# Patient Record
Sex: MALE | Race: WHITE | NOT HISPANIC OR LATINO
[De-identification: names, ages, dates, MRNs, and addresses within clinical notes are randomized per-mention and may not be internally consistent; named-entity substitution may affect disease eponyms.]

---

## 2015-04-08 RX ORDER — VERAPAMIL HCL 240 MG
1 CAPSULE, EXTENDED RELEASE PELLETS 24 HR ORAL
Qty: 0 | Refills: 0 | COMMUNITY
Start: 2015-04-08

## 2015-04-08 RX ORDER — VERAPAMIL HCL 240 MG
1 CAPSULE, EXTENDED RELEASE PELLETS 24 HR ORAL
Qty: 0 | Refills: 0 | DISCHARGE
Start: 2015-04-08

## 2017-01-06 ENCOUNTER — MEDICATION RENEWAL (OUTPATIENT)
Age: 67
End: 2017-01-06

## 2017-01-10 ENCOUNTER — APPOINTMENT (OUTPATIENT)
Dept: INTERNAL MEDICINE | Facility: CLINIC | Age: 67
End: 2017-01-10

## 2017-01-10 VITALS
DIASTOLIC BLOOD PRESSURE: 85 MMHG | TEMPERATURE: 98.2 F | SYSTOLIC BLOOD PRESSURE: 118 MMHG | HEIGHT: 66.5 IN | WEIGHT: 194 LBS | HEART RATE: 92 BPM | BODY MASS INDEX: 30.81 KG/M2

## 2017-01-17 ENCOUNTER — RX RENEWAL (OUTPATIENT)
Age: 67
End: 2017-01-17

## 2017-01-24 ENCOUNTER — APPOINTMENT (OUTPATIENT)
Dept: INTERNAL MEDICINE | Facility: CLINIC | Age: 67
End: 2017-01-24

## 2017-01-24 VITALS
SYSTOLIC BLOOD PRESSURE: 147 MMHG | BODY MASS INDEX: 31.82 KG/M2 | HEIGHT: 66 IN | DIASTOLIC BLOOD PRESSURE: 96 MMHG | HEART RATE: 87 BPM | WEIGHT: 198 LBS

## 2017-02-21 ENCOUNTER — RX RENEWAL (OUTPATIENT)
Age: 67
End: 2017-02-21

## 2017-03-07 ENCOUNTER — APPOINTMENT (OUTPATIENT)
Dept: INTERNAL MEDICINE | Facility: CLINIC | Age: 67
End: 2017-03-07

## 2017-03-07 VITALS
SYSTOLIC BLOOD PRESSURE: 140 MMHG | DIASTOLIC BLOOD PRESSURE: 78 MMHG | HEIGHT: 66.5 IN | WEIGHT: 198 LBS | HEART RATE: 80 BPM | BODY MASS INDEX: 31.44 KG/M2

## 2017-03-07 DIAGNOSIS — K59.09 OTHER CONSTIPATION: ICD-10-CM

## 2017-03-07 DIAGNOSIS — Z87.09 PERSONAL HISTORY OF OTHER DISEASES OF THE RESPIRATORY SYSTEM: ICD-10-CM

## 2017-03-07 DIAGNOSIS — Z87.39 PERSONAL HISTORY OF OTHER DISEASES OF THE MUSCULOSKELETAL SYSTEM AND CONNECTIVE TISSUE: ICD-10-CM

## 2017-03-07 RX ORDER — AZITHROMYCIN 250 MG/1
250 TABLET, FILM COATED ORAL
Qty: 6 | Refills: 0 | Status: DISCONTINUED | COMMUNITY
Start: 2017-01-10 | End: 2017-03-07

## 2017-03-08 ENCOUNTER — MESSAGE (OUTPATIENT)
Age: 67
End: 2017-03-08

## 2017-04-24 ENCOUNTER — LABORATORY RESULT (OUTPATIENT)
Age: 67
End: 2017-04-24

## 2017-04-25 ENCOUNTER — APPOINTMENT (OUTPATIENT)
Dept: INTERNAL MEDICINE | Facility: CLINIC | Age: 67
End: 2017-04-25

## 2017-04-25 ENCOUNTER — NON-APPOINTMENT (OUTPATIENT)
Age: 67
End: 2017-04-25

## 2017-04-25 VITALS — HEIGHT: 68 IN | BODY MASS INDEX: 30.92 KG/M2 | WEIGHT: 204 LBS

## 2017-04-25 VITALS — DIASTOLIC BLOOD PRESSURE: 110 MMHG | SYSTOLIC BLOOD PRESSURE: 166 MMHG | HEART RATE: 88 BPM

## 2017-04-25 VITALS — TEMPERATURE: 98.6 F

## 2017-04-25 RX ORDER — TAPENTADOL HYDROCHLORIDE 75 MG/1
75 TABLET, FILM COATED ORAL TWICE DAILY
Qty: 60 | Refills: 0 | Status: DISCONTINUED | COMMUNITY
Start: 2017-01-10 | End: 2017-04-25

## 2017-04-26 LAB
25(OH)D3 SERPL-MCNC: 43.5 NG/ML
BASOPHILS # BLD AUTO: 0.04 K/UL
BASOPHILS NFR BLD AUTO: 0.4 %
CHOLEST SERPL-MCNC: 230 MG/DL
CHOLEST/HDLC SERPL: 4.3 RATIO
EOSINOPHIL # BLD AUTO: 0.15 K/UL
EOSINOPHIL NFR BLD AUTO: 1.4 %
HBA1C MFR BLD HPLC: 5.3 %
HCT VFR BLD CALC: 44.7 %
HDLC SERPL-MCNC: 54 MG/DL
HGB BLD-MCNC: 15.4 G/DL
IMM GRANULOCYTES NFR BLD AUTO: 0.8 %
LDLC SERPL CALC-MCNC: 139 MG/DL
LYMPHOCYTES # BLD AUTO: 2.21 K/UL
LYMPHOCYTES NFR BLD AUTO: 19.9 %
MAN DIFF?: NORMAL
MCHC RBC-ENTMCNC: 31.7 PG
MCHC RBC-ENTMCNC: 34.5 GM/DL
MCV RBC AUTO: 92 FL
MONOCYTES # BLD AUTO: 0.96 K/UL
MONOCYTES NFR BLD AUTO: 8.7 %
NEUTROPHILS # BLD AUTO: 7.63 K/UL
NEUTROPHILS NFR BLD AUTO: 68.8 %
PLATELET # BLD AUTO: 219 K/UL
PSA SERPL-MCNC: 0.58 NG/ML
RBC # BLD: 4.86 M/UL
RBC # FLD: 14.8 %
T3FREE SERPL-MCNC: 3.12 PG/ML
T4 SERPL-MCNC: 7.1 UG/DL
TRIGL SERPL-MCNC: 184 MG/DL
TSH SERPL-ACNC: 2.08 UIU/ML
WBC # FLD AUTO: 11.08 K/UL

## 2017-06-09 ENCOUNTER — APPOINTMENT (OUTPATIENT)
Dept: ORTHOPEDIC SURGERY | Facility: CLINIC | Age: 67
End: 2017-06-09

## 2017-06-28 ENCOUNTER — APPOINTMENT (OUTPATIENT)
Dept: INTERNAL MEDICINE | Facility: CLINIC | Age: 67
End: 2017-06-28

## 2017-06-28 VITALS
BODY MASS INDEX: 33.04 KG/M2 | WEIGHT: 218 LBS | HEIGHT: 68 IN | HEART RATE: 85 BPM | SYSTOLIC BLOOD PRESSURE: 160 MMHG | DIASTOLIC BLOOD PRESSURE: 96 MMHG

## 2017-06-28 RX ORDER — TAPENTADOL HYDROCHLORIDE 100 MG/1
100 TABLET, FILM COATED ORAL
Qty: 60 | Refills: 0 | Status: DISCONTINUED | COMMUNITY
Start: 2017-04-25 | End: 2017-06-28

## 2017-07-18 ENCOUNTER — RX RENEWAL (OUTPATIENT)
Age: 67
End: 2017-07-18

## 2017-07-21 ENCOUNTER — APPOINTMENT (OUTPATIENT)
Dept: INTERNAL MEDICINE | Facility: CLINIC | Age: 67
End: 2017-07-21

## 2017-07-21 VITALS
WEIGHT: 208 LBS | BODY MASS INDEX: 31.52 KG/M2 | DIASTOLIC BLOOD PRESSURE: 93 MMHG | SYSTOLIC BLOOD PRESSURE: 135 MMHG | HEIGHT: 68 IN | HEART RATE: 82 BPM

## 2017-07-21 VITALS — TEMPERATURE: 98.7 F

## 2017-07-21 DIAGNOSIS — G91.2 (IDIOPATHIC) NORMAL PRESSURE HYDROCEPHALUS: ICD-10-CM

## 2017-07-23 ENCOUNTER — FORM ENCOUNTER (OUTPATIENT)
Age: 67
End: 2017-07-23

## 2017-07-24 ENCOUNTER — APPOINTMENT (OUTPATIENT)
Dept: CT IMAGING | Facility: CLINIC | Age: 67
End: 2017-07-24

## 2017-07-24 ENCOUNTER — OUTPATIENT (OUTPATIENT)
Dept: OUTPATIENT SERVICES | Facility: HOSPITAL | Age: 67
LOS: 1 days | End: 2017-07-24
Payer: MEDICARE

## 2017-07-24 DIAGNOSIS — Z00.8 ENCOUNTER FOR OTHER GENERAL EXAMINATION: ICD-10-CM

## 2017-07-24 PROCEDURE — 70450 CT HEAD/BRAIN W/O DYE: CPT

## 2017-07-24 PROCEDURE — 70450 CT HEAD/BRAIN W/O DYE: CPT | Mod: 26

## 2017-07-25 ENCOUNTER — MESSAGE (OUTPATIENT)
Age: 67
End: 2017-07-25

## 2017-08-01 ENCOUNTER — APPOINTMENT (OUTPATIENT)
Dept: INTERNAL MEDICINE | Facility: CLINIC | Age: 67
End: 2017-08-01
Payer: MEDICARE

## 2017-08-01 VITALS
SYSTOLIC BLOOD PRESSURE: 179 MMHG | HEIGHT: 67 IN | BODY MASS INDEX: 32.33 KG/M2 | DIASTOLIC BLOOD PRESSURE: 102 MMHG | WEIGHT: 206 LBS

## 2017-08-01 PROCEDURE — 99214 OFFICE O/P EST MOD 30 MIN: CPT

## 2017-08-15 ENCOUNTER — RX RENEWAL (OUTPATIENT)
Age: 67
End: 2017-08-15

## 2017-08-15 ENCOUNTER — MESSAGE (OUTPATIENT)
Age: 67
End: 2017-08-15

## 2017-09-13 ENCOUNTER — RX RENEWAL (OUTPATIENT)
Age: 67
End: 2017-09-13

## 2017-10-13 ENCOUNTER — MEDICATION RENEWAL (OUTPATIENT)
Age: 67
End: 2017-10-13

## 2017-11-16 ENCOUNTER — MESSAGE (OUTPATIENT)
Age: 67
End: 2017-11-16

## 2017-11-17 ENCOUNTER — APPOINTMENT (OUTPATIENT)
Dept: INTERNAL MEDICINE | Facility: CLINIC | Age: 67
End: 2017-11-17

## 2017-12-12 ENCOUNTER — APPOINTMENT (OUTPATIENT)
Dept: INTERNAL MEDICINE | Facility: CLINIC | Age: 67
End: 2017-12-12
Payer: MEDICARE

## 2017-12-12 VITALS
SYSTOLIC BLOOD PRESSURE: 207 MMHG | BODY MASS INDEX: 33.43 KG/M2 | HEIGHT: 67 IN | WEIGHT: 213 LBS | HEART RATE: 87 BPM | DIASTOLIC BLOOD PRESSURE: 97 MMHG

## 2017-12-12 PROCEDURE — 99214 OFFICE O/P EST MOD 30 MIN: CPT

## 2018-02-12 ENCOUNTER — RX RENEWAL (OUTPATIENT)
Age: 68
End: 2018-02-12

## 2018-03-13 ENCOUNTER — LABORATORY RESULT (OUTPATIENT)
Age: 68
End: 2018-03-13

## 2018-03-13 ENCOUNTER — APPOINTMENT (OUTPATIENT)
Dept: INTERNAL MEDICINE | Facility: CLINIC | Age: 68
End: 2018-03-13
Payer: MEDICARE

## 2018-03-13 VITALS
SYSTOLIC BLOOD PRESSURE: 174 MMHG | WEIGHT: 206 LBS | HEART RATE: 96 BPM | DIASTOLIC BLOOD PRESSURE: 111 MMHG | HEIGHT: 67 IN | BODY MASS INDEX: 32.33 KG/M2

## 2018-03-13 VITALS — DIASTOLIC BLOOD PRESSURE: 74 MMHG | SYSTOLIC BLOOD PRESSURE: 148 MMHG

## 2018-03-13 DIAGNOSIS — J32.9 CHRONIC SINUSITIS, UNSPECIFIED: ICD-10-CM

## 2018-03-13 PROCEDURE — 36415 COLL VENOUS BLD VENIPUNCTURE: CPT

## 2018-03-13 PROCEDURE — 99214 OFFICE O/P EST MOD 30 MIN: CPT | Mod: 25

## 2018-03-13 RX ORDER — OXYCODONE 10 MG/1
10 TABLET ORAL EVERY 6 HOURS
Qty: 120 | Refills: 0 | Status: DISCONTINUED | COMMUNITY
Start: 2017-08-01 | End: 2018-03-13

## 2018-03-13 RX ORDER — LIDOCAINE 4% 4 G/100G
4 CREAM TOPICAL TWICE DAILY
Qty: 1 | Refills: 1 | Status: DISCONTINUED | COMMUNITY
Start: 2017-07-21 | End: 2018-03-13

## 2018-03-14 ENCOUNTER — RX RENEWAL (OUTPATIENT)
Age: 68
End: 2018-03-14

## 2018-03-14 PROBLEM — J32.9 RHINOSINUSITIS: Status: ACTIVE | Noted: 2018-03-14

## 2018-03-15 LAB
ALBUMIN SERPL ELPH-MCNC: 4 G/DL
ALP BLD-CCNC: 53 U/L
ALT SERPL-CCNC: 14 U/L
ANION GAP SERPL CALC-SCNC: 15 MMOL/L
APTT BLD: 30.9 SEC
AST SERPL-CCNC: 15 U/L
BASOPHILS # BLD AUTO: 0.02 K/UL
BASOPHILS NFR BLD AUTO: 0.3 %
BILIRUB SERPL-MCNC: 0.4 MG/DL
BUN SERPL-MCNC: 13 MG/DL
CALCIUM SERPL-MCNC: 9.9 MG/DL
CHLORIDE SERPL-SCNC: 104 MMOL/L
CHOLEST SERPL-MCNC: 175 MG/DL
CHOLEST/HDLC SERPL: 4.5 RATIO
CO2 SERPL-SCNC: 24 MMOL/L
CREAT SERPL-MCNC: 1.13 MG/DL
EOSINOPHIL # BLD AUTO: 0.19 K/UL
EOSINOPHIL NFR BLD AUTO: 2.6 %
GLUCOSE SERPL-MCNC: 174 MG/DL
HBA1C MFR BLD HPLC: 5.4 %
HCT VFR BLD CALC: 45.4 %
HDLC SERPL-MCNC: 39 MG/DL
HGB BLD-MCNC: 15.3 G/DL
IMM GRANULOCYTES NFR BLD AUTO: 1 %
INR PPP: 1.06 RATIO
LDLC SERPL CALC-MCNC: 110 MG/DL
LYMPHOCYTES # BLD AUTO: 1.76 K/UL
LYMPHOCYTES NFR BLD AUTO: 24.1 %
MAN DIFF?: NORMAL
MCHC RBC-ENTMCNC: 31 PG
MCHC RBC-ENTMCNC: 33.7 GM/DL
MCV RBC AUTO: 92.1 FL
MONOCYTES # BLD AUTO: 0.49 K/UL
MONOCYTES NFR BLD AUTO: 6.7 %
NEUTROPHILS # BLD AUTO: 4.76 K/UL
NEUTROPHILS NFR BLD AUTO: 65.3 %
PLATELET # BLD AUTO: 215 K/UL
POTASSIUM SERPL-SCNC: 4 MMOL/L
PROT SERPL-MCNC: 7 G/DL
PT BLD: 12 SEC
RBC # BLD: 4.93 M/UL
RBC # FLD: 14.8 %
SODIUM SERPL-SCNC: 143 MMOL/L
TRIGL SERPL-MCNC: 128 MG/DL
WBC # FLD AUTO: 7.29 K/UL

## 2018-04-13 ENCOUNTER — APPOINTMENT (OUTPATIENT)
Dept: INTERNAL MEDICINE | Facility: CLINIC | Age: 68
End: 2018-04-13
Payer: MEDICARE

## 2018-04-13 ENCOUNTER — NON-APPOINTMENT (OUTPATIENT)
Age: 68
End: 2018-04-13

## 2018-04-13 VITALS
HEIGHT: 67 IN | DIASTOLIC BLOOD PRESSURE: 94 MMHG | HEART RATE: 94 BPM | WEIGHT: 212 LBS | SYSTOLIC BLOOD PRESSURE: 168 MMHG | BODY MASS INDEX: 33.27 KG/M2

## 2018-04-13 LAB
ALBUMIN SERPL ELPH-MCNC: 3.9 G/DL
ALP BLD-CCNC: 55 U/L
ALT SERPL-CCNC: 10 U/L
ANION GAP SERPL CALC-SCNC: 12 MMOL/L
APTT BLD: 30.8 SEC
AST SERPL-CCNC: 13 U/L
BASOPHILS # BLD AUTO: 0.03 K/UL
BASOPHILS NFR BLD AUTO: 0.4 %
BILIRUB SERPL-MCNC: 0.4 MG/DL
BILIRUB UR QL STRIP: NORMAL
BUN SERPL-MCNC: 12 MG/DL
CALCIUM SERPL-MCNC: 9.7 MG/DL
CHLORIDE SERPL-SCNC: 103 MMOL/L
CLARITY UR: CLEAR
CO2 SERPL-SCNC: 26 MMOL/L
COLLECTION METHOD: NORMAL
CREAT SERPL-MCNC: 1.03 MG/DL
EOSINOPHIL # BLD AUTO: 0.13 K/UL
EOSINOPHIL NFR BLD AUTO: 1.5 %
GLUCOSE SERPL-MCNC: 89 MG/DL
GLUCOSE UR-MCNC: NORMAL
HBA1C MFR BLD HPLC: 5.2 %
HCG UR QL: 0.2 EU/DL
HCT VFR BLD CALC: 42.5 %
HGB BLD-MCNC: 14.5 G/DL
HGB UR QL STRIP.AUTO: NORMAL
IMM GRANULOCYTES NFR BLD AUTO: 0.8 %
INR PPP: 0.94 RATIO
KETONES UR-MCNC: NORMAL
LEUKOCYTE ESTERASE UR QL STRIP: NORMAL
LYMPHOCYTES # BLD AUTO: 1.16 K/UL
LYMPHOCYTES NFR BLD AUTO: 13.6 %
MAN DIFF?: NORMAL
MCHC RBC-ENTMCNC: 31.5 PG
MCHC RBC-ENTMCNC: 34.1 GM/DL
MCV RBC AUTO: 92.2 FL
MONOCYTES # BLD AUTO: 0.78 K/UL
MONOCYTES NFR BLD AUTO: 9.2 %
NEUTROPHILS # BLD AUTO: 6.35 K/UL
NEUTROPHILS NFR BLD AUTO: 74.5 %
NITRITE UR QL STRIP: NORMAL
PH UR STRIP: 5.5
PLATELET # BLD AUTO: 183 K/UL
POTASSIUM SERPL-SCNC: 4.1 MMOL/L
PROT SERPL-MCNC: 6.7 G/DL
PROT UR STRIP-MCNC: NORMAL
PT BLD: 10.6 SEC
RBC # BLD: 4.61 M/UL
RBC # FLD: 14.3 %
SAVE SPECIMEN: NORMAL
SODIUM SERPL-SCNC: 141 MMOL/L
SP GR UR STRIP: 1.02
TSH SERPL-ACNC: 1.53 UIU/ML
WBC # FLD AUTO: 8.52 K/UL

## 2018-04-13 PROCEDURE — 81003 URINALYSIS AUTO W/O SCOPE: CPT | Mod: QW

## 2018-04-13 PROCEDURE — 93000 ELECTROCARDIOGRAM COMPLETE: CPT

## 2018-04-13 PROCEDURE — 99214 OFFICE O/P EST MOD 30 MIN: CPT | Mod: 25

## 2018-04-13 RX ORDER — NALOXEGOL OXALATE 25 MG/1
25 TABLET, FILM COATED ORAL
Qty: 30 | Refills: 0 | Status: DISCONTINUED | COMMUNITY
Start: 2017-11-12 | End: 2018-04-13

## 2018-04-13 RX ORDER — IPRATROPIUM BROMIDE 42 UG/1
0.06 SPRAY NASAL 3 TIMES DAILY
Qty: 90 | Refills: 1 | Status: DISCONTINUED | COMMUNITY
Start: 2018-03-14 | End: 2018-04-13

## 2018-05-04 ENCOUNTER — RX RENEWAL (OUTPATIENT)
Age: 68
End: 2018-05-04

## 2018-06-06 ENCOUNTER — RX RENEWAL (OUTPATIENT)
Age: 68
End: 2018-06-06

## 2018-06-21 ENCOUNTER — APPOINTMENT (OUTPATIENT)
Dept: INTERNAL MEDICINE | Facility: CLINIC | Age: 68
End: 2018-06-21
Payer: MEDICARE

## 2018-06-21 ENCOUNTER — LABORATORY RESULT (OUTPATIENT)
Age: 68
End: 2018-06-21

## 2018-06-21 ENCOUNTER — NON-APPOINTMENT (OUTPATIENT)
Age: 68
End: 2018-06-21

## 2018-06-21 VITALS
BODY MASS INDEX: 31.22 KG/M2 | SYSTOLIC BLOOD PRESSURE: 178 MMHG | HEIGHT: 68 IN | DIASTOLIC BLOOD PRESSURE: 103 MMHG | WEIGHT: 206 LBS | HEART RATE: 78 BPM

## 2018-06-21 PROCEDURE — 99214 OFFICE O/P EST MOD 30 MIN: CPT | Mod: 25

## 2018-06-21 PROCEDURE — 93000 ELECTROCARDIOGRAM COMPLETE: CPT

## 2018-06-21 PROCEDURE — 36415 COLL VENOUS BLD VENIPUNCTURE: CPT

## 2018-06-22 ENCOUNTER — RESULT CHARGE (OUTPATIENT)
Age: 68
End: 2018-06-22

## 2018-06-24 ENCOUNTER — EMERGENCY (EMERGENCY)
Facility: HOSPITAL | Age: 68
LOS: 1 days | Discharge: HOME | End: 2018-06-24
Attending: EMERGENCY MEDICINE
Payer: MEDICARE

## 2018-06-24 VITALS
RESPIRATION RATE: 16 BRPM | SYSTOLIC BLOOD PRESSURE: 152 MMHG | DIASTOLIC BLOOD PRESSURE: 83 MMHG | OXYGEN SATURATION: 97 % | HEART RATE: 63 BPM

## 2018-06-24 VITALS
OXYGEN SATURATION: 96 % | HEART RATE: 57 BPM | RESPIRATION RATE: 20 BRPM | HEIGHT: 68 IN | DIASTOLIC BLOOD PRESSURE: 62 MMHG | WEIGHT: 199.96 LBS | TEMPERATURE: 98 F | SYSTOLIC BLOOD PRESSURE: 176 MMHG

## 2018-06-24 PROCEDURE — 99283 EMERGENCY DEPT VISIT LOW MDM: CPT

## 2018-06-24 PROCEDURE — 99283 EMERGENCY DEPT VISIT LOW MDM: CPT | Mod: GC

## 2018-06-24 RX ORDER — ACETAMINOPHEN 500 MG
975 TABLET ORAL ONCE
Qty: 0 | Refills: 0 | Status: COMPLETED | OUTPATIENT
Start: 2018-06-24 | End: 2018-06-24

## 2018-06-24 RX ORDER — LIDOCAINE 4 G/100G
1 CREAM TOPICAL ONCE
Qty: 0 | Refills: 0 | Status: COMPLETED | OUTPATIENT
Start: 2018-06-24 | End: 2018-06-24

## 2018-06-24 RX ORDER — DIAZEPAM 5 MG
1 TABLET ORAL
Qty: 20 | Refills: 0 | OUTPATIENT
Start: 2018-06-24 | End: 2018-06-28

## 2018-06-24 RX ORDER — DIAZEPAM 5 MG
5 TABLET ORAL ONCE
Qty: 0 | Refills: 0 | Status: DISCONTINUED | OUTPATIENT
Start: 2018-06-24 | End: 2018-06-24

## 2018-06-24 RX ADMIN — Medication 5 MILLIGRAM(S): at 13:16

## 2018-06-24 RX ADMIN — Medication 975 MILLIGRAM(S): at 12:22

## 2018-06-24 RX ADMIN — LIDOCAINE 1 PATCH: 4 CREAM TOPICAL at 12:23

## 2018-06-24 RX ADMIN — Medication 5 MILLIGRAM(S): at 12:22

## 2018-06-24 NOTE — ED PROVIDER NOTE - CARE PLAN
Principal Discharge DX:	Back pain Principal Discharge DX:	Chronic back pain greater than 3 months duration

## 2018-06-24 NOTE — ED ADULT NURSE NOTE - PSH
Dehiscence, Operation Wound/Debridement  infection  S/P Laminectomy  L4-L5 2009  complicated by infection requiring  antibiiotcis  S/P lumbar fusion  L4-L5 on Oct 2011  pins/screws 2012   spinal surgery 2013

## 2018-06-24 NOTE — ED ADULT TRIAGE NOTE - CHIEF COMPLAINT QUOTE
back pain, worse this morning, pt. has spinal stimulator placed 6/2/17. Pt. had L5 fusion on 4/26/18

## 2018-06-24 NOTE — ED PROVIDER NOTE - OBJECTIVE STATEMENT
66 yo male with PMH of chronic back pain s/p multiple back surgeries, recent cage put in about 2 months ago and TENS unit placed about one year ago,  shunt for hydrocephalus, presenting with left lower back pain radiating down LLE. Pt scheduled for removal of device at [facility] in three weeks. Pt able to ambulate, denies paresthesias in saddle area or extremities, denies weakness, denies bowel or bladder incontinence, denies fever, chills, trauma. Patient's patient advocate at bedside accompanying patient.

## 2018-06-24 NOTE — ED ADULT NURSE REASSESSMENT NOTE - NS ED NURSE REASSESS COMMENT FT1
pt was ambulATORY TO BATHROOM WITH CANE WITH GOOD GAIT SOME RELIEF VERBALIZED PT TO FOLLOW UP WITH PAIN MANAGEMENT DISCHARGED WITH SCRIPT SENT ELECTRONICALLY TO [facility]

## 2018-06-24 NOTE — ED ADULT NURSE NOTE - PMH
Anxiety    Chronic back pain greater than 3 months duration    Depression    HTN (Hypertension)    Insomnia    Lumbar disc displacement without myelopathy    NPH (normal pressure hydrocephalus)    Sciatica    Small intestine disorder  "ilitis"   steroids   1967  Vertebral Sciatica

## 2018-06-24 NOTE — ED PROVIDER NOTE - PROGRESS NOTE DETAILS
Patient feels improved. States pain 8/10 upon arrival, 4/10 at this time. Pt will follow up with his neurosurgeon and pain management doctor outpatient.

## 2018-06-24 NOTE — ED PROVIDER NOTE - ATTENDING CONTRIBUTION TO CARE
ATTENDING MD:  I, Renny Chairez, personally have seen and examined this patient.  I have discussed all aspects of care with the resident physician. Resident note reviewed and agree on plan of care and except where noted.  See HPI, PE, and MDM for details.    well appearing male, NAD, AOx4. heart normal rate, regular rhythm, lungs clear to auscultation bilaterally, equal breath sounds bilaterally, abd soft notnender. no suprapubic fullness or genderness, no CVAT. no midline or lateralized back tenderness. 5/5 strength in all extremities, sensation intact to light touch throughout trunk and extremities, negative straight leg raise bilatearlly. implanted unit palpable in back/flank. 2+ patellar reflexes. normal gait.    MDM: pt with chronic back pain seeking relief. offered multile nonopiates which he said he has had minimal relief in the past, mild relief provided with valium. will give short course of valium, advised f/u with pain management and spin clinic. no concern for acute neurologic deficit. stable for DC,

## 2018-06-24 NOTE — ED ADULT NURSE NOTE - OBJECTIVE STATEMENT
pt presents with lower left back buttock pain radiating down posterior left leg hx of such x 1 year after battery change in implant stimulator to back at [place] 6/2017 pt scheduled for device removal 7/10/18 at [facility] states feels worsening discomfort pt ambulatory with cane or walker per patient pt took zanaflex this morning 8 am no relief

## 2018-06-24 NOTE — ED PROVIDER NOTE - MEDICAL DECISION MAKING DETAILS
Valium, tylenol, lidoderm patch, reasess; no indication for imaging or labs at this time; no red flag symptoms, normal physical exam

## 2018-06-25 LAB
ALBUMIN SERPL ELPH-MCNC: 4.3 G/DL
ALP BLD-CCNC: 71 U/L
ALT SERPL-CCNC: 7 U/L
ANION GAP SERPL CALC-SCNC: 17 MMOL/L
APTT BLD: 32.9 SEC
AST SERPL-CCNC: 13 U/L
BASOPHILS # BLD AUTO: 0.04 K/UL
BASOPHILS NFR BLD AUTO: 0.4 %
BILIRUB SERPL-MCNC: 0.5 MG/DL
BILIRUB UR QL STRIP: NORMAL
BUN SERPL-MCNC: 11 MG/DL
CALCIUM SERPL-MCNC: 10 MG/DL
CHLORIDE SERPL-SCNC: 103 MMOL/L
CLARITY UR: CLEAR
CO2 SERPL-SCNC: 25 MMOL/L
COLLECTION METHOD: NORMAL
CREAT SERPL-MCNC: 1.02 MG/DL
EOSINOPHIL # BLD AUTO: 0.23 K/UL
EOSINOPHIL NFR BLD AUTO: 2.4 %
GLUCOSE SERPL-MCNC: 84 MG/DL
GLUCOSE UR-MCNC: NORMAL
HCG UR QL: 0.2 EU/DL
HCT VFR BLD CALC: 45.5 %
HGB BLD-MCNC: 14.7 G/DL
HGB UR QL STRIP.AUTO: NORMAL
IMM GRANULOCYTES NFR BLD AUTO: 1 %
INR PPP: 1 RATIO
KETONES UR-MCNC: NORMAL
LEUKOCYTE ESTERASE UR QL STRIP: NORMAL
LYMPHOCYTES # BLD AUTO: 2.04 K/UL
LYMPHOCYTES NFR BLD AUTO: 21.1 %
MAN DIFF?: NORMAL
MCHC RBC-ENTMCNC: 30.2 PG
MCHC RBC-ENTMCNC: 32.3 GM/DL
MCV RBC AUTO: 93.4 FL
MONOCYTES # BLD AUTO: 0.88 K/UL
MONOCYTES NFR BLD AUTO: 9.1 %
NEUTROPHILS # BLD AUTO: 6.37 K/UL
NEUTROPHILS NFR BLD AUTO: 66 %
NITRITE UR QL STRIP: NORMAL
PH UR STRIP: 6
PLATELET # BLD AUTO: 244 K/UL
POTASSIUM SERPL-SCNC: 4.4 MMOL/L
PROT SERPL-MCNC: 7.5 G/DL
PROT UR STRIP-MCNC: NORMAL
PT BLD: 11.3 SEC
RBC # BLD: 4.87 M/UL
RBC # FLD: 14.5 %
SODIUM SERPL-SCNC: 145 MMOL/L
SP GR UR STRIP: 1.01
WBC # FLD AUTO: 9.66 K/UL

## 2018-06-25 NOTE — ASSESSMENT
[FreeTextEntry4] :     Patient has no history fo diabetes or heart disease. EKG NSR @ 70 BPM. CBC, CMP , PT/PTT and U/A REviewed all WNL.  Patient is medically cleared and optimized for upcoming removal of spinal stimulator.  Took a muscles relaxant.  Took a Ritalin just before coming, Blood pressure came up quite a bit today but was normal on prior visits while on enalapril 20mg BID and hydralazine 50mg BID.    Will leave medications the way they are, should continue the morning of the surgery, avoid using Ritalin that day.\par No history of heart disease or diabetes, he is a low risk patient going for a low risk surgery. Patient is medically cleared and optimized for upcoming battery removal procedure.   \par \par Reccomended he avoid all aspirin and NSAIDS in the week leading up to the surgery.  \par \par Patient is pain free at the level of the lower spine and its mostly the spinal stimulater battery that seems to be bothering him now.  WIll hope this finally makes him pain free enough to get back to a more normal life and get some activity and exercise.

## 2018-06-25 NOTE — HISTORY OF PRESENT ILLNESS
[Coronary Artery Disease] : no coronary artery disease [Diabetes] : no diabetes [Sleep Apnea] : no sleep apnea [COPD] : no COPD [Previous Adverse Anesthesia Reaction] : no previous adverse anesthesia reaction [FreeTextEntry1] : Neurostim battery removal.   [FreeTextEntry2] : 7/10/18 [FreeTextEntry3] : Dr Hill Quiroz [FreeTextEntry4] : This is a 67 year old man with a complex history of ack pain s/p spinal fusion with multiple revisions now presents for pre-surgical clearance prior to removal of  a neuro stim device with Dr. Quiroz on 7/10/18.  Back is better now however the quality of life is still "terrible " given pain form the Battery.  \par Was in a lot of pain this morning.  Took a muscle relaxant and a Ritalin\par \par

## 2018-06-25 NOTE — REVIEW OF SYSTEMS
[Negative] : Psychiatric [FreeTextEntry9] : Area around the spinal stimulator tender.   [de-identified] : Strength improved a great deal.

## 2018-07-20 ENCOUNTER — RX RENEWAL (OUTPATIENT)
Age: 68
End: 2018-07-20

## 2018-07-20 ENCOUNTER — APPOINTMENT (OUTPATIENT)
Dept: INTERNAL MEDICINE | Facility: CLINIC | Age: 68
End: 2018-07-20
Payer: MEDICARE

## 2018-07-20 VITALS
TEMPERATURE: 98.2 F | HEIGHT: 68 IN | DIASTOLIC BLOOD PRESSURE: 115 MMHG | BODY MASS INDEX: 31.07 KG/M2 | WEIGHT: 205 LBS | SYSTOLIC BLOOD PRESSURE: 196 MMHG

## 2018-07-20 DIAGNOSIS — L76.82 OTHER POSTPROCEDURAL COMPLICATIONS OF SKIN AND SUBCUTANEOUS TISSUE: ICD-10-CM

## 2018-07-20 PROCEDURE — 99214 OFFICE O/P EST MOD 30 MIN: CPT

## 2018-07-23 ENCOUNTER — APPOINTMENT (OUTPATIENT)
Dept: INTERNAL MEDICINE | Facility: CLINIC | Age: 68
End: 2018-07-23
Payer: MEDICARE

## 2018-07-23 ENCOUNTER — LABORATORY RESULT (OUTPATIENT)
Age: 68
End: 2018-07-23

## 2018-07-23 VITALS
HEIGHT: 68 IN | WEIGHT: 205 LBS | BODY MASS INDEX: 31.07 KG/M2 | DIASTOLIC BLOOD PRESSURE: 86 MMHG | SYSTOLIC BLOOD PRESSURE: 162 MMHG | HEART RATE: 102 BPM

## 2018-07-23 PROCEDURE — 99213 OFFICE O/P EST LOW 20 MIN: CPT | Mod: 25

## 2018-07-23 PROCEDURE — 36415 COLL VENOUS BLD VENIPUNCTURE: CPT

## 2018-07-23 RX ORDER — TAPENTADOL HYDROCHLORIDE 50 MG/1
50 TABLET, FILM COATED ORAL TWICE DAILY
Qty: 15 | Refills: 0 | Status: DISCONTINUED | COMMUNITY
Start: 2018-07-20 | End: 2018-07-23

## 2018-07-23 RX ORDER — NAPROXEN 500 MG/1
500 TABLET ORAL
Qty: 180 | Refills: 0 | Status: DISCONTINUED | COMMUNITY
Start: 2018-07-20 | End: 2018-07-23

## 2018-07-23 NOTE — ASSESSMENT
[FreeTextEntry1] : Pain: recommended following up with Surgeon\par Continue current medications \par CBC. \par

## 2018-07-23 NOTE — PHYSICAL EXAM
[No Acute Distress] : no acute distress [Well Nourished] : well nourished [Well Developed] : well developed [Well-Appearing] : well-appearing [No Respiratory Distress] : no respiratory distress  [Clear to Auscultation] : lungs were clear to auscultation bilaterally [No Accessory Muscle Use] : no accessory muscle use [Normal Rate] : normal rate  [Regular Rhythm] : with a regular rhythm [Normal S1, S2] : normal S1 and S2 [No Murmur] : no murmur heard [Normal Affect] : the affect was normal [Normal Insight/Judgement] : insight and judgment were intact [de-identified] : two incisions, both with stitches, both intact, no warmth/redness/swelling

## 2018-07-23 NOTE — HISTORY OF PRESENT ILLNESS
[FreeTextEntry8] : This is a 67 year old male here today with complaints of back pain post surgery 2 weeks ago. He states that he has had Over a week of pain in the back and redness.  He had seen Dr Lawson Friday. He was started on Keflex, Naproxen, and Nucynta. Denies fevers, chills.  He states the pain is worse.  He has not yet called his surgeon but he is following up on Thursday. Denies fevers, chills.  He states the Naprosyn doesn’t work and hurts his stomach.

## 2018-07-24 LAB
ALBUMIN SERPL ELPH-MCNC: 4.8 G/DL
ALP BLD-CCNC: 77 U/L
ALT SERPL-CCNC: 11 U/L
ANION GAP SERPL CALC-SCNC: 18 MMOL/L
AST SERPL-CCNC: 16 U/L
BASOPHILS # BLD AUTO: 0.03 K/UL
BASOPHILS NFR BLD AUTO: 0.3 %
BILIRUB SERPL-MCNC: 0.5 MG/DL
BUN SERPL-MCNC: 9 MG/DL
CALCIUM SERPL-MCNC: 9.5 MG/DL
CHLORIDE SERPL-SCNC: 102 MMOL/L
CO2 SERPL-SCNC: 24 MMOL/L
CREAT SERPL-MCNC: 0.92 MG/DL
EOSINOPHIL # BLD AUTO: 0.09 K/UL
EOSINOPHIL NFR BLD AUTO: 0.8 %
GLUCOSE SERPL-MCNC: 82 MG/DL
HCT VFR BLD CALC: 45.9 %
HGB BLD-MCNC: 15 G/DL
IMM GRANULOCYTES NFR BLD AUTO: 0.8 %
LYMPHOCYTES # BLD AUTO: 1.55 K/UL
LYMPHOCYTES NFR BLD AUTO: 14.4 %
MAN DIFF?: NORMAL
MCHC RBC-ENTMCNC: 29.6 PG
MCHC RBC-ENTMCNC: 32.7 GM/DL
MCV RBC AUTO: 90.5 FL
MONOCYTES # BLD AUTO: 0.68 K/UL
MONOCYTES NFR BLD AUTO: 6.3 %
NEUTROPHILS # BLD AUTO: 8.31 K/UL
NEUTROPHILS NFR BLD AUTO: 77.4 %
PLATELET # BLD AUTO: 230 K/UL
POTASSIUM SERPL-SCNC: 4 MMOL/L
PROT SERPL-MCNC: 7.5 G/DL
RBC # BLD: 5.07 M/UL
RBC # FLD: 13.8 %
SODIUM SERPL-SCNC: 144 MMOL/L
WBC # FLD AUTO: 10.75 K/UL

## 2018-07-25 ENCOUNTER — MESSAGE (OUTPATIENT)
Age: 68
End: 2018-07-25

## 2018-07-25 NOTE — PHYSICAL EXAM

## 2018-07-25 NOTE — ASSESSMENT
[FreeTextEntry1] : Patient has erythema round the suture site where the stimulater battery was removed. THe site near the spine is normal appearing.  No fluctuance, not a wound abscess ,mild cellulitis at the local area.  Reccomended restart the kevflex 500mg PO TID.  Cover pain with percocet 10gmg  PO TID.  Recommended he return to his surgeon to monitor the wound.

## 2018-07-25 NOTE — HISTORY OF PRESENT ILLNESS
[FreeTextEntry8] : Patient presents after having the spinal stimulator removed. IT had been hurting hem at the local site and not really helpign with his back pain. was removed successfully however there were some pain issues afterwards.  Was given only a few days of  percocet after leaving the hospital. Pain started immediately afterwards.  Returned to the hospital to have it re-evaluated Was given 2 days of ceflex.    AFter being home for a few more days started to hurt again.  Site became red.  Denies fevers or chills.  Pain int the back is considerably better, all the pain is at the local site.

## 2018-08-23 ENCOUNTER — APPOINTMENT (OUTPATIENT)
Dept: INTERNAL MEDICINE | Facility: CLINIC | Age: 68
End: 2018-08-23
Payer: MEDICARE

## 2018-08-23 VITALS
SYSTOLIC BLOOD PRESSURE: 193 MMHG | HEIGHT: 67 IN | BODY MASS INDEX: 32.96 KG/M2 | WEIGHT: 210 LBS | HEART RATE: 86 BPM | DIASTOLIC BLOOD PRESSURE: 101 MMHG

## 2018-08-23 PROCEDURE — 99214 OFFICE O/P EST MOD 30 MIN: CPT

## 2018-08-23 RX ORDER — HYDRALAZINE HYDROCHLORIDE 50 MG/1
50 TABLET ORAL
Qty: 180 | Refills: 0 | Status: DISCONTINUED | COMMUNITY
Start: 2018-03-13 | End: 2018-08-23

## 2018-08-24 VITALS — DIASTOLIC BLOOD PRESSURE: 90 MMHG | SYSTOLIC BLOOD PRESSURE: 186 MMHG

## 2018-08-24 NOTE — PHYSICAL EXAM
[No Acute Distress] : no acute distress [Well Nourished] : well nourished [Well Developed] : well developed [Well-Appearing] : well-appearing [Normal Sclera/Conjunctiva] : normal sclera/conjunctiva [PERRL] : pupils equal round and reactive to light [EOMI] : extraocular movements intact [Normal Outer Ear/Nose] : the outer ears and nose were normal in appearance [Normal Oropharynx] : the oropharynx was normal [No JVD] : no jugular venous distention [Supple] : supple [No Lymphadenopathy] : no lymphadenopathy [Thyroid Normal, No Nodules] : the thyroid was normal and there were no nodules present [No Respiratory Distress] : no respiratory distress  [Clear to Auscultation] : lungs were clear to auscultation bilaterally [No Accessory Muscle Use] : no accessory muscle use [Normal Rate] : normal rate  [Regular Rhythm] : with a regular rhythm [Normal S1, S2] : normal S1 and S2 [No Murmur] : no murmur heard [Soft] : abdomen soft [Non Tender] : non-tender [Non-distended] : non-distended [No Masses] : no abdominal mass palpated [No HSM] : no HSM [Normal Bowel Sounds] : normal bowel sounds [No Joint Swelling] : no joint swelling [Grossly Normal Strength/Tone] : grossly normal strength/tone [Speech Grossly Normal] : speech grossly normal [Normal Affect] : the affect was normal [Normal Mood] : the mood was normal [Normal Insight/Judgement] : insight and judgment were intact [de-identified] : back pain appears mostly muscular at this time.   [de-identified] : scars where spinal stimulater wre removed now well healed, no erythema or induration.   [de-identified] : NO leg weakness currently.

## 2018-08-24 NOTE — REVIEW OF SYSTEMS
[Negative] : Heme/Lymph [FreeTextEntry9] : Back pain, pain where the spinal stimulator was placed feels better.

## 2018-08-24 NOTE — ASSESSMENT
[FreeTextEntry1] : Patient with severe hypertension, had been hypotensive in the past, will carefully  try to decrease hypertension and slowly change his BP control, try to transition to longer acting antihypertensives.  THe current enalapril and and hydralazine have rather shorter half-lives.   Start irbesartan HCTZ 300/12.5 stop hydralazine and Enalapril.  Follow up in a month to recheck BP and potassium/creatinine.  \par \par Spent > 25 minutes in direct patient care and and addressed all questions and concerns.  >50% of this time was in direct face to face contact with patient during exam and counseling.

## 2018-08-24 NOTE — HISTORY OF PRESENT ILLNESS
[FreeTextEntry8] : 211/105  BP this morning ,took it himself.  Patient describes that on n Tuesday night, felt like he could feel his blood passing through his head.  Pulsing sensation.  \par Saw Dr. Bhandari this where they noticed his blood pressure was high.   morning was recommended to come here.  \par \par Stopped ritalin.  \par \par

## 2018-08-29 ENCOUNTER — RX RENEWAL (OUTPATIENT)
Age: 68
End: 2018-08-29

## 2018-09-12 ENCOUNTER — LABORATORY RESULT (OUTPATIENT)
Age: 68
End: 2018-09-12

## 2018-09-12 ENCOUNTER — APPOINTMENT (OUTPATIENT)
Dept: INTERNAL MEDICINE | Facility: CLINIC | Age: 68
End: 2018-09-12
Payer: MEDICARE

## 2018-09-12 VITALS
SYSTOLIC BLOOD PRESSURE: 149 MMHG | HEART RATE: 88 BPM | DIASTOLIC BLOOD PRESSURE: 93 MMHG | BODY MASS INDEX: 33.27 KG/M2 | HEIGHT: 67 IN | WEIGHT: 212 LBS

## 2018-09-12 PROCEDURE — 99215 OFFICE O/P EST HI 40 MIN: CPT | Mod: 25

## 2018-09-12 PROCEDURE — 36415 COLL VENOUS BLD VENIPUNCTURE: CPT

## 2018-09-12 PROCEDURE — 94010 BREATHING CAPACITY TEST: CPT

## 2018-09-16 NOTE — PHYSICAL EXAM
[No Acute Distress] : no acute distress [Well Nourished] : well nourished [Well Developed] : well developed [Well-Appearing] : well-appearing [Normal Sclera/Conjunctiva] : normal sclera/conjunctiva [PERRL] : pupils equal round and reactive to light [EOMI] : extraocular movements intact [Normal Outer Ear/Nose] : the outer ears and nose were normal in appearance [Normal Oropharynx] : the oropharynx was normal [No JVD] : no jugular venous distention [Supple] : supple [No Lymphadenopathy] : no lymphadenopathy [Thyroid Normal, No Nodules] : the thyroid was normal and there were no nodules present [No Respiratory Distress] : no respiratory distress  [Clear to Auscultation] : lungs were clear to auscultation bilaterally [No Accessory Muscle Use] : no accessory muscle use [Normal Rate] : normal rate  [Regular Rhythm] : with a regular rhythm [Normal S1, S2] : normal S1 and S2 [No Murmur] : no murmur heard [Soft] : abdomen soft [Non Tender] : non-tender [Non-distended] : non-distended [No Masses] : no abdominal mass palpated [No HSM] : no HSM [Normal Bowel Sounds] : normal bowel sounds [No Joint Swelling] : no joint swelling [Grossly Normal Strength/Tone] : grossly normal strength/tone [Speech Grossly Normal] : speech grossly normal [Normal Affect] : the affect was normal [Normal Mood] : the mood was normal [Normal Insight/Judgement] : insight and judgment were intact [de-identified] : Back pain around the SI joint area and in muscularture around shoulders and lumbar paraspinal muscles.   [de-identified] : Scars from the removed stimulator clean no erythema or induration.   [de-identified] : NO leg weakness currently.  straight leg raise negative, very tight hamstrings

## 2018-09-16 NOTE — HISTORY OF PRESENT ILLNESS
[FreeTextEntry8] : 12:20 Initiated encounter. \par Psychiatrist Increased to 1.5 times the usual Ritalin dose due to patients expressed fatigue and weakness.   This was 3 weeks ago.  Patient off enalapril.    Blood pressure repeated.  120/82.  Started irbesartan-HCTZ 300/12.5,  Feels like he developed asthma after taking it.  \par Patient hypertensive at home he noted. Has only been on  the new dose of irbesartan for a couple of days.    \par \par Katiana still notes severe pain in the lower back. It has been excruciating.  The area around the removed spinal stimulator no longer hurts however.  \par 13:11 Ended encounter.

## 2018-09-16 NOTE — REVIEW OF SYSTEMS
[Fatigue] : fatigue [Back Pain] : back pain [Negative] : Neurological [FreeTextEntry9] : Back pain [de-identified] : s

## 2018-09-16 NOTE — ASSESSMENT
[FreeTextEntry1] : Katiana again has low back pain.  Explained to him that he often has many reasons to have the back pain, this time around it appears musculoskeletal.  Not pain around the surgical site from the spinal stimulator, and not pain from the radiculopathy.  \par \par \par Continue irbesartan HCTZ 300/12.5mg daily instead of valsartan which was recalled. BP high but patient had only been taking this for a day or so.  \par Spent > 40 minutes in direct patient care and and addressed all questions and concerns.  >50% of this time was in direct face to face contact with patient during exam and counseling.

## 2018-09-19 LAB
ALBUMIN SERPL ELPH-MCNC: 4.4 G/DL
ALP BLD-CCNC: 67 U/L
ALT SERPL-CCNC: 12 U/L
ANION GAP SERPL CALC-SCNC: 14 MMOL/L
AST SERPL-CCNC: 16 U/L
BASOPHILS # BLD AUTO: 0.17 K/UL
BASOPHILS NFR BLD AUTO: 1.7 %
BILIRUB SERPL-MCNC: 0.6 MG/DL
BUN SERPL-MCNC: 8 MG/DL
CALCIUM SERPL-MCNC: 9.9 MG/DL
CHLORIDE SERPL-SCNC: 99 MMOL/L
CHOLEST SERPL-MCNC: 223 MG/DL
CHOLEST/HDLC SERPL: 5.6 RATIO
CO2 SERPL-SCNC: 28 MMOL/L
CREAT SERPL-MCNC: 0.93 MG/DL
EOSINOPHIL # BLD AUTO: 0.34 K/UL
EOSINOPHIL NFR BLD AUTO: 3.4 %
GLUCOSE SERPL-MCNC: 91 MG/DL
HBA1C MFR BLD HPLC: 5.4 %
HCT VFR BLD CALC: 47.4 %
HDLC SERPL-MCNC: 40 MG/DL
HGB BLD-MCNC: 15.7 G/DL
LDLC SERPL CALC-MCNC: 104 MG/DL
LYMPHOCYTES # BLD AUTO: 1.28 K/UL
LYMPHOCYTES NFR BLD AUTO: 12.8 %
MAN DIFF?: NORMAL
MCHC RBC-ENTMCNC: 30.4 PG
MCHC RBC-ENTMCNC: 33.1 GM/DL
MCV RBC AUTO: 91.9 FL
MONOCYTES # BLD AUTO: 0.77 K/UL
MONOCYTES NFR BLD AUTO: 7.7 %
NEUTROPHILS # BLD AUTO: 7.42 K/UL
NEUTROPHILS NFR BLD AUTO: 74.4 %
PLATELET # BLD AUTO: 218 K/UL
POTASSIUM SERPL-SCNC: 3.9 MMOL/L
PROT SERPL-MCNC: 7.3 G/DL
RBC # BLD: 5.16 M/UL
RBC # FLD: 14.4 %
SAVE SPECIMEN: NORMAL
SODIUM SERPL-SCNC: 141 MMOL/L
TRIGL SERPL-MCNC: 393 MG/DL
TSH SERPL-ACNC: 1.61 UIU/ML
WBC # FLD AUTO: 9.97 K/UL

## 2018-10-25 ENCOUNTER — HOSPITAL ENCOUNTER (OUTPATIENT)
Dept: CARDIOLOGY | Facility: HOSPITAL | Age: 68
Discharge: HOME | End: 2018-10-25
Attending: UROLOGY | Admitting: UROLOGY

## 2018-10-25 LAB
ANION GAP SERPL CALC-SCNC: 14.5 MMOL/L (ref 10–20)
BASOPHILS # BLD AUTO: 0.1 10*3/UL (ref 0–0.2)
BASOPHILS NFR BLD AUTO: 1 % (ref 0–2)
BUN SERPL-MCNC: 17 MG/DL (ref 8–20)
BUN/CREAT SERPL: 15.5 (ref 6.2–20.3)
CALCIUM SERPL-MCNC: 9.6 MG/DL (ref 8.9–10.3)
CHLORIDE SERPL-SCNC: 102 MMOL/L (ref 101–111)
CONV CO2: 26 MMOL/L (ref 22–32)
CREAT UR-MCNC: 1.1 MG/DL (ref 0.7–1.2)
DIFFERENTIAL METHOD BLD: (no result)
EOSINOPHIL # BLD AUTO: 0.1 10*3/UL (ref 0–0.3)
EOSINOPHIL # BLD AUTO: 2 % (ref 0–3)
ERYTHROCYTE [DISTWIDTH] IN BLOOD BY AUTOMATED COUNT: 13.9 % (ref 11.5–14.5)
GLUCOSE SERPL-MCNC: 110 MG/DL (ref 65–99)
HCT VFR BLD AUTO: 38.4 % (ref 40–54)
HGB BLD-MCNC: 13.3 G/DL (ref 14–18)
LYMPHOCYTES # BLD AUTO: 1.6 10*3/UL (ref 0.8–4.8)
LYMPHOCYTES NFR BLD AUTO: 26 % (ref 18–42)
MCH RBC QN AUTO: 30.5 PG (ref 26–32)
MCHC RBC AUTO-ENTMCNC: 34.6 G/DL (ref 32–36)
MCV RBC AUTO: 88.3 FL (ref 80–94)
MONOCYTES # BLD AUTO: 0.5 10*3/UL (ref 0.1–1.3)
MONOCYTES NFR BLD AUTO: 8 % (ref 2–11)
NEUTROPHILS # BLD AUTO: 3.9 10*3/UL (ref 2.3–8.6)
NEUTROPHILS NFR BLD AUTO: 63 % (ref 50–75)
NRBC BLD AUTO-RTO: 0 /100{WBCS}
NRBC/RBC NFR BLD MANUAL: 0 10*3/UL
PLATELET # BLD AUTO: 362 10*3/UL (ref 150–450)
PMV BLD AUTO: 8.1 FL (ref 7.4–10.4)
POTASSIUM SERPL-SCNC: 4.5 MMOL/L (ref 3.6–5.1)
RBC # BLD AUTO: 4.35 10*6/UL (ref 4.6–6)
SODIUM SERPL-SCNC: 138 MMOL/L (ref 136–144)
WBC # BLD AUTO: 6.2 10*3/UL (ref 4.5–11.5)

## 2018-11-03 ENCOUNTER — INPATIENT (INPATIENT)
Facility: HOSPITAL | Age: 68
LOS: 3 days | Discharge: HOME | End: 2018-11-07
Attending: HOSPITALIST | Admitting: HOSPITALIST
Payer: MEDICARE

## 2018-11-03 VITALS
DIASTOLIC BLOOD PRESSURE: 97 MMHG | HEART RATE: 101 BPM | OXYGEN SATURATION: 100 % | RESPIRATION RATE: 17 BRPM | SYSTOLIC BLOOD PRESSURE: 165 MMHG | TEMPERATURE: 99 F

## 2018-11-03 DIAGNOSIS — F32.9 MAJOR DEPRESSIVE DISORDER, SINGLE EPISODE, UNSPECIFIED: ICD-10-CM

## 2018-11-03 DIAGNOSIS — I10 ESSENTIAL (PRIMARY) HYPERTENSION: ICD-10-CM

## 2018-11-03 DIAGNOSIS — G47.00 INSOMNIA, UNSPECIFIED: ICD-10-CM

## 2018-11-03 DIAGNOSIS — M54.5 LOW BACK PAIN: ICD-10-CM

## 2018-11-03 DIAGNOSIS — G91.2 (IDIOPATHIC) NORMAL PRESSURE HYDROCEPHALUS: ICD-10-CM

## 2018-11-03 DIAGNOSIS — M54.9 DORSALGIA, UNSPECIFIED: ICD-10-CM

## 2018-11-03 DIAGNOSIS — Z29.9 ENCOUNTER FOR PROPHYLACTIC MEASURES, UNSPECIFIED: ICD-10-CM

## 2018-11-03 LAB
ALBUMIN SERPL ELPH-MCNC: 4.2 G/DL — SIGNIFICANT CHANGE UP (ref 3.3–5)
ALP SERPL-CCNC: 60 U/L — SIGNIFICANT CHANGE UP (ref 40–120)
ALT FLD-CCNC: 12 U/L — SIGNIFICANT CHANGE UP (ref 4–41)
APTT BLD: 32.6 SEC — SIGNIFICANT CHANGE UP (ref 27.5–36.3)
AST SERPL-CCNC: 12 U/L — SIGNIFICANT CHANGE UP (ref 4–40)
BASOPHILS # BLD AUTO: 0.06 K/UL — SIGNIFICANT CHANGE UP (ref 0–0.2)
BASOPHILS NFR BLD AUTO: 0.6 % — SIGNIFICANT CHANGE UP (ref 0–2)
BILIRUB SERPL-MCNC: 0.7 MG/DL — SIGNIFICANT CHANGE UP (ref 0.2–1.2)
BUN SERPL-MCNC: 15 MG/DL — SIGNIFICANT CHANGE UP (ref 7–23)
CALCIUM SERPL-MCNC: 9.9 MG/DL — SIGNIFICANT CHANGE UP (ref 8.4–10.5)
CHLORIDE SERPL-SCNC: 102 MMOL/L — SIGNIFICANT CHANGE UP (ref 98–107)
CO2 SERPL-SCNC: 28 MMOL/L — SIGNIFICANT CHANGE UP (ref 22–31)
CREAT SERPL-MCNC: 1.11 MG/DL — SIGNIFICANT CHANGE UP (ref 0.5–1.3)
EOSINOPHIL # BLD AUTO: 0.12 K/UL — SIGNIFICANT CHANGE UP (ref 0–0.5)
EOSINOPHIL NFR BLD AUTO: 1.1 % — SIGNIFICANT CHANGE UP (ref 0–6)
GLUCOSE SERPL-MCNC: 80 MG/DL — SIGNIFICANT CHANGE UP (ref 70–99)
HCT VFR BLD CALC: 43.6 % — SIGNIFICANT CHANGE UP (ref 39–50)
HGB BLD-MCNC: 14.7 G/DL — SIGNIFICANT CHANGE UP (ref 13–17)
IMM GRANULOCYTES # BLD AUTO: 0.1 # — SIGNIFICANT CHANGE UP
IMM GRANULOCYTES NFR BLD AUTO: 1 % — SIGNIFICANT CHANGE UP (ref 0–1.5)
INR BLD: 1.09 — SIGNIFICANT CHANGE UP (ref 0.88–1.17)
LYMPHOCYTES # BLD AUTO: 1.6 K/UL — SIGNIFICANT CHANGE UP (ref 1–3.3)
LYMPHOCYTES # BLD AUTO: 15.2 % — SIGNIFICANT CHANGE UP (ref 13–44)
MCHC RBC-ENTMCNC: 29.6 PG — SIGNIFICANT CHANGE UP (ref 27–34)
MCHC RBC-ENTMCNC: 33.7 % — SIGNIFICANT CHANGE UP (ref 32–36)
MCV RBC AUTO: 87.7 FL — SIGNIFICANT CHANGE UP (ref 80–100)
MONOCYTES # BLD AUTO: 1.01 K/UL — HIGH (ref 0–0.9)
MONOCYTES NFR BLD AUTO: 9.6 % — SIGNIFICANT CHANGE UP (ref 2–14)
NEUTROPHILS # BLD AUTO: 7.61 K/UL — HIGH (ref 1.8–7.4)
NEUTROPHILS NFR BLD AUTO: 72.5 % — SIGNIFICANT CHANGE UP (ref 43–77)
NRBC # FLD: 0 — SIGNIFICANT CHANGE UP
PLATELET # BLD AUTO: 240 K/UL — SIGNIFICANT CHANGE UP (ref 150–400)
PMV BLD: 9.8 FL — SIGNIFICANT CHANGE UP (ref 7–13)
POTASSIUM SERPL-MCNC: 4.5 MMOL/L — SIGNIFICANT CHANGE UP (ref 3.5–5.3)
POTASSIUM SERPL-SCNC: 4.5 MMOL/L — SIGNIFICANT CHANGE UP (ref 3.5–5.3)
PROT SERPL-MCNC: 7.1 G/DL — SIGNIFICANT CHANGE UP (ref 6–8.3)
PROTHROM AB SERPL-ACNC: 12.1 SEC — SIGNIFICANT CHANGE UP (ref 9.8–13.1)
RBC # BLD: 4.97 M/UL — SIGNIFICANT CHANGE UP (ref 4.2–5.8)
RBC # FLD: 13.9 % — SIGNIFICANT CHANGE UP (ref 10.3–14.5)
SODIUM SERPL-SCNC: 141 MMOL/L — SIGNIFICANT CHANGE UP (ref 135–145)
WBC # BLD: 10.5 K/UL — SIGNIFICANT CHANGE UP (ref 3.8–10.5)
WBC # FLD AUTO: 10.5 K/UL — SIGNIFICANT CHANGE UP (ref 3.8–10.5)

## 2018-11-03 PROCEDURE — 72100 X-RAY EXAM L-S SPINE 2/3 VWS: CPT | Mod: 26

## 2018-11-03 PROCEDURE — 99222 1ST HOSP IP/OBS MODERATE 55: CPT

## 2018-11-03 RX ORDER — SODIUM CHLORIDE 9 MG/ML
1000 INJECTION INTRAMUSCULAR; INTRAVENOUS; SUBCUTANEOUS
Qty: 0 | Refills: 0 | Status: DISCONTINUED | OUTPATIENT
Start: 2018-11-03 | End: 2018-11-07

## 2018-11-03 RX ORDER — KETOROLAC TROMETHAMINE 30 MG/ML
15 SYRINGE (ML) INJECTION ONCE
Qty: 0 | Refills: 0 | Status: DISCONTINUED | OUTPATIENT
Start: 2018-11-03 | End: 2018-11-03

## 2018-11-03 RX ORDER — CHOLECALCIFEROL (VITAMIN D3) 125 MCG
1000 CAPSULE ORAL DAILY
Qty: 0 | Refills: 0 | Status: DISCONTINUED | OUTPATIENT
Start: 2018-11-03 | End: 2018-11-07

## 2018-11-03 RX ORDER — CITALOPRAM 10 MG/1
40 TABLET, FILM COATED ORAL DAILY
Qty: 0 | Refills: 0 | Status: DISCONTINUED | OUTPATIENT
Start: 2018-11-03 | End: 2018-11-07

## 2018-11-03 RX ORDER — ZOLPIDEM TARTRATE 10 MG/1
5 TABLET ORAL AT BEDTIME
Qty: 0 | Refills: 0 | Status: DISCONTINUED | OUTPATIENT
Start: 2018-11-03 | End: 2018-11-07

## 2018-11-03 RX ORDER — HEPARIN SODIUM 5000 [USP'U]/ML
5000 INJECTION INTRAVENOUS; SUBCUTANEOUS EVERY 8 HOURS
Qty: 0 | Refills: 0 | Status: DISCONTINUED | OUTPATIENT
Start: 2018-11-03 | End: 2018-11-07

## 2018-11-03 RX ORDER — TRAZODONE HCL 50 MG
100 TABLET ORAL AT BEDTIME
Qty: 0 | Refills: 0 | Status: DISCONTINUED | OUTPATIENT
Start: 2018-11-03 | End: 2018-11-07

## 2018-11-03 RX ORDER — HYDROMORPHONE HYDROCHLORIDE 2 MG/ML
1 INJECTION INTRAMUSCULAR; INTRAVENOUS; SUBCUTANEOUS ONCE
Qty: 0 | Refills: 0 | Status: DISCONTINUED | OUTPATIENT
Start: 2018-11-03 | End: 2018-11-03

## 2018-11-03 RX ORDER — INFLUENZA VIRUS VACCINE 15; 15; 15; 15 UG/.5ML; UG/.5ML; UG/.5ML; UG/.5ML
0.5 SUSPENSION INTRAMUSCULAR ONCE
Qty: 0 | Refills: 0 | Status: COMPLETED | OUTPATIENT
Start: 2018-11-03 | End: 2018-11-07

## 2018-11-03 RX ORDER — HYDROCHLOROTHIAZIDE 25 MG
12.5 TABLET ORAL DAILY
Qty: 0 | Refills: 0 | Status: DISCONTINUED | OUTPATIENT
Start: 2018-11-03 | End: 2018-11-03

## 2018-11-03 RX ORDER — HYDROCHLOROTHIAZIDE 25 MG
12.5 TABLET ORAL DAILY
Qty: 0 | Refills: 0 | Status: DISCONTINUED | OUTPATIENT
Start: 2018-11-03 | End: 2018-11-07

## 2018-11-03 RX ORDER — VERAPAMIL HCL 240 MG
360 CAPSULE, EXTENDED RELEASE PELLETS 24 HR ORAL DAILY
Qty: 0 | Refills: 0 | Status: DISCONTINUED | OUTPATIENT
Start: 2018-11-03 | End: 2018-11-07

## 2018-11-03 RX ORDER — IBUPROFEN 200 MG
600 TABLET ORAL EVERY 6 HOURS
Qty: 0 | Refills: 0 | Status: DISCONTINUED | OUTPATIENT
Start: 2018-11-03 | End: 2018-11-07

## 2018-11-03 RX ORDER — LOSARTAN POTASSIUM 100 MG/1
50 TABLET, FILM COATED ORAL DAILY
Qty: 0 | Refills: 0 | Status: DISCONTINUED | OUTPATIENT
Start: 2018-11-03 | End: 2018-11-07

## 2018-11-03 RX ORDER — SODIUM CHLORIDE 9 MG/ML
1000 INJECTION INTRAMUSCULAR; INTRAVENOUS; SUBCUTANEOUS ONCE
Qty: 0 | Refills: 0 | Status: COMPLETED | OUTPATIENT
Start: 2018-11-03 | End: 2018-11-03

## 2018-11-03 RX ORDER — ACETAMINOPHEN 500 MG
1000 TABLET ORAL ONCE
Qty: 0 | Refills: 0 | Status: COMPLETED | OUTPATIENT
Start: 2018-11-03 | End: 2018-11-03

## 2018-11-03 RX ORDER — HYDROMORPHONE HYDROCHLORIDE 2 MG/ML
2 INJECTION INTRAMUSCULAR; INTRAVENOUS; SUBCUTANEOUS EVERY 4 HOURS
Qty: 0 | Refills: 0 | Status: DISCONTINUED | OUTPATIENT
Start: 2018-11-03 | End: 2018-11-04

## 2018-11-03 RX ADMIN — SODIUM CHLORIDE 50 MILLILITER(S): 9 INJECTION INTRAMUSCULAR; INTRAVENOUS; SUBCUTANEOUS at 18:50

## 2018-11-03 RX ADMIN — HYDROMORPHONE HYDROCHLORIDE 1 MILLIGRAM(S): 2 INJECTION INTRAMUSCULAR; INTRAVENOUS; SUBCUTANEOUS at 12:58

## 2018-11-03 RX ADMIN — Medication 400 MILLIGRAM(S): at 15:13

## 2018-11-03 RX ADMIN — SODIUM CHLORIDE 1000 MILLILITER(S): 9 INJECTION INTRAMUSCULAR; INTRAVENOUS; SUBCUTANEOUS at 13:59

## 2018-11-03 RX ADMIN — SODIUM CHLORIDE 1000 MILLILITER(S): 9 INJECTION INTRAMUSCULAR; INTRAVENOUS; SUBCUTANEOUS at 12:58

## 2018-11-03 RX ADMIN — Medication 600 MILLIGRAM(S): at 18:50

## 2018-11-03 RX ADMIN — HYDROMORPHONE HYDROCHLORIDE 1 MILLIGRAM(S): 2 INJECTION INTRAMUSCULAR; INTRAVENOUS; SUBCUTANEOUS at 13:10

## 2018-11-03 RX ADMIN — Medication 1000 MILLIGRAM(S): at 01:16

## 2018-11-03 RX ADMIN — Medication 1000 MILLIGRAM(S): at 18:20

## 2018-11-03 RX ADMIN — ZOLPIDEM TARTRATE 5 MILLIGRAM(S): 10 TABLET ORAL at 23:02

## 2018-11-03 RX ADMIN — HEPARIN SODIUM 5000 UNIT(S): 5000 INJECTION INTRAVENOUS; SUBCUTANEOUS at 21:48

## 2018-11-03 RX ADMIN — HYDROMORPHONE HYDROCHLORIDE 2 MILLIGRAM(S): 2 INJECTION INTRAMUSCULAR; INTRAVENOUS; SUBCUTANEOUS at 23:03

## 2018-11-03 RX ADMIN — HYDROMORPHONE HYDROCHLORIDE 1 MILLIGRAM(S): 2 INJECTION INTRAMUSCULAR; INTRAVENOUS; SUBCUTANEOUS at 19:54

## 2018-11-03 RX ADMIN — Medication 15 MILLIGRAM(S): at 15:13

## 2018-11-03 RX ADMIN — HYDROMORPHONE HYDROCHLORIDE 1 MILLIGRAM(S): 2 INJECTION INTRAMUSCULAR; INTRAVENOUS; SUBCUTANEOUS at 20:54

## 2018-11-03 RX ADMIN — Medication 600 MILLIGRAM(S): at 23:02

## 2018-11-03 RX ADMIN — Medication 100 MILLIGRAM(S): at 23:02

## 2018-11-03 RX ADMIN — Medication 15 MILLIGRAM(S): at 18:20

## 2018-11-03 RX ADMIN — Medication 600 MILLIGRAM(S): at 19:50

## 2018-11-03 NOTE — ED PROVIDER NOTE - CARE PLAN
Principal Discharge DX:	Back pain at L4-L5 level  Secondary Diagnosis:	Chronic back pain greater than 3 months duration

## 2018-11-03 NOTE — ED PROVIDER NOTE - PROGRESS NOTE DETAILS
PMD, Dr. Fredy Lawson, paged. PMD, Dr. Fredy Lawson, paged.  Case d/w covering MD Dr. Lugo; they admit to the hospitalist.    iSTOP: no opioid Rx on record. Pt pain minimally responsive to dilaudid, toradol, ofirmev. Pt will require admission for intractable pain and inability to ambulate

## 2018-11-03 NOTE — H&P ADULT - ASSESSMENT
Teto castaneda is a 67 y/o gentlemen with numerous spinal surgeries as reported below,  shunt placed for hydrocephalus and HTN who presented to the ER complaining of back pain. He calmly states that his current pain starts in the left lower back and radiates down to his buttock and leg. Due to the pain he is unable to ambulate and has limited ROM. Pain is something unlike he has ever had before, and he thinks it's more severe than the pain prior or even after his surgeries. Denies fevers or chills. Pain could be due to sciatica, however further workup may be required due to surgical history.

## 2018-11-03 NOTE — ED PROVIDER NOTE - CHIEF COMPLAINT
The patient is a 68y Male complaining of The patient is a 68y Male complaining of lower back pain; unable to ambulate.

## 2018-11-03 NOTE — ED PROVIDER NOTE - MEDICAL DECISION MAKING DETAILS
Impression:  acute exacerbation of chronic lumbar back pain.  Plan:  likely admit for pain control +/- MRI lumbar spine; we do not suspect CHANDU at this time. 69 yo M, with severe lower back pain.  Acute on chronic.  Severity of pain preventing him from being able to ambulate.  Normal lower extremity strength.  Afebrile.  normal rectal tone.  Impression:  acute exacerbation of chronic lumbar back pain.  Plan:  likely admit for pain control +/- MRI lumbar spine; we do not suspect CHANDU at this time.

## 2018-11-03 NOTE — H&P ADULT - HISTORY OF PRESENT ILLNESS
HPI:    Teto Perdomo is a 67 y/o gentlemen with numerous spinal surgeries as reported below,  shunt placed for hydrocephalus and HTN who presented to the ER complaining of back pain. The patient had stated that this new back pain is nothing like he ever had before and is more severe than the back pain that he had prior or even after his spinal surgeries. He claims that it has only somewhat responded to IV Dilaudid. He calmly states that his current pain starts in the left lower back and radiates down to his buttock and leg. Due to the pain he is unable to ambulate and has limited ROM. The patient denies any fevers, chills, sweating or shortness of breath. He also states that he feels very depressed as a result of his chronic pain, his friend at bedside states that the patient sleeps a lot. Patient denies opioid use.     PAST MEDICAL & SURGICAL HISTORY:  NPH (normal pressure hydrocephalus)  Chronic back pain greater than 3 months duration  Small intestine disorder: &quot;ilitis&quot;   steroids   1967  Lumbar disc displacement without myelopathy  Sciatica  Anxiety  Vertebral Sciatica  Insomnia  Depression  HTN (Hypertension)  S/P lumbar fusion: L4-L5 on Oct 2011  pins/screws 2012   spinal surgery 2013  Dehiscence, Operation Wound/Debridement: infection  S/P Laminectomy: L4-L5 2009  complicated by infection requiring  antibiiotcis      Review of Systems:   CONSTITUTIONAL: No fever, weight loss, +fatigue  EYES: No eye pain, visual disturbances, or discharge  ENMT:  No difficulty hearing, tinnitus, vertigo; No sinus or throat pain  NECK: No pain or stiffness  BREASTS: No pain, masses, or nipple discharge  RESPIRATORY: No cough, wheezing, chills or hemoptysis; No shortness of breath  CARDIOVASCULAR: No chest pain, palpitations, dizziness, or leg swelling  GASTROINTESTINAL: No abdominal or epigastric pain. No nausea, vomiting, or hematemesis; No diarrhea or constipation. No melena or hematochezia.  GENITOURINARY: No dysuria, frequency, hematuria, or incontinence  NEUROLOGICAL: As per HPI  SKIN: No itching, burning, rashes, or lesions   LYMPH NODES: No enlarged glands  ENDOCRINE: No heat or cold intolerance; No hair loss  MUSCULOSKELETAL: back pain, radiating back to his left leg. Unable to ambulate.  PSYCHIATRIC: +Depression, increased fatigue and sleep.  HEME/LYMPH: No easy bruising, or bleeding gums  ALLERGY AND IMMUNOLOGIC: No hives or eczema    Allergies    Biaxin (Other)  Lidoderm (Unknown)  morphine (Short breath; Rash)    Intolerances        Social History:   Denies drug use, drinking or smoking    FAMILY HISTORY:  No family history of back pain      MEDICATIONS  (STANDING):  heparin  Injectable 5000 Unit(s) SubCutaneous every 8 hours  sodium chloride 0.9%. 1000 milliLiter(s) (50 mL/Hr) IV Continuous <Continuous>    MEDICATIONS  (PRN):      T(C): 36.2 (11-03-18 @ 13:08), Max: 37.1 (11-03-18 @ 12:27)  HR: 70 (11-03-18 @ 13:08) (70 - 101)  BP: 171/84 (11-03-18 @ 13:08) (165/97 - 171/84)  RR: 17 (11-03-18 @ 13:08) (17 - 17)  SpO2: 100% (11-03-18 @ 13:08) (100% - 100%)    CAPILLARY BLOOD GLUCOSE        I&O's Summary      PHYSICAL EXAM:  GENERAL: NAD, well-developed, speaks in full sentences, pleasant  HEAD:  Atraumatic, Normocephalic  EYES: EOMI, PERRLA, conjunctiva and sclera clear  NECK: Supple, No elevated JVD  CHEST/LUNG: Clear to auscultation bilaterally; No wheeze  HEART: Regular rate and rhythm; No murmurs, rubs, or gallops  ABDOMEN: Soft, Nontender, Nondistended; Bowel sounds present  EXTREMITIES:  2+ Peripheral Pulses, No clubbing, cyanosis, or edema  PSYCH: AAOx3  NEUROLOGY: CN II-XII grossly intact, moving all extremities, +straight leg test B/L, mild tenderness present over the left lower back, sensation intact B/L on upper and lower extremity.  SKIN: No rashes or lesions    LABS:                        14.7   10.50 )-----------( 240      ( 03 Nov 2018 12:57 )             43.6     11-03    141  |  102  |  15  ----------------------------<  80  4.5   |  28  |  1.11    Ca    9.9      03 Nov 2018 12:57    TPro  7.1  /  Alb  4.2  /  TBili  0.7  /  DBili  x   /  AST  12  /  ALT  12  /  AlkPhos  60  11-03    PT/INR - ( 03 Nov 2018 12:57 )   PT: 12.1 SEC;   INR: 1.09          PTT - ( 03 Nov 2018 12:57 )  PTT:32.6 SEC            RADIOLOGY & ADDITIONAL TESTS:    ECG Personally Reviewed -     Imaging Personally Reviewed:    Consultant(s) Notes Reviewed:      Care Discussed with Consultants/Other Providers: HPI:    Teto Perdomo is a 67 y/o gentlemen with numerous spinal surgeries as reported below,  shunt placed for hydrocephalus and HTN who presented to the ER complaining of back pain. The patient had stated that this new back pain is nothing like he ever had before and is more severe than the back pain that he had prior or even after his spinal surgeries. He claims that it has only somewhat responded to IV Dilaudid. He calmly states that his current pain starts in the left lower back and radiates down to his buttock and leg. Due to the pain he is unable to ambulate and has limited ROM. The patient denies any fevers, chills, sweating or shortness of breath. He also states that he feels very depressed as a result of his chronic pain, his friend at bedside states that the patient sleeps a lot. Patient denies opioid use.     PAST MEDICAL & SURGICAL HISTORY:  NPH (normal pressure hydrocephalus)  Chronic back pain greater than 3 months duration  Small intestine disorder: &quot;ilitis&quot;   steroids   1967  Lumbar disc displacement without myelopathy  Sciatica  Anxiety  Vertebral Sciatica  Insomnia  Depression  HTN (Hypertension)  S/P lumbar fusion: L4-L5 on Oct 2011  pins/screws 2012   spinal surgery 2013  Dehiscence, Operation Wound/Debridement: infection  S/P Laminectomy: L4-L5 2009  complicated by infection requiring  antibiiotcis      Review of Systems:   CONSTITUTIONAL: No fever, weight loss, +fatigue  EYES: No eye pain, visual disturbances, or discharge  ENMT:  No difficulty hearing, tinnitus, vertigo; No sinus or throat pain  NECK: No pain or stiffness  BREASTS: No pain, masses, or nipple discharge  RESPIRATORY: No cough, wheezing, chills or hemoptysis; No shortness of breath  CARDIOVASCULAR: No chest pain, palpitations, dizziness, or leg swelling  GASTROINTESTINAL: No abdominal or epigastric pain. No nausea, vomiting, or hematemesis; No diarrhea or constipation. No melena or hematochezia.  GENITOURINARY: No dysuria, frequency, hematuria, or incontinence  NEUROLOGICAL: As per HPI  SKIN: No itching, burning, rashes, or lesions   LYMPH NODES: No enlarged glands  ENDOCRINE: No heat or cold intolerance; No hair loss  MUSCULOSKELETAL: back pain, radiating back to his left leg. Unable to ambulate.  PSYCHIATRIC: +Depression, increased fatigue and sleep.  HEME/LYMPH: No easy bruising, or bleeding gums  ALLERGY AND IMMUNOLOGIC: No hives or eczema    Allergies    Biaxin (Other)  Lidoderm (Unknown)  morphine (Short breath; Rash)    Intolerances        Social History:   Denies drug use, drinking or smoking    FAMILY HISTORY:  No family history of back pain      MEDICATIONS  (STANDING):  heparin  Injectable 5000 Unit(s) SubCutaneous every 8 hours  sodium chloride 0.9%. 1000 milliLiter(s) (50 mL/Hr) IV Continuous <Continuous>    MEDICATIONS  (PRN):      T(C): 36.2 (11-03-18 @ 13:08), Max: 37.1 (11-03-18 @ 12:27)  HR: 70 (11-03-18 @ 13:08) (70 - 101)  BP: 171/84 (11-03-18 @ 13:08) (165/97 - 171/84)  RR: 17 (11-03-18 @ 13:08) (17 - 17)  SpO2: 100% (11-03-18 @ 13:08) (100% - 100%)    CAPILLARY BLOOD GLUCOSE        I&O's Summary      PHYSICAL EXAM:  GENERAL: NAD, well-developed, speaks in full sentences, pleasant  HEAD:  Atraumatic, Normocephalic  EYES: EOMI, PERRLA, conjunctiva and sclera clear  NECK: Supple, No elevated JVD  CHEST/LUNG: Clear to auscultation bilaterally; No wheeze  HEART: Regular rate and rhythm; No murmurs, rubs, or gallops  ABDOMEN: Soft, Nontender, Nondistended; Bowel sounds present  EXTREMITIES:  2+ Peripheral Pulses, No clubbing, cyanosis, or edema  PSYCH: AAOx3  NEUROLOGY: CN II-XII grossly intact, moving all extremities, +straight leg test B/L, mild tenderness present over the left lower back, sensation intact B/L on upper and lower extremity.  SKIN: No rashes or lesions    LABS:                        14.7   10.50 )-----------( 240      ( 03 Nov 2018 12:57 )             43.6     11-03    141  |  102  |  15  ----------------------------<  80  4.5   |  28  |  1.11    Ca    9.9      03 Nov 2018 12:57    TPro  7.1  /  Alb  4.2  /  TBili  0.7  /  DBili  x   /  AST  12  /  ALT  12  /  AlkPhos  60  11-03    PT/INR - ( 03 Nov 2018 12:57 )   PT: 12.1 SEC;   INR: 1.09          PTT - ( 03 Nov 2018 12:57 )  PTT:32.6 SEC            RADIOLOGY & ADDITIONAL TESTS:    ECG Personally Reviewed -     Imaging Personally Reviewed:    Consultant(s) Notes Reviewed:      Care Discussed with Consultants/Other Providers: Spoke with neurology resident who will see the patient

## 2018-11-03 NOTE — H&P ADULT - PROBLEM SELECTOR PLAN 1
Pain could be due to sciatica, however further workup may be required due to surgical history.   Lumbrosacral x-ray  Neurology consult  pain management, ibuprofen and dilaudid 0.5mg Q4hrs for severe pain since patient has allergy to morphine?

## 2018-11-03 NOTE — ED ADULT NURSE NOTE - OBJECTIVE STATEMENT
pt brought to rm 26, A&Ox3, skin w/d/i, c/o lower back pain radiating to legs, difficulty ambulating 2/2 pain, denies numbness/tingling in lower extremities, or urinary incontinence, no neuro deficits noted @ this time, + pedal pulses, SL placed, labs sent awaiting results and further orders, will continue to monitor.  (break)

## 2018-11-03 NOTE — ED PROVIDER NOTE - ATTENDING CONTRIBUTION TO CARE
MD Weber:  patient seen and evaluated with the resident.  I was present for key portions of the History & Physical, and I agree with the Impression & Plan.  MD Weber:  67 yo M, c/o acute on chronic lower back pain.  Patient has lived in chronic pain since 2009, but has had a slow decline in function over the last 3 weeks.  He can no longer ambulate due to the 10/10 pain.  Location: lumbar.  Context:   has had L1-S1 fusion, nerve stimulator (now removed) w/o relief.  Reports that the only thing that helps his symptoms when they're this intense is IV dilaudid.  He does not take PO opioids at home, "because they don't work."  Associated Sx: no new weakness/numbness, no F/c, no bowel/bladder incontinence.  Physical Exam: adult M, uncomfortable appearing.  Normal Rectal Tone.  Strength 5/5 bilateral lower extremities.  Impression:  acute exacerbation of chronic lumbar back pain.  Plan:  likely admit for pain control +/- MRI lumbar spine; we do not suspect CHANDU at this time.

## 2018-11-03 NOTE — ED ADULT NURSE NOTE - NSIMPLEMENTINTERV_GEN_ALL_ED
Implemented All Universal Safety Interventions:  [employer] to call system. Call bell, personal items and telephone within reach. Instruct patient to call for assistance. Room bathroom lighting operational. Non-slip footwear when patient is off stretcher. Physically safe environment: no spills, clutter or unnecessary equipment. Stretcher in lowest position, wheels locked, appropriate side rails in place.

## 2018-11-03 NOTE — ED PROVIDER NOTE - OBJECTIVE STATEMENT
68y m w hx  shunt, multiple back surgeries, chronic lower back pain, p/w acute on chronic exacerbation of his back pain and inability to ambulate. He has radicular symptoms, pain shooting down L gluteal region and into L leg. No urinary retention or incontinence, no LE weakness. States this exacerbation started 1mo. ago during PT.

## 2018-11-03 NOTE — ED ADULT TRIAGE NOTE - CHIEF COMPLAINT QUOTE
pt c/o back pain and tingling radiating down L leg. pt s/p s1-s5 fusion. family endorse increased confusion.

## 2018-11-04 PROCEDURE — 99232 SBSQ HOSP IP/OBS MODERATE 35: CPT

## 2018-11-04 RX ORDER — HYDROMORPHONE HYDROCHLORIDE 2 MG/ML
1 INJECTION INTRAMUSCULAR; INTRAVENOUS; SUBCUTANEOUS ONCE
Qty: 0 | Refills: 0 | Status: DISCONTINUED | OUTPATIENT
Start: 2018-11-04 | End: 2018-11-04

## 2018-11-04 RX ORDER — HYDROMORPHONE HYDROCHLORIDE 2 MG/ML
0.5 INJECTION INTRAMUSCULAR; INTRAVENOUS; SUBCUTANEOUS EVERY 4 HOURS
Qty: 0 | Refills: 0 | Status: DISCONTINUED | OUTPATIENT
Start: 2018-11-04 | End: 2018-11-06

## 2018-11-04 RX ORDER — BACLOFEN 100 %
5 POWDER (GRAM) MISCELLANEOUS THREE TIMES A DAY
Qty: 0 | Refills: 0 | Status: DISCONTINUED | OUTPATIENT
Start: 2018-11-04 | End: 2018-11-06

## 2018-11-04 RX ORDER — HYDROMORPHONE HYDROCHLORIDE 2 MG/ML
0.5 INJECTION INTRAMUSCULAR; INTRAVENOUS; SUBCUTANEOUS
Qty: 0 | Refills: 0 | Status: DISCONTINUED | OUTPATIENT
Start: 2018-11-04 | End: 2018-11-04

## 2018-11-04 RX ORDER — ALPRAZOLAM 0.25 MG
1 TABLET ORAL EVERY 12 HOURS
Qty: 0 | Refills: 0 | Status: DISCONTINUED | OUTPATIENT
Start: 2018-11-04 | End: 2018-11-06

## 2018-11-04 RX ADMIN — HYDROMORPHONE HYDROCHLORIDE 0.5 MILLIGRAM(S): 2 INJECTION INTRAMUSCULAR; INTRAVENOUS; SUBCUTANEOUS at 19:20

## 2018-11-04 RX ADMIN — Medication 600 MILLIGRAM(S): at 11:44

## 2018-11-04 RX ADMIN — HYDROMORPHONE HYDROCHLORIDE 0.5 MILLIGRAM(S): 2 INJECTION INTRAMUSCULAR; INTRAVENOUS; SUBCUTANEOUS at 14:07

## 2018-11-04 RX ADMIN — HEPARIN SODIUM 5000 UNIT(S): 5000 INJECTION INTRAVENOUS; SUBCUTANEOUS at 13:31

## 2018-11-04 RX ADMIN — LOSARTAN POTASSIUM 50 MILLIGRAM(S): 100 TABLET, FILM COATED ORAL at 06:18

## 2018-11-04 RX ADMIN — HYDROMORPHONE HYDROCHLORIDE 0.5 MILLIGRAM(S): 2 INJECTION INTRAMUSCULAR; INTRAVENOUS; SUBCUTANEOUS at 19:06

## 2018-11-04 RX ADMIN — Medication 5 MILLIGRAM(S): at 11:31

## 2018-11-04 RX ADMIN — Medication 600 MILLIGRAM(S): at 07:18

## 2018-11-04 RX ADMIN — HYDROMORPHONE HYDROCHLORIDE 2 MILLIGRAM(S): 2 INJECTION INTRAMUSCULAR; INTRAVENOUS; SUBCUTANEOUS at 00:00

## 2018-11-04 RX ADMIN — Medication 600 MILLIGRAM(S): at 06:18

## 2018-11-04 RX ADMIN — HYDROMORPHONE HYDROCHLORIDE 0.5 MILLIGRAM(S): 2 INJECTION INTRAMUSCULAR; INTRAVENOUS; SUBCUTANEOUS at 14:15

## 2018-11-04 RX ADMIN — HYDROMORPHONE HYDROCHLORIDE 1 MILLIGRAM(S): 2 INJECTION INTRAMUSCULAR; INTRAVENOUS; SUBCUTANEOUS at 01:46

## 2018-11-04 RX ADMIN — HEPARIN SODIUM 5000 UNIT(S): 5000 INJECTION INTRAVENOUS; SUBCUTANEOUS at 06:18

## 2018-11-04 RX ADMIN — HYDROMORPHONE HYDROCHLORIDE 1 MILLIGRAM(S): 2 INJECTION INTRAMUSCULAR; INTRAVENOUS; SUBCUTANEOUS at 10:20

## 2018-11-04 RX ADMIN — Medication 1000 UNIT(S): at 11:31

## 2018-11-04 RX ADMIN — ZOLPIDEM TARTRATE 5 MILLIGRAM(S): 10 TABLET ORAL at 00:00

## 2018-11-04 RX ADMIN — HYDROMORPHONE HYDROCHLORIDE 1 MILLIGRAM(S): 2 INJECTION INTRAMUSCULAR; INTRAVENOUS; SUBCUTANEOUS at 10:35

## 2018-11-04 RX ADMIN — Medication 360 MILLIGRAM(S): at 06:18

## 2018-11-04 RX ADMIN — HYDROMORPHONE HYDROCHLORIDE 1 MILLIGRAM(S): 2 INJECTION INTRAMUSCULAR; INTRAVENOUS; SUBCUTANEOUS at 02:05

## 2018-11-04 RX ADMIN — Medication 1 MILLIGRAM(S): at 21:52

## 2018-11-04 RX ADMIN — Medication 600 MILLIGRAM(S): at 00:00

## 2018-11-04 RX ADMIN — Medication 600 MILLIGRAM(S): at 19:01

## 2018-11-04 RX ADMIN — Medication 600 MILLIGRAM(S): at 11:31

## 2018-11-04 RX ADMIN — Medication 5 MILLIGRAM(S): at 19:17

## 2018-11-04 RX ADMIN — Medication 12.5 MILLIGRAM(S): at 06:18

## 2018-11-04 RX ADMIN — Medication 24 MILLIGRAM(S): at 11:30

## 2018-11-04 RX ADMIN — HEPARIN SODIUM 5000 UNIT(S): 5000 INJECTION INTRAVENOUS; SUBCUTANEOUS at 21:02

## 2018-11-04 RX ADMIN — Medication 1 MILLIGRAM(S): at 08:07

## 2018-11-04 RX ADMIN — CITALOPRAM 40 MILLIGRAM(S): 10 TABLET, FILM COATED ORAL at 11:32

## 2018-11-04 RX ADMIN — Medication 600 MILLIGRAM(S): at 19:28

## 2018-11-04 NOTE — PROGRESS NOTE ADULT - SUBJECTIVE AND OBJECTIVE BOX
Patient is a 68y old  Male who presents with a chief complaint of back pain (04 Nov 2018 08:50)      SUBJECTIVE / OVERNIGHT EVENTS:  Patient seen and examined this morning. Continues to states that this is the worse back pain he is ever had and also says that "he just can not live like this, and he thinks god is going to take him". He continues to deny fevers or chills.       MEDICATIONS  (STANDING):  cholecalciferol 1000 Unit(s) Oral daily  citalopram 40 milliGRAM(s) Oral daily  heparin  Injectable 5000 Unit(s) SubCutaneous every 8 hours  hydrochlorothiazide 12.5 milliGRAM(s) Oral daily  ibuprofen  Tablet. 600 milliGRAM(s) Oral every 6 hours  influenza   Vaccine 0.5 milliLiter(s) IntraMuscular once  losartan 50 milliGRAM(s) Oral daily  methylPREDNISolone   Oral   sodium chloride 0.9%. 1000 milliLiter(s) (50 mL/Hr) IV Continuous <Continuous>  verapamil  milliGRAM(s) Oral daily    MEDICATIONS  (PRN):  ALPRAZolam 1 milliGRAM(s) Oral every 12 hours PRN anxiety  baclofen 5 milliGRAM(s) Oral three times a day PRN Muscle Spasm  HYDROmorphone  Injectable 0.5 milliGRAM(s) IV Push every 4 hours PRN Severe Pain (7 - 10)  traZODone 100 milliGRAM(s) Oral at bedtime PRN sleep  zolpidem 5 milliGRAM(s) Oral at bedtime PRN Insomnia  zolpidem 5 milliGRAM(s) Oral at bedtime PRN Insomnia      PHYSICAL EXAM:  T(C): 36.5 (11-04-18 @ 06:03), Max: 36.8 (11-03-18 @ 19:09)  HR: 75 (11-04-18 @ 10:25) (60 - 75)  BP: 142/73 (11-04-18 @ 10:25) (142/73 - 157/89)  RR: 18 (11-04-18 @ 10:25) (18 - 20)  SpO2: 96% (11-04-18 @ 06:03) (94% - 96%)  I&O's Summary    GENERAL:  well-developed, in bed turned to the side and withering in pain, second time being seen, noted to be in the chair.  EYES: EOMI, PERRLA, conjunctiva and sclera clear  CHEST/LUNG: Clear to auscultation bilaterally; No wheeze  HEART: Regular rate and rhythm; No murmurs, rubs, or gallops  ABDOMEN: Soft, Nontender, Nondistended; Bowel sounds present  PSYCH: AAOx3  NEUROLOGY: CN II-XII grossly intact, moving all extremities, +straight leg test B/L, mild tenderness present over the left lower back, sensation intact B/L on upper and lower extremity.  SKIN: No rashes or lesions      LABS:  CAPILLARY BLOOD GLUCOSE                              14.7   10.50 )-----------( 240      ( 03 Nov 2018 12:57 )             43.6     11-03    141  |  102  |  15  ----------------------------<  80  4.5   |  28  |  1.11    Ca    9.9      03 Nov 2018 12:57    TPro  7.1  /  Alb  4.2  /  TBili  0.7  /  DBili  x   /  AST  12  /  ALT  12  /  AlkPhos  60  11-03    PT/INR - ( 03 Nov 2018 12:57 )   PT: 12.1 SEC;   INR: 1.09          PTT - ( 03 Nov 2018 12:57 )  PTT:32.6 SEC          RADIOLOGY & ADDITIONAL TESTS:    Imaging Personally Reviewed:    Consultant(s) Notes Reviewed:  Neurology notes reviewed    Care Discussed with Consultants/Other Providers: Case discussed with neurology resident (Dr. Curtis)

## 2018-11-04 NOTE — PHYSICAL THERAPY INITIAL EVALUATION ADULT - PERTINENT HX OF CURRENT PROBLEM, REHAB EVAL
Pt. is a 68 year old female admitted to [facility] secondary to low back pain. PMH: sciatica, HTN, NPH, small intestine disorder.

## 2018-11-04 NOTE — PHYSICAL THERAPY INITIAL EVALUATION ADULT - REHAB POTENTIAL, PT EVAL
Pt. not placed on skilled therapy services secondary to pt. performing at their baseline performance./none

## 2018-11-04 NOTE — CONSULT NOTE ADULT - ASSESSMENT
Patient is a 68 year old male with PMHx of lumbar fusion L4-L5 in Oct 2011, screws/pin placement in 2012, sciatica, laminectomy L4-L5 in 2009, NPH s/p  shunt and HTN who presented to the ER complaining of back pain since August 2018. The patient had stated that this new back pain is nothing like he ever had before and is more severe than the back pain that he had prior or even after his spinal surgeries. He has been going to PT, and after these PT sessions the pain worsened significantly He claims that it has only somewhat responded to IV Dilaudid. He states that his current pain starts in the left lower back and radiates down to his left buttock and left leg. Due to the throbbing, shooting pain he is unable to ambulate and has limited ROM. He hasn't been able to walk due to the pain. He denies saddle anesthesia, urinary or fecal incontinence, numbness or tingling. No trauma to the back. The patient denies any fevers, chills, sweating or shortness of breath. No spinal tenderness. He keeps his wallet in his front pockets, did not keep it in back pocket on side of pain  Neurological exam notable for + straight leg test on the left, and pain with crossing midline, and decreased DTR on left knee. No central spinal tenderness, pain is left lumbar, left buttock, and shooting down left leg.   Impression: lumbar radiculopathy (sciatica) vs piriformis syndrome    Plan  [] continue dilaudid 2 mg q4  [] initiate medrol dose pack for pain  [] rest of management per primary team   [] heat packs for pain  [] can start on baclofen 5 TID for spasm if pain is not relieved and uptitrate as needed  [] GI ppx  [] DVT ppx    D/w dr Bustillos  Thank you for the consult Patient is a 68 year old male with PMHx of lumbar fusion L4-L5 in Oct 2011, screws/pin placement in 2012, sciatica, laminectomy L4-L5 in 2009, NPH s/p  shunt and HTN who presented to the ER complaining of back pain since August 2018. The patient had stated that this new back pain is nothing like he ever had before and is more severe than the back pain that he had prior or even after his spinal surgeries. He has been going to PT, and after these PT sessions the pain worsened significantly He claims that it has only somewhat responded to IV Dilaudid. He states that his current pain starts in the left lower back and radiates down to his left buttock and left leg. Due to the throbbing, shooting pain he is unable to ambulate and has limited ROM. He hasn't been able to walk due to the pain. He denies saddle anesthesia, urinary or fecal incontinence, numbness or tingling. No trauma to the back. The patient denies any fevers, chills, sweating or shortness of breath. No spinal tenderness. He keeps his wallet in his front pockets, did not keep it in back pocket on side of pain  Neurological exam notable for + straight leg test on the left, and pain with crossing midline, and decreased DTR on left knee. No central spinal tenderness, pain is left lumbar, left buttock, and shooting down left leg.   Impression: lumbar radiculopathy (sciatica) vs piriformis syndrome    Plan  [] continue dilaudid 2 mg q4  [] initiate medrol dose pack for pain  [] rest of management per primary team   [] heat packs for pain  [] can start on neurontin 100mg BID  for neuropathic pain and up titrate as needed  [] GI ppx  [] DVT ppx    D/w dr Bustillos  Thank you for the consult

## 2018-11-04 NOTE — CONSULT NOTE ADULT - ATTENDING COMMENTS
Patient seen and examined with neurology team and above note reviewed and I agree with plan as outlined. Patient with chronic lower back and radicular pain. However, over the past several weeks months having progressive lower back pain with radiation down left buttocks and into leg and foot with numbness and tingling. He has been having more difficulty ambulating and with his balance. He has had multiple lower back surgeries, most recently in April of this year.   On exam his strength and sensation are normal and reflexes are decreased in lower limbs. He was not able to ambulate for us.   WE will attempt to get MRI LS spine if he can bring in card for his  shunt settings. In meantime, will continue with medrol dose remy and neurontin 100mg BID and titrate to goal of 300mg TID over time.   PT follow up and continue medical mgt and supportive care and all questions answered.

## 2018-11-04 NOTE — CONSULT NOTE ADULT - SUBJECTIVE AND OBJECTIVE BOX
HPI:  Patient is a 68 year old male with PMHx of lumbar fusion L4-L5 in Oct 2011, screws/pin placement in 2012, sciatica, laminectomy L4-L5 in 2009, NPH s/p  shunt and HTN who presented to the ER complaining of back pain since August 2018. The patient had stated that this new back pain is nothing like he ever had before and is more severe than the back pain that he had prior or even after his spinal surgeries. He has been going to PT, and after these PT sessions the pain worsened significantly He claims that it has only somewhat responded to IV Dilaudid. He states that his current pain starts in the left lower back and radiates down to his left buttock and left leg. Due to the pain he is unable to ambulate and has limited ROM. He hasn't been able to walk due to the pain. He denies saddle anesthesia, urinary or fecal incontinence, numbness or tingling. No trauma to the back. The patient denies any fevers, chills, sweating or shortness of breath. No spinal tenderness. He keeps his wallet in his front pockets, did not keep it in back pocket on side of pain    PAST MEDICAL & SURGICAL HISTORY:  NPH (normal pressure hydrocephalus)  Chronic back pain greater than 3 months duration  Small intestine disorder: &quot;ilitis&quot;   steroids   1967  Lumbar disc displacement without myelopathy  Sciatica  Anxiety  Vertebral Sciatica  Insomnia  Depression  HTN (Hypertension)  S/P lumbar fusion: L4-L5 on Oct 2011  pins/screws 2012   spinal surgery 2013  Dehiscence, Operation Wound/Debridement: infection  S/P Laminectomy: L4-L5 2009  complicated by infection requiring  antibiiotcis    MEDICATIONS  (STANDING):  cholecalciferol 1000 Unit(s) Oral daily  citalopram 40 milliGRAM(s) Oral daily  heparin  Injectable 5000 Unit(s) SubCutaneous every 8 hours  hydrochlorothiazide 12.5 milliGRAM(s) Oral daily  ibuprofen  Tablet. 600 milliGRAM(s) Oral every 6 hours  influenza   Vaccine 0.5 milliLiter(s) IntraMuscular once  losartan 50 milliGRAM(s) Oral daily  sodium chloride 0.9%. 1000 milliLiter(s) (50 mL/Hr) IV Continuous <Continuous>  verapamil  milliGRAM(s) Oral daily    MEDICATIONS  (PRN):  ALPRAZolam 1 milliGRAM(s) Oral every 12 hours PRN anxiety  HYDROmorphone   Tablet 2 milliGRAM(s) Oral every 4 hours PRN Severe Pain (7 - 10)  traZODone 100 milliGRAM(s) Oral at bedtime PRN sleep  zolpidem 5 milliGRAM(s) Oral at bedtime PRN Insomnia  zolpidem 5 milliGRAM(s) Oral at bedtime PRN Insomnia    PAST MEDICAL & SURGICAL HISTORY:  NPH (normal pressure hydrocephalus)  Chronic back pain greater than 3 months duration  Small intestine disorder: &quot;ilitis&quot;   steroids   1967  Lumbar disc displacement without myelopathy  Sciatica  Anxiety  Vertebral Sciatica  Insomnia  Depression  HTN (Hypertension)  S/P lumbar fusion: L4-L5 on Oct 2011  pins/screws 2012   spinal surgery 2013  Dehiscence, Operation Wound/Debridement: infection  S/P Laminectomy: L4-L5 2009  complicated by infection requiring  antibiiotcis    Allergies  Biaxin (Other)  Lidoderm (Unknown)  morphine (Short breath; Rash)    SHx - No smoking, No ETOH, No drug abuse    Review of Systems:  CONSTITUTIONAL:  No weight loss, fever, chills, weakness or fatigue.  HEENT:  Eyes:  No visual loss, blurred vision, double vision or yellow sclerae. Ears, Nose, Throat:  No hearing loss, sneezing, congestion, runny nose or sore throat.  CARDIOVASCULAR:  No chest pain, chest pressure or chest discomfort. No palpitations or edema.  GASTROINTESTINAL:  No anorexia, nausea, vomiting or diarrhea. No abdominal pain or blood.  NEUROLOGICAL: See HPI  MUSCULOSKELETAL:  No muscle, back pain, joint pain or stiffness.  PSYCHIATRIC:  No history of depression or anxiety.    Vital Signs Last 24 Hrs  T(C): 36.5 (04 Nov 2018 06:03), Max: 37.1 (03 Nov 2018 12:27)  T(F): 97.7 (04 Nov 2018 06:03), Max: 98.7 (03 Nov 2018 12:27)  HR: 66 (04 Nov 2018 06:03) (60 - 101)  BP: 147/81 (04 Nov 2018 06:03) (147/81 - 171/84)  BP(mean): --  RR: 19 (04 Nov 2018 06:03) (17 - 20)  SpO2: 96% (04 Nov 2018 06:03) (94% - 100%)    General Exam:   General appearance: No acute distress                 Neurological Exam:  Mental Status: Orientated to self, date and place.    No dysarthria, aphasia or neglect.      Cranial Nerves: CN I - not tested.  PERRL, EOMI, VFF, no nystagmus or diplopia  No facial asymmetry.  Hearing intact to finger rub bilaterally.     Motor:   Tone: normal.                  Strength:     [] Upper extremity                      Delt       Bicep    Tricep                                                  R         5/5        5/5        5/5       5/5                                               L          5/5        5/5        5/5       5/5  [] Lower extremity                       HF          KE          KF        DF         PF                                               R        5/5 5/5        5/5       5/5       5/5                                               L         5/5 5/5       5/5 5/5        5/5  Pronator drift: none                 Dysmetria: BL NL FTN  + straight leg test on LLE. general pain, including with bending left knee in and crossing midline to opposite shoulder.   Tremor: No resting, postural or action tremor.  No myoclonus.    Sensation: intact to light touch    Deep Tendon Reflexes:   Right 2+ : BC, TC, BRD   Left 2+ : BC, TC, BRD  Right 2+ Knee, 1+ ankle  Left 1+ Knee, 1+ ankle    Toes flexor bilaterally  Gait: deferred due to pain    Other:  Radiology    CT  XR L-spine: to be read

## 2018-11-04 NOTE — PHYSICAL THERAPY INITIAL EVALUATION ADULT - PATIENT PROFILE REVIEW, REHAB EVAL
Pt. profile reviewed, consulted with TRUNG ARREGUIN prior to initial PT evaluation and tx, as per RN, Pt. is OK to participate in skilled therapy session./yes

## 2018-11-04 NOTE — PROGRESS NOTE ADULT - PROBLEM SELECTOR PLAN 1
Pain could be due to sciatica, however further workup may be required due to surgical history.   Lumbrosacral x-ray  Neurology consult  pain management, ibuprofen and dilaudid 0.5mg Q4hrs for severe pain since patient has allergy to morphine?  CT of lumbar region

## 2018-11-04 NOTE — PHYSICAL THERAPY INITIAL EVALUATION ADULT - ASR EQUIP NEEDS DISCH PT EVAL
rolling walker (5 inch wheels)/pt. would benifit from use of Rolling Walker upon D/C to increase stability during ambulation.

## 2018-11-04 NOTE — PROGRESS NOTE ADULT - ASSESSMENT
Teto castaneda is a 69 y/o gentlemen with numerous spinal surgeries as reported below,  shunt placed for hydrocephalus and HTN who presented to the ER complaining of back pain. He calmly states that his current pain starts in the left lower back and radiates down to his buttock and leg. Due to the pain he is unable to ambulate and has limited ROM. Pain is something unlike he has ever had before, and he thinks it's more severe than the pain prior or even after his surgeries. Denies fevers or chills. Pain could be due to sciatica, however further workup may be required due to surgical history.

## 2018-11-04 NOTE — PHYSICAL THERAPY INITIAL EVALUATION ADULT - ADDITIONAL COMMENTS
Pt. lives in a pvt house with his spouse. Pt. reports that he has steps with handrails x1 to negotiate at home. Pt. previously ambulating independently without an assistive device and independent with ADLs. Pt. returned to bed all lines/tubes intact, call bell in reach and in NAD.

## 2018-11-05 LAB
BUN SERPL-MCNC: 18 MG/DL — SIGNIFICANT CHANGE UP (ref 7–23)
CALCIUM SERPL-MCNC: 8.9 MG/DL — SIGNIFICANT CHANGE UP (ref 8.4–10.5)
CHLORIDE SERPL-SCNC: 105 MMOL/L — SIGNIFICANT CHANGE UP (ref 98–107)
CO2 SERPL-SCNC: 21 MMOL/L — LOW (ref 22–31)
CREAT SERPL-MCNC: 0.96 MG/DL — SIGNIFICANT CHANGE UP (ref 0.5–1.3)
CRP SERPL-MCNC: < 4 MG/L — SIGNIFICANT CHANGE UP
ERYTHROCYTE [SEDIMENTATION RATE] IN BLOOD: 4 MM/HR — SIGNIFICANT CHANGE UP (ref 1–15)
GLUCOSE SERPL-MCNC: 107 MG/DL — HIGH (ref 70–99)
HCT VFR BLD CALC: 43.8 % — SIGNIFICANT CHANGE UP (ref 39–50)
HGB BLD-MCNC: 14.8 G/DL — SIGNIFICANT CHANGE UP (ref 13–17)
MCHC RBC-ENTMCNC: 29.8 PG — SIGNIFICANT CHANGE UP (ref 27–34)
MCHC RBC-ENTMCNC: 33.8 % — SIGNIFICANT CHANGE UP (ref 32–36)
MCV RBC AUTO: 88.3 FL — SIGNIFICANT CHANGE UP (ref 80–100)
NRBC # FLD: 0 — SIGNIFICANT CHANGE UP
PLATELET # BLD AUTO: 230 K/UL — SIGNIFICANT CHANGE UP (ref 150–400)
PMV BLD: 10.4 FL — SIGNIFICANT CHANGE UP (ref 7–13)
POTASSIUM SERPL-MCNC: 4 MMOL/L — SIGNIFICANT CHANGE UP (ref 3.5–5.3)
POTASSIUM SERPL-SCNC: 4 MMOL/L — SIGNIFICANT CHANGE UP (ref 3.5–5.3)
RBC # BLD: 4.96 M/UL — SIGNIFICANT CHANGE UP (ref 4.2–5.8)
RBC # FLD: 13.8 % — SIGNIFICANT CHANGE UP (ref 10.3–14.5)
SODIUM SERPL-SCNC: 141 MMOL/L — SIGNIFICANT CHANGE UP (ref 135–145)
WBC # BLD: 13.97 K/UL — HIGH (ref 3.8–10.5)
WBC # FLD AUTO: 13.97 K/UL — HIGH (ref 3.8–10.5)

## 2018-11-05 PROCEDURE — 99222 1ST HOSP IP/OBS MODERATE 55: CPT | Mod: GC

## 2018-11-05 PROCEDURE — 99233 SBSQ HOSP IP/OBS HIGH 50: CPT

## 2018-11-05 PROCEDURE — 72132 CT LUMBAR SPINE W/DYE: CPT | Mod: 26

## 2018-11-05 RX ORDER — DOCUSATE SODIUM 100 MG
100 CAPSULE ORAL THREE TIMES A DAY
Qty: 0 | Refills: 0 | Status: DISCONTINUED | OUTPATIENT
Start: 2018-11-05 | End: 2018-11-07

## 2018-11-05 RX ADMIN — HYDROMORPHONE HYDROCHLORIDE 0.5 MILLIGRAM(S): 2 INJECTION INTRAMUSCULAR; INTRAVENOUS; SUBCUTANEOUS at 06:10

## 2018-11-05 RX ADMIN — HEPARIN SODIUM 5000 UNIT(S): 5000 INJECTION INTRAVENOUS; SUBCUTANEOUS at 21:51

## 2018-11-05 RX ADMIN — LOSARTAN POTASSIUM 50 MILLIGRAM(S): 100 TABLET, FILM COATED ORAL at 05:55

## 2018-11-05 RX ADMIN — HEPARIN SODIUM 5000 UNIT(S): 5000 INJECTION INTRAVENOUS; SUBCUTANEOUS at 05:55

## 2018-11-05 RX ADMIN — Medication 1 MILLIGRAM(S): at 12:01

## 2018-11-05 RX ADMIN — Medication 12.5 MILLIGRAM(S): at 05:55

## 2018-11-05 RX ADMIN — Medication 600 MILLIGRAM(S): at 19:44

## 2018-11-05 RX ADMIN — Medication 4 MILLIGRAM(S): at 22:13

## 2018-11-05 RX ADMIN — Medication 360 MILLIGRAM(S): at 05:55

## 2018-11-05 RX ADMIN — HEPARIN SODIUM 5000 UNIT(S): 5000 INJECTION INTRAVENOUS; SUBCUTANEOUS at 16:34

## 2018-11-05 RX ADMIN — Medication 4 MILLIGRAM(S): at 16:34

## 2018-11-05 RX ADMIN — Medication 600 MILLIGRAM(S): at 12:01

## 2018-11-05 RX ADMIN — CITALOPRAM 40 MILLIGRAM(S): 10 TABLET, FILM COATED ORAL at 12:02

## 2018-11-05 RX ADMIN — Medication 600 MILLIGRAM(S): at 13:01

## 2018-11-05 RX ADMIN — Medication 600 MILLIGRAM(S): at 18:44

## 2018-11-05 RX ADMIN — Medication 600 MILLIGRAM(S): at 05:55

## 2018-11-05 RX ADMIN — Medication 4 MILLIGRAM(S): at 05:55

## 2018-11-05 RX ADMIN — Medication 1000 UNIT(S): at 12:01

## 2018-11-05 RX ADMIN — Medication 600 MILLIGRAM(S): at 06:55

## 2018-11-05 RX ADMIN — HYDROMORPHONE HYDROCHLORIDE 0.5 MILLIGRAM(S): 2 INJECTION INTRAMUSCULAR; INTRAVENOUS; SUBCUTANEOUS at 05:55

## 2018-11-05 RX ADMIN — ZOLPIDEM TARTRATE 5 MILLIGRAM(S): 10 TABLET ORAL at 22:14

## 2018-11-05 NOTE — PROGRESS NOTE ADULT - PROBLEM SELECTOR PLAN 1
Pain could be due to sciatica, however further workup may be required due to surgical history.   Lumbrosacral x-ray  Neurology consult  pain management, ibuprofen and dilaudid 0.5mg Q4hrs for severe pain since patient has allergy to morphine?  CT of lumbar region- Ordered

## 2018-11-05 NOTE — CONSULT NOTE ADULT - ATTENDING COMMENTS
Pt is a 67 y/o gentlemen with multiple spinal surgeries (9),  shunt placed for hydrocephalus, HTN, anxiety/depression admitted with severe progressive back pain since late August '18.  He devloped leukocytosis after steroids.  CT scan noted - unclear significance _ I am skeptical that patient has infection    Suggest:  Monitor off antibiotics  Check Blood cultures, ESR, CRP, ProCalcitonin

## 2018-11-05 NOTE — CONSULT NOTE ADULT - ASSESSMENT
Pt is a 69 y/o gentlemen with multiple spinal surgeries (9),  shunt placed for hydrocephalus, HTN, anxiety/depression who presented to the ER complaining of back pain with     #CT findings concerning for  epidural fluid collection   -per CT findings epidural collection or phlegmon   -could be related to post surgical changes  -pt  afebrile, non toxic, now with mild leukocytosis in the setting of steroid use  -Hold off on abx for now  -obtain two sets of blood cultures   -please obtain inflammatory markers- ESR and CRP  -procalcitonin   -obtain MRI of spine     d/w Dr. Bustos

## 2018-11-05 NOTE — PROGRESS NOTE ADULT - SUBJECTIVE AND OBJECTIVE BOX
Patient is a 68y old  Male who presents with a chief complaint of back pain (04 Nov 2018 13:20)      SUBJECTIVE / OVERNIGHT EVENTS:    MEDICATIONS  (STANDING):  cholecalciferol 1000 Unit(s) Oral daily  citalopram 40 milliGRAM(s) Oral daily  heparin  Injectable 5000 Unit(s) SubCutaneous every 8 hours  hydrochlorothiazide 12.5 milliGRAM(s) Oral daily  ibuprofen  Tablet. 600 milliGRAM(s) Oral every 6 hours  influenza   Vaccine 0.5 milliLiter(s) IntraMuscular once  losartan 50 milliGRAM(s) Oral daily  methylPREDNISolone 4 milliGRAM(s) Oral before breakfast  methylPREDNISolone 4 milliGRAM(s) Oral after lunch  methylPREDNISolone 4 milliGRAM(s) Oral after dinner  methylPREDNISolone 8 milliGRAM(s) Oral at bedtime  methylPREDNISolone   Oral   sodium chloride 0.9%. 1000 milliLiter(s) (50 mL/Hr) IV Continuous <Continuous>  verapamil  milliGRAM(s) Oral daily    MEDICATIONS  (PRN):  ALPRAZolam 1 milliGRAM(s) Oral every 12 hours PRN anxiety  baclofen 5 milliGRAM(s) Oral three times a day PRN Muscle Spasm  HYDROmorphone  Injectable 0.5 milliGRAM(s) IV Push every 4 hours PRN Severe Pain (7 - 10)  traZODone 100 milliGRAM(s) Oral at bedtime PRN sleep  zolpidem 5 milliGRAM(s) Oral at bedtime PRN Insomnia  zolpidem 5 milliGRAM(s) Oral at bedtime PRN Insomnia        PHYSICAL EXAM:  Vital Signs Last 24 Hrs  T(C): 36.6 (05 Nov 2018 06:32), Max: 36.8 (04 Nov 2018 14:11)  T(F): 97.9 (05 Nov 2018 06:32), Max: 98.2 (04 Nov 2018 14:11)  HR: 81 (05 Nov 2018 06:32) (73 - 81)  BP: 158/79 (05 Nov 2018 06:32) (158/79 - 158/91)  BP(mean): --  RR: 18 (05 Nov 2018 06:32) (16 - 18)  SpO2: 99% (05 Nov 2018 06:32) (95% - 99%)  GENERAL: NAD, well-developed  HEAD:  Atraumatic, Normocephalic  EYES: EOMI, PERRLA, conjunctiva and sclera clear  NECK: Supple, No JVD  CHEST/LUNG: Clear to auscultation bilaterally; No wheeze  HEART: Regular rate and rhythm; No murmurs, rubs, or gallops  ABDOMEN: Soft, Nontender, Nondistended; Bowel sounds present  EXTREMITIES:  2+ Peripheral Pulses, No clubbing, cyanosis, or edema  PSYCH: AAOx3  NEUROLOGY: non-focal  SKIN: No rashes or lesions    LABS:                        14.8   13.97 )-----------( 230      ( 05 Nov 2018 06:05 )             43.8     11-05    141  |  105  |  18  ----------------------------<  107<H>  4.0   |  21<L>  |  0.96    Ca    8.9      05 Nov 2018 06:05                RADIOLOGY & ADDITIONAL TESTS:    Imaging Personally Reviewed:    Consultant(s) Notes Reviewed:      Care Discussed with Consultants/Other Providers: Patient is a 68y old  Male who presents with a chief complaint of back pain (04 Nov 2018 13:20)      SUBJECTIVE / OVERNIGHT EVENTS:    MEDICATIONS  (STANDING):  cholecalciferol 1000 Unit(s) Oral daily  citalopram 40 milliGRAM(s) Oral daily  heparin  Injectable 5000 Unit(s) SubCutaneous every 8 hours  hydrochlorothiazide 12.5 milliGRAM(s) Oral daily  ibuprofen  Tablet. 600 milliGRAM(s) Oral every 6 hours  influenza   Vaccine 0.5 milliLiter(s) IntraMuscular once  losartan 50 milliGRAM(s) Oral daily  methylPREDNISolone 4 milliGRAM(s) Oral before breakfast  methylPREDNISolone 4 milliGRAM(s) Oral after lunch  methylPREDNISolone 4 milliGRAM(s) Oral after dinner  methylPREDNISolone 8 milliGRAM(s) Oral at bedtime  methylPREDNISolone   Oral   sodium chloride 0.9%. 1000 milliLiter(s) (50 mL/Hr) IV Continuous <Continuous>  verapamil  milliGRAM(s) Oral daily    MEDICATIONS  (PRN):  ALPRAZolam 1 milliGRAM(s) Oral every 12 hours PRN anxiety  baclofen 5 milliGRAM(s) Oral three times a day PRN Muscle Spasm  HYDROmorphone  Injectable 0.5 milliGRAM(s) IV Push every 4 hours PRN Severe Pain (7 - 10)  traZODone 100 milliGRAM(s) Oral at bedtime PRN sleep  zolpidem 5 milliGRAM(s) Oral at bedtime PRN Insomnia  zolpidem 5 milliGRAM(s) Oral at bedtime PRN Insomnia        PHYSICAL EXAM:  Vital Signs Last 24 Hrs  T(C): 36.6 (05 Nov 2018 06:32), Max: 36.8 (04 Nov 2018 14:11)  T(F): 97.9 (05 Nov 2018 06:32), Max: 98.2 (04 Nov 2018 14:11)  HR: 81 (05 Nov 2018 06:32) (73 - 81)  BP: 158/79 (05 Nov 2018 06:32) (158/79 - 158/91)  BP(mean): --  RR: 18 (05 Nov 2018 06:32) (16 - 18)  SpO2: 99% (05 Nov 2018 06:32) (95% - 99%)  GENERAL: NAD, well-developed  HEAD:  Atraumatic, Normocephalic  EYES: EOMI, PERRLA, conjunctiva and sclera clear  NECK: Supple, No JVD  CHEST/LUNG: Clear to auscultation bilaterally; No wheeze  HEART: Regular rate and rhythm; No murmurs, rubs, or gallops  ABDOMEN: Soft, Nontender, Nondistended; Bowel sounds present  EXTREMITIES:  2+ Peripheral Pulses, No clubbing, cyanosis, or edema  PSYCH: Alert    LABS:                        14.8   13.97 )-----------( 230      ( 05 Nov 2018 06:05 )             43.8     11-05    141  |  105  |  18  ----------------------------<  107<H>  4.0   |  21<L>  |  0.96    Ca    8.9      05 Nov 2018 06:05                RADIOLOGY & ADDITIONAL TESTS:    Imaging Personally Reviewed:    Consultant(s) Notes Reviewed:      Care Discussed with Consultants/Other Providers:

## 2018-11-05 NOTE — CONSULT NOTE ADULT - SUBJECTIVE AND OBJECTIVE BOX
Patient is a 68y old  Male who presents with a chief complaint of back pain (05 Nov 2018 13:00)        HPI:      Pt is a 67 y/o gentlemen with multiple spinal surgeries (9),  shunt placed for hydrocephalus, HTN, anxiety/depression who presented to the ER complaining of back pain. The patient had stated that this new back pain is nothing like he ever had before and is more severe than the back pain that he had prior or even after his spinal surgeries.  Reports that he had two spinal surgeries this year, in April had S1-L5 spinal fusion and in July had removal of neurostimulator. Patient mentions that he had PT in August and was improving but during first week of September "everything went down hill". He started with left lower back pain radiating to left leg/foot that was worsening with time. Pain coming in was 10/10. Now 6-7/10 with Dilaudid IV.  Due to the pain he is unable to ambulate and has limited ROM. The patient denies any fevers, chills, sweating or shortness of breath. Denies any numbness of perineum, urinary incontinence or leg weakness.     PAST MEDICAL & SURGICAL HISTORY:  NPH (normal pressure hydrocephalus)  Chronic back pain greater than 3 months duration  Small intestine disorder:  steroids   1967  Lumbar disc displacement without myelopathy  Sciatica  Anxiety  Vertebral Sciatica  Insomnia  Depression  HTN (Hypertension)  S/P lumbar fusion: L4-L5 on Oct 2011  pins/screws 2012   spinal surgery 2013  Dehiscence, Operation Wound/Debridement: infection  S/P Laminectomy: L4-L5 2009  complicated by infection requiring  antibiiotcis      Review of Systems:   CONSTITUTIONAL: No fever, weight loss, +fatigue  EYES: No eye pain, visual disturbances, or discharge  ENMT:  No difficulty hearing, tinnitus, vertigo; No sinus or throat pain  NECK: No pain or stiffness  BREASTS: No pain, masses, or nipple discharge  RESPIRATORY: No cough, wheezing, chills or hemoptysis; No shortness of breath  CARDIOVASCULAR: No chest pain, palpitations, dizziness, or leg swelling  GASTROINTESTINAL: No abdominal or epigastric pain. No nausea, vomiting, or hematemesis; No diarrhea or constipation  GENITOURINARY: No dysuria, frequency, hematuria, or incontinence  NEUROLOGICAL: As per HPI  SKIN: No itching, burning, rashes, or lesions   LYMPH NODES: No enlarged glands  ENDOCRINE: No heat or cold intolerance; No hair loss  MUSCULOSKELETAL: back pain, radiating back to his left leg. Unable to ambulate.  PSYCHIATRIC: +Depression, increased fatigue and sleep.  HEME/LYMPH: No easy bruising, or bleeding gums  ALLERGY AND IMMUNOLOGIC: No hives or eczema    Allergies    Biaxin (Other)  Lidoderm (Unknown)  morphine (Short breath; Rash)    Intolerances        Social History:   Denies drug use, drinking or smoking, ambulates with cane     FAMILY HISTORY:  No family history of back pain  DM mother, Lung ca-Father       MEDICATIONS  (STANDING):  heparin  Injectable 5000 Unit(s) SubCutaneous every 8 hours  sodium chloride 0.9%. 1000 milliLiter(s) (50 mL/Hr) IV Continuous <Continuous>    MEDICATIONS  (PRN):      T(C): 36.2 (11-03-18 @ 13:08), Max: 37.1 (11-03-18 @ 12:27)  HR: 70 (11-03-18 @ 13:08) (70 - 101)  BP: 171/84 (11-03-18 @ 13:08) (165/97 - 171/84)  RR: 17 (11-03-18 @ 13:08) (17 - 17)  SpO2: 100% (11-03-18 @ 13:08) (100% - 100%)    CAPILLARY BLOOD GLUCOSE        I&O's Summary      PHYSICAL EXAM:  GENERAL: NAD, well-developed, speaks in full sentences, pleasant  HEAD:  Atraumatic, Normocephalic  EYES: EOMI, PERRLA, conjunctiva and sclera clear  NECK: Supple, No elevated JVD  CHEST/LUNG: Clear to auscultation bilaterally; No wheeze  HEART: Regular rate and rhythm; No murmurs, rubs, or gallops  ABDOMEN: Soft, Nontender, Nondistended; Bowel sounds present  EXTREMITIES:  2+ Peripheral Pulses, No clubbing, cyanosis, or edema  PSYCH: AAOx3  NEUROLOGY: CN II-XII grossly intact, moving all extremities,  mild tenderness present over the left lower back, sensation intact B/L on upper and lower extremity. ROM of back intact.  SKIN: No rashes or lesions    LABS:                        14.7   10.50 )-----------( 240      ( 03 Nov 2018 12:57 )             43.6     11-03    141  |  102  |  15  ----------------------------<  80  4.5   |  28  |  1.11    Ca    9.9      03 Nov 2018 12:57    TPro  7.1  /  Alb  4.2  /  TBili  0.7  /  DBili  x   /  AST  12  /  ALT  12  /  AlkPhos  60  11-03    PT/INR - ( 03 Nov 2018 12:57 )   PT: 12.1 SEC;   INR: 1.09          PTT - ( 03 Nov 2018 12:57 )  PTT:32.6 SEC            RADIOLOGY & ADDITIONAL TESTS:    ECG Personally Reviewed -     Imaging Personally Reviewed:    Consultant(s) Notes Reviewed:      Care Discussed with Consultants/Other Providers: Spoke with neurology resident who will see the patient (03 Nov 2018 17:16) Patient is a 68y old  Male who presents with a chief complaint of back pain (05 Nov 2018 13:00)        HPI:      Pt is a 69 y/o gentlemen with multiple spinal surgeries (9),  shunt placed for hydrocephalus, HTN, anxiety/depression who presented to the ER complaining of back pain. The patient had stated that this new back pain is nothing like he ever had before and is more severe than the back pain that he had prior or even after his spinal surgeries.  Reports that he had two spinal surgeries this year, in April had S1-L5 spinal fusion and in July had removal of neurostimulator. Patient mentions that he had PT in August, but during first week of September "everything went down hill". He started with left lower back pain radiating to left leg/foot that was worsening with time. Pain coming in was 10/10. Now 6-7/10 with Dilaudid IV on board.  Due to the pain he is unable to ambulate and has limited ROM. The patient denies any fevers, chills, sweating or shortness of breath. Denies any numbness of perineum, urinary incontinence or leg weakness. Of note Pt denies any recent dental work or antibiotics. Endorses that he lost a tooth crown in April.    PAST MEDICAL & SURGICAL HISTORY:  NPH (normal pressure hydrocephalus)  Chronic back pain greater than 3 months duration  Small intestine disorder:  steroids   1967  Lumbar disc displacement without myelopathy  Sciatica  Anxiety  Vertebral Sciatica  Insomnia  Depression  HTN (Hypertension)  S/P lumbar fusion: L4-L5 on Oct 2011  pins/screws 2012   spinal surgery 2013  Dehiscence, Operation Wound/Debridement: infection  S/P Laminectomy: L4-L5 2009  complicated by infection requiring  antibiiotcis      Review of Systems:   CONSTITUTIONAL: No fever, weight loss, +fatigue  EYES: No eye pain, visual disturbances, or discharge  ENMT:  No difficulty hearing, tinnitus, vertigo; No sinus or throat pain  NECK: No pain or stiffness  BREASTS: No pain, masses, or nipple discharge  RESPIRATORY: No cough, wheezing, chills or hemoptysis; No shortness of breath  CARDIOVASCULAR: No chest pain, palpitations, dizziness, or leg swelling  GASTROINTESTINAL: No abdominal or epigastric pain. No nausea, vomiting, or hematemesis; No diarrhea or constipation  GENITOURINARY: No dysuria, frequency, hematuria, or incontinence  NEUROLOGICAL: As per HPI  SKIN: No itching, burning, rashes, or lesions   LYMPH NODES: No enlarged glands  ENDOCRINE: No heat or cold intolerance; No hair loss  MUSCULOSKELETAL: back pain, radiating back to his left leg. Unable to ambulate.  PSYCHIATRIC: +Depression, increased fatigue and sleep.  HEME/LYMPH: No easy bruising, or bleeding gums  ALLERGY AND IMMUNOLOGIC: No hives or eczema    Allergies    Biaxin (Other)  Lidoderm (Unknown)  morphine (Short breath; Rash)    Intolerances        Social History:   Denies drug use, drinking or smoking, ambulates with cane     FAMILY HISTORY:  No family history of back pain  DM mother, Lung ca-Father       MEDICATIONS  (STANDING):  heparin  Injectable 5000 Unit(s) SubCutaneous every 8 hours  sodium chloride 0.9%. 1000 milliLiter(s) (50 mL/Hr) IV Continuous <Continuous>    MEDICATIONS  (PRN):      T(C): 36.2 (11-03-18 @ 13:08), Max: 37.1 (11-03-18 @ 12:27)  HR: 70 (11-03-18 @ 13:08) (70 - 101)  BP: 171/84 (11-03-18 @ 13:08) (165/97 - 171/84)  RR: 17 (11-03-18 @ 13:08) (17 - 17)  SpO2: 100% (11-03-18 @ 13:08) (100% - 100%)    CAPILLARY BLOOD GLUCOSE        I&O's Summary      PHYSICAL EXAM:  GENERAL: NAD, well-developed, speaks in full sentences, pleasant  HEAD:  Atraumatic, Normocephalic  EYES: EOMI, PERRLA, conjunctiva and sclera clear  NECK: Supple, No elevated JVD  CHEST/LUNG: Clear to auscultation bilaterally; No wheeze  HEART: Regular rate and rhythm; No murmurs, rubs, or gallops  ABDOMEN: Soft, Nontender, Nondistended; Bowel sounds present  EXTREMITIES:  2+ Peripheral Pulses, No clubbing, cyanosis, or edema  PSYCH: AAOx3  NEUROLOGY: CN II-XII grossly intact, moving all extremities,  mild tenderness present over the left lower back, sensation intact B/L on upper and lower extremity. ROM of back intact.  SKIN: No rashes or lesions    LABS:                        14.7   10.50 )-----------( 240      ( 03 Nov 2018 12:57 )             43.6     11-03    141  |  102  |  15  ----------------------------<  80  4.5   |  28  |  1.11    Ca    9.9      03 Nov 2018 12:57    TPro  7.1  /  Alb  4.2  /  TBili  0.7  /  DBili  x   /  AST  12  /  ALT  12  /  AlkPhos  60  11-03    PT/INR - ( 03 Nov 2018 12:57 )   PT: 12.1 SEC;   INR: 1.09          PTT - ( 03 Nov 2018 12:57 )  PTT:32.6 SEC            RADIOLOGY & ADDITIONAL TESTS:  CT spine with IV contrast    Abnormal lucency involving the L5-S1 intervertebral body fusion graft and   S1 screws with prevertebral fatty reticulation and a possible epidural   fluid collection or phlegmon worrisome for infection.

## 2018-11-06 LAB
BASOPHILS # BLD AUTO: 0.03 K/UL — SIGNIFICANT CHANGE UP (ref 0–0.2)
BASOPHILS NFR BLD AUTO: 0.2 % — SIGNIFICANT CHANGE UP (ref 0–2)
BUN SERPL-MCNC: 20 MG/DL — SIGNIFICANT CHANGE UP (ref 7–23)
CALCIUM SERPL-MCNC: 9 MG/DL — SIGNIFICANT CHANGE UP (ref 8.4–10.5)
CHLORIDE SERPL-SCNC: 104 MMOL/L — SIGNIFICANT CHANGE UP (ref 98–107)
CO2 SERPL-SCNC: 22 MMOL/L — SIGNIFICANT CHANGE UP (ref 22–31)
CREAT SERPL-MCNC: 0.95 MG/DL — SIGNIFICANT CHANGE UP (ref 0.5–1.3)
EOSINOPHIL # BLD AUTO: 0 K/UL — SIGNIFICANT CHANGE UP (ref 0–0.5)
EOSINOPHIL NFR BLD AUTO: 0 % — SIGNIFICANT CHANGE UP (ref 0–6)
ERYTHROCYTE [SEDIMENTATION RATE] IN BLOOD: 4 MM/HR — SIGNIFICANT CHANGE UP (ref 1–15)
GLUCOSE SERPL-MCNC: 128 MG/DL — HIGH (ref 70–99)
HCT VFR BLD CALC: 43.7 % — SIGNIFICANT CHANGE UP (ref 39–50)
HGB BLD-MCNC: 15.1 G/DL — SIGNIFICANT CHANGE UP (ref 13–17)
IMM GRANULOCYTES # BLD AUTO: 0.09 # — SIGNIFICANT CHANGE UP
IMM GRANULOCYTES NFR BLD AUTO: 0.7 % — SIGNIFICANT CHANGE UP (ref 0–1.5)
LYMPHOCYTES # BLD AUTO: 1.08 K/UL — SIGNIFICANT CHANGE UP (ref 1–3.3)
LYMPHOCYTES # BLD AUTO: 8.7 % — LOW (ref 13–44)
MCHC RBC-ENTMCNC: 30 PG — SIGNIFICANT CHANGE UP (ref 27–34)
MCHC RBC-ENTMCNC: 34.6 % — SIGNIFICANT CHANGE UP (ref 32–36)
MCV RBC AUTO: 86.7 FL — SIGNIFICANT CHANGE UP (ref 80–100)
MONOCYTES # BLD AUTO: 0.44 K/UL — SIGNIFICANT CHANGE UP (ref 0–0.9)
MONOCYTES NFR BLD AUTO: 3.6 % — SIGNIFICANT CHANGE UP (ref 2–14)
NEUTROPHILS # BLD AUTO: 10.72 K/UL — HIGH (ref 1.8–7.4)
NEUTROPHILS NFR BLD AUTO: 86.8 % — HIGH (ref 43–77)
NRBC # FLD: 0 — SIGNIFICANT CHANGE UP
PLATELET # BLD AUTO: 234 K/UL — SIGNIFICANT CHANGE UP (ref 150–400)
PMV BLD: 9.9 FL — SIGNIFICANT CHANGE UP (ref 7–13)
POTASSIUM SERPL-MCNC: 4.2 MMOL/L — SIGNIFICANT CHANGE UP (ref 3.5–5.3)
POTASSIUM SERPL-SCNC: 4.2 MMOL/L — SIGNIFICANT CHANGE UP (ref 3.5–5.3)
PROCALCITONIN SERPL-MCNC: 0.04 NG/ML — SIGNIFICANT CHANGE UP (ref 0.02–0.1)
RBC # BLD: 5.04 M/UL — SIGNIFICANT CHANGE UP (ref 4.2–5.8)
RBC # FLD: 13.7 % — SIGNIFICANT CHANGE UP (ref 10.3–14.5)
SODIUM SERPL-SCNC: 139 MMOL/L — SIGNIFICANT CHANGE UP (ref 135–145)
SPECIMEN SOURCE: SIGNIFICANT CHANGE UP
SPECIMEN SOURCE: SIGNIFICANT CHANGE UP
WBC # BLD: 12.36 K/UL — HIGH (ref 3.8–10.5)
WBC # FLD AUTO: 12.36 K/UL — HIGH (ref 3.8–10.5)

## 2018-11-06 PROCEDURE — 99232 SBSQ HOSP IP/OBS MODERATE 35: CPT

## 2018-11-06 PROCEDURE — 99233 SBSQ HOSP IP/OBS HIGH 50: CPT

## 2018-11-06 RX ORDER — GABAPENTIN 400 MG/1
100 CAPSULE ORAL THREE TIMES A DAY
Qty: 0 | Refills: 0 | Status: DISCONTINUED | OUTPATIENT
Start: 2018-11-06 | End: 2018-11-07

## 2018-11-06 RX ORDER — TIZANIDINE 4 MG/1
2 TABLET ORAL EVERY 6 HOURS
Qty: 0 | Refills: 0 | Status: DISCONTINUED | OUTPATIENT
Start: 2018-11-06 | End: 2018-11-07

## 2018-11-06 RX ORDER — HYDROMORPHONE HYDROCHLORIDE 2 MG/ML
0.5 INJECTION INTRAMUSCULAR; INTRAVENOUS; SUBCUTANEOUS EVERY 4 HOURS
Qty: 0 | Refills: 0 | Status: DISCONTINUED | OUTPATIENT
Start: 2018-11-06 | End: 2018-11-07

## 2018-11-06 RX ORDER — ACETAMINOPHEN 500 MG
650 TABLET ORAL EVERY 6 HOURS
Qty: 0 | Refills: 0 | Status: DISCONTINUED | OUTPATIENT
Start: 2018-11-06 | End: 2018-11-07

## 2018-11-06 RX ORDER — HYDROMORPHONE HYDROCHLORIDE 2 MG/ML
4 INJECTION INTRAMUSCULAR; INTRAVENOUS; SUBCUTANEOUS EVERY 4 HOURS
Qty: 0 | Refills: 0 | Status: DISCONTINUED | OUTPATIENT
Start: 2018-11-06 | End: 2018-11-07

## 2018-11-06 RX ORDER — ALPRAZOLAM 0.25 MG
1 TABLET ORAL EVERY 8 HOURS
Qty: 0 | Refills: 0 | Status: DISCONTINUED | OUTPATIENT
Start: 2018-11-06 | End: 2018-11-07

## 2018-11-06 RX ADMIN — Medication 1 MILLIGRAM(S): at 05:40

## 2018-11-06 RX ADMIN — Medication 4 MILLIGRAM(S): at 06:34

## 2018-11-06 RX ADMIN — LOSARTAN POTASSIUM 50 MILLIGRAM(S): 100 TABLET, FILM COATED ORAL at 05:30

## 2018-11-06 RX ADMIN — GABAPENTIN 100 MILLIGRAM(S): 400 CAPSULE ORAL at 21:20

## 2018-11-06 RX ADMIN — Medication 1000 UNIT(S): at 12:03

## 2018-11-06 RX ADMIN — HEPARIN SODIUM 5000 UNIT(S): 5000 INJECTION INTRAVENOUS; SUBCUTANEOUS at 14:01

## 2018-11-06 RX ADMIN — CITALOPRAM 40 MILLIGRAM(S): 10 TABLET, FILM COATED ORAL at 12:02

## 2018-11-06 RX ADMIN — Medication 600 MILLIGRAM(S): at 05:47

## 2018-11-06 RX ADMIN — HYDROMORPHONE HYDROCHLORIDE 0.5 MILLIGRAM(S): 2 INJECTION INTRAMUSCULAR; INTRAVENOUS; SUBCUTANEOUS at 10:06

## 2018-11-06 RX ADMIN — Medication 600 MILLIGRAM(S): at 19:28

## 2018-11-06 RX ADMIN — Medication 600 MILLIGRAM(S): at 18:39

## 2018-11-06 RX ADMIN — ZOLPIDEM TARTRATE 5 MILLIGRAM(S): 10 TABLET ORAL at 00:48

## 2018-11-06 RX ADMIN — HEPARIN SODIUM 5000 UNIT(S): 5000 INJECTION INTRAVENOUS; SUBCUTANEOUS at 21:18

## 2018-11-06 RX ADMIN — Medication 100 MILLIGRAM(S): at 05:32

## 2018-11-06 RX ADMIN — HYDROMORPHONE HYDROCHLORIDE 4 MILLIGRAM(S): 2 INJECTION INTRAMUSCULAR; INTRAVENOUS; SUBCUTANEOUS at 21:16

## 2018-11-06 RX ADMIN — Medication 600 MILLIGRAM(S): at 05:32

## 2018-11-06 RX ADMIN — Medication 600 MILLIGRAM(S): at 00:47

## 2018-11-06 RX ADMIN — Medication 4 MILLIGRAM(S): at 14:00

## 2018-11-06 RX ADMIN — Medication 4 MILLIGRAM(S): at 21:19

## 2018-11-06 RX ADMIN — Medication 600 MILLIGRAM(S): at 00:32

## 2018-11-06 RX ADMIN — Medication 100 MILLIGRAM(S): at 14:02

## 2018-11-06 RX ADMIN — Medication 600 MILLIGRAM(S): at 12:02

## 2018-11-06 RX ADMIN — Medication 100 MILLIGRAM(S): at 21:19

## 2018-11-06 RX ADMIN — Medication 100 MILLIGRAM(S): at 00:32

## 2018-11-06 RX ADMIN — Medication 4 MILLIGRAM(S): at 18:40

## 2018-11-06 RX ADMIN — Medication 8 MILLIGRAM(S): at 00:31

## 2018-11-06 RX ADMIN — Medication 12.5 MILLIGRAM(S): at 05:32

## 2018-11-06 RX ADMIN — Medication 650 MILLIGRAM(S): at 19:28

## 2018-11-06 RX ADMIN — Medication 360 MILLIGRAM(S): at 05:31

## 2018-11-06 RX ADMIN — Medication 5 MILLIGRAM(S): at 12:02

## 2018-11-06 RX ADMIN — HEPARIN SODIUM 5000 UNIT(S): 5000 INJECTION INTRAVENOUS; SUBCUTANEOUS at 05:32

## 2018-11-06 RX ADMIN — Medication 600 MILLIGRAM(S): at 13:01

## 2018-11-06 RX ADMIN — Medication 650 MILLIGRAM(S): at 20:27

## 2018-11-06 RX ADMIN — HYDROMORPHONE HYDROCHLORIDE 4 MILLIGRAM(S): 2 INJECTION INTRAMUSCULAR; INTRAVENOUS; SUBCUTANEOUS at 22:15

## 2018-11-06 RX ADMIN — HYDROMORPHONE HYDROCHLORIDE 0.5 MILLIGRAM(S): 2 INJECTION INTRAMUSCULAR; INTRAVENOUS; SUBCUTANEOUS at 10:23

## 2018-11-06 RX ADMIN — ZOLPIDEM TARTRATE 5 MILLIGRAM(S): 10 TABLET ORAL at 22:03

## 2018-11-06 NOTE — PROGRESS NOTE ADULT - ASSESSMENT
Pt is a 67 y/o gentlemen with multiple spinal surgeries (9),  shunt placed for hydrocephalus, HTN, anxiety/depression who presented to the ER complaining of back pain with:    #CT findings concerning for  epidural fluid collection   -per CT findings epidural collection or phlegmon   -could be related to post surgical changes  -pt  afebrile, non toxic, with mild leukocytosis in the setting of steroid use  -Monitor off on abx for now  -obtained two sets of blood cultures (pending)  - inflammatory markers- ESR and CRP were within normal range  -procalcitonin was 0.04  -given these findings unlikely for pt to have active infection    d/w Dr. Bustos

## 2018-11-06 NOTE — CONSULT NOTE ADULT - SUBJECTIVE AND OBJECTIVE BOX
Patient is a 68y old  Male who presents with a chief complaint of back pain (06 Nov 2018 14:53)      HPI:  HPI:    Teto Perdomo is a 69 y/o gentlemen with numerous spinal surgeries as reported below,  shunt placed for hydrocephalus and HTN who presented to the ER complaining of back pain. The patient had stated that this new back pain is nothing like he ever had before and is more severe than the back pain that he had prior or even after his spinal surgeries. He claims that it has only somewhat responded to IV Dilaudid. He calmly states that his current pain starts in the left lower back and radiates down to his buttock and leg. Due to the pain he is unable to ambulate and has limited ROM. The patient denies any fevers, chills, sweating or shortness of breath. He also states that he feels very depressed as a result of his chronic pain, his friend at bedside states that the patient sleeps a lot. Patient denies opioid use.     PAST MEDICAL & SURGICAL HISTORY:  NPH (normal pressure hydrocephalus)  Chronic back pain greater than 3 months duration  Small intestine disorder: &quot;ilitis&quot;   steroids   1967  Lumbar disc displacement without myelopathy  Sciatica  Anxiety  Vertebral Sciatica  Insomnia  Depression  HTN (Hypertension)  S/P lumbar fusion: L4-L5 on Oct 2011  pins/screws 2012   spinal surgery 2013  Dehiscence, Operation Wound/Debridement: infection  S/P Laminectomy: L4-L5 2009  complicated by infection requiring  antibiiotcis      Review of Systems:   CONSTITUTIONAL: No fever, weight loss, +fatigue  EYES: No eye pain, visual disturbances, or discharge  ENMT:  No difficulty hearing, tinnitus, vertigo; No sinus or throat pain  NECK: No pain or stiffness  BREASTS: No pain, masses, or nipple discharge  RESPIRATORY: No cough, wheezing, chills or hemoptysis; No shortness of breath  CARDIOVASCULAR: No chest pain, palpitations, dizziness, or leg swelling  GASTROINTESTINAL: No abdominal or epigastric pain. No nausea, vomiting, or hematemesis; No diarrhea or constipation. No melena or hematochezia.  GENITOURINARY: No dysuria, frequency, hematuria, or incontinence  NEUROLOGICAL: As per HPI  SKIN: No itching, burning, rashes, or lesions   LYMPH NODES: No enlarged glands  ENDOCRINE: No heat or cold intolerance; No hair loss  MUSCULOSKELETAL: back pain, radiating back to his left leg. Unable to ambulate.  PSYCHIATRIC: +Depression, increased fatigue and sleep.  HEME/LYMPH: No easy bruising, or bleeding gums  ALLERGY AND IMMUNOLOGIC: No hives or eczema    Allergies    Biaxin (Other)  Lidoderm (Unknown)  morphine (Short breath; Rash)    Intolerances        Social History:   Denies drug use, drinking or smoking    FAMILY HISTORY:  No family history of back pain      MEDICATIONS  (STANDING):  heparin  Injectable 5000 Unit(s) SubCutaneous every 8 hours  sodium chloride 0.9%. 1000 milliLiter(s) (50 mL/Hr) IV Continuous <Continuous>    MEDICATIONS  (PRN):      T(C): 36.2 (11-03-18 @ 13:08), Max: 37.1 (11-03-18 @ 12:27)  HR: 70 (11-03-18 @ 13:08) (70 - 101)  BP: 171/84 (11-03-18 @ 13:08) (165/97 - 171/84)  RR: 17 (11-03-18 @ 13:08) (17 - 17)  SpO2: 100% (11-03-18 @ 13:08) (100% - 100%)    CAPILLARY BLOOD GLUCOSE        I&O's Summary      PHYSICAL EXAM:  GENERAL: NAD, well-developed, speaks in full sentences, pleasant  HEAD:  Atraumatic, Normocephalic  EYES: EOMI, PERRLA, conjunctiva and sclera clear  NECK: Supple, No elevated JVD  CHEST/LUNG: Clear to auscultation bilaterally; No wheeze  HEART: Regular rate and rhythm; No murmurs, rubs, or gallops  ABDOMEN: Soft, Nontender, Nondistended; Bowel sounds present  EXTREMITIES:  2+ Peripheral Pulses, No clubbing, cyanosis, or edema  PSYCH: AAOx3  NEUROLOGY: CN II-XII grossly intact, moving all extremities, +straight leg test B/L, mild tenderness present over the left lower back, sensation intact B/L on upper and lower extremity.  SKIN: No rashes or lesions    LABS:                        14.7   10.50 )-----------( 240      ( 03 Nov 2018 12:57 )             43.6     11-03    141  |  102  |  15  ----------------------------<  80  4.5   |  28  |  1.11    Ca    9.9      03 Nov 2018 12:57    TPro  7.1  /  Alb  4.2  /  TBili  0.7  /  DBili  x   /  AST  12  /  ALT  12  /  AlkPhos  60  11-03    PT/INR - ( 03 Nov 2018 12:57 )   PT: 12.1 SEC;   INR: 1.09          PTT - ( 03 Nov 2018 12:57 )  PTT:32.6 SEC            RADIOLOGY & ADDITIONAL TESTS:    ECG Personally Reviewed -     Imaging Personally Reviewed:    Consultant(s) Notes Reviewed:      Care Discussed with Consultants/Other Providers: Spoke with neurology resident who will see the patient (03 Nov 2018 17:16)      PAST MEDICAL & SURGICAL HISTORY:  NPH (normal pressure hydrocephalus)  Chronic back pain greater than 3 months duration  Small intestine disorder: &quot;ilitis&quot;   steroids   1967  Lumbar disc displacement without myelopathy  Sciatica  Anxiety  Vertebral Sciatica  Insomnia  Depression  HTN (Hypertension)  S/P lumbar fusion: L4-L5 on Oct 2011  pins/screws 2012   spinal surgery 2013  Dehiscence, Operation Wound/Debridement: infection  S/P Laminectomy: L4-L5 2009  complicated by infection requiring  antibiiotcis      MEDICATIONS  (STANDING):  cholecalciferol 1000 Unit(s) Oral daily  citalopram 40 milliGRAM(s) Oral daily  docusate sodium 100 milliGRAM(s) Oral three times a day  heparin  Injectable 5000 Unit(s) SubCutaneous every 8 hours  hydrochlorothiazide 12.5 milliGRAM(s) Oral daily  ibuprofen  Tablet. 600 milliGRAM(s) Oral every 6 hours  influenza   Vaccine 0.5 milliLiter(s) IntraMuscular once  losartan 50 milliGRAM(s) Oral daily  methylPREDNISolone 4 milliGRAM(s) Oral before breakfast  methylPREDNISolone 4 milliGRAM(s) Oral after lunch  methylPREDNISolone 4 milliGRAM(s) Oral after dinner  methylPREDNISolone 4 milliGRAM(s) Oral at bedtime  methylPREDNISolone   Oral   sodium chloride 0.9%. 1000 milliLiter(s) (50 mL/Hr) IV Continuous <Continuous>  verapamil  milliGRAM(s) Oral daily    MEDICATIONS  (PRN):  ALPRAZolam 1 milliGRAM(s) Oral every 12 hours PRN anxiety  baclofen 5 milliGRAM(s) Oral three times a day PRN Muscle Spasm  HYDROmorphone   Tablet 4 milliGRAM(s) Oral every 4 hours PRN Moderate Pain (4 - 6) and severe painm  HYDROmorphone  Injectable 0.5 milliGRAM(s) IV Push every 4 hours PRN severe break through pain  traZODone 100 milliGRAM(s) Oral at bedtime PRN sleep  zolpidem 5 milliGRAM(s) Oral at bedtime PRN Insomnia  zolpidem 5 milliGRAM(s) Oral at bedtime PRN Insomnia      ICU Vital Signs Last 24 Hrs  T(C): 36.9 (06 Nov 2018 13:57), Max: 36.9 (05 Nov 2018 20:40)  T(F): 98.4 (06 Nov 2018 13:57), Max: 98.5 (05 Nov 2018 20:40)  HR: 64 (06 Nov 2018 13:57) (64 - 80)  BP: 161/79 (06 Nov 2018 13:57) (156/88 - 180/98)  BP(mean): --  ABP: --  ABP(mean): --  RR: 18 (06 Nov 2018 13:57) (18 - 18)  SpO2: 97% (06 Nov 2018 13:57) (95% - 97%)      Vital Signs Last 24 Hrs  T(C): 36.9 (06 Nov 2018 13:57), Max: 36.9 (05 Nov 2018 20:40)  T(F): 98.4 (06 Nov 2018 13:57), Max: 98.5 (05 Nov 2018 20:40)  HR: 64 (06 Nov 2018 13:57) (64 - 80)  BP: 161/79 (06 Nov 2018 13:57) (156/88 - 180/98)  BP(mean): --  RR: 18 (06 Nov 2018 13:57) (18 - 18)  SpO2: 97% (06 Nov 2018 13:57) (95% - 97%)                          15.1   12.36 )-----------( 234      ( 06 Nov 2018 06:33 )             43.7                 COMMENTS/PLAN: Pt. complaining of central back pain that radiates down left leg. Pt. states pain is 7/10 now and comes in goes, nothing triggers it and only IV dilaudid seemed to work for the pain. Pt. A&Ox3, NAD, laying in bed, answers all questions appropriately and maintains eye contact.     Recommendations:   1. Stop Baclofen, and add Tizanidine 2mg Q6hr PRN, give 1st dose now. Hold for oversedation. (May decrease to 1mg if pt. is to drowsy)  2. Add gabapentin 200mg TID. Start first dose now   3. Add Tylenol 650mg Q6hrs standing for 2 days   4. Continue NSAID as ordered   5. Consider restarting home dose of Xanax 1mg Q6hrs PRN (istop #[id number]) or Psych consult to adjust medications   6. Consider a Holistic RN consult   7. Have pt. make appointment with chronic pain MD through insurance as outpatient.

## 2018-11-06 NOTE — PROGRESS NOTE ADULT - SUBJECTIVE AND OBJECTIVE BOX
Follow Up:      Inverval History/ROS:    Allergies  Biaxin (Other)  Lidoderm (Unknown)  morphine (Short breath; Rash)        ANTIMICROBIALS:      OTHER MEDS:  ALPRAZolam 1 milliGRAM(s) Oral every 12 hours PRN  baclofen 5 milliGRAM(s) Oral three times a day PRN  cholecalciferol 1000 Unit(s) Oral daily  citalopram 40 milliGRAM(s) Oral daily  docusate sodium 100 milliGRAM(s) Oral three times a day  heparin  Injectable 5000 Unit(s) SubCutaneous every 8 hours  hydrochlorothiazide 12.5 milliGRAM(s) Oral daily  HYDROmorphone  Injectable 0.5 milliGRAM(s) IV Push every 4 hours PRN  ibuprofen  Tablet. 600 milliGRAM(s) Oral every 6 hours  influenza   Vaccine 0.5 milliLiter(s) IntraMuscular once  losartan 50 milliGRAM(s) Oral daily  methylPREDNISolone 4 milliGRAM(s) Oral before breakfast  methylPREDNISolone 4 milliGRAM(s) Oral after lunch  methylPREDNISolone 4 milliGRAM(s) Oral after dinner  methylPREDNISolone 4 milliGRAM(s) Oral at bedtime  methylPREDNISolone   Oral   sodium chloride 0.9%. 1000 milliLiter(s) IV Continuous <Continuous>  traZODone 100 milliGRAM(s) Oral at bedtime PRN  verapamil  milliGRAM(s) Oral daily  zolpidem 5 milliGRAM(s) Oral at bedtime PRN  zolpidem 5 milliGRAM(s) Oral at bedtime PRN      Vital Signs Last 24 Hrs  T(C): 36.7 (06 Nov 2018 05:28), Max: 37.4 (05 Nov 2018 15:17)  T(F): 98.1 (06 Nov 2018 05:28), Max: 99.4 (05 Nov 2018 15:17)  HR: 80 (06 Nov 2018 07:59) (66 - 80)  BP: 160/92 (06 Nov 2018 07:59) (154/94 - 189/104)  BP(mean): --  RR: 18 (06 Nov 2018 05:28) (18 - 18)  SpO2: 97% (06 Nov 2018 05:28) (95% - 97%)    PHYSICAL EXAM:  General: [ ] non-toxic  HEAD/EYES: [ ] PERRL [ ] white sclera [ ] icterus  ENT:  [ ] normal [ ] supple [ ] thrush [ ] pharyngeal exudate  Cardiovascular:   [ ] murmur [ ] normal [ ] PPM/AICD  Respiratory:  [ ] clear to ausculation bilaterally  GI:  [ ] soft, non-tender, normal bowel sounds  :  [ ] lindsay [ ] no CVA tenderness   Musculoskeletal:  [ ] no synovitis  Neurologic:  [ ] non-focal exam   Skin:  [ ] no rash  Lymph: [ ] no lymphadenopathy  Psychiatric:  [ ] appropriate affect [ ] alert & oriented  Lines:  [ ] no phlebitis [ ] central line                                15.1   12.36 )-----------( 234      ( 06 Nov 2018 06:33 )             43.7       11-06    139  |  104  |  20  ----------------------------<  128<H>  4.2   |  22  |  0.95    Ca    9.0      06 Nov 2018 06:33            MICROBIOLOGY:  v            RADIOLOGY: Follow Up:  concern for epidural abscess     Inverval History/ROS: Patient continues to endorse severe back pain. Denies any fevers, chills, N/V/D. Has been afebrile over the last 24hrs.     Allergies  Biaxin (Other)  Lidoderm (Unknown)  morphine (Short breath; Rash)        ANTIMICROBIALS:      OTHER MEDS:  ALPRAZolam 1 milliGRAM(s) Oral every 12 hours PRN  baclofen 5 milliGRAM(s) Oral three times a day PRN  cholecalciferol 1000 Unit(s) Oral daily  citalopram 40 milliGRAM(s) Oral daily  docusate sodium 100 milliGRAM(s) Oral three times a day  heparin  Injectable 5000 Unit(s) SubCutaneous every 8 hours  hydrochlorothiazide 12.5 milliGRAM(s) Oral daily  HYDROmorphone  Injectable 0.5 milliGRAM(s) IV Push every 4 hours PRN  ibuprofen  Tablet. 600 milliGRAM(s) Oral every 6 hours  influenza   Vaccine 0.5 milliLiter(s) IntraMuscular once  losartan 50 milliGRAM(s) Oral daily  methylPREDNISolone 4 milliGRAM(s) Oral before breakfast  methylPREDNISolone 4 milliGRAM(s) Oral after lunch  methylPREDNISolone 4 milliGRAM(s) Oral after dinner  methylPREDNISolone 4 milliGRAM(s) Oral at bedtime  methylPREDNISolone   Oral   sodium chloride 0.9%. 1000 milliLiter(s) IV Continuous <Continuous>  traZODone 100 milliGRAM(s) Oral at bedtime PRN  verapamil  milliGRAM(s) Oral daily  zolpidem 5 milliGRAM(s) Oral at bedtime PRN  zolpidem 5 milliGRAM(s) Oral at bedtime PRN      Vital Signs Last 24 Hrs  T(C): 36.7 (06 Nov 2018 05:28), Max: 37.4 (05 Nov 2018 15:17)  T(F): 98.1 (06 Nov 2018 05:28), Max: 99.4 (05 Nov 2018 15:17)  HR: 80 (06 Nov 2018 07:59) (66 - 80)  BP: 160/92 (06 Nov 2018 07:59) (154/94 - 189/104)  BP(mean): --  RR: 18 (06 Nov 2018 05:28) (18 - 18)  SpO2: 97% (06 Nov 2018 05:28) (95% - 97%)    PHYSICAL EXAM:  General: [ ] non-toxic  HEAD/EYES: [ ] PERRL [ ] white sclera [ ] icterus  ENT:  [ ] normal [ ] supple [ ] thrush [ ] pharyngeal exudate  Cardiovascular:   [ ] murmur [ ] normal [ ] PPM/AICD  Respiratory:  [ ] clear to ausculation bilaterally  GI:  [ ] soft, non-tender, normal bowel sounds  :  [ ] lindsay [ ] no CVA tenderness   Musculoskeletal:  [ ] no synovitis  Neurologic:  [ ] non-focal exam   Skin:  [ ] no rash  Lymph: [ ] no lymphadenopathy  Psychiatric:  [ ] appropriate affect [ ] alert & oriented  Lines:  [ ] no phlebitis [ ] central line                                15.1   12.36 )-----------( 234      ( 06 Nov 2018 06:33 )             43.7       11-06    139  |  104  |  20  ----------------------------<  128<H>  4.2   |  22  |  0.95    Ca    9.0      06 Nov 2018 06:33            MICROBIOLOGY:  v            RADIOLOGY: Follow Up:  concern for epidural abscess     Inverval History/ROS: Patient continues to endorse severe back pain. Denies any fevers, chills, N/V/D. Has been afebrile over the last 24hrs.     Allergies  Biaxin (Other)  Lidoderm (Unknown)  morphine (Short breath; Rash)        ANTIMICROBIALS:      OTHER MEDS:  ALPRAZolam 1 milliGRAM(s) Oral every 12 hours PRN  baclofen 5 milliGRAM(s) Oral three times a day PRN  cholecalciferol 1000 Unit(s) Oral daily  citalopram 40 milliGRAM(s) Oral daily  docusate sodium 100 milliGRAM(s) Oral three times a day  heparin  Injectable 5000 Unit(s) SubCutaneous every 8 hours  hydrochlorothiazide 12.5 milliGRAM(s) Oral daily  HYDROmorphone  Injectable 0.5 milliGRAM(s) IV Push every 4 hours PRN  ibuprofen  Tablet. 600 milliGRAM(s) Oral every 6 hours  influenza   Vaccine 0.5 milliLiter(s) IntraMuscular once  losartan 50 milliGRAM(s) Oral daily  methylPREDNISolone 4 milliGRAM(s) Oral before breakfast  methylPREDNISolone 4 milliGRAM(s) Oral after lunch  methylPREDNISolone 4 milliGRAM(s) Oral after dinner  methylPREDNISolone 4 milliGRAM(s) Oral at bedtime  methylPREDNISolone   Oral   sodium chloride 0.9%. 1000 milliLiter(s) IV Continuous <Continuous>  traZODone 100 milliGRAM(s) Oral at bedtime PRN  verapamil  milliGRAM(s) Oral daily  zolpidem 5 milliGRAM(s) Oral at bedtime PRN  zolpidem 5 milliGRAM(s) Oral at bedtime PRN      Vital Signs Last 24 Hrs  T(C): 36.7 (06 Nov 2018 05:28), Max: 37.4 (05 Nov 2018 15:17)  T(F): 98.1 (06 Nov 2018 05:28), Max: 99.4 (05 Nov 2018 15:17)  HR: 80 (06 Nov 2018 07:59) (66 - 80)  BP: 160/92 (06 Nov 2018 07:59) (154/94 - 189/104)  BP(mean): --  RR: 18 (06 Nov 2018 05:28) (18 - 18)  SpO2: 97% (06 Nov 2018 05:28) (95% - 97%)    PHYSICAL EXAM:  General: [ ] non-toxic  HEAD/EYES: [ ] PERRL [ ] white sclera [ ] icterus  ENT:  [ ] normal [ ] supple [ ] thrush [ ] pharyngeal exudate  Cardiovascular:   [ ] murmur [ ] normal [ ] PPM/AICD  Respiratory:  [ ] clear to ausculation bilaterally  GI:  [ ] soft, non-tender, normal bowel sounds  :  [ ] lindsay [ ] no CVA tenderness   Musculoskeletal:  [ ] no synovitis  Neurologic:  [ ] non-focal exam   Skin:  [ ] no rash  Lymph: [ ] no lymphadenopathy  Psychiatric:  [ ] appropriate affect [ ] alert & oriented  Lines:  [ ] no phlebitis [ ] central line                        15.1   12.36 )-----------( 234      ( 06 Nov 2018 06:33 )             43.7   WBC Count: 12.36 (11-06 @ 06:33)  WBC Count: 13.97 (11-05 @ 06:05)  WBC Count: 10.50 (11-03 @ 12:57)      Sedimentation Rate, Erythrocyte (11.06.18 @ 06:33)    Sedimentation Rate, Erythrocyte: 4 mm/hr    C-Reactive Protein, Serum (11.05.18 @ 17:37)    C-Reactive Protein, Serum: < 4.0 mg/L    Procalcitonin, Serum (11.05.18 @ 17:37)    Procalcitonin, Serum: 0.04:     11-06    139  |  104  |  20  ----------------------------<  128<H>  4.2   |  22  |  0.95    Ca    9.0      06 Nov 2018 06:33      MICROBIOLOGY:  pending    RADIOLOGY:  < from: CT Lumbar Spine w/ IV Cont (11.05.18 @ 13:05) >  FINDINGS:  L3-L5 transpedicular screws are again identified and appear unchanged in   position without violation of the neural foramen, or migration into the   spinal canal.    There has been interval insertion of an L5-S1 intervertebral fusion   device since 2015. Periprosthetic lucency involving S1 intervertebral   disc fusion fixation screws however, are suggestive of hardware   loosening. There is prevertebral fatty reticulation and swelling.    Though evaluation of the intraspinal compartment is limited, there is   suggestion for an anterior epidural phlegmon versus abscess at the L5   level measuring approximately 0.4 x 0.9 cm.    Contrast-enhanced MR imaging may be done for further evaluation.    The examination is otherwise unchanged.    L4 and L5 laminectomy is again identified. Nonspecific soft tissue within   the posterior paraspinal regions are unchanged in configuration,   consistent with postoperative change.    At L4-L5, moderate left foraminal stenosis on a bony productive change is   unchanged.    At L3-L4, mild to moderate bilateral foraminal narrowing is unchanged.    At L2-L3, moderate bilateral foraminal narrowing on a bony productive   basis is increased.    At L1-L2, there is no significant canal or foraminal stenosis.        IMPRESSION:  Abnormal lucency involving the L5-S1 intervertebral body fusion graft and   S1 screws with prevertebral fatty reticulation and a possible epidural   fluid collection or phlegmon worrisome for infection.    Contrast-enhanced MR imaging may be done for further evaluation.    < end of copied text >

## 2018-11-06 NOTE — CHART NOTE - NSCHARTNOTEFT_GEN_A_CORE
Acute on chronic pain        d/w PCP Dr Ulises Lawson at [phone].  Patient does NOT have allergy to Percocet- he get loopy from too much narcotics.  Patient has been on every type of pain medication and combination possible - including NSAIDS, neuropathic pain medicine and which ever one is use patients seems to have a new pain. Dr. Lawson last gave patient Percocet, patient confirmed he received percocet in 09/2018. Patient states Percocet didn't help so he stopped taking them. Patient also took Nucynta which did not help.   Patient is aware that he pain will never return to a level of zero.  Will continue with PO Dilaudid and wait for Ortho evaluation.  Chronic pain consult called. Acute on chronic pain    Long story- Had surgery to back in April 2018 emili Pimentel- Iv Dilaudid worked then- the Po does not work- Was homwe with ICE to back- No CP /sob pain on 3 months f/u , but then had new pain in different spot and Surgeon did not want do any more surgery- he has been getting PT at [facility] in [facility]- felt like he stretched too much and was home with MORE pain in 8/2018- he is here now because pain is too much. He got Iv Dilaudid last night for PAIN to lower back 10/10 then and is now 6/10 pain.  Explain to patient we most try PO- He states " the narcotic receiptors are full in his brain and Po will not work"  No pain radiating down legs, No numbness to arms/legs, No bowel / urine incontinence of difficulty.  d/w PCP Dr Ulises Lawson at [phone].  Patient does NOT have allergy to Percocet- he get loopy from too much narcotics.  Patient has been on every type of pain medication and combination possible - including NSAIDS, neuropathic pain medicine and which ever one is use patients seems to have a new pain. Dr. Lawson last gave patient Percocet, patient confirmed he received percocet in 09/2018. Patient states Percocet didn't help so he stopped taking them. Patient also took Nucynta which did not help.   Patient is aware that he pain will never return to a level of zero.  Will continue with PO Dilaudid and wait for Ortho evaluation.  Chronic pain consult called.

## 2018-11-06 NOTE — CONSULT NOTE ADULT - SUBJECTIVE AND OBJECTIVE BOX
HPI:  HPI:    Teto Perdomo is a 69 y/o gentlemen with numerous spinal surgeries as reported below,  shunt placed for hydrocephalus and HTN who presented to the ER complaining of back pain. The patient had stated that this new back pain is nothing like he ever had before and is more severe than the back pain that he had prior or even after his spinal surgeries. He claims that it has only somewhat responded to IV Dilaudid. He calmly states that his current pain starts in the left lower back and radiates down to his buttock and leg. Due to the pain he is unable to ambulate and has limited ROM. The patient denies any fevers, chills, sweating or shortness of breath. He also states that he feels very depressed as a result of his chronic pain, his friend at bedside states that the patient sleeps a lot. Patient denies opioid use.     Nsx consulted because of prior surgery with Dr Castanon. Patient c/o severe 7/10 LBP with radiculopathy to the buttock and down the legs. hx of spinal cord stimulator and retained battery. Recent spinal surgery at [facility] and VPS placement also for NPH.    PAST MEDICAL & SURGICAL HISTORY:  NPH (normal pressure hydrocephalus)  Chronic back pain greater than 3 months duration  Small intestine disorder: &quot;ilitis&quot;   steroids   1967  Lumbar disc displacement without myelopathy  Sciatica  Anxiety  Vertebral Sciatica  Insomnia  Depression  HTN (Hypertension)  S/P lumbar fusion: L4-L5 on Oct 2011  pins/screws 2012   spinal surgery 2013  Dehiscence, Operation Wound/Debridement: infection  S/P Laminectomy: L4-L5 2009  complicated by infection requiring  antibiiotcis      Review of Systems:   CONSTITUTIONAL: No fever, weight loss, +fatigue  EYES: No eye pain, visual disturbances, or discharge  ENMT:  No difficulty hearing, tinnitus, vertigo; No sinus or throat pain  NECK: No pain or stiffness  BREASTS: No pain, masses, or nipple discharge  RESPIRATORY: No cough, wheezing, chills or hemoptysis; No shortness of breath  CARDIOVASCULAR: No chest pain, palpitations, dizziness, or leg swelling  GASTROINTESTINAL: No abdominal or epigastric pain. No nausea, vomiting, or hematemesis; No diarrhea or constipation. No melena or hematochezia.  GENITOURINARY: No dysuria, frequency, hematuria, or incontinence  NEUROLOGICAL: As per HPI  SKIN: No itching, burning, rashes, or lesions   LYMPH NODES: No enlarged glands  ENDOCRINE: No heat or cold intolerance; No hair loss  MUSCULOSKELETAL: back pain, radiating back to his left leg. Unable to ambulate.  PSYCHIATRIC: +Depression, increased fatigue and sleep.  HEME/LYMPH: No easy bruising, or bleeding gums  ALLERGY AND IMMUNOLOGIC: No hives or eczema    Allergies    Biaxin (Other)  Lidoderm (Unknown)  morphine (Short breath; Rash)    Intolerances        Social History:   Denies drug use, drinking or smoking    FAMILY HISTORY:  No family history of back pain      MEDICATIONS  (STANDING):  heparin  Injectable 5000 Unit(s) SubCutaneous every 8 hours  sodium chloride 0.9%. 1000 milliLiter(s) (50 mL/Hr) IV Continuous <Continuous>    MEDICATIONS  (PRN):      T(C): 36.2 (11-03-18 @ 13:08), Max: 37.1 (11-03-18 @ 12:27)  HR: 70 (11-03-18 @ 13:08) (70 - 101)  BP: 171/84 (11-03-18 @ 13:08) (165/97 - 171/84)  RR: 17 (11-03-18 @ 13:08) (17 - 17)  SpO2: 100% (11-03-18 @ 13:08) (100% - 100%)    CAPILLARY BLOOD GLUCOSE        I&O's Summary      PHYSICAL EXAM:  GENERAL: NAD, well-developed, speaks in full sentences, pleasant  HEAD:  Atraumatic, Normocephalic  EYES: EOMI, PERRLA, conjunctiva and sclera clear  NECK: Supple, No elevated JVD  CHEST/LUNG: Clear to auscultation bilaterally; No wheeze  HEART: Regular rate and rhythm; No murmurs, rubs, or gallops  ABDOMEN: Soft, Nontender, Nondistended; Bowel sounds present  EXTREMITIES:  2+ Peripheral Pulses, No clubbing, cyanosis, or edema  PSYCH: AAOx3  NEUROLOGY: CN II-XII grossly intact, moving all extremities, +straight leg test B/L, mild tenderness present over the left lower back, sensation intact B/L on upper and lower extremity.  SKIN: No rashes or lesions    LABS:                        14.7   10.50 )-----------( 240      ( 03 Nov 2018 12:57 )             43.6     11-03    141  |  102  |  15  ----------------------------<  80  4.5   |  28  |  1.11    Ca    9.9      03 Nov 2018 12:57    TPro  7.1  /  Alb  4.2  /  TBili  0.7  /  DBili  x   /  AST  12  /  ALT  12  /  AlkPhos  60  11-03    PT/INR - ( 03 Nov 2018 12:57 )   PT: 12.1 SEC;   INR: 1.09          PTT - ( 03 Nov 2018 12:57 )  PTT:32.6 SEC            RADIOLOGY & ADDITIONAL TESTS:    ECG Personally Reviewed -     Imaging Personally Reviewed:    Consultant(s) Notes Reviewed:      Care Discussed with Consultants/Other Providers: Spoke with neurology resident who will see the patient (03 Nov 2018 17:16)    PAST MEDICAL & SURGICAL HISTORY:  NPH (normal pressure hydrocephalus)  Chronic back pain greater than 3 months duration  Small intestine disorder: &quot;ilitis&quot;   steroids   1967  Lumbar disc displacement without myelopathy  Sciatica  Anxiety  Vertebral Sciatica  Insomnia  Depression  HTN (Hypertension)  S/P lumbar fusion: L4-L5 on Oct 2011  pins/screws 2012   spinal surgery 2013  Dehiscence, Operation Wound/Debridement: infection  S/P Laminectomy: L4-L5 2009  complicated by infection requiring  antibiiotcis    Allergies    Biaxin (Other)  Lidoderm (Unknown)  morphine (Short breath; Rash)    Intolerances      acetaminophen   Tablet .. 650 milliGRAM(s) Oral every 6 hours  ALPRAZolam 1 milliGRAM(s) Oral every 8 hours PRN  cholecalciferol 1000 Unit(s) Oral daily  citalopram 40 milliGRAM(s) Oral daily  docusate sodium 100 milliGRAM(s) Oral three times a day  gabapentin 100 milliGRAM(s) Oral three times a day  heparin  Injectable 5000 Unit(s) SubCutaneous every 8 hours  hydrochlorothiazide 12.5 milliGRAM(s) Oral daily  HYDROmorphone   Tablet 4 milliGRAM(s) Oral every 4 hours PRN  HYDROmorphone  Injectable 0.5 milliGRAM(s) IV Push every 4 hours PRN  ibuprofen  Tablet. 600 milliGRAM(s) Oral every 6 hours  influenza   Vaccine 0.5 milliLiter(s) IntraMuscular once  losartan 50 milliGRAM(s) Oral daily  methylPREDNISolone 4 milliGRAM(s) Oral before breakfast  methylPREDNISolone 4 milliGRAM(s) Oral after lunch  methylPREDNISolone 4 milliGRAM(s) Oral at bedtime  methylPREDNISolone   Oral   sodium chloride 0.9%. 1000 milliLiter(s) IV Continuous <Continuous>  tiZANidine 2 milliGRAM(s) Oral every 6 hours PRN  traZODone 100 milliGRAM(s) Oral at bedtime PRN  verapamil  milliGRAM(s) Oral daily  zolpidem 5 milliGRAM(s) Oral at bedtime PRN  zolpidem 5 milliGRAM(s) Oral at bedtime PRN    SOCIAL HISTORY:  FAMILY HISTORY:    Vital Signs Last 24 Hrs  T(C): 36.9 (06 Nov 2018 13:57), Max: 36.9 (05 Nov 2018 20:40)  T(F): 98.4 (06 Nov 2018 13:57), Max: 98.5 (05 Nov 2018 20:40)  HR: 64 (06 Nov 2018 13:57) (64 - 80)  BP: 161/79 (06 Nov 2018 13:57) (156/88 - 180/98)  BP(mean): --  RR: 18 (06 Nov 2018 13:57) (18 - 18)  SpO2: 97% (06 Nov 2018 13:57) (95% - 97%)    PHYSICAL EXAM:  Awake Alert Attentive Affect appropriate Ox3  PERRL EOMI  Motor:   Tone: normal.                  Strength:     [X] Upper extremity                      Delt       Bicep    Tricep                                                  R         5/5        5/5        5/5       5/5                                               L          5/5        5/5        5/5       5/5  [X] Lower extremity                       HF          KE          KF        DF         PF                                               R        5/5        5/5        5/5       5/5       5/5                                               L         5/5        5/5       5/5       5/5        5/5  Sensory Intact to Light Touch  Reflexes WNL, No clonus    LABS:                          15.1   12.36 )-----------( 234      ( 06 Nov 2018 06:33 )             43.7     11-06    139  |  104  |  20  ----------------------------<  128<H>  4.2   |  22  |  0.95    Ca    9.0      06 Nov 2018 06:33            RADIOLOGY & ADDITIONAL STUDIES:      < from: CT Lumbar Spine w/ IV Cont (11.05.18 @ 13:05) >        IMPRESSION:  Abnormal lucency involving the L5-S1 intervertebral body fusion graft and   S1 screws with prevertebral fatty reticulation and a possible epidural   fluid collection or phlegmon worrisome for infection.    Contrast-enhanced MR imaging may be done for further evaluation.    These findings were discussed with YOSSI Zheng at 11/5/2018 1:19 PM by Dr. Novak.     < end of copied text >

## 2018-11-06 NOTE — CONSULT NOTE ADULT - PROBLEM SELECTOR RECOMMENDATION 9
Patient with complex spinal surgery history, with multiple surgeons and multiple operations - patient refuses to be evaluated by Dr Castanon anymore and requested continued observation by the recommendation he was provided earlier from the Orthopedics team   No Nsx intervention at this time, reconsult PRN

## 2018-11-06 NOTE — CONSULT NOTE ADULT - ASSESSMENT
68y male w/ recent surgery by outside spinal surgeon now requesting 2nd, 3rd opinions for continued LBP despite pain management

## 2018-11-06 NOTE — CHART NOTE - NSCHARTNOTEFT_GEN_A_CORE
Would like to obtain MRI L- and S- spine with and without contrast, however patient has a  shunt that must be reprogrammed by neurosurgery after MRI. Please consult neurosurgery prior to obtaining MRI. Shunt settings were obtained from patient's caregiver and shunt was last reprogrammed in March 2018 and last valve setting was 130. He has a CODMAN-HAKIN Programmable Valve. Would like to obtain MRI L- and S- spine with and without contrast to better assess the abnormality seen on CT of the LS spine at L4-S1, however patient has a  shunt that must be reprogrammed by neurosurgery after MRI. Please consult neurosurgery prior to obtaining MRI. Shunt settings were obtained from patient's caregiver and shunt was last reprogrammed in March 2018 and last valve setting was 130. He has a CODMAN-HAKIN Programmable Valve.

## 2018-11-06 NOTE — PROGRESS NOTE ADULT - ATTENDING COMMENTS
Ortho called for spine [id number] - they will see patient for loose screw to S1 Ortho called for spine [id number] - they will see patient for loose screw to S1.  PCP Dr Ferdy Lawson [phone] called about patient.

## 2018-11-06 NOTE — PROGRESS NOTE ADULT - ASSESSMENT
Teto castaneda is a 67 y/o gentlemen with numerous spinal surgeries as reported below,  shunt placed for hydrocephalus and HTN who presented to the ER complaining of back pain. He calmly states that his current pain starts in the left lower back and radiates down to his buttock and leg. Due to the pain he is unable to ambulate and has limited ROM. Pain is something unlike he has ever had before, and he thinks it's more severe than the pain prior or even after his surgeries. Denies fevers or chills. Pain could be due to sciatica, however further workup may be required due to surgical history.   ID rec No CP /sob antibiotics with nl esr and crp    < from: CT Lumbar Spine w/ IV Cont (11.05.18 @ 13:05) >  L3-L5 transpedicular screws are again identified and appear unchanged in   position without violation of the neural foramen, or migration into the   spinal canal.    There has been interval insertion of an L5-S1 intervertebral fusion   device since 2015. Periprosthetic lucency involving S1 intervertebral   disc fusion fixation screws however, are suggestive of hardware   loosening. There is prevertebral fatty reticulation and swelling.    Though evaluation of the intraspinal compartment is limited, there is   suggestion for an anterior epidural phlegmon versus abscess at the L5   level measuring approximately 0.4 x 0.9 cm.    Contrast-enhanced MR imaging may be done for further evaluation.    The examination is otherwise unchanged.    L4 and L5 laminectomy is again identified. Nonspecific soft tissue within   the posterior paraspinal regions are unchanged in configuration,   consistent with postoperative change.    At L4-L5, moderate left foraminal stenosis on a bony productive change is   unchanged.    At L3-L4, mild to moderate bilateral foraminal narrowing is unchanged.    At L2-L3, moderate bilateral foraminal narrowing on a bony productive   basis is increased.    At L1-L2, there is no significant canal or foraminal stenosis.      < end of copied text > Teto castaneda is a 69 y/o gentlemen with numerous spinal surgeries as reported below,  shunt placed for hydrocephalus and HTN who presented to the ER complaining of back pain. He calmly states that his current pain starts in the left lower back and radiates down to his buttock and leg. Due to the pain he is unable to ambulate and has limited ROM. Pain is something unlike he has ever had before, and he thinks it's more severe than the pain prior or even after his surgeries. Denies fevers or chills. Pain could be due to sciatica, however further workup may be required due to surgical history.   ID rec No CP /sob antibiotics with nl esr and crp  History and PE NOT c/w any cord compression or caude equina syndrome    < from: CT Lumbar Spine w/ IV Cont (11.05.18 @ 13:05) >  L3-L5 transpedicular screws are again identified and appear unchanged in   position without violation of the neural foramen, or migration into the   spinal canal.    There has been interval insertion of an L5-S1 intervertebral fusion   device since 2015. Periprosthetic lucency involving S1 intervertebral   disc fusion fixation screws however, are suggestive of hardware   loosening. There is prevertebral fatty reticulation and swelling.    Though evaluation of the intraspinal compartment is limited, there is   suggestion for an anterior epidural phlegmon versus abscess at the L5   level measuring approximately 0.4 x 0.9 cm.    Contrast-enhanced MR imaging may be done for further evaluation.    The examination is otherwise unchanged.    L4 and L5 laminectomy is again identified. Nonspecific soft tissue within   the posterior paraspinal regions are unchanged in configuration,   consistent with postoperative change.    At L4-L5, moderate left foraminal stenosis on a bony productive change is   unchanged.    At L3-L4, mild to moderate bilateral foraminal narrowing is unchanged.    At L2-L3, moderate bilateral foraminal narrowing on a bony productive   basis is increased.    At L1-L2, there is no significant canal or foraminal stenosis.      < end of copied text > Teto castaneda is a 67 y/o gentlemen with numerous spinal surgeries as reported below,  shunt placed for hydrocephalus and HTN who presented to the ER complaining of back pain. He calmly states that his current pain starts in the left lower back and radiates down to his buttock and leg. Due to the pain he is unable to ambulate and has limited ROM. Pain is something unlike he has ever had before, and he thinks it's more severe than the pain prior or even after his surgeries. Denies fevers or chills. Pain could be due to sciatica, however further workup may be required due to surgical history.   ID rec No antibiotics with nl esr and crp- No CP /sob infection  History and PE NOT c/w any cord compression or caude equina syndrome.  acute on chronic back pain    < from: CT Lumbar Spine w/ IV Cont (11.05.18 @ 13:05) >  L3-L5 transpedicular screws are again identified and appear unchanged in   position without violation of the neural foramen, or migration into the   spinal canal.    There has been interval insertion of an L5-S1 intervertebral fusion   device since 2015. Periprosthetic lucency involving S1 intervertebral   disc fusion fixation screws however, are suggestive of hardware   loosening. There is prevertebral fatty reticulation and swelling.    Though evaluation of the intraspinal compartment is limited, there is   suggestion for an anterior epidural phlegmon versus abscess at the L5   level measuring approximately 0.4 x 0.9 cm.    Contrast-enhanced MR imaging may be done for further evaluation.    The examination is otherwise unchanged.    L4 and L5 laminectomy is again identified. Nonspecific soft tissue within   the posterior paraspinal regions are unchanged in configuration,   consistent with postoperative change.    At L4-L5, moderate left foraminal stenosis on a bony productive change is   unchanged.    At L3-L4, mild to moderate bilateral foraminal narrowing is unchanged.    At L2-L3, moderate bilateral foraminal narrowing on a bony productive   basis is increased.    At L1-L2, there is no significant canal or foraminal stenosis.      < end of copied text >

## 2018-11-06 NOTE — PROGRESS NOTE ADULT - SUBJECTIVE AND OBJECTIVE BOX
Patient is a 68y old  Male who presents with a chief complaint of back pain (06 Nov 2018 14:53)      SUBJECTIVE / OVERNIGHT EVENTS:  Back pain    MEDICATIONS  (STANDING):  cholecalciferol 1000 Unit(s) Oral daily  citalopram 40 milliGRAM(s) Oral daily  docusate sodium 100 milliGRAM(s) Oral three times a day  heparin  Injectable 5000 Unit(s) SubCutaneous every 8 hours  hydrochlorothiazide 12.5 milliGRAM(s) Oral daily  ibuprofen  Tablet. 600 milliGRAM(s) Oral every 6 hours  influenza   Vaccine 0.5 milliLiter(s) IntraMuscular once  losartan 50 milliGRAM(s) Oral daily  methylPREDNISolone 4 milliGRAM(s) Oral before breakfast  methylPREDNISolone 4 milliGRAM(s) Oral after lunch  methylPREDNISolone 4 milliGRAM(s) Oral after dinner  methylPREDNISolone 4 milliGRAM(s) Oral at bedtime  methylPREDNISolone   Oral   sodium chloride 0.9%. 1000 milliLiter(s) (50 mL/Hr) IV Continuous <Continuous>  verapamil  milliGRAM(s) Oral daily    MEDICATIONS  (PRN):  ALPRAZolam 1 milliGRAM(s) Oral every 12 hours PRN anxiety  baclofen 5 milliGRAM(s) Oral three times a day PRN Muscle Spasm  HYDROmorphone   Tablet 4 milliGRAM(s) Oral every 4 hours PRN Moderate Pain (4 - 6) and severe painm  HYDROmorphone  Injectable 0.5 milliGRAM(s) IV Push every 4 hours PRN Severe Pain (7 - 10)  traZODone 100 milliGRAM(s) Oral at bedtime PRN sleep  zolpidem 5 milliGRAM(s) Oral at bedtime PRN Insomnia  zolpidem 5 milliGRAM(s) Oral at bedtime PRN Insomnia        CAPILLARY BLOOD GLUCOSE        I&O's Summary      PHYSICAL EXAM:  GENERAL: NAD, well-developed  HEAD:  Atraumatic, Normocephalic  EYES: EOMI, PERRLA, conjunctiva and sclera clear  NECK: Supple, No JVD  CHEST/LUNG: Clear to auscultation bilaterally; No wheeze  HEART: Regular rate and rhythm; No murmurs, rubs, or gallops  ABDOMEN: Soft, Nontender, Nondistended; Bowel sounds present  EXTREMITIES:  2+ Peripheral Pulses, No clubbing, cyanosis, or edema  PSYCH: AAOx3  NEUROLOGY: non-focal  SKIN: No rashes or lesions    LABS:                        15.1   12.36 )-----------( 234      ( 06 Nov 2018 06:33 )             43.7     11-06    139  |  104  |  20  ----------------------------<  128<H>  4.2   |  22  |  0.95    Ca    9.0      06 Nov 2018 06:33      RADIOLOGY & ADDITIONAL TESTS:    Imaging Personally Reviewed:    Consultant(s) Notes Reviewed:      Care Discussed with Consultants/Other Providers:  ID- d/w ID - No CP /sob role for antibiotics. No  infection suspected Patient is a 68y old  Male who presents with a chief complaint of back pain (06 Nov 2018 14:53)      SUBJECTIVE / OVERNIGHT EVENTS:  Back pain  Long story- Had surgery to back in April 2018 emili Pimentel- Iv Dilaudid worked then- the Po does not work- Was homwe with ICE to back- No CP /sob pain on 3 months f/u , but then had new pain in different spot and Surgeon did not want do any more surgery- he has been getting PT at [facility] in [facility]- felt like he stretched too much and was home with MORE pain in 8/2018- he is here now because pain is too much. He got Iv Dilaudid last night for PAIN to lower back 10/10 then and is now 6/10 pain.  Explain to patient we most try PO- He states " the narcotic receiptors are full in his brain and Po will not work"  No pain radiating down legs, No numbness to arms/legs, No bowel / urine incontinence of difficulty.    MEDICATIONS  (STANDING):  cholecalciferol 1000 Unit(s) Oral daily  citalopram 40 milliGRAM(s) Oral daily  docusate sodium 100 milliGRAM(s) Oral three times a day  heparin  Injectable 5000 Unit(s) SubCutaneous every 8 hours  hydrochlorothiazide 12.5 milliGRAM(s) Oral daily  ibuprofen  Tablet. 600 milliGRAM(s) Oral every 6 hours  influenza   Vaccine 0.5 milliLiter(s) IntraMuscular once  losartan 50 milliGRAM(s) Oral daily  methylPREDNISolone 4 milliGRAM(s) Oral before breakfast  methylPREDNISolone 4 milliGRAM(s) Oral after lunch  methylPREDNISolone 4 milliGRAM(s) Oral after dinner  methylPREDNISolone 4 milliGRAM(s) Oral at bedtime  methylPREDNISolone   Oral   sodium chloride 0.9%. 1000 milliLiter(s) (50 mL/Hr) IV Continuous <Continuous>  verapamil  milliGRAM(s) Oral daily    MEDICATIONS  (PRN):  ALPRAZolam 1 milliGRAM(s) Oral every 12 hours PRN anxiety  baclofen 5 milliGRAM(s) Oral three times a day PRN Muscle Spasm  HYDROmorphone   Tablet 4 milliGRAM(s) Oral every 4 hours PRN Moderate Pain (4 - 6) and severe painm  HYDROmorphone  Injectable 0.5 milliGRAM(s) IV Push every 4 hours PRN Severe Pain (7 - 10)  traZODone 100 milliGRAM(s) Oral at bedtime PRN sleep  zolpidem 5 milliGRAM(s) Oral at bedtime PRN Insomnia  zolpidem 5 milliGRAM(s) Oral at bedtime PRN Insomnia        PHYSICAL EXAM:  Vital Signs Last 24 Hrs  T(C): 36.9 (06 Nov 2018 13:57), Max: 36.9 (05 Nov 2018 20:40)  T(F): 98.4 (06 Nov 2018 13:57), Max: 98.5 (05 Nov 2018 20:40)  HR: 64 (06 Nov 2018 13:57) (64 - 80)  BP: 161/79 (06 Nov 2018 13:57) (156/88 - 180/98)  BP(mean): --  RR: 18 (06 Nov 2018 13:57) (18 - 18)  SpO2: 97% (06 Nov 2018 13:57) (95% - 97%)  GENERAL: NAD, well-developed  HEAD:  Atraumatic, Normocephalic  EYES: EOMI, PERRLA, conjunctiva and sclera clear  NECK: Supple, No JVD  CHEST/LUNG: Clear to auscultation bilaterally; No wheeze  HEART: Regular rate and rhythm; No murmurs, rubs, or gallops  ABDOMEN: Soft, Nontender, Nondistended; Bowel sounds present  EXTREMITIES:  2+ Peripheral Pulses, No clubbing, cyanosis, or edema  PSYCH: AAOx3  NEUROLOGY: non-focal  NO PAIN SLR  SKIN: No rashes or lesions    LABS:                        15.1   12.36 )-----------( 234      ( 06 Nov 2018 06:33 )             43.7     11-06    139  |  104  |  20  ----------------------------<  128<H>  4.2   |  22  |  0.95    Ca    9.0      06 Nov 2018 06:33      RADIOLOGY & ADDITIONAL TESTS:    Imaging Personally Reviewed:    Consultant(s) Notes Reviewed:      Care Discussed with Consultants/Other Providers:  ID- d/w ID - No CP /sob role for antibiotics. No  infection suspected

## 2018-11-06 NOTE — PROGRESS NOTE ADULT - ATTENDING COMMENTS
69 y/o gentlemen with multiple spinal surgeries (9),  shunt placed for hydrocephalus, HTN, anxiety/depression with severe persistent progressive back pain.  Inflammatory markers and ProCalcitonin levels inconsistent with infection  Is it the possible hardware loosening involving S1 intervertebral   disc fusion fixation screws that is the cause of the pain    Suggest  Monitor off antibiotics  await blood culture results  MRI if ok with  shunt and hardware  Neurosurgery evaluation: Should the S1 intervertebral disc fusion fixation screws be removed or revised?

## 2018-11-07 ENCOUNTER — TRANSCRIPTION ENCOUNTER (OUTPATIENT)
Age: 68
End: 2018-11-07

## 2018-11-07 VITALS — HEART RATE: 67 BPM | DIASTOLIC BLOOD PRESSURE: 75 MMHG | SYSTOLIC BLOOD PRESSURE: 148 MMHG

## 2018-11-07 DIAGNOSIS — R52 PAIN, UNSPECIFIED: ICD-10-CM

## 2018-11-07 LAB
BUN SERPL-MCNC: 21 MG/DL — SIGNIFICANT CHANGE UP (ref 7–23)
CALCIUM SERPL-MCNC: 8.7 MG/DL — SIGNIFICANT CHANGE UP (ref 8.4–10.5)
CHLORIDE SERPL-SCNC: 102 MMOL/L — SIGNIFICANT CHANGE UP (ref 98–107)
CO2 SERPL-SCNC: 23 MMOL/L — SIGNIFICANT CHANGE UP (ref 22–31)
CREAT SERPL-MCNC: 0.95 MG/DL — SIGNIFICANT CHANGE UP (ref 0.5–1.3)
GLUCOSE SERPL-MCNC: 101 MG/DL — HIGH (ref 70–99)
HCT VFR BLD CALC: 42.7 % — SIGNIFICANT CHANGE UP (ref 39–50)
HGB BLD-MCNC: 14.5 G/DL — SIGNIFICANT CHANGE UP (ref 13–17)
MCHC RBC-ENTMCNC: 29.8 PG — SIGNIFICANT CHANGE UP (ref 27–34)
MCHC RBC-ENTMCNC: 34 % — SIGNIFICANT CHANGE UP (ref 32–36)
MCV RBC AUTO: 87.7 FL — SIGNIFICANT CHANGE UP (ref 80–100)
NRBC # FLD: 0 — SIGNIFICANT CHANGE UP
PLATELET # BLD AUTO: 244 K/UL — SIGNIFICANT CHANGE UP (ref 150–400)
PMV BLD: 10 FL — SIGNIFICANT CHANGE UP (ref 7–13)
POTASSIUM SERPL-MCNC: 4 MMOL/L — SIGNIFICANT CHANGE UP (ref 3.5–5.3)
POTASSIUM SERPL-SCNC: 4 MMOL/L — SIGNIFICANT CHANGE UP (ref 3.5–5.3)
RBC # BLD: 4.87 M/UL — SIGNIFICANT CHANGE UP (ref 4.2–5.8)
RBC # FLD: 13.7 % — SIGNIFICANT CHANGE UP (ref 10.3–14.5)
SODIUM SERPL-SCNC: 139 MMOL/L — SIGNIFICANT CHANGE UP (ref 135–145)
WBC # BLD: 12.46 K/UL — HIGH (ref 3.8–10.5)
WBC # FLD AUTO: 12.46 K/UL — HIGH (ref 3.8–10.5)

## 2018-11-07 PROCEDURE — 99232 SBSQ HOSP IP/OBS MODERATE 35: CPT

## 2018-11-07 PROCEDURE — 99239 HOSP IP/OBS DSCHRG MGMT >30: CPT

## 2018-11-07 RX ORDER — TIZANIDINE 4 MG/1
2 TABLET ORAL EVERY 6 HOURS
Qty: 0 | Refills: 0 | Status: DISCONTINUED | OUTPATIENT
Start: 2018-11-07 | End: 2018-11-07

## 2018-11-07 RX ORDER — ALPRAZOLAM 0.25 MG
1 TABLET ORAL
Qty: 0 | Refills: 0 | COMMUNITY

## 2018-11-07 RX ORDER — ALPRAZOLAM 0.25 MG
1 TABLET ORAL
Qty: 0 | Refills: 0 | COMMUNITY
Start: 2018-11-07

## 2018-11-07 RX ORDER — GABAPENTIN 400 MG/1
2 CAPSULE ORAL
Qty: 180 | Refills: 0 | OUTPATIENT
Start: 2018-11-07 | End: 2018-12-06

## 2018-11-07 RX ORDER — TRAZODONE HCL 50 MG
1 TABLET ORAL
Qty: 0 | Refills: 0 | COMMUNITY
Start: 2018-11-07

## 2018-11-07 RX ORDER — TIZANIDINE 4 MG/1
1 TABLET ORAL
Qty: 12 | Refills: 0 | OUTPATIENT
Start: 2018-11-07 | End: 2018-11-09

## 2018-11-07 RX ORDER — TRAZODONE HCL 50 MG
0 TABLET ORAL
Qty: 0 | Refills: 0 | COMMUNITY

## 2018-11-07 RX ORDER — ACETAMINOPHEN 500 MG
2 TABLET ORAL
Qty: 0 | Refills: 0 | COMMUNITY
Start: 2018-11-07

## 2018-11-07 RX ORDER — IBUPROFEN 200 MG
1 TABLET ORAL
Qty: 0 | Refills: 0 | COMMUNITY
Start: 2018-11-07

## 2018-11-07 RX ORDER — GABAPENTIN 400 MG/1
200 CAPSULE ORAL THREE TIMES A DAY
Qty: 0 | Refills: 0 | Status: DISCONTINUED | OUTPATIENT
Start: 2018-11-07 | End: 2018-11-07

## 2018-11-07 RX ORDER — TRAZODONE HCL 50 MG
4 TABLET ORAL
Qty: 0 | Refills: 0 | DISCHARGE
Start: 2018-11-07

## 2018-11-07 RX ORDER — DOCUSATE SODIUM 100 MG
1 CAPSULE ORAL
Qty: 0 | Refills: 0 | COMMUNITY
Start: 2018-11-07

## 2018-11-07 RX ADMIN — Medication 600 MILLIGRAM(S): at 11:28

## 2018-11-07 RX ADMIN — Medication 600 MILLIGRAM(S): at 05:55

## 2018-11-07 RX ADMIN — Medication 650 MILLIGRAM(S): at 01:10

## 2018-11-07 RX ADMIN — SODIUM CHLORIDE 50 MILLILITER(S): 9 INJECTION INTRAMUSCULAR; INTRAVENOUS; SUBCUTANEOUS at 00:14

## 2018-11-07 RX ADMIN — INFLUENZA VIRUS VACCINE 0.5 MILLILITER(S): 15; 15; 15; 15 SUSPENSION INTRAMUSCULAR at 15:56

## 2018-11-07 RX ADMIN — HYDROMORPHONE HYDROCHLORIDE 4 MILLIGRAM(S): 2 INJECTION INTRAMUSCULAR; INTRAVENOUS; SUBCUTANEOUS at 03:10

## 2018-11-07 RX ADMIN — HYDROMORPHONE HYDROCHLORIDE 4 MILLIGRAM(S): 2 INJECTION INTRAMUSCULAR; INTRAVENOUS; SUBCUTANEOUS at 04:10

## 2018-11-07 RX ADMIN — Medication 100 MILLIGRAM(S): at 13:44

## 2018-11-07 RX ADMIN — Medication 1 MILLIGRAM(S): at 00:14

## 2018-11-07 RX ADMIN — Medication 650 MILLIGRAM(S): at 06:55

## 2018-11-07 RX ADMIN — Medication 100 MILLIGRAM(S): at 05:55

## 2018-11-07 RX ADMIN — Medication 600 MILLIGRAM(S): at 01:10

## 2018-11-07 RX ADMIN — GABAPENTIN 200 MILLIGRAM(S): 400 CAPSULE ORAL at 13:44

## 2018-11-07 RX ADMIN — Medication 600 MILLIGRAM(S): at 06:55

## 2018-11-07 RX ADMIN — HYDROMORPHONE HYDROCHLORIDE 0.5 MILLIGRAM(S): 2 INJECTION INTRAMUSCULAR; INTRAVENOUS; SUBCUTANEOUS at 10:01

## 2018-11-07 RX ADMIN — HYDROMORPHONE HYDROCHLORIDE 4 MILLIGRAM(S): 2 INJECTION INTRAMUSCULAR; INTRAVENOUS; SUBCUTANEOUS at 08:26

## 2018-11-07 RX ADMIN — ZOLPIDEM TARTRATE 5 MILLIGRAM(S): 10 TABLET ORAL at 00:11

## 2018-11-07 RX ADMIN — GABAPENTIN 100 MILLIGRAM(S): 400 CAPSULE ORAL at 05:55

## 2018-11-07 RX ADMIN — HYDROMORPHONE HYDROCHLORIDE 0.5 MILLIGRAM(S): 2 INJECTION INTRAMUSCULAR; INTRAVENOUS; SUBCUTANEOUS at 09:40

## 2018-11-07 RX ADMIN — LOSARTAN POTASSIUM 50 MILLIGRAM(S): 100 TABLET, FILM COATED ORAL at 05:55

## 2018-11-07 RX ADMIN — Medication 650 MILLIGRAM(S): at 00:11

## 2018-11-07 RX ADMIN — Medication 650 MILLIGRAM(S): at 12:25

## 2018-11-07 RX ADMIN — Medication 360 MILLIGRAM(S): at 05:55

## 2018-11-07 RX ADMIN — Medication 600 MILLIGRAM(S): at 12:25

## 2018-11-07 RX ADMIN — HEPARIN SODIUM 5000 UNIT(S): 5000 INJECTION INTRAVENOUS; SUBCUTANEOUS at 05:54

## 2018-11-07 RX ADMIN — HYDROMORPHONE HYDROCHLORIDE 4 MILLIGRAM(S): 2 INJECTION INTRAMUSCULAR; INTRAVENOUS; SUBCUTANEOUS at 09:24

## 2018-11-07 RX ADMIN — Medication 4 MILLIGRAM(S): at 06:00

## 2018-11-07 RX ADMIN — Medication 1000 UNIT(S): at 11:28

## 2018-11-07 RX ADMIN — Medication 650 MILLIGRAM(S): at 11:28

## 2018-11-07 RX ADMIN — Medication 600 MILLIGRAM(S): at 00:11

## 2018-11-07 RX ADMIN — CITALOPRAM 40 MILLIGRAM(S): 10 TABLET, FILM COATED ORAL at 11:28

## 2018-11-07 RX ADMIN — TIZANIDINE 2 MILLIGRAM(S): 4 TABLET ORAL at 10:00

## 2018-11-07 RX ADMIN — Medication 4 MILLIGRAM(S): at 13:43

## 2018-11-07 RX ADMIN — Medication 650 MILLIGRAM(S): at 05:55

## 2018-11-07 RX ADMIN — Medication 12.5 MILLIGRAM(S): at 05:55

## 2018-11-07 NOTE — PROGRESS NOTE ADULT - PROBLEM SELECTOR PLAN 5
continue home meds  Ritalin 10mg  Xanax 1mg TID  Trazodone 100mg

## 2018-11-07 NOTE — PROGRESS NOTE ADULT - PROBLEM SELECTOR PLAN 1
- patient refuses to be evaluated by Dr Castanon anymore and requested continued observation by the recommendation he was provided earlier from the Orthopedics team   No Nsx intervention at this time, reconsult PRN - patient refuses to be evaluated by Dr Castanon anymore and requested continued observation by the recommendation he was provided earlier from the Orthopedics team   No Nsx intervention at this time.  patient will SEE HIS SURGEON OUT PATIENT-

## 2018-11-07 NOTE — PROGRESS NOTE ADULT - ASSESSMENT
69 y/o gentlemen with multiple spinal surgeries (9),  shunt placed for hydrocephalus, HTN, anxiety/depression with severe persistent progressive back pain.  Inflammatory markers and ProCalcitonin levels inconsistent with infection  Remains afebrile with modest leukocytosis after steroids  No evidence of infection at present.    Patient provides documentation about previous infection.  He underwent I&D of back wound on 1/16/10 at [facility] which grew out Group G Strep. He was treated with Cefepime/Vancomycin then changed to Ceftriaxone to complete 6 week course of antibiotics. The document provided no information about the location/depth or anatomy of infection.    Patient has been discharged.

## 2018-11-07 NOTE — PROGRESS NOTE ADULT - PROBLEM SELECTOR PLAN 6
continue home meds  change irbesartan to losartan   monitor BP, if needed adjust meds

## 2018-11-07 NOTE — PROGRESS NOTE ADULT - SUBJECTIVE AND OBJECTIVE BOX
Patient is a 68y old  Male who presents with a chief complaint of back pain (07 Nov 2018 08:51)      SUBJECTIVE / OVERNIGHT EVENTS:  patient seen with Pa student in training.  Explained to patient No  role for MRI- Id does not need.  Seen by Surgery- Patient DOES NOT WANT SURGERY with Dr Castanon- he will see surgery OUT PATIENT- he stated his last surgeon Dr Meche Huynh DOES NOT WISH TO SEE HIM.  He c/o pain overnight req iv dilaudid- pain doctor to see him again today.  Not happy with care and wishes to leave- explained we will get his paper work ready.    MEDICATIONS  (STANDING):  acetaminophen   Tablet .. 650 milliGRAM(s) Oral every 6 hours  cholecalciferol 1000 Unit(s) Oral daily  citalopram 40 milliGRAM(s) Oral daily  docusate sodium 100 milliGRAM(s) Oral three times a day  gabapentin 100 milliGRAM(s) Oral three times a day  heparin  Injectable 5000 Unit(s) SubCutaneous every 8 hours  hydrochlorothiazide 12.5 milliGRAM(s) Oral daily  ibuprofen  Tablet. 600 milliGRAM(s) Oral every 6 hours  influenza   Vaccine 0.5 milliLiter(s) IntraMuscular once  losartan 50 milliGRAM(s) Oral daily  methylPREDNISolone 4 milliGRAM(s) Oral before breakfast  methylPREDNISolone 4 milliGRAM(s) Oral after lunch  methylPREDNISolone 4 milliGRAM(s) Oral at bedtime  methylPREDNISolone   Oral   sodium chloride 0.9%. 1000 milliLiter(s) (50 mL/Hr) IV Continuous <Continuous>  verapamil  milliGRAM(s) Oral daily    MEDICATIONS  (PRN):  ALPRAZolam 1 milliGRAM(s) Oral every 8 hours PRN anxiety  HYDROmorphone   Tablet 4 milliGRAM(s) Oral every 4 hours PRN Moderate Pain (4 - 6) and severe painm  HYDROmorphone  Injectable 0.5 milliGRAM(s) IV Push every 4 hours PRN severe break through pain  tiZANidine 2 milliGRAM(s) Oral every 6 hours PRN Muscle Spasm  traZODone 100 milliGRAM(s) Oral at bedtime PRN sleep  zolpidem 5 milliGRAM(s) Oral at bedtime PRN Insomnia  zolpidem 5 milliGRAM(s) Oral at bedtime PRN Insomnia        PHYSICAL EXAM:  Vital Signs Last 24 Hrs  T(C): 36.8 (07 Nov 2018 05:24), Max: 36.9 (06 Nov 2018 13:57)  T(F): 98.2 (07 Nov 2018 05:24), Max: 98.4 (06 Nov 2018 13:57)  HR: 67 (07 Nov 2018 06:20) (60 - 67)  BP: 148/75 (07 Nov 2018 06:20) (148/75 - 161/79)  BP(mean): --  RR: 18 (07 Nov 2018 05:24) (18 - 18)  SpO2: 98% (07 Nov 2018 05:24) (96% - 98%)  GENERAL: NAD, well-developed  HEAD:  Atraumatic, Normocephalic  EYES: EOMI, PERRLA, conjunctiva and sclera clear  NECK: Supple, No JVD  CHEST/LUNG: Clear to auscultation bilaterally; No wheeze  HEART: Regular rate and rhythm; No murmurs, rubs, or gallops  ABDOMEN: Soft, Nontender, Nondistended; Bowel sounds present  EXTREMITIES:  2+ Peripheral Pulses, No clubbing, cyanosis, or edema  PSYCH: AAOx3  NEUROLOGY: non-focal  SKIN: No rashes or lesions    LABS:                        14.5   12.46 )-----------( 244      ( 07 Nov 2018 05:35 )             42.7     11-07    139  |  102  |  21  ----------------------------<  101<H>  4.0   |  23  |  0.95    Ca    8.7      07 Nov 2018 05:35        RADIOLOGY & ADDITIONAL TESTS:    Imaging Personally Reviewed:    Consultant(s) Notes Reviewed:    pain- Mng and Surgery    Care Discussed with Consultants/Other Providers:

## 2018-11-07 NOTE — PROGRESS NOTE ADULT - PROBLEM SELECTOR PROBLEM 4
Insomnia, unspecified type

## 2018-11-07 NOTE — DISCHARGE NOTE ADULT - OTHER SIGNIFICANT FINDINGS
Patient has had multiple surgeries with many different surgeons:  07/10/2018 - Removal of nervo spinal battery ([facility])  04/26/2018 - S1-L5 fusion ([facility])  12/19/2017 - Shunts for hydrocephalus ([facility])  03/18/2014 - L3-L5 Fusion ([facility])  07/29/2013 - L3-L4 laminectomy ([facility])  10/27/2011 - Fusion at L4-L5 at [facility], sent by [facility]  01/11/2011 - Reherniation and clean up scar tissue at L4-L5 ([facility])  01/10/2010 -  I&D surgery to clean infection/wound at [facility]  12/29/2009 - Laminectomy and Discectomy L4-L5 done at [facility]

## 2018-11-07 NOTE — PROGRESS NOTE ADULT - PROBLEM SELECTOR PLAN 7
heparin 5,000 units subq, q8hrs

## 2018-11-07 NOTE — DISCHARGE NOTE ADULT - PATIENT PORTAL LINK FT
You can access the [facility] Patient Portal, offered by [facility], by registering with the following website: [email or web address]

## 2018-11-07 NOTE — PROGRESS NOTE ADULT - PROBLEM SELECTOR PLAN 4
Continue home meds  ambien 10mg

## 2018-11-07 NOTE — PROGRESS NOTE ADULT - PROBLEM SELECTOR PROBLEM 3
NPH (normal pressure hydrocephalus)

## 2018-11-07 NOTE — CHART NOTE - NSCHARTNOTEFT_GEN_A_CORE
D/w primary team, noted per ID input that MRI will not be done due to low suspicious of infectious process in the spine.    D/w Dr Red

## 2018-11-07 NOTE — PROGRESS NOTE ADULT - SUBJECTIVE AND OBJECTIVE BOX
Follow Up:  back pain    Interval History/ROS: continued back pain    Allergies  Biaxin (Other)  Lidoderm (Unknown)  morphine (Short breath; Rash)        ANTIMICROBIALS:      OTHER MEDS:  MEDICATIONS  (STANDING):  acetaminophen   Tablet .. 650 every 6 hours  ALPRAZolam 1 every 8 hours PRN  citalopram 40 daily  docusate sodium 100 three times a day  gabapentin 200 three times a day  heparin  Injectable 5000 every 8 hours  hydrochlorothiazide 12.5 daily  ibuprofen  Tablet. 600 every 6 hours  losartan 50 daily  methylPREDNISolone 4 before breakfast  methylPREDNISolone 4 at bedtime  methylPREDNISolone    tiZANidine 2 every 6 hours PRN  traZODone 100 at bedtime PRN  verapamil  daily  zolpidem 5 at bedtime PRN  zolpidem 5 at bedtime PRN      Vital Signs Last 24 Hrs  T(C): 36.8 (07 Nov 2018 05:24), Max: 36.8 (07 Nov 2018 05:24)  T(F): 98.2 (07 Nov 2018 05:24), Max: 98.2 (07 Nov 2018 05:24)  HR: 67 (07 Nov 2018 06:20) (60 - 67)  BP: 148/75 (07 Nov 2018 06:20) (148/75 - 155/80)  BP(mean): --  RR: 18 (07 Nov 2018 05:24) (18 - 18)  SpO2: 98% (07 Nov 2018 05:24) (96% - 98%)    PHYSICAL EXAM:  General: WN/WD marked discomfort, Non-toxic  Neurology: fatigued, antalgic gait  Respiratory: Clear to auscultation bilaterally  CV: RRR, S1S2, no murmurs, rubs or gallops  Abdominal: Soft, Non-tender, non-distended, normal bowel sounds  Extremities: No edema, + peripheral pulses  Line Sites: Clear  Skin: No rash                          14.5   12.46 )-----------( 244      ( 07 Nov 2018 05:35 )             42.7       11-07    139  |  102  |  21  ----------------------------<  101<H>  4.0   |  23  |  0.95    Ca    8.7      07 Nov 2018 05:35            MICROBIOLOGY:  BLOOD  11-05-18 --  --  --            RADIOLOGY:    Mars Bustos MD; Division of Infectious Disease; Pager: [phone]; nights and weekends: [phone]

## 2018-11-07 NOTE — DISCHARGE NOTE ADULT - MEDICATION SUMMARY - MEDICATIONS TO CHANGE
I will SWITCH the dose or number of times a day I take the medications listed below when I get home from the hospital:    ALPRAZolam 1 mg oral tablet  -- 1 tab(s) by mouth every 4 hours, As Needed - max daily dose of 4 tablets    traZODone 100 mg oral tablet  -- 2 to 4 tab(s) by mouth once at bedtime as needed for sleep

## 2018-11-07 NOTE — DISCHARGE NOTE ADULT - CARE PROVIDER_API CALL
Fredy Lawson), Hematology; Internal Medicine; Oncology  [address] [place] [address]  Phone: [phone]  Fax: [phone]    Surgery Meche Nur,   Phone: (   )    -  Fax: (   )    -

## 2018-11-07 NOTE — PROGRESS NOTE ADULT - PROBLEM SELECTOR PROBLEM 2
Chronic back pain greater than 3 months duration

## 2018-11-07 NOTE — DISCHARGE NOTE ADULT - MEDICATION SUMMARY - MEDICATIONS TO TAKE
I will START or STAY ON the medications listed below when I get home from the hospital:    Rolling Walker   -- Rolling Walker to assist with ambulation per physical therapy recommendations   -- Indication: For Back pain at L4-L5 level    methylPREDNISolone 4 mg oral tablet  -- 4 milligram(s) by mouth 2 times a day MDD:2 tab  Instructions: Take 1 tab (4 mg)  before breakfast and 1 tab at bedtime, stop after 2 days   -- It is very important that you take or use this exactly as directed.  Do not skip doses or discontinue unless directed by your doctor.  Obtain medical advice before taking any non-prescription drugs as some may affect the action of this medication.  Take with food or milk.    -- Indication: For Back pain at L4-L5 level    ibuprofen 600 mg oral tablet  -- 1 tab(s) by mouth every 6 hours, As Needed mild-mod pain   -- Indication: For Chronic back pain greater than 3 months duration    acetaminophen 325 mg oral tablet  -- 2 tab(s) by mouth every 6 hours prn for mild-moderate pain, do not exceed 4g per day   -- Indication: For Chronic back pain greater than 3 months duration    verapamil 360 mg/24 hours oral capsule, extended release  -- 1 cap(s) by mouth once a day, hold for SBP <110  -- Indication: For Hypertension, unspecified type    gabapentin 100 mg oral capsule  -- 2 cap(s) (200 mg) by mouth 3 times a day, hold for oversedation MDD:6 tab  -- Indication: For Chronic back pain greater than 3 months duration    traZODone 100 mg oral tablet  -- 1 tab(s) by mouth once a day (at bedtime), As needed, sleep  -- Indication: For Insomnia, unspecified type    Celexa 40 mg oral tablet  -- 1 tab(s) by mouth once a day  -- Indication: For Depression, unspecified depression type    irbesartan-hydrochlorothiazide 300mg-12.5mg oral tablet  -- 1 tab(s) by mouth once a day, hold for SBP <110  -- Indication: For Hypertension, unspecified type    ALPRAZolam 1 mg oral tablet  -- 1 tab(s) by mouth every 8 hours, As needed, anxiety, hold for oversedation/lethargy   -- Indication: For Anxiety    Ambien CR 12.5 mg oral tablet, extended release  -- 1 tab(s) by mouth once a day (at bedtime)  -- Indication: For Insomnia, unspecified type    docusate sodium 100 mg oral capsule  -- 1 cap(s) by mouth 3 times a day  -- Indication: For Constipation    tiZANidine 2 mg oral tablet  -- 1 tab(s) by mouth every 6 hours, As needed, Muscle Spasm MDD:4 tab, hold for oversedation/lethargy  -- Indication: For Chronic back pain greater than 3 months duration    Vitamin D3 1000 intl units oral tablet  -- 1 tab(s) by mouth once a day  -- Indication: For supplement     Vitamin B-12 1000 mcg oral tablet  -- 1 tab(s) by mouth once a day  -- Indication: For supplement

## 2018-11-07 NOTE — PROGRESS NOTE ADULT - PROBLEM SELECTOR PLAN 3
shunt placed in 2017 as a result of NPH  F/U with recs from neurology

## 2018-11-07 NOTE — PROGRESS NOTE ADULT - PROBLEM SELECTOR PLAN 8
acute on chronic back pain s/p multiple surgeries.  CHRONIC PAIN CONSULT APPRECIATED.  commendations:   1. Stop Baclofen, and add Tizanidine 2mg Q6hr PRN, give 1st dose now. Hold for oversedation. (May decrease to 1mg if pt. is to drowsy)  2. Add gabapentin 200mg TID. Start first dose now   3. Add Tylenol 650mg Q6hrs standing for 2 days   4. Continue NSAID as ordered   5. Consider restarting home dose of Xanax 1mg Q6hrs PRN (istop #[id number]) or Psych consult to adjust medications   6. Consider a Holistic RN consult   7. Have pt. make appointment with chronic pain MD through insurance as outpatient.  Pain MD to see patient again today

## 2018-11-07 NOTE — PROGRESS NOTE ADULT - ATTENDING COMMENTS
Patient with complex spinal surgery history, with multiple surgeons and multiple operations - patient refuses to be evaluated by Dr Castanon anymore and requested continued observation by the recommendation he was provided earlier from the Orthopedics team   No Nsx intervention at this time, reconsult PRN  Pain Mng recs- Patient with complex spinal surgery history, with multiple surgeons and multiple operations - patient refuses to be evaluated by Dr Castanon anymore and requested continued observation by the recommendation he was provided earlier from the Orthopedics team   No Nsx intervention at this time, reconsult PRN  Pain Mng recs-  Recommendations:   1. Stop Baclofen, and add Tizanidine 2mg Q6hr PRN, give 1st dose now. Hold for oversedation. (May decrease to 1mg if pt. is to drowsy)  2. Add gabapentin 200mg TID. Start first dose now   3. Add Tylenol 650mg Q6hrs standing for 2 days   4. Continue NSAID as ordered   5. Consider restarting home dose of Xanax 1mg Q6hrs PRN (istop #[id number]) or Psych consult to adjust medications   6. Consider a Holistic RN consult   7. Have pt. make appointment with chronic pain MD through insurance as outpatient. Patient with complex spinal surgery history, with multiple surgeons and multiple operations - patient refuses to be evaluated by Dr Castanon anymore and requested continued observation by the recommendation he was provided earlier from the Orthopedics team   No Nsx intervention at this time.  Pain Mng recs-appreciated.  Patient unhappy with care and wishes to leave to see his out patient MDs.  Paperwork completed,  45 min to coordinate d/c

## 2018-11-07 NOTE — DISCHARGE NOTE ADULT - PLAN OF CARE
control Please use gabapentin/ tylenol/ moltrin as prescribed.  Please see your out patient pain mng doctor c/w c/w Xanax Please use gabapentin/ tylenol/ Zanaflex/moltrin as prescribed.  Please see your out patient pain mng doctor c/w Celexa c/w verapamil and cozzar Please use gabapentin/ tylenol/ Zanaflex/moltrin as prescribed.  Please see your out patient pain management  doctor within 1 week of discharge from hospital Continue celexa, please follow up with your PCP/psychiatrist per routine. Continue home blood pressure medications as directed. Monitor for any visual changes, headaches or dizziness.  Monitor blood pressure regularly.  Follow up with your PCP for further management for high blood pressure, please call to make appointment within 1 week of discharge continue  Xanax as prescribed, please follow up with your PCP/psychiatrist per routine.

## 2018-11-07 NOTE — PROGRESS NOTE ADULT - PROBLEM SELECTOR PLAN 2
Multiple prior surgeries  Continue work up as above  S1 ?? hardware loosening with PAIN- Seen by Surgery  - patient refuses to be evaluated by Dr Castanon anymore and requested continued observation by the recommendation he was provided earlier from the Orthopedics team   No Nsx intervention at this time, reconsult PRN Multiple prior surgeries  Continue work up as above  S1 ?? hardware loosening with PAIN- Seen by Surgery  - patient refuses to be evaluated by Dr Castanon anymore and requested continued observation by the recommendation he was provided earlier from the Orthopedics team   No Nsx intervention at this time.

## 2018-11-07 NOTE — DISCHARGE NOTE ADULT - HOSPITAL COURSE
Teto Perdomo is a 69 y/o gentlemen with numerous spinal surgeries as reported below,  shunt placed for hydrocephalus and HTN who presented to the ER complaining of back pain. The patient had stated that this new back pain is nothing like he ever had before and is more severe than the back pain that he had prior or even after his spinal surgeries. He claims that it has only somewhat responded to IV Dilaudid. He calmly states that his current pain starts in the left lower back and radiates down to his buttock and leg. Due to the pain he is unable to ambulate and has limited ROM. The patient denies any fevers, chills, sweating or shortness of breath. He also states that he feels very depressed as a result of his chronic pain, his friend at bedside states that the patient sleeps a lot. Patient denies opioid use.     PAST MEDICAL & SURGICAL HISTORY:  NPH (normal pressure hydrocephalus)  HOSPITAL COURSE:  Patient was seen in Ed and started on Medrol. dose pack for pain to back.  Ct of Back was done with concern ?? infection and ID was called.  With ESR/ CRP and pro- mavis all normal it was determined there in No spine infection and No role MRI of Antibiotics.  Surgery was called for ?? loose Screw at S1- No surgery rec since patient did not want to see Dr Rogers and is able to ambulate and has No cord compression s/s and No  caudae equina. Teto Perdomo is a 67 y/o gentlemen with numerous spinal surgeries as reported below,  shunt placed for hydrocephalus and HTN who presented to the ER complaining of back pain. The patient had stated that this new back pain is nothing like he ever had before and is more severe than the back pain that he had prior or even after his spinal surgeries. He claims that it has only somewhat responded to IV Dilaudid. He calmly states that his current pain starts in the left lower back and radiates down to his buttock and leg. Due to the pain he is unable to ambulate and has limited ROM. The patient denies any fevers, chills, sweating or shortness of breath. He also states that he feels very depressed as a result of his chronic pain, his friend at bedside states that the patient sleeps a lot. Patient denies opioid use.     PAST MEDICAL & SURGICAL HISTORY:  NPH (normal pressure hydrocephalus)  HOSPITAL COURSE:  Patient was seen in Ed and started on Medrol dose pack for pain to back.  Ct of Back was done with concern ??  infection and ID was called.  With ESR/ CRP and pro- mavis all normal it was determined there in No spine infection and No role MRI of Antibiotics.  Surgery was called for ?? loose Screw at S1- No surgery rec since patient did not want to see Dr Rogers and is able to ambulate and has No cord compression s/s and No  caudae equina.  Patient had surgery to back in April 2018 with Dr Huynh-  He noted  Iv Dilaudid worked then- the Po does not work- Was home with ICE to back- No  pain on 3 months f/u , but then had new pain in different spot and Surgeon did not want do any more surgery- he has been getting PT at [facility] in [place]- felt like he stretched too much and was home with MORE pain in 8/2018- he is here now because pain is too much.  No pain radiating down legs, No numbness to arms/legs, No bowel / urine incontinence of difficulty. Teto Perdomo is a 67 y/o gentlemen with numerous spinal surgeries as reported below,  shunt placed for hydrocephalus and HTN who presented to the ER complaining of back pain. The patient had stated that this new back pain is nothing like he ever had before and is more severe than the back pain that he had prior or even after his spinal surgeries. He claims that it has only somewhat responded to IV Dilaudid. He calmly states that his current pain starts in the left lower back and radiates down to his buttock and leg. Due to the pain he is unable to ambulate and has limited ROM. The patient denies any fevers, chills, sweating or shortness of breath. He also states that he feels very depressed as a result of his chronic pain, his friend at bedside states that the patient sleeps a lot. Patient denies opioid use.   Patient also has h/o NPH (normal pressure hydrocephalus)  HOSPITAL COURSE:  Patient was seen in Ed and stared on Medrol dose pack for pain to back.  Ct of Back was done with concern ??   infection and ID was called.  With ESR/ CRP and pro- mavis all normal it was determined there in No spine infection and No role MRI of Antibiotics.  Surgery was called for ?? loose Screw at S1- No surgery rec since patient did not want to see Dr Rogers and is able to ambulate and has No cord compression s/s and No  caudae equina.  Patient had surgery to back in April 2018 with Dr Huynh-and had good 3 months f/u in 7/2018 but then had NEW pain and Dr Huynh declined to do further surgery and sent him to physical therapy.  Patient stated in 8/2018 at PT he pulled muscles and pain became worse.  d/w PCP Dr Ulises Lawson at [phone].  Patient does NOT have allergy to Percocet- he get loopy from too much narcotics.  Patient has been on every type of pain medication and combination possible - including NSAIDS, neuropathic pain medicine and which ever one is use patients seems to have a new pain. Dr. Lawson last gave patient Percocet, patient confirmed he received percocet in 09/2018. Patient states Percocet didn't help so he stopped taking them. Patient also took Nucynta which did not help.   Patient is aware that he pain will never return to a level of zero.  Patient noted  Iv Dilaudid worked then- the Po does not work- Was home with ICE to back- No  pain on 3 months f/u , but then had new pain in different spot and Surgeon did not want do any more surgery- he has been getting PT at [facility] in [place]- felt like he stretched too much and was home with MORE pain in 8/2018- he is here now because pain is too much.  No pain radiating down legs, No numbness to arms/legs, No bowel / urine incontinence of difficulty.  He was seen by pain management and rec to have stop Baclofen and add Tizanidine 2 mg Q6hr PRN, Gabapentin 200 mg TID, Tylenol 650 q6 standing x2days, continue Ibuprofen and home dose of Xanax 1 mg Q6hrs. Patient is to follow up outpatient with chronic pain through his insurance as outpatient.   Patient was not happy with recommendations of chronic pain and wanted to leave.  Patient left 11/7/18 to follow out patient with Dr Lawson his PCP. Teto Perdomo is a 69 y/o gentlemen with numerous spinal surgeries as reported below,  shunt placed for hydrocephalus and HTN who presented to the ER complaining of back pain. The patient had stated that this new back pain is nothing like he ever had before and is more severe than the back pain that he had prior or even after his spinal surgeries. He claims that it has only somewhat responded to IV Dilaudid. He calmly states that his current pain starts in the left lower back and radiates down to his buttock and leg. Due to the pain he is unable to ambulate and has limited ROM. The patient denies any fevers, chills, sweating or shortness of breath. He also states that he feels very depressed as a result of his chronic pain, his friend at bedside states that the patient sleeps a lot. Patient denies opioid use.   Patient also has h/o NPH (normal pressure hydrocephalus)  HOSPITAL COURSE:  Patient was seen in Ed and stared on Medrol dose pack for pain to back.  Ct of Back was done with concern ??   infection and ID was called.  With ESR/ CRP and pro- mavis all normal it was determined there in No spine infection and No role MRI of Antibiotics.  Surgery was called for ?? loose Screw at S1- No surgery rec since patient did not want to see Dr Rogers and is able to ambulate and has No cord compression s/s and No  caudae equina.  Patient had surgery to back in April 2018 with Dr Huynh-and had good 3 months f/u in 7/2018 but then had NEW pain and Dr Huynh declined to do further surgery and sent him to physical therapy.  Patient stated in 8/2018 at PT he pulled muscles and pain became worse.  d/w PCP Dr Ulises Lawson at [phone].  Patient does NOT have allergy to Percocet- he get loopy from too much narcotics.  Patient has been on every type of pain medication and combination possible - including NSAIDS, neuropathic pain medicine and which ever one is use patients seems to have a new pain. Dr. Lawson last gave patient Percocet, patient confirmed he received percocet in 09/2018. Patient states Percocet didn't help so he stopped taking them. Patient also took Nucynta which did not help.   Patient is aware that he pain will never return to a level of zero.  Patient noted  Iv Dilaudid worked then- the Po does not work- Was home with ICE to back- No  pain on 3 months f/u , but then had new pain in different spot and Surgeon did not want do any more surgery- he has been getting PT at [facility] in [place]- felt like he stretched too much and was home with MORE pain in 8/2018- he is here now because pain is too much.  No pain radiating down legs, No numbness to arms/legs, No bowel / urine incontinence of difficulty.  He was seen by pain management and rec to have stop Baclofen and add Tizanidine 2 mg Q6hr PRN, Gabapentin 200 mg TID, Tylenol 650 q6 standing x2days, continue Ibuprofen and home dose of Xanax 1 mg Q6hrs. Patient is to follow up outpatient with chronic pain through his insurance as outpatient.   Pt has h/o depression/anxiety, currently denies any SI/HI.   Patient was not happy with recommendations of chronic pain and wanted to leave.  Patient left 11/7/18 to follow out patient with Dr Lawson his PCP.  Istop: Reference # [id number] Teto Perdomo is a 69 y/o gentlemen with numerous spinal surgeries as reported below,  shunt placed for hydrocephalus and HTN who presented to the ER complaining of back pain. The patient had stated that this new back pain is nothing like he ever had before and is more severe than the back pain that he had prior or even after his spinal surgeries. He claims that it has only somewhat responded to IV Dilaudid. He calmly states that his current pain starts in the left lower back and radiates down to his buttock and leg. Due to the pain he is unable to ambulate and has limited ROM. The patient denies any fevers, chills, sweating or shortness of breath. He also states that he feels very depressed as a result of his chronic pain, his friend at bedside states that the patient sleeps a lot. Patient denies opioid use.   Patient also has h/o NPH (normal pressure hydrocephalus)  HOSPITAL COURSE:  Patient was seen in Ed and stared on Medrol dose pack for pain to back.  Ct of Back was done with concern ??   infection and ID was called.  With ESR/ CRP and pro- mavis all normal it was determined there in No spine infection and No role MRI of Antibiotics.  Surgery was called for ?? loose Screw at S1- No surgery rec since patient did not want to see Dr Rogers and is able to ambulate and has No cord compression s/s and No  caudae equina.  Patient had surgery to back in April 2018 with Dr Huynh-and had good 3 months f/u in 7/2018 but then had NEW pain and Dr Huynh declined to do further surgery and sent him to physical therapy.  Patient stated in 8/2018 at PT he pulled muscles and pain became worse.  d/w PCP Dr Ulises Lawson at [phone].  Patient does NOT have allergy to Percocet- he get loopy from too much narcotics.  Patient has been on every type of pain medication and combination possible - including NSAIDS, neuropathic pain medicine and which ever one is use patients seems to have a new pain. Dr. Lawson last gave patient Percocet, patient confirmed he received percocet in 09/2018. Patient states Percocet didn't help so he stopped taking them. Patient also took Nucynta which did not help.   Patient is aware that he pain will never return to a level of zero.  Patient noted  Iv Dilaudid worked then- the Po does not work- Was home with ICE to back- No  pain on 3 months f/u , but then had new pain in different spot and Surgeon did not want do any more surgery- he has been getting PT at \[facility]\"" in [place]- felt like he stretched too much and was home with MORE pain in 8/2018- he is here now because pain is too much.  No pain radiating down legs, No numbness to arms/legs, No bowel / urine incontinence of difficulty.  He was seen by pain management and rec to have stop Baclofen and add Tizanidine 2 mg Q6hr PRN, Gabapentin 200 mg TID, Tylenol 650 q6 standing x2days, continue Ibuprofen and home dose of Xanax 1 mg Q6hrs. Patient is to follow up outpatient with chronic pain through his insurance as outpatient.   Pt has h/o depression/anxiety, currently denies any SI/HI.   Patient was not happy with recommendations of chronic pain and wanted to leave.  Patient left 11/7/18 to follow out patient with Dr Lawson his PCP.  Istop: Reference # [id number]     Addendum  11/14/18  Patient was noted to ambulate to and from bathroom on this admission. He had No  pain to legs with ambulation or at rest.  No  saddle anesthesia No difficulty with bowel movement or urination. He had acute on chronic back pain, but No CP /sob CORD COMPRESSION OR CAUDAE EQUINE.   His acute on chronic back pain is possibly secondary to loosening of hardware at S1 BUT he declines any surgery intervention at [facility] because he did not want to see the surgeon and was to follow with surgeon of choice OUT PATIENT. He was also unhappy with recommendations by chronic pain and left.

## 2018-11-10 LAB
BACTERIA BLD CULT: SIGNIFICANT CHANGE UP
BACTERIA BLD CULT: SIGNIFICANT CHANGE UP

## 2018-11-12 ENCOUNTER — HOSPITAL ENCOUNTER (OUTPATIENT)
Dept: GENERAL RADIOLOGY | Facility: HOSPITAL | Age: 68
Discharge: HOME | End: 2018-11-12
Attending: UROLOGY | Admitting: UROLOGY

## 2018-11-13 ENCOUNTER — OTHER (OUTPATIENT)
Age: 68
End: 2018-11-13

## 2018-11-14 ENCOUNTER — HOSPITAL ENCOUNTER (OUTPATIENT)
Dept: OTHER | Facility: HOSPITAL | Age: 68
Setting detail: SPECIMEN
Discharge: HOME | End: 2018-11-14
Attending: UROLOGY | Admitting: UROLOGY

## 2018-11-14 ENCOUNTER — APPOINTMENT (OUTPATIENT)
Dept: INTERNAL MEDICINE | Facility: CLINIC | Age: 68
End: 2018-11-14
Payer: MEDICARE

## 2018-11-14 VITALS
HEIGHT: 67 IN | HEART RATE: 101 BPM | WEIGHT: 214 LBS | BODY MASS INDEX: 33.59 KG/M2 | SYSTOLIC BLOOD PRESSURE: 148 MMHG | DIASTOLIC BLOOD PRESSURE: 97 MMHG

## 2018-11-14 PROCEDURE — 99496 TRANSJ CARE MGMT HIGH F2F 7D: CPT

## 2018-11-15 LAB — SPECIMEN SOURCE: NORMAL

## 2018-11-15 NOTE — HISTORY OF PRESENT ILLNESS
[Post-hospitalization from ___ Hospital] : Post-hospitalization from [unfilled] Hospital [Admitted on: ___] : The patient was admitted on [unfilled] [Discharged on ___] : discharged on [unfilled] [Discharge Summary] : discharge summary [Pertinent Labs] : pertinent labs [Radiology Findings] : radiology findings [Discharge Med List] : discharge medication list [Med Reconciliation] : medication reconciliation has been completed [Patient Contacted By: ____] : and contacted by [unfilled] [FreeTextEntry2] : Was in hospital again still no real relief, IV Dilaudid did help fro an hour and a half.\par \par Was at the surgeons office on July 31st.  \par \par Unable to get to [place].  Going back to Dr. Gardiner next week and Dr. Florence December 3rd neurolgoy. \par Unable to get back to Dr. Quiroz in the city, does no’t believe he could make the trip too far.  \par Patient's CBD doctor returned Dr. Edwards.  \par \par Sleeps all day and night.  \par

## 2018-11-15 NOTE — PHYSICAL EXAM
[No Acute Distress] : no acute distress [Well Nourished] : well nourished [Well Developed] : well developed [Well-Appearing] : well-appearing [Normal Sclera/Conjunctiva] : normal sclera/conjunctiva [PERRL] : pupils equal round and reactive to light [EOMI] : extraocular movements intact [Normal Outer Ear/Nose] : the outer ears and nose were normal in appearance [Normal Oropharynx] : the oropharynx was normal [No JVD] : no jugular venous distention [Supple] : supple [No Lymphadenopathy] : no lymphadenopathy [Thyroid Normal, No Nodules] : the thyroid was normal and there were no nodules present [No Respiratory Distress] : no respiratory distress  [Clear to Auscultation] : lungs were clear to auscultation bilaterally [No Accessory Muscle Use] : no accessory muscle use [Normal Rate] : normal rate  [Regular Rhythm] : with a regular rhythm [Normal S1, S2] : normal S1 and S2 [No Murmur] : no murmur heard [No Carotid Bruits] : no carotid bruits [No Abdominal Bruit] : a ~M bruit was not heard ~T in the abdomen [No Varicosities] : no varicosities [Pedal Pulses Present] : the pedal pulses are present [No Edema] : there was no peripheral edema [No Extremity Clubbing/Cyanosis] : no extremity clubbing/cyanosis [No Palpable Aorta] : no palpable aorta [Soft] : abdomen soft [Non Tender] : non-tender [Non-distended] : non-distended [No Masses] : no abdominal mass palpated [No HSM] : no HSM [Normal Bowel Sounds] : normal bowel sounds [Normal Supraclavicular Nodes] : no supraclavicular lymphadenopathy [Normal Axillary Nodes] : no axillary lymphadenopathy [Normal Posterior Cervical Nodes] : no posterior cervical lymphadenopathy [Normal Anterior Cervical Nodes] : no anterior cervical lymphadenopathy [No CVA Tenderness] : no CVA  tenderness [No Spinal Tenderness] : no spinal tenderness [No Joint Swelling] : no joint swelling [Grossly Normal Strength/Tone] : grossly normal strength/tone [Speech Grossly Normal] : speech grossly normal [Normal Affect] : the affect was normal [Normal Mood] : the mood was normal [Normal Insight/Judgement] : insight and judgment were intact [de-identified] : stragiht leg raise negative 4+/5 strength bilateral LE.  Limited by pain mostly.   [de-identified] : depressed affect.

## 2018-11-15 NOTE — ASSESSMENT
[FreeTextEntry1] : Patient still in severe pain, really not much better was discharged without any type of pain medication besides the gabapentin.  Patient states that at the hospital he was on Dilaudid 4mg IV which did help with his pain quite abit but only lasted an hour and a half or so did not make him loopy.  \par \par I had a long talk with Teto expressing my concerns that he has expended most of his options.  Suspect his pain may be refractory and there may not a lot we could do for him. Preestablished goal of possibly decreasing his pain by 50% but he would probably not be pain free.  \par \par Advised that more procedures may not be beneficial to him, hes had major set backs with every one of them. Can try opiod therapy again.  Expressed concerned of altered mental status last year when we had tried.  Granted much of that could have been the NPH which has been managed by a shunt.  NOw that this is better may be able to tolerate a higher dose of opiod pain control.  Prescribed fentanyl 100mcg which approximates his Dilaudid use at the hospital, 4mg IV Q 4 hours, and would be more constant. FOund out later that this would never be co clifford by his insurance changed it to an approximate dose of oxycontin 60mg BID.  Can use dilauded 8mg PO Q 6 hrs PRN.  INformed him that the PO to IV Conversion is somewhere around 2-3:1\par \par HIs CBD physician Dr. Lan retired, he may benefit from a new one. It did help somewhat in the past.  Made referal.  \par \par PAtient states that he will not be following up with Dr. Quiroz and the other doctors in [place] as.  Informed him that he truly must be following up with somebody who is able to follow up on this as its dangerous to ignore follow up especially since he may have an upcoming MRI, the shunt may need adjustment.  He will be following up with Dr. Gardiner who may be able to take this over, instructed him to bring this up.  \par \par Still suspect that he has a strong component of depression compounding his pain.  He is following up with Dr. Bhandari, strongly encouraged him to keep doing this.  \par \par Spent > 60 minutes in direct patient care and and addressed all questions and concerns.  >50% of this time was in direct face to face contact with patient during exam and counseling.

## 2018-12-04 ENCOUNTER — APPOINTMENT (OUTPATIENT)
Dept: NEUROLOGY | Facility: CLINIC | Age: 68
End: 2018-12-04

## 2018-12-04 ENCOUNTER — EMERGENCY (EMERGENCY)
Facility: HOSPITAL | Age: 68
LOS: 1 days | Discharge: HOME | End: 2018-12-04
Attending: EMERGENCY MEDICINE
Payer: MEDICARE

## 2018-12-04 VITALS
OXYGEN SATURATION: 97 % | TEMPERATURE: 98 F | SYSTOLIC BLOOD PRESSURE: 147 MMHG | RESPIRATION RATE: 20 BRPM | HEART RATE: 81 BPM | DIASTOLIC BLOOD PRESSURE: 74 MMHG

## 2018-12-04 VITALS
HEART RATE: 104 BPM | TEMPERATURE: 99 F | SYSTOLIC BLOOD PRESSURE: 150 MMHG | OXYGEN SATURATION: 91 % | WEIGHT: 210.1 LBS | RESPIRATION RATE: 18 BRPM | DIASTOLIC BLOOD PRESSURE: 96 MMHG

## 2018-12-04 LAB
BASE EXCESS BLDV CALC-SCNC: 4.9 MMOL/L — HIGH (ref -2–2)
CA-I SERPL-SCNC: 1.13 MMOL/L — SIGNIFICANT CHANGE UP (ref 1.12–1.3)
CHLORIDE BLDV-SCNC: 110 MMOL/L — HIGH (ref 96–108)
CO2 BLDV-SCNC: 34 MMOL/L — HIGH (ref 22–30)
GAS PNL BLDV: 135 MMOL/L — LOW (ref 136–145)
GAS PNL BLDV: SIGNIFICANT CHANGE UP
GLUCOSE BLDV-MCNC: 94 MG/DL — SIGNIFICANT CHANGE UP (ref 70–99)
HCO3 BLDV-SCNC: 32 MMOL/L — HIGH (ref 21–29)
HCT VFR BLDA CALC: 44 % — SIGNIFICANT CHANGE UP (ref 39–50)
HGB BLD CALC-MCNC: 14.4 G/DL — SIGNIFICANT CHANGE UP (ref 13–17)
LACTATE BLDV-MCNC: 1.8 MMOL/L — SIGNIFICANT CHANGE UP (ref 0.7–2)
OTHER CELLS CSF MANUAL: 7 ML/DL — LOW (ref 18–22)
PCO2 BLDV: 62 MMHG — HIGH (ref 35–50)
PH BLDV: 7.34 — LOW (ref 7.35–7.45)
PO2 BLDV: 25 MMHG — SIGNIFICANT CHANGE UP (ref 25–45)
POTASSIUM BLDV-SCNC: 3.5 MMOL/L — SIGNIFICANT CHANGE UP (ref 3.5–5.3)
SAO2 % BLDV: 34 % — LOW (ref 67–88)

## 2018-12-04 PROCEDURE — 84132 ASSAY OF SERUM POTASSIUM: CPT

## 2018-12-04 PROCEDURE — 82947 ASSAY GLUCOSE BLOOD QUANT: CPT

## 2018-12-04 PROCEDURE — 84295 ASSAY OF SERUM SODIUM: CPT

## 2018-12-04 PROCEDURE — 99284 EMERGENCY DEPT VISIT MOD MDM: CPT | Mod: GC,25

## 2018-12-04 PROCEDURE — 74018 RADEX ABDOMEN 1 VIEW: CPT | Mod: 26

## 2018-12-04 PROCEDURE — 70250 X-RAY EXAM OF SKULL: CPT

## 2018-12-04 PROCEDURE — 80053 COMPREHEN METABOLIC PANEL: CPT

## 2018-12-04 PROCEDURE — 85379 FIBRIN DEGRADATION QUANT: CPT

## 2018-12-04 PROCEDURE — 70450 CT HEAD/BRAIN W/O DYE: CPT | Mod: 26

## 2018-12-04 PROCEDURE — 71045 X-RAY EXAM CHEST 1 VIEW: CPT | Mod: 26

## 2018-12-04 PROCEDURE — 70450 CT HEAD/BRAIN W/O DYE: CPT

## 2018-12-04 PROCEDURE — 83605 ASSAY OF LACTIC ACID: CPT

## 2018-12-04 PROCEDURE — 74018 RADEX ABDOMEN 1 VIEW: CPT

## 2018-12-04 PROCEDURE — 99284 EMERGENCY DEPT VISIT MOD MDM: CPT | Mod: 25

## 2018-12-04 PROCEDURE — 85730 THROMBOPLASTIN TIME PARTIAL: CPT

## 2018-12-04 PROCEDURE — 71045 X-RAY EXAM CHEST 1 VIEW: CPT

## 2018-12-04 PROCEDURE — 82803 BLOOD GASES ANY COMBINATION: CPT

## 2018-12-04 PROCEDURE — 93010 ELECTROCARDIOGRAM REPORT: CPT

## 2018-12-04 PROCEDURE — 85027 COMPLETE CBC AUTOMATED: CPT

## 2018-12-04 PROCEDURE — 70250 X-RAY EXAM OF SKULL: CPT | Mod: 26

## 2018-12-04 PROCEDURE — 82330 ASSAY OF CALCIUM: CPT

## 2018-12-04 PROCEDURE — 85610 PROTHROMBIN TIME: CPT

## 2018-12-04 PROCEDURE — 93005 ELECTROCARDIOGRAM TRACING: CPT

## 2018-12-04 PROCEDURE — 85014 HEMATOCRIT: CPT

## 2018-12-04 PROCEDURE — 82435 ASSAY OF BLOOD CHLORIDE: CPT

## 2018-12-04 RX ORDER — OXYCODONE HYDROCHLORIDE 5 MG/1
5 TABLET ORAL ONCE
Qty: 0 | Refills: 0 | Status: DISCONTINUED | OUTPATIENT
Start: 2018-12-04 | End: 2018-12-04

## 2018-12-04 RX ADMIN — OXYCODONE HYDROCHLORIDE 5 MILLIGRAM(S): 5 TABLET ORAL at 19:41

## 2018-12-04 RX ADMIN — OXYCODONE HYDROCHLORIDE 5 MILLIGRAM(S): 5 TABLET ORAL at 20:30

## 2018-12-04 NOTE — ED ADULT TRIAGE NOTE - CHIEF COMPLAINT QUOTE
Patient presents with unsteady gate, periods of confusion, memory loss x weeks. Patient has  shunt in place. Patient's family states symptoms have gotten worse over the last 24 hours.

## 2018-12-04 NOTE — CONSULT NOTE ADULT - ASSESSMENT
68M h/o NPH here with worsening gait and confusion, VPS pumps and refills, CTH stable, intact on exam  - no acute neurosurgical intervention  - Patient should follow up outpatient

## 2018-12-04 NOTE — ED PROVIDER NOTE - OBJECTIVE STATEMENT
69yo M pmhx HTN anxiety, depression, chronic low back pain, hydrocephalus w/  shunt p/w CC AMS. Pt. is accompanied by family friend who states that for the past few weeks he has noticed a steady decline in pts status, similar to when he had the  shunt initially placed, he describes a shuffling gait, memory loss, repetitiveness and confusion. Pt. denies fever chills CP SOB n/v/d numbness/tingling/weakness.

## 2018-12-04 NOTE — ED PROVIDER NOTE - PROGRESS NOTE DETAILS
Dr. Parsons (Attending Physician)  seen by neurosurg will give follow-up with them as patient. does not want to follow at [facility]. recheck lactate which is normal. Patient ambulated without difficulty in ED. Pt. ambulating without difficulty around ED, he is AOx3, well appearing. Pt. was cleared by neurosurg for outpatient follow up.

## 2018-12-04 NOTE — CONSULT NOTE ADULT - SUBJECTIVE AND OBJECTIVE BOX
p ([id number])     HPI:67yo M pmhx HTN anxiety, depression, chronic low back pain, hydrocephalus w/  shunt p/w CC AMS. Pt. is accompanied by family friend who states that for the past few weeks he has noticed a steady decline in pts status, similar to when he had the  shunt initially placed, he describes a shuffling gait, memory loss, repetitiveness and confusion. Pt. denies fever chills CP SOB n/v/d numbness/tingling/weakness.    PAST MEDICAL HISTORY   NPH (normal pressure hydrocephalus)  Chronic back pain greater than 3 months duration  Small intestine disorder  Lumbar disc displacement without myelopathy  Sciatica  Anxiety  Vertebral Sciatica  Insomnia  Depression  HTN (Hypertension)    PAST SURGICAL HISTORY   S/P lumbar fusion  Dehiscence, Operation Wound/Debridement  S/P Laminectomy    Biaxin (Other)  Lidoderm (Unknown)  morphine (Short breath; Rash)      MEDICATIONS:  Antibiotics:    Neuro:    Anticoagulation:    Other:      SOCIAL HISTORY:   Occupation:   Marital Status:     FAMILY HISTORY:  Family history of CKD (chronic kidney disease)  Family history of lung cancer      REVIEW OF SYSTEMS:  negative but for hpi  General:  Eyes:  ENT:  Cardiac:  Respiratory:  GI:  Musculoskeletal:   Skin:  Neurologic:   Psychiatric:     PHYSICAL EXAMINATION:   T(C): 37.4 (12-04-18 @ 16:20), Max: 37.4 (12-04-18 @ 16:20)  HR: 102 (12-04-18 @ 16:20) (102 - 104)  BP: 158/89 (12-04-18 @ 16:20) (150/96 - 158/89)  RR: 27 (12-04-18 @ 16:20) (18 - 27)  SpO2: 95% (12-04-18 @ 16:20) (91% - 95%)  Wt(kg): --  Weight (kg): 95.3 (12-04 @ 16:15)    General Examination:     Neurologic Examination:           AOx3, FC, PERRL, EOMI, V1-3 intact, no facial, palate jeanette symmetric, tongue midline, shrug 5/5  5/5 throughout, no drift  SILT  No clonus or babinski    LABS:                        14.6   12.7  )-----------( 256      ( 04 Dec 2018 16:55 )             40.9     12-04    144  |  103  |  15  ----------------------------<  106<H>  3.6   |  23  |  1.16    Ca    9.3      04 Dec 2018 16:55    TPro  6.9  /  Alb  4.3  /  TBili  0.4  /  DBili  x   /  AST  13  /  ALT  12  /  AlkPhos  63  12-04    PT/INR - ( 04 Dec 2018 17:13 )   PT: 11.9 sec;   INR: 1.03 ratio         PTT - ( 04 Dec 2018 17:13 )  PTT:30.2 sec      RADIOLOGY & ADDITIONAL STUDIES:  CTH: reviewed with good shunt placement and stable ventriculomegaly

## 2018-12-04 NOTE — ED ADULT NURSE NOTE - OBJECTIVE STATEMENT
68 y m came to the ed with unsteady gait and back/leg pain. states back and leg pain is chronic. had shunt placed last year for hydrocephalus. patient friend at bedside also said patient was ams although is currently a/ox3. states symptoms last for one month but got worse today. denies any fevers, chills, chest pain, sob. skin is warm and dry. able to move all extremities. perrl. denies any numbness/tingling.

## 2018-12-04 NOTE — ED PROVIDER NOTE - MEDICAL DECISION MAKING DETAILS
69yo M pmhx htn depression anxiety hydrocephalus chronic LBP p/w CC worsening confusion and shuffling gait - will perform CT head, shunt series, send basic labs cxr ua to eval for possible infectious etiology - also consider PE as pt. is 93% on 2L O2 and tachycardic, will send d-dimer and pt. is robert kapoor

## 2018-12-04 NOTE — ED ADULT NURSE NOTE - NSIMPLEMENTINTERV_GEN_ALL_ED
Implemented All Fall Risk Interventions:  [place] to call system. Call bell, personal items and telephone within reach. Instruct patient to call for assistance. Room bathroom lighting operational. Non-slip footwear when patient is off stretcher. Physically safe environment: no spills, clutter or unnecessary equipment. Stretcher in lowest position, wheels locked, appropriate side rails in place. Provide visual cue, wrist band, yellow gown, etc. Monitor gait and stability. Monitor for mental status changes and reorient to person, place, and time. Review medications for side effects contributing to fall risk. Reinforce activity limits and safety measures with patient and family.

## 2018-12-04 NOTE — ED ADULT NURSE REASSESSMENT NOTE - NS ED NURSE REASSESS COMMENT FT1
pt is resting comfortably on stretcher, pt Neuro is intact, paper in chart for Neuro cks, pt is A&Ox3, on room air with no signs of distress. pt has slight slurred speech. is coherent. awaiting Repeat VBG, pt has pain in back and lower legs, given Oxy 5MG IR, will re assess pain level. awaiting MD dispo.

## 2018-12-04 NOTE — ED ADULT NURSE REASSESSMENT NOTE - NSIMPLEMENTINTERV_GEN_ALL_ED
Implemented All Fall Risk Interventions:  [place] to call system. Call bell, personal items and telephone within reach. Instruct patient to call for assistance. Room bathroom lighting operational. Non-slip footwear when patient is off stretcher. Physically safe environment: no spills, clutter or unnecessary equipment. Stretcher in lowest position, wheels locked, appropriate side rails in place. Provide visual cue, wrist band, yellow gown, etc. Monitor gait and stability. Monitor for mental status changes and reorient to person, place, and time. Review medications for side effects contributing to fall risk. Reinforce activity limits and safety measures with patient and family.

## 2018-12-04 NOTE — ED PROVIDER NOTE - ATTENDING CONTRIBUTION TO CARE
Dr. Parsons (Attending Physician)  Pt. with ho  shunt for hydrocephalus pw mild confusion and unsteady gait per friend. Alert and oriented in ed, ambulating with shuffling gait but not ataxic.  Pt. has required narcan at [facility] for resp depression and is on opioids, benzos, flexeril, celexa.  Concern for medication induced mental status changes. ECG without ischemic changes.  O2 sat 91% on RA. Intermediate risk for PE will send d-dimer. Will CT head, shunt series, check labs and reassess.

## 2018-12-06 ENCOUNTER — MEDICATION RENEWAL (OUTPATIENT)
Age: 68
End: 2018-12-06

## 2018-12-06 ENCOUNTER — MESSAGE (OUTPATIENT)
Age: 68
End: 2018-12-06

## 2018-12-19 ENCOUNTER — APPOINTMENT (OUTPATIENT)
Dept: SPINE | Facility: CLINIC | Age: 68
End: 2018-12-19

## 2018-12-19 ENCOUNTER — APPOINTMENT (OUTPATIENT)
Dept: INTERNAL MEDICINE | Facility: CLINIC | Age: 68
End: 2018-12-19

## 2019-03-01 ENCOUNTER — RX RENEWAL (OUTPATIENT)
Age: 69
End: 2019-03-01

## 2019-03-03 ENCOUNTER — TRANSCRIPTION ENCOUNTER (OUTPATIENT)
Age: 69
End: 2019-03-03

## 2019-03-15 ENCOUNTER — EMERGENCY (EMERGENCY)
Facility: HOSPITAL | Age: 69
LOS: 1 days | Discharge: HOME | End: 2019-03-15
Attending: EMERGENCY MEDICINE
Payer: MEDICARE

## 2019-03-15 VITALS
SYSTOLIC BLOOD PRESSURE: 155 MMHG | OXYGEN SATURATION: 95 % | RESPIRATION RATE: 17 BRPM | HEART RATE: 84 BPM | DIASTOLIC BLOOD PRESSURE: 106 MMHG

## 2019-03-15 VITALS
OXYGEN SATURATION: 94 % | HEART RATE: 115 BPM | SYSTOLIC BLOOD PRESSURE: 151 MMHG | HEIGHT: 68 IN | RESPIRATION RATE: 17 BRPM | TEMPERATURE: 98 F | WEIGHT: 220.02 LBS | DIASTOLIC BLOOD PRESSURE: 92 MMHG

## 2019-03-15 PROCEDURE — 99283 EMERGENCY DEPT VISIT LOW MDM: CPT | Mod: GC

## 2019-03-15 PROCEDURE — 99283 EMERGENCY DEPT VISIT LOW MDM: CPT

## 2019-03-15 RX ORDER — DIAZEPAM 5 MG
5 TABLET ORAL ONCE
Qty: 0 | Refills: 0 | Status: DISCONTINUED | OUTPATIENT
Start: 2019-03-15 | End: 2019-03-15

## 2019-03-15 RX ORDER — IBUPROFEN 200 MG
1 TABLET ORAL
Qty: 20 | Refills: 0 | OUTPATIENT
Start: 2019-03-15 | End: 2019-03-19

## 2019-03-15 RX ORDER — OXYCODONE HYDROCHLORIDE 5 MG/1
1 TABLET ORAL
Qty: 16 | Refills: 0 | OUTPATIENT
Start: 2019-03-15 | End: 2019-03-18

## 2019-03-15 RX ORDER — OXYCODONE HYDROCHLORIDE 5 MG/1
5 TABLET ORAL ONCE
Qty: 0 | Refills: 0 | Status: DISCONTINUED | OUTPATIENT
Start: 2019-03-15 | End: 2019-03-15

## 2019-03-15 RX ORDER — OXYCODONE AND ACETAMINOPHEN 5; 325 MG/1; MG/1
1 TABLET ORAL ONCE
Qty: 0 | Refills: 0 | Status: DISCONTINUED | OUTPATIENT
Start: 2019-03-15 | End: 2019-03-15

## 2019-03-15 RX ORDER — IBUPROFEN 200 MG
600 TABLET ORAL ONCE
Qty: 0 | Refills: 0 | Status: COMPLETED | OUTPATIENT
Start: 2019-03-15 | End: 2019-03-15

## 2019-03-15 RX ORDER — ACETAMINOPHEN 500 MG
2 TABLET ORAL
Qty: 32 | Refills: 0 | OUTPATIENT
Start: 2019-03-15 | End: 2019-03-18

## 2019-03-15 RX ADMIN — Medication 600 MILLIGRAM(S): at 15:15

## 2019-03-15 RX ADMIN — OXYCODONE HYDROCHLORIDE 5 MILLIGRAM(S): 5 TABLET ORAL at 15:15

## 2019-03-15 RX ADMIN — OXYCODONE AND ACETAMINOPHEN 1 TABLET(S): 5; 325 TABLET ORAL at 15:15

## 2019-03-15 RX ADMIN — Medication 5 MILLIGRAM(S): at 12:58

## 2019-03-15 RX ADMIN — OXYCODONE AND ACETAMINOPHEN 1 TABLET(S): 5; 325 TABLET ORAL at 12:58

## 2019-03-15 NOTE — ED PROVIDER NOTE - NS ED ROS FT
ROS:  GENERAL: No fever, no chills  EYES: no change in vision  HEENT: no trouble swallowing, no trouble speaking  CARDIAC: no chest pain  PULMONARY: no cough, no shortness of breath  GI: no abdominal pain, no nausea, no vomiting, no diarrhea, no constipation  : No dysuria, no frequency, no change in appearance, or odor of urine  SKIN: no rashes  NEURO: no headache, no weakness  MSK: RLE joint pain and bp   ~Nestor Oneal D.O. -Resident

## 2019-03-15 NOTE — ED PROVIDER NOTE - NSFOLLOWUPINSTRUCTIONS_ED_ALL_ED_FT
Chronic Pain    Chronic pain is a complex condition and the Emergency Department is not the ideal place to manage this condition. Prescription painkillers must be written by your primary care provider or a pain management physician. Avoid activities or triggers that exacerbate your pain.    SEEK IMMEDIATE MEDICAL CARE IF YOU HAVE ANY OF THE FOLLOWING SYMPTOMS: severe chest pain, fainting, vomiting blood, dark or bloody stools, or pain different from your chronic pain.    1. TAKE ALL MEDICATIONS AS DIRECTED.    2. FOR PAIN OR FEVER YOU CAN TAKE IBUPROFEN (MOTRIN, ADVIL) OR ACETAMINOPHEN (TYLENOL) AS NEEDED, AS DIRECTED ON PACKAGING.  3. FOLLOW UP WITH YOUR PRIMARY DOCTOR WITHIN 5 DAYS AS DIRECTED.  4. IF YOU HAD LABS OR IMAGING DONE, YOU WERE GIVEN COPIES OF ALL LABS AND/OR IMAGING RESULTS FROM YOUR ER VISIT--PLEASE TAKE THEM WITH YOU TO YOUR FOLLOW UP APPOINTMENTS.  5. IF NEEDED, CALL PATIENT ACCESS SERVICES AT [phone] TO FIND A PRIMARY CARE PHYSICIAN.  OR CALL [phone] TO MAKE AN APPOINTMENT WITH THE CLINIC.  6. RETURN TO THE ER FOR ANY WORSENING SYMPTOMS OR CONCERNS.

## 2019-03-15 NOTE — ED ADULT TRIAGE NOTE - CHIEF COMPLAINT QUOTE
left sided lower back pain radiating down the left leg. had surgery last April on L5 S1. He had an epidural on Wednesday but feels like the pain is worsening. Denies any injury or trauma

## 2019-03-15 NOTE — ED ADULT NURSE NOTE - OBJECTIVE STATEMENT
69 y/o male hx sciatica, chronic back pain, lumbar disc displacement, HTN presents to the ED from home c/o left lower back pain x few weeks progressively worsening today. Pt states had L5, S1 surgery in April, recent steroid injection on Wednesday with no relief in symptoms. "I came in today because I knew I couldn't stand the whole day with this pain." PT states no pain medication works, "Dilaudid only sometimes works and takes the edge off." Denies fever, chills, n/v, weakness, abd pain, diarrhea/constipation, numbness/tingling, urinary s/s. Pt A&Ox3, in no respiratory distress, no CP, abd soft, nontender, nondistended, pulses present, cap refill<2. PT safety maintained with family at bedside, call bell within reach and bed in the lowest position.

## 2019-03-15 NOTE — ED ADULT NURSE NOTE - NSIMPLEMENTINTERV_GEN_ALL_ED
Implemented All Fall Risk Interventions:  [place] to call system. Call bell, personal items and telephone within reach. Instruct patient to call for assistance. Room bathroom lighting operational. Non-slip footwear when patient is off stretcher. Physically safe environment: no spills, clutter or unnecessary equipment. Stretcher in lowest position, wheels locked, appropriate side rails in place. Provide visual cue, wrist band, yellow gown, etc. Monitor gait and stability. Monitor for mental status changes and reorient to person, place, and time. Review medications for side effects contributing to fall risk. Reinforce activity limits and safety measures with patient and family.

## 2019-03-15 NOTE — ED PROVIDER NOTE - NSFOLLOWUPCLINICS_GEN_ALL_ED_FT
[facility]  Orthopedic Surgery  [address], 3rd & 4th [place] [address]  Phone: [phone]  Fax:   Follow Up Time: 1-3 Days    [facility]  Orthopedic Surgery  [address] [address]  Phone: [phone]  Fax:   Follow Up Time: 1-3 Days    [facility]  Orthopedic Surgery  [address] [place] [address]  Phone: [phone]  Fax:   Follow Up Time: 1-3 Days

## 2019-03-15 NOTE — ED PROVIDER NOTE - OBJECTIVE STATEMENT
69yo m pmhx spinal surgery, spinal stimulator, steroid injections pw acute on chronic back pain for the past few weeks. reports back pain has been worse since october and has been seeing pain  for the chronic pain. is not currently on any pain medications at home and came in for pain relief today. No fevers, chills, chest pain, shortness of breath, difficulty ambulating, incontinence, diarrhea, constipation, retaining urine, numbness or tingling in legs. has appt to see spine surgeon on tuesday at [facility]

## 2019-03-15 NOTE — ED PROVIDER NOTE - ATTENDING CONTRIBUTION TO CARE
pt is a 69 y/o male with h/o chronic lower back pain, sciatica known l4-5 laminectomy, fusion s/p surgery, has had prior injections pain mgmt, has an appt with his spinal surgeon scar,  no motor deficits noted, does not radiate down both legs, with no change in bowel or bladder habits, no saddle anesthesia. pt able to ambulate with no red flags such as h/o cancer or concern for epidural abscess.

## 2019-03-15 NOTE — ED PROVIDER NOTE - CLINICAL SUMMARY MEDICAL DECISION MAKING FREE TEXT BOX
See Attending Note See Attending Note    69yo m pmhx spinal surgery, spinal stimulator, steroid injections pw acute on chronic back pain for the past few weeks, less concern for emergent process with no red flags at this time. will give analgesia, reassess followup with surgeon on tuesday

## 2019-03-15 NOTE — ED PROVIDER NOTE - PROGRESS NOTE DETAILS
Patient feels well, tolerating PO. Patient feels comfortable going home. Gave home care and follow up instructions. Discussed which symptoms to look out for and when to return to the ED for further evaluation. Patient given opportunity to ask questions about their medical condition and had all questions answered. will send meds to pharmacy and patient will Follow up with spine surg on tuesday return precautions

## 2019-03-15 NOTE — ED PROVIDER NOTE - PHYSICAL EXAMINATION
Physical Exam:  Gen: NAD, AOx3, non-toxic appearing, able to ambulate without assistance  Head: NCAT  HEENT: EOMI, PEERLA, normal conjunctiva, tongue midline, oral mucosa moist  Lung: CTAB, no respiratory distress, no wheezes/rhonchi/rales B/L, speaking in full sentences  CV: RRR, no murmurs, rubs or gallops  Abd: soft, NTND, no guarding, no rigidity, no CVA tenderness   MSK: no visible deformities, ROM normal in UE/LE, lumbar back pain at ls joint  Neuro: No focal sensory or motor deficits, no weakness in le, neurovasc intact le's   Skin: Warm, well perfused, no rash  Psych: normal affect, calm  ~Nestor Oneal D.O. -Resident

## 2019-03-15 NOTE — ED ADULT NURSE NOTE - CHPI ED NUR SYMPTOMS NEG
no abrasion/no tingling/no bruising/no difficulty bearing weight/no deformity/no fever/no numbness/no stiffness

## 2019-03-17 NOTE — DISCHARGE NOTE ADULT - REASON FOR ADMISSION
I have reviewed discharge instructions with the patient. The parent verbalized understanding. Patient left ED via Discharge Method: ambulatory to Home with family. Opportunity for questions and clarification provided. Patient given 0 scripts. To continue your aftercare when you leave the hospital, you may receive an automated call from our care team to check in on how you are doing. This is a free service and part of our promise to provide the best care and service to meet your aftercare needs.  If you have questions, or wish to unsubscribe from this service please call [phone]. Thank you for Choosing our [employer] Emergency Department. back pain- acute on chronic

## 2019-03-20 ENCOUNTER — APPOINTMENT (OUTPATIENT)
Dept: ORTHOPEDIC SURGERY | Facility: CLINIC | Age: 69
End: 2019-03-20

## 2019-03-29 ENCOUNTER — EMERGENCY (EMERGENCY)
Facility: HOSPITAL | Age: 69
LOS: 1 days | Discharge: HOME | End: 2019-03-29

## 2019-03-29 VITALS
TEMPERATURE: 99 F | HEART RATE: 88 BPM | RESPIRATION RATE: 19 BRPM | HEIGHT: 68 IN | OXYGEN SATURATION: 99 % | WEIGHT: 220.02 LBS | SYSTOLIC BLOOD PRESSURE: 159 MMHG | DIASTOLIC BLOOD PRESSURE: 100 MMHG

## 2019-04-04 ENCOUNTER — APPOINTMENT (OUTPATIENT)
Dept: INTERNAL MEDICINE | Facility: CLINIC | Age: 69
End: 2019-04-04
Payer: MEDICARE

## 2019-04-04 VITALS
DIASTOLIC BLOOD PRESSURE: 78 MMHG | BODY MASS INDEX: 34.53 KG/M2 | HEIGHT: 67 IN | SYSTOLIC BLOOD PRESSURE: 166 MMHG | HEART RATE: 72 BPM | WEIGHT: 220 LBS

## 2019-04-04 DIAGNOSIS — Z00.00 ENCOUNTER FOR GENERAL ADULT MEDICAL EXAMINATION W/OUT ABNORMAL FINDINGS: ICD-10-CM

## 2019-04-04 PROCEDURE — G0439: CPT | Mod: GA

## 2019-04-04 PROCEDURE — 36415 COLL VENOUS BLD VENIPUNCTURE: CPT

## 2019-04-04 RX ORDER — OXYCODONE AND ACETAMINOPHEN 5; 325 MG/1; MG/1
5-325 TABLET ORAL
Qty: 120 | Refills: 0 | Status: DISCONTINUED | COMMUNITY
Start: 2018-09-12 | End: 2019-04-04

## 2019-04-04 RX ORDER — FENTANYL 100 UG/H
100 PATCH, EXTENDED RELEASE TRANSDERMAL
Qty: 10 | Refills: 0 | Status: DISCONTINUED | COMMUNITY
Start: 2018-11-14 | End: 2019-04-04

## 2019-04-04 RX ORDER — HYDROMORPHONE HYDROCHLORIDE 8 MG/1
8 TABLET ORAL
Qty: 120 | Refills: 0 | Status: DISCONTINUED | COMMUNITY
Start: 2018-11-14 | End: 2019-04-04

## 2019-04-04 RX ORDER — CYCLOBENZAPRINE HYDROCHLORIDE 10 MG/1
10 TABLET, FILM COATED ORAL 3 TIMES DAILY
Qty: 90 | Refills: 1 | Status: DISCONTINUED | COMMUNITY
Start: 2018-11-14 | End: 2019-04-04

## 2019-04-04 RX ORDER — OXYCODONE AND ACETAMINOPHEN 5; 325 MG/1; MG/1
5-325 TABLET ORAL EVERY 4 HOURS
Qty: 60 | Refills: 0 | Status: DISCONTINUED | COMMUNITY
Start: 2018-12-06 | End: 2019-04-04

## 2019-04-04 RX ORDER — OXYCODONE HYDROCHLORIDE 40 MG/1
40 TABLET, EXTENDED RELEASE ORAL TWICE DAILY
Qty: 60 | Refills: 0 | Status: DISCONTINUED | COMMUNITY
Start: 2018-11-14 | End: 2019-04-04

## 2019-04-04 RX ORDER — CEPHALEXIN 500 MG/1
500 CAPSULE ORAL 3 TIMES DAILY
Qty: 21 | Refills: 0 | Status: DISCONTINUED | COMMUNITY
Start: 2018-07-20 | End: 2019-04-04

## 2019-04-04 RX ORDER — POLYETHYLENE GLYCOL 3350 17 G/17G
17 POWDER, FOR SOLUTION ORAL TWICE DAILY
Qty: 1 | Refills: 3 | Status: DISCONTINUED | COMMUNITY
Start: 2018-11-14 | End: 2019-04-04

## 2019-04-04 NOTE — PLAN
[FreeTextEntry1] : Patient still in severe pain, really not much better after being under the care of multiple pain management specialist in the past.  \par \par Most recently, was taking oxycodone q4h as needed with moderate relief until that stopped several days ago in which his pain management specialist advised patient that there is nothing else he can do for him. \par \par Patient continues not to be following with his doctors in [place]. Informed him that he truly must be following up with somebody who is able to follow up on this as its dangerous to ignore follow up especially since he may have an upcoming MRI, the shunt may need adjustment.\par \par -Advised will start Celebrex BID\par -Continue taking Tylenol 650mg q6hrs\par -Stop Percocet and Start Oxycodone 5mg q4hrs as needed\par -Will renew Medical marijuana certification, will start with 10:1 cbd:thc ratio \par -Still suspect that he has a strong component of depression compounding his pain. He is following up with Dr. Bhandari for many years, no change in medication regimen therefore advised Teto to seek second opinion as can be the route of his issues.\par -Follow-up in 1 month \par \par I had a long talk with Teto and his  expressing my concerns that he has expended most of his options. Suspect his pain may be refractory and there may not a lot we could do for him. \par Preestablished goal of possibly decreasing his pain by 50% but he would probably not be pain free. \par \par Spent > 60 minutes in direct patient care and and addressed all questions and concerns. >50% of this time was in direct face to face contact with patient during exam and counseling. \par \par all questions and concerns addressed with patient in detail.  patient verbalized back instructions in his own words.\par \par

## 2019-04-04 NOTE — HEALTH RISK ASSESSMENT
[Good] : ~his/her~ current health as good [No falls in past year] : Patient reported no falls in the past year [1] : 1) Little interest or pleasure doing things for several days (1) [0] : 2) Feeling down, depressed, or hopeless: Not at all (0) [Poor] : ~his/her~ mood as  poor [FreeTextEntry1] : back/leg pain  [] : No [[id number]] : 1 [Alone] : lives alone [Retired] : retired [Sexually Active] : not sexually active

## 2019-04-04 NOTE — PHYSICAL EXAM
[Normal] : no joint swelling, grossly normal strength/tone [de-identified] : in wheelchair, refusing to get on table for full exam  [de-identified] : unable to fully assess  [de-identified] : depressed mood with flat affect

## 2019-04-04 NOTE — HISTORY OF PRESENT ILLNESS
[Friend] : friend [de-identified] : here for follow-up and to establish care with myself\par 69 y/o male with h/o multiple spinal surgeries, chronic pain refractory to multiple medications(seen by multiple specialists in the past), depression presents for follow-up.  states he has a long history of back/leg pain in which seems to do uncontrolled despite having tried opioids, nsaids, steroids and neuroleptics. in addition has tried acupuncture, physical therapy with no relief.  states  he has seen 5 pain management specialist in the past in which have given medication and procedures that seem to work for a period of time until it stops.  states the pain has stopped him from performing his daily activities.  currently has been taking ibuprofen, tylenol and oxycodone daily with mild relief.  no other acute complaints besides the pain.

## 2019-04-05 ENCOUNTER — RX CHANGE (OUTPATIENT)
Age: 69
End: 2019-04-05

## 2019-04-07 LAB
25(OH)D3 SERPL-MCNC: 57.1 NG/ML
ALBUMIN SERPL ELPH-MCNC: 4.6 G/DL
ALP BLD-CCNC: 66 U/L
ALT SERPL-CCNC: 16 U/L
ANION GAP SERPL CALC-SCNC: 13 MMOL/L
AST SERPL-CCNC: 14 U/L
BASOPHILS # BLD AUTO: 0.07 K/UL
BASOPHILS NFR BLD AUTO: 0.6 %
BILIRUB SERPL-MCNC: 0.5 MG/DL
BUN SERPL-MCNC: 18 MG/DL
CALCIUM SERPL-MCNC: 10.2 MG/DL
CHLORIDE SERPL-SCNC: 103 MMOL/L
CHOLEST SERPL-MCNC: 222 MG/DL
CHOLEST/HDLC SERPL: 4.4 RATIO
CO2 SERPL-SCNC: 28 MMOL/L
CREAT SERPL-MCNC: 1.11 MG/DL
EOSINOPHIL # BLD AUTO: 0.11 K/UL
EOSINOPHIL NFR BLD AUTO: 1 %
ESTIMATED AVERAGE GLUCOSE: 117 MG/DL
FOLATE SERPL-MCNC: >20 NG/ML
GLUCOSE SERPL-MCNC: 88 MG/DL
HBA1C MFR BLD HPLC: 5.7 %
HCT VFR BLD CALC: 44.5 %
HDLC SERPL-MCNC: 50 MG/DL
HGB BLD-MCNC: 14.6 G/DL
IMM GRANULOCYTES NFR BLD AUTO: 1.2 %
LDLC SERPL CALC-MCNC: 129 MG/DL
LYMPHOCYTES # BLD AUTO: 1.41 K/UL
LYMPHOCYTES NFR BLD AUTO: 12.3 %
MAN DIFF?: NORMAL
MCHC RBC-ENTMCNC: 30.5 PG
MCHC RBC-ENTMCNC: 32.8 GM/DL
MCV RBC AUTO: 92.9 FL
MONOCYTES # BLD AUTO: 0.89 K/UL
MONOCYTES NFR BLD AUTO: 7.8 %
NEUTROPHILS # BLD AUTO: 8.84 K/UL
NEUTROPHILS NFR BLD AUTO: 77.1 %
PLATELET # BLD AUTO: 252 K/UL
POTASSIUM SERPL-SCNC: 4.5 MMOL/L
PROT SERPL-MCNC: 7.3 G/DL
RBC # BLD: 4.79 M/UL
RBC # FLD: 13.8 %
SAVE SPECIMEN: NORMAL
SODIUM SERPL-SCNC: 144 MMOL/L
T4 SERPL-MCNC: 5.6 UG/DL
TRIGL SERPL-MCNC: 213 MG/DL
TSH SERPL-ACNC: 1.3 UIU/ML
VIT B12 SERPL-MCNC: 906 PG/ML
WBC # FLD AUTO: 11.46 K/UL

## 2019-04-10 ENCOUNTER — APPOINTMENT (OUTPATIENT)
Dept: ORTHOPEDIC SURGERY | Facility: HOSPITAL | Age: 69
End: 2019-04-10

## 2019-04-10 ENCOUNTER — OUTPATIENT (OUTPATIENT)
Dept: OUTPATIENT SERVICES | Facility: HOSPITAL | Age: 69
LOS: 1 days | End: 2019-04-10
Payer: MEDICARE

## 2019-04-10 VITALS
SYSTOLIC BLOOD PRESSURE: 184 MMHG | HEIGHT: 67 IN | WEIGHT: 220 LBS | HEART RATE: 103 BPM | BODY MASS INDEX: 34.53 KG/M2 | DIASTOLIC BLOOD PRESSURE: 116 MMHG

## 2019-04-10 DIAGNOSIS — Z80.1 FAMILY HISTORY OF MALIGNANT NEOPLASM OF TRACHEA, BRONCHUS AND LUNG: ICD-10-CM

## 2019-04-10 DIAGNOSIS — M54.89 OTHER DORSALGIA: ICD-10-CM

## 2019-04-10 DIAGNOSIS — Z84.1 FAMILY HISTORY OF DISORDERS OF KIDNEY AND URETER: ICD-10-CM

## 2019-04-10 PROCEDURE — G0463: CPT

## 2019-04-11 DIAGNOSIS — Z84.1 FAMILY HISTORY OF DISORDERS OF KIDNEY AND URETER: ICD-10-CM

## 2019-04-11 DIAGNOSIS — Z80.1 FAMILY HISTORY OF MALIGNANT NEOPLASM OF TRACHEA, BRONCHUS AND LUNG: ICD-10-CM

## 2019-04-11 DIAGNOSIS — M54.9 DORSALGIA, UNSPECIFIED: ICD-10-CM

## 2019-04-11 DIAGNOSIS — M89.29 OTHER DISORDERS OF BONE DEVELOPMENT AND GROWTH, MULTIPLE SITES: ICD-10-CM

## 2019-04-19 ENCOUNTER — APPOINTMENT (OUTPATIENT)
Dept: INTERNAL MEDICINE | Facility: CLINIC | Age: 69
End: 2019-04-19
Payer: MEDICARE

## 2019-04-19 ENCOUNTER — INPATIENT (INPATIENT)
Facility: HOSPITAL | Age: 69
LOS: 6 days | Discharge: NURSING, REHAB OR LONG-TERM CARE | End: 2019-04-26
Attending: HOSPITALIST | Admitting: HOSPITALIST
Payer: MEDICARE

## 2019-04-19 VITALS
TEMPERATURE: 98 F | HEART RATE: 103 BPM | OXYGEN SATURATION: 100 % | RESPIRATION RATE: 18 BRPM | DIASTOLIC BLOOD PRESSURE: 68 MMHG | SYSTOLIC BLOOD PRESSURE: 140 MMHG

## 2019-04-19 VITALS
SYSTOLIC BLOOD PRESSURE: 160 MMHG | HEIGHT: 67 IN | HEART RATE: 89 BPM | DIASTOLIC BLOOD PRESSURE: 100 MMHG | BODY MASS INDEX: 34.53 KG/M2 | WEIGHT: 220 LBS

## 2019-04-19 DIAGNOSIS — F32.9 MAJOR DEPRESSIVE DISORDER, SINGLE EPISODE, UNSPECIFIED: ICD-10-CM

## 2019-04-19 DIAGNOSIS — I10 ESSENTIAL (PRIMARY) HYPERTENSION: ICD-10-CM

## 2019-04-19 DIAGNOSIS — M54.16 RADICULOPATHY, LUMBAR REGION: ICD-10-CM

## 2019-04-19 DIAGNOSIS — Z29.9 ENCOUNTER FOR PROPHYLACTIC MEASURES, UNSPECIFIED: ICD-10-CM

## 2019-04-19 DIAGNOSIS — G91.2 (IDIOPATHIC) NORMAL PRESSURE HYDROCEPHALUS: ICD-10-CM

## 2019-04-19 DIAGNOSIS — M54.9 DORSALGIA, UNSPECIFIED: ICD-10-CM

## 2019-04-19 LAB
ALBUMIN SERPL ELPH-MCNC: 4.2 G/DL — SIGNIFICANT CHANGE UP (ref 3.3–5)
ALP SERPL-CCNC: 48 U/L — SIGNIFICANT CHANGE UP (ref 40–120)
ALT FLD-CCNC: 11 U/L — SIGNIFICANT CHANGE UP (ref 4–41)
ANION GAP SERPL CALC-SCNC: 13 MMO/L — SIGNIFICANT CHANGE UP (ref 7–14)
AST SERPL-CCNC: 11 U/L — SIGNIFICANT CHANGE UP (ref 4–40)
BASOPHILS # BLD AUTO: 0.06 K/UL — SIGNIFICANT CHANGE UP (ref 0–0.2)
BASOPHILS NFR BLD AUTO: 0.5 % — SIGNIFICANT CHANGE UP (ref 0–2)
BILIRUB SERPL-MCNC: 0.8 MG/DL — SIGNIFICANT CHANGE UP (ref 0.2–1.2)
BUN SERPL-MCNC: 21 MG/DL — SIGNIFICANT CHANGE UP (ref 7–23)
CALCIUM SERPL-MCNC: 9.6 MG/DL — SIGNIFICANT CHANGE UP (ref 8.4–10.5)
CHLORIDE SERPL-SCNC: 101 MMOL/L — SIGNIFICANT CHANGE UP (ref 98–107)
CO2 SERPL-SCNC: 27 MMOL/L — SIGNIFICANT CHANGE UP (ref 22–31)
CREAT SERPL-MCNC: 1.06 MG/DL — SIGNIFICANT CHANGE UP (ref 0.5–1.3)
EOSINOPHIL # BLD AUTO: 0.13 K/UL — SIGNIFICANT CHANGE UP (ref 0–0.5)
EOSINOPHIL NFR BLD AUTO: 1.2 % — SIGNIFICANT CHANGE UP (ref 0–6)
ERYTHROCYTE [SEDIMENTATION RATE] IN BLOOD: 3 MM/HR — SIGNIFICANT CHANGE UP (ref 1–15)
GLUCOSE SERPL-MCNC: 81 MG/DL — SIGNIFICANT CHANGE UP (ref 70–99)
HCT VFR BLD CALC: 43.8 % — SIGNIFICANT CHANGE UP (ref 39–50)
HGB BLD-MCNC: 14.9 G/DL — SIGNIFICANT CHANGE UP (ref 13–17)
IMM GRANULOCYTES NFR BLD AUTO: 2.3 % — HIGH (ref 0–1.5)
LYMPHOCYTES # BLD AUTO: 1.8 K/UL — SIGNIFICANT CHANGE UP (ref 1–3.3)
LYMPHOCYTES # BLD AUTO: 16.4 % — SIGNIFICANT CHANGE UP (ref 13–44)
MCHC RBC-ENTMCNC: 30.7 PG — SIGNIFICANT CHANGE UP (ref 27–34)
MCHC RBC-ENTMCNC: 34 % — SIGNIFICANT CHANGE UP (ref 32–36)
MCV RBC AUTO: 90.1 FL — SIGNIFICANT CHANGE UP (ref 80–100)
MONOCYTES # BLD AUTO: 0.99 K/UL — HIGH (ref 0–0.9)
MONOCYTES NFR BLD AUTO: 9 % — SIGNIFICANT CHANGE UP (ref 2–14)
NEUTROPHILS # BLD AUTO: 7.74 K/UL — HIGH (ref 1.8–7.4)
NEUTROPHILS NFR BLD AUTO: 70.6 % — SIGNIFICANT CHANGE UP (ref 43–77)
NRBC # FLD: 0 K/UL — SIGNIFICANT CHANGE UP (ref 0–0)
PLATELET # BLD AUTO: 239 K/UL — SIGNIFICANT CHANGE UP (ref 150–400)
PMV BLD: 9.7 FL — SIGNIFICANT CHANGE UP (ref 7–13)
POTASSIUM SERPL-MCNC: 3.5 MMOL/L — SIGNIFICANT CHANGE UP (ref 3.5–5.3)
POTASSIUM SERPL-SCNC: 3.5 MMOL/L — SIGNIFICANT CHANGE UP (ref 3.5–5.3)
PROT SERPL-MCNC: 7 G/DL — SIGNIFICANT CHANGE UP (ref 6–8.3)
RBC # BLD: 4.86 M/UL — SIGNIFICANT CHANGE UP (ref 4.2–5.8)
RBC # FLD: 14 % — SIGNIFICANT CHANGE UP (ref 10.3–14.5)
REVIEW TO FOLLOW: YES — SIGNIFICANT CHANGE UP
SODIUM SERPL-SCNC: 141 MMOL/L — SIGNIFICANT CHANGE UP (ref 135–145)
WBC # BLD: 10.97 K/UL — HIGH (ref 3.8–10.5)
WBC # FLD AUTO: 10.97 K/UL — HIGH (ref 3.8–10.5)

## 2019-04-19 PROCEDURE — 72131 CT LUMBAR SPINE W/O DYE: CPT | Mod: 26

## 2019-04-19 PROCEDURE — 99223 1ST HOSP IP/OBS HIGH 75: CPT | Mod: AI

## 2019-04-19 PROCEDURE — 99213 OFFICE O/P EST LOW 20 MIN: CPT | Mod: PD

## 2019-04-19 RX ORDER — CELECOXIB 200 MG/1
200 CAPSULE ORAL TWICE DAILY
Qty: 60 | Refills: 0 | Status: DISCONTINUED | COMMUNITY
Start: 2019-04-04 | End: 2019-04-19

## 2019-04-19 RX ORDER — ZOLPIDEM TARTRATE 10 MG/1
5 TABLET ORAL AT BEDTIME
Qty: 0 | Refills: 0 | Status: DISCONTINUED | OUTPATIENT
Start: 2019-04-19 | End: 2019-04-25

## 2019-04-19 RX ORDER — OXYCODONE 5 MG/1
5 TABLET ORAL
Qty: 120 | Refills: 0 | Status: DISCONTINUED | COMMUNITY
Start: 2019-04-04 | End: 2019-04-19

## 2019-04-19 RX ORDER — CHOLECALCIFEROL (VITAMIN D3) 125 MCG
1 CAPSULE ORAL
Qty: 0 | Refills: 0 | COMMUNITY

## 2019-04-19 RX ORDER — HYDROMORPHONE HYDROCHLORIDE 2 MG/ML
1 INJECTION INTRAMUSCULAR; INTRAVENOUS; SUBCUTANEOUS ONCE
Qty: 0 | Refills: 0 | Status: DISCONTINUED | OUTPATIENT
Start: 2019-04-19 | End: 2019-04-19

## 2019-04-19 RX ORDER — HYDROMORPHONE HYDROCHLORIDE 2 MG/ML
2 INJECTION INTRAMUSCULAR; INTRAVENOUS; SUBCUTANEOUS ONCE
Qty: 0 | Refills: 0 | Status: DISCONTINUED | OUTPATIENT
Start: 2019-04-19 | End: 2019-04-19

## 2019-04-19 RX ORDER — KETOROLAC TROMETHAMINE 30 MG/ML
15 SYRINGE (ML) INJECTION ONCE
Qty: 0 | Refills: 0 | Status: DISCONTINUED | OUTPATIENT
Start: 2019-04-19 | End: 2019-04-19

## 2019-04-19 RX ORDER — LOSARTAN POTASSIUM 100 MG/1
100 TABLET, FILM COATED ORAL DAILY
Qty: 0 | Refills: 0 | Status: DISCONTINUED | OUTPATIENT
Start: 2019-04-19 | End: 2019-04-26

## 2019-04-19 RX ORDER — HYDROMORPHONE HYDROCHLORIDE 2 MG/ML
1 INJECTION INTRAMUSCULAR; INTRAVENOUS; SUBCUTANEOUS EVERY 4 HOURS
Qty: 0 | Refills: 0 | Status: DISCONTINUED | OUTPATIENT
Start: 2019-04-19 | End: 2019-04-24

## 2019-04-19 RX ORDER — ZOLPIDEM TARTRATE 10 MG/1
1 TABLET ORAL
Qty: 0 | Refills: 0 | COMMUNITY

## 2019-04-19 RX ORDER — PREGABALIN 225 MG/1
1 CAPSULE ORAL
Qty: 0 | Refills: 0 | COMMUNITY

## 2019-04-19 RX ORDER — CITALOPRAM 10 MG/1
40 TABLET, FILM COATED ORAL DAILY
Qty: 0 | Refills: 0 | Status: DISCONTINUED | OUTPATIENT
Start: 2019-04-19 | End: 2019-04-26

## 2019-04-19 RX ORDER — VERAPAMIL HCL 240 MG
360 CAPSULE, EXTENDED RELEASE PELLETS 24 HR ORAL DAILY
Qty: 0 | Refills: 0 | Status: DISCONTINUED | OUTPATIENT
Start: 2019-04-19 | End: 2019-04-20

## 2019-04-19 RX ORDER — ALPRAZOLAM 0.25 MG
1 TABLET ORAL ONCE
Qty: 0 | Refills: 0 | Status: DISCONTINUED | OUTPATIENT
Start: 2019-04-19 | End: 2019-04-19

## 2019-04-19 RX ORDER — ENOXAPARIN SODIUM 100 MG/ML
40 INJECTION SUBCUTANEOUS EVERY 24 HOURS
Qty: 0 | Refills: 0 | Status: DISCONTINUED | OUTPATIENT
Start: 2019-04-19 | End: 2019-04-26

## 2019-04-19 RX ADMIN — HYDROMORPHONE HYDROCHLORIDE 2 MILLIGRAM(S): 2 INJECTION INTRAMUSCULAR; INTRAVENOUS; SUBCUTANEOUS at 16:23

## 2019-04-19 RX ADMIN — Medication 1 MILLIGRAM(S): at 18:28

## 2019-04-19 RX ADMIN — Medication 15 MILLIGRAM(S): at 16:09

## 2019-04-19 RX ADMIN — HYDROMORPHONE HYDROCHLORIDE 1 MILLIGRAM(S): 2 INJECTION INTRAMUSCULAR; INTRAVENOUS; SUBCUTANEOUS at 19:33

## 2019-04-19 RX ADMIN — HYDROMORPHONE HYDROCHLORIDE 2 MILLIGRAM(S): 2 INJECTION INTRAMUSCULAR; INTRAVENOUS; SUBCUTANEOUS at 16:38

## 2019-04-19 RX ADMIN — Medication 15 MILLIGRAM(S): at 14:06

## 2019-04-19 RX ADMIN — HYDROMORPHONE HYDROCHLORIDE 1 MILLIGRAM(S): 2 INJECTION INTRAMUSCULAR; INTRAVENOUS; SUBCUTANEOUS at 16:09

## 2019-04-19 RX ADMIN — ZOLPIDEM TARTRATE 5 MILLIGRAM(S): 10 TABLET ORAL at 23:08

## 2019-04-19 RX ADMIN — HYDROMORPHONE HYDROCHLORIDE 1 MILLIGRAM(S): 2 INJECTION INTRAMUSCULAR; INTRAVENOUS; SUBCUTANEOUS at 14:06

## 2019-04-19 RX ADMIN — HYDROMORPHONE HYDROCHLORIDE 1 MILLIGRAM(S): 2 INJECTION INTRAMUSCULAR; INTRAVENOUS; SUBCUTANEOUS at 19:48

## 2019-04-19 RX ADMIN — ENOXAPARIN SODIUM 40 MILLIGRAM(S): 100 INJECTION SUBCUTANEOUS at 19:33

## 2019-04-19 NOTE — H&P ADULT - NSHPREVIEWOFSYSTEMS_GEN_ALL_CORE
Review of Systems:   CONSTITUTIONAL: No fever, weight loss, or fatigue  EYES: No eye pain, visual disturbances, or discharge  ENMT:  No difficulty hearing, tinnitus, vertigo; No sinus or throat pain  NECK: No pain or stiffness  BREASTS: No pain, masses, or nipple discharge  RESPIRATORY: No cough, wheezing, chills or hemoptysis; No shortness of breath  CARDIOVASCULAR: No chest pain, palpitations, dizziness, or leg swelling  GASTROINTESTINAL: No abdominal or epigastric pain. No nausea, vomiting, or hematemesis; No diarrhea or constipation. No melena or hematochezia.  GENITOURINARY: No dysuria, frequency, hematuria, or incontinence  NEUROLOGICAL: No headaches, memory loss, loss of strength, numbness, or tremors  SKIN: No itching, burning, rashes, or lesions   LYMPH NODES: No enlarged glands  ENDOCRINE: No heat or cold intolerance; No hair loss  MUSCULOSKELETAL: No joint pain or swelling; + back pain with leg weakness   PSYCHIATRIC: No depression, anxiety, mood swings, or difficulty sleeping  HEME/LYMPH: No easy bruising, or bleeding gums  ALLERY AND IMMUNOLOGIC: No hives or eczema

## 2019-04-19 NOTE — H&P ADULT - NSHPPHYSICALEXAM_GEN_ALL_CORE
PHYSICAL EXAM:  GENERAL: NAD, well-developed  HEAD:  Atraumatic, Normocephalic  EYES: EOMI, PERRLA, conjunctiva and sclera clear  NECK: Supple, No JVD  CHEST/LUNG: Clear to auscultation bilaterally; No wheeze  HEART: Regular rate and rhythm; No murmurs, rubs, or gallops  ABDOMEN: Soft, Nontender, Nondistended; Bowel sounds present  EXTREMITIES:  2+ Peripheral Pulses, No clubbing, cyanosis, or edema  PSYCH: AAOx3  NEUROLOGY: non-focal, strength 5/5 in both legs   SKIN: No rashes or lesions

## 2019-04-19 NOTE — H&P ADULT - HISTORY OF PRESENT ILLNESS
68M w/ chronic lower back pain, VPS for NPH and multiple back surgeries by different surgeons in different hospitals presents to the ED w/ worsening back pain and difficulty ambulating. He states the pain in left lower back radiating to the left leg with weakness. He states that he was seen a few days ago at [place] and CT scan showed compression fracture fo L1. However, his pain was slightly lower down around L5. He has had work done on L5 in the past and had issues w/ the screws there. However, he was told this time that the area near L5 appeared wnl.  Was given flexeril and medrol but no significant improvement in pain. [facility] a [facility]. Pt reports he was told he had loosening of screws from previous surgery. Pt also reports MRI from March, surgeon [facility] said there was nothing to do. Pt reports he is bedbound at home and depends on the help from his friend. No fever/chills/cp/sob/dizziness/fall/trauma.   While in ER, CT lumber showed extensive postop changes and increased erosion of the superior endplate of S1. s/p eval by neurosx, no sx intervention.

## 2019-04-19 NOTE — ED PROVIDER NOTE - CLINICAL SUMMARY MEDICAL DECISION MAKING FREE TEXT BOX
67 y/o M w/ lower back pain and difficulty ambulating. Will check CT L spine, cbc, cmp, esr. Give dilaudid and toradol and re assess.

## 2019-04-19 NOTE — ED PROVIDER NOTE - OBJECTIVE STATEMENT
69 y/o M w/ chronic lower back pain and multiple back surgeries who presents to the ED w/ worsening back pain and difficulty ambulating. He states that he was seen a few days ago at [place] and CT scan showed compression fracture fo L1. However, his pain was slightly lower down around L5. He has had work done on L5 in the past and had issues w/ the screws there. However, he was told this time that the area near L5 appeared wnl. Does endorse difficulty initiating urine, but no incontinence. Was given flexeril and medrol but no significant improvement in pain.

## 2019-04-19 NOTE — ED ADULT NURSE NOTE - OBJECTIVE STATEMENT
Pt presents to rm 22, A&Ox4, ambulatory w/o assistance at baseline, pmhx of lumbar fusion, HTN, hydrocephalus, here for evaluation of worsening back pain x9 days, pt states he had a CT done at [place] x2days ago. Denies chest pain, shortness of breath, palpitations, diaphoresis, headaches, fevers, dizziness, nausea, vomiting or diarrhea  at this time. Pt states having some difficulty urinating and having a BM. Warm blanket provided, bed in lowest position, side rails up x2, MD evaluation in progress. Will continue to monitor. Pt presents to rm 22, A&Ox4, ambulatory w/o assistance at baseline, pmhx of lumbar fusion, HTN, hydrocephalus, depression, here for evaluation of worsening back pain x9 days, pt states he had a CT done at [place] x2days ago. Pt states having taken his PO meds of prednisone, flexeril and tylenol w/o relief. Also c/o of left leg numbness and weakness. Pt denies SI and HI at this time. Denies chest pain, shortness of breath, palpitations, diaphoresis, headaches, fevers, dizziness, nausea, vomiting or diarrhea at this time. Pt states having some difficulty urinating and having a BM. IV established in left AC with a 18G, labs drawn and sent, call bell in reach, warm blanket provided, bed in lowest position, side rails up x2, MD evaluation in progress. Will continue to monitor.

## 2019-04-19 NOTE — H&P ADULT - ASSESSMENT
68M w/ chronic lower back pain, VPS for NPH and multiple back surgeries by different surgeons in different hospitals presents to the ED w/ worsening back pain and difficulty ambulating

## 2019-04-19 NOTE — H&P ADULT - NSICDXPASTMEDICALHX_GEN_ALL_CORE_FT
PAST MEDICAL HISTORY:  Anxiety     Chronic back pain greater than 3 months duration     Depression     HTN (Hypertension)     Insomnia     Lumbar disc displacement without myelopathy     NPH (normal pressure hydrocephalus)     Sciatica     Small intestine disorder "ilitis"   steroids   1967    Vertebral Sciatica

## 2019-04-19 NOTE — PLAN
[FreeTextEntry1] : 69 y/o male with h/o multiple spinal surgeries, chronic pain refractory to multiple medications(seen by multiple specialists in the past), depression presents for follow-up.  states he has a long history of back/leg pain in which seems to do uncontrolled despite having tried opioids, nsaids, steroids and neuroleptics. in addition has tried acupuncture, physical therapy with no relief.  states  he has seen 5 pain management specialist in the past in which have given medication and procedures that seem to work for a period of time until it stops.  states the pain has stopped him from performing his daily activities.  currently has been taking ibuprofen, tylenol and oxycodone daily with mild relief.  no other acute complaints besides the pain.\par \par was seen 2 weeks here and was given celebrex and medical marijuana with no relief.\par \par states the pain is severe which prompted him to go to ED at [facility] twice this week and KS'ed after getting several round of dilaudid IV.  states that is the only thing that controls the pain\par \par presents today requesting to go to ED associated with [facility], as unable to ambulate due to the severe pain.\par \par transfer center was called for transfer to ED\par \par

## 2019-04-19 NOTE — ED ADULT NURSE REASSESSMENT NOTE - NS ED NURSE REASSESS COMMENT FT1
handoff received from day RN- pt currently awake, a/ox3, vitals as noted in NAD- pt c/o lower left back pain, EDMD aware- will continue to monitor pt

## 2019-04-19 NOTE — PHYSICAL EXAM
[Normal] : no joint swelling, grossly normal strength/tone [de-identified] : unable to fully assess  [de-identified] : in wheelchair, refusing to get on table for full exam  [de-identified] : depressed mood with flat affect

## 2019-04-19 NOTE — ED PROVIDER NOTE - NS ED ROS FT
CONSTITUTIONAL: No weakness, fevers or chills  EYES/ENT: No visual changes, no throat pain   RESPIRATORY: No cough, wheezing, hemoptysis; No shortness of breath  CARDIOVASCULAR: No chest pain or palpitations  GASTROINTESTINAL: No abdominal pain, nausea, vomiting, or hematemesis; No diarrhea or constipation. No melena or hematochezia.  GENITOURINARY: No dysuria, frequency or hematuria  NEUROLOGICAL: No dizziness, numbness, or weakness  MUSCULOSKELETAL: no joint pain, stiffness  HEME/ENDO: no easy bleeding or bruising, no weight loss or gain  SKIN: No itching, burning, rashes, or lesions   All other review of systems is negative unless indicated above.

## 2019-04-19 NOTE — ED ADULT NURSE REASSESSMENT NOTE - NS ED NURSE REASSESS COMMENT FT1
pt states that he has had suicidal thoughts in the past when his pain is elevated- pt currently denies any SI/HI- has psychiatrist outpatient - EDMD aware of condition

## 2019-04-19 NOTE — PATIENT PROFILE ADULT - NSPROCHRONICPAINAGGRAVATE_GEN_A_NUR
Refills sent for spironolactone and lisinopril    Last Refill: 03/14/17  LOV: 03/23/17    Refills sent for 6 months.    activity

## 2019-04-19 NOTE — ED PROVIDER NOTE - ATTENDING CONTRIBUTION TO CARE
Dr. Ware:  I have personally performed a face to face bedside history and physical examination of this patient. I have discussed the history, examination, review of systems, assessment and plan of management with the resident. I have reviewed the electronic medical record and amended it to reflect my history, review of systems, physical exam, assessment and plan.    68M h/o multiple back surgeries present with worsening back pain over past week.  Was recently seen at [place] and had CT showing compression fracture of lumbar spine, sent home with steroids and muscle relaxant but worsening pain.  Difficulty urinating at times, mild numbness LLE, denies weakness/incontinence.    Exam:  - nad  - rrr  - ctab  - abd soft ntnd  - 4/5 LLE strength, 5/5 RLE    A/P  - back pain, compression fracture  - basic labs  - CT L-spine  - pain control, reassess

## 2019-04-19 NOTE — ED PROVIDER NOTE - PHYSICAL EXAMINATION
GENERAL: no acute distress  HEENT: NC/AT, EOMI, neck supple, MMM  RESPIRATORY: LCTAB/L, no rhonchi, rales, or wheezing  CARDIOVASCULAR: RRR, no murmurs, gallops, rubs  ABDOMINAL: soft, non-tender, non-distended, positive bowel sounds   EXTREMITIES: no clubbing, cyanosis, or edema  NEUROLOGICAL: alert and oriented x 3, minimal weakness of LLE 4/5; otherwise strength 5/5 throughout; sensory exam wnl throughout  SKIN: no rashes or lesions   PSYCH: normal affect, linear thought process  MUSCULOSKELETAL: no gross joint deformity

## 2019-04-19 NOTE — H&P ADULT - NSICDXPASTSURGICALHX_GEN_ALL_CORE_FT
PAST SURGICAL HISTORY:  Dehiscence, Operation Wound/Debridement infection    S/P Laminectomy L4-L5 2009  complicated by infection requiring  antibiiotcis    S/P lumbar fusion L4-L5 on Oct 2011  pins/screws 2012   spinal surgery 2013

## 2019-04-19 NOTE — CONSULT NOTE ADULT - PROBLEM SELECTOR RECOMMENDATION 9
1. Pain control, if patient manageable can d/c and follow up with Dr. Bhatia pt advised to bring all imaging with him at follow up visit. If Pain no able to be controlled recommend medicine admission for pain control.   2. In setting of chronic radiculopathy since July likely no acute neurosurgical intervention.  Case to be d/w Dr. Bhatia

## 2019-04-19 NOTE — HISTORY OF PRESENT ILLNESS
[Friend] : friend [de-identified] : \par 67 y/o male with h/o multiple spinal surgeries, chronic pain refractory to multiple medications(seen by multiple specialists in the past), depression presents for follow-up.  states he has a long history of back/leg pain in which seems to do uncontrolled despite having tried opioids, nsaids, steroids and neuroleptics. in addition has tried acupuncture, physical therapy with no relief.  states  he has seen 5 pain management specialist in the past in which have given medication and procedures that seem to work for a period of time until it stops.  states the pain has stopped him from performing his daily activities.  currently has been taking ibuprofen, tylenol and oxycodone daily with mild relief.  no other acute complaints besides the pain.

## 2019-04-19 NOTE — CONSULT NOTE ADULT - SUBJECTIVE AND OBJECTIVE BOX
HPI:  67 y/o M w/ chronic lower back pain, VPS and multiple back surgeries who presents to the ED w/ worsening back pain and difficulty ambulating. He states that he was seen a few days ago at [place] and CT scan showed compression fracture fo L1. However, his pain was slightly lower down around L5. He has had work done on L5 in the past and had issues w/ the screws there. However, he was told this time that the area near L5 appeared wnl.  Was given flexeril and medrol but no significant improvement in pain. Last Spine Surgery about a year ago at [facility] and Bone [facility]. Pt reports he was told he had loosening of screws from previous surgery.    PAST MEDICAL & SURGICAL HISTORY:  NPH (normal pressure hydrocephalus)  Chronic back pain greater than 3 months duration  Small intestine disorder: &quot;ilitis&quot;   steroids   1967  Lumbar disc displacement without myelopathy  Sciatica  Anxiety  Vertebral Sciatica  Insomnia  Depression  HTN (Hypertension)  S/P lumbar fusion: L4-L5 on Oct 2011  pins/screws 2012   spinal surgery 2013  Dehiscence, Operation Wound/Debridement: infection  S/P Laminectomy: L4-L5 2009  complicated by infection requiring  antibiiotcis    Allergies    Biaxin (Other)  Lidoderm (Unknown)  morphine (Short breath; Rash)    Intolerances      HYDROmorphone  Injectable 2 milliGRAM(s) IV Push Once    SOCIAL HISTORY:  FAMILY HISTORY:  No pertinent family history in first degree relatives    Vital Signs Last 24 Hrs  T(C): 37.1 (19 Apr 2019 13:13), Max: 37.1 (19 Apr 2019 13:13)  T(F): 98.7 (19 Apr 2019 13:13), Max: 98.7 (19 Apr 2019 13:13)  HR: 76 (19 Apr 2019 13:13) (76 - 103)  BP: 151/91 (19 Apr 2019 13:13) (140/68 - 151/91)  BP(mean): --  RR: 20 (19 Apr 2019 13:13) (18 - 20)  SpO2: 94% (19 Apr 2019 13:13) (94% - 100%)    PHYSICAL EXAM:  Awake Alert Attentive Affect appropriate Ox3  PERRL EOMI    LABS:                          14.9   10.97 )-----------( 239      ( 19 Apr 2019 14:21 )             43.8     04-19    141  |  101  |  21  ----------------------------<  81  3.5   |  27  |  1.06    Ca    9.6      19 Apr 2019 14:21    TPro  7.0  /  Alb  4.2  /  TBili  0.8  /  DBili  x   /  AST  11  /  ALT  11  /  AlkPhos  48  04-19          RADIOLOGY & ADDITIONAL STUDIES:  < from: CT Lumbar Spine No Cont (04.19.19 @ 14:36) >  Impression: Extensive postop changes are again seen.    Increased erosion of the superior endplate of S1 is identifiedwhen   compared with the prior study as described above.    < end of copied text > HPI:  69 y/o M w/ chronic lower back pain, VPS for NPH and multiple back surgeries by different surgeons who presents to the ED w/ worsening back pain and difficulty ambulating. He states that he was seen a few days ago at [place] and CT scan showed compression fracture fo L1. However, his pain was slightly lower down around L5. He has had work done on L5 in the past and had issues w/ the screws there. However, he was told this time that the area near L5 appeared wnl.  Was given flexeril and medrol but no significant improvement in pain. Last Spine Surgery about a year ago at [facility] and [facility]. Pt reports he was told he had loosening of screws from previous surgery. Pt also reports MRI from March, surgeon [facility] said there was nothing to do. Pt does not have MRI with him. Pt still complaining of worsening pain.     PAST MEDICAL & SURGICAL HISTORY:  NPH (normal pressure hydrocephalus)  Chronic back pain greater than 3 months duration  Small intestine disorder: &quot;ilitis&quot;   steroids   1967  Lumbar disc displacement without myelopathy  Sciatica  Anxiety  Vertebral Sciatica  Insomnia  Depression  HTN (Hypertension)  S/P lumbar fusion: L4-L5 on Oct 2011  pins/screws 2012   spinal surgery 2013  Dehiscence, Operation Wound/Debridement: infection  S/P Laminectomy: L4-L5 2009  complicated by infection requiring  antibiiotcis    Allergies    Biaxin (Other)  Lidoderm (Unknown)  morphine (Short breath; Rash)    Intolerances      HYDROmorphone  Injectable 2 milliGRAM(s) IV Push Once    SOCIAL HISTORY:  FAMILY HISTORY:  No pertinent family history in first degree relatives    Vital Signs Last 24 Hrs  T(C): 37.1 (19 Apr 2019 13:13), Max: 37.1 (19 Apr 2019 13:13)  T(F): 98.7 (19 Apr 2019 13:13), Max: 98.7 (19 Apr 2019 13:13)  HR: 76 (19 Apr 2019 13:13) (76 - 103)  BP: 151/91 (19 Apr 2019 13:13) (140/68 - 151/91)  BP(mean): --  RR: 20 (19 Apr 2019 13:13) (18 - 20)  SpO2: 94% (19 Apr 2019 13:13) (94% - 100%)    PHYSICAL EXAM:  Awake Alert Attentive Affect appropriate Ox3  PERRL EOMI  LIMA x 4 with 5/5 strength  SILT    LABS:                          14.9   10.97 )-----------( 239      ( 19 Apr 2019 14:21 )             43.8     04-19    141  |  101  |  21  ----------------------------<  81  3.5   |  27  |  1.06    Ca    9.6      19 Apr 2019 14:21    TPro  7.0  /  Alb  4.2  /  TBili  0.8  /  DBili  x   /  AST  11  /  ALT  11  /  AlkPhos  48  04-19          RADIOLOGY & ADDITIONAL STUDIES:  < from: CT Lumbar Spine No Cont (04.19.19 @ 14:36) >  Impression: Extensive postop changes are again seen.    Increased erosion of the superior endplate of S1 is identifiedwhen   compared with the prior study as described above.    < end of copied text >

## 2019-04-19 NOTE — CONSULT NOTE ADULT - ATTENDING COMMENTS
Patient seen and examined. Agree with the above assessment and plan. Films have been reviewed and multiple surgeons have discussed unlikelihood that any surgery will improve patient's pain at this time. Follow up with pain management as outpatient.

## 2019-04-19 NOTE — ED ADULT TRIAGE NOTE - CHIEF COMPLAINT QUOTE
BIBEMS from MDs office c/o back pain x 3 days. States no relief from PO meds. Pain 10/10. As per pt, has had difficulty ambulating due to pain. Hx: Lumbar fusion, laminectomy. Had a CT done this week that showed a fractured vertebrae

## 2019-04-19 NOTE — H&P ADULT - NSHPLABSRESULTS_GEN_ALL_CORE
14.9   10.97 )-----------( 239      ( 19 Apr 2019 14:21 )             43.8     04-19    141  |  101  |  21  ----------------------------<  81  3.5   |  27  |  1.06    Ca    9.6      19 Apr 2019 14:21    TPro  7.0  /  Alb  4.2  /  TBili  0.8  /  DBili  x   /  AST  11  /  ALT  11  /  AlkPhos  48  04-19

## 2019-04-19 NOTE — CONSULT NOTE ADULT - ASSESSMENT
68year old male with 68year old male with hx of multi spine surgeries by multiple surgeons, complaining of worsening back pain. CTH L spine showing S1 superior endplate changes.

## 2019-04-19 NOTE — H&P ADULT - PROBLEM SELECTOR PLAN 1
CT imaging reviewed, per neurosx, no indication for sx intervention   c/w symptom control. However, per pt, he has failed all the attempts for many years. Unsure what can be effective for this pt at this time  PT reyes CT imaging reviewed, per neurosx, no indication for sx intervention   c/w symptom control. However, per pt, he has failed all the attempts for many years. Unsure what can be effective for this pt at this time  PT eval  I STOP Reference #: [id number] CT imaging reviewed, per neurosx, no indication for sx intervention   c/w symptom control. However, per pt and record, he has worked with multiple surgeons and pain specialists which has not been successful so far. Unsure what can be done differently and effective for this pt at this time  On evaluation, pt appears very comfortable in the stretcher. Turning around without any difficulty. Pt should avoid polypharmacy and wean off narcotics.   PT eval  I STOP Reference #: [id number] CT imaging reviewed, per neurosx, no indication for sx intervention   c/w symptom control. However, per pt and record, he has worked with multiple surgeons and pain specialists for years which has not been successful so far. Unsure what can be done differently and effective for this pt at this time  On evaluation, pt appears very comfortable in the stretcher. Turning around without any difficulty. Pt should avoid polypharmacy and wean off narcotics.   Mild leukocytosis likely due to recent Medrol dose remy (Pt just took 2 doses. I doubt the benefit to continue steroid now). No sign of active infection.   PT eval  I STOP Reference #: [id number] CT imaging reviewed, per neurosx, no indication for sx intervention   c/w symptom control. However, per pt and record, he has worked with multiple surgeons and pain specialists for years which has not been successful so far. Unsure what can be done differently and effectively for this pt at this time  On evaluation, pt appears very comfortable in the stretcher. Turning around without any difficulty. Pt should avoid polypharmacy and wean off narcotics.   Mild leukocytosis likely due to recent Medrol dose remy (Pt just took 2 doses. I doubt the benefit to continue steroid now). No sign of active infection.   PT eval  I STOP Reference #: [id number]

## 2019-04-19 NOTE — HEALTH RISK ASSESSMENT
[No falls in past year] : Patient reported no falls in the past year [1] : 1) Little interest or pleasure doing things for several days (1) [0] : 2) Feeling down, depressed, or hopeless: Not at all (0) [Good] : ~his/her~ current health as good [Poor] : ~his/her~ mood as  poor [Alone] : lives alone [Retired] : retired [[id number]] : 1 [] : No [Sexually Active] : not sexually active [FreeTextEntry1] : back/leg pain

## 2019-04-20 LAB
ANION GAP SERPL CALC-SCNC: 13 MMO/L — SIGNIFICANT CHANGE UP (ref 7–14)
BUN SERPL-MCNC: 30 MG/DL — HIGH (ref 7–23)
CALCIUM SERPL-MCNC: 9.2 MG/DL — SIGNIFICANT CHANGE UP (ref 8.4–10.5)
CHLORIDE SERPL-SCNC: 100 MMOL/L — SIGNIFICANT CHANGE UP (ref 98–107)
CO2 SERPL-SCNC: 25 MMOL/L — SIGNIFICANT CHANGE UP (ref 22–31)
CREAT SERPL-MCNC: 1.17 MG/DL — SIGNIFICANT CHANGE UP (ref 0.5–1.3)
GLUCOSE SERPL-MCNC: 116 MG/DL — HIGH (ref 70–99)
HCT VFR BLD CALC: 44.9 % — SIGNIFICANT CHANGE UP (ref 39–50)
HCV AB S/CO SERPL IA: 0.08 S/CO — SIGNIFICANT CHANGE UP (ref 0–0.99)
HCV AB SERPL-IMP: SIGNIFICANT CHANGE UP
HGB BLD-MCNC: 14.5 G/DL — SIGNIFICANT CHANGE UP (ref 13–17)
MCHC RBC-ENTMCNC: 30.2 PG — SIGNIFICANT CHANGE UP (ref 27–34)
MCHC RBC-ENTMCNC: 32.3 % — SIGNIFICANT CHANGE UP (ref 32–36)
MCV RBC AUTO: 93.5 FL — SIGNIFICANT CHANGE UP (ref 80–100)
NRBC # FLD: 0 K/UL — SIGNIFICANT CHANGE UP (ref 0–0)
PLATELET # BLD AUTO: 226 K/UL — SIGNIFICANT CHANGE UP (ref 150–400)
PMV BLD: 9.8 FL — SIGNIFICANT CHANGE UP (ref 7–13)
POTASSIUM SERPL-MCNC: 3.4 MMOL/L — LOW (ref 3.5–5.3)
POTASSIUM SERPL-SCNC: 3.4 MMOL/L — LOW (ref 3.5–5.3)
RBC # BLD: 4.8 M/UL — SIGNIFICANT CHANGE UP (ref 4.2–5.8)
RBC # FLD: 13.9 % — SIGNIFICANT CHANGE UP (ref 10.3–14.5)
SODIUM SERPL-SCNC: 138 MMOL/L — SIGNIFICANT CHANGE UP (ref 135–145)
WBC # BLD: 9.35 K/UL — SIGNIFICANT CHANGE UP (ref 3.8–10.5)
WBC # FLD AUTO: 9.35 K/UL — SIGNIFICANT CHANGE UP (ref 3.8–10.5)

## 2019-04-20 PROCEDURE — 99233 SBSQ HOSP IP/OBS HIGH 50: CPT

## 2019-04-20 RX ORDER — ACETAMINOPHEN 500 MG
650 TABLET ORAL EVERY 6 HOURS
Qty: 0 | Refills: 0 | Status: COMPLETED | OUTPATIENT
Start: 2019-04-20 | End: 2019-04-22

## 2019-04-20 RX ORDER — KETOROLAC TROMETHAMINE 30 MG/ML
30 SYRINGE (ML) INJECTION ONCE
Qty: 0 | Refills: 0 | Status: DISCONTINUED | OUTPATIENT
Start: 2019-04-20 | End: 2019-04-20

## 2019-04-20 RX ORDER — VERAPAMIL HCL 240 MG
360 CAPSULE, EXTENDED RELEASE PELLETS 24 HR ORAL DAILY
Qty: 0 | Refills: 0 | Status: DISCONTINUED | OUTPATIENT
Start: 2019-04-20 | End: 2019-04-26

## 2019-04-20 RX ORDER — TIZANIDINE 4 MG/1
2 TABLET ORAL EVERY 6 HOURS
Qty: 0 | Refills: 0 | Status: DISCONTINUED | OUTPATIENT
Start: 2019-04-20 | End: 2019-04-24

## 2019-04-20 RX ORDER — POTASSIUM CHLORIDE 20 MEQ
20 PACKET (EA) ORAL ONCE
Qty: 0 | Refills: 0 | Status: COMPLETED | OUTPATIENT
Start: 2019-04-20 | End: 2019-04-20

## 2019-04-20 RX ORDER — ZOLPIDEM TARTRATE 10 MG/1
5 TABLET ORAL ONCE
Qty: 0 | Refills: 0 | Status: DISCONTINUED | OUTPATIENT
Start: 2019-04-20 | End: 2019-04-20

## 2019-04-20 RX ORDER — DOCUSATE SODIUM 100 MG
100 CAPSULE ORAL DAILY
Qty: 0 | Refills: 0 | Status: DISCONTINUED | OUTPATIENT
Start: 2019-04-20 | End: 2019-04-26

## 2019-04-20 RX ORDER — GABAPENTIN 400 MG/1
100 CAPSULE ORAL THREE TIMES A DAY
Qty: 0 | Refills: 0 | Status: DISCONTINUED | OUTPATIENT
Start: 2019-04-20 | End: 2019-04-20

## 2019-04-20 RX ORDER — CELECOXIB 200 MG/1
1 CAPSULE ORAL
Qty: 0 | Refills: 0 | COMMUNITY

## 2019-04-20 RX ORDER — KETOROLAC TROMETHAMINE 30 MG/ML
15 SYRINGE (ML) INJECTION ONCE
Qty: 0 | Refills: 0 | Status: DISCONTINUED | OUTPATIENT
Start: 2019-04-20 | End: 2019-04-20

## 2019-04-20 RX ORDER — ZOLPIDEM TARTRATE 10 MG/1
1 TABLET ORAL
Qty: 0 | Refills: 0 | COMMUNITY

## 2019-04-20 RX ORDER — ALPRAZOLAM 0.25 MG
1 TABLET ORAL EVERY 6 HOURS
Qty: 0 | Refills: 0 | Status: DISCONTINUED | OUTPATIENT
Start: 2019-04-20 | End: 2019-04-25

## 2019-04-20 RX ORDER — ALPRAZOLAM 0.25 MG
1 TABLET ORAL ONCE
Qty: 0 | Refills: 0 | Status: DISCONTINUED | OUTPATIENT
Start: 2019-04-20 | End: 2019-04-20

## 2019-04-20 RX ORDER — GABAPENTIN 400 MG/1
200 CAPSULE ORAL THREE TIMES A DAY
Qty: 0 | Refills: 0 | Status: DISCONTINUED | OUTPATIENT
Start: 2019-04-20 | End: 2019-04-21

## 2019-04-20 RX ADMIN — Medication 15 MILLIGRAM(S): at 20:50

## 2019-04-20 RX ADMIN — Medication 1 MILLIGRAM(S): at 21:46

## 2019-04-20 RX ADMIN — HYDROMORPHONE HYDROCHLORIDE 1 MILLIGRAM(S): 2 INJECTION INTRAMUSCULAR; INTRAVENOUS; SUBCUTANEOUS at 06:13

## 2019-04-20 RX ADMIN — GABAPENTIN 200 MILLIGRAM(S): 400 CAPSULE ORAL at 13:16

## 2019-04-20 RX ADMIN — HYDROMORPHONE HYDROCHLORIDE 1 MILLIGRAM(S): 2 INJECTION INTRAMUSCULAR; INTRAVENOUS; SUBCUTANEOUS at 06:30

## 2019-04-20 RX ADMIN — Medication 30 MILLIGRAM(S): at 13:31

## 2019-04-20 RX ADMIN — ZOLPIDEM TARTRATE 5 MILLIGRAM(S): 10 TABLET ORAL at 02:28

## 2019-04-20 RX ADMIN — HYDROMORPHONE HYDROCHLORIDE 1 MILLIGRAM(S): 2 INJECTION INTRAMUSCULAR; INTRAVENOUS; SUBCUTANEOUS at 21:33

## 2019-04-20 RX ADMIN — HYDROMORPHONE HYDROCHLORIDE 1 MILLIGRAM(S): 2 INJECTION INTRAMUSCULAR; INTRAVENOUS; SUBCUTANEOUS at 10:03

## 2019-04-20 RX ADMIN — ZOLPIDEM TARTRATE 5 MILLIGRAM(S): 10 TABLET ORAL at 21:50

## 2019-04-20 RX ADMIN — HYDROMORPHONE HYDROCHLORIDE 1 MILLIGRAM(S): 2 INJECTION INTRAMUSCULAR; INTRAVENOUS; SUBCUTANEOUS at 10:18

## 2019-04-20 RX ADMIN — Medication 1 MILLIGRAM(S): at 12:02

## 2019-04-20 RX ADMIN — Medication 15 MILLIGRAM(S): at 20:35

## 2019-04-20 RX ADMIN — Medication 650 MILLIGRAM(S): at 17:24

## 2019-04-20 RX ADMIN — HYDROMORPHONE HYDROCHLORIDE 1 MILLIGRAM(S): 2 INJECTION INTRAMUSCULAR; INTRAVENOUS; SUBCUTANEOUS at 00:02

## 2019-04-20 RX ADMIN — ENOXAPARIN SODIUM 40 MILLIGRAM(S): 100 INJECTION SUBCUTANEOUS at 17:23

## 2019-04-20 RX ADMIN — HYDROMORPHONE HYDROCHLORIDE 1 MILLIGRAM(S): 2 INJECTION INTRAMUSCULAR; INTRAVENOUS; SUBCUTANEOUS at 00:20

## 2019-04-20 RX ADMIN — Medication 360 MILLIGRAM(S): at 06:14

## 2019-04-20 RX ADMIN — Medication 30 MILLIGRAM(S): at 13:16

## 2019-04-20 RX ADMIN — LOSARTAN POTASSIUM 100 MILLIGRAM(S): 100 TABLET, FILM COATED ORAL at 06:14

## 2019-04-20 RX ADMIN — Medication 100 MILLIGRAM(S): at 21:38

## 2019-04-20 RX ADMIN — ZOLPIDEM TARTRATE 5 MILLIGRAM(S): 10 TABLET ORAL at 23:31

## 2019-04-20 RX ADMIN — Medication 20 MILLIEQUIVALENT(S): at 08:53

## 2019-04-20 RX ADMIN — CITALOPRAM 40 MILLIGRAM(S): 10 TABLET, FILM COATED ORAL at 11:59

## 2019-04-20 RX ADMIN — GABAPENTIN 200 MILLIGRAM(S): 400 CAPSULE ORAL at 21:38

## 2019-04-20 RX ADMIN — Medication 650 MILLIGRAM(S): at 23:33

## 2019-04-20 RX ADMIN — HYDROMORPHONE HYDROCHLORIDE 1 MILLIGRAM(S): 2 INJECTION INTRAMUSCULAR; INTRAVENOUS; SUBCUTANEOUS at 21:47

## 2019-04-20 RX ADMIN — Medication 650 MILLIGRAM(S): at 17:23

## 2019-04-20 NOTE — PHYSICAL THERAPY INITIAL EVALUATION ADULT - PLANNED THERAPY INTERVENTIONS, PT EVAL
balance training/Patient left supine in bed in NAD, call bell in reach, all lines intact./bed mobility training/gait training/strengthening/transfer training

## 2019-04-20 NOTE — PROGRESS NOTE ADULT - SUBJECTIVE AND OBJECTIVE BOX
Patient is a 68y old  Male who presents with a chief complaint of acute on chronic back pain (19 Apr 2019 18:53)      SUBJECTIVE / OVERNIGHT EVENTS:    c/o persistent LBP partially relieved by iv Dilaudid     Review of Systems:    RESPIRATORY: No cough, wheezing, chills or hemoptysis; No shortness of breath  CARDIOVASCULAR: No chest pain, palpitations, dizziness, or leg swelling  GASTROINTESTINAL: No abdominal or epigastric pain. No nausea, vomiting, or hematemesis; No diarrhea or constipation. No melena or hematochezia.    MEDICATIONS  (STANDING):  acetaminophen   Tablet .. 650 milliGRAM(s) Oral every 6 hours  citalopram 40 milliGRAM(s) Oral daily  enoxaparin Injectable 40 milliGRAM(s) SubCutaneous every 24 hours  gabapentin 200 milliGRAM(s) Oral three times a day  losartan 100 milliGRAM(s) Oral daily  verapamil  milliGRAM(s) Oral daily    MEDICATIONS  (PRN):  ALPRAZolam 1 milliGRAM(s) Oral every 6 hours PRN Anxiety  HYDROmorphone  Injectable 1 milliGRAM(s) IV Push every 4 hours PRN Severe Pain (7 - 10)  tiZANidine 2 milliGRAM(s) Oral every 6 hours PRN Muscle Spasm  zolpidem 5 milliGRAM(s) Oral at bedtime PRN Insomnia      PHYSICAL EXAM:  T(C): 36.8 (04-20-19 @ 14:30), Max: 36.8 (04-19-19 @ 17:58)  HR: 69 (04-20-19 @ 14:30) (68 - 90)  BP: 132/79 (04-20-19 @ 14:30) (113/69 - 147/94)  RR: 17 (04-20-19 @ 14:30) (16 - 18)  SpO2: 97% (04-20-19 @ 14:30) (96% - 98%)  I&O's Summary    GENERAL: middle age male lying in bed in NAD   MENTAL STATUS/PSYCH:  AAO x3   HEAD:  Atraumatic, Normocephalic  EYES: EOMI, PERRLA, conjunctiva and sclera clear  NECK: Supple, No elevated JVD  CHEST/LUNG: Clear to auscultation bilaterally; No wheeze  HEART: Regular rate and rhythm; No murmurs, rubs, or gallops  ABDOMEN: Soft, Nontender, Nondistended; Bowel sounds present  EXTREMITIES:  L3-S1 diffusely ttp. sensation and strength intact b/l LE   NEUROLOGY: CN II-XII grossly intact, moving all extremities  SKIN: No rashes or lesions    LABS:  CAPILLARY BLOOD GLUCOSE                              14.5   9.35  )-----------( 226      ( 20 Apr 2019 06:00 )             44.9     04-20    138  |  100  |  30<H>  ----------------------------<  116<H>  3.4<L>   |  25  |  1.17    Ca    9.2      20 Apr 2019 06:00    TPro  7.0  /  Alb  4.2  /  TBili  0.8  /  DBili  x   /  AST  11  /  ALT  11  /  AlkPhos  48  04-19

## 2019-04-20 NOTE — PHYSICAL THERAPY INITIAL EVALUATION ADULT - PERTINENT HX OF CURRENT PROBLEM, REHAB EVAL
68 male with chronic lower back pain, VPS for NPH and multiple back surgeries by different surgeons in different hospitals presents to the ED w/ worsening back pain and difficulty ambulating.  CT lumbar spine extensive post surgical changes. Increased erosion superior endplate S1.

## 2019-04-21 LAB
ANION GAP SERPL CALC-SCNC: 13 MMO/L — SIGNIFICANT CHANGE UP (ref 7–14)
BUN SERPL-MCNC: 22 MG/DL — SIGNIFICANT CHANGE UP (ref 7–23)
CALCIUM SERPL-MCNC: 9.3 MG/DL — SIGNIFICANT CHANGE UP (ref 8.4–10.5)
CHLORIDE SERPL-SCNC: 101 MMOL/L — SIGNIFICANT CHANGE UP (ref 98–107)
CO2 SERPL-SCNC: 25 MMOL/L — SIGNIFICANT CHANGE UP (ref 22–31)
CREAT SERPL-MCNC: 1.11 MG/DL — SIGNIFICANT CHANGE UP (ref 0.5–1.3)
GLUCOSE SERPL-MCNC: 98 MG/DL — SIGNIFICANT CHANGE UP (ref 70–99)
HCT VFR BLD CALC: 46.3 % — SIGNIFICANT CHANGE UP (ref 39–50)
HGB BLD-MCNC: 15.7 G/DL — SIGNIFICANT CHANGE UP (ref 13–17)
MCHC RBC-ENTMCNC: 31 PG — SIGNIFICANT CHANGE UP (ref 27–34)
MCHC RBC-ENTMCNC: 33.9 % — SIGNIFICANT CHANGE UP (ref 32–36)
MCV RBC AUTO: 91.3 FL — SIGNIFICANT CHANGE UP (ref 80–100)
NRBC # FLD: 0 K/UL — SIGNIFICANT CHANGE UP (ref 0–0)
PLATELET # BLD AUTO: 247 K/UL — SIGNIFICANT CHANGE UP (ref 150–400)
PMV BLD: 10 FL — SIGNIFICANT CHANGE UP (ref 7–13)
POTASSIUM SERPL-MCNC: 3.9 MMOL/L — SIGNIFICANT CHANGE UP (ref 3.5–5.3)
POTASSIUM SERPL-SCNC: 3.9 MMOL/L — SIGNIFICANT CHANGE UP (ref 3.5–5.3)
RBC # BLD: 5.07 M/UL — SIGNIFICANT CHANGE UP (ref 4.2–5.8)
RBC # FLD: 14 % — SIGNIFICANT CHANGE UP (ref 10.3–14.5)
SODIUM SERPL-SCNC: 139 MMOL/L — SIGNIFICANT CHANGE UP (ref 135–145)
WBC # BLD: 10.55 K/UL — HIGH (ref 3.8–10.5)
WBC # FLD AUTO: 10.55 K/UL — HIGH (ref 3.8–10.5)

## 2019-04-21 PROCEDURE — 99233 SBSQ HOSP IP/OBS HIGH 50: CPT

## 2019-04-21 RX ORDER — ZOLPIDEM TARTRATE 10 MG/1
5 TABLET ORAL ONCE
Qty: 0 | Refills: 0 | Status: DISCONTINUED | OUTPATIENT
Start: 2019-04-21 | End: 2019-04-21

## 2019-04-21 RX ORDER — KETOROLAC TROMETHAMINE 30 MG/ML
15 SYRINGE (ML) INJECTION ONCE
Qty: 0 | Refills: 0 | Status: DISCONTINUED | OUTPATIENT
Start: 2019-04-21 | End: 2019-04-21

## 2019-04-21 RX ORDER — GABAPENTIN 400 MG/1
300 CAPSULE ORAL EVERY 8 HOURS
Qty: 0 | Refills: 0 | Status: DISCONTINUED | OUTPATIENT
Start: 2019-04-21 | End: 2019-04-22

## 2019-04-21 RX ORDER — GABAPENTIN 400 MG/1
100 CAPSULE ORAL ONCE
Qty: 0 | Refills: 0 | Status: COMPLETED | OUTPATIENT
Start: 2019-04-21 | End: 2019-04-21

## 2019-04-21 RX ADMIN — GABAPENTIN 300 MILLIGRAM(S): 400 CAPSULE ORAL at 13:23

## 2019-04-21 RX ADMIN — HYDROMORPHONE HYDROCHLORIDE 1 MILLIGRAM(S): 2 INJECTION INTRAMUSCULAR; INTRAVENOUS; SUBCUTANEOUS at 05:33

## 2019-04-21 RX ADMIN — HYDROMORPHONE HYDROCHLORIDE 1 MILLIGRAM(S): 2 INJECTION INTRAMUSCULAR; INTRAVENOUS; SUBCUTANEOUS at 05:18

## 2019-04-21 RX ADMIN — Medication 650 MILLIGRAM(S): at 23:58

## 2019-04-21 RX ADMIN — Medication 15 MILLIGRAM(S): at 22:33

## 2019-04-21 RX ADMIN — HYDROMORPHONE HYDROCHLORIDE 1 MILLIGRAM(S): 2 INJECTION INTRAMUSCULAR; INTRAVENOUS; SUBCUTANEOUS at 20:27

## 2019-04-21 RX ADMIN — ENOXAPARIN SODIUM 40 MILLIGRAM(S): 100 INJECTION SUBCUTANEOUS at 17:35

## 2019-04-21 RX ADMIN — Medication 1 MILLIGRAM(S): at 16:10

## 2019-04-21 RX ADMIN — Medication 650 MILLIGRAM(S): at 17:36

## 2019-04-21 RX ADMIN — Medication 650 MILLIGRAM(S): at 00:33

## 2019-04-21 RX ADMIN — Medication 15 MILLIGRAM(S): at 22:18

## 2019-04-21 RX ADMIN — Medication 360 MILLIGRAM(S): at 05:17

## 2019-04-21 RX ADMIN — GABAPENTIN 100 MILLIGRAM(S): 400 CAPSULE ORAL at 11:32

## 2019-04-21 RX ADMIN — Medication 100 MILLIGRAM(S): at 11:33

## 2019-04-21 RX ADMIN — ZOLPIDEM TARTRATE 5 MILLIGRAM(S): 10 TABLET ORAL at 23:58

## 2019-04-21 RX ADMIN — HYDROMORPHONE HYDROCHLORIDE 1 MILLIGRAM(S): 2 INJECTION INTRAMUSCULAR; INTRAVENOUS; SUBCUTANEOUS at 20:42

## 2019-04-21 RX ADMIN — Medication 650 MILLIGRAM(S): at 06:15

## 2019-04-21 RX ADMIN — Medication 650 MILLIGRAM(S): at 11:32

## 2019-04-21 RX ADMIN — Medication 1 MILLIGRAM(S): at 10:16

## 2019-04-21 RX ADMIN — Medication 1 MILLIGRAM(S): at 22:24

## 2019-04-21 RX ADMIN — GABAPENTIN 300 MILLIGRAM(S): 400 CAPSULE ORAL at 21:06

## 2019-04-21 RX ADMIN — Medication 650 MILLIGRAM(S): at 13:23

## 2019-04-21 RX ADMIN — HYDROMORPHONE HYDROCHLORIDE 1 MILLIGRAM(S): 2 INJECTION INTRAMUSCULAR; INTRAVENOUS; SUBCUTANEOUS at 10:07

## 2019-04-21 RX ADMIN — ZOLPIDEM TARTRATE 5 MILLIGRAM(S): 10 TABLET ORAL at 21:49

## 2019-04-21 RX ADMIN — HYDROMORPHONE HYDROCHLORIDE 1 MILLIGRAM(S): 2 INJECTION INTRAMUSCULAR; INTRAVENOUS; SUBCUTANEOUS at 15:30

## 2019-04-21 RX ADMIN — HYDROMORPHONE HYDROCHLORIDE 1 MILLIGRAM(S): 2 INJECTION INTRAMUSCULAR; INTRAVENOUS; SUBCUTANEOUS at 10:30

## 2019-04-21 RX ADMIN — HYDROMORPHONE HYDROCHLORIDE 1 MILLIGRAM(S): 2 INJECTION INTRAMUSCULAR; INTRAVENOUS; SUBCUTANEOUS at 15:14

## 2019-04-21 RX ADMIN — GABAPENTIN 200 MILLIGRAM(S): 400 CAPSULE ORAL at 05:17

## 2019-04-21 RX ADMIN — Medication 650 MILLIGRAM(S): at 05:17

## 2019-04-21 RX ADMIN — LOSARTAN POTASSIUM 100 MILLIGRAM(S): 100 TABLET, FILM COATED ORAL at 05:17

## 2019-04-21 RX ADMIN — CITALOPRAM 40 MILLIGRAM(S): 10 TABLET, FILM COATED ORAL at 11:32

## 2019-04-21 RX ADMIN — Medication 650 MILLIGRAM(S): at 17:34

## 2019-04-21 RX ADMIN — TIZANIDINE 2 MILLIGRAM(S): 4 TABLET ORAL at 16:10

## 2019-04-21 NOTE — CHART NOTE - NSCHARTNOTEFT_GEN_A_CORE
Imaging and case reviewed with attending neurosurgeon.  No acute neurosurgical intervention at this time.  Care per primary team.  Please reconsult as needed. Follow up outpatient with Dr. Bhatia

## 2019-04-21 NOTE — PROGRESS NOTE ADULT - SUBJECTIVE AND OBJECTIVE BOX
Patient is a 68y old  Male who presents with a chief complaint of acute on chronic back pain (20 Apr 2019 15:55)      SUBJECTIVE / OVERNIGHT EVENTS:    Pt cont to c/o LBP worse with weight bearing. Improved with iv Dilaudid     Review of Systems:  RESPIRATORY: No cough, wheezing, chills or hemoptysis; No shortness of breath  CARDIOVASCULAR: No chest pain, palpitations, dizziness, or leg swelling  GASTROINTESTINAL: No abdominal or epigastric pain. No nausea, vomiting, or hematemesis; No diarrhea or constipation. No melena or hematochezia.    MEDICATIONS  (STANDING):  acetaminophen   Tablet .. 650 milliGRAM(s) Oral every 6 hours  citalopram 40 milliGRAM(s) Oral daily  docusate sodium 100 milliGRAM(s) Oral daily  enoxaparin Injectable 40 milliGRAM(s) SubCutaneous every 24 hours  gabapentin 300 milliGRAM(s) Oral every 8 hours  losartan 100 milliGRAM(s) Oral daily  verapamil  milliGRAM(s) Oral daily    MEDICATIONS  (PRN):  ALPRAZolam 1 milliGRAM(s) Oral every 6 hours PRN Anxiety  HYDROmorphone  Injectable 1 milliGRAM(s) IV Push every 4 hours PRN Severe Pain (7 - 10)  tiZANidine 2 milliGRAM(s) Oral every 6 hours PRN Muscle Spasm  zolpidem 5 milliGRAM(s) Oral at bedtime PRN Insomnia      PHYSICAL EXAM:  T(C): 36.3 (04-21-19 @ 05:16), Max: 36.9 (04-20-19 @ 21:20)  HR: 71 (04-21-19 @ 05:16) (64 - 71)  BP: 140/80 (04-21-19 @ 05:16) (132/79 - 140/80)  RR: 16 (04-21-19 @ 05:16) (16 - 18)  SpO2: 95% (04-21-19 @ 05:16) (95% - 97%)  I&O's Summary    GENERAL: middle age male lying in bed in NAD   MENTAL STATUS/PSYCH:  AAO x3   HEAD:  Atraumatic, Normocephalic  EYES: EOMI, PERRLA, conjunctiva and sclera clear  NECK: Supple, No elevated JVD  CHEST/LUNG: Clear to auscultation bilaterally; No wheeze  HEART: Regular rate and rhythm; No murmurs, rubs, or gallops  ABDOMEN: Soft, Nontender, Nondistended; Bowel sounds present  EXTREMITIES:  L3-S1 diffusely ttp. sensation and strength intact b/l LE   NEUROLOGY: CN II-XII grossly intact, moving all extremities  SKIN: No rashes or lesions    LABS:  CAPILLARY BLOOD GLUCOSE                              15.7   10.55 )-----------( 247      ( 21 Apr 2019 06:00 )             46.3     04-21    139  |  101  |  22  ----------------------------<  98  3.9   |  25  |  1.11    Ca    9.3      21 Apr 2019 06:00    TPro  7.0  /  Alb  4.2  /  TBili  0.8  /  DBili  x   /  AST  11  /  ALT  11  /  AlkPhos  48  04-19

## 2019-04-22 DIAGNOSIS — F06.30 MOOD DISORDER DUE TO KNOWN PHYSIOLOGICAL CONDITION, UNSPECIFIED: ICD-10-CM

## 2019-04-22 DIAGNOSIS — F06.4 ANXIETY DISORDER DUE TO KNOWN PHYSIOLOGICAL CONDITION: ICD-10-CM

## 2019-04-22 DIAGNOSIS — F32.9 MAJOR DEPRESSIVE DISORDER, SINGLE EPISODE, UNSPECIFIED: ICD-10-CM

## 2019-04-22 DIAGNOSIS — F33.1 MAJOR DEPRESSIVE DISORDER, RECURRENT, MODERATE: ICD-10-CM

## 2019-04-22 LAB
ANION GAP SERPL CALC-SCNC: 14 MMO/L — SIGNIFICANT CHANGE UP (ref 7–14)
BUN SERPL-MCNC: 23 MG/DL — SIGNIFICANT CHANGE UP (ref 7–23)
CALCIUM SERPL-MCNC: 8.5 MG/DL — SIGNIFICANT CHANGE UP (ref 8.4–10.5)
CHLORIDE SERPL-SCNC: 105 MMOL/L — SIGNIFICANT CHANGE UP (ref 98–107)
CO2 SERPL-SCNC: 20 MMOL/L — LOW (ref 22–31)
CREAT SERPL-MCNC: 1.16 MG/DL — SIGNIFICANT CHANGE UP (ref 0.5–1.3)
GLUCOSE SERPL-MCNC: 176 MG/DL — HIGH (ref 70–99)
HCT VFR BLD CALC: 43.9 % — SIGNIFICANT CHANGE UP (ref 39–50)
HGB BLD-MCNC: 14.4 G/DL — SIGNIFICANT CHANGE UP (ref 13–17)
MCHC RBC-ENTMCNC: 30.3 PG — SIGNIFICANT CHANGE UP (ref 27–34)
MCHC RBC-ENTMCNC: 32.8 % — SIGNIFICANT CHANGE UP (ref 32–36)
MCV RBC AUTO: 92.2 FL — SIGNIFICANT CHANGE UP (ref 80–100)
NRBC # FLD: 0 K/UL — SIGNIFICANT CHANGE UP (ref 0–0)
PLATELET # BLD AUTO: 216 K/UL — SIGNIFICANT CHANGE UP (ref 150–400)
PMV BLD: 10.3 FL — SIGNIFICANT CHANGE UP (ref 7–13)
POTASSIUM SERPL-MCNC: 4.1 MMOL/L — SIGNIFICANT CHANGE UP (ref 3.5–5.3)
POTASSIUM SERPL-SCNC: 4.1 MMOL/L — SIGNIFICANT CHANGE UP (ref 3.5–5.3)
RBC # BLD: 4.76 M/UL — SIGNIFICANT CHANGE UP (ref 4.2–5.8)
RBC # FLD: 14 % — SIGNIFICANT CHANGE UP (ref 10.3–14.5)
SODIUM SERPL-SCNC: 139 MMOL/L — SIGNIFICANT CHANGE UP (ref 135–145)
WBC # BLD: 12.58 K/UL — HIGH (ref 3.8–10.5)
WBC # FLD AUTO: 12.58 K/UL — HIGH (ref 3.8–10.5)

## 2019-04-22 PROCEDURE — 99233 SBSQ HOSP IP/OBS HIGH 50: CPT

## 2019-04-22 PROCEDURE — 90792 PSYCH DIAG EVAL W/MED SRVCS: CPT

## 2019-04-22 RX ORDER — GABAPENTIN 400 MG/1
400 CAPSULE ORAL EVERY 8 HOURS
Qty: 0 | Refills: 0 | Status: DISCONTINUED | OUTPATIENT
Start: 2019-04-22 | End: 2019-04-26

## 2019-04-22 RX ORDER — SENNA PLUS 8.6 MG/1
2 TABLET ORAL AT BEDTIME
Qty: 0 | Refills: 0 | Status: DISCONTINUED | OUTPATIENT
Start: 2019-04-22 | End: 2019-04-26

## 2019-04-22 RX ORDER — TAMSULOSIN HYDROCHLORIDE 0.4 MG/1
0.4 CAPSULE ORAL AT BEDTIME
Qty: 0 | Refills: 0 | Status: DISCONTINUED | OUTPATIENT
Start: 2019-04-22 | End: 2019-04-26

## 2019-04-22 RX ORDER — POLYETHYLENE GLYCOL 3350 17 G/17G
17 POWDER, FOR SOLUTION ORAL DAILY
Qty: 0 | Refills: 0 | Status: DISCONTINUED | OUTPATIENT
Start: 2019-04-22 | End: 2019-04-26

## 2019-04-22 RX ORDER — CELECOXIB 200 MG/1
100 CAPSULE ORAL EVERY 12 HOURS
Qty: 0 | Refills: 0 | Status: COMPLETED | OUTPATIENT
Start: 2019-04-22 | End: 2019-04-24

## 2019-04-22 RX ORDER — ZOLPIDEM TARTRATE 10 MG/1
5 TABLET ORAL ONCE
Qty: 0 | Refills: 0 | Status: DISCONTINUED | OUTPATIENT
Start: 2019-04-22 | End: 2019-04-22

## 2019-04-22 RX ADMIN — Medication 360 MILLIGRAM(S): at 05:22

## 2019-04-22 RX ADMIN — LOSARTAN POTASSIUM 100 MILLIGRAM(S): 100 TABLET, FILM COATED ORAL at 05:22

## 2019-04-22 RX ADMIN — GABAPENTIN 300 MILLIGRAM(S): 400 CAPSULE ORAL at 05:22

## 2019-04-22 RX ADMIN — HYDROMORPHONE HYDROCHLORIDE 1 MILLIGRAM(S): 2 INJECTION INTRAMUSCULAR; INTRAVENOUS; SUBCUTANEOUS at 03:56

## 2019-04-22 RX ADMIN — Medication 650 MILLIGRAM(S): at 00:58

## 2019-04-22 RX ADMIN — ZOLPIDEM TARTRATE 5 MILLIGRAM(S): 10 TABLET ORAL at 22:34

## 2019-04-22 RX ADMIN — CITALOPRAM 40 MILLIGRAM(S): 10 TABLET, FILM COATED ORAL at 12:00

## 2019-04-22 RX ADMIN — SENNA PLUS 2 TABLET(S): 8.6 TABLET ORAL at 22:34

## 2019-04-22 RX ADMIN — Medication 1 MILLIGRAM(S): at 05:22

## 2019-04-22 RX ADMIN — TIZANIDINE 2 MILLIGRAM(S): 4 TABLET ORAL at 03:58

## 2019-04-22 RX ADMIN — ZOLPIDEM TARTRATE 5 MILLIGRAM(S): 10 TABLET ORAL at 20:27

## 2019-04-22 RX ADMIN — CELECOXIB 100 MILLIGRAM(S): 200 CAPSULE ORAL at 18:28

## 2019-04-22 RX ADMIN — HYDROMORPHONE HYDROCHLORIDE 1 MILLIGRAM(S): 2 INJECTION INTRAMUSCULAR; INTRAVENOUS; SUBCUTANEOUS at 09:23

## 2019-04-22 RX ADMIN — Medication 650 MILLIGRAM(S): at 12:00

## 2019-04-22 RX ADMIN — ENOXAPARIN SODIUM 40 MILLIGRAM(S): 100 INJECTION SUBCUTANEOUS at 18:29

## 2019-04-22 RX ADMIN — Medication 650 MILLIGRAM(S): at 13:00

## 2019-04-22 RX ADMIN — Medication 650 MILLIGRAM(S): at 05:23

## 2019-04-22 RX ADMIN — Medication 1 MILLIGRAM(S): at 12:00

## 2019-04-22 RX ADMIN — HYDROMORPHONE HYDROCHLORIDE 1 MILLIGRAM(S): 2 INJECTION INTRAMUSCULAR; INTRAVENOUS; SUBCUTANEOUS at 04:11

## 2019-04-22 RX ADMIN — TIZANIDINE 2 MILLIGRAM(S): 4 TABLET ORAL at 12:00

## 2019-04-22 RX ADMIN — HYDROMORPHONE HYDROCHLORIDE 1 MILLIGRAM(S): 2 INJECTION INTRAMUSCULAR; INTRAVENOUS; SUBCUTANEOUS at 18:45

## 2019-04-22 RX ADMIN — GABAPENTIN 400 MILLIGRAM(S): 400 CAPSULE ORAL at 22:35

## 2019-04-22 RX ADMIN — POLYETHYLENE GLYCOL 3350 17 GRAM(S): 17 POWDER, FOR SOLUTION ORAL at 12:22

## 2019-04-22 RX ADMIN — Medication 100 MILLIGRAM(S): at 12:00

## 2019-04-22 RX ADMIN — HYDROMORPHONE HYDROCHLORIDE 1 MILLIGRAM(S): 2 INJECTION INTRAMUSCULAR; INTRAVENOUS; SUBCUTANEOUS at 09:07

## 2019-04-22 RX ADMIN — TAMSULOSIN HYDROCHLORIDE 0.4 MILLIGRAM(S): 0.4 CAPSULE ORAL at 22:34

## 2019-04-22 RX ADMIN — HYDROMORPHONE HYDROCHLORIDE 1 MILLIGRAM(S): 2 INJECTION INTRAMUSCULAR; INTRAVENOUS; SUBCUTANEOUS at 18:28

## 2019-04-22 RX ADMIN — Medication 650 MILLIGRAM(S): at 06:23

## 2019-04-22 RX ADMIN — Medication 1 MILLIGRAM(S): at 18:27

## 2019-04-22 RX ADMIN — CELECOXIB 100 MILLIGRAM(S): 200 CAPSULE ORAL at 20:06

## 2019-04-22 NOTE — CONSULT NOTE ADULT - SUBJECTIVE AND OBJECTIVE BOX
Chief Complaint: unrelieved lower back pain    HPI:  68M w/ chronic lower back pain, VPS for NPH and multiple back surgeries by different surgeons in different hospitals presents to the ED w/ worsening back pain and difficulty ambulating. He states the pain in left lower back radiating to the left leg with weakness. He states that he was seen a few days ago at [place] and CT scan showed compression fracture fo L1. However, his pain was slightly lower down around L5. He has had work done on L5 in the past and had issues w/ the screws there. However, he was told this time that the area near L5 appeared wnl.  Was given flexeril and medrol but no significant improvement in pain. Last Spine Surgery about a year ago at [facility] and Bone Graft. Pt reports he was told he had loosening of screws from previous surgery. Pt also reports [facility]. Pt reports he is bedbound at home and depends on the help from his friend. No fever/chills/cp/sob/dizziness/fall/trauma.   While in ER, CT lumber showed extensive postop changes and increased erosion of the superior endplate of S1. s/p eval by neurosx, no sx intervention. (19 Apr 2019 18:53)      PAST MEDICAL & SURGICAL HISTORY:  NPH (normal pressure hydrocephalus)  Chronic back pain greater than 3 months duration  Small intestine disorder: &quot;ilitis&quot;   steroids   1967  Lumbar disc displacement without myelopathy  Sciatica  Anxiety  Vertebral Sciatica  Insomnia  Depression  HTN (Hypertension)  S/P lumbar fusion: L4-L5 on Oct 2011  pins/screws 2012   spinal surgery 2013  Dehiscence, Operation Wound/Debridement: infection  S/P Laminectomy: L4-L5 2009  complicated by infection requiring  antibiiotcis      FAMILY HISTORY:  No pertinent family history in first degree relatives      SOCIAL HISTORY:  [ ] Denies Smoking, Alcohol, or Drug Use    Allergies    Biaxin (Other)  Lidoderm (Unknown)  morphine (Short breath; Rash)    Intolerances        PAIN MEDICATIONS:  ALPRAZolam 1 milliGRAM(s) Oral every 6 hours PRN  citalopram 40 milliGRAM(s) Oral daily  gabapentin 300 milliGRAM(s) Oral every 8 hours  HYDROmorphone  Injectable 1 milliGRAM(s) IV Push every 4 hours PRN  tiZANidine 2 milliGRAM(s) Oral every 6 hours PRN  zolpidem 5 milliGRAM(s) Oral at bedtime PRN    Heme:  enoxaparin Injectable 40 milliGRAM(s) SubCutaneous every 24 hours    Antibiotics:    Cardiovascular:  losartan 100 milliGRAM(s) Oral daily  tamsulosin 0.4 milliGRAM(s) Oral at bedtime  verapamil  milliGRAM(s) Oral daily    GI:  docusate sodium 100 milliGRAM(s) Oral daily  polyethylene glycol 3350 17 Gram(s) Oral daily  senna 2 Tablet(s) Oral at bedtime    Endocrine:    All Other Medications:    REVIEW OF SYSTEMS:    CONSTITUTIONAL: No fever, weight loss, or fatigue  EYES: No eye pain, visual disturbances, or discharge  ENMT:  No difficulty hearing, tinnitus, vertigo; No sinus or throat pain  NECK: No pain or stiffness  BREASTS: No pain, masses, or nipple discharge  RESPIRATORY: No cough, wheezing, chills or hemoptysis; No shortness of breath  CARDIOVASCULAR: No chest pain, palpitations, dizziness, or leg swelling  GASTROINTESTINAL: No abdominal or epigastric pain. No nausea, vomiting, or hematemesis; No diarrhea or constipation. No melena or hematochezia.  GENITOURINARY: No dysuria, frequency, hematuria, or incontinence  NEUROLOGICAL: No headaches, memory loss, loss of strength, numbness, or tremors  SKIN: No itching, burning, rashes, or lesions   LYMPH NODES: No enlarged glands  ENDOCRINE: No heat or cold intolerance; No hair loss  MUSCULOSKELETAL: No joint pain or swelling; No muscle, back, or extremity pain  PSYCHIATRIC: No depression, anxiety, mood swings, or difficulty sleeping  HEME/LYMPH: No easy bruising, or bleeding gums  ALLERY AND IMMUNOLOGIC: No hives or eczema      Vital Signs Last 24 Hrs  T(C): 36.7 (22 Apr 2019 05:34), Max: 36.9 (21 Apr 2019 21:47)  T(F): 98 (22 Apr 2019 05:34), Max: 98.4 (21 Apr 2019 21:47)  HR: 78 (22 Apr 2019 05:34) (72 - 78)  BP: 157/97 (22 Apr 2019 05:34) (151/93 - 163/91)  BP(mean): --  RR: 18 (22 Apr 2019 05:34) (17 - 18)  SpO2: 97% (22 Apr 2019 05:34) (95% - 97%)    PAIN SCORE: 10/10    SCALE USED: (1-10 VNRS)           LABS:                          14.4   12.58 )-----------( 216      ( 22 Apr 2019 06:57 )             43.9     04-22    139  |  105  |  23  ----------------------------<  176<H>  4.1   |  20<L>  |  1.16    Ca    8.5      22 Apr 2019 06:57    Summary:  Called by ADS team for pain management.  Patient seen at bedside.  Patient is A&Ox3, poor historian with pain history. Patient is currently in 10/10 pain, at his left lower back and buttock described as a constant throbbing and aching feeling.  Patient's pain radiates down from his left buttock down his left leg.  At home, patient was recently prescribed Oxycodone by his new PCP, which gave the patient no relief.  Last fall, Dr. Fredy Lawson was prescribing his pain medications because he was the patient's primary care physician assigned by the hospital wherever he was discharged. When asked why Dr. Lawson, since he was a heme-onc doctor, patient just stated that was the doctor the hospital sent him when he was discharged.  Patient was going regularly to him, until he had an episode where he became short of breath and had to be taken to the hospital and given Narcan.  Patient then went to a chronic pain doctor, Dr. Ward, however the injections to his back did not work, nor the medications prescribed like Nucynta or Lyrica. Patient stated that the chronic pain doctor said there is nothing left for them to do. Recently, the patient was at [facility] due to chronic back pain, and has brought those MRI records with him for further evaluation.  Informed patient that he needs a chronic pain doctor to continue his pain regimen outpatient, and that may have other modalities to manage his pain.  Patient states that he understands and said that his current PCP wants him to go to a chronic pain doctor that they know.      [X]  [place]  Reviewed and Copied to Chart    PHYSICAL EXAM:  GENERAL: Alert & Oriented x 3 in NAD, well-groomed, well-developed     Impression/Plan: Requested by ADS team to help manage pain.  Recommendations:  1) Consider discontinuing IV Dilaudid 1 mg, instead order po Dilaudid 2 mg every 4 hours for moderate pain, and Dilaudid 4 mg every 4 hours for severe pain.  Hold for oversedation.  Only one dose of Dilaudid in any 4 hours.   2) Consider changing Tizanidine order to Tizanidine 2 mg every 6 hours standing x2 days, then prn. Hold for oversedation.  3) Consider increasing Neurontin order to Neurontin 400 mg every 8 hours.  Hold for oversedation.  4) Consider ordering Celebrex 100 mg every 12 hours, standing x2days, then prn for pain.  5) Consider changing Tylenol order to Tylenol 975 mg every 6 hours standing x2 days, then prn pain.  6) Consider TENS therapy for lower back  7) Recommend patient make chronic pain doctor appointment ASAP for follow-up when patient gets discharged.  Patient can go through his insurance company to find a doctor, or through his current PCP.  8) Recommend follow-up evaluations of MRI patient brought from home.

## 2019-04-22 NOTE — PROGRESS NOTE ADULT - SUBJECTIVE AND OBJECTIVE BOX
Dr. Lyle pager [phone]    Patient is a 68y old  Male who presents with a chief complaint of acute on chronic back pain (22 Apr 2019 12:51)      SUBJECTIVE / OVERNIGHT EVENTS: pt seen at 230p- friend at bedside  states that his pain has been worsneing over the last 6months  doesn't want to leave till he's "better"  no pain meds work for him    MEDICATIONS  (STANDING):  celecoxib 100 milliGRAM(s) Oral every 12 hours  citalopram 40 milliGRAM(s) Oral daily  docusate sodium 100 milliGRAM(s) Oral daily  enoxaparin Injectable 40 milliGRAM(s) SubCutaneous every 24 hours  gabapentin 400 milliGRAM(s) Oral every 8 hours  losartan 100 milliGRAM(s) Oral daily  polyethylene glycol 3350 17 Gram(s) Oral daily  senna 2 Tablet(s) Oral at bedtime  tamsulosin 0.4 milliGRAM(s) Oral at bedtime  verapamil  milliGRAM(s) Oral daily    MEDICATIONS  (PRN):  ALPRAZolam 1 milliGRAM(s) Oral every 6 hours PRN Anxiety  HYDROmorphone  Injectable 1 milliGRAM(s) IV Push every 4 hours PRN Severe Pain (7 - 10)  tiZANidine 2 milliGRAM(s) Oral every 6 hours PRN Muscle Spasm  zolpidem 5 milliGRAM(s) Oral at bedtime PRN Insomnia      Meds ordered within last 24hours  ketorolac   Injectable: [Ordered as TORADOL Injectable]  15 milliGRAM(s), IV Push, once, Stop After 1 Doses  Administration Instructions: IF IV PUSH, ADMINISTER OVER 1 MINUTE  Provider's Contact #: [phone] (04-21 @ 21:49)  zolpidem: [Ordered as AMBIEN]  5 milliGRAM(s), Oral, once, PRN for Insomnia, Stop After 1 Doses  Indication: 2nd dose - takes 10mg at home  Administration Instructions: This medication should not be administered with or immediately following a meal.  Provider's Contact #: [phone] (04-21 @ 23:50)  senna:   2 Tablet(s), Oral, at bedtime  Provider's Contact #: [phone] (04-22 @ 10:29)  polyethylene glycol 3350: [Ordered as MIRALAX]  17 Gram(s), Oral, daily  Administration Instructions: Dissolve in 8 ounces water, juice, cola or tea.  Provider's Contact #: [phone] (04-22 @ 10:33)  tamsulosin: [Ordered as FLOMAX]  0.4 milliGRAM(s), Oral, at bedtime  Indication: Urinary retention  Administration Instructions: Swallow whole * don't crush/chew  This is a Look-alike/Sound-alike Medication  Provider's Contact #: [phone] (04-22 @ 10:36)  gabapentin: [Known as NEURONTIN]  400 milliGRAM(s), Oral, every 8 hours  Special Instructions: hold for sedation  Provider's Contact #: [phone] (04-22 @ 15:33)  celecoxib: [Ordered as CeleBREX]  100 milliGRAM(s), Oral, every 12 hours, Stop After 2 Days  Administration Instructions: This is a Look-alike/Sound-alike Medication  Provider's Contact #: [phone] (04-22 @ 15:34)      T(C): 36.9 (04-22-19 @ 15:39), Max: 36.9 (04-21-19 @ 21:47)  HR: 66 (04-22-19 @ 15:39) (66 - 78)  BP: 121/75 (04-22-19 @ 15:39) (121/75 - 157/97)  RR: 18 (04-22-19 @ 15:39) (17 - 18)  SpO2: 95% (04-22-19 @ 15:39) (95% - 97%)    CAPILLARY BLOOD GLUCOSE        I&O's Summary      PHYSICAL EXAM:  GENERAL: NAD  CHEST/LUNG: Clear to auscultation bilaterally; No wheeze  HEART: Regular rate and rhythm; No murmurs, rubs, or gallops  ABDOMEN: Soft, Nontender, Nondistended; Bowel sounds present  EXTREMITIES:  No clubbing, cyanosis, or edema  NEURO: A&Ox3      LABS:                        14.4   12.58 )-----------( 216      ( 22 Apr 2019 06:57 )             43.9     04-22    139  |  105  |  23  ----------------------------<  176<H>  4.1   |  20<L>  |  1.16    Ca    8.5      22 Apr 2019 06:57                RADIOLOGY & ADDITIONAL TESTS:    Imaging Personally Reviewed:    Consultant(s) Notes Reviewed:      Care Discussed with Consultants/Other Providers:

## 2019-04-22 NOTE — BEHAVIORAL HEALTH ASSESSMENT NOTE - OTHER PAST PSYCHIATRIC HISTORY (INCLUDE DETAILS REGARDING ONSET, COURSE OF ILLNESS, INPATIENT/OUTPATIENT TREATMENT)
MDD diagnosed 1999  Anxiety MDD diagnosed 1999  Anxiety  No history of inpatient psychiatric admissions.

## 2019-04-22 NOTE — BEHAVIORAL HEALTH ASSESSMENT NOTE - OTHER
intractable pain The hope that pain will get better Friend (Alfredo) at bedside Friend (Alfredo) at bedside [phone]

## 2019-04-22 NOTE — BEHAVIORAL HEALTH ASSESSMENT NOTE - NSBHCHARTREVIEWVS_PSY_A_CORE FT
Vital Signs Last 24 Hrs  T(C): 36.9 (22 Apr 2019 15:39), Max: 36.9 (21 Apr 2019 21:47)  T(F): 98.4 (22 Apr 2019 15:39), Max: 98.4 (21 Apr 2019 21:47)  HR: 66 (22 Apr 2019 15:39) (66 - 78)  BP: 121/75 (22 Apr 2019 15:39) (121/75 - 157/97)  BP(mean): --  RR: 18 (22 Apr 2019 15:39) (17 - 18)  SpO2: 95% (22 Apr 2019 15:39) (95% - 97%)

## 2019-04-22 NOTE — BEHAVIORAL HEALTH ASSESSMENT NOTE - SUMMARY
Briefly, the patient is a 68 year old male, medical history notable for chronic lower back pain, multiple back surgeries since 2007 by different surgeons in different hospitals, and VPS for NPH.  Previous psychiatric history significant for MDD and anxiety but has never been hospitalized for psychiatric issues.  Based on assessment done today, depressive disorder due to current medical condition as well as MDD. Denies SI/HI, any hospitalizations for psychiatric illness, visual or auditory hallucinations, alcohol or drug use.    RECOMMENDATIONS:  - Routine observation  - Collaborate care with Alfredo  - Will coordinate with psychiatrist Dr. Bhandari  - Obtain TSH and EKG  - Recommend social work or case management for coordination of care  - Continue with PT recs  - Continue Celexa 40 mg PO 1x/day for depression  - Continue Ambien 10 mg PO QHS for sleep  - Continue Xanax 1mg PO PRN Q6h for anxiety Briefly, the patient is a 68 year old male, single, who lives at home with assistance from his friend Alfredo.  Has medical history notable for chronic lower back pain, multiple back surgeries since 2007 by different surgeons in different hospitals, and VPS for NPH.  Previous psychiatric history significant for MDD and anxiety but has never been hospitalized for psychiatric issues.  Sees a psychiatrist (Dr. Cory Parkinson) 1x/week.  Patient presents to the ED w/ worsening back pain and difficulty ambulating. As per ED records-He states the pain in left lower back radiating to the left leg with weakness. He states that he was seen a few days ago at [place] and CT scan showed compression fracture fo L1. However, his pain was slightly lower down around L5. He has had work done on L5 in the past and had issues w/ the screws there. However, he was told this time that the area near L5 appeared wnl.  Was given Flexeril and Medrol but no significant improvement in pain. Last Spine Surgery about a year ago at [facility] and Bone Graft. Pt reports he was told he had loosening of screws from previous surgery. Pt also reports MRI from March, surgeon [facility] said there was nothing to do. Pt reports able to ambulate with cane but is extremely painful to do so.  As per HPI- No fever/chills/cp/sob/dizziness/fall/trauma.  Psychiatry consulted for depressive symptoms.  Based on assessment done today, patient endorses chronic depressive and anxiety symptoms dating back to his high school years, with some remission over the subsequent years, to emerge again around year 2009. Symptoms of depression have continued since then only to become more pronounced in the last few years in context to worsening pain, difficulty ambulating and increasing dependence on friends. He denies any si or hi nor psychosis, and is future oriented- hopeful that he would get better.   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 Bhandari  - Obtain TSH and EKG  - SW consult  - Considering frequent admissions, would benefit from CM services in community for better coordination of care.  - Continue with PT recs  - Continue Celexa 40 mg PO 1x/day for depression  - Continue Ambien 10 mg PO QHS for sleep  - Continue Xanax 1mg PO PRN Q6h for anxiety

## 2019-04-22 NOTE — BEHAVIORAL HEALTH ASSESSMENT NOTE - HPI (INCLUDE ILLNESS QUALITY, SEVERITY, DURATION, TIMING, CONTEXT, MODIFYING FACTORS, ASSOCIATED SIGNS AND SYMPTOMS)
Briefly, the patient is a 68 year old male, single, who lives at home with assistance from his friend Alfredo.  Has medical history notable for chronic lower back pain, multiple back surgeries since 2007 by different surgeons in different hospitals, and VPS for NPH.  Previous psychiatric history significant for MDD and anxiety but has never been hospitalized for psychiatric issues.  Sees a psychiatrist (Dr. Cory Parkinson) 1x/week.  Patient presents to the ED w/ worsening back pain and difficulty ambulating. He states the pain in left lower back radiating to the left leg with weakness. He states that he was seen a few days ago at [place] and CT scan showed compression fracture fo L1. However, his pain was slightly lower down around L5. He has had work done on L5 in the past and had issues w/ the screws there. However, he was told this time that the area near L5 appeared wnl.  Was given Flexeril and Medrol but no significant improvement in pain. Last Spine Surgery about a year ago at [facility] and Bone Graft. Pt reports he was told he had loosening of screws from previous surgery. Pt also [facility]. Pt reports able to ambulate with cane but is extremely painful to do so.  As per HPI- No fever/chills/cp/sob/dizziness/fall/trauma.  Psychiatry consulted for depressive symptoms.    Met with the patient.  A&O x3.  Was open and willing to speak about his mood due to constant, chronic pain.  He admits baseline depression and history of MDD since 1999 but is feeling more depressed as of late due to the non-subsiding pain from his back.  He endorses sadness, frustration, anhedonia, loss of appetite, and low energy.  He feels abandoned by his spine surgeon who told him “there was nothing more to do” and to “see pain management.”  He admits that sometimes he feels he cannot deal with the pain any longer but denies SI.  Has been on a range of medications for depression in the past including Lexapro, Zoloft, Prozac, Effexor, Cymbalta, nortriptyline, MAOI but he feels nothing has really worked.  He verbalizes that if his pain gets better, his mood will also improve as well.  He also feels hopeful that he will get better and has a desire to do so.  In addition to depression he does endorse baseline anxiety which is worse being at the hospital.  He has taken Xanax 1mg in the past which has helped.  Denies difficulty sleeping due to Ambien.  Denies HI, SI, alcohol or drug use, any hospitalizations for psychiatric illness, visual or auditory hallucinations. Briefly, the patient is a 68 year old male, single, who lives at home with assistance from his friend Alfredo.  Has medical history notable for chronic lower back pain, multiple back surgeries since 2007 by different surgeons in different hospitals, and VPS for NPH.  Previous psychiatric history significant for MDD and anxiety but has never been hospitalized for psychiatric issues.  Sees a psychiatrist (Dr. Cory Parkinson) 1x/week.  Patient presents to the ED w/ worsening back pain and difficulty ambulating. As per ED records-He states the pain in left lower back radiating to the left leg with weakness. He states that he was seen a few days ago at [place] and CT scan showed compression fracture fo L1. However, his pain was slightly lower down around L5. He has had work done on L5 in the past and had issues w/ the screws there. However, he was told this time that the area near L5 appeared wnl.  Was given Flexeril and Medrol but no significant improvement in pain. Last Spine Surgery about a year ago at [facility] and Bone Graft. Pt reports he was told he had loosening of screws from previous surgery. Pt also reports MRI from March, surgeon [facility] said there was nothing to do. Pt reports able to ambulate with cane but is extremely painful to do so.  As per HPI- No fever/chills/cp/sob/dizziness/fall/trauma.  Psychiatry consulted for depressive symptoms.    Met with the patient.  A&O x3.  Was open and willing to speak about his mood due to constant, chronic pain.  He admits baseline depression and history of MDD since 1999 but is feeling more depressed as of late due to the non-subsiding pain from his back. Rates his pain a 10/10. He endorses sadness, frustration, anhedonia, loss of appetite, and low energy.  He feels abandoned by his spine surgeon who told him “there was nothing more to do” and to “see pain management.”  He admits that sometimes he feels he cannot deal with the pain any longer but denies SI.  Denies any hypomania or manic symptoms. Denies any psychosis. Has been on a range of medications for depression in the past including Lexapro, Zoloft, Prozac, Effexor, Cymbalta, nortriptyline, MAOI but he feels nothing has really worked.  He verbalizes that if his pain gets better, his mood will also improve as well.  He also feels hopeful that he will get better and has a desire to do so.  In addition to depression he does endorse baseline anxiety which is worse being at the hospital.  He has taken Xanax 1mg in the past which has helped. Takes Ambien for sleep disturbance.    Care coordinated with his friend Alfredo at bedside, who voices the same frustration as the patient and is concerned about the patient's ongoing pain, inability to walk or function independently.

## 2019-04-22 NOTE — BEHAVIORAL HEALTH ASSESSMENT NOTE - PRIMARY DX
Major depressive disorder with current active episode, unspecified depression episode severity, unspecified whether recurrent Moderate episode of recurrent major depressive disorder

## 2019-04-22 NOTE — BEHAVIORAL HEALTH ASSESSMENT NOTE - RISK ASSESSMENT
Risks include chronic pain, family history of depression, older male, medical history of MDD, anxiety.  Protective factors include the hope for pain improvement, strong personal relationship with friends and family.

## 2019-04-22 NOTE — BEHAVIORAL HEALTH ASSESSMENT NOTE - NSBHCHARTREVIEWLAB_PSY_A_CORE FT
CBC Full  -  ( 22 Apr 2019 06:57 )  WBC Count : 12.58 K/uL  RBC Count : 4.76 M/uL  Hemoglobin : 14.4 g/dL  Hematocrit : 43.9 %  Platelet Count - Automated : 216 K/uL  Mean Cell Volume : 92.2 fL  Mean Cell Hemoglobin : 30.3 pg  Mean Cell Hemoglobin Concentration : 32.8 % CBC Full  -  ( 22 Apr 2019 06:57 )  WBC Count : 12.58 K/uL  RBC Count : 4.76 M/uL  Hemoglobin : 14.4 g/dL  Hematocrit : 43.9 %  Platelet Count - Automated : 216 K/uL  Mean Cell Volume : 92.2 fL  Mean Cell Hemoglobin : 30.3 pg  Mean Cell Hemoglobin Concentration : 32.8 %    04-22    139  |  105  |  23  ----------------------------<  176<H>  4.1   |  20<L>  |  1.16    Ca    8.5      22 Apr 2019 06:57

## 2019-04-23 LAB
ANION GAP SERPL CALC-SCNC: 10 MMO/L — SIGNIFICANT CHANGE UP (ref 7–14)
BUN SERPL-MCNC: 18 MG/DL — SIGNIFICANT CHANGE UP (ref 7–23)
CALCIUM SERPL-MCNC: 9.2 MG/DL — SIGNIFICANT CHANGE UP (ref 8.4–10.5)
CHLORIDE SERPL-SCNC: 107 MMOL/L — SIGNIFICANT CHANGE UP (ref 98–107)
CO2 SERPL-SCNC: 26 MMOL/L — SIGNIFICANT CHANGE UP (ref 22–31)
CREAT SERPL-MCNC: 0.95 MG/DL — SIGNIFICANT CHANGE UP (ref 0.5–1.3)
GLUCOSE SERPL-MCNC: 96 MG/DL — SIGNIFICANT CHANGE UP (ref 70–99)
HCT VFR BLD CALC: 42.4 % — SIGNIFICANT CHANGE UP (ref 39–50)
HGB BLD-MCNC: 13.9 G/DL — SIGNIFICANT CHANGE UP (ref 13–17)
MCHC RBC-ENTMCNC: 30.7 PG — SIGNIFICANT CHANGE UP (ref 27–34)
MCHC RBC-ENTMCNC: 32.8 % — SIGNIFICANT CHANGE UP (ref 32–36)
MCV RBC AUTO: 93.6 FL — SIGNIFICANT CHANGE UP (ref 80–100)
NRBC # FLD: 0 K/UL — SIGNIFICANT CHANGE UP (ref 0–0)
PLATELET # BLD AUTO: 202 K/UL — SIGNIFICANT CHANGE UP (ref 150–400)
PMV BLD: 10 FL — SIGNIFICANT CHANGE UP (ref 7–13)
POTASSIUM SERPL-MCNC: 4 MMOL/L — SIGNIFICANT CHANGE UP (ref 3.5–5.3)
POTASSIUM SERPL-SCNC: 4 MMOL/L — SIGNIFICANT CHANGE UP (ref 3.5–5.3)
RBC # BLD: 4.53 M/UL — SIGNIFICANT CHANGE UP (ref 4.2–5.8)
RBC # FLD: 14.1 % — SIGNIFICANT CHANGE UP (ref 10.3–14.5)
SODIUM SERPL-SCNC: 143 MMOL/L — SIGNIFICANT CHANGE UP (ref 135–145)
TSH SERPL-MCNC: 0.73 UIU/ML — SIGNIFICANT CHANGE UP (ref 0.27–4.2)
WBC # BLD: 8.5 K/UL — SIGNIFICANT CHANGE UP (ref 3.8–10.5)
WBC # FLD AUTO: 8.5 K/UL — SIGNIFICANT CHANGE UP (ref 3.8–10.5)

## 2019-04-23 PROCEDURE — 93010 ELECTROCARDIOGRAM REPORT: CPT

## 2019-04-23 PROCEDURE — 72158 MRI LUMBAR SPINE W/O & W/DYE: CPT | Mod: 26

## 2019-04-23 PROCEDURE — 99232 SBSQ HOSP IP/OBS MODERATE 35: CPT

## 2019-04-23 RX ORDER — HYDROMORPHONE HYDROCHLORIDE 2 MG/ML
1 INJECTION INTRAMUSCULAR; INTRAVENOUS; SUBCUTANEOUS ONCE
Qty: 0 | Refills: 0 | Status: DISCONTINUED | OUTPATIENT
Start: 2019-04-23 | End: 2019-04-23

## 2019-04-23 RX ORDER — KETOROLAC TROMETHAMINE 30 MG/ML
15 SYRINGE (ML) INJECTION ONCE
Qty: 0 | Refills: 0 | Status: DISCONTINUED | OUTPATIENT
Start: 2019-04-23 | End: 2019-04-23

## 2019-04-23 RX ORDER — ZOLPIDEM TARTRATE 10 MG/1
5 TABLET ORAL ONCE
Qty: 0 | Refills: 0 | Status: DISCONTINUED | OUTPATIENT
Start: 2019-04-23 | End: 2019-04-23

## 2019-04-23 RX ADMIN — CITALOPRAM 40 MILLIGRAM(S): 10 TABLET, FILM COATED ORAL at 13:11

## 2019-04-23 RX ADMIN — CELECOXIB 100 MILLIGRAM(S): 200 CAPSULE ORAL at 18:43

## 2019-04-23 RX ADMIN — ENOXAPARIN SODIUM 40 MILLIGRAM(S): 100 INJECTION SUBCUTANEOUS at 18:43

## 2019-04-23 RX ADMIN — Medication 15 MILLIGRAM(S): at 15:28

## 2019-04-23 RX ADMIN — HYDROMORPHONE HYDROCHLORIDE 1 MILLIGRAM(S): 2 INJECTION INTRAMUSCULAR; INTRAVENOUS; SUBCUTANEOUS at 21:34

## 2019-04-23 RX ADMIN — HYDROMORPHONE HYDROCHLORIDE 1 MILLIGRAM(S): 2 INJECTION INTRAMUSCULAR; INTRAVENOUS; SUBCUTANEOUS at 13:09

## 2019-04-23 RX ADMIN — Medication 1 MILLIGRAM(S): at 10:07

## 2019-04-23 RX ADMIN — HYDROMORPHONE HYDROCHLORIDE 1 MILLIGRAM(S): 2 INJECTION INTRAMUSCULAR; INTRAVENOUS; SUBCUTANEOUS at 09:13

## 2019-04-23 RX ADMIN — Medication 15 MILLIGRAM(S): at 15:45

## 2019-04-23 RX ADMIN — HYDROMORPHONE HYDROCHLORIDE 1 MILLIGRAM(S): 2 INJECTION INTRAMUSCULAR; INTRAVENOUS; SUBCUTANEOUS at 13:24

## 2019-04-23 RX ADMIN — Medication 1 MILLIGRAM(S): at 22:59

## 2019-04-23 RX ADMIN — TAMSULOSIN HYDROCHLORIDE 0.4 MILLIGRAM(S): 0.4 CAPSULE ORAL at 21:20

## 2019-04-23 RX ADMIN — HYDROMORPHONE HYDROCHLORIDE 1 MILLIGRAM(S): 2 INJECTION INTRAMUSCULAR; INTRAVENOUS; SUBCUTANEOUS at 08:57

## 2019-04-23 RX ADMIN — SENNA PLUS 2 TABLET(S): 8.6 TABLET ORAL at 21:21

## 2019-04-23 RX ADMIN — TIZANIDINE 2 MILLIGRAM(S): 4 TABLET ORAL at 21:22

## 2019-04-23 RX ADMIN — Medication 1 MILLIGRAM(S): at 16:42

## 2019-04-23 RX ADMIN — HYDROMORPHONE HYDROCHLORIDE 1 MILLIGRAM(S): 2 INJECTION INTRAMUSCULAR; INTRAVENOUS; SUBCUTANEOUS at 10:07

## 2019-04-23 RX ADMIN — Medication 360 MILLIGRAM(S): at 05:49

## 2019-04-23 RX ADMIN — Medication 100 MILLIGRAM(S): at 13:11

## 2019-04-23 RX ADMIN — GABAPENTIN 400 MILLIGRAM(S): 400 CAPSULE ORAL at 13:09

## 2019-04-23 RX ADMIN — ZOLPIDEM TARTRATE 5 MILLIGRAM(S): 10 TABLET ORAL at 21:20

## 2019-04-23 RX ADMIN — HYDROMORPHONE HYDROCHLORIDE 1 MILLIGRAM(S): 2 INJECTION INTRAMUSCULAR; INTRAVENOUS; SUBCUTANEOUS at 22:00

## 2019-04-23 RX ADMIN — ZOLPIDEM TARTRATE 5 MILLIGRAM(S): 10 TABLET ORAL at 22:58

## 2019-04-23 RX ADMIN — LOSARTAN POTASSIUM 100 MILLIGRAM(S): 100 TABLET, FILM COATED ORAL at 05:49

## 2019-04-23 RX ADMIN — GABAPENTIN 400 MILLIGRAM(S): 400 CAPSULE ORAL at 05:49

## 2019-04-23 RX ADMIN — GABAPENTIN 400 MILLIGRAM(S): 400 CAPSULE ORAL at 21:20

## 2019-04-23 RX ADMIN — POLYETHYLENE GLYCOL 3350 17 GRAM(S): 17 POWDER, FOR SOLUTION ORAL at 13:09

## 2019-04-23 RX ADMIN — HYDROMORPHONE HYDROCHLORIDE 1 MILLIGRAM(S): 2 INJECTION INTRAMUSCULAR; INTRAVENOUS; SUBCUTANEOUS at 10:32

## 2019-04-23 RX ADMIN — CELECOXIB 100 MILLIGRAM(S): 200 CAPSULE ORAL at 05:49

## 2019-04-23 NOTE — PROGRESS NOTE ADULT - ASSESSMENT
68M w/ chronic lower back pain, VPS for NPH and multiple back surgeries by different surgeons in different hospitals presents to the ED w/ worsening back pain and difficulty ambulating- MRI spine pending to see what route of rx we adopt- surgical vs medical- likely medical- concerned that "nothing works" for patient

## 2019-04-23 NOTE — PROGRESS NOTE ADULT - SUBJECTIVE AND OBJECTIVE BOX
Dr. Lyle pager [phone]    Patient is a 68y old  Male who presents with a chief complaint of acute on chronic back pain (22 Apr 2019 16:52)      SUBJECTIVE / OVERNIGHT EVENTS: pt seen this am around 1030- said dilaudid IV wasn't helping him and that he was just going to "die in pain"  Told him that we have MRI planned to evaluate pathology in spine to assess why he is in pain     MEDICATIONS  (STANDING):  bisacodyl Suppository 10 milliGRAM(s) Rectal once  celecoxib 100 milliGRAM(s) Oral every 12 hours  citalopram 40 milliGRAM(s) Oral daily  docusate sodium 100 milliGRAM(s) Oral daily  enoxaparin Injectable 40 milliGRAM(s) SubCutaneous every 24 hours  gabapentin 400 milliGRAM(s) Oral every 8 hours  losartan 100 milliGRAM(s) Oral daily  polyethylene glycol 3350 17 Gram(s) Oral daily  senna 2 Tablet(s) Oral at bedtime  tamsulosin 0.4 milliGRAM(s) Oral at bedtime  verapamil  milliGRAM(s) Oral daily    MEDICATIONS  (PRN):  ALPRAZolam 1 milliGRAM(s) Oral every 6 hours PRN Anxiety  HYDROmorphone  Injectable 1 milliGRAM(s) IV Push every 4 hours PRN Severe Pain (7 - 10)  tiZANidine 2 milliGRAM(s) Oral every 6 hours PRN Muscle Spasm  zolpidem 5 milliGRAM(s) Oral at bedtime PRN Insomnia      Meds ordered within last 24hours  zolpidem: [Ordered as AMBIEN]  5 milliGRAM(s), Oral, once, Stop After 1 Doses  Administration Instructions: This medication should not be administered with or immediately following a meal.  Provider's Contact #: [phone] (04-22 @ 22:26)  HYDROmorphone  Injectable: [Known as DILAUDID Injectable]  1 milliGRAM(s), IV Push, once, Stop After 1 Doses  Administration Instructions: This is a Look-alike/Sound-alike Medication  Provider's Contact #: [phone] (04-23 @ 10:02)  ketorolac   Injectable: [Ordered as TORADOL Injectable]  15 milliGRAM(s), IV Push, once, Stop After 1 Doses  Administration Instructions: IF IV PUSH, ADMINISTER OVER 1 MINUTE  Provider's Contact #: [phone] (04-23 @ 15:19)  bisacodyl Suppository: [Ordered as DULCOLAX Suppository]  10 milliGRAM(s), Rectal, once, Stop After 1 Doses  Administration Instructions: for rectal use  Provider's Contact #: [phone] (04-23 @ 15:57)      T(C): 36.5 (04-23-19 @ 14:07), Max: 36.8 (04-22-19 @ 20:55)  HR: 72 (04-23-19 @ 14:07) (61 - 77)  BP: 157/93 (04-23-19 @ 14:07) (120/70 - 157/93)  RR: 19 (04-23-19 @ 14:07) (18 - 19)  SpO2: 97% (04-23-19 @ 14:07) (96% - 97%)    CAPILLARY BLOOD GLUCOSE        I&O's Summary    23 Apr 2019 07:01  -  23 Apr 2019 16:51  --------------------------------------------------------  IN: 0 mL / OUT: 800 mL / NET: -800 mL        PHYSICAL EXAM:  GENERAL: NAD  CHEST/LUNG: Clear to auscultation bilaterally; No wheeze  HEART: Regular rate and rhythm; No murmurs, rubs, or gallops  ABDOMEN: Soft, Nontender, Nondistended; Bowel sounds present  EXTREMITIES:  No clubbing, cyanosis, or edema  NEURO: A&Ox3      LABS:                        13.9   8.50  )-----------( 202      ( 23 Apr 2019 05:57 )             42.4     04-23    143  |  107  |  18  ----------------------------<  96  4.0   |  26  |  0.95    Ca    9.2      23 Apr 2019 05:57                RADIOLOGY & ADDITIONAL TESTS:    Imaging Personally Reviewed:    Consultant(s) Notes Reviewed:      Care Discussed with Consultants/Other Providers:

## 2019-04-24 ENCOUNTER — APPOINTMENT (OUTPATIENT)
Dept: ORTHOPEDIC SURGERY | Facility: HOSPITAL | Age: 69
End: 2019-04-24

## 2019-04-24 PROCEDURE — 99222 1ST HOSP IP/OBS MODERATE 55: CPT

## 2019-04-24 PROCEDURE — 99232 SBSQ HOSP IP/OBS MODERATE 35: CPT

## 2019-04-24 PROCEDURE — 70260 X-RAY EXAM OF SKULL: CPT | Mod: 26

## 2019-04-24 PROCEDURE — 99233 SBSQ HOSP IP/OBS HIGH 50: CPT

## 2019-04-24 PROCEDURE — 99222 1ST HOSP IP/OBS MODERATE 55: CPT | Mod: GC

## 2019-04-24 RX ORDER — HYDROMORPHONE HYDROCHLORIDE 2 MG/ML
2 INJECTION INTRAMUSCULAR; INTRAVENOUS; SUBCUTANEOUS EVERY 4 HOURS
Qty: 0 | Refills: 0 | Status: DISCONTINUED | OUTPATIENT
Start: 2019-04-24 | End: 2019-04-25

## 2019-04-24 RX ORDER — HYDROMORPHONE HYDROCHLORIDE 2 MG/ML
4 INJECTION INTRAMUSCULAR; INTRAVENOUS; SUBCUTANEOUS EVERY 4 HOURS
Qty: 0 | Refills: 0 | Status: DISCONTINUED | OUTPATIENT
Start: 2019-04-24 | End: 2019-04-25

## 2019-04-24 RX ORDER — TIZANIDINE 4 MG/1
2 TABLET ORAL EVERY 8 HOURS
Qty: 0 | Refills: 0 | Status: COMPLETED | OUTPATIENT
Start: 2019-04-24 | End: 2019-04-26

## 2019-04-24 RX ORDER — KETOROLAC TROMETHAMINE 30 MG/ML
15 SYRINGE (ML) INJECTION ONCE
Qty: 0 | Refills: 0 | Status: DISCONTINUED | OUTPATIENT
Start: 2019-04-24 | End: 2019-04-24

## 2019-04-24 RX ADMIN — HYDROMORPHONE HYDROCHLORIDE 4 MILLIGRAM(S): 2 INJECTION INTRAMUSCULAR; INTRAVENOUS; SUBCUTANEOUS at 13:50

## 2019-04-24 RX ADMIN — CELECOXIB 100 MILLIGRAM(S): 200 CAPSULE ORAL at 06:29

## 2019-04-24 RX ADMIN — TIZANIDINE 2 MILLIGRAM(S): 4 TABLET ORAL at 13:10

## 2019-04-24 RX ADMIN — HYDROMORPHONE HYDROCHLORIDE 4 MILLIGRAM(S): 2 INJECTION INTRAMUSCULAR; INTRAVENOUS; SUBCUTANEOUS at 14:50

## 2019-04-24 RX ADMIN — Medication 15 MILLIGRAM(S): at 10:12

## 2019-04-24 RX ADMIN — HYDROMORPHONE HYDROCHLORIDE 1 MILLIGRAM(S): 2 INJECTION INTRAMUSCULAR; INTRAVENOUS; SUBCUTANEOUS at 09:13

## 2019-04-24 RX ADMIN — Medication 360 MILLIGRAM(S): at 06:29

## 2019-04-24 RX ADMIN — LOSARTAN POTASSIUM 100 MILLIGRAM(S): 100 TABLET, FILM COATED ORAL at 06:29

## 2019-04-24 RX ADMIN — Medication 15 MILLIGRAM(S): at 02:03

## 2019-04-24 RX ADMIN — TAMSULOSIN HYDROCHLORIDE 0.4 MILLIGRAM(S): 0.4 CAPSULE ORAL at 21:10

## 2019-04-24 RX ADMIN — Medication 1 MILLIGRAM(S): at 10:37

## 2019-04-24 RX ADMIN — TIZANIDINE 2 MILLIGRAM(S): 4 TABLET ORAL at 21:10

## 2019-04-24 RX ADMIN — CELECOXIB 100 MILLIGRAM(S): 200 CAPSULE ORAL at 06:28

## 2019-04-24 RX ADMIN — ENOXAPARIN SODIUM 40 MILLIGRAM(S): 100 INJECTION SUBCUTANEOUS at 21:10

## 2019-04-24 RX ADMIN — Medication 100 MILLIGRAM(S): at 13:10

## 2019-04-24 RX ADMIN — GABAPENTIN 400 MILLIGRAM(S): 400 CAPSULE ORAL at 13:11

## 2019-04-24 RX ADMIN — ZOLPIDEM TARTRATE 5 MILLIGRAM(S): 10 TABLET ORAL at 21:09

## 2019-04-24 RX ADMIN — Medication 1 MILLIGRAM(S): at 18:22

## 2019-04-24 RX ADMIN — Medication 15 MILLIGRAM(S): at 02:20

## 2019-04-24 RX ADMIN — SENNA PLUS 2 TABLET(S): 8.6 TABLET ORAL at 21:10

## 2019-04-24 RX ADMIN — GABAPENTIN 400 MILLIGRAM(S): 400 CAPSULE ORAL at 21:10

## 2019-04-24 RX ADMIN — HYDROMORPHONE HYDROCHLORIDE 1 MILLIGRAM(S): 2 INJECTION INTRAMUSCULAR; INTRAVENOUS; SUBCUTANEOUS at 09:28

## 2019-04-24 RX ADMIN — GABAPENTIN 400 MILLIGRAM(S): 400 CAPSULE ORAL at 06:28

## 2019-04-24 RX ADMIN — CITALOPRAM 40 MILLIGRAM(S): 10 TABLET, FILM COATED ORAL at 13:10

## 2019-04-24 NOTE — CONSULT NOTE ADULT - SUBJECTIVE AND OBJECTIVE BOX
HPI:  68M w/ chronic lower back pain, VPS for NPH and multiple back surgeries by different surgeons in different hospitals presents to the ED w/ worsening back pain and difficulty ambulating. He states the pain in left lower back radiating to the left leg with weakness. He states that he was seen a few days ago at [facility] and CT scan showed compression fracture fo L1. However, his pain was slightly lower down around L5. He has had work done on L5 in the past and had issues w/ the screws there. However, he was told this time that the area near L5 appeared wnl.  Was given flexeril and medrol but no significant improvement in pain. Last Spine Surgery about a year ago at [facility] SEUN and Bone Graft. Pt reports he was told he had loosening of screws from previous surgery. Pt also reports [facility] from [facility], [facility] was nothing to do. Pt reports he is bedbound at home and depends on the help from his friend. No fever/chills/cp/sob/dizziness/fall/trauma.   While in ER, CT lumber showed extensive postop changes and increased erosion of the superior endplate of S1. s/p eval by neurosx, no sx intervention. (19 Apr 2019 18:53)    ID- above reviewed.  Has had multiple lower spine procedures- both posterior, then anterior revision.   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    No fever, chills.  lower back pain radiating to left buttock is severe.     PAST MEDICAL & SURGICAL HISTORY:  NPH (normal pressure hydrocephalus)  Chronic back pain greater than 3 months duration  Small intestine disorder: &quot;ilitis&quot;   steroids   1967  Lumbar disc displacement without myelopathy  Sciatica  Anxiety  Vertebral Sciatica  Insomnia  Depression  HTN (Hypertension)  S/P lumbar fusion: L4-L5 on Oct 2011  pins/screws 2012   spinal surgery 2013  Dehiscence, Operation Wound/Debridement: infection  S/P Laminectomy: L4-L5 2009  complicated by infection requiring  antibiiotcis      Allergies    Biaxin (Other)  Lidoderm (Unknown)  morphine (Short breath; Rash)    Intolerances        ANTIMICROBIALS:  none    OTHER MEDS:  ALPRAZolam 1 milliGRAM(s) Oral every 6 hours PRN  bisacodyl Suppository 10 milliGRAM(s) Rectal once  citalopram 40 milliGRAM(s) Oral daily  docusate sodium 100 milliGRAM(s) Oral daily  enoxaparin Injectable 40 milliGRAM(s) SubCutaneous every 24 hours  gabapentin 400 milliGRAM(s) Oral every 8 hours  HYDROmorphone   Tablet 4 milliGRAM(s) Oral every 4 hours PRN  HYDROmorphone   Tablet 2 milliGRAM(s) Oral every 4 hours PRN  losartan 100 milliGRAM(s) Oral daily  polyethylene glycol 3350 17 Gram(s) Oral daily  senna 2 Tablet(s) Oral at bedtime  tamsulosin 0.4 milliGRAM(s) Oral at bedtime  tiZANidine 2 milliGRAM(s) Oral every 8 hours  verapamil  milliGRAM(s) Oral daily  zolpidem 5 milliGRAM(s) Oral at bedtime PRN      SOCIAL HISTORY: lives home     FAMILY HISTORY:  No pertinent family history in first degree relatives      REVIEW OF SYSTEMS  [  ] ROS unobtainable because:    [x  ] All other systems negative except as noted below:	    Constitutional:  [ ] fever [ ] chills  [ ] weight loss  [ ] weakness  Skin:  [ ] rash [ ] phlebitis	  Eyes: [ ] icterus [ ] pain  [ ] discharge	  ENMT: [ ] sore throat  [ ] thrush [ ] ulcers [ ] exudates  Respiratory: [ ] dyspnea [ ] hemoptysis [ ] cough [ ] sputum	  Cardiovascular:  [ ] chest pain [ ] palpitations [ ] edema	  Gastrointestinal:  [ ] nausea [ ] vomiting [ ] diarrhea [ ] constipation [ ] pain	  Genitourinary:  [ ] dysuria [ ] frequency [ ] hematuria [ ] discharge [ ] flank pain  [ ] incontinence  Musculoskeletal:  [ ] myalgias [ ] arthralgias [ ] arthritis  [x ] back pain  Neurological:  [ ] headache [ ] seizures  [ ] confusion/altered mental status  Psychiatric:  [ ] anxiety [ ] depression	  Hematology/Lymphatics:  [ ] lymphadenopathy  Endocrine:  [ ] adrenal [ ] thyroid  Allergic/Immunologic:	 [ ] transplant [ ] seasonal    PHYSICAL EXAM:  General: [x ] non-toxic  HEAD/EYES: [ ] PERRL [x ] white sclera [ ] icterus  ENT:  [ ] normal [x ] supple [ ] thrush [ ] pharyngeal exudate  Cardiovascular:   [ ] murmur [x ] normal [ ] PPM/AICD  Respiratory:  [x ] clear to ausculation bilaterally  GI:  [x ] soft, non-tender, normal bowel sounds  :  [ ] lindsay [ ] no CVA tenderness   Musculoskeletal:  well healed scars over lumbar spine, left lateral  Neurologic:  [x ] non-focal exam   Skin:  [x ] no rash  Lymph: [ ] no lymphadenopathy  Psychiatric:  [x ] flat affect [x ] alert & oriented  Lines:  [x ] no phlebitis [ ] central line          Drug Dosing Weight  Height (cm): 172.72 (19 Apr 2019 21:27)  Weight (kg): 98.6 (19 Apr 2019 21:27)  BMI (kg/m2): 33.1 (19 Apr 2019 21:27)  BSA (m2): 2.12 (19 Apr 2019 21:27)    Vital Signs Last 24 Hrs  T(F): 97.5 (04-24-19 @ 13:52), Max: 98.8 (04-23-19 @ 21:38)    Vital Signs Last 24 Hrs  HR: 79 (04-24-19 @ 13:52) (70 - 80)  BP: 120/81 (04-24-19 @ 13:52) (120/81 - 170/85)  RR: 18 (04-24-19 @ 13:52)  SpO2: 97% (04-24-19 @ 13:52) (97% - 99%)  Wt(kg): --                          13.9   8.50  )-----------( 202      ( 23 Apr 2019 05:57 )             42.4       04-23    143  |  107  |  18  ----------------------------<  96  4.0   |  26  |  0.95    Ca    9.2      23 Apr 2019 05:57      Sedimentation Rate, Erythrocyte (04.19.19 @ 14:21)    Sedimentation Rate, Erythrocyte: 3 mm/hr          MICROBIOLOGY:    none        RADIOLOGY:  < from: MR Lumbar Spine w/wo IV Cont (04.23.19 @ 17:59) >  EXAM:  MR SPINE LUMBAR WAW IC        PROCEDURE DATE:  Apr 23 2019         INTERPRETATION:  Clinical indication: Status post multiple back   surgeries. Back pain. Leukocytosis.    MRI of the lumbar spine was performed using sagittal T1-2 and STIR   sequence. Axial T1 and T2-weighted sequences were performed. The patient   was injected with approximately 7.5 cc of Gadavist IV and sagittal   coronal and axial T1-weighted sequences were performed.    Postop changes compatible with a left-sided laminectomy is seen at the   L3-4 and L4-5 levels with evidence of a new discectomy/anterior fusion at   the L5-S1 level. There is posterior spinal fusion again seen extending   < from: CT Lumbar Spine No Cont (04.19.19 @ 14:36) >  Postop changes compatible with left laminectomy is seen involving the   L3-4 and L4-5 levels. The patient is status post discectomy at the L4-5   level as well as a intervertebral fusion device again seen in the L5-S1   level. The patient is status post posterior spinal fusion extending from   L3 to S1. Evaluation of the metallic hardware is unchanged when compared   with the prior CT scan.    Sclerotic change involving the L5-S1 endplates again seen. There is   increased erosion involving the superior S1 endplate when compared with   the prior study. This could be due to chronic degenerative changes though  the stability of underlying infection in the correct clinical setting   cannot be entirely excluded. Clinical correlation and continued close   interval follow-up is recommended.    Compression deformity involving the superior endplate of L1 is again seen   and unchanged.    Bilateral hypertrophic facet joint changes are seen multiple levels as   well as scattered Schmorl's nodes. These findings are all secondary to   degenerative changes.    Evaluation of the paraspinal soft tissues is somewhat limited by lack of   IV contrast and artifact. Aside from postop changes the paraspinal soft   tissues is grossly unremarkable.    Impression: Extensive postop changes are again seen.    Increased erosion of the superior endplate of S1 is identifiedwhen   compared with the prior study as described above.    < end of copied text >  from L3 to L5. The metallic hardware appears adequately placed.    Widening of the L5-S1 disc space is seen when compared the prior MRI.   There is also abnormal T1 and T2 prolongation involving the adjacent   endplates with associated enhancement identified. All these findings   could be related to prior surgery with degenerative changes though the   possibility of underlying discitis and osteomyelitis cannot be entirely   excluded in the correct clinical setting. Clinical correlation and   continued close interval follow-up is recommended.     L1-2: Bilateral hypertrophic facet joint changes are seen. Moderate   narrowing of the spinal canal is seen. Mild narrowing of both neural   foramen.    L2-3: Disc bulge and bilateral hypertrophic facet joint changes seen.   Moderate narrowing of the spinal canal and mild narrowing of both neural   foramen    L3-4: Bilateral hypertrophic facet joint changes seen. Mild narrowing of   the spinal canal. Mild to moderate narrowing of the right neural foramen   and moderate to severe narrowing of the left neural foramen.    L4-5: Bilateral hypertrophic facet joint changes seen. Mild narrowing of   the spinal canal and both neural foramen    L5-S1: Disc bulge and bilateral hypertrophic facet joint changes are   seen. This disc bulge does appear to abut the left S1 nerve root Mild to   moderate narrowing left neural foramen is seen. No abnormal masses or   collections are seen    No abnormal areas enhancement seen.    Impression: Extensive degenerative changes as described above.    New postop (when compared with prior MRI)changes involving the L5-S1   disc space is seen with widening of the disc space abnormal signal and   enhancement involving the adjacent endplates. These findings could be the   result of postop changes though degenerative changes and early   discitis/osteomyelitis cannot be entirely excluded. Clinical correlation   and continued close interval follow-up is recommended.    < end of copied text >

## 2019-04-24 NOTE — CHART NOTE - NSCHARTNOTEFT_GEN_A_CORE
Pt with Codman Hakim Valve, reprogrammed after MRI to setting of 110 reported by patient. Pt will have Skull X-Ray to confirm setting.

## 2019-04-24 NOTE — CONSULT NOTE ADULT - SUBJECTIVE AND OBJECTIVE BOX
Patient is a 68y old  Male who presents with a chief complaint of acute on chronic back pain (24 Apr 2019 13:56)      HPI:  68M w/ chronic lower back pain, VPS for NPH and multiple back surgeries by different surgeons in different hospitals presents to the ED w/ worsening back pain and difficulty ambulating. He states the pain in left lower back radiating to the left leg with weakness. He states that he was seen a few days ago at [facility] and CT scan showed compression fracture fo L1. However, his pain was slightly lower down around L5. He has had work done on L5 in the past and had issues w/ the screws there. However, he was told this time that the area near L5 appeared wnl.  Was given flexeril and medrol but no significant improvement in pain. Last Spine Surgery about a year ago at [facility] SEUN and Bone Graft. Pt reports he was told he had loosening of screws from previous surgery. Pt also reports MRI from March, surgeon [facility] said there was nothing to do. Pt reports he is bedbound at home and depends on the help from his friend. No fever/chills/cp/sob/dizziness/fall/trauma.   While in ER, CT lumber showed extensive postop changes and increased erosion of the superior endplate of S1. s/p eval by neurosx, no sx intervention. (19 Apr 2019 18:53)      MRI- Widening of the L5-S1 disc space is seen when compared the prior MRI. There   is also abnormal T1 and T2 prolongation involving the adjacent endplates   with associated enhancement identified. All these findings could be related   to prior surgery with degenerative changes though the possibility of   underlying discitis and osteomyelitis cannot be entirely excluded in the   correct clinical setting. Clinical correlation and continued close interval   follow-up is recommended.     No NSG intervention. Seen by pain service, ID r/o discitis.   REVIEW OF SYSTEMS: No chest pain, shortness of breath, nausea, vomiting or diarhea.      PAST MEDICAL & SURGICAL HISTORY  NPH (normal pressure hydrocephalus)  Chronic back pain greater than 3 months duration  Small intestine disorder  Lumbar disc displacement without myelopathy  Sciatica  Anxiety  Vertebral Sciatica  Insomnia  Depression  HTN (Hypertension)  S/P lumbar fusion  Dehiscence, Operation Wound/Debridement  S/P Laminectomy      SOCIAL HISTORY  Smoking - Denied, EtOH - Denied, Drugs - Denied    FUNCTIONAL HISTORY:   Lives   Independent    CURRENT FUNCTIONAL STATUS:      FAMILY HISTORY   No pertinent family history in first degree relatives  Family history of CKD (chronic kidney disease)  Family history of lung cancer      RECENT LABS/IMAGING  CBC Full  -  ( 23 Apr 2019 05:57 )  WBC Count : 8.50 K/uL  RBC Count : 4.53 M/uL  Hemoglobin : 13.9 g/dL  Hematocrit : 42.4 %  Platelet Count - Automated : 202 K/uL  Mean Cell Volume : 93.6 fL  Mean Cell Hemoglobin : 30.7 pg  Mean Cell Hemoglobin Concentration : 32.8 %  Auto Neutrophil # : x  Auto Lymphocyte # : x  Auto Monocyte # : x  Auto Eosinophil # : x  Auto Basophil # : x  Auto Neutrophil % : x  Auto Lymphocyte % : x  Auto Monocyte % : x  Auto Eosinophil % : x  Auto Basophil % : x    04-23    143  |  107  |  18  ----------------------------<  96  4.0   |  26  |  0.95    Ca    9.2      23 Apr 2019 05:57          VITALS  T(C): 36.4 (04-24-19 @ 13:52), Max: 37.1 (04-23-19 @ 21:38)  HR: 79 (04-24-19 @ 13:52) (70 - 80)  BP: 120/81 (04-24-19 @ 13:52) (120/81 - 170/85)  RR: 18 (04-24-19 @ 13:52) (18 - 19)  SpO2: 97% (04-24-19 @ 13:52) (97% - 99%)  Wt(kg): --    ALLERGIES  Biaxin (Other)  Lidoderm (Unknown)  morphine (Short breath; Rash)      MEDICATIONS   ALPRAZolam 1 milliGRAM(s) Oral every 6 hours PRN  bisacodyl Suppository 10 milliGRAM(s) Rectal once  citalopram 40 milliGRAM(s) Oral daily  docusate sodium 100 milliGRAM(s) Oral daily  enoxaparin Injectable 40 milliGRAM(s) SubCutaneous every 24 hours  gabapentin 400 milliGRAM(s) Oral every 8 hours  HYDROmorphone   Tablet 4 milliGRAM(s) Oral every 4 hours PRN  HYDROmorphone   Tablet 2 milliGRAM(s) Oral every 4 hours PRN  losartan 100 milliGRAM(s) Oral daily  polyethylene glycol 3350 17 Gram(s) Oral daily  senna 2 Tablet(s) Oral at bedtime  tamsulosin 0.4 milliGRAM(s) Oral at bedtime  tiZANidine 2 milliGRAM(s) Oral every 8 hours  verapamil  milliGRAM(s) Oral daily  zolpidem 5 milliGRAM(s) Oral at bedtime PRN      ----------------------------------------------------------------------------------------  PHYSICAL EXAM  Constitutional - NAD, Comfortable  HEENT - NCAT, EOMI  Neck - Supple, No limited ROM  Chest - CTA bilaterally, No wheeze, No rhonchi, No crackles  Cardiovascular - RRR, S1S2, No murmurs  Abdomen - BS+, Soft, NTND  Extremities - No C/C/E, No calf tenderness   Neurologic Exam -                    Cognitive - Awake, Alert, AAO to self, place, date, year, situation     Communication - Fluent, No dysarthria, no aphasia     Cranial Nerves - CN 2-12 intact     Motor - No focal deficits                       Sensory - Intact to LT     Reflexes - DTR Intact, No primitive reflexive     Balance - WNL Static  Psychiatric - Mood stable, Affect WNL HPI:  68M w/ chronic lower back pain, VPS for NPH and multiple back surgeries by different surgeons in different hospitals presents to the ED w/ worsening back pain and difficulty ambulating. He states the pain in left lower back radiating to the left leg with weakness. He states that he was seen a few days ago at [place] and CT scan showed compression fracture fo L1. However, his pain was slightly lower down around L5. He has had work done on L5 in the past and had issues w/ the screws there. However, he was told this time that the area near L5 appeared wnl.  Was given flexeril and medrol but no significant improvement in pain. Last Spine Surgery about a year ago at [facility] SEUN and Bone Graft. Pt reports he was told he had loosening of screws from previous surgery. Pt also [facility]. Pt reports he is bedbound at home and depends on the help from his friend. No fever/chills/cp/sob/dizziness/fall/trauma.   While in ER, CT lumber showed extensive postop changes and increased erosion of the superior endplate of S1. s/p eval by neurosx, no sx intervention. (19 Apr 2019 18:53)      MRI- Widening of the L5-S1 disc space is seen when compared the prior MRI. There   is also abnormal T1 and T2 prolongation involving the adjacent endplates   with associated enhancement identified. All these findings could be related   to prior surgery with degenerative changes though the possibility of   underlying discitis and osteomyelitis cannot be entirely excluded in the   correct clinical setting. Clinical correlation and continued close interval   follow-up is recommended.     No NSG intervention. Seen by pain service, ID r/o discitis.   Had severe low back pain this am, radiating down left leg.   States that no meds, injections have helped.     REVIEW OF SYSTEMS: No chest pain, shortness of breath, nausea, vomiting or diarhea.      PAST MEDICAL & SURGICAL HISTORY  NPH (normal pressure hydrocephalus)  Chronic back pain greater than 3 months duration  Small intestine disorder  Lumbar disc displacement without myelopathy  Sciatica  Anxiety  Vertebral Sciatica  Insomnia  Depression  HTN (Hypertension)  S/P lumbar fusion  Dehiscence, Operation Wound/Debridement  S/P Laminectomy      SOCIAL HISTORY  Smoking - Denied, EtOH - Denied, Drugs - Denied    CURRENT FUNCTIONAL STATUS: CGA       FAMILY HISTORY   No pertinent family history in first degree relatives  Family history of CKD (chronic kidney disease)  Family history of lung cancer      RECENT LABS/IMAGING  CBC Full  -  ( 23 Apr 2019 05:57 )  WBC Count : 8.50 K/uL  RBC Count : 4.53 M/uL  Hemoglobin : 13.9 g/dL  Hematocrit : 42.4 %  Platelet Count - Automated : 202 K/uL  Mean Cell Volume : 93.6 fL  Mean Cell Hemoglobin : 30.7 pg  Mean Cell Hemoglobin Concentration : 32.8 %  Auto Neutrophil # : x  Auto Lymphocyte # : x  Auto Monocyte # : x  Auto Eosinophil # : x  Auto Basophil # : x  Auto Neutrophil % : x  Auto Lymphocyte % : x  Auto Monocyte % : x  Auto Eosinophil % : x  Auto Basophil % : x    04-23    143  |  107  |  18  ----------------------------<  96  4.0   |  26  |  0.95    Ca    9.2      23 Apr 2019 05:57          VITALS  T(C): 36.4 (04-24-19 @ 13:52), Max: 37.1 (04-23-19 @ 21:38)  HR: 79 (04-24-19 @ 13:52) (70 - 80)  BP: 120/81 (04-24-19 @ 13:52) (120/81 - 170/85)  RR: 18 (04-24-19 @ 13:52) (18 - 19)  SpO2: 97% (04-24-19 @ 13:52) (97% - 99%)  Wt(kg): --    ALLERGIES  Biaxin (Other)  Lidoderm (Unknown)  morphine (Short breath; Rash)      MEDICATIONS   ALPRAZolam 1 milliGRAM(s) Oral every 6 hours PRN  bisacodyl Suppository 10 milliGRAM(s) Rectal once  citalopram 40 milliGRAM(s) Oral daily  docusate sodium 100 milliGRAM(s) Oral daily  enoxaparin Injectable 40 milliGRAM(s) SubCutaneous every 24 hours  gabapentin 400 milliGRAM(s) Oral every 8 hours  HYDROmorphone   Tablet 4 milliGRAM(s) Oral every 4 hours PRN  HYDROmorphone   Tablet 2 milliGRAM(s) Oral every 4 hours PRN  losartan 100 milliGRAM(s) Oral daily  polyethylene glycol 3350 17 Gram(s) Oral daily  senna 2 Tablet(s) Oral at bedtime  tamsulosin 0.4 milliGRAM(s) Oral at bedtime  tiZANidine 2 milliGRAM(s) Oral every 8 hours  verapamil  milliGRAM(s) Oral daily  zolpidem 5 milliGRAM(s) Oral at bedtime PRN      ----------------------------------------------------------------------------------------  PHYSICAL EXAM  Constitutional - NAD, Comfortable  HEENT - NCAT, EOMI  Neck - Supple, No limited ROM  Chest - CTA bilaterally, No wheeze, No rhonchi, No crackles  Cardiovascular - RRR, S1S2, No murmurs  Abdomen - BS+, Soft, NTND  Extremities - No C/C/E, No calf tenderness   MSK: decreased ROM LS, + TTP lumbar paraspinals   Neurologic Exam -                    Cognitive - Awake, Alert, AAO to self, place, date, year, situation     Communication - Fluent, No dysarthria, no aphasia     Cranial Nerves - CN 2-12 intact     Motor - 5/5 LE                        Sensory - Intact to LT     Reflexes - DTR Intact, No primitive reflexive     Balance - WNL Static  Psychiatric - Mood stable, Affect WNL

## 2019-04-24 NOTE — PROGRESS NOTE BEHAVIORAL HEALTH - NSBHADMITCOUNSEL_PSY_A_CORE
instructions for management, treatment and follow up/importance of adherence to chosen treatment/client/family/caregiver education/risk factor reduction/risks and benefits of treatment options

## 2019-04-24 NOTE — PROGRESS NOTE BEHAVIORAL HEALTH - NSBHCHARTREVIEWLAB_PSY_A_CORE FT
CBC Full  -  ( 23 Apr 2019 05:57 )  WBC Count : 8.50 K/uL  RBC Count : 4.53 M/uL  Hemoglobin : 13.9 g/dL  Hematocrit : 42.4 %  Platelet Count - Automated : 202 K/uL  Mean Cell Volume : 93.6 fL  Mean Cell Hemoglobin : 30.7 pg  Mean Cell Hemoglobin Concentration : 32.8 %  Auto Neutrophil # : x  Auto Lymphocyte # : x  Auto Monocyte # : x  Auto Eosinophil # : x  Auto Basophil # : x  Auto Neutrophil % : x  Auto Lymphocyte % : x  Auto Monocyte % : x  Auto Eosinophil % : x  Auto Basophil % : x  04-23    143  |  107  |  18  ----------------------------<  96  4.0   |  26  |  0.95    Ca    9.2      23 Apr 2019 05:57

## 2019-04-24 NOTE — PROGRESS NOTE BEHAVIORAL HEALTH - NSBHFUPINTERVALHXFT_PSY_A_CORE
Met with the patient. Lying uncomfortably in bed, writhing in pain. Frustrated about his ongoing symptoms and medications being ineffective. Admits to ongoing sadness and anxiety in context to above symptoms. denies any si  Care coordinated with Dr Rojas.  Discussed with Dr Parkinson [phone]. States that patient was on TCA in the past with anticholinergic side effects from it- urinary retention.

## 2019-04-24 NOTE — PROGRESS NOTE ADULT - SUBJECTIVE AND OBJECTIVE BOX
Dr. Lyle pager [phone]    Patient is a 68y old  Male who presents with a chief complaint of acute on chronic back pain (24 Apr 2019 14:24)      SUBJECTIVE / OVERNIGHT EVENTS: pt seen at 1145a- still in pain- "nothing works"- including iv dilaudid; pt's friend was pushing for "experimental" meds - even asked about ketamine.  Apparently whenever discharge is mentioned pt gets upset.     MEDICATIONS  (STANDING):  bisacodyl Suppository 10 milliGRAM(s) Rectal once  citalopram 40 milliGRAM(s) Oral daily  docusate sodium 100 milliGRAM(s) Oral daily  enoxaparin Injectable 40 milliGRAM(s) SubCutaneous every 24 hours  gabapentin 400 milliGRAM(s) Oral every 8 hours  losartan 100 milliGRAM(s) Oral daily  polyethylene glycol 3350 17 Gram(s) Oral daily  senna 2 Tablet(s) Oral at bedtime  tamsulosin 0.4 milliGRAM(s) Oral at bedtime  tiZANidine 2 milliGRAM(s) Oral every 8 hours  verapamil  milliGRAM(s) Oral daily    MEDICATIONS  (PRN):  ALPRAZolam 1 milliGRAM(s) Oral every 6 hours PRN Anxiety  HYDROmorphone   Tablet 4 milliGRAM(s) Oral every 4 hours PRN Severe Pain (7 - 10)  HYDROmorphone   Tablet 2 milliGRAM(s) Oral every 4 hours PRN Moderate Pain (4 - 6)  zolpidem 5 milliGRAM(s) Oral at bedtime PRN Insomnia      Meds ordered within last 24hours  zolpidem: [Ordered as AMBIEN]  5 milliGRAM(s), Oral, once, PRN for Insomnia, Stop After 1 Doses  Administration Instructions: This medication should not be administered with or immediately following a meal.  Provider's Contact #: [phone] (04-23 @ 22:47)  ketorolac   Injectable: [Ordered as TORADOL Injectable]  15 milliGRAM(s), IV Push, once, Stop After 1 Doses  Administration Instructions: IF IV PUSH, ADMINISTER OVER 1 MINUTE  Provider's Contact #: [phone] (04-24 @ 01:51)  ketorolac   Injectable: [Ordered as TORADOL Injectable]  15 milliGRAM(s), IV Push, once, Stop After 1 Doses  Administration Instructions: IF IV PUSH, ADMINISTER OVER 1 MINUTE  Provider's Contact #: [phone] (04-24 @ 10:00)  HYDROmorphone   Tablet: [Ordered as DILAUDID]  4 milliGRAM(s), Oral, every 4 hours, PRN for Severe Pain (7 - 10)  Provider's Contact #: [phone] (04-24 @ 12:43)  HYDROmorphone   Tablet: [Ordered as DILAUDID]  2 milliGRAM(s), Oral, every 4 hours, PRN for Moderate Pain (4 - 6)  Special Instructions: please give the dilaudid 4mg and 2mg doses 4h apart from each other  Provider's Contact #: [phone] (04-24 @ 12:43)  tiZANidine: [Known as ZANAFLEX]  2 milliGRAM(s), Oral, every 8 hours, Stop After 2 Days  Special Instructions: Hold for Oversedation  Administration Instructions: This is a Look-alike/Sound-alike Medication  NON-FORMULARY REQUEST: Spasms  Provider's Contact #: [phone] (04-24 @ 12:50)      T(C): 36.4 (04-24-19 @ 13:52), Max: 37.1 (04-23-19 @ 21:38)  HR: 79 (04-24-19 @ 13:52) (70 - 80)  BP: 120/81 (04-24-19 @ 13:52) (120/81 - 170/85)  RR: 18 (04-24-19 @ 13:52) (18 - 19)  SpO2: 97% (04-24-19 @ 13:52) (97% - 99%)    CAPILLARY BLOOD GLUCOSE        I&O's Summary    23 Apr 2019 07:01  -  24 Apr 2019 07:00  --------------------------------------------------------  IN: 0 mL / OUT: 800 mL / NET: -800 mL        PHYSICAL EXAM:  GENERAL: NAD  CHEST/LUNG: Clear to auscultation bilaterally; No wheeze  HEART: Regular rate and rhythm; No murmurs, rubs, or gallops  ABDOMEN: Soft, Nontender, Nondistended; Bowel sounds present  EXTREMITIES:  No clubbing, cyanosis, or edema  NEURO: A&Ox3      LABS:                        13.9   8.50  )-----------( 202      ( 23 Apr 2019 05:57 )             42.4     04-23    143  |  107  |  18  ----------------------------<  96  4.0   |  26  |  0.95    Ca    9.2      23 Apr 2019 05:57                RADIOLOGY & ADDITIONAL TESTS:    Imaging Personally Reviewed:    Consultant(s) Notes Reviewed:      Care Discussed with Consultants/Other Providers: Dr. Lyle pager [phone]    Patient is a 68y old  Male who presents with a chief complaint of acute on chronic back pain (24 Apr 2019 14:24)      SUBJECTIVE / OVERNIGHT EVENTS: pt seen at 1145a- still in pain- "nothing works"- including iv dilaudid; pt's friend was pushing for "experimental" meds - even asked about ketamine.  Apparently whenever discharge is mentioned pt gets upset.  friend mentioned pain management at rehab as well    MEDICATIONS  (STANDING):  bisacodyl Suppository 10 milliGRAM(s) Rectal once  citalopram 40 milliGRAM(s) Oral daily  docusate sodium 100 milliGRAM(s) Oral daily  enoxaparin Injectable 40 milliGRAM(s) SubCutaneous every 24 hours  gabapentin 400 milliGRAM(s) Oral every 8 hours  losartan 100 milliGRAM(s) Oral daily  polyethylene glycol 3350 17 Gram(s) Oral daily  senna 2 Tablet(s) Oral at bedtime  tamsulosin 0.4 milliGRAM(s) Oral at bedtime  tiZANidine 2 milliGRAM(s) Oral every 8 hours  verapamil  milliGRAM(s) Oral daily    MEDICATIONS  (PRN):  ALPRAZolam 1 milliGRAM(s) Oral every 6 hours PRN Anxiety  HYDROmorphone   Tablet 4 milliGRAM(s) Oral every 4 hours PRN Severe Pain (7 - 10)  HYDROmorphone   Tablet 2 milliGRAM(s) Oral every 4 hours PRN Moderate Pain (4 - 6)  zolpidem 5 milliGRAM(s) Oral at bedtime PRN Insomnia      Meds ordered within last 24hours  zolpidem: [Ordered as AMBIEN]  5 milliGRAM(s), Oral, once, PRN for Insomnia, Stop After 1 Doses  Administration Instructions: This medication should not be administered with or immediately following a meal.  Provider's Contact #: [phone] (04-23 @ 22:47)  ketorolac   Injectable: [Ordered as TORADOL Injectable]  15 milliGRAM(s), IV Push, once, Stop After 1 Doses  Administration Instructions: IF IV PUSH, ADMINISTER OVER 1 MINUTE  Provider's Contact #: [phone] (04-24 @ 01:51)  ketorolac   Injectable: [Ordered as TORADOL Injectable]  15 milliGRAM(s), IV Push, once, Stop After 1 Doses  Administration Instructions: IF IV PUSH, ADMINISTER OVER 1 MINUTE  Provider's Contact #: [phone] (04-24 @ 10:00)  HYDROmorphone   Tablet: [Ordered as DILAUDID]  4 milliGRAM(s), Oral, every 4 hours, PRN for Severe Pain (7 - 10)  Provider's Contact #: [phone] (04-24 @ 12:43)  HYDROmorphone   Tablet: [Ordered as DILAUDID]  2 milliGRAM(s), Oral, every 4 hours, PRN for Moderate Pain (4 - 6)  Special Instructions: please give the dilaudid 4mg and 2mg doses 4h apart from each other  Provider's Contact #: [phone] (04-24 @ 12:43)  tiZANidine: [Known as ZANAFLEX]  2 milliGRAM(s), Oral, every 8 hours, Stop After 2 Days  Special Instructions: Hold for Oversedation  Administration Instructions: This is a Look-alike/Sound-alike Medication  NON-FORMULARY REQUEST: Spasms  Provider's Contact #: [phone] (04-24 @ 12:50)      T(C): 36.4 (04-24-19 @ 13:52), Max: 37.1 (04-23-19 @ 21:38)  HR: 79 (04-24-19 @ 13:52) (70 - 80)  BP: 120/81 (04-24-19 @ 13:52) (120/81 - 170/85)  RR: 18 (04-24-19 @ 13:52) (18 - 19)  SpO2: 97% (04-24-19 @ 13:52) (97% - 99%)    CAPILLARY BLOOD GLUCOSE        I&O's Summary    23 Apr 2019 07:01  -  24 Apr 2019 07:00  --------------------------------------------------------  IN: 0 mL / OUT: 800 mL / NET: -800 mL        PHYSICAL EXAM:  GENERAL: NAD  CHEST/LUNG: Clear to auscultation bilaterally; No wheeze  HEART: Regular rate and rhythm; No murmurs, rubs, or gallops  ABDOMEN: Soft, Nontender, Nondistended; Bowel sounds present  EXTREMITIES:  No clubbing, cyanosis, or edema  NEURO: A&Ox3      LABS:                        13.9   8.50  )-----------( 202      ( 23 Apr 2019 05:57 )             42.4     04-23    143  |  107  |  18  ----------------------------<  96  4.0   |  26  |  0.95    Ca    9.2      23 Apr 2019 05:57                RADIOLOGY & ADDITIONAL TESTS:    Imaging Personally Reviewed:    Consultant(s) Notes Reviewed:      Care Discussed with Consultants/Other Providers:

## 2019-04-24 NOTE — PROGRESS NOTE ADULT - PROBLEM SELECTOR PLAN 2
Pt reports feeling depressed, but no SI  He initially declined psych eval, but agreed later Pt reports feeling depressed, but no SI  psych saw pt- d/w outpt psychiatrist pt did not tolerate TCA's in the past

## 2019-04-24 NOTE — PROGRESS NOTE ADULT - PROBLEM SELECTOR PLAN 3
stable  neurosurg will help us w the  shunt after the MRI done stable  neurosurg helped us w the  shunt after the MRI done

## 2019-04-24 NOTE — PROGRESS NOTE ADULT - ASSESSMENT
68M w/ chronic lower back pain, VPS for NPH and multiple back surgeries by different surgeons in different hospitals presents to the ED w/ worsening back pain and difficulty ambulating- MRI spine pending to see what route of rx we adopt- surgical vs medical- likely medical- concerned that "nothing works" for patient 68M w/ chronic lower back pain, VPS for NPH and multiple back surgeries by different surgeons in different hospitals presents to the ED w/ worsening back pain and difficulty ambulating- MRI spine pending to see what route of rx we adopt- surgical vs medical- likely medical- concerned that "nothing works" for patient- MRI w/ likely DJD vs post op changes- less likely discitis

## 2019-04-24 NOTE — CONSULT NOTE ADULT - ASSESSMENT
67 yo man with chronic low back pain, multiple surgical procedures (10) including lumbar fusion, hardware replacement for ?infection 10 years ago who presented to [facility] in 11/2018 with similar complaints. workup at that time included blood cultures (neg), ESR normal, CT scan.  Today afebrile, on exam no erythema lower back area,  WBC normal, ESR 3.  Scans including MRI, CTs reviewed with neuroradiologist.   Low suspicion for infection.     Monitor clinically.    Defer antibiotics for now.  Would check blood cultures and CRP.    I will be away 4/25.  ID service will follow.    Selene Lopez MD  Pager: [phone]  After 5 PM or weekends please call fellow on call or office [phone]

## 2019-04-24 NOTE — PROGRESS NOTE BEHAVIORAL HEALTH - SUMMARY
Briefly, the patient is a 68 year old male, single, who lives at home with assistance from his friend Alfredo.  Has medical history notable for chronic lower back pain, multiple back surgeries since 2007 by different surgeons in different hospitals, and VPS for NPH.  Previous psychiatric history significant for MDD and anxiety but has never been hospitalized for psychiatric issues.  Sees a psychiatrist (Dr. Cory Parkinson) 1x/week.  Patient presents to the ED w/ worsening back pain and difficulty ambulating. As per ED records-He states the pain in left lower back radiating to the left leg with weakness. He states that he was seen a few days ago at [place] and CT scan showed compression fracture fo L1. However, his pain was slightly lower down around L5. He has had work done on L5 in the past and had issues w/ the screws there. However, he was told this time that the area near L5 appeared wnl.  Was given Flexeril and Medrol but no significant improvement in pain. Last Spine Surgery about a year ago at [facility]. Pt reports he was told he had loosening of screws from previous surgery. Pt also reports MRI from March, surgeon [facility] said there was nothing to do. Pt reports able to ambulate with cane but is extremely painful to do so.  As per HPI- No fever/chills/cp/sob/dizziness/fall/trauma.  Psychiatry consulted for depressive symptoms.  Based on assessment done today, patient endorses chronic depressive and anxiety symptoms dating back to his high school years, with some remission over the subsequent years, to emerge again around year 2009. Symptoms of depression have continued since then only to become more pronounced in the last few years in context to worsening pain, difficulty ambulating and increasing dependence on friends. He denies any si or hi nor psychosis, and is future oriented- hopeful that he would get better.   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 Bhandari  ***Considering frequent admissions, would benefit from CM services in community for better coordination of care ***  - Continue with PT recs  - Continue Celexa 40 mg PO 1x/day for depression  - Continue Ambien 10 mg PO QHS for sleep  - Continue Xanax 1mg PO PRN Q6h for anxiety.

## 2019-04-24 NOTE — CONSULT NOTE ADULT - ASSESSMENT
chronic low back pain  would rec medrol dosepak  continue current meds   needs outpt pain management apt now  will follow

## 2019-04-25 LAB — CRP SERPL-MCNC: < 4 MG/L — SIGNIFICANT CHANGE UP

## 2019-04-25 PROCEDURE — 99233 SBSQ HOSP IP/OBS HIGH 50: CPT

## 2019-04-25 PROCEDURE — 70250 X-RAY EXAM OF SKULL: CPT | Mod: 26

## 2019-04-25 PROCEDURE — 99232 SBSQ HOSP IP/OBS MODERATE 35: CPT

## 2019-04-25 RX ORDER — HYDROMORPHONE HYDROCHLORIDE 2 MG/ML
2 INJECTION INTRAMUSCULAR; INTRAVENOUS; SUBCUTANEOUS EVERY 4 HOURS
Qty: 0 | Refills: 0 | Status: DISCONTINUED | OUTPATIENT
Start: 2019-04-25 | End: 2019-04-26

## 2019-04-25 RX ORDER — ALPRAZOLAM 0.25 MG
1 TABLET ORAL EVERY 6 HOURS
Qty: 0 | Refills: 0 | Status: DISCONTINUED | OUTPATIENT
Start: 2019-04-25 | End: 2019-04-26

## 2019-04-25 RX ORDER — HYDROMORPHONE HYDROCHLORIDE 2 MG/ML
4 INJECTION INTRAMUSCULAR; INTRAVENOUS; SUBCUTANEOUS EVERY 4 HOURS
Qty: 0 | Refills: 0 | Status: DISCONTINUED | OUTPATIENT
Start: 2019-04-25 | End: 2019-04-26

## 2019-04-25 RX ORDER — ZOLPIDEM TARTRATE 10 MG/1
5 TABLET ORAL AT BEDTIME
Qty: 0 | Refills: 0 | Status: DISCONTINUED | OUTPATIENT
Start: 2019-04-25 | End: 2019-04-25

## 2019-04-25 RX ORDER — ZOLPIDEM TARTRATE 10 MG/1
5 TABLET ORAL AT BEDTIME
Qty: 0 | Refills: 0 | Status: DISCONTINUED | OUTPATIENT
Start: 2019-04-25 | End: 2019-04-26

## 2019-04-25 RX ORDER — ALPRAZOLAM 0.25 MG
1 TABLET ORAL ONCE
Qty: 0 | Refills: 0 | Status: DISCONTINUED | OUTPATIENT
Start: 2019-04-25 | End: 2019-04-25

## 2019-04-25 RX ADMIN — GABAPENTIN 400 MILLIGRAM(S): 400 CAPSULE ORAL at 13:25

## 2019-04-25 RX ADMIN — ENOXAPARIN SODIUM 40 MILLIGRAM(S): 100 INJECTION SUBCUTANEOUS at 18:11

## 2019-04-25 RX ADMIN — Medication 24 MILLIGRAM(S): at 15:11

## 2019-04-25 RX ADMIN — ZOLPIDEM TARTRATE 5 MILLIGRAM(S): 10 TABLET ORAL at 22:25

## 2019-04-25 RX ADMIN — Medication 360 MILLIGRAM(S): at 05:49

## 2019-04-25 RX ADMIN — TAMSULOSIN HYDROCHLORIDE 0.4 MILLIGRAM(S): 0.4 CAPSULE ORAL at 21:15

## 2019-04-25 RX ADMIN — Medication 1 MILLIGRAM(S): at 00:49

## 2019-04-25 RX ADMIN — HYDROMORPHONE HYDROCHLORIDE 4 MILLIGRAM(S): 2 INJECTION INTRAMUSCULAR; INTRAVENOUS; SUBCUTANEOUS at 12:17

## 2019-04-25 RX ADMIN — Medication 1 MILLIGRAM(S): at 12:12

## 2019-04-25 RX ADMIN — HYDROMORPHONE HYDROCHLORIDE 4 MILLIGRAM(S): 2 INJECTION INTRAMUSCULAR; INTRAVENOUS; SUBCUTANEOUS at 11:17

## 2019-04-25 RX ADMIN — TIZANIDINE 2 MILLIGRAM(S): 4 TABLET ORAL at 21:15

## 2019-04-25 RX ADMIN — GABAPENTIN 400 MILLIGRAM(S): 400 CAPSULE ORAL at 21:15

## 2019-04-25 RX ADMIN — Medication 100 MILLIGRAM(S): at 11:08

## 2019-04-25 RX ADMIN — Medication 1 MILLIGRAM(S): at 11:08

## 2019-04-25 RX ADMIN — Medication 1 MILLIGRAM(S): at 19:22

## 2019-04-25 RX ADMIN — TIZANIDINE 2 MILLIGRAM(S): 4 TABLET ORAL at 13:24

## 2019-04-25 RX ADMIN — ZOLPIDEM TARTRATE 5 MILLIGRAM(S): 10 TABLET ORAL at 21:15

## 2019-04-25 RX ADMIN — LOSARTAN POTASSIUM 100 MILLIGRAM(S): 100 TABLET, FILM COATED ORAL at 05:50

## 2019-04-25 RX ADMIN — TIZANIDINE 2 MILLIGRAM(S): 4 TABLET ORAL at 05:49

## 2019-04-25 RX ADMIN — CITALOPRAM 40 MILLIGRAM(S): 10 TABLET, FILM COATED ORAL at 11:08

## 2019-04-25 RX ADMIN — GABAPENTIN 400 MILLIGRAM(S): 400 CAPSULE ORAL at 05:51

## 2019-04-25 NOTE — PROGRESS NOTE ADULT - ASSESSMENT
68M w/ chronic lower back pain, VPS for NPH and multiple back surgeries by different surgeons in different hospitals presents to the ED w/ worsening back pain and difficulty ambulating- MRI spine pending to see what route of rx we adopt- surgical vs medical- likely medical- concerned that "nothing works" for patient- MRI w/ likely DJD vs post op changes- less likely discitis- CRP<4  will start medrol dosepak

## 2019-04-25 NOTE — PROGRESS NOTE BEHAVIORAL HEALTH - SUMMARY
Briefly, the patient is a 68 year old male, single, who lives at home with assistance from his friend Alfredo.  Has medical history notable for chronic lower back pain, multiple back surgeries since 2007 by different surgeons in different hospitals, and VPS for NPH.  Previous psychiatric history significant for MDD and anxiety but has never been hospitalized for psychiatric issues.  Sees a psychiatrist (Dr. Cory Parkinson) 1x/week.  Patient presents to the ED w/ worsening back pain and difficulty ambulating. As per ED records-He states the pain in left lower back radiating to the left leg with weakness. He states that he was seen a few days ago at [place] and CT scan showed compression fracture fo L1. However, his pain was slightly lower down around L5. He has had work done on L5 in the past and had issues w/ the screws there. However, he was told this time that the area near L5 appeared wnl.  Was given Flexeril and Medrol but no significant improvement in pain. [facility]. Pt reports he was told he had loosening of screws from previous surgery. Pt also reports MRI from March, surgeon [facility] said there was nothing to do. Pt reports able to ambulate with cane but is extremely painful to do so.  As per HPI- No fever/chills/cp/sob/dizziness/fall/trauma.  Psychiatry consulted for depressive symptoms.    4/24= in pain, anxious, frustrated    4/25= remains in pain (although mild improvement), anxious, and frustrated.    RECOMMENDATIONS:  - Routine observation  - Collaborate care with Alfredo  ***Considering frequent admissions, would benefit from CM services in community for better coordination of care ***  - Continue with PT recs  - Continue Celexa 40 mg PO 1x/day for depression  - Continue Ambien 10 mg PO QHS for sleep  - Continue Xanax 1mg PO PRN Q6h for anxiety. Briefly, the patient is a 68 year old male, single, who lives at home with assistance from his friend Alfredo.  Has medical history notable for chronic lower back pain, multiple back surgeries since 2007 by different surgeons in different hospitals, and VPS for NPH.  Previous psychiatric history significant for MDD and anxiety but has never been hospitalized for psychiatric issues.  Sees a psychiatrist (Dr. Cory Parkinson) 1x/week.  Patient presents to the ED w/ worsening back pain and difficulty ambulating. As per ED records-He states the pain in left lower back radiating to the left leg with weakness. He states that he was seen a few days ago at [place] and CT scan showed compression fracture fo L1. However, his pain was slightly lower down around L5. He has had work done on L5 in the past and had issues w/ the screws there. However, he was told this time that the area near L5 appeared wnl.  Was given Flexeril and Medrol but no significant improvement in pain. [facility]. Pt reports he was told he had loosening of screws from previous surgery. Pt also reports MRI from March, surgeon [facility] said there was nothing to do. Pt reports able to ambulate with cane but is extremely painful to do so.  As per HPI- No fever/chills/cp/sob/dizziness/fall/trauma.  Psychiatry consulted for depressive symptoms.    4/24= in pain, anxious, frustrated    4/25= remains in pain (although mild improvement), anxious, irritable and frustrated.    RECOMMENDATIONS:  - Routine observation  - Collaborate care with Alfredo  ***Considering frequent admissions, would benefit from CM services in community for better coordination of care ***  - Continue with PT recs  - Continue Celexa 40 mg PO 1x/day for depression for now  - Continue Ambien 10 mg PO QHS for sleep  - Continue Xanax 1mg PO PRN Q6h for anxiety.  - Team considering Medrol pack for pain- can worsen anxiety/mood but will make psychotropic med adjustment as needed with continued f/u

## 2019-04-25 NOTE — PROGRESS NOTE ADULT - PROBLEM SELECTOR PLAN 2
Pt reports feeling depressed, but no SI  psych saw pt- d/w outpt psychiatrist pt did not tolerate TCA's in the past

## 2019-04-25 NOTE — PROGRESS NOTE BEHAVIORAL HEALTH - NSBHFUPINTERVALHXFT_PSY_A_CORE
Met with the patient. Still lying uncomfortably in bed but does admit to mild improvement in pain. Continued frustration over pain and ineffectiveness of medications.  Continued sadness and anxiety due to these symptoms. Continues to deny any si.  Also admits frustration due to non-compliance of roommate to quiet hours. Met with the patient. Still lying uncomfortably in bed but does admit to very mild improvement in pain. Continued frustration over pain and ineffectiveness of medications.  Continued sadness and anxiety due to these symptoms. Continues to deny any si but wonders how he can live like this. Also admits frustration due to non-compliance of roommate to quiet hours.   Discussed with RN about above room issues.   Care was coordinated with psychiatrist Dr Parkinson who also feels that current antidepressant has been better compared to prior trials, and that pain has been a major contributory factor to his anxiety and sadness.

## 2019-04-25 NOTE — PROGRESS NOTE BEHAVIORAL HEALTH - NSBHADMITCOUNSEL_PSY_A_CORE
importance of adherence to chosen treatment/risk factor reduction/instructions for management, treatment and follow up/risks and benefits of treatment options importance of adherence to chosen treatment/risk factor reduction/client/family/caregiver education/instructions for management, treatment and follow up/risks and benefits of treatment options

## 2019-04-25 NOTE — PROGRESS NOTE ADULT - SUBJECTIVE AND OBJECTIVE BOX
Dr. Lyle pager [phone]    Patient is a 68y old  Male who presents with a chief complaint of acute on chronic back pain (24 Apr 2019 16:32)      SUBJECTIVE / OVERNIGHT EVENTS: pt seen at  w/ his friend at bedside- had an episode of anxiety this am- better w/ xanax- writer called Dr. Huynh at [phone] at their request to see if needs to be "transferred" to [place].  pt ideally would like to go to rehab  case dw Psych who spoke to outpt psych who also thinks pt would benefit from rehab  case d/w Dr. Galeano- she thinks rehab will be of help to pt as well    MEDICATIONS  (STANDING):  bisacodyl Suppository 10 milliGRAM(s) Rectal once  citalopram 40 milliGRAM(s) Oral daily  docusate sodium 100 milliGRAM(s) Oral daily  enoxaparin Injectable 40 milliGRAM(s) SubCutaneous every 24 hours  gabapentin 400 milliGRAM(s) Oral every 8 hours  losartan 100 milliGRAM(s) Oral daily  methylPREDNISolone   Oral   polyethylene glycol 3350 17 Gram(s) Oral daily  senna 2 Tablet(s) Oral at bedtime  tamsulosin 0.4 milliGRAM(s) Oral at bedtime  tiZANidine 2 milliGRAM(s) Oral every 8 hours  verapamil  milliGRAM(s) Oral daily    MEDICATIONS  (PRN):  ALPRAZolam 1 milliGRAM(s) Oral every 6 hours PRN Anxiety  HYDROmorphone   Tablet 4 milliGRAM(s) Oral every 4 hours PRN Severe Pain (7 - 10)  HYDROmorphone   Tablet 2 milliGRAM(s) Oral every 4 hours PRN Moderate Pain (4 - 6)  zolpidem 5 milliGRAM(s) Oral at bedtime PRN Insomnia      Meds ordered within last 24hours  zolpidem: [Ordered as AMBIEN]  5 milliGRAM(s), Oral, at bedtime, PRN for Insomnia  Indication: pt's home meds  Administration Instructions: This medication should not be administered with or immediately following a meal.  Provider's Contact #: [phone] (04-25 @ 08:30)  ALPRAZolam: [Ordered as XANAX]  1 milliGRAM(s), Oral, every 6 hours, PRN for Anxiety  Administration Instructions: This is a Look-alike/Sound-alike Medication  Provider's Contact #: [phone] (04-25 @ 08:30)  HYDROmorphone   Tablet: [Ordered as DILAUDID]  4 milliGRAM(s), Oral, every 4 hours, PRN for Severe Pain (7 - 10)  Provider's Contact #: [phone] (04-25 @ 08:30)  HYDROmorphone   Tablet: [Ordered as DILAUDID]  2 milliGRAM(s), Oral, every 4 hours, PRN for Moderate Pain (4 - 6)  Special Instructions: please give the dilaudid 4mg and 2mg doses 4h apart from each other  Provider's Contact #: [phone] (04-25 @ 08:30)  ALPRAZolam: [Ordered as XANAX]  1 milliGRAM(s), Oral, once, Stop After 1 Doses  Administration Instructions: This is a Look-alike/Sound-alike Medication  Provider's Contact #: [phone] (04-25 @ 12:00)  methylPREDNISolone:             24 milliGRAM(s), once,  Stop After 1 Doses, starting on 25-Apr-2019;      4 milliGRAM(s), before breakfast,  Stop After 5 Doses, starting on 26-Apr-2019;      4 milliGRAM(s), after lunch,  Stop After 3 Doses, starting on 26-Apr-2019;      4 milliGRAM(s), after dinner,  Stop After 2 Doses, starting on 26-Apr-2019;      8 milliGRAM(s), at bedtime,  Stop After 1 Doses, starting on 26-Apr-2019;      4 milliGRAM(s), at bedtime,  Stop After 3 Doses, starting on 27-Apr-2019; (04-25 @ 13:15)  methylPREDNISolone: [Ordered as MEDROL]  24 milliGRAM(s), Oral, once, Stop After 1 Doses  Administration Instructions: This is a Look-alike/Sound-alike Medication  Provider's Contact #: [phone]; (Taper (medrol): This is order 1 of 6) (04-25 @ 13:26)  methylPREDNISolone: [Ordered as MEDROL]  4 milliGRAM(s), Oral, before breakfast, Stop After 5 Doses  Administration Instructions: This is a Look-alike/Sound-alike Medication  Provider's Contact #: [phone]; (Taper (medrol): This is order 2 of 6) (04-25 @ 13:26)  methylPREDNISolone: [Ordered as MEDROL]  4 milliGRAM(s), Oral, after lunch, Stop After 3 Doses  Administration Instructions: This is a Look-alike/Sound-alike Medication  Provider's Contact #: [phone]; (Taper (medrol): This is order 3 of 6) (04-25 @ 13:26)  methylPREDNISolone: [Ordered as MEDROL]  4 milliGRAM(s), Oral, after dinner, Stop After 2 Doses  Administration Instructions: This is a Look-alike/Sound-alike Medication  Provider's Contact #: [phone]; (Taper (medrol): This is order 4 of 6) (04-25 @ 13:26)  methylPREDNISolone: [Ordered as MEDROL]  8 milliGRAM(s), Oral, at bedtime, Stop After 1 Doses  Administration Instructions: This is a Look-alike/Sound-alike Medication  Provider's Contact #: [phone]; (Taper (medrol): This is order 5 of 6) (04-25 @ 13:26)  methylPREDNISolone: [Ordered as MEDROL]  4 milliGRAM(s), Oral, at bedtime, Stop After 3 Doses  Administration Instructions: This is a Look-alike/Sound-alike Medication  Provider's Contact #: [phone]; (Taper (medrol): This is order 6 of 6) (04-25 @ 13:26)      T(C): 36.9 (04-25-19 @ 05:44), Max: 37.1 (04-24-19 @ 21:50)  HR: 70 (04-25-19 @ 05:44) (70 - 76)  BP: 146/91 (04-25-19 @ 05:44) (146/91 - 173/101)  RR: 18 (04-25-19 @ 05:44) (18 - 18)  SpO2: 100% (04-25-19 @ 05:44) (100% - 100%)    CAPILLARY BLOOD GLUCOSE        I&O's Summary    25 Apr 2019 07:01  -  25 Apr 2019 15:33  --------------------------------------------------------  IN: 0 mL / OUT: 200 mL / NET: -200 mL        PHYSICAL EXAM:  GENERAL: NAD  CHEST/LUNG: Clear to auscultation bilaterally; No wheeze  HEART: Regular rate and rhythm; No murmurs, rubs, or gallops  ABDOMEN: Soft, Nontender, Nondistended; Bowel sounds present  EXTREMITIES:  No clubbing, cyanosis, or edema        LABS:                    RADIOLOGY & ADDITIONAL TESTS:    Imaging Personally Reviewed:    Consultant(s) Notes Reviewed:      Care Discussed with Consultants/Other Providers:

## 2019-04-26 ENCOUNTER — TRANSCRIPTION ENCOUNTER (OUTPATIENT)
Age: 69
End: 2019-04-26

## 2019-04-26 ENCOUNTER — CONVERSION ENCOUNTER (OUTPATIENT)
Dept: FAMILY MEDICINE CLINIC | Facility: CLINIC | Age: 69
End: 2019-04-26

## 2019-04-26 VITALS — DIASTOLIC BLOOD PRESSURE: 65 MMHG | SYSTOLIC BLOOD PRESSURE: 147 MMHG

## 2019-04-26 LAB
ALBUMIN SERPL-MCNC: 4.1 G/DL (ref 3.6–5.1)
ALBUMIN/GLOB SERPL: ABNORMAL {RATIO} (ref 1–2.1)
ALP SERPL-CCNC: 41 UNITS/L (ref 40–115)
ALT SERPL-CCNC: 49 UNITS/L (ref 9–60)
AST SERPL-CCNC: 35 UNITS/L (ref 10–35)
BILIRUB SERPL-MCNC: 0.6 MG/DL (ref 0.2–1.2)
BUN SERPL-MCNC: 13 MG/DL (ref 7–25)
BUN/CREAT SERPL: ABNORMAL (ref 6–22)
CALCIUM SERPL-MCNC: 9.4 MG/DL (ref 8.6–10.2)
CHLORIDE SERPL-SCNC: 104 MMOL/L (ref 98–110)
CONV % HGB A1C: ABNORMAL %
CONV CO2: 20 MMOL/L (ref 21–33)
CONV TOTAL PROTEIN: 6.4 G/DL (ref 6.2–8.3)
CREAT UR-MCNC: 0.8 MG/DL (ref 0.76–1.46)
GLOBULIN UR ELPH-MCNC: ABNORMAL G/DL (ref 2.1–3.7)
GLUCOSE SERPL-MCNC: 187 MG/DL (ref 65–99)
POTASSIUM SERPL-SCNC: 4.5 MMOL/L (ref 3.5–5.3)
SODIUM SERPL-SCNC: 135 MMOL/L (ref 135–146)
SPECIMEN SOURCE: SIGNIFICANT CHANGE UP

## 2019-04-26 PROCEDURE — 99232 SBSQ HOSP IP/OBS MODERATE 35: CPT

## 2019-04-26 PROCEDURE — 99233 SBSQ HOSP IP/OBS HIGH 50: CPT

## 2019-04-26 PROCEDURE — 99232 SBSQ HOSP IP/OBS MODERATE 35: CPT | Mod: GC

## 2019-04-26 RX ORDER — HYDROMORPHONE HYDROCHLORIDE 2 MG/ML
1 INJECTION INTRAMUSCULAR; INTRAVENOUS; SUBCUTANEOUS
Qty: 0 | Refills: 0 | DISCHARGE
Start: 2019-04-26

## 2019-04-26 RX ORDER — ALPRAZOLAM 0.25 MG
1 TABLET ORAL
Qty: 0 | Refills: 0 | COMMUNITY

## 2019-04-26 RX ORDER — TAMSULOSIN HYDROCHLORIDE 0.4 MG/1
1 CAPSULE ORAL
Qty: 0 | Refills: 0 | DISCHARGE
Start: 2019-04-26

## 2019-04-26 RX ORDER — ALPRAZOLAM 0.25 MG
1 TABLET ORAL
Qty: 0 | Refills: 0 | DISCHARGE
Start: 2019-04-26

## 2019-04-26 RX ADMIN — HYDROMORPHONE HYDROCHLORIDE 2 MILLIGRAM(S): 2 INJECTION INTRAMUSCULAR; INTRAVENOUS; SUBCUTANEOUS at 12:33

## 2019-04-26 RX ADMIN — Medication 4 MILLIGRAM(S): at 07:56

## 2019-04-26 RX ADMIN — HYDROMORPHONE HYDROCHLORIDE 4 MILLIGRAM(S): 2 INJECTION INTRAMUSCULAR; INTRAVENOUS; SUBCUTANEOUS at 08:32

## 2019-04-26 RX ADMIN — Medication 1 MILLIGRAM(S): at 14:54

## 2019-04-26 RX ADMIN — GABAPENTIN 400 MILLIGRAM(S): 400 CAPSULE ORAL at 06:59

## 2019-04-26 RX ADMIN — CITALOPRAM 40 MILLIGRAM(S): 10 TABLET, FILM COATED ORAL at 12:33

## 2019-04-26 RX ADMIN — Medication 360 MILLIGRAM(S): at 06:59

## 2019-04-26 RX ADMIN — HYDROMORPHONE HYDROCHLORIDE 4 MILLIGRAM(S): 2 INJECTION INTRAMUSCULAR; INTRAVENOUS; SUBCUTANEOUS at 07:32

## 2019-04-26 RX ADMIN — TIZANIDINE 2 MILLIGRAM(S): 4 TABLET ORAL at 06:59

## 2019-04-26 RX ADMIN — GABAPENTIN 400 MILLIGRAM(S): 400 CAPSULE ORAL at 14:54

## 2019-04-26 RX ADMIN — LOSARTAN POTASSIUM 100 MILLIGRAM(S): 100 TABLET, FILM COATED ORAL at 06:59

## 2019-04-26 RX ADMIN — HYDROMORPHONE HYDROCHLORIDE 2 MILLIGRAM(S): 2 INJECTION INTRAMUSCULAR; INTRAVENOUS; SUBCUTANEOUS at 13:30

## 2019-04-26 RX ADMIN — Medication 100 MILLIGRAM(S): at 12:33

## 2019-04-26 NOTE — DISCHARGE NOTE PROVIDER - HOSPITAL COURSE
68M w/ chronic lower back pain, VPS for NPH and multiple back surgeries by different surgeons in different hospitals presents to the ED w/ worsening back pain and difficulty ambulating- MRI spine pending to see what route of rx we adopt- surgical vs medical- likely medical- concerned that "nothing works" for patient- MRI w/ likely DJD vs post op changes- less likely discitis- CRP<4    will start medrol dosepak- unsure if pt will take the doses as directed    ready for rehab-    seen by psych for underlying depression    pt has a lot of pain and "nothing works"- MRI repeated- no acute pathology found- seen by PMR- advised rehab as well

## 2019-04-26 NOTE — PROGRESS NOTE ADULT - PROBLEM SELECTOR PLAN 5
IMPROVE score 2  lovenox for DVT ppx with limited mobility
IMPROVE score 2  lovenox for DVT ppx with limited mobility  dispo likely LANDON
IMPROVE score 2  lovenox for DVT ppx with limited mobility  dispo likely LANDON

## 2019-04-26 NOTE — DISCHARGE NOTE NURSING/CASE MANAGEMENT/SOCIAL WORK - NSDCDPATPORTLINK_GEN_ALL_CORE
You can access the [facility] Patient Portal, offered by [facility], by registering with the following website: [email or web address]

## 2019-04-26 NOTE — PROGRESS NOTE BEHAVIORAL HEALTH - SECONDARY DX1
Anxiety disorder due to general medical condition
Anxiety disorder due to general medical condition
Chronic back pain greater than 3 months duration

## 2019-04-26 NOTE — PROGRESS NOTE BEHAVIORAL HEALTH - NSBHCONSULTFOLLOWDETAILS_PSY_A_CORE FT
No indicated for a psychiatric admission at this point. Psychiatric f/u at facility will be beneficial
No indication for a psychiatric admission at this point.

## 2019-04-26 NOTE — PROGRESS NOTE ADULT - SUBJECTIVE AND OBJECTIVE BOX
Dr. Lyle pager [phone]    Patient is a 68y old  Male who presents with a chief complaint of acute on chronic back pain (26 Apr 2019 13:25)      SUBJECTIVE / OVERNIGHT EVENTS: pt seen at [facility] w/ Psychiatry team- doesn't remember which female doctor   saw him last- at first he said he wanted to go to [facility]- writer informed pt that Dr. Huynh did not call writer back- he then agreed to rehab after it was explained that there are no surgical issues; his friend Alfredo was on the phone and also agreed to rehab.    acknowledges that yesterday was a "mental" day and today is more physical.    MEDICATIONS  (STANDING):  bisacodyl Suppository 10 milliGRAM(s) Rectal once  citalopram 40 milliGRAM(s) Oral daily  docusate sodium 100 milliGRAM(s) Oral daily  enoxaparin Injectable 40 milliGRAM(s) SubCutaneous every 24 hours  gabapentin 400 milliGRAM(s) Oral every 8 hours  losartan 100 milliGRAM(s) Oral daily  methylPREDNISolone   Oral   methylPREDNISolone 4 milliGRAM(s) Oral before breakfast  methylPREDNISolone 4 milliGRAM(s) Oral after lunch  methylPREDNISolone 4 milliGRAM(s) Oral after dinner  methylPREDNISolone 8 milliGRAM(s) Oral at bedtime  polyethylene glycol 3350 17 Gram(s) Oral daily  senna 2 Tablet(s) Oral at bedtime  tamsulosin 0.4 milliGRAM(s) Oral at bedtime  verapamil  milliGRAM(s) Oral daily    MEDICATIONS  (PRN):  ALPRAZolam 1 milliGRAM(s) Oral every 6 hours PRN Anxiety  HYDROmorphone   Tablet 4 milliGRAM(s) Oral every 4 hours PRN Severe Pain (7 - 10)  HYDROmorphone   Tablet 2 milliGRAM(s) Oral every 4 hours PRN Moderate Pain (4 - 6)  zolpidem 5 milliGRAM(s) Oral at bedtime PRN Insomnia  zolpidem 5 milliGRAM(s) Oral at bedtime PRN Insomnia      Meds ordered within last 24hours  zolpidem: [Known as AMBIEN]  5 milliGRAM(s), Oral, at bedtime, PRN for Insomnia  Special Instructions: Initial Dose  Administration Instructions: This medication should not be administered with or immediately following a meal. (04-25 @ 18:26)  zolpidem: [Known as AMBIEN]  5 milliGRAM(s), Oral, at bedtime, PRN for Insomnia  Special Instructions: If initial 5 mG dose is ineffective, administer additional 5 mG dose at least 1 hour and up to 2 hours after initial 5 mG dose  Administration Instructions: This medication should not be administered with or immediately following a meal. (04-25 @ 18:26)      T(C): 37.1 (04-26-19 @ 14:52), Max: 37.2 (04-25-19 @ 15:38)  HR: 79 (04-26-19 @ 14:52) (79 - 82)  BP: 147/65 (04-26-19 @ 15:23) (137/86 - 178/89)  RR: 18 (04-26-19 @ 14:52) (18 - 18)  SpO2: 98% (04-26-19 @ 14:52) (96% - 98%)    CAPILLARY BLOOD GLUCOSE        I&O's Summary    25 Apr 2019 07:01  -  26 Apr 2019 07:00  --------------------------------------------------------  IN: 0 mL / OUT: 200 mL / NET: -200 mL        PHYSICAL EXAM:  GENERAL: NAD  CHEST/LUNG: Clear to auscultation bilaterally; No wheeze  HEART: Regular rate and rhythm; No murmurs, rubs, or gallops  ABDOMEN: Soft, Nontender, Nondistended; Bowel sounds present  EXTREMITIES:  No clubbing, cyanosis, or edema  NEURO: A&Ox3      LABS:                    RADIOLOGY & ADDITIONAL TESTS:    Imaging Personally Reviewed:    Consultant(s) Notes Reviewed:      Care Discussed with Consultants/Other Providers:

## 2019-04-26 NOTE — PROGRESS NOTE BEHAVIORAL HEALTH - NSBHFUPINTERVALCCFT_PSY_A_CORE
- x2 dose Ambien 5mg PO QHS PRN given for sleep  - x1 dose Xanax 1mg PO Q6h PRN given for anxiety  - started on Medrol dose pack  - room change  - rehab recommendation from PT

## 2019-04-26 NOTE — PROGRESS NOTE BEHAVIORAL HEALTH - SECONDARY DX3
Chronic back pain greater than 3 months duration
Chronic back pain greater than 3 months duration
Major depressive disorder with current active episode, unspecified depression episode severity, unspecified whether recurrent

## 2019-04-26 NOTE — PROGRESS NOTE ADULT - ASSESSMENT
68M w/ chronic lower back pain, VPS for NPH and multiple back surgeries by different surgeons in different hospitals presents to the ED w/ worsening back pain and difficulty ambulating- MRI spine pending to see what route of rx we adopt- surgical vs medical- likely medical- concerned that "nothing works" for patient- MRI w/ likely DJD vs post op changes- less likely discitis- CRP<4  will start medrol dosepak- unsure if pt will take the doses as directed  ready for rehab- awaiting acceptance

## 2019-04-26 NOTE — PROGRESS NOTE ADULT - ASSESSMENT
Pt with intractable back pain, morbid obesity, chronic lumbar radiculopathy and lower extremity weakness.   HE lives alone and needs help with ADL's  I think he would benefit from short course of LANDON

## 2019-04-26 NOTE — DISCHARGE NOTE PROVIDER - NSDCCPCAREPLAN_GEN_ALL_CORE_FT
PRINCIPAL DISCHARGE DIAGNOSIS  Diagnosis: Lumbar radiculopathy, acute  Assessment and Plan of Treatment: f/u w/ your doctor after d/c from rehab      SECONDARY DISCHARGE DIAGNOSES  Diagnosis: Moderate episode of recurrent major depressive disorder  Assessment and Plan of Treatment: please see Dr. Bhandari after d/c from rehab

## 2019-04-26 NOTE — PROGRESS NOTE ADULT - PROBLEM SELECTOR PLAN 1
CT imaging reviewed, per neurosx, no indication for sx intervention   c/w symptom control with iv Dilaudid prn. add Zanaflex, gabapentin   PT eval appreciated   I STOP Reference #: [id number]
CT imaging reviewed, per neurosx, no indication for sx intervention   c/w symptom control with iv Dilaudid prn. add Zanaflex, gabapentin   PT eval appreciated   I STOP Reference #: [id number]
CT imaging reviewed, per neurosx, no indication for sx intervention   c/w symptom control with iv Dilaudid prn. add Zanaflex, gabapentin - will change to PO dilaudid once MRI done and no acute pathology found- WBC NL now  PT eval appreciated   pain team consulted  PMR evaluation  in addition d/w neurorads- erosion at S1- ?DJD vs. infection- check MRI L/S spine w/ and w/o contrast- last MRI in Jan w/ ?screw loose at L5
MRI w/ DJD changes and post op changes more likely than discitis/osteomyelitis  dilaudid changed to PO  zanaflex standing  PT eval appreciated   pain team consulted  PMR evaluation- apprec- starting medrol dosepak  ID eval to help w/ discitis evaluation
MRI w/ DJD changes and post op changes more likely than discitis/osteomyelitis  dilaudid changed to PO  zanaflex standing  no role for fentanyl patch if pt stating IV dilaudid not working- unclear if pt and his friend have realistic expectations.     PT reyes appreciated   pain team consulted  PMR evaluation  ID reyes to help w/ discitis evaluation
MRI w/ DJD changes and post op changes more likely than discitis/osteomyelitis- blood cx neg  dilaudid changed to PO  zanaflex standing  PT eval appreciated   pain team consulted  PMR evaluation- apprec- on medrol dosepak  ID saw pt- no abx for now
CT imaging reviewed, per neurosx, no indication for sx intervention   c/w symptom control with iv Dilaudid prn. add Zanaflex, gabapentin - will change to PO dilaudid once MRI done and no acute pathology found- given wBC of 12 r/o infection  PT eval appreciated   pain team consulted  PMR evaluation  in addition d/w neurorads- erosion at S1- ?DJD vs. infection- check MRI L/S spine w/ and w/o contrast- last MRI in Jan w/ ?screw loose at L5

## 2019-04-26 NOTE — PROGRESS NOTE ADULT - PROBLEM SELECTOR PLAN 4
c/w home medicine   monitor BP

## 2019-04-26 NOTE — PROGRESS NOTE BEHAVIORAL HEALTH - NSBHCONSULTFOLLOWAFTERCARE_PSY_A_CORE FT
when medically cleared for home, patient should f.u with outpatient psychiatrist Dr Bhandari
psychiatric f/u at facility will be beneficial

## 2019-04-26 NOTE — PROGRESS NOTE BEHAVIORAL HEALTH - NSBHADMITCOUNSEL_PSY_A_CORE
risks and benefits of treatment options/importance of adherence to chosen treatment/instructions for management, treatment and follow up/risk factor reduction/client/family/caregiver education

## 2019-04-26 NOTE — PROGRESS NOTE ADULT - SUBJECTIVE AND OBJECTIVE BOX
HPI:  68M w/ chronic lower back pain, VPS for NPH and multiple back surgeries by different surgeons in different hospitals presents to the ED w/ worsening back pain and difficulty ambulating. He states the pain in left lower back radiating to the left leg with weakness. He states that he was seen a few days ago at [place] and CT scan showed compression fracture fo L1. However, his pain was slightly lower down around L5. He has had work done on L5 in the past and had issues w/ the screws there. However, he was told this time that the area near L5 appeared wnl.  Was given flexeril and medrol but no significant improvement in pain. [facility]. Pt reports he was told he had loosening of screws from previous surgery. Pt also [email or web address] said there was nothing to do. Pt reports he is bedbound at home and depends on the help from his friend. No fever/chills/cp/sob/dizziness/fall/trauma.   While in ER, CT lumber showed extensive postop changes and increased erosion of the superior endplate of S1. s/p eval by neurosx, no sx intervention. (19 Apr 2019 18:53)      MRI- Widening of the L5-S1 disc space is seen when compared the prior MRI. There   is also abnormal T1 and T2 prolongation involving the adjacent endplates   with associated enhancement identified. All these findings could be related   to prior surgery with degenerative changes though the possibility of   underlying discitis and osteomyelitis cannot be entirely excluded in the   correct clinical setting. Clinical correlation and continued close interval   follow-up is recommended.     No NSG intervention. Seen by pain service, ID r/o discitis.   Had severe low back pain this am, anxiety, claims nothing helps   wants to go to rehab     REVIEW OF SYSTEMS: No chest pain, shortness of breath, nausea, vomiting or diarhea.      CURRENT FUNCTIONAL STATUS: CGA       MEDICATIONS  (STANDING):  bisacodyl Suppository 10 milliGRAM(s) Rectal once  citalopram 40 milliGRAM(s) Oral daily  docusate sodium 100 milliGRAM(s) Oral daily  enoxaparin Injectable 40 milliGRAM(s) SubCutaneous every 24 hours  gabapentin 400 milliGRAM(s) Oral every 8 hours  losartan 100 milliGRAM(s) Oral daily  methylPREDNISolone   Oral   methylPREDNISolone 4 milliGRAM(s) Oral before breakfast  methylPREDNISolone 4 milliGRAM(s) Oral after lunch  methylPREDNISolone 4 milliGRAM(s) Oral after dinner  methylPREDNISolone 8 milliGRAM(s) Oral at bedtime  polyethylene glycol 3350 17 Gram(s) Oral daily  senna 2 Tablet(s) Oral at bedtime  tamsulosin 0.4 milliGRAM(s) Oral at bedtime  verapamil  milliGRAM(s) Oral daily    MEDICATIONS  (PRN):  ALPRAZolam 1 milliGRAM(s) Oral every 6 hours PRN Anxiety  HYDROmorphone   Tablet 4 milliGRAM(s) Oral every 4 hours PRN Severe Pain (7 - 10)  HYDROmorphone   Tablet 2 milliGRAM(s) Oral every 4 hours PRN Moderate Pain (4 - 6)  zolpidem 5 milliGRAM(s) Oral at bedtime PRN Insomnia  zolpidem 5 milliGRAM(s) Oral at bedtime PRN Insomnia    Vital Signs Last 24 Hrs  T(C): 36.6 (26 Apr 2019 06:57), Max: 37.2 (25 Apr 2019 15:38)  T(F): 97.8 (26 Apr 2019 06:57), Max: 99 (25 Apr 2019 15:38)  HR: 79 (26 Apr 2019 06:57) (79 - 82)  BP: 160/90 (26 Apr 2019 06:57) (137/86 - 160/90)  BP(mean): --  RR: 18 (26 Apr 2019 06:57) (18 - 18)  SpO2: 98% (26 Apr 2019 06:57) (96% - 98%)  ----------------------------------------------------------------------------------------  PHYSICAL EXAM  Constitutional - NAD, Comfortable  HEENT - NCAT, EOMI  Neck - Supple, No limited ROM  Chest - CTA bilaterally, No wheeze, No rhonchi, No crackles  Cardiovascular - RRR, S1S2, No murmurs  Abdomen - BS+, Soft, NTND  Extremities - No C/C/E, No calf tenderness   MSK: decreased ROM LS, + TTP lumbar paraspinals   Neurologic Exam -                    Cognitive - Awake, Alert, AAO to self, place, date, year, situation     Communication - Fluent, No dysarthria, no aphasia     Cranial Nerves - CN 2-12 intact     Motor - 5/5 LE                        Sensory - Intact to LT     Reflexes - DTR Intact, No primitive reflexive     Balance - WNL Static  Psychiatric - Mood stable, Affect WNL

## 2019-04-26 NOTE — PROGRESS NOTE BEHAVIORAL HEALTH - SECONDARY DX2
Major depressive disorder with current active episode, unspecified depression episode severity, unspecified whether recurrent
Anxiety disorder due to general medical condition
Major depressive disorder with current active episode, unspecified depression episode severity, unspecified whether recurrent

## 2019-04-26 NOTE — PROGRESS NOTE ADULT - PROBLEM SELECTOR PLAN 2
Pt reports feeling depressed, but no SI  psych saw pt- d/w outpt psychiatrist pt did not tolerate TCA's in the past  pt has a lot of psychiatric undertones to his complaints- he will benefit from inpt rehab as his ADLs are being affected now- his outpt psychiatrist also believes that rehab will help pt

## 2019-04-26 NOTE — PROGRESS NOTE BEHAVIORAL HEALTH - NSBHCONSFOLLOWNEEDS_PSY_A_CORE
Patient needs further psychiatric safety assessment prior to discharge/No indicated for a psychiatric admission at this point no psychiatric contraindications to discharge

## 2019-04-26 NOTE — PROGRESS NOTE BEHAVIORAL HEALTH - PRIMARY DX
Depressive disorder due to separate medical condition

## 2019-04-26 NOTE — PROGRESS NOTE BEHAVIORAL HEALTH - SUMMARY
Briefly, the patient is a 68 year old male, single, who lives at home with assistance from his friend Alfredo.  Has medical history notable for chronic lower back pain, multiple back surgeries since 2007 by different surgeons in different hospitals, and VPS for NPH.  Previous psychiatric history significant for MDD and anxiety but has never been hospitalized for psychiatric issues.  Sees a psychiatrist (Dr. oCry Parkinson) 1x/week.  Patient presents to the ED w/ worsening back pain and difficulty ambulating. As per ED records-He states the pain in left lower back radiating to the left leg with weakness. He states that he was seen a few days ago at [place] and CT scan showed compression fracture fo L1. However, his pain was slightly lower down around L5. He has had work done on L5 in the past and had issues w/ the screws there. However, he was told this time that the area near L5 appeared wnl.  Was given Flexeril and Medrol but no significant improvement in pain. Last Spine Surgery about a year ago at [facility]. Pt reports he was told he had loosening of screws from previous surgery. Pt also reports MRI from March, surgeon [facility] said there was nothing to do. Pt reports able to ambulate with cane but is extremely painful to do so.  As per HPI- No fever/chills/cp/sob/dizziness/fall/trauma.  Psychiatry consulted for depressive symptoms.    4/24= in pain, anxious, frustrated    4/25= remains in pain (although mild improvement), anxious, irritable and frustrated.    4/26= restless due to pain.  Help seeking, help rejecting.    RECOMMENDATIONS:  - Routine observation  - Collaborate care with Alfredo and [facility] to facilitate rehab  - Appreciate PT recs  - Continue Celexa 40 mg PO 1x/day for depression for now  - Continue Ambien 10 mg PO QHS for sleep  - Continue Xanax 1mg PO PRN Q6h for anxiety.  - Although Medrol pack can worsen anxiety/mood, no need to adjust psychotropic medications at the time but will continue to f/u. Briefly, the patient is a 68 year old male, single, who lives at home with assistance from his friend Alfredo.  Has medical history notable for chronic lower back pain, multiple back surgeries since 2007 by different surgeons in different hospitals, and VPS for NPH.  Previous psychiatric history significant for MDD and anxiety but has never been hospitalized for psychiatric issues.  Sees a psychiatrist (Dr. Cory Parkinson) 1x/week.  Patient presents to the ED w/ worsening back pain and difficulty ambulating. As per ED records-He states the pain in left lower back radiating to the left leg with weakness. He states that he was seen a few days ago at [place] and CT scan showed compression fracture fo L1. However, his pain was slightly lower down around L5. He has had work done on L5 in the past and had issues w/ the screws there. However, he was told this time that the area near L5 appeared wnl.  Was given Flexeril and Medrol but no significant improvement in pain. Last Spine Surgery about a year ago at [facility]. Pt reports he was told he had loosening of screws from previous surgery. Pt also reports MRI from March, surgeon [facility] said there was nothing to do. Pt reports able to ambulate with cane but is extremely painful to do so.  As per HPI- No fever/chills/cp/sob/dizziness/fall/trauma.  Psychiatry consulted for depressive symptoms.    4/24= in pain, anxious, frustrated    4/25= remains in pain (although mild improvement), anxious, irritable and frustrated.    4/26= restless due to pain. Ambivalent about his next choices, and education and support was given about it.     RECOMMENDATIONS:  - Routine observation  - Collaborate care with Alfredo and [facility] to facilitate rehab  - Appreciate PT recs  - Continue Celexa 40 mg PO 1x/day for depression for now  - Continue Ambien 10 mg PO QHS for sleep  - Continue Xanax 1mg PO PRN Q6h for anxiety.  - Although Medrol pack can worsen anxiety/mood, no need to adjust psychotropic medications at the time but will continue to f/u.

## 2019-04-26 NOTE — PROGRESS NOTE ADULT - PROBLEM SELECTOR PROBLEM 1
Chronic back pain greater than 3 months duration

## 2019-04-26 NOTE — PROGRESS NOTE BEHAVIORAL HEALTH - NSBHFUPINTERVALHXFT_PSY_A_CORE
Met with the patient. Restless in bed due to pain.  Admits feeling "out of it" because of the Medrol dose pack.  Continued frustration due to pain symptoms. Continues to deny any si.  Discussed the news of being accepted to rehab and patient is hesitant to go due to fear of rehab making his pain worse.  Discussed further with Dr. Lyle and patient agreed to continue discussion with Alfredo and [facility]. Met with the patient. Restless in bed due to pain.  Admits feeling "out of it" because of the Medrol dose pack.  Continued frustration due to pain symptoms. Continues to deny any si.  Sadness and anxiety is in the context to above. Discussed the news of being accepted to rehab and patient is hesitant to go due to fear of rehab making his pain worse. Educated the patient at length. Discussed further with Dr. Lyle and patient agreed to continue discussion with Alfredo and [facility].

## 2019-04-26 NOTE — PROGRESS NOTE ADULT - PROBLEM SELECTOR PROBLEM 3
NPH (normal pressure hydrocephalus)

## 2019-04-30 LAB — BACTERIA BLD CULT: SIGNIFICANT CHANGE UP

## 2019-05-08 ENCOUNTER — HOSPITAL ENCOUNTER (OUTPATIENT)
Dept: OTHER | Facility: HOSPITAL | Age: 69
Discharge: HOME | End: 2019-05-08
Attending: UROLOGY | Admitting: UROLOGY

## 2019-05-08 LAB
ANION GAP SERPL CALC-SCNC: 17.1 MMOL/L (ref 10–20)
BASOPHILS # BLD AUTO: 0 10*3/UL (ref 0–0.2)
BASOPHILS NFR BLD AUTO: 1 % (ref 0–2)
BUN SERPL-MCNC: 19 MG/DL (ref 8–20)
BUN/CREAT SERPL: 23.8 (ref 6.2–20.3)
CALCIUM SERPL-MCNC: 9.2 MG/DL (ref 8.9–10.3)
CHLORIDE SERPL-SCNC: 100 MMOL/L (ref 101–111)
CONV CO2: 24 MMOL/L (ref 22–32)
CREAT UR-MCNC: 0.8 MG/DL (ref 0.7–1.2)
DIFFERENTIAL METHOD BLD: (no result)
EOSINOPHIL # BLD AUTO: 0.2 10*3/UL (ref 0–0.3)
EOSINOPHIL # BLD AUTO: 4 % (ref 0–3)
ERYTHROCYTE [DISTWIDTH] IN BLOOD BY AUTOMATED COUNT: 14 % (ref 11.5–14.5)
GLUCOSE SERPL-MCNC: 191 MG/DL (ref 65–99)
HCT VFR BLD AUTO: 39.5 % (ref 40–54)
HGB BLD-MCNC: 13.5 G/DL (ref 14–18)
LYMPHOCYTES # BLD AUTO: 1.5 10*3/UL (ref 0.8–4.8)
LYMPHOCYTES NFR BLD AUTO: 31 % (ref 18–42)
MCH RBC QN AUTO: 30.4 PG (ref 26–32)
MCHC RBC AUTO-ENTMCNC: 34.1 G/DL (ref 32–36)
MCV RBC AUTO: 89.2 FL (ref 80–94)
MONOCYTES # BLD AUTO: 0.4 10*3/UL (ref 0.1–1.3)
MONOCYTES NFR BLD AUTO: 8 % (ref 2–11)
NEUTROPHILS # BLD AUTO: 2.9 10*3/UL (ref 2.3–8.6)
NEUTROPHILS NFR BLD AUTO: 56 % (ref 50–75)
NRBC BLD AUTO-RTO: 0 /100{WBCS}
NRBC/RBC NFR BLD MANUAL: 0 10*3/UL
PLATELET # BLD AUTO: 200 10*3/UL (ref 150–450)
PMV BLD AUTO: 9.3 FL (ref 7.4–10.4)
POTASSIUM SERPL-SCNC: 5.1 MMOL/L (ref 3.6–5.1)
RBC # BLD AUTO: 4.43 10*6/UL (ref 4.6–6)
SODIUM SERPL-SCNC: 136 MMOL/L (ref 136–144)
WBC # BLD AUTO: 5.1 10*3/UL (ref 4.5–11.5)

## 2019-05-15 RX ORDER — TRAZODONE HYDROCHLORIDE 100 MG/1
100 TABLET ORAL
Refills: 0 | Status: DISCONTINUED | COMMUNITY
Start: 2019-04-10 | End: 2019-05-15

## 2019-05-15 RX ORDER — METHYLPREDNISOLONE 4 MG/1
4 TABLET ORAL
Qty: 21 | Refills: 0 | Status: DISCONTINUED | COMMUNITY
Start: 2019-04-16 | End: 2019-05-15

## 2019-05-15 RX ORDER — ZOLPIDEM TARTRATE 12.5 MG/1
12.5 TABLET, EXTENDED RELEASE ORAL
Qty: 30 | Refills: 0 | Status: DISCONTINUED | COMMUNITY
Start: 2019-03-27 | End: 2019-05-15

## 2019-05-18 ENCOUNTER — INPATIENT (INPATIENT)
Facility: HOSPITAL | Age: 69
LOS: 1 days | Discharge: NURSING, REHAB OR LONG-TERM CARE | DRG: 866 | End: 2019-05-20
Attending: INTERNAL MEDICINE | Admitting: INTERNAL MEDICINE
Payer: MEDICARE

## 2019-05-18 ENCOUNTER — TRANSCRIPTION ENCOUNTER (OUTPATIENT)
Age: 69
End: 2019-05-18

## 2019-05-18 VITALS
WEIGHT: 179.9 LBS | DIASTOLIC BLOOD PRESSURE: 77 MMHG | OXYGEN SATURATION: 95 % | TEMPERATURE: 100 F | HEART RATE: 89 BPM | HEIGHT: 68 IN | SYSTOLIC BLOOD PRESSURE: 104 MMHG | RESPIRATION RATE: 20 BRPM

## 2019-05-18 DIAGNOSIS — J18.9 PNEUMONIA, UNSPECIFIED ORGANISM: ICD-10-CM

## 2019-05-18 LAB
ANION GAP SERPL CALC-SCNC: 11 MMOL/L — SIGNIFICANT CHANGE UP (ref 5–17)
APPEARANCE UR: CLEAR — SIGNIFICANT CHANGE UP
BACTERIA # UR AUTO: NEGATIVE — SIGNIFICANT CHANGE UP
BILIRUB UR-MCNC: NEGATIVE — SIGNIFICANT CHANGE UP
BUN SERPL-MCNC: 18 MG/DL — SIGNIFICANT CHANGE UP (ref 7–23)
CALCIUM SERPL-MCNC: 8.7 MG/DL — SIGNIFICANT CHANGE UP (ref 8.4–10.5)
CHLORIDE SERPL-SCNC: 102 MMOL/L — SIGNIFICANT CHANGE UP (ref 96–108)
CO2 SERPL-SCNC: 26 MMOL/L — SIGNIFICANT CHANGE UP (ref 22–31)
COLOR SPEC: SIGNIFICANT CHANGE UP
CREAT SERPL-MCNC: 0.98 MG/DL — SIGNIFICANT CHANGE UP (ref 0.5–1.3)
DIFF PNL FLD: NEGATIVE — SIGNIFICANT CHANGE UP
EPI CELLS # UR: 0 /HPF — SIGNIFICANT CHANGE UP
GLUCOSE SERPL-MCNC: 111 MG/DL — HIGH (ref 70–99)
GLUCOSE UR QL: NEGATIVE — SIGNIFICANT CHANGE UP
HCT VFR BLD CALC: 38.4 % — LOW (ref 39–50)
HGB BLD-MCNC: 12.9 G/DL — LOW (ref 13–17)
HPIV3 RNA SPEC QL NAA+PROBE: DETECTED
HYALINE CASTS # UR AUTO: 0 /LPF — SIGNIFICANT CHANGE UP (ref 0–2)
KETONES UR-MCNC: NEGATIVE — SIGNIFICANT CHANGE UP
LEUKOCYTE ESTERASE UR-ACNC: NEGATIVE — SIGNIFICANT CHANGE UP
MCHC RBC-ENTMCNC: 31.4 PG — SIGNIFICANT CHANGE UP (ref 27–34)
MCHC RBC-ENTMCNC: 33.7 GM/DL — SIGNIFICANT CHANGE UP (ref 32–36)
MCV RBC AUTO: 93.1 FL — SIGNIFICANT CHANGE UP (ref 80–100)
NITRITE UR-MCNC: NEGATIVE — SIGNIFICANT CHANGE UP
PH UR: 5.5 — SIGNIFICANT CHANGE UP (ref 5–8)
PLATELET # BLD AUTO: 192 K/UL — SIGNIFICANT CHANGE UP (ref 150–400)
POTASSIUM SERPL-MCNC: 3.9 MMOL/L — SIGNIFICANT CHANGE UP (ref 3.5–5.3)
POTASSIUM SERPL-SCNC: 3.9 MMOL/L — SIGNIFICANT CHANGE UP (ref 3.5–5.3)
PROT UR-MCNC: NEGATIVE — SIGNIFICANT CHANGE UP
RAPID RVP RESULT: DETECTED
RBC # BLD: 4.12 M/UL — LOW (ref 4.2–5.8)
RBC # FLD: 13.3 % — SIGNIFICANT CHANGE UP (ref 10.3–14.5)
RBC CASTS # UR COMP ASSIST: 1 /HPF — SIGNIFICANT CHANGE UP (ref 0–4)
SODIUM SERPL-SCNC: 139 MMOL/L — SIGNIFICANT CHANGE UP (ref 135–145)
SP GR SPEC: 1.01 — SIGNIFICANT CHANGE UP (ref 1.01–1.02)
UROBILINOGEN FLD QL: NEGATIVE — SIGNIFICANT CHANGE UP
WBC # BLD: 8.2 K/UL — SIGNIFICANT CHANGE UP (ref 3.8–10.5)
WBC # FLD AUTO: 8.2 K/UL — SIGNIFICANT CHANGE UP (ref 3.8–10.5)
WBC UR QL: 1 /HPF — SIGNIFICANT CHANGE UP (ref 0–5)

## 2019-05-18 PROCEDURE — 99285 EMERGENCY DEPT VISIT HI MDM: CPT

## 2019-05-18 PROCEDURE — 71275 CT ANGIOGRAPHY CHEST: CPT | Mod: 26

## 2019-05-18 PROCEDURE — 70450 CT HEAD/BRAIN W/O DYE: CPT | Mod: 26

## 2019-05-18 PROCEDURE — 71046 X-RAY EXAM CHEST 2 VIEWS: CPT | Mod: 26

## 2019-05-18 RX ORDER — VERAPAMIL HCL 240 MG
240 CAPSULE, EXTENDED RELEASE PELLETS 24 HR ORAL DAILY
Refills: 0 | Status: DISCONTINUED | OUTPATIENT
Start: 2019-05-18 | End: 2019-05-20

## 2019-05-18 RX ORDER — TRAZODONE HCL 50 MG
100 TABLET ORAL AT BEDTIME
Refills: 0 | Status: DISCONTINUED | OUTPATIENT
Start: 2019-05-18 | End: 2019-05-18

## 2019-05-18 RX ORDER — CITALOPRAM 10 MG/1
40 TABLET, FILM COATED ORAL DAILY
Refills: 0 | Status: DISCONTINUED | OUTPATIENT
Start: 2019-05-18 | End: 2019-05-20

## 2019-05-18 RX ORDER — IPRATROPIUM/ALBUTEROL SULFATE 18-103MCG
3 AEROSOL WITH ADAPTER (GRAM) INHALATION ONCE
Refills: 0 | Status: COMPLETED | OUTPATIENT
Start: 2019-05-18 | End: 2019-05-18

## 2019-05-18 RX ORDER — ZOLPIDEM TARTRATE 10 MG/1
5 TABLET ORAL AT BEDTIME
Refills: 0 | Status: DISCONTINUED | OUTPATIENT
Start: 2019-05-18 | End: 2019-05-20

## 2019-05-18 RX ORDER — HYDROMORPHONE HYDROCHLORIDE 2 MG/ML
2 INJECTION INTRAMUSCULAR; INTRAVENOUS; SUBCUTANEOUS EVERY 4 HOURS
Refills: 0 | Status: DISCONTINUED | OUTPATIENT
Start: 2019-05-18 | End: 2019-05-20

## 2019-05-18 RX ORDER — ALBUTEROL 90 UG/1
1 AEROSOL, METERED ORAL EVERY 4 HOURS
Refills: 0 | Status: DISCONTINUED | OUTPATIENT
Start: 2019-05-18 | End: 2019-05-20

## 2019-05-18 RX ORDER — IPRATROPIUM/ALBUTEROL SULFATE 18-103MCG
3 AEROSOL WITH ADAPTER (GRAM) INHALATION EVERY 6 HOURS
Refills: 0 | Status: DISCONTINUED | OUTPATIENT
Start: 2019-05-18 | End: 2019-05-20

## 2019-05-18 RX ORDER — ZOLPIDEM TARTRATE 10 MG/1
5 TABLET ORAL ONCE
Refills: 0 | Status: DISCONTINUED | OUTPATIENT
Start: 2019-05-18 | End: 2019-05-18

## 2019-05-18 RX ORDER — HEPARIN SODIUM 5000 [USP'U]/ML
5000 INJECTION INTRAVENOUS; SUBCUTANEOUS EVERY 12 HOURS
Refills: 0 | Status: DISCONTINUED | OUTPATIENT
Start: 2019-05-18 | End: 2019-05-20

## 2019-05-18 RX ORDER — SODIUM CHLORIDE 9 MG/ML
1000 INJECTION INTRAMUSCULAR; INTRAVENOUS; SUBCUTANEOUS ONCE
Refills: 0 | Status: COMPLETED | OUTPATIENT
Start: 2019-05-18 | End: 2019-05-18

## 2019-05-18 RX ORDER — PIPERACILLIN AND TAZOBACTAM 4; .5 G/20ML; G/20ML
3.38 INJECTION, POWDER, LYOPHILIZED, FOR SOLUTION INTRAVENOUS ONCE
Refills: 0 | Status: COMPLETED | OUTPATIENT
Start: 2019-05-18 | End: 2019-05-18

## 2019-05-18 RX ORDER — TIOTROPIUM BROMIDE 18 UG/1
1 CAPSULE ORAL; RESPIRATORY (INHALATION) DAILY
Refills: 0 | Status: DISCONTINUED | OUTPATIENT
Start: 2019-05-18 | End: 2019-05-20

## 2019-05-18 RX ORDER — TAMSULOSIN HYDROCHLORIDE 0.4 MG/1
0.4 CAPSULE ORAL AT BEDTIME
Refills: 0 | Status: DISCONTINUED | OUTPATIENT
Start: 2019-05-18 | End: 2019-05-20

## 2019-05-18 RX ORDER — HYDROMORPHONE HYDROCHLORIDE 2 MG/ML
4 INJECTION INTRAMUSCULAR; INTRAVENOUS; SUBCUTANEOUS EVERY 6 HOURS
Refills: 0 | Status: DISCONTINUED | OUTPATIENT
Start: 2019-05-18 | End: 2019-05-18

## 2019-05-18 RX ORDER — VANCOMYCIN HCL 1 G
1000 VIAL (EA) INTRAVENOUS ONCE
Refills: 0 | Status: COMPLETED | OUTPATIENT
Start: 2019-05-18 | End: 2019-05-18

## 2019-05-18 RX ORDER — ALPRAZOLAM 0.25 MG
1 TABLET ORAL EVERY 6 HOURS
Refills: 0 | Status: DISCONTINUED | OUTPATIENT
Start: 2019-05-18 | End: 2019-05-20

## 2019-05-18 RX ADMIN — ZOLPIDEM TARTRATE 5 MILLIGRAM(S): 10 TABLET ORAL at 23:20

## 2019-05-18 RX ADMIN — TAMSULOSIN HYDROCHLORIDE 0.4 MILLIGRAM(S): 0.4 CAPSULE ORAL at 23:04

## 2019-05-18 RX ADMIN — Medication 3 MILLILITER(S): at 23:04

## 2019-05-18 RX ADMIN — HYDROMORPHONE HYDROCHLORIDE 2 MILLIGRAM(S): 2 INJECTION INTRAMUSCULAR; INTRAVENOUS; SUBCUTANEOUS at 23:04

## 2019-05-18 RX ADMIN — HYDROMORPHONE HYDROCHLORIDE 2 MILLIGRAM(S): 2 INJECTION INTRAMUSCULAR; INTRAVENOUS; SUBCUTANEOUS at 23:34

## 2019-05-18 RX ADMIN — Medication 250 MILLIGRAM(S): at 16:50

## 2019-05-18 RX ADMIN — SODIUM CHLORIDE 500 MILLILITER(S): 9 INJECTION INTRAMUSCULAR; INTRAVENOUS; SUBCUTANEOUS at 12:30

## 2019-05-18 RX ADMIN — PIPERACILLIN AND TAZOBACTAM 200 GRAM(S): 4; .5 INJECTION, POWDER, LYOPHILIZED, FOR SOLUTION INTRAVENOUS at 15:18

## 2019-05-18 RX ADMIN — Medication 3 MILLILITER(S): at 17:00

## 2019-05-18 NOTE — ED ADULT NURSE REASSESSMENT NOTE - NS ED NURSE REASSESS COMMENT FT1
trialing patient on room air, tolerating well with saturation from 93% - 95%.   Patient's care giver states the patient is unable to urinate and states he has been confused since he was in the hospital in April.   Pt is able to follow commands, strength is equal throughout, pupils are equal and reactive.

## 2019-05-18 NOTE — H&P ADULT - NSHPPHYSICALEXAM_GEN_ALL_CORE
LABS:                        12.9   8.2   )-----------( 192      ( 18 May 2019 12:23 )             38.4     05-18    139  |  102  |  18  ----------------------------<  111<H>  3.9   |  26  |  0.98    Ca    8.7      18 May 2019 12:23            Urinalysis Basic - ( 18 May 2019 16:32 )    Color: Light Yellow / Appearance: Clear / S.014 / pH: x  Gluc: x / Ketone: Negative  / Bili: Negative / Urobili: Negative   Blood: x / Protein: Negative / Nitrite: Negative   Leuk Esterase: Negative / RBC: 1 /hpf / WBC 1 /HPF   Sq Epi: x / Non Sq Epi: 0 /hpf / Bacteria: Negative              Thyroid Stimulating Hormone, Serum: 0.73 uIU/mL ( @ 05:57)

## 2019-05-18 NOTE — ED PROVIDER NOTE - NS ED ROS FT
REVIEW OF SYSTEMS:  CONSTITUTIONAL: No weakness, or chills  EYES/ENT: No visual changes;  No vertigo or throat pain   NECK: No pain or stiffness  RESPIRATORY: +cough, +SOB. No hemoptysis;  CARDIOVASCULAR: No chest pain or palpitations  GASTROINTESTINAL: No abdominal pain; nausea, vomiting;  diarrhea or constipation. No hemetemesis, melena or hematochezia.  GENITOURINARY: No dysuria, frequency or hematuria  NEUROLOGICAL: No numbness or weakness  SKIN: No itching, burning, rashes, or lesions   All other review of systems is negative unless indicated above.

## 2019-05-18 NOTE — ED ADULT NURSE NOTE - OBJECTIVE STATEMENT
68 y.o male presents via ems from urgent care for diagnosed right lower lobe pneumonia. Recent hospitalization from April 19th-26th and stay at rehab for back pain, states his roommate may have had pneumonia (had productive cough). Hx of multiple back surgeries, anxiety, nerve damage and HTN. Pt is awake and alert, wheezes and diminished breath sounds heard throughout, abdomen soft ntnd, no recent fever/chills/nv/d. denies chest pain. Pt was up and moving around at urgent care, c.o productive cough and SOB intermittently. 88% on room air and improved to 97% on 3L NC. sent from urgent care for low oxygen saturation and hypotension, fluids initiated by EMS, improved on arrival.   Patient undressed and placed into gown, call bell in hand and side rails up for safety. warm blanket provided, vital signs stable, pt in no acute distress.

## 2019-05-18 NOTE — ED PROVIDER NOTE - ATTENDING CONTRIBUTION TO CARE
68M, pmh nph, s/p vps, chronic low back pain s/p multiple surgeries, recent d/c from rehab s/p admission for severe back pain, presents from urgent care with hypoxia, concern for pneumonia on cxr. +mild fever, no chills. +sob. denies calf pain, swelling, hx pe/dvt. denies cp, palpitations, abd pain, or other complaints.    PE: NAD, NCAT, MMM, Trachea midline, Normal conjunctiva, lungs with diffuse expiratory wheeze, +LLL rales, S1/S2 RRR, Normal perfusion, 2+ radial pulses bilat, Abdomen Soft, NTND, No rebound/guarding, No LE edema, No deformity of extremities, No rashes,  No focal motor or sensory deficits.     Pt in NAD but appears unwell. O2 sat on arrival 88-89 on RA. placed on O2 via NC. ddx includes but not limited to pneumonia vs uri. low suspicion of pe. per pt's roommate, pt is somewhat more confused, will also check head ct given  shunt. check labs, cxr. likely abx. plan to admit. - Gael Madera MD

## 2019-05-18 NOTE — ED ADULT NURSE REASSESSMENT NOTE - NS ED NURSE REASSESS COMMENT FT1
on exam, bilateral expiratory wheezes heard. spoke with attending and resident for breathing treatment orders.   pending order pt in no respiratory distress. sitting up straight on 3L NC

## 2019-05-18 NOTE — H&P ADULT - ASSESSMENT
Pt is a 68yM with a pmh of NPH with VPS, lower back pain s/p multiple surgeries,        recently in rehab for PT / h/o ,  severe back pain  ,   came in from urgent care , CXR  at urgent care, with pna  per report  cxr in er,  no pna   on rocephin   for now  ID eval  parainfluenza positive  ct chest,  no PE/   Ct  head,  v/p shunt  dvt ppx       < from: CT Angio Chest w/ IV Cont (05.18.19 @ 16:33) >  IMPRESSION:     1.  No pulmonary embolism.   2.  Enlarged main pulmonary artery which may be seen in the setting of   pulmonary arterial hypertension.  < end of copied text >       < from: CT Head No Cont (05.18.19 @ 16:28) >  IMPRESSION:   Unchanged right frontal approach  shunt catheter and size/configuration   of the ventricles.  No acute intracranial hemorrhage, extra-axial fluid collection, mass   effect or midline shift  < end of copied text > Pt is a 68yM with a pmh of NPH with VPS, lower back pain s/p multiple surgeries,        recently in rehab for PT / h/o ,  severe back pain  ,   came in from urgent care , CXR  at urgent care, with pna  per report  cxr in er,  no pna   on rocephin   for now  ID eval  parainfluenza positive  ct chest,  no PE/  pulm htn  card eval  Ct  head,  v/p shunt  dvt ppx       < from: CT Angio Chest w/ IV Cont (05.18.19 @ 16:33) >  IMPRESSION:     1.  No pulmonary embolism.   2.  Enlarged main pulmonary artery which may be seen in the setting of   pulmonary arterial hypertension.  < end of copied text >       < from: CT Head No Cont (05.18.19 @ 16:28) >  IMPRESSION:   Unchanged right frontal approach  shunt catheter and size/configuration   of the ventricles.  No acute intracranial hemorrhage, extra-axial fluid collection, mass   effect or midline shift  < end of copied text >

## 2019-05-18 NOTE — ED PROVIDER NOTE - CLINICAL SUMMARY MEDICAL DECISION MAKING FREE TEXT BOX
Pt is a 68yM with pmh of NPH has VPS, chronic low back pain, came in d/t LLL opacity found in urgent care. Also +productive cough, +subj fevers, +sick contact. CBC, CMP, cxr, UA/UCx. 1L ns bolus. reassess afterwards. Pt is a 68yM with pmh of NPH has VPS, chronic low back pain, came in d/t LLL opacity found in urgent care. Also +productive cough, +subj fevers, +sick contact. CBC, CMP, cxr, UA/UCx. 1L ns bolus. Pt became progressively more confused. CXR found bibasilar atelectasis. Parainfluenza + on RVP. Admission to medicine.

## 2019-05-18 NOTE — ED ADULT NURSE NOTE - NSIMPLEMENTINTERV_GEN_ALL_ED
Implemented All Universal Safety Interventions:  [place] to call system. Call bell, personal items and telephone within reach. Instruct patient to call for assistance. Room bathroom lighting operational. Non-slip footwear when patient is off stretcher. Physically safe environment: no spills, clutter or unnecessary equipment. Stretcher in lowest position, wheels locked, appropriate side rails in place.

## 2019-05-18 NOTE — H&P ADULT - HISTORY OF PRESENT ILLNESS
68y Male complaining of SOB, cough.	            Pt is a 68yM with a pmh of NPH with VPS, lower back pain s/p multiple surgeries,       recently in rehab for PT ,  severe backpain,  came in from urgent care , CXR findings of pneumonia.  Productive cough since earlier this week, yellow-green sputum . Fever since Tuesday. Pt states his roommate had these symptoms back when he was in rehab--pt was discharged on Tuesday . Pt states he has a mild fever, no chills. +SOB. Does not use inhaler, no hx of lung disease (denies COPD/asthma). After discharge pt states walked around bathroom to and from, but was mostly in bed. Denies any leg pain, swelling, weakness. Further denies any CP, palpitations, urinary symptoms/changes. Reports difficulty urinating, but baseline. On flomax.

## 2019-05-18 NOTE — H&P ADULT - NSHPREVIEWOFSYSTEMS_GEN_ALL_CORE
REVIEW OF SYSTEMS:  GEN: no fever,    no chills  RESP: SOB,  / cough  CVS: no chest pain,   no palpitations  GI: no abdominal pain,   no nausea,   no vomiting,   no constipation,   no diarrhea  : no dysuria,   no frequency  NEURO: no headache,   no dizziness  PSYCH: no depression,   not anxious  Derm : no rash

## 2019-05-18 NOTE — ED PROVIDER NOTE - OBJECTIVE STATEMENT
Pt is a 68yM with a pmh of NPH with VPS, lower back pain s/p multiple surgeries, recently in rehab for PT d/t severe backpain, came in from urgentcare d/t CXR findings of pneumonia. Productive cough since earlier this week, yellow-green sputum. Fever since Tuesday. Pt states his roommate had these symptoms back when he was in rehab--pt was discharged on Tuesday. Pt states he has a mild fever, no chills. +SOB. Does not use inhaler, no hx of lung disease (denies COPD/asthma). After discharge pt states walked around bathroom to and from, but was mostly in bed. Denies any leg pain, swelling, weakness. Further denies any CP, palpitations, urinary symptoms/changes. Reports difficulty urinating, but baseline. On flomax.

## 2019-05-19 LAB
ANION GAP SERPL CALC-SCNC: 12 MMOL/L — SIGNIFICANT CHANGE UP (ref 5–17)
BUN SERPL-MCNC: 12 MG/DL — SIGNIFICANT CHANGE UP (ref 7–23)
CALCIUM SERPL-MCNC: 8.4 MG/DL — SIGNIFICANT CHANGE UP (ref 8.4–10.5)
CHLORIDE SERPL-SCNC: 103 MMOL/L — SIGNIFICANT CHANGE UP (ref 96–108)
CO2 SERPL-SCNC: 27 MMOL/L — SIGNIFICANT CHANGE UP (ref 22–31)
CREAT SERPL-MCNC: 0.94 MG/DL — SIGNIFICANT CHANGE UP (ref 0.5–1.3)
GLUCOSE SERPL-MCNC: 103 MG/DL — HIGH (ref 70–99)
HCT VFR BLD CALC: 35 % — LOW (ref 39–50)
HGB BLD-MCNC: 11.5 G/DL — LOW (ref 13–17)
MCHC RBC-ENTMCNC: 31.2 PG — SIGNIFICANT CHANGE UP (ref 27–34)
MCHC RBC-ENTMCNC: 32.9 GM/DL — SIGNIFICANT CHANGE UP (ref 32–36)
MCV RBC AUTO: 94.9 FL — SIGNIFICANT CHANGE UP (ref 80–100)
PLATELET # BLD AUTO: 187 K/UL — SIGNIFICANT CHANGE UP (ref 150–400)
POTASSIUM SERPL-MCNC: 4.1 MMOL/L — SIGNIFICANT CHANGE UP (ref 3.5–5.3)
POTASSIUM SERPL-SCNC: 4.1 MMOL/L — SIGNIFICANT CHANGE UP (ref 3.5–5.3)
RBC # BLD: 3.69 M/UL — LOW (ref 4.2–5.8)
RBC # FLD: 14.6 % — HIGH (ref 10.3–14.5)
SODIUM SERPL-SCNC: 142 MMOL/L — SIGNIFICANT CHANGE UP (ref 135–145)
WBC # BLD: 5.92 K/UL — SIGNIFICANT CHANGE UP (ref 3.8–10.5)
WBC # FLD AUTO: 5.92 K/UL — SIGNIFICANT CHANGE UP (ref 3.8–10.5)

## 2019-05-19 PROCEDURE — 93010 ELECTROCARDIOGRAM REPORT: CPT

## 2019-05-19 RX ORDER — HYDROMORPHONE HYDROCHLORIDE 2 MG/ML
2 INJECTION INTRAMUSCULAR; INTRAVENOUS; SUBCUTANEOUS ONCE
Refills: 0 | Status: DISCONTINUED | OUTPATIENT
Start: 2019-05-19 | End: 2019-05-19

## 2019-05-19 RX ORDER — ZOLPIDEM TARTRATE 10 MG/1
5 TABLET ORAL ONCE
Refills: 0 | Status: DISCONTINUED | OUTPATIENT
Start: 2019-05-19 | End: 2019-05-19

## 2019-05-19 RX ORDER — ACETAMINOPHEN 500 MG
1000 TABLET ORAL ONCE
Refills: 0 | Status: COMPLETED | OUTPATIENT
Start: 2019-05-19 | End: 2019-05-19

## 2019-05-19 RX ADMIN — HEPARIN SODIUM 5000 UNIT(S): 5000 INJECTION INTRAVENOUS; SUBCUTANEOUS at 06:56

## 2019-05-19 RX ADMIN — HYDROMORPHONE HYDROCHLORIDE 2 MILLIGRAM(S): 2 INJECTION INTRAMUSCULAR; INTRAVENOUS; SUBCUTANEOUS at 20:37

## 2019-05-19 RX ADMIN — ZOLPIDEM TARTRATE 5 MILLIGRAM(S): 10 TABLET ORAL at 23:48

## 2019-05-19 RX ADMIN — Medication 1000 MILLIGRAM(S): at 16:06

## 2019-05-19 RX ADMIN — Medication 20 MILLIGRAM(S): at 12:48

## 2019-05-19 RX ADMIN — CITALOPRAM 40 MILLIGRAM(S): 10 TABLET, FILM COATED ORAL at 12:48

## 2019-05-19 RX ADMIN — HYDROMORPHONE HYDROCHLORIDE 2 MILLIGRAM(S): 2 INJECTION INTRAMUSCULAR; INTRAVENOUS; SUBCUTANEOUS at 03:04

## 2019-05-19 RX ADMIN — HYDROMORPHONE HYDROCHLORIDE 2 MILLIGRAM(S): 2 INJECTION INTRAMUSCULAR; INTRAVENOUS; SUBCUTANEOUS at 14:29

## 2019-05-19 RX ADMIN — Medication 400 MILLIGRAM(S): at 15:21

## 2019-05-19 RX ADMIN — HYDROMORPHONE HYDROCHLORIDE 2 MILLIGRAM(S): 2 INJECTION INTRAMUSCULAR; INTRAVENOUS; SUBCUTANEOUS at 09:09

## 2019-05-19 RX ADMIN — Medication 3 MILLILITER(S): at 06:57

## 2019-05-19 RX ADMIN — HEPARIN SODIUM 5000 UNIT(S): 5000 INJECTION INTRAVENOUS; SUBCUTANEOUS at 17:09

## 2019-05-19 RX ADMIN — HYDROMORPHONE HYDROCHLORIDE 2 MILLIGRAM(S): 2 INJECTION INTRAMUSCULAR; INTRAVENOUS; SUBCUTANEOUS at 03:34

## 2019-05-19 RX ADMIN — HYDROMORPHONE HYDROCHLORIDE 2 MILLIGRAM(S): 2 INJECTION INTRAMUSCULAR; INTRAVENOUS; SUBCUTANEOUS at 21:07

## 2019-05-19 RX ADMIN — Medication 3 MILLILITER(S): at 17:09

## 2019-05-19 RX ADMIN — Medication 3 MILLILITER(S): at 12:53

## 2019-05-19 RX ADMIN — Medication 240 MILLIGRAM(S): at 06:57

## 2019-05-19 RX ADMIN — HYDROMORPHONE HYDROCHLORIDE 2 MILLIGRAM(S): 2 INJECTION INTRAMUSCULAR; INTRAVENOUS; SUBCUTANEOUS at 17:09

## 2019-05-19 RX ADMIN — Medication 1 MILLIGRAM(S): at 10:51

## 2019-05-19 RX ADMIN — HYDROMORPHONE HYDROCHLORIDE 2 MILLIGRAM(S): 2 INJECTION INTRAMUSCULAR; INTRAVENOUS; SUBCUTANEOUS at 15:20

## 2019-05-19 RX ADMIN — HYDROMORPHONE HYDROCHLORIDE 2 MILLIGRAM(S): 2 INJECTION INTRAMUSCULAR; INTRAVENOUS; SUBCUTANEOUS at 17:49

## 2019-05-19 RX ADMIN — Medication 3 MILLILITER(S): at 22:33

## 2019-05-19 RX ADMIN — HYDROMORPHONE HYDROCHLORIDE 2 MILLIGRAM(S): 2 INJECTION INTRAMUSCULAR; INTRAVENOUS; SUBCUTANEOUS at 09:39

## 2019-05-19 RX ADMIN — TAMSULOSIN HYDROCHLORIDE 0.4 MILLIGRAM(S): 0.4 CAPSULE ORAL at 20:39

## 2019-05-19 NOTE — PROGRESS NOTE ADULT - ASSESSMENT
Pt is a 68yM with a pmh of NPH with VPS, lower back pain s/p multiple surgeries,        recently in rehab for PT / h/o ,  severe back pain  ,   came in from urgent care , CXR  at urgent care, with pna  per report  cxr in er,  no pna   on rocephin   for now  ID eval  parainfluenza positive  ct chest,  no PE/  pulm htn  card eval  Ct  head,  v/p shunt  dvt ppx  pt to ambulate       < from: CT Angio Chest w/ IV Cont (05.18.19 @ 16:33) >  IMPRESSION:     1.  No pulmonary embolism.   2.  Enlarged main pulmonary artery which may be seen in the setting of   pulmonary arterial hypertension.  < end of copied text >       < from: CT Head No Cont (05.18.19 @ 16:28) >  IMPRESSION:   Unchanged right frontal approach  shunt catheter and size/configuration   of the ventricles.  No acute intracranial hemorrhage, extra-axial fluid collection, mass   effect or midline shift  < end of copied text > Pt is a 68yM with a pmh of NPH with VPS, lower back pain s/p multiple surgeries,        recently in rehab for PT / h/o ,  severe back pain  ,   came in from urgent care , CXR  at urgent care, with pna  per report  cxr in er,  no pna  ID eval  parainfluenza positive  ct chest,  no PE/  pulm htn  card eval  Ct  head,  v/p shunt  dvt ppx  pt to ambulate       < from: CT Angio Chest w/ IV Cont (05.18.19 @ 16:33) >  IMPRESSION:     1.  No pulmonary embolism.   2.  Enlarged main pulmonary artery which may be seen in the setting of   pulmonary arterial hypertension.  < end of copied text >       < from: CT Head No Cont (05.18.19 @ 16:28) >  IMPRESSION:   Unchanged right frontal approach  shunt catheter and size/configuration   of the ventricles.  No acute intracranial hemorrhage, extra-axial fluid collection, mass   effect or midline shift  < end of copied text > Pt is a 68yM with a pmh of NPH with VPS, lower back pain s/p multiple surgeries,        recently in rehab for PT / h/o ,  severe back pain  ,   came in from urgent care , CXR  at urgent care, with pna  per report  cxr in er,  no pna  ID eval  parainfluenza positive  ct chest,  no PE/  pulm htn  card eval  Ct  head,  v/p shunt  rhonchi/   bronchospasm  from viral  sx on proventil/ prednisone  dvt ppx  pt  ambulating well ;;       < from: CT Angio Chest w/ IV Cont (05.18.19 @ 16:33) >  IMPRESSION:     1.  No pulmonary embolism.   2.  Enlarged main pulmonary artery which may be seen in the setting of   pulmonary arterial hypertension.  < end of copied text >       < from: CT Head No Cont (05.18.19 @ 16:28) >  IMPRESSION:   Unchanged right frontal approach  shunt catheter and size/configuration   of the ventricles.  No acute intracranial hemorrhage, extra-axial fluid collection, mass   effect or midline shift  < end of copied text >

## 2019-05-19 NOTE — CONSULT NOTE ADULT - SUBJECTIVE AND OBJECTIVE BOX
HPI:   Patient is a 68y male with NPH (VPS), chronic back pain (mult surgeries, fusion, remote infection), depression/anxiety, HTN, here last month for flare in back pain, sent to rehab and then home only few days ago.  He was pleased with progress at rehab, but upon return home, he developed cough.  Cough has been productive of thick sputum, but no SOB at rest, no pleuritic pain.    REVIEW OF SYSTEMS:  All other review of systems negative (Comprehensive ROS)    PAST MEDICAL & SURGICAL HISTORY:  NPH (normal pressure hydrocephalus)  Chronic back pain greater than 3 months duration  Small intestine disorder  steroids   1967  Lumbar disc displacement without myelopathy  Sciatica  Anxiety  Vertebral Sciatica  Insomnia  Depression  HTN (Hypertension)  S/P lumbar fusion: L4-L5 on Oct 2011  pins/screws 2012   spinal surgery 2013  Dehiscence, Operation Wound/Debridement: infection  S/P Laminectomy: L4-L5   complicated by infection requiring  antibiiotcis      Allergies  Biaxin (Other)  Lidoderm (Unknown)  morphine (Short breath; Rash)    Antimicrobials: Vanco + Zosyn x 1 given in ED    Other Medications:  ALBUTerol    90 MICROgram(s) HFA Inhaler 1 Puff(s) Inhalation every 4 hours  ALBUTerol/ipratropium for Nebulization 3 milliLiter(s) Nebulizer every 6 hours  ALPRAZolam 1 milliGRAM(s) Oral every 6 hours PRN  citalopram 40 milliGRAM(s) Oral daily  heparin  Injectable 5000 Unit(s) SubCutaneous every 12 hours  HYDROmorphone   Tablet 2 milliGRAM(s) Oral every 4 hours PRN  predniSONE   Tablet 20 milliGRAM(s) Oral daily  tamsulosin 0.4 milliGRAM(s) Oral at bedtime  tiotropium 18 MICROgram(s) Capsule 1 Capsule(s) Inhalation daily  verapamil  milliGRAM(s) Oral daily  zolpidem 5 milliGRAM(s) Oral at bedtime PRN      FAMILY HISTORY:  No pertinent family history in first degree relatives      SOCIAL HISTORY:  Smoking: no    ETOH: no     Single     T(F): 98.5 (19 @ 13:07), Max: 98.5 (19 @ 13:07)  HR: 81 (19 @ 13:07)  BP: 115/69 (19 @ 13:07)  RR: 18 (19 @ 13:07)  SpO2: 99% (19 @ 13:07)  Wt(kg): --    PHYSICAL EXAM:  General: alert, no acute distress  Eyes:  anicteric, no conjunctival injection, no discharge  Oropharynx: no lesions or injection 	  Neck: supple, without adenopathy  Lungs: clear to auscultation  Heart: regular rate and rhythm; no murmur, rubs or gallops  Abdomen: soft, nondistended, nontender, without mass or organomegaly  Skin: no lesions  Extremities: no clubbing, cyanosis, or edema  Neurologic: alert, oriented, moves all extremities    LAB RESULTS:                        11.5   5.92  )-----------( 187      ( 19 May 2019 09:18 )             35.0         142  |  103  |  12  ----------------------------<  103<H>  4.1   |  27  |  0.94    Ca    8.4      19 May 2019 07:12        Urinalysis Basic - ( 18 May 2019 16:32 )    Color: Light Yellow / Appearance: Clear / S.014 / pH: x  Gluc: x / Ketone: Negative  / Bili: Negative / Urobili: Negative   Blood: x / Protein: Negative / Nitrite: Negative   Leuk Esterase: Negative / RBC: 1 /hpf / WBC 1 /HPF   Sq Epi: x / Non Sq Epi: 0 /hpf / Bacteria: Negative    MICROBIOLOGY:  RECENT CULTURES:  Rapid Respiratory Viral Panel (19 @ 14:10)    Rapid RVP Result: Detected:    Culture - Blood in AM (19 @ 07:27)    Culture - Blood:   NO ORGANISMS ISOLATED    Specimen Source: BLOOD PERIPHERAL    RADIOLOGY REVIEWED:  CT Angio Chest w/ IV Cont (19 @ 16:33) >  1.  No pulmonary embolism.   2.  Enlarged main pulmonary artery which may be seen in the setting of   pulmonary arterial hypertension.
CHIEF COMPLAINT:Patient is a 68y old  Male who presents with a chief complaint of sob/ cough (18 May 2019 17:52)      HPI:   Pt is a 68yM with a pmh of NPH with VPS, lower back pain s/p multiple surgeries,       recently in rehab for PT ,  severe backpain,  came in from urgent care , CXR findings of pneumonia.  Productive cough since earlier this week, yellow-green sputum . Fever since Tuesday. Pt states his roommate had these symptoms back when he was in rehab--pt was discharged on Tuesday . Pt states he has a mild fever, no chills. +SOB. Does not use inhaler, no hx of lung disease (denies COPD/asthma). After discharge pt states walked around bathroom to and from, but was mostly in bed. Denies any leg pain, swelling, weakness. Further denies any CP, palpitations, urinary symptoms/changes. Reports difficulty urinating, but baseline. On flomax. (18 May 2019 17:52)  pt was admitted before with syncopal episodes.    PAST MEDICAL & SURGICAL HISTORY:  NPH (normal pressure hydrocephalus)  Chronic back pain greater than 3 months duration  Small intestine disorder: &quot;ilitis&quot;   steroids   1967  Lumbar disc displacement without myelopathy  Sciatica  Anxiety  Vertebral Sciatica  Insomnia  Depression  HTN (Hypertension)  S/P lumbar fusion: L4-L5 on Oct 2011  pins/screws 2012   spinal surgery 2013  Dehiscence, Operation Wound/Debridement: infection  S/P Laminectomy: L4-L5 2009  complicated by infection requiring  antibiiotcis      MEDICATIONS  (STANDING):  ALBUTerol/ipratropium for Nebulization. 3 milliLiter(s) Nebulizer once  citalopram 40 milliGRAM(s) Oral daily  tamsulosin 0.4 milliGRAM(s) Oral at bedtime  traZODone 100 milliGRAM(s) Oral at bedtime  verapamil  milliGRAM(s) Oral daily    MEDICATIONS  (PRN):  ALPRAZolam 1 milliGRAM(s) Oral every 6 hours PRN Anxiety  HYDROmorphone   Tablet 2 milliGRAM(s) Oral every 4 hours PRN Moderate Pain (4 - 6)  HYDROmorphone   Tablet 4 milliGRAM(s) Oral every 6 hours PRN Severe Pain (7 - 10)      FAMILY HISTORY:  No pertinent family history in first degree relatives      SOCIAL HISTORY:    [ ] Non-smoker  [ ] Smoker  [ ] Alcohol    Allergies    Biaxin (Other)  Lidoderm (Unknown)  morphine (Short breath; Rash)    Intolerances    	    REVIEW OF SYSTEMS:  CONSTITUTIONAL: No fever, weight loss, or fatigue  EYES: No eye pain, visual disturbances, or discharge  ENT:  No difficulty hearing, tinnitus, vertigo; No sinus or throat pain  NECK: No pain or stiffness  RESPIRATORY: No cough, wheezing, chills or hemoptysis; + Shortness of Breath  CARDIOVASCULAR: No chest pain, palpitations, passing out, dizziness, or leg swelling  GASTROINTESTINAL: No abdominal or epigastric pain. No nausea, vomiting, or hematemesis; No diarrhea or constipation. No melena or hematochezia.  GENITOURINARY: No dysuria, frequency, hematuria, or incontinence  NEUROLOGICAL: No headaches, memory loss, loss of strength, numbness, or tremors  SKIN: No itching, burning, rashes, or lesions   LYMPH Nodes: No enlarged glands  ENDOCRINE: No heat or cold intolerance; No hair loss  MUSCULOSKELETAL: No joint pain or swelling; No muscle, back, or extremity pain  PSYCHIATRIC: No depression, anxiety, mood swings, or difficulty sleeping  HEME/LYMPH: No easy bruising, or bleeding gums  ALLERGY AND IMMUNOLOGIC: No hives or eczema	    [ ] All others negative	  [ ] Unable to obtain    PHYSICAL EXAM:  T(C): 36.3 (05-18-19 @ 17:42), Max: 37.7 (05-18-19 @ 11:41)  HR: 74 (05-18-19 @ 17:42) (72 - 89)  BP: 109/66 (05-18-19 @ 17:42) (102/69 - 128/83)  RR: 18 (05-18-19 @ 17:42) (18 - 20)  SpO2: 100% (05-18-19 @ 17:42) (88% - 100%)  Wt(kg): --  I&O's Summary      Appearance: Normal	  HEENT:   Normal oral mucosa, PERRL, EOMI	  Lymphatic: No lymphadenopathy  Cardiovascular: Normal S1 S2, No JVD, + murmurs, No edema  Respiratory: rhonchi	  Psychiatry: A & O x 3, Mood & affect appropriate  Gastrointestinal:  Soft, Non-tender, + BS	  Skin: No rashes, No ecchymoses, No cyanosis	  Neurologic: Non-focal  Extremities: Normal range of motion, No clubbing, cyanosis or edema  Vascular: Peripheral pulses palpable 2+ bilaterally    TELEMETRY: 	    ECG:  	  RADIOLOGY:  OTHER: 	  	  LABS:	 	    CARDIAC MARKERS:                              12.9   8.2   )-----------( 192      ( 18 May 2019 12:23 )             38.4     05-18    139  |  102  |  18  ----------------------------<  111<H>  3.9   |  26  |  0.98    Ca    8.7      18 May 2019 12:23      proBNP:   Lipid Profile:   HgA1c:   TSH:       PREVIOUS DIAGNOSTIC TESTING:    < from: Transthoracic Echocardiogram (03.14.14 @ 16:02) >  1. Mitral annular calcification, otherwise normal mitral  valve. Minimal mitral regurgitation.  2. Normal left ventricular internal dimensions and wall  thicknesses.  3. Endocardium not well visualized; grossly normal left  ventricular systolic function.  4. The right ventricle is not well visualized; grossly  normal right ventricularsystolic function.    < end of copied text >    < from: CT Angio Chest w/ IV Cont (05.18.19 @ 16:33) >  1.  No pulmonary embolism.   2.  Enlarged main pulmonary artery which may be seen in the setting of   pulmonary arterial hypertension.      < end of copied text >

## 2019-05-19 NOTE — PROGRESS NOTE ADULT - SUBJECTIVE AND OBJECTIVE BOX
CARDIOLOGY     PROGRESS  NOTE   ________________________________________________    CHIEF COMPLAINT:Patient is a 68y old  Male who presents with a chief complaint of sob/ cough (18 May 2019 18:08)  doing better.  	  REVIEW OF SYSTEMS:  CONSTITUTIONAL: No fever, weight loss, or fatigue  EYES: No eye pain, visual disturbances, or discharge  ENT:  No difficulty hearing, tinnitus, vertigo; No sinus or throat pain  NECK: No pain or stiffness  RESPIRATORY: No cough, wheezing, chills or hemoptysis; decrease  Shortness of Breath  CARDIOVASCULAR: No chest pain, palpitations, passing out, dizziness, or leg swelling  GASTROINTESTINAL: No abdominal or epigastric pain. No nausea, vomiting, or hematemesis; No diarrhea or constipation. No melena or hematochezia.  GENITOURINARY: No dysuria, frequency, hematuria, or incontinence  NEUROLOGICAL: No headaches, memory loss, loss of strength, numbness, or tremors  SKIN: No itching, burning, rashes, or lesions   LYMPH Nodes: No enlarged glands  ENDOCRINE: No heat or cold intolerance; No hair loss  MUSCULOSKELETAL: No joint pain or swelling; No muscle, back, or extremity pain  PSYCHIATRIC: No depression, anxiety, mood swings, or difficulty sleeping  HEME/LYMPH: No easy bruising, or bleeding gums  ALLERGY AND IMMUNOLOGIC: No hives or eczema	    [ ] All others negative	  [ ] Unable to obtain    PHYSICAL EXAM:  T(C): 36.8 (05-19-19 @ 05:16), Max: 37.7 (05-18-19 @ 11:41)  HR: 77 (05-19-19 @ 05:16) (69 - 89)  BP: 122/73 (05-19-19 @ 05:16) (102/69 - 128/83)  RR: 18 (05-19-19 @ 05:16) (8 - 20)  SpO2: 96% (05-19-19 @ 05:16) (88% - 100%)  Wt(kg): --  I&O's Summary    18 May 2019 07:01  -  19 May 2019 06:58  --------------------------------------------------------  IN: 0 mL / OUT: 300 mL / NET: -300 mL        Appearance: Normal	  HEENT:   Normal oral mucosa, PERRL, EOMI	  Lymphatic: No lymphadenopathy  Cardiovascular: Normal S1 S2, No JVD, + murmurs, No edema  Respiratory: Lungs clear to auscultation	  Psychiatry: A & O x 3, Mood & affect appropriate  Gastrointestinal:  Soft, Non-tender, + BS	  Skin: No rashes, No ecchymoses, No cyanosis	  Neurologic: Non-focal  Extremities: Normal range of motion, No clubbing, cyanosis or edema  Vascular: Peripheral pulses palpable 2+ bilaterally    MEDICATIONS  (STANDING):  ALBUTerol    90 MICROgram(s) HFA Inhaler 1 Puff(s) Inhalation every 4 hours  ALBUTerol/ipratropium for Nebulization 3 milliLiter(s) Nebulizer every 6 hours  citalopram 40 milliGRAM(s) Oral daily  heparin  Injectable 5000 Unit(s) SubCutaneous every 12 hours  tamsulosin 0.4 milliGRAM(s) Oral at bedtime  tiotropium 18 MICROgram(s) Capsule 1 Capsule(s) Inhalation daily  verapamil  milliGRAM(s) Oral daily      TELEMETRY: 	    ECG:  	  RADIOLOGY:  OTHER: 	  	  LABS:	 	    CARDIAC MARKERS:                                12.9   8.2   )-----------( 192      ( 18 May 2019 12:23 )             38.4     05-18    139  |  102  |  18  ----------------------------<  111<H>  3.9   |  26  |  0.98    Ca    8.7      18 May 2019 12:23      proBNP:   Lipid Profile:   HgA1c:   TSH: Thyroid Stimulating Hormone, Serum: 0.73 uIU/mL (04-23 @ 05:57)    < from: CT Angio Chest w/ IV Cont (05.18.19 @ 16:33) >  1.  No pulmonary embolism.   2.  Enlarged main pulmonary artery which may be seen in the setting of   pulmonary arterial hypertension.    < end of copied text >  < from: Transthoracic Echocardiogram (03.14.14 @ 16:02) >  1. Mitral annular calcification, otherwise normal mitral  valve. Minimal mitral regurgitation.  2. Normal left ventricular internal dimensions and wall  thicknesses.  3. Endocardium not well visualized; grossly normal left  ventricular systolic function.  4. The right ventricle is not well visualized; grossly  normal right ventricularsystolic function.    < end of copied text >        Assessment and plan  ---------------------------  sob /cough ? pneumoniae  enlarged pulmonary artery r/o pulmonary htn  awaiting echo  continue verapamil  ?GULSHAN needs sleep study  dvt prophylaxis

## 2019-05-19 NOTE — PROGRESS NOTE ADULT - SUBJECTIVE AND OBJECTIVE BOX
no cp/sob  REVIEW OF SYSTEMS:  GEN: no fever,    no chills  RESP: no SOB,   no cough  CVS: no chest pain,   no palpitations  GI: no abdominal pain,   no nausea,   no vomiting,   no constipation,   no diarrhea  : no dysuria,   no frequency  NEURO: no headache,   no dizziness  PSYCH: no depression,   not anxious  Derm : no rash    MEDICATIONS  (STANDING):  ALBUTerol    90 MICROgram(s) HFA Inhaler 1 Puff(s) Inhalation every 4 hours  ALBUTerol/ipratropium for Nebulization 3 milliLiter(s) Nebulizer every 6 hours  citalopram 40 milliGRAM(s) Oral daily  heparin  Injectable 5000 Unit(s) SubCutaneous every 12 hours  tamsulosin 0.4 milliGRAM(s) Oral at bedtime  tiotropium 18 MICROgram(s) Capsule 1 Capsule(s) Inhalation daily  verapamil  milliGRAM(s) Oral daily    MEDICATIONS  (PRN):  ALPRAZolam 1 milliGRAM(s) Oral every 6 hours PRN Anxiety  HYDROmorphone   Tablet 2 milliGRAM(s) Oral every 4 hours PRN Moderate Pain (4 - 6)  zolpidem 5 milliGRAM(s) Oral at bedtime PRN Insomnia      Vital Signs Last 24 Hrs  T(C): 36.8 (19 May 2019 05:16), Max: 37.7 (18 May 2019 11:41)  T(F): 98.2 (19 May 2019 05:16), Max: 99.8 (18 May 2019 11:41)  HR: 77 (19 May 2019 05:16) (69 - 89)  BP: 122/73 (19 May 2019 05:16) (102/69 - 128/83)  BP(mean): --  RR: 18 (19 May 2019 05:16) (8 - 20)  SpO2: 96% (19 May 2019 05:16) (88% - 100%)  CAPILLARY BLOOD GLUCOSE        I&O's Summary    18 May 2019 07:01  -  19 May 2019 07:00  --------------------------------------------------------  IN: 0 mL / OUT: 300 mL / NET: -300 mL        PHYSICAL EXAM:  HEAD:  Atraumatic, Normocephalic  NECK: Supple, No   JVD  CHEST/LUNG:   no     rales,     no,    rhonchi  HEART: Regular rate and rhythm;         murmur  ABDOMEN: Soft, Nontender, ;   EXTREMITIES:     no   edema  NEUROLOGY:  alert    LABS:                        12.9   8.2   )-----------( 192      ( 18 May 2019 12:23 )             38.4     05-18    139  |  102  |  18  ----------------------------<  111<H>  3.9   |  26  |  0.98    Ca    8.7      18 May 2019 12:23            Urinalysis Basic - ( 18 May 2019 16:32 )    Color: Light Yellow / Appearance: Clear / S.014 / pH: x  Gluc: x / Ketone: Negative  / Bili: Negative / Urobili: Negative   Blood: x / Protein: Negative / Nitrite: Negative   Leuk Esterase: Negative / RBC: 1 /hpf / WBC 1 /HPF   Sq Epi: x / Non Sq Epi: 0 /hpf / Bacteria: Negative              Thyroid Stimulating Hormone, Serum: 0.73 uIU/mL ( @ 05:57)          Consultant(s) Notes Reviewed:      Care Discussed with Consultants/Other Providers: no cp/sob  REVIEW OF SYSTEMS:  GEN: no fever,    no chills  RESP: no SOB,   no cough  CVS: no chest pain,   no palpitations  GI: no abdominal pain,   no nausea,   no vomiting,   no constipation,   no diarrhea  : no dysuria,   no frequency  NEURO: no headache,   no dizziness  PSYCH: no depression,   not anxious  Derm : no rash    MEDICATIONS  (STANDING):  ALBUTerol    90 MICROgram(s) HFA Inhaler 1 Puff(s) Inhalation every 4 hours  ALBUTerol/ipratropium for Nebulization 3 milliLiter(s) Nebulizer every 6 hours  citalopram 40 milliGRAM(s) Oral daily  heparin  Injectable 5000 Unit(s) SubCutaneous every 12 hours  tamsulosin 0.4 milliGRAM(s) Oral at bedtime  tiotropium 18 MICROgram(s) Capsule 1 Capsule(s) Inhalation daily  verapamil  milliGRAM(s) Oral daily    MEDICATIONS  (PRN):  ALPRAZolam 1 milliGRAM(s) Oral every 6 hours PRN Anxiety  HYDROmorphone   Tablet 2 milliGRAM(s) Oral every 4 hours PRN Moderate Pain (4 - 6)  zolpidem 5 milliGRAM(s) Oral at bedtime PRN Insomnia      Vital Signs Last 24 Hrs  T(C): 36.8 (19 May 2019 05:16), Max: 37.7 (18 May 2019 11:41)  T(F): 98.2 (19 May 2019 05:16), Max: 99.8 (18 May 2019 11:41)  HR: 77 (19 May 2019 05:16) (69 - 89)  BP: 122/73 (19 May 2019 05:16) (102/69 - 128/83)  BP(mean): --  RR: 18 (19 May 2019 05:16) (8 - 20)  SpO2: 96% (19 May 2019 05:16) (88% - 100%)  CAPILLARY BLOOD GLUCOSE        I&O's Summary    18 May 2019 07:01  -  19 May 2019 07:00  --------------------------------------------------------  IN: 0 mL / OUT: 300 mL / NET: -300 mL        PHYSICAL EXAM:  HEAD:  Atraumatic, Normocephalic  NECK: Supple, No   JVD  CHEST/LUNG:   b/l    rhonchi  HEART: Regular rate and rhythm;         murmur  ABDOMEN: Soft, Nontender, ;   EXTREMITIES:     no   edema  NEUROLOGY:  alert    LABS:                        12.9   8.2   )-----------( 192      ( 18 May 2019 12:23 )             38.4     05-18    139  |  102  |  18  ----------------------------<  111<H>  3.9   |  26  |  0.98    Ca    8.7      18 May 2019 12:23            Urinalysis Basic - ( 18 May 2019 16:32 )    Color: Light Yellow / Appearance: Clear / S.014 / pH: x  Gluc: x / Ketone: Negative  / Bili: Negative / Urobili: Negative   Blood: x / Protein: Negative / Nitrite: Negative   Leuk Esterase: Negative / RBC: 1 /hpf / WBC 1 /HPF   Sq Epi: x / Non Sq Epi: 0 /hpf / Bacteria: Negative              Thyroid Stimulating Hormone, Serum: 0.73 uIU/mL ( @ 05:57)          Consultant(s) Notes Reviewed:      Care Discussed with Consultants/Other Providers:

## 2019-05-19 NOTE — CONSULT NOTE ADULT - ASSESSMENT
68 yp M, NPH, HTN, depression, mult spine surgeries, chronic back pain, recent rehab stay, now with cough, malaise and found to have Parainfluenza virus c/w URI.  No pneumonia on CT.  Afebrile since arrival, WBC normal.    Plan:  No indication for abx  Symptomatic, supportive Tx for viral infection
pt with hx of htn on verapamil who presented to er with c/o cough and sob  cta no pulmonary emboli with evidence of pulmonary htn.  r/o barb  continue verapamil  will need sleep study  check o2 sat on exertion  echo noted on 2014, repeat echo  pulmonary eval

## 2019-05-20 ENCOUNTER — TRANSCRIPTION ENCOUNTER (OUTPATIENT)
Age: 69
End: 2019-05-20

## 2019-05-20 VITALS
OXYGEN SATURATION: 95 % | SYSTOLIC BLOOD PRESSURE: 121 MMHG | HEART RATE: 70 BPM | RESPIRATION RATE: 18 BRPM | DIASTOLIC BLOOD PRESSURE: 72 MMHG | TEMPERATURE: 98 F

## 2019-05-20 PROCEDURE — 85027 COMPLETE CBC AUTOMATED: CPT

## 2019-05-20 PROCEDURE — 87633 RESP VIRUS 12-25 TARGETS: CPT

## 2019-05-20 PROCEDURE — 81001 URINALYSIS AUTO W/SCOPE: CPT

## 2019-05-20 PROCEDURE — 96374 THER/PROPH/DIAG INJ IV PUSH: CPT

## 2019-05-20 PROCEDURE — 71275 CT ANGIOGRAPHY CHEST: CPT

## 2019-05-20 PROCEDURE — 93005 ELECTROCARDIOGRAM TRACING: CPT

## 2019-05-20 PROCEDURE — 80048 BASIC METABOLIC PNL TOTAL CA: CPT

## 2019-05-20 PROCEDURE — 87798 DETECT AGENT NOS DNA AMP: CPT

## 2019-05-20 PROCEDURE — 70450 CT HEAD/BRAIN W/O DYE: CPT

## 2019-05-20 PROCEDURE — 87486 CHLMYD PNEUM DNA AMP PROBE: CPT

## 2019-05-20 PROCEDURE — 71046 X-RAY EXAM CHEST 2 VIEWS: CPT

## 2019-05-20 PROCEDURE — 96375 TX/PRO/DX INJ NEW DRUG ADDON: CPT

## 2019-05-20 PROCEDURE — 99285 EMERGENCY DEPT VISIT HI MDM: CPT | Mod: 25

## 2019-05-20 PROCEDURE — 94640 AIRWAY INHALATION TREATMENT: CPT

## 2019-05-20 PROCEDURE — 87581 M.PNEUMON DNA AMP PROBE: CPT

## 2019-05-20 RX ORDER — HYDROMORPHONE HYDROCHLORIDE 2 MG/ML
2 INJECTION INTRAMUSCULAR; INTRAVENOUS; SUBCUTANEOUS ONCE
Refills: 0 | Status: DISCONTINUED | OUTPATIENT
Start: 2019-05-20 | End: 2019-05-20

## 2019-05-20 RX ORDER — ALBUTEROL 90 UG/1
1 AEROSOL, METERED ORAL
Qty: 1 | Refills: 0
Start: 2019-05-20 | End: 2019-06-18

## 2019-05-20 RX ADMIN — HYDROMORPHONE HYDROCHLORIDE 2 MILLIGRAM(S): 2 INJECTION INTRAMUSCULAR; INTRAVENOUS; SUBCUTANEOUS at 11:38

## 2019-05-20 RX ADMIN — HYDROMORPHONE HYDROCHLORIDE 2 MILLIGRAM(S): 2 INJECTION INTRAMUSCULAR; INTRAVENOUS; SUBCUTANEOUS at 07:13

## 2019-05-20 RX ADMIN — Medication 3 MILLILITER(S): at 06:43

## 2019-05-20 RX ADMIN — HYDROMORPHONE HYDROCHLORIDE 2 MILLIGRAM(S): 2 INJECTION INTRAMUSCULAR; INTRAVENOUS; SUBCUTANEOUS at 11:08

## 2019-05-20 RX ADMIN — Medication 20 MILLIGRAM(S): at 06:43

## 2019-05-20 RX ADMIN — Medication 3 MILLILITER(S): at 11:14

## 2019-05-20 RX ADMIN — Medication 1 MILLIGRAM(S): at 06:50

## 2019-05-20 RX ADMIN — CITALOPRAM 40 MILLIGRAM(S): 10 TABLET, FILM COATED ORAL at 11:08

## 2019-05-20 RX ADMIN — HYDROMORPHONE HYDROCHLORIDE 2 MILLIGRAM(S): 2 INJECTION INTRAMUSCULAR; INTRAVENOUS; SUBCUTANEOUS at 06:43

## 2019-05-20 RX ADMIN — Medication 240 MILLIGRAM(S): at 06:43

## 2019-05-20 RX ADMIN — HEPARIN SODIUM 5000 UNIT(S): 5000 INJECTION INTRAVENOUS; SUBCUTANEOUS at 06:45

## 2019-05-20 NOTE — DISCHARGE NOTE NURSING/CASE MANAGEMENT/SOCIAL WORK - NSDCDPATPORTLINK_GEN_ALL_CORE
You can access the [email or web address] Patient Portal, offered by [facility], by registering with the following website: [email or web address]

## 2019-05-20 NOTE — PROGRESS NOTE ADULT - ASSESSMENT
68 yp M, NPH, HTN, depression, mult spine surgeries, chronic back pain, recent rehab stay, now with cough, malaise and found to have Parainfluenza virus c/w URI.  No pneumonia on CT.  Afebrile since arrival, WBC normal.  His respiratory symptoms are improving.  Plan:  No indication for abx  Symptomatic, supportive Tx for viral infection  agree with discharge planning, no additional ID w/u planned  I have answered his questions.  With no additional ID w/u planned we will stop actively following, please call if questions arise

## 2019-05-20 NOTE — PROGRESS NOTE ADULT - SUBJECTIVE AND OBJECTIVE BOX
CC: f/u for parainfluenza 3    Patient reports: improved cough, less congestion    REVIEW OF SYSTEMS:  All other review of systems negative (Comprehensive ROS)    Antimicrobials Day #  :off    Other Medications Reviewed    T(F): 98.4 (19 @ 10:00), Max: 98.8 (19 @ 21:26)  HR: 89 (19 @ 10:00)  BP: 136/74 (19 @ 10:00)  RR: 18 (19 @ 11:30)  SpO2: 97% (19 @ 11:30)  Wt(kg): --    PHYSICAL EXAM:  General: alert, no acute distress  Eyes:  anicteric, no conjunctival injection, no discharge  Oropharynx: no lesions or injection 	  Neck: supple, without adenopathy  Lungs: few ronchi  Heart: regular rate and rhythm; no murmur, rubs or gallops  Abdomen: soft, nondistended, nontender, without mass or organomegaly  Skin: no lesions  Extremities: no clubbing, cyanosis,  trace edema  Neurologic: alert, oriented, moves all extremities    LAB RESULTS:                        11.5   5.92  )-----------( 187      ( 19 May 2019 09:18 )             35.0         142  |  103  |  12  ----------------------------<  103<H>  4.1   |  27  |  0.94    Ca    8.4      19 May 2019 07:12        Urinalysis Basic - ( 18 May 2019 16:32 )    Color: Light Yellow / Appearance: Clear / S.014 / pH: x  Gluc: x / Ketone: Negative  / Bili: Negative / Urobili: Negative   Blood: x / Protein: Negative / Nitrite: Negative   Leuk Esterase: Negative / RBC: 1 /hpf / WBC 1 /HPF   Sq Epi: x / Non Sq Epi: 0 /hpf / Bacteria: Negative      MICROBIOLOGY:  RECENT CULTURES:  blood culture negative    RADIOLOGY REVIEWED:  < from: CT Angio Chest w/ IV Cont (19 @ 16:33) >  IMPRESSION:     1.  No pulmonary embolism.   2.  Enlarged main pulmonary artery which may be seen in the setting of   pulmonary arterial hypertension.    < end of copied text >

## 2019-05-20 NOTE — PROGRESS NOTE ADULT - SUBJECTIVE AND OBJECTIVE BOX
doing better    REVIEW OF SYSTEMS:  GEN: no fever,    no chills  RESP: no SOB,   no cough  CVS: no chest pain,   no palpitations  GI: no abdominal pain,   no nausea,   no vomiting,   no constipation,   no diarrhea  : no dysuria,   no frequency  NEURO: no headache,   no dizziness  PSYCH: no depression,   not anxious  Derm : no rash    MEDICATIONS  (STANDING):  ALBUTerol    90 MICROgram(s) HFA Inhaler 1 Puff(s) Inhalation every 4 hours  ALBUTerol/ipratropium for Nebulization 3 milliLiter(s) Nebulizer every 6 hours  citalopram 40 milliGRAM(s) Oral daily  heparin  Injectable 5000 Unit(s) SubCutaneous every 12 hours  predniSONE   Tablet 20 milliGRAM(s) Oral daily  tamsulosin 0.4 milliGRAM(s) Oral at bedtime  tiotropium 18 MICROgram(s) Capsule 1 Capsule(s) Inhalation daily  verapamil  milliGRAM(s) Oral daily    MEDICATIONS  (PRN):  ALPRAZolam 1 milliGRAM(s) Oral every 6 hours PRN Anxiety  HYDROmorphone   Tablet 2 milliGRAM(s) Oral every 4 hours PRN Moderate Pain (4 - 6)  zolpidem 5 milliGRAM(s) Oral at bedtime PRN Insomnia      Vital Signs Last 24 Hrs  T(C): 36.8 (20 May 2019 06:21), Max: 37.1 (19 May 2019 21:26)  T(F): 98.3 (20 May 2019 06:21), Max: 98.8 (19 May 2019 21:26)  HR: 79 (20 May 2019 06:45) (76 - 98)  BP: 133/81 (20 May 2019 06:45) (108/65 - 168/80)  BP(mean): --  RR: 18 (20 May 2019 06:21) (18 - 18)  SpO2: 97% (20 May 2019 06:21) (95% - 99%)  CAPILLARY BLOOD GLUCOSE        I&O's Summary    19 May 2019 07:01  -  20 May 2019 07:00  --------------------------------------------------------  IN: 1260 mL / OUT: 1303 mL / NET: -43 mL        PHYSICAL EXAM:  HEAD:  Atraumatic, Normocephalic  NECK: Supple, No   JVD  CHEST/LUNG:  less   rhonchi  HEART: Regular rate and rhythm;         murmur  ABDOMEN: Soft, Nontender, ;   EXTREMITIES:     no   edema  NEUROLOGY:  alert    LABS:                        11.5   5.92  )-----------( 187      ( 19 May 2019 09:18 )             35.0     05-19    142  |  103  |  12  ----------------------------<  103<H>  4.1   |  27  |  0.94    Ca    8.4      19 May 2019 07:12            Urinalysis Basic - ( 18 May 2019 16:32 )    Color: Light Yellow / Appearance: Clear / S.014 / pH: x  Gluc: x / Ketone: Negative  / Bili: Negative / Urobili: Negative   Blood: x / Protein: Negative / Nitrite: Negative   Leuk Esterase: Negative / RBC: 1 /hpf / WBC 1 /HPF   Sq Epi: x / Non Sq Epi: 0 /hpf / Bacteria: Negative              Thyroid Stimulating Hormone, Serum: 0.73 uIU/mL ( @ 05:57)          Consultant(s) Notes Reviewed:      Care Discussed with Consultants/Other Providers:

## 2019-05-20 NOTE — DISCHARGE NOTE PROVIDER - PROVIDER TOKENS
PROVIDER:[TOKEN:[[id number]],FOLLOWUP:[1 week]] PROVIDER:[[id number]],FOLLOWUP:[1 week]],PROVIDER:[[id number]],FOLLOWUP:[1 week]]

## 2019-05-20 NOTE — DISCHARGE NOTE PROVIDER - NSDCCPCAREPLAN_GEN_ALL_CORE_FT
PRINCIPAL DISCHARGE DIAGNOSIS  Diagnosis: Parainfluenza virus infection  Assessment and Plan of Treatment: No need for antibiotics , continue with Steroids per taper and follow up with pmd

## 2019-05-20 NOTE — PROGRESS NOTE ADULT - ASSESSMENT
Pt is a 68yM with a pmh of NPH with VPS, lower back pain s/p multiple surgeries,        recently in rehab for PT / h/o ,  severe back pain  ,   came in from urgent care , CXR  at urgent care, with pna  per report  cxr in er,  no pna  ID eval, no ab  parainfluenza positive  ct chest,  no PE/  pulm htn  Ct  head,  v/p shunt  rhonchi/   bronchospasm  from viral  sx on proventil/ prednisone  dvt ppx  pt  ambulating well ;  plan.  d/c  planning;       < from: CT Angio Chest w/ IV Cont (05.18.19 @ 16:33) >  IMPRESSION:     1.  No pulmonary embolism.   2.  Enlarged main pulmonary artery which may be seen in the setting of   pulmonary arterial hypertension.  < end of copied text >       < from: CT Head No Cont (05.18.19 @ 16:28) >  IMPRESSION:   Unchanged right frontal approach  shunt catheter and size/configuration   of the ventricles.  No acute intracranial hemorrhage, extra-axial fluid collection, mass   effect or midline shift  < end of copied text > Pt is a 68yM with a pmh of NPH with VPS, lower back pain s/p multiple surgeries,        recently in rehab for PT / h/o ,  severe back pain  ,   came in from urgent care , CXR  at urgent care, with pna  per report  cxr in er,  no pna  ID eval, no ab  parainfluenza positive  ct chest,  no PE/  pulm htn  Ct  head,  v/p shunt  rhonchi/   bronchospasm  from viral  sx on proventil/ prednisone  dvt ppx  pt  ambulating well ;/ pt wishes to gohome/  f/p with pmd  plan.  d/c  /  spoke to np;       < from: CT Angio Chest w/ IV Cont (05.18.19 @ 16:33) >  IMPRESSION:     1.  No pulmonary embolism.   2.  Enlarged main pulmonary artery which may be seen in the setting of   pulmonary arterial hypertension.  < end of copied text >       < from: CT Head No Cont (05.18.19 @ 16:28) >  IMPRESSION:   Unchanged right frontal approach  shunt catheter and size/configuration   of the ventricles.  No acute intracranial hemorrhage, extra-axial fluid collection, mass   effect or midline shift  < end of copied text >

## 2019-05-20 NOTE — DISCHARGE NOTE PROVIDER - CARE PROVIDER_API CALL
Alcides Cantor)  Family Medicine  [address], [address]  [place] [address]  Phone: [phone]  Fax: [phone]  Follow Up Time: 1 week Alcides Cantor)  Family Medicine  [address], [address]  [place] [address]  Phone: [phone]  Fax: [phone]  Follow Up Time: 1 week    Zee Collins (DO)  Cardiology Medicine  [address] [place] [address], [address]  [place] [address]  Phone: [phone]  Fax: [phone]  Follow Up Time: 1 week

## 2019-05-20 NOTE — PROGRESS NOTE ADULT - SUBJECTIVE AND OBJECTIVE BOX
CARDIOLOGY     PROGRESS  NOTE   ________________________________________________    CHIEF COMPLAINT:Patient is a 68y old  Male who presents with a chief complaint of sob/ cough (19 May 2019 13:51)  no complain decrease cough.  	  REVIEW OF SYSTEMS:  CONSTITUTIONAL: No fever, weight loss, or fatigue  EYES: No eye pain, visual disturbances, or discharge  ENT:  No difficulty hearing, tinnitus, vertigo; No sinus or throat pain  NECK: No pain or stiffness  RESPIRATORY: No cough, wheezing, chills or hemoptysis; No Shortness of Breath  CARDIOVASCULAR: No chest pain, palpitations, passing out, dizziness, or leg swelling  GASTROINTESTINAL: No abdominal or epigastric pain. No nausea, vomiting, or hematemesis; No diarrhea or constipation. No melena or hematochezia.  GENITOURINARY: No dysuria, frequency, hematuria, or incontinence  NEUROLOGICAL: No headaches, memory loss, loss of strength, numbness, or tremors  SKIN: No itching, burning, rashes, or lesions   LYMPH Nodes: No enlarged glands  ENDOCRINE: No heat or cold intolerance; No hair loss  MUSCULOSKELETAL: No joint pain or swelling; No muscle, back, or extremity pain  PSYCHIATRIC: No depression, anxiety, mood swings, or difficulty sleeping  HEME/LYMPH: No easy bruising, or bleeding gums  ALLERGY AND IMMUNOLOGIC: No hives or eczema	    [ ] All others negative	  [ ] Unable to obtain    PHYSICAL EXAM:  T(C): 36.8 (05-20-19 @ 06:21), Max: 37.1 (05-19-19 @ 21:26)  HR: 87 (05-20-19 @ 06:21) (76 - 98)  BP: 168/80 (05-20-19 @ 06:21) (108/65 - 168/80)  RR: 18 (05-20-19 @ 06:21) (18 - 18)  SpO2: 97% (05-20-19 @ 06:21) (95% - 99%)  Wt(kg): --  I&O's Summary    19 May 2019 07:01  -  20 May 2019 07:00  --------------------------------------------------------  IN: 1260 mL / OUT: 1103 mL / NET: 157 mL        Appearance: Normal	  HEENT:   Normal oral mucosa, PERRL, EOMI	  Lymphatic: No lymphadenopathy  Cardiovascular: Normal S1 S2, No JVD, + murmurs, No edema  Respiratory: Lungs clear to auscultation	  Psychiatry: A & O x 3, Mood & affect appropriate  Gastrointestinal:  Soft, Non-tender, + BS	  Skin: No rashes, No ecchymoses, No cyanosis	  Neurologic: Non-focal  Extremities: Normal range of motion, No clubbing, cyanosis or edema  Vascular: Peripheral pulses palpable 2+ bilaterally    MEDICATIONS  (STANDING):  ALBUTerol    90 MICROgram(s) HFA Inhaler 1 Puff(s) Inhalation every 4 hours  ALBUTerol/ipratropium for Nebulization 3 milliLiter(s) Nebulizer every 6 hours  citalopram 40 milliGRAM(s) Oral daily  heparin  Injectable 5000 Unit(s) SubCutaneous every 12 hours  predniSONE   Tablet 20 milliGRAM(s) Oral daily  tamsulosin 0.4 milliGRAM(s) Oral at bedtime  tiotropium 18 MICROgram(s) Capsule 1 Capsule(s) Inhalation daily  verapamil  milliGRAM(s) Oral daily      TELEMETRY: 	    ECG:  	  RADIOLOGY:  OTHER: 	  	  LABS:	 	    CARDIAC MARKERS:                                11.5   5.92  )-----------( 187      ( 19 May 2019 09:18 )             35.0     05-19    142  |  103  |  12  ----------------------------<  103<H>  4.1   |  27  |  0.94    Ca    8.4      19 May 2019 07:12      proBNP:   Lipid Profile:   HgA1c:   TSH: Thyroid Stimulating Hormone, Serum: 0.73 uIU/mL (04-23 @ 05:57)    < from: CT Angio Chest w/ IV Cont (05.18.19 @ 16:33) >  1.  No pulmonary embolism.   2.  Enlarged main pulmonary artery which may be seen in the setting of   pulmonary arterial hypertension.      < end of copied text >        Assessment and plan  ---------------------------  cough/sob/enlarged pulmonary artery  r/o pulmonary htn  GULSHAN  continue verapamil  need of prn lasix  awaiting echo

## 2019-05-20 NOTE — CHART NOTE - NSCHARTNOTEFT_GEN_A_CORE
Discussed with Dr. Govea  - pt cleared for dc,  echo cancelled , patient requesting more pains meds, he was istopped and Dilaudid 4mg po is home dose

## 2019-05-20 NOTE — DISCHARGE NOTE PROVIDER - HOSPITAL COURSE
Pt is a 68yM with a pmh of NPH with VPS, lower back pain s/p multiple surgeries,          recently in rehab for PT / h/o ,  severe back pain    ,     came in from urgent care , CXR  at urgent care, with pna  per report    cxr in er,  no pna    ID eval, no ab    parainfluenza positive    ct chest,  no PE/  pulm htn    Ct  head,  v/p shunt    rhonchi/   bronchospasm  from viral  sx on proventil/ prednisone    dvt ppx    pt  ambulating well ;/ pt wishes to gohome/  f/p with pmd    plan.  d/c  /  spoke to np;

## 2019-05-22 ENCOUNTER — OTHER (OUTPATIENT)
Age: 69
End: 2019-05-22

## 2019-05-23 ENCOUNTER — APPOINTMENT (OUTPATIENT)
Dept: INTERNAL MEDICINE | Facility: CLINIC | Age: 69
End: 2019-05-23
Payer: MEDICARE

## 2019-05-23 VITALS — DIASTOLIC BLOOD PRESSURE: 74 MMHG | SYSTOLIC BLOOD PRESSURE: 124 MMHG

## 2019-05-23 VITALS — WEIGHT: 227 LBS | HEART RATE: 86 BPM | BODY MASS INDEX: 35.63 KG/M2 | HEIGHT: 67 IN

## 2019-05-23 DIAGNOSIS — K59.09 OTHER CONSTIPATION: ICD-10-CM

## 2019-05-23 DIAGNOSIS — G47.00 INSOMNIA, UNSPECIFIED: ICD-10-CM

## 2019-05-23 DIAGNOSIS — J00 ACUTE NASOPHARYNGITIS [COMMON COLD]: ICD-10-CM

## 2019-05-23 PROCEDURE — 99496 TRANSJ CARE MGMT HIGH F2F 7D: CPT

## 2019-05-23 NOTE — HISTORY OF PRESENT ILLNESS
[Post-hospitalization from ___ Hospital] : Post-hospitalization from [unfilled] Hospital [Discharged on ___] : discharged on [unfilled] [Discharge Summary] : discharge summary [Pertinent Labs] : pertinent labs [Radiology Findings] : radiology findings [Discharge Med List] : discharge medication list [Med Reconciliation] : medication reconciliation has been completed [Patient Contacted By: ____] : and contacted by [unfilled] [FreeTextEntry2] : patient was first sent to [facility] for Intractable Back pain.  was seen by pain management and placed on a new regimen, was d'ed for rehab and was doing well until he developed moderate cough and was admitted again for +parainfluenza bronchitis.  has been using albuterol inhaler and has several days left of prednisone.\par \par I have seen a great improvement in patient mood and pain on new regimen.\par planning on having revision of spinal fusion and seeking pre-op clearance\par

## 2019-05-23 NOTE — PHYSICAL EXAM

## 2019-05-23 NOTE — HISTORY OF PRESENT ILLNESS
[Post-hospitalization from ___ Hospital] : Post-hospitalization from [unfilled] Hospital [Discharged on ___] : discharged on [unfilled] [Discharge Summary] : discharge summary [Pertinent Labs] : pertinent labs [Radiology Findings] : radiology findings [Discharge Med List] : discharge medication list [Med Reconciliation] : medication reconciliation has been completed [Patient Contacted By: ____] : and contacted by [unfilled] [FreeTextEntry2] : patient was first sent to [facility] for Intractable Back pain.  was seen by pain management and placed on a new regimen, was d'ed for rehab and was doing well until he developed moderate cough and was admitted again for +parainfluenza bronchitis.  has been using albuterol inhaler and has several days left of prednisone.\par \par I have seen a great improvement in patient mood and pain on new regimen.\par planning on having revision of spinal fusion and seeking pre-op clearance\par

## 2019-05-23 NOTE — PHYSICAL EXAM

## 2019-05-23 NOTE — ASSESSMENT
[FreeTextEntry1] : \par I have seen a great improvement in patient mood and pain on new regimen.  patient not requiring wheelchair to get around.  \par \par planning on having revision of spinal fusion and seeking pre-op clearance.  discussed in length that this is an elective procedure in which there is no rush to get done given his h/o muliple operations in the past with no relief and that his new regimen is helping \par -advised will need to get clearance from cardiologist \par -will need to see sleep specialist to r/o GULSHAN given h/o insomnia, obesity, snoring \par \par BPH, on flomax\par -f/u with urology for possible finasteride rx

## 2019-05-23 NOTE — REVIEW OF SYSTEMS
[Shortness Of Breath] : no shortness of breath [Wheezing] : no wheezing [Cough] : cough [Dyspnea on Exertion] : not dyspnea on exertion [Negative] : Heme/Lymph [FreeTextEntry9] : chronic back pain

## 2019-05-29 ENCOUNTER — NON-APPOINTMENT (OUTPATIENT)
Age: 69
End: 2019-05-29

## 2019-05-29 ENCOUNTER — APPOINTMENT (OUTPATIENT)
Dept: CARDIOLOGY | Facility: CLINIC | Age: 69
End: 2019-05-29
Payer: MEDICARE

## 2019-05-29 VITALS
HEART RATE: 94 BPM | BODY MASS INDEX: 36.02 KG/M2 | OXYGEN SATURATION: 95 % | WEIGHT: 230 LBS | SYSTOLIC BLOOD PRESSURE: 158 MMHG | DIASTOLIC BLOOD PRESSURE: 82 MMHG

## 2019-05-29 DIAGNOSIS — Z01.810 ENCOUNTER FOR PREPROCEDURAL CARDIOVASCULAR EXAMINATION: ICD-10-CM

## 2019-05-29 PROCEDURE — 99204 OFFICE O/P NEW MOD 45 MIN: CPT

## 2019-05-29 PROCEDURE — 93000 ELECTROCARDIOGRAM COMPLETE: CPT

## 2019-05-29 NOTE — HISTORY OF PRESENT ILLNESS
[FreeTextEntry1] : 68-year-old male being seen in preop cardiac evaluation prior to back surgery scheduled for 6/17.    He has a long history of hypertension, but no history of heart disease and has no cardiac complaints. He was recently hospitalized with a parainfluenza infection which occurred while he was in rehabilitation. He is feeling generally well at present.\par \par His past medical history is also remarkable for low pressure hydrocephalus with some cognitive impairment, anxiety, and depression. He has a long history of back problems and has had multiple surgical procedures in the past.

## 2019-05-29 NOTE — DISCUSSION/SUMMARY
[FreeTextEntry1] : In summary, Mr. Perdomo is a 68-year-old hypertensive male scheduled for elective back surgery  on 6/17. He has no current complaints. His exam shows borderline blood pressure, clear lungs, and a normal cardiac exam. His EKG is within normal limits.\par \par He has no evidence of heart disease and is a good surgical candidate. He is cleared for surgery as scheduled.

## 2019-05-29 NOTE — REVIEW OF SYSTEMS
[Feeling Fatigued] : feeling fatigued [Joint Pain] : joint pain [Joint Stiffness] : joint stiffness [Limb Weakness (Paresis)] : limb weakness [Numbness (Hypesthesia)] : numbness [Tingling (Paresthesia)] : tingling [Negative] : Heme/Lymph

## 2019-05-29 NOTE — PHYSICAL EXAM
[General Appearance - Well Developed] : well developed [Normal Appearance] : normal appearance [General Appearance - Well Nourished] : well nourished [Well Groomed] : well groomed [No Deformities] : no deformities [General Appearance - In No Acute Distress] : no acute distress [Normal Conjunctiva] : the conjunctiva exhibited no abnormalities [Eyelids - No Xanthelasma] : the eyelids demonstrated no xanthelasmas [Normal Oral Mucosa] : normal oral mucosa [No Oral Pallor] : no oral pallor [Normal Jugular Venous A Waves Present] : normal jugular venous A waves present [No Oral Cyanosis] : no oral cyanosis [Heart Rate And Rhythm] : heart rate and rhythm were normal [No Jugular Venous House A Waves] : no jugular venous house A waves [Normal Jugular Venous V Waves Present] : normal jugular venous V waves present [Heart Sounds] : normal S1 and S2 [Murmurs] : no murmurs present [Exaggerated Use Of Accessory Muscles For Inspiration] : no accessory muscle use [Respiration, Rhythm And Depth] : normal respiratory rhythm and effort [Abdomen Soft] : soft [Auscultation Breath Sounds / Voice Sounds] : lungs were clear to auscultation bilaterally [Abdomen Tenderness] : non-tender [Abdomen Mass (___ Cm)] : no abdominal mass palpated [Abnormal Walk] : normal gait [Gait - Sufficient For Exercise Testing] : the gait was sufficient for exercise testing [Nail Clubbing] : no clubbing of the fingernails [Cyanosis, Localized] : no localized cyanosis [Petechial Hemorrhages (___cm)] : no petechial hemorrhages [Skin Color & Pigmentation] : normal skin color and pigmentation [] : no rash [No Venous Stasis] : no venous stasis [Skin Lesions] : no skin lesions [No Xanthoma] : no  xanthoma was observed [No Skin Ulcers] : no skin ulcer [Oriented To Time, Place, And Person] : oriented to person, place, and time [Affect] : the affect was normal [No Anxiety] : not feeling anxious [Mood] : the mood was normal

## 2019-05-31 ENCOUNTER — APPOINTMENT (OUTPATIENT)
Dept: INTERNAL MEDICINE | Facility: CLINIC | Age: 69
End: 2019-05-31
Payer: MEDICARE

## 2019-05-31 VITALS — SYSTOLIC BLOOD PRESSURE: 122 MMHG | DIASTOLIC BLOOD PRESSURE: 70 MMHG

## 2019-05-31 VITALS — BODY MASS INDEX: 36.1 KG/M2 | WEIGHT: 230 LBS | HEIGHT: 67 IN

## 2019-05-31 DIAGNOSIS — Z01.818 ENCOUNTER FOR OTHER PREPROCEDURAL EXAMINATION: ICD-10-CM

## 2019-05-31 PROCEDURE — 99214 OFFICE O/P EST MOD 30 MIN: CPT

## 2019-05-31 NOTE — HISTORY OF PRESENT ILLNESS
[No Pertinent Cardiac History] : no history of aortic stenosis, atrial fibrillation, coronary artery disease, recent myocardial infarction, or implantable device/pacemaker [No Pertinent Pulmonary History] : no history of asthma, COPD, sleep apnea, or smoking [No Adverse Anesthesia Reaction] : no adverse anesthesia reaction in self or family member [Chronic Anticoagulation] : no chronic anticoagulation [Chronic Kidney Disease] : no chronic kidney disease [Diabetes] : no diabetes [(Patient denies any chest pain, claudication, dyspnea on exertion, orthopnea, palpitations or syncope)] : Patient denies any chest pain, claudication, dyspnea on exertion, orthopnea, palpitations or syncope [Good (7-10 METs)] : Good (7-10 METs) [FreeTextEntry1] : Revision of L2-S1 spinal fusion [FreeTextEntry2] : 06/05/2019 [FreeTextEntry3] : Dr. Meche Huynh

## 2019-07-06 ENCOUNTER — EMERGENCY (EMERGENCY)
Facility: HOSPITAL | Age: 69
LOS: 1 days | Discharge: HOME | End: 2019-07-06
Attending: EMERGENCY MEDICINE | Admitting: EMERGENCY MEDICINE

## 2019-07-06 VITALS
OXYGEN SATURATION: 99 % | DIASTOLIC BLOOD PRESSURE: 52 MMHG | RESPIRATION RATE: 18 BRPM | HEART RATE: 79 BPM | TEMPERATURE: 98 F | SYSTOLIC BLOOD PRESSURE: 104 MMHG

## 2019-07-06 VITALS
TEMPERATURE: 98 F | RESPIRATION RATE: 18 BRPM | OXYGEN SATURATION: 99 % | HEART RATE: 90 BPM | DIASTOLIC BLOOD PRESSURE: 63 MMHG | SYSTOLIC BLOOD PRESSURE: 100 MMHG

## 2019-07-06 NOTE — ED ADULT TRIAGE NOTE - CHIEF COMPLAINT QUOTE
Pt arrives via [facility] " s/p back surgery june 5th also has a  Shunt that was readjusted june 4th..he is aox3, but very unsteady when walking." , as per friend/caretaker ." I called 911 he was confused for past week , very unsteady tonight, I heard a thump in bathroom yesterday..he suppose to use cane but doesn't. " Pt st"I have a lot of pain in buttock , I did not fall....I have no new weakness or incontinence in legs." Pt is axo3.

## 2019-07-10 ENCOUNTER — APPOINTMENT (OUTPATIENT)
Dept: INTERNAL MEDICINE | Facility: CLINIC | Age: 69
End: 2019-07-10
Payer: MEDICARE

## 2019-07-10 VITALS
HEIGHT: 67 IN | DIASTOLIC BLOOD PRESSURE: 83 MMHG | WEIGHT: 213 LBS | HEART RATE: 97 BPM | BODY MASS INDEX: 33.43 KG/M2 | SYSTOLIC BLOOD PRESSURE: 143 MMHG

## 2019-07-10 PROCEDURE — 99214 OFFICE O/P EST MOD 30 MIN: CPT

## 2019-07-11 RX ORDER — ZOLPIDEM TARTRATE 10 MG/1
10 TABLET ORAL
Refills: 0 | Status: DISCONTINUED | COMMUNITY
End: 2019-07-11

## 2019-07-11 NOTE — PHYSICAL EXAM
[Normal] : no rash [Speech Grossly Normal] : speech grossly normal [Memory Grossly Normal] : memory grossly normal [Normal Insight/Judgement] : insight and judgment were intact [Alert and Oriented x3] : oriented to person, place, and time [de-identified] : mild distress upon position change and ambulating.  currently getting around in wheelchair  [de-identified] : mild spinal tenderness and para spinal region bilaterally.  wound healing well.  no discharge, surrounding erythema or red dtreaking

## 2019-07-11 NOTE — ASSESSMENT
[FreeTextEntry1] : -advised in length that he needs to follow-up with pain management that he hasn’t seen before as he has seen multiple in the surrounding area and either has been fired or he doesn’t like them\par -advised it would be best to stop valium as can be giving him amnesia \par -expressed that the better option would be to titrate back the dilaudid and xanax as he seemed to get the best results such as ability to ambulate, less depressed/anxiety, improved mood and less amount of extremity pain from that combination without any major side effects.  discussed that he should start with BID and work up to his normal dosing to prevent overdose.  \par -discussed in length goals of care and realistic pain control given his extensive spinal history including injury and multiple surgeries with revision.  \par \par all questions and concerns addressed with patient in detail.  patient verbalized back instructions in his own words.\par advised if symptoms worsen please call office or go directly to ED for further evaluation.\par

## 2019-07-11 NOTE — HISTORY OF PRESENT ILLNESS
[de-identified] :  69 y/o male presents for follow-up s/p spinal surgery on 7/3.  healing well, but c/o pain in incision.  was seen by ortho and was advised to f/u with pain management.  states they were unable to refill his maintenance meds for chronic back pain that he has been on, therefore first states the dilaudid wasn’t helping with the pain but then states he has not been taking it for the last 3 weeks.  again feels depressed and unable to ambulate without severe pain.  still doesn’t have a outpt pain management MD due to last MD hiring patient from practice.  unable to do PT.  no SI/HI, still follows with Dr. Bhandari (psych) regularly.\par has been taking valium, states since then he has been getting forgetful and confused.

## 2019-07-11 NOTE — REVIEW OF SYSTEMS
[Muscle Pain] : muscle pain [Back Pain] : back pain [Confusion] : confusion [Suicidal] : not suicidal [Insomnia] : insomnia [Anxiety] : anxiety [Depression] : depression [Negative] : Heme/Lymph

## 2019-07-22 ENCOUNTER — TELEPHONE (OUTPATIENT)
Dept: FAMILY MEDICINE CLINIC | Facility: CLINIC | Age: 69
End: 2019-07-22

## 2019-07-22 DIAGNOSIS — E78.5 HYPERLIPIDEMIA, UNSPECIFIED HYPERLIPIDEMIA TYPE: Primary | ICD-10-CM

## 2019-07-22 DIAGNOSIS — I10 HYPERTENSION, UNSPECIFIED TYPE: ICD-10-CM

## 2019-07-22 DIAGNOSIS — E11.9 CONTROLLED TYPE 2 DIABETES MELLITUS WITHOUT COMPLICATION, UNSPECIFIED WHETHER LONG TERM INSULIN USE (HCC): ICD-10-CM

## 2019-07-22 NOTE — TELEPHONE ENCOUNTER
I believe I saw Orion on or around April 26, 2019, please check to see if there is a record in centricity if not please get [facility] notes and build this chart.  He will need an A1c and CMP, uncertain as to when his last lipid panel was, if it has been 1 year please order that as well.

## 2019-07-25 LAB
ALBUMIN SERPL-MCNC: 4.2 G/DL (ref 3.6–4.8)
ALBUMIN/GLOB SERPL: 1.8 {RATIO} (ref 1.2–2.2)
ALP SERPL-CCNC: 55 IU/L (ref 39–117)
ALT SERPL-CCNC: 50 IU/L (ref 0–44)
AST SERPL-CCNC: 53 IU/L (ref 0–40)
BILIRUB SERPL-MCNC: 0.5 MG/DL (ref 0–1.2)
BUN SERPL-MCNC: 13 MG/DL (ref 8–27)
BUN/CREAT SERPL: 14 (ref 10–24)
CALCIUM SERPL-MCNC: 9.8 MG/DL (ref 8.6–10.2)
CHLORIDE SERPL-SCNC: 99 MMOL/L (ref 96–106)
CHOLEST SERPL-MCNC: 122 MG/DL (ref 100–199)
CO2 SERPL-SCNC: 24 MMOL/L (ref 20–29)
CREAT SERPL-MCNC: 0.91 MG/DL (ref 0.76–1.27)
GLOBULIN SER CALC-MCNC: 2.4 G/DL (ref 1.5–4.5)
GLUCOSE SERPL-MCNC: 209 MG/DL (ref 65–99)
HBA1C MFR BLD: 9.1 % (ref 4.8–5.6)
HDLC SERPL-MCNC: 37 MG/DL
LDLC SERPL CALC-MCNC: 35 MG/DL (ref 0–99)
POTASSIUM SERPL-SCNC: 4.9 MMOL/L (ref 3.5–5.2)
PROT SERPL-MCNC: 6.6 G/DL (ref 6–8.5)
SODIUM SERPL-SCNC: 139 MMOL/L (ref 134–144)
TRIGL SERPL-MCNC: 249 MG/DL (ref 0–149)
VLDLC SERPL CALC-MCNC: 50 MG/DL (ref 5–40)

## 2019-07-26 PROBLEM — I10 HYPERTENSION: Status: ACTIVE | Noted: 2019-04-26

## 2019-07-26 PROBLEM — E11.9 DIABETES MELLITUS, TYPE 2: Status: ACTIVE | Noted: 2019-04-26

## 2019-07-26 PROBLEM — E78.5 HYPERLIPIDEMIA: Status: ACTIVE | Noted: 2019-04-26

## 2019-07-26 PROBLEM — G47.33 OBSTRUCTIVE SLEEP APNEA SYNDROME: Status: ACTIVE | Noted: 2019-04-26

## 2019-07-26 PROBLEM — R05.8 COUGH DUE TO ACE INHIBITOR: Status: ACTIVE | Noted: 2019-04-26

## 2019-07-26 PROBLEM — M10.9 GOUT: Status: ACTIVE | Noted: 2019-04-26

## 2019-07-26 PROBLEM — R73.01 ABNORMAL FASTING GLUCOSE: Status: ACTIVE | Noted: 2019-04-26

## 2019-07-26 PROBLEM — N20.0 KIDNEY STONES: Status: ACTIVE | Noted: 2019-04-26

## 2019-07-26 PROBLEM — T46.4X5A COUGH DUE TO ACE INHIBITOR: Status: ACTIVE | Noted: 2019-04-26

## 2019-07-26 RX ORDER — ASPIRIN 325 MG
1 TABLET ORAL DAILY
COMMUNITY
Start: 2019-04-26 | End: 2021-08-16 | Stop reason: DRUGHIGH

## 2019-07-26 RX ORDER — ROSUVASTATIN CALCIUM 10 MG/1
1 TABLET, COATED ORAL NIGHTLY
COMMUNITY
Start: 2019-07-23 | End: 2020-04-22

## 2019-07-26 RX ORDER — GLIMEPIRIDE 4 MG/1
1 TABLET ORAL 2 TIMES DAILY
Refills: 0 | COMMUNITY
Start: 2019-04-29 | End: 2019-07-29 | Stop reason: SDUPTHER

## 2019-07-26 RX ORDER — LISINOPRIL 20 MG/1
1 TABLET ORAL DAILY
COMMUNITY
Start: 2019-07-23 | End: 2019-11-05

## 2019-07-26 RX ORDER — MULTIVIT WITH MINERALS/LUTEIN
1 TABLET ORAL EVERY EVENING
COMMUNITY
Start: 2019-04-26

## 2019-07-26 RX ORDER — MULTIVIT WITH MINERALS/LUTEIN
1 TABLET ORAL DAILY
COMMUNITY
Start: 2019-04-26

## 2019-07-26 RX ORDER — LANCETS 33 GAUGE
1 EACH MISCELLANEOUS EVERY 12 HOURS
COMMUNITY
Start: 2019-04-26 | End: 2021-12-21 | Stop reason: SDUPTHER

## 2019-07-29 ENCOUNTER — OFFICE VISIT (OUTPATIENT)
Dept: FAMILY MEDICINE CLINIC | Facility: CLINIC | Age: 69
End: 2019-07-29

## 2019-07-29 VITALS
DIASTOLIC BLOOD PRESSURE: 81 MMHG | RESPIRATION RATE: 20 BRPM | WEIGHT: 290 LBS | SYSTOLIC BLOOD PRESSURE: 143 MMHG | HEART RATE: 71 BPM | OXYGEN SATURATION: 96 % | HEIGHT: 69 IN | BODY MASS INDEX: 42.95 KG/M2 | TEMPERATURE: 98 F

## 2019-07-29 DIAGNOSIS — E66.01 MORBIDLY OBESE (HCC): ICD-10-CM

## 2019-07-29 DIAGNOSIS — E78.2 MIXED HYPERLIPIDEMIA: ICD-10-CM

## 2019-07-29 DIAGNOSIS — I10 ESSENTIAL HYPERTENSION: ICD-10-CM

## 2019-07-29 DIAGNOSIS — E11.9 TYPE 2 DIABETES MELLITUS WITHOUT COMPLICATION, WITHOUT LONG-TERM CURRENT USE OF INSULIN (HCC): Primary | ICD-10-CM

## 2019-07-29 PROBLEM — E78.5 DYSLIPIDEMIA: Status: ACTIVE | Noted: 2019-07-29

## 2019-07-29 PROBLEM — E66.9 OBESITY: Status: ACTIVE | Noted: 2019-07-29

## 2019-07-29 PROBLEM — G47.33 OSA ON CPAP: Status: ACTIVE | Noted: 2019-07-29

## 2019-07-29 PROBLEM — Z99.89 OSA ON CPAP: Status: ACTIVE | Noted: 2019-07-29

## 2019-07-29 PROBLEM — N40.0 BPH (BENIGN PROSTATIC HYPERPLASIA): Status: ACTIVE | Noted: 2019-07-29

## 2019-07-29 PROBLEM — K63.5 COLON POLYPS: Status: ACTIVE | Noted: 2019-07-29

## 2019-07-29 PROCEDURE — 99214 OFFICE O/P EST MOD 30 MIN: CPT | Performed by: FAMILY MEDICINE

## 2019-07-29 RX ORDER — GLIMEPIRIDE 4 MG/1
4 TABLET ORAL 2 TIMES DAILY
Qty: 60 TABLET | Refills: 12 | Status: SHIPPED | OUTPATIENT
Start: 2019-07-29 | End: 2019-07-29 | Stop reason: SDUPTHER

## 2019-07-29 RX ORDER — GLIMEPIRIDE 4 MG/1
TABLET ORAL
Qty: 180 TABLET | Refills: 3 | Status: SHIPPED | OUTPATIENT
Start: 2019-07-29 | End: 2019-09-18 | Stop reason: SDUPTHER

## 2019-07-29 NOTE — PATIENT INSTRUCTIONS
Diabetes Mellitus and Nutrition, Adult  When you have diabetes (diabetes mellitus), it is very important to have healthy eating habits because your blood sugar (glucose) levels are greatly affected by what you eat and drink. Eating healthy foods in the appropriate amounts, at about the same times every day, can help you:  · Control your blood glucose.  · Lower your risk of heart disease.  · Improve your blood pressure.  · Reach or maintain a healthy weight.    Every person with diabetes is different, and each person has different needs for a meal plan. Your health care provider may recommend that you work with a diet and nutrition specialist (dietitian) to make a meal plan that is best for you. Your meal plan may vary depending on factors such as:  · The calories you need.  · The medicines you take.  · Your weight.  · Your blood glucose, blood pressure, and cholesterol levels.  · Your activity level.  · Other health conditions you have, such as heart or kidney disease.    How do carbohydrates affect me?  Carbohydrates, also called carbs, affect your blood glucose level more than any other type of food. Eating carbs naturally raises the amount of glucose in your blood. Carb counting is a method for keeping track of how many carbs you eat. Counting carbs is important to keep your blood glucose at a healthy level, especially if you use insulin or take certain oral diabetes medicines.  It is important to know how many carbs you can safely have in each meal. This is different for every person. Your dietitian can help you calculate how many carbs you should have at each meal and for each snack.  Foods that contain carbs include:  · Bread, cereal, rice, pasta, and crackers.  · Potatoes and corn.  · Peas, beans, and lentils.  · Milk and yogurt.  · Fruit and juice.  · Desserts, such as cakes, cookies, ice cream, and candy.    How does alcohol affect me?  Alcohol can cause a sudden decrease in blood glucose (hypoglycemia),  "especially if you use insulin or take certain oral diabetes medicines. Hypoglycemia can be a life-threatening condition. Symptoms of hypoglycemia (sleepiness, dizziness, and confusion) are similar to symptoms of having too much alcohol.  If your health care provider says that alcohol is safe for you, follow these guidelines:  · Limit alcohol intake to no more than 1 drink per day for nonpregnant women and 2 drinks per day for men. One drink equals 12 oz of beer, 5 oz of wine, or 1½ oz of hard liquor.  · Do not drink on an empty stomach.  · Keep yourself hydrated with water, diet soda, or unsweetened iced tea.  · Keep in mind that regular soda, juice, and other mixers may contain a lot of sugar and must be counted as carbs.    What are tips for following this plan?  Reading food labels  · Start by checking the serving size on the \"Nutrition Facts\" label of packaged foods and drinks. The amount of calories, carbs, fats, and other nutrients listed on the label is based on one serving of the item. Many items contain more than one serving per package.  · Check the total grams (g) of carbs in one serving. You can calculate the number of servings of carbs in one serving by dividing the total carbs by 15. For example, if a food has 30 g of total carbs, it would be equal to 2 servings of carbs.  · Check the number of grams (g) of saturated and trans fats in one serving. Choose foods that have low or no amount of these fats.  · Check the number of milligrams (mg) of salt (sodium) in one serving. Most people should limit total sodium intake to less than 2,300 mg per day.  · Always check the nutrition information of foods labeled as \"low-fat\" or \"nonfat\". These foods may be higher in added sugar or refined carbs and should be avoided.  · Talk to your dietitian to identify your daily goals for nutrients listed on the label.  Shopping    · Avoid buying canned, premade, or processed foods. These foods tend to be high in fat, " sodium, and added sugar.  · Shop around the outside edge of the grocery store. This includes fresh fruits and vegetables, bulk grains, fresh meats, and fresh dairy.  Cooking  · Use low-heat cooking methods, such as baking, instead of high-heat cooking methods like deep frying.  · Cook using healthy oils, such as olive, canola, or sunflower oil.  · Avoid cooking with butter, cream, or high-fat meats.  Meal planning  · Eat meals and snacks regularly, preferably at the same times every day. Avoid going long periods of time without eating.  · Eat foods high in fiber, such as fresh fruits, vegetables, beans, and whole grains. Talk to your dietitian about how many servings of carbs you can eat at each meal.  · Eat 4-6 ounces (oz) of lean protein each day, such as lean meat, chicken, fish, eggs, or tofu. One oz of lean protein is equal to:  ? 1 oz of meat, chicken, or fish.  ? 1 egg.  ? ¼ cup of tofu.  · Eat some foods each day that contain healthy fats, such as avocado, nuts, seeds, and fish.  Lifestyle  · Check your blood glucose regularly.  · Exercise regularly as told by your health care provider. This may include:  ? 150 minutes of moderate-intensity or vigorous-intensity exercise each week. This could be brisk walking, biking, or water aerobics.  ? Stretching and doing strength exercises, such as yoga or weightlifting, at least 2 times a week.  · Take medicines as told by your health care provider.  · Do not use any products that contain nicotine or tobacco, such as cigarettes and e-cigarettes. If you need help quitting, ask your health care provider.  · Work with a counselor or diabetes educator to identify strategies to manage stress and any emotional and social challenges.  Questions to ask a health care provider  · Do I need to meet with a diabetes educator?  · Do I need to meet with a dietitian?  · What number can I call if I have questions?  · When are the best times to check my blood glucose?  Where to find  more information:  · American Diabetes Association: diabetes.org  · Academy of Nutrition and Dietetics: www.eatright.org  · National [place] of Diabetes and Digestive and Kidney Diseases (NIH): www.niddk.nih.gov  Summary  · A healthy meal plan will help you control your blood glucose and maintain a healthy lifestyle.  · Working with a diet and nutrition specialist (dietitian) can help you make a meal plan that is best for you.  · Keep in mind that carbohydrates (carbs) and alcohol have immediate effects on your blood glucose levels. It is important to count carbs and to use alcohol carefully.  This information is not intended to replace advice given to you by your health care provider. Make sure you discuss any questions you have with your health care provider.  Document Released: 09/14/2006 Document Revised: 07/18/2018 Document Reviewed: 01/22/2018  [employer] Interactive Patient Education © 2019 [employer]    Carbohydrate Counting for Diabetes Mellitus, Adult  Carbohydrate counting is a method of keeping track of how many carbohydrates you eat. Eating carbohydrates naturally increases the amount of sugar (glucose) in the blood. Counting how many carbohydrates you eat helps keep your blood glucose within normal limits, which helps you manage your diabetes (diabetes mellitus).  It is important to know how many carbohydrates you can safely have in each meal. This is different for every person. A diet and nutrition specialist (registered dietitian) can help you make a meal plan and calculate how many carbohydrates you should have at each meal and snack.  Carbohydrates are found in the following foods:  · Grains, such as breads and cereals.  · Dried beans and soy products.  · Starchy vegetables, such as potatoes, peas, and corn.  · Fruit and fruit juices.  · Milk and yogurt.  · Sweets and snack foods, such as cake, cookies, candy, chips, and soft drinks.    How do I count carbohydrates?  There are two ways to count  "carbohydrates in food. You can use either of the methods or a combination of both.  Reading \"Nutrition Facts\" on packaged food  The \"Nutrition Facts\" list is included on the labels of almost all packaged foods and beverages in the [place] It includes:  · The serving size.  · Information about nutrients in each serving, including the grams (g) of carbohydrate per serving.    To use the “Nutrition Facts\":  · Decide how many servings you will have.  · Multiply the number of servings by the number of carbohydrates per serving.  · The resulting number is the total amount of carbohydrates that you will be having.    Learning standard serving sizes of other foods  When you eat carbohydrate foods that are not packaged or do not include \"Nutrition Facts\" on the label, you need to measure the servings in order to count the amount of carbohydrates:  · Measure the foods that you will eat with a food scale or measuring cup, if needed.  · Decide how many standard-size servings you will eat.  · Multiply the number of servings by 15. Most carbohydrate-rich foods have about 15 g of carbohydrates per serving.  ? For example, if you eat 8 oz (170 g) of strawberries, you will have eaten 2 servings and 30 g of carbohydrates (2 servings x 15 g = 30 g).  · For foods that have more than one food mixed, such as soups and casseroles, you must count the carbohydrates in each food that is included.    The following list contains standard serving sizes of common carbohydrate-rich foods. Each of these servings has about 15 g of carbohydrates:  · ½ hamburger bun or ½ English muffin.  · ½ oz (15 mL) syrup.  · ½ oz (14 g) jelly.  · 1 slice of bread.  · 1 six-inch tortilla.  · 3 oz (85 g) cooked rice or pasta.  · 4 oz (113 g) cooked dried beans.  · 4 oz (113 g) starchy vegetable, such as peas, corn, or potatoes.  · 4 oz (113 g) hot cereal.  · 4 oz (113 g) mashed potatoes or ¼ of a large baked potato.  · 4 oz (113 g) canned or frozen fruit.  · 4 oz (120 " mL) fruit juice.  · 4-6 crackers.  · 6 chicken nuggets.  · 6 oz (170 g) unsweetened dry cereal.  · 6 oz (170 g) plain fat-free yogurt or yogurt sweetened with artificial sweeteners.  · 8 oz (240 mL) milk.  · 8 oz (170 g) fresh fruit or one small piece of fruit.  · 24 oz (680 g) popped popcorn.    Example of carbohydrate counting  Sample meal  · 3 oz (85 g) chicken breast.  · 6 oz (170 g) brown rice.  · 4 oz (113 g) corn.  · 8 oz (240 mL) milk.  · 8 oz (170 g) strawberries with sugar-free whipped topping.  Carbohydrate calculation  1. Identify the foods that contain carbohydrates:  ? Rice.  ? Corn.  ? Milk.  ? Strawberries.  2. Calculate how many servings you have of each food:  ? 2 servings rice.  ? 1 serving corn.  ? 1 serving milk.  ? 1 serving strawberries.  3. Multiply each number of servings by 15 g:  ? 2 servings rice x 15 g = 30 g.  ? 1 serving corn x 15 g = 15 g.  ? 1 serving milk x 15 g = 15 g.  ? 1 serving strawberries x 15 g = 15 g.  4. Add together all of the amounts to find the total grams of carbohydrates eaten:  ? 30 g + 15 g + 15 g + 15 g = 75 g of carbohydrates total.  Summary  · Carbohydrate counting is a method of keeping track of how many carbohydrates you eat.  · Eating carbohydrates naturally increases the amount of sugar (glucose) in the blood.  · Counting how many carbohydrates you eat helps keep your blood glucose within normal limits, which helps you manage your diabetes.  · A diet and nutrition specialist (registered dietitian) can help you make a meal plan and calculate how many carbohydrates you should have at each meal and snack.  This information is not intended to replace advice given to you by your health care provider. Make sure you discuss any questions you have with your health care provider.  Document Released: 12/18/2006 Document Revised: 06/27/2018 Document Reviewed: 05/31/2017  [employer] Interactive Patient Education © 2019 [employer]

## 2019-07-29 NOTE — PROGRESS NOTES
Subjective   Orion Harvey is a 68 y.o. male.   Chief Complaint   Patient presents with   • Follow-up     Type II DM, HTN, HLD        History of Present Illness   Orion presents in office today for a 3 month follow up on his Type II DM, HTN, and HLD. Had lab work completed last week. Admits to recently beingvery lax on his diet and not checking his blood sugars on a regular basis. Lowest blood glucose reading was 140 and the highest reading was 314. Last diabetic eye exam was late 2017. He does check blood pressure at home, but does not record it. He states that the BP reading obtained in office this am is about a typical reading for him. He did not bring his blood glucose logs.     The following portions of the patient's history were reviewed and updated as appropriate: allergies, current medications, past family history, past medical history, past social history, past surgical history and problem list.    Review of Systems   Constitutional: Negative for fatigue and fever.   Eyes: Negative for visual disturbance.   Respiratory: Negative for cough, shortness of breath and wheezing.    Cardiovascular: Negative for chest pain, palpitations and leg swelling.   Gastrointestinal: Negative.  Negative for abdominal pain.   Skin: Negative for rash.   Psychiatric/Behavioral: Negative for depressed mood.       Objective    Vitals:    07/29/19 0848   BP: 143/81   Pulse: 71   Resp: 20   Temp: 98 °F (36.7 °C)   SpO2: 96%   '  Physical Exam   Constitutional: He is oriented to person, place, and time. He appears well-developed and well-nourished. No distress.   Morbidly obese   Eyes: Conjunctivae and EOM are normal. Pupils are equal, round, and reactive to light.   Neck: Normal range of motion.   Cardiovascular: Normal rate and regular rhythm.   No murmur heard.  Pulmonary/Chest: Effort normal and breath sounds normal. He has no wheezes.   Musculoskeletal: Normal range of motion. He exhibits no edema.   Neurological: He is  alert and oriented to person, place, and time.   Skin: Skin is warm and dry.   Psychiatric: He has a normal mood and affect.   Nursing note and vitals reviewed.         Telephone on 07/22/2019   Component Date Value Ref Range Status   • Hemoglobin A1C 07/24/2019 9.1* 4.8 - 5.6 % Final    Comment:          Prediabetes: 5.7 - 6.4           Diabetes: >6.4           Glycemic control for adults with diabetes: <7.0     • Glucose 07/24/2019 209* 65 - 99 mg/dL Final   • BUN 07/24/2019 13  8 - 27 mg/dL Final   • Creatinine 07/24/2019 0.91  0.76 - 1.27 mg/dL Final   • eGFR Non  Am 07/24/2019 87  >59 mL/min/1.73 Final   • eGFR African Am 07/24/2019 100  >59 mL/min/1.73 Final   • BUN/Creatinine Ratio 07/24/2019 14  10 - 24 Final   • Sodium 07/24/2019 139  134 - 144 mmol/L Final   • Potassium 07/24/2019 4.9  3.5 - 5.2 mmol/L Final   • Chloride 07/24/2019 99  96 - 106 mmol/L Final   • Total CO2 07/24/2019 24  20 - 29 mmol/L Final   • Calcium 07/24/2019 9.8  8.6 - 10.2 mg/dL Final   • Total Protein 07/24/2019 6.6  6.0 - 8.5 g/dL Final   • Albumin 07/24/2019 4.2  3.6 - 4.8 g/dL Final   • Globulin 07/24/2019 2.4  1.5 - 4.5 g/dL Final   • A/G Ratio 07/24/2019 1.8  1.2 - 2.2 Final   • Total Bilirubin 07/24/2019 0.5  0.0 - 1.2 mg/dL Final   • Alkaline Phosphatase 07/24/2019 55  39 - 117 IU/L Final   • AST (SGOT) 07/24/2019 53* 0 - 40 IU/L Final   • ALT (SGPT) 07/24/2019 50* 0 - 44 IU/L Final   • Total Cholesterol 07/24/2019 122  100 - 199 mg/dL Final   • Triglycerides 07/24/2019 249* 0 - 149 mg/dL Final   • HDL Cholesterol 07/24/2019 37* >39 mg/dL Final   • VLDL Cholesterol 07/24/2019 50* 5 - 40 mg/dL Final   • LDL Cholesterol  07/24/2019 35  0 - 99 mg/dL Final   Hospital Outpatient Visit on 05/08/2019   Component Date Value Ref Range Status   • WBC 05/08/2019 5.1  4.5 - 11.5 10*3/uL Final   • RBC 05/08/2019 4.43* 4.60 - 6.00 10*6/uL Final   • Hemoglobin 05/08/2019 13.5* 14.0 - 18.0 g/dL Final   • Hematocrit 05/08/2019 39.5* 40  - 54 % Final   • MCV 05/08/2019 89.2  80 - 94 fL Final   • MCH 05/08/2019 30.4  26 - 32 pg Final   • MCHC 05/08/2019 34.1  32 - 36 g/dL Final   • RDW 05/08/2019 14.0  11.5 - 14.5 % Final   • Platelets 05/08/2019 200  150 - 450 10*3/uL Final   • MPV 05/08/2019 9.3  7.4 - 10.4 fL Final   • Differential Type 05/08/2019 AUTO   Final   • Neutrophils Absolute 05/08/2019 2.9  2.3 - 8.6 10*3/uL Final   • Lymphocytes Absolute 05/08/2019 1.5  0.8 - 4.8 10*3/uL Final   • Monocytes Absolute 05/08/2019 0.4  0.1 - 1.3 10*3/uL Final   • Eosinophils Absolute 05/08/2019 0.2  0.0 - 0.3 10*3/uL Final   • Basophils Absolute 05/08/2019 0.0  0 - 0.2 10*3/uL Final   • Neutrophil Rel % 05/08/2019 56  50 - 75 % Final   • Lymphocyte Rel % 05/08/2019 31  18 - 42 % Final   • Monocyte Rel % 05/08/2019 8  2 - 11 % Final   • Eosinophil Rel % 05/08/2019 4* 0 - 3 % Final   • Basophil Rel % 05/08/2019 1  0 - 2 % Final   • nRBC 05/08/2019 0  0 /100[WBCs] Final   • Absolute nRBC 05/08/2019 0  10*3/uL Final   • Sodium 05/08/2019 136  136 - 144 mmol/L Final   • Potassium 05/08/2019 5.1  3.6 - 5.1 mmol/L Final   • Chloride 05/08/2019 100* 101 - 111 mmol/L Final   • CO2 05/08/2019 24  22 - 32 mmol/L Final   • Glucose 05/08/2019 191* 65 - 99 mg/dL Final   • BUN 05/08/2019 19  8 - 20 mg/dL Final   • Creatinine 05/08/2019 0.8  0.7 - 1.2 mg/dl Final   • Anion Gap 05/08/2019 17.1  10 - 20 Final   • BUN/Creatinine Ratio 05/08/2019 23.8* 6.2 - 20.3 Final   • GFR MDRD Non  05/08/2019 >60  >60 mL/min/1.73m2 Final   • GFR MDRD  05/08/2019 >60  >60 mL/min/1.73m2 Final   • Calcium 05/08/2019 9.2  8.9 - 10.3 mg/dL Final   Conversion Encounter on 04/26/2019   Component Date Value Ref Range Status   • Albumin 04/26/2019 4.1  3.6 - 5.1 g/dL Final   • Alkaline Phosphatase 04/26/2019 41  40 - 115 units/L Final   • Glucose 04/26/2019 187* 65 - 99 mg/dL Final   • Total Bilirubin 04/26/2019 0.6  0.2 - 1.2 mg/dL Final   • BUN 04/26/2019 13  7 -  25 mg/dL Final   • BUN/Creatinine Ratio 04/26/2019 16.3 (calc)  6 - 22 Final   • Calcium 04/26/2019 9.4  8.6 - 10.2 mg/dL Final   • Chloride 04/26/2019 104  98 - 110 mmol/L Final   • CO2 04/26/2019 20* 21 - 33 mmol/L Final   • Creatinine 04/26/2019 0.8  0.76 - 1.46 mg/dL Final   • eGFR African Am 04/26/2019 >60 mL/min/1.73m2  >59 mL/min/1.73m2 Final   • eGFR Non African Am 04/26/2019 >60 mL/min/1.73m2  >59 mL/min/1.73m2 Final   • Globulin 04/26/2019 2.3 G/DL (CALC)  2.1 - 3.7 g/dL Final   • % Hgb A1C 04/26/2019 7.8 % OF TOTAL HGB* <5.7 % Final   • Potassium 04/26/2019 4.5  3.5 - 5.3 mmol/L Final   • Total Protein 04/26/2019 6.4  6.2 - 8.3 g/dL Final   • AST (SGOT) 04/26/2019 35  10 - 35 units/L Final   • ALT (SGPT) 04/26/2019 49  9 - 60 units/L Final   • Sodium 04/26/2019 135  135 - 146 mmol/L Final   • A/G Ratio 04/26/2019 1.8 (calc)  1.0 - 2.1 Final   ]  Assessment/Plan   Orion was seen today for follow-up.    Diagnoses and all orders for this visit:    Type 2 diabetes mellitus without complication, without long-term current use of insulin (CMS/[facility])  -     Dulaglutide (TRULICITY) 0.75 MG/0.5ML solution pen-injector; Inject 0.75 mg under the skin into the appropriate area as directed 1 (One) Time Per Week.  -     Ambulatory Referral for Diabetic Eye Exam-Ophthalmology    Mixed hyperlipidemia    Essential hypertension    Morbidly obese (CMS/[facility])    Other orders  -     Discontinue: glimepiride (AMARYL) 4 MG tablet; Take 1 tablet by mouth 2 (Two) Times a Day.      Long discussion about appropriate diet and increase physical activity adding Trulicity and warned if any recurrence readings below 80, will have to reduce and possibly stop Amaryl.  Return in about 3 weeks (around 8/19/2019) for diabetes.

## 2019-07-30 ENCOUNTER — APPOINTMENT (OUTPATIENT)
Dept: INTERNAL MEDICINE | Facility: CLINIC | Age: 69
End: 2019-07-30
Payer: MEDICARE

## 2019-07-30 VITALS
DIASTOLIC BLOOD PRESSURE: 80 MMHG | HEART RATE: 109 BPM | HEIGHT: 67 IN | WEIGHT: 213 LBS | SYSTOLIC BLOOD PRESSURE: 119 MMHG | BODY MASS INDEX: 33.43 KG/M2

## 2019-07-30 DIAGNOSIS — R63.0 ANOREXIA: ICD-10-CM

## 2019-07-30 PROCEDURE — 99214 OFFICE O/P EST MOD 30 MIN: CPT

## 2019-07-30 RX ORDER — LUBIPROSTONE 8 UG/1
8 CAPSULE, GELATIN COATED ORAL
Refills: 0 | Status: DISCONTINUED | COMMUNITY
Start: 2019-05-15 | End: 2019-07-30

## 2019-07-30 RX ORDER — NAPROXEN 500 MG/1
500 TABLET ORAL
Refills: 0 | Status: DISCONTINUED | COMMUNITY
End: 2019-07-30

## 2019-07-30 RX ORDER — GABAPENTIN 300 MG/1
300 CAPSULE ORAL
Qty: 90 | Refills: 3 | Status: DISCONTINUED | COMMUNITY
Start: 2019-05-15 | End: 2019-07-30

## 2019-07-30 RX ORDER — ZOLPIDEM TARTRATE 10 MG/1
10 TABLET ORAL
Qty: 30 | Refills: 3 | Status: DISCONTINUED | COMMUNITY
Start: 2019-05-15 | End: 2019-07-30

## 2019-07-30 RX ORDER — HYDROMORPHONE HYDROCHLORIDE 4 MG/1
4 TABLET ORAL
Qty: 120 | Refills: 0 | Status: DISCONTINUED | COMMUNITY
Start: 2019-05-15 | End: 2019-07-30

## 2019-07-30 NOTE — REVIEW OF SYSTEMS
[Back Pain] : back pain [Confusion] : confusion [Muscle Pain] : muscle pain [Insomnia] : insomnia [Depression] : depression [Anxiety] : anxiety [Negative] : Integumentary [Suicidal] : not suicidal

## 2019-07-30 NOTE — PHYSICAL EXAM
[Memory Grossly Normal] : memory grossly normal [Speech Grossly Normal] : speech grossly normal [Normal] : no rash [Normal Insight/Judgement] : insight and judgment were intact [Alert and Oriented x3] : oriented to person, place, and time [de-identified] : mild distress upon position change and ambulating.  currently getting around in wheelchair  [de-identified] : mild spinal tenderness and para spinal region bilaterally.  wound healed well.  no discharge, surrounding erythema or red dtreaking

## 2019-07-30 NOTE — PLAN
[FreeTextEntry1] : 67 y/o male with h/o multiple spinal surgeries, chronic pain refractory to multiple medications(seen by multiple specialists in the past), depression, presented for follow-up.  recently had a revision of his spinal surgery 4 months ago which has not alleviated his pain.  and now s/p hospitalization due to respiratory failure 2/2 to opioid overdose.  was discharged on no opioids, and still c/o chronic pain.  is deciding if he would like to f/u with Dr. Santillan (PM), during hospitalization he decided he didn’t want to go to [facility] but now feels it might have been a good idea.  will consult CM to see if anything can be arranged.  in meantime advised to cont home PT, will add celebex.  \par \par discussed in length that I am unable to prescribed any narcotic meds and his condition needs to be addressed with a specialist given the complexity of the situation.  \par \par all questions and concerns addressed with patient in detail.  patient verbalized back instructions in his own words.\par \par f/u in 3 weeks \par \par Spent >25 minutes in direct patient care and addressed all questions and concerns.  >50% of this time was in direct face to face contact with the patient during exam and counseling.\par \par \par \par \par

## 2019-07-30 NOTE — HISTORY OF PRESENT ILLNESS
[Friend] : friend [de-identified] : presents s/p hospitalization  due to respiratory distress 2/2 to opioid use.  Dilaudid, Ambien and gabapentin was discontinued upon discharge and narcan was given.  as per patient,  Dr. Santillan (pain management), saw patient while he was hospitalized but no recommendations were given but is deciding if he wants to f/u with him or not given as he was told there was nothing for him to do with him.  \par currently not on any pain meds and still having his chronic back pain \par home PT and heating pad has been helping \par \par \par

## 2019-08-13 ENCOUNTER — APPOINTMENT (OUTPATIENT)
Dept: INTERNAL MEDICINE | Facility: CLINIC | Age: 69
End: 2019-08-13
Payer: MEDICARE

## 2019-08-13 VITALS
HEART RATE: 94 BPM | SYSTOLIC BLOOD PRESSURE: 144 MMHG | DIASTOLIC BLOOD PRESSURE: 87 MMHG | WEIGHT: 213 LBS | HEIGHT: 67 IN | BODY MASS INDEX: 33.43 KG/M2

## 2019-08-13 DIAGNOSIS — M25.551 PAIN IN RIGHT HIP: ICD-10-CM

## 2019-08-13 DIAGNOSIS — M79.606 PAIN IN LEG, UNSPECIFIED: ICD-10-CM

## 2019-08-13 PROCEDURE — 99213 OFFICE O/P EST LOW 20 MIN: CPT

## 2019-08-13 RX ORDER — PNV NO.95/FERROUS FUM/FOLIC AC 28MG-0.8MG
TABLET ORAL
Refills: 0 | Status: ACTIVE | COMMUNITY

## 2019-08-13 NOTE — PLAN
[FreeTextEntry1] : advised to stop taking ibuprofen, cont celebrex.  start PPI \par f/u with pain management \par start PT \par \par f/u in 6-8 weeks

## 2019-08-13 NOTE — PHYSICAL EXAM
[Normal] : no rash [Speech Grossly Normal] : speech grossly normal [Memory Grossly Normal] : memory grossly normal [Alert and Oriented x3] : oriented to person, place, and time [Normal Insight/Judgement] : insight and judgment were intact [de-identified] : mild distress upon position change and ambulating.  currently getting around in wheelchair  [de-identified] : mild spinal tenderness and para spinal region bilaterally.  wound healed well.  no discharge, surrounding erythema or red dtreaking

## 2019-08-13 NOTE — REVIEW OF SYSTEMS
[Muscle Pain] : muscle pain [Back Pain] : back pain [Suicidal] : not suicidal [Confusion] : confusion [Anxiety] : anxiety [Insomnia] : insomnia [Depression] : depression [Negative] : Heme/Lymph

## 2019-08-13 NOTE — HISTORY OF PRESENT ILLNESS
[de-identified] : presents for f/u of chronic back pain, has been taking celebrex with mild relief doesn’t help much.  \par interested in PT, \par \par now having involuntary moaning x 1 week \par DIDNT f/u with chronic pain management \par \par has been getting out of bed and walking in the park daily that is helping, which is helping \par \par

## 2019-08-23 ENCOUNTER — RX RENEWAL (OUTPATIENT)
Age: 69
End: 2019-08-23

## 2019-08-23 ENCOUNTER — OFFICE VISIT (OUTPATIENT)
Dept: FAMILY MEDICINE CLINIC | Facility: CLINIC | Age: 69
End: 2019-08-23

## 2019-08-23 VITALS — RESPIRATION RATE: 20 BRPM | OXYGEN SATURATION: 97 % | BODY MASS INDEX: 42.83 KG/M2 | TEMPERATURE: 97.9 F | HEIGHT: 69 IN

## 2019-08-23 DIAGNOSIS — E11.9 TYPE 2 DIABETES MELLITUS WITHOUT COMPLICATION, WITHOUT LONG-TERM CURRENT USE OF INSULIN (HCC): Primary | ICD-10-CM

## 2019-08-23 DIAGNOSIS — E66.01 OBESITY, CLASS III, BMI 40-49.9 (MORBID OBESITY) (HCC): ICD-10-CM

## 2019-08-23 DIAGNOSIS — E78.2 MIXED HYPERLIPIDEMIA: ICD-10-CM

## 2019-08-23 DIAGNOSIS — Z23 NEED FOR PROPHYLACTIC VACCINATION AGAINST STREPTOCOCCUS PNEUMONIAE (PNEUMOCOCCUS): ICD-10-CM

## 2019-08-23 DIAGNOSIS — I10 ESSENTIAL HYPERTENSION: ICD-10-CM

## 2019-08-23 DIAGNOSIS — Z23 NEED FOR IMMUNIZATION AGAINST INFLUENZA: ICD-10-CM

## 2019-08-23 PROBLEM — E66.813 OBESITY, CLASS III, BMI 40-49.9 (MORBID OBESITY): Status: ACTIVE | Noted: 2019-07-29

## 2019-08-23 PROCEDURE — 96372 THER/PROPH/DIAG INJ SC/IM: CPT | Performed by: FAMILY MEDICINE

## 2019-08-23 PROCEDURE — 99214 OFFICE O/P EST MOD 30 MIN: CPT | Performed by: FAMILY MEDICINE

## 2019-08-23 NOTE — PROGRESS NOTES
Chief Complaint   Patient presents with   • Diabetes     3 week f/up after starting Trulicity inconjunction with Amaryl       Subjective   History of Present Illness   Orion Harvey is a 69 y.o. male.   He presents today for follow-up on diabetes after starting Trulicity 4 weeks ago.  He denies any side effects with Trulicity, dyspnea was over $300 but is willing to continue at this point.  Eventually would like to be able to come back off of it due to cost.  His sugars are coming down, home glucose readings in the past 2 weeks range from 132 189.  He has had a low of 99.  He has not had any recent hypoglycemia, in the past though he states that sugars less than 80 will make him shaky.  He has been following a very strict low concentrated sweet diet and cut out many high, higher foods such as biscuits and gravy.  He has lost 8.6 pounds in the past 3 and half weeks.    Blood pressure readings ranging from 116//79, heart rate 50-73    The following portions of the patient's history were reviewed and updated as appropriate: allergies, current medications,  family history, past medical history, social history,  surgical history and problem list.      Current Outpatient Medications:   •  ascorbic acid (VITAMIN C) 1000 MG tablet, Take 1 tablet by mouth Daily., Disp: , Rfl:   •  aspirin 325 MG tablet, Take 1 tablet by mouth Daily., Disp: , Rfl:   •  Dulaglutide 1.5 MG/0.5ML solution pen-injector, Inject 1.5 mg under the skin into the appropriate area as directed 1 (One) Time Per Week., Disp: 4 pen, Rfl: 12  •  glimepiride (AMARYL) 4 MG tablet, TAKE 1 TABLET BY MOUTH TWICE DAILY, Disp: 180 tablet, Rfl: 3  •  glucose blood (ONE TOUCH ULTRA TEST) test strip, 1 strip by In Vitro route Every 12 (Twelve) Hours., Disp: , Rfl:   •  lisinopril (PRINIVIL,ZESTRIL) 20 MG tablet, Take 1 tablet by mouth Daily., Disp: , Rfl:   •  metFORMIN (GLUCOPHAGE) 1000 MG tablet, Take 1 tablet by mouth 2 (Two) Times a Day., Disp: , Rfl:  "4  •  Multiple Vitamins-Minerals (CENTRUM SILVER) tablet, Take 1 tablet by mouth Daily., Disp: , Rfl:   •  ONETOUCH DELICA LANCETS 33G misc, 1 tablet by In Vitro route Every 12 (Twelve) Hours., Disp: , Rfl:   •  rosuvastatin (CRESTOR) 10 MG tablet, Take 1 tablet by mouth Every Night., Disp: , Rfl:   •  vitamin d ( VITAMIN D3) 5000 units capsule, Take 1 tablet by mouth Daily., Disp: , Rfl:       Review of Systems   Constitutional: Negative for fatigue and fever.   Eyes: Negative for visual disturbance.   Respiratory: Negative for cough, shortness of breath and wheezing.    Cardiovascular: Negative for chest pain, palpitations and leg swelling.   Gastrointestinal: Negative.  Negative for abdominal pain.   Skin: Negative for rash.   Psychiatric/Behavioral: Negative for depressed mood.       Objective   Temp 97.9 °F (36.6 °C) (Oral)   Resp 20   Ht 175.3 cm (69\")   SpO2 97%   BMI 42.83 kg/m²     Physical Exam   Constitutional: He is oriented to person, place, and time. He appears well-developed and well-nourished. No distress.   Morbidly obese   Eyes: Conjunctivae and EOM are normal. Pupils are equal, round, and reactive to light.   Neck: Normal range of motion.   Cardiovascular: Normal rate and regular rhythm.   No murmur heard.  Pulmonary/Chest: Effort normal and breath sounds normal. He has no wheezes.   Musculoskeletal: Normal range of motion. He exhibits no edema.   Neurological: He is alert and oriented to person, place, and time.   Skin: Skin is warm and dry.   Psychiatric: He has a normal mood and affect.   Nursing note and vitals reviewed.      Telephone on 07/22/2019   Component Date Value Ref Range Status   • Hemoglobin A1C 07/24/2019 9.1* 4.8 - 5.6 % Final    Comment:          Prediabetes: 5.7 - 6.4           Diabetes: >6.4           Glycemic control for adults with diabetes: <7.0     • Glucose 07/24/2019 209* 65 - 99 mg/dL Final   • BUN 07/24/2019 13  8 - 27 mg/dL Final   • Creatinine 07/24/2019 0.91  " 0.76 - 1.27 mg/dL Final   • eGFR Non  Am 07/24/2019 87  >59 mL/min/1.73 Final   • eGFR African Am 07/24/2019 100  >59 mL/min/1.73 Final   • BUN/Creatinine Ratio 07/24/2019 14  10 - 24 Final   • Sodium 07/24/2019 139  134 - 144 mmol/L Final   • Potassium 07/24/2019 4.9  3.5 - 5.2 mmol/L Final   • Chloride 07/24/2019 99  96 - 106 mmol/L Final   • Total CO2 07/24/2019 24  20 - 29 mmol/L Final   • Calcium 07/24/2019 9.8  8.6 - 10.2 mg/dL Final   • Total Protein 07/24/2019 6.6  6.0 - 8.5 g/dL Final   • Albumin 07/24/2019 4.2  3.6 - 4.8 g/dL Final   • Globulin 07/24/2019 2.4  1.5 - 4.5 g/dL Final   • A/G Ratio 07/24/2019 1.8  1.2 - 2.2 Final   • Total Bilirubin 07/24/2019 0.5  0.0 - 1.2 mg/dL Final   • Alkaline Phosphatase 07/24/2019 55  39 - 117 IU/L Final   • AST (SGOT) 07/24/2019 53* 0 - 40 IU/L Final   • ALT (SGPT) 07/24/2019 50* 0 - 44 IU/L Final   • Total Cholesterol 07/24/2019 122  100 - 199 mg/dL Final   • Triglycerides 07/24/2019 249* 0 - 149 mg/dL Final   • HDL Cholesterol 07/24/2019 37* >39 mg/dL Final   • VLDL Cholesterol 07/24/2019 50* 5 - 40 mg/dL Final   • LDL Cholesterol  07/24/2019 35  0 - 99 mg/dL Final       Assessment/Plan   Diagnoses and all orders for this visit:    1. Type 2 diabetes mellitus without complication, without long-term current use of insulin (CMS/[facility]) (Primary)  -     Dulaglutide 1.5 MG/0.5ML solution pen-injector; Inject 1.5 mg under the skin into the appropriate area as directed 1 (One) Time Per Week.  Dispense: 4 pen; Refill: 12  -     Hemoglobin A1c; Future    2. Obesity, Class III, BMI 40-49.9 (morbid obesity) (CMS/[facility])    3. Mixed hyperlipidemia  -     Lipid Panel; Future  -     Comprehensive Metabolic Panel; Future    4. Essential hypertension  -     Comprehensive Metabolic Panel; Future          Continuous glucose readings to be well above goal going ahead and increasing Trulicity.  Did encourage continued weight loss and strict diet control and suspect will be able to  reduce medications.  Advised if any low sugars below 80 will need to reduce Amaryl and he should have food with him at all times in case of a low sugar reaction.     Return in about 2 months (around 10/23/2019) for Recheck diabets, with Labs.

## 2019-08-23 NOTE — PATIENT INSTRUCTIONS

## 2019-09-13 DIAGNOSIS — E11.9 TYPE 2 DIABETES MELLITUS WITHOUT COMPLICATION, WITHOUT LONG-TERM CURRENT USE OF INSULIN (HCC): ICD-10-CM

## 2019-09-18 RX ORDER — GLIMEPIRIDE 4 MG/1
TABLET ORAL
Qty: 180 TABLET | Refills: 3 | Status: SHIPPED | OUTPATIENT
Start: 2019-09-18 | End: 2020-10-19

## 2019-09-25 ENCOUNTER — APPOINTMENT (OUTPATIENT)
Dept: INTERNAL MEDICINE | Facility: CLINIC | Age: 69
End: 2019-09-25

## 2019-10-23 ENCOUNTER — RESULTS ENCOUNTER (OUTPATIENT)
Dept: FAMILY MEDICINE CLINIC | Facility: CLINIC | Age: 69
End: 2019-10-23

## 2019-10-23 DIAGNOSIS — E11.9 TYPE 2 DIABETES MELLITUS WITHOUT COMPLICATION, WITHOUT LONG-TERM CURRENT USE OF INSULIN (HCC): ICD-10-CM

## 2019-10-23 DIAGNOSIS — I10 ESSENTIAL HYPERTENSION: ICD-10-CM

## 2019-10-23 DIAGNOSIS — E78.2 MIXED HYPERLIPIDEMIA: ICD-10-CM

## 2019-10-24 ENCOUNTER — APPOINTMENT (OUTPATIENT)
Dept: INTERNAL MEDICINE | Facility: CLINIC | Age: 69
End: 2019-10-24
Payer: MEDICARE

## 2019-10-24 VITALS
BODY MASS INDEX: 34.84 KG/M2 | SYSTOLIC BLOOD PRESSURE: 135 MMHG | WEIGHT: 222 LBS | HEART RATE: 102 BPM | DIASTOLIC BLOOD PRESSURE: 79 MMHG | HEIGHT: 67 IN

## 2019-10-24 DIAGNOSIS — R09.02 HYPOXEMIA: ICD-10-CM

## 2019-10-24 PROCEDURE — 77080 DXA BONE DENSITY AXIAL: CPT

## 2019-10-24 PROCEDURE — 99214 OFFICE O/P EST MOD 30 MIN: CPT | Mod: 25

## 2019-10-24 PROCEDURE — 36415 COLL VENOUS BLD VENIPUNCTURE: CPT

## 2019-10-24 PROCEDURE — 90686 IIV4 VACC NO PRSV 0.5 ML IM: CPT

## 2019-10-24 PROCEDURE — G0008: CPT

## 2019-10-24 RX ORDER — CELECOXIB 200 MG/1
200 CAPSULE ORAL
Qty: 60 | Refills: 0 | Status: DISCONTINUED | COMMUNITY
Start: 2019-07-30 | End: 2019-10-24

## 2019-10-24 RX ORDER — ASPIRIN 81 MG/1
81 TABLET, CHEWABLE ORAL
Qty: 90 | Refills: 0 | Status: DISCONTINUED | COMMUNITY
Start: 2019-08-23 | End: 2019-10-24

## 2019-10-24 NOTE — PLAN
[FreeTextEntry1] : HTN, controlled -cont meds as directed \par \par Acute/chronic back pain,  h/o multiple spinal surgeries, chronic pain refractory to multiple medications(seen by multiple specialists in the past), depression currently receiving ketamine infusions\par -discussed to cont therapy for now \par -cont CBT cream\par \par Depression/anxiety/insomnia, being followed by Dr. Bhandari \par -cont meds as directed \par \par continue to have long discussions regarding back pain, patient has exhausted mutiple modalities including surgery, opioids, neuropathics and nsaids with no major relief and pain.  again reiterated that he must be realistic with his goals of care \par \par flu vaccine given today \par

## 2019-10-24 NOTE — PHYSICAL EXAM
[Normal] : no rash [Speech Grossly Normal] : speech grossly normal [Memory Grossly Normal] : memory grossly normal [Alert and Oriented x3] : oriented to person, place, and time [de-identified] : mild distress upon position change and ambulating. not using wheelchair today  [Normal Insight/Judgement] : insight and judgment were intact [de-identified] : mild spinal tenderness and para spinal region bilaterally.  wound healed well.  no discharge, surrounding erythema or red dtreaking

## 2019-10-24 NOTE — HISTORY OF PRESENT ILLNESS
[de-identified] : presents for f/u of chronic back pain, has been seeing Dr. Young (pain management) \par has received several ketamine infusions with no relief, d/w Dr. Florence advised that he might get a \par has been going to PT\par \par now having involuntary moaning x 1 week \par DIDNT f/u with chronic pain management \par \par has been getting out of bed and walking in the park daily that is helping, which is helping

## 2019-10-24 NOTE — REVIEW OF SYSTEMS
[Back Pain] : back pain [Muscle Pain] : muscle pain [Confusion] : confusion [Suicidal] : not suicidal [Anxiety] : anxiety [Insomnia] : insomnia [Depression] : depression [Negative] : Heme/Lymph

## 2019-10-25 LAB
ALBUMIN SERPL ELPH-MCNC: 4.6 G/DL
ALP BLD-CCNC: 84 U/L
ALT SERPL-CCNC: 12 U/L
ANION GAP SERPL CALC-SCNC: 13 MMOL/L
AST SERPL-CCNC: 12 U/L
BASOPHILS # BLD AUTO: 0.05 K/UL
BASOPHILS NFR BLD AUTO: 0.5 %
BILIRUB DIRECT SERPL-MCNC: 0.1 MG/DL
BILIRUB INDIRECT SERPL-MCNC: 0.2 MG/DL
BILIRUB SERPL-MCNC: 0.3 MG/DL
BUN SERPL-MCNC: 11 MG/DL
CALCIUM SERPL-MCNC: 9.6 MG/DL
CHLORIDE SERPL-SCNC: 100 MMOL/L
CHOLEST SERPL-MCNC: 211 MG/DL
CHOLEST/HDLC SERPL: 4.6 RATIO
CO2 SERPL-SCNC: 27 MMOL/L
CREAT SERPL-MCNC: 1.11 MG/DL
EOSINOPHIL # BLD AUTO: 0.17 K/UL
EOSINOPHIL NFR BLD AUTO: 1.7 %
ESTIMATED AVERAGE GLUCOSE: 120 MG/DL
FOLATE SERPL-MCNC: 14.5 NG/ML
GLUCOSE SERPL-MCNC: 127 MG/DL
HBA1C MFR BLD HPLC: 5.8 %
HCT VFR BLD CALC: 40.8 %
HDLC SERPL-MCNC: 46 MG/DL
HGB BLD-MCNC: 12.4 G/DL
IMM GRANULOCYTES NFR BLD AUTO: 0.5 %
LDLC SERPL CALC-MCNC: 134 MG/DL
LYMPHOCYTES # BLD AUTO: 1.28 K/UL
LYMPHOCYTES NFR BLD AUTO: 12.9 %
MAN DIFF?: NORMAL
MCHC RBC-ENTMCNC: 25.9 PG
MCHC RBC-ENTMCNC: 30.4 GM/DL
MCV RBC AUTO: 85.2 FL
MONOCYTES # BLD AUTO: 0.84 K/UL
MONOCYTES NFR BLD AUTO: 8.5 %
NEUTROPHILS # BLD AUTO: 7.51 K/UL
NEUTROPHILS NFR BLD AUTO: 75.9 %
PLATELET # BLD AUTO: 296 K/UL
POTASSIUM SERPL-SCNC: 4 MMOL/L
PROT SERPL-MCNC: 7.2 G/DL
RBC # BLD: 4.79 M/UL
RBC # FLD: 16.2 %
SAVE SPECIMEN: NORMAL
SODIUM SERPL-SCNC: 140 MMOL/L
TRIGL SERPL-MCNC: 157 MG/DL
TSH SERPL-ACNC: 1.69 UIU/ML
VIT B12 SERPL-MCNC: 1012 PG/ML
WBC # FLD AUTO: 9.9 K/UL

## 2019-10-28 DIAGNOSIS — E78.5 HYPERLIPIDEMIA, UNSPECIFIED HYPERLIPIDEMIA TYPE: ICD-10-CM

## 2019-10-28 DIAGNOSIS — E11.9 TYPE 2 DIABETES MELLITUS WITHOUT COMPLICATION, WITHOUT LONG-TERM CURRENT USE OF INSULIN (HCC): Primary | ICD-10-CM

## 2019-10-30 ENCOUNTER — LAB (OUTPATIENT)
Dept: LAB | Facility: HOSPITAL | Age: 69
End: 2019-10-30

## 2019-10-30 DIAGNOSIS — E11.9 TYPE 2 DIABETES MELLITUS WITHOUT COMPLICATION, WITHOUT LONG-TERM CURRENT USE OF INSULIN (HCC): ICD-10-CM

## 2019-10-30 DIAGNOSIS — E78.5 HYPERLIPIDEMIA, UNSPECIFIED HYPERLIPIDEMIA TYPE: ICD-10-CM

## 2019-10-30 LAB
ALBUMIN SERPL-MCNC: 4.4 G/DL (ref 3.5–5.2)
ALBUMIN/GLOB SERPL: 2.3 G/DL
ALP SERPL-CCNC: 46 U/L (ref 39–117)
ALT SERPL W P-5'-P-CCNC: 51 U/L (ref 1–41)
ANION GAP SERPL CALCULATED.3IONS-SCNC: 11.8 MMOL/L (ref 5–15)
AST SERPL-CCNC: 39 U/L (ref 1–40)
BILIRUB SERPL-MCNC: 0.3 MG/DL (ref 0.2–1.2)
BUN BLD-MCNC: 13 MG/DL (ref 8–23)
BUN/CREAT SERPL: 15.9 (ref 7–25)
CALCIUM SPEC-SCNC: 9.4 MG/DL (ref 8.6–10.5)
CHLORIDE SERPL-SCNC: 99 MMOL/L (ref 98–107)
CHOLEST SERPL-MCNC: 115 MG/DL (ref 0–200)
CO2 SERPL-SCNC: 25.2 MMOL/L (ref 22–29)
CREAT BLD-MCNC: 0.82 MG/DL (ref 0.76–1.27)
GFR SERPL CREATININE-BSD FRML MDRD: 93 ML/MIN/1.73
GLOBULIN UR ELPH-MCNC: 1.9 GM/DL
GLUCOSE BLD-MCNC: 133 MG/DL (ref 65–99)
HBA1C MFR BLD: 7.3 % (ref 3.5–5.6)
HDLC SERPL-MCNC: 43 MG/DL (ref 40–60)
LDLC SERPL CALC-MCNC: 42 MG/DL (ref 0–100)
LDLC/HDLC SERPL: 0.98 {RATIO}
POTASSIUM BLD-SCNC: 4.8 MMOL/L (ref 3.5–5.2)
PROT SERPL-MCNC: 6.3 G/DL (ref 6–8.5)
SODIUM BLD-SCNC: 136 MMOL/L (ref 136–145)
TRIGL SERPL-MCNC: 149 MG/DL (ref 0–150)
VLDLC SERPL-MCNC: 29.8 MG/DL (ref 5–40)

## 2019-10-30 PROCEDURE — 80053 COMPREHEN METABOLIC PANEL: CPT

## 2019-10-30 PROCEDURE — 36415 COLL VENOUS BLD VENIPUNCTURE: CPT

## 2019-10-30 PROCEDURE — 83036 HEMOGLOBIN GLYCOSYLATED A1C: CPT

## 2019-10-30 PROCEDURE — 80061 LIPID PANEL: CPT

## 2019-11-05 ENCOUNTER — OFFICE VISIT (OUTPATIENT)
Dept: FAMILY MEDICINE CLINIC | Facility: CLINIC | Age: 69
End: 2019-11-05

## 2019-11-05 VITALS
OXYGEN SATURATION: 96 % | SYSTOLIC BLOOD PRESSURE: 142 MMHG | HEIGHT: 69 IN | WEIGHT: 292 LBS | TEMPERATURE: 97.9 F | BODY MASS INDEX: 43.25 KG/M2 | HEART RATE: 65 BPM | DIASTOLIC BLOOD PRESSURE: 76 MMHG

## 2019-11-05 DIAGNOSIS — G89.29 CHRONIC PAIN OF BOTH SHOULDERS: ICD-10-CM

## 2019-11-05 DIAGNOSIS — M25.512 CHRONIC PAIN OF BOTH SHOULDERS: ICD-10-CM

## 2019-11-05 DIAGNOSIS — E78.2 MIXED HYPERLIPIDEMIA: ICD-10-CM

## 2019-11-05 DIAGNOSIS — G89.29 CHRONIC ARTHRALGIAS OF KNEES AND HIPS: ICD-10-CM

## 2019-11-05 DIAGNOSIS — M25.561 CHRONIC ARTHRALGIAS OF KNEES AND HIPS: ICD-10-CM

## 2019-11-05 DIAGNOSIS — M25.562 CHRONIC ARTHRALGIAS OF KNEES AND HIPS: ICD-10-CM

## 2019-11-05 DIAGNOSIS — I10 ESSENTIAL HYPERTENSION: ICD-10-CM

## 2019-11-05 DIAGNOSIS — E66.01 OBESITY, CLASS III, BMI 40-49.9 (MORBID OBESITY) (HCC): ICD-10-CM

## 2019-11-05 DIAGNOSIS — M25.552 CHRONIC ARTHRALGIAS OF KNEES AND HIPS: ICD-10-CM

## 2019-11-05 DIAGNOSIS — E11.9 TYPE 2 DIABETES MELLITUS WITHOUT COMPLICATION, WITHOUT LONG-TERM CURRENT USE OF INSULIN (HCC): Primary | ICD-10-CM

## 2019-11-05 DIAGNOSIS — M25.511 CHRONIC PAIN OF BOTH SHOULDERS: ICD-10-CM

## 2019-11-05 DIAGNOSIS — M25.551 CHRONIC ARTHRALGIAS OF KNEES AND HIPS: ICD-10-CM

## 2019-11-05 PROCEDURE — 99214 OFFICE O/P EST MOD 30 MIN: CPT | Performed by: FAMILY MEDICINE

## 2019-11-05 RX ORDER — SENNOSIDES 8.6 MG
1300 CAPSULE ORAL EVERY 8 HOURS PRN
Qty: 60 TABLET | Refills: 12
Start: 2019-11-05 | End: 2020-10-07 | Stop reason: HOSPADM

## 2019-11-05 RX ORDER — LISINOPRIL 20 MG/1
40 TABLET ORAL DAILY
Qty: 90 TABLET | Refills: 3 | Status: SHIPPED | OUTPATIENT
Start: 2019-11-05 | End: 2019-11-07 | Stop reason: SDUPTHER

## 2019-11-05 NOTE — PATIENT INSTRUCTIONS
Acetaminophen biphasic or extended-release tablets  What is this medicine?  ACETAMINOPHEN (a set a JADA casandra fen) is a pain reliever. It is used to treat mild pain and fever.  This medicine may be used for other purposes; ask your health care provider or pharmacist if you have questions.  COMMON BRAND NAME(S): Mapap Arthritis Pain, Tylenol 8 Hour, Tylenol Arthritis Pain  What should I tell my health care provider before I take this medicine?  They need to know if you have any of these conditions:  -if you often drink alcohol  -liver disease  -an unusual or allergic reaction to acetaminophen, other medicines, foods, dyes, or preservatives  -pregnant or trying to get pregnant  -breast-feeding  How should I use this medicine?  Take this medicine by mouth with a glass of water. Follow the directions on the package or prescription label. Do not cut, crush or chew this medicine. Take your medicine at regular intervals. Do not take your medicine more often than directed.  Talk to your pediatrician regarding the use of this medicine in children. While this drug may be prescribed for children as young as 12 years of age for selected conditions, precautions do apply.  Overdosage: If you think you have taken too much of this medicine contact a poison control center or emergency room at once.  NOTE: This medicine is only for you. Do not share this medicine with others.  What if I miss a dose?  If you miss a dose, take it as soon as you can. If it is almost time for your next dose, take only that dose. Do not take double or extra doses.  What may interact with this medicine?  -alcohol  -imatinib  -isoniazid  -other medicines with acetaminophen  This list may not describe all possible interactions. Give your health care provider a list of all the medicines, herbs, non-prescription drugs, or dietary supplements you use. Also tell them if you smoke, drink alcohol, or use illegal drugs. Some items may interact with your  medicine.  What should I watch for while using this medicine?  Tell your doctor or health care professional if the pain lasts more than 10 days (5 days for children), if it gets worse, or if there is a new or different kind of pain. Also, check with your doctor if a fever lasts for more than 3 days.  Do not take other medicines that contain acetaminophen with this medicine. Always read labels carefully. If you have questions, ask your doctor or pharmacist.  If you take too much acetaminophen get medical help right away. Too much acetaminophen can be very dangerous and cause liver damage. Even if you do not have symptoms, it is important to get help right away.  What side effects may I notice from receiving this medicine?  Side effects that you should report to your doctor or health care professional as soon as possible:  -allergic reactions like skin rash, itching or hives, swelling of the face, lips, or tongue  -breathing problems  -fever or sore throat  -redness, blistering, peeling or loosening of the skin, including inside the mouth  -trouble passing urine or change in the amount of urine  -unusual bleeding or bruising  -unusually weak or tired  -yellowing of the eyes or skin  Side effects that usually do not require medical attention (report to your doctor or health care professional if they continue or are bothersome):  -headache  -nausea, vomiting  This list may not describe all possible side effects. Call your doctor for medical advice about side effects. You may report side effects to FDA at 1-153-FDA-3730.  Where should I keep my medicine?  Keep out of reach of children.  Store at room temperature between 20 and 25 degrees C (68 and 77 degrees F). Protect from moisture and heat. Throw away any unused medicine after the expiration date.  NOTE: This sheet is a summary. It may not cover all possible information. If you have questions about this medicine, talk to your doctor, pharmacist, or health care  provider.  © 2019 [employer] Standard (2014 12:56:12)    Shoulder Range of Motion Exercises  Shoulder range of motion (ROM) exercises are done to keep the shoulder moving freely or to increase movement. They are often recommended for people who have shoulder pain or stiffness or who are recovering from a shoulder surgery.  Phase 1 exercises  When you are able, do this exercise 1-2 times per day for 30-60 seconds in each direction, or as directed by your health care provider.  Pendulum exercise  To do this exercise while sittin. Sit in a chair or at the edge of your bed with your feet flat on the floor.  2. Let your affected arm hang down in front of you over the edge of the bed or chair.  3. Relax your shoulder, arm, and hand.  4. Rock your body so your arm gently swings in small circles. You can also use your unaffected arm to start the motion.  5. Repeat changing the direction of the circles, swinging your arm left and right, and swinging your arm forward and back.  To do this exercise while standin. Stand next to a sturdy chair or table, and hold on to it with your hand on your unaffected side.  2. Bend forward at the waist.  3. Bend your knees slightly.  4. Relax your shoulder, arm, and hand.  5. While keeping your shoulder relaxed, use body motion to swing your arm in small circles.  6. Repeat changing the direction of the circles, swinging your arm left and right, and swinging your arm forward and back.  7. Between exercises, stand up tall and take a short break to relax your lower back.    Phase 2 exercises  Do these exercises 1-2 times per day or as told by your health care provider. Hold each stretch for 30 seconds, and repeat 3 times. Do the exercises with one or both arms as instructed by your health care provider.  For these exercises, sit at a table with your hand and arm supported by the table. A chair that slides easily or has wheels can be helpful.  External rotation  1. Turn your  chair so that your affected side is nearest to the table.  2. Place your forearm on the table to your side. Bend your elbow about 90° at the elbow (right angle) and place your hand palm facing down on the table. Your elbow should be about 6 inches away from your side.  3. Keeping your arm on the table, lean your body forward.  Abduction  1. Turn your chair so that your affected side is nearest to the table.  2. Place your forearm and hand on the table so that your thumb points toward the ceiling and your arm is straight out to your side.  3. Slide your hand out to the side and away from you, using your unaffected arm to do the work.  4. To increase the stretch, you can slide your chair away from the table.  Flexion: forward stretch  1. Sit facing the table. Place your hand and elbow on the table in front of you.  2. Slide your hand forward and away from you, using your unaffected arm to do the work.  3. To increase the stretch, you can slide your chair backward.  Phase 3 exercises  Do these exercises 1-2 times per day or as told by your health care provider. Hold each stretch for 30 seconds, and repeat 3 times. Do the exercises with one or both arms as instructed by your health care provider.  Cross-body stretch: posterior capsule stretch  1. Lift your arm straight out in front of you.  2. Bend your arm 90° at the elbow (right angle) so your forearm moves across your body.  3. Use your other arm to gently pull the elbow across your body, toward your other shoulder.  Wall climbs  1. Stand with your affected arm extended out to the side with your hand resting on a door frame.  2. Slide your hand slowly up the door frame.  3. To increase the stretch, step through the door frame. Keep your body upright and do not lean.  Wand exercises  You will need a cane, a piece of PVC pipe, or a sturdy wooden dowel for wand exercises.  Flexion  To do this exercise while standin. Hold the wand with both of your hands, palms  down.  2. Using the other arm to help, lift your arms up and over your head, if able.  3. Push upward with your other arm to gently increase the stretch.  To do this exercise while lying down:  1. Lie on your back with your elbows resting on the floor and the wand in both your hands. Your hands will be palm down, or pointing toward your feet.  2. Lift your hands toward the ceiling, using your unaffected arm to help if needed.  3. Bring your arms overhead as able, using your unaffected arm to help if needed.  Internal rotation  1. Stand while holding the wand behind you with both hands. Your unaffected arm should be extended above your head with the arm of the affected side extended behind you at the level of your waist. The wand should be pointing straight up and down as you hold it.  2. Slowly pull the wand up behind your back by straightening the elbow of your unaffected arm and bending the elbow of your affected arm.  External rotation  1. Lie on your back with your affected upper arm supported on a small pillow or rolled towel. When you first do this exercise, keep your upper arm close to your body. Over time, bring your arm up to a 90° angle out to the side.  2. Hold the wand across your stomach and with both hands palm up. Your elbow on your affected side should be bent at a 90° angle.  3. Use your unaffected side to help push your forearm away from you and toward the floor. Keep your elbow on your affected side bent at a 90° angle.  Contact a health care provider if you have:  · New or increasing pain.  · New numbness, tingling, weakness, or discoloration in your arm or hand.  This information is not intended to replace advice given to you by your health care provider. Make sure you discuss any questions you have with your health care provider.  Document Released: 09/15/2004 Document Revised: 01/30/2019 Document Reviewed: 01/30/2019  [employer] Interactive Patient Education © 2019 [employer]    Hip  "Exercises  Ask your health care provider which exercises are safe for you. Do exercises exactly as told by your health care provider and adjust them as directed. It is normal to feel mild stretching, pulling, tightness, or discomfort as you do these exercises, but you should stop right away if you feel sudden pain or your pain gets worse. Do not begin these exercises until told by your health care provider.  Stretching and range of motion exercises  These exercises warm up your muscles and joints and improve the movement and flexibility of your hip. These exercises also help to relieve pain, numbness, and tingling.  Exercise A: Hamstrings, supine    1. Lie on your back.  2. Loop a belt or towel over the ball of your left / right foot. The ball of your foot is on the walking surface, right under your toes.  3. Straighten your left / right knee and slowly pull on the belt to raise your leg.  ? Do not let your left / right knee bend while you do this.  ? Keep your other leg flat on the floor.  ? Raise the left / right leg until you feel a gentle stretch behind your left / right knee or thigh.  4. Hold this position for __________ seconds.  5. Slowly return your leg to the starting position.  Repeat __________ times. Complete this stretch __________ times a day.  Exercise B: Hip rotators    1. Lie on your back on a firm surface.  2. Hold your left / right knee with your left / right hand. Hold your ankle with your other hand.  3. Gently pull your left / right knee and rotate your lower leg toward your other shoulder.  ? Pull until you feel a stretch in your buttocks.  ? Keep your hips and shoulders firmly planted while you do this stretch.  4. Hold this position for __________ seconds.  Repeat __________ times. Complete this stretch __________ times a day.  Exercise C: V-sit (hamstrings and adductors)    1. Sit on the floor with your legs extended in a large \"V\" shape. Keep your knees straight during this " exercise.  2. Start with your head and chest upright, then bend at your waist to reach for your left foot (position A). You should feel a stretch in your right inner thigh.  3. Hold this position for __________ seconds. Then slowly return to the upright position.  4. Bend at your waist to reach forward (position B). You should feel a stretch behind both of your thighs and knees.  5. Hold this position for __________ seconds. Then slowly return to the upright position.  6. Bend at your waist to reach for your right foot (position C). You should feel a stretch in your left inner thigh.  7. Hold this position for __________ seconds. Then slowly return to the upright position.  Repeat __________ times. Complete this stretch __________ times a day.  Exercise D: Lunge (hip flexors)    1. Place your left / right knee on the floor and bend your other knee so that is directly over your ankle. You should be half-kneeling.  2. Keep good posture with your head over your shoulders.  3. Tighten your buttocks to point your tailbone downward. This helps your back to keep from arching too much.  4. You should feel a gentle stretch in the front of your left / right thigh and hip. If you do not feel any resistance, slightly slide your other foot forward and then slowly lunge forward so your knee once again lines up over your ankle.  5. Make sure your tailbone continues to point downward.  6. Hold this position for __________ seconds.  Repeat __________ times. Complete this stretch __________ times a day.  Strengthening exercises  These exercises build strength and endurance in your hip. Endurance is the ability to use your muscles for a long time, even after they get tired.  Exercise E: Bridge (hip extensors)    1. Lie on your back on a firm surface with your knees bent and your feet flat on the floor.  2. Tighten your buttocks muscles and lift your bottom off the floor until the trunk of your body is level with your thighs.  ? Do not  arch your back.  ? You should feel the muscles working in your buttocks and the back of your thighs. If you do not feel these muscles, slide your feet 1-2 inches (2.5-5 cm) farther away from your buttocks.  3. Hold this position for __________ seconds.  4. Slowly lower your hips to the starting position.  5. Let your muscles relax completely between repetitions.  6. If this exercise is too easy, try doing it with your arms crossed over your chest.  Repeat __________ times. Complete this exercise __________ times a day.  Exercise F: Straight leg raises - hip abductors    1. Lie on your side with your left / right leg in the top position. Lie so your head, shoulder, knee, and hip line up with each other. You may bend your bottom knee to help you balance.  2. Roll your hips slightly forward, so your hips are stacked directly over each other and your left / right knee is facing forward.  3. Leading with your heel, lift your top leg 4-6 inches (10-15 cm). You should feel the muscles in your outer hip lifting.  ? Do not let your foot drift forward.  ? Do not let your knee roll toward the ceiling.  4. Hold this position for __________ seconds.  5. Slowly return to the starting position.  6. Let your muscles relax completely between repetitions.  Repeat __________ times. Complete this exercise __________ times a day.  Exercise G: Straight leg raises - hip adductors    1. Lie on your side with your left / right leg in the bottom position. Lie so your head, shoulder, knee, and hip line up. You may place your upper foot in front to help you balance.  2. Roll your hips slightly forward, so your hips are stacked directly over each other and your left / right knee is facing forward.  3. Tense the muscles in your inner thigh and lift your bottom leg 4-6 inches (10-15 cm).  4. Hold this position for __________ seconds.  5. Slowly return to the starting position.  6. Let your muscles relax completely between repetitions.  Repeat  "__________ times. Complete this exercise __________ times a day.  Exercise H: Straight leg raises - quadriceps    1. Lie on your back with your left / right leg extended and your other knee bent.  2. Tense the muscles in the front of your left / right thigh. When you do this, you should see your kneecap slide up or see increased dimpling just above your knee.  3. Tighten these muscles even more and raise your leg 4-6 inches (10-15 cm) off the floor.  4. Hold this position for __________ seconds.  5. Keep these muscles tense as you lower your leg.  6. Relax the muscles slowly and completely between repetitions.  Repeat __________ times. Complete this exercise __________ times a day.  Exercise I: Hip abductors, standing  1. Tie one end of a rubber exercise band or tubing to a secure surface, such as a table or pole.  2. Loop the other end of the band or tubing around your left / right ankle.  3. Keeping your ankle with the band or tubing directly opposite of the secured end, step away until there is tension in the tubing or band. Hold onto a chair as needed for balance.  4. Lift your left / right leg out to your side. While you do this:  ? Keep your back upright.  ? Keep your shoulders over your hips.  ? Keep your toes pointing forward.  ? Make sure to use your hip muscles to lift your leg. Do not \"throw\" your leg or tip your body to lift your leg.  5. Hold this position for __________ seconds.  6. Slowly return to the starting position.  Repeat __________ times. Complete this exercise __________ times a day.  Exercise J: Squats (quadriceps)  1.  a door frame so your feet and knees are in line with the frame. You may place your hands on the frame for balance.  2. Slowly bend your knees and lower your hips like you are going to sit in a chair.  ? Keep your lower legs in a straight-up-and-down position.  ? Do not let your hips go lower than your knees.  ? Do not bend your knees lower than told by your health " care provider.  ? If your hip pain increases, do not bend as low.  3. Hold this position for ___________ seconds.  4. Slowly push with your legs to return to standing. Do not use your hands to pull yourself to standing.  Repeat __________ times. Complete this exercise __________ times a day.  This information is not intended to replace advice given to you by your health care provider. Make sure you discuss any questions you have with your health care provider.  Document Released: 01/05/2007 Document Revised: 04/23/2019 Document Reviewed: 12/12/2016  [employer] Interactive Patient Education © 2019 [employer]    Knee Exercises                        Ask your health care provider which exercises are safe for you. Do exercises exactly as told by your health care provider and adjust them as directed. It is normal to feel mild stretching, pulling, tightness, or discomfort as you do these exercises, but you should stop right away if you feel sudden pain or your pain gets worse. Do not begin these exercises until told by your health care provider.  STRETCHING AND RANGE OF MOTION EXERCISES  These exercises warm up your muscles and joints and improve the movement and flexibility of your knee. These exercises also help to relieve pain, numbness, and tingling.  Exercise A: Knee Extension, Prone  6. Lie on your abdomen on a bed.  7. Place your left / right knee just beyond the edge of the surface so your knee is not on the bed. You can put a towel under your left / right thigh just above your knee for comfort.  8. Relax your leg muscles and allow gravity to straighten your knee. You should feel a stretch behind your left / right knee.  9. Hold this position for __________ seconds.  10. Scoot up so your knee is supported between repetitions.  Repeat __________ times. Complete this stretch __________ times a day.  Exercise B: Knee Flexion, Active  8. Lie on your back with both knees straight. If this causes back discomfort, bend  your left / right knee so your foot is flat on the floor.  9. Slowly slide your left / right heel back toward your buttocks until you feel a gentle stretch in the front of your knee or thigh.  10. Hold this position for __________ seconds.  11. Slowly slide your left / right heel back to the starting position.  Repeat __________ times. Complete this exercise __________ times a day.  Exercise C: Quadriceps, Prone  4. Lie on your abdomen on a firm surface, such as a bed or padded floor.  5. Bend your left / right knee and hold your ankle. If you cannot reach your ankle or pant leg, loop a belt around your foot and grab the belt instead.  6. Gently pull your heel toward your buttocks. Your knee should not slide out to the side. You should feel a stretch in the front of your thigh and knee.  7. Hold this position for __________ seconds.  Repeat __________ times. Complete this stretch __________ times a day.  Exercise D: Hamstring, Supine  1. Lie on your back.  2. Loop a belt or towel over the ball of your left / right foot. The ball of your foot is on the walking surface, right under your toes.  3. Straighten your left / right knee and slowly pull on the belt to raise your leg until you feel a gentle stretch behind your knee.  ? Do not let your left / right knee bend while you do this.  ? Keep your other leg flat on the floor.  4. Hold this position for __________ seconds.  Repeat __________ times. Complete this stretch __________ times a day.  STRENGTHENING EXERCISES  These exercises build strength and endurance in your knee. Endurance is the ability to use your muscles for a long time, even after they get tired.  Exercise E: Quadriceps, Isometric  4. Lie on your back with your left / right leg extended and your other knee bent. Put a rolled towel or small pillow under your knee if told by your health care provider.  5. Slowly tense the muscles in the front of your left / right thigh. You should see your kneecap slide  up toward your hip or see increased dimpling just above the knee. This motion will push the back of the knee toward the floor.  6. For __________ seconds, keep the muscle as tight as you can without increasing your pain.  7. Relax the muscles slowly and completely.  Repeat __________ times. Complete this exercise __________ times a day.  Exercise F: Straight Leg Raises - Quadriceps  4. Lie on your back with your left / right leg extended and your other knee bent.  5. Tense the muscles in the front of your left / right thigh. You should see your kneecap slide up or see increased dimpling just above the knee. Your thigh may even shake a bit.  6. Keep these muscles tight as you raise your leg 4-6 inches (10-15 cm) off the floor. Do not let your knee bend.  7. Hold this position for __________ seconds.  8. Keep these muscles tense as you lower your leg.  9. Relax your muscles slowly and completely after each repetition.  Repeat __________ times. Complete this exercise __________ times a day.  Exercise G: Hamstring, Isometric  4. Lie on your back on a firm surface.  5. Bend your left / right knee approximately __________ degrees.  6. Dig your left / right heel into the surface as if you are trying to pull it toward your buttocks. Tighten the muscles in the back of your thighs to dig as hard as you can without increasing any pain.  7. Hold this position for __________ seconds.  8. Release the tension gradually and allow your muscles to relax completely for __________ seconds after each repetition.  Repeat __________ times. Complete this exercise __________ times a day.  Exercise H: Hamstring Curls  If told by your health care provider, do this exercise while wearing ankle weights. Begin with __________ weights. Then increase the weight by 1 lb (0.5 kg) increments. Do not wear ankle weights that are more than __________.  4. Lie on your abdomen with your legs straight.  5. Bend your left / right knee as far as you can  without feeling pain. Keep your hips flat against the floor.  6. Hold this position for __________ seconds.  7. Slowly lower your leg to the starting position.  Repeat __________ times. Complete this exercise __________ times a day.  Exercise I: Squats (Quadriceps)  1.  front of a table, with your feet and knees pointing straight ahead. You may rest your hands on the table for balance but not for support.  2. Slowly bend your knees and lower your hips like you are going to sit in a chair.  ? Keep your weight over your heels, not over your toes.  ? Keep your lower legs upright so they are parallel with the table legs.  ? Do not let your hips go lower than your knees.  ? Do not bend lower than told by your health care provider.  ? If your knee pain increases, do not bend as low.  3. Hold the squat position for __________ seconds.  4. Slowly push with your legs to return to standing. Do not use your hands to pull yourself to standing.  Repeat __________ times. Complete this exercise __________ times a day.  Exercise J: Wall Slides (Quadriceps)  3. Lean your back against a smooth wall or door while you walk your feet out 18-24 inches (46-61 cm) from it.  4. Place your feet hip-width apart.  5. Slowly slide down the wall or door until your knees bend __________ degrees. Keep your knees over your heels, not over your toes. Keep your knees in line with your hips.  6. Hold for __________ seconds.  Repeat __________ times. Complete this exercise __________ times a day.  Exercise K: Straight Leg Raises - Hip Abductors  1. Lie on your side with your left / right leg in the top position. Lie so your head, shoulder, knee, and hip line up. You may bend your bottom knee to help you keep your balance.  2. Roll your hips slightly forward so your hips are stacked directly over each other and your left / right knee is facing forward.  3. Leading with your heel, lift your top leg 4-6 inches (10-15 cm). You should feel the  muscles in your outer hip lifting.  ? Do not let your foot drift forward.  ? Do not let your knee roll toward the ceiling.  4. Hold this position for __________ seconds.  5. Slowly return your leg to the starting position.  6. Let your muscles relax completely after each repetition.  Repeat __________ times. Complete this exercise __________ times a day.  Exercise L: Straight Leg Raises - Hip Extensors  1. Lie on your abdomen on a firm surface. You can put a pillow under your hips if that is more comfortable.  2. Tense the muscles in your buttocks and lift your left / right leg about 4-6 inches (10-15 cm). Keep your knee straight as you lift your leg.  3. Hold this position for __________ seconds.  4. Slowly lower your leg to the starting position.  5. Let your leg relax completely after each repetition.  Repeat __________ times. Complete this exercise __________ times a day.  This information is not intended to replace advice given to you by your health care provider. Make sure you discuss any questions you have with your health care provider.  Document Released: 11/01/2006 Document Revised: 09/11/2017 Document Reviewed: 10/23/2016  Elsevier Interactive Patient Education © 2019 [employer]

## 2019-11-05 NOTE — PROGRESS NOTES
Chief Complaint   Patient presents with   • Diabetes       Subjective   History of Present Illness   Orion Harvey is a 69 y.o. male. Pt here for 3 month follow up.      Diabetes: He brings home glucose readings which range from 102-206 before breakfast or dinner he has a few postprandial readings ranging from 138-302.    Having more arthritic pain, especially in his knees, hips, and shoulder, and is getting harder to control.  Is currently taking Tylenol 500 mg 2 times daily he has a history of left rotator cuff tear patient has had injections in that joint, he has done physical therapy.  Currently his pain is worse in his right shoulder and right hip.    Hypertension does not bring any recent home blood pressure readings has not checked much in the past 3 months.    The following portions of the patient's history were reviewed and updated as appropriate: allergies, current medications, past family history, past medical history, past social history, past surgical history and problem list.    Current Outpatient Medications on File Prior to Visit   Medication Sig   • ascorbic acid (VITAMIN C) 1000 MG tablet Take 1 tablet by mouth Daily.   • aspirin 325 MG tablet Take 1 tablet by mouth Daily.   • Dulaglutide 1.5 MG/0.5ML solution pen-injector Inject 1.5 mg under the skin into the appropriate area as directed 1 (One) Time Per Week.   • glimepiride (AMARYL) 4 MG tablet TAKE 1 TABLET BY MOUTH TWICE DAILY   • metFORMIN (GLUCOPHAGE) 1000 MG tablet TAKE 1 TABLET BY MOUTH TWICE DAILY   • Multiple Vitamins-Minerals (CENTRUM SILVER) tablet Take 1 tablet by mouth Daily.   • ONE TOUCH ULTRA TEST test strip TEST BLOOD SUGAR ONCE DAILY   • ONETOUCH DELICA LANCETS 33G misc 1 tablet by In Vitro route Every 12 (Twelve) Hours.   • rosuvastatin (CRESTOR) 10 MG tablet Take 1 tablet by mouth Every Night.   • vitamin d ( VITAMIN D3) 5000 units capsule Take 1 tablet by mouth Daily.     No current facility-administered medications on  "file prior to visit.          Review of Systems   Constitutional: Negative for appetite change, fatigue, fever and unexpected weight loss.   HENT: Negative for congestion.    Eyes: Negative for visual disturbance.   Respiratory: Negative for cough, shortness of breath and wheezing.    Cardiovascular: Negative for chest pain, palpitations and leg swelling.   Gastrointestinal: Negative.  Negative for abdominal pain, constipation, diarrhea, nausea, vomiting and indigestion.   Musculoskeletal: Positive for arthralgias and back pain.   Skin: Negative for rash.   Neurological: Negative for numbness and headache.   Psychiatric/Behavioral: Negative for sleep disturbance and depressed mood.   Is okay but I will get a new lab    Objective   Vitals:    11/05/19 0838   BP: 142/76   BP Location: Left arm   Patient Position: Sitting   Cuff Size: Adult   Pulse: 65   Temp: 97.9 °F (36.6 °C)   TempSrc: Oral   SpO2: 96%   Weight: 132 kg (292 lb)   Height: 175.3 cm (69.02\")      Body mass index is 43.1 kg/m².    Physical Exam   Constitutional: He is oriented to person, place, and time. He appears well-developed and well-nourished. No distress.   Morbidly obese   Eyes: Conjunctivae and EOM are normal. Pupils are equal, round, and reactive to light.   Neck: Normal range of motion.   Cardiovascular: Normal rate and regular rhythm.   No murmur heard.  Pulmonary/Chest: Effort normal and breath sounds normal. He has no wheezes.   Musculoskeletal: Normal range of motion. He exhibits no edema.   Decreased range of motion of shoulders, difficulty raising arm above 90 degrees.  There is arthritic thickening and crepitus, somewhat tender to palpation.  In both of his knees   Neurological: He is alert and oriented to person, place, and time.   Skin: Skin is warm and dry.   Psychiatric: He has a normal mood and affect.   Nursing note and vitals reviewed.      Lab Results   Component Value Date    BUN 13 10/30/2019    CREATININE 0.82 10/30/2019    " EGFRIFNONA 93 10/30/2019     10/30/2019    K 4.8 10/30/2019    CL 99 10/30/2019    CALCIUM 9.4 10/30/2019    ALBUMIN 4.40 10/30/2019    BILITOT 0.3 10/30/2019    ALKPHOS 46 10/30/2019    AST 39 10/30/2019    ALT 51 (H) 10/30/2019    TRIG 149 10/30/2019    HDL 43 10/30/2019    VLDL 29.8 10/30/2019    LDL 42 10/30/2019    LDLHDL 0.98 10/30/2019      Lab Results   Component Value Date    HGBA1C 7.3 (H) 10/30/2019    HGBA1C 9.1 (H) 07/24/2019        Assessment/Plan   Diagnoses and all orders for this visit:    1. Type 2 diabetes mellitus without complication, without long-term current use of insulin (CMS/[facility]) (Primary)    2. Obesity, Class III, BMI 40-49.9 (morbid obesity) (CMS/[facility])    3. Essential hypertension  -     Discontinue: lisinopril (PRINIVIL,ZESTRIL) 20 MG tablet; Take 2 tablets by mouth Daily.  Dispense: 90 tablet; Refill: 3    4. Mixed hyperlipidemia    5. Chronic arthralgias of knees and hips  -     acetaminophen (TYLENOL) 650 MG 8 hr tablet; Take 2 tablets by mouth Every 8 (Eight) Hours As Needed for Mild Pain  or Moderate Pain .  Dispense: 60 tablet; Refill: 12    6. Chronic pain of both shoulders  -     acetaminophen (TYLENOL) 650 MG 8 hr tablet; Take 2 tablets by mouth Every 8 (Eight) Hours As Needed for Mild Pain  or Moderate Pain .  Dispense: 60 tablet; Refill: 12      Offered X-rays of shoulders and knees, patient declines, he also declined referral to physical therapy or orthopedist.  Did encourage to increase Tylenol arthritis to 2 tablets at a time and up to 3 times a day.  Can use topicals such as Biofreeze freeze with lidocaine as well         Return in about 3 months (around 2/5/2020).      AVS given with handouts appropriate to diagnoses addressed included exercises for shoulders, hips, and knees as well as appropriate acetaminophen use.

## 2019-11-06 ENCOUNTER — APPOINTMENT (OUTPATIENT)
Dept: PULMONOLOGY | Facility: CLINIC | Age: 69
End: 2019-11-06
Payer: MEDICARE

## 2019-11-06 VITALS
HEIGHT: 68 IN | SYSTOLIC BLOOD PRESSURE: 113 MMHG | RESPIRATION RATE: 12 BRPM | HEART RATE: 88 BPM | BODY MASS INDEX: 33.34 KG/M2 | DIASTOLIC BLOOD PRESSURE: 73 MMHG | OXYGEN SATURATION: 92 % | WEIGHT: 220 LBS

## 2019-11-06 PROCEDURE — 94060 EVALUATION OF WHEEZING: CPT

## 2019-11-06 PROCEDURE — 99205 OFFICE O/P NEW HI 60 MIN: CPT | Mod: 25

## 2019-11-06 PROCEDURE — 94664 DEMO&/EVAL PT USE INHALER: CPT | Mod: 59

## 2019-11-06 PROCEDURE — 94729 DIFFUSING CAPACITY: CPT

## 2019-11-06 PROCEDURE — 94726 PLETHYSMOGRAPHY LUNG VOLUMES: CPT

## 2019-11-06 PROCEDURE — 94750: CPT

## 2019-11-06 RX ORDER — CYCLOBENZAPRINE HYDROCHLORIDE 5 MG/1
5 TABLET, FILM COATED ORAL 3 TIMES DAILY
Refills: 0 | Status: DISCONTINUED | COMMUNITY
Start: 2019-04-16 | End: 2019-11-06

## 2019-11-06 NOTE — CONSULT LETTER
[FreeTextEntry1] : Dear ,\par \par I had the pleasure of evaluating your patient, DACIA NAVARRO today in pulmonary consultation.  Please refer to my attached note for my findings and recommendations.\par \par \par Thank you for allowing me to participate in the care of your patient, please feel free to call with any questions or concerns.\par \par \par Sincerely,\par \par Ashley Bear MD\par [facility] \par [facility]\par \par

## 2019-11-06 NOTE — PHYSICAL EXAM
[Well Groomed] : well groomed [General Appearance - In No Acute Distress] : no acute distress [Normal Oropharynx] : normal oropharynx [II] : II [Neck Appearance] : the appearance of the neck was normal [] : no respiratory distress [Respiration, Rhythm And Depth] : normal respiratory rhythm and effort [Exaggerated Use Of Accessory Muscles For Inspiration] : no accessory muscle use [Nail Clubbing] : no clubbing of the fingernails [Cyanosis, Localized] : no localized cyanosis

## 2019-11-06 NOTE — HISTORY OF PRESENT ILLNESS
[FreeTextEntry1] : 69M with chronic back pain, hydrocephalus s/p  shunt, htn, anxiety, depression.  Has been going for ketamine injections for chronic pain/depression, during sessions was told oxygen was low.  Was told he should be evaluated for GULSHAN.  Reports snoring, no waking up gasping/choking.  Uses ambien and trazodone to sleep.  Sleeps about 8hrs/night.  Not waking up refreshed, no headaches.  No falling asleep inappropriately during the day.  Does not drive. \par \par Never smoker\par worked as a /--no toxic exposures\par fam hx: reviewed, father-lung cancer, mom-dm

## 2019-11-07 DIAGNOSIS — I10 ESSENTIAL HYPERTENSION: ICD-10-CM

## 2019-11-08 RX ORDER — LISINOPRIL 20 MG/1
40 TABLET ORAL DAILY
Qty: 180 TABLET | Refills: 3 | Status: SHIPPED | OUTPATIENT
Start: 2019-11-08 | End: 2020-02-17 | Stop reason: SDUPTHER

## 2019-11-13 ENCOUNTER — APPOINTMENT (OUTPATIENT)
Dept: INTERNAL MEDICINE | Facility: CLINIC | Age: 69
End: 2019-11-13
Payer: MEDICARE

## 2019-11-13 VITALS
DIASTOLIC BLOOD PRESSURE: 83 MMHG | WEIGHT: 220 LBS | HEART RATE: 108 BPM | BODY MASS INDEX: 33.34 KG/M2 | SYSTOLIC BLOOD PRESSURE: 158 MMHG | TEMPERATURE: 98.5 F | HEIGHT: 68 IN

## 2019-11-13 PROCEDURE — 99213 OFFICE O/P EST LOW 20 MIN: CPT

## 2019-11-13 NOTE — HISTORY OF PRESENT ILLNESS
[Friend] : friend [de-identified] : presents for f/u of chronic back pain, has been seeing Dr. Young (pain management) \par has received several ketamine infusions with no relief, d/w Dr. Florence- discontinued but to no relief.\par has been going to PT\par \par now having involuntary moaning intermittently while in pain \par was seen by Dr. Bhandari (psych), recently added gabapentin.  has been taking for last week with no major relief but is walking well with cane and no needing wheelchair today.  \par \par s/p fall last Friday while getting out of bed\par

## 2019-11-13 NOTE — ASSESSMENT
[FreeTextEntry1] : htn, stable on amlodipine (recently changed from verapamil due to possibility of less depressive state)\par -cont amlodipine as directed \par \par -advised in length that he needs to follow-up with pain management that he hasn’t seen before as he has seen multiple in the surrounding area and either has been fired or he doesn’t like them\par -advised it would be best to stop valium as can be giving him amnesia \par -expressed that the better option would be to titrate back the dilaudid and xanax as he seemed to get the best results such as ability to ambulate, less depressed/anxiety, improved mood and less amount of extremity pain from that combination without any major side effects.  discussed that he should start with BID and work up to his normal dosing to prevent overdose.  \par -discussed in length goals of care and realistic pain control given his extensive spinal history including injury and multiple surgeries with revision.  \par \par f/u in 3 months \par

## 2019-11-13 NOTE — PHYSICAL EXAM
[Normal] : no rash [Normal Affect] : the affect was normal [Normal Mood] : the mood was normal [de-identified] : mild distress upon position change and ambulating. not using wheelchair today  [de-identified] : mild spinal tenderness and para spinal region bilaterally.  wound healed well.  no discharge, surrounding erythema or red dtreaking

## 2019-11-18 ENCOUNTER — FORM ENCOUNTER (OUTPATIENT)
Age: 69
End: 2019-11-18

## 2019-11-19 ENCOUNTER — APPOINTMENT (OUTPATIENT)
Dept: PULMONOLOGY | Facility: CLINIC | Age: 69
End: 2019-11-19
Payer: MEDICARE

## 2019-11-19 PROCEDURE — 95800 SLP STDY UNATTENDED: CPT

## 2019-11-20 PROCEDURE — 95800 SLP STDY UNATTENDED: CPT

## 2019-11-26 NOTE — DISCHARGE NOTE NURSING/CASE MANAGEMENT/SOCIAL WORK - HAS THE PATIENT RECEIVED THE INFLUENZA VACCINE THIS SEASON?
Subjective:      Magdiel Norwood is a 30 y.o. female who presents for follow up of  anxiety disorder, panic attacks. She denies current suicidal and homicidal ideation. Allergies:   No Known Allergies   Medications:     Current Outpatient Medications   Medication Sig    metFORMIN (GLUCOPHAGE) 500 mg tablet Take  by mouth two (2) times daily (with meals).  escitalopram oxalate (LEXAPRO) 10 mg tablet TAKE 1 TABLET BY MOUTH DAILY    ondansetron (ZOFRAN ODT) 8 mg disintegrating tablet Take 1 Tab by mouth every eight (8) hours as needed for Nausea.  TRINESSA, 28, 0.18/0.215/0.25 mg-35 mcg (28) tab Take 1 Tab by mouth daily.  minocycline (MINOCIN, DYNACIN) 50 mg capsule     tretinoin (RETIN-A) 0.05 % topical cream      No current facility-administered medications for this visit. Review of Systems  Pertinent items are noted in HPI. Objective:     Visit Vitals  Pulse 66   Temp 97.8 °F (36.6 °C) (Oral)   Resp 18   Ht 5' 4\" (1.626 m)   Wt 148 lb 9.6 oz (67.4 kg)   LMP 11/21/2019   SpO2 98%   BMI 25.51 kg/m²        General:  alert, cooperative, no distress, appears stated age   Lungs: clear to auscultation bilaterally   Heart:  regular rate and rhythm, S1, S2 normal, no murmur, click, rub or gallop            Assessment/ Plan:     Encounter Diagnoses   Name Primary?  Anxiety Yes    Panic attacks      Orders Placed This Encounter    metFORMIN (GLUCOPHAGE) 500 mg tablet    escitalopram oxalate (LEXAPRO) 10 mg tablet     Continue same medication no change return to care in 6 months. Patient Education:  Reviewed concept of anxiety as biochemical imbalance of neurotransmitters and rationale for treatment. Instructed patient to contact office or on-call physician promptly should condition worsen or any new symptoms appear and provided on-call telephone numbers.   IF THE PATIENT HAS ANY SUICIDAL OR HOMICIDAL IDEATION, CALL THE OFFICE, DISCUSS WITH A SUPPORT MEMBER OR GO TO THE ER IMMEDIATELY- pt said they would. yes...

## 2019-12-04 ENCOUNTER — APPOINTMENT (OUTPATIENT)
Dept: PULMONOLOGY | Facility: CLINIC | Age: 69
End: 2019-12-04
Payer: MEDICARE

## 2019-12-04 VITALS
OXYGEN SATURATION: 94 % | RESPIRATION RATE: 14 BRPM | SYSTOLIC BLOOD PRESSURE: 136 MMHG | DIASTOLIC BLOOD PRESSURE: 81 MMHG | HEIGHT: 68 IN | HEART RATE: 109 BPM

## 2019-12-04 PROCEDURE — 99212 OFFICE O/P EST SF 10 MIN: CPT

## 2019-12-04 NOTE — ASSESSMENT
[FreeTextEntry1] : will initiate auto cpap with nasal pillow mask\par fu after 1 month of use\par \par discussed with pt and aid

## 2019-12-04 NOTE — PHYSICAL EXAM
[Well Groomed] : well groomed [Respiration, Rhythm And Depth] : normal respiratory rhythm and effort [General Appearance - In No Acute Distress] : no acute distress [] : no respiratory distress [Exaggerated Use Of Accessory Muscles For Inspiration] : no accessory muscle use

## 2019-12-08 ENCOUNTER — EMERGENCY (EMERGENCY)
Facility: HOSPITAL | Age: 69
LOS: 1 days | Discharge: HOME | End: 2019-12-08
Attending: EMERGENCY MEDICINE
Payer: MEDICARE

## 2019-12-08 VITALS
RESPIRATION RATE: 16 BRPM | HEIGHT: 68 IN | OXYGEN SATURATION: 96 % | WEIGHT: 214.95 LBS | DIASTOLIC BLOOD PRESSURE: 89 MMHG | SYSTOLIC BLOOD PRESSURE: 156 MMHG | HEART RATE: 88 BPM | TEMPERATURE: 98 F

## 2019-12-08 VITALS
DIASTOLIC BLOOD PRESSURE: 78 MMHG | HEART RATE: 72 BPM | OXYGEN SATURATION: 98 % | SYSTOLIC BLOOD PRESSURE: 118 MMHG | TEMPERATURE: 99 F | RESPIRATION RATE: 17 BRPM

## 2019-12-08 PROCEDURE — 74018 RADEX ABDOMEN 1 VIEW: CPT

## 2019-12-08 PROCEDURE — 70450 CT HEAD/BRAIN W/O DYE: CPT | Mod: 26

## 2019-12-08 PROCEDURE — 93010 ELECTROCARDIOGRAM REPORT: CPT

## 2019-12-08 PROCEDURE — 71045 X-RAY EXAM CHEST 1 VIEW: CPT | Mod: 26

## 2019-12-08 PROCEDURE — 71045 X-RAY EXAM CHEST 1 VIEW: CPT

## 2019-12-08 PROCEDURE — 74018 RADEX ABDOMEN 1 VIEW: CPT | Mod: 26

## 2019-12-08 PROCEDURE — 70450 CT HEAD/BRAIN W/O DYE: CPT

## 2019-12-08 PROCEDURE — 99284 EMERGENCY DEPT VISIT MOD MDM: CPT

## 2019-12-08 PROCEDURE — 70250 X-RAY EXAM OF SKULL: CPT | Mod: 26

## 2019-12-08 PROCEDURE — 99284 EMERGENCY DEPT VISIT MOD MDM: CPT | Mod: 25

## 2019-12-08 PROCEDURE — 93005 ELECTROCARDIOGRAM TRACING: CPT

## 2019-12-08 PROCEDURE — 70250 X-RAY EXAM OF SKULL: CPT

## 2019-12-08 NOTE — ED PROVIDER NOTE - NS ED ROS FT
GENERAL: No fever, chills  EYES: no vision changes, no discharge.   HEENT: no difficulty swallowing or speaking   CARDIAC: no chest pain/pressure, SOB, lower ex edema  PULMONARY: no cough, SOB  GI: no abdominal pain, n/v/d/c  : no dysuria, frequency, hematuria  SKIN: no rashes, lesions, vesicles  NEURO: no headache, lightheadedness, paraesthesias.   MSK:+ chornic back pain.  no weakness.

## 2019-12-08 NOTE — ED PROVIDER NOTE - CARE PROVIDER_API CALL
[facility],   Call [phone]  Phone: (   )    -  Fax: (   )    -  Follow Up Time:     [place] [facility],   [place]  Call [phone]  Phone: (   )    -  Fax: (   )    -  Follow Up Time:

## 2019-12-08 NOTE — ED ADULT NURSE NOTE - NSIMPLEMENTINTERV_GEN_ALL_ED
Implemented All Fall with Harm Risk Interventions:  [place]onda to call system. Call bell, personal items and telephone within reach. Instruct patient to call for assistance. Room bathroom lighting operational. Non-slip footwear when patient is off stretcher. Physically safe environment: no spills, clutter or unnecessary equipment. Stretcher in lowest position, wheels locked, appropriate side rails in place. Provide visual cue, wrist band, yellow gown, etc. Monitor gait and stability. Monitor for mental status changes and reorient to person, place, and time. Review medications for side effects contributing to fall risk. Reinforce activity limits and safety measures with patient and family. Provide visual clues: red socks.

## 2019-12-08 NOTE — ED ADULT NURSE NOTE - OBJECTIVE STATEMENT
pt via ems pt was found on floor by roomate pt states he had dizziness pt states recent b/p med change pt states worse on ambulating lightheaded pt denies any fall last ate 6pm last night pt on gabapentin and THC and cbd with tylenol and motrin for back pain pt last surgical intervention 6/4 revisiopn L1-L5 pt alert drowsy verbal on arrival pt oriented x 2 on arrival pt states pain 7 out of 10 on arrival to back pt accompanied to er with friend giving information pending furter orders at this time pt via ems pt was found on floor by roomate pt states he had dizziness pt states recent b/p med change pt states worse on ambulating lightheaded pt denies any fall last ate 6pm last night pt on gabapentin and THC and cbd with tylenol and motrin for back pain pt last surgical intervention 6/4 revisiopn L1-L5 pt alert drowsy verbal on arrival pt oriented x 2 on arrival pt states pain 7 out of 10 on arrival to back pt accompanied to er with friend giving information pending further orders at this time

## 2019-12-08 NOTE — ED PROVIDER NOTE - OBJECTIVE STATEMENT
70 yo male with chronic back pain sp several lumbar laminectomy, fusions and revisions, on gabapentin and THC for pain, NPH with  shunt, BIBEMS after being found on the floor by caretaker. Caretaker states pt had bad back pain last night, could not sleep, so he administered 2 doses of THC to help patient sleep. Pt endorse that he was trying to go to the bathroom this AM, tried to get out of bed with bedrail up and landed on his buttock, denies hitting head or LOC, no chest pain, sob, palpitations, abd pain or dysuria, fever, chills. In the ED, pt is noted to appear comfortable, legs crossed sleeping in bed. pt is having mild difficulty staying awake, falling asleep mid sentence, but easily awakes to voice.

## 2019-12-08 NOTE — ED PROVIDER NOTE - PRO INTERPRETER NEED 2
Problem: Patient/Family Goals  Goal: Patient/Family Long Term Goal  Patient's Long Term Goal: go home    Interventions:   Follow MD orders  Assess for signs of infection  - See additional Care Plan goals for specific interventions    Outcome: Not Progressin activity based on assessment  - Modify environment to reduce risk of injury  - Provide assistive devices as appropriate  - Consider OT/PT consult to assist with strengthening/mobility  - Encourage toileting schedule   Outcome: Progressing      Problem: Adam Giles English

## 2019-12-08 NOTE — ED PROVIDER NOTE - CLINICAL SUMMARY MEDICAL DECISION MAKING FREE TEXT BOX
elderly male with NPH  shunt, multiple lumbar back surgeries on gabapentin and THC for pain management, was found on the floor after trying to go to the bathroom. Pt well appearing, complains of mild dizziness, able to ambulate without assistance with cane.  shunt series to r/o obstruction. Vitals stable. Most likely mechanical fall from moving out of bed with bedrail up. Low suspicion for medical etiology of fall. Reassess for dispo.

## 2019-12-08 NOTE — ED PROVIDER NOTE - NSFOLLOWUPINSTRUCTIONS_ED_ALL_ED_FT
You were seen in the Emergency Department (ED) for fall. You were found to have Your CT and shunt series was negative.     Continue to take your medications as prescribed. Please follow up with your primary care doctor in the next 72 hours. Pain management information attached if you with to follow up with a new provider.     Please return to the ED if you experience any new or concerning symptoms, such as: worsening back pain, unable to move or feel part of your body, fever, chills, chest pain, difficulty breathing, passing out, unable to eat or drink,     Thank you for choosing a [facility] ED.

## 2019-12-08 NOTE — ED PROVIDER NOTE - ATTENDING CONTRIBUTION TO CARE
Patient on gabapentin and THC for chronic pain management, presenting with difficulty walking after extra THC at home for pain overnight.  Reportedly had difficulty walking after medications so lowered himself to floor versus fall on buttocks (unclear).  History of prior  shunt, multiple prior back surgeries.  No head trauma reported.    A 14 point review of systems is negative except as in HPI or otherwise documented.    Exam:  General: Patient well appearing, sleepy but arousable vital signs within normal limits  HEENT: airway patent with moist mucous membranes  Cardiac: RRR S1/S2 with strong peripheral pulses  Respiratory: lungs clear without respiratory distress  GI: abdomen soft, non tender, non distended  Neuro: no gross neurologic deficits, CN II-XII intact, normal strength, sensation coordination, ambulatory in Emergency Department on own power  Skin: warm, well perfused  Psych: normal mood and affect    Patient with increased lethargy after extra home THC, possibly related to this, no obvious evidence of focal neurologic dysfunction on initial evaluation, no significant traumatic injuries appreciated, afebrile with normal vital signs so infection less likely.  Will obtain CT head and shuntogram to r/o shunt malfunction, monitor in Emergency Department for improvement with metabolization of meds.

## 2019-12-08 NOTE — ED PROVIDER NOTE - PROGRESS NOTE DETAILS
Lizeth Grace M.D. Resident  The patient was re-examined and is feeling better, more awake. Chronic back pain still present, not worse than usual. Vitals signs within acceptable limits, pt is at baseline mental status, ambulating, tolerating PO. Patient was given verbal and written discharge instructions and return precautions, and a hard copy of all results from tests completed in the ED. Pt verbalized understanding and agreed to outpatient follow up with PMD. Referral list / Clinic for pain management provided.

## 2019-12-08 NOTE — ED PROVIDER NOTE - PATIENT PORTAL LINK FT
You can access the FollowMyHealth Patient Portal offered by [facility] by registering at the following website: [email or web address]. By joining [facility]’s FollowMyHealth portal, you will also be able to view your health information using other applications (apps) compatible with our system.

## 2019-12-08 NOTE — ED PROVIDER NOTE - PROVIDER TOKENS
FREE:[LAST:[[facility]],PHONE:[(   )    -],FAX:[(   )    -],ADDRESS:[Call [phone]]],FREE:[LAST:[[place] [facility]],PHONE:[(   )    -],FAX:[(   )    -],ADDRESS:[[place] [facility]  Call [phone]]]

## 2019-12-08 NOTE — ED PROVIDER NOTE - PHYSICAL EXAMINATION
General: Patient sleepy, not lethargic, NAD.   HEENT: normocephalic, atraumatic, EOMI, no scleral icterus.   Cardiac: RRR, S1, S2, no murmur, rubs, gallop.   LUNGS: CTA B/L no wheeze, rhonchi, rales.   Abdomen: soft NT, ND, no rebound no guarding.  EXT: Moving all extremities, no edema.   BACK: + chronic midline lumbar tenderness to palpation. no midline cervical or thoracic tendenress.    Neuro: A&Ox3, gait normal, no focal neurological deficits, CN 2-12 intact  Skin: warm, dry, no rash, no lesions

## 2019-12-31 ENCOUNTER — APPOINTMENT (OUTPATIENT)
Dept: INTERNAL MEDICINE | Facility: CLINIC | Age: 69
End: 2019-12-31
Payer: MEDICARE

## 2019-12-31 VITALS
HEART RATE: 99 BPM | BODY MASS INDEX: 33.34 KG/M2 | DIASTOLIC BLOOD PRESSURE: 89 MMHG | HEIGHT: 68 IN | SYSTOLIC BLOOD PRESSURE: 150 MMHG | WEIGHT: 220 LBS

## 2019-12-31 DIAGNOSIS — R40.0 SOMNOLENCE: ICD-10-CM

## 2019-12-31 PROCEDURE — 99213 OFFICE O/P EST LOW 20 MIN: CPT | Mod: 25

## 2019-12-31 PROCEDURE — 36415 COLL VENOUS BLD VENIPUNCTURE: CPT

## 2019-12-31 RX ORDER — TRAZODONE HYDROCHLORIDE 100 MG/1
100 TABLET ORAL
Qty: 60 | Refills: 0 | Status: ACTIVE | COMMUNITY

## 2019-12-31 NOTE — PHYSICAL EXAM
[Normal] : no rash [Normal Affect] : the affect was normal [Normal Mood] : the mood was normal [de-identified] : mild distress upon position change and ambulating. not using wheelchair today  [de-identified] : mild spinal tenderness and para spinal region bilaterally.  wound healed well.  no discharge, surrounding erythema or red dtreaking

## 2019-12-31 NOTE — ASSESSMENT
[FreeTextEntry1] : htn, stable on amlodipine (recently changed from verapamil due to possibility of less depressive state)\par -cont amlodipine as directed \par \par chronic back pain, s/p multiple surgeries on multiple sedative meds and states is sleeping much of the time\par -advised to stop aspirin, decrease xanax dosing and d'c THC may continue CBD\par -f/u with pain management as directed \par -advised in length that he needs to follow-up with pain management that he hasn’t seen before as he has seen multiple in the surrounding area and either has been fired or he doesn’t like them\par -advised it would be best to stop valium as can be giving him amnesia \par -expressed that the better option would be to titrate back the dilaudid and xanax as he seemed to get the best results such as ability to ambulate, less depressed/anxiety, improved mood and less amount of extremity pain from that combination without any major side effects.  discussed that he should start with BID and work up to his normal dosing to prevent overdose.  \par -discussed in length goals of care and realistic pain control given his extensive spinal history including injury and multiple surgeries with revision.  \par \par cellulitis \par -Risks and benefits of antibiotics explained in detail, Sometimes the antibiotics that you are taking to kill the disease causing bacteria also kill some of the normal bacteria in your intestinal tract (and/or in your vagina, if female). Buying and taking a "probiotic" may be helpful in replacing some of these normal bacteria.\par -warm compress\par \par f/u in 3 months \par

## 2019-12-31 NOTE — HISTORY OF PRESENT ILLNESS
[de-identified] : here for follow-up of HTN and ED visit following syncopal episode \par  shunt was checked-normal \par \par \par currently takes xanax QID, trazadone 400mg qhs, ambien 10mg qhs, thc/cbd oil, gabapentin 300mg TID.  aspirin 4 tabs and naproxen at times \par states feels depressed and sleeping most of the day due to the pain.  in one breath states unable to stand for prolonged period of time, but then seen walking without any issue when friend was using the restroom.\par \par patient and friend are taking combination of medications to help with pain and depressed mood with no relieve.  they continue to discuss pain is not controlled and ask what can be done.  we have had this discussion several times this year.  \par has f/u with pain management in [facility], Dr. Park\par acupuncturist next month \par \par h/o GULSHAN, now using mask with improvement of snoring and lethargy as per friend \par

## 2020-01-02 LAB
ALBUMIN SERPL ELPH-MCNC: 4.8 G/DL
ALP BLD-CCNC: 78 U/L
ALT SERPL-CCNC: 10 U/L
ANION GAP SERPL CALC-SCNC: 14 MMOL/L
AST SERPL-CCNC: 13 U/L
BASOPHILS # BLD AUTO: 0.05 K/UL
BASOPHILS NFR BLD AUTO: 0.5 %
BILIRUB DIRECT SERPL-MCNC: 0.1 MG/DL
BILIRUB INDIRECT SERPL-MCNC: 0.3 MG/DL
BILIRUB SERPL-MCNC: 0.4 MG/DL
BUN SERPL-MCNC: 13 MG/DL
CALCIUM SERPL-MCNC: 9.9 MG/DL
CHLORIDE SERPL-SCNC: 102 MMOL/L
CO2 SERPL-SCNC: 27 MMOL/L
CREAT SERPL-MCNC: 1.09 MG/DL
EOSINOPHIL # BLD AUTO: 0.11 K/UL
EOSINOPHIL NFR BLD AUTO: 1 %
FERRITIN SERPL-MCNC: 13 NG/ML
FOLATE SERPL-MCNC: 11.5 NG/ML
GLUCOSE SERPL-MCNC: 128 MG/DL
HCT VFR BLD CALC: 40.3 %
HGB BLD-MCNC: 12.7 G/DL
IMM GRANULOCYTES NFR BLD AUTO: 0.7 %
IRON SATN MFR SERPL: 9 %
IRON SERPL-MCNC: 36 UG/DL
LYMPHOCYTES # BLD AUTO: 1.34 K/UL
LYMPHOCYTES NFR BLD AUTO: 12.5 %
MAN DIFF?: NORMAL
MCHC RBC-ENTMCNC: 26.4 PG
MCHC RBC-ENTMCNC: 31.5 GM/DL
MCV RBC AUTO: 83.8 FL
MONOCYTES # BLD AUTO: 0.96 K/UL
MONOCYTES NFR BLD AUTO: 8.9 %
NEUTROPHILS # BLD AUTO: 8.2 K/UL
NEUTROPHILS NFR BLD AUTO: 76.4 %
PLATELET # BLD AUTO: 342 K/UL
POTASSIUM SERPL-SCNC: 3.8 MMOL/L
PROT SERPL-MCNC: 7.4 G/DL
RBC # BLD: 4.81 M/UL
RBC # FLD: 15.4 %
SODIUM SERPL-SCNC: 143 MMOL/L
TIBC SERPL-MCNC: 411 UG/DL
TSH SERPL-ACNC: 1.28 UIU/ML
UIBC SERPL-MCNC: 375 UG/DL
VIT B12 SERPL-MCNC: 1005 PG/ML
WBC # FLD AUTO: 10.73 K/UL

## 2020-01-21 ENCOUNTER — APPOINTMENT (OUTPATIENT)
Dept: PULMONOLOGY | Facility: CLINIC | Age: 70
End: 2020-01-21
Payer: MEDICARE

## 2020-01-21 VITALS — OXYGEN SATURATION: 94 % | HEART RATE: 98 BPM | SYSTOLIC BLOOD PRESSURE: 126 MMHG | DIASTOLIC BLOOD PRESSURE: 79 MMHG

## 2020-01-21 PROCEDURE — 99213 OFFICE O/P EST LOW 20 MIN: CPT | Mod: PD

## 2020-01-21 NOTE — HISTORY OF PRESENT ILLNESS
[FreeTextEntry1] : using cpap\par 30/30 nights\par 9.5 hrs/night\par pressure ~7\par ahi 1.3\par no complaints\par ESS 1

## 2020-01-21 NOTE — PHYSICAL EXAM
[General Appearance - In No Acute Distress] : no acute distress [Well Groomed] : well groomed [] : no respiratory distress [Exaggerated Use Of Accessory Muscles For Inspiration] : no accessory muscle use [Respiration, Rhythm And Depth] : normal respiratory rhythm and effort [Nail Clubbing] : no clubbing of the fingernails [Cyanosis, Localized] : no localized cyanosis

## 2020-01-22 ENCOUNTER — INPATIENT (INPATIENT)
Facility: HOSPITAL | Age: 70
LOS: 11 days | Discharge: HOME | DRG: 460 | End: 2020-02-03
Attending: NEUROLOGICAL SURGERY | Admitting: HOSPITALIST
Payer: MEDICARE

## 2020-01-22 VITALS
TEMPERATURE: 98 F | OXYGEN SATURATION: 99 % | RESPIRATION RATE: 16 BRPM | WEIGHT: 210.1 LBS | SYSTOLIC BLOOD PRESSURE: 156 MMHG | HEART RATE: 82 BPM | HEIGHT: 68 IN | DIASTOLIC BLOOD PRESSURE: 85 MMHG

## 2020-01-22 DIAGNOSIS — M54.32 SCIATICA, LEFT SIDE: ICD-10-CM

## 2020-01-22 LAB
ALBUMIN SERPL ELPH-MCNC: 4.1 G/DL — SIGNIFICANT CHANGE UP (ref 3.3–5)
ALP SERPL-CCNC: 71 U/L — SIGNIFICANT CHANGE UP (ref 40–120)
ALT FLD-CCNC: 7 U/L — LOW (ref 10–45)
ANION GAP SERPL CALC-SCNC: 15 MMOL/L — SIGNIFICANT CHANGE UP (ref 5–17)
APTT BLD: 33.5 SEC — SIGNIFICANT CHANGE UP (ref 27.5–36.3)
AST SERPL-CCNC: 8 U/L — LOW (ref 10–40)
BASOPHILS # BLD AUTO: 0.08 K/UL — SIGNIFICANT CHANGE UP (ref 0–0.2)
BASOPHILS NFR BLD AUTO: 0.8 % — SIGNIFICANT CHANGE UP (ref 0–2)
BILIRUB SERPL-MCNC: 0.4 MG/DL — SIGNIFICANT CHANGE UP (ref 0.2–1.2)
BUN SERPL-MCNC: 14 MG/DL — SIGNIFICANT CHANGE UP (ref 7–23)
CALCIUM SERPL-MCNC: 9.3 MG/DL — SIGNIFICANT CHANGE UP (ref 8.4–10.5)
CHLORIDE SERPL-SCNC: 100 MMOL/L — SIGNIFICANT CHANGE UP (ref 96–108)
CO2 SERPL-SCNC: 26 MMOL/L — SIGNIFICANT CHANGE UP (ref 22–31)
CREAT SERPL-MCNC: 1.39 MG/DL — HIGH (ref 0.5–1.3)
EOSINOPHIL # BLD AUTO: 0.25 K/UL — SIGNIFICANT CHANGE UP (ref 0–0.5)
EOSINOPHIL NFR BLD AUTO: 2.4 % — SIGNIFICANT CHANGE UP (ref 0–6)
GLUCOSE SERPL-MCNC: 79 MG/DL — SIGNIFICANT CHANGE UP (ref 70–99)
HCT VFR BLD CALC: 39.7 % — SIGNIFICANT CHANGE UP (ref 39–50)
HGB BLD-MCNC: 12.2 G/DL — LOW (ref 13–17)
IMM GRANULOCYTES NFR BLD AUTO: 0.9 % — SIGNIFICANT CHANGE UP (ref 0–1.5)
INR BLD: 1.12 RATIO — SIGNIFICANT CHANGE UP (ref 0.88–1.16)
LYMPHOCYTES # BLD AUTO: 2.32 K/UL — SIGNIFICANT CHANGE UP (ref 1–3.3)
LYMPHOCYTES # BLD AUTO: 22.2 % — SIGNIFICANT CHANGE UP (ref 13–44)
MCHC RBC-ENTMCNC: 25.3 PG — LOW (ref 27–34)
MCHC RBC-ENTMCNC: 30.7 GM/DL — LOW (ref 32–36)
MCV RBC AUTO: 82.2 FL — SIGNIFICANT CHANGE UP (ref 80–100)
MONOCYTES # BLD AUTO: 1.06 K/UL — HIGH (ref 0–0.9)
MONOCYTES NFR BLD AUTO: 10.2 % — SIGNIFICANT CHANGE UP (ref 2–14)
NEUTROPHILS # BLD AUTO: 6.64 K/UL — SIGNIFICANT CHANGE UP (ref 1.8–7.4)
NEUTROPHILS NFR BLD AUTO: 63.5 % — SIGNIFICANT CHANGE UP (ref 43–77)
NRBC # BLD: 0 /100 WBCS — SIGNIFICANT CHANGE UP (ref 0–0)
PLATELET # BLD AUTO: 319 K/UL — SIGNIFICANT CHANGE UP (ref 150–400)
POTASSIUM SERPL-MCNC: 3.2 MMOL/L — LOW (ref 3.5–5.3)
POTASSIUM SERPL-SCNC: 3.2 MMOL/L — LOW (ref 3.5–5.3)
PROT SERPL-MCNC: 7.1 G/DL — SIGNIFICANT CHANGE UP (ref 6–8.3)
PROTHROM AB SERPL-ACNC: 12.9 SEC — SIGNIFICANT CHANGE UP (ref 10–12.9)
RBC # BLD: 4.83 M/UL — SIGNIFICANT CHANGE UP (ref 4.2–5.8)
RBC # FLD: 15 % — HIGH (ref 10.3–14.5)
SODIUM SERPL-SCNC: 141 MMOL/L — SIGNIFICANT CHANGE UP (ref 135–145)
WBC # BLD: 10.44 K/UL — SIGNIFICANT CHANGE UP (ref 3.8–10.5)
WBC # FLD AUTO: 10.44 K/UL — SIGNIFICANT CHANGE UP (ref 3.8–10.5)

## 2020-01-22 PROCEDURE — 99285 EMERGENCY DEPT VISIT HI MDM: CPT

## 2020-01-22 PROCEDURE — 72131 CT LUMBAR SPINE W/O DYE: CPT | Mod: 26

## 2020-01-22 PROCEDURE — 99223 1ST HOSP IP/OBS HIGH 75: CPT

## 2020-01-22 PROCEDURE — 72128 CT CHEST SPINE W/O DYE: CPT | Mod: 26

## 2020-01-22 PROCEDURE — 72100 X-RAY EXAM L-S SPINE 2/3 VWS: CPT | Mod: 26

## 2020-01-22 PROCEDURE — 72170 X-RAY EXAM OF PELVIS: CPT | Mod: 26

## 2020-01-22 RX ORDER — DIAZEPAM 5 MG
5 TABLET ORAL ONCE
Refills: 0 | Status: DISCONTINUED | OUTPATIENT
Start: 2020-01-22 | End: 2020-01-22

## 2020-01-22 RX ORDER — HYDROMORPHONE HYDROCHLORIDE 2 MG/ML
0.5 INJECTION INTRAMUSCULAR; INTRAVENOUS; SUBCUTANEOUS ONCE
Refills: 0 | Status: DISCONTINUED | OUTPATIENT
Start: 2020-01-22 | End: 2020-01-22

## 2020-01-22 RX ORDER — HYDROMORPHONE HYDROCHLORIDE 2 MG/ML
1 INJECTION INTRAMUSCULAR; INTRAVENOUS; SUBCUTANEOUS ONCE
Refills: 0 | Status: DISCONTINUED | OUTPATIENT
Start: 2020-01-22 | End: 2020-01-22

## 2020-01-22 RX ORDER — OXYCODONE HYDROCHLORIDE 5 MG/1
5 TABLET ORAL ONCE
Refills: 0 | Status: DISCONTINUED | OUTPATIENT
Start: 2020-01-22 | End: 2020-01-22

## 2020-01-22 RX ADMIN — OXYCODONE HYDROCHLORIDE 5 MILLIGRAM(S): 5 TABLET ORAL at 18:35

## 2020-01-22 RX ADMIN — OXYCODONE HYDROCHLORIDE 5 MILLIGRAM(S): 5 TABLET ORAL at 21:09

## 2020-01-22 RX ADMIN — HYDROMORPHONE HYDROCHLORIDE 0.5 MILLIGRAM(S): 2 INJECTION INTRAMUSCULAR; INTRAVENOUS; SUBCUTANEOUS at 21:13

## 2020-01-22 RX ADMIN — HYDROMORPHONE HYDROCHLORIDE 1 MILLIGRAM(S): 2 INJECTION INTRAMUSCULAR; INTRAVENOUS; SUBCUTANEOUS at 22:05

## 2020-01-22 RX ADMIN — Medication 5 MILLIGRAM(S): at 18:36

## 2020-01-22 NOTE — H&P ADULT - PROBLEM SELECTOR PLAN 1
[id number] Comprehensive -acute on chronic pain in setting of multiple prior Lumbar laminectomy/fusion surgeries. Currently no alarm sx; afeb, no point tenderness, no incontinence or saddle anesthesia, neurovascularly intact.   -XR pelvis without fx, XR L spine with noted chronic L1 deformity. No acute findings  -f/u read of pending CT L/T spine  -f/u spine/neurosx recs  -pain control: tylenol for mild, oxy IR for mod/severe. Dilaudid 0.5 q6 prn breakthrough. Avoid morphine for prior intolerance. If escalating requirements, suggest pain mgt eval.   -PT eval  -fall prec

## 2020-01-22 NOTE — H&P ADULT - NSHPREVIEWOFSYSTEMS_GEN_ALL_CORE
REVIEW OF SYSTEMS:  CONSTITUTIONAL: No weakness. No fevers. No chills. No weight loss. Good appetite.  EYES: No visual changes. No eye pain.  ENT: No hearing difficulty. No vertigo. No dysphagia. No Sinusitis/rhinorrhea.  NECK: No pain. No stiffness/rigidity.  CARDIAC: No chest pain. No palpitations.  RESPIRATORY: No cough. No SOB. No hemoptysis.  GASTROINTESTINAL: No abdominal pain. No nausea. No vomiting. No hematemesis. No diarrhea. No constipation. No melena. No hematochezia.  GENITOURINARY: No dysuria. No frequency. No hesitancy. No hematuria.  NEUROLOGICAL: No numbness. No focal weakness. No incontinence. No headache.  BACK: +back pain.  EXTREMITIES: No lower extremity edema. Full ROM.  SKIN: No rashes. No itching. No other lesions.  PSYCHIATRIC: No depression. No anxiety. No SI/HI.  ALLERGIC: No lip swelling. No hives.  All other review of systems is negative unless indicated above.

## 2020-01-22 NOTE — ED ADULT NURSE REASSESSMENT NOTE - NS ED NURSE REASSESS COMMENT FT1
Report received from TRUNG Chang. Upon reassessment pt resting quietly on stretcher in no distress. Neuro MD at bedside. Safety and comfort measures provided bed locked and in lowest position, side rails up for safety. Awaiting blood results at this time.

## 2020-01-22 NOTE — ED PROVIDER NOTE - OBJECTIVE STATEMENT
Attending note. Patient was seen in the fast track room #4. The patient was brought in by EMS from home complaining of acute on chronic left lower back pain with radiation to the left lateral thigh. Patient states pain became worse one week ago when he had some flooding in his bathroom.  He has a chronic left lower back pain with occasional pain to the left hip. He denies any chronic numbness or paresthesia. He has no numbness or paresthesia. He has chronic urinary problems for the last year and no new incontinence or retention. He denies any bowel incontinence or saddle anesthesia. He has no fevers. He denies any abdominal pain, hematuria, dysuria or frequency. Patient only takes over-the-counter medication for his back pain. Patient had his first lumbar surgery in 2009. He reports greater than 6 surgeries since that time, the last being in June of 2019. He also reports he fell out of bed while he was in the hospital at that time and had x-rays which she reports were unremarkable. He also has a history of a  shunt for normal pressure hydrocephalus. Attending note. Patient was seen in the fast track room #4. The patient was brought in by EMS from home complaining of acute on chronic left lower back pain with radiation to the left lateral thigh. Patient states pain became worse one week ago when he had some flooding in his bathroom.  He has a chronic left lower back pain with occasional pain to the left hip. He denies any chronic numbness or paresthesia. He has no numbness or paresthesia. He has chronic urinary problems for the last year and no new incontinence or retention. He denies any bowel incontinence or saddle anesthesia. He has no fevers. He denies any abdominal pain, hematuria, dysuria or frequency. Patient only takes over-the-counter medication for his back pain. Patient had his first lumbar surgery in 2009. He reports greater than 6 surgeries since that time, the last being in June of 2019. He also reports he fell out of bed while he was in the hospital at that time and had x-rays which she reports were unremarkable. He also has a history of a  shunt for normal pressure hydrocephalus.    PA note: 70 yo male with HTN, HLD, Anxiety/Depression, chronic back pain sp several lumbar laminectomy, fusions and revisions, NPH with  shunt presents to ED with left thigh pain and back pain since 1/15/2020. Patient states that he was cleaning the bathroom floor and has been in pain since. Although patient states that his back has been an increased in pain since July 2019 after falling out of bed multiple times. Had xrays done at the time, without surgical intervention. Last spine surgery was 6/4/19 for L4-S1 fusion revision at [facility] Dr. Huynh. States that with reoccurring pain they have sent him to pain management for prescription medication. Patient does not follow up with practice at this time. Does not follow up with a pain specialist at this time, although states that he has seen a practitioner in [place] for Ketamine infusions. 12/2017 patient had  shut placed.  Patient has taken oxycodone, gabapentin, OTC NSAIDs/Tylenol for pain within the last few months without relief. Last time he took pain medication, only knows that it was OTC, was at 2pm. Lives at home, has assistance. Does not leave the house, only for Dr. carlos. Spends a lot of time in bed, walking around home. Ambulates with cane. Allergies: Morphine and Lidoderm patches Denies numbness, tingling, chest pain, SOB, change in urinary habits/falls with this episode of pain, fever

## 2020-01-22 NOTE — H&P ADULT - NSHPLABSRESULTS_GEN_ALL_CORE
Labs personally reviewed : no leukocytosis, chronic stable mild anemia, plt wnl, coags unrevealing, cmp with mild hypoK 3.2, JOSE MIGUEL scr 1.39 baseline 0.94, rest cmp unrevealing.   Imaging personally reviewed XR pelvis prelim no acute fx. XR lumbar spine with noted unchanged L1 deformity, no new compression deformities seen.  No EKG in chart.

## 2020-01-22 NOTE — H&P ADULT - PROBLEM SELECTOR PLAN 8
-pt unsure of meds or doses  -have approximated meds to best ability based on allscripts and sunrise meds from other sources tab.   -will email pharm techs to assist with med rec in am.

## 2020-01-22 NOTE — ED PROVIDER NOTE - CLINICAL SUMMARY MEDICAL DECISION MAKING FREE TEXT BOX
Attending note. Patient with multiple lumbar surgeries complaining of acute on chronic left lower back pain with radiation to the left lateral thigh. Analgesia, x-rays of lumbar spine and reassess.

## 2020-01-22 NOTE — H&P ADULT - NSHPPHYSICALEXAM_GEN_ALL_CORE
PHYSICAL EXAM:    Vital Signs Last 24 Hrs  T(C): 36.7 (22 Jan 2020 22:30), Max: 36.7 (22 Jan 2020 22:30)  T(F): 98 (22 Jan 2020 22:30), Max: 98 (22 Jan 2020 22:30)  HR: 58 (22 Jan 2020 23:50) (57 - 82)  BP: 127/77 (22 Jan 2020 23:50) (103/63 - 156/85)  BP(mean): --  RR: 18 (22 Jan 2020 23:50) (16 - 18)  SpO2: 94% (22 Jan 2020 23:50) (94% - 99%)    GENERAL: NAD, obese white male. appears stated age.   HEAD:  Atraumatic, Normocephalic  EYES: EOMI, PERRLA, conjunctiva and sclera clear  ENMT: No tonsillar erythema, exudates, or enlargement; Moist mucous membranes, Good dentition, No lesions  NECK: Supple, No JVD, Normal thyroid  CHEST/LUNG: Clear to percussion bilaterally; No rales, rhonchi, wheezing, or rubs no resp distress or accessory muscle usage.   HEART: Regular rate and rhythm; No murmurs, rubs, or gallops, no sig Le edema.   ABDOMEN: Soft, Nontender, Nondistended; Bowel sounds present, no rebound/guarding.   EXTREMITIES:  2+ Peripheral Pulses, No clubbing, cyanosis  LYMPH: No lymphadenopathy noted, no lymphangitis  SKIN: No rashes or lesions, no petechiae  NERVOUS SYSTEM:  Alert & Oriented X3, Good concentration; Motor Strength 5/5 B/L upper and lower extremities.  no facial droop, no dysarthria.  PSYCH: normal affect, no si no hi. Reinaldo Moe(Attending)

## 2020-01-22 NOTE — H&P ADULT - ASSESSMENT
69 M PMH chronic back pain s/p multiple lumbar laminectomy/fusion, NPH s/p  shunt, HTN, HLD, anxiety, depression, GULSHAN on CPAP, p/w worsening back pain.

## 2020-01-22 NOTE — ED PROVIDER NOTE - PHYSICAL EXAMINATION
Attending note. Patient is alert and moderate to severe pain. Patient reports his pain as "10/10". Patient is back reveals midline scar in the lumbar spine. There are no rashes lesions. There is no CVA tenderness. Abdomen is soft and nontender. Patient has some tenderness in the sciatic notch. There is no tenderness over the greater trochanter. Patient has pain with seated straight leg raise. Sensation is intact and nonfocal. DTRs are +1/4 equal symmetrical. There is no clonus or foot drop. Motor strength is 5/5. She has severe pain with movement of his left leg. There is no leg edema or calf tenderness.

## 2020-01-22 NOTE — H&P ADULT - PROBLEM SELECTOR PLAN 5
-c/w xanax 1 mg q6 hrs prn (confirmed on ISTOP and Allscripts), trazodone 200 qhs, celexa 40 qd  -ISTOP Reference #: [id number]

## 2020-01-22 NOTE — ED PROVIDER NOTE - NSCAREINITIATED _GEN_ER
Desiree Conner(PA) General Sunscreen Counseling: I recommended a broad spectrum sunscreen with a SPF of 30 or higher.  I explained that SPF 30 sunscreens block approximately 97 percent of the sun's harmful rays.  Sunscreens should be applied at least 15 minutes prior to expected sun exposure and then every 2 hours after that as long as sun exposure continues. If swimming or exercising sunscreen should be reapplied every 45 minutes to an hour after getting wet or sweating.  One ounce, or the equivalent of a shot glass full of sunscreen, is adequate to protect the skin not covered by a bathing suit. I also recommended a lip balm with a sunscreen as well. Sun protective clothing can be used in lieu of sunscreen but must be worn the entire time you are exposed to the sun's rays. Detail Level: Zone

## 2020-01-22 NOTE — ED PROVIDER NOTE - PROGRESS NOTE DETAILS
Spoke with NeuroSurg/Spine recommends CT L/T spine. Blood work ordered. Spoke with Hospitalist, will admit to [facility]. Will watch blood work results.  ED attending. - Desiree Conner PA-C

## 2020-01-22 NOTE — H&P ADULT - NSHPSOCIALHISTORY_GEN_ALL_CORE
Social History:    Marital Status:  (   )    (  x ) Single    (   )    (  )   Occupation: retired   Lives with: (x  ) alone  (  ) children   (  ) spouse   (  ) parents  (  ) other    Substance Use (street drugs): ( x ) never used  (  ) other:  Tobacco Usage:  (   x) never smoked   (   ) former smoker   (   ) current smoker  (     ) pack year  (        ) last cigarette date  Alcohol Usage: denies

## 2020-01-22 NOTE — H&P ADULT - PROBLEM SELECTOR PLAN 6
-hsq vte ppx  -pt offerred tamiflu renally dosed for PPX given his exposure to roomate who was dx with flu.  He is refusing, states he does not believe his roomate actually has the flu.  Importance of ppx reviewed and risks of forgoing ppx in documented exposures reviewed, patient again refusing.

## 2020-01-22 NOTE — H&P ADULT - HISTORY OF PRESENT ILLNESS
69 M PMH chronic back pain s/p multiple lumbar laminectomy/fusion, NPH s/p  shunt, HTN, HLD, anxiety, depression, GULSHAN on CPAP, p/w worsening back pain. 69 M PMH chronic back pain s/p multiple lumbar laminectomy/fusion, NPH s/p  shunt, HTN, HLD, anxiety, depression, GULSHAN on CPAP, p/w worsening back pain. Pt states that he has had months of worsening, intermittent back pain that was acutely worsened in last 1-2 weeks. States he was cleaning his bathroom when the pain acutely worsened; is mostly lumbar in nature with radiation down L buttocks and at times L thigh/knee.  No radiation of pain to toes.  NO numbness/tingling. No weakness.  Still ambulatory with cane/able to bear weight.  Pt states he felt better after his last lumbar laminectomy/fusion in 6/2019 with Dr. Meche Huynh at [facility], but only felt better for about a week before his pain returned.  Has had issues with opiates in past (passing out, ?overdose) and has followed with multiple pain mgt providers in past (per allscripts his PCP states that he was either fired from prior pain mgt doctors or lost to f/u with them because he didn't like them).  Few months ago he tried ketamine infusions with a doctor at [facility] but this did not help; more recently he has started gabapentin but he has since discontinued it due to lack of effect and uncomfortable sensation in both legs as if they were "going to explode."  Denies cp, sob, f/c, n/v/d, denies urinary retention or urinary/fecal incontinence, denies saddle anesthesia; notes unchanged urinary hesitancy likely related to bph.  Of note, he has a friend who is living with him since his last surgery, who pt states primarily helps him with his meds; pt is unable to recount all meds/doses at this time, call placed to friend unanswered. Pt notes he is not currently taking opiates and only OTC meds such as aspirin and naproxen; unk dose/freq of naproxen use, denies gib/melena/hematochezia or abd pain at the moment. Also, his friend who lives with him was seen at urgent care center today and diagnosed with flu and given Tamiflu; pt himself denies viral uri sx.      VS: 98.0, 57, 103/63, 18, 95% Ra.  Labs: no leukocytosis, chronic stable mild anemia, plt wnl, coags unrevealing, cmp with mild hypoK 3.2, JOSE MIGUEL scr 1.39 baseline 0.94, rest cmp unrevealing. XR pelvis prelim no acute fx. XR lumbar spine with noted unchanged L1 deformity, no new compression deformities seen. In ER pt received valium, dilaudid iv 0.5 x 2, dilaudid iv 1 x 1, and oxyIR prior to medicine team involvement.

## 2020-01-22 NOTE — H&P ADULT - PROBLEM SELECTOR PLAN 2
-mild Juan Miguel on admission; etio unclear but includes but not limited to dec Po intake vs. nsaid related  -strict avoidance of nsaids reviewed with pt  -nonoliguric  -trend bmp, avoid nephrotoxins

## 2020-01-23 DIAGNOSIS — I10 ESSENTIAL (PRIMARY) HYPERTENSION: ICD-10-CM

## 2020-01-23 DIAGNOSIS — M54.16 RADICULOPATHY, LUMBAR REGION: ICD-10-CM

## 2020-01-23 DIAGNOSIS — F41.9 ANXIETY DISORDER, UNSPECIFIED: ICD-10-CM

## 2020-01-23 DIAGNOSIS — E87.6 HYPOKALEMIA: ICD-10-CM

## 2020-01-23 DIAGNOSIS — Z29.9 ENCOUNTER FOR PROPHYLACTIC MEASURES, UNSPECIFIED: ICD-10-CM

## 2020-01-23 DIAGNOSIS — Z79.899 OTHER LONG TERM (CURRENT) DRUG THERAPY: ICD-10-CM

## 2020-01-23 DIAGNOSIS — Z02.9 ENCOUNTER FOR ADMINISTRATIVE EXAMINATIONS, UNSPECIFIED: ICD-10-CM

## 2020-01-23 DIAGNOSIS — N17.9 ACUTE KIDNEY FAILURE, UNSPECIFIED: ICD-10-CM

## 2020-01-23 PROCEDURE — 99221 1ST HOSP IP/OBS SF/LOW 40: CPT

## 2020-01-23 PROCEDURE — 99233 SBSQ HOSP IP/OBS HIGH 50: CPT

## 2020-01-23 RX ORDER — AMLODIPINE BESYLATE 2.5 MG/1
5 TABLET ORAL
Refills: 0 | Status: DISCONTINUED | OUTPATIENT
Start: 2020-01-23 | End: 2020-01-28

## 2020-01-23 RX ORDER — CHOLECALCIFEROL (VITAMIN D3) 125 MCG
1000 CAPSULE ORAL DAILY
Refills: 0 | Status: DISCONTINUED | OUTPATIENT
Start: 2020-01-23 | End: 2020-01-28

## 2020-01-23 RX ORDER — PREGABALIN 225 MG/1
1000 CAPSULE ORAL DAILY
Refills: 0 | Status: DISCONTINUED | OUTPATIENT
Start: 2020-01-23 | End: 2020-01-28

## 2020-01-23 RX ORDER — AMLODIPINE BESYLATE 2.5 MG/1
1 TABLET ORAL
Qty: 0 | Refills: 0 | DISCHARGE

## 2020-01-23 RX ORDER — HYDROMORPHONE HYDROCHLORIDE 2 MG/ML
0.5 INJECTION INTRAMUSCULAR; INTRAVENOUS; SUBCUTANEOUS EVERY 4 HOURS
Refills: 0 | Status: DISCONTINUED | OUTPATIENT
Start: 2020-01-23 | End: 2020-01-24

## 2020-01-23 RX ORDER — OXYCODONE HYDROCHLORIDE 5 MG/1
5 TABLET ORAL EVERY 4 HOURS
Refills: 0 | Status: DISCONTINUED | OUTPATIENT
Start: 2020-01-23 | End: 2020-01-24

## 2020-01-23 RX ORDER — ZOLPIDEM TARTRATE 10 MG/1
5 TABLET ORAL AT BEDTIME
Refills: 0 | Status: DISCONTINUED | OUTPATIENT
Start: 2020-01-23 | End: 2020-01-28

## 2020-01-23 RX ORDER — POTASSIUM CHLORIDE 20 MEQ
40 PACKET (EA) ORAL ONCE
Refills: 0 | Status: COMPLETED | OUTPATIENT
Start: 2020-01-23 | End: 2020-01-23

## 2020-01-23 RX ORDER — HYDROMORPHONE HYDROCHLORIDE 2 MG/ML
0.5 INJECTION INTRAMUSCULAR; INTRAVENOUS; SUBCUTANEOUS ONCE
Refills: 0 | Status: DISCONTINUED | OUTPATIENT
Start: 2020-01-23 | End: 2020-01-23

## 2020-01-23 RX ORDER — ACETAMINOPHEN 500 MG
650 TABLET ORAL EVERY 6 HOURS
Refills: 0 | Status: DISCONTINUED | OUTPATIENT
Start: 2020-01-23 | End: 2020-01-28

## 2020-01-23 RX ORDER — CYCLOBENZAPRINE HYDROCHLORIDE 10 MG/1
1 TABLET, FILM COATED ORAL
Qty: 0 | Refills: 0 | DISCHARGE

## 2020-01-23 RX ORDER — HYDROMORPHONE HYDROCHLORIDE 2 MG/ML
0.5 INJECTION INTRAMUSCULAR; INTRAVENOUS; SUBCUTANEOUS EVERY 6 HOURS
Refills: 0 | Status: DISCONTINUED | OUTPATIENT
Start: 2020-01-23 | End: 2020-01-23

## 2020-01-23 RX ORDER — TAMSULOSIN HYDROCHLORIDE 0.4 MG/1
0.4 CAPSULE ORAL AT BEDTIME
Refills: 0 | Status: DISCONTINUED | OUTPATIENT
Start: 2020-01-23 | End: 2020-01-28

## 2020-01-23 RX ORDER — CITALOPRAM 10 MG/1
40 TABLET, FILM COATED ORAL DAILY
Refills: 0 | Status: DISCONTINUED | OUTPATIENT
Start: 2020-01-23 | End: 2020-01-28

## 2020-01-23 RX ORDER — TRAZODONE HCL 50 MG
200 TABLET ORAL AT BEDTIME
Refills: 0 | Status: DISCONTINUED | OUTPATIENT
Start: 2020-01-23 | End: 2020-01-28

## 2020-01-23 RX ORDER — ALPRAZOLAM 0.25 MG
1 TABLET ORAL EVERY 6 HOURS
Refills: 0 | Status: DISCONTINUED | OUTPATIENT
Start: 2020-01-23 | End: 2020-01-28

## 2020-01-23 RX ORDER — HEPARIN SODIUM 5000 [USP'U]/ML
5000 INJECTION INTRAVENOUS; SUBCUTANEOUS EVERY 8 HOURS
Refills: 0 | Status: DISCONTINUED | OUTPATIENT
Start: 2020-01-23 | End: 2020-01-27

## 2020-01-23 RX ORDER — ZOLPIDEM TARTRATE 10 MG/1
1 TABLET ORAL
Qty: 0 | Refills: 0 | DISCHARGE

## 2020-01-23 RX ADMIN — Medication 40 MILLIEQUIVALENT(S): at 03:01

## 2020-01-23 RX ADMIN — AMLODIPINE BESYLATE 5 MILLIGRAM(S): 2.5 TABLET ORAL at 09:07

## 2020-01-23 RX ADMIN — HEPARIN SODIUM 5000 UNIT(S): 5000 INJECTION INTRAVENOUS; SUBCUTANEOUS at 21:17

## 2020-01-23 RX ADMIN — TAMSULOSIN HYDROCHLORIDE 0.4 MILLIGRAM(S): 0.4 CAPSULE ORAL at 21:19

## 2020-01-23 RX ADMIN — HYDROMORPHONE HYDROCHLORIDE 0.5 MILLIGRAM(S): 2 INJECTION INTRAMUSCULAR; INTRAVENOUS; SUBCUTANEOUS at 15:00

## 2020-01-23 RX ADMIN — OXYCODONE HYDROCHLORIDE 5 MILLIGRAM(S): 5 TABLET ORAL at 12:20

## 2020-01-23 RX ADMIN — Medication 1 TABLET(S): at 11:00

## 2020-01-23 RX ADMIN — ZOLPIDEM TARTRATE 5 MILLIGRAM(S): 10 TABLET ORAL at 21:17

## 2020-01-23 RX ADMIN — OXYCODONE HYDROCHLORIDE 5 MILLIGRAM(S): 5 TABLET ORAL at 05:03

## 2020-01-23 RX ADMIN — Medication 200 MILLIGRAM(S): at 21:17

## 2020-01-23 RX ADMIN — HEPARIN SODIUM 5000 UNIT(S): 5000 INJECTION INTRAVENOUS; SUBCUTANEOUS at 12:21

## 2020-01-23 RX ADMIN — CITALOPRAM 40 MILLIGRAM(S): 10 TABLET, FILM COATED ORAL at 11:00

## 2020-01-23 RX ADMIN — HYDROMORPHONE HYDROCHLORIDE 0.5 MILLIGRAM(S): 2 INJECTION INTRAMUSCULAR; INTRAVENOUS; SUBCUTANEOUS at 20:10

## 2020-01-23 RX ADMIN — OXYCODONE HYDROCHLORIDE 5 MILLIGRAM(S): 5 TABLET ORAL at 05:30

## 2020-01-23 RX ADMIN — AMLODIPINE BESYLATE 5 MILLIGRAM(S): 2.5 TABLET ORAL at 20:25

## 2020-01-23 RX ADMIN — Medication 1 MILLIGRAM(S): at 16:08

## 2020-01-23 RX ADMIN — HYDROMORPHONE HYDROCHLORIDE 0.5 MILLIGRAM(S): 2 INJECTION INTRAMUSCULAR; INTRAVENOUS; SUBCUTANEOUS at 15:30

## 2020-01-23 RX ADMIN — Medication 1 MILLIGRAM(S): at 07:16

## 2020-01-23 RX ADMIN — ZOLPIDEM TARTRATE 5 MILLIGRAM(S): 10 TABLET ORAL at 22:45

## 2020-01-23 RX ADMIN — HYDROMORPHONE HYDROCHLORIDE 0.5 MILLIGRAM(S): 2 INJECTION INTRAMUSCULAR; INTRAVENOUS; SUBCUTANEOUS at 12:20

## 2020-01-23 RX ADMIN — Medication 1000 UNIT(S): at 10:59

## 2020-01-23 RX ADMIN — HYDROMORPHONE HYDROCHLORIDE 0.5 MILLIGRAM(S): 2 INJECTION INTRAMUSCULAR; INTRAVENOUS; SUBCUTANEOUS at 11:00

## 2020-01-23 RX ADMIN — PREGABALIN 1000 MICROGRAM(S): 225 CAPSULE ORAL at 11:00

## 2020-01-23 RX ADMIN — HEPARIN SODIUM 5000 UNIT(S): 5000 INJECTION INTRAVENOUS; SUBCUTANEOUS at 05:03

## 2020-01-23 RX ADMIN — OXYCODONE HYDROCHLORIDE 5 MILLIGRAM(S): 5 TABLET ORAL at 12:49

## 2020-01-23 RX ADMIN — HYDROMORPHONE HYDROCHLORIDE 0.5 MILLIGRAM(S): 2 INJECTION INTRAMUSCULAR; INTRAVENOUS; SUBCUTANEOUS at 19:55

## 2020-01-23 NOTE — PROGRESS NOTE ADULT - PROBLEM SELECTOR PLAN 2
-mild Juan Miguel on admission; etio unclear but includes but not limited to dec Po intake vs. nsaid related  -strict avoidance of nsaids reviewed with pt  -nonoliguric  -trend bmp, avoid nephrotoxins -mild Juan Miguel on admission; etio unclear but includes but not limited to dec Po intake vs. nsaid related  -strict avoidance of nsaids reviewed with pt  -nonoliguric  -trend bmp, avoid nephrotoxins  f/up serum K in AM

## 2020-01-23 NOTE — CONSULT NOTE ADULT - ASSESSMENT
Teto Perdomo  69M with extensive lumbar surgery hx and chronic pain hx including ketamine infusions (most recent revision fusion at [facility] w/ Meche Hoyos 06/2019) presents for chronic non-radicular back pain  - CT Scan shows multiple areas of hardware loosening and pseudoarthrosis, compression fx unchanged since 2016  - No acute neurosurgical intervention  - Pain control  - Recommend outpatient f/u with patient's primary surgeon who he has not seen since his surgery Teto Perdomo  69M with extensive lumbar surgery hx and chronic pain hx including ketamine infusions (most recent revision fusion at [facility] w/ Meche Hoyos 06/2019) presents for chronic non-radicular back pain  - CT Scan shows multiple areas of hardware loosening and pseudoarthrosis, compression fx unchanged since 2016  - No acute neurosurgical intervention  - Pain control  - Standing XR  - Will discuss surgical revision with patient

## 2020-01-23 NOTE — CHART NOTE - NSCHARTNOTEFT_GEN_A_CORE
Dr. Castanon (Neurosurgery attending) spoke with patient regarding imaging findings and potential need for further surgery. We recommended standing Xrays to evaluate the patient's overall spinal alignment, however the patient has stated that he does not want the X-rays and does not want our Neurosurgery team to operate on him. With that, we will sign off and recommend that he follow up with his original Neurosurgeon.

## 2020-01-23 NOTE — CONSULT NOTE ADULT - SUBJECTIVE AND OBJECTIVE BOX
p (7810)     HPI:  69 M PMH chronic back pain s/p multiple lumbar laminectomy/fusion, NPH s/p  shunt, HTN, HLD, anxiety, depression, GULSHAN on CPAP, p/w worsening back pain. Pt states that he has had months of worsening, intermittent back pain that was acutely worsened in last 1-2 weeks. States he was cleaning his bathroom when the pain acutely worsened; is mostly lumbar in nature with radiation down L buttocks and at times L thigh/knee.  No radiation of pain to toes.  NO numbness/tingling. No weakness.  Still ambulatory with cane/able to bear weight.  Pt states he felt better after his last lumbar laminectomy/fusion in 6/2019 with Dr. Meche Huynh at [facility], but only felt better for about a week before his pain returned.  Has had issues with opiates in past (passing out, ?overdose) and has followed with multiple pain mgt providers in past (per allscripts his PCP states that he was either fired from prior pain mgt doctors or lost to f/u with them because he didn't like them).  Few months ago he tried ketamine infusions with a doctor at [facility] but this did not help; more recently he has started gabapentin but he has since discontinued it due to lack of effect and uncomfortable sensation in both legs as if they were "going to explode."  Denies cp, sob, f/c, n/v/d, denies urinary retention or urinary/fecal incontinence, denies saddle anesthesia; notes unchanged urinary hesitancy likely related to bph.  Of note, he has a friend who is living with him since his last surgery, who pt states primarily helps him with his meds; pt is unable to recount all meds/doses at this time, call placed to friend unanswered. Pt notes he is not currently taking opiates and only OTC meds such as aspirin and naproxen; unk dose/freq of naproxen use, denies gib/melena/hematochezia or abd pain at the moment. Also, his friend who lives with him was seen at urgent care center today and diagnosed with flu and given Tamiflu; pt himself denies viral uri sx.      VS: 98.0, 57, 103/63, 18, 95% Ra.  Labs: no leukocytosis, chronic stable mild anemia, plt wnl, coags unrevealing, cmp with mild hypoK 3.2, JOSE MIGUEL scr 1.39 baseline 0.94, rest cmp unrevealing. XR pelvis prelim no acute fx. XR lumbar spine with noted unchanged L1 deformity, no new compression deformities seen. In ER pt received valium, dilaudid iv 0.5 x 2, dilaudid iv 1 x 1, and oxyIR prior to medicine team involvement. (22 Jan 2020 23:48)      Imaging:    Motor:            Lower extremity                           HF          KF         KE         DF        PF                                                  L           5/5         5/5        5/5       5/5        5/5                                               R           5/5         5/5        5/5       5/5        5/5  Sensation / Reflexes  [x ] intact to light touch  [ ] decreased:   No clonus    --Anticoagulation:  heparin  Injectable 5000 Unit(s) SubCutaneous every 8 hours    =====================  PAST MEDICAL HISTORY   NPH (normal pressure hydrocephalus)  Chronic back pain greater than 3 months duration  Small intestine disorder  Lumbar disc displacement without myelopathy  Sciatica  Anxiety  Vertebral Sciatica  Insomnia  Depression  HTN (Hypertension)    PAST SURGICAL HISTORY   S/P lumbar fusion  Dehiscence, Operation Wound/Debridement  S/P Laminectomy    Biaxin (Other)  Lidoderm (Unknown)  morphine (Short breath; Rash)      MEDICATIONS:  Antibiotics:    Neuro:  acetaminophen   Tablet .. 650 milliGRAM(s) Oral every 6 hours PRN  ALPRAZolam 1 milliGRAM(s) Oral every 6 hours PRN  citalopram 40 milliGRAM(s) Oral daily  HYDROmorphone  Injectable 0.5 milliGRAM(s) IV Push every 6 hours PRN  oxyCODONE    IR 5 milliGRAM(s) Oral every 4 hours PRN  traZODone 200 milliGRAM(s) Oral at bedtime  zolpidem 5 milliGRAM(s) Oral at bedtime PRN  zolpidem 5 milliGRAM(s) Oral at bedtime PRN    Other:  amLODIPine   Tablet 5 milliGRAM(s) Oral <User Schedule>  cholecalciferol 1000 Unit(s) Oral daily  cyanocobalamin 1000 MICROGram(s) Oral daily  multivitamin 1 Tablet(s) Oral daily  potassium chloride    Tablet ER 40 milliEquivalent(s) Oral once  tamsulosin 0.4 milliGRAM(s) Oral at bedtime      SOCIAL HISTORY:   Occupation:   Marital Status:     FAMILY HISTORY:  No pertinent family history in first degree relatives  Family history of CKD (chronic kidney disease)  Family history of lung cancer      ROS: Negative except per HPI    LABS:  PT/INR - ( 22 Jan 2020 23:03 )   PT: 12.9 sec;   INR: 1.12 ratio         PTT - ( 22 Jan 2020 23:03 )  PTT:33.5 sec                        12.2   10.44 )-----------( 319      ( 22 Jan 2020 23:03 )             39.7     01-22    141  |  100  |  14  ----------------------------<  79  3.2<L>   |  26  |  1.39<H>    Ca    9.3      22 Jan 2020 23:03    TPro  7.1  /  Alb  4.1  /  TBili  0.4  /  DBili  x   /  AST  8<L>  /  ALT  7<L>  /  AlkPhos  71  01-22

## 2020-01-23 NOTE — PROGRESS NOTE ADULT - PROBLEM SELECTOR PLAN 6
-hsq vte ppx  -pt offerred tamiflu renally dosed for PPX given his exposure to roomate who was dx with flu.  He is refusing, states he does not believe his roomate actually has the flu.  Importance of ppx reviewed and risks of forgoing ppx in documented exposures reviewed, patient again refusing. -hsq vte ppx  - It is reported tat pt was offerred tamiflu renally dosed for PPX given his exposure to roomate who was dx with flu.  He is refusing, states he does not believe his roomate actually has the flu as reported .  Importance of ppx reviewed and risks of forgoing ppx in documented exposures reviewed, patient again refusing.

## 2020-01-23 NOTE — PHYSICAL THERAPY INITIAL EVALUATION ADULT - PLANNED THERAPY INTERVENTIONS, PT EVAL
GOAL: Pt will negotiate 5 steps  up and down using standard cane  independent  within 2-3 weeks./gait training/balance training/transfer training/strengthening

## 2020-01-23 NOTE — CONSULT NOTE ADULT - ASSESSMENT
69 M admitted with worsening back pain  CT showed Previous laminectomy and metallic fusion L2-S1 with interposition grafts L4-5 andL5-S1. Lucency surrounding the screws bilaterally at L2 and S1 suspicious for loosening versus infection. Lucency in the endplates surrounding the interposition graft L5-S1 also suspicious for loosening versus infection.  PMH chronic back pain s/p multiple lumbar laminectomy/fusion, NPH s/p  shunt, HTN, HLD, anxiety, depression, GULSHAN on CPAP  Has hx of Oxycodone OD and tried multiple therapies including Ketamine with dr. Esau Florence (see note)  Neurosurgery has signed off but pt states they returned and Dr. Castanon will speak with Dr. Huynh at [facility] and patient will make decision regarding surgery    Consider Cymbalta 20 mg daily  Avoid NSAIDs 2/2 elevated Cr 1.39  Avoid Gabapentin 2/2 pt reports makes him feel "like legs going to explode"  Discontinue Oxycodone 2/2 prior OD  Dilaudid 2 mg PO every 4 hours PRN moderate pain  Dilaudid 4 mg every 6 hours PRN severe pain  Discontinue PRN 0.5 mg IV Dilaudid - provider can give one time dose but do not recommend standing PRN ordered  Pt provided with list of out patient pain practices for follow up after discharge  Dilaudid recommendations until decision regarding surgery. If pt to be discharged discontinue Dilaudid, he can use his Medical Marijuana and f/u with pain provider  Lidoderm to lumbar region daily (on for 12 hrs off for 12 hrs)  Warm/cold packs for comfort (do not place over lidoderm)  Monitor for sedation, respiratory depression    Time spent on encounter:    45    Minutes    Chronic Pain Service  [phone]

## 2020-01-23 NOTE — CONSULT NOTE ADULT - SUBJECTIVE AND OBJECTIVE BOX
Chief Complaint:  Patient is a 69y old  Male who presents with a chief complaint of Lumbar back pain (23 Jan 2020 10:22)    HPI:  69 M PMH chronic back pain s/p multiple lumbar laminectomy/fusion, NPH s/p  shunt, HTN, HLD, anxiety, depression, GULSHAN on CPAP, p/w worsening back pain. Pt states that he has had months of worsening, intermittent back pain that was acutely worsened in last 1-2 weeks. States he was cleaning his bathroom when the pain acutely worsened; is mostly lumbar in nature with radiation down L buttocks and at times L thigh/knee.  No radiation of pain to toes.  NO numbness/tingling. No weakness.  Still ambulatory with cane/able to bear weight.  Pt states he felt better after his last lumbar laminectomy/fusion in 6/2019 with Dr. Meche Huynh at [facility], but only felt better for about a week before his pain returned.  Has had issues with opiates in past (passing out, ?overdose) and has followed with multiple pain mgt providers in past (per [employer]scripts his PCP states that he was either fired from prior pain mgt doctors or lost to f/u with them because he didn't like them).  Few months ago he tried ketamine infusions with a doctor at [facility] but this did not help; more recently he has started gabapentin but he has since discontinued it due to lack of effect and uncomfortable sensation in both legs as if they were "going to explode."  Denies cp, sob, f/c, n/v/d, denies urinary retention or urinary/fecal incontinence, denies saddle anesthesia; notes unchanged urinary hesitancy likely related to bph.  Of note, he has a friend who is living with him since his last surgery, who pt states primarily helps him with his meds; pt is unable to recount all meds/doses at this time, call placed to friend unanswered. Pt notes he is not currently taking opiates and only OTC meds such as aspirin and naproxen; unk dose/freq of naproxen use, denies gib/melena/hematochezia or abd pain at the moment. Also, his friend who lives with him was seen at urgent care center today and diagnosed with flu and given Tamiflu; pt himself denies viral uri sx.      VS: 98.0, 57, 103/63, 18, 95% Ra.  Labs: no leukocytosis, chronic stable mild anemia, plt wnl, coags unrevealing, cmp with mild hypoK 3.2, JOSE MIGUEL scr 1.39 baseline 0.94, rest cmp unrevealing. XR pelvis prelim no acute fx. XR lumbar spine with noted unchanged L1 deformity, no new compression deformities seen. In ER pt received valium, dilaudid iv 0.5 x 2, dilaudid iv 1 x 1, and oxyIR prior to medicine team involvement. (22 Jan 2020 23:48)    Current out- patient pain regimen: Medical Marijuana    Out Patient Pain Management provider: States he does not have one - has seen Dr. Ward and Dr. Esau Florence in the past    [facility] Prescription Monitoring Program: Reference #[id number]    Opioid Risk Tool (ORT-OUD) Score: High    Pain Score: 0/10    Pt sitting at edge of bed, appears very comfortable, in NAD. Ambulating independently, slowly, without difficulty, in room. Pt is pleasant, c/o lumbar pain radiating down posterior left thigh sometimes down to knee or ankle and occasionally radiates down RLE instead of left, but L>R. Describes pain as stabbing. States he was told he has loose screws from prior spinal surgery. He has tried many therapies without relief. Had been receiving Oxycodone from primary care MD Dr. Lawson, until he "took too many", lost consciousness and was taken to [place] ED where he was aroused with Narcan. He was then transferred to Dr. Esau Florence where he received multiple Ketamine treatments without effect. Per pt Dr. Florence recently told him there was nothing else he could do. Has taken gabapentin and it makes him feel "like my legs are going to explode". Uses Medical Marijuana but says it doesn't do anything for his pain. Pt states that he has not said he will not agree to surgery, that he needs to confer with his psychiatrist and care giver. States he spoke with his psychiatrist today, and does not wish to see anyone here, and he is waiting for Dr. Castanon and Dr. Huynh at [facility] to speak and then he will make decision for next steps.   Denies shortness of breath, chest pain, palpitations, abdominal pain, nausea, vomiting, diarrhea or constipation. Last BM yesterday. Occasional episodes of retention - difficulty getting started and doesn't fully empty at times until he makes the effort.  NAD, well-developed, no signs of toxicity, Alert & Oriented X3, Good concentration.  Motor Strength 4-5/5 B/L upper and lower extremities; moves all extremities equally against gravity; ROM intact.     PAST MEDICAL & SURGICAL HISTORY:  NPH (normal pressure hydrocephalus)  Chronic back pain greater than 3 months duration  Small intestine disorder: &quot;ilitis&quot;   steroids   1967  Lumbar disc displacement without myelopathy  Sciatica  Anxiety  Vertebral Sciatica  Insomnia  Depression  HTN (Hypertension)  S/P lumbar fusion: L4-L5 on Oct 2011  pins/screws 2012   spinal surgery 2013  Dehiscence, Operation Wound/Debridement: infection  S/P Laminectomy: L4-L5 2009  complicated by infection requiring  antibiiotcis      FAMILY HISTORY:  No pertinent family history in first degree relatives      Allergies    Biaxin (Other)  Lidoderm (Unknown)  morphine (Short breath; Rash)      MEDICATIONS  (STANDING):  amLODIPine   Tablet 5 milliGRAM(s) Oral <User Schedule>  cholecalciferol 1000 Unit(s) Oral daily  citalopram 40 milliGRAM(s) Oral daily  cyanocobalamin 1000 MICROGram(s) Oral daily  heparin  Injectable 5000 Unit(s) SubCutaneous every 8 hours  multivitamin 1 Tablet(s) Oral daily  tamsulosin 0.4 milliGRAM(s) Oral at bedtime  traZODone 200 milliGRAM(s) Oral at bedtime    MEDICATIONS  (PRN):  acetaminophen   Tablet .. 650 milliGRAM(s) Oral every 6 hours PRN Temp greater or equal to 38C (100.4F), Mild Pain (1 - 3), Moderate Pain (4 - 6), Severe Pain (7 - 10)  ALPRAZolam 1 milliGRAM(s) Oral every 6 hours PRN anxiety  HYDROmorphone  Injectable 0.5 milliGRAM(s) IV Push every 4 hours PRN breakthrough pain  oxyCODONE    IR 5 milliGRAM(s) Oral every 4 hours PRN moderate or severe pain  zolpidem 5 milliGRAM(s) Oral at bedtime PRN Insomnia  zolpidem 5 milliGRAM(s) Oral at bedtime PRN Insomnia      Vital Signs:  T(C): 37.3 (01-23-20 @ 13:29)  HR: 69 (01-23-20 @ 14:11)  BP: 127/67 (01-23-20 @ 14:11)  RR: 19 (01-23-20 @ 14:11)  SpO2: 97% (01-23-20 @ 14:11)    Pertinent labs/radiology:  Labs reviewed    < from: CT Lumbar Spine No Cont (01.22.20 @ 23:26) >  IMPRESSION: Unremarkable thoracic spine.     Previous laminectomy and metallic fusion L2-S1 with interposition grafts L4-5 andL5-S1. Lucency surrounding the screws bilaterally at L2 and S1 suspicious for loosening versus infection. Lucency in the endplates surrounding the interposition graft L5-S1 also suspicious for loosening versus infection, slightly improved since 4/19/2019. Recommend correlation with ESR and CRP.    < end of copied text >

## 2020-01-23 NOTE — CHART NOTE - NSCHARTNOTEFT_GEN_A_CORE
Reference #: [id number]    Others' Prescriptions  Patient Name:	Teto Perdomo	YOB: 1950  Address:	[address] [place] [address]	Sex:	Male  Rx Written	Rx Dispensed	Drug	Quantity	Days Supply	Prescriber Name  12/05/2019	01/09/2020	zolpidem tartrate 10 mg tablet	30	30	Green, Cory QUINTANA MD  01/08/2020	01/09/2020	alprazolam 1 mg tablet	120	30	Green, Cory QUINTANA MD  04/04/2019	01/04/2020	curaleaf (1:20) 0.24mgthc and 5 mgcbd/0.5 ml tct lemon	1	5	Alleva, Alcides  04/04/2019	01/04/2020	curaleaf (1:20) 0.24mgthc and 5 mgcbd/0.5 ml tct lemon	1	5	Alleva, Alcides  04/04/2019	12/21/2019	curaleaf (1:20) 0.24mgthc and 5 mgcbd/0.5 ml tct lemon	1	5	Alleva, Alcides  04/04/2019	12/21/2019	curaleaf (20:1) 5mgthc and 0.24 mgcbd/0.5 ml tct lemon	1	5	Alleva, Alcides  04/04/2019	12/21/2019	curaleaf (20:1) 5mgthc and 0.24 mgcbd/0.5 ml tct lemon	1	5	Alleva, Alcides  04/04/2019	12/13/2019	curaleaf (20:1) 5mgthc and 0.24 mgcbd/0.5 ml tct lemon	1	5	Alleva, Alcides  04/04/2019	12/13/2019	curaleaf (20:1) 5mgthc and 0.24 mgcbd/0.5 ml tct lemon	1	5	Alleva, Alcides  04/04/2019	12/13/2019	curaleaf (20:1) 5mgthc and 0.24 mgcbd/0.5 ml tct lemon	1	5	Alleva, Alcides  12/05/2019	12/11/2019	alprazolam 1 mg tablet	120	30	Green, Cory QUINTANA MD  12/05/2019	12/11/2019	zolpidem tartrate 10 mg tablet	30	30	Green, Cory QUINTANA MD  12/05/2019	12/06/2019	methylphenidate 10 mg tablet	120	30	Green, Cory QUINTANA MD  04/04/2019	11/29/2019	curaleaf (20:1) 5mgthc and 0.24 mgcbd/0.5 ml tct peppermint	1	5	Alleva, Alcides  04/04/2019	11/29/2019	curaleaf (20:1) 5mgthc and 0.24 mgcbd/0.5 ml tct peppermint	1	5	Alleva, Alcides  04/04/2019	11/29/2019	curaleaf (20:1) 5mgthc and 0.24 mgcbd/0.5 ml tct lemon	1	5	Alleva, Alcides  11/14/2019	11/15/2019	methylphenidate 20 mg tablet	30	30	Green, Cory QUINTANA MD  04/04/2019	11/12/2019	curaleaf (20:1) 5mgthc and 0.24 mgcbd/0.5 ml tct lemon	1	5	Alleva, Alcides  04/04/2019	11/12/2019	curaleaf (20:1) 5mgthc and 0.24 mgcbd/0.5 ml tct lemon	1	5	Alleva, Alcides  04/04/2019	11/12/2019	curaleaf (20:1) 5mgthc and 0.24 mgcbd/0.5 ml tct lemon	1	5	Alleva, Alcidse  11/01/2019	11/11/2019	zolpidem tartrate 10 mg tablet	30	30	Green, Cory QUINTANA MD  11/09/2019	11/11/2019	alprazolam 1 mg tablet	120	30	Green, Cory QUINTANA MD  04/04/2019	10/31/2019	curaleaf (1:20) 0.24mgthc and 5 mgcbd/0.5 ml tct lemon	1	5	Alleva, Alcides  04/04/2019	10/31/2019	curaleaf (1:20) 0.24mgthc and 5 mgcbd/0.5 ml tct lemon	1	5	Alleva, Alcides  04/04/2019	10/19/2019	curaleaf (20:1) 5mgthc and 0.24 mgcbd/0.5 ml tct lemon	1	5	Alleva, Alcides  04/04/2019	10/19/2019	curaleaf (20:1) 5mgthc and 0.24 mgcbd/0.5 ml tct lemon	1	5	Alleva, Alcides  04/04/2019	10/19/2019	curaleaf (1:20) 0.24mgthc and 5 mgcbd/0.5 ml tct lemon	1	5	Alleva, Alcides  07/11/2019	10/13/2019	zolpidem tartrate 10 mg tablet	30	30	Alleva, Alcides  10/05/2019	10/06/2019	alprazolam 1 mg tablet	120	30	GreenCory MD  04/04/2019	10/04/2019	curaleaf (20:1) 5mgthc and 0.24 mgcbd/0.5 ml tct lemon	1	5	Alleva, Alcides  04/04/2019	10/04/2019	curaleaf (20:1) 5mgthc and 0.24 mgcbd/0.5 ml tct lemon	1	5	Alleva, Alcides  04/04/2019	10/04/2019	curaleaf (20:1) 5mgthc and 0.24 mgcbd/0.5 ml tct lemon	1	5	Alleva, Alcides  04/04/2019	10/04/2019	curaleaf (20:1) 5mgthc and 0.24 mgcbd/0.5 ml tct lemon	1	5	Alleva, Alcides  10/03/2019	10/04/2019	methylphenidate 20 mg tablet	60	30	Cory Bhandari MD  04/04/2019	09/24/2019	curaleaf (20:1) 5mgthc and 0.24 mgcbd/0.5 ml tct lemon	1	5	Alleva, Alcides  04/04/2019	09/24/2019	curaleaf (20:1) 5mgthc and 0.24 mgcbd/0.5 ml tct lemon	1	5	Alleva, Alcides  07/11/2019	09/11/2019	zolpidem tartrate 10 mg tablet	30	30	Alleva, Alcides  04/04/2019	09/10/2019	curaleaf (20:1) 5mgthc and 0.24 mgcbd/0.5 ml tct lemon	1	5	Alleva, Alcides  04/04/2019	09/10/2019	curaleaf (20:1) 5mgthc and 0.24 mgcbd/0.5 ml tct lemon	1	5	Alleva, Alcides  04/04/2019	09/03/2019	curaleaf (1:20) 0.24mgthc and 5 mgcbd/0.5 ml tct lemon	1	5	Alleva, Alcides  04/04/2019	08/31/2019	aqua oral solution 8mg thc and 4mg cbd per 1ml	1	5	Alleva, Alcides  04/04/2019	08/31/2019	aqua oral solution 8mg thc and 4mg cbd per 1ml	1	5	Alleva, Alcides  08/15/2019	08/27/2019	alprazolam 1 mg tablet	120	30	Cory Bhandari MD  04/04/2019	08/23/2019	aqua oral solution 8mg thc and 4mg cbd per 1ml	1	5	Alleva, Alcides  04/04/2019	08/23/2019	aqua oral solution 8mg thc and 4mg cbd per 1ml	1	5	Alleva, Alcides  04/04/2019	08/14/2019	curaleaf (20:1) 1.0mg thc and 0.05mg cbd/dose vape	1	5	Alleva, Alcides  04/04/2019	08/14/2019	aqua oral solution 8mg thc and 4mg cbd per 1ml	1	5	Alleva, Alcides  07/11/2019	08/13/2019	zolpidem tartrate 10 mg tablet	30	30	Alleva, Alcides  04/04/2019	08/03/2019	curaleaf (20:1) 1.0mg thc and 0.05mg cbd/dose vape	1	4	Alleva, Alcides  04/04/2019	08/03/2019	aqua oral solution 8mg thc and 4mg cbd per 1ml	1	5	Alleva, Alcides  08/01/2019	08/01/2019	methylphenidate 20 mg tablet	60	30	Cory Bhandari MD  07/10/2019	07/22/2019	alprazolam 1 mg tablet	120	30	Alleva, Alcides  04/04/2019	07/17/2019	aqua oral solution 8mg thc and 4mg cbd per 1ml	1	5	Alleva, Alcides  07/11/2019	07/13/2019	zolpidem tartrate 10 mg tablet	30	30	Alleva, Alcides  07/10/2019	07/10/2019	hydromorphone 4 mg tablet	120	30	Alleva, Alcides  04/04/2019	06/30/2019	curaleaf (1:1) 0.5mg thc and 0.5mg cbd/dose vape	1	4	Alleva, Alcides  04/04/2019	06/29/2019	curaleaf (1:20) 0.05mg thc and 1.0mg cbd/dose vape	1	4	Alleva, Alcides  04/04/2019	06/29/2019	curaleaf (1:20) 0.25mgthc and 5 mgcbd/microtab peppermint	1	4	Alleva, Alcides  06/21/2019	06/22/2019	alprazolam 1 mg tablet	120	30	GreenCory MD  06/14/2019	06/14/2019	zolpidem tartrate 10 mg tablet	30	30	GreenCory MD  06/08/2019	06/10/2019	diazepam 5 mg tablet	45	15	Meche Huynh MD  05/12/2019	05/16/2019	hydromorphone 4 mg tablet	180	30	Mehrdad Knight MD  05/12/2019	05/14/2019	alprazolam 1 mg tablet	120	30	Mehrdad Knight MD  05/12/2019	05/14/2019	zolpidem tartrate 10 mg tablet	30	30	Mehrdad Knight MD  03/27/2019	04/27/2019	zolpidem tart er 12.5 mg tab	30	30	GreenCory MD  04/18/2019	04/18/2019	alprazolam 1 mg tablet	120	30	GreenCory MD  04/04/2019	04/04/2019	oxycodone hcl 5 mg tablet	120	20	Rubia Sanchez  03/27/2019	03/27/2019	zolpidem tart er 12.5 mg tab	30	30	GreenCory MD  03/18/2019	03/19/2019	oxycodone hcl 5 mg tablet	60	15	Balaji Santillan  03/14/2019	03/15/2019	alprazolam 1 mg tablet	120	30	GreenCory MD  03/15/2019	03/15/2019	oxycodone hcl 5 mg tablet	16	4	Corby Manley  01/24/2019	02/26/2019	zolpidem tart er 12.5 mg tab	30	30	Cory Bhandari MD  02/22/2019	02/22/2019	nucynta 50 mg tablet	60	15	Balaji Santillan  02/12/2019	02/12/2019	levorphanol 2 mg tablet	21	7	Ramesh Florence  02/07/2019	02/11/2019	methylphenidate 10 mg tablet	90	30	Cory Bhandari MD  02/07/2019	02/11/2019	alprazolam 1 mg tablet	120	30	Cory Bhandari MD  01/24/2019	01/27/2019	zolpidem tart er 12.5 mg tab	30	30	Cory Bhandari MD  01/25/2019	01/25/2019	hydromorphone 4 mg tablet	60	15	Balaji Santillan  Patient Name:	Teto Perdomo	YOB: 1950  Address:	[address] [place] [address] [place] [address]	Sex:	Male  Rx Written	Rx Dispensed	Drug	Quantity	Days Supply	Prescriber Name  05/09/2019	05/09/2019	zolpidem tartrate 10 mg tablet	7	7	Mehrdad Knight MD  05/07/2019	05/07/2019	alprazolam 1 mg tablet	20	5	Mehrdad Knight MD  05/06/2019	05/06/2019	hydromorphone 4 mg tablet	30	5	Mehrdad Knight MD  05/03/2019	05/03/2019	zolpidem tartrate 10 mg tablet	7	7	Mehrdad Knight MD  05/03/2019	05/03/2019	alprazolam 1 mg tablet	20	5	Mehrdad Knight MD  04/28/2019	04/28/2019	alprazolam 1 mg tablet	20	5	Mehrdad Knight MD  04/26/2019	04/26/2019	hydromorphone 4 mg tablet	30	5	Mehrdad Knight MD  04/26/2019	04/26/2019	zolpidem tartrate 10 mg tablet	7	7	Mehrdad Knight MD

## 2020-01-23 NOTE — PROGRESS NOTE ADULT - PROBLEM SELECTOR PLAN 3
-not repleted thus far in ER, will give 40 meq PO x 1  -trend bmp -not repleted thus far in ER, will give 40 meq PO x 1  -trend bmp in AM

## 2020-01-23 NOTE — PROGRESS NOTE ADULT - SUBJECTIVE AND OBJECTIVE BOX
Patient is a 69y old  Male who presents with a chief complaint of Lumbar back pain (23 Jan 2020 01:32)      SUBJECTIVE / OVERNIGHT EVENTS:    69 M PMH chronic back pain s/p multiple lumbar laminectomy/fusion, NPH s/p  shunt, HTN, HLD, anxiety, depression, GULSHAN on CPAP, p/w worsening back pain. Pt states that he has had months of worsening, intermittent back pain that was acutely worsened in last 1-2 weeks.       ADDITIONAL REVIEW OF SYSTEMS: Negative except for above    MEDICATIONS  (STANDING):  amLODIPine   Tablet 5 milliGRAM(s) Oral <User Schedule>  cholecalciferol 1000 Unit(s) Oral daily  citalopram 40 milliGRAM(s) Oral daily  cyanocobalamin 1000 MICROGram(s) Oral daily  heparin  Injectable 5000 Unit(s) SubCutaneous every 8 hours  multivitamin 1 Tablet(s) Oral daily  tamsulosin 0.4 milliGRAM(s) Oral at bedtime  traZODone 200 milliGRAM(s) Oral at bedtime    MEDICATIONS  (PRN):  acetaminophen   Tablet .. 650 milliGRAM(s) Oral every 6 hours PRN Temp greater or equal to 38C (100.4F), Mild Pain (1 - 3), Moderate Pain (4 - 6), Severe Pain (7 - 10)  ALPRAZolam 1 milliGRAM(s) Oral every 6 hours PRN anxiety  HYDROmorphone  Injectable 0.5 milliGRAM(s) IV Push every 6 hours PRN breakthrough pain  oxyCODONE    IR 5 milliGRAM(s) Oral every 4 hours PRN moderate or severe pain  zolpidem 5 milliGRAM(s) Oral at bedtime PRN Insomnia  zolpidem 5 milliGRAM(s) Oral at bedtime PRN Insomnia      CAPILLARY BLOOD GLUCOSE        I&O's Summary      PHYSICAL EXAM:  Vital Signs Last 24 Hrs  T(C): 37 (23 Jan 2020 08:02), Max: 37 (23 Jan 2020 08:02)  T(F): 98.6 (23 Jan 2020 08:02), Max: 98.6 (23 Jan 2020 08:02)  HR: 67 (23 Jan 2020 08:02) (57 - 82)  BP: 153/96 (23 Jan 2020 08:02) (103/63 - 156/85)  BP(mean): --  RR: 18 (23 Jan 2020 08:02) (16 - 18)  SpO2: 98% (23 Jan 2020 08:02) (94% - 99%)    PHYSICAL EXAM:  GENERAL: NAD, well-developed  HEAD:  Atraumatic, Normocephalic  EYES:  conjunctiva and sclera clear  NECK: Supple, No JVD  CHEST/LUNG: Clear to auscultation bilaterally; No wheeze  HEART: Regular rate and rhythm; No murmurs, rubs, or gallops  ABDOMEN: Soft, Nontender, Nondistended; Bowel sounds present  EXTREMITIES:  2+ Peripheral Pulses, No clubbing, cyanosis, or edema  PSYCH: AAOx3  NEUROLOGY: non-focal  SKIN: No rashes or lesions      LABS:                        12.2   10.44 )-----------( 319      ( 22 Jan 2020 23:03 )             39.7     01-22    141  |  100  |  14  ----------------------------<  79  3.2<L>   |  26  |  1.39<H>    Ca    9.3      22 Jan 2020 23:03    TPro  7.1  /  Alb  4.1  /  TBili  0.4  /  DBili  x   /  AST  8<L>  /  ALT  7<L>  /  AlkPhos  71  01-22    PT/INR - ( 22 Jan 2020 23:03 )   PT: 12.9 sec;   INR: 1.12 ratio         PTT - ( 22 Jan 2020 23:03 )  PTT:33.5 sec            RADIOLOGY & ADDITIONAL TESTS:    Imaging Personally Reviewed:    Electrocardiogram Personally Reviewed:    COORDINATION OF CARE:  Care Discussed with Consultants/Other Providers [Y/N]:  Prior or Outpatient Records Reviewed [Y/N]: Patient is a 69y old  Male who presents with a chief complaint of Lumbar back pain (23 Jan 2020 01:32)      SUBJECTIVE / OVERNIGHT EVENTS:    69 M PMH chronic back pain s/p multiple lumbar laminectomy/fusion, NPH s/p  shunt, HTN, HLD, anxiety, depression, GULSHAN on CPAP, p/w worsening back pain. Pt states that he has had months of worsening, intermittent back pain that was acutely worsened in last 1-2 weeks.  Patient had CT Of thoracic and Lumbar spine with possible Lucency (loosening) of the screws at L2-S1 and L5-S1 graft  and patient was seen by Neurosurgery team who sign off since patient refuses the Standing Xrays ordered by the team and as reported, he does not  does not want the Neurosurgery team at [facility] to operate on him.  Patient is interested in pain management only.  As per pt , pain is 10/10 on his back with no radiation and is worse with any kind of activities with no urinary or bladder incontinence and has tried many pain meds to no avail as stated.        ADDITIONAL REVIEW OF SYSTEMS: Negative except for above    MEDICATIONS  (STANDING):  amLODIPine   Tablet 5 milliGRAM(s) Oral <User Schedule>  cholecalciferol 1000 Unit(s) Oral daily  citalopram 40 milliGRAM(s) Oral daily  cyanocobalamin 1000 MICROGram(s) Oral daily  heparin  Injectable 5000 Unit(s) SubCutaneous every 8 hours  multivitamin 1 Tablet(s) Oral daily  tamsulosin 0.4 milliGRAM(s) Oral at bedtime  traZODone 200 milliGRAM(s) Oral at bedtime    MEDICATIONS  (PRN):  acetaminophen   Tablet .. 650 milliGRAM(s) Oral every 6 hours PRN Temp greater or equal to 38C (100.4F), Mild Pain (1 - 3), Moderate Pain (4 - 6), Severe Pain (7 - 10)  ALPRAZolam 1 milliGRAM(s) Oral every 6 hours PRN anxiety  HYDROmorphone  Injectable 0.5 milliGRAM(s) IV Push every 6 hours PRN breakthrough pain  oxyCODONE    IR 5 milliGRAM(s) Oral every 4 hours PRN moderate or severe pain  zolpidem 5 milliGRAM(s) Oral at bedtime PRN Insomnia  zolpidem 5 milliGRAM(s) Oral at bedtime PRN Insomnia      CAPILLARY BLOOD GLUCOSE        I&O's Summary      PHYSICAL EXAM:  Vital Signs Last 24 Hrs  T(C): 37 (23 Jan 2020 08:02), Max: 37 (23 Jan 2020 08:02)  T(F): 98.6 (23 Jan 2020 08:02), Max: 98.6 (23 Jan 2020 08:02)  HR: 67 (23 Jan 2020 08:02) (57 - 82)  BP: 153/96 (23 Jan 2020 08:02) (103/63 - 156/85)  BP(mean): --  RR: 18 (23 Jan 2020 08:02) (16 - 18)  SpO2: 98% (23 Jan 2020 08:02) (94% - 99%)    PHYSICAL EXAM:  GENERAL: NAD, well-developed  HEAD:  Atraumatic, Normocephalic  EYES:  conjunctiva and sclera clear  NECK: Supple, No JVD  CHEST/LUNG: Clear to auscultation bilaterally; No wheeze  HEART: Regular rate and rhythm; No murmurs, rubs, or gallops  ABDOMEN: Soft, Nontender, Nondistended; Bowel sounds present  EXTREMITIES:  2+ Peripheral Pulses, No clubbing, cyanosis, or edema  PSYCH: AAOx3        LABS:                        12.2   10.44 )-----------( 319      ( 22 Jan 2020 23:03 )             39.7     01-22    141  |  100  |  14  ----------------------------<  79  3.2<L>   |  26  |  1.39<H>    Ca    9.3      22 Jan 2020 23:03    TPro  7.1  /  Alb  4.1  /  TBili  0.4  /  DBili  x   /  AST  8<L>  /  ALT  7<L>  /  AlkPhos  71  01-22    PT/INR - ( 22 Jan 2020 23:03 )   PT: 12.9 sec;   INR: 1.12 ratio         PTT - ( 22 Jan 2020 23:03 )  PTT:33.5 sec            RADIOLOGY & ADDITIONAL TESTS:    Imaging Personally Reviewed:    Electrocardiogram Personally Reviewed:    COORDINATION OF CARE:  Care Discussed with Consultants/Other Providers [Y/N]:  Prior or Outpatient Records Reviewed [Y/N]:

## 2020-01-23 NOTE — PROGRESS NOTE ADULT - PROBLEM SELECTOR PLAN 1
-acute on chronic pain in setting of multiple prior Lumbar laminectomy/fusion surgeries. Currently no alarm sx; afeb, no point tenderness, no incontinence or saddle anesthesia, neurovascularly intact.   -XR pelvis without fx, XR L spine with noted chronic L1 deformity  -CT L/T spine  -f/u spine/neurosx recs  -pain control: tylenol for mild, oxy IR for mod/severe. Dilaudid 0.5 q6 prn breakthrough. Avoid morphine for prior intolerance. If escalating requirements, suggest pain mgt eval.   -PT eval  -fall prec -acute on chronic pain in setting of multiple prior Lumbar laminectomy/fusion surgeries. Currently no alarm sx; afeb, no point tenderness, no incontinence or saddle anesthesia, neurovascularly intact.   -XR pelvis without fx, XR L spine with noted chronic L1 deformity  -CT L/T spine with  Lucency (loosening) of the screws at L2-S1 and L5-S1 graft    -pain control: tylenol for mild, oxy IR for mod/severe. Dilaudid 0.5 q4 prn breakthrough. Avoid morphine for prior intolerance.   Pain MGT team is consulted / might also consider Gabapentin on Cymbalta    -PT eval  -fall prec

## 2020-01-24 LAB
ALBUMIN SERPL ELPH-MCNC: 3.9 G/DL — SIGNIFICANT CHANGE UP (ref 3.3–5)
ALP SERPL-CCNC: 69 U/L — SIGNIFICANT CHANGE UP (ref 40–120)
ALT FLD-CCNC: 10 U/L — SIGNIFICANT CHANGE UP (ref 10–45)
ANION GAP SERPL CALC-SCNC: 11 MMOL/L — SIGNIFICANT CHANGE UP (ref 5–17)
AST SERPL-CCNC: 6 U/L — LOW (ref 10–40)
BASOPHILS # BLD AUTO: 0.07 K/UL — SIGNIFICANT CHANGE UP (ref 0–0.2)
BASOPHILS NFR BLD AUTO: 0.9 % — SIGNIFICANT CHANGE UP (ref 0–2)
BILIRUB SERPL-MCNC: 0.4 MG/DL — SIGNIFICANT CHANGE UP (ref 0.2–1.2)
BUN SERPL-MCNC: 15 MG/DL — SIGNIFICANT CHANGE UP (ref 7–23)
CALCIUM SERPL-MCNC: 8.8 MG/DL — SIGNIFICANT CHANGE UP (ref 8.4–10.5)
CHLORIDE SERPL-SCNC: 102 MMOL/L — SIGNIFICANT CHANGE UP (ref 96–108)
CO2 SERPL-SCNC: 27 MMOL/L — SIGNIFICANT CHANGE UP (ref 22–31)
CREAT SERPL-MCNC: 1.17 MG/DL — SIGNIFICANT CHANGE UP (ref 0.5–1.3)
CRP SERPL-MCNC: 1.34 MG/DL — HIGH (ref 0–0.4)
EOSINOPHIL # BLD AUTO: 0.16 K/UL — SIGNIFICANT CHANGE UP (ref 0–0.5)
EOSINOPHIL NFR BLD AUTO: 2.1 % — SIGNIFICANT CHANGE UP (ref 0–6)
ERYTHROCYTE [SEDIMENTATION RATE] IN BLOOD: 27 MM/HR — HIGH (ref 0–20)
GLUCOSE SERPL-MCNC: 107 MG/DL — HIGH (ref 70–99)
HCT VFR BLD CALC: 40.2 % — SIGNIFICANT CHANGE UP (ref 39–50)
HGB BLD-MCNC: 12.4 G/DL — LOW (ref 13–17)
IMM GRANULOCYTES NFR BLD AUTO: 0.3 % — SIGNIFICANT CHANGE UP (ref 0–1.5)
LYMPHOCYTES # BLD AUTO: 1.61 K/UL — SIGNIFICANT CHANGE UP (ref 1–3.3)
LYMPHOCYTES # BLD AUTO: 20.9 % — SIGNIFICANT CHANGE UP (ref 13–44)
MCHC RBC-ENTMCNC: 25.4 PG — LOW (ref 27–34)
MCHC RBC-ENTMCNC: 30.8 GM/DL — LOW (ref 32–36)
MCV RBC AUTO: 82.2 FL — SIGNIFICANT CHANGE UP (ref 80–100)
MONOCYTES # BLD AUTO: 0.77 K/UL — SIGNIFICANT CHANGE UP (ref 0–0.9)
MONOCYTES NFR BLD AUTO: 10 % — SIGNIFICANT CHANGE UP (ref 2–14)
NEUTROPHILS # BLD AUTO: 5.09 K/UL — SIGNIFICANT CHANGE UP (ref 1.8–7.4)
NEUTROPHILS NFR BLD AUTO: 65.8 % — SIGNIFICANT CHANGE UP (ref 43–77)
PCP SPEC-MCNC: SIGNIFICANT CHANGE UP
PLATELET # BLD AUTO: 304 K/UL — SIGNIFICANT CHANGE UP (ref 150–400)
POTASSIUM SERPL-MCNC: 3.2 MMOL/L — LOW (ref 3.5–5.3)
POTASSIUM SERPL-SCNC: 3.2 MMOL/L — LOW (ref 3.5–5.3)
PROT SERPL-MCNC: 6.7 G/DL — SIGNIFICANT CHANGE UP (ref 6–8.3)
RBC # BLD: 4.89 M/UL — SIGNIFICANT CHANGE UP (ref 4.2–5.8)
RBC # FLD: 14.8 % — HIGH (ref 10.3–14.5)
SODIUM SERPL-SCNC: 140 MMOL/L — SIGNIFICANT CHANGE UP (ref 135–145)
WBC # BLD: 7.72 K/UL — SIGNIFICANT CHANGE UP (ref 3.8–10.5)
WBC # FLD AUTO: 7.72 K/UL — SIGNIFICANT CHANGE UP (ref 3.8–10.5)

## 2020-01-24 PROCEDURE — 99233 SBSQ HOSP IP/OBS HIGH 50: CPT

## 2020-01-24 PROCEDURE — 72082 X-RAY EXAM ENTIRE SPI 2/3 VW: CPT | Mod: 26

## 2020-01-24 RX ORDER — HYDROMORPHONE HYDROCHLORIDE 2 MG/ML
2 INJECTION INTRAMUSCULAR; INTRAVENOUS; SUBCUTANEOUS EVERY 4 HOURS
Refills: 0 | Status: DISCONTINUED | OUTPATIENT
Start: 2020-01-24 | End: 2020-01-28

## 2020-01-24 RX ORDER — POLYETHYLENE GLYCOL 3350 17 G/17G
17 POWDER, FOR SOLUTION ORAL DAILY
Refills: 0 | Status: DISCONTINUED | OUTPATIENT
Start: 2020-01-24 | End: 2020-01-25

## 2020-01-24 RX ORDER — HYDROMORPHONE HYDROCHLORIDE 2 MG/ML
4 INJECTION INTRAMUSCULAR; INTRAVENOUS; SUBCUTANEOUS EVERY 6 HOURS
Refills: 0 | Status: DISCONTINUED | OUTPATIENT
Start: 2020-01-24 | End: 2020-01-28

## 2020-01-24 RX ORDER — HYDROMORPHONE HYDROCHLORIDE 2 MG/ML
0.5 INJECTION INTRAMUSCULAR; INTRAVENOUS; SUBCUTANEOUS ONCE
Refills: 0 | Status: DISCONTINUED | OUTPATIENT
Start: 2020-01-24 | End: 2020-01-24

## 2020-01-24 RX ORDER — POTASSIUM CHLORIDE 20 MEQ
40 PACKET (EA) ORAL ONCE
Refills: 0 | Status: COMPLETED | OUTPATIENT
Start: 2020-01-24 | End: 2020-01-24

## 2020-01-24 RX ORDER — SENNA PLUS 8.6 MG/1
2 TABLET ORAL AT BEDTIME
Refills: 0 | Status: DISCONTINUED | OUTPATIENT
Start: 2020-01-24 | End: 2020-01-28

## 2020-01-24 RX ADMIN — HYDROMORPHONE HYDROCHLORIDE 0.5 MILLIGRAM(S): 2 INJECTION INTRAMUSCULAR; INTRAVENOUS; SUBCUTANEOUS at 11:43

## 2020-01-24 RX ADMIN — Medication 200 MILLIGRAM(S): at 22:33

## 2020-01-24 RX ADMIN — HEPARIN SODIUM 5000 UNIT(S): 5000 INJECTION INTRAVENOUS; SUBCUTANEOUS at 22:34

## 2020-01-24 RX ADMIN — HYDROMORPHONE HYDROCHLORIDE 2 MILLIGRAM(S): 2 INJECTION INTRAMUSCULAR; INTRAVENOUS; SUBCUTANEOUS at 19:30

## 2020-01-24 RX ADMIN — HYDROMORPHONE HYDROCHLORIDE 4 MILLIGRAM(S): 2 INJECTION INTRAMUSCULAR; INTRAVENOUS; SUBCUTANEOUS at 20:31

## 2020-01-24 RX ADMIN — HYDROMORPHONE HYDROCHLORIDE 4 MILLIGRAM(S): 2 INJECTION INTRAMUSCULAR; INTRAVENOUS; SUBCUTANEOUS at 14:18

## 2020-01-24 RX ADMIN — Medication 1 MILLIGRAM(S): at 07:38

## 2020-01-24 RX ADMIN — HEPARIN SODIUM 5000 UNIT(S): 5000 INJECTION INTRAVENOUS; SUBCUTANEOUS at 05:41

## 2020-01-24 RX ADMIN — TAMSULOSIN HYDROCHLORIDE 0.4 MILLIGRAM(S): 0.4 CAPSULE ORAL at 22:33

## 2020-01-24 RX ADMIN — Medication 1000 UNIT(S): at 11:42

## 2020-01-24 RX ADMIN — HYDROMORPHONE HYDROCHLORIDE 0.5 MILLIGRAM(S): 2 INJECTION INTRAMUSCULAR; INTRAVENOUS; SUBCUTANEOUS at 19:50

## 2020-01-24 RX ADMIN — OXYCODONE HYDROCHLORIDE 5 MILLIGRAM(S): 5 TABLET ORAL at 10:20

## 2020-01-24 RX ADMIN — POLYETHYLENE GLYCOL 3350 17 GRAM(S): 17 POWDER, FOR SOLUTION ORAL at 12:26

## 2020-01-24 RX ADMIN — HYDROMORPHONE HYDROCHLORIDE 2 MILLIGRAM(S): 2 INJECTION INTRAMUSCULAR; INTRAVENOUS; SUBCUTANEOUS at 18:30

## 2020-01-24 RX ADMIN — HYDROMORPHONE HYDROCHLORIDE 4 MILLIGRAM(S): 2 INJECTION INTRAMUSCULAR; INTRAVENOUS; SUBCUTANEOUS at 15:00

## 2020-01-24 RX ADMIN — AMLODIPINE BESYLATE 5 MILLIGRAM(S): 2.5 TABLET ORAL at 07:42

## 2020-01-24 RX ADMIN — Medication 1 MILLIGRAM(S): at 20:05

## 2020-01-24 RX ADMIN — OXYCODONE HYDROCHLORIDE 5 MILLIGRAM(S): 5 TABLET ORAL at 09:24

## 2020-01-24 RX ADMIN — HYDROMORPHONE HYDROCHLORIDE 0.5 MILLIGRAM(S): 2 INJECTION INTRAMUSCULAR; INTRAVENOUS; SUBCUTANEOUS at 19:34

## 2020-01-24 RX ADMIN — CITALOPRAM 40 MILLIGRAM(S): 10 TABLET, FILM COATED ORAL at 11:42

## 2020-01-24 RX ADMIN — AMLODIPINE BESYLATE 5 MILLIGRAM(S): 2.5 TABLET ORAL at 20:31

## 2020-01-24 RX ADMIN — HYDROMORPHONE HYDROCHLORIDE 0.5 MILLIGRAM(S): 2 INJECTION INTRAMUSCULAR; INTRAVENOUS; SUBCUTANEOUS at 12:00

## 2020-01-24 RX ADMIN — HYDROMORPHONE HYDROCHLORIDE 4 MILLIGRAM(S): 2 INJECTION INTRAMUSCULAR; INTRAVENOUS; SUBCUTANEOUS at 21:30

## 2020-01-24 RX ADMIN — PREGABALIN 1000 MICROGRAM(S): 225 CAPSULE ORAL at 11:42

## 2020-01-24 RX ADMIN — HYDROMORPHONE HYDROCHLORIDE 0.5 MILLIGRAM(S): 2 INJECTION INTRAMUSCULAR; INTRAVENOUS; SUBCUTANEOUS at 07:39

## 2020-01-24 RX ADMIN — Medication 40 MILLIEQUIVALENT(S): at 12:30

## 2020-01-24 RX ADMIN — ZOLPIDEM TARTRATE 5 MILLIGRAM(S): 10 TABLET ORAL at 23:44

## 2020-01-24 RX ADMIN — HYDROMORPHONE HYDROCHLORIDE 0.5 MILLIGRAM(S): 2 INJECTION INTRAMUSCULAR; INTRAVENOUS; SUBCUTANEOUS at 08:00

## 2020-01-24 RX ADMIN — SENNA PLUS 2 TABLET(S): 8.6 TABLET ORAL at 22:33

## 2020-01-24 RX ADMIN — Medication 40 MILLIEQUIVALENT(S): at 09:24

## 2020-01-24 RX ADMIN — HEPARIN SODIUM 5000 UNIT(S): 5000 INJECTION INTRAVENOUS; SUBCUTANEOUS at 13:24

## 2020-01-24 RX ADMIN — ZOLPIDEM TARTRATE 5 MILLIGRAM(S): 10 TABLET ORAL at 22:34

## 2020-01-24 RX ADMIN — Medication 1 TABLET(S): at 11:42

## 2020-01-24 NOTE — PROGRESS NOTE ADULT - ATTENDING COMMENTS
Domonique Bright   Hospitalist    Possible Neurosurgical intervention on 1/27/20 , pt is RCRI =1 with no CAD, CHF, CVA, renal dysfunction , no CP and no SOB on exertion.  ECG was reviewed .  Patient is currently optimized for surgical intervention .  Pt denies GULSHAN and states that he is not using CPAP and if there is any concern may get pulm evaluation .  NO cymbalta please as pt states that he was on Cymbalta and was experiencing suicidal ideation while on it .        Domonique Prietore   Hospitalist   [phone]

## 2020-01-24 NOTE — PROGRESS NOTE ADULT - ASSESSMENT
69 M PMH chronic back pain s/p multiple lumbar laminectomy/fusion, NPH s/p  shunt, HTN, HLD, anxiety, depression, GULSHAN on CPAP, p/w worsening back pain. 69 M PMH chronic back pain s/p multiple lumbar laminectomy/fusion, NPH s/p  shunt, HTN, HLD, anxiety, depression, ? GULSHAN on CPAP, p/w worsening back pain. IV intact

## 2020-01-24 NOTE — PROGRESS NOTE ADULT - PROBLEM SELECTOR PLAN 2
-mild Juan Miguel on admission; etio unclear but includes but not limited to dec Po intake vs. nsaid related  -strict avoidance of nsaids reviewed with pt  -nonoliguric  -Resolved

## 2020-01-24 NOTE — PROGRESS NOTE ADULT - PROBLEM SELECTOR PLAN 1
-acute on chronic pain in setting of multiple prior Lumbar laminectomy/fusion surgeries.   -XR pelvis without fx, XR L spine with noted chronic L1 deformity  -CT L/T spine with  Lucency (loosening) of the screws at L2-S1 and L5-S1 graft / pt was refusing neurosurgery intervention , but now he is reconsidering surgical option and will contact neurosurgery team    -pain control: Pt mgt rec is appreciated   -PT eval is noted /home PT   -fall prec

## 2020-01-24 NOTE — PROGRESS NOTE ADULT - PROBLEM SELECTOR PLAN 6
-hsq vte ppx  - It is reported tat pt was offerred tamiflu renally dosed for PPX given his exposure to roomate who was dx with flu.  He is refusing, states he does not believe his roomate actually has the flu as reported .  Importance of ppx reviewed and risks of forgoing ppx in documented exposures reviewed, patient again refusing.

## 2020-01-24 NOTE — CHART NOTE - NSCHARTNOTEFT_GEN_A_CORE
Asked to evaluate Pt with back pain   Pt seen and examined  found , twisting and turning in bed , stating "  I cannot be on the clock for pain medications , I am  in excruciating pain "  Unable to perform full exam  since Pt appeared to be very uncomfortable   Pt not due for his Po dilaudid  for another 45 min   Reviewed Pain management note , d/w DR Heaton , added 0.5 mg if IV dilaudid  x 1    please minimize the use of IV pain meds   Pt sign out to night covering team   nikolas chaidez NP-C Asked to evaluate Pt with back pain   Pt seen and examined  found , twisting and turning in bed , stating "  I cannot be on the clock for pain medications , I am  in excruciating pain "  Unable to perform full exam  since Pt appeared to be very uncomfortable   Pt not due for his Po dilaudid  for another 45 min   Reviewed Pain management note , d/w DR Heaton , added 0.5 mg if IV dilaudid  x 1    please minimize the use of IV pain meds   Pt sign out to night covering team   nikolas chaidez NP-C    NP medicine  at 2007: Pt's appearance ( Very red  and  anxious )and behaviour concerning for  with drawal  will add Urine Tox .  nikolas chaidez NP-C

## 2020-01-24 NOTE — CHART NOTE - NSCHARTNOTEFT_GEN_A_CORE
The neurosurgical team discussed options for treating his back pain this morning.  Patient has elected to have surgery on Monday.   -please document medical clearance

## 2020-01-24 NOTE — PROGRESS NOTE ADULT - SUBJECTIVE AND OBJECTIVE BOX
Patient is a 69y old  Male who presents with a chief complaint of Lumbar back pain (23 Jan 2020 19:11)      SUBJECTIVE / OVERNIGHT EVENTS:    Patient was seen  by the pain mgt team .   As reported , patient states that he would like to talk to the surgery team and possible surgery       ADDITIONAL REVIEW OF SYSTEMS: Negative except for above    MEDICATIONS  (STANDING):  amLODIPine   Tablet 5 milliGRAM(s) Oral <User Schedule>  cholecalciferol 1000 Unit(s) Oral daily  citalopram 40 milliGRAM(s) Oral daily  cyanocobalamin 1000 MICROGram(s) Oral daily  heparin  Injectable 5000 Unit(s) SubCutaneous every 8 hours  multivitamin 1 Tablet(s) Oral daily  polyethylene glycol 3350 17 Gram(s) Oral daily  potassium chloride    Tablet ER 40 milliEquivalent(s) Oral once  senna 2 Tablet(s) Oral at bedtime  tamsulosin 0.4 milliGRAM(s) Oral at bedtime  traZODone 200 milliGRAM(s) Oral at bedtime    MEDICATIONS  (PRN):  acetaminophen   Tablet .. 650 milliGRAM(s) Oral every 6 hours PRN Temp greater or equal to 38C (100.4F), Mild Pain (1 - 3), Moderate Pain (4 - 6), Severe Pain (7 - 10)  ALPRAZolam 1 milliGRAM(s) Oral every 6 hours PRN anxiety  bisacodyl Suppository 10 milliGRAM(s) Rectal once PRN Constipation  HYDROmorphone  Injectable 0.5 milliGRAM(s) IV Push every 4 hours PRN breakthrough pain  oxyCODONE    IR 5 milliGRAM(s) Oral every 4 hours PRN moderate or severe pain  zolpidem 5 milliGRAM(s) Oral at bedtime PRN Insomnia  zolpidem 5 milliGRAM(s) Oral at bedtime PRN Insomnia      CAPILLARY BLOOD GLUCOSE        I&O's Summary    23 Jan 2020 07:01  -  24 Jan 2020 07:00  --------------------------------------------------------  IN: 540 mL / OUT: 750 mL / NET: -210 mL    24 Jan 2020 07:01  -  24 Jan 2020 12:03  --------------------------------------------------------  IN: 0 mL / OUT: 200 mL / NET: -200 mL        PHYSICAL EXAM:  Vital Signs Last 24 Hrs  T(C): 36.8 (24 Jan 2020 05:50), Max: 37.3 (23 Jan 2020 13:29)  T(F): 98.3 (24 Jan 2020 05:50), Max: 99.1 (23 Jan 2020 13:29)  HR: 75 (24 Jan 2020 05:50) (68 - 75)  BP: 120/82 (24 Jan 2020 05:50) (120/82 - 154/72)  BP(mean): --  RR: 18 (24 Jan 2020 05:50) (18 - 20)  SpO2: 95% (24 Jan 2020 05:50) (94% - 97%)    PHYSICAL EXAM:  GENERAL: NAD, well-developed  HEAD:  Atraumatic, Normocephalic  EYES:  conjunctiva and sclera clear  NECK: Supple, No JVD  CHEST/LUNG: Clear to auscultation bilaterally; No wheeze  HEART: Regular rate and rhythm; No murmurs, rubs, or gallops  ABDOMEN: Soft, Nontender, Nondistended; Bowel sounds present  EXTREMITIES:  2+ Peripheral Pulses, No clubbing, cyanosis, or edema  PSYCH: AAOx3      LABS:                        12.4   7.72  )-----------( 304      ( 24 Jan 2020 08:38 )             40.2     01-24    140  |  102  |  15  ----------------------------<  107<H>  3.2<L>   |  27  |  1.17    Ca    8.8      24 Jan 2020 06:24    TPro  6.7  /  Alb  3.9  /  TBili  0.4  /  DBili  x   /  AST  6<L>  /  ALT  10  /  AlkPhos  69  01-24    PT/INR - ( 22 Jan 2020 23:03 )   PT: 12.9 sec;   INR: 1.12 ratio         PTT - ( 22 Jan 2020 23:03 )  PTT:33.5 sec            RADIOLOGY & ADDITIONAL TESTS:    Imaging Personally Reviewed:    Electrocardiogram Personally Reviewed:    COORDINATION OF CARE:  Care Discussed with Consultants/Other Providers [Y/N]:  Prior or Outpatient Records Reviewed [Y/N]:

## 2020-01-25 DIAGNOSIS — Z01.818 ENCOUNTER FOR OTHER PREPROCEDURAL EXAMINATION: ICD-10-CM

## 2020-01-25 LAB
AMPHET UR-MCNC: NEGATIVE — SIGNIFICANT CHANGE UP
ANION GAP SERPL CALC-SCNC: 12 MMOL/L — SIGNIFICANT CHANGE UP (ref 5–17)
BARBITURATES UR SCN-MCNC: NEGATIVE — SIGNIFICANT CHANGE UP
BENZODIAZ UR-MCNC: POSITIVE
BUN SERPL-MCNC: 15 MG/DL — SIGNIFICANT CHANGE UP (ref 7–23)
CALCIUM SERPL-MCNC: 9.1 MG/DL — SIGNIFICANT CHANGE UP (ref 8.4–10.5)
CHLORIDE SERPL-SCNC: 104 MMOL/L — SIGNIFICANT CHANGE UP (ref 96–108)
CO2 SERPL-SCNC: 25 MMOL/L — SIGNIFICANT CHANGE UP (ref 22–31)
COCAINE METAB.OTHER UR-MCNC: NEGATIVE — SIGNIFICANT CHANGE UP
CREAT SERPL-MCNC: 1.02 MG/DL — SIGNIFICANT CHANGE UP (ref 0.5–1.3)
GLUCOSE SERPL-MCNC: 112 MG/DL — HIGH (ref 70–99)
HCT VFR BLD CALC: 41.4 % — SIGNIFICANT CHANGE UP (ref 39–50)
HGB BLD-MCNC: 12.7 G/DL — LOW (ref 13–17)
MAGNESIUM SERPL-MCNC: 2.3 MG/DL — SIGNIFICANT CHANGE UP (ref 1.6–2.6)
MCHC RBC-ENTMCNC: 25.7 PG — LOW (ref 27–34)
MCHC RBC-ENTMCNC: 30.7 GM/DL — LOW (ref 32–36)
MCV RBC AUTO: 83.8 FL — SIGNIFICANT CHANGE UP (ref 80–100)
METHADONE UR-MCNC: NEGATIVE — SIGNIFICANT CHANGE UP
OPIATES UR-MCNC: POSITIVE
OXYCODONE UR-MCNC: POSITIVE
PCP UR-MCNC: NEGATIVE — SIGNIFICANT CHANGE UP
PLATELET # BLD AUTO: 303 K/UL — SIGNIFICANT CHANGE UP (ref 150–400)
POTASSIUM SERPL-MCNC: 3.6 MMOL/L — SIGNIFICANT CHANGE UP (ref 3.5–5.3)
POTASSIUM SERPL-SCNC: 3.6 MMOL/L — SIGNIFICANT CHANGE UP (ref 3.5–5.3)
RBC # BLD: 4.94 M/UL — SIGNIFICANT CHANGE UP (ref 4.2–5.8)
RBC # FLD: 14.9 % — HIGH (ref 10.3–14.5)
SODIUM SERPL-SCNC: 141 MMOL/L — SIGNIFICANT CHANGE UP (ref 135–145)
THC UR QL: POSITIVE
WBC # BLD: 8.78 K/UL — SIGNIFICANT CHANGE UP (ref 3.8–10.5)
WBC # FLD AUTO: 8.78 K/UL — SIGNIFICANT CHANGE UP (ref 3.8–10.5)

## 2020-01-25 PROCEDURE — 71045 X-RAY EXAM CHEST 1 VIEW: CPT | Mod: 26

## 2020-01-25 PROCEDURE — 93010 ELECTROCARDIOGRAM REPORT: CPT

## 2020-01-25 PROCEDURE — 99233 SBSQ HOSP IP/OBS HIGH 50: CPT

## 2020-01-25 RX ORDER — TRAZODONE HCL 50 MG
100 TABLET ORAL DAILY
Refills: 0 | Status: DISCONTINUED | OUTPATIENT
Start: 2020-01-25 | End: 2020-01-28

## 2020-01-25 RX ORDER — POTASSIUM CHLORIDE 20 MEQ
20 PACKET (EA) ORAL ONCE
Refills: 0 | Status: COMPLETED | OUTPATIENT
Start: 2020-01-25 | End: 2020-01-25

## 2020-01-25 RX ORDER — POLYETHYLENE GLYCOL 3350 17 G/17G
17 POWDER, FOR SOLUTION ORAL
Refills: 0 | Status: DISCONTINUED | OUTPATIENT
Start: 2020-01-25 | End: 2020-01-28

## 2020-01-25 RX ADMIN — AMLODIPINE BESYLATE 5 MILLIGRAM(S): 2.5 TABLET ORAL at 08:36

## 2020-01-25 RX ADMIN — Medication 200 MILLIGRAM(S): at 21:17

## 2020-01-25 RX ADMIN — HYDROMORPHONE HYDROCHLORIDE 4 MILLIGRAM(S): 2 INJECTION INTRAMUSCULAR; INTRAVENOUS; SUBCUTANEOUS at 09:05

## 2020-01-25 RX ADMIN — ZOLPIDEM TARTRATE 5 MILLIGRAM(S): 10 TABLET ORAL at 22:31

## 2020-01-25 RX ADMIN — Medication 1 MILLIGRAM(S): at 08:30

## 2020-01-25 RX ADMIN — HYDROMORPHONE HYDROCHLORIDE 4 MILLIGRAM(S): 2 INJECTION INTRAMUSCULAR; INTRAVENOUS; SUBCUTANEOUS at 02:35

## 2020-01-25 RX ADMIN — HYDROMORPHONE HYDROCHLORIDE 2 MILLIGRAM(S): 2 INJECTION INTRAMUSCULAR; INTRAVENOUS; SUBCUTANEOUS at 21:15

## 2020-01-25 RX ADMIN — Medication 20 MILLIEQUIVALENT(S): at 13:25

## 2020-01-25 RX ADMIN — Medication 1 MILLIGRAM(S): at 14:40

## 2020-01-25 RX ADMIN — POLYETHYLENE GLYCOL 3350 17 GRAM(S): 17 POWDER, FOR SOLUTION ORAL at 13:26

## 2020-01-25 RX ADMIN — AMLODIPINE BESYLATE 5 MILLIGRAM(S): 2.5 TABLET ORAL at 21:17

## 2020-01-25 RX ADMIN — HYDROMORPHONE HYDROCHLORIDE 4 MILLIGRAM(S): 2 INJECTION INTRAMUSCULAR; INTRAVENOUS; SUBCUTANEOUS at 03:00

## 2020-01-25 RX ADMIN — HYDROMORPHONE HYDROCHLORIDE 2 MILLIGRAM(S): 2 INJECTION INTRAMUSCULAR; INTRAVENOUS; SUBCUTANEOUS at 17:30

## 2020-01-25 RX ADMIN — HEPARIN SODIUM 5000 UNIT(S): 5000 INJECTION INTRAVENOUS; SUBCUTANEOUS at 21:17

## 2020-01-25 RX ADMIN — HYDROMORPHONE HYDROCHLORIDE 2 MILLIGRAM(S): 2 INJECTION INTRAMUSCULAR; INTRAVENOUS; SUBCUTANEOUS at 21:45

## 2020-01-25 RX ADMIN — HYDROMORPHONE HYDROCHLORIDE 2 MILLIGRAM(S): 2 INJECTION INTRAMUSCULAR; INTRAVENOUS; SUBCUTANEOUS at 12:52

## 2020-01-25 RX ADMIN — CITALOPRAM 40 MILLIGRAM(S): 10 TABLET, FILM COATED ORAL at 13:24

## 2020-01-25 RX ADMIN — TAMSULOSIN HYDROCHLORIDE 0.4 MILLIGRAM(S): 0.4 CAPSULE ORAL at 21:17

## 2020-01-25 RX ADMIN — Medication 100 MILLIGRAM(S): at 13:25

## 2020-01-25 RX ADMIN — SENNA PLUS 2 TABLET(S): 8.6 TABLET ORAL at 21:17

## 2020-01-25 RX ADMIN — HYDROMORPHONE HYDROCHLORIDE 4 MILLIGRAM(S): 2 INJECTION INTRAMUSCULAR; INTRAVENOUS; SUBCUTANEOUS at 08:35

## 2020-01-25 RX ADMIN — Medication 1000 UNIT(S): at 13:24

## 2020-01-25 RX ADMIN — HYDROMORPHONE HYDROCHLORIDE 2 MILLIGRAM(S): 2 INJECTION INTRAMUSCULAR; INTRAVENOUS; SUBCUTANEOUS at 13:22

## 2020-01-25 RX ADMIN — ZOLPIDEM TARTRATE 5 MILLIGRAM(S): 10 TABLET ORAL at 21:17

## 2020-01-25 RX ADMIN — HEPARIN SODIUM 5000 UNIT(S): 5000 INJECTION INTRAVENOUS; SUBCUTANEOUS at 06:26

## 2020-01-25 RX ADMIN — HEPARIN SODIUM 5000 UNIT(S): 5000 INJECTION INTRAVENOUS; SUBCUTANEOUS at 16:04

## 2020-01-25 RX ADMIN — HYDROMORPHONE HYDROCHLORIDE 2 MILLIGRAM(S): 2 INJECTION INTRAMUSCULAR; INTRAVENOUS; SUBCUTANEOUS at 17:00

## 2020-01-25 NOTE — PROGRESS NOTE ADULT - ASSESSMENT
69 M PMH chronic back pain s/p multiple lumbar laminectomy/fusion, NPH s/p  shunt, HTN, HLD, anxiety, depression, ? GULSHAN on CPAP, p/w worsening back pain.

## 2020-01-25 NOTE — PROGRESS NOTE ADULT - SUBJECTIVE AND OBJECTIVE BOX
Patient is a 69y old  Male who presents with a chief complaint of Lumbar back pain (24 Jan 2020 12:02)      SUBJECTIVE / OVERNIGHT EVENTS:    MEDICATIONS  (STANDING):  amLODIPine   Tablet 5 milliGRAM(s) Oral <User Schedule>  cholecalciferol 1000 Unit(s) Oral daily  citalopram 40 milliGRAM(s) Oral daily  cyanocobalamin 1000 MICROGram(s) Oral daily  heparin  Injectable 5000 Unit(s) SubCutaneous every 8 hours  multivitamin 1 Tablet(s) Oral daily  polyethylene glycol 3350 17 Gram(s) Oral daily  potassium chloride    Tablet ER 20 milliEquivalent(s) Oral once  senna 2 Tablet(s) Oral at bedtime  tamsulosin 0.4 milliGRAM(s) Oral at bedtime  traZODone 200 milliGRAM(s) Oral at bedtime    MEDICATIONS  (PRN):  acetaminophen   Tablet .. 650 milliGRAM(s) Oral every 6 hours PRN Temp greater or equal to 38C (100.4F), Mild Pain (1 - 3), Moderate Pain (4 - 6), Severe Pain (7 - 10)  ALPRAZolam 1 milliGRAM(s) Oral every 6 hours PRN anxiety  bisacodyl Suppository 10 milliGRAM(s) Rectal once PRN Constipation  HYDROmorphone   Tablet 2 milliGRAM(s) Oral every 4 hours PRN Moderate Pain (4 - 6)  HYDROmorphone   Tablet 4 milliGRAM(s) Oral every 6 hours PRN Severe Pain (7 - 10)  zolpidem 5 milliGRAM(s) Oral at bedtime PRN Insomnia  zolpidem 5 milliGRAM(s) Oral at bedtime PRN Insomnia      Vital Signs Last 24 Hrs  T(C): 36.6 (25 Jan 2020 04:15), Max: 37.1 (24 Jan 2020 16:21)  T(F): 97.8 (25 Jan 2020 04:15), Max: 98.7 (24 Jan 2020 16:21)  HR: 70 (25 Jan 2020 04:15) (69 - 73)  BP: 160/72 (25 Jan 2020 08:30) (126/75 - 160/72)  BP(mean): --  RR: 18 (25 Jan 2020 04:15) (17 - 18)  SpO2: 93% (25 Jan 2020 04:15) (93% - 94%)  CAPILLARY BLOOD GLUCOSE        I&O's Summary    24 Jan 2020 07:01  -  25 Jan 2020 07:00  --------------------------------------------------------  IN: 570 mL / OUT: 800 mL / NET: -230 mL        PHYSICAL EXAM:  GENERAL: NAD, well-developed  HEAD:  Atraumatic, Normocephalic  EYES: EOMI, PERRLA, conjunctiva and sclera clear  NECK: Supple, No JVD  CHEST/LUNG: Clear to auscultation bilaterally; No wheeze  HEART: Regular rate and rhythm; No murmurs, rubs, or gallops  ABDOMEN: Soft, Nontender, Nondistended; Bowel sounds present  EXTREMITIES:  2+ Peripheral Pulses, No clubbing, cyanosis, or edema  PSYCH: AAOx3  NEUROLOGY: non-focal  SKIN: No rashes or lesions    LABS:                        12.4   7.72  )-----------( 304      ( 24 Jan 2020 08:38 )             40.2     01-25    141  |  104  |  15  ----------------------------<  112<H>  3.6   |  25  |  1.02    Ca    9.1      25 Jan 2020 06:31  Mg     2.3     01-25    TPro  6.7  /  Alb  3.9  /  TBili  0.4  /  DBili  x   /  AST  6<L>  /  ALT  10  /  AlkPhos  69  01-24              RADIOLOGY & ADDITIONAL TESTS:    Imaging Personally Reviewed:    Consultant(s) Notes Reviewed:      Care Discussed with Consultants/Other Providers: Patient is a 69y old  Male who presents with a chief complaint of Lumbar back pain (24 Jan 2020 12:02)      SUBJECTIVE / OVERNIGHT EVENTS: Pt seen and examined. No acute events overnight. He reports that back pain is fairly well controlled on current pain medication. Does c/o poor sleep and anxiety.    MEDICATIONS  (STANDING):  amLODIPine   Tablet 5 milliGRAM(s) Oral <User Schedule>  cholecalciferol 1000 Unit(s) Oral daily  citalopram 40 milliGRAM(s) Oral daily  cyanocobalamin 1000 MICROGram(s) Oral daily  heparin  Injectable 5000 Unit(s) SubCutaneous every 8 hours  multivitamin 1 Tablet(s) Oral daily  polyethylene glycol 3350 17 Gram(s) Oral daily  potassium chloride    Tablet ER 20 milliEquivalent(s) Oral once  senna 2 Tablet(s) Oral at bedtime  tamsulosin 0.4 milliGRAM(s) Oral at bedtime  traZODone 200 milliGRAM(s) Oral at bedtime    MEDICATIONS  (PRN):  acetaminophen   Tablet .. 650 milliGRAM(s) Oral every 6 hours PRN Temp greater or equal to 38C (100.4F), Mild Pain (1 - 3), Moderate Pain (4 - 6), Severe Pain (7 - 10)  ALPRAZolam 1 milliGRAM(s) Oral every 6 hours PRN anxiety  bisacodyl Suppository 10 milliGRAM(s) Rectal once PRN Constipation  HYDROmorphone   Tablet 2 milliGRAM(s) Oral every 4 hours PRN Moderate Pain (4 - 6)  HYDROmorphone   Tablet 4 milliGRAM(s) Oral every 6 hours PRN Severe Pain (7 - 10)  zolpidem 5 milliGRAM(s) Oral at bedtime PRN Insomnia  zolpidem 5 milliGRAM(s) Oral at bedtime PRN Insomnia      Vital Signs Last 24 Hrs  T(C): 36.6 (25 Jan 2020 04:15), Max: 37.1 (24 Jan 2020 16:21)  T(F): 97.8 (25 Jan 2020 04:15), Max: 98.7 (24 Jan 2020 16:21)  HR: 70 (25 Jan 2020 04:15) (69 - 73)  BP: 160/72 (25 Jan 2020 08:30) (126/75 - 160/72)  BP(mean): --  RR: 18 (25 Jan 2020 04:15) (17 - 18)  SpO2: 93% (25 Jan 2020 04:15) (93% - 94%)  CAPILLARY BLOOD GLUCOSE        I&O's Summary    24 Jan 2020 07:01  -  25 Jan 2020 07:00  --------------------------------------------------------  IN: 570 mL / OUT: 800 mL / NET: -230 mL        PHYSICAL EXAM:  GENERAL: NAD, anicteric, afebrile  HEAD:  Atraumatic, Normocephalic  EYES: EOMI, PERRLA, conjunctiva and sclera clear  NECK: Supple, No JVD  CHEST/LUNG: Clear to auscultation bilaterally; No wheeze  HEART: Regular rate and rhythm; No murmurs, rubs, or gallops  ABDOMEN: Soft, Nontender, Nondistended; Bowel sounds present  EXTREMITIES:  2+ Peripheral Pulses, No clubbing, cyanosis, or edema  PSYCH: anxious  NEUROLOGY: AAOx3, non-focal  SKIN: No rashes or lesions    LABS:                        12.4   7.72  )-----------( 304      ( 24 Jan 2020 08:38 )             40.2     01-25    141  |  104  |  15  ----------------------------<  112<H>  3.6   |  25  |  1.02    Ca    9.1      25 Jan 2020 06:31  Mg     2.3     01-25    TPro  6.7  /  Alb  3.9  /  TBili  0.4  /  DBili  x   /  AST  6<L>  /  ALT  10  /  AlkPhos  69  01-24                Consultant(s) Notes Reviewed:  Neuro Sx

## 2020-01-25 NOTE — PROGRESS NOTE ADULT - PROBLEM SELECTOR PLAN 2
-mild Juan Miguel on admission likely 2/2 decreased Po intake vs. nsaid related  -resolved ; Serum Cr 1.0  - monitor bmp

## 2020-01-25 NOTE — PROGRESS NOTE ADULT - PROBLEM SELECTOR PLAN 6
RCRI : 0   CLASS 1 risk of perioperative  cardiac events with a risk percentage of 0.4%    METS ~ 4 ( expected functional status given age)  Denies CP, SOB on exertion  No absolute contraindications   Low risk procedure ; laminectomy    Based on current ACC/AHA guidelines, pt history and physical exam , the pt is considered to have low risk from a cardiovascular standpoint for planned procedure.    Check EKG and CXR

## 2020-01-26 LAB
ANION GAP SERPL CALC-SCNC: 11 MMOL/L — SIGNIFICANT CHANGE UP (ref 5–17)
BUN SERPL-MCNC: 18 MG/DL — SIGNIFICANT CHANGE UP (ref 7–23)
CALCIUM SERPL-MCNC: 9.3 MG/DL — SIGNIFICANT CHANGE UP (ref 8.4–10.5)
CHLORIDE SERPL-SCNC: 104 MMOL/L — SIGNIFICANT CHANGE UP (ref 96–108)
CO2 SERPL-SCNC: 26 MMOL/L — SIGNIFICANT CHANGE UP (ref 22–31)
CREAT SERPL-MCNC: 1.13 MG/DL — SIGNIFICANT CHANGE UP (ref 0.5–1.3)
GLUCOSE SERPL-MCNC: 120 MG/DL — HIGH (ref 70–99)
POTASSIUM SERPL-MCNC: 3.8 MMOL/L — SIGNIFICANT CHANGE UP (ref 3.5–5.3)
POTASSIUM SERPL-SCNC: 3.8 MMOL/L — SIGNIFICANT CHANGE UP (ref 3.5–5.3)
SODIUM SERPL-SCNC: 141 MMOL/L — SIGNIFICANT CHANGE UP (ref 135–145)

## 2020-01-26 PROCEDURE — 99233 SBSQ HOSP IP/OBS HIGH 50: CPT

## 2020-01-26 RX ADMIN — Medication 1 TABLET(S): at 12:26

## 2020-01-26 RX ADMIN — Medication 1 MILLIGRAM(S): at 05:08

## 2020-01-26 RX ADMIN — Medication 1000 UNIT(S): at 12:26

## 2020-01-26 RX ADMIN — HEPARIN SODIUM 5000 UNIT(S): 5000 INJECTION INTRAVENOUS; SUBCUTANEOUS at 05:08

## 2020-01-26 RX ADMIN — PREGABALIN 1000 MICROGRAM(S): 225 CAPSULE ORAL at 12:26

## 2020-01-26 RX ADMIN — HYDROMORPHONE HYDROCHLORIDE 2 MILLIGRAM(S): 2 INJECTION INTRAMUSCULAR; INTRAVENOUS; SUBCUTANEOUS at 19:00

## 2020-01-26 RX ADMIN — TAMSULOSIN HYDROCHLORIDE 0.4 MILLIGRAM(S): 0.4 CAPSULE ORAL at 21:26

## 2020-01-26 RX ADMIN — HEPARIN SODIUM 5000 UNIT(S): 5000 INJECTION INTRAVENOUS; SUBCUTANEOUS at 21:25

## 2020-01-26 RX ADMIN — Medication 1 MILLIGRAM(S): at 20:24

## 2020-01-26 RX ADMIN — POLYETHYLENE GLYCOL 3350 17 GRAM(S): 17 POWDER, FOR SOLUTION ORAL at 05:08

## 2020-01-26 RX ADMIN — HYDROMORPHONE HYDROCHLORIDE 2 MILLIGRAM(S): 2 INJECTION INTRAMUSCULAR; INTRAVENOUS; SUBCUTANEOUS at 18:46

## 2020-01-26 RX ADMIN — HYDROMORPHONE HYDROCHLORIDE 2 MILLIGRAM(S): 2 INJECTION INTRAMUSCULAR; INTRAVENOUS; SUBCUTANEOUS at 08:30

## 2020-01-26 RX ADMIN — HYDROMORPHONE HYDROCHLORIDE 2 MILLIGRAM(S): 2 INJECTION INTRAMUSCULAR; INTRAVENOUS; SUBCUTANEOUS at 14:00

## 2020-01-26 RX ADMIN — ZOLPIDEM TARTRATE 5 MILLIGRAM(S): 10 TABLET ORAL at 23:20

## 2020-01-26 RX ADMIN — HYDROMORPHONE HYDROCHLORIDE 2 MILLIGRAM(S): 2 INJECTION INTRAMUSCULAR; INTRAVENOUS; SUBCUTANEOUS at 13:37

## 2020-01-26 RX ADMIN — Medication 200 MILLIGRAM(S): at 21:27

## 2020-01-26 RX ADMIN — AMLODIPINE BESYLATE 5 MILLIGRAM(S): 2.5 TABLET ORAL at 21:27

## 2020-01-26 RX ADMIN — HEPARIN SODIUM 5000 UNIT(S): 5000 INJECTION INTRAVENOUS; SUBCUTANEOUS at 12:27

## 2020-01-26 RX ADMIN — ZOLPIDEM TARTRATE 5 MILLIGRAM(S): 10 TABLET ORAL at 21:27

## 2020-01-26 RX ADMIN — HYDROMORPHONE HYDROCHLORIDE 2 MILLIGRAM(S): 2 INJECTION INTRAMUSCULAR; INTRAVENOUS; SUBCUTANEOUS at 08:05

## 2020-01-26 RX ADMIN — Medication 100 MILLIGRAM(S): at 12:26

## 2020-01-26 RX ADMIN — AMLODIPINE BESYLATE 5 MILLIGRAM(S): 2.5 TABLET ORAL at 12:26

## 2020-01-26 RX ADMIN — CITALOPRAM 40 MILLIGRAM(S): 10 TABLET, FILM COATED ORAL at 12:26

## 2020-01-26 RX ADMIN — Medication 1 MILLIGRAM(S): at 14:35

## 2020-01-26 NOTE — PROGRESS NOTE ADULT - ASSESSMENT
Teto Perdomo  69M with extensive lumbar surgery hx and chronic pain hx including ketamine infusions (most recent revision fusion at [facility] meka/ Meche Hoyos 06/2019) presents for chronic non-radicular back pain  - CT Scan shows multiple areas of hardware loosening and pseudoarthrosis, compression fx unchanged since 2016  - standing XR done  - Plan for OR Tuesday for revision of fusion, T10-Pelvis

## 2020-01-26 NOTE — PROGRESS NOTE ADULT - SUBJECTIVE AND OBJECTIVE BOX
Patient is a 69y old  Male who presents with a chief complaint of Lumbar back pain (25 Jan 2020 09:59)      SUBJECTIVE / OVERNIGHT EVENTS:    MEDICATIONS  (STANDING):  amLODIPine   Tablet 5 milliGRAM(s) Oral <User Schedule>  cholecalciferol 1000 Unit(s) Oral daily  citalopram 40 milliGRAM(s) Oral daily  cyanocobalamin 1000 MICROGram(s) Oral daily  heparin  Injectable 5000 Unit(s) SubCutaneous every 8 hours  multivitamin 1 Tablet(s) Oral daily  polyethylene glycol 3350 17 Gram(s) Oral two times a day  senna 2 Tablet(s) Oral at bedtime  tamsulosin 0.4 milliGRAM(s) Oral at bedtime  traZODone 200 milliGRAM(s) Oral at bedtime  traZODone 100 milliGRAM(s) Oral daily    MEDICATIONS  (PRN):  acetaminophen   Tablet .. 650 milliGRAM(s) Oral every 6 hours PRN Temp greater or equal to 38C (100.4F), Mild Pain (1 - 3), Moderate Pain (4 - 6), Severe Pain (7 - 10)  ALPRAZolam 1 milliGRAM(s) Oral every 6 hours PRN anxiety  bisacodyl Suppository 10 milliGRAM(s) Rectal once PRN Constipation  HYDROmorphone   Tablet 2 milliGRAM(s) Oral every 4 hours PRN Moderate Pain (4 - 6)  HYDROmorphone   Tablet 4 milliGRAM(s) Oral every 6 hours PRN Severe Pain (7 - 10)  zolpidem 5 milliGRAM(s) Oral at bedtime PRN Insomnia  zolpidem 5 milliGRAM(s) Oral at bedtime PRN Insomnia      Vital Signs Last 24 Hrs  T(C): 36.9 (26 Jan 2020 04:50), Max: 36.9 (25 Jan 2020 13:49)  T(F): 98.4 (26 Jan 2020 04:50), Max: 98.5 (25 Jan 2020 13:49)  HR: 92 (26 Jan 2020 06:07) (72 - 128)  BP: 107/67 (26 Jan 2020 06:07) (107/67 - 155/84)  BP(mean): --  RR: 18 (26 Jan 2020 04:50) (18 - 18)  SpO2: 93% (26 Jan 2020 04:50) (93% - 98%)  CAPILLARY BLOOD GLUCOSE        I&O's Summary    25 Jan 2020 07:01  -  26 Jan 2020 07:00  --------------------------------------------------------  IN: 0 mL / OUT: 150 mL / NET: -150 mL        PHYSICAL EXAM:  GENERAL: NAD, well-developed  HEAD:  Atraumatic, Normocephalic  EYES: EOMI, PERRLA, conjunctiva and sclera clear  NECK: Supple, No JVD  CHEST/LUNG: Clear to auscultation bilaterally; No wheeze  HEART: Regular rate and rhythm; No murmurs, rubs, or gallops  ABDOMEN: Soft, Nontender, Nondistended; Bowel sounds present  EXTREMITIES:  2+ Peripheral Pulses, No clubbing, cyanosis, or edema  PSYCH: AAOx3  NEUROLOGY: non-focal  SKIN: No rashes or lesions    LABS:                        12.7   8.78  )-----------( 303      ( 25 Jan 2020 09:20 )             41.4     01-26    141  |  104  |  18  ----------------------------<  120<H>  3.8   |  26  |  1.13    Ca    9.3      26 Jan 2020 07:02  Mg     2.3     01-25                RADIOLOGY & ADDITIONAL TESTS:    Imaging Personally Reviewed:    Consultant(s) Notes Reviewed:      Care Discussed with Consultants/Other Providers: Patient is a 69y old  Male who presents with a chief complaint of Lumbar back pain (25 Jan 2020 09:59)      SUBJECTIVE / OVERNIGHT EVENTS:  Pt seen and examined. No acute events overnight. c/o anxiety on waking up this morning. Also c/o back pain ; fairly well controlled on current pain regimen.    MEDICATIONS  (STANDING):  amLODIPine   Tablet 5 milliGRAM(s) Oral <User Schedule>  cholecalciferol 1000 Unit(s) Oral daily  citalopram 40 milliGRAM(s) Oral daily  cyanocobalamin 1000 MICROGram(s) Oral daily  heparin  Injectable 5000 Unit(s) SubCutaneous every 8 hours  multivitamin 1 Tablet(s) Oral daily  polyethylene glycol 3350 17 Gram(s) Oral two times a day  senna 2 Tablet(s) Oral at bedtime  tamsulosin 0.4 milliGRAM(s) Oral at bedtime  traZODone 200 milliGRAM(s) Oral at bedtime  traZODone 100 milliGRAM(s) Oral daily    MEDICATIONS  (PRN):  acetaminophen   Tablet .. 650 milliGRAM(s) Oral every 6 hours PRN Temp greater or equal to 38C (100.4F), Mild Pain (1 - 3), Moderate Pain (4 - 6), Severe Pain (7 - 10)  ALPRAZolam 1 milliGRAM(s) Oral every 6 hours PRN anxiety  bisacodyl Suppository 10 milliGRAM(s) Rectal once PRN Constipation  HYDROmorphone   Tablet 2 milliGRAM(s) Oral every 4 hours PRN Moderate Pain (4 - 6)  HYDROmorphone   Tablet 4 milliGRAM(s) Oral every 6 hours PRN Severe Pain (7 - 10)  zolpidem 5 milliGRAM(s) Oral at bedtime PRN Insomnia  zolpidem 5 milliGRAM(s) Oral at bedtime PRN Insomnia      Vital Signs Last 24 Hrs  T(C): 36.9 (26 Jan 2020 04:50), Max: 36.9 (25 Jan 2020 13:49)  T(F): 98.4 (26 Jan 2020 04:50), Max: 98.5 (25 Jan 2020 13:49)  HR: 92 (26 Jan 2020 06:07) (72 - 128)  BP: 107/67 (26 Jan 2020 06:07) (107/67 - 155/84)  BP(mean): --  RR: 18 (26 Jan 2020 04:50) (18 - 18)  SpO2: 93% (26 Jan 2020 04:50) (93% - 98%)  CAPILLARY BLOOD GLUCOSE        I&O's Summary    25 Jan 2020 07:01  -  26 Jan 2020 07:00  --------------------------------------------------------  IN: 0 mL / OUT: 150 mL / NET: -150 mL        PHYSICAL EXAM:  GENERAL: NAD, anicteric, afebrile  HEAD:  Atraumatic, Normocephalic  EYES: EOMI, PERRLA, conjunctiva and sclera clear  NECK: Supple, No JVD  CHEST/LUNG: Clear to auscultation bilaterally; No wheeze  HEART: Regular rate and rhythm; No murmurs, rubs, or gallops  ABDOMEN: Soft, Nontender, Nondistended; Bowel sounds present  EXTREMITIES:  2+ Peripheral Pulses, No clubbing, cyanosis, or edema  PSYCH: anxious  NEUROLOGY: AAOx3, non-focal  SKIN: No rashes or lesions      LABS:                        12.7   8.78  )-----------( 303      ( 25 Jan 2020 09:20 )             41.4     01-26    141  |  104  |  18  ----------------------------<  120<H>  3.8   |  26  |  1.13    Ca    9.3      26 Jan 2020 07:02  Mg     2.3     01-25

## 2020-01-26 NOTE — PROVIDER CONTACT NOTE (OTHER) - BACKGROUND
Pt admitted for worsening back pain. CT scan shows multiple areas of hardware loosening and pseudo arthrosis. PMH compression fx.

## 2020-01-26 NOTE — PROGRESS NOTE ADULT - PROBLEM SELECTOR PLAN 6
RCRI : 0   CLASS 1 risk of perioperative  cardiac events with a risk percentage of 0.4%    METS ~ 4 ( expected functional status given age)  Denies CP, SOB on exertion  No absolute contraindications   Low risk procedure ; revision of fusion, T10-Pelvis    Based on current ACC/AHA guidelines, pt history and physical exam , the pt is considered to have low risk from a cardiovascular standpoint for planned procedure.    CXR shows no acute pathology  EKG pending

## 2020-01-26 NOTE — PROGRESS NOTE ADULT - PROBLEM SELECTOR PLAN 1
-acute on chronic pain in setting of multiple prior lumbar laminectomy/fusion surgeries.   -CT L/T spine with lucency (loosening) of the screws at L2-S1 and L5-S1 graft   - Planned for OR Tuesday for revision of fusion, T10-Pelvis; see pre- op assessment below  -PT reccs home PT   - c/w current pain control regimen  -fall precautions  - Neuro sx reccs appreciated.

## 2020-01-26 NOTE — PROVIDER CONTACT NOTE (OTHER) - ASSESSMENT
Patient experiencing anxiety about upcoming procedure. Pt states "I experience anxiety every morning". No other c/o distress. All other VSS.

## 2020-01-26 NOTE — PROGRESS NOTE ADULT - SUBJECTIVE AND OBJECTIVE BOX
Patient seen and examined at bedside.    --Anticoagulation--  heparin  Injectable 5000 Unit(s) SubCutaneous every 8 hours    T(C): 37.2 (01-26-20 @ 12:33), Max: 37.2 (01-26-20 @ 12:33)  HR: 83 (01-26-20 @ 12:33) (72 - 128)  BP: 130/76 (01-26-20 @ 12:33) (107/67 - 155/84)  RR: 20 (01-26-20 @ 12:33) (18 - 20)  SpO2: 94% (01-26-20 @ 12:33) (93% - 98%)  Wt(kg): --    Exam:   AOx3, FC, PERRL, EOMI, no facial   5/5 throughout, no drift  SILT  no clonus

## 2020-01-26 NOTE — PROGRESS NOTE ADULT - PROBLEM SELECTOR PLAN 2
-mild Juan Miguel on admission likely 2/2 decreased Po intake vs. nsaid related  -resolved ; Serum Cr 1.1  - monitor bmp

## 2020-01-27 ENCOUNTER — TRANSCRIPTION ENCOUNTER (OUTPATIENT)
Age: 70
End: 2020-01-27

## 2020-01-27 LAB
APTT BLD: 32.8 SEC — SIGNIFICANT CHANGE UP (ref 27.5–36.3)
BLD GP AB SCN SERPL QL: NEGATIVE — SIGNIFICANT CHANGE UP
INR BLD: 1.22 RATIO — HIGH (ref 0.88–1.16)
PROTHROM AB SERPL-ACNC: 14.1 SEC — HIGH (ref 10–12.9)
RH IG SCN BLD-IMP: POSITIVE — SIGNIFICANT CHANGE UP

## 2020-01-27 PROCEDURE — 72158 MRI LUMBAR SPINE W/O & W/DYE: CPT | Mod: 26

## 2020-01-27 PROCEDURE — 99232 SBSQ HOSP IP/OBS MODERATE 35: CPT

## 2020-01-27 PROCEDURE — 70250 X-RAY EXAM OF SKULL: CPT | Mod: 26

## 2020-01-27 RX ORDER — SODIUM CHLORIDE 9 MG/ML
1000 INJECTION INTRAMUSCULAR; INTRAVENOUS; SUBCUTANEOUS
Refills: 0 | Status: DISCONTINUED | OUTPATIENT
Start: 2020-01-27 | End: 2020-01-28

## 2020-01-27 RX ADMIN — HYDROMORPHONE HYDROCHLORIDE 2 MILLIGRAM(S): 2 INJECTION INTRAMUSCULAR; INTRAVENOUS; SUBCUTANEOUS at 07:30

## 2020-01-27 RX ADMIN — HYDROMORPHONE HYDROCHLORIDE 4 MILLIGRAM(S): 2 INJECTION INTRAMUSCULAR; INTRAVENOUS; SUBCUTANEOUS at 09:07

## 2020-01-27 RX ADMIN — HYDROMORPHONE HYDROCHLORIDE 2 MILLIGRAM(S): 2 INJECTION INTRAMUSCULAR; INTRAVENOUS; SUBCUTANEOUS at 07:00

## 2020-01-27 RX ADMIN — TAMSULOSIN HYDROCHLORIDE 0.4 MILLIGRAM(S): 0.4 CAPSULE ORAL at 21:42

## 2020-01-27 RX ADMIN — CITALOPRAM 40 MILLIGRAM(S): 10 TABLET, FILM COATED ORAL at 12:27

## 2020-01-27 RX ADMIN — HYDROMORPHONE HYDROCHLORIDE 4 MILLIGRAM(S): 2 INJECTION INTRAMUSCULAR; INTRAVENOUS; SUBCUTANEOUS at 16:15

## 2020-01-27 RX ADMIN — Medication 100 MILLIGRAM(S): at 12:28

## 2020-01-27 RX ADMIN — HEPARIN SODIUM 5000 UNIT(S): 5000 INJECTION INTRAVENOUS; SUBCUTANEOUS at 14:11

## 2020-01-27 RX ADMIN — Medication 200 MILLIGRAM(S): at 21:43

## 2020-01-27 RX ADMIN — AMLODIPINE BESYLATE 5 MILLIGRAM(S): 2.5 TABLET ORAL at 09:07

## 2020-01-27 RX ADMIN — HEPARIN SODIUM 5000 UNIT(S): 5000 INJECTION INTRAVENOUS; SUBCUTANEOUS at 05:46

## 2020-01-27 RX ADMIN — PREGABALIN 1000 MICROGRAM(S): 225 CAPSULE ORAL at 12:27

## 2020-01-27 RX ADMIN — AMLODIPINE BESYLATE 5 MILLIGRAM(S): 2.5 TABLET ORAL at 21:43

## 2020-01-27 RX ADMIN — HYDROMORPHONE HYDROCHLORIDE 4 MILLIGRAM(S): 2 INJECTION INTRAMUSCULAR; INTRAVENOUS; SUBCUTANEOUS at 16:45

## 2020-01-27 RX ADMIN — Medication 1000 UNIT(S): at 12:27

## 2020-01-27 RX ADMIN — HYDROMORPHONE HYDROCHLORIDE 4 MILLIGRAM(S): 2 INJECTION INTRAMUSCULAR; INTRAVENOUS; SUBCUTANEOUS at 09:30

## 2020-01-27 RX ADMIN — Medication 1 MILLIGRAM(S): at 05:46

## 2020-01-27 RX ADMIN — ZOLPIDEM TARTRATE 5 MILLIGRAM(S): 10 TABLET ORAL at 21:42

## 2020-01-27 RX ADMIN — Medication 1 MILLIGRAM(S): at 14:10

## 2020-01-27 NOTE — PROGRESS NOTE ADULT - ASSESSMENT
Teto Perdomo  69M with extensive lumbar surgery hx and chronic pain hx including ketamine infusions (most recent revision fusion at [facility] meka/ Meche Hoyos 06/2019) presents for chronic non-radicular back pain  - Plan for OR Tomorrow for revision of fusion, T10-Pelvis  - medically cleared  - CT Scan shows multiple areas of hardware loosening and pseudoarthrosis, compression fx unchanged since 2016  - standing XR done

## 2020-01-27 NOTE — PROGRESS NOTE ADULT - SUBJECTIVE AND OBJECTIVE BOX
[facility] Division of Hospital Medicine  Lydia Santoyo MD  Pager (M-F, 9Q-5P): [phone]  Other Times:  [phone]    Patient is a 69y old  Male who presents with a chief complaint of Lumbar back pain (27 Jan 2020 05:31)      SUBJECTIVE / OVERNIGHT EVENTS:      no overnight events. no specific complaints. pain controlled. eating. plan or OR tomorrow.    ADDITIONAL REVIEW OF SYSTEMS:    MEDICATIONS  (STANDING):  amLODIPine   Tablet 5 milliGRAM(s) Oral <User Schedule>  cholecalciferol 1000 Unit(s) Oral daily  citalopram 40 milliGRAM(s) Oral daily  cyanocobalamin 1000 MICROGram(s) Oral daily  heparin  Injectable 5000 Unit(s) SubCutaneous every 8 hours  multivitamin 1 Tablet(s) Oral daily  polyethylene glycol 3350 17 Gram(s) Oral two times a day  senna 2 Tablet(s) Oral at bedtime  tamsulosin 0.4 milliGRAM(s) Oral at bedtime  traZODone 200 milliGRAM(s) Oral at bedtime  traZODone 100 milliGRAM(s) Oral daily    MEDICATIONS  (PRN):  acetaminophen   Tablet .. 650 milliGRAM(s) Oral every 6 hours PRN Temp greater or equal to 38C (100.4F), Mild Pain (1 - 3), Moderate Pain (4 - 6), Severe Pain (7 - 10)  ALPRAZolam 1 milliGRAM(s) Oral every 6 hours PRN anxiety  bisacodyl Suppository 10 milliGRAM(s) Rectal once PRN Constipation  HYDROmorphone   Tablet 2 milliGRAM(s) Oral every 4 hours PRN Moderate Pain (4 - 6)  HYDROmorphone   Tablet 4 milliGRAM(s) Oral every 6 hours PRN Severe Pain (7 - 10)  zolpidem 5 milliGRAM(s) Oral at bedtime PRN Insomnia  zolpidem 5 milliGRAM(s) Oral at bedtime PRN Insomnia      CAPILLARY BLOOD GLUCOSE        I&O's Summary    26 Jan 2020 07:01  -  27 Jan 2020 07:00  --------------------------------------------------------  IN: 300 mL / OUT: 0 mL / NET: 300 mL        PHYSICAL EXAM:  Vital Signs Last 24 Hrs  T(C): 37.4 (27 Jan 2020 13:09), Max: 37.4 (27 Jan 2020 13:09)  T(F): 99.3 (27 Jan 2020 13:09), Max: 99.3 (27 Jan 2020 13:09)  HR: 60 (27 Jan 2020 13:09) (60 - 86)  BP: 151/82 (27 Jan 2020 13:09) (124/68 - 157/74)  BP(mean): --  RR: 17 (27 Jan 2020 13:09) (17 - 18)  SpO2: 94% (27 Jan 2020 13:09) (93% - 95%)  CONSTITUTIONAL: NAD, well-developed, well-groomed  EYES: PERRLA; conjunctiva and sclera clear  ENMT: Moist oral mucosa, no pharyngeal injection or exudates; normal dentition  NECK: Supple, no palpable masses; no thyromegaly  RESPIRATORY: Normal respiratory effort; lungs are clear to auscultation bilaterally  CARDIOVASCULAR: Regular rate and rhythm, normal S1 and S2, no murmur/rub/gallop; No lower extremity edema; Peripheral pulses are 2+ bilaterally  ABDOMEN: Nontender to palpation, normoactive bowel sounds, no rebound/guarding; No hepatosplenomegaly  MUSCULOSKELETAL:  Normal gait; no clubbing or cyanosis of digits; no joint swelling or tenderness to palpation  PSYCH: A+O to person, place, and time; affect appropriate  NEUROLOGY: CN 2-12 are intact and symmetric; no gross sensory deficits   SKIN: No rashes; no palpable lesions    LABS:    01-26    141  |  104  |  18  ----------------------------<  120<H>  3.8   |  26  |  1.13    Ca    9.3      26 Jan 2020 07:02      PT/INR - ( 27 Jan 2020 06:43 )   PT: 14.1 sec;   INR: 1.22 ratio         PTT - ( 27 Jan 2020 06:43 )  PTT:32.8 sec          COORDINATION OF CARE:  Care Discussed with Consultants/Other Providers [Y/N]: yes  Prior or Outpatient Records Reviewed [Y/N]: yes

## 2020-01-28 ENCOUNTER — APPOINTMENT (OUTPATIENT)
Dept: SPINE | Facility: HOSPITAL | Age: 70
End: 2020-01-28
Payer: MEDICARE

## 2020-01-28 ENCOUNTER — TELEPHONE (OUTPATIENT)
Dept: FAMILY MEDICINE CLINIC | Facility: CLINIC | Age: 70
End: 2020-01-28

## 2020-01-28 DIAGNOSIS — E11.9 TYPE 2 DIABETES MELLITUS WITHOUT COMPLICATION, WITHOUT LONG-TERM CURRENT USE OF INSULIN (HCC): ICD-10-CM

## 2020-01-28 DIAGNOSIS — E78.2 MIXED HYPERLIPIDEMIA: ICD-10-CM

## 2020-01-28 DIAGNOSIS — I10 ESSENTIAL HYPERTENSION: Primary | ICD-10-CM

## 2020-01-28 LAB
ANION GAP SERPL CALC-SCNC: 14 MMOL/L — SIGNIFICANT CHANGE UP (ref 5–17)
ANION GAP SERPL CALC-SCNC: 15 MMOL/L — SIGNIFICANT CHANGE UP (ref 5–17)
APTT BLD: 30.7 SEC — SIGNIFICANT CHANGE UP (ref 27.5–36.3)
BASOPHILS # BLD AUTO: 0.04 K/UL — SIGNIFICANT CHANGE UP (ref 0–0.2)
BASOPHILS NFR BLD AUTO: 0.3 % — SIGNIFICANT CHANGE UP (ref 0–2)
BUN SERPL-MCNC: 15 MG/DL — SIGNIFICANT CHANGE UP (ref 7–23)
BUN SERPL-MCNC: 17 MG/DL — SIGNIFICANT CHANGE UP (ref 7–23)
CALCIUM SERPL-MCNC: 7.5 MG/DL — LOW (ref 8.4–10.5)
CALCIUM SERPL-MCNC: 8.8 MG/DL — SIGNIFICANT CHANGE UP (ref 8.4–10.5)
CHLORIDE SERPL-SCNC: 103 MMOL/L — SIGNIFICANT CHANGE UP (ref 96–108)
CHLORIDE SERPL-SCNC: 108 MMOL/L — SIGNIFICANT CHANGE UP (ref 96–108)
CO2 SERPL-SCNC: 21 MMOL/L — LOW (ref 22–31)
CO2 SERPL-SCNC: 22 MMOL/L — SIGNIFICANT CHANGE UP (ref 22–31)
CREAT SERPL-MCNC: 0.88 MG/DL — SIGNIFICANT CHANGE UP (ref 0.5–1.3)
CREAT SERPL-MCNC: 1.05 MG/DL — SIGNIFICANT CHANGE UP (ref 0.5–1.3)
EOSINOPHIL # BLD AUTO: 0.01 K/UL — SIGNIFICANT CHANGE UP (ref 0–0.5)
EOSINOPHIL NFR BLD AUTO: 0.1 % — SIGNIFICANT CHANGE UP (ref 0–6)
GLUCOSE SERPL-MCNC: 115 MG/DL — HIGH (ref 70–99)
GLUCOSE SERPL-MCNC: 186 MG/DL — HIGH (ref 70–99)
HCT VFR BLD CALC: 33.6 % — LOW (ref 39–50)
HCT VFR BLD CALC: 37 % — LOW (ref 39–50)
HGB BLD-MCNC: 10.6 G/DL — LOW (ref 13–17)
HGB BLD-MCNC: 11.8 G/DL — LOW (ref 13–17)
IMM GRANULOCYTES NFR BLD AUTO: 1.4 % — SIGNIFICANT CHANGE UP (ref 0–1.5)
INR BLD: 1.23 RATIO — HIGH (ref 0.88–1.16)
LYMPHOCYTES # BLD AUTO: 0.68 K/UL — LOW (ref 1–3.3)
LYMPHOCYTES # BLD AUTO: 5.1 % — LOW (ref 13–44)
MAGNESIUM SERPL-MCNC: 2.1 MG/DL — SIGNIFICANT CHANGE UP (ref 1.6–2.6)
MCHC RBC-ENTMCNC: 25.7 PG — LOW (ref 27–34)
MCHC RBC-ENTMCNC: 26 PG — LOW (ref 27–34)
MCHC RBC-ENTMCNC: 31.5 GM/DL — LOW (ref 32–36)
MCHC RBC-ENTMCNC: 31.9 GM/DL — LOW (ref 32–36)
MCV RBC AUTO: 81.6 FL — SIGNIFICANT CHANGE UP (ref 80–100)
MCV RBC AUTO: 81.7 FL — SIGNIFICANT CHANGE UP (ref 80–100)
MONOCYTES # BLD AUTO: 0.21 K/UL — SIGNIFICANT CHANGE UP (ref 0–0.9)
MONOCYTES NFR BLD AUTO: 1.6 % — LOW (ref 2–14)
NEUTROPHILS # BLD AUTO: 12.17 K/UL — HIGH (ref 1.8–7.4)
NEUTROPHILS NFR BLD AUTO: 91.5 % — HIGH (ref 43–77)
NRBC # BLD: 0 /100 WBCS — SIGNIFICANT CHANGE UP (ref 0–0)
PHOSPHATE SERPL-MCNC: 2.1 MG/DL — LOW (ref 2.5–4.5)
PLATELET # BLD AUTO: 262 K/UL — SIGNIFICANT CHANGE UP (ref 150–400)
PLATELET # BLD AUTO: 280 K/UL — SIGNIFICANT CHANGE UP (ref 150–400)
POTASSIUM SERPL-MCNC: 3.4 MMOL/L — LOW (ref 3.5–5.3)
POTASSIUM SERPL-MCNC: 3.7 MMOL/L — SIGNIFICANT CHANGE UP (ref 3.5–5.3)
POTASSIUM SERPL-SCNC: 3.4 MMOL/L — LOW (ref 3.5–5.3)
POTASSIUM SERPL-SCNC: 3.7 MMOL/L — SIGNIFICANT CHANGE UP (ref 3.5–5.3)
PROTHROM AB SERPL-ACNC: 14 SEC — HIGH (ref 10–13.1)
RBC # BLD: 4.12 M/UL — LOW (ref 4.2–5.8)
RBC # BLD: 4.53 M/UL — SIGNIFICANT CHANGE UP (ref 4.2–5.8)
RBC # FLD: 15.2 % — HIGH (ref 10.3–14.5)
RBC # FLD: 15.3 % — HIGH (ref 10.3–14.5)
SODIUM SERPL-SCNC: 138 MMOL/L — SIGNIFICANT CHANGE UP (ref 135–145)
SODIUM SERPL-SCNC: 145 MMOL/L — SIGNIFICANT CHANGE UP (ref 135–145)
WBC # BLD: 13.29 K/UL — HIGH (ref 3.8–10.5)
WBC # BLD: 7.12 K/UL — SIGNIFICANT CHANGE UP (ref 3.8–10.5)
WBC # FLD AUTO: 13.29 K/UL — HIGH (ref 3.8–10.5)
WBC # FLD AUTO: 7.12 K/UL — SIGNIFICANT CHANGE UP (ref 3.8–10.5)

## 2020-01-28 PROCEDURE — 22848 INSERT PELV FIXATION DEVICE: CPT

## 2020-01-28 PROCEDURE — 99232 SBSQ HOSP IP/OBS MODERATE 35: CPT

## 2020-01-28 PROCEDURE — 15734 MUSCLE-SKIN GRAFT TRUNK: CPT

## 2020-01-28 PROCEDURE — 22614 ARTHRD PST TQ 1NTRSPC EA ADD: CPT

## 2020-01-28 PROCEDURE — 22843 INSERT SPINE FIXATION DEVICE: CPT

## 2020-01-28 PROCEDURE — 13102 CMPLX RPR TRUNK ADDL 5CM/<: CPT | Mod: 59

## 2020-01-28 PROCEDURE — 13101 CMPLX RPR TRUNK 2.6-7.5 CM: CPT | Mod: 59

## 2020-01-28 PROCEDURE — 22612 ARTHRD PST TQ 1NTRSPC LUMBAR: CPT

## 2020-01-28 RX ORDER — DIAZEPAM 5 MG
5 TABLET ORAL EVERY 6 HOURS
Refills: 0 | Status: DISCONTINUED | OUTPATIENT
Start: 2020-01-28 | End: 2020-02-03

## 2020-01-28 RX ORDER — ONDANSETRON 8 MG/1
4 TABLET, FILM COATED ORAL EVERY 6 HOURS
Refills: 0 | Status: DISCONTINUED | OUTPATIENT
Start: 2020-01-28 | End: 2020-02-03

## 2020-01-28 RX ORDER — NALOXONE HYDROCHLORIDE 4 MG/.1ML
0.1 SPRAY NASAL
Refills: 0 | Status: DISCONTINUED | OUTPATIENT
Start: 2020-01-28 | End: 2020-02-03

## 2020-01-28 RX ORDER — ZOLPIDEM TARTRATE 10 MG/1
5 TABLET ORAL AT BEDTIME
Refills: 0 | Status: DISCONTINUED | OUTPATIENT
Start: 2020-01-28 | End: 2020-02-03

## 2020-01-28 RX ORDER — DIPHENHYDRAMINE HCL 50 MG
25 CAPSULE ORAL EVERY 4 HOURS
Refills: 0 | Status: DISCONTINUED | OUTPATIENT
Start: 2020-01-28 | End: 2020-02-03

## 2020-01-28 RX ORDER — SODIUM CHLORIDE 9 MG/ML
1000 INJECTION INTRAMUSCULAR; INTRAVENOUS; SUBCUTANEOUS
Refills: 0 | Status: DISCONTINUED | OUTPATIENT
Start: 2020-01-28 | End: 2020-01-29

## 2020-01-28 RX ORDER — CEFAZOLIN SODIUM 1 G
2000 VIAL (EA) INJECTION EVERY 8 HOURS
Refills: 0 | Status: COMPLETED | OUTPATIENT
Start: 2020-01-28 | End: 2020-01-29

## 2020-01-28 RX ORDER — ONDANSETRON 8 MG/1
4 TABLET, FILM COATED ORAL EVERY 4 HOURS
Refills: 0 | Status: DISCONTINUED | OUTPATIENT
Start: 2020-01-28 | End: 2020-01-29

## 2020-01-28 RX ORDER — POLYETHYLENE GLYCOL 3350 17 G/17G
17 POWDER, FOR SOLUTION ORAL
Refills: 0 | Status: DISCONTINUED | OUTPATIENT
Start: 2020-01-28 | End: 2020-02-03

## 2020-01-28 RX ORDER — AMLODIPINE BESYLATE 2.5 MG/1
5 TABLET ORAL
Refills: 0 | Status: DISCONTINUED | OUTPATIENT
Start: 2020-01-28 | End: 2020-02-03

## 2020-01-28 RX ORDER — PREGABALIN 225 MG/1
1000 CAPSULE ORAL DAILY
Refills: 0 | Status: DISCONTINUED | OUTPATIENT
Start: 2020-01-28 | End: 2020-02-03

## 2020-01-28 RX ORDER — SENNA PLUS 8.6 MG/1
2 TABLET ORAL AT BEDTIME
Refills: 0 | Status: DISCONTINUED | OUTPATIENT
Start: 2020-01-28 | End: 2020-02-03

## 2020-01-28 RX ORDER — ENOXAPARIN SODIUM 100 MG/ML
40 INJECTION SUBCUTANEOUS DAILY
Refills: 0 | Status: DISCONTINUED | OUTPATIENT
Start: 2020-01-29 | End: 2020-02-03

## 2020-01-28 RX ORDER — HYDROMORPHONE HYDROCHLORIDE 2 MG/ML
4 INJECTION INTRAMUSCULAR; INTRAVENOUS; SUBCUTANEOUS EVERY 6 HOURS
Refills: 0 | Status: DISCONTINUED | OUTPATIENT
Start: 2020-01-28 | End: 2020-01-29

## 2020-01-28 RX ORDER — ACETAMINOPHEN 500 MG
650 TABLET ORAL EVERY 6 HOURS
Refills: 0 | Status: DISCONTINUED | OUTPATIENT
Start: 2020-01-28 | End: 2020-02-03

## 2020-01-28 RX ORDER — TAMSULOSIN HYDROCHLORIDE 0.4 MG/1
0.4 CAPSULE ORAL AT BEDTIME
Refills: 0 | Status: DISCONTINUED | OUTPATIENT
Start: 2020-01-28 | End: 2020-02-03

## 2020-01-28 RX ORDER — HYDROMORPHONE HYDROCHLORIDE 2 MG/ML
2 INJECTION INTRAMUSCULAR; INTRAVENOUS; SUBCUTANEOUS ONCE
Refills: 0 | Status: DISCONTINUED | OUTPATIENT
Start: 2020-01-28 | End: 2020-01-28

## 2020-01-28 RX ORDER — ALPRAZOLAM 0.25 MG
1 TABLET ORAL EVERY 6 HOURS
Refills: 0 | Status: DISCONTINUED | OUTPATIENT
Start: 2020-01-28 | End: 2020-02-03

## 2020-01-28 RX ORDER — HYDROMORPHONE HYDROCHLORIDE 2 MG/ML
0.5 INJECTION INTRAMUSCULAR; INTRAVENOUS; SUBCUTANEOUS
Refills: 0 | Status: DISCONTINUED | OUTPATIENT
Start: 2020-01-28 | End: 2020-01-29

## 2020-01-28 RX ORDER — CHOLECALCIFEROL (VITAMIN D3) 125 MCG
1000 CAPSULE ORAL DAILY
Refills: 0 | Status: DISCONTINUED | OUTPATIENT
Start: 2020-01-28 | End: 2020-02-03

## 2020-01-28 RX ORDER — HYDROMORPHONE HYDROCHLORIDE 2 MG/ML
30 INJECTION INTRAMUSCULAR; INTRAVENOUS; SUBCUTANEOUS
Refills: 0 | Status: DISCONTINUED | OUTPATIENT
Start: 2020-01-28 | End: 2020-01-29

## 2020-01-28 RX ORDER — CITALOPRAM 10 MG/1
40 TABLET, FILM COATED ORAL DAILY
Refills: 0 | Status: DISCONTINUED | OUTPATIENT
Start: 2020-01-28 | End: 2020-02-03

## 2020-01-28 RX ORDER — TRAZODONE HCL 50 MG
200 TABLET ORAL AT BEDTIME
Refills: 0 | Status: DISCONTINUED | OUTPATIENT
Start: 2020-01-28 | End: 2020-02-03

## 2020-01-28 RX ADMIN — HYDROMORPHONE HYDROCHLORIDE 2 MILLIGRAM(S): 2 INJECTION INTRAMUSCULAR; INTRAVENOUS; SUBCUTANEOUS at 13:53

## 2020-01-28 RX ADMIN — Medication 1 MILLIGRAM(S): at 12:26

## 2020-01-28 RX ADMIN — SODIUM CHLORIDE 75 MILLILITER(S): 9 INJECTION INTRAMUSCULAR; INTRAVENOUS; SUBCUTANEOUS at 22:10

## 2020-01-28 RX ADMIN — HYDROMORPHONE HYDROCHLORIDE 30 MILLILITER(S): 2 INJECTION INTRAMUSCULAR; INTRAVENOUS; SUBCUTANEOUS at 21:15

## 2020-01-28 RX ADMIN — HYDROMORPHONE HYDROCHLORIDE 2 MILLIGRAM(S): 2 INJECTION INTRAMUSCULAR; INTRAVENOUS; SUBCUTANEOUS at 12:26

## 2020-01-28 RX ADMIN — Medication 100 MILLIGRAM(S): at 23:37

## 2020-01-28 RX ADMIN — HYDROMORPHONE HYDROCHLORIDE 2 MILLIGRAM(S): 2 INJECTION INTRAMUSCULAR; INTRAVENOUS; SUBCUTANEOUS at 13:30

## 2020-01-28 RX ADMIN — Medication 200 MILLIGRAM(S): at 23:43

## 2020-01-28 RX ADMIN — TAMSULOSIN HYDROCHLORIDE 0.4 MILLIGRAM(S): 0.4 CAPSULE ORAL at 23:40

## 2020-01-28 RX ADMIN — SODIUM CHLORIDE 75 MILLILITER(S): 9 INJECTION INTRAMUSCULAR; INTRAVENOUS; SUBCUTANEOUS at 00:01

## 2020-01-28 RX ADMIN — HYDROMORPHONE HYDROCHLORIDE 4 MILLIGRAM(S): 2 INJECTION INTRAMUSCULAR; INTRAVENOUS; SUBCUTANEOUS at 10:15

## 2020-01-28 RX ADMIN — CITALOPRAM 40 MILLIGRAM(S): 10 TABLET, FILM COATED ORAL at 12:26

## 2020-01-28 RX ADMIN — AMLODIPINE BESYLATE 5 MILLIGRAM(S): 2.5 TABLET ORAL at 09:46

## 2020-01-28 RX ADMIN — HYDROMORPHONE HYDROCHLORIDE 4 MILLIGRAM(S): 2 INJECTION INTRAMUSCULAR; INTRAVENOUS; SUBCUTANEOUS at 09:46

## 2020-01-28 RX ADMIN — Medication 1000 UNIT(S): at 12:25

## 2020-01-28 RX ADMIN — Medication 100 MILLIGRAM(S): at 12:26

## 2020-01-28 RX ADMIN — Medication 1 MILLIGRAM(S): at 05:31

## 2020-01-28 RX ADMIN — HYDROMORPHONE HYDROCHLORIDE 2 MILLIGRAM(S): 2 INJECTION INTRAMUSCULAR; INTRAVENOUS; SUBCUTANEOUS at 13:00

## 2020-01-28 RX ADMIN — ZOLPIDEM TARTRATE 5 MILLIGRAM(S): 10 TABLET ORAL at 00:01

## 2020-01-28 NOTE — PROGRESS NOTE ADULT - PROBLEM SELECTOR PLAN 7
- Heparin SC for  vte ppx  - It is reported that pt was offered Tamiflu renally dosed for ppx given his exposure to room mate who was dx with flu.  He is refusing, states he does not believe his room mate actually has the flu as reported .  Importance of ppx reviewed and risks of forgoing ppx in documented exposures reviewed, patient continues to refuse.
Transitions of Care Status:  1.  Name of PCP:  2.  PCP Contacted on Admission: [ ] Y    [ ] N    3.  PCP contacted at Discharge: [ ] Y    [ ] N    [ ] N/A  4.  Post-Discharge Appointment Date and Location:  5.  Summary of Handoff given to PCP:
- Heparin SC for  vte ppx  - It is reported that pt was offered Tamiflu renally dosed for ppx given his exposure to room mate who was dx with flu.  He is refusing, states he does not believe his room mate actually has the flu as reported .  Importance of ppx reviewed and risks of forgoing ppx in documented exposures reviewed, patient continues to refuse.
Transitions of Care Status:  1.  Name of PCP:  2.  PCP Contacted on Admission: [ ] Y    [ ] N    3.  PCP contacted at Discharge: [ ] Y    [ ] N    [ ] N/A  4.  Post-Discharge Appointment Date and Location:  5.  Summary of Handoff given to PCP:

## 2020-01-28 NOTE — DIETITIAN INITIAL EVALUATION ADULT. - PROBLEM SELECTOR PLAN 1
-acute on chronic pain in setting of multiple prior Lumbar laminectomy/fusion surgeries. Currently no alarm sx; afeb, no point tenderness, no incontinence or saddle anesthesia, neurovascularly intact.   -XR pelvis without fx, XR L spine with noted chronic L1 deformity. No acute findings  -f/u read of pending CT L/T spine  -f/u spine/neurosx recs  -pain control: tylenol for mild, oxy IR for mod/severe. Dilaudid 0.5 q6 prn breakthrough. Avoid morphine for prior intolerance. If escalating requirements, suggest pain mgt eval.   -PT eval  -fall prec

## 2020-01-28 NOTE — PROGRESS NOTE ADULT - ASSESSMENT
Teto Perdomo  69M with extensive lumbar surgery hx and chronic pain hx including ketamine infusions (most recent revision fusion at [facility] meka/ Meche Hoyos 06/2019) presents for chronic non-radicular back pain  - Plan for OR Today for revision of fusion, T10-Pelvis  - medically cleared  - CT Scan shows multiple areas of hardware loosening and pseudoarthrosis, compression fx unchanged since 2016  - standing XR done  -MRI done  -Patient has a prior shunt.

## 2020-01-28 NOTE — DIETITIAN INITIAL EVALUATION ADULT. - OTHER INFO
interview deferred-pt leaving for OR at time of visit for revision of fusion, T94-[id number]    Reason for admission :Lumbar back pain  Diet PTA :  Intake :  nausea/vomit ?  difficulty chewing /swallow ?  Last BM : 1/27  NKFA   IBW +/- 10%=154pounds  Ht:68"  Usual Weight PTA:   BMI: 31.9  Education Provided :  skin: no pressure injury  edema: none

## 2020-01-28 NOTE — TELEPHONE ENCOUNTER
Patient is wanting to come into the office this Friday and have labs done prior to upcoming appt on Feb 5, please order labs so they will be in chart when patient comes Friday. Thanks

## 2020-01-28 NOTE — PROGRESS NOTE ADULT - PROBLEM SELECTOR PLAN 4
- BP well controlled  - c/w home antihypertensives  - agree with holding diuretics and ace-i/arb for now
-c/w home antihypertensives, hold diuretics and ace-i/arb
- BP well controlled  - c/w home antihypertensives  - agree with holding diuretics and ace-i/arb for now
-c/w home antihypertensives, hold diuretics and ace-i/arb

## 2020-01-28 NOTE — PROGRESS NOTE ADULT - PROBLEM SELECTOR PROBLEM 7
Discharge planning issues
Prophylactic measure
Discharge planning issues

## 2020-01-28 NOTE — BRIEF OPERATIVE NOTE - OPERATION/FINDINGS
T10 TO PELVIS POSTERIOR INSTRUMENTATION AND FUSION, REVISION OF INSTRUMENTATION  L5-S1 T10 TO PELVIS POSTERIOR INSTRUMENTATION AND FUSION, REVISION OF INSTRUMENTATION T11 TO PELVIS POSTERIOR INSTRUMENTATION AND FUSION, REVISION OF INSTRUMENTATION

## 2020-01-28 NOTE — DIETITIAN INITIAL EVALUATION ADULT. - PERTINENT MEDS FT
acetaminophen   Tablet .. 650 PRN  ALPRAZolam 1 PRN  amLODIPine   Tablet 5  bisacodyl Suppository 10 PRN  cholecalciferol 1000  citalopram 40  cyanocobalamin 1000  HYDROmorphone   Tablet 2 PRN  HYDROmorphone   Tablet 4 PRN  multivitamin 1  polyethylene glycol 3350 17  senna 2  sodium chloride 0.9%. 1000  tamsulosin 0.4  traZODone 200  traZODone 100  zolpidem 5 PRN  zolpidem 5 PRN

## 2020-01-28 NOTE — PROGRESS NOTE ADULT - PROBLEM SELECTOR PLAN 8
Transitions of Care Status:  1.  Name of PCP:  2.  PCP Contacted on Admission: [ ] Y    [ ] N    3.  PCP contacted at Discharge: [ ] Y    [ ] N    [ ] N/A  4.  Post-Discharge Appointment Date and Location:  5.  Summary of Handoff given to PCP:
-pt unsure of meds or doses  -have approximated meds to best ability based on allscripts and [facility] meds from other sources tab.   -will email pharm techs to assist with med rec in am.

## 2020-01-28 NOTE — DIETITIAN INITIAL EVALUATION ADULT. - PROBLEM SELECTOR PLAN 5
-c/w xanax 1 mg q6 hrs prn (confirmed on ISTOP and All[employer]), trazodone 200 qhs, celexa 40 qd  -ISTOP Reference #: [id number]

## 2020-01-28 NOTE — PROGRESS NOTE ADULT - PROBLEM SELECTOR PLAN 5
-c/w xanax 1 mg q6 hrs prn (confirmed on ISTOP and [employer]), trazodone 200 qhs, celexa 40 qd  -ISTOP Reference #: [id number]
-c/w xanax 1 mg q6 hrs prn (confirmed on ISTOP and [employer]), trazodone 200 qhs, celexa 40 qd  -ISTOP Reference #: [id number]  - c/w Trazodone 100mg qam as well ; pt reports it helps with his anxiety
-c/w xanax 1 mg q6 hrs prn (confirmed on ISTOP and [employer]), trazodone 200 qhs, celexa 40 qd  -ISTOP Reference #: [id number]  - c/w Trazodone 100mg qam as well ; pt reports it helps with his anxiety
-c/w xanax 1 mg q6 hrs prn (confirmed on ISTOP and All[employer]), trazodone 200 qhs, celexa 40 qd  -ISTOP Reference #: [id number]  - c/w Trazodone 100mg qam as well ; pt reports it helps with his anxiety
-c/w xanax 1 mg q6 hrs prn (confirmed on ISTOP and Allscripts), trazodone 200 qhs, celexa 40 qd  -ISTOP Reference #: [id number]  - will add an extra dose of Trazodone 100mg qam ; pt reports it helps with his anxiety
-c/w xanax 1 mg q6 hrs prn (confirmed on ISTOP and Allscripts), trazodone 200 qhs, celexa 40 qd  -ISTOP Reference #: [id number]

## 2020-01-28 NOTE — PROGRESS NOTE ADULT - SUBJECTIVE AND OBJECTIVE BOX
[facility] Division of Hospital Medicine  Lydia Santoyo MD  Pager (M-F, 7L-5P): [phone]  Other Times:  [phone]    Patient is a 69y old  Male who presents with a chief complaint of Lumbar back pain (28 Jan 2020 05:39)      SUBJECTIVE / OVERNIGHT EVENTS:      ADDITIONAL REVIEW OF SYSTEMS:    MEDICATIONS  (STANDING):  amLODIPine   Tablet 5 milliGRAM(s) Oral <User Schedule>  cholecalciferol 1000 Unit(s) Oral daily  citalopram 40 milliGRAM(s) Oral daily  cyanocobalamin 1000 MICROGram(s) Oral daily  multivitamin 1 Tablet(s) Oral daily  polyethylene glycol 3350 17 Gram(s) Oral two times a day  senna 2 Tablet(s) Oral at bedtime  sodium chloride 0.9%. 1000 milliLiter(s) (75 mL/Hr) IV Continuous <Continuous>  tamsulosin 0.4 milliGRAM(s) Oral at bedtime  traZODone 200 milliGRAM(s) Oral at bedtime  traZODone 100 milliGRAM(s) Oral daily    MEDICATIONS  (PRN):  acetaminophen   Tablet .. 650 milliGRAM(s) Oral every 6 hours PRN Temp greater or equal to 38C (100.4F), Mild Pain (1 - 3), Moderate Pain (4 - 6), Severe Pain (7 - 10)  ALPRAZolam 1 milliGRAM(s) Oral every 6 hours PRN anxiety  bisacodyl Suppository 10 milliGRAM(s) Rectal once PRN Constipation  HYDROmorphone   Tablet 2 milliGRAM(s) Oral every 4 hours PRN Moderate Pain (4 - 6)  HYDROmorphone   Tablet 4 milliGRAM(s) Oral every 6 hours PRN Severe Pain (7 - 10)  zolpidem 5 milliGRAM(s) Oral at bedtime PRN Insomnia  zolpidem 5 milliGRAM(s) Oral at bedtime PRN Insomnia      CAPILLARY BLOOD GLUCOSE        I&O's Summary    27 Jan 2020 07:01  -  28 Jan 2020 07:00  --------------------------------------------------------  IN: 525 mL / OUT: 0 mL / NET: 525 mL        PHYSICAL EXAM:  Vital Signs Last 24 Hrs  T(C): 37.1 (28 Jan 2020 14:08), Max: 37.1 (28 Jan 2020 14:08)  T(F): 98.8 (28 Jan 2020 14:08), Max: 98.8 (28 Jan 2020 14:08)  HR: 67 (28 Jan 2020 14:08) (67 - 76)  BP: 161/91 (28 Jan 2020 14:08) (124/68 - 179/74)  BP(mean): --  RR: 18 (28 Jan 2020 14:08) (17 - 19)  SpO2: 97% (28 Jan 2020 14:08) (93% - 97%)  CONSTITUTIONAL: NAD, well-developed, well-groomed  EYES: PERRLA; conjunctiva and sclera clear  ENMT: Moist oral mucosa, no pharyngeal injection or exudates; normal dentition  NECK: Supple, no palpable masses; no thyromegaly  RESPIRATORY: Normal respiratory effort; lungs are clear to auscultation bilaterally  CARDIOVASCULAR: Regular rate and rhythm, normal S1 and S2, no murmur/rub/gallop; No lower extremity edema; Peripheral pulses are 2+ bilaterally  ABDOMEN: Nontender to palpation, normoactive bowel sounds, no rebound/guarding; No hepatosplenomegaly  MUSCULOSKELETAL:  Normal gait; no clubbing or cyanosis of digits; no joint swelling or tenderness to palpation  PSYCH: A+O to person, place, and time; affect appropriate  NEUROLOGY: CN 2-12 are intact and symmetric; no gross sensory deficits   SKIN: No rashes; no palpable lesions    LABS:                        11.8   7.12  )-----------( 280      ( 28 Jan 2020 07:56 )             37.0     01-28    145  |  108  |  17  ----------------------------<  115<H>  3.4<L>   |  22  |  1.05    Ca    8.8      28 Jan 2020 06:24  Phos  2.1     01-28  Mg     2.1     01-28      PT/INR - ( 28 Jan 2020 07:43 )   PT: 14.0 sec;   INR: 1.23 ratio         PTT - ( 28 Jan 2020 07:43 )  PTT:30.7 sec            RADIOLOGY & ADDITIONAL TESTS:  Results Reviewed:   Imaging Personally Reviewed:  Electrocardiogram Personally Reviewed:    COORDINATION OF CARE:  Care Discussed with Consultants/Other Providers [Y/N]:  Prior or Outpatient Records Reviewed [Y/N]: [facility] Division of Hospital Medicine  Lydia Santoyo MD  Pager (M-F, 4U-5P): [phone]  Other Times:  [phone]    Patient is a 69y old  Male who presents with a chief complaint of Lumbar back pain (28 Jan 2020 05:39)      SUBJECTIVE / OVERNIGHT EVENTS:      no overnight events- continues to have diffuse pain. for surgery today        ADDITIONAL REVIEW OF SYSTEMS:    MEDICATIONS  (STANDING):  amLODIPine   Tablet 5 milliGRAM(s) Oral <User Schedule>  cholecalciferol 1000 Unit(s) Oral daily  citalopram 40 milliGRAM(s) Oral daily  cyanocobalamin 1000 MICROGram(s) Oral daily  multivitamin 1 Tablet(s) Oral daily  polyethylene glycol 3350 17 Gram(s) Oral two times a day  senna 2 Tablet(s) Oral at bedtime  sodium chloride 0.9%. 1000 milliLiter(s) (75 mL/Hr) IV Continuous <Continuous>  tamsulosin 0.4 milliGRAM(s) Oral at bedtime  traZODone 200 milliGRAM(s) Oral at bedtime  traZODone 100 milliGRAM(s) Oral daily    MEDICATIONS  (PRN):  acetaminophen   Tablet .. 650 milliGRAM(s) Oral every 6 hours PRN Temp greater or equal to 38C (100.4F), Mild Pain (1 - 3), Moderate Pain (4 - 6), Severe Pain (7 - 10)  ALPRAZolam 1 milliGRAM(s) Oral every 6 hours PRN anxiety  bisacodyl Suppository 10 milliGRAM(s) Rectal once PRN Constipation  HYDROmorphone   Tablet 2 milliGRAM(s) Oral every 4 hours PRN Moderate Pain (4 - 6)  HYDROmorphone   Tablet 4 milliGRAM(s) Oral every 6 hours PRN Severe Pain (7 - 10)  zolpidem 5 milliGRAM(s) Oral at bedtime PRN Insomnia  zolpidem 5 milliGRAM(s) Oral at bedtime PRN Insomnia      CAPILLARY BLOOD GLUCOSE        I&O's Summary    27 Jan 2020 07:01  -  28 Jan 2020 07:00  --------------------------------------------------------  IN: 525 mL / OUT: 0 mL / NET: 525 mL        PHYSICAL EXAM:  Vital Signs Last 24 Hrs  T(C): 37.1 (28 Jan 2020 14:08), Max: 37.1 (28 Jan 2020 14:08)  T(F): 98.8 (28 Jan 2020 14:08), Max: 98.8 (28 Jan 2020 14:08)  HR: 67 (28 Jan 2020 14:08) (67 - 76)  BP: 161/91 (28 Jan 2020 14:08) (124/68 - 179/74)  BP(mean): --  RR: 18 (28 Jan 2020 14:08) (17 - 19)  SpO2: 97% (28 Jan 2020 14:08) (93% - 97%)  CONSTITUTIONAL: NAD, well-developed, well-groomed  EYES: PERRLA; conjunctiva and sclera clear  RESPIRATORY: Normal respiratory effort; lungs are clear to auscultation bilaterally  CARDIOVASCULAR: Regular rate and rhythm, normal S1 and S2, no murmur/rub/gallop; No lower extremity edema; Peripheral pulses are 2+ bilaterally  ABDOMEN: Nontender to palpation, normoactive bowel sounds, no rebound/guarding; No hepatosplenomegaly  MUSCULOSKELETAL:  Normal gait; no clubbing or cyanosis of digits; no joint swelling or tenderness to palpation      LABS:                        11.8   7.12  )-----------( 280      ( 28 Jan 2020 07:56 )             37.0     01-28    145  |  108  |  17  ----------------------------<  115<H>  3.4<L>   |  22  |  1.05    Ca    8.8      28 Jan 2020 06:24  Phos  2.1     01-28  Mg     2.1     01-28      PT/INR - ( 28 Jan 2020 07:43 )   PT: 14.0 sec;   INR: 1.23 ratio         PTT - ( 28 Jan 2020 07:43 )  PTT:30.7 sec          COORDINATION OF CARE:  Care Discussed with Consultants/Other Providers [Y/N]: yes  Prior or Outpatient Records Reviewed [Y/N]: yes

## 2020-01-28 NOTE — PROGRESS NOTE ADULT - PROBLEM SELECTOR PROBLEM 5
Anxiety and depression

## 2020-01-28 NOTE — PROGRESS NOTE ADULT - PROBLEM SELECTOR PROBLEM 8
Discharge planning issues
Medication management

## 2020-01-28 NOTE — PROGRESS NOTE ADULT - PROBLEM SELECTOR PROBLEM 6
Preoperative examination
Prophylactic measure
Preoperative examination
Prophylactic measure

## 2020-01-28 NOTE — PROGRESS NOTE ADULT - PROBLEM SELECTOR PROBLEM 2
JOSE MIGUEL (acute kidney injury)

## 2020-01-28 NOTE — PROGRESS NOTE ADULT - PROBLEM SELECTOR PROBLEM 1
Lumbar back pain with radiculopathy affecting left lower extremity

## 2020-01-29 LAB
ANION GAP SERPL CALC-SCNC: 9 MMOL/L — SIGNIFICANT CHANGE UP (ref 5–17)
BASOPHILS # BLD AUTO: 0.01 K/UL — SIGNIFICANT CHANGE UP (ref 0–0.2)
BASOPHILS NFR BLD AUTO: 0.1 % — SIGNIFICANT CHANGE UP (ref 0–2)
BUN SERPL-MCNC: 14 MG/DL — SIGNIFICANT CHANGE UP (ref 7–23)
CALCIUM SERPL-MCNC: 7.4 MG/DL — LOW (ref 8.4–10.5)
CHLORIDE SERPL-SCNC: 108 MMOL/L — SIGNIFICANT CHANGE UP (ref 96–108)
CO2 SERPL-SCNC: 23 MMOL/L — SIGNIFICANT CHANGE UP (ref 22–31)
CREAT SERPL-MCNC: 0.85 MG/DL — SIGNIFICANT CHANGE UP (ref 0.5–1.3)
EOSINOPHIL # BLD AUTO: 0 K/UL — SIGNIFICANT CHANGE UP (ref 0–0.5)
EOSINOPHIL NFR BLD AUTO: 0 % — SIGNIFICANT CHANGE UP (ref 0–6)
GLUCOSE SERPL-MCNC: 158 MG/DL — HIGH (ref 70–99)
HCT VFR BLD CALC: 29.3 % — LOW (ref 39–50)
HGB BLD-MCNC: 9.2 G/DL — LOW (ref 13–17)
IMM GRANULOCYTES NFR BLD AUTO: 0.9 % — SIGNIFICANT CHANGE UP (ref 0–1.5)
LYMPHOCYTES # BLD AUTO: 0.68 K/UL — LOW (ref 1–3.3)
LYMPHOCYTES # BLD AUTO: 6.1 % — LOW (ref 13–44)
MCHC RBC-ENTMCNC: 25.8 PG — LOW (ref 27–34)
MCHC RBC-ENTMCNC: 31.4 GM/DL — LOW (ref 32–36)
MCV RBC AUTO: 82.1 FL — SIGNIFICANT CHANGE UP (ref 80–100)
MONOCYTES # BLD AUTO: 0.78 K/UL — SIGNIFICANT CHANGE UP (ref 0–0.9)
MONOCYTES NFR BLD AUTO: 7 % — SIGNIFICANT CHANGE UP (ref 2–14)
NEUTROPHILS # BLD AUTO: 9.56 K/UL — HIGH (ref 1.8–7.4)
NEUTROPHILS NFR BLD AUTO: 85.9 % — HIGH (ref 43–77)
NRBC # BLD: 0 /100 WBCS — SIGNIFICANT CHANGE UP (ref 0–0)
PLATELET # BLD AUTO: 224 K/UL — SIGNIFICANT CHANGE UP (ref 150–400)
POTASSIUM SERPL-MCNC: 4.2 MMOL/L — SIGNIFICANT CHANGE UP (ref 3.5–5.3)
POTASSIUM SERPL-SCNC: 4.2 MMOL/L — SIGNIFICANT CHANGE UP (ref 3.5–5.3)
RBC # BLD: 3.57 M/UL — LOW (ref 4.2–5.8)
RBC # FLD: 15.2 % — HIGH (ref 10.3–14.5)
SODIUM SERPL-SCNC: 140 MMOL/L — SIGNIFICANT CHANGE UP (ref 135–145)
WBC # BLD: 11.13 K/UL — HIGH (ref 3.8–10.5)
WBC # FLD AUTO: 11.13 K/UL — HIGH (ref 3.8–10.5)

## 2020-01-29 PROCEDURE — 99232 SBSQ HOSP IP/OBS MODERATE 35: CPT

## 2020-01-29 RX ORDER — ACETAMINOPHEN 500 MG
1000 TABLET ORAL ONCE
Refills: 0 | Status: COMPLETED | OUTPATIENT
Start: 2020-01-29 | End: 2020-01-29

## 2020-01-29 RX ORDER — CALCIUM GLUCONATE 100 MG/ML
2 VIAL (ML) INTRAVENOUS ONCE
Refills: 0 | Status: COMPLETED | OUTPATIENT
Start: 2020-01-29 | End: 2020-01-29

## 2020-01-29 RX ORDER — OXYCODONE HYDROCHLORIDE 5 MG/1
5 TABLET ORAL EVERY 4 HOURS
Refills: 0 | Status: DISCONTINUED | OUTPATIENT
Start: 2020-01-29 | End: 2020-01-30

## 2020-01-29 RX ORDER — OXYCODONE HYDROCHLORIDE 5 MG/1
10 TABLET ORAL EVERY 4 HOURS
Refills: 0 | Status: DISCONTINUED | OUTPATIENT
Start: 2020-01-29 | End: 2020-01-30

## 2020-01-29 RX ORDER — HYDROMORPHONE HYDROCHLORIDE 2 MG/ML
30 INJECTION INTRAMUSCULAR; INTRAVENOUS; SUBCUTANEOUS
Refills: 0 | Status: DISCONTINUED | OUTPATIENT
Start: 2020-01-29 | End: 2020-01-29

## 2020-01-29 RX ADMIN — Medication 200 GRAM(S): at 08:42

## 2020-01-29 RX ADMIN — HYDROMORPHONE HYDROCHLORIDE 30 MILLILITER(S): 2 INJECTION INTRAMUSCULAR; INTRAVENOUS; SUBCUTANEOUS at 07:26

## 2020-01-29 RX ADMIN — PREGABALIN 1000 MICROGRAM(S): 225 CAPSULE ORAL at 11:40

## 2020-01-29 RX ADMIN — Medication 1 TABLET(S): at 11:40

## 2020-01-29 RX ADMIN — ZOLPIDEM TARTRATE 5 MILLIGRAM(S): 10 TABLET ORAL at 23:25

## 2020-01-29 RX ADMIN — Medication 1000 UNIT(S): at 11:40

## 2020-01-29 RX ADMIN — HYDROMORPHONE HYDROCHLORIDE 30 MILLILITER(S): 2 INJECTION INTRAMUSCULAR; INTRAVENOUS; SUBCUTANEOUS at 05:38

## 2020-01-29 RX ADMIN — HYDROMORPHONE HYDROCHLORIDE 0.5 MILLIGRAM(S): 2 INJECTION INTRAMUSCULAR; INTRAVENOUS; SUBCUTANEOUS at 00:47

## 2020-01-29 RX ADMIN — Medication 1000 MILLIGRAM(S): at 02:12

## 2020-01-29 RX ADMIN — Medication 100 MILLIGRAM(S): at 13:55

## 2020-01-29 RX ADMIN — CITALOPRAM 40 MILLIGRAM(S): 10 TABLET, FILM COATED ORAL at 11:40

## 2020-01-29 RX ADMIN — Medication 1000 MILLIGRAM(S): at 11:00

## 2020-01-29 RX ADMIN — HYDROMORPHONE HYDROCHLORIDE 30 MILLILITER(S): 2 INJECTION INTRAMUSCULAR; INTRAVENOUS; SUBCUTANEOUS at 01:09

## 2020-01-29 RX ADMIN — HYDROMORPHONE HYDROCHLORIDE 30 MILLILITER(S): 2 INJECTION INTRAMUSCULAR; INTRAVENOUS; SUBCUTANEOUS at 04:34

## 2020-01-29 RX ADMIN — AMLODIPINE BESYLATE 5 MILLIGRAM(S): 2.5 TABLET ORAL at 08:39

## 2020-01-29 RX ADMIN — ENOXAPARIN SODIUM 40 MILLIGRAM(S): 100 INJECTION SUBCUTANEOUS at 11:40

## 2020-01-29 RX ADMIN — Medication 400 MILLIGRAM(S): at 10:41

## 2020-01-29 RX ADMIN — HYDROMORPHONE HYDROCHLORIDE 30 MILLILITER(S): 2 INJECTION INTRAMUSCULAR; INTRAVENOUS; SUBCUTANEOUS at 14:15

## 2020-01-29 RX ADMIN — Medication 1 MILLIGRAM(S): at 14:24

## 2020-01-29 RX ADMIN — AMLODIPINE BESYLATE 5 MILLIGRAM(S): 2.5 TABLET ORAL at 23:23

## 2020-01-29 RX ADMIN — Medication 1000 MILLIGRAM(S): at 20:55

## 2020-01-29 RX ADMIN — Medication 400 MILLIGRAM(S): at 02:07

## 2020-01-29 RX ADMIN — Medication 100 MILLIGRAM(S): at 06:02

## 2020-01-29 RX ADMIN — Medication 400 MILLIGRAM(S): at 20:25

## 2020-01-29 RX ADMIN — Medication 1 MILLIGRAM(S): at 06:02

## 2020-01-29 RX ADMIN — TAMSULOSIN HYDROCHLORIDE 0.4 MILLIGRAM(S): 0.4 CAPSULE ORAL at 23:23

## 2020-01-29 RX ADMIN — HYDROMORPHONE HYDROCHLORIDE 30 MILLILITER(S): 2 INJECTION INTRAMUSCULAR; INTRAVENOUS; SUBCUTANEOUS at 10:06

## 2020-01-29 RX ADMIN — Medication 200 MILLIGRAM(S): at 23:22

## 2020-01-29 NOTE — PROGRESS NOTE ADULT - SUBJECTIVE AND OBJECTIVE BOX
CHIEF COMPLAINT:  Patient is a 69y old  Male who presents with a chief complaint of Lumbar back pain (29 Jan 2020 10:32)    Interval HPI:   69 M admitted with worsening back pain  CT showed Previous laminectomy and metallic fusion L2-S1 with interposition grafts L4-5 andL5-S1. Lucency surrounding the screws bilaterally at L2 and S1 suspicious for loosening versus infection. Lucency in the endplates surrounding the interposition graft L5-S1 also suspicious for loosening versus infection.  PMH chronic back pain s/p multiple lumbar laminectomy/fusion, NPH s/p  shunt, HTN, HLD, anxiety, depression, GULSHAN on CPAP  Has hx of Oxycodone OD and tried multiple therapies including Ketamine with Dr. Esau Florence (see note)  1/28 s/p T11 TO PELVIS POSTERIOR INSTRUMENTATION AND FUSION, REVISION OF INSTRUMENTATION    Patient seen sitting at bedside and working with PT, appears comfortable, states post-op pain controlled with PCA. Pt able to ambulate independently, without difficulty.   NAD, well-groomed, well-developed, no signs of toxicity, Alert & Oriented X3, Good concentration, moves all extremities equally against gravity.    T(C): 36.9 (01-29-20 @ 14:28)  HR: 80 (01-29-20 @ 14:28)  BP: 162/77 (01-29-20 @ 14:28)  RR: 18 (01-29-20 @ 14:28)  SpO2: 95% (01-29-20 @ 14:28)    Current in-patient pain therapy:  MEDICATIONS  (STANDING):  amLODIPine   Tablet 5 milliGRAM(s) Oral <User Schedule>  cholecalciferol 1000 Unit(s) Oral daily  citalopram 40 milliGRAM(s) Oral daily  cyanocobalamin 1000 MICROGram(s) Oral daily  enoxaparin Injectable 40 milliGRAM(s) SubCutaneous daily  multivitamin 1 Tablet(s) Oral daily  polyethylene glycol 3350 17 Gram(s) Oral two times a day  senna 2 Tablet(s) Oral at bedtime  tamsulosin 0.4 milliGRAM(s) Oral at bedtime  traZODone 200 milliGRAM(s) Oral at bedtime    MEDICATIONS  (PRN):  acetaminophen   Tablet .. 650 milliGRAM(s) Oral every 6 hours PRN Temp greater or equal to 38C (100.4F), Mild Pain (1 - 3), Moderate Pain (4 - 6), Severe Pain (7 - 10)  acetaminophen   Tablet .. 650 milliGRAM(s) Oral every 6 hours PRN Temp greater or equal to 38C (100.4F), Mild Pain (1 - 3)  ALPRAZolam 1 milliGRAM(s) Oral every 6 hours PRN anxiety  bisacodyl Suppository 10 milliGRAM(s) Rectal once PRN Constipation  diazepam    Tablet 5 milliGRAM(s) Oral every 6 hours PRN spasm  diphenhydrAMINE 25 milliGRAM(s) Oral every 4 hours PRN Pruritus  naloxone Injectable 0.1 milliGRAM(s) IV Push every 3 minutes PRN For ANY of the following changes in patient status:  A. RR LESS THAN 10 breaths per minute, B. Oxygen saturation LESS THAN 90%, C. Sedation score of 6  ondansetron Injectable 4 milliGRAM(s) IV Push every 6 hours PRN Nausea  oxyCODONE    IR 5 milliGRAM(s) Oral every 4 hours PRN Moderate Pain (4 - 6)  oxyCODONE    IR 10 milliGRAM(s) Oral every 4 hours PRN Severe Pain (7 - 10)  zolpidem 5 milliGRAM(s) Oral at bedtime PRN Insomnia      Allergies    Biaxin (Other)  Lidoderm (Unknown)  morphine (Short breath; Rash)    Pertinent labs, radiology, additional studies:  Labs reviewed

## 2020-01-29 NOTE — PROVIDER CONTACT NOTE (OTHER) - ACTION/TREATMENT ORDERED:
YOSSI Nice assessed pt, no hematoma noted, edema noted to be positional. no further action required at this time, cont to monitor as per PA.

## 2020-01-29 NOTE — PROGRESS NOTE ADULT - SUBJECTIVE AND OBJECTIVE BOX
SUBJECTIVE: Patient seen and examined at bedside. Complains of incisional pain.     OVERNIGHT EVENTS: none     Vital Signs Last 24 Hrs  T(C): 36.9 (29 Jan 2020 06:30), Max: 37.1 (28 Jan 2020 14:08)  T(F): 98.4 (29 Jan 2020 06:30), Max: 98.8 (28 Jan 2020 14:08)  HR: 56 (29 Jan 2020 07:55) (53 - 72)  BP: 110/66 (29 Jan 2020 07:55) (102/55 - 179/74)  BP(mean): 82 (29 Jan 2020 02:00) (75 - 123)  RR: 18 (29 Jan 2020 06:30) (16 - 19)  SpO2: 99% (29 Jan 2020 06:30) (92% - 100%)    PHYSICAL EXAM:    General: No Acute Distress     Neurological: Awake, alert oriented to person, place and time, Following Commands, PERRL, EOMI, Face Symmetrical, Speech Fluent, Moving all extremities, Muscle Strength normal in all four extremities, No Drift, Sensation to Light Touch Intact    Pulmonary: Clear to Auscultation, No Rales, No Rhonchi, No Wheezes     Cardiovascular: S1, S2, Regular Rate and Rhythm     Gastrointestinal: Soft, Nontender, Nondistended     Incision: c/d/i     LABS:                        9.2    11.13 )-----------( 224      ( 29 Jan 2020 03:19 )             29.3    01-29    140  |  108  |  14  ----------------------------<  158<H>  4.2   |  23  |  0.85    Ca    7.4<L>      29 Jan 2020 03:19  Phos  2.1     01-28  Mg     2.1     01-28    PT/INR - ( 28 Jan 2020 07:43 )   PT: 14.0 sec;   INR: 1.23 ratio         PTT - ( 28 Jan 2020 07:43 )  PTT:30.7 sec      01-28 @ 07:01  -  01-29 @ 07:00  --------------------------------------------------------  IN: 1500 mL / OUT: 1635 mL / NET: -135 mL    01-29 @ 07:01 - 01-29 @ 10:35  --------------------------------------------------------  IN: 0 mL / OUT: 400 mL / NET: -400 mL      DRAINS: + R HMV (180cc/24 hours) + L HMV (230 cc/24 hours)     DIET: regular diet     IMAGING: no new imaging

## 2020-01-29 NOTE — PROGRESS NOTE ADULT - SUBJECTIVE AND OBJECTIVE BOX
Plastic Surgery Progress Note    Subjective: seen on rounds, no issues this AM, doing well    Exam:    Neuro: Alert  GA: well-appearing  Pulm: breathing comfortably  Back: incision c/d/i, soft, no collections, drains with s/s output    Vital Signs Last 24 Hrs  T(C): 36.9 (29 Jan 2020 06:30), Max: 37.1 (28 Jan 2020 14:08)  T(F): 98.4 (29 Jan 2020 06:30), Max: 98.8 (28 Jan 2020 14:08)  HR: 53 (29 Jan 2020 06:30) (53 - 72)  BP: 114/55 (29 Jan 2020 06:30) (102/55 - 179/74)  BP(mean): 82 (29 Jan 2020 02:00) (75 - 123)  RR: 18 (29 Jan 2020 06:30) (16 - 19)  SpO2: 99% (29 Jan 2020 06:30) (92% - 100%)    I&O's Detail    28 Jan 2020 07:01  -  29 Jan 2020 07:00  --------------------------------------------------------  IN:    IV PiggyBack: 150 mL    sodium chloride 0.9%: 600 mL    sodium chloride 0.9%.: 750 mL  Total IN: 1500 mL    OUT:    Accordian: 180 mL    Accordian: 230 mL    Indwelling Catheter - Urethral: 1225 mL  Total OUT: 1635 mL    Total NET: -135 mL      MEDICATIONS  (STANDING):  amLODIPine   Tablet 5 milliGRAM(s) Oral <User Schedule>  ceFAZolin   IVPB 2000 milliGRAM(s) IV Intermittent every 8 hours  cholecalciferol 1000 Unit(s) Oral daily  citalopram 40 milliGRAM(s) Oral daily  cyanocobalamin 1000 MICROGram(s) Oral daily  enoxaparin Injectable 40 milliGRAM(s) SubCutaneous daily  HYDROmorphone PCA (1 mG/mL) 30 milliLiter(s) PCA Continuous PCA Continuous  multivitamin 1 Tablet(s) Oral daily  multivitamin 1 Tablet(s) Oral daily  polyethylene glycol 3350 17 Gram(s) Oral two times a day  senna 2 Tablet(s) Oral at bedtime  sodium chloride 0.9%. 1000 milliLiter(s) (75 mL/Hr) IV Continuous <Continuous>  tamsulosin 0.4 milliGRAM(s) Oral at bedtime  traZODone 200 milliGRAM(s) Oral at bedtime    MEDICATIONS  (PRN):  acetaminophen   Tablet .. 650 milliGRAM(s) Oral every 6 hours PRN Temp greater or equal to 38C (100.4F), Mild Pain (1 - 3), Moderate Pain (4 - 6), Severe Pain (7 - 10)  acetaminophen   Tablet .. 650 milliGRAM(s) Oral every 6 hours PRN Temp greater or equal to 38C (100.4F), Mild Pain (1 - 3)  ALPRAZolam 1 milliGRAM(s) Oral every 6 hours PRN anxiety  bisacodyl Suppository 10 milliGRAM(s) Rectal once PRN Constipation  diazepam    Tablet 5 milliGRAM(s) Oral every 6 hours PRN spasm  diphenhydrAMINE 25 milliGRAM(s) Oral every 4 hours PRN Pruritus  HYDROmorphone   Tablet 4 milliGRAM(s) Oral every 6 hours PRN Severe Pain (7 - 10)  HYDROmorphone PCA (1 mG/mL) Rescue Clinician Bolus 0.5 milliGRAM(s) IV Push every 15 minutes PRN for Pain Scale GREATER THAN 6  naloxone Injectable 0.1 milliGRAM(s) IV Push every 3 minutes PRN For ANY of the following changes in patient status:  A. RR LESS THAN 10 breaths per minute, B. Oxygen saturation LESS THAN 90%, C. Sedation score of 6  ondansetron Injectable 4 milliGRAM(s) IV Push every 6 hours PRN Nausea  zolpidem 5 milliGRAM(s) Oral at bedtime PRN Insomnia      LABS:                        9.2    11.13 )-----------( 224      ( 29 Jan 2020 03:19 )             29.3     01-29    140  |  108  |  14  ----------------------------<  158<H>  4.2   |  23  |  0.85    Ca    7.4<L>      29 Jan 2020 03:19  Phos  2.1     01-28  Mg     2.1     01-28      PT/INR - ( 28 Jan 2020 07:43 )   PT: 14.0 sec;   INR: 1.23 ratio         PTT - ( 28 Jan 2020 07:43 )  PTT:30.7 sec

## 2020-01-29 NOTE — PROGRESS NOTE ADULT - ASSESSMENT
69 M admitted with worsening back pain  CT showed Previous laminectomy and metallic fusion L2-S1 with interposition grafts L4-5 andL5-S1. Lucency surrounding the screws bilaterally at L2 and S1 suspicious for loosening versus infection. Lucency in the endplates surrounding the interposition graft L5-S1 also suspicious for loosening versus infection.  PMH chronic back pain s/p multiple lumbar laminectomy/fusion, NPH s/p  shunt, HTN, HLD, anxiety, depression, GULSHAN on CPAP  Has hx of Oxycodone OD and tried multiple therapies including Ketamine with Dr. Esau Florence (see note)  1/28 s/p T11 TO PELVIS POSTERIOR INSTRUMENTATION AND FUSION, REVISION OF INSTRUMENTATION    At time of visit patient was on PCA - which has been discontinued  Pt with history of Oxycodone overdose; Consider changing to Dilaudid PO 2mg every 4 hours PRN moderate and 4 mg every 6 hours PRN severe  Can add IV Dilaudid 0.5 mg every 6 hours PRN severe breakthrough pain  Consider Cymbalta 20 mg daily  Avoid Gabapentin 2/2 pt reports makes him feel "like legs going to explode"  Pt to follow up with Dr. Esau Florence or another out patient pain practice (list provided) after discharge  Pt does not feel his Medical Marijuana is effective and does not wish to use while in-patient  Monitor for sedation, respiratory depression    Time spent on encounter:    20    Minutes    [facility]  [phone]

## 2020-01-29 NOTE — PROVIDER CONTACT NOTE (OTHER) - ASSESSMENT
Pt on PCA pump, pain controlled. Denies increased pain at HMV entry site, no ecchymosis noted at site. HMV to ss.

## 2020-01-29 NOTE — PHYSICAL THERAPY INITIAL EVALUATION ADULT - PERTINENT HX OF CURRENT PROBLEM, REHAB EVAL
69 M PMH chronic back pain s/p multiple lumbar laminectomy/fusion, NPH s/p  shunt, HTN, HLD, anxiety, depression, GULSHAN on CPAP, p/w worsening back pain. Pt states that he has had months of worsening, intermittent back pain that was acutely worsened in last 1-2 weeks.CT Scan shows multiple areas of hardware loosening and pseudoarthrosis, compression fx unchanged since 2016. Per neurosx- no acute sx intervention.
69 M PMH chronic back pain s/p multiple lumbar laminectomy/fusion, NPH s/p  shunt, HTN, HLD, anxiety, depression, GULSHAN on CPAP, p/w worsening back pain. Pt states that he has had months of worsening, intermittent back pain that was acutely worsened in last 1-2 weeks.CT Scan shows multiple areas of hardware loosening and pseudoarthrosis, compression fx unchanged since 2016. Pt now s/p T11 to pelvis posterior instrumentation and fusion, revision of instrumentation 1/28/2020.

## 2020-01-29 NOTE — PROGRESS NOTE ADULT - ASSESSMENT
Pt is a 69M s/p T11-pelvis PSF with back closure    - Aquacel change 2/2, drain care as needed  - appreciate care per primary  - will follow for incision care    #Plastic Surgery

## 2020-01-29 NOTE — PROGRESS NOTE ADULT - ASSESSMENT
69 M PMH chronic back pain s/p multiple lumbar laminectomy/fusion, NPH s/p  shunt, HTN, HLD, anxiety, depression, GULSHAN on CPAP, p/w worsening back pain. Pt states that he has had months of worsening, intermittent back pain that was acutely worsened in last 1-2 weeks. States he was cleaning his bathroom when the pain acutely worsened; is mostly lumbar in nature with radiation down L buttocks and at times L thigh/knee.  No radiation of pain to toes.  NO numbness/tingling. No weakness.  Still ambulatory with cane/able to bear weight.  Pt states he felt better after his last lumbar laminectomy/fusion in 6/2019 with Dr. Meche Huynh at [facility], but only felt better for about a week before his pain returned.  Has had issues with opiates in past (passing out, ?overdose) and has followed with multiple pain mgt providers in past (per [employer] his PCP states that he was either fired from prior pain mgt doctors or lost to f/u with them because he didn't like them).  Few months ago he tried ketamine infusions with a doctor at [facility] but this did not help; more recently he has started gabapentin but he has since discontinued it due to lack of effect and uncomfortable sensation in both legs as if they were "going to explode."  Denies cp, sob, f/c, n/v/d, denies urinary retention or urinary/fecal incontinence, denies saddle anesthesia; notes unchanged urinary hesitancy likely related to bph.  Of note, he has a friend who is living with him since his last surgery, who pt states primarily helps him with his meds; pt is unable to recount all meds/doses at this time, call placed to friend unanswered. Pt notes he is not currently taking opiates and only OTC meds such as aspirin and naproxen; unk dose/freq of naproxen use, denies gib/melena/hematochezia or abd pain at the moment. Also, his friend who lives with him was seen at urgent care center today and diagnosed with flu and given Tamiflu; pt himself denies viral uri sx.    VS: 98.0, 57, 103/63, 18, 95% Ra.  Labs: no leukocytosis, chronic stable mild anemia, plt wnl, coags unrevealing, cmp with mild hypoK 3.2, JOSE MIGUEL scr 1.39 baseline 0.94, rest cmp unrevealing. XR pelvis prelim no acute fx. XR lumbar spine with noted unchanged L1 deformity, no new compression deformities seen. In ER pt received valium, dilaudid iv 0.5 x 2, dilaudid iv 1 x 1, and oxyIR prior to medicine team involvement. (22 Jan 2020 23:48)    PROCEDURE: 1/28 s/p T11 TO PELVIS POSTERIOR INSTRUMENTATION AND FUSION, REVISION OF INSTRUMENTATION  POD#1    PLAN:  -Neuro: continue drains and monitor output  -Continue PCA for today and dc in am as patient is in moderate amount of pain. Chronic pain called  -Encouraged incentive spirometry  -Continue norvasc for hypertension  -Flomax for bph  -Continue home meds  -Senna, miralax, dulcolax for bowel regimen  -Post op anemia however patient asymptomatic Repeat labs in am   -DVT ppx: venodynes and sql  -PT: home w outpatient PT when stable     Will discuss above with Dr. Lg Soto #[id number]        Assessment:  Please Check When Present   []  GCS  E   V  M     Heart Failure: []Acute, [] acute on chronic , []chronic  Heart Failure:  [] Diastolic (HFpEF), [] Systolic (HFrEF), []Combined (HFpEF and HFrEF), [] RHF, [] Pulm HTN, [] Other    [] JOSE MIGUEL, [] ATN, [] AIN, [] other  [] CKD1, [] CKD2, [] CKD 3, [] CKD 4, [] CKD 5, []ESRD    Encephalopathy: [] Metabolic, [] Hepatic, [] toxic, [] Neurological, [] Other    Abnormal Nurtitional Status: [] malnurtition (see nutrition note), [ ]underweight: BMI < 19, [] morbid obesity: BMI >40, [] Cachexia    [] Sepsis  [] hypovolemic shock,[] cardiogenic shock, [] hemorrhagic shock, [] neuogenic shock  [] Acute Respiratory Failure  []Cerebral edema, [] Brain compression/ herniation,   [] Functional quadriplegia  [] Acute blood loss anemia

## 2020-01-29 NOTE — PROGRESS NOTE ADULT - SUBJECTIVE AND OBJECTIVE BOX
Day 1 of Anesthesia Pain Management Service    SUBJECTIVE: I'm doing better    Pain Scale Score:	[X] Refer to charted pain scores    THERAPY:    [ ] IV PCA Morphine		[ ] 5 mg/mL	[ ] 1 mg/mL  [X] IV PCA Hydromorphone	[ ] 5 mg/mL	[X] 1 mg/mL  [ ] IV PCA Fentanyl		[ ] 50 micrograms/mL    Demand dose: 0.2 mg     Lockout: 6 minutes   Continuous Rate: 0 mg/hr  4 Hour Limit: 4 mg    MEDICATIONS  (STANDING):  acetaminophen  IVPB .. 1000 milliGRAM(s) IV Intermittent once  amLODIPine   Tablet 5 milliGRAM(s) Oral <User Schedule>  ceFAZolin   IVPB 2000 milliGRAM(s) IV Intermittent every 8 hours  cholecalciferol 1000 Unit(s) Oral daily  citalopram 40 milliGRAM(s) Oral daily  cyanocobalamin 1000 MICROGram(s) Oral daily  enoxaparin Injectable 40 milliGRAM(s) SubCutaneous daily  HYDROmorphone PCA (1 mG/mL) 30 milliLiter(s) PCA Continuous PCA Continuous  multivitamin 1 Tablet(s) Oral daily  multivitamin 1 Tablet(s) Oral daily  polyethylene glycol 3350 17 Gram(s) Oral two times a day  senna 2 Tablet(s) Oral at bedtime  sodium chloride 0.9%. 1000 milliLiter(s) (75 mL/Hr) IV Continuous <Continuous>  tamsulosin 0.4 milliGRAM(s) Oral at bedtime  traZODone 200 milliGRAM(s) Oral at bedtime    MEDICATIONS  (PRN):  acetaminophen   Tablet .. 650 milliGRAM(s) Oral every 6 hours PRN Temp greater or equal to 38C (100.4F), Mild Pain (1 - 3), Moderate Pain (4 - 6), Severe Pain (7 - 10)  acetaminophen   Tablet .. 650 milliGRAM(s) Oral every 6 hours PRN Temp greater or equal to 38C (100.4F), Mild Pain (1 - 3)  ALPRAZolam 1 milliGRAM(s) Oral every 6 hours PRN anxiety  bisacodyl Suppository 10 milliGRAM(s) Rectal once PRN Constipation  diazepam    Tablet 5 milliGRAM(s) Oral every 6 hours PRN spasm  diphenhydrAMINE 25 milliGRAM(s) Oral every 4 hours PRN Pruritus  HYDROmorphone   Tablet 4 milliGRAM(s) Oral every 6 hours PRN Severe Pain (7 - 10)  HYDROmorphone PCA (1 mG/mL) Rescue Clinician Bolus 0.5 milliGRAM(s) IV Push every 15 minutes PRN for Pain Scale GREATER THAN 6  naloxone Injectable 0.1 milliGRAM(s) IV Push every 3 minutes PRN For ANY of the following changes in patient status:  A. RR LESS THAN 10 breaths per minute, B. Oxygen saturation LESS THAN 90%, C. Sedation score of 6  ondansetron Injectable 4 milliGRAM(s) IV Push every 6 hours PRN Nausea  zolpidem 5 milliGRAM(s) Oral at bedtime PRN Insomnia      OBJECTIVE:    Sedation Score:	[ X] Alert 	[ ] Drowsy 	[ ] Arousable	[ ] Asleep	[ ] Unresponsive    Side Effects:	[X ] None	[ ] Nausea	[ ] Vomiting	[ ] Pruritus  		[ ] Other:    Vital Signs Last 24 Hrs  T(C): 36.9 (29 Jan 2020 06:30), Max: 37.1 (28 Jan 2020 14:08)  T(F): 98.4 (29 Jan 2020 06:30), Max: 98.8 (28 Jan 2020 14:08)  HR: 56 (29 Jan 2020 07:55) (53 - 72)  BP: 110/66 (29 Jan 2020 07:55) (102/55 - 179/74)  BP(mean): 82 (29 Jan 2020 02:00) (75 - 123)  RR: 18 (29 Jan 2020 06:30) (16 - 19)  SpO2: 99% (29 Jan 2020 06:30) (92% - 100%)    ASSESSMENT/ PLAN    Therapy to  be:               [X] Continued   [ ] Discontinued   [ ] Changed to PRN Analgesics    Documentation and Verification of current medications:   [X] Done	[ ] Not done, not eligible    Comments: Analgesia improved with four hour limit increase last pm. Continue PCA until OOB then transition to prn analgesics

## 2020-01-29 NOTE — PROVIDER CONTACT NOTE (OTHER) - ASSESSMENT
BP: 147/69, RR: 18, 94% on room air, HR: 89, temp: 98.7. Neuro check WDL, no change from previous checks.

## 2020-01-29 NOTE — CHART NOTE - NSCHARTNOTEFT_GEN_A_CORE
Asked by nurse to see patient regarding  patient noting "I feel feverish and flushed," Examined patient at bedside. He is at neuro baseline and remains afebrile currently as per nursing staff with stable vital signs.

## 2020-01-29 NOTE — PHYSICAL THERAPY INITIAL EVALUATION ADULT - ADDITIONAL COMMENTS
Patient lives in pvt house with friend ,3 steps to enter.  Patient ambulated with straight cane independent.
Patient lives in pvt house with friend ,3 steps to enter.  PTA ptt amb with straight cane independent.

## 2020-01-30 ENCOUNTER — RESULTS ENCOUNTER (OUTPATIENT)
Dept: FAMILY MEDICINE CLINIC | Facility: CLINIC | Age: 70
End: 2020-01-30

## 2020-01-30 DIAGNOSIS — E78.2 MIXED HYPERLIPIDEMIA: ICD-10-CM

## 2020-01-30 DIAGNOSIS — I10 ESSENTIAL HYPERTENSION: ICD-10-CM

## 2020-01-30 DIAGNOSIS — E11.9 TYPE 2 DIABETES MELLITUS WITHOUT COMPLICATION, WITHOUT LONG-TERM CURRENT USE OF INSULIN (HCC): ICD-10-CM

## 2020-01-30 LAB
ANION GAP SERPL CALC-SCNC: 10 MMOL/L — SIGNIFICANT CHANGE UP (ref 5–17)
BASOPHILS # BLD AUTO: 0.04 K/UL — SIGNIFICANT CHANGE UP (ref 0–0.2)
BASOPHILS NFR BLD AUTO: 0.3 % — SIGNIFICANT CHANGE UP (ref 0–2)
BUN SERPL-MCNC: 9 MG/DL — SIGNIFICANT CHANGE UP (ref 7–23)
CALCIUM SERPL-MCNC: 8.2 MG/DL — LOW (ref 8.4–10.5)
CHLORIDE SERPL-SCNC: 105 MMOL/L — SIGNIFICANT CHANGE UP (ref 96–108)
CO2 SERPL-SCNC: 26 MMOL/L — SIGNIFICANT CHANGE UP (ref 22–31)
CREAT SERPL-MCNC: 0.87 MG/DL — SIGNIFICANT CHANGE UP (ref 0.5–1.3)
EOSINOPHIL # BLD AUTO: 0.05 K/UL — SIGNIFICANT CHANGE UP (ref 0–0.5)
EOSINOPHIL NFR BLD AUTO: 0.4 % — SIGNIFICANT CHANGE UP (ref 0–6)
GLUCOSE SERPL-MCNC: 129 MG/DL — HIGH (ref 70–99)
HCT VFR BLD CALC: 31.8 % — LOW (ref 39–50)
HGB BLD-MCNC: 9.8 G/DL — LOW (ref 13–17)
IMM GRANULOCYTES NFR BLD AUTO: 0.7 % — SIGNIFICANT CHANGE UP (ref 0–1.5)
LYMPHOCYTES # BLD AUTO: 1.13 K/UL — SIGNIFICANT CHANGE UP (ref 1–3.3)
LYMPHOCYTES # BLD AUTO: 8.7 % — LOW (ref 13–44)
MCHC RBC-ENTMCNC: 25.7 PG — LOW (ref 27–34)
MCHC RBC-ENTMCNC: 30.8 GM/DL — LOW (ref 32–36)
MCV RBC AUTO: 83.2 FL — SIGNIFICANT CHANGE UP (ref 80–100)
MONOCYTES # BLD AUTO: 1.24 K/UL — HIGH (ref 0–0.9)
MONOCYTES NFR BLD AUTO: 9.5 % — SIGNIFICANT CHANGE UP (ref 2–14)
NEUTROPHILS # BLD AUTO: 10.51 K/UL — HIGH (ref 1.8–7.4)
NEUTROPHILS NFR BLD AUTO: 80.4 % — HIGH (ref 43–77)
NRBC # BLD: 0 /100 WBCS — SIGNIFICANT CHANGE UP (ref 0–0)
PLATELET # BLD AUTO: 235 K/UL — SIGNIFICANT CHANGE UP (ref 150–400)
POTASSIUM SERPL-MCNC: 3.4 MMOL/L — LOW (ref 3.5–5.3)
POTASSIUM SERPL-SCNC: 3.4 MMOL/L — LOW (ref 3.5–5.3)
RBC # BLD: 3.82 M/UL — LOW (ref 4.2–5.8)
RBC # FLD: 15.6 % — HIGH (ref 10.3–14.5)
SODIUM SERPL-SCNC: 141 MMOL/L — SIGNIFICANT CHANGE UP (ref 135–145)
WBC # BLD: 13.06 K/UL — HIGH (ref 3.8–10.5)
WBC # FLD AUTO: 13.06 K/UL — HIGH (ref 3.8–10.5)

## 2020-01-30 PROCEDURE — 99231 SBSQ HOSP IP/OBS SF/LOW 25: CPT

## 2020-01-30 PROCEDURE — 72131 CT LUMBAR SPINE W/O DYE: CPT | Mod: 26

## 2020-01-30 PROCEDURE — 72128 CT CHEST SPINE W/O DYE: CPT | Mod: 26

## 2020-01-30 RX ORDER — POTASSIUM CHLORIDE 20 MEQ
20 PACKET (EA) ORAL ONCE
Refills: 0 | Status: COMPLETED | OUTPATIENT
Start: 2020-01-30 | End: 2020-01-30

## 2020-01-30 RX ORDER — OXYCODONE AND ACETAMINOPHEN 5; 325 MG/1; MG/1
2 TABLET ORAL EVERY 4 HOURS
Refills: 0 | Status: DISCONTINUED | OUTPATIENT
Start: 2020-01-30 | End: 2020-01-30

## 2020-01-30 RX ORDER — DULOXETINE HYDROCHLORIDE 30 MG/1
20 CAPSULE, DELAYED RELEASE ORAL DAILY
Refills: 0 | Status: DISCONTINUED | OUTPATIENT
Start: 2020-01-30 | End: 2020-01-30

## 2020-01-30 RX ORDER — HYDROMORPHONE HYDROCHLORIDE 2 MG/ML
4 INJECTION INTRAMUSCULAR; INTRAVENOUS; SUBCUTANEOUS EVERY 4 HOURS
Refills: 0 | Status: DISCONTINUED | OUTPATIENT
Start: 2020-01-30 | End: 2020-02-03

## 2020-01-30 RX ORDER — OXYCODONE AND ACETAMINOPHEN 5; 325 MG/1; MG/1
1 TABLET ORAL EVERY 4 HOURS
Refills: 0 | Status: DISCONTINUED | OUTPATIENT
Start: 2020-01-30 | End: 2020-01-30

## 2020-01-30 RX ORDER — HYDROMORPHONE HYDROCHLORIDE 2 MG/ML
2 INJECTION INTRAMUSCULAR; INTRAVENOUS; SUBCUTANEOUS EVERY 4 HOURS
Refills: 0 | Status: DISCONTINUED | OUTPATIENT
Start: 2020-01-30 | End: 2020-02-03

## 2020-01-30 RX ADMIN — OXYCODONE HYDROCHLORIDE 10 MILLIGRAM(S): 5 TABLET ORAL at 09:26

## 2020-01-30 RX ADMIN — PREGABALIN 1000 MICROGRAM(S): 225 CAPSULE ORAL at 16:51

## 2020-01-30 RX ADMIN — HYDROMORPHONE HYDROCHLORIDE 4 MILLIGRAM(S): 2 INJECTION INTRAMUSCULAR; INTRAVENOUS; SUBCUTANEOUS at 13:56

## 2020-01-30 RX ADMIN — Medication 1 MILLIGRAM(S): at 05:46

## 2020-01-30 RX ADMIN — TAMSULOSIN HYDROCHLORIDE 0.4 MILLIGRAM(S): 0.4 CAPSULE ORAL at 21:17

## 2020-01-30 RX ADMIN — Medication 1000 UNIT(S): at 12:40

## 2020-01-30 RX ADMIN — AMLODIPINE BESYLATE 5 MILLIGRAM(S): 2.5 TABLET ORAL at 20:20

## 2020-01-30 RX ADMIN — Medication 650 MILLIGRAM(S): at 17:20

## 2020-01-30 RX ADMIN — Medication 200 MILLIGRAM(S): at 21:17

## 2020-01-30 RX ADMIN — ZOLPIDEM TARTRATE 5 MILLIGRAM(S): 10 TABLET ORAL at 21:26

## 2020-01-30 RX ADMIN — OXYCODONE HYDROCHLORIDE 10 MILLIGRAM(S): 5 TABLET ORAL at 09:58

## 2020-01-30 RX ADMIN — HYDROMORPHONE HYDROCHLORIDE 4 MILLIGRAM(S): 2 INJECTION INTRAMUSCULAR; INTRAVENOUS; SUBCUTANEOUS at 21:00

## 2020-01-30 RX ADMIN — CITALOPRAM 40 MILLIGRAM(S): 10 TABLET, FILM COATED ORAL at 12:40

## 2020-01-30 RX ADMIN — Medication 20 MILLIEQUIVALENT(S): at 16:58

## 2020-01-30 RX ADMIN — Medication 1 MILLIGRAM(S): at 12:45

## 2020-01-30 RX ADMIN — Medication 650 MILLIGRAM(S): at 16:51

## 2020-01-30 RX ADMIN — HYDROMORPHONE HYDROCHLORIDE 4 MILLIGRAM(S): 2 INJECTION INTRAMUSCULAR; INTRAVENOUS; SUBCUTANEOUS at 20:17

## 2020-01-30 RX ADMIN — HYDROMORPHONE HYDROCHLORIDE 4 MILLIGRAM(S): 2 INJECTION INTRAMUSCULAR; INTRAVENOUS; SUBCUTANEOUS at 14:26

## 2020-01-30 RX ADMIN — ENOXAPARIN SODIUM 40 MILLIGRAM(S): 100 INJECTION SUBCUTANEOUS at 12:40

## 2020-01-30 RX ADMIN — POLYETHYLENE GLYCOL 3350 17 GRAM(S): 17 POWDER, FOR SOLUTION ORAL at 17:01

## 2020-01-30 RX ADMIN — AMLODIPINE BESYLATE 5 MILLIGRAM(S): 2.5 TABLET ORAL at 09:33

## 2020-01-30 NOTE — PROGRESS NOTE ADULT - SUBJECTIVE AND OBJECTIVE BOX
SUBJECTIVE: Patient seen and examined at bedside. Complains of incisional pain.     OVERNIGHT EVENTS: none     Vital Signs Last 24 Hrs  T(C): 37.7 (30 Jan 2020 10:22), Max: 37.8 (30 Jan 2020 05:46)  T(F): 99.9 (30 Jan 2020 10:22), Max: 100 (30 Jan 2020 05:46)  HR: 98 (30 Jan 2020 10:22) (71 - 98)  BP: 142/72 (30 Jan 2020 10:22) (115/77 - 162/77)  BP(mean): --  RR: 17 (30 Jan 2020 10:22) (16 - 18)  SpO2: 95% (30 Jan 2020 10:22) (93% - 100%)    PHYSICAL EXAM:    General: No Acute Distress     Neurological: Awake, alert oriented to person, place and time, Following Commands, PERRL, EOMI, Face Symmetrical, Speech Fluent, Moving all extremities, Muscle Strength normal in all four extremities, No Drift, Sensation to Light Touch Intact    Pulmonary: Clear to Auscultation, No Rales, No Rhonchi, No Wheezes     Cardiovascular: S1, S2, Regular Rate and Rhythm     Gastrointestinal: Soft, Nontender, Nondistended     Incision: c/d/i     LABS:                	                      9.8    13.06 )-----------( 235      ( 30 Jan 2020 06:34 )             31.8   01-30    141  |  105  |  9   ----------------------------<  129<H>  3.4<L>   |  26  |  0.87    Ca    8.2<L>      30 Jan 2020 06:34      DRAINS: + R HMV (220cc/24 hours) + L HMV (115 cc/24 hours)     DIET: regular diet     IMAGING: < from: CT Thoracic Spine No Cont (01.30.20 @ 10:55) >  IMPRESSION:      Status post interval new posterior surgical fusion at T11-L1 and new placement of 2 additional sacral screws inferior to the S1 screws,as an extension to the previously visualized posterior fusion at L2-S1 with unchanged multilevel laminectomies. This hardware appears intact.  Zero profile device at the L5/S1 level demonstrates stable surrounding lucency.  2 drainage catheters within the paraspinal soft tissues are in place. Expected postsurgical changes in the superficial and deep soft tissues of the surgical bed are identified.    < end of copied text >

## 2020-01-30 NOTE — PROGRESS NOTE ADULT - ASSESSMENT
Pt is a 69M s/p T11-pelvis PSF with back closure    - Aquacel change 2/2, drain care as needed  - appreciate care per primary  - will follow for incision care    Plastic Surgery  (pg [facility]: [id number], NS: [phone])

## 2020-01-30 NOTE — PROGRESS NOTE ADULT - SUBJECTIVE AND OBJECTIVE BOX
CHIEF COMPLAINT:  Patient is a 69y old  Male who presents with a chief complaint of Lumbar back pain (30 Jan 2020 13:34)    Interval HPI:   69 M admitted with worsening back pain. CT showed Previous laminectomy and metallic fusion L2-S1 with interposition grafts L4-5 andL5-S1. Lucency surrounding the screws bilaterally at L2 and S1 suspicious for loosening versus infection. Lucency in the endplates surrounding the interposition graft L5-S1 also suspicious for loosening versus infection. PMH chronic back pain s/p multiple lumbar laminectomy/fusion, NPH s/p  shunt, HTN, HLD, anxiety, depression, GULSHAN on CPAP  **Has hx of Oxycodone OD and tried multiple therapies including Ketamine with Dr. Esau Florence (see note)  1/28 s/p T11 TO PELVIS POSTERIOR INSTRUMENTATION AND FUSION, REVISION OF INSTRUMENTATION    Pain Score: 8/10    Patient seen lying in bed, states he feels terrible 2/2 fever. Pain is tolerable at the moment. Appears in mild discomfort, pain is incisional, post-op pain which does not radiate. "Thinks" the dilaudid may be working better than the Oxycodone. Discussed his refusal of Cymbalta, states that he took it in the past and had suicidal tendencies, will discontinue. NAD, well-groomed, well-developed, no signs of toxicity, Alert & Oriented X3, Good concentration, moves all extremities equally against gravity.    T(C): 37.9 (01-30-20 @ 16:33)  HR: 99 (01-30-20 @ 16:33)  BP: 117/62 (01-30-20 @ 16:33)  RR: 16 (01-30-20 @ 16:33)  SpO2: 93% (01-30-20 @ 16:33)    Current in-patient pain therapy:  MEDICATIONS  (STANDING):  amLODIPine   Tablet 5 milliGRAM(s) Oral <User Schedule>  cholecalciferol 1000 Unit(s) Oral daily  citalopram 40 milliGRAM(s) Oral daily  cyanocobalamin 1000 MICROGram(s) Oral daily  enoxaparin Injectable 40 milliGRAM(s) SubCutaneous daily  multivitamin 1 Tablet(s) Oral daily  polyethylene glycol 3350 17 Gram(s) Oral two times a day  senna 2 Tablet(s) Oral at bedtime  tamsulosin 0.4 milliGRAM(s) Oral at bedtime  traZODone 200 milliGRAM(s) Oral at bedtime    MEDICATIONS  (PRN):  acetaminophen   Tablet .. 650 milliGRAM(s) Oral every 6 hours PRN Temp greater or equal to 38C (100.4F), Mild Pain (1 - 3), Moderate Pain (4 - 6), Severe Pain (7 - 10)  acetaminophen   Tablet .. 650 milliGRAM(s) Oral every 6 hours PRN Temp greater or equal to 38C (100.4F), Mild Pain (1 - 3)  ALPRAZolam 1 milliGRAM(s) Oral every 6 hours PRN anxiety  bisacodyl Suppository 10 milliGRAM(s) Rectal once PRN Constipation  diazepam    Tablet 5 milliGRAM(s) Oral every 6 hours PRN spasm  diphenhydrAMINE 25 milliGRAM(s) Oral every 4 hours PRN Pruritus  HYDROmorphone   Tablet 2 milliGRAM(s) Oral every 4 hours PRN Moderate Pain (4 - 6)  HYDROmorphone   Tablet 4 milliGRAM(s) Oral every 4 hours PRN Severe Pain (7 - 10)  naloxone Injectable 0.1 milliGRAM(s) IV Push every 3 minutes PRN For ANY of the following changes in patient status:  A. RR LESS THAN 10 breaths per minute, B. Oxygen saturation LESS THAN 90%, C. Sedation score of 6  ondansetron Injectable 4 milliGRAM(s) IV Push every 6 hours PRN Nausea  zolpidem 5 milliGRAM(s) Oral at bedtime PRN Insomnia      Allergies    Biaxin (Other)  Lidoderm (Unknown)  morphine (Short breath; Rash)      Pertinent labs, radiology, additional studies:  Labs reviewed

## 2020-01-30 NOTE — PROGRESS NOTE ADULT - ASSESSMENT
69 M admitted with worsening back pain; CT showed Previous laminectomy and metallic fusion L2-S1 with interposition grafts L4-5 andL5-S1. Lucency surrounding the screws bilaterally at L2 and S1 suspicious for loosening versus infection. Lucency in the endplates surrounding the interposition graft L5-S1 also suspicious for loosening versus infection. PMH chronic back pain s/p multiple lumbar laminectomy/fusion, NPH s/p  shunt, HTN, HLD, anxiety, depression, GULSHAN on CPAP    **History Oxycodone OD - see consult note  1/28 s/p T11 TO PELVIS POSTERIOR INSTRUMENTATION AND FUSION, REVISION OF INSTRUMENTATION    Plan discussed with primary team:  Continue PO Dilaudid 2 mg and 4 mg as ordered  Discontinue Cymbalta  History of Oxycodone OD - Try to taper off opioids for discharge   If discharging home with opioids please order Narcan Rescue Kit for discharge  Avoid Gabapentin 2/2 pt reports makes him feel "like legs going to explode"  Pt to follow up with Dr. Esau Florence or another out patient pain practice (list provided) after discharge  Pt does not feel his Medical Marijuana is effective and does not wish to use while in-patient  Monitor for sedation, respiratory depression    Time spent on encounter:    20    Minutes    Chronic Pain Service  [phone]

## 2020-01-30 NOTE — PROGRESS NOTE ADULT - SUBJECTIVE AND OBJECTIVE BOX
Plastic Surgery Progress Note  (pg [facility]: [id number], NS: [phone])    Subjective:  The patient was seen and examined on morning rounds. denies pain    Objective:    Physical Exam:  Gen: appears well, NAD  Resp: breathing comfortably  Back: incision c/d/i, soft, no collections, drains with s/s output    T(C): 36.9 (01-30-20 @ 06:00), Max: 37.8 (01-30-20 @ 05:46)  HR: 87 (01-30-20 @ 05:46) (56 - 87)  BP: 149/74 (01-30-20 @ 05:46) (110/66 - 173/78)  RR: 16 (01-30-20 @ 05:46) (16 - 18)  SpO2: 95% (01-30-20 @ 05:46) (93% - 100%)    01-29-20 @ 07:01  -  01-30-20 @ 07:00  --------------------------------------------------------  IN: 780 mL / OUT: 2135 mL / NET: -1355 mL          LABS                        9.2    11.13 )-----------( 224      ( 29 Jan 2020 03:19 )             29.3     01-30    141  |  105  |  9   ----------------------------<  129<H>  3.4<L>   |  26  |  0.87    Ca    8.2<L>      30 Jan 2020 06:34      PT/INR - ( 28 Jan 2020 07:43 )   PT: 14.0 sec;   INR: 1.23 ratio         PTT - ( 28 Jan 2020 07:43 )  PTT:30.7 sec

## 2020-01-30 NOTE — PROGRESS NOTE ADULT - ASSESSMENT
69 M PMH chronic back pain s/p multiple lumbar laminectomy/fusion, NPH s/p  shunt, HTN, HLD, anxiety, depression, GULSHAN on CPAP, p/w worsening back pain. Pt states that he has had months of worsening, intermittent back pain that was acutely worsened in last 1-2 weeks. States he was cleaning his bathroom when the pain acutely worsened; is mostly lumbar in nature with radiation down L buttocks and at times L thigh/knee.  No radiation of pain to toes.  NO numbness/tingling. No weakness.  Still ambulatory with cane/able to bear weight.  Pt states he felt better after his last lumbar laminectomy/fusion in 6/2019 with Dr. Meche Huynh at [facility], but only felt better for about a week before his pain returned.  Has had issues with opiates in past (passing out, ?overdose) and has followed with multiple pain mgt providers in past (per allscripts his PCP states that he was either fired from prior pain mgt doctors or lost to f/u with them because he didn't like them).  Few months ago he tried ketamine infusions with a doctor at [facility] but this did not help; more recently he has started gabapentin but he has since discontinued it due to lack of effect and uncomfortable sensation in both legs as if they were "going to explode."  Denies cp, sob, f/c, n/v/d, denies urinary retention or urinary/fecal incontinence, denies saddle anesthesia; notes unchanged urinary hesitancy likely related to bph.  Of note, he has a friend who is living with him since his last surgery, who pt states primarily helps him with his meds; pt is unable to recount all meds/doses at this time, call placed to friend unanswered. Pt notes he is not currently taking opiates and only OTC meds such as aspirin and naproxen; unk dose/freq of naproxen use, denies gib/melena/hematochezia or abd pain at the moment. Also, his friend who lives with him was seen at urgent care center today and diagnosed with flu and given Tamiflu; pt himself denies viral uri sx.    VS: 98.0, 57, 103/63, 18, 95% Ra.  Labs: no leukocytosis, chronic stable mild anemia, plt wnl, coags unrevealing, cmp with mild hypoK 3.2, JOSE MIGUEL scr 1.39 baseline 0.94, rest cmp unrevealing. XR pelvis prelim no acute fx. XR lumbar spine with noted unchanged L1 deformity, no new compression deformities seen. In ER pt received valium, dilaudid iv 0.5 x 2, dilaudid iv 1 x 1, and oxyIR prior to medicine team involvement. (22 Jan 2020 23:48)    PROCEDURE: 1/28 s/p T11 TO PELVIS POSTERIOR INSTRUMENTATION AND FUSION, REVISION OF INSTRUMENTATION  POD#2    PLAN:  -Neuro: continue drains and monitor output  - Acute pain saw. Recommend 1) Consider changing to Dilaudid PO 2mg every 4 hours PRN moderate and 4 mg every 6 hours PRN severe  Can add IV Dilaudid 0.5 mg every 6 hours PRN severe breakthrough pain 3) Consider Cymbalta 20 mg daily  -Encouraged incentive spirometry  -Continue norvasc for hypertension  -Flomax for bph  -Continue home meds  -Senna, miralax, dulcolax for bowel regimen  -Post op anemia stable. Repeat labs in am   -Hypokalmia (3.4) supplemented. Repeat labs in am   -DVT ppx: venodynes and sql  -PT: home w outpatient PT when stable     Will discuss above with Dr. Lg Soto #[id number]       Assessment:  Please Check When Present   []  GCS  E   V  M     Heart Failure: []Acute, [] acute on chronic , []chronic  Heart Failure:  [] Diastolic (HFpEF), [] Systolic (HFrEF), []Combined (HFpEF and HFrEF), [] RHF, [] Pulm HTN, [] Other    [] JOSE MIGUEL, [] ATN, [] AIN, [] other  [] CKD1, [] CKD2, [] CKD 3, [] CKD 4, [] CKD 5, []ESRD    Encephalopathy: [] Metabolic, [] Hepatic, [] toxic, [] Neurological, [] Other    Abnormal Nurtitional Status: [] malnurtition (see nutrition note), [ ]underweight: BMI < 19, [] morbid obesity: BMI >40, [] Cachexia    [] Sepsis  [] hypovolemic shock,[] cardiogenic shock, [] hemorrhagic shock, [] neuogenic shock  [] Acute Respiratory Failure  []Cerebral edema, [] Brain compression/ herniation,   [] Functional quadriplegia  [] Acute blood loss anemia

## 2020-01-30 NOTE — PROGRESS NOTE ADULT - ATTENDING COMMENTS
Patient seen and agree with above consult note for post laminectomy syndrome. Optimize dilaudid for pain control.

## 2020-01-31 LAB
ANION GAP SERPL CALC-SCNC: 12 MMOL/L — SIGNIFICANT CHANGE UP (ref 5–17)
BUN SERPL-MCNC: 12 MG/DL — SIGNIFICANT CHANGE UP (ref 7–23)
CALCIUM SERPL-MCNC: 7.8 MG/DL — LOW (ref 8.4–10.5)
CHLORIDE SERPL-SCNC: 101 MMOL/L — SIGNIFICANT CHANGE UP (ref 96–108)
CO2 SERPL-SCNC: 25 MMOL/L — SIGNIFICANT CHANGE UP (ref 22–31)
CREAT SERPL-MCNC: 0.92 MG/DL — SIGNIFICANT CHANGE UP (ref 0.5–1.3)
GLUCOSE SERPL-MCNC: 154 MG/DL — HIGH (ref 70–99)
HCT VFR BLD CALC: 29.1 % — LOW (ref 39–50)
HGB BLD-MCNC: 9.1 G/DL — LOW (ref 13–17)
MCHC RBC-ENTMCNC: 25.7 PG — LOW (ref 27–34)
MCHC RBC-ENTMCNC: 31.3 GM/DL — LOW (ref 32–36)
MCV RBC AUTO: 82.2 FL — SIGNIFICANT CHANGE UP (ref 80–100)
NRBC # BLD: 0 /100 WBCS — SIGNIFICANT CHANGE UP (ref 0–0)
PLATELET # BLD AUTO: 233 K/UL — SIGNIFICANT CHANGE UP (ref 150–400)
POTASSIUM SERPL-MCNC: 3.3 MMOL/L — LOW (ref 3.5–5.3)
POTASSIUM SERPL-SCNC: 3.3 MMOL/L — LOW (ref 3.5–5.3)
RBC # BLD: 3.54 M/UL — LOW (ref 4.2–5.8)
RBC # FLD: 15.4 % — HIGH (ref 10.3–14.5)
SODIUM SERPL-SCNC: 138 MMOL/L — SIGNIFICANT CHANGE UP (ref 135–145)
WBC # BLD: 12.49 K/UL — HIGH (ref 3.8–10.5)
WBC # FLD AUTO: 12.49 K/UL — HIGH (ref 3.8–10.5)

## 2020-01-31 RX ORDER — CALCIUM GLUCONATE 100 MG/ML
2 VIAL (ML) INTRAVENOUS ONCE
Refills: 0 | Status: COMPLETED | OUTPATIENT
Start: 2020-01-31 | End: 2020-01-31

## 2020-01-31 RX ORDER — POTASSIUM CHLORIDE 20 MEQ
20 PACKET (EA) ORAL
Refills: 0 | Status: COMPLETED | OUTPATIENT
Start: 2020-01-31 | End: 2020-01-31

## 2020-01-31 RX ADMIN — AMLODIPINE BESYLATE 5 MILLIGRAM(S): 2.5 TABLET ORAL at 08:49

## 2020-01-31 RX ADMIN — Medication 1000 UNIT(S): at 12:41

## 2020-01-31 RX ADMIN — HYDROMORPHONE HYDROCHLORIDE 4 MILLIGRAM(S): 2 INJECTION INTRAMUSCULAR; INTRAVENOUS; SUBCUTANEOUS at 10:20

## 2020-01-31 RX ADMIN — CITALOPRAM 40 MILLIGRAM(S): 10 TABLET, FILM COATED ORAL at 12:41

## 2020-01-31 RX ADMIN — Medication 20 MILLIEQUIVALENT(S): at 16:15

## 2020-01-31 RX ADMIN — POLYETHYLENE GLYCOL 3350 17 GRAM(S): 17 POWDER, FOR SOLUTION ORAL at 05:39

## 2020-01-31 RX ADMIN — Medication 200 MILLIGRAM(S): at 21:24

## 2020-01-31 RX ADMIN — ZOLPIDEM TARTRATE 5 MILLIGRAM(S): 10 TABLET ORAL at 21:24

## 2020-01-31 RX ADMIN — Medication 5 MILLIGRAM(S): at 12:45

## 2020-01-31 RX ADMIN — Medication 200 GRAM(S): at 16:15

## 2020-01-31 RX ADMIN — Medication 1 TABLET(S): at 12:41

## 2020-01-31 RX ADMIN — HYDROMORPHONE HYDROCHLORIDE 4 MILLIGRAM(S): 2 INJECTION INTRAMUSCULAR; INTRAVENOUS; SUBCUTANEOUS at 06:30

## 2020-01-31 RX ADMIN — HYDROMORPHONE HYDROCHLORIDE 4 MILLIGRAM(S): 2 INJECTION INTRAMUSCULAR; INTRAVENOUS; SUBCUTANEOUS at 05:39

## 2020-01-31 RX ADMIN — HYDROMORPHONE HYDROCHLORIDE 4 MILLIGRAM(S): 2 INJECTION INTRAMUSCULAR; INTRAVENOUS; SUBCUTANEOUS at 09:48

## 2020-01-31 RX ADMIN — AMLODIPINE BESYLATE 5 MILLIGRAM(S): 2.5 TABLET ORAL at 21:24

## 2020-01-31 RX ADMIN — ENOXAPARIN SODIUM 40 MILLIGRAM(S): 100 INJECTION SUBCUTANEOUS at 12:41

## 2020-01-31 RX ADMIN — PREGABALIN 1000 MICROGRAM(S): 225 CAPSULE ORAL at 12:41

## 2020-01-31 RX ADMIN — TAMSULOSIN HYDROCHLORIDE 0.4 MILLIGRAM(S): 0.4 CAPSULE ORAL at 21:24

## 2020-01-31 RX ADMIN — Medication 1 MILLIGRAM(S): at 08:49

## 2020-01-31 NOTE — PROGRESS NOTE ADULT - ASSESSMENT
69 M PMH chronic back pain s/p multiple lumbar laminectomy/fusion, NPH s/p  shunt, HTN, HLD, anxiety, depression, GULSHAN on CPAP, p/w worsening back pain. Pt states that he has had months of worsening, intermittent back pain that was acutely worsened in last 1-2 weeks. States he was cleaning his bathroom when the pain acutely worsened; is mostly lumbar in nature with radiation down L buttocks and at times L thigh/knee.  No radiation of pain to toes.  NO numbness/tingling. No weakness.  Still ambulatory with cane/able to bear weight.  Pt states he felt better after his last lumbar laminectomy/fusion in 6/2019 with Dr. Meche Huynh at [facility], but only felt better for about a week before his pain returned.  Has had issues with opiates in past (passing out, ?overdose) and has followed with multiple pain mgt providers in past (per allscripts his PCP states that he was either fired from prior pain mgt doctors or lost to f/u with them because he didn't like them).  Few months ago he tried ketamine infusions with a doctor at [facility] but this did not help; more recently he has started gabapentin but he has since discontinued it due to lack of effect and uncomfortable sensation in both legs as if they were "going to explode."  Denies cp, sob, f/c, n/v/d, denies urinary retention or urinary/fecal incontinence, denies saddle anesthesia; notes unchanged urinary hesitancy likely related to bph.  Of note, he has a friend who is living with him since his last surgery, who pt states primarily helps him with his meds; pt is unable to recount all meds/doses at this time, call placed to friend unanswered. Pt notes he is not currently taking opiates and only OTC meds such as aspirin and naproxen; unk dose/freq of naproxen use, denies gib/melena/hematochezia or abd pain at the moment. Also, his friend who lives with him was seen at urgent care center today and diagnosed with flu and given Tamiflu; pt himself denies viral uri sx.    VS: 98.0, 57, 103/63, 18, 95% Ra.  Labs: no leukocytosis, chronic stable mild anemia, plt wnl, coags unrevealing, cmp with mild hypoK 3.2, JOSE MIGUEL scr 1.39 baseline 0.94, rest cmp unrevealing. XR pelvis prelim no acute fx. XR lumbar spine with noted unchanged L1 deformity, no new compression deformities seen. In ER pt received valium, dilaudid iv 0.5 x 2, dilaudid iv 1 x 1, and oxyIR prior to medicine team involvement. (22 Jan 2020 23:48)    PROCEDURE: 1/28 s/p T11 TO PELVIS POSTERIOR INSTRUMENTATION AND FUSION, REVISION OF INSTRUMENTATION  POD#3    PLAN:  -Neuro: continue drains and monitor output  - Acute pain saw. Recommend 1) Continue PO Dilaudid 2 mg and 4 mg as ordered 2) Discontinue Cymbalta3) History of Oxycodone OD - Try to taper off opioids for discharge  If discharging home with opioids please order Narcan Rescue Kit for discharge 4) Avoid Gabapentin 2/2 pt reports makes him feel "like legs going to explode" 5) Pt to follow up with Dr. Esau Florence or another out patient pain practice (list provided) after discharge  -Encouraged incentive spirometry  -Continue norvasc for hypertension  -Flomax for bph  -Continue home meds  -Senna, miralax, dulcolax for bowel regimen  -Post op anemia stable. Repeat labs in am   -Hypokalmia (3.3) and hypocalcemia supplemented. Repeat labs in am   -DVT ppx: venodynes and sql  -PT: home w home PT when stable     Will discuss above with Dr. Lg Soto #[id number]       Assessment:  Please Check When Present   []  GCS  E   V  M     Heart Failure: []Acute, [] acute on chronic , []chronic  Heart Failure:  [] Diastolic (HFpEF), [] Systolic (HFrEF), []Combined (HFpEF and HFrEF), [] RHF, [] Pulm HTN, [] Other    [] JOSE MIGUEL, [] ATN, [] AIN, [] other  [] CKD1, [] CKD2, [] CKD 3, [] CKD 4, [] CKD 5, []ESRD    Encephalopathy: [] Metabolic, [] Hepatic, [] toxic, [] Neurological, [] Other    Abnormal Nurtitional Status: [] malnurtition (see nutrition note), [ ]underweight: BMI < 19, [] morbid obesity: BMI >40, [] Cachexia    [] Sepsis  [] hypovolemic shock,[] cardiogenic shock, [] hemorrhagic shock, [] neuogenic shock  [] Acute Respiratory Failure  []Cerebral edema, [] Brain compression/ herniation,   [] Functional quadriplegia  [] Acute blood loss anemia

## 2020-01-31 NOTE — PROGRESS NOTE ADULT - SUBJECTIVE AND OBJECTIVE BOX
SUBJECTIVE: Patient seen and examined at bedside. Denies any complains at this time. Notes significant improvement in his pain     OVERNIGHT EVENTS: none     Vital Signs Last 24 Hrs  T(C): 37 (31 Jan 2020 14:42), Max: 37.9 (30 Jan 2020 16:33)  T(F): 98.6 (31 Jan 2020 14:42), Max: 100.3 (30 Jan 2020 16:33)  HR: 80 (31 Jan 2020 14:42) (80 - 99)  BP: 138/74 (31 Jan 2020 14:42) (109/61 - 148/83)  BP(mean): --  RR: 17 (31 Jan 2020 14:42) (16 - 17)  SpO2: 97% (31 Jan 2020 14:42) (92% - 97%)    PHYSICAL EXAM:    General: No Acute Distress     Neurological: Awake, alert oriented to person, place and time, Following Commands, PERRL, EOMI, Face Symmetrical, Speech Fluent, Moving all extremities, Muscle Strength normal in all four extremities, No Drift, Sensation to Light Touch Intact    Pulmonary: Clear to Auscultation, No Rales, No Rhonchi, No Wheezes     Cardiovascular: S1, S2, Regular Rate and Rhythm     Gastrointestinal: Soft, Nontender, Nondistended     Incision: c/d/i     LABS:                	                               9.1    12.49 )-----------( 233      ( 31 Jan 2020 06:48 )             29.1   01-31    138  |  101  |  12  ----------------------------<  154<H>  3.3<L>   |  25  |  0.92    Ca    7.8<L>      31 Jan 2020 06:44        DRAINS: + R HMV (205cc/24 hours) + L HMV (415 cc/24 hours)     DIET: regular diet     IMAGING: < from: CT Thoracic Spine No Cont (01.30.20 @ 10:55) >  IMPRESSION:      Status post interval new posterior surgical fusion at T11-L1 and new placement of 2 additional sacral screws inferior to the S1 screws,as an extension to the previously visualized posterior fusion at L2-S1 with unchanged multilevel laminectomies. This hardware appears intact.  Zero profile device at the L5/S1 level demonstrates stable surrounding lucency.  2 drainage catheters within the paraspinal soft tissues are in place. Expected postsurgical changes in the superficial and deep soft tissues of the surgical bed are identified.    < end of copied text >    standing xray pending

## 2020-01-31 NOTE — PROGRESS NOTE ADULT - ASSESSMENT
Pt is a 69M s/p T11-pelvis PSF with back closure    - Aquacel change 2/2, drain care as needed  - appreciate care per primary  - will follow for incision care    Plastic Surgery  (pg [facility]: [id number], NS: [phone])

## 2020-01-31 NOTE — CHART NOTE - NSCHARTNOTEFT_GEN_A_CORE
69 M admitted with worsening back pain  CT showed Previous laminectomy and metallic fusion L2-S1 with interposition grafts L4-5 andL5-S1. Lucency surrounding the screws bilaterally at L2 and S1 suspicious for loosening versus infection. Lucency in the endplates surrounding the interposition graft L5-S1 also suspicious for loosening versus infection.  PMH chronic back pain s/p multiple lumbar laminectomy/fusion, NPH s/p  shunt, HTN, HLD, anxiety, depression, GULSHAN on CPAP  Has hx of Oxycodone OD and tried multiple therapies including Ketamine infusions with Dr. Esau Florence ([place]).  1/28 s/p T11 TO PELVIS POSTERIOR INSTRUMENTATION AND FUSION, REVISION OF INSTRUMENTATION    Continue Dilaudid PO 2mg every 4 hours PRN moderate and 4 mg every 6 hours PRN severe pain.  Avoid Gabapentin and CYmbalta- intolerable side effects with past use.    Pt to follow up with Dr. Esau Florence or another out patient pain practice (list provided) after discharge.  Pt does not feel his Medical Marijuana is effective.  Would discharge only with short supply of dilaudid 2 mg QID PRN.    Will sign off at this time.      [facility] Pain Service  [phone]

## 2020-01-31 NOTE — PROGRESS NOTE ADULT - SUBJECTIVE AND OBJECTIVE BOX
Plastic Surgery Progress Note  (pg [facility]: [id number], NS: [phone])    Subjective:  The patient was seen and examined on morning rounds.  denies pain    Objective:    Physical Exam:  Gen: appears well, NAD  Resp: breathing comfortably  Back: dressing c/d/i, soft, no collections, drains with s/s output    T(C): 37.2 (01-31-20 @ 05:00), Max: 37.9 (01-30-20 @ 16:33)  HR: 88 (01-31-20 @ 06:09) (80 - 106)  BP: 115/66 (01-31-20 @ 05:00) (109/61 - 142/85)  RR: 16 (01-31-20 @ 05:00) (16 - 17)  SpO2: 93% (01-31-20 @ 06:09) (92% - 98%)    01-30-20 @ 07:01  -  01-31-20 @ 07:00  --------------------------------------------------------  IN: 1130 mL / OUT: 1225 mL / NET: -95 mL          LABS                        9.1    12.49 )-----------( 233      ( 31 Jan 2020 06:48 )             29.1     01-31    138  |  101  |  12  ----------------------------<  154<H>  3.3<L>   |  25  |  0.92    Ca    7.8<L>      31 Jan 2020 06:44

## 2020-02-01 LAB
ALBUMIN SERPL-MCNC: 4.3 G/DL (ref 3.8–4.8)
ALBUMIN/GLOB SERPL: 2 {RATIO} (ref 1.2–2.2)
ALP SERPL-CCNC: 52 IU/L (ref 39–117)
ALT SERPL-CCNC: 58 IU/L (ref 0–44)
ANION GAP SERPL CALC-SCNC: 10 MMOL/L — SIGNIFICANT CHANGE UP (ref 5–17)
AST SERPL-CCNC: 52 IU/L (ref 0–40)
BILIRUB SERPL-MCNC: 0.4 MG/DL (ref 0–1.2)
BUN SERPL-MCNC: 10 MG/DL — SIGNIFICANT CHANGE UP (ref 7–23)
BUN SERPL-MCNC: 17 MG/DL (ref 8–27)
BUN/CREAT SERPL: 23 (ref 10–24)
CALCIUM SERPL-MCNC: 8.5 MG/DL — SIGNIFICANT CHANGE UP (ref 8.4–10.5)
CALCIUM SERPL-MCNC: 9.4 MG/DL (ref 8.6–10.2)
CHLORIDE SERPL-SCNC: 101 MMOL/L (ref 96–106)
CHLORIDE SERPL-SCNC: 102 MMOL/L — SIGNIFICANT CHANGE UP (ref 96–108)
CHOLEST SERPL-MCNC: 125 MG/DL (ref 100–199)
CO2 SERPL-SCNC: 21 MMOL/L (ref 20–29)
CO2 SERPL-SCNC: 29 MMOL/L — SIGNIFICANT CHANGE UP (ref 22–31)
CREAT SERPL-MCNC: 0.74 MG/DL (ref 0.76–1.27)
CREAT SERPL-MCNC: 0.82 MG/DL — SIGNIFICANT CHANGE UP (ref 0.5–1.3)
GLOBULIN SER CALC-MCNC: 2.1 G/DL (ref 1.5–4.5)
GLUCOSE SERPL-MCNC: 120 MG/DL — HIGH (ref 70–99)
GLUCOSE SERPL-MCNC: 195 MG/DL (ref 65–99)
HBA1C MFR BLD: 9.3 % (ref 4.8–5.6)
HCT VFR BLD CALC: 29.3 % — LOW (ref 39–50)
HDLC SERPL-MCNC: 39 MG/DL
HGB BLD-MCNC: 9 G/DL — LOW (ref 13–17)
LDLC SERPL CALC-MCNC: 27 MG/DL (ref 0–99)
MCHC RBC-ENTMCNC: 25.4 PG — LOW (ref 27–34)
MCHC RBC-ENTMCNC: 30.7 GM/DL — LOW (ref 32–36)
MCV RBC AUTO: 82.5 FL — SIGNIFICANT CHANGE UP (ref 80–100)
NRBC # BLD: 0 /100 WBCS — SIGNIFICANT CHANGE UP (ref 0–0)
PLATELET # BLD AUTO: 266 K/UL — SIGNIFICANT CHANGE UP (ref 150–400)
POTASSIUM SERPL-MCNC: 3.1 MMOL/L — LOW (ref 3.5–5.3)
POTASSIUM SERPL-SCNC: 3.1 MMOL/L — LOW (ref 3.5–5.3)
POTASSIUM SERPL-SCNC: 5.1 MMOL/L (ref 3.5–5.2)
PROT SERPL-MCNC: 6.4 G/DL (ref 6–8.5)
RBC # BLD: 3.55 M/UL — LOW (ref 4.2–5.8)
RBC # FLD: 15.5 % — HIGH (ref 10.3–14.5)
SODIUM SERPL-SCNC: 140 MMOL/L (ref 134–144)
SODIUM SERPL-SCNC: 141 MMOL/L — SIGNIFICANT CHANGE UP (ref 135–145)
TRIGL SERPL-MCNC: 294 MG/DL (ref 0–149)
VLDLC SERPL CALC-MCNC: 59 MG/DL (ref 5–40)
WBC # BLD: 10.25 K/UL — SIGNIFICANT CHANGE UP (ref 3.8–10.5)
WBC # FLD AUTO: 10.25 K/UL — SIGNIFICANT CHANGE UP (ref 3.8–10.5)

## 2020-02-01 RX ORDER — POTASSIUM CHLORIDE 20 MEQ
20 PACKET (EA) ORAL
Refills: 0 | Status: COMPLETED | OUTPATIENT
Start: 2020-02-01 | End: 2020-02-01

## 2020-02-01 RX ADMIN — Medication 1 MILLIGRAM(S): at 14:58

## 2020-02-01 RX ADMIN — Medication 20 MILLIEQUIVALENT(S): at 14:58

## 2020-02-01 RX ADMIN — ZOLPIDEM TARTRATE 5 MILLIGRAM(S): 10 TABLET ORAL at 22:16

## 2020-02-01 RX ADMIN — HYDROMORPHONE HYDROCHLORIDE 4 MILLIGRAM(S): 2 INJECTION INTRAMUSCULAR; INTRAVENOUS; SUBCUTANEOUS at 08:53

## 2020-02-01 RX ADMIN — AMLODIPINE BESYLATE 5 MILLIGRAM(S): 2.5 TABLET ORAL at 21:09

## 2020-02-01 RX ADMIN — TAMSULOSIN HYDROCHLORIDE 0.4 MILLIGRAM(S): 0.4 CAPSULE ORAL at 21:10

## 2020-02-01 RX ADMIN — HYDROMORPHONE HYDROCHLORIDE 4 MILLIGRAM(S): 2 INJECTION INTRAMUSCULAR; INTRAVENOUS; SUBCUTANEOUS at 13:43

## 2020-02-01 RX ADMIN — HYDROMORPHONE HYDROCHLORIDE 4 MILLIGRAM(S): 2 INJECTION INTRAMUSCULAR; INTRAVENOUS; SUBCUTANEOUS at 14:13

## 2020-02-01 RX ADMIN — Medication 200 MILLIGRAM(S): at 21:09

## 2020-02-01 RX ADMIN — HYDROMORPHONE HYDROCHLORIDE 4 MILLIGRAM(S): 2 INJECTION INTRAMUSCULAR; INTRAVENOUS; SUBCUTANEOUS at 04:18

## 2020-02-01 RX ADMIN — Medication 1 MILLIGRAM(S): at 05:28

## 2020-02-01 RX ADMIN — AMLODIPINE BESYLATE 5 MILLIGRAM(S): 2.5 TABLET ORAL at 08:53

## 2020-02-01 RX ADMIN — Medication 20 MILLIEQUIVALENT(S): at 17:24

## 2020-02-01 RX ADMIN — SENNA PLUS 2 TABLET(S): 8.6 TABLET ORAL at 21:09

## 2020-02-01 RX ADMIN — Medication 1000 UNIT(S): at 12:06

## 2020-02-01 RX ADMIN — HYDROMORPHONE HYDROCHLORIDE 4 MILLIGRAM(S): 2 INJECTION INTRAMUSCULAR; INTRAVENOUS; SUBCUTANEOUS at 09:23

## 2020-02-01 RX ADMIN — ENOXAPARIN SODIUM 40 MILLIGRAM(S): 100 INJECTION SUBCUTANEOUS at 12:06

## 2020-02-01 RX ADMIN — CITALOPRAM 40 MILLIGRAM(S): 10 TABLET, FILM COATED ORAL at 12:06

## 2020-02-01 RX ADMIN — ZOLPIDEM TARTRATE 5 MILLIGRAM(S): 10 TABLET ORAL at 21:09

## 2020-02-01 RX ADMIN — PREGABALIN 1000 MICROGRAM(S): 225 CAPSULE ORAL at 12:06

## 2020-02-01 RX ADMIN — HYDROMORPHONE HYDROCHLORIDE 4 MILLIGRAM(S): 2 INJECTION INTRAMUSCULAR; INTRAVENOUS; SUBCUTANEOUS at 18:22

## 2020-02-01 RX ADMIN — HYDROMORPHONE HYDROCHLORIDE 4 MILLIGRAM(S): 2 INJECTION INTRAMUSCULAR; INTRAVENOUS; SUBCUTANEOUS at 05:00

## 2020-02-01 NOTE — PROGRESS NOTE ADULT - ASSESSMENT
69 M PMH chronic back pain s/p multiple lumbar laminectomy/fusion, NPH s/p  shunt, HTN, HLD, anxiety, depression, GULSHAN on CPAP, p/w worsening back pain. Pt states that he has had months of worsening, intermittent back pain that was acutely worsened in last 1-2 weeks. States he was cleaning his bathroom when the pain acutely worsened; is mostly lumbar in nature with radiation down L buttocks and at times L thigh/knee.  No radiation of pain to toes.  NO numbness/tingling. No weakness.  Still ambulatory with cane/able to bear weight.  Pt states he felt better after his last lumbar laminectomy/fusion in 6/2019 with Dr. Meche Huynh at [facility], but only felt better for about a week before his pain returned.  Has had issues with opiates in past (passing out, ?overdose) and has followed with multiple pain mgt providers in past (per allscripts his PCP states that he was either fired from prior pain mgt doctors or lost to f/u with them because he didn't like them).  Few months ago he tried ketamine infusions with a doctor at [facility] but this did not help; more recently he has started gabapentin but he has since discontinued it due to lack of effect and uncomfortable sensation in both legs as if they were "going to explode."  Denies cp, sob, f/c, n/v/d, denies urinary retention or urinary/fecal incontinence, denies saddle anesthesia; notes unchanged urinary hesitancy likely related to bph.  Of note, he has a friend who is living with him since his last surgery, who pt states primarily helps him with his meds; pt is unable to recount all meds/doses at this time, call placed to friend unanswered. Pt notes he is not currently taking opiates and only OTC meds such as aspirin and naproxen; unk dose/freq of naproxen use, denies gib/melena/hematochezia or abd pain at the moment. Also, his friend who lives with him was seen at urgent care center today and diagnosed with flu and given Tamiflu; pt himself denies viral uri sx.    VS: 98.0, 57, 103/63, 18, 95% Ra.  Labs: no leukocytosis, chronic stable mild anemia, plt wnl, coags unrevealing, cmp with mild hypoK 3.2, JOSE MIGUEL scr 1.39 baseline 0.94, rest cmp unrevealing. XR pelvis prelim no acute fx. XR lumbar spine with noted unchanged L1 deformity, no new compression deformities seen. In ER pt received valium, dilaudid iv 0.5 x 2, dilaudid iv 1 x 1, and oxyIR prior to medicine team involvement. (22 Jan 2020 23:48)    PROCEDURE: 1/28 s/p T11 TO PELVIS POSTERIOR INSTRUMENTATION AND FUSION, REVISION OF INSTRUMENTATION  POD#4    PLAN:  -Neuro: plan to DC R HVM, continue to monitor L HMV   - Acute pain saw. Recommend 1) Continue PO Dilaudid 2 mg and 4 mg as ordered 2) Discontinue Cymbalta3) History of Oxycodone OD - Try to taper off opioids for discharge  If discharging home with opioids please order Narcan Rescue Kit for discharge 4) Avoid Gabapentin 2/2 pt reports makes him feel "like legs going to explode" 5) Pt to follow up with Dr. Esau Florence or another out patient pain practice (list provided) after discharge  -Encouraged incentive spirometry  -Continue norvasc for hypertension  -Flomax for bph  -Continue home meds  -Senna, miralax, dulcolax for bowel regimen  -Post op anemia stable. AM labs stable    -Hypokalmia (3.1). Repeat labs in am   -DVT ppx: venodynes and sql  -PT: home w home PT when stable

## 2020-02-01 NOTE — PROGRESS NOTE ADULT - SUBJECTIVE AND OBJECTIVE BOX
SUBJECTIVE: Patient seen and examined at bedside. No acute complaints.     OVERNIGHT EVENTS: none     Vital Signs Last 24 Hrs  Vital Signs Last 24 Hrs  T(C): 37.1 (01 Feb 2020 08:47), Max: 37.7 (31 Jan 2020 20:17)  T(F): 98.7 (01 Feb 2020 08:47), Max: 99.8 (31 Jan 2020 20:17)  HR: 93 (01 Feb 2020 08:47) (80 - 96)  BP: 114/70 (01 Feb 2020 08:47) (114/57 - 138/74)  BP(mean): --  RR: 18 (01 Feb 2020 08:47) (16 - 18)  SpO2: 97% (01 Feb 2020 08:47) (95% - 99%)    PHYSICAL EXAM:    General: No Acute Distress     Neurological: Awake, alert oriented to person, place and time, Following Commands, PERRL, EOMI, Face Symmetrical, Speech Fluent, Moving all extremities, Muscle Strength normal in all four extremities, No Drift, Sensation to Light Touch Intact    Pulmonary: No accessory muscle use, no respiratory distress    Gastrointestinal: Soft, Nontender, Nondistended     Incision: c/d/i     LABS:                                   9.0    10.25 )-----------( 266      ( 01 Feb 2020 08:20 )             29.3   02-01    141  |  102  |  10  ----------------------------<  120<H>  3.1<L>   |  29  |  0.82    Ca    8.5      01 Feb 2020 08:20          DRAINS: + R HMV (23cc/24 hours) + L HMV (130 cc/24 hours)     DIET: regular diet     IMAGING: < from: CT Thoracic Spine No Cont (01.30.20 @ 10:55) >  IMPRESSION:      Status post interval new posterior surgical fusion at T11-L1 and new placement of 2 additional sacral screws inferior to the S1 screws,as an extension to the previously visualized posterior fusion at L2-S1 with unchanged multilevel laminectomies. This hardware appears intact.  Zero profile device at the L5/S1 level demonstrates stable surrounding lucency.  2 drainage catheters within the paraspinal soft tissues are in place. Expected postsurgical changes in the superficial and deep soft tissues of the surgical bed are identified.    < end of copied text >    standing xray pending

## 2020-02-02 RX ADMIN — Medication 200 MILLIGRAM(S): at 21:25

## 2020-02-02 RX ADMIN — TAMSULOSIN HYDROCHLORIDE 0.4 MILLIGRAM(S): 0.4 CAPSULE ORAL at 21:25

## 2020-02-02 RX ADMIN — HYDROMORPHONE HYDROCHLORIDE 4 MILLIGRAM(S): 2 INJECTION INTRAMUSCULAR; INTRAVENOUS; SUBCUTANEOUS at 01:46

## 2020-02-02 RX ADMIN — AMLODIPINE BESYLATE 5 MILLIGRAM(S): 2.5 TABLET ORAL at 21:25

## 2020-02-02 RX ADMIN — Medication 650 MILLIGRAM(S): at 22:00

## 2020-02-02 RX ADMIN — ZOLPIDEM TARTRATE 5 MILLIGRAM(S): 10 TABLET ORAL at 21:25

## 2020-02-02 RX ADMIN — HYDROMORPHONE HYDROCHLORIDE 2 MILLIGRAM(S): 2 INJECTION INTRAMUSCULAR; INTRAVENOUS; SUBCUTANEOUS at 12:52

## 2020-02-02 RX ADMIN — Medication 1 MILLIGRAM(S): at 05:16

## 2020-02-02 RX ADMIN — Medication 650 MILLIGRAM(S): at 21:26

## 2020-02-02 RX ADMIN — HYDROMORPHONE HYDROCHLORIDE 2 MILLIGRAM(S): 2 INJECTION INTRAMUSCULAR; INTRAVENOUS; SUBCUTANEOUS at 17:06

## 2020-02-02 RX ADMIN — HYDROMORPHONE HYDROCHLORIDE 4 MILLIGRAM(S): 2 INJECTION INTRAMUSCULAR; INTRAVENOUS; SUBCUTANEOUS at 02:15

## 2020-02-02 RX ADMIN — CITALOPRAM 40 MILLIGRAM(S): 10 TABLET, FILM COATED ORAL at 12:09

## 2020-02-02 RX ADMIN — ZOLPIDEM TARTRATE 5 MILLIGRAM(S): 10 TABLET ORAL at 23:00

## 2020-02-02 RX ADMIN — ENOXAPARIN SODIUM 40 MILLIGRAM(S): 100 INJECTION SUBCUTANEOUS at 12:09

## 2020-02-02 RX ADMIN — Medication 1000 UNIT(S): at 12:09

## 2020-02-02 RX ADMIN — ONDANSETRON 4 MILLIGRAM(S): 8 TABLET, FILM COATED ORAL at 10:55

## 2020-02-02 RX ADMIN — HYDROMORPHONE HYDROCHLORIDE 2 MILLIGRAM(S): 2 INJECTION INTRAMUSCULAR; INTRAVENOUS; SUBCUTANEOUS at 12:22

## 2020-02-02 RX ADMIN — PREGABALIN 1000 MICROGRAM(S): 225 CAPSULE ORAL at 12:09

## 2020-02-02 RX ADMIN — Medication 1 TABLET(S): at 12:09

## 2020-02-02 RX ADMIN — AMLODIPINE BESYLATE 5 MILLIGRAM(S): 2.5 TABLET ORAL at 07:54

## 2020-02-02 RX ADMIN — HYDROMORPHONE HYDROCHLORIDE 2 MILLIGRAM(S): 2 INJECTION INTRAMUSCULAR; INTRAVENOUS; SUBCUTANEOUS at 16:36

## 2020-02-02 NOTE — PROGRESS NOTE ADULT - SUBJECTIVE AND OBJECTIVE BOX
Patient seen and examined at bedside.    --Anticoagulation--  enoxaparin Injectable 40 milliGRAM(s) SubCutaneous daily    T(C): 36.9 (02-02-20 @ 04:33), Max: 37.5 (02-01-20 @ 22:28)  HR: 80 (02-02-20 @ 04:33) (76 - 95)  BP: 119/63 (02-02-20 @ 04:33) (114/70 - 137/69)  RR: 18 (02-02-20 @ 04:33) (18 - 18)  SpO2: 95% (02-02-20 @ 04:33) (95% - 99%)  Wt(kg): --    Exam:   General: No Acute Distress     Neurological: Awake, alert oriented to person, place and time, Following Commands, PERRL, EOMI, Face Symmetrical, Speech Fluent, Moving all extremities, Muscle Strength normal in all four extremities, No Drift, Sensation to Light Touch Intact    Pulmonary: No accessory muscle use, no respiratory distress    Gastrointestinal: Soft, Nontender, Nondistended     Incision: c/d/i

## 2020-02-02 NOTE — PROGRESS NOTE ADULT - ASSESSMENT
Complex Repair And Rotation Flap Text: The defect edges were debeveled with a #15 scalpel blade.  The primary defect was closed partially with a complex linear closure.  Given the location of the remaining defect, shape of the defect and the proximity to free margins a rotation flap was deemed most appropriate for complete closure of the defect.  Using a sterile surgical marker, an appropriate advancement flap was drawn incorporating the defect and placing the expected incisions within the relaxed skin tension lines where possible.    The area thus outlined was incised deep to adipose tissue with a #15 scalpel blade.  The skin margins were undermined to an appropriate distance in all directions utilizing iris scissors. 69 M PMH chronic back pain s/p multiple lumbar laminectomy/fusion, NPH s/p  shunt, HTN, HLD, anxiety, depression, GULSHAN on CPAP, p/w worsening back pain. Pt states that he has had months of worsening, intermittent back pain that was acutely worsened in last 1-2 weeks. States he was cleaning his bathroom when the pain acutely worsened; is mostly lumbar in nature with radiation down L buttocks and at times L thigh/knee.  No radiation of pain to toes.  NO numbness/tingling. No weakness.  Still ambulatory with cane/able to bear weight.  Pt states he felt better after his last lumbar laminectomy/fusion in 6/2019 with Dr. Meche Huynh at [facility], but only felt better for about a week before his pain returned.  Has had issues with opiates in past (passing out, ?overdose) and has followed with multiple pain mgt providers in past (per allscripts his PCP states that he was either fired from prior pain mgt doctors or lost to f/u with them because he didn't like them).  Few months ago he tried ketamine infusions with a doctor at [facility] but this did not help; more recently he has started gabapentin but he has since discontinued it due to lack of effect and uncomfortable sensation in both legs as if they were "going to explode."  Denies cp, sob, f/c, n/v/d, denies urinary retention or urinary/fecal incontinence, denies saddle anesthesia; notes unchanged urinary hesitancy likely related to bph.  Of note, he has a friend who is living with him since his last surgery, who pt states primarily helps him with his meds; pt is unable to recount all meds/doses at this time, call placed to friend unanswered. Pt notes he is not currently taking opiates and only OTC meds such as aspirin and naproxen; unk dose/freq of naproxen use, denies gib/melena/hematochezia or abd pain at the moment. Also, his friend who lives with him was seen at urgent care center today and diagnosed with flu and given Tamiflu; pt himself denies viral uri sx.    VS: 98.0, 57, 103/63, 18, 95% Ra.  Labs: no leukocytosis, chronic stable mild anemia, plt wnl, coags unrevealing, cmp with mild hypoK 3.2, JOSE MIGUEL scr 1.39 baseline 0.94, rest cmp unrevealing. XR pelvis prelim no acute fx. XR lumbar spine with noted unchanged L1 deformity, no new compression deformities seen. In ER pt received valium, dilaudid iv 0.5 x 2, dilaudid iv 1 x 1, and oxyIR prior to medicine team involvement. (22 Jan 2020 23:48)    PROCEDURE: 1/28 s/p T11 TO PELVIS POSTERIOR INSTRUMENTATION AND FUSION, REVISION OF INSTRUMENTATION  POD#5    PLAN:  -Neuro: R HMV removed on 2/1. L HMV output 10mL, will remove today   - Acute pain saw. Recommend 1) Continue PO Dilaudid 2 mg and 4 mg as ordered 2) Discontinue Cymbalta3) History of Oxycodone OD - Try to taper off opioids for discharge  If discharging home with opioids please order Narcan Rescue Kit for discharge 4) Avoid Gabapentin 2/2 pt reports makes him feel "like legs going to explode" 5) Pt to follow up with Dr. Esau Florence or another out patient pain practice (list provided) after discharge  -Encouraged incentive spirometry  -Continue norvasc for hypertension  -Flomax for bph  -Continue home meds  -Senna, miralax, dulcolax for bowel regimen  -Post op anemia stable. AM labs stable    -Hypokalmia (3.1 on Feb. 1). Repeat labs in am pending  -DVT ppx: venodynes and sql  -PT: home w home PT when stable

## 2020-02-02 NOTE — PROGRESS NOTE ADULT - SUBJECTIVE AND OBJECTIVE BOX
DACIA NAVARRO  [id number]    Subjective:    Patient seen and examined, doing well.   Afebrile, dressing changed this morning.        Objective:  T(C): 37.4 (02-02-20 @ 08:44), Max: 37.5 (02-01-20 @ 22:28)  HR: 84 (02-02-20 @ 08:44) (76 - 95)  BP: 119/76 (02-02-20 @ 08:44) (119/63 - 137/69)  RR: 18 (02-02-20 @ 08:44) (18 - 18)  SpO2: 95% (02-02-20 @ 08:44) (95% - 99%)  Wt(kg): --   02-01    141  |  102  |  10  ----------------------------<  120<H>  3.1<L>   |  29  |  0.82    Ca    8.5      01 Feb 2020 08:20                          9.0    10.25 )-----------( 266      ( 01 Feb 2020 08:20 )             29.3       02-01 @ 07:01  -  02-02 @ 07:00  --------------------------------------------------------  IN: 1610 mL / OUT: 1535 mL / NET: 75 mL    02-02 @ 07:01  -  02-02 @ 11:23  --------------------------------------------------------  IN: 0 mL / OUT: 400 mL / NET: -400 mL      PHYSICAL EXAM:    General: NAD   Back: Incision CDI, back flat, no collections. Dressing changed this morning,  SS.             MEDICATIONS  (STANDING):  amLODIPine   Tablet 5 milliGRAM(s) Oral <User Schedule>  cholecalciferol 1000 Unit(s) Oral daily  citalopram 40 milliGRAM(s) Oral daily  cyanocobalamin 1000 MICROGram(s) Oral daily  enoxaparin Injectable 40 milliGRAM(s) SubCutaneous daily  multivitamin 1 Tablet(s) Oral daily  polyethylene glycol 3350 17 Gram(s) Oral two times a day  senna 2 Tablet(s) Oral at bedtime  tamsulosin 0.4 milliGRAM(s) Oral at bedtime  traZODone 200 milliGRAM(s) Oral at bedtime    MEDICATIONS  (PRN):  acetaminophen   Tablet .. 650 milliGRAM(s) Oral every 6 hours PRN Temp greater or equal to 38C (100.4F), Mild Pain (1 - 3), Moderate Pain (4 - 6), Severe Pain (7 - 10)  acetaminophen   Tablet .. 650 milliGRAM(s) Oral every 6 hours PRN Temp greater or equal to 38C (100.4F), Mild Pain (1 - 3)  ALPRAZolam 1 milliGRAM(s) Oral every 6 hours PRN anxiety  bisacodyl Suppository 10 milliGRAM(s) Rectal once PRN Constipation  diazepam    Tablet 5 milliGRAM(s) Oral every 6 hours PRN spasm  diphenhydrAMINE 25 milliGRAM(s) Oral every 4 hours PRN Pruritus  HYDROmorphone   Tablet 2 milliGRAM(s) Oral every 4 hours PRN Moderate Pain (4 - 6)  HYDROmorphone   Tablet 4 milliGRAM(s) Oral every 4 hours PRN Severe Pain (7 - 10)  naloxone Injectable 0.1 milliGRAM(s) IV Push every 3 minutes PRN For ANY of the following changes in patient status:  A. RR LESS THAN 10 breaths per minute, B. Oxygen saturation LESS THAN 90%, C. Sedation score of 6  ondansetron Injectable 4 milliGRAM(s) IV Push every 6 hours PRN Nausea  zolpidem 5 milliGRAM(s) Oral at bedtime PRN Insomnia

## 2020-02-02 NOTE — PROGRESS NOTE ADULT - ASSESSMENT
Pt is a 69M s/p T11-pelvis PSF with back closure    - Aquacel changed today, drain care as needed  - appreciate care per primary  - will follow for incision care    Plastic Surgery  (pg [facility]: [id number], NS: [phone])

## 2020-02-03 ENCOUNTER — TRANSCRIPTION ENCOUNTER (OUTPATIENT)
Age: 70
End: 2020-02-03

## 2020-02-03 VITALS
HEART RATE: 86 BPM | SYSTOLIC BLOOD PRESSURE: 135 MMHG | OXYGEN SATURATION: 95 % | RESPIRATION RATE: 17 BRPM | TEMPERATURE: 99 F | DIASTOLIC BLOOD PRESSURE: 66 MMHG

## 2020-02-03 LAB
ANION GAP SERPL CALC-SCNC: 9 MMOL/L — SIGNIFICANT CHANGE UP (ref 5–17)
BUN SERPL-MCNC: 14 MG/DL — SIGNIFICANT CHANGE UP (ref 7–23)
CALCIUM SERPL-MCNC: 8.7 MG/DL — SIGNIFICANT CHANGE UP (ref 8.4–10.5)
CHLORIDE SERPL-SCNC: 100 MMOL/L — SIGNIFICANT CHANGE UP (ref 96–108)
CO2 SERPL-SCNC: 29 MMOL/L — SIGNIFICANT CHANGE UP (ref 22–31)
CREAT SERPL-MCNC: 0.85 MG/DL — SIGNIFICANT CHANGE UP (ref 0.5–1.3)
GLUCOSE SERPL-MCNC: 142 MG/DL — HIGH (ref 70–99)
POTASSIUM SERPL-MCNC: 3.4 MMOL/L — LOW (ref 3.5–5.3)
POTASSIUM SERPL-SCNC: 3.4 MMOL/L — LOW (ref 3.5–5.3)
SODIUM SERPL-SCNC: 138 MMOL/L — SIGNIFICANT CHANGE UP (ref 135–145)

## 2020-02-03 PROCEDURE — P9016: CPT

## 2020-02-03 PROCEDURE — 86140 C-REACTIVE PROTEIN: CPT

## 2020-02-03 PROCEDURE — C1889: CPT

## 2020-02-03 PROCEDURE — 84100 ASSAY OF PHOSPHORUS: CPT

## 2020-02-03 PROCEDURE — 85730 THROMBOPLASTIN TIME PARTIAL: CPT

## 2020-02-03 PROCEDURE — 93005 ELECTROCARDIOGRAM TRACING: CPT

## 2020-02-03 PROCEDURE — 72170 X-RAY EXAM OF PELVIS: CPT

## 2020-02-03 PROCEDURE — C1713: CPT

## 2020-02-03 PROCEDURE — C1769: CPT

## 2020-02-03 PROCEDURE — 86900 BLOOD TYPING SEROLOGIC ABO: CPT

## 2020-02-03 PROCEDURE — 86923 COMPATIBILITY TEST ELECTRIC: CPT

## 2020-02-03 PROCEDURE — 99285 EMERGENCY DEPT VISIT HI MDM: CPT | Mod: 25

## 2020-02-03 PROCEDURE — 85610 PROTHROMBIN TIME: CPT

## 2020-02-03 PROCEDURE — 83735 ASSAY OF MAGNESIUM: CPT

## 2020-02-03 PROCEDURE — 72158 MRI LUMBAR SPINE W/O & W/DYE: CPT

## 2020-02-03 PROCEDURE — 85652 RBC SED RATE AUTOMATED: CPT

## 2020-02-03 PROCEDURE — 80307 DRUG TEST PRSMV CHEM ANLYZR: CPT

## 2020-02-03 PROCEDURE — 72100 X-RAY EXAM L-S SPINE 2/3 VWS: CPT

## 2020-02-03 PROCEDURE — 72128 CT CHEST SPINE W/O DYE: CPT

## 2020-02-03 PROCEDURE — 76000 FLUOROSCOPY <1 HR PHYS/QHP: CPT

## 2020-02-03 PROCEDURE — 72082 X-RAY EXAM ENTIRE SPI 2/3 VW: CPT

## 2020-02-03 PROCEDURE — 96374 THER/PROPH/DIAG INJ IV PUSH: CPT

## 2020-02-03 PROCEDURE — 80053 COMPREHEN METABOLIC PANEL: CPT

## 2020-02-03 PROCEDURE — A9585: CPT

## 2020-02-03 PROCEDURE — 86901 BLOOD TYPING SEROLOGIC RH(D): CPT

## 2020-02-03 PROCEDURE — 71045 X-RAY EXAM CHEST 1 VIEW: CPT

## 2020-02-03 PROCEDURE — 97164 PT RE-EVAL EST PLAN CARE: CPT

## 2020-02-03 PROCEDURE — 85027 COMPLETE CBC AUTOMATED: CPT

## 2020-02-03 PROCEDURE — 72131 CT LUMBAR SPINE W/O DYE: CPT

## 2020-02-03 PROCEDURE — 97161 PT EVAL LOW COMPLEX 20 MIN: CPT

## 2020-02-03 PROCEDURE — G0480: CPT

## 2020-02-03 PROCEDURE — 86850 RBC ANTIBODY SCREEN: CPT

## 2020-02-03 PROCEDURE — 94660 CPAP INITIATION&MGMT: CPT

## 2020-02-03 PROCEDURE — 96376 TX/PRO/DX INJ SAME DRUG ADON: CPT

## 2020-02-03 PROCEDURE — 80048 BASIC METABOLIC PNL TOTAL CA: CPT

## 2020-02-03 PROCEDURE — 70250 X-RAY EXAM OF SKULL: CPT

## 2020-02-03 RX ORDER — HYDROMORPHONE HYDROCHLORIDE 2 MG/ML
1 INJECTION INTRAMUSCULAR; INTRAVENOUS; SUBCUTANEOUS
Qty: 28 | Refills: 0
Start: 2020-02-03 | End: 2020-02-09

## 2020-02-03 RX ORDER — ACETAMINOPHEN 500 MG
2 TABLET ORAL
Qty: 0 | Refills: 0 | DISCHARGE
Start: 2020-02-03

## 2020-02-03 RX ORDER — SENNA PLUS 8.6 MG/1
2 TABLET ORAL
Qty: 0 | Refills: 0 | DISCHARGE
Start: 2020-02-03

## 2020-02-03 RX ORDER — HYDROMORPHONE HYDROCHLORIDE 2 MG/ML
1 INJECTION INTRAMUSCULAR; INTRAVENOUS; SUBCUTANEOUS
Qty: 42 | Refills: 0
Start: 2020-02-03 | End: 2020-02-09

## 2020-02-03 RX ORDER — POTASSIUM CHLORIDE 20 MEQ
40 PACKET (EA) ORAL ONCE
Refills: 0 | Status: COMPLETED | OUTPATIENT
Start: 2020-02-03 | End: 2020-02-03

## 2020-02-03 RX ORDER — POLYETHYLENE GLYCOL 3350 17 G/17G
17 POWDER, FOR SOLUTION ORAL
Qty: 0 | Refills: 0 | DISCHARGE
Start: 2020-02-03

## 2020-02-03 RX ADMIN — HYDROMORPHONE HYDROCHLORIDE 2 MILLIGRAM(S): 2 INJECTION INTRAMUSCULAR; INTRAVENOUS; SUBCUTANEOUS at 09:22

## 2020-02-03 RX ADMIN — AMLODIPINE BESYLATE 5 MILLIGRAM(S): 2.5 TABLET ORAL at 11:12

## 2020-02-03 RX ADMIN — Medication 1000 UNIT(S): at 11:12

## 2020-02-03 RX ADMIN — HYDROMORPHONE HYDROCHLORIDE 2 MILLIGRAM(S): 2 INJECTION INTRAMUSCULAR; INTRAVENOUS; SUBCUTANEOUS at 14:12

## 2020-02-03 RX ADMIN — ENOXAPARIN SODIUM 40 MILLIGRAM(S): 100 INJECTION SUBCUTANEOUS at 13:18

## 2020-02-03 RX ADMIN — Medication 1 TABLET(S): at 11:12

## 2020-02-03 RX ADMIN — Medication 1 MILLIGRAM(S): at 04:48

## 2020-02-03 RX ADMIN — HYDROMORPHONE HYDROCHLORIDE 2 MILLIGRAM(S): 2 INJECTION INTRAMUSCULAR; INTRAVENOUS; SUBCUTANEOUS at 10:00

## 2020-02-03 RX ADMIN — PREGABALIN 1000 MICROGRAM(S): 225 CAPSULE ORAL at 11:12

## 2020-02-03 RX ADMIN — CITALOPRAM 40 MILLIGRAM(S): 10 TABLET, FILM COATED ORAL at 11:12

## 2020-02-03 RX ADMIN — Medication 40 MILLIEQUIVALENT(S): at 13:18

## 2020-02-03 RX ADMIN — HYDROMORPHONE HYDROCHLORIDE 2 MILLIGRAM(S): 2 INJECTION INTRAMUSCULAR; INTRAVENOUS; SUBCUTANEOUS at 13:23

## 2020-02-03 NOTE — PROGRESS NOTE ADULT - SUBJECTIVE AND OBJECTIVE BOX
Plastic Surgery Progress Note  (pg [facility]: [id number], NS: [phone])    Subjective:  The patient was seen and examined on morning rounds.  denies pain  drains removed by nsgy    Objective:    Physical Exam:  Gen: appears well, NAD  Resp: breathing comfortably  Back: Incision CDI, back flat, no collections        T(C): 36.8 (02-03-20 @ 04:15), Max: 38.2 (02-02-20 @ 21:04)  HR: 99 (02-03-20 @ 04:15) (78 - 99)  BP: 127/71 (02-03-20 @ 04:15) (112/68 - 146/71)  RR: 16 (02-03-20 @ 04:15) (16 - 18)  SpO2: 93% (02-03-20 @ 04:15) (93% - 98%)    02-02-20 @ 07:01  -  02-03-20 @ 07:00  --------------------------------------------------------  IN: 1100 mL / OUT: 1153 mL / NET: -53 mL          LABS                        9.0    10.25 )-----------( 266      ( 01 Feb 2020 08:20 )             29.3     02-01    141  |  102  |  10  ----------------------------<  120<H>  3.1<L>   |  29  |  0.82    Ca    8.5      01 Feb 2020 08:20

## 2020-02-03 NOTE — DISCHARGE NOTE PROVIDER - NSDCFUSCHEDAPPT_GEN_ALL_CORE_FT
DACIA NAVARRO ; 02/25/2020 ; [facility] [address] [facility]  DACIA NAVARRO ; 04/21/2020 ; [facility]PP [facility] [address] DACIA NAVARRO ; 02/12/2020 ; [facility] [address] ; 02/25/2020 ; [facility] [address] [facility]  DACIA NAVARRO ; 04/21/2020 ; NPP [facility] [address] [place]

## 2020-02-03 NOTE — DISCHARGE NOTE PROVIDER - NSDCCPCAREPLAN_GEN_ALL_CORE_FT
PRINCIPAL DISCHARGE DIAGNOSIS  Diagnosis: Sciatica of left side  Assessment and Plan of Treatment: S/p J70-Xzoeuk Fusion  Will discharge home with home PT

## 2020-02-03 NOTE — DISCHARGE NOTE PROVIDER - HOSPITAL COURSE
69 M PMH chronic back pain s/p multiple lumbar laminectomy/fusion, NPH s/p  shunt, HTN, HLD, anxiety, depression, GULSHAN on CPAP, p/w worsening back pain. Pt states that he has had months of worsening, intermittent back pain that was acutely worsened in last 1-2 weeks. States he was cleaning his bathroom when the pain acutely worsened; is mostly lumbar in nature with radiation down L buttocks and at times L thigh/knee.  No radiation of pain to toes.  NO numbness/tingling. No weakness.  Still ambulatory with cane/able to bear weight.  Pt states he felt better after his last lumbar laminectomy/fusion in 6/2019 with Dr. Meche Huynh at [facility], but only felt better for about a week before his pain returned.  Has had issues with opiates in past (passing out, ?overdose) and has followed with multiple pain mgt providers in past (per allscripts his PCP states that he was either fired from prior pain mgt doctors or lost to f/u with them because he didn't like them).  Few months ago he tried ketamine infusions with a doctor at [facility] but this did not help; more recently he has started gabapentin but he has since discontinued it due to lack of effect and uncomfortable sensation in both legs as if they were "going to explode."  Denies cp, sob, f/c, n/v/d, denies urinary retention or urinary/fecal incontinence, denies saddle anesthesia; notes unchanged urinary hesitancy likely related to bph.  Of note, he has a friend who is living with him since his last surgery, who pt states primarily helps him with his meds; pt is unable to recount all meds/doses at this time, call placed to friend unanswered. Pt notes he is not currently taking opiates and only OTC meds such as aspirin and naproxen; unk dose/freq of naproxen use, denies gib/melena/hematochezia or abd pain at the moment. Also, his friend who lives with him was seen at urgent care center today and diagnosed with flu and given Tamiflu; pt himself denies viral uri sx.    On 1/28 patient had P01-Tnjftr Fusion. s no intra operative or post operative complications. Patient was seen by physical therapy and home PT was recommended 69 M PMH chronic back pain s/p multiple lumbar laminectomy/fusion, NPH s/p  shunt, HTN, HLD, anxiety, depression, GULSHAN on CPAP, p/w worsening back pain. Pt states that he has had months of worsening, intermittent back pain that was acutely worsened in last 1-2 weeks. States he was cleaning his bathroom when the pain acutely worsened; is mostly lumbar in nature with radiation down L buttocks and at times L thigh/knee.  No radiation of pain to toes.  NO numbness/tingling. No weakness.  Still ambulatory with cane/able to bear weight.  Pt states he felt better after his last lumbar laminectomy/fusion in 6/2019 with Dr. Meche Huynh at [facility], but only felt better for about a week before his pain returned.  Has had issues with opiates in past (passing out, ?overdose) and has followed with multiple pain mgt providers in past (per allscripts his PCP states that he was either fired from prior pain mgt doctors or lost to f/u with them because he didn't like them).  Few months ago he tried ketamine infusions with a doctor at [facility] but this did not help; more recently he has started gabapentin but he has since discontinued it due to lack of effect and uncomfortable sensation in both legs as if they were "going to explode."  Denies cp, sob, f/c, n/v/d, denies urinary retention or urinary/fecal incontinence, denies saddle anesthesia; notes unchanged urinary hesitancy likely related to bph.  Of note, he has a friend who is living with him since his last surgery, who pt states primarily helps him with his meds; pt is unable to recount all meds/doses at this time, call placed to friend unanswered. Pt notes he is not currently taking opiates and only OTC meds such as aspirin and naproxen; unk dose/freq of naproxen use, denies gib/melena/hematochezia or abd pain at the moment. Also, his friend who lives with him was seen at urgent care center today and diagnosed with flu and given Tamiflu; pt himself denies viral uri sx.    On 1/28 patient had T31-Czjnlh Fusion. There was no intra operative or post operative complications. Patient was seen by physical therapy and home PT was recommended

## 2020-02-03 NOTE — DISCHARGE NOTE NURSING/CASE MANAGEMENT/SOCIAL WORK - PATIENT PORTAL LINK FT
You can access the FollowMyHealth Patient Portal offered by [facility] by registering at the following website: [email or web address]. By joining [email or web address]’s FollowMyHealth portal, you will also be able to view your health information using other applications (apps) compatible with our system.

## 2020-02-03 NOTE — DISCHARGE NOTE PROVIDER - NSDCMRMEDTOKEN_GEN_ALL_CORE_FT
acetaminophen 325 mg oral tablet: 2 tab(s) orally every 6 hours, As needed, Temp greater or equal to 38C (100.4F), Mild Pain (1 - 3), Moderate Pain (4 - 6), Severe Pain (7 - 10)  albuterol 90 mcg/inh inhalation aerosol: 1 puff(s) inhaled every 6 hours, As Needed -for shortness of breath and/or wheezing   ALPRAZolam 1 mg oral tablet: 1 tab(s) orally every 6 hours, As needed, Anxiety  Ambien 10 mg oral tablet: 1 tab(s) orally once a day (at bedtime)  Amitiza 8 mcg oral capsule: 1 cap(s) orally 2 times a day  amLODIPine 5 mg oral tablet: 1 tab(s) orally 2 times a day  bisacodyl 10 mg rectal suppository: 1 suppository(ies) rectal once, As needed, Constipation  Celexa 40 mg oral tablet: 1 tab(s) orally once a day  irbesartan-hydrochlorothiazide 300mg-12.5mg oral tablet: 1 tab(s) orally once a day  Multiple Vitamins oral tablet, chewable: 1 tab(s) orally once a day  polyethylene glycol 3350 oral powder for reconstitution: 17 gram(s) orally 2 times a day  senna oral tablet: 2 tab(s) orally once a day (at bedtime)  tamsulosin 0.4 mg oral capsule: 1 cap(s) orally once a day (at bedtime)  traZODone 100 mg oral tablet: 2 tab(s) orally once a day (at bedtime)  Vitamin B12 1000 mcg oral tablet: 1 tab(s) orally once a day  Vitamin D3 1000 intl units oral tablet: 1 tab(s) orally once a day acetaminophen 325 mg oral tablet: 2 tab(s) orally every 6 hours, As needed, Temp greater or equal to 38C (100.4F), Mild Pain (1 - 3)  acetaminophen 325 mg oral tablet: 2 tab(s) orally every 6 hours, As needed, Temp greater or equal to 38C (100.4F), Mild Pain (1 - 3), Moderate Pain (4 - 6), Severe Pain (7 - 10)  albuterol 90 mcg/inh inhalation aerosol: 1 puff(s) inhaled every 6 hours, As Needed -for shortness of breath and/or wheezing   ALPRAZolam 1 mg oral tablet: 1 tab(s) orally every 6 hours, As needed, Anxiety  Ambien 10 mg oral tablet: 1 tab(s) orally once a day (at bedtime)  Amitiza 8 mcg oral capsule: 1 cap(s) orally 2 times a day  amLODIPine 5 mg oral tablet: 1 tab(s) orally 2 times a day  bisacodyl 10 mg rectal suppository: 1 suppository(ies) rectal once, As needed, Constipation  Celexa 40 mg oral tablet: 1 tab(s) orally once a day  HYDROmorphone 4 mg oral tablet: 1 tab(s) orally every 4 hours, As needed, Severe Pain (7 - 10) MDD:6  irbesartan-hydrochlorothiazide 300mg-12.5mg oral tablet: 1 tab(s) orally once a day  Multiple Vitamins oral tablet, chewable: 1 tab(s) orally once a day  polyethylene glycol 3350 oral powder for reconstitution: 17 gram(s) orally 2 times a day  senna oral tablet: 2 tab(s) orally once a day (at bedtime)  tamsulosin 0.4 mg oral capsule: 1 cap(s) orally once a day (at bedtime)  traZODone 100 mg oral tablet: 2 tab(s) orally once a day (at bedtime)  Vitamin B12 1000 mcg oral tablet: 1 tab(s) orally once a day  Vitamin D3 1000 intl units oral tablet: 1 tab(s) orally once a day

## 2020-02-03 NOTE — DISCHARGE NOTE PROVIDER - NSDCACTIVITY_GEN_ALL_CORE
Walking - Indoors allowed/Showering allowed/Stairs allowed/Walking - Outdoors allowed/No heavy lifting/straining/Bathing allowed

## 2020-02-03 NOTE — PROGRESS NOTE ADULT - SUBJECTIVE AND OBJECTIVE BOX
Post-op day #6 s/p [id number] Fusion    OVERNIGHT EVENTS: none  Vital Signs Last 24 Hrs  T(C): 36.7 (03 Feb 2020 08:52), Max: 38.2 (02 Feb 2020 21:04)  T(F): 98 (03 Feb 2020 08:52), Max: 100.7 (02 Feb 2020 21:04)  HR: 95 (03 Feb 2020 08:52) (78 - 99)  BP: 125/66 (03 Feb 2020 08:52) (112/68 - 146/71)  BP(mean): --  RR: 17 (03 Feb 2020 08:52) (16 - 17)  SpO2: 96% (03 Feb 2020 08:52) (93% - 98%)    I&O's Detail    02 Feb 2020 07:01  -  03 Feb 2020 07:00  --------------------------------------------------------  IN:    Oral Fluid: 1100 mL  Total IN: 1100 mL    OUT:    Accordian: 3 mL    Voided: 1150 mL  Total OUT: 1153 mL    Total NET: -53 mL        I&O's Summary    02 Feb 2020 07:01  -  03 Feb 2020 07:00  --------------------------------------------------------  IN: 1100 mL / OUT: 1153 mL / NET: -53 mL        PHYSICAL EXAM:  Neurological:    Motor exam:         [] Upper extremity                 D             B          T          WE       WF      HI                                               R         5/5        5/5        5/5       5/5     5/5       5/5                                               L          5/5        5/5        5/5       5/5     5/5       5/5         [] Lower extremity                Ps          Ha        Quad    EHL        FHL                                               R        5/5        5/5        5/5       5/5         5/5                                               L         5/5        5/5       5/5       5/5          5/5                                                        [] warm well perfused; capillary refill <3 seconds     Sensation: [] intact to light touch  [] decreased:       Gait    Cardiovascular: Regular  Respiratory: Clear bilaterally  Gastrointestinal: Abd soft + BS non tender  Genitourinary:  Extremities: -C/C/E  Incision/Wound: Intact    TUBES/LINES:  [] CVC  [] A-line  [] Lumbar Drain  [] Ventriculostomy  [] Other    DIET:  [] NPO  [] Mechanical  [] Tube feeds    LABS:                  CAPILLARY BLOOD GLUCOSE              Drug Levels: [] N/A    CSF Analysis: [] N/A      Allergies    Biaxin (Other)  Lidoderm (Unknown)  morphine (Short breath; Rash)    Intolerances      MEDICATIONS:  Antibiotics:    Neuro:  acetaminophen   Tablet .. 650 milliGRAM(s) Oral every 6 hours PRN  acetaminophen   Tablet .. 650 milliGRAM(s) Oral every 6 hours PRN  ALPRAZolam 1 milliGRAM(s) Oral every 6 hours PRN  citalopram 40 milliGRAM(s) Oral daily  diazepam    Tablet 5 milliGRAM(s) Oral every 6 hours PRN  diphenhydrAMINE 25 milliGRAM(s) Oral every 4 hours PRN  HYDROmorphone   Tablet 2 milliGRAM(s) Oral every 4 hours PRN  HYDROmorphone   Tablet 4 milliGRAM(s) Oral every 4 hours PRN  ondansetron Injectable 4 milliGRAM(s) IV Push every 6 hours PRN  traZODone 200 milliGRAM(s) Oral at bedtime  zolpidem 5 milliGRAM(s) Oral at bedtime PRN    Anticoagulation:  enoxaparin Injectable 40 milliGRAM(s) SubCutaneous daily    OTHER:  amLODIPine   Tablet 5 milliGRAM(s) Oral <User Schedule>  bisacodyl Suppository 10 milliGRAM(s) Rectal once PRN  naloxone Injectable 0.1 milliGRAM(s) IV Push every 3 minutes PRN  polyethylene glycol 3350 17 Gram(s) Oral two times a day  senna 2 Tablet(s) Oral at bedtime  tamsulosin 0.4 milliGRAM(s) Oral at bedtime    IVF:  cholecalciferol 1000 Unit(s) Oral daily  cyanocobalamin 1000 MICROGram(s) Oral daily  multivitamin 1 Tablet(s) Oral daily    CULTURES:    RADIOLOGY & ADDITIONAL TESTS:      ASSESSMENT:     69 M PMH chronic back pain s/p multiple lumbar laminectomy/fusion, NPH s/p  shunt, HTN, HLD, anxiety, depression, GULSHAN on CPAP, p/w worsening back pain. Pt states that he has had months of worsening, intermittent back pain that was acutely worsened in last 1-2 weeks. States he was cleaning his bathroom when the pain acutely worsened; is mostly lumbar in nature with radiation down L buttocks and at times L thigh/knee.  No radiation of pain to toes.  NO numbness/tingling. No weakness.  Still ambulatory with cane/able to bear weight.  Pt states he felt better after his last lumbar laminectomy/fusion in 6/2019 with Dr. Meche Huynh at [facility], but only felt better for about a week before his pain returned.  Has had issues with opiates in past (passing out, ?overdose) and has followed with multiple pain mgt providers in past (per allscripts his PCP states that he was either fired from prior pain mgt doctors or lost to f/u with them because he didn't like them).  Few months ago he tried ketamine infusions with a doctor at [facility] but this did not help; more recently he has started gabapentin but he has since discontinued it due to lack of effect and uncomfortable sensation in both legs as if they were "going to explode."  Denies cp, sob, f/c, n/v/d, denies urinary retention or urinary/fecal incontinence, denies saddle anesthesia; notes unchanged urinary hesitancy likely related to bph.  Of note, he has a friend who is living with him since his last surgery, who pt states primarily helps him with his meds; pt is unable to recount all meds/doses at this time, call placed to friend unanswered. Pt notes he is not currently taking opiates and only OTC meds such as aspirin and naproxen; unk dose/freq of naproxen use, denies gib/melena/hematochezia or abd pain at the moment. Also, his friend who lives with him was seen at urgent care center today and diagnosed with flu and given Tamiflu; pt himself denies viral uri sx.      VS: 98.0, 57, 103/63, 18, 95% Ra.  Labs: no leukocytosis, chronic stable mild anemia, plt wnl, coags unrevealing, cmp with mild hypoK 3.2, JOSE MIGUEL scr 1.39 baseline 0.94, rest cmp unrevealing. XR pelvis prelim no acute fx. XR lumbar spine with noted unchanged L1 deformity, no new compression deformities seen. In ER pt received valium, dilaudid iv 0.5 x 2, dilaudid iv 1 x 1, and oxyIR prior to medicine team involvement. (22 Jan 2020 23:48)    69y Male s/pPost-op day #6 s/p T11-Pelvis Fusion    PLAN:  NEURO:  CARDIOVASCULAR:  PULMONARY:  RENAL:  GI:  HEME:  ID:  ENDO:    DVT PROPHYLAXIS:  [] Venodynes                                [] Heparin/Lovenox    FALL RISK:  [] Low Risk                                    [] Impulsive Post-op day #6 s/p [id number]-[id number] Fusion    OVERNIGHT EVENTS: none  Vital Signs Last 24 Hrs  T(C): 36.7 (03 Feb 2020 08:52), Max: 38.2 (02 Feb 2020 21:04)  T(F): 98 (03 Feb 2020 08:52), Max: 100.7 (02 Feb 2020 21:04)  HR: 95 (03 Feb 2020 08:52) (78 - 99)  BP: 125/66 (03 Feb 2020 08:52) (112/68 - 146/71)  BP(mean): --  RR: 17 (03 Feb 2020 08:52) (16 - 17)  SpO2: 96% (03 Feb 2020 08:52) (93% - 98%)    I&O's Detail    02 Feb 2020 07:01  -  03 Feb 2020 07:00  --------------------------------------------------------  IN:    Oral Fluid: 1100 mL  Total IN: 1100 mL    OUT:    Accordian: 3 mL    Voided: 1150 mL  Total OUT: 1153 mL    Total NET: -53 mL        I&O's Summary    02 Feb 2020 07:01  -  03 Feb 2020 07:00  --------------------------------------------------------  IN: 1100 mL / OUT: 1153 mL / NET: -53 mL        PHYSICAL EXAM:  Neurological:    Motor exam:         [x] Upper extremity                 D             B          T          WE       WF      HI                                               R         5/5        5/5        5/5       5/5     5/5       5/5                                               L          5/5        5/5        5/5       5/5     5/5       5/5         [x] Lower extremity                Ps          Ha        Quad    EHL        FHL                                               R        5/5        5/5        5/5       5/5         5/5                                               L         5/5        5/5       5/5       5/5          5/5          Sensation: [x] intact to light touch  [] decreased:       Gait ; not tested    Cardiovascular: Regular  Respiratory: Clear bilaterally  Gastrointestinal: Abd soft + BS non tender  Genitourinary:  Extremities: -C/C/E  Incision/Wound: Intact    TUBES/LINES:  [] CVC  [] A-line  [] Lumbar Drain  [] Ventriculostomy  [] Other    DIET:  [] NPO  [] Mechanical  [] Tube feeds    LABS:      Allergies    Biaxin (Other)  Lidoderm (Unknown)  morphine (Short breath; Rash)    Intolerances      MEDICATIONS:  Antibiotics:    Neuro:  acetaminophen   Tablet .. 650 milliGRAM(s) Oral every 6 hours PRN  acetaminophen   Tablet .. 650 milliGRAM(s) Oral every 6 hours PRN  ALPRAZolam 1 milliGRAM(s) Oral every 6 hours PRN  citalopram 40 milliGRAM(s) Oral daily  diazepam    Tablet 5 milliGRAM(s) Oral every 6 hours PRN  diphenhydrAMINE 25 milliGRAM(s) Oral every 4 hours PRN  HYDROmorphone   Tablet 2 milliGRAM(s) Oral every 4 hours PRN  HYDROmorphone   Tablet 4 milliGRAM(s) Oral every 4 hours PRN  ondansetron Injectable 4 milliGRAM(s) IV Push every 6 hours PRN  traZODone 200 milliGRAM(s) Oral at bedtime  zolpidem 5 milliGRAM(s) Oral at bedtime PRN    Anticoagulation:  enoxaparin Injectable 40 milliGRAM(s) SubCutaneous daily    OTHER:  amLODIPine   Tablet 5 milliGRAM(s) Oral <User Schedule>  bisacodyl Suppository 10 milliGRAM(s) Rectal once PRN  naloxone Injectable 0.1 milliGRAM(s) IV Push every 3 minutes PRN  polyethylene glycol 3350 17 Gram(s) Oral two times a day  senna 2 Tablet(s) Oral at bedtime  tamsulosin 0.4 milliGRAM(s) Oral at bedtime    IVF:  cholecalciferol 1000 Unit(s) Oral daily  cyanocobalamin 1000 MICROGram(s) Oral daily  multivitamin 1 Tablet(s) Oral daily    CULTURES:    RADIOLOGY & ADDITIONAL TESTS:      ASSESSMENT:     69 M PMH chronic back pain s/p multiple lumbar laminectomy/fusion, NPH s/p  shunt, HTN, HLD, anxiety, depression, GULSHAN on CPAP, p/w worsening back pain. Pt states that he has had months of worsening, intermittent back pain that was acutely worsened in last 1-2 weeks. States he was cleaning his bathroom when the pain acutely worsened; is mostly lumbar in nature with radiation down L buttocks and at times L thigh/knee.  No radiation of pain to toes.  NO numbness/tingling. No weakness.  Still ambulatory with cane/able to bear weight.  Pt states he felt better after his last lumbar laminectomy/fusion in 6/2019 with Dr. Meche Huynh at [facility], but only felt better for about a week before his pain returned.  Has had issues with opiates in past (passing out, ?overdose) and has followed with multiple pain mgt providers in past (per allscripts his PCP states that he was either fired from prior pain mgt doctors or lost to f/u with them because he didn't like them).  Few months ago he tried ketamine infusions with a doctor at [facility] but this did not help; more recently he has started gabapentin but he has since discontinued it due to lack of effect and uncomfortable sensation in both legs as if they were "going to explode."  Denies cp, sob, f/c, n/v/d, denies urinary retention or urinary/fecal incontinence, denies saddle anesthesia; notes unchanged urinary hesitancy likely related to bph.  Of note, he has a friend who is living with him since his last surgery, who pt states primarily helps him with his meds; pt is unable to recount all meds/doses at this time, call placed to friend unanswered. Pt notes he is not currently taking opiates and only OTC meds such as aspirin and naproxen; unk dose/freq of naproxen use, denies gib/melena/hematochezia or abd pain at the moment. Also, his friend who lives with him was seen at urgent care center today and diagnosed with flu and given Tamiflu; pt himself denies viral uri sx.      VS: 98.0, 57, 103/63, 18, 95% Ra.  Labs: no leukocytosis, chronic stable mild anemia, plt wnl, coags unrevealing, cmp with mild hypoK 3.2, JOSE MIGUEL scr 1.39 baseline 0.94, rest cmp unrevealing. XR pelvis prelim no acute fx. XR lumbar spine with noted unchanged L1 deformity, no new compression deformities seen. In ER pt received valium, dilaudid iv 0.5 x 2, dilaudid iv 1 x 1, and oxyIR prior to medicine team involvement. (22 Jan 2020 23:48)    69y Male s/pPost-op day #6 s/p T11-Pelvis Fusion    PLAN:  NEURO:  CARDIOVASCULAR:  PULMONARY:  RENAL:  GI:  HEME:  ID:  ENDO:    DVT PROPHYLAXIS:  [] Venodynes                                [] Heparin/Lovenox    FALL RISK:  [] Low Risk                                    [] Impulsive Post-op day #6 s/p H70-Irhtvt Fusion    OVERNIGHT EVENTS: none  Vital Signs Last 24 Hrs  T(C): 36.7 (03 Feb 2020 08:52), Max: 38.2 (02 Feb 2020 21:04)  T(F): 98 (03 Feb 2020 08:52), Max: 100.7 (02 Feb 2020 21:04)  HR: 95 (03 Feb 2020 08:52) (78 - 99)  BP: 125/66 (03 Feb 2020 08:52) (112/68 - 146/71)  BP(mean): --  RR: 17 (03 Feb 2020 08:52) (16 - 17)  SpO2: 96% (03 Feb 2020 08:52) (93% - 98%)    I&O's Detail    02 Feb 2020 07:01  -  03 Feb 2020 07:00  --------------------------------------------------------  IN:    Oral Fluid: 1100 mL  Total IN: 1100 mL    OUT:    Accordian: 3 mL    Voided: 1150 mL  Total OUT: 1153 mL    Total NET: -53 mL        I&O's Summary    02 Feb 2020 07:01  -  03 Feb 2020 07:00  --------------------------------------------------------  IN: 1100 mL / OUT: 1153 mL / NET: -53 mL        PHYSICAL EXAM:  Neurological:    Motor exam:         [x] Upper extremity                 D             B          T          WE       WF      HI                                               R         5/5        5/5        5/5       5/5     5/5       5/5                                               L          5/5        5/5        5/5       5/5     5/5       5/5         [x] Lower extremity                Ps          Ha        Quad    EHL        FHL                                               R        5/5        5/5        5/5       5/5         5/5                                               L         5/5        5/5       5/5       5/5          5/5          Sensation: [x] intact to light touch  [] decreased:       Gait ; not tested    Cardiovascular: Regular  Respiratory: Clear bilaterally  Gastrointestinal: Abd soft + BS non tender  Genitourinary:  Extremities: -C/C/E  Incision/Wound: Intact, old dressing removed    TUBES/LINES:  [] CVC  [] A-line  [] Lumbar Drain  [] Ventriculostomy  [] Other    DIET: Regular  [] NPO  [] Mechanical  [] Tube feeds    LABS:      Allergies    Biaxin (Other)  Lidoderm (Unknown)  morphine (Short breath; Rash)    Intolerances      MEDICATIONS:  Antibiotics:    Neuro:  acetaminophen   Tablet .. 650 milliGRAM(s) Oral every 6 hours PRN  acetaminophen   Tablet .. 650 milliGRAM(s) Oral every 6 hours PRN  ALPRAZolam 1 milliGRAM(s) Oral every 6 hours PRN  citalopram 40 milliGRAM(s) Oral daily  diazepam    Tablet 5 milliGRAM(s) Oral every 6 hours PRN  diphenhydrAMINE 25 milliGRAM(s) Oral every 4 hours PRN  HYDROmorphone   Tablet 2 milliGRAM(s) Oral every 4 hours PRN  HYDROmorphone   Tablet 4 milliGRAM(s) Oral every 4 hours PRN  ondansetron Injectable 4 milliGRAM(s) IV Push every 6 hours PRN  traZODone 200 milliGRAM(s) Oral at bedtime  zolpidem 5 milliGRAM(s) Oral at bedtime PRN    Anticoagulation:  enoxaparin Injectable 40 milliGRAM(s) SubCutaneous daily    OTHER:  amLODIPine   Tablet 5 milliGRAM(s) Oral <User Schedule>  bisacodyl Suppository 10 milliGRAM(s) Rectal once PRN  naloxone Injectable 0.1 milliGRAM(s) IV Push every 3 minutes PRN  polyethylene glycol 3350 17 Gram(s) Oral two times a day  senna 2 Tablet(s) Oral at bedtime  tamsulosin 0.4 milliGRAM(s) Oral at bedtime    IVF:  cholecalciferol 1000 Unit(s) Oral daily  cyanocobalamin 1000 MICROGram(s) Oral daily  multivitamin 1 Tablet(s) Oral daily    CULTURES:    RADIOLOGY & ADDITIONAL TESTS:      ASSESSMENT:     69 M PMH chronic back pain s/p multiple lumbar laminectomy/fusion, NPH s/p  shunt, HTN, HLD, anxiety, depression, GULSHAN on CPAP, p/w worsening back pain. Pt states that he has had months of worsening, intermittent back pain that was acutely worsened in last 1-2 weeks. States he was cleaning his bathroom when the pain acutely worsened; is mostly lumbar in nature with radiation down L buttocks and at times L thigh/knee.  No radiation of pain to toes.  NO numbness/tingling. No weakness.  Still ambulatory with cane/able to bear weight.  Pt states he felt better after his last lumbar laminectomy/fusion in 6/2019 with Dr. Meche Huynh at [facility], but only felt better for about a week before his pain returned.  Has had issues with opiates in past (passing out, ?overdose) and has followed with multiple pain mgt providers in past (per allscripts his PCP states that he was either fired from prior pain mgt doctors or lost to f/u with them because he didn't like them).  Few months ago he tried ketamine infusions with a doctor at [facility] but this did not help; more recently he has started gabapentin but he has since discontinued it due to lack of effect and uncomfortable sensation in both legs as if they were "going to explode."  Denies cp, sob, f/c, n/v/d, denies urinary retention or urinary/fecal incontinence, denies saddle anesthesia; notes unchanged urinary hesitancy likely related to bph.  Of note, he has a friend who is living with him since his last surgery, who pt states primarily helps him with his meds; pt is unable to recount all meds/doses at this time, call placed to friend unanswered. Pt notes he is not currently taking opiates and only OTC meds such as aspirin and naproxen; unk dose/freq of naproxen use, denies gib/melena/hematochezia or abd pain at the moment. Also, his friend who lives with him was seen at urgent care center today and diagnosed with flu and given Tamiflu; pt himself denies viral uri sx.      VS: 98.0, 57, 103/63, 18, 95% Ra.  Labs: no leukocytosis, chronic stable mild anemia, plt wnl, coags unrevealing, cmp with mild hypoK 3.2, JOSE MIGUEL scr 1.39 baseline 0.94, rest cmp unrevealing. XR pelvis prelim no acute fx. XR lumbar spine with noted unchanged L1 deformity, no new compression deformities seen. In ER pt received valium, dilaudid iv 0.5 x 2, dilaudid iv 1 x 1, and oxyIR prior to medicine team involvement. (22 Jan 2020 23:48)    69y Male s/pPost-op day #6 s/p T11-Pelvis Fusion  Assessment:  Please Check When Present   []  GCS  E   V  M     Heart Failure: []Acute, [] acute on chronic , []chronic  Heart Failure:  [] Diastolic (HFpEF), [] Systolic (HFrEF), []Combined (HFpEF and HFrEF), [] RHF, [] Pulm HTN, [] Other    [] JOSE MIGUEL, [] ATN, [] AIN, [] other  [] CKD1, [] CKD2, [] CKD 3, [] CKD 4, [] CKD 5, []ESRD    Encephalopathy: [] Metabolic, [] Hepatic, [] toxic, [] Neurological, [] Other    Abnormal Nurtitional Status: [] malnurtition (see nutrition note), [ ]underweight: BMI < 19, [] morbid obesity: BMI >40, [] Cachexia    [] Sepsis  [] hypovolemic shock,[] cardiogenic shock, [] hemorrhagic shock, [] neuogenic shock  [] Acute Respiratory Failure  []Cerebral edema, [] Brain compression/ herniation,   [] Functional quadriplegia  [] Acute blood loss anemia      PLAN:  NEURO: Neurologically stable, will d/c home today with home PT  CARDIOVASCULAR: Hemodynamically stable  PULMONARY: Incentive spirometre  RENAL: Hypokalemia, supplemented f/u pend  GI: Tolerating PO  HEME: H/H stable  ID: Afebrile  ENDO: no issue    DVT PROPHYLAXIS:  [x] Venodynes                                [x] Heparin/Lovenox    FALL RISK:  [x] Low Risk                                    [] Impulsive Post-op day #6 s/p M72-Xbdodk Fusion    OVERNIGHT EVENTS: none  Vital Signs Last 24 Hrs  T(C): 36.7 (03 Feb 2020 08:52), Max: 38.2 (02 Feb 2020 21:04)  T(F): 98 (03 Feb 2020 08:52), Max: 100.7 (02 Feb 2020 21:04)  HR: 95 (03 Feb 2020 08:52) (78 - 99)  BP: 125/66 (03 Feb 2020 08:52) (112/68 - 146/71)  BP(mean): --  RR: 17 (03 Feb 2020 08:52) (16 - 17)  SpO2: 96% (03 Feb 2020 08:52) (93% - 98%)    I&O's Detail    02 Feb 2020 07:01  -  03 Feb 2020 07:00  --------------------------------------------------------  IN:    Oral Fluid: 1100 mL  Total IN: 1100 mL    OUT:    Accordian: 3 mL    Voided: 1150 mL  Total OUT: 1153 mL    Total NET: -53 mL        I&O's Summary    02 Feb 2020 07:01  -  03 Feb 2020 07:00  --------------------------------------------------------  IN: 1100 mL / OUT: 1153 mL / NET: -53 mL        PHYSICAL EXAM:  Neurological:    Motor exam:         [x] Upper extremity                 D             B          T          WE       WF      HI                                               R         5/5        5/5        5/5       5/5     5/5       5/5                                               L          5/5        5/5        5/5       5/5     5/5       5/5         [x] Lower extremity                Ps          Ha        Quad    EHL        FHL                                               R        5/5        5/5        5/5       5/5         5/5                                               L         5/5        5/5       5/5       5/5          5/5          Sensation: [x] intact to light touch  [] decreased:       Gait ; not tested    Cardiovascular: Regular  Respiratory: Clear bilaterally  Gastrointestinal: Abd soft + BS non tender  Genitourinary:  Extremities: -C/C/E  Incision/Wound: Intact, old dressing removed    TUBES/LINES:  [] CVC  [] A-line  [] Lumbar Drain  [] Ventriculostomy  [] Other    DIET: Regular  [] NPO  [] Mechanical  [] Tube feeds    LABS:      Allergies    Biaxin (Other)  Lidoderm (Unknown)  morphine (Short breath; Rash)    Intolerances      MEDICATIONS:  Antibiotics:    Neuro:  acetaminophen   Tablet .. 650 milliGRAM(s) Oral every 6 hours PRN  acetaminophen   Tablet .. 650 milliGRAM(s) Oral every 6 hours PRN  ALPRAZolam 1 milliGRAM(s) Oral every 6 hours PRN  citalopram 40 milliGRAM(s) Oral daily  diazepam    Tablet 5 milliGRAM(s) Oral every 6 hours PRN  diphenhydrAMINE 25 milliGRAM(s) Oral every 4 hours PRN  HYDROmorphone   Tablet 2 milliGRAM(s) Oral every 4 hours PRN  HYDROmorphone   Tablet 4 milliGRAM(s) Oral every 4 hours PRN  ondansetron Injectable 4 milliGRAM(s) IV Push every 6 hours PRN  traZODone 200 milliGRAM(s) Oral at bedtime  zolpidem 5 milliGRAM(s) Oral at bedtime PRN    Anticoagulation:  enoxaparin Injectable 40 milliGRAM(s) SubCutaneous daily    OTHER:  amLODIPine   Tablet 5 milliGRAM(s) Oral <User Schedule>  bisacodyl Suppository 10 milliGRAM(s) Rectal once PRN  naloxone Injectable 0.1 milliGRAM(s) IV Push every 3 minutes PRN  polyethylene glycol 3350 17 Gram(s) Oral two times a day  senna 2 Tablet(s) Oral at bedtime  tamsulosin 0.4 milliGRAM(s) Oral at bedtime    IVF:  cholecalciferol 1000 Unit(s) Oral daily  cyanocobalamin 1000 MICROGram(s) Oral daily  multivitamin 1 Tablet(s) Oral daily    CULTURES:    RADIOLOGY & ADDITIONAL TESTS:      ASSESSMENT:     69 M PMH chronic back pain s/p multiple lumbar laminectomy/fusion, NPH s/p  shunt, HTN, HLD, anxiety, depression, GULSHAN on CPAP, p/w worsening back pain. Pt states that he has had months of worsening, intermittent back pain that was acutely worsened in last 1-2 weeks. States he was cleaning his bathroom when the pain acutely worsened; is mostly lumbar in nature with radiation down L buttocks and at times L thigh/knee.  No radiation of pain to toes.  NO numbness/tingling. No weakness.  Still ambulatory with cane/able to bear weight.  Pt states he felt better after his last lumbar laminectomy/fusion in 6/2019 with Dr. Meche Huynh at [facility], but only felt better for about a week before his pain returned.  Has had issues with opiates in past (passing out, ?overdose) and has followed with multiple pain mgt providers in past (per allscripts his PCP states that he was either fired from prior pain mgt doctors or lost to f/u with them because he didn't like them).  Few months ago he tried ketamine infusions with a doctor at [facility] but this did not help; more recently he has started gabapentin but he has since discontinued it due to lack of effect and uncomfortable sensation in both legs as if they were "going to explode."  Denies cp, sob, f/c, n/v/d, denies urinary retention or urinary/fecal incontinence, denies saddle anesthesia; notes unchanged urinary hesitancy likely related to bph.  Of note, he has a friend who is living with him since his last surgery, who pt states primarily helps him with his meds; pt is unable to recount all meds/doses at this time, call placed to friend unanswered. Pt notes he is not currently taking opiates and only OTC meds such as aspirin and naproxen; unk dose/freq of naproxen use, denies gib/melena/hematochezia or abd pain at the moment. Also, his friend who lives with him was seen at urgent care center today and diagnosed with flu and given Tamiflu; pt himself denies viral uri sx.      VS: 98.0, 57, 103/63, 18, 95% Ra.  Labs: no leukocytosis, chronic stable mild anemia, plt wnl, coags unrevealing, cmp with mild hypoK 3.2, JOSE MIGUEL scr 1.39 baseline 0.94, rest cmp unrevealing. XR pelvis prelim no acute fx. XR lumbar spine with noted unchanged L1 deformity, no new compression deformities seen. In ER pt received valium, dilaudid iv 0.5 x 2, dilaudid iv 1 x 1, and oxyIR prior to medicine team involvement. (22 Jan 2020 23:48)    69y Male s/pPost-op day #6 s/p T11-Pelvis Fusion  Please Check When Present   []  GCS  E   V  M     Heart Failure: []Acute, [] acute on chronic , []chronic  Heart Failure:  [] Diastolic (HFpEF), [] Systolic (HFrEF), []Combined (HFpEF and HFrEF), [] RHF, [] Pulm HTN, [] Other    [] JOSE MIGUEL, [] ATN, [] AIN, [] other  [] CKD1, [] CKD2, [] CKD 3, [] CKD 4, [] CKD 5, []ESRD    Encephalopathy: [] Metabolic, [] Hepatic, [] toxic, [] Neurological, [] Other    Abnormal Nurtitional Status: [] malnurtition (see nutrition note), [ ]underweight: BMI < 19, [] morbid obesity: BMI >40, [] Cachexia    [] Sepsis  [] hypovolemic shock,[] cardiogenic shock, [] hemorrhagic shock, [] neuogenic shock  [] Acute Respiratory Failure  []Cerebral edema, [] Brain compression/ herniation,   [] Functional quadriplegia  [] Acute blood loss anemia      PLAN:  NEURO: Neurologically stable, will d/c home today with home PT  CARDIOVASCULAR: Hemodynamically stable  PULMONARY: Incentive spirometre  RENAL: Hypokalemia, supplemented f/u pend  GI: Tolerating PO  HEME: H/H stable  ID: Afebrile  ENDO: no issue    DVT PROPHYLAXIS:  [x] Venodynes                                [x] Heparin/Lovenox    FALL RISK:  [x] Low Risk                                    [] Impulsive

## 2020-02-03 NOTE — DISCHARGE NOTE PROVIDER - NSDCFUADDAPPT_GEN_ALL_CORE_FT
Please see Dr Castanon in a week for follow up Please see Dr Castanon in the office on February 12th @11 am for follow up

## 2020-02-03 NOTE — DISCHARGE NOTE PROVIDER - CARE PROVIDER_API CALL
Keira Castanon (DO)  Neurological Surgery  [address] [place] [address], [address]  [place] [address]  Phone: [phone]  Fax: [phone]  Follow Up Time:

## 2020-02-03 NOTE — PROGRESS NOTE ADULT - REASON FOR ADMISSION
Lumbar back pain

## 2020-02-03 NOTE — PROGRESS NOTE ADULT - ASSESSMENT
Pt is a 69M s/p T11-pelvis PSF with back closure    - Aquacel changed today, drain care as needed  - appreciate care per primary  - will follow for incision care    Plastic Surgery  (pg [facility]: [id number], NS: [phone]) Pt is a 69M s/p T11-pelvis PSF with back closure    - aquacel on back, can be changed at follow up visit  - f/u with Dr. Lilly 1 week after discharge  - appreciate care per primary    Plastic Surgery  (pg [facility]: [id number], NS: [phone])

## 2020-02-03 NOTE — DISCHARGE NOTE PROVIDER - NSDCFUADDINST_GEN_ALL_CORE_FT
please keep incision clean and dry, do not submerge wound in water for prolonged periods of time, pat dry after showering, and do not use any creams or ointments to incision.  please do not engage in strenuous activity, heavy lifting, drive, or return to work or school until cleared by surgeon.  please notify physician if fevers, bleeding, swelling, pain not relieved by medication, increased sluggishness or irritability, increased nausea or vomiting, inability to tolerate foods or liquids.

## 2020-02-04 LAB
AMPHET UR-MCNC: NEGATIVE — SIGNIFICANT CHANGE UP
BARBITURATES, URINE.: NEGATIVE — SIGNIFICANT CHANGE UP
BENZODIAZ UR-MCNC: SIGNIFICANT CHANGE UP
COCAINE METAB.OTHER UR-MCNC: NEGATIVE — SIGNIFICANT CHANGE UP
CREATININE, URINE THERAPEUTIC: 241.5 MG/DL — SIGNIFICANT CHANGE UP
METHADONE UR-MCNC: NEGATIVE — SIGNIFICANT CHANGE UP
METHAQUALONE UR QL: NEGATIVE — SIGNIFICANT CHANGE UP
METHAQUALONE UR-MCNC: NEGATIVE — SIGNIFICANT CHANGE UP
OPIATES UR-MCNC: SIGNIFICANT CHANGE UP
PCP UR-MCNC: NEGATIVE — SIGNIFICANT CHANGE UP
PROPOXYPH UR QL: NEGATIVE — SIGNIFICANT CHANGE UP
THC UR QL: SIGNIFICANT CHANGE UP

## 2020-02-05 ENCOUNTER — OFFICE VISIT (OUTPATIENT)
Dept: FAMILY MEDICINE CLINIC | Facility: CLINIC | Age: 70
End: 2020-02-05

## 2020-02-05 ENCOUNTER — EMERGENCY (EMERGENCY)
Facility: HOSPITAL | Age: 70
LOS: 1 days | Discharge: HOME | End: 2020-02-05
Attending: EMERGENCY MEDICINE
Payer: MEDICARE

## 2020-02-05 VITALS
OXYGEN SATURATION: 95 % | SYSTOLIC BLOOD PRESSURE: 114 MMHG | DIASTOLIC BLOOD PRESSURE: 64 MMHG | HEART RATE: 83 BPM | RESPIRATION RATE: 16 BRPM | TEMPERATURE: 100 F

## 2020-02-05 VITALS
HEART RATE: 68 BPM | OXYGEN SATURATION: 97 % | WEIGHT: 295 LBS | SYSTOLIC BLOOD PRESSURE: 122 MMHG | HEIGHT: 69 IN | TEMPERATURE: 97.8 F | BODY MASS INDEX: 43.69 KG/M2 | DIASTOLIC BLOOD PRESSURE: 67 MMHG

## 2020-02-05 VITALS
WEIGHT: 210.1 LBS | HEART RATE: 77 BPM | HEIGHT: 68 IN | TEMPERATURE: 99 F | OXYGEN SATURATION: 95 % | RESPIRATION RATE: 18 BRPM | SYSTOLIC BLOOD PRESSURE: 107 MMHG | DIASTOLIC BLOOD PRESSURE: 62 MMHG

## 2020-02-05 DIAGNOSIS — E78.2 MIXED HYPERLIPIDEMIA: ICD-10-CM

## 2020-02-05 DIAGNOSIS — E66.01 OBESITY, CLASS III, BMI 40-49.9 (MORBID OBESITY) (HCC): ICD-10-CM

## 2020-02-05 DIAGNOSIS — E11.9 TYPE 2 DIABETES MELLITUS WITHOUT COMPLICATION, WITHOUT LONG-TERM CURRENT USE OF INSULIN (HCC): Primary | ICD-10-CM

## 2020-02-05 DIAGNOSIS — E55.9 VITAMIN D DEFICIENCY: ICD-10-CM

## 2020-02-05 DIAGNOSIS — I10 ESSENTIAL HYPERTENSION: ICD-10-CM

## 2020-02-05 LAB
ALBUMIN SERPL ELPH-MCNC: 3.3 G/DL — SIGNIFICANT CHANGE UP (ref 3.3–5)
ALP SERPL-CCNC: 84 U/L — SIGNIFICANT CHANGE UP (ref 40–120)
ALT FLD-CCNC: 40 U/L — SIGNIFICANT CHANGE UP (ref 10–45)
ANION GAP SERPL CALC-SCNC: 14 MMOL/L — SIGNIFICANT CHANGE UP (ref 5–17)
APPEARANCE UR: CLEAR — SIGNIFICANT CHANGE UP
APTT BLD: 29.6 SEC — SIGNIFICANT CHANGE UP (ref 27.5–36.3)
AST SERPL-CCNC: 27 U/L — SIGNIFICANT CHANGE UP (ref 10–40)
BASOPHILS # BLD AUTO: 0.04 K/UL — SIGNIFICANT CHANGE UP (ref 0–0.2)
BASOPHILS NFR BLD AUTO: 0.3 % — SIGNIFICANT CHANGE UP (ref 0–2)
BILIRUB SERPL-MCNC: 0.4 MG/DL — SIGNIFICANT CHANGE UP (ref 0.2–1.2)
BILIRUB UR-MCNC: NEGATIVE — SIGNIFICANT CHANGE UP
BUN SERPL-MCNC: 12 MG/DL — SIGNIFICANT CHANGE UP (ref 7–23)
CALCIUM SERPL-MCNC: 8.3 MG/DL — LOW (ref 8.4–10.5)
CHLORIDE SERPL-SCNC: 102 MMOL/L — SIGNIFICANT CHANGE UP (ref 96–108)
CO2 SERPL-SCNC: 25 MMOL/L — SIGNIFICANT CHANGE UP (ref 22–31)
COLOR SPEC: YELLOW — SIGNIFICANT CHANGE UP
CREAT SERPL-MCNC: 1.09 MG/DL — SIGNIFICANT CHANGE UP (ref 0.5–1.3)
DIFF PNL FLD: NEGATIVE — SIGNIFICANT CHANGE UP
EOSINOPHIL # BLD AUTO: 0.17 K/UL — SIGNIFICANT CHANGE UP (ref 0–0.5)
EOSINOPHIL NFR BLD AUTO: 1.4 % — SIGNIFICANT CHANGE UP (ref 0–6)
GLUCOSE SERPL-MCNC: 118 MG/DL — HIGH (ref 70–99)
GLUCOSE UR QL: NEGATIVE — SIGNIFICANT CHANGE UP
HCT VFR BLD CALC: 28 % — LOW (ref 39–50)
HGB BLD-MCNC: 8.9 G/DL — LOW (ref 13–17)
IMM GRANULOCYTES NFR BLD AUTO: 1.5 % — SIGNIFICANT CHANGE UP (ref 0–1.5)
INR BLD: 1.25 RATIO — HIGH (ref 0.88–1.16)
KETONES UR-MCNC: NEGATIVE — SIGNIFICANT CHANGE UP
LEUKOCYTE ESTERASE UR-ACNC: NEGATIVE — SIGNIFICANT CHANGE UP
LYMPHOCYTES # BLD AUTO: 1.18 K/UL — SIGNIFICANT CHANGE UP (ref 1–3.3)
LYMPHOCYTES # BLD AUTO: 9.9 % — LOW (ref 13–44)
MCHC RBC-ENTMCNC: 25.9 PG — LOW (ref 27–34)
MCHC RBC-ENTMCNC: 31.8 GM/DL — LOW (ref 32–36)
MCV RBC AUTO: 81.4 FL — SIGNIFICANT CHANGE UP (ref 80–100)
MONOCYTES # BLD AUTO: 1.36 K/UL — HIGH (ref 0–0.9)
MONOCYTES NFR BLD AUTO: 11.4 % — SIGNIFICANT CHANGE UP (ref 2–14)
NEUTROPHILS # BLD AUTO: 8.96 K/UL — HIGH (ref 1.8–7.4)
NEUTROPHILS NFR BLD AUTO: 75.5 % — SIGNIFICANT CHANGE UP (ref 43–77)
NITRITE UR-MCNC: NEGATIVE — SIGNIFICANT CHANGE UP
NRBC # BLD: 0 /100 WBCS — SIGNIFICANT CHANGE UP (ref 0–0)
PH UR: 6 — SIGNIFICANT CHANGE UP (ref 5–8)
PLATELET # BLD AUTO: 448 K/UL — HIGH (ref 150–400)
POTASSIUM SERPL-MCNC: 3.2 MMOL/L — LOW (ref 3.5–5.3)
POTASSIUM SERPL-SCNC: 3.2 MMOL/L — LOW (ref 3.5–5.3)
PROT SERPL-MCNC: 6.3 G/DL — SIGNIFICANT CHANGE UP (ref 6–8.3)
PROT UR-MCNC: SIGNIFICANT CHANGE UP
PROTHROM AB SERPL-ACNC: 14.4 SEC — HIGH (ref 10–12.9)
RBC # BLD: 3.44 M/UL — LOW (ref 4.2–5.8)
RBC # FLD: 15.3 % — HIGH (ref 10.3–14.5)
SODIUM SERPL-SCNC: 141 MMOL/L — SIGNIFICANT CHANGE UP (ref 135–145)
SP GR SPEC: 1.02 — SIGNIFICANT CHANGE UP (ref 1.01–1.02)
UROBILINOGEN FLD QL: 1 — SIGNIFICANT CHANGE UP
WBC # BLD: 11.89 K/UL — HIGH (ref 3.8–10.5)
WBC # FLD AUTO: 11.89 K/UL — HIGH (ref 3.8–10.5)

## 2020-02-05 PROCEDURE — 99284 EMERGENCY DEPT VISIT MOD MDM: CPT

## 2020-02-05 PROCEDURE — 72128 CT CHEST SPINE W/O DYE: CPT | Mod: 26

## 2020-02-05 PROCEDURE — 99214 OFFICE O/P EST MOD 30 MIN: CPT | Performed by: FAMILY MEDICINE

## 2020-02-05 PROCEDURE — 71045 X-RAY EXAM CHEST 1 VIEW: CPT | Mod: 26

## 2020-02-05 PROCEDURE — 72131 CT LUMBAR SPINE W/O DYE: CPT | Mod: 26

## 2020-02-05 RX ORDER — HYDROMORPHONE HYDROCHLORIDE 2 MG/ML
1 INJECTION INTRAMUSCULAR; INTRAVENOUS; SUBCUTANEOUS ONCE
Refills: 0 | Status: DISCONTINUED | OUTPATIENT
Start: 2020-02-05 | End: 2020-02-05

## 2020-02-05 RX ADMIN — HYDROMORPHONE HYDROCHLORIDE 1 MILLIGRAM(S): 2 INJECTION INTRAMUSCULAR; INTRAVENOUS; SUBCUTANEOUS at 13:45

## 2020-02-05 NOTE — ED PROVIDER NOTE - CLINICAL SUMMARY MEDICAL DECISION MAKING FREE TEXT BOX
Dr. Parsons (Attending Physician)  Pt. with ho htn, hydronephrosis pw episode of weakness slid to floor today had multiple back surgeries most recently 1/22 discharged, on 2/3. No syncope, no cauda equina, no fever.  Was hypotensive with EMS to 70s systolic improved with 500 mL bolus.  Will check labs, ecg, ua, cxr, consult spine with recent surgery in the past month. Incision healing well no discharge.

## 2020-02-05 NOTE — PHYSICAL THERAPY INITIAL EVALUATION ADULT - PERTINENT HX OF CURRENT PROBLEM, REHAB EVAL
69M s/p T11-P instrumentation and fusion on 1/28 previous hospital stay, now s/p fall/ slide out from chair at home, CT to eval hardware was wnl.

## 2020-02-05 NOTE — ED PROVIDER NOTE - PROGRESS NOTE DETAILS
spoke with PT, ambulating with walker, stable for dc home, discussed with patient states pain controlled at this time. will call Dr. Castanon for follow up - Lydia Hernandez PA-C

## 2020-02-05 NOTE — ED ADULT NURSE NOTE - OBJECTIVE STATEMENT
69 y.o. Male presents to the ED via EMS from home for fall and hypotensive. Hx s/p T11 pelvis fusion 1/22 d/c 2/3. Sleep apnea, HTN, depression, brain shunt, 9 back surgeries. Reports getting up from bed to the bathroom using a cane. Denies LOC and hitting head, anticoagulants. EMS reports hypotensive 70s/40s. 500cc NS via 20L EMS. 69 y.o. Male presents to the ED via EMS from home for fall and hypotensive. Hx recent back surgery - T11 pelvis fusion admitted 1/22 and d/c 2/3; sleep apnea, HTN, depression, brain shunt, 9 back surgeries. Pt reports getting up from bed to the bathroom using a cane and "crouch down." Pt states he should've used his walker instead. Denies LOC and hitting head, anticoagulants. EMS reports hypotensive 70s/40s upon arrival. 500cc NS given via 20L hand from EMS prior to arrival. Surgical site noted vertically lower thoracic to sacrum - no redness or swelling noted. +ROM in all four extremities. A&Ox3. Neuro intact. Ambulatory with cane and walker at baseline.

## 2020-02-05 NOTE — ED PROVIDER NOTE - NSFOLLOWUPINSTRUCTIONS_ED_ALL_ED_FT
Follow up with your Primary Care Physician within the next 2-3 days  Bring a copy of your test results with you to your appointment  Continue your current medication regimen  Return to the Emergency Room if you experience new or worsening symptoms    SEEK IMMEDIATE MEDICAL CARE IF YOU HAVE ANY OF THE FOLLOWING SYMPTOMS: bowel or bladder control problems, unusual weakness or numbness in your arms or legs, nausea or vomiting, abdominal pain, fever, dizziness/lightheadedness.

## 2020-02-05 NOTE — ED ADULT NURSE REASSESSMENT NOTE - NS ED NURSE REASSESS COMMENT FT1
handoff report received from RN, pt resting in bed, VSS, pt c/o of back pain, md made aware, will reassess

## 2020-02-05 NOTE — CONSULT NOTE ADULT - SUBJECTIVE AND OBJECTIVE BOX
p ([id number])     HPI: 69 M PMH chronic back pain s/p multiple lumbar laminectomy/fusion on most recent hospitalization with Dr. Castanon, just discharged to home, NPH s/p  shunt, HTN, HLD, anxiety, depression, GULSHAN on CPAP presents to the ED via EMS for fall at home. patient states he tried to get up to get breakfast at around 5 am, used cane and was unable to stand completely so slid out of bed. no headstrike. similar episode 2 days ago. no fever chills or recent illness. continued back pain but without loss of bowel/bladder.      Imaging:    Exam: AAOx3, LIMA full strength, denies radicular pain    --Anticoagulation:    =====================  PAST MEDICAL HISTORY   NPH (normal pressure hydrocephalus)  Chronic back pain greater than 3 months duration  Small intestine disorder  Lumbar disc displacement without myelopathy  Sciatica  Anxiety  Vertebral Sciatica  Insomnia  Depression  HTN (Hypertension)    PAST SURGICAL HISTORY   S/P lumbar fusion  Dehiscence, Operation Wound/Debridement  S/P Laminectomy    Biaxin (Other)  Lidoderm (Unknown)  morphine (Short breath; Rash)      MEDICATIONS:  Antibiotics:    Neuro:    Other:      SOCIAL HISTORY:   Occupation:   Marital Status:     FAMILY HISTORY:  No pertinent family history in first degree relatives  Family history of CKD (chronic kidney disease)  Family history of lung cancer      ROS: Negative except per HPI    LABS:  PT/INR - ( 05 Feb 2020 08:50 )   PT: 14.4 sec;   INR: 1.25 ratio         PTT - ( 05 Feb 2020 08:50 )  PTT:29.6 sec                        8.9    11.89 )-----------( 448      ( 05 Feb 2020 08:50 )             28.0     02-05    141  |  102  |  12  ----------------------------<  118<H>  3.2<L>   |  25  |  1.09    Ca    8.3<L>      05 Feb 2020 08:50    TPro  6.3  /  Alb  3.3  /  TBili  0.4  /  DBili  x   /  AST  27  /  ALT  40  /  AlkPhos  84  02-05

## 2020-02-05 NOTE — ED PROVIDER NOTE - CARE PLAN
Principal Discharge DX:	Fall Principal Discharge DX:	Lumbar back pain  Secondary Diagnosis:	Fall, initial encounter Principal Discharge DX:	Lumbar back pain  Secondary Diagnosis:	Fall, initial encounter  Secondary Diagnosis:	Neurogenic orthostatic hypotension

## 2020-02-05 NOTE — PROGRESS NOTES
Chief Complaint   Patient presents with   • Diabetes   • Hypertension       Subjective      Orion Harvey is a 69 y.o. male.  He returns today for a 3-month follow-up on diabetes, hypertension, and arthritic pain.      Diabetes: He is currently taking metformin/Glucophage 1000 twice daily, Amaryl/glimepiride 4 mg twice daily, and dulaglutide/Trulicity 1.5 MG weekly.  He brings home glucose readings which range from 184-213 before breakfast or dinner he has a few postprandial readings ranging from 259-298. Eating more starches than should.    Having more arthritic pain, especially in his knees, hips, and shoulder, and is getting harder to control.  Is currently taking Tylenol 500 mg 2 times daily he has a history of left rotator cuff tear patient has had injections in that joint, he has done physical therapy.  Currently his pain is worse in his right shoulder and right hip.    Hypertension  home blood pressure readings 128//94    The following portions of the patient's history were reviewed and updated as appropriate: allergies, current medications, past family history, past medical history, past social history, past surgical history and problem list.    Current Outpatient Medications on File Prior to Visit   Medication Sig   • acetaminophen (TYLENOL) 650 MG 8 hr tablet Take 2 tablets by mouth Every 8 (Eight) Hours As Needed for Mild Pain  or Moderate Pain .   • ascorbic acid (VITAMIN C) 1000 MG tablet Take 1 tablet by mouth Daily.   • aspirin 325 MG tablet Take 1 tablet by mouth Daily.   • Dulaglutide 1.5 MG/0.5ML solution pen-injector Inject 1.5 mg under the skin into the appropriate area as directed 1 (One) Time Per Week.   • glimepiride (AMARYL) 4 MG tablet TAKE 1 TABLET BY MOUTH TWICE DAILY   • lisinopril (PRINIVIL,ZESTRIL) 20 MG tablet TAKE 2 TABLETS BY MOUTH DAILY   • metFORMIN (GLUCOPHAGE) 1000 MG tablet TAKE 1 TABLET BY MOUTH TWICE DAILY   • Multiple Vitamins-Minerals (CENTRUM SILVER) tablet Take 1  "tablet by mouth Daily.   • ONE TOUCH ULTRA TEST test strip TEST BLOOD SUGAR ONCE DAILY   • ONETOUCH DELICA LANCETS 33G misc 1 tablet by In Vitro route Every 12 (Twelve) Hours.   • rosuvastatin (CRESTOR) 10 MG tablet Take 1 tablet by mouth Every Night.   • vitamin d ( VITAMIN D3) 5000 units capsule Take 1 tablet by mouth Daily.     No current facility-administered medications on file prior to visit.          Review of Systems   Constitutional: Positive for unexpected weight gain. Negative for appetite change, fatigue and fever.   HENT: Negative for congestion.    Eyes: Negative for visual disturbance.   Respiratory: Negative for cough, shortness of breath and wheezing.    Cardiovascular: Negative for chest pain, palpitations and leg swelling.   Gastrointestinal: Negative.  Negative for abdominal pain, constipation, diarrhea, nausea, vomiting and indigestion.   Musculoskeletal: Positive for arthralgias and back pain.   Skin: Negative for rash.   Neurological: Negative for numbness and headache.   Psychiatric/Behavioral: Negative for sleep disturbance and depressed mood.   Is okay but I will get a new lab    Objective   Vitals:    02/05/20 1318   BP: 122/67   BP Location: Left arm   Patient Position: Sitting   Cuff Size: Adult   Pulse: 68   Temp: 97.8 °F (36.6 °C)   TempSrc: Oral   SpO2: 97%   Weight: 134 kg (295 lb)   Height: 175.3 cm (69.02\")      Body mass index is 43.54 kg/m².    Physical Exam   Constitutional: He is oriented to person, place, and time. He appears well-developed and well-nourished. No distress.   Morbidly obese   Eyes: Pupils are equal, round, and reactive to light. Conjunctivae and EOM are normal.   Neck: Normal range of motion.   Cardiovascular: Normal rate and regular rhythm.   No murmur heard.  Pulmonary/Chest: Effort normal and breath sounds normal. He has no wheezes.   Musculoskeletal: He exhibits no edema.   Neurological: He is alert and oriented to person, place, and time.   Skin: Skin is " warm and dry.   Psychiatric: He has a normal mood and affect.   Nursing note and vitals reviewed.      Lab Results   Component Value Date     (H) 01/31/2020    BUN 17 01/31/2020    CREATININE 0.74 (L) 01/31/2020    EGFRIFNONA 95 01/31/2020    EGFRIFAFRI 110 01/31/2020     01/31/2020    K 5.1 01/31/2020     01/31/2020    CALCIUM 9.4 01/31/2020    ALBUMIN 4.3 01/31/2020    BILITOT 0.4 01/31/2020    ALKPHOS 52 01/31/2020    AST 52 (H) 01/31/2020    ALT 58 (H) 01/31/2020    CHLPL 125 01/31/2020    TRIG 294 (H) 01/31/2020    HDL 39 (L) 01/31/2020    VLDL 59 (H) 01/31/2020    LDL 27 01/31/2020      Lab Results   Component Value Date    HGBA1C 9.3 (H) 01/31/2020    HGBA1C 7.3 (H) 10/30/2019    HGBA1C 9.1 (H) 07/24/2019        Assessment/Plan   Diagnoses and all orders for this visit:    1. Type 2 diabetes mellitus without complication, without long-term current use of insulin (CMS/[facility]) (Primary)  -     Hemoglobin A1c; Future    2. Essential hypertension  -     Comprehensive Metabolic Panel; Future    3. Mixed hyperlipidemia  -     Lipid Panel; Future    4. Vitamin D deficiency  -     Vitamin D 25 Hydroxy; Future    5. Obesity, Class III, BMI 40-49.9 (morbid obesity) (CMS/[facility])        Diabetes, greatly increased A1c.  Stressed need to follow a low concentrated sweet, low-fat/cholesterol diet, and increase physical activity to improve glycemic control and reduce cardiovascular risk.  Did have long discussion about adding additional diabetes medication, such as SGLT2 inhibitors such as Jardiance, Invokana, or Farxiga may need to consider insulin.  Patient declines change today stating he will be diligent about diet.    HTN, BP good today however home readings are elevated, will check machine for accuracy and check mannually. Before ncreasing medication.  Will contact office if remaining greater than 140 and will increase lisinopril to 40 mg.  Otherwise bring blood pressure readings to appointment in 3  months.    Knee and shoulder pain, somewhat improved with Tylenol arthritis to 2 tablets twice daily.  Patient does not want additional evaluation or treatment at this time.         Return in about 3 months (around 5/5/2020) for Recheck, diabetes, with Labs.      AVS given

## 2020-02-05 NOTE — ED PROVIDER NOTE - ATTENDING CONTRIBUTION TO CARE
Dr. Parsons (Attending Physician)  I performed a history and physical exam of the patient and discussed their management with the resident. I reviewed the resident's note and agree with the documented findings and plan of care. My medical decision making and observations are found above.

## 2020-02-05 NOTE — ED ADULT NURSE REASSESSMENT NOTE - NS ED NURSE REASSESS COMMENT FT1
Pt is in no current distress - resting comfortably in stretcher with both rails up for safety. Comfort and safety provided. Will continue to monitor. Awaiting radiology results.

## 2020-02-05 NOTE — ED PROVIDER NOTE - PATIENT PORTAL LINK FT
You can access the FollowMyHealth Patient Portal offered by [facility] by registering at the following website: [email or web address]. By joining [facility]’s FollowMyHealth portal, you will also be able to view your health information using other applications (apps) compatible with our system.

## 2020-02-05 NOTE — ED ADULT NURSE NOTE - NSIMPLEMENTINTERV_GEN_ALL_ED
Implemented All Fall with Harm Risk Interventions:  [place] to call system. Call bell, personal items and telephone within reach. Instruct patient to call for assistance. Room bathroom lighting operational. Non-slip footwear when patient is off stretcher. Physically safe environment: no spills, clutter or unnecessary equipment. Stretcher in lowest position, wheels locked, appropriate side rails in place. Provide visual cue, wrist band, yellow gown, etc. Monitor gait and stability. Monitor for mental status changes and reorient to person, place, and time. Review medications for side effects contributing to fall risk. Reinforce activity limits and safety measures with patient and family. Provide visual clues: red socks.

## 2020-02-05 NOTE — PHYSICAL THERAPY INITIAL EVALUATION ADULT - ADDITIONAL COMMENTS
Patient lives in pvt house ,3 steps to enter.  Patient ambulated with straight cane independent. states he owns all DME necesarry no WC though.

## 2020-02-05 NOTE — CONSULT NOTE ADULT - ASSESSMENT
69M s/p T11-P instrumentation and fusion on 1/28, s/p multiple falls, CT to eval hardware was wnl  - no neurosurgical intervention  - hardware intact  - no neurosurgical contraindication to D/C and f/u outpatient

## 2020-02-05 NOTE — ED PROVIDER NOTE - CARE PROVIDER_API CALL
Keira Castanon (DO)  Neurological Surgery  [address] [place] [address], [address]  [place] [address]  Phone: [phone]  Fax: [phone]  Follow Up Time:

## 2020-02-05 NOTE — PHYSICAL THERAPY INITIAL EVALUATION ADULT - DISCHARGE DISPOSITION, PT EVAL
home w/ outpatient services/Home with out patient PT services for pain control, improve strength and optimize functional mobility .

## 2020-02-05 NOTE — ED PROVIDER NOTE - OBJECTIVE STATEMENT
69 M PMH chronic back pain s/p multiple lumbar laminectomy/fusion on most recent hospitalization with Dr. Castanon, just discharged to home, NPH s/p  shunt, HTN, HLD, anxiety, depression, GULSHAN on CPAP presents to the ED via EMS for fall at home. patient states he tried to get up to get breakfast at around 5 am, used cane and was unable to stand completely so slid out of bed. no headstrike. similar episode 2 days ago. no fever chills or recent illness. continued back pain but without loss of bowel/bladder.

## 2020-02-06 LAB
CULTURE RESULTS: SIGNIFICANT CHANGE UP
SPECIMEN SOURCE: SIGNIFICANT CHANGE UP

## 2020-02-06 RX ORDER — GABAPENTIN 300 MG/1
300 CAPSULE ORAL
Refills: 0 | Status: DISCONTINUED | COMMUNITY
Start: 2019-11-13 | End: 2020-02-06

## 2020-02-07 ENCOUNTER — INPATIENT (INPATIENT)
Facility: HOSPITAL | Age: 70
LOS: 6 days | Discharge: HOME | DRG: 552 | End: 2020-02-14
Attending: INTERNAL MEDICINE | Admitting: HOSPITALIST
Payer: MEDICARE

## 2020-02-07 VITALS
RESPIRATION RATE: 18 BRPM | WEIGHT: 210.1 LBS | DIASTOLIC BLOOD PRESSURE: 69 MMHG | OXYGEN SATURATION: 96 % | TEMPERATURE: 99 F | HEIGHT: 68 IN | HEART RATE: 75 BPM | SYSTOLIC BLOOD PRESSURE: 109 MMHG

## 2020-02-07 LAB
ALBUMIN SERPL ELPH-MCNC: 3.5 G/DL — SIGNIFICANT CHANGE UP (ref 3.3–5)
ALP SERPL-CCNC: 113 U/L — SIGNIFICANT CHANGE UP (ref 40–120)
ALT FLD-CCNC: 39 U/L — SIGNIFICANT CHANGE UP (ref 10–45)
ANION GAP SERPL CALC-SCNC: 14 MMOL/L — SIGNIFICANT CHANGE UP (ref 5–17)
APTT BLD: 27.3 SEC — LOW (ref 27.5–36.3)
AST SERPL-CCNC: 25 U/L — SIGNIFICANT CHANGE UP (ref 10–40)
BASOPHILS # BLD AUTO: 0.04 K/UL — SIGNIFICANT CHANGE UP (ref 0–0.2)
BASOPHILS NFR BLD AUTO: 0.3 % — SIGNIFICANT CHANGE UP (ref 0–2)
BILIRUB SERPL-MCNC: 0.4 MG/DL — SIGNIFICANT CHANGE UP (ref 0.2–1.2)
BUN SERPL-MCNC: 13 MG/DL — SIGNIFICANT CHANGE UP (ref 7–23)
CALCIUM SERPL-MCNC: 8.7 MG/DL — SIGNIFICANT CHANGE UP (ref 8.4–10.5)
CHLORIDE SERPL-SCNC: 97 MMOL/L — SIGNIFICANT CHANGE UP (ref 96–108)
CO2 SERPL-SCNC: 28 MMOL/L — SIGNIFICANT CHANGE UP (ref 22–31)
CREAT SERPL-MCNC: 1.27 MG/DL — SIGNIFICANT CHANGE UP (ref 0.5–1.3)
EOSINOPHIL # BLD AUTO: 0.17 K/UL — SIGNIFICANT CHANGE UP (ref 0–0.5)
EOSINOPHIL NFR BLD AUTO: 1.4 % — SIGNIFICANT CHANGE UP (ref 0–6)
GLUCOSE SERPL-MCNC: 98 MG/DL — SIGNIFICANT CHANGE UP (ref 70–99)
HCT VFR BLD CALC: 27.4 % — LOW (ref 39–50)
HGB BLD-MCNC: 8.2 G/DL — LOW (ref 13–17)
IMM GRANULOCYTES NFR BLD AUTO: 1.2 % — SIGNIFICANT CHANGE UP (ref 0–1.5)
INR BLD: 1.34 RATIO — HIGH (ref 0.88–1.16)
LYMPHOCYTES # BLD AUTO: 1.55 K/UL — SIGNIFICANT CHANGE UP (ref 1–3.3)
LYMPHOCYTES # BLD AUTO: 12.9 % — LOW (ref 13–44)
MCHC RBC-ENTMCNC: 24.6 PG — LOW (ref 27–34)
MCHC RBC-ENTMCNC: 29.9 GM/DL — LOW (ref 32–36)
MCV RBC AUTO: 82.3 FL — SIGNIFICANT CHANGE UP (ref 80–100)
MONOCYTES # BLD AUTO: 1.19 K/UL — HIGH (ref 0–0.9)
MONOCYTES NFR BLD AUTO: 9.9 % — SIGNIFICANT CHANGE UP (ref 2–14)
NEUTROPHILS # BLD AUTO: 8.93 K/UL — HIGH (ref 1.8–7.4)
NEUTROPHILS NFR BLD AUTO: 74.3 % — SIGNIFICANT CHANGE UP (ref 43–77)
NRBC # BLD: 0 /100 WBCS — SIGNIFICANT CHANGE UP (ref 0–0)
PLATELET # BLD AUTO: 530 K/UL — HIGH (ref 150–400)
POTASSIUM SERPL-MCNC: 3.1 MMOL/L — LOW (ref 3.5–5.3)
POTASSIUM SERPL-SCNC: 3.1 MMOL/L — LOW (ref 3.5–5.3)
PROT SERPL-MCNC: 6.7 G/DL — SIGNIFICANT CHANGE UP (ref 6–8.3)
PROTHROM AB SERPL-ACNC: 15.4 SEC — HIGH (ref 10–12.9)
RBC # BLD: 3.33 M/UL — LOW (ref 4.2–5.8)
RBC # FLD: 15.4 % — HIGH (ref 10.3–14.5)
SODIUM SERPL-SCNC: 139 MMOL/L — SIGNIFICANT CHANGE UP (ref 135–145)
WBC # BLD: 12.02 K/UL — HIGH (ref 3.8–10.5)
WBC # FLD AUTO: 12.02 K/UL — HIGH (ref 3.8–10.5)

## 2020-02-07 PROCEDURE — 70250 X-RAY EXAM OF SKULL: CPT | Mod: 26,77

## 2020-02-07 PROCEDURE — 74018 RADEX ABDOMEN 1 VIEW: CPT | Mod: 26

## 2020-02-07 PROCEDURE — 71045 X-RAY EXAM CHEST 1 VIEW: CPT | Mod: 26

## 2020-02-07 PROCEDURE — 72131 CT LUMBAR SPINE W/O DYE: CPT | Mod: 26

## 2020-02-07 PROCEDURE — 70450 CT HEAD/BRAIN W/O DYE: CPT | Mod: 26

## 2020-02-07 PROCEDURE — 72128 CT CHEST SPINE W/O DYE: CPT | Mod: 26

## 2020-02-07 PROCEDURE — 99285 EMERGENCY DEPT VISIT HI MDM: CPT | Mod: GC

## 2020-02-07 PROCEDURE — 70250 X-RAY EXAM OF SKULL: CPT | Mod: 26

## 2020-02-07 RX ORDER — HYDROMORPHONE HYDROCHLORIDE 2 MG/ML
1 INJECTION INTRAMUSCULAR; INTRAVENOUS; SUBCUTANEOUS ONCE
Refills: 0 | Status: DISCONTINUED | OUTPATIENT
Start: 2020-02-07 | End: 2020-02-07

## 2020-02-07 RX ADMIN — HYDROMORPHONE HYDROCHLORIDE 1 MILLIGRAM(S): 2 INJECTION INTRAMUSCULAR; INTRAVENOUS; SUBCUTANEOUS at 21:16

## 2020-02-07 NOTE — ED PROVIDER NOTE - CLINICAL SUMMARY MEDICAL DECISION MAKING FREE TEXT BOX
CT head, basic bloods, consult with neurosurgery, chest x-ray, UA. If pt has rectal temp, will obtain cultures. CT head, basic bloods, consult with neurosurgery, chest x-ray, UA.

## 2020-02-07 NOTE — ED PROVIDER NOTE - CRANIAL NERVE AND PUPILLARY EXAM
5/5 bilateral lower extremity strength. Sensation intact to bilateral lower extremities. No spinal tenderness other/5/5 bilateral lower extremity strength EHL. Sensation intact to bilateral lower extremities. No spinal tenderness

## 2020-02-07 NOTE — ED PROVIDER NOTE - PROGRESS NOTE DETAILS
Ahmet PGY-1: [facility] came and adjusted patients vent to original settings (pt brought in sheet with that information.) Patient offered rehab at this time for multiple falls. Patient declined and wants to go home. Will recheck skull xray to confirm VPS settings are appropriate. Patient agrees to follow up in [facility] clinic in 1 to 2 weeks. Were still pending hardware imaging, but if those are normal then he is ok to go be discharged home as no acute pathology is occurring. Ahmet PGY-1: spoke to patient again about unsafe discharge given multiple falls at home. Patient agrees with admission plans and requests rehab facility such as [facility] or [facility] given recent back surgery.

## 2020-02-07 NOTE — ED ADULT NURSE NOTE - OBJECTIVE STATEMENT
69 y.o. male with PMH of hydrocephalus and HTN presented to ED by EMS after 4 falls at home today. Pt states "I was walking to the bathroom and my knees kept buckling and I kept falling." Pt states he did not have the strength to pull himself up and his caregiver found him on the floor "hours later." Pt denies head trauma, injury, or bleeding upon falls. Pt denies chest pain, SOB, loss of sensation in legs, numbness, 69 y.o. male with PMH of hydrocephalus and HTN presented to ED by EMS after 4 falls at home today s/p back surgery 2 weeks ago. Pt states "I was walking to the bathroom and my knees kept buckling and I kept falling." Pt states his caregiver came home "hours later" and called EMS. As per EMS, caregiver did not find pt on the floor. Pt denies head trauma, injury, bleeding, or LOC upon falls. Pt denies chest pain, SOB, loss of sensation in legs, numbness, 69 y.o. male with PMH of hydrocephalus and HTN presented to ED by EMS after 4 falls at home today s/p back surgery 2 weeks ago. Pt states "I was walking to the bathroom and my knees kept buckling and I kept falling." Pt states his caregiver came home "hours later" and called EMS. As per EMS, caregiver did not find pt on the floor. Pt denies head trauma, injury, bleeding, or LOC upon falls. Pt denies chest pain, SOB, loss of sensation in legs, numbness, tingling, fevers, chills, H/A, vision changes, or dizziness. Upon assessment, pt A&Ox3, sensation in bilateral extremities noted, equal strength bilateral extremities, swelling bilateral feet, positive pedal pulses, surgical scar on back dry, intact, no erythema noted, no warmth upon palpation. Pt safety and comfort provided 69 y.o. male with PMH of hydrocephalus ( shunt) and HTN presented to ED by EMS after 4 falls at home today s/p back surgery 2 weeks ago. Pt states "I was walking to the bathroom and my knees kept buckling and I kept falling." Pt states his caregiver came home "hours later" and called EMS. As per EMS, caregiver did not find pt on the floor. Pt denies head trauma, injury, bleeding, or LOC upon falls. Pt denies chest pain, SOB, loss of sensation in legs, numbness, tingling, fevers, chills, H/A, vision changes, or dizziness. Upon assessment, pt A&Ox3, sensation in bilateral extremities noted, equal strength bilateral extremities, swelling bilateral feet, positive pedal pulses, surgical scar on back dry, intact, no erythema noted, no warmth upon palpation. Pt safety and comfort provided

## 2020-02-07 NOTE — ED PROVIDER NOTE - OBJECTIVE STATEMENT
69 M with PMH chronic back pain s/p multiple lumbar laminectomy/fusion on most recent hospitalization with Dr. Castanon (DC'd 2/3/20), NPH s/p  shunt, HTN, HLD, anxiety, depression, GULSHAN on CPAP presents to the ED via EMS c/o x4 falls today s/p back surgery 2 weeks ago. Pt friend has been staying with him at his home since his discharge. Friend left pt alone today and during this time, pt fell x4 times. Pt states while walking to the bathroom, his knees buckled and fell to the side. No head injury or LOC. Denies CP, SOB, and dizziness prior to fall. Friend came home and called 911, EMS states that pt was not on the floor when they arrived. Pt denies numbness to bilateral lower extremities and any other acute complaints.

## 2020-02-07 NOTE — ED PROVIDER NOTE - MUSCULOSKELETAL, MLM
Spine appears normal, no spinal tenderness, no muscle or joint tenderness no spinal tenderness, no muscle or joint tenderness

## 2020-02-08 DIAGNOSIS — I10 ESSENTIAL (PRIMARY) HYPERTENSION: ICD-10-CM

## 2020-02-08 DIAGNOSIS — M54.5 LOW BACK PAIN: ICD-10-CM

## 2020-02-08 DIAGNOSIS — Z02.9 ENCOUNTER FOR ADMINISTRATIVE EXAMINATIONS, UNSPECIFIED: ICD-10-CM

## 2020-02-08 DIAGNOSIS — E87.6 HYPOKALEMIA: ICD-10-CM

## 2020-02-08 DIAGNOSIS — N17.9 ACUTE KIDNEY FAILURE, UNSPECIFIED: ICD-10-CM

## 2020-02-08 DIAGNOSIS — Z29.9 ENCOUNTER FOR PROPHYLACTIC MEASURES, UNSPECIFIED: ICD-10-CM

## 2020-02-08 DIAGNOSIS — R26.81 UNSTEADINESS ON FEET: ICD-10-CM

## 2020-02-08 DIAGNOSIS — F41.9 ANXIETY DISORDER, UNSPECIFIED: ICD-10-CM

## 2020-02-08 DIAGNOSIS — F32.9 MAJOR DEPRESSIVE DISORDER, SINGLE EPISODE, UNSPECIFIED: ICD-10-CM

## 2020-02-08 LAB
APPEARANCE UR: CLEAR — SIGNIFICANT CHANGE UP
BILIRUB UR-MCNC: NEGATIVE — SIGNIFICANT CHANGE UP
COLOR SPEC: YELLOW — SIGNIFICANT CHANGE UP
DIFF PNL FLD: NEGATIVE — SIGNIFICANT CHANGE UP
GLUCOSE UR QL: NEGATIVE — SIGNIFICANT CHANGE UP
KETONES UR-MCNC: NEGATIVE — SIGNIFICANT CHANGE UP
LEUKOCYTE ESTERASE UR-ACNC: NEGATIVE — SIGNIFICANT CHANGE UP
NITRITE UR-MCNC: NEGATIVE — SIGNIFICANT CHANGE UP
PH UR: 6 — SIGNIFICANT CHANGE UP (ref 5–8)
PROT UR-MCNC: ABNORMAL
SP GR SPEC: 1.02 — SIGNIFICANT CHANGE UP (ref 1.01–1.02)
UROBILINOGEN FLD QL: ABNORMAL

## 2020-02-08 PROCEDURE — 99223 1ST HOSP IP/OBS HIGH 75: CPT

## 2020-02-08 PROCEDURE — 12345: CPT | Mod: NC

## 2020-02-08 RX ORDER — ZOLPIDEM TARTRATE 10 MG/1
5 TABLET ORAL AT BEDTIME
Refills: 0 | Status: DISCONTINUED | OUTPATIENT
Start: 2020-02-08 | End: 2020-02-14

## 2020-02-08 RX ORDER — ALPRAZOLAM 0.25 MG
1 TABLET ORAL EVERY 6 HOURS
Refills: 0 | Status: DISCONTINUED | OUTPATIENT
Start: 2020-02-08 | End: 2020-02-14

## 2020-02-08 RX ORDER — ACETAMINOPHEN 500 MG
650 TABLET ORAL EVERY 6 HOURS
Refills: 0 | Status: DISCONTINUED | OUTPATIENT
Start: 2020-02-08 | End: 2020-02-14

## 2020-02-08 RX ORDER — POLYETHYLENE GLYCOL 3350 17 G/17G
17 POWDER, FOR SOLUTION ORAL
Refills: 0 | Status: DISCONTINUED | OUTPATIENT
Start: 2020-02-08 | End: 2020-02-14

## 2020-02-08 RX ORDER — AMLODIPINE BESYLATE 2.5 MG/1
5 TABLET ORAL
Refills: 0 | Status: DISCONTINUED | OUTPATIENT
Start: 2020-02-08 | End: 2020-02-14

## 2020-02-08 RX ORDER — HYDROMORPHONE HYDROCHLORIDE 2 MG/ML
4 INJECTION INTRAMUSCULAR; INTRAVENOUS; SUBCUTANEOUS EVERY 6 HOURS
Refills: 0 | Status: DISCONTINUED | OUTPATIENT
Start: 2020-02-08 | End: 2020-02-12

## 2020-02-08 RX ORDER — CITALOPRAM 10 MG/1
40 TABLET, FILM COATED ORAL DAILY
Refills: 0 | Status: DISCONTINUED | OUTPATIENT
Start: 2020-02-08 | End: 2020-02-14

## 2020-02-08 RX ORDER — LUBIPROSTONE 24 UG/1
1 CAPSULE, GELATIN COATED ORAL
Qty: 0 | Refills: 0 | DISCHARGE

## 2020-02-08 RX ORDER — SENNA PLUS 8.6 MG/1
2 TABLET ORAL AT BEDTIME
Refills: 0 | Status: DISCONTINUED | OUTPATIENT
Start: 2020-02-08 | End: 2020-02-14

## 2020-02-08 RX ORDER — POTASSIUM CHLORIDE 20 MEQ
40 PACKET (EA) ORAL ONCE
Refills: 0 | Status: COMPLETED | OUTPATIENT
Start: 2020-02-08 | End: 2020-02-08

## 2020-02-08 RX ORDER — HYDROMORPHONE HYDROCHLORIDE 2 MG/ML
1 INJECTION INTRAMUSCULAR; INTRAVENOUS; SUBCUTANEOUS ONCE
Refills: 0 | Status: DISCONTINUED | OUTPATIENT
Start: 2020-02-08 | End: 2020-02-08

## 2020-02-08 RX ORDER — HYDROMORPHONE HYDROCHLORIDE 2 MG/ML
2 INJECTION INTRAMUSCULAR; INTRAVENOUS; SUBCUTANEOUS EVERY 6 HOURS
Refills: 0 | Status: DISCONTINUED | OUTPATIENT
Start: 2020-02-08 | End: 2020-02-12

## 2020-02-08 RX ORDER — TAMSULOSIN HYDROCHLORIDE 0.4 MG/1
0.4 CAPSULE ORAL AT BEDTIME
Refills: 0 | Status: DISCONTINUED | OUTPATIENT
Start: 2020-02-08 | End: 2020-02-14

## 2020-02-08 RX ORDER — TRAZODONE HCL 50 MG
200 TABLET ORAL AT BEDTIME
Refills: 0 | Status: DISCONTINUED | OUTPATIENT
Start: 2020-02-08 | End: 2020-02-14

## 2020-02-08 RX ADMIN — AMLODIPINE BESYLATE 5 MILLIGRAM(S): 2.5 TABLET ORAL at 05:10

## 2020-02-08 RX ADMIN — AMLODIPINE BESYLATE 5 MILLIGRAM(S): 2.5 TABLET ORAL at 16:59

## 2020-02-08 RX ADMIN — Medication 650 MILLIGRAM(S): at 08:08

## 2020-02-08 RX ADMIN — Medication 1 MILLIGRAM(S): at 22:46

## 2020-02-08 RX ADMIN — HYDROMORPHONE HYDROCHLORIDE 2 MILLIGRAM(S): 2 INJECTION INTRAMUSCULAR; INTRAVENOUS; SUBCUTANEOUS at 16:51

## 2020-02-08 RX ADMIN — HYDROMORPHONE HYDROCHLORIDE 2 MILLIGRAM(S): 2 INJECTION INTRAMUSCULAR; INTRAVENOUS; SUBCUTANEOUS at 15:20

## 2020-02-08 RX ADMIN — Medication 1 MILLIGRAM(S): at 17:01

## 2020-02-08 RX ADMIN — HYDROMORPHONE HYDROCHLORIDE 2 MILLIGRAM(S): 2 INJECTION INTRAMUSCULAR; INTRAVENOUS; SUBCUTANEOUS at 06:00

## 2020-02-08 RX ADMIN — TAMSULOSIN HYDROCHLORIDE 0.4 MILLIGRAM(S): 0.4 CAPSULE ORAL at 22:01

## 2020-02-08 RX ADMIN — ZOLPIDEM TARTRATE 5 MILLIGRAM(S): 10 TABLET ORAL at 22:01

## 2020-02-08 RX ADMIN — Medication 650 MILLIGRAM(S): at 08:20

## 2020-02-08 RX ADMIN — HYDROMORPHONE HYDROCHLORIDE 4 MILLIGRAM(S): 2 INJECTION INTRAMUSCULAR; INTRAVENOUS; SUBCUTANEOUS at 22:10

## 2020-02-08 RX ADMIN — HYDROMORPHONE HYDROCHLORIDE 1 MILLIGRAM(S): 2 INJECTION INTRAMUSCULAR; INTRAVENOUS; SUBCUTANEOUS at 00:57

## 2020-02-08 RX ADMIN — HYDROMORPHONE HYDROCHLORIDE 4 MILLIGRAM(S): 2 INJECTION INTRAMUSCULAR; INTRAVENOUS; SUBCUTANEOUS at 21:11

## 2020-02-08 RX ADMIN — HYDROMORPHONE HYDROCHLORIDE 4 MILLIGRAM(S): 2 INJECTION INTRAMUSCULAR; INTRAVENOUS; SUBCUTANEOUS at 11:13

## 2020-02-08 RX ADMIN — SENNA PLUS 2 TABLET(S): 8.6 TABLET ORAL at 22:01

## 2020-02-08 RX ADMIN — Medication 650 MILLIGRAM(S): at 17:30

## 2020-02-08 RX ADMIN — Medication 200 MILLIGRAM(S): at 22:01

## 2020-02-08 RX ADMIN — Medication 1 MILLIGRAM(S): at 08:08

## 2020-02-08 RX ADMIN — Medication 40 MILLIEQUIVALENT(S): at 05:11

## 2020-02-08 RX ADMIN — Medication 650 MILLIGRAM(S): at 17:01

## 2020-02-08 RX ADMIN — HYDROMORPHONE HYDROCHLORIDE 2 MILLIGRAM(S): 2 INJECTION INTRAMUSCULAR; INTRAVENOUS; SUBCUTANEOUS at 05:11

## 2020-02-08 RX ADMIN — HYDROMORPHONE HYDROCHLORIDE 4 MILLIGRAM(S): 2 INJECTION INTRAMUSCULAR; INTRAVENOUS; SUBCUTANEOUS at 11:45

## 2020-02-08 RX ADMIN — CITALOPRAM 40 MILLIGRAM(S): 10 TABLET, FILM COATED ORAL at 11:13

## 2020-02-08 NOTE — H&P ADULT - PROBLEM SELECTOR PLAN 6
ISTOP Reference #: [id number]  Alprazolam last prescribed 1/2/2020 total 120 pills over 30 days.  Continue home regimen

## 2020-02-08 NOTE — H&P ADULT - ASSESSMENT
70 yo man with PMH chronic back pain s/p multiple lumbar laminectomy/fusion, NPH s/p  shunt, HTN, HLD, anxiety, depression, GULSHAN on CPAP with recent admission from 1/22/2020 to 2/3/2020 for [id number] Fusion on 1/28 presenting from home in setting of frequent falls.

## 2020-02-08 NOTE — H&P ADULT - ATTENDING COMMENTS
Patient was previously unknown to me. Patient was assigned to me by hospitalist in charge. My involvement in this case consisted only of the initial history, physical and management plan. Primary day team to assume care in AM and thereafter. Case discussed in detail with overnight medicine NP/PA Shanna [id number]

## 2020-02-08 NOTE — H&P ADULT - HISTORY OF PRESENT ILLNESS
68 yo man with PMH chronic back pain s/p multiple lumbar laminectomy/fusion, NPH s/p  shunt, HTN, HLD, anxiety, depression, GULSHAN on CPAP with recent admission from 1/22/2020 to 2/3/2020 for Y04-Glccxq Fusion on 1/28 presenting from home in setting of frequent falls. Patient states that he was at home and was attempting to walk from his bedroom to the bathroom with the cane. He felt weak and wobbly and equated the sensation to when his NPH only without incontinence. Denies any focal weakness or paresthesias. Patient states that he didn't have a hard fall. Once he felt unsteady, he slowly lowered himself down to soften the fall. Denies head injury or LOC. Denies worsening back pain or extremity or neck pain. Denies preceding symptoms of dizziness, palpitations, CP, SOB. Patient's friend/caregiver left the home during this time. Friend came home and when he heard of events called 911.

## 2020-02-08 NOTE — CONSULT NOTE ADULT - SUBJECTIVE AND OBJECTIVE BOX
p ([phone])     HPI:  68 yo man with PMH chronic back pain s/p multiple lumbar laminectomy/fusion, NPH s/p  shunt, HTN, HLD, anxiety, depression, GULSHAN on CPAP with recent admission from 1/22/2020 to 2/3/2020 for E72-Cnpkky Fusion on 1/28 presenting from home in setting of frequent falls. Patient states that he was at home and was attempting to walk from his bedroom to the bathroom with the cane. He felt weak and wobbly and equated the sensation to when his NPH only without incontinence. Denies any focal weakness or paresthesias. Patient states that he didn't have a hard fall. Once he felt unsteady, he slowly lowered himself down to soften the fall. Denies head injury or LOC. Denies worsening back pain or extremity or neck pain. Denies preceding symptoms of dizziness, palpitations, CP, SOB. Patient's friend/caregiver left the home during this time. Friend came home and when he heard of events called 911. (08 Feb 2020 03:39)      Imaging:  Stable CTH, T and L spine  Hakim at 110    Exam:  AOx3, FC, PERRL, EOMI, no facial   5/5 throughout, no drift  SILT  no clonus    --Anticoagulation:    =====================  PAST MEDICAL HISTORY   NPH (normal pressure hydrocephalus)  Chronic back pain greater than 3 months duration  Small intestine disorder  Lumbar disc displacement without myelopathy  Sciatica  Anxiety  Vertebral Sciatica  Insomnia  Depression  HTN (Hypertension)    PAST SURGICAL HISTORY   S/P lumbar fusion  Dehiscence, Operation Wound/Debridement  S/P Laminectomy    Biaxin (Other)  Lidoderm (Unknown)  morphine (Short breath; Rash)      MEDICATIONS:  Antibiotics:    Neuro:  acetaminophen   Tablet .. 650 milliGRAM(s) Oral every 6 hours PRN  ALPRAZolam 1 milliGRAM(s) Oral every 6 hours PRN  citalopram 40 milliGRAM(s) Oral daily  HYDROmorphone   Tablet 4 milliGRAM(s) Oral every 6 hours PRN  HYDROmorphone   Tablet 2 milliGRAM(s) Oral every 6 hours PRN  traZODone 200 milliGRAM(s) Oral at bedtime  zolpidem 5 milliGRAM(s) Oral at bedtime PRN  zolpidem 5 milliGRAM(s) Oral at bedtime PRN    Other:  bisacodyl Suppository 10 milliGRAM(s) Rectal once PRN  polyethylene glycol 3350 17 Gram(s) Oral two times a day PRN  potassium chloride    Tablet ER 40 milliEquivalent(s) Oral once  senna 2 Tablet(s) Oral at bedtime  tamsulosin 0.4 milliGRAM(s) Oral at bedtime      SOCIAL HISTORY:   Occupation:   Marital Status:     FAMILY HISTORY:  No pertinent family history in first degree relatives  Family history of CKD (chronic kidney disease)  Family history of lung cancer      ROS: Negative except per HPI    LABS:  PT/INR - ( 07 Feb 2020 18:33 )   PT: 15.4 sec;   INR: 1.34 ratio         PTT - ( 07 Feb 2020 18:33 )  PTT:27.3 sec                        8.2    12.02 )-----------( 530      ( 07 Feb 2020 18:33 )             27.4     02-07    139  |  97  |  13  ----------------------------<  98  3.1<L>   |  28  |  1.27    Ca    8.7      07 Feb 2020 18:33    TPro  6.7  /  Alb  3.5  /  TBili  0.4  /  DBili  x   /  AST  25  /  ALT  39  /  AlkPhos  113  02-07

## 2020-02-08 NOTE — H&P ADULT - NSHPLABSRESULTS_GEN_ALL_CORE
Personally reviewed labs and noted in detail below: anemia at baseline and hypokalemia    Personally reviewed EKG SR 78 bpm    Reviewed imaging  < from: CT Head No Cont (02.07.20 @ 19:27) >      IMPRESSION:     Unchanged right frontal approach  shunt catheter. Stable ventricular size and configuration compared with 12/8/2019.    No acute intracranial bleeding, mass effect, or shift.    < end of copied text >    < from: CT Thoracic Spine No Cont (02.07.20 @ 21:59) >    IMPRESSION:      Stable superior endplate compression deformity of the L2 vertebral body without retropulsion. Otherwise, no acute fracture or dislocation.  Status post instrumented posterior metallic spine fusion of T11-S1 with pelvic stabilization and unchanged postsurgical fluid collection at T11-L5.  Additional findings as mentioned above.    < end of copied text >    < from: CT Lumbar Spine No Cont (02.07.20 @ 21:59) >    IMPRESSION:      Stable superior endplate compression deformity of the L2 vertebral body without retropulsion. Otherwise, no acute fracture or dislocation.  Status post instrumented posterior metallic spine fusion of T11-S1 with pelvic stabilization and unchanged postsurgical fluid collection at T11-L5.  Additional findings as mentioned above.    < end of copied text > Personally reviewed labs and noted in detail below: anemia at baseline and hypokalemia, uptrend of Cr    Personally reviewed EKG SR 78 bpm    Reviewed imaging  < from: CT Head No Cont (02.07.20 @ 19:27) >      IMPRESSION:     Unchanged right frontal approach  shunt catheter. Stable ventricular size and configuration compared with 12/8/2019.    No acute intracranial bleeding, mass effect, or shift.    < end of copied text >    < from: CT Thoracic Spine No Cont (02.07.20 @ 21:59) >    IMPRESSION:      Stable superior endplate compression deformity of the L2 vertebral body without retropulsion. Otherwise, no acute fracture or dislocation.  Status post instrumented posterior metallic spine fusion of T11-S1 with pelvic stabilization and unchanged postsurgical fluid collection at T11-L5.  Additional findings as mentioned above.    < end of copied text >    < from: CT Lumbar Spine No Cont (02.07.20 @ 21:59) >    IMPRESSION:      Stable superior endplate compression deformity of the L2 vertebral body without retropulsion. Otherwise, no acute fracture or dislocation.  Status post instrumented posterior metallic spine fusion of T11-S1 with pelvic stabilization and unchanged postsurgical fluid collection at T11-L5.  Additional findings as mentioned above.    < end of copied text >

## 2020-02-08 NOTE — PROGRESS NOTE ADULT - PROBLEM SELECTOR PLAN 6
ISTOP Reference #: [id number]  Alprazolam last prescribed 1/2/2020 total 120 pills over 30 days.  Continue home regimen

## 2020-02-08 NOTE — H&P ADULT - PROBLEM SELECTOR PLAN 1
Patient with difficulty ambulating, describes legs as "wobbly" and "imbalanced"  CT head without acute intracranial pathology and  shunt described to be in place  Per ED team, Neurosurgery assessed patient in ED and adjusted patient's device ot original settings.  Fall risk protocol  PT consult

## 2020-02-08 NOTE — H&P ADULT - NSHPREVIEWOFSYSTEMS_GEN_ALL_CORE
REVIEW OF SYSTEMS:  CONSTITUTIONAL: No fever, chills or sweats  EYES: No eye pain or visual disturbances  ENMT:  No sinus or throat pain  RESPIRATORY: No cough, wheezing, or shortness of breath  CARDIOVASCULAR: No chest pain, palpitations, dizziness, or leg swelling  GASTROINTESTINAL: +Intermittent constipation. No abdominal pain. No nausea, vomiting, or hematemesis; No diarrhea. No melena or hematochezia.  GENITOURINARY: No dysuria, change of frequency, or incontinence  NEUROLOGICAL: No headaches, loss of strength, numbness, or tremors  SKIN: No rashes or lesions   MUSCULOSKELETAL: +Chronic back pain No neck pain. No joint pain or swelling; No muscle or extremity pain  PSYCHIATRIC: + History of depression and anxiety.   HEME/LYMPH: No easy bruising, or bleeding gums

## 2020-02-08 NOTE — PROGRESS NOTE ADULT - ASSESSMENT
70 yo man with PMH chronic back pain s/p multiple lumbar laminectomy/fusion, NPH s/p  shunt, HTN, HLD, anxiety, depression, GULSHAN on CPAP with recent admission from 1/22/2020 to 2/3/2020 for N82-Swgxij Fusion on 1/28 presenting from home in setting of frequent falls.

## 2020-02-08 NOTE — PROGRESS NOTE ADULT - PROBLEM SELECTOR PLAN 2
Worsened back pain while laying in strecther for prolonged time in the ED  ISTOP Reference #: [id number]  Dialudid 4 mg PO q6h prescribed for 7 days  Continue with PO dilaudid 2 mg for moderate pain and 4 mg for severe pain q6h  Bowel regimen

## 2020-02-08 NOTE — H&P ADULT - NSHPPHYSICALEXAM_GEN_ALL_CORE
Vital Signs Last 24 Hrs  T(C): 37.2 (08 Feb 2020 03:37), Max: 37.7 (07 Feb 2020 17:40)  T(F): 99 (08 Feb 2020 03:37), Max: 99.8 (07 Feb 2020 17:40)  HR: 84 (08 Feb 2020 03:37) (75 - 84)  BP: 132/65 (08 Feb 2020 03:37) (109/69 - 132/65)  BP(mean): --  RR: 16 (08 Feb 2020 03:37) (16 - 18)  SpO2: 96% (08 Feb 2020 03:37) (96% - 98%)      PHYSICAL EXAM:  GENERAL: NAD, well-groomed, well-developed  HEAD:  Atraumatic, Normocephalic  EYES: EOMI, PERRLA, conjunctiva and sclera clear  NERVOUS SYSTEM:  Alert & Oriented X3, Good concentration; Motor Strength 5/5 B/L upper and lower extremities  CHEST/LUNG: Clear to percussion bilaterally; No rales, rhonchi, or wheezing  HEART: Regular rate and rhythm +S1 S2; No murmurs, rubs, or gallops  ABDOMEN: Soft, Nontender, Nondistended; Bowel sounds present  EXTREMITIES:  2+ Peripheral Pulses, No clubbing, cyanosis, or edema  LYMPH: No lymphadenopathy noted  SKIN: Midline scar along back from thoracic level down, C/D/I. No rashes or lesions

## 2020-02-08 NOTE — H&P ADULT - PROBLEM SELECTOR PLAN 2
Worsened back pain while laying in strecther for prolonged time in the ED  ISTOP Reference #: [id number]  Dialudid 4 mg PO q6h prescribed for 7 days  Continue with PO dilaudid 2 mg for moderate pain and 4 mg for severe pain q6h  Bowel regimen

## 2020-02-09 LAB
ANION GAP SERPL CALC-SCNC: 14 MMOL/L — SIGNIFICANT CHANGE UP (ref 5–17)
BUN SERPL-MCNC: 10 MG/DL — SIGNIFICANT CHANGE UP (ref 7–23)
CALCIUM SERPL-MCNC: 8.6 MG/DL — SIGNIFICANT CHANGE UP (ref 8.4–10.5)
CHLORIDE SERPL-SCNC: 99 MMOL/L — SIGNIFICANT CHANGE UP (ref 96–108)
CO2 SERPL-SCNC: 25 MMOL/L — SIGNIFICANT CHANGE UP (ref 22–31)
CREAT SERPL-MCNC: 0.84 MG/DL — SIGNIFICANT CHANGE UP (ref 0.5–1.3)
CULTURE RESULTS: NO GROWTH — SIGNIFICANT CHANGE UP
GLUCOSE SERPL-MCNC: 241 MG/DL — HIGH (ref 70–99)
HCT VFR BLD CALC: 26.9 % — LOW (ref 39–50)
HGB BLD-MCNC: 8.2 G/DL — LOW (ref 13–17)
MCHC RBC-ENTMCNC: 24.6 PG — LOW (ref 27–34)
MCHC RBC-ENTMCNC: 30.5 GM/DL — LOW (ref 32–36)
MCV RBC AUTO: 80.5 FL — SIGNIFICANT CHANGE UP (ref 80–100)
NRBC # BLD: 0 /100 WBCS — SIGNIFICANT CHANGE UP (ref 0–0)
PLATELET # BLD AUTO: 527 K/UL — HIGH (ref 150–400)
POTASSIUM SERPL-MCNC: 3.1 MMOL/L — LOW (ref 3.5–5.3)
POTASSIUM SERPL-SCNC: 3.1 MMOL/L — LOW (ref 3.5–5.3)
RBC # BLD: 3.34 M/UL — LOW (ref 4.2–5.8)
RBC # FLD: 15 % — HIGH (ref 10.3–14.5)
SODIUM SERPL-SCNC: 138 MMOL/L — SIGNIFICANT CHANGE UP (ref 135–145)
SPECIMEN SOURCE: SIGNIFICANT CHANGE UP
WBC # BLD: 11.74 K/UL — HIGH (ref 3.8–10.5)
WBC # FLD AUTO: 11.74 K/UL — HIGH (ref 3.8–10.5)

## 2020-02-09 PROCEDURE — 99233 SBSQ HOSP IP/OBS HIGH 50: CPT

## 2020-02-09 RX ORDER — POTASSIUM CHLORIDE 20 MEQ
40 PACKET (EA) ORAL
Refills: 0 | Status: COMPLETED | OUTPATIENT
Start: 2020-02-09 | End: 2020-02-10

## 2020-02-09 RX ADMIN — HYDROMORPHONE HYDROCHLORIDE 4 MILLIGRAM(S): 2 INJECTION INTRAMUSCULAR; INTRAVENOUS; SUBCUTANEOUS at 22:00

## 2020-02-09 RX ADMIN — Medication 1 MILLIGRAM(S): at 04:33

## 2020-02-09 RX ADMIN — CITALOPRAM 40 MILLIGRAM(S): 10 TABLET, FILM COATED ORAL at 13:06

## 2020-02-09 RX ADMIN — ZOLPIDEM TARTRATE 5 MILLIGRAM(S): 10 TABLET ORAL at 22:24

## 2020-02-09 RX ADMIN — HYDROMORPHONE HYDROCHLORIDE 4 MILLIGRAM(S): 2 INJECTION INTRAMUSCULAR; INTRAVENOUS; SUBCUTANEOUS at 21:39

## 2020-02-09 RX ADMIN — HYDROMORPHONE HYDROCHLORIDE 4 MILLIGRAM(S): 2 INJECTION INTRAMUSCULAR; INTRAVENOUS; SUBCUTANEOUS at 04:01

## 2020-02-09 RX ADMIN — HYDROMORPHONE HYDROCHLORIDE 4 MILLIGRAM(S): 2 INJECTION INTRAMUSCULAR; INTRAVENOUS; SUBCUTANEOUS at 16:39

## 2020-02-09 RX ADMIN — TAMSULOSIN HYDROCHLORIDE 0.4 MILLIGRAM(S): 0.4 CAPSULE ORAL at 21:33

## 2020-02-09 RX ADMIN — HYDROMORPHONE HYDROCHLORIDE 4 MILLIGRAM(S): 2 INJECTION INTRAMUSCULAR; INTRAVENOUS; SUBCUTANEOUS at 15:39

## 2020-02-09 RX ADMIN — HYDROMORPHONE HYDROCHLORIDE 4 MILLIGRAM(S): 2 INJECTION INTRAMUSCULAR; INTRAVENOUS; SUBCUTANEOUS at 09:19

## 2020-02-09 RX ADMIN — AMLODIPINE BESYLATE 5 MILLIGRAM(S): 2.5 TABLET ORAL at 18:00

## 2020-02-09 RX ADMIN — Medication 40 MILLIEQUIVALENT(S): at 18:00

## 2020-02-09 RX ADMIN — Medication 1 MILLIGRAM(S): at 13:44

## 2020-02-09 RX ADMIN — AMLODIPINE BESYLATE 5 MILLIGRAM(S): 2.5 TABLET ORAL at 05:59

## 2020-02-09 RX ADMIN — HYDROMORPHONE HYDROCHLORIDE 4 MILLIGRAM(S): 2 INJECTION INTRAMUSCULAR; INTRAVENOUS; SUBCUTANEOUS at 10:19

## 2020-02-09 RX ADMIN — Medication 200 MILLIGRAM(S): at 21:33

## 2020-02-09 RX ADMIN — ZOLPIDEM TARTRATE 5 MILLIGRAM(S): 10 TABLET ORAL at 23:25

## 2020-02-09 RX ADMIN — HYDROMORPHONE HYDROCHLORIDE 4 MILLIGRAM(S): 2 INJECTION INTRAMUSCULAR; INTRAVENOUS; SUBCUTANEOUS at 03:16

## 2020-02-09 NOTE — PROGRESS NOTE ADULT - PROBLEM SELECTOR PLAN 6
ISTOP Reference #: [id number]  Alprazolam last prescribed 1/2/2020 total 120 pills over 30 days.  Continue home regimen

## 2020-02-09 NOTE — PROGRESS NOTE ADULT - SUBJECTIVE AND OBJECTIVE BOX
Benito Pedro M.D. Pager Number [phone]    Patient is a 69y old  Male who presents with a chief complaint of falls (2020 12:50)      SUBJECTIVE / OVERNIGHT EVENTS:  Pt seen and examined at bedside. No acute events overnight. Continues to endorse lower back pain.   Pt denies cp, palpitations, sob, abd pain, N/V, fever, chills.     ROS:  All other review of systems negative    Allergies    Biaxin (Other)  Lidoderm (Unknown)  morphine (Short breath; Rash)    Intolerances        MEDICATIONS  (STANDING):  amLODIPine   Tablet 5 milliGRAM(s) Oral <User Schedule>  citalopram 40 milliGRAM(s) Oral daily  senna 2 Tablet(s) Oral at bedtime  tamsulosin 0.4 milliGRAM(s) Oral at bedtime  traZODone 200 milliGRAM(s) Oral at bedtime    MEDICATIONS  (PRN):  acetaminophen   Tablet .. 650 milliGRAM(s) Oral every 6 hours PRN Mild Pain (1 - 3)  ALPRAZolam 1 milliGRAM(s) Oral every 6 hours PRN Anxiety  bisacodyl Suppository 10 milliGRAM(s) Rectal once PRN Constipation  HYDROmorphone   Tablet 4 milliGRAM(s) Oral every 6 hours PRN Severe Pain (7 - 10)  HYDROmorphone   Tablet 2 milliGRAM(s) Oral every 6 hours PRN Moderate Pain (4 - 6)  polyethylene glycol 3350 17 Gram(s) Oral two times a day PRN Constipation  zolpidem 5 milliGRAM(s) Oral at bedtime PRN Insomnia  zolpidem 5 milliGRAM(s) Oral at bedtime PRN Insomnia      Vital Signs Last 24 Hrs  T(C): 37.2 (2020 11:37), Max: 37.2 (2020 11:37)  T(F): 99 (2020 11:37), Max: 99 (2020 11:37)  HR: 96 (2020 12:42) (74 - 96)  BP: 150/72 (2020 12:42) (121/66 - 150/72)  BP(mean): --  RR: 18 (2020 11:37) (16 - 18)  SpO2: 94% (2020 11:37) (92% - 100%)  CAPILLARY BLOOD GLUCOSE        I&O's Summary    2020 07:01  -  2020 07:00  --------------------------------------------------------  IN: 120 mL / OUT: 450 mL / NET: -330 mL        PHYSICAL EXAM:  GENERAL: NAD, well-developed  CHEST/LUNG: Clear to auscultation bilaterally; No wheeze  HEART: Regular rate and rhythm; No murmurs, rubs, or gallops  ABDOMEN: Soft, Nontender, Nondistended; Bowel sounds present  EXTREMITIES:  2+ Peripheral Pulses, No clubbing, cyanosis, or edema  NEUROLOGY: AAOx3, non-focal  PSYCH: calm  SKIN: No rashes or lesions      LABS:                        8.2    11.74 )-----------( 527      ( 2020 11:25 )             26.9     02-09    138  |  99  |  10  ----------------------------<  241<H>  3.1<L>   |  25  |  0.84    Ca    8.6      2020 11:25    TPro  6.7  /  Alb  3.5  /  TBili  0.4  /  DBili  x   /  AST  25  /  ALT  39  /  AlkPhos  113  02-07    PT/INR - ( 2020 18:33 )   PT: 15.4 sec;   INR: 1.34 ratio         PTT - ( 2020 18:33 )  PTT:27.3 sec      Urinalysis Basic - ( 2020 03:00 )    Color: Yellow / Appearance: Clear / S.022 / pH: x  Gluc: x / Ketone: Negative  / Bili: Negative / Urobili: 4 mg/dL   Blood: x / Protein: Trace / Nitrite: Negative   Leuk Esterase: Negative / RBC: 9 /hpf / WBC 2 /HPF   Sq Epi: x / Non Sq Epi: 0 /hpf / Bacteria: Negative        RADIOLOGY & ADDITIONAL TESTS:  Results Reviewed:   Imaging Personally Reviewed:  Electrocardiogram Personally Reviewed:    COORDINATION OF CARE:  Care Discussed with Consultants/Other Providers [Y/N]:  Prior or Outpatient Records Reviewed [Y/N]:

## 2020-02-09 NOTE — PHYSICAL THERAPY INITIAL EVALUATION ADULT - PLANNED THERAPY INTERVENTIONS, PT EVAL
gait training/balance training/bed mobility training/GOAL: Pt will negotiate 3 steps with 0 HR and step to pattern independently in 4 weeks./transfer training

## 2020-02-09 NOTE — PHYSICAL THERAPY INITIAL EVALUATION ADULT - GAIT DEVIATIONS NOTED, PT EVAL
decreased stride length/decreased weight-shifting ability/increased time in double stance/decreased velocity of limb motion

## 2020-02-09 NOTE — PROGRESS NOTE ADULT - PROBLEM SELECTOR PLAN 2
Worsened back pain while laying in strecther for prolonged time in the ED  ISTOP Reference #: [id number]  Dialudid 4 mg PO q6h prescribed for 7 days  Continue with PO dilaudid 2 mg for moderate pain and 4 mg for severe pain q6h  Bowel regimen

## 2020-02-09 NOTE — PHYSICAL THERAPY INITIAL EVALUATION ADULT - PERTINENT HX OF CURRENT PROBLEM, REHAB EVAL
70 yo M s/p multiple lumbar laminectomy/fusion on most recent hospitalization with Dr. Castanon (DC'd 2/3/20)  for F98-Ssdhcs Fusion, NPH s/p  shunt, presents via EMS c/o x4 falls today. Pt states while walking to the bathroom, his knees buckled and fell to the side. XRay Shunt 2/7: Intact. CT Head 2/7: Neg.

## 2020-02-09 NOTE — PROGRESS NOTE ADULT - ASSESSMENT
68 yo man with PMH chronic back pain s/p multiple lumbar laminectomy/fusion, NPH s/p  shunt, HTN, HLD, anxiety, depression, GULSHAN on CPAP with recent admission from 1/22/2020 to 2/3/2020 for H32-Opctdr Fusion on 1/28 presenting from home in setting of frequent falls.

## 2020-02-09 NOTE — PHYSICAL THERAPY INITIAL EVALUATION ADULT - ADDITIONAL COMMENTS
pt lives alone, 3 steps to enter +0HR, first floor set up. Prior to surgery, pt states he had 24/7 caregiver. Pt states he requiring some assist using cane or rolling walker, transport wheelchair for long distances.

## 2020-02-10 LAB
ANION GAP SERPL CALC-SCNC: 10 MMOL/L — SIGNIFICANT CHANGE UP (ref 5–17)
BUN SERPL-MCNC: 12 MG/DL — SIGNIFICANT CHANGE UP (ref 7–23)
CALCIUM SERPL-MCNC: 8.5 MG/DL — SIGNIFICANT CHANGE UP (ref 8.4–10.5)
CHLORIDE SERPL-SCNC: 102 MMOL/L — SIGNIFICANT CHANGE UP (ref 96–108)
CO2 SERPL-SCNC: 25 MMOL/L — SIGNIFICANT CHANGE UP (ref 22–31)
CREAT SERPL-MCNC: 0.94 MG/DL — SIGNIFICANT CHANGE UP (ref 0.5–1.3)
GLUCOSE SERPL-MCNC: 141 MG/DL — HIGH (ref 70–99)
HCT VFR BLD CALC: 26.5 % — LOW (ref 39–50)
HGB BLD-MCNC: 8.3 G/DL — LOW (ref 13–17)
LACTATE SERPL-SCNC: 1.3 MMOL/L — SIGNIFICANT CHANGE UP (ref 0.7–2)
MCHC RBC-ENTMCNC: 25.5 PG — LOW (ref 27–34)
MCHC RBC-ENTMCNC: 31.3 GM/DL — LOW (ref 32–36)
MCV RBC AUTO: 81.3 FL — SIGNIFICANT CHANGE UP (ref 80–100)
NRBC # BLD: 0 /100 WBCS — SIGNIFICANT CHANGE UP (ref 0–0)
PLATELET # BLD AUTO: 572 K/UL — HIGH (ref 150–400)
POTASSIUM SERPL-MCNC: 4.1 MMOL/L — SIGNIFICANT CHANGE UP (ref 3.5–5.3)
POTASSIUM SERPL-SCNC: 4.1 MMOL/L — SIGNIFICANT CHANGE UP (ref 3.5–5.3)
PROCALCITONIN SERPL-MCNC: 0.08 NG/ML — SIGNIFICANT CHANGE UP (ref 0.02–0.1)
RBC # BLD: 3.26 M/UL — LOW (ref 4.2–5.8)
RBC # FLD: 15.1 % — HIGH (ref 10.3–14.5)
SODIUM SERPL-SCNC: 137 MMOL/L — SIGNIFICANT CHANGE UP (ref 135–145)
WBC # BLD: 10.92 K/UL — HIGH (ref 3.8–10.5)
WBC # FLD AUTO: 10.92 K/UL — HIGH (ref 3.8–10.5)

## 2020-02-10 PROCEDURE — 70250 X-RAY EXAM OF SKULL: CPT | Mod: 26

## 2020-02-10 PROCEDURE — 99232 SBSQ HOSP IP/OBS MODERATE 35: CPT

## 2020-02-10 PROCEDURE — 99233 SBSQ HOSP IP/OBS HIGH 50: CPT

## 2020-02-10 PROCEDURE — 71045 X-RAY EXAM CHEST 1 VIEW: CPT | Mod: 26

## 2020-02-10 RX ORDER — ACETAMINOPHEN 500 MG
650 TABLET ORAL ONCE
Refills: 0 | Status: COMPLETED | OUTPATIENT
Start: 2020-02-10 | End: 2020-02-10

## 2020-02-10 RX ADMIN — Medication 650 MILLIGRAM(S): at 21:10

## 2020-02-10 RX ADMIN — HYDROMORPHONE HYDROCHLORIDE 4 MILLIGRAM(S): 2 INJECTION INTRAMUSCULAR; INTRAVENOUS; SUBCUTANEOUS at 10:43

## 2020-02-10 RX ADMIN — HYDROMORPHONE HYDROCHLORIDE 4 MILLIGRAM(S): 2 INJECTION INTRAMUSCULAR; INTRAVENOUS; SUBCUTANEOUS at 05:30

## 2020-02-10 RX ADMIN — AMLODIPINE BESYLATE 5 MILLIGRAM(S): 2.5 TABLET ORAL at 18:05

## 2020-02-10 RX ADMIN — Medication 650 MILLIGRAM(S): at 10:30

## 2020-02-10 RX ADMIN — TAMSULOSIN HYDROCHLORIDE 0.4 MILLIGRAM(S): 0.4 CAPSULE ORAL at 22:57

## 2020-02-10 RX ADMIN — HYDROMORPHONE HYDROCHLORIDE 4 MILLIGRAM(S): 2 INJECTION INTRAMUSCULAR; INTRAVENOUS; SUBCUTANEOUS at 04:39

## 2020-02-10 RX ADMIN — CITALOPRAM 40 MILLIGRAM(S): 10 TABLET, FILM COATED ORAL at 18:05

## 2020-02-10 RX ADMIN — HYDROMORPHONE HYDROCHLORIDE 4 MILLIGRAM(S): 2 INJECTION INTRAMUSCULAR; INTRAVENOUS; SUBCUTANEOUS at 18:05

## 2020-02-10 RX ADMIN — Medication 1 MILLIGRAM(S): at 05:09

## 2020-02-10 RX ADMIN — Medication 1 MILLIGRAM(S): at 18:05

## 2020-02-10 RX ADMIN — Medication 200 MILLIGRAM(S): at 22:57

## 2020-02-10 RX ADMIN — AMLODIPINE BESYLATE 5 MILLIGRAM(S): 2.5 TABLET ORAL at 05:09

## 2020-02-10 RX ADMIN — HYDROMORPHONE HYDROCHLORIDE 4 MILLIGRAM(S): 2 INJECTION INTRAMUSCULAR; INTRAVENOUS; SUBCUTANEOUS at 11:13

## 2020-02-10 RX ADMIN — Medication 40 MILLIEQUIVALENT(S): at 05:09

## 2020-02-10 RX ADMIN — Medication 1 MILLIGRAM(S): at 11:40

## 2020-02-10 RX ADMIN — Medication 650 MILLIGRAM(S): at 10:01

## 2020-02-10 RX ADMIN — ZOLPIDEM TARTRATE 5 MILLIGRAM(S): 10 TABLET ORAL at 22:57

## 2020-02-10 RX ADMIN — ZOLPIDEM TARTRATE 5 MILLIGRAM(S): 10 TABLET ORAL at 21:32

## 2020-02-10 NOTE — PROGRESS NOTE ADULT - PROBLEM SELECTOR PLAN 6
ISTOP Reference #: [id number]  Alprazolam last prescribed 1/2/2020 total 120 pills over 30 days.  Continue home regimen

## 2020-02-10 NOTE — CHART NOTE - NSCHARTNOTEFT_GEN_A_CORE
Medicine NP Episodic note    CC: Fever  Notified by RN that patient febrile with temperature of 101.4F. Seen and  examined the patient at bedside. Patient lying in bed, in no acute distress. Denies HA, dizziness, SOB, CP, chills/rigors, N/V/D. C/O tenderness at laminectomy suture site. patient states that  "I feel like my lower back jelly type with no structures  around sutures".    Vital Signs Last 24 Hrs  T(C): 38.6 (10 Feb 2020 20:45), Max: 38.6 (10 Feb 2020 20:45)  T(F): 101.4 (10 Feb 2020 20:45), Max: 101.4 (10 Feb 2020 20:45)  HR: 93 (10 Feb 2020 20:45) (82 - 93)  BP: 146/73 (10 Feb 2020 20:45) (111/65 - 146/73)  BP(mean): --  RR: 18 (10 Feb 2020 20:45) (18 - 18)  SpO2: 96% (10 Feb 2020 20:45) (96% - 99%)                          8.3    10.92 )-----------( 572      ( 10 Feb 2020 06:46 )             26.5     < from: CT Lumbar Spine No Cont (02.07.20 @ 21:59) >    IMPRESSION:      Stable superior endplate compression deformity of the L2 vertebral body without retropulsion. Otherwise, no acute fracture or dislocation.  Status post instrumented posterior metallic spine fusion of T11-S1 with pelvic stabilization and unchanged postsurgical fluid collection at T11-L5.  Additional findings as mentioned above.      68 yo man with PMH chronic back pain s/p multiple lumbar laminectomy/fusion, NPH s/p  shunt, HTN, HLD, anxiety, depression, GULSHAN on CPAP with recent admission from 1/22/2020 to 2/3/2020 for W42-Lwallx Fusion on 1/28 presenting from home in setting of frequent falls. Admitted with Gait instability.  Patient now with fever of 101.4F    Post operative Fever/SIRS  Patient appears non toxic , mentating well, tenderness around suture-line  Post op site with suture, clean and dry with npo oozing, noted likely small collection around suture line  Lungs clear  Bcx, RVP, Lactic, procalcitonin ordered  Tylenol 650mg PO x1 dose now  Will monitor off ABX  F/U Bcx results  Discussed with Neurosurgery  Will closely monitor    Michael Gant P BC  [id number]

## 2020-02-10 NOTE — PROGRESS NOTE ADULT - ASSESSMENT
70 yo man with PMH chronic back pain s/p multiple lumbar laminectomy/fusion, NPH s/p  shunt, HTN, HLD, anxiety, depression, GULSHAN on CPAP with recent admission from 1/22/2020 to 2/3/2020 for B27-Xjmscq Fusion on 1/28 presenting from home in setting of frequent falls.

## 2020-02-10 NOTE — PROGRESS NOTE ADULT - SUBJECTIVE AND OBJECTIVE BOX
Patient is a 69y old  Male who presents with a chief complaint of falls (09 Feb 2020 12:59)      SUBJECTIVE / OVERNIGHT EVENTS:    Tele reviewed:       ADDITIONAL REVIEW OF SYSTEMS: Negative except for above    MEDICATIONS  (STANDING):  amLODIPine   Tablet 5 milliGRAM(s) Oral <User Schedule>  citalopram 40 milliGRAM(s) Oral daily  senna 2 Tablet(s) Oral at bedtime  tamsulosin 0.4 milliGRAM(s) Oral at bedtime  traZODone 200 milliGRAM(s) Oral at bedtime    MEDICATIONS  (PRN):  acetaminophen   Tablet .. 650 milliGRAM(s) Oral every 6 hours PRN Mild Pain (1 - 3)  ALPRAZolam 1 milliGRAM(s) Oral every 6 hours PRN Anxiety  bisacodyl Suppository 10 milliGRAM(s) Rectal once PRN Constipation  HYDROmorphone   Tablet 4 milliGRAM(s) Oral every 6 hours PRN Severe Pain (7 - 10)  HYDROmorphone   Tablet 2 milliGRAM(s) Oral every 6 hours PRN Moderate Pain (4 - 6)  polyethylene glycol 3350 17 Gram(s) Oral two times a day PRN Constipation  zolpidem 5 milliGRAM(s) Oral at bedtime PRN Insomnia  zolpidem 5 milliGRAM(s) Oral at bedtime PRN Insomnia      CAPILLARY BLOOD GLUCOSE        I&O's Summary    09 Feb 2020 07:01  -  10 Feb 2020 07:00  --------------------------------------------------------  IN: 440 mL / OUT: 350 mL / NET: 90 mL        PHYSICAL EXAM:  Vital Signs Last 24 Hrs  T(C): 37.1 (10 Feb 2020 04:49), Max: 37.8 (09 Feb 2020 17:56)  T(F): 98.7 (10 Feb 2020 04:49), Max: 100.1 (09 Feb 2020 17:56)  HR: 82 (10 Feb 2020 04:49) (82 - 96)  BP: 115/72 (10 Feb 2020 04:49) (115/72 - 150/72)  BP(mean): --  RR: 18 (10 Feb 2020 04:49) (18 - 18)  SpO2: 97% (10 Feb 2020 04:49) (92% - 97%)    PHYSICAL EXAM:  GENERAL: NAD, well-developed  HEAD:  Atraumatic, Normocephalic  EYES:  conjunctiva and sclera clear  NECK: Supple, No JVD  CHEST/LUNG: Clear to auscultation bilaterally; No wheeze  HEART: Regular rate and rhythm; No murmurs, rubs, or gallops  ABDOMEN: Soft, Nontender, Nondistended; Bowel sounds present  EXTREMITIES:  2+ Peripheral Pulses, No clubbing, cyanosis, or edema  PSYCH: AAOx3  NEUROLOGY: non-focal  SKIN: No rashes or lesions      LABS:                        8.3    10.92 )-----------( 572      ( 10 Feb 2020 06:46 )             26.5     02-10    137  |  102  |  12  ----------------------------<  141<H>  4.1   |  25  |  0.94    Ca    8.5      10 Feb 2020 06:46                Culture - Urine (collected 08 Feb 2020 09:42)  Source: .Urine Clean Catch (Midstream)  Final Report (09 Feb 2020 08:15):    No growth        RADIOLOGY & ADDITIONAL TESTS:    Imaging Personally Reviewed:    Electrocardiogram Personally Reviewed:    COORDINATION OF CARE:  Care Discussed with Consultants/Other Providers [Y/N]:  Prior or Outpatient Records Reviewed [Y/N]: Patient is a 69y old  Male who presents with a chief complaint of falls (09 Feb 2020 12:59)      SUBJECTIVE / OVERNIGHT EVENTS:    Patient is seen today and he request admission to [facility] due to reported weakness and inability to ambulate. Case was discussed with the PT who states that patient requests [facility].        ADDITIONAL REVIEW OF SYSTEMS: Negative except for above    MEDICATIONS  (STANDING):  amLODIPine   Tablet 5 milliGRAM(s) Oral <User Schedule>  citalopram 40 milliGRAM(s) Oral daily  senna 2 Tablet(s) Oral at bedtime  tamsulosin 0.4 milliGRAM(s) Oral at bedtime  traZODone 200 milliGRAM(s) Oral at bedtime    MEDICATIONS  (PRN):  acetaminophen   Tablet .. 650 milliGRAM(s) Oral every 6 hours PRN Mild Pain (1 - 3)  ALPRAZolam 1 milliGRAM(s) Oral every 6 hours PRN Anxiety  bisacodyl Suppository 10 milliGRAM(s) Rectal once PRN Constipation  HYDROmorphone   Tablet 4 milliGRAM(s) Oral every 6 hours PRN Severe Pain (7 - 10)  HYDROmorphone   Tablet 2 milliGRAM(s) Oral every 6 hours PRN Moderate Pain (4 - 6)  polyethylene glycol 3350 17 Gram(s) Oral two times a day PRN Constipation  zolpidem 5 milliGRAM(s) Oral at bedtime PRN Insomnia  zolpidem 5 milliGRAM(s) Oral at bedtime PRN Insomnia      CAPILLARY BLOOD GLUCOSE        I&O's Summary    09 Feb 2020 07:01  -  10 Feb 2020 07:00  --------------------------------------------------------  IN: 440 mL / OUT: 350 mL / NET: 90 mL        PHYSICAL EXAM:  Vital Signs Last 24 Hrs  T(C): 37.1 (10 Feb 2020 04:49), Max: 37.8 (09 Feb 2020 17:56)  T(F): 98.7 (10 Feb 2020 04:49), Max: 100.1 (09 Feb 2020 17:56)  HR: 82 (10 Feb 2020 04:49) (82 - 96)  BP: 115/72 (10 Feb 2020 04:49) (115/72 - 150/72)  BP(mean): --  RR: 18 (10 Feb 2020 04:49) (18 - 18)  SpO2: 97% (10 Feb 2020 04:49) (92% - 97%)    PHYSICAL EXAM:  GENERAL: NAD, well-developed  HEAD:  Atraumatic, Normocephalic  EYES:  conjunctiva and sclera clear  NECK: Supple, No JVD  CHEST/LUNG: Clear to auscultation bilaterally; No wheeze  HEART: Regular rate and rhythm; No murmurs, rubs, or gallops  ABDOMEN: Soft, Nontender, Nondistended; Bowel sounds present  EXTREMITIES:  2+ Peripheral Pulses, No clubbing, cyanosis, or edema  PSYCH: AAOx3        LABS:                        8.3    10.92 )-----------( 572      ( 10 Feb 2020 06:46 )             26.5     02-10    137  |  102  |  12  ----------------------------<  141<H>  4.1   |  25  |  0.94    Ca    8.5      10 Feb 2020 06:46                Culture - Urine (collected 08 Feb 2020 09:42)  Source: .Urine Clean Catch (Midstream)  Final Report (09 Feb 2020 08:15):    No growth        RADIOLOGY & ADDITIONAL TESTS:    Imaging Personally Reviewed:    Electrocardiogram Personally Reviewed:    COORDINATION OF CARE:  Care Discussed with Consultants/Other Providers [Y/N]:  Prior or Outpatient Records Reviewed [Y/N]:

## 2020-02-10 NOTE — CONSULT NOTE ADULT - SUBJECTIVE AND OBJECTIVE BOX
Chief Complaint:  Patient is a 69y old  Male who presents with a chief complaint of falls, H/O low back pain.    HPI:  70 yo man, well known to our service from a previous admission, with PMH chronic back pain s/p multiple lumbar laminectomy/fusion, NPH s/p  shunt, HTN, HLD, anxiety, depression, GULSHAN on CPAP with recent admission from 1/22/2020 to 2/3/2020 for V35-Gsfvit Fusion on 1/28 presenting from home in setting of frequent falls. Patient states that he was at home and was attempting to walk from his bedroom to the bathroom with the cane. He felt weak and wobbly and equated the sensation to when his NPH only without incontinence. Denies any focal weakness or paresthesias. Patient states that he didn't have a hard fall. Once he felt unsteady, he slowly lowered himself down to soften the fall. Denies head injury or LOC. Denies worsening back pain or extremity or neck pain. Denies preceding symptoms of dizziness, palpitations, CP, SOB. Patient's friend/caregiver left the home during this time. Friend came home and when he heard of events called 911.      Current Pain Score: 8/10    Current out- patient pain regimen: medical marijuana (last filled 1/4/2020)    Out Patient Pain Management provider: Alcides Cantor MD    [facility] Prescription Monitoring Program: Reference #[id number]    PAST MEDICAL & SURGICAL HISTORY:  NPH (normal pressure hydrocephalus)  Chronic back pain greater than 3 months duration  Small intestine disorder: &quot;ilitis&quot;   steroids   1967  Lumbar disc displacement without myelopathy  Sciatica  Anxiety  Vertebral Sciatica  Insomnia  Depression  HTN (Hypertension)  S/P lumbar fusion: L4-L5 on Oct 2011  pins/screws 2012   spinal surgery 2013  Dehiscence, Operation Wound/Debridement: infection  S/P Laminectomy: L4-L5 2009  complicated by infection requiring  antibiiotcis    FAMILY HISTORY:  No pertinent family history in first degree relatives    SOCIAL HISTORY:  Tobacco Use: denies  Alcohol Use: denies  Illcicit Drug Use: denies    Opioid Risk Tool (ORT-OUD) Score: low    Allergies    Biaxin (Other)  Lidoderm (Unknown)  morphine (Short breath; Rash)    Intolerances    none    REVIEW OF SYSTEMS:  CONSTITUTIONAL: No fever, weight loss, or fatigue  NEUROLOGICAL: No headaches, memory loss,  numbness, tremors, dizziness or blurred vision  RESPIRATORY: No shortness of breath  CARDIOVASCULAR: No chest pain, palpitations  GASTROINTESTINAL: No abdominal pain, nausea, vomiting, diarrhea or constipation.  : No bladder incontinence/retention, denies dysuria  MUSCULOSKELETAL: No joint pain or swelling; + low  back and left extremity pain; + subjective lower motor strength weakness, denies saddle anesthesia   SKIN: No itching, burning, rashes, or lesions   PSYCHIATRIC: No depression, anxiety, mood swings, or difficulty sleeping      MEDICATIONS  (STANDING):  amLODIPine   Tablet 5 milliGRAM(s) Oral <User Schedule>  citalopram 40 milliGRAM(s) Oral daily  senna 2 Tablet(s) Oral at bedtime  tamsulosin 0.4 milliGRAM(s) Oral at bedtime  traZODone 200 milliGRAM(s) Oral at bedtime    MEDICATIONS  (PRN):  acetaminophen   Tablet .. 650 milliGRAM(s) Oral every 6 hours PRN Mild Pain (1 - 3)  ALPRAZolam 1 milliGRAM(s) Oral every 6 hours PRN Anxiety  bisacodyl Suppository 10 milliGRAM(s) Rectal once PRN Constipation  HYDROmorphone   Tablet 4 milliGRAM(s) Oral every 6 hours PRN Severe Pain (7 - 10)  HYDROmorphone   Tablet 2 milliGRAM(s) Oral every 6 hours PRN Moderate Pain (4 - 6)  polyethylene glycol 3350 17 Gram(s) Oral two times a day PRN Constipation  zolpidem 5 milliGRAM(s) Oral at bedtime PRN Insomnia  zolpidem 5 milliGRAM(s) Oral at bedtime PRN Insomnia      PHYSICAL EXAM  Sween at bedside, NAD, appears comfortable  Alert & Oriented X3, Good concentration; Cranial Nerves II-XII intact; sensory exam intact  Lungs Clear to auscultation bilaterally  Heart Regular rate and rhythm  Abdomen Soft, Nontender, Nondistended; + bowel sounds   Extremities 2+ Peripheral Pulses, No cyanosis or edema  Motor Strength 5/5 B/L upper and lower extremities; moves all extremities equally; gait not assessed  SKIN: No rashes or lesions    Vital Signs:  T(C): 36.7 (02-10-20 @ 12:36)  HR: 90 (02-10-20 @ 12:36)  BP: 111/65 (02-10-20 @ 12:36)  RR: 18 (02-10-20 @ 12:36)  SpO2: 99% (02-10-20 @ 12:36)    Pertinent labs/radiology:  AM labs reviewed

## 2020-02-10 NOTE — PROGRESS NOTE ADULT - PROBLEM SELECTOR PLAN 2
Worsened back pain while laying in strecther for prolonged time in the ED as reported   ISTOP Reference #: [id number]  Dialudid 4 mg PO q6h   Continue with PO dilaudid 2 mg for moderate pain and 4 mg for severe pain q6h  Bowel regimen  case is discussed with  who will give patient choices about LANDON

## 2020-02-11 DIAGNOSIS — R50.9 FEVER, UNSPECIFIED: ICD-10-CM

## 2020-02-11 LAB
ALBUMIN SERPL ELPH-MCNC: 3 G/DL — LOW (ref 3.3–5)
ALP SERPL-CCNC: 107 U/L — SIGNIFICANT CHANGE UP (ref 40–120)
ALT FLD-CCNC: 30 U/L — SIGNIFICANT CHANGE UP (ref 10–45)
ANION GAP SERPL CALC-SCNC: 3 MMOL/L — LOW (ref 5–17)
AST SERPL-CCNC: 22 U/L — SIGNIFICANT CHANGE UP (ref 10–40)
BASOPHILS # BLD AUTO: 0.04 K/UL — SIGNIFICANT CHANGE UP (ref 0–0.2)
BASOPHILS NFR BLD AUTO: 0.4 % — SIGNIFICANT CHANGE UP (ref 0–2)
BILIRUB DIRECT SERPL-MCNC: <0.1 MG/DL — SIGNIFICANT CHANGE UP (ref 0–0.2)
BILIRUB INDIRECT FLD-MCNC: >0.2 MG/DL — SIGNIFICANT CHANGE UP (ref 0.2–1)
BILIRUB SERPL-MCNC: 0.3 MG/DL — SIGNIFICANT CHANGE UP (ref 0.2–1.2)
BUN SERPL-MCNC: 13 MG/DL — SIGNIFICANT CHANGE UP (ref 7–23)
CALCIUM SERPL-MCNC: 8.9 MG/DL — SIGNIFICANT CHANGE UP (ref 8.4–10.5)
CHLORIDE SERPL-SCNC: 105 MMOL/L — SIGNIFICANT CHANGE UP (ref 96–108)
CO2 SERPL-SCNC: 25 MMOL/L — SIGNIFICANT CHANGE UP (ref 22–31)
CREAT SERPL-MCNC: 0.95 MG/DL — SIGNIFICANT CHANGE UP (ref 0.5–1.3)
CRP SERPL-MCNC: 9.56 MG/DL — HIGH (ref 0–0.4)
EOSINOPHIL # BLD AUTO: 0.19 K/UL — SIGNIFICANT CHANGE UP (ref 0–0.5)
EOSINOPHIL NFR BLD AUTO: 1.9 % — SIGNIFICANT CHANGE UP (ref 0–6)
GLUCOSE SERPL-MCNC: 112 MG/DL — HIGH (ref 70–99)
HCT VFR BLD CALC: 26.9 % — LOW (ref 39–50)
HGB BLD-MCNC: 8.1 G/DL — LOW (ref 13–17)
IMM GRANULOCYTES NFR BLD AUTO: 1.1 % — SIGNIFICANT CHANGE UP (ref 0–1.5)
LYMPHOCYTES # BLD AUTO: 1.14 K/UL — SIGNIFICANT CHANGE UP (ref 1–3.3)
LYMPHOCYTES # BLD AUTO: 11.1 % — LOW (ref 13–44)
MCHC RBC-ENTMCNC: 24.5 PG — LOW (ref 27–34)
MCHC RBC-ENTMCNC: 30.1 GM/DL — LOW (ref 32–36)
MCV RBC AUTO: 81.3 FL — SIGNIFICANT CHANGE UP (ref 80–100)
MONOCYTES # BLD AUTO: 1.08 K/UL — HIGH (ref 0–0.9)
MONOCYTES NFR BLD AUTO: 10.5 % — SIGNIFICANT CHANGE UP (ref 2–14)
NEUTROPHILS # BLD AUTO: 7.7 K/UL — HIGH (ref 1.8–7.4)
NEUTROPHILS NFR BLD AUTO: 75 % — SIGNIFICANT CHANGE UP (ref 43–77)
NRBC # BLD: 0 /100 WBCS — SIGNIFICANT CHANGE UP (ref 0–0)
PLATELET # BLD AUTO: 552 K/UL — HIGH (ref 150–400)
POTASSIUM SERPL-MCNC: 3.8 MMOL/L — SIGNIFICANT CHANGE UP (ref 3.5–5.3)
POTASSIUM SERPL-SCNC: 3.8 MMOL/L — SIGNIFICANT CHANGE UP (ref 3.5–5.3)
PROT SERPL-MCNC: 6 G/DL — SIGNIFICANT CHANGE UP (ref 6–8.3)
RAPID RVP RESULT: SIGNIFICANT CHANGE UP
RBC # BLD: 3.31 M/UL — LOW (ref 4.2–5.8)
RBC # FLD: 15.1 % — HIGH (ref 10.3–14.5)
SODIUM SERPL-SCNC: 133 MMOL/L — LOW (ref 135–145)
WBC # BLD: 10.26 K/UL — SIGNIFICANT CHANGE UP (ref 3.8–10.5)
WBC # FLD AUTO: 10.26 K/UL — SIGNIFICANT CHANGE UP (ref 3.8–10.5)

## 2020-02-11 PROCEDURE — 99233 SBSQ HOSP IP/OBS HIGH 50: CPT

## 2020-02-11 RX ORDER — HEPARIN SODIUM 5000 [USP'U]/ML
5000 INJECTION INTRAVENOUS; SUBCUTANEOUS EVERY 12 HOURS
Refills: 0 | Status: DISCONTINUED | OUTPATIENT
Start: 2020-02-11 | End: 2020-02-14

## 2020-02-11 RX ADMIN — TAMSULOSIN HYDROCHLORIDE 0.4 MILLIGRAM(S): 0.4 CAPSULE ORAL at 21:14

## 2020-02-11 RX ADMIN — HYDROMORPHONE HYDROCHLORIDE 4 MILLIGRAM(S): 2 INJECTION INTRAMUSCULAR; INTRAVENOUS; SUBCUTANEOUS at 10:00

## 2020-02-11 RX ADMIN — HYDROMORPHONE HYDROCHLORIDE 4 MILLIGRAM(S): 2 INJECTION INTRAMUSCULAR; INTRAVENOUS; SUBCUTANEOUS at 18:46

## 2020-02-11 RX ADMIN — Medication 1 MILLIGRAM(S): at 18:13

## 2020-02-11 RX ADMIN — ZOLPIDEM TARTRATE 5 MILLIGRAM(S): 10 TABLET ORAL at 22:24

## 2020-02-11 RX ADMIN — Medication 200 MILLIGRAM(S): at 21:14

## 2020-02-11 RX ADMIN — HYDROMORPHONE HYDROCHLORIDE 4 MILLIGRAM(S): 2 INJECTION INTRAMUSCULAR; INTRAVENOUS; SUBCUTANEOUS at 02:55

## 2020-02-11 RX ADMIN — HYDROMORPHONE HYDROCHLORIDE 4 MILLIGRAM(S): 2 INJECTION INTRAMUSCULAR; INTRAVENOUS; SUBCUTANEOUS at 03:25

## 2020-02-11 RX ADMIN — Medication 650 MILLIGRAM(S): at 15:00

## 2020-02-11 RX ADMIN — HYDROMORPHONE HYDROCHLORIDE 2 MILLIGRAM(S): 2 INJECTION INTRAMUSCULAR; INTRAVENOUS; SUBCUTANEOUS at 14:13

## 2020-02-11 RX ADMIN — HYDROMORPHONE HYDROCHLORIDE 2 MILLIGRAM(S): 2 INJECTION INTRAMUSCULAR; INTRAVENOUS; SUBCUTANEOUS at 14:45

## 2020-02-11 RX ADMIN — ZOLPIDEM TARTRATE 5 MILLIGRAM(S): 10 TABLET ORAL at 21:14

## 2020-02-11 RX ADMIN — HYDROMORPHONE HYDROCHLORIDE 4 MILLIGRAM(S): 2 INJECTION INTRAMUSCULAR; INTRAVENOUS; SUBCUTANEOUS at 18:13

## 2020-02-11 RX ADMIN — CITALOPRAM 40 MILLIGRAM(S): 10 TABLET, FILM COATED ORAL at 18:13

## 2020-02-11 RX ADMIN — AMLODIPINE BESYLATE 5 MILLIGRAM(S): 2.5 TABLET ORAL at 18:13

## 2020-02-11 RX ADMIN — Medication 650 MILLIGRAM(S): at 14:13

## 2020-02-11 RX ADMIN — Medication 1 MILLIGRAM(S): at 10:00

## 2020-02-11 RX ADMIN — AMLODIPINE BESYLATE 5 MILLIGRAM(S): 2.5 TABLET ORAL at 05:25

## 2020-02-11 RX ADMIN — HEPARIN SODIUM 5000 UNIT(S): 5000 INJECTION INTRAVENOUS; SUBCUTANEOUS at 18:13

## 2020-02-11 NOTE — CHART NOTE - NSCHARTNOTEFT_GEN_A_CORE
70 yo man, well known to our service from a previous admission, with PMH chronic back pain s/p multiple lumbar laminectomy/fusion, NPH s/p  shunt, HTN, HLD, anxiety, depression, GULSHAN on CPAP with recent admission from 1/22/2020 to 2/3/2020 for L05-Jwrvxa Fusion on 1/28 presenting from home in setting of fall x 3 at home. Denies head injury or LOC. Denies worsening back pain or extremity pain. Denies preceding symptoms of dizziness, palpitations, CP, SOB.      Pt admits that after his discharge home on February 3, 2020 after T11- pelvic posterior fusion, he did not follow up with Physical Therapy.    Is currently on Dilaudid 4 mg PO Q6 hours PRN,  along with xanax 1 mg Q6 hours PRN, ambien 5 mg PO QHS PRN, and trazodone 200 mg PO QHS.    Was previously on medical marijuana, last script filled 1/4/2020.  Does not want to return to medical marijuana due to "dark dreams" as a side effect.  As discussed during his last admission, he needs to follow up with a community pain management specialist.  He has seen Dr Esau Florence in the past.    Would discharge on on PO dilaudid 2 mg TID PRN x 5 days.  Please give narcan rescue kit on discharge.  Suggest PT outpatient.      Will sign off at this time.        Chronic Pain Service  [phone]

## 2020-02-11 NOTE — PROGRESS NOTE ADULT - SUBJECTIVE AND OBJECTIVE BOX
Patient is a 69y old  Male who presents with a chief complaint of falls (10 Feb 2020 14:35)      SUBJECTIVE / OVERNIGHT EVENTS:    Tele reviewed:       ADDITIONAL REVIEW OF SYSTEMS: Negative except for above    MEDICATIONS  (STANDING):  amLODIPine   Tablet 5 milliGRAM(s) Oral <User Schedule>  citalopram 40 milliGRAM(s) Oral daily  senna 2 Tablet(s) Oral at bedtime  tamsulosin 0.4 milliGRAM(s) Oral at bedtime  traZODone 200 milliGRAM(s) Oral at bedtime    MEDICATIONS  (PRN):  acetaminophen   Tablet .. 650 milliGRAM(s) Oral every 6 hours PRN Mild Pain (1 - 3)  ALPRAZolam 1 milliGRAM(s) Oral every 6 hours PRN Anxiety  bisacodyl Suppository 10 milliGRAM(s) Rectal once PRN Constipation  HYDROmorphone   Tablet 4 milliGRAM(s) Oral every 6 hours PRN Severe Pain (7 - 10)  HYDROmorphone   Tablet 2 milliGRAM(s) Oral every 6 hours PRN Moderate Pain (4 - 6)  polyethylene glycol 3350 17 Gram(s) Oral two times a day PRN Constipation  zolpidem 5 milliGRAM(s) Oral at bedtime PRN Insomnia  zolpidem 5 milliGRAM(s) Oral at bedtime PRN Insomnia      CAPILLARY BLOOD GLUCOSE        I&O's Summary    10 Feb 2020 07:01  -  11 Feb 2020 07:00  --------------------------------------------------------  IN: 120 mL / OUT: 825 mL / NET: -705 mL    11 Feb 2020 07:01  -  11 Feb 2020 12:14  --------------------------------------------------------  IN: 320 mL / OUT: 1 mL / NET: 319 mL        PHYSICAL EXAM:  Vital Signs Last 24 Hrs  T(C): 37.6 (11 Feb 2020 09:25), Max: 38.6 (10 Feb 2020 20:45)  T(F): 99.7 (11 Feb 2020 09:25), Max: 101.4 (10 Feb 2020 20:45)  HR: 102 (11 Feb 2020 09:25) (78 - 102)  BP: 149/81 (11 Feb 2020 09:25) (110/66 - 149/81)  BP(mean): --  RR: 18 (11 Feb 2020 09:25) (18 - 18)  SpO2: 98% (11 Feb 2020 09:25) (92% - 99%)    PHYSICAL EXAM:  GENERAL: NAD, well-developed  HEAD:  Atraumatic, Normocephalic  EYES:  conjunctiva and sclera clear  NECK: Supple, No JVD  CHEST/LUNG: Clear to auscultation bilaterally; No wheeze  HEART: Regular rate and rhythm; No murmurs, rubs, or gallops  ABDOMEN: Soft, Nontender, Nondistended; Bowel sounds present  EXTREMITIES:  2+ Peripheral Pulses, No clubbing, cyanosis, or edema  PSYCH: AAOx3  NEUROLOGY: non-focal  SKIN: No rashes or lesions      LABS:                        8.1    10.26 )-----------( 552      ( 11 Feb 2020 06:29 )             26.9     02-11    133<L>  |  105  |  13  ----------------------------<  112<H>  3.8   |  25  |  0.95    Ca    8.9      11 Feb 2020 06:29                  RADIOLOGY & ADDITIONAL TESTS:    Imaging Personally Reviewed:    Electrocardiogram Personally Reviewed:    COORDINATION OF CARE:  Care Discussed with Consultants/Other Providers [Y/N]:  Prior or Outpatient Records Reviewed [Y/N]: Patient is a 69y old  Male who presents with a chief complaint of falls (10 Feb 2020 14:35)      SUBJECTIVE / OVERNIGHT EVENTS:    Patient had temp of 101.4F last night with no clear source of infection .  Patient denies any cough, no rash, no SOB, no urinary complaints.  NO purulence over the spine surgical wound.  POs non specific chronic back pain controlled on current medications.  Pt later that his lt buttock hurt and later states that it is because of his sleep position.        ADDITIONAL REVIEW OF SYSTEMS: Negative except for above    MEDICATIONS  (STANDING):  amLODIPine   Tablet 5 milliGRAM(s) Oral <User Schedule>  citalopram 40 milliGRAM(s) Oral daily  senna 2 Tablet(s) Oral at bedtime  tamsulosin 0.4 milliGRAM(s) Oral at bedtime  traZODone 200 milliGRAM(s) Oral at bedtime    MEDICATIONS  (PRN):  acetaminophen   Tablet .. 650 milliGRAM(s) Oral every 6 hours PRN Mild Pain (1 - 3)  ALPRAZolam 1 milliGRAM(s) Oral every 6 hours PRN Anxiety  bisacodyl Suppository 10 milliGRAM(s) Rectal once PRN Constipation  HYDROmorphone   Tablet 4 milliGRAM(s) Oral every 6 hours PRN Severe Pain (7 - 10)  HYDROmorphone   Tablet 2 milliGRAM(s) Oral every 6 hours PRN Moderate Pain (4 - 6)  polyethylene glycol 3350 17 Gram(s) Oral two times a day PRN Constipation  zolpidem 5 milliGRAM(s) Oral at bedtime PRN Insomnia  zolpidem 5 milliGRAM(s) Oral at bedtime PRN Insomnia      CAPILLARY BLOOD GLUCOSE        I&O's Summary    10 Feb 2020 07:01  -  11 Feb 2020 07:00  --------------------------------------------------------  IN: 120 mL / OUT: 825 mL / NET: -705 mL    11 Feb 2020 07:01  -  11 Feb 2020 12:14  --------------------------------------------------------  IN: 320 mL / OUT: 1 mL / NET: 319 mL        PHYSICAL EXAM:  Vital Signs Last 24 Hrs  T(C): 37.6 (11 Feb 2020 09:25), Max: 38.6 (10 Feb 2020 20:45)  T(F): 99.7 (11 Feb 2020 09:25), Max: 101.4 (10 Feb 2020 20:45)  HR: 102 (11 Feb 2020 09:25) (78 - 102)  BP: 149/81 (11 Feb 2020 09:25) (110/66 - 149/81)  BP(mean): --  RR: 18 (11 Feb 2020 09:25) (18 - 18)  SpO2: 98% (11 Feb 2020 09:25) (92% - 99%)    PHYSICAL EXAM:  GENERAL: NAD, well-developed  HEAD:  Atraumatic, Normocephalic  EYES:  conjunctiva and sclera clear  NECK: Supple, No JVD  CHEST/LUNG: Clear to auscultation bilaterally; No wheeze  HEART: Regular rate and rhythm; No murmurs, rubs, or gallops  ABDOMEN: Soft, Nontender, Nondistended; Bowel sounds present  EXTREMITIES:  no edema  PSYCH: AAOx3  NEUROLOGY: non-focal  SKIN: The mid spine surgical wound is clean with no erythema , no dehiscence and no discharge.  His buttocks were inspected with no rash or erythema       LABS:                        8.1    10.26 )-----------( 552      ( 11 Feb 2020 06:29 )             26.9     02-11    133<L>  |  105  |  13  ----------------------------<  112<H>  3.8   |  25  |  0.95    Ca    8.9      11 Feb 2020 06:29                  RADIOLOGY & ADDITIONAL TESTS:    Imaging Personally Reviewed:    Electrocardiogram Personally Reviewed:    COORDINATION OF CARE:  Care Discussed with Consultants/Other Providers [Y/N]:  Prior or Outpatient Records Reviewed [Y/N]:

## 2020-02-11 NOTE — PROGRESS NOTE ADULT - ASSESSMENT
68 yo man with PMH chronic back pain s/p multiple lumbar laminectomy/fusion, NPH s/p  shunt, HTN, HLD, anxiety, depression, GULSHAN on CPAP with recent admission from 1/22/2020 to 2/3/2020 for P79-Pozluo Fusion on 1/28 presenting from home in setting of frequent falls.

## 2020-02-11 NOTE — PROGRESS NOTE ADULT - PROBLEM SELECTOR PLAN 2
Worsened back pain while laying in strecther for prolonged time in the ED as reported   ISTOP Reference #: [id number]   Continue with PO dilaudid 2 mg for moderate pain and 4 mg for severe pain q6h  Bowel regimen  case is discussed with  who will give patient choices about LANDON

## 2020-02-11 NOTE — PROGRESS NOTE ADULT - PROBLEM SELECTOR PLAN 6
ISTOP Reference #: [id number]  Alprazolam last prescribed 1/2/2020 total 120 pills over 30 days.  Continue home regimen

## 2020-02-12 LAB
ANION GAP SERPL CALC-SCNC: 11 MMOL/L — SIGNIFICANT CHANGE UP (ref 5–17)
APPEARANCE UR: CLEAR — SIGNIFICANT CHANGE UP
BACTERIA # UR AUTO: NEGATIVE — SIGNIFICANT CHANGE UP
BASOPHILS # BLD AUTO: 0.04 K/UL — SIGNIFICANT CHANGE UP (ref 0–0.2)
BASOPHILS NFR BLD AUTO: 0.5 % — SIGNIFICANT CHANGE UP (ref 0–2)
BILIRUB UR-MCNC: NEGATIVE — SIGNIFICANT CHANGE UP
BUN SERPL-MCNC: 14 MG/DL — SIGNIFICANT CHANGE UP (ref 7–23)
CALCIUM SERPL-MCNC: 8.8 MG/DL — SIGNIFICANT CHANGE UP (ref 8.4–10.5)
CHLORIDE SERPL-SCNC: 101 MMOL/L — SIGNIFICANT CHANGE UP (ref 96–108)
CO2 SERPL-SCNC: 25 MMOL/L — SIGNIFICANT CHANGE UP (ref 22–31)
COLOR SPEC: YELLOW — SIGNIFICANT CHANGE UP
CREAT SERPL-MCNC: 0.99 MG/DL — SIGNIFICANT CHANGE UP (ref 0.5–1.3)
DIFF PNL FLD: NEGATIVE — SIGNIFICANT CHANGE UP
EOSINOPHIL # BLD AUTO: 0.19 K/UL — SIGNIFICANT CHANGE UP (ref 0–0.5)
EOSINOPHIL NFR BLD AUTO: 2.2 % — SIGNIFICANT CHANGE UP (ref 0–6)
EPI CELLS # UR: 1 /HPF — SIGNIFICANT CHANGE UP (ref 0–5)
GLUCOSE SERPL-MCNC: 107 MG/DL — HIGH (ref 70–99)
GLUCOSE UR QL: NEGATIVE — SIGNIFICANT CHANGE UP
HCT VFR BLD CALC: 26.6 % — LOW (ref 39–50)
HGB BLD-MCNC: 8.2 G/DL — LOW (ref 13–17)
HYALINE CASTS # UR AUTO: 0 /LPF — SIGNIFICANT CHANGE UP (ref 0–7)
IMM GRANULOCYTES NFR BLD AUTO: 1 % — SIGNIFICANT CHANGE UP (ref 0–1.5)
KETONES UR-MCNC: NEGATIVE — SIGNIFICANT CHANGE UP
LEUKOCYTE ESTERASE UR-ACNC: NEGATIVE — SIGNIFICANT CHANGE UP
LYMPHOCYTES # BLD AUTO: 1 K/UL — SIGNIFICANT CHANGE UP (ref 1–3.3)
LYMPHOCYTES # BLD AUTO: 11.5 % — LOW (ref 13–44)
MCHC RBC-ENTMCNC: 25.2 PG — LOW (ref 27–34)
MCHC RBC-ENTMCNC: 30.8 GM/DL — LOW (ref 32–36)
MCV RBC AUTO: 81.8 FL — SIGNIFICANT CHANGE UP (ref 80–100)
MONOCYTES # BLD AUTO: 1.01 K/UL — HIGH (ref 0–0.9)
MONOCYTES NFR BLD AUTO: 11.6 % — SIGNIFICANT CHANGE UP (ref 2–14)
NEUTROPHILS # BLD AUTO: 6.38 K/UL — SIGNIFICANT CHANGE UP (ref 1.8–7.4)
NEUTROPHILS NFR BLD AUTO: 73.2 % — SIGNIFICANT CHANGE UP (ref 43–77)
NITRITE UR-MCNC: NEGATIVE — SIGNIFICANT CHANGE UP
NRBC # BLD: 0 /100 WBCS — SIGNIFICANT CHANGE UP (ref 0–0)
PH UR: 6.5 — SIGNIFICANT CHANGE UP (ref 5–8)
PLATELET # BLD AUTO: 566 K/UL — HIGH (ref 150–400)
POTASSIUM SERPL-MCNC: 3.9 MMOL/L — SIGNIFICANT CHANGE UP (ref 3.5–5.3)
POTASSIUM SERPL-SCNC: 3.9 MMOL/L — SIGNIFICANT CHANGE UP (ref 3.5–5.3)
PROT UR-MCNC: SIGNIFICANT CHANGE UP
RAPID RVP RESULT: SIGNIFICANT CHANGE UP
RBC # BLD: 3.25 M/UL — LOW (ref 4.2–5.8)
RBC # FLD: 14.9 % — HIGH (ref 10.3–14.5)
RBC CASTS # UR COMP ASSIST: 3 /HPF — SIGNIFICANT CHANGE UP (ref 0–4)
SODIUM SERPL-SCNC: 137 MMOL/L — SIGNIFICANT CHANGE UP (ref 135–145)
SP GR SPEC: 1.03 — HIGH (ref 1.01–1.02)
UROBILINOGEN FLD QL: ABNORMAL
WBC # BLD: 8.51 K/UL — SIGNIFICANT CHANGE UP (ref 3.8–10.5)
WBC # FLD AUTO: 8.51 K/UL — SIGNIFICANT CHANGE UP (ref 3.8–10.5)
WBC UR QL: 1 /HPF — SIGNIFICANT CHANGE UP (ref 0–5)

## 2020-02-12 PROCEDURE — 99231 SBSQ HOSP IP/OBS SF/LOW 25: CPT

## 2020-02-12 PROCEDURE — 93970 EXTREMITY STUDY: CPT | Mod: 26

## 2020-02-12 PROCEDURE — 99233 SBSQ HOSP IP/OBS HIGH 50: CPT

## 2020-02-12 PROCEDURE — 99223 1ST HOSP IP/OBS HIGH 75: CPT

## 2020-02-12 RX ORDER — OXYCODONE HYDROCHLORIDE 5 MG/1
5 TABLET ORAL EVERY 4 HOURS
Refills: 0 | Status: DISCONTINUED | OUTPATIENT
Start: 2020-02-12 | End: 2020-02-13

## 2020-02-12 RX ORDER — FENTANYL CITRATE 50 UG/ML
1 INJECTION INTRAVENOUS
Refills: 0 | Status: DISCONTINUED | OUTPATIENT
Start: 2020-02-12 | End: 2020-02-14

## 2020-02-12 RX ADMIN — CITALOPRAM 40 MILLIGRAM(S): 10 TABLET, FILM COATED ORAL at 11:26

## 2020-02-12 RX ADMIN — TAMSULOSIN HYDROCHLORIDE 0.4 MILLIGRAM(S): 0.4 CAPSULE ORAL at 21:22

## 2020-02-12 RX ADMIN — ZOLPIDEM TARTRATE 5 MILLIGRAM(S): 10 TABLET ORAL at 22:23

## 2020-02-12 RX ADMIN — HEPARIN SODIUM 5000 UNIT(S): 5000 INJECTION INTRAVENOUS; SUBCUTANEOUS at 17:57

## 2020-02-12 RX ADMIN — Medication 1 MILLIGRAM(S): at 06:37

## 2020-02-12 RX ADMIN — Medication 200 MILLIGRAM(S): at 21:22

## 2020-02-12 RX ADMIN — AMLODIPINE BESYLATE 5 MILLIGRAM(S): 2.5 TABLET ORAL at 05:15

## 2020-02-12 RX ADMIN — HYDROMORPHONE HYDROCHLORIDE 4 MILLIGRAM(S): 2 INJECTION INTRAMUSCULAR; INTRAVENOUS; SUBCUTANEOUS at 12:11

## 2020-02-12 RX ADMIN — HYDROMORPHONE HYDROCHLORIDE 4 MILLIGRAM(S): 2 INJECTION INTRAMUSCULAR; INTRAVENOUS; SUBCUTANEOUS at 11:26

## 2020-02-12 RX ADMIN — HYDROMORPHONE HYDROCHLORIDE 2 MILLIGRAM(S): 2 INJECTION INTRAMUSCULAR; INTRAVENOUS; SUBCUTANEOUS at 16:26

## 2020-02-12 RX ADMIN — HYDROMORPHONE HYDROCHLORIDE 4 MILLIGRAM(S): 2 INJECTION INTRAMUSCULAR; INTRAVENOUS; SUBCUTANEOUS at 18:25

## 2020-02-12 RX ADMIN — HYDROMORPHONE HYDROCHLORIDE 4 MILLIGRAM(S): 2 INJECTION INTRAMUSCULAR; INTRAVENOUS; SUBCUTANEOUS at 00:14

## 2020-02-12 RX ADMIN — AMLODIPINE BESYLATE 5 MILLIGRAM(S): 2.5 TABLET ORAL at 17:56

## 2020-02-12 RX ADMIN — Medication 1 MILLIGRAM(S): at 00:14

## 2020-02-12 RX ADMIN — HEPARIN SODIUM 5000 UNIT(S): 5000 INJECTION INTRAVENOUS; SUBCUTANEOUS at 05:19

## 2020-02-12 RX ADMIN — HYDROMORPHONE HYDROCHLORIDE 4 MILLIGRAM(S): 2 INJECTION INTRAMUSCULAR; INTRAVENOUS; SUBCUTANEOUS at 05:45

## 2020-02-12 RX ADMIN — HYDROMORPHONE HYDROCHLORIDE 4 MILLIGRAM(S): 2 INJECTION INTRAMUSCULAR; INTRAVENOUS; SUBCUTANEOUS at 17:54

## 2020-02-12 RX ADMIN — FENTANYL CITRATE 1 PATCH: 50 INJECTION INTRAVENOUS at 20:02

## 2020-02-12 RX ADMIN — OXYCODONE HYDROCHLORIDE 5 MILLIGRAM(S): 5 TABLET ORAL at 21:52

## 2020-02-12 RX ADMIN — Medication 1 MILLIGRAM(S): at 19:13

## 2020-02-12 RX ADMIN — HYDROMORPHONE HYDROCHLORIDE 4 MILLIGRAM(S): 2 INJECTION INTRAMUSCULAR; INTRAVENOUS; SUBCUTANEOUS at 00:44

## 2020-02-12 RX ADMIN — OXYCODONE HYDROCHLORIDE 5 MILLIGRAM(S): 5 TABLET ORAL at 21:22

## 2020-02-12 RX ADMIN — HYDROMORPHONE HYDROCHLORIDE 4 MILLIGRAM(S): 2 INJECTION INTRAMUSCULAR; INTRAVENOUS; SUBCUTANEOUS at 05:15

## 2020-02-12 RX ADMIN — HYDROMORPHONE HYDROCHLORIDE 2 MILLIGRAM(S): 2 INJECTION INTRAMUSCULAR; INTRAVENOUS; SUBCUTANEOUS at 15:24

## 2020-02-12 RX ADMIN — ZOLPIDEM TARTRATE 5 MILLIGRAM(S): 10 TABLET ORAL at 23:23

## 2020-02-12 NOTE — PROGRESS NOTE ADULT - SUBJECTIVE AND OBJECTIVE BOX
Patient is a 69y old  Male who presents with a chief complaint of falls (2020 12:13)      SUBJECTIVE / OVERNIGHT EVENTS:    Tele reviewed:       ADDITIONAL REVIEW OF SYSTEMS: Negative except for above    MEDICATIONS  (STANDING):  amLODIPine   Tablet 5 milliGRAM(s) Oral <User Schedule>  citalopram 40 milliGRAM(s) Oral daily  heparin  Injectable 5000 Unit(s) SubCutaneous every 12 hours  senna 2 Tablet(s) Oral at bedtime  tamsulosin 0.4 milliGRAM(s) Oral at bedtime  traZODone 200 milliGRAM(s) Oral at bedtime    MEDICATIONS  (PRN):  acetaminophen   Tablet .. 650 milliGRAM(s) Oral every 6 hours PRN Mild Pain (1 - 3)  ALPRAZolam 1 milliGRAM(s) Oral every 6 hours PRN Anxiety  bisacodyl Suppository 10 milliGRAM(s) Rectal once PRN Constipation  HYDROmorphone   Tablet 4 milliGRAM(s) Oral every 6 hours PRN Severe Pain (7 - 10)  HYDROmorphone   Tablet 2 milliGRAM(s) Oral every 6 hours PRN Moderate Pain (4 - 6)  polyethylene glycol 3350 17 Gram(s) Oral two times a day PRN Constipation  zolpidem 5 milliGRAM(s) Oral at bedtime PRN Insomnia  zolpidem 5 milliGRAM(s) Oral at bedtime PRN Insomnia      CAPILLARY BLOOD GLUCOSE        I&O's Summary    2020 07:  -  2020 07:00  --------------------------------------------------------  IN: 440 mL / OUT: 151 mL / NET: 289 mL    2020 07:  -  2020 09:24  --------------------------------------------------------  IN: 0 mL / OUT: 300 mL / NET: -300 mL        PHYSICAL EXAM:  Vital Signs Last 24 Hrs  T(C): 37 (2020 04:52), Max: 37.7 (2020 19:46)  T(F): 98.6 (2020 04:52), Max: 99.8 (2020 19:46)  HR: 87 (2020 04:52) (77 - 102)  BP: 120/70 (2020 04:52) (112/61 - 149/81)  BP(mean): --  RR: 18 (2020 04:52) (18 - 18)  SpO2: 94% (2020 04:52) (94% - 98%)    PHYSICAL EXAM:  GENERAL: NAD, well-developed  HEAD:  Atraumatic, Normocephalic  EYES:  conjunctiva and sclera clear  NECK: Supple, No JVD  CHEST/LUNG: Clear to auscultation bilaterally; No wheeze  HEART: Regular rate and rhythm; No murmurs, rubs, or gallops  ABDOMEN: Soft, Nontender, Nondistended; Bowel sounds present  EXTREMITIES:  2+ Peripheral Pulses, No clubbing, cyanosis, or edema  PSYCH: AAOx3  NEUROLOGY: non-focal  SKIN: No rashes or lesions      LABS:                        8.2    8.51  )-----------( 566      ( 2020 06:49 )             26.6     02-12    137  |  101  |  14  ----------------------------<  107<H>  3.9   |  25  |  0.99    Ca    8.8      2020 06:49    TPro  6.0  /  Alb  3.0<L>  /  TBili  0.3  /  DBili  <0.1  /  AST  22  /  ALT  30  /  AlkPhos  107  02-11          Urinalysis Basic - ( 2020 04:06 )    Color: Yellow / Appearance: Clear / S.026 / pH: x  Gluc: x / Ketone: Negative  / Bili: Negative / Urobili: 6 mg/dL   Blood: x / Protein: Trace / Nitrite: Negative   Leuk Esterase: Negative / RBC: 3 /HPF / WBC 1 /HPF   Sq Epi: x / Non Sq Epi: 1 /HPF / Bacteria: Negative        Culture - Blood (collected 2020 01:00)  Source: .Blood Blood  Preliminary Report (2020 02:01):    No growth to date.    Culture - Blood (collected 2020 01:00)  Source: .Blood Blood  Preliminary Report (2020 02:01):    No growth to date.        RADIOLOGY & ADDITIONAL TESTS:    Imaging Personally Reviewed:    Electrocardiogram Personally Reviewed:    COORDINATION OF CARE:  Care Discussed with Consultants/Other Providers [Y/N]:  Prior or Outpatient Records Reviewed [Y/N]: Patient is a 69y old  Male who presents with a chief complaint of falls (2020 12:13)      SUBJECTIVE / OVERNIGHT EVENTS:    Patient states that his pain is not controlled and no fever overnight .  NO urinary symptoms.  NO diarrhea and no cough .        ADDITIONAL REVIEW OF SYSTEMS: Negative except for above    MEDICATIONS  (STANDING):  amLODIPine   Tablet 5 milliGRAM(s) Oral <User Schedule>  citalopram 40 milliGRAM(s) Oral daily  heparin  Injectable 5000 Unit(s) SubCutaneous every 12 hours  senna 2 Tablet(s) Oral at bedtime  tamsulosin 0.4 milliGRAM(s) Oral at bedtime  traZODone 200 milliGRAM(s) Oral at bedtime    MEDICATIONS  (PRN):  acetaminophen   Tablet .. 650 milliGRAM(s) Oral every 6 hours PRN Mild Pain (1 - 3)  ALPRAZolam 1 milliGRAM(s) Oral every 6 hours PRN Anxiety  bisacodyl Suppository 10 milliGRAM(s) Rectal once PRN Constipation  HYDROmorphone   Tablet 4 milliGRAM(s) Oral every 6 hours PRN Severe Pain (7 - 10)  HYDROmorphone   Tablet 2 milliGRAM(s) Oral every 6 hours PRN Moderate Pain (4 - 6)  polyethylene glycol 3350 17 Gram(s) Oral two times a day PRN Constipation  zolpidem 5 milliGRAM(s) Oral at bedtime PRN Insomnia  zolpidem 5 milliGRAM(s) Oral at bedtime PRN Insomnia      CAPILLARY BLOOD GLUCOSE        I&O's Summary    2020 07  -  2020 07:00  --------------------------------------------------------  IN: 440 mL / OUT: 151 mL / NET: 289 mL      -  2020 09:24  --------------------------------------------------------  IN: 0 mL / OUT: 300 mL / NET: -300 mL        PHYSICAL EXAM:  Vital Signs Last 24 Hrs  T(C): 37 (2020 04:52), Max: 37.7 (2020 19:46)  T(F): 98.6 (2020 04:52), Max: 99.8 (2020 19:46)  HR: 87 (2020 04:52) (77 - 102)  BP: 120/70 (2020 04:52) (112/61 - 149/81)  BP(mean): --  RR: 18 (2020 04:52) (18 - 18)  SpO2: 94% (2020 04:52) (94% - 98%)    PHYSICAL EXAM:  GENERAL: NAD, well-developed  HEAD:  Atraumatic, Normocephalic  EYES:  conjunctiva and sclera clear  NECK: Supple, No JVD  CHEST/LUNG: Clear to auscultation bilaterally; No wheeze  HEART: Regular rate and rhythm; No murmurs, rubs, or gallops  ABDOMEN: Soft, Nontender, Nondistended; Bowel sounds present  EXTREMITIES:  2+ Peripheral Pulses, No clubbing, cyanosis, or edema AAOx3  NEUROLOGY: non-focal, AAO X3   SKIN: clean surgical wound       LABS:                        8.2    8.51  )-----------( 566      ( 2020 06:49 )             26.6     02-12    137  |  101  |  14  ----------------------------<  107<H>  3.9   |  25  |  0.99    Ca    8.8      2020 06:49    TPro  6.0  /  Alb  3.0<L>  /  TBili  0.3  /  DBili  <0.1  /  AST  22  /  ALT  30  /  AlkPhos  107  02-11          Urinalysis Basic - ( 2020 04:06 )    Color: Yellow / Appearance: Clear / S.026 / pH: x  Gluc: x / Ketone: Negative  / Bili: Negative / Urobili: 6 mg/dL   Blood: x / Protein: Trace / Nitrite: Negative   Leuk Esterase: Negative / RBC: 3 /HPF / WBC 1 /HPF   Sq Epi: x / Non Sq Epi: 1 /HPF / Bacteria: Negative        Culture - Blood (collected :00)  Source: .Blood Blood  Preliminary Report (2020 02:):    No growth to date.    Culture - Blood (collected :)  Source: .Blood Blood  Preliminary Report (:):    No growth to date.        RADIOLOGY & ADDITIONAL TESTS:    Imaging Personally Reviewed:    Electrocardiogram Personally Reviewed:    COORDINATION OF CARE:  Care Discussed with Consultants/Other Providers [Y/N]:  Prior or Outpatient Records Reviewed [Y/N]:

## 2020-02-12 NOTE — CONSULT NOTE ADULT - ASSESSMENT
70 yo man, well known to our service from a previous admission, with PMH chronic back pain s/p multiple lumbar laminectomy/fusion, NPH s/p  shunt, HTN, HLD, anxiety, depression, GULSHAN on CPAP with recent admission from 1/22/2020 to 2/3/2020 for S69-Qlwhqo Fusion on 1/28 presenting from home in setting of fall x 3 at home. Denies head injury or LOC. Denies worsening back pain or extremity pain. Denies preceding symptoms of dizziness, palpitations, CP, SOB.      Pt admits that after his discharge home on February 3, 2020 after T11- pelvic posterior fusion, he did not follow up with Physical Therapy.    Is currently on Dilaudid 4 mg PO Q6 hours PRN,  along with xanax 1 mg Q6 hours PRN, ambien 5 mg PO QHS PRN, and trazodone 200 mg PO QHS.    Was previously on medical marijuana, last script filled 1/4/2020.  Does not want to return to medical marijuana due to "dark dreams" as a side effect.  As discussed during his last admission, he needs to follow up with a community pain management specialist.  He has seen Dr Esau Florence in the past.    Would discharge on on PO dilaudid 2 mg TID PRN x 5 days.      Minutes spent on total encounter:  30 minutes      Chronic Pain Service  [phone]
Teto Perdomo  69M s/p G41-gwfzem instrumentation in Jan, 2020 and fusion presents after frequent falls postoperatively. CTH and spine stable without any acute changes. Hakim Valve settings confirmed at 120, however patients baseline settings are 110. Shunt readjusted. Neuroloigcally intact.    - No acute neursurgical intervention   - Repeat skull XR to confirm settings  - Falls likely due to deconditioning post opertaively. Patient likely to benefit from PT and Rehab.   - Chronic pain.  Management per medicine
69 year old male PMH chronic back pain s/p multiple lumbar laminectomy/fusion, NPH s/p  shunt, HTN, HLD, anxiety, depression, GULSHAN on CPAP with recent admission from 1/22/2020 to 2/3/2020 for C60-Sbuggu Fusion on 1/28 presenting from home in setting of frequent falls. On presentation afebrile. U/A (2/8) with 2 WBC.     On 2/10 febrile to Tmax of 101.4.   RVP (2/10) negative.   CXR (2/10) with clear lungs.   Procalcitonin (2/10) 0.08.   U/A (2/12) with 1 WBC.   CRP (2/11) 9.56. (Prior CRP 1.34 on 1/24/20)    Bacterial infection seems somewhat less likely (especially with low procalcitonin and no focal s/s of infection)  I suspect the CRP increase from January is due to the recent surgery on his spine  If continued fevers I would imaging the spine (MRI if hardware compatible)    Overall, Fever, Back Pain, Abnormal Lab (CRP), s/p Spinal Fusion    --Continue to monitor off of antibiotics.   --Recommend repeat RVP (I ordered and obtained sample)  --If any further fever I would obtain MRI of the spine  --Continue to follow CBC with diff  --Continue to follow temperature curve  --Follow up on preliminary blood cultures    I will continue to follow. Please feel free to contact me with any further questions.    Florian Bell M.D.  [facility] Division of Infectious Disease  8AM-5PM: Pager Number [phone]  After Hours (or if no response): Please contact the Infectious Diseases Office at [phone]     The above assessment and plan were discussed with medicine NP

## 2020-02-12 NOTE — PROGRESS NOTE ADULT - ASSESSMENT
70 yo man with PMH chronic back pain s/p multiple lumbar laminectomy/fusion, NPH s/p  shunt, HTN, HLD, anxiety, depression, GULSHAN on CPAP with recent admission from 1/22/2020 to 2/3/2020 for L37-Murjek Fusion on 1/28 presenting from home in setting of frequent falls.

## 2020-02-12 NOTE — CONSULT NOTE ADULT - SUBJECTIVE AND OBJECTIVE BOX
Patient is a 69y old  Male who presents with a chief complaint of falls (2020 09:23)    HPI:    69 year old male PMH chronic back pain s/p multiple lumbar laminectomy/fusion, NPH s/p  shunt, HTN, HLD, anxiety, depression, GULSHAN on CPAP with recent admission from 2020 to 2/3/2020 for L08-Edynet Fusion on  presenting from home in setting of frequent falls. Patient states that he was at home and was attempting to walk from his bedroom to the bathroom with the cane. He felt weak and wobbly and equated the sensation to when his NPH only without incontinence. Denies any focal weakness or paresthesias. Patient states that he didn't have a hard fall. Once he felt unsteady, he slowly lowered himself down to soften the fall. Denies head injury or LOC. Denies worsening back pain or extremity or neck pain. Denies preceding symptoms of dizziness, palpitations, CP, SOB. Patient's friend/caregiver left the home during this time. Friend came home and when he heard of events called 911. (2020 03:39)    On presentation Afebrile. U/A () with 2 WBC. On 2/10 febrile to Tmax of 101.4. RVP (2/10) negative. CXR (2/10) with clear lungs. Procalcitonin (2/10) 0.08. U/A () with 1 WBC. CRP () 956. (Prior CRP 1.34 on 20)     prior hospital charts reviewed [  ]  primary team notes reviewed [  ]  other consultant notes reviewed [  ]    PAST MEDICAL & SURGICAL HISTORY:  NPH (normal pressure hydrocephalus)  Chronic back pain greater than 3 months duration  Small intestine disorder: &quot;ilitis&quot;   steroids     Lumbar disc displacement without myelopathy  Sciatica  Anxiety  Vertebral Sciatica  Insomnia  Depression  HTN (Hypertension)  S/P lumbar fusion: L4-L5 on Oct 2011  pins/screws    spinal surgery 2013  Dehiscence, Operation Wound/Debridement: infection  S/P Laminectomy: L4-L5   complicated by infection requiring  antibiiotcis      Allergies  Biaxin (Other)  Lidoderm (Unknown)  morphine (Short breath; Rash)    ANTIMICROBIALS (past 90 days)  MEDICATIONS  (STANDING):      ANTIMICROBIALS:      OTHER MEDS: MEDICATIONS  (STANDING):  acetaminophen   Tablet .. 650 every 6 hours PRN  ALPRAZolam 1 every 6 hours PRN  amLODIPine   Tablet 5 <User Schedule>  bisacodyl Suppository 10 once PRN  citalopram 40 daily  heparin  Injectable 5000 every 12 hours  HYDROmorphone   Tablet 4 every 6 hours PRN  HYDROmorphone   Tablet 2 every 6 hours PRN  polyethylene glycol 3350 17 two times a day PRN  senna 2 at bedtime  tamsulosin 0.4 at bedtime  traZODone 200 at bedtime  zolpidem 5 at bedtime PRN  zolpidem 5 at bedtime PRN    SOCIAL HISTORY:   hx smoking  non-smoker    FAMILY HISTORY:  No pertinent family history in first degree relatives    REVIEW OF SYSTEMS  [  ] ROS unobtainable because:    [  ] All other systems negative except as noted below:	    Constitutional:  [ ] fever [ ] chills  [ ] weight loss  [ ] weakness  Skin:  [ ] rash [ ] phlebitis	  Eyes: [ ] icterus [ ] pain  [ ] discharge	  ENMT: [ ] sore throat  [ ] thrush [ ] ulcers [ ] exudates  Respiratory: [ ] dyspnea [ ] hemoptysis [ ] cough [ ] sputum	  Cardiovascular:  [ ] chest pain [ ] palpitations [ ] edema	  Gastrointestinal:  [ ] nausea [ ] vomiting [ ] diarrhea [ ] constipation [ ] pain	  Genitourinary:  [ ] dysuria [ ] frequency [ ] hematuria [ ] discharge [ ] flank pain  [ ] incontinence  Musculoskeletal:  [ ] myalgias [ ] arthralgias [ ] arthritis  [ ] back pain  Neurological:  [ ] headache [ ] seizures  [ ] confusion/altered mental status  Psychiatric:  [ ] anxiety [ ] depression	  Hematology/Lymphatics:  [ ] lymphadenopathy  Endocrine:  [ ] adrenal [ ] thyroid  Allergic/Immunologic:	 [ ] transplant [ ] seasonal    Vital Signs Last 24 Hrs  T(F): 98.6 (20 @ 04:52), Max: 101.4 (02-10-20 @ 20:45)  Vital Signs Last 24 Hrs  HR: 87 (20 @ 04:52) (77 - 87)  BP: 120/70 (20 @ 04:52) (112/61 - 146/77)  RR: 18 (20 @ 04:52)  SpO2: 94% (20 @ 04:52) (94% - 96%)  Wt(kg): --    PHYSICAL EXAMINATION:  General: Alert and Awake, NAD  HEENT: PERRL, EOMI, No subconjunctival hemorrhages, Oropharynx Clear, MMM  Neck: Supple, No USHA  Cardiac: RRR, No M/R/G  Resp: CTAB, No Wh/Rh/Ra  Abdomen: NBS, NT/ND, No HSM, No rigidity or guarding  MSK: No LE edema. No stigmata of IE. No evidence of phlebitis. No evidence of synovitis.  : No lindsay  Skin: No rashes or lesions. Skin is warm and dry to the touch.   Neuro: Alert and Awake. CN 2-12 Grossly intact. Moves all four extremities spontaneously.  Psych: Calm, Pleasant, Cooperative                          8.2    8.51  )-----------( 566      ( 2020 06:49 )             26.6     02-12    137  |  101  |  14  ----------------------------<  107<H>  3.9   |  25  |  0.99    Ca    8.8      2020 06:49    TPro  6.0  /  Alb  3.0<L>  /  TBili  0.3  /  DBili  <0.1  /  AST  22  /  ALT  30  /  AlkPhos  107  02-11    Urinalysis Basic - ( 2020 04:06 )    Color: Yellow / Appearance: Clear / S.026 / pH: x  Gluc: x / Ketone: Negative  / Bili: Negative / Urobili: 6 mg/dL   Blood: x / Protein: Trace / Nitrite: Negative   Leuk Esterase: Negative / RBC: 3 /HPF / WBC 1 /HPF   Sq Epi: x / Non Sq Epi: 1 /HPF / Bacteria: Negative    MICROBIOLOGY:    Culture - Blood (collected 2020 01:00)  Source: .Blood Blood  Preliminary Report (2020 02:01):    No growth to date.    Culture - Blood (collected 2020 01:00)  Source: .Blood Blood  Preliminary Report (2020 02:01):    No growth to date.    Culture - Urine (collected 2020 09:42)  Source: .Urine Clean Catch (Midstream)  Final Report (2020 08:15):    No growth    Rapid RVP Result: NotDetec (02-10 @ 23:07)    RADIOLOGY:    <The imaging below has been reviewed and visualized by me independently. Findings as detailed in report below>    EXAM:  XR CHEST PORTABLE URGENT 1V                        PROCEDURE DATE:  02/10/2020    Clear lungs.    EXAM:  SKULL LATERAL                        PROCEDURE DATE:  02/10/2020    There is an adjustable  shunt coursing out of a right-sided veronica hole. The shunt is set to approximately 110 mm water.  The  shunt tip location cannot be accurately assessed on this single radiograph. The catheter is partially visualized over the neck without any definitive kinks or discontinuities.    EXAM:  CT LUMBAR SPINE                        EXAM:  CT THORACIC SPINE                        PROCEDURE DATE:  2020    Stable superior endplate compression deformity of the L2 vertebral body without retropulsion. Otherwise, no acute fracture or dislocation.  Status post instrumented posterior metallic spine fusion of T11-S1 with pelvic stabilization and unchanged postsurgical fluid collection at T11-L5.  Additional findings as mentioned above. Patient is a 69y old  Male who presents with a chief complaint of falls (2020 09:23)    HPI:    69 year old male PMH chronic back pain s/p multiple lumbar laminectomy/fusion, NPH s/p  shunt, HTN, HLD, anxiety, depression, GULSHAN on CPAP with recent admission from 2020 to 2/3/2020 for K66-Zduwzb Fusion on  presenting from home in setting of frequent falls. Patient states that he was at home and was attempting to walk from his bedroom to the bathroom with the cane. He felt weak and wobbly and equated the sensation to when his NPH only without incontinence. Denies any focal weakness or paresthesias. Patient states that he didn't have a hard fall. Once he felt unsteady, he slowly lowered himself down to soften the fall. Denies head injury or LOC. Denies worsening back pain or extremity or neck pain. Denies preceding symptoms of dizziness, palpitations, CP, SOB. Patient's friend/caregiver left the home during this time. Friend came home and when he heard of events called 911. (2020 03:39)    On presentation afebrile. U/A () with 2 WBC. On 2/10 febrile to Tmax of 101.4. RVP (2/10) negative. CXR (2/10) with clear lungs. Procalcitonin (2/10) 0.08. U/A () with 1 WBC. CRP () 956. (Prior CRP 1.34 on 20)     prior hospital charts reviewed [ x ]  primary team notes reviewed [ x ]  other consultant notes reviewed [ x ]    PAST MEDICAL & SURGICAL HISTORY:  NPH (normal pressure hydrocephalus)  Chronic back pain greater than 3 months duration  Small intestine disorder: &quot;ilitis&quot;   steroids   1967  Lumbar disc displacement without myelopathy  Sciatica  Anxiety  Vertebral Sciatica  Insomnia  Depression  HTN (Hypertension)  S/P lumbar fusion: L4-L5 on Oct 2011  pins/screws 2012   spinal surgery 2013  Dehiscence, Operation Wound/Debridement: infection  S/P Laminectomy: L4-L5   complicated by infection requiring  antibiiotcis      Allergies  Biaxin (Other)  Lidoderm (Unknown)  morphine (Short breath; Rash)    ANTIMICROBIALS (past 90 days)  MEDICATIONS  (STANDING):      ANTIMICROBIALS:      OTHER MEDS: MEDICATIONS  (STANDING):  acetaminophen   Tablet .. 650 every 6 hours PRN  ALPRAZolam 1 every 6 hours PRN  amLODIPine   Tablet 5 <User Schedule>  bisacodyl Suppository 10 once PRN  citalopram 40 daily  heparin  Injectable 5000 every 12 hours  HYDROmorphone   Tablet 4 every 6 hours PRN  HYDROmorphone   Tablet 2 every 6 hours PRN  polyethylene glycol 3350 17 two times a day PRN  senna 2 at bedtime  tamsulosin 0.4 at bedtime  traZODone 200 at bedtime  zolpidem 5 at bedtime PRN  zolpidem 5 at bedtime PRN    SOCIAL HISTORY: no smoking, etoh use or drug use. no recent travel     FAMILY HISTORY:  Father with lung cancer history    REVIEW OF SYSTEMS  [  ] ROS unobtainable because:    [ x ] All other systems negative except as noted below:	    Constitutional:  [x ] fever [ x] chills  [ ] weight loss  [ ] weakness  Skin:  [ ] rash [ ] phlebitis	  Eyes: [ ] icterus [ ] pain  [ ] discharge	  ENMT: [ ] sore throat  [ ] thrush [ ] ulcers [ ] exudates  Respiratory: [ ] dyspnea [ ] hemoptysis [ ] cough [ ] sputum	  Cardiovascular:  [ ] chest pain [ ] palpitations [ ] edema	  Gastrointestinal:  [ ] nausea [ ] vomiting [ ] diarrhea [ ] constipation [ ] pain	  Genitourinary:  [ ] dysuria [ ] frequency [ ] hematuria [ ] discharge [ ] flank pain  [ ] incontinence  Musculoskeletal:  [ ] myalgias [ ] arthralgias [ ] arthritis  [x] back pain  Neurological:  [ ] headache [ ] seizures  [ ] confusion/altered mental status  Psychiatric:  [ ] anxiety [ ] depression	  Hematology/Lymphatics:  [ ] lymphadenopathy  Endocrine:  [ ] adrenal [ ] thyroid  Allergic/Immunologic:	 [ ] transplant [ ] seasonal    Vital Signs Last 24 Hrs  T(F): 98.6 (20 @ 04:52), Max: 101.4 (02-10-20 @ 20:45)  Vital Signs Last 24 Hrs  HR: 87 (20 @ 04:52) (77 - 87)  BP: 120/70 (20 @ 04:52) (112/61 - 146/77)  RR: 18 (20 @ 04:52)  SpO2: 94% (20 @ 04:52) (94% - 96%)  Wt(kg): --    PHYSICAL EXAMINATION:  General: Alert and Awake, NAD  HEENT: PERRL, EOMI, No subconjunctival hemorrhages, Oropharynx Clear, MMM  Neck: Supple, No USHA  Cardiac: RRR, No M/R/G  Resp: CTAB, No Wh/Rh/Ra  Abdomen: NBS, NT/ND, No HSM, No rigidity or guarding  MSK: +surgical scar from laminectony (No surrounding erythema, drainage or tenderness to palpation), No LE edema. No stigmata of IE. No evidence of phlebitis. No evidence of synovitis.  : No lindsay  Skin: No rashes or lesions. Skin is warm and dry to the touch.   Neuro: Alert and Awake. CN 2-12 Grossly intact. Moves all four extremities spontaneously.  Psych: Calm, Pleasant, Cooperative                          8.2    8.51  )-----------( 566      ( 2020 06:49 )             26.6     02-12    137  |  101  |  14  ----------------------------<  107<H>  3.9   |  25  |  0.99    Ca    8.8      2020 06:49    TPro  6.0  /  Alb  3.0<L>  /  TBili  0.3  /  DBili  <0.1  /  AST  22  /  ALT  30  /  AlkPhos  107  02-11    Urinalysis Basic - ( 2020 04:06 )    Color: Yellow / Appearance: Clear / S.026 / pH: x  Gluc: x / Ketone: Negative  / Bili: Negative / Urobili: 6 mg/dL   Blood: x / Protein: Trace / Nitrite: Negative   Leuk Esterase: Negative / RBC: 3 /HPF / WBC 1 /HPF   Sq Epi: x / Non Sq Epi: 1 /HPF / Bacteria: Negative    MICROBIOLOGY:    Culture - Blood (collected 2020 01:00)  Source: .Blood Blood  Preliminary Report (2020 02:01):    No growth to date.    Culture - Blood (collected 2020 01:00)  Source: .Blood Blood  Preliminary Report (2020 02:01):    No growth to date.    Culture - Urine (collected 2020 09:42)  Source: .Urine Clean Catch (Midstream)  Final Report (2020 08:15):    No growth    Rapid RVP Result: NotDetec (02-10 @ 23:07)    RADIOLOGY:    <The imaging below has been reviewed and visualized by me independently. Findings as detailed in report below>    EXAM:  XR CHEST PORTABLE URGENT 1V                        PROCEDURE DATE:  02/10/2020    Clear lungs.    EXAM:  SKULL LATERAL                        PROCEDURE DATE:  02/10/2020    There is an adjustable  shunt coursing out of a right-sided veronica hole. The shunt is set to approximately 110 mm water.  The  shunt tip location cannot be accurately assessed on this single radiograph. The catheter is partially visualized over the neck without any definitive kinks or discontinuities.    EXAM:  CT LUMBAR SPINE                        EXAM:  CT THORACIC SPINE                        PROCEDURE DATE:  2020    Stable superior endplate compression deformity of the L2 vertebral body without retropulsion. Otherwise, no acute fracture or dislocation.  Status post instrumented posterior metallic spine fusion of T11-S1 with pelvic stabilization and unchanged postsurgical fluid collection at T11-L5.  Additional findings as mentioned above.

## 2020-02-12 NOTE — PROGRESS NOTE ADULT - ASSESSMENT
68 yo man, well known to our service from a previous admission, with PMH chronic back pain s/p multiple lumbar laminectomy/fusion, NPH s/p  shunt, HTN, HLD, anxiety, depression, GULSHAN on CPAP with recent admission from 1/22/2020 to 2/3/2020 for O91-Dojwqx Fusion on 1/28 presenting from home in setting of fall x 3 at home.      Pt admits that after his discharge home on February 3, 2020 after T11- pelvic posterior fusion, he did not follow up with Physical Therapy.    Is currently on Dilaudid  2 or 4 mg PO Q6 hours PRN.  Discontinue dilaudid.  Start fentanyl patch 25 ug change Q3 days.  Was on fentanyl in the past with effect.  Start oxycodone 5 mg Q4 hours PRN breakthrough pain for 48 hours only then D/C.  Continue xanax 1 mg Q6 hours PRN, ambien 5 mg PO QHS PRN, and trazodone 200 mg PO QHS.    Was previously on medical marijuana, last script filled 1/4/2020.  Does not want to return to medical marijuana due to "dark dreams" as a side effect.  As discussed during his last admission, he needs to follow up with a community pain management specialist.  He has seen Dr Esau Florence in the past.    Would discharge pt on fentanyl 25 ug patch, and follow up with pain management.      Minutes spent on total encounter:  20 minutes      [facility]  [phone]

## 2020-02-12 NOTE — PROGRESS NOTE ADULT - SUBJECTIVE AND OBJECTIVE BOX
Chief Complaint:  Patient is a 69y old  Male who presents with a chief complaint of falls, H/O low back pain.    HPI:  68 yo man, well known to our service from a previous admission, with PMH chronic back pain s/p multiple lumbar laminectomy/fusion, NPH s/p  shunt, HTN, HLD, anxiety, depression, GULSHAN on CPAP with recent admission from 1/22/2020 to 2/3/2020 for K28-Fjuyir Fusion on 1/28 presenting from home in setting of frequent falls. Patient states that he was at home and was attempting to walk from his bedroom to the bathroom with the cane. He felt weak and wobbly and equated the sensation to when his NPH only without incontinence. Denies any focal weakness or paresthesias. Patient states that he didn't have a hard fall. Once he felt unsteady, he slowly lowered himself down to soften the fall. Denies head injury or LOC. Denies worsening back pain or extremity or neck pain. Denies preceding symptoms of dizziness, palpitations, CP, SOB. Patient's friend/caregiver left the home during this time. Friend came home and when he heard of events called 911.      Current Pain Score: 7/10    Current in-patient pain therapy:  MEDICATIONS  (STANDING):  amLODIPine   Tablet 5 milliGRAM(s) Oral <User Schedule>  citalopram 40 milliGRAM(s) Oral daily  heparin  Injectable 5000 Unit(s) SubCutaneous every 12 hours  senna 2 Tablet(s) Oral at bedtime  tamsulosin 0.4 milliGRAM(s) Oral at bedtime  traZODone 200 milliGRAM(s) Oral at bedtime    MEDICATIONS  (PRN):  acetaminophen   Tablet .. 650 milliGRAM(s) Oral every 6 hours PRN Mild Pain (1 - 3)  ALPRAZolam 1 milliGRAM(s) Oral every 6 hours PRN Anxiety  bisacodyl Suppository 10 milliGRAM(s) Rectal once PRN Constipation  HYDROmorphone   Tablet 4 milliGRAM(s) Oral every 6 hours PRN Severe Pain (7 - 10)  HYDROmorphone   Tablet 2 milliGRAM(s) Oral every 6 hours PRN Moderate Pain (4 - 6)  polyethylene glycol 3350 17 Gram(s) Oral two times a day PRN Constipation  zolpidem 5 milliGRAM(s) Oral at bedtime PRN Insomnia  zolpidem 5 milliGRAM(s) Oral at bedtime PRN Insomnia      PHYSICAL EXAM  Called back to see patient for pain, seen at bedside, just finished working with PT ambulating with walker  Alert & Oriented X3,  NAD, appears comfortable  LIMA, 2+ Peripheral Pulses, No cyanosis or edema  Motor Strength 5/5 B/L upper and lower extremities; moves all extremities equally; gait WNL  States the dilaudid regimen "is not working", pain is not getting worse, but is "not getting better".  Discussed with the patient that opiates are not for long term use.      T(C): 37.1 (02-12-20 @ 12:44)  HR: 83 (02-12-20 @ 12:44)  BP: 117/70 (02-12-20 @ 12:44)  RR: 18 (02-12-20 @ 12:44)  SpO2: 98% (02-12-20 @ 12:44)     Pertinent labs, radiology, additional studies:  labs reviewed

## 2020-02-12 NOTE — PROGRESS NOTE ADULT - PROBLEM SELECTOR PLAN 6
IST[facility] Reference #: [id number]  Alprazolam last prescribed 1/2/2020 total 120 pills over 30 days.  Continue home regimen

## 2020-02-12 NOTE — PROGRESS NOTE ADULT - PROBLEM SELECTOR PLAN 2
Worsened back pain while laying in strecther for prolonged time in the ED as reported   [facility]OP Reference #: [id number]   Continue with PO dilaudid 2 mg for moderate pain and 4 mg for severe pain q6h  Bowel regimen  case is discussed with  who will give patient choices about [facility]  Pain control team was called for follow up

## 2020-02-13 ENCOUNTER — APPOINTMENT (OUTPATIENT)
Dept: SPINE | Facility: CLINIC | Age: 70
End: 2020-02-13

## 2020-02-13 LAB
HCT VFR BLD CALC: 28.8 % — LOW (ref 39–50)
HGB BLD-MCNC: 8.5 G/DL — LOW (ref 13–17)
MCHC RBC-ENTMCNC: 24.2 PG — LOW (ref 27–34)
MCHC RBC-ENTMCNC: 29.5 GM/DL — LOW (ref 32–36)
MCV RBC AUTO: 82.1 FL — SIGNIFICANT CHANGE UP (ref 80–100)
NRBC # BLD: 0 /100 WBCS — SIGNIFICANT CHANGE UP (ref 0–0)
PLATELET # BLD AUTO: 572 K/UL — HIGH (ref 150–400)
RBC # BLD: 3.51 M/UL — LOW (ref 4.2–5.8)
RBC # FLD: 15 % — HIGH (ref 10.3–14.5)
WBC # BLD: 10.19 K/UL — SIGNIFICANT CHANGE UP (ref 3.8–10.5)
WBC # FLD AUTO: 10.19 K/UL — SIGNIFICANT CHANGE UP (ref 3.8–10.5)

## 2020-02-13 PROCEDURE — 99232 SBSQ HOSP IP/OBS MODERATE 35: CPT

## 2020-02-13 RX ORDER — OXYCODONE HYDROCHLORIDE 5 MG/1
10 TABLET ORAL EVERY 4 HOURS
Refills: 0 | Status: DISCONTINUED | OUTPATIENT
Start: 2020-02-13 | End: 2020-02-14

## 2020-02-13 RX ORDER — OXYCODONE HYDROCHLORIDE 5 MG/1
5 TABLET ORAL EVERY 4 HOURS
Refills: 0 | Status: DISCONTINUED | OUTPATIENT
Start: 2020-02-13 | End: 2020-02-14

## 2020-02-13 RX ADMIN — Medication 1 MILLIGRAM(S): at 05:32

## 2020-02-13 RX ADMIN — SENNA PLUS 2 TABLET(S): 8.6 TABLET ORAL at 21:39

## 2020-02-13 RX ADMIN — AMLODIPINE BESYLATE 5 MILLIGRAM(S): 2.5 TABLET ORAL at 17:30

## 2020-02-13 RX ADMIN — FENTANYL CITRATE 1 PATCH: 50 INJECTION INTRAVENOUS at 19:39

## 2020-02-13 RX ADMIN — FENTANYL CITRATE 1 PATCH: 50 INJECTION INTRAVENOUS at 07:19

## 2020-02-13 RX ADMIN — POLYETHYLENE GLYCOL 3350 17 GRAM(S): 17 POWDER, FOR SOLUTION ORAL at 10:53

## 2020-02-13 RX ADMIN — OXYCODONE HYDROCHLORIDE 5 MILLIGRAM(S): 5 TABLET ORAL at 09:00

## 2020-02-13 RX ADMIN — OXYCODONE HYDROCHLORIDE 5 MILLIGRAM(S): 5 TABLET ORAL at 08:31

## 2020-02-13 RX ADMIN — ZOLPIDEM TARTRATE 5 MILLIGRAM(S): 10 TABLET ORAL at 21:39

## 2020-02-13 RX ADMIN — Medication 1 MILLIGRAM(S): at 12:20

## 2020-02-13 RX ADMIN — Medication 200 MILLIGRAM(S): at 21:39

## 2020-02-13 RX ADMIN — OXYCODONE HYDROCHLORIDE 5 MILLIGRAM(S): 5 TABLET ORAL at 13:15

## 2020-02-13 RX ADMIN — OXYCODONE HYDROCHLORIDE 5 MILLIGRAM(S): 5 TABLET ORAL at 17:30

## 2020-02-13 RX ADMIN — OXYCODONE HYDROCHLORIDE 5 MILLIGRAM(S): 5 TABLET ORAL at 12:38

## 2020-02-13 RX ADMIN — OXYCODONE HYDROCHLORIDE 5 MILLIGRAM(S): 5 TABLET ORAL at 18:05

## 2020-02-13 RX ADMIN — AMLODIPINE BESYLATE 5 MILLIGRAM(S): 2.5 TABLET ORAL at 05:32

## 2020-02-13 RX ADMIN — TAMSULOSIN HYDROCHLORIDE 0.4 MILLIGRAM(S): 0.4 CAPSULE ORAL at 21:39

## 2020-02-13 RX ADMIN — CITALOPRAM 40 MILLIGRAM(S): 10 TABLET, FILM COATED ORAL at 17:30

## 2020-02-13 RX ADMIN — HEPARIN SODIUM 5000 UNIT(S): 5000 INJECTION INTRAVENOUS; SUBCUTANEOUS at 05:33

## 2020-02-13 RX ADMIN — Medication 1 MILLIGRAM(S): at 20:18

## 2020-02-13 NOTE — PROGRESS NOTE ADULT - SUBJECTIVE AND OBJECTIVE BOX
Patient is a 69y old  Male who presents with a chief complaint of falls (2020 16:55)      SUBJECTIVE / OVERNIGHT EVENTS:    Tele reviewed:       ADDITIONAL REVIEW OF SYSTEMS: Negative except for above    MEDICATIONS  (STANDING):  amLODIPine   Tablet 5 milliGRAM(s) Oral <User Schedule>  citalopram 40 milliGRAM(s) Oral daily  fentaNYL   Patch  25 MICROgram(s)/Hr 1 Patch Transdermal every 72 hours  heparin  Injectable 5000 Unit(s) SubCutaneous every 12 hours  senna 2 Tablet(s) Oral at bedtime  tamsulosin 0.4 milliGRAM(s) Oral at bedtime  traZODone 200 milliGRAM(s) Oral at bedtime    MEDICATIONS  (PRN):  acetaminophen   Tablet .. 650 milliGRAM(s) Oral every 6 hours PRN Mild Pain (1 - 3)  ALPRAZolam 1 milliGRAM(s) Oral every 6 hours PRN Anxiety  bisacodyl Suppository 10 milliGRAM(s) Rectal once PRN Constipation  oxyCODONE    IR 5 milliGRAM(s) Oral every 4 hours PRN Breakthrough pain  polyethylene glycol 3350 17 Gram(s) Oral two times a day PRN Constipation  zolpidem 5 milliGRAM(s) Oral at bedtime PRN Insomnia  zolpidem 5 milliGRAM(s) Oral at bedtime PRN Insomnia      CAPILLARY BLOOD GLUCOSE        I&O's Summary    2020 07:01  -  2020 07:00  --------------------------------------------------------  IN: 240 mL / OUT: 1000 mL / NET: -760 mL        PHYSICAL EXAM:  Vital Signs Last 24 Hrs  T(C): 36.7 (2020 05:08), Max: 37.4 (2020 20:55)  T(F): 98 (2020 05:08), Max: 99.3 (2020 20:55)  HR: 86 (2020 08:35) (79 - 92)  BP: 118/68 (2020 05:08) (117/70 - 120/73)  BP(mean): --  RR: 18 (2020 05:08) (18 - 18)  SpO2: 93% (2020 08:35) (92% - 98%)    PHYSICAL EXAM:  GENERAL: NAD, well-developed  HEAD:  Atraumatic, Normocephalic  EYES:  conjunctiva and sclera clear  NECK: Supple, No JVD  CHEST/LUNG: Clear to auscultation bilaterally; No wheeze  HEART: Regular rate and rhythm; No murmurs, rubs, or gallops  ABDOMEN: Soft, Nontender, Nondistended; Bowel sounds present  EXTREMITIES:  2+ Peripheral Pulses, No clubbing, cyanosis, or edema  NEUROLOGY: non-focal, AAOX3   SKIN: clean surgical wound       LABS:                        8.5    10.19 )-----------( 572      ( 2020 06:35 )             28.8     02-12    137  |  101  |  14  ----------------------------<  107<H>  3.9   |  25  |  0.99    Ca    8.8      2020 06:49            Urinalysis Basic - ( 2020 04:06 )    Color: Yellow / Appearance: Clear / S.026 / pH: x  Gluc: x / Ketone: Negative  / Bili: Negative / Urobili: 6 mg/dL   Blood: x / Protein: Trace / Nitrite: Negative   Leuk Esterase: Negative / RBC: 3 /HPF / WBC 1 /HPF   Sq Epi: x / Non Sq Epi: 1 /HPF / Bacteria: Negative        Culture - Blood (collected 2020 01:00)  Source: .Blood Blood  Preliminary Report (2020 02:01):    No growth to date.    Culture - Blood (collected 2020 01:00)  Source: .Blood Blood  Preliminary Report (2020 02:01):    No growth to date.        RADIOLOGY & ADDITIONAL TESTS:    Imaging Personally Reviewed:    Electrocardiogram Personally Reviewed:    COORDINATION OF CARE:  Care Discussed with Consultants/Other Providers [Y/N]:  Prior or Outpatient Records Reviewed [Y/N]: Patient is a 69y old  Male who presents with a chief complaint of falls (2020 16:55)      SUBJECTIVE / OVERNIGHT EVENTS:    Patient was seen in AM in the presence of the beside RN.  Patient was started on Fentanyl patch by pain management and pt states that his pain was improving.   I have had a long discussion with patient about discharge to [facility]  and he agreed.  NO fever       ADDITIONAL REVIEW OF SYSTEMS: Negative except for above    MEDICATIONS  (STANDING):  amLODIPine   Tablet 5 milliGRAM(s) Oral <User Schedule>  citalopram 40 milliGRAM(s) Oral daily  fentaNYL   Patch  25 MICROgram(s)/Hr 1 Patch Transdermal every 72 hours  heparin  Injectable 5000 Unit(s) SubCutaneous every 12 hours  senna 2 Tablet(s) Oral at bedtime  tamsulosin 0.4 milliGRAM(s) Oral at bedtime  traZODone 200 milliGRAM(s) Oral at bedtime    MEDICATIONS  (PRN):  acetaminophen   Tablet .. 650 milliGRAM(s) Oral every 6 hours PRN Mild Pain (1 - 3)  ALPRAZolam 1 milliGRAM(s) Oral every 6 hours PRN Anxiety  bisacodyl Suppository 10 milliGRAM(s) Rectal once PRN Constipation  oxyCODONE    IR 5 milliGRAM(s) Oral every 4 hours PRN Breakthrough pain  polyethylene glycol 3350 17 Gram(s) Oral two times a day PRN Constipation  zolpidem 5 milliGRAM(s) Oral at bedtime PRN Insomnia  zolpidem 5 milliGRAM(s) Oral at bedtime PRN Insomnia      CAPILLARY BLOOD GLUCOSE        I&O's Summary    2020 07:01  -  2020 07:00  --------------------------------------------------------  IN: 240 mL / OUT: 1000 mL / NET: -760 mL        PHYSICAL EXAM:  Vital Signs Last 24 Hrs  T(C): 36.7 (2020 05:08), Max: 37.4 (2020 20:55)  T(F): 98 (2020 05:08), Max: 99.3 (2020 20:55)  HR: 86 (2020 08:35) (79 - 92)  BP: 118/68 (2020 05:08) (117/70 - 120/73)  BP(mean): --  RR: 18 (2020 05:08) (18 - 18)  SpO2: 93% (2020 08:35) (92% - 98%)    PHYSICAL EXAM:  GENERAL: NAD, well-developed  HEAD:  Atraumatic, Normocephalic  EYES:  conjunctiva and sclera clear  NECK: Supple, No JVD  CHEST/LUNG: Clear to auscultation bilaterally; No wheeze  HEART: Regular rate and rhythm; No murmurs, rubs, or gallops  ABDOMEN: Soft, Nontender, Nondistended; Bowel sounds present  EXTREMITIES:  2+ Peripheral Pulses, No clubbing, cyanosis, or edema  NEUROLOGY: non-focal, AAOX3   SKIN: clean surgical wound of the spine       LABS:                        8.5    10.19 )-----------( 572      ( 2020 06:35 )             28.8     02-12    137  |  101  |  14  ----------------------------<  107<H>  3.9   |  25  |  0.99    Ca    8.8      2020 06:49            Urinalysis Basic - ( 2020 04:06 )    Color: Yellow / Appearance: Clear / S.026 / pH: x  Gluc: x / Ketone: Negative  / Bili: Negative / Urobili: 6 mg/dL   Blood: x / Protein: Trace / Nitrite: Negative   Leuk Esterase: Negative / RBC: 3 /HPF / WBC 1 /HPF   Sq Epi: x / Non Sq Epi: 1 /HPF / Bacteria: Negative        Culture - Blood (collected 2020 01:00)  Source: .Blood Blood  Preliminary Report (2020 02:01):    No growth to date.    Culture - Blood (collected 2020 01:00)  Source: .Blood Blood  Preliminary Report (2020 02:01):    No growth to date.        RADIOLOGY & ADDITIONAL TESTS:    Imaging Personally Reviewed:    Electrocardiogram Personally Reviewed:    COORDINATION OF CARE:  Care Discussed with Consultants/Other Providers [Y/N]:  Prior or Outpatient Records Reviewed [Y/N]:

## 2020-02-13 NOTE — PROGRESS NOTE ADULT - PROBLEM SELECTOR PLAN 2
Worsened back pain while laying in strecther for prolonged time in the ED as reported   ISTOP Reference #: [id number]   Pt was seen by CHronic pain mgt who started Fentanyl patch and Oxycodone IR on 2/12   cont Bowel regimen  Plan is to dc to [facility] and cont outpatient pain control

## 2020-02-13 NOTE — PROGRESS NOTE ADULT - ASSESSMENT
68 yo man with PMH chronic back pain s/p multiple lumbar laminectomy/fusion, NPH s/p  shunt, HTN, HLD, anxiety, depression, GULSHAN on CPAP with recent admission from 1/22/2020 to 2/3/2020 for D30-Piuzet Fusion on 1/28 presenting from home in setting of frequent falls.

## 2020-02-13 NOTE — PROGRESS NOTE ADULT - SUBJECTIVE AND OBJECTIVE BOX
Follow Up:  fever    Interval History: afebrile, normotensive. notes some back pain    REVIEW OF SYSTEMS  [  ] ROS unobtainable because:    [ x ] All other systems negative except as noted below    Constitutional:  [ ] fever [ ] chills  [ ] weight loss  [ ] weakness  Skin:  [ ] rash [ ] phlebitis	  Eyes: [ ] icterus [ ] pain  [ ] discharge	  ENMT: [ ] sore throat  [ ] thrush [ ] ulcers [ ] exudates  Respiratory: [ ] dyspnea [ ] hemoptysis [ ] cough [ ] sputum	  Cardiovascular:  [ ] chest pain [ ] palpitations [ ] edema	  Gastrointestinal:  [ ] nausea [ ] vomiting [ ] diarrhea [ ] constipation [ ] pain	  Genitourinary:  [ ] dysuria [ ] frequency [ ] hematuria [ ] discharge [ ] flank pain  [ ] incontinence  Musculoskeletal:  [ ] myalgias [ ] arthralgias [ ] arthritis  [x ] back pain  Neurological:  [ ] headache [ ] seizures  [ ] confusion/altered mental status    Allergies  Biaxin (Other)  Lidoderm (Unknown)  morphine (Short breath; Rash)        ANTIMICROBIALS:      OTHER MEDS:  MEDICATIONS  (STANDING):  acetaminophen   Tablet .. 650 every 6 hours PRN  ALPRAZolam 1 every 6 hours PRN  amLODIPine   Tablet 5 <User Schedule>  bisacodyl Suppository 10 once PRN  citalopram 40 daily  fentaNYL   Patch  25 MICROgram(s)/Hr 1 every 72 hours  heparin  Injectable 5000 every 12 hours  oxyCODONE    IR 5 every 4 hours PRN  polyethylene glycol 3350 17 two times a day PRN  senna 2 at bedtime  tamsulosin 0.4 at bedtime  traZODone 200 at bedtime  zolpidem 5 at bedtime PRN  zolpidem 5 at bedtime PRN      Vital Signs Last 24 Hrs  T(C): 37.6 (2020 15:26), Max: 37.6 (2020 15:26)  T(F): 99.6 (2020 15:26), Max: 99.6 (2020 15:26)  HR: 89 (2020 15:26) (79 - 92)  BP: 117/71 (2020 15:26) (117/71 - 120/73)  BP(mean): --  RR: 18 (2020 15:26) (18 - 18)  SpO2: 97% (2020 15:26) (92% - 98%)    PHYSICAL EXAMINATION:  General: Alert and Awake, NAD  HEENT: PERRL, EOMI  Neck: Supple  Cardiac: RRR, No M/R/G  Resp: CTAB, No Wh/Rh/Ra  Abdomen: NBS, NT/ND, No HSM, No rigidity or guarding  MSK: +surgical scar from laminectony (No surrounding erythema, drainage or tenderness to palpation), No LE edema. No LE edema. No Calf tenderness  : No lindsay  Skin: No rashes or lesions. Skin is warm and dry to the touch.   Neuro: Alert and Awake. CN 2-12 Grossly intact. Moves all four extremities spontaneously.  Psych: Calm, Pleasant, Cooperative                          8.5    10.19 )-----------( 572      ( 2020 06:35 )             28.8           137  |  101  |  14  ----------------------------<  107<H>  3.9   |  25  |  0.99    Ca    8.8      2020 06:49        Urinalysis Basic - ( 2020 04:06 )    Color: Yellow / Appearance: Clear / S.026 / pH: x  Gluc: x / Ketone: Negative  / Bili: Negative / Urobili: 6 mg/dL   Blood: x / Protein: Trace / Nitrite: Negative   Leuk Esterase: Negative / RBC: 3 /HPF / WBC 1 /HPF   Sq Epi: x / Non Sq Epi: 1 /HPF / Bacteria: Negative        MICROBIOLOGY:  v  .Blood Blood  20   No growth to date.  --  --      .Urine Clean Catch (Midstream)  20   No growth  --  --      .Urine Clean Catch (Midstream)  20   <10,000 CFU/mL Normal Urogenital Joyce  --  --          Rapid RVP Result: NotDetec ( @ 12:12)  Rapid RVP Result: NotDetec (02-10 @ 23:07)        RADIOLOGY:    <The imaging below has been reviewed and visualized by me independently. Findings as detailed in report below>

## 2020-02-13 NOTE — PROGRESS NOTE ADULT - PROBLEM SELECTOR PLAN 6
ISTOP Reference #: [id number]  Alprazolam last prescribed 1/2/2020 total 120 pills over 30 days.  Continue home regimen

## 2020-02-13 NOTE — PROGRESS NOTE ADULT - ASSESSMENT
69 year old male PMH chronic back pain s/p multiple lumbar laminectomy/fusion, NPH s/p  shunt, HTN, HLD, anxiety, depression, GULSHAN on CPAP with recent admission from 1/22/2020 to 2/3/2020 for I56-Pdafsb Fusion on 1/28 presenting from home in setting of frequent falls. On presentation afebrile. U/A (2/8) with 2 WBC.     On 2/10 febrile to Tmax of 101.4.   RVP (2/10) negative.   CXR (2/10) with clear lungs.   Procalcitonin (2/10) 0.08.   U/A (2/12) with 1 WBC.   CRP (2/11) 9.56. (Prior CRP 1.34 on 1/24/20)  Repeat RVP negative    Bacterial infection seems somewhat less likely (especially with low procalcitonin and no focal s/s of infection)  I suspect the CRP increase from January is due to the recent surgery on his spine  If continued fevers I would imaging the spine (MRI if hardware compatible)    Overall, Fever, Back Pain, Abnormal Lab (CRP), s/p Spinal Fusion    --Continue to monitor off of antibiotics.   --If any further fever I would obtain MRI of the spine  --Continue to follow CBC with diff  --Continue to follow temperature curve  --Follow up on preliminary blood cultures    I will sign off at this time. Please feel free to contact me with any further questions or concerns.    Florian Bell M.D.  [facility] Division of Infectious Disease  8AM-5PM: Pager Number [phone]  After Hours (or if no response): Please contact the Infectious Diseases Office at [phone]     The above assessment and plan were discussed with medicine NP

## 2020-02-14 ENCOUNTER — TRANSCRIPTION ENCOUNTER (OUTPATIENT)
Age: 70
End: 2020-02-14

## 2020-02-14 VITALS
TEMPERATURE: 98 F | SYSTOLIC BLOOD PRESSURE: 120 MMHG | OXYGEN SATURATION: 93 % | HEART RATE: 85 BPM | RESPIRATION RATE: 18 BRPM | DIASTOLIC BLOOD PRESSURE: 72 MMHG

## 2020-02-14 DIAGNOSIS — R29.6 REPEATED FALLS: ICD-10-CM

## 2020-02-14 PROCEDURE — 99284 EMERGENCY DEPT VISIT MOD MDM: CPT | Mod: 25

## 2020-02-14 PROCEDURE — 96376 TX/PRO/DX INJ SAME DRUG ADON: CPT

## 2020-02-14 PROCEDURE — 86140 C-REACTIVE PROTEIN: CPT

## 2020-02-14 PROCEDURE — 96374 THER/PROPH/DIAG INJ IV PUSH: CPT

## 2020-02-14 PROCEDURE — 71045 X-RAY EXAM CHEST 1 VIEW: CPT

## 2020-02-14 PROCEDURE — 80048 BASIC METABOLIC PNL TOTAL CA: CPT

## 2020-02-14 PROCEDURE — 72131 CT LUMBAR SPINE W/O DYE: CPT

## 2020-02-14 PROCEDURE — 97116 GAIT TRAINING THERAPY: CPT

## 2020-02-14 PROCEDURE — 85610 PROTHROMBIN TIME: CPT

## 2020-02-14 PROCEDURE — 81003 URINALYSIS AUTO W/O SCOPE: CPT

## 2020-02-14 PROCEDURE — 87633 RESP VIRUS 12-25 TARGETS: CPT

## 2020-02-14 PROCEDURE — 97110 THERAPEUTIC EXERCISES: CPT

## 2020-02-14 PROCEDURE — 94660 CPAP INITIATION&MGMT: CPT

## 2020-02-14 PROCEDURE — 70250 X-RAY EXAM OF SKULL: CPT

## 2020-02-14 PROCEDURE — 99239 HOSP IP/OBS DSCHRG MGMT >30: CPT

## 2020-02-14 PROCEDURE — 87040 BLOOD CULTURE FOR BACTERIA: CPT

## 2020-02-14 PROCEDURE — 70450 CT HEAD/BRAIN W/O DYE: CPT

## 2020-02-14 PROCEDURE — 80076 HEPATIC FUNCTION PANEL: CPT

## 2020-02-14 PROCEDURE — 85027 COMPLETE CBC AUTOMATED: CPT

## 2020-02-14 PROCEDURE — 81001 URINALYSIS AUTO W/SCOPE: CPT

## 2020-02-14 PROCEDURE — 85730 THROMBOPLASTIN TIME PARTIAL: CPT

## 2020-02-14 PROCEDURE — 80053 COMPREHEN METABOLIC PANEL: CPT

## 2020-02-14 PROCEDURE — 72128 CT CHEST SPINE W/O DYE: CPT

## 2020-02-14 PROCEDURE — 93005 ELECTROCARDIOGRAM TRACING: CPT

## 2020-02-14 PROCEDURE — 82962 GLUCOSE BLOOD TEST: CPT

## 2020-02-14 PROCEDURE — 87798 DETECT AGENT NOS DNA AMP: CPT

## 2020-02-14 PROCEDURE — 87581 M.PNEUMON DNA AMP PROBE: CPT

## 2020-02-14 PROCEDURE — 87086 URINE CULTURE/COLONY COUNT: CPT

## 2020-02-14 PROCEDURE — 84145 PROCALCITONIN (PCT): CPT

## 2020-02-14 PROCEDURE — 74018 RADEX ABDOMEN 1 VIEW: CPT

## 2020-02-14 PROCEDURE — 87486 CHLMYD PNEUM DNA AMP PROBE: CPT

## 2020-02-14 PROCEDURE — 99285 EMERGENCY DEPT VISIT HI MDM: CPT | Mod: 25

## 2020-02-14 PROCEDURE — 83605 ASSAY OF LACTIC ACID: CPT

## 2020-02-14 PROCEDURE — 93970 EXTREMITY STUDY: CPT

## 2020-02-14 PROCEDURE — 97161 PT EVAL LOW COMPLEX 20 MIN: CPT

## 2020-02-14 RX ORDER — TAMSULOSIN HYDROCHLORIDE 0.4 MG/1
2 CAPSULE ORAL
Qty: 0 | Refills: 0 | DISCHARGE
Start: 2020-02-14

## 2020-02-14 RX ORDER — ZOLPIDEM TARTRATE 10 MG/1
1 TABLET ORAL
Qty: 0 | Refills: 0 | DISCHARGE
Start: 2020-02-14

## 2020-02-14 RX ORDER — ALPRAZOLAM 0.25 MG
1 TABLET ORAL
Qty: 0 | Refills: 0 | DISCHARGE
Start: 2020-02-14

## 2020-02-14 RX ORDER — TRAZODONE HCL 50 MG
2 TABLET ORAL
Qty: 0 | Refills: 0 | DISCHARGE
Start: 2020-02-14

## 2020-02-14 RX ORDER — TRAZODONE HCL 50 MG
2 TABLET ORAL
Qty: 0 | Refills: 0 | DISCHARGE

## 2020-02-14 RX ORDER — SENNA PLUS 8.6 MG/1
2 TABLET ORAL
Qty: 0 | Refills: 0 | DISCHARGE
Start: 2020-02-14

## 2020-02-14 RX ORDER — OXYCODONE HYDROCHLORIDE 5 MG/1
1 TABLET ORAL
Qty: 0 | Refills: 0 | DISCHARGE
Start: 2020-02-14

## 2020-02-14 RX ORDER — FENTANYL CITRATE 50 UG/ML
1 INJECTION INTRAVENOUS
Qty: 0 | Refills: 0 | DISCHARGE
Start: 2020-02-14

## 2020-02-14 RX ORDER — ACETAMINOPHEN 500 MG
2 TABLET ORAL
Qty: 0 | Refills: 0 | DISCHARGE
Start: 2020-02-14

## 2020-02-14 RX ORDER — ZOLPIDEM TARTRATE 10 MG/1
1 TABLET ORAL
Qty: 0 | Refills: 0 | DISCHARGE

## 2020-02-14 RX ORDER — AMLODIPINE BESYLATE 2.5 MG/1
1 TABLET ORAL
Qty: 0 | Refills: 0 | DISCHARGE
Start: 2020-02-14

## 2020-02-14 RX ORDER — TAMSULOSIN HYDROCHLORIDE 0.4 MG/1
1 CAPSULE ORAL
Qty: 0 | Refills: 0 | DISCHARGE
Start: 2020-02-14

## 2020-02-14 RX ORDER — TRAZODONE HCL 50 MG
1 TABLET ORAL
Qty: 0 | Refills: 0 | DISCHARGE
Start: 2020-02-14

## 2020-02-14 RX ORDER — POLYETHYLENE GLYCOL 3350 17 G/17G
17 POWDER, FOR SOLUTION ORAL
Qty: 0 | Refills: 0 | DISCHARGE
Start: 2020-02-14

## 2020-02-14 RX ORDER — AMLODIPINE BESYLATE 2.5 MG/1
1 TABLET ORAL
Qty: 0 | Refills: 0 | DISCHARGE

## 2020-02-14 RX ORDER — CITALOPRAM 10 MG/1
1 TABLET, FILM COATED ORAL
Qty: 0 | Refills: 0 | DISCHARGE
Start: 2020-02-14

## 2020-02-14 RX ADMIN — AMLODIPINE BESYLATE 5 MILLIGRAM(S): 2.5 TABLET ORAL at 05:40

## 2020-02-14 RX ADMIN — OXYCODONE HYDROCHLORIDE 10 MILLIGRAM(S): 5 TABLET ORAL at 01:48

## 2020-02-14 RX ADMIN — OXYCODONE HYDROCHLORIDE 10 MILLIGRAM(S): 5 TABLET ORAL at 10:45

## 2020-02-14 RX ADMIN — HEPARIN SODIUM 5000 UNIT(S): 5000 INJECTION INTRAVENOUS; SUBCUTANEOUS at 05:40

## 2020-02-14 RX ADMIN — OXYCODONE HYDROCHLORIDE 10 MILLIGRAM(S): 5 TABLET ORAL at 02:45

## 2020-02-14 RX ADMIN — FENTANYL CITRATE 1 PATCH: 50 INJECTION INTRAVENOUS at 07:26

## 2020-02-14 RX ADMIN — OXYCODONE HYDROCHLORIDE 10 MILLIGRAM(S): 5 TABLET ORAL at 06:32

## 2020-02-14 RX ADMIN — POLYETHYLENE GLYCOL 3350 17 GRAM(S): 17 POWDER, FOR SOLUTION ORAL at 09:20

## 2020-02-14 RX ADMIN — OXYCODONE HYDROCHLORIDE 5 MILLIGRAM(S): 5 TABLET ORAL at 10:00

## 2020-02-14 RX ADMIN — OXYCODONE HYDROCHLORIDE 10 MILLIGRAM(S): 5 TABLET ORAL at 11:36

## 2020-02-14 RX ADMIN — Medication 1 MILLIGRAM(S): at 02:38

## 2020-02-14 RX ADMIN — CITALOPRAM 40 MILLIGRAM(S): 10 TABLET, FILM COATED ORAL at 11:23

## 2020-02-14 RX ADMIN — OXYCODONE HYDROCHLORIDE 5 MILLIGRAM(S): 5 TABLET ORAL at 09:02

## 2020-02-14 RX ADMIN — OXYCODONE HYDROCHLORIDE 10 MILLIGRAM(S): 5 TABLET ORAL at 05:45

## 2020-02-14 NOTE — DISCHARGE NOTE PROVIDER - HOSPITAL COURSE
68 yo man with PMH chronic back pain s/p multiple lumbar laminectomy/fusion with recent admission from 1/22/2020 to 2/3/2020 for Z92-Nakltg Fusion on 1/28 , NPH s/p  shunt, HTN, HLD, anxiety, depression, GULSHAN on CPAP  was admitted to the hospital on 2/8 from home in setting of frequent falls.  Patient was seen by the Neurosurgery team with the following recommendation :  No acute neursurgical intervention - Repeat skull XR to confirm settings(which was done) - Falls likely due to deconditioning post operatively Patient likely to benefit from PT and Rehab. - Chronic pain.  Management per medicine.  Patient was seen by Chronic pain management and pain medications were changed to Fentanyl patch, Oxycodone IR .  PT has seen patient and recommended home with home PT , but patient requested [facility].  During the hospitalization , patient had fever and had neg blood CX , neg RVP and no consolidation on CXR.  ID has seen patient and no ABX Is initiated.  Patient is accepted at [facility]. 70 yo man with PMH chronic back pain s/p multiple lumbar laminectomy/fusion with recent admission from 1/22/2020 to 2/3/2020 for T62-Ozrgcb Fusion on 1/28 , NPH s/p  shunt, HTN, HLD, anxiety, depression, GULSHAN on CPAP  was admitted to the hospital on 2/8 from home in setting of frequent falls.  Patient was seen by the Neurosurgery team with the following recommendation :  No acute neursurgical intervention - Repeat skull XR to confirm settings(which was done) - Falls likely due to deconditioning post operatively Patient likely to benefit from PT and Rehab. - Chronic pain.  Management per medicine.  Patient was seen by Chronic pain management and pain medications were changed to Fentanyl patch, Oxycodone IR .  PT has seen patient and recommended home with home PT , but patient requested [facility].  During the hospitalization , patient had fever and had neg blood CX , neg RVP and no consolidation on CXR.  ID has seen patient and no ABX Is initiated.  Patient is accepted at [facility].  Patient is to follow up with PCP / Pain management and Neurosurgery teams.

## 2020-02-14 NOTE — PROGRESS NOTE ADULT - PROBLEM SELECTOR PROBLEM 3
JOSE MIGUEL (acute kidney injury)

## 2020-02-14 NOTE — PROGRESS NOTE ADULT - NSHPATTENDINGPLANDISCUSS_GEN_ALL_CORE
SHIRA Avila   and the 
NP  and the 
SHIRA Villalba , Bedside nurse, Neurosurgery Resident ( Tl) and patient
SHIRA Villalba , Bedside nurse, Neurosurgery Resident ( Tl) and patient

## 2020-02-14 NOTE — PROGRESS NOTE ADULT - PROBLEM SELECTOR PROBLEM 9
Discharge planning issues
Discharge planning issues
Fever, unspecified fever cause
Discharge planning issues
Fever, unspecified fever cause

## 2020-02-14 NOTE — PROGRESS NOTE ADULT - PROBLEM SELECTOR PLAN 9
Transitions of Care Status:  1.  Name of PCP:  2.  PCP Contacted on Admission: [ ] Y    [ ] N    3.  PCP contacted at Discharge: [ ] Y    [ ] N    [ ] N/A  4.  Post-Discharge Appointment Date and Location:  5.  Summary of Handoff given to PCP:
Transitions of Care Status:  1.  Name of PCP:  2.  PCP Contacted on Admission: [ ] Y    [ ] N    3.  PCP contacted at Discharge: [ ] Y    [ ] N    [ ] N/A  4.  Post-Discharge Appointment Date and Location:  5.  Summary of Handoff given to PCP:
Unsure of Etiology and is now RESOLVED   WBC with DIff is unremarkable   RVP is negative   Neg Blood CX   I have gotten in contact with the Neurosurgery team who is not concerned about surgical wound infection / Mentation is intact.  IF pt remains febrile , will call ID and might get CT of spine to r/o any seroma or collection   F/UP Lower extremity dopplers .   IV site is clean  ID consult is appreciated / NO ABX for now
Transitions of Care Status:  1.  Name of PCP: PCP on chart Dr Mcgrath's office was called and no answer   2.  PCP Contacted on Admission: [ ] Y    [ ] N    3.  PCP contacted at Discharge: [ ] Y    [ ] N    [ ] N/A  4.  Post-Discharge Appointment Date and Location:  5.  Summary of Handoff given to PCP:
Unsure of Etiology   NO symptoms to pinpoint infectious causes   WBC with DIff is unremarkable   RVP is negative   F/UP U/A , UCX , Blood CX   I have gotten in contact with the Neurosurgery team who is not concerned about surgical wound infection / Mentation is intact.  IF pt remains febrile , will call ID and might get CT of spine to r/o any seroma or collection   F/UP Lower extremity dopplers .   IV site is clean
Unsure of Etiology   NO symptoms to pinpoint infectious causes   WBC with DIff is unremarkable   RVP is negative   F/UP U/A , UCX , Blood CX   I have gotten in contact with the Neurosurgery team who is not concerned about surgical wound infection / Mentation is intact.  IF pt remains febrile , will call ID and might get CT of spine to r/o any seroma or collection   F/UP Lower extremity dopplers .   IV site is clean  ID consult is called
Unsure of Etiology and is now RESOLVED   WBC with DIff is unremarkable   RVP is negative   Neg Blood CX   I have gotten in contact with the Neurosurgery team who is not concerned about surgical wound infection / Mentation is intact.  IF pt remains febrile , will call ID and might get CT of spine to r/o any seroma or collection   F/UP Lower extremity dopplers .   IV site is clean  ID consult is appreciated / NO ABX for now

## 2020-02-14 NOTE — PROGRESS NOTE ADULT - PROBLEM SELECTOR PLAN 6
ISTOP Reference #: [id number]  Alprazolam last prescribed 1/2/2020 total 120 pills over 30 days.  Continue home regimen ISTOP Reference #: [id number]  Alprazolam last prescribed 1/2/2020 total 120 pills over 30 days as reported   Continue home regimen

## 2020-02-14 NOTE — DISCHARGE NOTE PROVIDER - CARE PROVIDER_API CALL
Keira Castanon (DO)  Neurological Surgery  [address] [place] [address], [address]  [place] [address]  Phone: [phone]  Fax: [phone]  Follow Up Time:

## 2020-02-14 NOTE — DISCHARGE NOTE NURSING/CASE MANAGEMENT/SOCIAL WORK - PATIENT PORTAL LINK FT
You can access the FollowMyHealth Patient Portal offered by [facility] by registering at the following website: [email or web address]. By joining [facility]’s FollowMyHealth portal, you will also be able to view your health information using other applications (apps) compatible with our system.

## 2020-02-14 NOTE — PROGRESS NOTE ADULT - ASSESSMENT
70 yo man with PMH chronic back pain s/p multiple lumbar laminectomy/fusion with recent admission from 1/22/2020 to 2/3/2020 for X31-Xntrkk Fusion on 1/28 , NPH s/p  shunt, HTN, HLD, anxiety, depression, GULSHAN on CPAP  was admitted to the hospital on 2/8 from home in setting of frequent falls.  Patient was seen by the Neurosurgery team with the following recommendation :  No acute neursurgical intervention - Repeat skull XR to confirm settings(which was done) - Falls likely due to deconditioning post operatively Patient likely to benefit from PT and Rehab. - Chronic pain.  Management per medicine.  Patient was seen by Chronic pain management and pain medications were changed to Fentanyl patch, Oxycodone IR .  PT has seen patient and recommended home with home PT , but patient requested [facility].  During the hospitalization , patient had fever and had neg blood CX , neg RVP and no consolidation on CXR.  ID has seen patient and no ABX Is initiated.  Patient is accepted at [facility].

## 2020-02-14 NOTE — PROGRESS NOTE ADULT - PROBLEM SELECTOR PLAN 3
-Baseline renal function Cr 0.8   -Hold HCTZ/ARB  -Monitor BMP  -Renally dose medications
-Resolved  -Baseline renal function Cr 0.8   -Hold HCTZ/ARB  -Monitor BMP  -Renally dose medications
-Resolved  -Baseline renal function Cr 0.8   -cont to Hold HCTZ/ARB/ can be restarted as outpatient based on BP   -Monitor BMP  -Renally dose medications
-Resolved  -Baseline renal function Cr 0.8   -cont to Hold HCTZ/ARB  -Monitor BMP  -Renally dose medications

## 2020-02-14 NOTE — PROGRESS NOTE ADULT - PROBLEM SELECTOR PROBLEM 5
HTN (Hypertension)

## 2020-02-14 NOTE — PROGRESS NOTE ADULT - ATTENDING COMMENTS
Domonique Bright   Hospitalist   
Domonique Bright   Hospitalist   
Pt states that he was on CPAP at home and does not know his setting but will call someone to get the setting and will let the nurse know of his setting.  I have spent more 30mns at a time explaining to patient our plan of care and he has agreed.        Domonique Bright   Hospitalist   [phone]
Pt states that he was on CPAP at home at 4     Newcomb Deangelo   Hospitalist   [phone]
Pt states that he was on CPAP at home at 4 and he did not use it because it is too noisy as per pt .      Domonique Bright   Hospitalist

## 2020-02-14 NOTE — DISCHARGE NOTE PROVIDER - NSDCFUSCHEDAPPT_GEN_ALL_CORE_FT
DACIA NAVARRO ; 02/25/2020 ; N[facility] Med [facility] [address] [facility]  DCAIA NAVARRO ; 04/21/2020 ; NPP PulmMed [address] DACIA NAVARRO ; 02/25/2020 ; NPP Med [facility] [address] [facility]  DACIA NAVARRO ; 04/21/2020 ; N[facility] Pulm[facility] [address] DACIA NAVARRO ; 02/25/2020 ; [facility] Med [facility] [address] [facility]  DACIA NAVARRO ; 04/21/2020 ; NPP Pulm[facility] [address]

## 2020-02-14 NOTE — DISCHARGE NOTE PROVIDER - NSDCMRMEDTOKEN_GEN_ALL_CORE_FT
acetaminophen 325 mg oral tablet: 2 tab(s) orally every 6 hours, As needed, Mild Pain (1 - 3)  albuterol 90 mcg/inh inhalation aerosol: 1 puff(s) inhaled every 6 hours, As Needed -for shortness of breath and/or wheezing   ALPRAZolam 1 mg oral tablet: 1 tab(s) orally every 6 hours, As needed, Anxiety  amLODIPine 5 mg oral tablet: 1 tab(s) orally   citalopram 40 mg oral tablet: 1 tab(s) orally once a day  fentaNYL 25 mcg/hr transdermal film, extended release: 1 patch transdermal every 72 hours  Multiple Vitamins oral tablet, chewable: 1 tab(s) orally once a day  oxyCODONE 10 mg oral tablet: 1 tab(s) orally every 4 hours, As needed, Severe Pain (7 - 10)  oxyCODONE 5 mg oral tablet: 1 tab(s) orally every 4 hours, As needed, Moderate Pain (4 - 6)  polyethylene glycol 3350 oral powder for reconstitution: 17 gram(s) orally 2 times a day, As needed, Constipation  senna oral tablet: 2 tab(s) orally once a day (at bedtime)  tamsulosin 0.4 mg oral capsule: 1 cap(s) orally once a day (at bedtime)  traZODone 100 mg oral tablet: 2 tab(s) orally once a day (at bedtime)  Vitamin B12 1000 mcg oral tablet: 1 tab(s) orally once a day  Vitamin D3 1000 intl units oral tablet: 1 tab(s) orally once a day  zolpidem 5 mg oral tablet: 1 tab(s) orally once a day (at bedtime), As needed, Insomnia

## 2020-02-14 NOTE — PROGRESS NOTE ADULT - PROBLEM SELECTOR PLAN 1
-Patient with difficulty ambulating, describes legs as "wobbly" and "imbalanced"  -CT head without acute intracranial pathology and  shunt described to be in place  -NSGY recs noted; No acute neurosurgical intervention  -Repeat skull XR to confirm settings done  -Fall risk protocol  -Follow up with PT
-Patient with difficulty ambulating, describes legs as "wobbly" and "imbalanced"  -CT head without acute intracranial pathology and  shunt described to be in place  -NSGY recs noted; No acute neursurgical intervention  -Follow up with Repeat skull XR to confirm settings  -Fall risk protocol  -PT consult
Worsened back pain while laying in strecther for prolonged time in the ED as reported   ISTOP Reference #: [id number]   Pt was seen by [facility] pain mgt who started Fentanyl patch and Oxycodone IR on 2/12   cont Bowel regimen  Plan is to dc to [facility] and cont outpatient pain control  Pt agrees for [facility] today / dc time 45mns.  Outpatient f/up with PCP/Neurosurgery and PT
-CT head without acute intracranial pathology and  shunt described to be in place  -NSGY recs noted; No acute neurosurgical intervention  -Repeat skull XR to confirm settings was done   -Fall risk protocol  -PT has seen patient/ awaiting [facility]   - Awaiting DC to [facility]
-Patient with difficulty ambulating, describes legs as week  -CT head without acute intracranial pathology and  shunt described to be in place  -NSGY recs noted; No acute neurosurgical intervention  -Repeat skull XR to confirm settings done is ordered today/ f/up results   -Fall risk protocol  -PT has seen patient  I suspect his recurrent falls from his multiple medications ( pt is on Laprazolam, Ambien )   will get Orthostatic BP and HR
-Patient with difficulty ambulating, describes legs as week  -CT head without acute intracranial pathology and  shunt described to be in place  -NSGY recs noted; No acute neurosurgical intervention  -Repeat skull XR to confirm settings was done   -Fall risk protocol  -PT has seen patient  I suspect his recurrent falls from his multiple medications ( pt is on Laprazolam, Ambien / might get Psych on the case / pt denies any change in his mood
-Patient with difficulty ambulating, describes legs as week  -CT head without acute intracranial pathology and  shunt described to be in place  -NSGY recs noted; No acute neurosurgical intervention  -Repeat skull XR to confirm settings was done   -Fall risk protocol  -PT has seen patient  I suspect his recurrent falls from his multiple medications ( pt is on Laprazolam, Ambien / might get Psych on the case / pt denies any change in his mood

## 2020-02-14 NOTE — PROGRESS NOTE ADULT - PROBLEM SELECTOR PLAN 7
Continue Celexa

## 2020-02-14 NOTE — DISCHARGE NOTE PROVIDER - NSDCCPCAREPLAN_GEN_ALL_CORE_FT
PRINCIPAL DISCHARGE DIAGNOSIS  Diagnosis: Chronic low back pain, unspecified back pain laterality, unspecified whether sciatica present  Assessment and Plan of Treatment:   Pt was seen by Chronic pain mgt who started Fentanyl patch and Oxycodone IR on 2/12   cont Bowel regimen  cont outpatient pain control   Outpatient f/up with PCP/Neurosurgery and PT.      SECONDARY DISCHARGE DIAGNOSES  Diagnosis: JOSE MIGUEL (acute kidney injury)  Assessment and Plan of Treatment: -Resolved  -Baseline renal function Cr 0.8   -cont to Hold HCTZ/ARB/ can be restarted as outpatient based  BP   -Monitor BMP  -Renally dose medications.       Diagnosis: Fever, unspecified fever cause  Assessment and Plan of Treatment: Unsure of Etiology and is now RESOLVED   WBC with DIff is unremarkable   RVP is negative   Neg Blood CX   I have gotten in contact with the Neurosurgery team who is not concerned about surgical wound infection / Mentation is intact.  IF pt remains febrile , will call ID and might get CT of spine to r/o any seroma or collection   F/UP Lower extremity dopplers .   IV site is clean  ID consult is appreciated / NO ABX for now.       Diagnosis: HTN (Hypertension)  Assessment and Plan of Treatment: Continue Amlodipine   -Hold ARB HCTZ  - Monitor BP.       Diagnosis: Recurrent falls  Assessment and Plan of Treatment: -CT head without acute intracranial pathology and  shunt described to be in place  ; No acute neurosurgical intervention  -Repeat skull XR to confirm settings was done   -Fall risk protocol  -PT recommends [facility]

## 2020-02-14 NOTE — PROGRESS NOTE ADULT - SUBJECTIVE AND OBJECTIVE BOX
Patient is a 69y old  Male who presents with a chief complaint of falls (13 Feb 2020 15:44)      SUBJECTIVE / OVERNIGHT EVENTS:    Patient is seen this AM with the , SHIRA Henriquez .  Patient states that he had pain on the back this AM and he received the Oxycodone .  I have explained to patient that he is ready for DC to [facility] and he agrees.  He states that he is ready to go to [facility].  NO fever , no SOB.        ADDITIONAL REVIEW OF SYSTEMS: Negative except for above    MEDICATIONS  (STANDING):  amLODIPine   Tablet 5 milliGRAM(s) Oral <User Schedule>  citalopram 40 milliGRAM(s) Oral daily  fentaNYL   Patch  25 MICROgram(s)/Hr 1 Patch Transdermal every 72 hours  heparin  Injectable 5000 Unit(s) SubCutaneous every 12 hours  senna 2 Tablet(s) Oral at bedtime  tamsulosin 0.4 milliGRAM(s) Oral at bedtime  traZODone 200 milliGRAM(s) Oral at bedtime    MEDICATIONS  (PRN):  acetaminophen   Tablet .. 650 milliGRAM(s) Oral every 6 hours PRN Mild Pain (1 - 3)  ALPRAZolam 1 milliGRAM(s) Oral every 6 hours PRN Anxiety  bisacodyl Suppository 10 milliGRAM(s) Rectal once PRN Constipation  oxyCODONE    IR 5 milliGRAM(s) Oral every 4 hours PRN Moderate Pain (4 - 6)  oxyCODONE    IR 10 milliGRAM(s) Oral every 4 hours PRN Severe Pain (7 - 10)  polyethylene glycol 3350 17 Gram(s) Oral two times a day PRN Constipation  zolpidem 5 milliGRAM(s) Oral at bedtime PRN Insomnia  zolpidem 5 milliGRAM(s) Oral at bedtime PRN Insomnia      CAPILLARY BLOOD GLUCOSE        I&O's Summary    13 Feb 2020 07:01  -  14 Feb 2020 07:00  --------------------------------------------------------  IN: 150 mL / OUT: 800 mL / NET: -650 mL        PHYSICAL EXAM:  Vital Signs Last 24 Hrs  T(C): 36.4 (14 Feb 2020 05:39), Max: 37.6 (13 Feb 2020 15:26)  T(F): 97.5 (14 Feb 2020 05:39), Max: 99.6 (13 Feb 2020 15:26)  HR: 85 (14 Feb 2020 05:39) (81 - 90)  BP: 120/72 (14 Feb 2020 05:39) (117/71 - 120/74)  BP(mean): --  RR: 18 (14 Feb 2020 05:39) (18 - 18)  SpO2: 93% (14 Feb 2020 05:39) (93% - 97%)    PHYSICAL EXAM:  GENERAL: NAD, well-developed  HEAD:  Atraumatic, Normocephalic  EYES:  conjunctiva and sclera clear  NECK: Supple, No JVD  CHEST/LUNG: Clear to auscultation bilaterally; No wheeze  HEART: Regular rate and rhythm; No murmurs, rubs, or gallops  ABDOMEN: Soft, Nontender, Nondistended; Bowel sounds present  EXTREMITIES:  2+ Peripheral Pulses, No clubbing, cyanosis, or edema  NEUROLOGY: non-focal, AAOX3   SKIN: clean surgical wound of the spine       LABS:                        8.5    10.19 )-----------( 572      ( 13 Feb 2020 06:35 )             28.8                       RADIOLOGY & ADDITIONAL TESTS:    Imaging Personally Reviewed:    Electrocardiogram Personally Reviewed:    COORDINATION OF CARE:  Care Discussed with Consultants/Other Providers [Y/N]:  Prior or Outpatient Records Reviewed [Y/N]:

## 2020-02-14 NOTE — PROGRESS NOTE ADULT - PROBLEM SELECTOR PROBLEM 2
Chronic low back pain, unspecified back pain laterality, unspecified whether sciatica present
Chronic low back pain, unspecified back pain laterality, unspecified whether sciatica present
Recurrent falls
Chronic low back pain, unspecified back pain laterality, unspecified whether sciatica present

## 2020-02-14 NOTE — PROGRESS NOTE ADULT - PROBLEM SELECTOR PROBLEM 1
Chronic low back pain, unspecified back pain laterality, unspecified whether sciatica present
Gait instability

## 2020-02-14 NOTE — PROGRESS NOTE ADULT - PROBLEM SELECTOR PLAN 10
Transitions of Care Status:  1.  Name of PCP: PCP on chart Dr Mcgrath's office was called and no answer   2.  PCP Contacted on Admission: [ ] Y    [ ] N    3.  PCP contacted at Discharge: [ ] Y    [ ] N    [ ] N/A  4.  Post-Discharge Appointment Date and Location:  5.  Summary of Handoff given to PCP:

## 2020-02-14 NOTE — PROGRESS NOTE ADULT - PROBLEM SELECTOR PLAN 5
-Continue Amlodipine   -Hold ARB HCTZ  - Monitor BP
-Continue home regimen  -Hold ARB HCTZ
-Continue home regimen  -Hold ARB HCTZ with mild JOSE MIGUEL
-Continue Amlodipine   -Hold ARB HCTZ  - Monitor BP
-Continue home regimen  -Hold ARB HCTZ  - Monitor BP

## 2020-02-14 NOTE — PROGRESS NOTE ADULT - PROBLEM SELECTOR PLAN 2
-CT head without acute intracranial pathology and  shunt described to be in place  -NSGY recs noted; No acute neurosurgical intervention  -Repeat skull XR to confirm settings was done   -Fall risk protocol  -PT has seen patient/ awaiting [facility]   - Awaiting DC to [facility] -CT head without acute intracranial pathology and  shunt described to be in place  -NSGY recs noted; No acute neurosurgical intervention  -Repeat skull XR to confirm settings was done   -Fall risk protocol  -PT has seen patient  - Awaiting DC to [facility]

## 2020-02-16 LAB
CULTURE RESULTS: SIGNIFICANT CHANGE UP
CULTURE RESULTS: SIGNIFICANT CHANGE UP
SPECIMEN SOURCE: SIGNIFICANT CHANGE UP
SPECIMEN SOURCE: SIGNIFICANT CHANGE UP

## 2020-02-17 DIAGNOSIS — I10 ESSENTIAL HYPERTENSION: ICD-10-CM

## 2020-02-17 RX ORDER — LISINOPRIL 40 MG/1
40 TABLET ORAL DAILY
Qty: 90 TABLET | Refills: 3 | Status: SHIPPED | OUTPATIENT
Start: 2020-02-17 | End: 2021-03-12

## 2020-02-17 NOTE — TELEPHONE ENCOUNTER
Please inform patient that I change strength of lisinopril to 40 mg so he can take 1 daily instead of 2 daily.  This has been sent to [facility] in [place].

## 2020-02-25 ENCOUNTER — APPOINTMENT (OUTPATIENT)
Dept: INTERNAL MEDICINE | Facility: CLINIC | Age: 70
End: 2020-02-25

## 2020-03-09 ENCOUNTER — APPOINTMENT (OUTPATIENT)
Dept: INTERNAL MEDICINE | Facility: CLINIC | Age: 70
End: 2020-03-09
Payer: MEDICARE

## 2020-03-09 VITALS
WEIGHT: 200 LBS | DIASTOLIC BLOOD PRESSURE: 68 MMHG | BODY MASS INDEX: 30.31 KG/M2 | HEART RATE: 101 BPM | SYSTOLIC BLOOD PRESSURE: 111 MMHG | HEIGHT: 68 IN

## 2020-03-09 PROCEDURE — 99213 OFFICE O/P EST LOW 20 MIN: CPT

## 2020-03-09 RX ORDER — DOXYCYCLINE HYCLATE 100 MG/1
100 TABLET ORAL
Qty: 14 | Refills: 0 | Status: DISCONTINUED | COMMUNITY
Start: 2019-12-31 | End: 2020-03-09

## 2020-03-09 NOTE — ASSESSMENT
[FreeTextEntry1] : \par 70 y/o male with h/o acute/chronic back pain s/p multiple surgeries in the past, most recent was in 1/2020 at [facility] states was doing well after discharge and progressing at [facility] rehab facility but states during stay there, performed a manuver on Thursday last week and then back pain restarted at that time.  no imaging or testing done at that time.  was discharged from facility over the weekend.\par -advised best to f/u with spinal orthopedics as he missed his follow-up appt due to rehab stay.  office able to see him tomorrow morning \par -might need additional imaging\par \par will follow-up after \par \par \par

## 2020-03-09 NOTE — PHYSICAL EXAM
[Normal] : no rash [Normal Affect] : the affect was normal [Normal Mood] : the mood was normal [de-identified] : mild distress upon position change and ambulating. not using wheelchair today  [de-identified] : mild spinal tenderness and para spinal region bilaterally.

## 2020-03-09 NOTE — HISTORY OF PRESENT ILLNESS
[de-identified] : \par 70 y/o male with h/o acute/chronic back pain s/p multiple surgeries in the past, most recent was in 1/2020 at [facility] states was doing well after discharge and progressing at [facility] rehab facility but states during stay there, performed a manuver on Thursday last week and then back pain restarted at that time.  no imaging or testing done at that time.  was discharged from facility over the weekend.  \par presenting today due to again back pain.  no weakness, distal numbness, fever/chills, \par \par was seen in pain management while hospitalized and changed to  fentanyl, oxycodone for breakthrough.  \par was transferred in [facility] physical therapy. was doing well and now on oxycodone 10mg.  \par \par  \par \par

## 2020-03-10 ENCOUNTER — APPOINTMENT (OUTPATIENT)
Dept: SPINE | Facility: CLINIC | Age: 70
End: 2020-03-10
Payer: MEDICARE

## 2020-03-10 ENCOUNTER — APPOINTMENT (OUTPATIENT)
Dept: CT IMAGING | Facility: CLINIC | Age: 70
End: 2020-03-10
Payer: MEDICARE

## 2020-03-10 ENCOUNTER — OUTPATIENT (OUTPATIENT)
Dept: OUTPATIENT SERVICES | Facility: HOSPITAL | Age: 70
LOS: 1 days | End: 2020-03-10
Payer: MEDICARE

## 2020-03-10 VITALS
HEIGHT: 68 IN | WEIGHT: 200 LBS | TEMPERATURE: 96 F | HEART RATE: 95 BPM | SYSTOLIC BLOOD PRESSURE: 118 MMHG | DIASTOLIC BLOOD PRESSURE: 81 MMHG | RESPIRATION RATE: 16 BRPM | BODY MASS INDEX: 30.31 KG/M2

## 2020-03-10 VITALS
BODY MASS INDEX: 30.31 KG/M2 | SYSTOLIC BLOOD PRESSURE: 115 MMHG | WEIGHT: 200 LBS | OXYGEN SATURATION: 93 % | HEIGHT: 68 IN | DIASTOLIC BLOOD PRESSURE: 70 MMHG | HEART RATE: 97 BPM | TEMPERATURE: 99.3 F

## 2020-03-10 DIAGNOSIS — M54.6 PAIN IN THORACIC SPINE: ICD-10-CM

## 2020-03-10 DIAGNOSIS — M54.9 DORSALGIA, UNSPECIFIED: ICD-10-CM

## 2020-03-10 DIAGNOSIS — L03.313 CELLULITIS OF CHEST WALL: ICD-10-CM

## 2020-03-10 DIAGNOSIS — M48.07 SPINAL STENOSIS, LUMBOSACRAL REGION: ICD-10-CM

## 2020-03-10 PROCEDURE — 72128 CT CHEST SPINE W/O DYE: CPT

## 2020-03-10 PROCEDURE — 72128 CT CHEST SPINE W/O DYE: CPT | Mod: 26

## 2020-03-10 PROCEDURE — 76376 3D RENDER W/INTRP POSTPROCES: CPT | Mod: 26

## 2020-03-10 PROCEDURE — 72131 CT LUMBAR SPINE W/O DYE: CPT | Mod: 26

## 2020-03-10 PROCEDURE — 76376 3D RENDER W/INTRP POSTPROCES: CPT

## 2020-03-10 PROCEDURE — 99024 POSTOP FOLLOW-UP VISIT: CPT

## 2020-03-10 PROCEDURE — 72131 CT LUMBAR SPINE W/O DYE: CPT

## 2020-03-10 RX ORDER — METHYLPHENIDATE HYDROCHLORIDE 20 MG/1
20 TABLET ORAL
Qty: 30 | Refills: 0 | Status: DISCONTINUED | COMMUNITY
Start: 2019-11-14 | End: 2020-03-10

## 2020-03-10 RX ORDER — METHOCARBAMOL 500 MG/1
500 TABLET, FILM COATED ORAL
Qty: 42 | Refills: 0 | Status: DISCONTINUED | COMMUNITY
Start: 2019-09-16 | End: 2020-03-10

## 2020-03-12 NOTE — PHYSICAL EXAM
[General Appearance - Alert] : alert [General Appearance - In No Acute Distress] : in no acute distress [Longitudinal] : longitudinal [Well-Healed] : well-healed [Oriented To Time, Place, And Person] : oriented to person, place, and time [Cranial Nerves Optic (II)] : visual acuity intact bilaterally,  pupils equal round and reactive to light [Cranial Nerves Oculomotor (III)] : extraocular motion intact [Cranial Nerves Trigeminal (V)] : facial sensation intact symmetrically [Cranial Nerves Facial (VII)] : face symmetrical [Cranial Nerves Vestibulocochlear (VIII)] : hearing was intact bilaterally [Cranial Nerves Glossopharyngeal (IX)] : tongue and palate midline [Cranial Nerves Accessory (XI - Cranial And Spinal)] : head turning and shoulder shrug symmetric [Cranial Nerves Hypoglossal (XII)] : there was no tongue deviation with protrusion [4] : S1 toe walking 4/5 [No Visual Abnormalities] : no visible abnormailities [] : no respiratory distress [Heart Rate And Rhythm] : heart rate was normal and rhythm regular [Skin Color & Pigmentation] : normal skin color and pigmentation [de-identified] : Wheelchair assist [Able to toe walk] : the patient was not able to toe walk [Able to heel walk] : the patient was not able to heel walk [FreeTextEntry1] : Wheelchair assist

## 2020-03-12 NOTE — REASON FOR VISIT
[Follow-Up: _____] : a [unfilled] follow-up visit [FreeTextEntry1] : Here for comprehensive assessment and evaluation Post Hospitalization 2/8/20  and discharged on 2/14/20. He is S/P  Revision of instrumentation L2 and S1.\par  Extension of the instrumentation at T11, T12, L1 and pelvic fixations.\par  T11 to pelvis posterolateral arthrodesis on 1/28/20\par \par \par Went to Rehab after his second rehospitalization on 2/8/20. He spent \par 3-weeks in Rehab. Overall his experience was well until hi last PT session where he experienced severe Lowerback and leg pain. He is currently home and has reported improved BILATERAL Quad Strength and Improved ambulation. He continues to receive PT/OT within the outpatient setting\par Wheelchair assist; According to pt., he ambulates with walker at home.\par \par \par \par \par \par  \par

## 2020-03-12 NOTE — ASSESSMENT
[FreeTextEntry1] : Need new study:\par CT THORACIC AND LUMBAR to assess for solid osseous fusion\par Return to office when completed.\par \par Hydromorphone refilled with referral to see pain management specialist\par Medication Purpose, Action, Possible interactions, Side effects as well as adverse effects explain to pt.\par The risk of ADDICTION explained as well as following instructions recommended.\par Teachback Protocol implemented\par \par

## 2020-03-12 NOTE — REVIEW OF SYSTEMS
[Numbness] : numbness [Tingling] : tingling [Abnormal Sensation] : an abnormal sensation [Hypersensitivity] : hypersensitivity [Difficulty Walking] : difficulty walking [Arthralgias] : arthralgias [Joint Pain] : joint pain [Negative] : Endocrine [de-identified] : Wheelchair assist

## 2020-03-24 ENCOUNTER — APPOINTMENT (OUTPATIENT)
Dept: SPINE | Facility: CLINIC | Age: 70
End: 2020-03-24

## 2020-03-28 NOTE — PHYSICAL EXAM
"No smell or drainage.      Reason for Disposition  • [1] Constantly digging or poking inside 1 ear canal AND [2] new onset AND [3] present > 2 hours    Additional Information  • Negative: Sounds like a life-threatening emergency to the triager  • Negative: Earache reported by child  • Negative: [1] Crying is the main problem AND [2] normal or minor pulling on ear  • Negative: Earwax buildup is the problem per caller  • Negative: [1] Age < 12 weeks AND [2] fever 100.4 F (38.0 C) or higher rectally  • Negative: [1] Fever AND [2] > 105 F (40.6 C) by any route OR axillary > 104 F (40 C)  • Negative: [1] Severe crying or screaming (won't stop) AND [2] present > 1 hour  • Negative: Child sounds very sick or weak to the triager  • Negative: Drainage from ear canal  • Negative: Fever is present  • Negative: MODERATE pain or crying is present (interferes with normal activities)    Answer Assessment - Initial Assessment Questions  1. BEHAVIOR: \"Describe your child's exact behavior.\"       Pulling at ears since yesterday.  2. ONSET: \"When did she start pulling at the ear?\"       Friday  3. PAIN: \"Does your child act like she's in pain?\"       She is digging at her ears to the point of redness on the outside.  4. SLEEP: \"Has she recently started awakening from sleep?\"       No waking, but hasnt been sleeping great.  5. CAUSE: \"What do you think is causing the ear pulling?\"      Earache with tubes  6. URI: \"Does your child have symptoms of a cold such as runny nose, cough, hoarseness or fever?\"       No runny nose, slight cough sounds wet, no fever, no hoarseness  7. COTTON SWABS: \"Do you or your child use cotton-tipped swabs to clean out the ear canals?\" Reason: if the answer is \"yes\" and the child has no other symptoms, impacted earwax is the most likely cause of this symptom.       Doesn't use q tips    Protocols used: EAR - PULLING AT OR RUBBING-PEDIATRIC-AH      " [Normal] : normal gait, coordination grossly intact, no focal deficits

## 2020-04-07 ENCOUNTER — APPOINTMENT (OUTPATIENT)
Dept: SPINE | Facility: CLINIC | Age: 70
End: 2020-04-07
Payer: MEDICARE

## 2020-04-07 PROCEDURE — 99024 POSTOP FOLLOW-UP VISIT: CPT

## 2020-04-07 NOTE — REVIEW OF SYSTEMS
[As Noted in HPI] : as noted in HPI [Difficulty Walking] : difficulty walking [Arthralgias] : arthralgias [Negative] : Respiratory [de-identified] : Gait Improving with BRACE [FreeTextEntry9] : CAREN BRACE

## 2020-04-07 NOTE — HISTORY OF PRESENT ILLNESS
[Home] : at home, [unfilled] , at the time of the visit. [Medical Office: ([facility])___] : at ~his/her~ medical office located in [place] [Patient] : the patient [Self] : self [FreeTextEntry1] : He reports iimproved pain syndrome with Wearing the BRACE that was custom fitted by [facility]. He will Follow up with Pain management as needed

## 2020-04-21 ENCOUNTER — APPOINTMENT (OUTPATIENT)
Dept: PULMONOLOGY | Facility: CLINIC | Age: 70
End: 2020-04-21

## 2020-04-21 ENCOUNTER — APPOINTMENT (OUTPATIENT)
Dept: SPINE | Facility: CLINIC | Age: 70
End: 2020-04-21
Payer: MEDICARE

## 2020-04-21 DIAGNOSIS — E78.2 MIXED HYPERLIPIDEMIA: ICD-10-CM

## 2020-04-21 PROCEDURE — 99024 POSTOP FOLLOW-UP VISIT: CPT

## 2020-04-21 NOTE — REASON FOR VISIT
[de-identified] : S/P Revision of instrumentation L2 and S1.\par  Extension of the instrumentation at T11, T12, L1 and pelvic fixations.\par  T11 to pelvis posterolateral arthrodesis.\par  [de-identified] : 1/28/20

## 2020-04-21 NOTE — HISTORY OF PRESENT ILLNESS
[Home] : at home, [unfilled] , at the time of the visit. [Medical Office: ([facility])___] : at the medical office located in  [Patient] : the patient [Self] : self [FreeTextEntry4] : Ga Dean, NP

## 2020-04-22 RX ORDER — ROSUVASTATIN CALCIUM 10 MG/1
TABLET, COATED ORAL
Qty: 90 TABLET | Refills: 3 | Status: SHIPPED | OUTPATIENT
Start: 2020-04-22 | End: 2021-11-08 | Stop reason: SDUPTHER

## 2020-05-01 ENCOUNTER — RESULTS ENCOUNTER (OUTPATIENT)
Dept: FAMILY MEDICINE CLINIC | Facility: CLINIC | Age: 70
End: 2020-05-01

## 2020-05-01 DIAGNOSIS — I10 ESSENTIAL HYPERTENSION: ICD-10-CM

## 2020-05-01 DIAGNOSIS — E78.2 MIXED HYPERLIPIDEMIA: ICD-10-CM

## 2020-05-01 DIAGNOSIS — E55.9 VITAMIN D DEFICIENCY: ICD-10-CM

## 2020-05-01 DIAGNOSIS — E11.9 TYPE 2 DIABETES MELLITUS WITHOUT COMPLICATION, WITHOUT LONG-TERM CURRENT USE OF INSULIN (HCC): ICD-10-CM

## 2020-05-02 LAB
25(OH)D3+25(OH)D2 SERPL-MCNC: 70 NG/ML (ref 30–100)
ALBUMIN SERPL-MCNC: 4 G/DL (ref 3.8–4.8)
ALBUMIN/GLOB SERPL: 1.7 {RATIO} (ref 1.2–2.2)
ALP SERPL-CCNC: 56 IU/L (ref 39–117)
ALT SERPL-CCNC: 45 IU/L (ref 0–44)
AST SERPL-CCNC: 50 IU/L (ref 0–40)
BILIRUB SERPL-MCNC: 0.4 MG/DL (ref 0–1.2)
BUN SERPL-MCNC: 13 MG/DL (ref 8–27)
BUN/CREAT SERPL: 16 (ref 10–24)
CALCIUM SERPL-MCNC: 9 MG/DL (ref 8.6–10.2)
CHLORIDE SERPL-SCNC: 99 MMOL/L (ref 96–106)
CHOLEST SERPL-MCNC: 117 MG/DL (ref 100–199)
CO2 SERPL-SCNC: 22 MMOL/L (ref 20–29)
CREAT SERPL-MCNC: 0.79 MG/DL (ref 0.76–1.27)
GLOBULIN SER CALC-MCNC: 2.4 G/DL (ref 1.5–4.5)
GLUCOSE SERPL-MCNC: 210 MG/DL (ref 65–99)
HBA1C MFR BLD: 10.1 % (ref 4.8–5.6)
HDLC SERPL-MCNC: 39 MG/DL
LDLC SERPL CALC-MCNC: 29 MG/DL (ref 0–99)
POTASSIUM SERPL-SCNC: 4.8 MMOL/L (ref 3.5–5.2)
PROT SERPL-MCNC: 6.4 G/DL (ref 6–8.5)
SODIUM SERPL-SCNC: 136 MMOL/L (ref 134–144)
TRIGL SERPL-MCNC: 245 MG/DL (ref 0–149)
VLDLC SERPL CALC-MCNC: 49 MG/DL (ref 5–40)

## 2020-05-04 NOTE — PROGRESS NOTE ADULT - PROBLEM/PLAN-8
DISPLAY PLAN FREE TEXT
negative...

## 2020-05-05 NOTE — PROGRESS NOTES
Please inform patient A1c has increased to 10.1 please bring glucose readings, will need to adjust medications.

## 2020-05-06 ENCOUNTER — OFFICE VISIT (OUTPATIENT)
Dept: FAMILY MEDICINE CLINIC | Facility: CLINIC | Age: 70
End: 2020-05-06

## 2020-05-06 VITALS
SYSTOLIC BLOOD PRESSURE: 130 MMHG | WEIGHT: 290 LBS | HEART RATE: 66 BPM | BODY MASS INDEX: 42.95 KG/M2 | OXYGEN SATURATION: 96 % | HEIGHT: 69 IN | TEMPERATURE: 97.3 F | DIASTOLIC BLOOD PRESSURE: 72 MMHG

## 2020-05-06 DIAGNOSIS — E66.01 OBESITY, CLASS III, BMI 40-49.9 (MORBID OBESITY) (HCC): ICD-10-CM

## 2020-05-06 DIAGNOSIS — G89.29 CHRONIC PAIN OF BOTH SHOULDERS: ICD-10-CM

## 2020-05-06 DIAGNOSIS — M25.512 CHRONIC PAIN OF BOTH SHOULDERS: ICD-10-CM

## 2020-05-06 DIAGNOSIS — I10 ESSENTIAL HYPERTENSION: ICD-10-CM

## 2020-05-06 DIAGNOSIS — E11.9 TYPE 2 DIABETES MELLITUS WITHOUT COMPLICATION, WITHOUT LONG-TERM CURRENT USE OF INSULIN (HCC): ICD-10-CM

## 2020-05-06 DIAGNOSIS — E78.2 MIXED HYPERLIPIDEMIA: Primary | ICD-10-CM

## 2020-05-06 DIAGNOSIS — M25.511 CHRONIC PAIN OF BOTH SHOULDERS: ICD-10-CM

## 2020-05-06 PROCEDURE — 99214 OFFICE O/P EST MOD 30 MIN: CPT | Performed by: FAMILY MEDICINE

## 2020-05-06 NOTE — PROGRESS NOTES
Chief Complaint   Patient presents with   • Diabetes   • Hypertension   • Shoulder Pain       Subjective     Orion Harvey is a 69 y.o. male.  He is presenting today in office for follow-up on diabetes and hypertension.  He is also complaining of right greater than left shoulder pain for the past few months as well as ongoing bilateral knee pain.  He states it is hurting to mow the grass, hurts to do any exercise, having difficulty casting a fishing pole.  He reports these arthritic changes are greatly limiting his physical activity    Diabetes, home glucose logs are scant, he has only 5 recorded readings from February and March and none from April or May.  Those readings ranged from 163-275.  Since sheltering in place at home they have been cooking and eating a lot more, not paying attention to concentrated sweets and eating a lot more starches.      Hypertension, he is checking his blood pressure nearly every day with readings ranging from 120/ 3/83 mostly in the high 130s to low 150s.      The following portions of the patient's history were reviewed and updated as appropriate: allergies, current medications, past family history, past medical history, past social history, past surgical history and problem list.    Current Outpatient Medications on File Prior to Visit   Medication Sig   • acetaminophen (TYLENOL) 650 MG 8 hr tablet Take 2 tablets by mouth Every 8 (Eight) Hours As Needed for Mild Pain  or Moderate Pain .   • ascorbic acid (VITAMIN C) 1000 MG tablet Take 1 tablet by mouth Daily.   • aspirin 325 MG tablet Take 1 tablet by mouth Daily.   • Dulaglutide 1.5 MG/0.5ML solution pen-injector Inject 1.5 mg under the skin into the appropriate area as directed 1 (One) Time Per Week.   • glimepiride (AMARYL) 4 MG tablet TAKE 1 TABLET BY MOUTH TWICE DAILY   • lisinopril (PRINIVIL,ZESTRIL) 40 MG tablet Take 1 tablet by mouth Daily.   • metFORMIN (GLUCOPHAGE) 1000 MG tablet TAKE 1 TABLET BY MOUTH TWICE  "DAILY   • Multiple Vitamins-Minerals (CENTRUM SILVER) tablet Take 1 tablet by mouth Daily.   • ONE TOUCH ULTRA TEST test strip TEST BLOOD SUGAR ONCE DAILY   • ONETOUCH DELICA LANCETS 33G misc 1 tablet by In Vitro route Every 12 (Twelve) Hours.   • rosuvastatin (CRESTOR) 10 MG tablet Take 1 tablet by mouth once daily   • vitamin d ( VITAMIN D3) 5000 units capsule Take 1 tablet by mouth Daily.     No current facility-administered medications on file prior to visit.      There are no discontinued medications.    Review of Systems   Constitutional: Positive for unexpected weight gain. Negative for fatigue and unexpected weight loss.   Eyes: Negative for blurred vision.   Cardiovascular: Negative for chest pain.   Endocrine: Negative for polydipsia, polyphagia and polyuria.   Musculoskeletal: Positive for arthralgias ( Particularly in his shoulders and knees).   Skin: Negative for pallor and rash.   Neurological: Negative for dizziness, tremors, seizures, speech difficulty, weakness and confusion.   Psychiatric/Behavioral: The patient is not nervous/anxious.         Objective   Vitals:    05/06/20 1306   BP: 130/72   BP Location: Left arm   Patient Position: Sitting   Cuff Size: Adult   Pulse: 66   Temp: 97.3 °F (36.3 °C)   TempSrc: Oral   SpO2: 96%   Weight: 132 kg (290 lb)   Height: 175.3 cm (69.02\")      Body mass index is 42.81 kg/m².    Physical Exam   Constitutional: He is oriented to person, place, and time. He appears well-developed and well-nourished. No distress.   Morbidly obese   Eyes: Pupils are equal, round, and reactive to light. Conjunctivae and EOM are normal.   Neck: Normal range of motion.   Cardiovascular: Normal rate and regular rhythm.   No murmur heard.  Pulmonary/Chest: Effort normal and breath sounds normal. He has no wheezes.   Musculoskeletal: He exhibits no edema.   Significantly limited range of motion in his right, much greater than left shoulder.  Passive abduction of right limited at " about 100 degrees.  Positive impingement test on the right.  Greatly reduced internal rotation bilaterally right limited by pain left limited more by body habitus.  There is significant tenderness to palpation of the right anterior shoulder at the AC joint but also posteriorly and laterally at the glenohumeral head.   Neurological: He is alert and oriented to person, place, and time.   Skin: Skin is warm and dry.   Psychiatric: He has a normal mood and affect.   Nursing note and vitals reviewed.      Lab Results   Component Value Date     (H) 05/01/2020    BUN 13 05/01/2020    CREATININE 0.79 05/01/2020    EGFRIFNONA 92 05/01/2020    EGFRIFAFRI 107 05/01/2020     05/01/2020    K 4.8 05/01/2020    CL 99 05/01/2020    CALCIUM 9.0 05/01/2020    ALBUMIN 4.0 05/01/2020    BILITOT 0.4 05/01/2020    ALKPHOS 56 05/01/2020    AST 50 (H) 05/01/2020    ALT 45 (H) 05/01/2020    CHLPL 117 05/01/2020    TRIG 245 (H) 05/01/2020    HDL 39 (L) 05/01/2020    VLDL 49 (H) 05/01/2020    LDL 29 05/01/2020    NZFT85AZ 70.0 05/01/2020      Lab Results   Component Value Date    HGBA1C 10.1 (H) 05/01/2020    HGBA1C 9.3 (H) 01/31/2020    HGBA1C 7.3 (H) 10/30/2019    HGBA1C 9.1 (H) 07/24/2019      Bilateral shoulder x-rays as ordered today  IMPRESSION:  1.Severe arthritis of both glenohumeral joints, right worse than left.  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  2.Findings of chronic rotator cuff pathology bilaterally.  3.Moderate size subacromial osteophyte on the left.  4.Remote healed fracture of the mid left clavicle.    Procedures     Assessment/Plan   Diagnoses and all orders for this visit:    1. Mixed hyperlipidemia (Primary)  -     Comprehensive Metabolic Panel; Future  -     Lipid Panel; Future    2. Essential hypertension  -     Comprehensive Metabolic Panel; Future    3. Obesity, Class III, BMI 40-49.9 (morbid  obesity) (CMS/[facility])    4. Chronic pain of both shoulders  -     Cancel: XR Shoulder 2+ View Left  -     Cancel: XR Shoulder 2+ View Right (In Office)  -     Ambulatory Referral to Physical Therapy Evaluate and treat  -     diclofenac (VOLTAREN) 1 % gel gel; Apply 4 g topically to the appropriate area as directed 4 (Four) Times a Day As Needed (shoulder pain).  Dispense: 300 g; Refill: 2  -     XR shoulder 2+ vw bilateral    5. Type 2 diabetes mellitus without complication, without long-term current use of insulin (CMS/[facility])  -     Hemoglobin A1c; Future         There are no discontinued medications.       Return in about 3 months (around 8/6/2020) for Recheck, diabetes, cholesterol, htn, shoulder pain, with Labs.        AVS given with information on shoulder exercises  Advised him to start the range of motion exercises, referral to physical therapy and prescription for topical diclofenac.  If these treatments do not improve pain and mobility to a tolerable level would recommend referral to orthopedist for further evaluation and treatment.    Stressed need to follow a low concentrated sweet diet increase physical activity and work on weight reduction.  If A1c does not improve with dietary and lifestyle modification changes may have to utilize a basal insulin.  Patient declines starting that at this time.  Labs as ordered in 3 months prior to follow-up.

## 2020-05-06 NOTE — PATIENT INSTRUCTIONS
Shoulder Range of Motion Exercises  Shoulder range of motion (ROM) exercises are done to keep the shoulder moving freely or to increase movement. They are often recommended for people who have shoulder pain or stiffness or who are recovering from a shoulder surgery.  Phase 1 exercises  When you are able, do this exercise 1-2 times per day for 30-60 seconds in each direction, or as directed by your health care provider.  Pendulum exercise  To do this exercise while sittin. Sit in a chair or at the edge of your bed with your feet flat on the floor.  2. Let your affected arm hang down in front of you over the edge of the bed or chair.  3. Relax your shoulder, arm, and hand.  4. Rock your body so your arm gently swings in small circles. You can also use your unaffected arm to start the motion.  5. Repeat changing the direction of the circles, swinging your arm left and right, and swinging your arm forward and back.  To do this exercise while standin. Stand next to a sturdy chair or table, and hold on to it with your hand on your unaffected side.  2. Bend forward at the waist.  3. Bend your knees slightly.  4. Relax your shoulder, arm, and hand.  5. While keeping your shoulder relaxed, use body motion to swing your arm in small circles.  6. Repeat changing the direction of the circles, swinging your arm left and right, and swinging your arm forward and back.  7. Between exercises, stand up tall and take a short break to relax your lower back.    Phase 2 exercises  Do these exercises 1-2 times per day or as told by your health care provider. Hold each stretch for 30 seconds, and repeat 3 times. Do the exercises with one or both arms as instructed by your health care provider.  For these exercises, sit at a table with your hand and arm supported by the table. A chair that slides easily or has wheels can be helpful.  External rotation  1. Turn your chair so that your affected side is nearest to the  table.  2. Place your forearm on the table to your side. Bend your elbow about 90° at the elbow (right angle) and place your hand palm facing down on the table. Your elbow should be about 6 inches away from your side.  3. Keeping your arm on the table, lean your body forward.  Abduction  1. Turn your chair so that your affected side is nearest to the table.  2. Place your forearm and hand on the table so that your thumb points toward the ceiling and your arm is straight out to your side.  3. Slide your hand out to the side and away from you, using your unaffected arm to do the work.  4. To increase the stretch, you can slide your chair away from the table.  Flexion: forward stretch  1. Sit facing the table. Place your hand and elbow on the table in front of you.  2. Slide your hand forward and away from you, using your unaffected arm to do the work.  3. To increase the stretch, you can slide your chair backward.  Phase 3 exercises  Do these exercises 1-2 times per day or as told by your health care provider. Hold each stretch for 30 seconds, and repeat 3 times. Do the exercises with one or both arms as instructed by your health care provider.  Cross-body stretch: posterior capsule stretch  1. Lift your arm straight out in front of you.  2. Bend your arm 90° at the elbow (right angle) so your forearm moves across your body.  3. Use your other arm to gently pull the elbow across your body, toward your other shoulder.  Wall climbs  1. Stand with your affected arm extended out to the side with your hand resting on a door frame.  2. Slide your hand slowly up the door frame.  3. To increase the stretch, step through the door frame. Keep your body upright and do not lean.  Wand exercises  You will need a cane, a piece of PVC pipe, or a sturdy wooden dowel for wand exercises.  Flexion  To do this exercise while standin. Hold the wand with both of your hands, palms down.  2. Using the other arm to help, lift your arms  up and over your head, if able.  3. Push upward with your other arm to gently increase the stretch.  To do this exercise while lying down:  1. Lie on your back with your elbows resting on the floor and the wand in both your hands. Your hands will be palm down, or pointing toward your feet.  2. Lift your hands toward the ceiling, using your unaffected arm to help if needed.  3. Bring your arms overhead as able, using your unaffected arm to help if needed.  Internal rotation  1. Stand while holding the wand behind you with both hands. Your unaffected arm should be extended above your head with the arm of the affected side extended behind you at the level of your waist. The wand should be pointing straight up and down as you hold it.  2. Slowly pull the wand up behind your back by straightening the elbow of your unaffected arm and bending the elbow of your affected arm.  External rotation  1. Lie on your back with your affected upper arm supported on a small pillow or rolled towel. When you first do this exercise, keep your upper arm close to your body. Over time, bring your arm up to a 90° angle out to the side.  2. Hold the wand across your stomach and with both hands palm up. Your elbow on your affected side should be bent at a 90° angle.  3. Use your unaffected side to help push your forearm away from you and toward the floor. Keep your elbow on your affected side bent at a 90° angle.  Contact a health care provider if you have:  · New or increasing pain.  · New numbness, tingling, weakness, or discoloration in your arm or hand.  This information is not intended to replace advice given to you by your health care provider. Make sure you discuss any questions you have with your health care provider.  Document Released: 09/15/2004 Document Revised: 01/30/2019 Document Reviewed: 01/30/2019  [employer]vier Interactive Patient Education © 2020 [employer]    Shoulder Exercises  Ask your health care provider which exercises  are safe for you. Do exercises exactly as told by your health care provider and adjust them as directed. It is normal to feel mild stretching, pulling, tightness, or discomfort as you do these exercises, but you should stop right away if you feel sudden pain or your pain gets worse. Do not begin these exercises until told by your health care provider.  Range of Motion Exercises              These exercises warm up your muscles and joints and improve the movement and flexibility of your shoulder. These exercises also help to relieve pain, numbness, and tingling. These exercises involve stretching your injured shoulder directly.  Exercise A: Pendulum  1. Stand near a wall or a surface that you can hold onto for balance.  2. Bend at the waist and let your left / right arm hang straight down. Use your other arm to support you. Keep your back straight and do not lock your knees.  3. Relax your left / right arm and shoulder muscles, and move your hips and your trunk so your left / right arm swings freely. Your arm should swing because of the motion of your body, not because you are using your arm or shoulder muscles.  4. Keep moving your body so your arm swings in the following directions, as told by your health care provider:  ? Side to side.  ? Forward and backward.  ? In clockwise and counterclockwise circles.  5. Continue each motion for __________ seconds, or for as long as told by your health care provider.  6. Slowly return to the starting position.  Repeat __________ times. Complete this exercise __________ times a day.  Exercise B: Flexion, Standing  1. Stand and hold a broomstick, a cane, or a similar object. Place your hands a little more than shoulder-width apart on the object. Your left / right hand should be palm-up, and your other hand should be palm-down.  2. Keep your elbow straight and keep your shoulder muscles relaxed. Push the stick down with your healthy arm to raise your left / right arm in front of  your body, and then over your head until you feel a stretch in your shoulder.  ? Avoid shrugging your shoulder while you raise your arm. Keep your shoulder blade tucked down toward the middle of your back.  3. Hold for __________ seconds.  4. Slowly return to the starting position.  Repeat __________ times. Complete this exercise __________ times a day.  Exercise C: Abduction, Standing  1. Stand and hold a broomstick, a cane, or a similar object. Place your hands a little more than shoulder-width apart on the object. Your left / right hand should be palm-up, and your other hand should be palm-down.  2. While keeping your elbow straight and your shoulder muscles relaxed, push the stick across your body toward your left / right side. Raise your left / right arm to the side of your body and then over your head until you feel a stretch in your shoulder.  ? Do not raise your arm above shoulder height, unless your health care provider tells you to do that.  ? Avoid shrugging your shoulder while you raise your arm. Keep your shoulder blade tucked down toward the middle of your back.  3. Hold for __________ seconds.  4. Slowly return to the starting position.  Repeat __________ times. Complete this exercise __________ times a day.  Exercise D: Internal Rotation  1. Place your left / right hand behind your back, palm-up.  2. Use your other hand to dangle an exercise band, a towel, or a similar object over your shoulder. Grasp the band with your left / right hand so you are holding onto both ends.  3. Gently pull up on the band until you feel a stretch in the front of your left / right shoulder.  ? Avoid shrugging your shoulder while you raise your arm. Keep your shoulder blade tucked down toward the middle of your back.  4. Hold for __________ seconds.  5. Release the stretch by letting go of the band and lowering your hands.  Repeat __________ times. Complete this exercise __________ times a day.  Stretching  Exercises    These exercises warm up your muscles and joints and improve the movement and flexibility of your shoulder. These exercises also help to relieve pain, numbness, and tingling. These exercises are done using your healthy shoulder to help stretch the muscles of your injured shoulder.  Exercise E: Corner Stretch (External Rotation and Abduction)  1.  a doorway with one of your feet slightly in front of the other. This is called a staggered stance. If you cannot reach your forearms to the door frame, stand facing a corner of a room.  2. Choose one of the following positions as told by your health care provider:  ? Place your hands and forearms on the door frame above your head.  ? Place your hands and forearms on the door frame at the height of your head.  ? Place your hands on the door frame at the height of your elbows.  3. Slowly move your weight onto your front foot until you feel a stretch across your chest and in the front of your shoulders. Keep your head and chest upright and keep your abdominal muscles tight.  4. Hold for __________ seconds.  5. To release the stretch, shift your weight to your back foot.  Repeat __________ times. Complete this stretch __________ times a day.  Exercise F: Extension, Standing  1. Stand and hold a broomstick, a cane, or a similar object behind your back.  ? Your hands should be a little wider than shoulder-width apart.  ? Your palms should face away from your back.  2. Keeping your elbows straight and keeping your shoulder muscles relaxed, move the stick away from your body until you feel a stretch in your shoulder.  ? Avoid shrugging your shoulders while you move the stick. Keep your shoulder blade tucked down toward the middle of your back.  3. Hold for __________ seconds.  4. Slowly return to the starting position.  Repeat __________ times. Complete this exercise __________ times a day.  Strengthening Exercises                   These exercises build  strength and endurance in your shoulder. Endurance is the ability to use your muscles for a long time, even after they get tired.  Exercise G: External Rotation  1. Sit in a stable chair without armrests.  2. Secure an exercise band at elbow height on your left / right side.  3. Place a soft object, such as a folded towel or a small pillow, between your left / right upper arm and your body to move your elbow a few inches away (about 10 cm) from your side.  4. Hold the end of the band so it is tight and there is no slack.  5. Keeping your elbow pressed against the soft object, move your left / right forearm out, away from your abdomen. Keep your body steady so only your forearm moves.  6. Hold for __________ seconds.  7. Slowly return to the starting position.  Repeat __________ times. Complete this exercise __________ times a day.  Exercise H: Shoulder Abduction  1. Sit in a stable chair without armrests, or stand.  2. Hold a __________ weight in your left / right hand, or hold an exercise band with both hands.  3. Start with your arms straight down and your left / right palm facing in, toward your body.  4. Slowly lift your left / right hand out to your side. Do not lift your hand above shoulder height unless your health care provider tells you that this is safe.  ? Keep your arms straight.  ? Avoid shrugging your shoulder while you do this movement. Keep your shoulder blade tucked down toward the middle of your back.  5. Hold for __________ seconds.  6. Slowly lower your arm, and return to the starting position.  Repeat __________ times. Complete this exercise __________ times a day.  Exercise I: Shoulder Extension  1. Sit in a stable chair without armrests, or stand.  2. Secure an exercise band to a stable object in front of you where it is at shoulder height.  3. Hold one end of the exercise band in each hand. Your palms should face each other.  4. Straighten your elbows and lift your hands up to shoulder  "height.  5. Step back, away from the secured end of the exercise band, until the band is tight and there is no slack.  6. Squeeze your shoulder blades together as you pull your hands down to the sides of your thighs. Stop when your hands are straight down by your sides. Do not let your hands go behind your body.  7. Hold for __________ seconds.  8. Slowly return to the starting position.  Repeat __________ times. Complete this exercise __________ times a day.  Exercise J: Standing Shoulder Row  1. Sit in a stable chair without armrests, or stand.  2. Secure an exercise band to a stable object in front of you so it is at waist height.  3. Hold one end of the exercise band in each hand. Your palms should be in a thumbs-up position.  4. Bend each of your elbows to an \"L\" shape (about 90 degrees) and keep your upper arms at your sides.  5. Step back until the band is tight and there is no slack.  6. Slowly pull your elbows back behind you.  7. Hold for __________ seconds.  8. Slowly return to the starting position.  Repeat __________ times. Complete this exercise __________ times a day.  Exercise K: Shoulder Press-Ups  1. Sit in a stable chair that has armrests. Sit upright, with your feet flat on the floor.  2. Put your hands on the armrests so your elbows are bent and your fingers are pointing forward. Your hands should be about even with the sides of your body.  3. Push down on the armrests and use your arms to lift yourself off of the chair. Straighten your elbows and lift yourself up as much as you comfortably can.  ? Move your shoulder blades down, and avoid letting your shoulders move up toward your ears.  ? Keep your feet on the ground. As you get stronger, your feet should support less of your body weight as you lift yourself up.  4. Hold for __________ seconds.  5. Slowly lower yourself back into the chair.  Repeat __________ times. Complete this exercise __________ times a day.  Exercise L: Lit " Push-Ups  1. Stand so you are facing a stable wall. Your feet should be about one arm-length away from the wall.  2. Lean forward and place your palms on the wall at shoulder height.  3. Keep your feet flat on the floor as you bend your elbows and lean forward toward the wall.  4. Hold for __________ seconds.  5. Straighten your elbows to push yourself back to the starting position.  Repeat __________ times. Complete this exercise __________ times a day.  This information is not intended to replace advice given to you by your health care provider. Make sure you discuss any questions you have with your health care provider.  Document Released: 11/01/2006 Document Revised: 04/23/2019 Document Reviewed: 08/28/2016  [employer] Interactive Patient Education © 2020 [employer]

## 2020-05-06 NOTE — PROGRESS NOTES
Please inform patient x-rays do show severe arthritis in both shoulders right greater than left there is also findings consistent with chronic rotator cuff pathology.  Definitely will want him to start the range of motion exercises and physical therapy and try the topical diclofenac.  If these treatments do not improve pain and mobility to a tolerable level would recommend referral to orthopedist for further evaluation and treatment.    IMPRESSION:  1.Severe arthritis of both glenohumeral joints, right worse than left.  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  2.Findings of chronic rotator cuff pathology bilaterally.  3.Moderate size subacromial osteophyte on the left.  4.Remote healed fracture of the mid left clavicle.

## 2020-05-13 ENCOUNTER — TREATMENT (OUTPATIENT)
Dept: PHYSICAL THERAPY | Facility: CLINIC | Age: 70
End: 2020-05-13

## 2020-05-13 DIAGNOSIS — M25.511 CHRONIC PAIN OF BOTH SHOULDERS: Primary | ICD-10-CM

## 2020-05-13 DIAGNOSIS — M25.512 CHRONIC PAIN OF BOTH SHOULDERS: Primary | ICD-10-CM

## 2020-05-13 DIAGNOSIS — G89.29 CHRONIC PAIN OF BOTH SHOULDERS: Primary | ICD-10-CM

## 2020-05-13 PROCEDURE — G0283 ELEC STIM OTHER THAN WOUND: HCPCS | Performed by: PHYSICAL THERAPIST

## 2020-05-13 PROCEDURE — 97140 MANUAL THERAPY 1/> REGIONS: CPT | Performed by: PHYSICAL THERAPIST

## 2020-05-13 PROCEDURE — 97161 PT EVAL LOW COMPLEX 20 MIN: CPT | Performed by: PHYSICAL THERAPIST
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
## 2020-05-18 ENCOUNTER — APPOINTMENT (OUTPATIENT)
Dept: CT IMAGING | Facility: CLINIC | Age: 70
End: 2020-05-18
Payer: MEDICARE

## 2020-05-18 ENCOUNTER — OUTPATIENT (OUTPATIENT)
Dept: OUTPATIENT SERVICES | Facility: HOSPITAL | Age: 70
LOS: 1 days | End: 2020-05-18
Payer: MEDICARE

## 2020-05-18 ENCOUNTER — RESULT REVIEW (OUTPATIENT)
Age: 70
End: 2020-05-18

## 2020-05-18 DIAGNOSIS — M54.6 PAIN IN THORACIC SPINE: ICD-10-CM

## 2020-05-18 DIAGNOSIS — M54.9 DORSALGIA, UNSPECIFIED: ICD-10-CM

## 2020-05-18 DIAGNOSIS — K52.9 NONINFECTIVE GASTROENTERITIS AND COLITIS, UNSPECIFIED: ICD-10-CM

## 2020-05-18 PROCEDURE — 72128 CT CHEST SPINE W/O DYE: CPT | Mod: 26

## 2020-05-18 PROCEDURE — 72128 CT CHEST SPINE W/O DYE: CPT

## 2020-05-18 PROCEDURE — 72131 CT LUMBAR SPINE W/O DYE: CPT | Mod: 26

## 2020-05-18 PROCEDURE — 76376 3D RENDER W/INTRP POSTPROCES: CPT | Mod: 26

## 2020-05-18 PROCEDURE — 72131 CT LUMBAR SPINE W/O DYE: CPT

## 2020-05-18 PROCEDURE — 76376 3D RENDER W/INTRP POSTPROCES: CPT

## 2020-05-19 ENCOUNTER — APPOINTMENT (OUTPATIENT)
Dept: SPINE | Facility: CLINIC | Age: 70
End: 2020-05-19
Payer: MEDICARE

## 2020-05-19 ENCOUNTER — APPOINTMENT (OUTPATIENT)
Dept: PULMONOLOGY | Facility: CLINIC | Age: 70
End: 2020-05-19
Payer: MEDICARE

## 2020-05-19 ENCOUNTER — TREATMENT (OUTPATIENT)
Dept: PHYSICAL THERAPY | Facility: CLINIC | Age: 70
End: 2020-05-19

## 2020-05-19 VITALS
WEIGHT: 200 LBS | DIASTOLIC BLOOD PRESSURE: 65 MMHG | BODY MASS INDEX: 30.31 KG/M2 | HEART RATE: 92 BPM | TEMPERATURE: 98.3 F | HEIGHT: 68 IN | OXYGEN SATURATION: 92 % | SYSTOLIC BLOOD PRESSURE: 108 MMHG | RESPIRATION RATE: 18 BRPM

## 2020-05-19 DIAGNOSIS — M25.511 CHRONIC PAIN OF BOTH SHOULDERS: Primary | ICD-10-CM

## 2020-05-19 DIAGNOSIS — G89.29 CHRONIC PAIN OF BOTH SHOULDERS: Primary | ICD-10-CM

## 2020-05-19 DIAGNOSIS — M25.512 CHRONIC PAIN OF BOTH SHOULDERS: Primary | ICD-10-CM

## 2020-05-19 PROCEDURE — 97110 THERAPEUTIC EXERCISES: CPT | Performed by: PHYSICAL THERAPIST

## 2020-05-19 PROCEDURE — 99213 OFFICE O/P EST LOW 20 MIN: CPT | Mod: 95

## 2020-05-19 PROCEDURE — G0283 ELEC STIM OTHER THAN WOUND: HCPCS | Performed by: PHYSICAL THERAPIST

## 2020-05-19 PROCEDURE — 99213 OFFICE O/P EST LOW 20 MIN: CPT

## 2020-05-19 PROCEDURE — 97140 MANUAL THERAPY 1/> REGIONS: CPT | Performed by: PHYSICAL THERAPIST

## 2020-05-19 NOTE — PROGRESS NOTES
Physical Therapy Daily Progress Note      Patient: Orion Harvey   : 1950  Diagnosis/ICD-10 Code:  Chronic pain of both shoulders [M25.511, G89.29, M25.512]  Referring practitioner: Nazanin Garcia*  Date of Initial Visit: Type: THERAPY  Noted: 2020  Today's Date: 2020  Patient seen for 2 sessions         Orion Harvey reports: he has been using his pulleys at home and feels like arm is going higher but cannot lift it own his own. Eager to be able to fish. Using heating pad a lot on shoulders and it feels good.    Objective : Instructed pt in use of pulleys for AAROM with good demonstration returned.  See Exercise, Manual, and Modality Logs for complete treatment.       Assessment/Plan : Good tolerance to ther ex. Tolerated addition of strengthening ther ex with no reports of pain. Needs continued scap strengthening and progressing of shoulder supine AROM as tolerated.    Progress per Plan of Care           Timed:         Manual Therapy:    15     mins  75615;     Therapeutic Exercise:    15     mins  47868;     Neuromuscular Taylor:        mins  38453;    Therapeutic Activity:          mins  18737;     Gait Training:           mins  99799;     Ultrasound:          mins  93954;    Ionto                                   mins   86367  Self Care                            mins   94461  Canalith Repos                   mins  4209    Un-Timed:  Electrical Stimulation:    15     mins  62577 ( );  Dry Needling          mins self-pay  Traction          mins 00371  Low Eval          Mins  71660  Mod Eval          Mins  31310  High Eval                            Mins  39104    Timed Treatment:   30   mins   Total Treatment:     45   mins    Vangie Torres, PT  Physical Therapist

## 2020-05-19 NOTE — HISTORY OF PRESENT ILLNESS
[TextBox_4] : since last visit had back surgery\par now needs revision \par using cpap no complaints\par had about 20 days of noncompliance which is around the time of his hospitlization\par \par 70/90 days\par 8hrs/night\par ahi 1.3

## 2020-05-19 NOTE — REASON FOR VISIT
[Medical Office: ([facility])___] : at the medical office located in  [Home] : at home, [unfilled] , at the time of the visit. [Formal Caregiver] : formal caregiver [Patient] : the patient

## 2020-05-20 NOTE — REVIEW OF SYSTEMS
[Feeling Poorly] : feeling poorly [Poor Coordination] : poor coordination [Numbness] : numbness [Tingling] : tingling [Abnormal Sensation] : an abnormal sensation [Difficulty Walking] : difficulty walking [Ataxia] : ataxia [Arthralgias] : arthralgias [Joint Pain] : joint pain [Joint Stiffness] : joint stiffness [Limb Pain] : limb pain [Negative] : Heme/Lymph [Joint Swelling] : no joint swelling [Limb Swelling] : no limb swelling

## 2020-05-20 NOTE — PHYSICAL EXAM
[General Appearance - Alert] : alert [General Appearance - In No Acute Distress] : in no acute distress [Oriented To Time, Place, And Person] : oriented to person, place, and time [I: Normal Smell] : smell intact bilaterally [Cranial Nerves Oculomotor (III)] : extraocular motion intact [Cranial Nerves Trigeminal (V)] : facial sensation intact symmetrically [Cranial Nerves Optic (II)] : visual acuity intact bilaterally,  pupils equal round and reactive to light [Cranial Nerves Glossopharyngeal (IX)] : tongue and palate midline [Cranial Nerves Vestibulocochlear (VIII)] : hearing was intact bilaterally [Cranial Nerves Facial (VII)] : face symmetrical [Cranial Nerves Accessory (XI - Cranial And Spinal)] : head turning and shoulder shrug symmetric [Cranial Nerves Hypoglossal (XII)] : there was no tongue deviation with protrusion [Sensation Tactile Decrease] : light touch was intact [Limited Balance] : the patient's balance was impaired [No Visual Abnormalities] : no visible abnormailities [Respiration, Rhythm And Depth] : normal respiratory rhythm and effort [] : the neck was supple [Skin Color & Pigmentation] : normal skin color and pigmentation [Able to heel walk] : the patient was not able to heel walk [Able to toe walk] : the patient was not able to toe walk [FreeTextEntry1] : Limited Mobility

## 2020-05-20 NOTE — ASSESSMENT
[FreeTextEntry1] : Surgery Recommended; Revision Spine Fusion\par \par After detailed imaging review and patient examination surgical intervention was discussed with the patient to halt progression of symptoms. \par Potential surgical risks and benefits and alternative discussed with time allowed for questions and answers given. \par Dr Castanon discussed in detail that loss of function preop is not guaranteed to return. Surgical intervention is to halt further progression although at times the process still continues despite intervention Risks could include but is not limited to infection, no resolution of symptoms/device failure; Paralysis, death.\par Pre-op requirements and postop recovery and expectations discussed regarding Follow up NP for wound care assessment and medication management.\par \par \par \par \par \par

## 2020-05-20 NOTE — REASON FOR VISIT
[Follow-Up: _____] : a [unfilled] follow-up visit [FreeTextEntry1] : S/P  Revision of instrumentation L2 and S1.\par  Extension of the instrumentation at T11, T12, L1 and pelvic fixations.\par  T11 to pelvis posterolateral arthrodesis on 1/28/20\par \par Here with New CT  for review\par Reports persistent pain and discomfort in his lower back\par He reports significant limitations in his mobility and quality of life

## 2020-05-21 ENCOUNTER — TREATMENT (OUTPATIENT)
Dept: PHYSICAL THERAPY | Facility: CLINIC | Age: 70
End: 2020-05-21

## 2020-05-21 DIAGNOSIS — M25.511 CHRONIC PAIN OF BOTH SHOULDERS: Primary | ICD-10-CM

## 2020-05-21 DIAGNOSIS — G89.29 CHRONIC PAIN OF BOTH SHOULDERS: Primary | ICD-10-CM

## 2020-05-21 DIAGNOSIS — M25.512 CHRONIC PAIN OF BOTH SHOULDERS: Primary | ICD-10-CM

## 2020-05-21 PROCEDURE — G0283 ELEC STIM OTHER THAN WOUND: HCPCS | Performed by: PHYSICAL THERAPIST

## 2020-05-21 PROCEDURE — 97110 THERAPEUTIC EXERCISES: CPT | Performed by: PHYSICAL THERAPIST

## 2020-05-21 PROCEDURE — 97140 MANUAL THERAPY 1/> REGIONS: CPT | Performed by: PHYSICAL THERAPIST

## 2020-05-21 NOTE — PROGRESS NOTES
Physical Therapy Daily Progress Note      Patient: Orion Harvey   : 1950  Diagnosis/ICD-10 Code:  Chronic pain of both shoulders [M25.511, G89.29, M25.512]  Referring practitioner: Nazanin Garcia*  Date of Initial Visit: Type: THERAPY  Noted: 2020  Today's Date: 2020  Patient seen for 3 sessions         Orion Harvey reports: shoulders were a little sore after last visit but improved and feels like they are loosening up some. States he has gotten his fishing jacki out at home and practiced doing casting and it is a little better.    Objective : Added flex/scaption wall slides with difficulty performing on R.  See Exercise, Manual, and Modality Logs for complete treatment.       Assessment/Plan : B shoulder PROM and AAROM improving. Difficulty performing wall slides on R due to weakness and crepitus, able to tolerate to ~90 deg with assist. Needs continued shoulder and scap strengthening to improve functional use of B UE.    Progress per Plan of Care           Timed:         Manual Therapy:    10     mins  64572;     Therapeutic Exercise:    20     mins  49628;     Neuromuscular Taylor:        mins  22152;    Therapeutic Activity:          mins  96144;     Gait Training:           mins  03078;     Ultrasound:          mins  50988;    Ionto                                   mins   35076  Self Care                            mins   33065  Canalith Repos                   mins  4209    Un-Timed:  Electrical Stimulation:    15     mins  51715 ( );  Dry Needling          mins self-pay  Traction          mins 83123  Low Eval          Mins  83681  Mod Eval          Mins  46085  High Eval                            Mins  02082    Timed Treatment:   30   mins   Total Treatment:     45   mins    Vangie Torres, PT  Physical Therapist

## 2020-05-24 ENCOUNTER — INPATIENT (INPATIENT)
Facility: HOSPITAL | Age: 70
LOS: 11 days | Discharge: HOME | DRG: 460 | End: 2020-06-05
Attending: NEUROLOGICAL SURGERY | Admitting: NEUROLOGICAL SURGERY
Payer: MEDICARE

## 2020-05-24 VITALS
OXYGEN SATURATION: 95 % | HEART RATE: 82 BPM | RESPIRATION RATE: 20 BRPM | HEIGHT: 68 IN | WEIGHT: 199.96 LBS | DIASTOLIC BLOOD PRESSURE: 68 MMHG | TEMPERATURE: 99 F | SYSTOLIC BLOOD PRESSURE: 116 MMHG

## 2020-05-24 DIAGNOSIS — R26.2 DIFFICULTY IN WALKING, NOT ELSEWHERE CLASSIFIED: ICD-10-CM

## 2020-05-24 LAB
ALBUMIN SERPL ELPH-MCNC: 3.6 G/DL — SIGNIFICANT CHANGE UP (ref 3.3–5)
ALP SERPL-CCNC: 88 U/L — SIGNIFICANT CHANGE UP (ref 40–120)
ALT FLD-CCNC: <5 U/L — LOW (ref 10–45)
ANION GAP SERPL CALC-SCNC: 12 MMOL/L — SIGNIFICANT CHANGE UP (ref 5–17)
ANISOCYTOSIS BLD QL: SLIGHT — SIGNIFICANT CHANGE UP
APPEARANCE UR: CLEAR — SIGNIFICANT CHANGE UP
AST SERPL-CCNC: 8 U/L — LOW (ref 10–40)
BACTERIA # UR AUTO: NEGATIVE — SIGNIFICANT CHANGE UP
BASOPHILS # BLD AUTO: 0.08 K/UL — SIGNIFICANT CHANGE UP (ref 0–0.2)
BASOPHILS NFR BLD AUTO: 0.9 % — SIGNIFICANT CHANGE UP (ref 0–2)
BILIRUB SERPL-MCNC: 0.4 MG/DL — SIGNIFICANT CHANGE UP (ref 0.2–1.2)
BILIRUB UR-MCNC: NEGATIVE — SIGNIFICANT CHANGE UP
BUN SERPL-MCNC: 13 MG/DL — SIGNIFICANT CHANGE UP (ref 7–23)
CALCIUM SERPL-MCNC: 9.2 MG/DL — SIGNIFICANT CHANGE UP (ref 8.4–10.5)
CHLORIDE SERPL-SCNC: 97 MMOL/L — SIGNIFICANT CHANGE UP (ref 96–108)
CO2 SERPL-SCNC: 25 MMOL/L — SIGNIFICANT CHANGE UP (ref 22–31)
COLOR SPEC: SIGNIFICANT CHANGE UP
CREAT SERPL-MCNC: 1.06 MG/DL — SIGNIFICANT CHANGE UP (ref 0.5–1.3)
DIFF PNL FLD: NEGATIVE — SIGNIFICANT CHANGE UP
ELLIPTOCYTES BLD QL SMEAR: SLIGHT — SIGNIFICANT CHANGE UP
EOSINOPHIL # BLD AUTO: 0.16 K/UL — SIGNIFICANT CHANGE UP (ref 0–0.5)
EOSINOPHIL NFR BLD AUTO: 1.7 % — SIGNIFICANT CHANGE UP (ref 0–6)
EPI CELLS # UR: 0 /HPF — SIGNIFICANT CHANGE UP
GIANT PLATELETS BLD QL SMEAR: PRESENT — SIGNIFICANT CHANGE UP
GLUCOSE SERPL-MCNC: 112 MG/DL — HIGH (ref 70–99)
GLUCOSE UR QL: NEGATIVE — SIGNIFICANT CHANGE UP
HCT VFR BLD CALC: 30 % — LOW (ref 39–50)
HGB BLD-MCNC: 8.3 G/DL — LOW (ref 13–17)
HYALINE CASTS # UR AUTO: 0 /LPF — SIGNIFICANT CHANGE UP (ref 0–2)
HYPOCHROMIA BLD QL: SLIGHT — SIGNIFICANT CHANGE UP
KETONES UR-MCNC: NEGATIVE — SIGNIFICANT CHANGE UP
LEUKOCYTE ESTERASE UR-ACNC: NEGATIVE — SIGNIFICANT CHANGE UP
LYMPHOCYTES # BLD AUTO: 1.37 K/UL — SIGNIFICANT CHANGE UP (ref 1–3.3)
LYMPHOCYTES # BLD AUTO: 14.8 % — SIGNIFICANT CHANGE UP (ref 13–44)
MANUAL SMEAR VERIFICATION: SIGNIFICANT CHANGE UP
MCHC RBC-ENTMCNC: 19.8 PG — LOW (ref 27–34)
MCHC RBC-ENTMCNC: 27.7 GM/DL — LOW (ref 32–36)
MCV RBC AUTO: 71.4 FL — LOW (ref 80–100)
MICROCYTES BLD QL: SLIGHT — SIGNIFICANT CHANGE UP
MONOCYTES # BLD AUTO: 0.24 K/UL — SIGNIFICANT CHANGE UP (ref 0–0.9)
MONOCYTES NFR BLD AUTO: 2.6 % — SIGNIFICANT CHANGE UP (ref 2–14)
NEUTROPHILS # BLD AUTO: 7.4 K/UL — SIGNIFICANT CHANGE UP (ref 1.8–7.4)
NEUTROPHILS NFR BLD AUTO: 80 % — HIGH (ref 43–77)
NITRITE UR-MCNC: NEGATIVE — SIGNIFICANT CHANGE UP
PH UR: 6 — SIGNIFICANT CHANGE UP (ref 5–8)
PLAT MORPH BLD: NORMAL — SIGNIFICANT CHANGE UP
PLATELET # BLD AUTO: 401 K/UL — HIGH (ref 150–400)
POIKILOCYTOSIS BLD QL AUTO: SLIGHT — SIGNIFICANT CHANGE UP
POLYCHROMASIA BLD QL SMEAR: SLIGHT — SIGNIFICANT CHANGE UP
POTASSIUM SERPL-MCNC: 3.6 MMOL/L — SIGNIFICANT CHANGE UP (ref 3.5–5.3)
POTASSIUM SERPL-SCNC: 3.6 MMOL/L — SIGNIFICANT CHANGE UP (ref 3.5–5.3)
PROT SERPL-MCNC: 7.4 G/DL — SIGNIFICANT CHANGE UP (ref 6–8.3)
PROT UR-MCNC: NEGATIVE — SIGNIFICANT CHANGE UP
RBC # BLD: 4.2 M/UL — SIGNIFICANT CHANGE UP (ref 4.2–5.8)
RBC # FLD: 17 % — HIGH (ref 10.3–14.5)
RBC BLD AUTO: NORMAL — SIGNIFICANT CHANGE UP
RBC CASTS # UR COMP ASSIST: 2 /HPF — SIGNIFICANT CHANGE UP (ref 0–4)
SARS-COV-2 RNA SPEC QL NAA+PROBE: SIGNIFICANT CHANGE UP
SODIUM SERPL-SCNC: 134 MMOL/L — LOW (ref 135–145)
SP GR SPEC: 1.01 — LOW (ref 1.01–1.02)
UROBILINOGEN FLD QL: NEGATIVE — SIGNIFICANT CHANGE UP
WBC # BLD: 9.25 K/UL — SIGNIFICANT CHANGE UP (ref 3.8–10.5)
WBC # FLD AUTO: 9.25 K/UL — SIGNIFICANT CHANGE UP (ref 3.8–10.5)
WBC UR QL: 0 /HPF — SIGNIFICANT CHANGE UP (ref 0–5)

## 2020-05-24 PROCEDURE — 99285 EMERGENCY DEPT VISIT HI MDM: CPT

## 2020-05-24 RX ORDER — OXYCODONE HYDROCHLORIDE 5 MG/1
5 TABLET ORAL EVERY 4 HOURS
Refills: 0 | Status: DISCONTINUED | OUTPATIENT
Start: 2020-05-24 | End: 2020-05-25

## 2020-05-24 RX ORDER — TRAZODONE HCL 50 MG
100 TABLET ORAL AT BEDTIME
Refills: 0 | Status: DISCONTINUED | OUTPATIENT
Start: 2020-05-24 | End: 2020-05-25

## 2020-05-24 RX ORDER — TAMSULOSIN HYDROCHLORIDE 0.4 MG/1
0.4 CAPSULE ORAL AT BEDTIME
Refills: 0 | Status: DISCONTINUED | OUTPATIENT
Start: 2020-05-24 | End: 2020-05-25

## 2020-05-24 RX ORDER — ALBUTEROL 90 UG/1
1 AEROSOL, METERED ORAL EVERY 6 HOURS
Refills: 0 | Status: DISCONTINUED | OUTPATIENT
Start: 2020-05-24 | End: 2020-05-27

## 2020-05-24 RX ORDER — CITALOPRAM 10 MG/1
40 TABLET, FILM COATED ORAL DAILY
Refills: 0 | Status: DISCONTINUED | OUTPATIENT
Start: 2020-05-24 | End: 2020-05-25

## 2020-05-24 RX ORDER — ACETAMINOPHEN 500 MG
650 TABLET ORAL EVERY 6 HOURS
Refills: 0 | Status: DISCONTINUED | OUTPATIENT
Start: 2020-05-24 | End: 2020-05-27

## 2020-05-24 RX ORDER — ALPRAZOLAM 0.25 MG
1 TABLET ORAL EVERY 6 HOURS
Refills: 0 | Status: DISCONTINUED | OUTPATIENT
Start: 2020-05-24 | End: 2020-05-26

## 2020-05-24 RX ORDER — OXYCODONE HYDROCHLORIDE 5 MG/1
10 TABLET ORAL EVERY 4 HOURS
Refills: 0 | Status: DISCONTINUED | OUTPATIENT
Start: 2020-05-24 | End: 2020-05-25

## 2020-05-24 RX ORDER — SENNA PLUS 8.6 MG/1
2 TABLET ORAL AT BEDTIME
Refills: 0 | Status: DISCONTINUED | OUTPATIENT
Start: 2020-05-24 | End: 2020-05-27

## 2020-05-24 RX ORDER — ZOLPIDEM TARTRATE 10 MG/1
5 TABLET ORAL AT BEDTIME
Refills: 0 | Status: DISCONTINUED | OUTPATIENT
Start: 2020-05-24 | End: 2020-05-27

## 2020-05-24 RX ORDER — FENTANYL CITRATE 50 UG/ML
1 INJECTION INTRAVENOUS
Refills: 0 | Status: DISCONTINUED | OUTPATIENT
Start: 2020-05-24 | End: 2020-05-25

## 2020-05-24 RX ORDER — AMLODIPINE BESYLATE 2.5 MG/1
5 TABLET ORAL DAILY
Refills: 0 | Status: DISCONTINUED | OUTPATIENT
Start: 2020-05-24 | End: 2020-05-25

## 2020-05-24 RX ORDER — POLYETHYLENE GLYCOL 3350 17 G/17G
17 POWDER, FOR SOLUTION ORAL
Refills: 0 | Status: DISCONTINUED | OUTPATIENT
Start: 2020-05-24 | End: 2020-05-27

## 2020-05-24 RX ADMIN — FENTANYL CITRATE 1 PATCH: 50 INJECTION INTRAVENOUS at 18:43

## 2020-05-24 RX ADMIN — FENTANYL CITRATE 1 PATCH: 50 INJECTION INTRAVENOUS at 20:25

## 2020-05-24 RX ADMIN — OXYCODONE HYDROCHLORIDE 10 MILLIGRAM(S): 5 TABLET ORAL at 20:32

## 2020-05-24 NOTE — ED PROVIDER NOTE - CLINICAL SUMMARY MEDICAL DECISION MAKING FREE TEXT BOX
Joseph Frankel PGY1: 68 yo M with previous back surgeries presenting with trouble walking and recent falls. VSS. Patient looks well and is non toxic appearing. PE as above. No concern for cord compression at this time. However patient falling at home with no caretaker at home and trouble walking would be unsafe discharge. Will consult neurosurgery and most likely admit.

## 2020-05-24 NOTE — ED PROVIDER NOTE - ATTENDING CONTRIBUTION TO CARE
70yo male pmh multiple spinal surgeries p/w weakness this morning, taking dilaudid PO for pain. Pt denies trauma, fevers, n/v/d, abdominal pain, upper back pain, dysuria, hematuria, IVDU, saddle anesthesia or urinary or fecal incontinence. Recent CT last week with screw loosening. Normal strenght on exam, abd soft, no bony tenderness. Discussed with neurosurgery, will admit for surgery. Labs, COVID swab.

## 2020-05-24 NOTE — H&P ADULT - HISTORY OF PRESENT ILLNESS
68 yo man with PMH chronic back pain s/p multiple lumbar laminectomy/fusion with recent admission from 1/22/2020 to 2/3/2020 for R42-[id number] Fusion on 1/28 , NPH s/p  shunt, HTN, HLD, anxiety, depression, GULSHAN on CPAP  was admitted to the hospital on 2/8 from home in setting of frequent falls. Subsequently D/C to [facility], presents to ED with recent falls and found to have loosening of hardware on CT, plan for RTOR.

## 2020-05-24 NOTE — ED PROVIDER NOTE - OBJECTIVE STATEMENT
68 yo M with ho of 10 back surgeries presenting for increased weakness and falls. Denies hitting head or losing consciousness. Has been living with a care taker but he no longer will be. Currently denying back pain as he took dilaudid prior to coming to the ED. Denies saddle anesthesia, sensory changes in lower extremities, bowel/urinary retention or incontinence. Denies nausea, vomiting, chills, fevers. Denies urinary frequency, urgency, or dysuria. Denies recent trauma. Denies history of cancer, IV drug use, or immunosuppression. Denies smoking. Denies history of hypocoagulability.

## 2020-05-24 NOTE — H&P ADULT - ASSESSMENT
Teto Cannon  69M s/p multiple OSH back Sx latest was T11-P fusion w/ Dr. Castanon 1/20/20, presents to ED s/p falls at home. Saw Lg in office this week, CT shows some fusion L3-5, halo-ing of sacral and L5 screws. Exam: sleepy but arousable, AAOx3, FC, LIMA full strength.  - admit NSGY floor  - plan for OR this week for hardware revision

## 2020-05-24 NOTE — ED ADULT NURSE NOTE - OBJECTIVE STATEMENT
69y male arrived to ED [facility] for weakness and frequent falls at home. Patient Patient has had back surgeries x10 recently d/c from rehab and lives at home with a caretaker who will no longer be able to continue working. PMHx chronic back pain, sciatica, HTN, depression. Patient took 4mg Dilaudid PTA, denies pain at this time. Patient A&Ox3, but appears drowsy. VS stable. Ambulates with walker. Patient denies CP, SOB, n/v/d, abd pain, chills, fever, loss of sensation, numbness/tingling, urinary or bowel changes, head injury/LOC.

## 2020-05-24 NOTE — ED PROVIDER NOTE - NS ED ROS FT
Gen: Denies fever, weight loss  CV: Denies chest pain, palpitations  HEENT: Denies neck pain, sore throat, eye discharge  Skin: Denies rash, erythema, color changes  Resp: Denies SOB, cough  Endo: Denies sensitivity to heat, cold, increased urination  GI: Denies constipation, nausea, vomiting  Msk: Denies LE swelling, extremity pain  : Denies dysuria, increased frequency  Neuro: Denies LOC, weakness, seizures  Psych: Denies hx of psych, hallucinations, SI

## 2020-05-24 NOTE — ED PROVIDER NOTE - PHYSICAL EXAMINATION
Gen: Alert and oriented. Answering questions appropriately  HEENT: extra occular movements intact, no nasal discharge, mucous membranes moist  CV: Regular rate and rhythm, +S1/S2, no murmurs/rubs/gallops,   Resp: Clear to ausculation bilaterally, no wheezes/rhonchi/rales  GI: Abdomen soft non-distended, non tender to palpation, no masses  : No CVA tenderness  MSK: No point Tenderness above spine  Neuro:  A&Ox4, following commands,   No saddle anesthesia, rectal tone intact.   No sensory deficits at LEs bilaterally.   Strength: Hip flexors/extendors 5/5 bilaterally, knee flexion/extension 5/5 bilaterally, dosiflexion/plantarflexion of foot 5/5 bilaterally, Having trouble walking.

## 2020-05-25 DIAGNOSIS — M54.5 LOW BACK PAIN: ICD-10-CM

## 2020-05-25 DIAGNOSIS — G47.33 OBSTRUCTIVE SLEEP APNEA (ADULT) (PEDIATRIC): ICD-10-CM

## 2020-05-25 DIAGNOSIS — I10 ESSENTIAL (PRIMARY) HYPERTENSION: ICD-10-CM

## 2020-05-25 DIAGNOSIS — D64.9 ANEMIA, UNSPECIFIED: ICD-10-CM

## 2020-05-25 PROCEDURE — 99223 1ST HOSP IP/OBS HIGH 75: CPT

## 2020-05-25 PROCEDURE — 99233 SBSQ HOSP IP/OBS HIGH 50: CPT

## 2020-05-25 RX ORDER — TRAZODONE HCL 50 MG
100 TABLET ORAL ONCE
Refills: 0 | Status: COMPLETED | OUTPATIENT
Start: 2020-05-25 | End: 2020-05-25

## 2020-05-25 RX ORDER — AMLODIPINE BESYLATE 2.5 MG/1
5 TABLET ORAL DAILY
Refills: 0 | Status: DISCONTINUED | OUTPATIENT
Start: 2020-05-26 | End: 2020-05-27

## 2020-05-25 RX ORDER — LOSARTAN POTASSIUM 100 MG/1
25 TABLET, FILM COATED ORAL DAILY
Refills: 0 | Status: DISCONTINUED | OUTPATIENT
Start: 2020-05-26 | End: 2020-05-27

## 2020-05-25 RX ORDER — CHOLECALCIFEROL (VITAMIN D3) 125 MCG
1 CAPSULE ORAL
Qty: 0 | Refills: 0 | DISCHARGE

## 2020-05-25 RX ORDER — HYDROMORPHONE HYDROCHLORIDE 2 MG/ML
4 INJECTION INTRAMUSCULAR; INTRAVENOUS; SUBCUTANEOUS EVERY 6 HOURS
Refills: 0 | Status: DISCONTINUED | OUTPATIENT
Start: 2020-05-25 | End: 2020-05-25

## 2020-05-25 RX ORDER — TAMSULOSIN HYDROCHLORIDE 0.4 MG/1
0.8 CAPSULE ORAL AT BEDTIME
Refills: 0 | Status: DISCONTINUED | OUTPATIENT
Start: 2020-05-25 | End: 2020-05-27

## 2020-05-25 RX ORDER — LOSARTAN POTASSIUM 100 MG/1
100 TABLET, FILM COATED ORAL DAILY
Refills: 0 | Status: DISCONTINUED | OUTPATIENT
Start: 2020-05-25 | End: 2020-05-25

## 2020-05-25 RX ORDER — TRAMADOL HYDROCHLORIDE 50 MG/1
50 TABLET ORAL EVERY 4 HOURS
Refills: 0 | Status: DISCONTINUED | OUTPATIENT
Start: 2020-05-25 | End: 2020-05-26

## 2020-05-25 RX ORDER — TAPENTADOL HYDROCHLORIDE 50 MG/1
150 TABLET, FILM COATED ORAL EVERY 12 HOURS
Refills: 0 | Status: DISCONTINUED | OUTPATIENT
Start: 2020-05-25 | End: 2020-05-25

## 2020-05-25 RX ORDER — TRAMADOL HYDROCHLORIDE 50 MG/1
25 TABLET ORAL EVERY 4 HOURS
Refills: 0 | Status: DISCONTINUED | OUTPATIENT
Start: 2020-05-25 | End: 2020-05-27

## 2020-05-25 RX ORDER — ENOXAPARIN SODIUM 100 MG/ML
40 INJECTION SUBCUTANEOUS
Refills: 0 | Status: DISCONTINUED | OUTPATIENT
Start: 2020-05-25 | End: 2020-05-26

## 2020-05-25 RX ADMIN — OXYCODONE HYDROCHLORIDE 10 MILLIGRAM(S): 5 TABLET ORAL at 11:54

## 2020-05-25 RX ADMIN — TAMSULOSIN HYDROCHLORIDE 0.4 MILLIGRAM(S): 0.4 CAPSULE ORAL at 00:15

## 2020-05-25 RX ADMIN — SENNA PLUS 2 TABLET(S): 8.6 TABLET ORAL at 00:15

## 2020-05-25 RX ADMIN — TAMSULOSIN HYDROCHLORIDE 0.8 MILLIGRAM(S): 0.4 CAPSULE ORAL at 21:38

## 2020-05-25 RX ADMIN — ENOXAPARIN SODIUM 40 MILLIGRAM(S): 100 INJECTION SUBCUTANEOUS at 17:33

## 2020-05-25 RX ADMIN — FENTANYL CITRATE 1 PATCH: 50 INJECTION INTRAVENOUS at 07:19

## 2020-05-25 RX ADMIN — Medication 100 MILLIGRAM(S): at 00:15

## 2020-05-25 RX ADMIN — Medication 100 MILLIGRAM(S): at 21:37

## 2020-05-25 RX ADMIN — Medication 1 MILLIGRAM(S): at 16:14

## 2020-05-25 RX ADMIN — TRAMADOL HYDROCHLORIDE 50 MILLIGRAM(S): 50 TABLET ORAL at 23:09

## 2020-05-25 RX ADMIN — OXYCODONE HYDROCHLORIDE 10 MILLIGRAM(S): 5 TABLET ORAL at 05:21

## 2020-05-25 RX ADMIN — FENTANYL CITRATE 1 PATCH: 50 INJECTION INTRAVENOUS at 19:51

## 2020-05-25 RX ADMIN — AMLODIPINE BESYLATE 5 MILLIGRAM(S): 2.5 TABLET ORAL at 09:01

## 2020-05-25 RX ADMIN — CITALOPRAM 40 MILLIGRAM(S): 10 TABLET, FILM COATED ORAL at 11:49

## 2020-05-25 NOTE — PROGRESS NOTE ADULT - ASSESSMENT
68 yo man with PMH chronic back pain s/p multiple lumbar laminectomy/fusion with recent admission from 1/22/2020 to 2/3/2020 for W90-Xjzsyd Fusion on 1/28 , NPH s/p  shunt, HTN, HLD, anxiety, depression, GULSHAN on CPAP  was admitted to the hospital on 2/8 from home in setting of frequent falls. Subsequently D/C to [facility] [place], presents to ED with recent falls and found to have loosening of hardware on CT, plan for RTOR. (24 May 2020 13:20)    PLAN:  -Neuro: OR plan as per Dr. Castanon  -Oxy IR and fentanyl patch for pain control    -Encouraged incentive spirometry   -Continue norvasc for hypertension  -Senna, miralax for bowel regimen  -Continue home meds  -DVT ppx: venodynes and sql; LED pending  -PT: after OR    Will discuss above with Dr. Castanon   Spectralink #[id number]      Assessment:  Please Check When Present   []  GCS  E   V  M     Heart Failure: []Acute, [] acute on chronic , []chronic  Heart Failure:  [] Diastolic (HFpEF), [] Systolic (HFrEF), []Combined (HFpEF and HFrEF), [] RHF, [] Pulm HTN, [] Other    [] JOSE MIGUEL, [] ATN, [] AIN, [] other  [] CKD1, [] CKD2, [] CKD 3, [] CKD 4, [] CKD 5, []ESRD    Encephalopathy: [] Metabolic, [] Hepatic, [] toxic, [] Neurological, [] Other    Abnormal Nurtitional Status: [] malnurtition (see nutrition note), [ ]underweight: BMI < 19, [] morbid obesity: BMI >40, [] Cachexia    [] Sepsis  [] hypovolemic shock,[] cardiogenic shock, [] hemorrhagic shock, [] neuogenic shock  [] Acute Respiratory Failure  []Cerebral edema, [] Brain compression/ herniation,   [] Functional quadriplegia  [] Acute blood loss anemia 68 yo man with PMH chronic back pain s/p multiple lumbar laminectomy/fusion with recent admission from 1/22/2020 to 2/3/2020 for D37-Aulopi Fusion on 1/28 , NPH s/p  shunt, HTN, HLD, anxiety, depression, GULSHAN on CPAP  was admitted to the hospital on 2/8 from home in setting of frequent falls. Subsequently D/C to [facility], presents to ED with recent falls and found to have loosening of hardware on CT, plan for RTOR. (24 May 2020 13:20)    PLAN:  -Neuro: OR plan as per Dr. Castanon  -Oxy IR and fentanyl patch for pain control    -Encouraged incentive spirometry   -Continue norvasc for hypertension  -Senna, miralax for bowel regimen  -Hospitalist called for medical clearance   -Pre op labs ordered for am   -Continue flomax for bph   -Continue home meds  -DVT ppx: venodynes and sql; LED pending  -PT: after OR    Will discuss above with Dr. Castanon   Spectralink #[id number]      Assessment:  Please Check When Present   []  GCS  E   V  M     Heart Failure: []Acute, [] acute on chronic , []chronic  Heart Failure:  [] Diastolic (HFpEF), [] Systolic (HFrEF), []Combined (HFpEF and HFrEF), [] RHF, [] Pulm HTN, [] Other    [] JOSE MIGUEL, [] ATN, [] AIN, [] other  [] CKD1, [] CKD2, [] CKD 3, [] CKD 4, [] CKD 5, []ESRD    Encephalopathy: [] Metabolic, [] Hepatic, [] toxic, [] Neurological, [] Other    Abnormal Nurtitional Status: [] malnurtition (see nutrition note), [ ]underweight: BMI < 19, [] morbid obesity: BMI >40, [] Cachexia    [] Sepsis  [] hypovolemic shock,[] cardiogenic shock, [] hemorrhagic shock, [] neuogenic shock  [] Acute Respiratory Failure  []Cerebral edema, [] Brain compression/ herniation,   [] Functional quadriplegia  [] Acute blood loss anemia 70 yo man with PMH chronic back pain s/p multiple lumbar laminectomy/fusion with recent admission from 1/22/2020 to 2/3/2020 for O40-Cpolxu Fusion on 1/28 , NPH s/p  shunt, HTN, HLD, anxiety, depression, GULSHAN on CPAP  was admitted to the hospital on 2/8 from home in setting of frequent falls. Subsequently D/C to [facility], presents to ED with recent falls and found to have loosening of hardware on CT, plan for RTOR. (24 May 2020 13:20)    PLAN:  -Neuro: OR plan as per Dr. Castanon  -Oxy IR and fentanyl patch for pain control    -Encouraged incentive spirometry   -Continue norvasc for hypertension  -Senna, miralax for bowel regimen  -Hospitalist called for medical clearance   -Pre op labs ordered for am   -Continue flomax for bph   -Continue home meds  -DVT ppx: venodynes and sql; LED pending  -PT: after OR    Will discuss above with Dr. Castanon   Spectralink #[id number]      Assessment:  Please Check When Present   []  GCS  E   V  M     Heart Failure: []Acute, [] acute on chronic , []chronic  Heart Failure:  [] Diastolic (HFpEF), [] Systolic (HFrEF), []Combined (HFpEF and HFrEF), [] RHF, [] Pulm HTN, [] Other    [] JOSE MIGUEL, [] ATN, [] AIN, [] other  [] CKD1, [] CKD2, [] CKD 3, [] CKD 4, [] CKD 5, []ESRD    Encephalopathy: [] Metabolic, [] Hepatic, [] toxic, [] Neurological, [] Other    Abnormal Nurtitional Status: [] malnurtition (see nutrition note), [ ]underweight: BMI < 19, [] morbid obesity: BMI >40, [] Cachexia    [] Sepsis  [] hypovolemic shock,[] cardiogenic shock, [] hemorrhagic shock, [] neuogenic shock  [] Acute Respiratory Failure  []Cerebral edema, [] Brain compression/ herniation,   [] Functional quadriplegia  [] Acute blood loss anemia    More than 30 minutes were spent educating the patient and family regarding condition, medications, follow up plans, signs and symptoms to be concerned with, preparing paperwork, and questions answered regarding discharge.

## 2020-05-25 NOTE — PROGRESS NOTE ADULT - SUBJECTIVE AND OBJECTIVE BOX
SUBJECTIVE: Patient seen and examined at bedside. Complains of left lower leg pain.     OVERNIGHT EVENTS: no acute overnight events.     Vital Signs Last 24 Hrs  T(C): 36.7 (25 May 2020 09:06), Max: 37.1 (25 May 2020 00:03)  T(F): 98.1 (25 May 2020 09:06), Max: 98.7 (25 May 2020 00:03)  HR: 75 (25 May 2020 09:06) (71 - 80)  BP: 111/66 (25 May 2020 09:06) (106/60 - 139/83)  BP(mean): --  RR: 16 (25 May 2020 09:06) (16 - 16)  SpO2: 94% (25 May 2020 09:06) (93% - 98%)    PHYSICAL EXAM:    General: No Acute Distress     Neurological: Awake, alert oriented to person, place and time, Following Commands, PERRL, EOMI, Face Symmetrical, Speech Fluent, Moving all extremities, b/l UE 5/5, LLE 4/5 (pain limited) RLE 5/5, No Drift, Sensation to Light Touch Intact    Pulmonary: Clear to Auscultation, No Rales, No Rhonchi, No Wheezes     Cardiovascular: S1, S2, Regular Rate and Rhythm     Gastrointestinal: Soft, Nontender, Nondistended     LABS:                        8.3    9.25  )-----------( 401      ( 24 May 2020 12:47 )             30.0    05-24    134<L>  |  97  |  13  ----------------------------<  112<H>  3.6   |  25  |  1.06    Ca    9.2      24 May 2020 12:47    TPro  7.4  /  Alb  3.6  /  TBili  0.4  /  DBili  x   /  AST  8<L>  /  ALT  <5<L>  /  AlkPhos  88  05-24 05-24 @ 07:01  -  05-25 @ 07:00  --------------------------------------------------------  IN: 400 mL / OUT: 950 mL / NET: -550 mL    05-25 @ 07:01  - 05-25 @ 11:53  --------------------------------------------------------  IN: 0 mL / OUT: 300 mL / NET: -300 mL    DIET: regular diet     IMAGING: < from: CT 3D Reconstruct w/o Workstation (05.18.20 @ 11:43) >  IMPRESSION: Increased lucency surrounding the bilateral sacral screws consistent with screw loosening. Unchanged lucency surrounding the bilateral L5 screws.    < end of copied text >  < from: CT Thoracic Spine No Cont (05.18.20 @ 11:43) >  INTERPRETATION:  LUMBOSACRAL AND THORACIC SPINE CT    CLINICAL INFORMATION: Back pain after prior fusion. Evaluate for osseous fusion  TECHNIQUE: Axial CT images were obtained of the lumbosacral spine and thoracic spine with coronal and sagittal reconstructions. 3-dimensional reconstructions were obtained for improved evaluation of the hardware. Comparisonis made to prior study from 3/10/2020.    FINDINGS:    As seen previously the patient is status post posterior decompression from L2 through S1 with instrumented posterior fusion extending from T11 through the sacrum and across the sacroiliac joints.The patient is status post anterior fusion at L5-S1. 1 of the left-sided screw remains somewhat rounded relative to the anterior device without change compared to multiple prior studies. There is unchanged lucency seen around the L5-S1 hardware alongwith cystic change, lucency and sclerosis on both sides of the disc space. There is disc graft placement is present at L4-L5.    There is increased lucency surrounding the bilateral sacral screws that extend across the SI joint consistent with screw loosening. Relatively unchanged lucency is seen surrounding the bilateral L5 pedicle screws consistent with screw loosening. There is solid posterior osseous fusion at L3-L4 and L4-L5 with very small regions of fusion seen at L2-L3    There is unchanged central compression deformity of the L1 vertebral body. There is unchanged mild retrolisthesis at L2-L3.    Posterior soft tissue swelling is seen within the subcutaneous soft tissues and paraspinal musculature at the level of the laminectomy defects in the lower lumbar spine.    IMPRESSION: Increased lucency surrounding the bilateral sacral screws consistent with screw loosening. Unchanged lucency surrounding the bilateral L5 screws.    < end of copied text >

## 2020-05-25 NOTE — CONSULT NOTE ADULT - PROBLEM SELECTOR RECOMMENDATION 2
pulm eval. will get settings for cpap/bipap from pulm pulm eval. will get settings for cpap/bipap from pul. Dr. Ashley Bear [phone]

## 2020-05-25 NOTE — CONSULT NOTE ADULT - SUBJECTIVE AND OBJECTIVE BOX
Irving Mcdonnell  Pager [phone]  Office [phone]    CC: sent in for loose hradware    HPI:  70 yo man with PMH chronic back pain s/p multiple lumbar laminectomy/fusion with recent admission from 2020 to 2/3/2020 for J19-Nvecoz Fusion on  , NPH s/p  shunt, HTN, HLD, anxiety, depression, GULSHAN on CPAP  was admitted to the hospital on  from home in setting of frequent falls. Subsequently D/C to [facility], presents to ED with recent falls and found to have loosening of hardware on CT, plan for RTOR. (24 May 2020 13:20)      PAST MEDICAL & SURGICAL HISTORY:  NPH (normal pressure hydrocephalus)  Chronic back pain greater than 3 months duration  Small intestine disorder:   steroids   1967  Lumbar disc displacement without myelopathy  Anxiety  Vertebral Sciatica  Insomnia  Depression  HTN (Hypertension)  S/P lumbar fusion: L4-L5 on Oct 2011  pins/screws 2012   spinal surgery   Dehiscence, Operation Wound/Debridement: infection  S/P Laminectomy: L4-L5   complicated by infection requiring  antibiiotcis      Review of Systems:   CONSTITUTIONAL: No fever, weight loss, or fatigue  EYES: No eye pain, visual disturbances, or discharge  ENMT:  No difficulty hearing, tinnitus, vertigo; No sinus or throat pain  NECK: No pain or stiffness  BREASTS: No pain, masses, or nipple discharge  RESPIRATORY: No cough, wheezing, chills or hemoptysis; No shortness of breath  CARDIOVASCULAR: No chest pain, palpitations, dizziness, or leg swelling  GASTROINTESTINAL: No abdominal or epigastric pain. No nausea, vomiting, or hematemesis; No diarrhea or constipation. No melena or hematochezia.  GENITOURINARY: No dysuria, frequency, hematuria, or incontinence  NEUROLOGICAL: No headaches, memory loss, loss of strength, numbness, or tremors  SKIN: No itching, burning, rashes, or lesions   LYMPH NODES: No enlarged glands  ENDOCRINE: No heat or cold intolerance; No hair loss  MUSCULOSKELETAL: No joint pain or swelling; No muscle, back, or extremity pain  PSYCHIATRIC: No depression, anxiety, mood swings, or difficulty sleeping  HEME/LYMPH: No easy bruising, or bleeding gums  ALLERY AND IMMUNOLOGIC: No hives or eczema    Allergies    Biaxin (Other)  Lidoderm (Unknown)  morphine (Short breath; Rash)    Intolerances        Social History:     FAMILY HISTORY:  No pertinent family history in first degree relatives      MEDICATIONS  (STANDING):  amLODIPine   Tablet 5 milliGRAM(s) Oral daily  citalopram 40 milliGRAM(s) Oral daily  enoxaparin Injectable 40 milliGRAM(s) SubCutaneous <User Schedule>  fentaNYL   Patch  25 MICROgram(s)/Hr 1 Patch Transdermal every 72 hours  senna 2 Tablet(s) Oral at bedtime  tamsulosin 0.4 milliGRAM(s) Oral at bedtime  traZODone 100 milliGRAM(s) Oral at bedtime    MEDICATIONS  (PRN):  acetaminophen   Tablet .. 650 milliGRAM(s) Oral every 6 hours PRN Temp greater or equal to 38C (100.4F), Mild Pain (1 - 3)  ALBUTerol    90 MICROgram(s) HFA Inhaler 1 Puff(s) Inhalation every 6 hours PRN Shortness of Breath and/or Wheezing  ALPRAZolam 1 milliGRAM(s) Oral every 6 hours PRN Anxiety  oxyCODONE    IR 5 milliGRAM(s) Oral every 4 hours PRN Moderate Pain (4 - 6)  oxyCODONE    IR 10 milliGRAM(s) Oral every 4 hours PRN Severe Pain (7 - 10)  polyethylene glycol 3350 17 Gram(s) Oral two times a day PRN Constipation  zolpidem 5 milliGRAM(s) Oral at bedtime PRN Insomnia      Vital Signs Last 24 Hrs  T(C): 37.2 (25 May 2020 13:10), Max: 37.2 (25 May 2020 13:10)  T(F): 99 (25 May 2020 13:10), Max: 99 (25 May 2020 13:10)  HR: 91 (25 May 2020 13:10) (73 - 91)  BP: 116/71 (25 May 2020 13:10) (106/60 - 139/83)  BP(mean): --  RR: 16 (25 May 2020 13:10) (16 - 16)  SpO2: 93% (25 May 2020 13:10) (93% - 98%)  CAPILLARY BLOOD GLUCOSE        I&O's Summary    24 May 2020 07:01  -  25 May 2020 07:00  --------------------------------------------------------  IN: 400 mL / OUT: 950 mL / NET: -550 mL    25 May 2020 07:01  -  25 May 2020 16:37  --------------------------------------------------------  IN: 680 mL / OUT: 600 mL / NET: 80 mL        PHYSICAL EXAM:  GENERAL: NAD, well-developed  HEAD:  Atraumatic, Normocephalic  EYES: EOMI, PERRLA, conjunctiva and sclera clear  NECK: Supple, No JVD  CHEST/LUNG: Clear to auscultation bilaterally; No wheeze  HEART: Regular rate and rhythm; No murmurs, rubs, or gallops  ABDOMEN: Soft, Nontender, Nondistended; Bowel sounds present  EXTREMITIES:  2+ Peripheral Pulses, No clubbing, cyanosis, or edema  PSYCH: AAOx3  NEUROLOGY: non-focal  SKIN: No rashes or lesions    LABS:                        8.3    9.25  )-----------( 401      ( 24 May 2020 12:47 )             30.0     05-24    134<L>  |  97  |  13  ----------------------------<  112<H>  3.6   |  25  |  1.06    Ca    9.2      24 May 2020 12:47    TPro  7.4  /  Alb  3.6  /  TBili  0.4  /  DBili  x   /  AST  8<L>  /  ALT  <5<L>  /  AlkPhos  88  05-24          Urinalysis Basic - ( 24 May 2020 14:25 )    Color: Light Yellow / Appearance: Clear / S.009 / pH: x  Gluc: x / Ketone: Negative  / Bili: Negative / Urobili: Negative   Blood: x / Protein: Negative / Nitrite: Negative   Leuk Esterase: Negative / RBC: 2 /hpf / WBC 0 /HPF   Sq Epi: x / Non Sq Epi: 0 /hpf / Bacteria: Negative        RADIOLOGY & ADDITIONAL TESTS:    Imaging Personally Reviewed:    Consultant(s) Notes Reviewed:      Care Discussed with Consultants/Other Providers: Irving Mcdonnell  Pager [phone]  Office [phone]    CC: sent in for loose hardware    HPI:  70 yo man with PMH chronic back pain s/p multiple lumbar laminectomy/fusion with recent admission from 2020 to 2/3/2020 for X70-Cnjdmn Fusion on  , NPH s/p  shunt, HTN, HLD, anxiety, GULSHAN on CPAP presents to ED after he was found to have loosening of hardware on CT by his spine surgeon. Pt reports worsening low back pain over past few weeks but denies bladder/bowel dysfunction. No f/c/r. Pt was able to walk up 2 flights of stairs c no ischemic symptoms, typical or atypical.     PAST MEDICAL & SURGICAL HISTORY:  NPH (normal pressure hydrocephalus)  Chronic back pain greater than 3 months duration  Small intestine disorder:   steroids     Lumbar disc displacement without myelopathy  Anxiety  Vertebral Sciatica  Insomnia  HTN (Hypertension)  S/P lumbar fusion: L4-L5 on Oct 2011  pins/screws    spinal surgery   Dehiscence, Operation Wound/Debridement: infection  S/P Laminectomy: L4-L5   complicated by infection requiring antibiotics      Review of Systems:   CONSTITUTIONAL: No fever, weight loss, or fatigue  EYES: No eye pain, visual disturbances, or discharge  ENMT:  No difficulty hearing, tinnitus, vertigo; No sinus or throat pain  NECK: No pain or stiffness  BREASTS: No pain, masses, or nipple discharge  RESPIRATORY: No cough, wheezing, chills or hemoptysis; No shortness of breath  CARDIOVASCULAR: No chest pain, palpitations, dizziness, or leg swelling  GASTROINTESTINAL: No abdominal or epigastric pain. No nausea, vomiting, or hematemesis; No diarrhea or constipation. No melena or hematochezia.  GENITOURINARY: No dysuria, frequency, hematuria, or incontinence  NEUROLOGICAL: No headaches, memory loss, loss of strength, numbness, or tremors  SKIN: No itching, burning, rashes, or lesions   LYMPH NODES: No enlarged glands  ENDOCRINE: No heat or cold intolerance; No hair loss  MUSCULOSKELETAL: No joint pain or swelling; No muscle, back, or extremity pain  PSYCHIATRIC: No depression, anxiety, mood swings, or difficulty sleeping  HEME/LYMPH: No easy bruising, or bleeding gums  ALLERY AND IMMUNOLOGIC: No hives or eczema    Allergies    Biaxin (Other)  Lidoderm (Unknown)  morphine (Short breath; Rash)    Social History:     FAMILY HISTORY:  No pertinent family history in first degree relatives    Home medications:  changed in outpt med reconc      MEDICATIONS  (STANDING):  amLODIPine   Tablet 5 milliGRAM(s) Oral daily  citalopram 40 milliGRAM(s) Oral daily  enoxaparin Injectable 40 milliGRAM(s) SubCutaneous <User Schedule>  fentaNYL   Patch  25 MICROgram(s)/Hr 1 Patch Transdermal every 72 hours  senna 2 Tablet(s) Oral at bedtime  tamsulosin 0.4 milliGRAM(s) Oral at bedtime  traZODone 100 milliGRAM(s) Oral at bedtime    MEDICATIONS  (PRN):  acetaminophen   Tablet .. 650 milliGRAM(s) Oral every 6 hours PRN Temp greater or equal to 38C (100.4F), Mild Pain (1 - 3)  ALBUTerol    90 MICROgram(s) HFA Inhaler 1 Puff(s) Inhalation every 6 hours PRN Shortness of Breath and/or Wheezing  ALPRAZolam 1 milliGRAM(s) Oral every 6 hours PRN Anxiety  oxyCODONE    IR 5 milliGRAM(s) Oral every 4 hours PRN Moderate Pain (4 - 6)  oxyCODONE    IR 10 milliGRAM(s) Oral every 4 hours PRN Severe Pain (7 - 10)  polyethylene glycol 3350 17 Gram(s) Oral two times a day PRN Constipation  zolpidem 5 milliGRAM(s) Oral at bedtime PRN Insomnia      Vital Signs Last 24 Hrs  T(C): 37.2 (25 May 2020 13:10), Max: 37.2 (25 May 2020 13:10)  T(F): 99 (25 May 2020 13:10), Max: 99 (25 May 2020 13:10)  HR: 91 (25 May 2020 13:10) (73 - 91)  BP: 116/71 (25 May 2020 13:10) (106/60 - 139/83)  BP(mean): --  RR: 16 (25 May 2020 13:10) (16 - 16)  SpO2: 93% (25 May 2020 13:10) (93% - 98%)  CAPILLARY BLOOD GLUCOSE        I&O's Summary    24 May 2020 07:01  -  25 May 2020 07:00  --------------------------------------------------------  IN: 400 mL / OUT: 950 mL / NET: -550 mL    25 May 2020 07:01  -  25 May 2020 16:37  --------------------------------------------------------  IN: 680 mL / OUT: 600 mL / NET: 80 mL        PHYSICAL EXAM:  GENERAL: NAD, well-developed  HEAD:  Atraumatic, Normocephalic  EYES: EOMI, PERRLA, conjunctiva and sclera clear  NECK: Supple, No JVD  CHEST/LUNG: Clear to auscultation bilaterally; No wheeze  HEART: Regular rate and rhythm; No murmurs, rubs, or gallops  ABDOMEN: Soft, Nontender, Nondistended; Bowel sounds present  EXTREMITIES:  2+ Peripheral Pulses, No clubbing, cyanosis, or edema  PSYCH: AAOx3  NEUROLOGY: non-focal  SKIN: No rashes     LABS:                        8.3    9.25  )-----------( 401      ( 24 May 2020 12:47 )             30.0     05-    134<L>  |  97  |  13  ----------------------------<  112<H>  3.6   |  25  |  1.06    Ca    9.2      24 May 2020 12:47    TPro  7.4  /  Alb  3.6  /  TBili  0.4  /  DBili  x   /  AST  8<L>  /  ALT  <5<L>  /  AlkPhos  88            Urinalysis Basic - ( 24 May 2020 14:25 )    Color: Light Yellow / Appearance: Clear / S.009 / pH: x  Gluc: x / Ketone: Negative  / Bili: Negative / Urobili: Negative   Blood: x / Protein: Negative / Nitrite: Negative   Leuk Esterase: Negative / RBC: 2 /hpf / WBC 0 /HPF   Sq Epi: x / Non Sq Epi: 0 /hpf / Bacteria: Negative    EKG reviewed by me: ROBERTH baptiste no isch changes    RADIOLOGY & ADDITIONAL TESTS:    Imaging Personally Reviewed:    Consultant(s) Notes Reviewed:      Care Discussed with Consultants/Other Providers: Irving Mcdonnell  Pager [phone]  Office [phone]    CC: sent in for loose hardware    HPI:  68 yo man with PMH chronic back pain s/p multiple lumbar laminectomy/fusion with recent admission from 2020 to 2/3/2020 for U21-Ikamcc Fusion on  , NPH s/p  shunt, HTN, HLD, anxiety, GULSHAN on CPAP presents to ED after he was found to have loosening of hardware on CT by his spine surgeon. Pt reports worsening low back pain over past few weeks but denies bladder/bowel dysfunction. No f/c/r. Pt was able to walk up 2 flights of stairs c no ischemic symptoms, typical or atypical.     PAST MEDICAL & SURGICAL HISTORY:  NPH (normal pressure hydrocephalus)  Chronic back pain greater than 3 months duration  Small intestine disorder:   steroids     Lumbar disc displacement without myelopathy  Anxiety  Vertebral Sciatica  Insomnia  HTN (Hypertension)  S/P lumbar fusion: L4-L5 on Oct 2011  pins/screws    spinal surgery   Dehiscence, Operation Wound/Debridement: infection  S/P Laminectomy: L4-L5   complicated by infection requiring antibiotics      Review of Systems:   CONSTITUTIONAL: No fever, weight loss, or fatigue  EYES: No eye pain, visual disturbances, or discharge  ENMT:  No difficulty hearing, tinnitus, vertigo; No sinus or throat pain  NECK: No pain or stiffness  BREASTS: No pain, masses, or nipple discharge  RESPIRATORY: No cough, wheezing, chills or hemoptysis; No shortness of breath  CARDIOVASCULAR: No chest pain, palpitations, dizziness, or leg swelling  GASTROINTESTINAL: No abdominal or epigastric pain. No nausea, vomiting, or hematemesis; No diarrhea or constipation. No melena or hematochezia.  GENITOURINARY: No dysuria, frequency, hematuria, or incontinence  NEUROLOGICAL: No headaches, memory loss, loss of strength, numbness, or tremors  SKIN: No itching, burning, rashes, or lesions   LYMPH NODES: No enlarged glands  ENDOCRINE: No heat or cold intolerance; No hair loss  MUSCULOSKELETAL: No joint pain or swelling; No muscle, back, or extremity pain  PSYCHIATRIC: No depression, anxiety, mood swings, or difficulty sleeping  HEME/LYMPH: No easy bruising, or bleeding gums  ALLERY AND IMMUNOLOGIC: No hives or eczema    Allergies    Biaxin (Other)  Lidoderm (Unknown)  morphine (Short breath; Rash)    Social History:     FAMILY HISTORY:  No pertinent family history in first degree relatives    Home medications:  changed in outpt med reconc      MEDICATIONS  (STANDING):  amLODIPine   Tablet 5 milliGRAM(s) Oral daily  citalopram 40 milliGRAM(s) Oral daily  enoxaparin Injectable 40 milliGRAM(s) SubCutaneous <User Schedule>  fentaNYL   Patch  25 MICROgram(s)/Hr 1 Patch Transdermal every 72 hours  senna 2 Tablet(s) Oral at bedtime  tamsulosin 0.4 milliGRAM(s) Oral at bedtime  traZODone 100 milliGRAM(s) Oral at bedtime    MEDICATIONS  (PRN):  acetaminophen   Tablet .. 650 milliGRAM(s) Oral every 6 hours PRN Temp greater or equal to 38C (100.4F), Mild Pain (1 - 3)  ALBUTerol    90 MICROgram(s) HFA Inhaler 1 Puff(s) Inhalation every 6 hours PRN Shortness of Breath and/or Wheezing  ALPRAZolam 1 milliGRAM(s) Oral every 6 hours PRN Anxiety  oxyCODONE    IR 5 milliGRAM(s) Oral every 4 hours PRN Moderate Pain (4 - 6)  oxyCODONE    IR 10 milliGRAM(s) Oral every 4 hours PRN Severe Pain (7 - 10)  polyethylene glycol 3350 17 Gram(s) Oral two times a day PRN Constipation  zolpidem 5 milliGRAM(s) Oral at bedtime PRN Insomnia      Vital Signs Last 24 Hrs  T(C): 37.2 (25 May 2020 13:10), Max: 37.2 (25 May 2020 13:10)  T(F): 99 (25 May 2020 13:10), Max: 99 (25 May 2020 13:10)  HR: 91 (25 May 2020 13:10) (73 - 91)  BP: 116/71 (25 May 2020 13:10) (106/60 - 139/83)  BP(mean): --  RR: 16 (25 May 2020 13:10) (16 - 16)  SpO2: 93% (25 May 2020 13:10) (93% - 98%)  CAPILLARY BLOOD GLUCOSE        I&O's Summary    24 May 2020 07:01  -  25 May 2020 07:00  --------------------------------------------------------  IN: 400 mL / OUT: 950 mL / NET: -550 mL    25 May 2020 07:01  -  25 May 2020 16:37  --------------------------------------------------------  IN: 680 mL / OUT: 600 mL / NET: 80 mL        PHYSICAL EXAM:  GENERAL: NAD, well-developed  HEAD:  Atraumatic, Normocephalic  EYES: EOMI, PERRLA, conjunctiva and sclera clear  NECK: Supple, No JVD  CHEST/LUNG: Clear to auscultation bilaterally; No wheeze  HEART: Regular rate and rhythm; No murmurs, rubs, or gallops  ABDOMEN: Soft, Nontender, Nondistended; Bowel sounds present  EXTREMITIES:  2+ Peripheral Pulses, No clubbing, cyanosis, or edema  PSYCH: AAOx3  NEUROLOGY: non-focal  SKIN: No rashes     LABS:                        8.3    9.25  )-----------( 401      ( 24 May 2020 12:47 )             30.0     05-    134<L>  |  97  |  13  ----------------------------<  112<H>  3.6   |  25  |  1.06    Ca    9.2      24 May 2020 12:47    TPro  7.4  /  Alb  3.6  /  TBili  0.4  /  DBili  x   /  AST  8<L>  /  ALT  <5<L>  /  AlkPhos  88            Urinalysis Basic - ( 24 May 2020 14:25 )    Color: Light Yellow / Appearance: Clear / S.009 / pH: x  Gluc: x / Ketone: Negative  / Bili: Negative / Urobili: Negative   Blood: x / Protein: Negative / Nitrite: Negative   Leuk Esterase: Negative / RBC: 2 /hpf / WBC 0 /HPF   Sq Epi: x / Non Sq Epi: 0 /hpf / Bacteria: Negative    EKG reviewed by me: ROBERTH baptiste no isch changes    RADIOLOGY & ADDITIONAL TESTS:    Imaging Personally Reviewed: CT spine    Consultant(s) Notes Reviewed:      Care Discussed with Consultants/Other Providers: neurosurg

## 2020-05-25 NOTE — CHART NOTE - NSCHARTNOTEFT_GEN_A_CORE
CAPRINI SCORE [CLOT] Score on Admission for     AGE RELATED RISK FACTORS                                                       MOBILITY RELATED FACTORS  [ ] Age 41-60 years                                            (1 Point)                  [ ] Bed rest                                                        (1 Point)  [ x] Age: 61-74 years                                           (2 Points)                 [ ] Plaster cast                                                   (2 Points)  [ ] Age= 75 years                                              (3 Points)                 [ ] Bed bound for more than 72 hours                 (2 Points)    DISEASE RELATED RISK FACTORS                                               GENDER SPECIFIC FACTORS  [ ] Edema in the lower extremities                       (1 Point)                  [ ] Pregnancy                                                     (1 Point)  [ ] Varicose veins                                               (1 Point)                  [ ] Post-partum < 6 weeks                                   (1 Point)             [ x] BMI > 25 Kg/m2                                            (1 Point)                  [ ] Hormonal therapy  or oral contraception          (1 Point)                 [ ] Sepsis (in the previous month)                        (1 Point)                  [ ] History of pregnancy complications                 (1 point)  [ ] Pneumonia or serious lung disease                                               [ ] Unexplained or recurrent                     (1 Point)           (in the previous month)                               (1 Point)  [ ] Abnormal pulmonary function test                     (1 Point)                 SURGERY RELATED RISK FACTORS (include planned surgeries)  [ ] Acute myocardial infarction                              (1 Point)                 [ ]  Section                                             (1 Point)  [ ] Congestive heart failure (in the previous month)  (1 Point)         [ ] Minor surgery                                                  (1 Point)   [ ] Inflammatory bowel disease                             (1 Point)                 [ ] Arthroscopic surgery                                        (2 Points)  [ ] Central venous access                                      (2 Points)                [ ] General surgery lasting more than 45 minutes   (2 Points)       [ ] Stroke (in the previous month)                          (5 Points)               [ ] Elective arthroplasty                                         (5 Points)            [ ] current or past malignancy                              (2 Points)                                                                                                       HEMATOLOGY RELATED FACTORS                                                 TRAUMA RELATED RISK FACTORS  [ ] Prior episodes of VTE                                     (3 Points)                [ ] Fracture of the hip, pelvis, or leg                       (5 Points)  [ ] Positive family history for VTE                         (3 Points)                 [ ] Acute spinal cord injury (in the previous month)  (5 Points)  [ ] Prothrombin 71635 A                                     (3 Points)                 [ ] Paralysis  (less than 1 month)                             (5 Points)  [ ] Factor V Leiden                                             (3 Points)                  [ ] Multiple Trauma within 1 month                        (5 Points)  [ ] Lupus anticoagulants                                     (3 Points)                                                           [ ] Anticardiolipin antibodies                               (3 Points)                                                       [ ] High homocysteine in the blood                      (3 Points)                                             [ ] Other congenital or acquired thrombophilia      (3 Points)                                                [ ] Heparin induced thrombocytopenia                  (3 Points)                                          Total Score [       3   ]    Risk:  Very low 0   Low 1 to 2   Moderate 3 to 4   High =5       VTE Prophylasix Recommednations:  [ x] mechanical pneumatic compression devices                                      [ ] contraindicated: _____________________  [ x] chemo prophylasix                                                                                   [ ] contraindicated _____________________    **** HIGH LIKELIHOOD DVT PRESENT ON ADMISSION  [ ] (please order LE dopplers within 24 hours of admission)

## 2020-05-25 NOTE — CONSULT NOTE ADULT - ASSESSMENT
70 yo man with PMH chronic back pain s/p multiple lumbar laminectomy/fusion with recent admission from 1/22/2020 to 2/3/2020 for C97-Rmrrze Fusion on 1/28 , NPH s/p  shunt, HTN, HLD, anxiety, GULSHAN on CPAP presents to ED after he was found to have loosening of hardware on CT by his spine surgeon.

## 2020-05-26 ENCOUNTER — TRANSCRIPTION ENCOUNTER (OUTPATIENT)
Age: 70
End: 2020-05-26

## 2020-05-26 LAB
ANION GAP SERPL CALC-SCNC: 12 MMOL/L — SIGNIFICANT CHANGE UP (ref 5–17)
APTT BLD: 32.4 SEC — SIGNIFICANT CHANGE UP (ref 27.5–36.3)
BLD GP AB SCN SERPL QL: NEGATIVE — SIGNIFICANT CHANGE UP
BUN SERPL-MCNC: 11 MG/DL — SIGNIFICANT CHANGE UP (ref 7–23)
CALCIUM SERPL-MCNC: 9.3 MG/DL — SIGNIFICANT CHANGE UP (ref 8.4–10.5)
CHLORIDE SERPL-SCNC: 102 MMOL/L — SIGNIFICANT CHANGE UP (ref 96–108)
CO2 SERPL-SCNC: 25 MMOL/L — SIGNIFICANT CHANGE UP (ref 22–31)
CREAT SERPL-MCNC: 0.95 MG/DL — SIGNIFICANT CHANGE UP (ref 0.5–1.3)
FERRITIN SERPL-MCNC: 28 NG/ML — LOW (ref 30–400)
GLUCOSE SERPL-MCNC: 91 MG/DL — SIGNIFICANT CHANGE UP (ref 70–99)
HCT VFR BLD CALC: 31.8 % — LOW (ref 39–50)
HGB BLD-MCNC: 8.8 G/DL — LOW (ref 13–17)
INR BLD: 1.33 RATIO — HIGH (ref 0.88–1.16)
IRON SATN MFR SERPL: 19 UG/DL — LOW (ref 45–165)
IRON SATN MFR SERPL: 6 % — LOW (ref 16–55)
MCHC RBC-ENTMCNC: 19.7 PG — LOW (ref 27–34)
MCHC RBC-ENTMCNC: 27.7 GM/DL — LOW (ref 32–36)
MCV RBC AUTO: 71.1 FL — LOW (ref 80–100)
NRBC # BLD: 0 /100 WBCS — SIGNIFICANT CHANGE UP (ref 0–0)
PLATELET # BLD AUTO: 425 K/UL — HIGH (ref 150–400)
POTASSIUM SERPL-MCNC: 3.4 MMOL/L — LOW (ref 3.5–5.3)
POTASSIUM SERPL-SCNC: 3.4 MMOL/L — LOW (ref 3.5–5.3)
PROTHROM AB SERPL-ACNC: 15.3 SEC — HIGH (ref 10–13.1)
RBC # BLD: 4.46 M/UL — SIGNIFICANT CHANGE UP (ref 4.2–5.8)
RBC # BLD: 4.47 M/UL — SIGNIFICANT CHANGE UP (ref 4.2–5.8)
RBC # FLD: 16.9 % — HIGH (ref 10.3–14.5)
RETICS #: 51.7 K/UL — SIGNIFICANT CHANGE UP (ref 25–125)
RETICS/RBC NFR: 1.2 % — SIGNIFICANT CHANGE UP (ref 0.5–2.5)
RH IG SCN BLD-IMP: POSITIVE — SIGNIFICANT CHANGE UP
SARS-COV-2 RNA SPEC QL NAA+PROBE: SIGNIFICANT CHANGE UP
SODIUM SERPL-SCNC: 139 MMOL/L — SIGNIFICANT CHANGE UP (ref 135–145)
TIBC SERPL-MCNC: 341 UG/DL — SIGNIFICANT CHANGE UP (ref 220–430)
TRANSFERRIN SERPL-MCNC: 248 MG/DL — SIGNIFICANT CHANGE UP (ref 200–360)
UIBC SERPL-MCNC: 321 UG/DL — SIGNIFICANT CHANGE UP (ref 110–370)
VIT B12 SERPL-MCNC: 411 PG/ML — SIGNIFICANT CHANGE UP (ref 232–1245)
WBC # BLD: 10.15 K/UL — SIGNIFICANT CHANGE UP (ref 3.8–10.5)
WBC # FLD AUTO: 10.15 K/UL — SIGNIFICANT CHANGE UP (ref 3.8–10.5)

## 2020-05-26 PROCEDURE — 99223 1ST HOSP IP/OBS HIGH 75: CPT

## 2020-05-26 PROCEDURE — 99232 SBSQ HOSP IP/OBS MODERATE 35: CPT | Mod: 57

## 2020-05-26 PROCEDURE — 99233 SBSQ HOSP IP/OBS HIGH 50: CPT

## 2020-05-26 PROCEDURE — 93970 EXTREMITY STUDY: CPT | Mod: 26

## 2020-05-26 RX ORDER — HYDROMORPHONE HYDROCHLORIDE 2 MG/ML
6 INJECTION INTRAMUSCULAR; INTRAVENOUS; SUBCUTANEOUS EVERY 4 HOURS
Refills: 0 | Status: DISCONTINUED | OUTPATIENT
Start: 2020-05-26 | End: 2020-05-27

## 2020-05-26 RX ORDER — ACETAMINOPHEN 500 MG
1000 TABLET ORAL ONCE
Refills: 0 | Status: COMPLETED | OUTPATIENT
Start: 2020-05-26 | End: 2020-05-26

## 2020-05-26 RX ORDER — ALPRAZOLAM 0.25 MG
1 TABLET ORAL EVERY 6 HOURS
Refills: 0 | Status: DISCONTINUED | OUTPATIENT
Start: 2020-05-26 | End: 2020-05-27

## 2020-05-26 RX ORDER — DEXTROSE MONOHYDRATE, SODIUM CHLORIDE, AND POTASSIUM CHLORIDE 50; .745; 4.5 G/1000ML; G/1000ML; G/1000ML
1000 INJECTION, SOLUTION INTRAVENOUS
Refills: 0 | Status: DISCONTINUED | OUTPATIENT
Start: 2020-05-26 | End: 2020-05-27

## 2020-05-26 RX ORDER — POTASSIUM CHLORIDE 20 MEQ
40 PACKET (EA) ORAL ONCE
Refills: 0 | Status: COMPLETED | OUTPATIENT
Start: 2020-05-26 | End: 2020-05-26

## 2020-05-26 RX ORDER — TRAZODONE HCL 50 MG
100 TABLET ORAL DAILY
Refills: 0 | Status: DISCONTINUED | OUTPATIENT
Start: 2020-05-26 | End: 2020-05-27

## 2020-05-26 RX ORDER — DIAZEPAM 5 MG
5 TABLET ORAL EVERY 8 HOURS
Refills: 0 | Status: DISCONTINUED | OUTPATIENT
Start: 2020-05-26 | End: 2020-05-27

## 2020-05-26 RX ADMIN — Medication 100 MILLIGRAM(S): at 22:10

## 2020-05-26 RX ADMIN — Medication 1 MILLIGRAM(S): at 19:00

## 2020-05-26 RX ADMIN — TAMSULOSIN HYDROCHLORIDE 0.8 MILLIGRAM(S): 0.4 CAPSULE ORAL at 22:10

## 2020-05-26 RX ADMIN — LOSARTAN POTASSIUM 25 MILLIGRAM(S): 100 TABLET, FILM COATED ORAL at 08:36

## 2020-05-26 RX ADMIN — FENTANYL CITRATE 1 PATCH: 50 INJECTION INTRAVENOUS at 22:20

## 2020-05-26 RX ADMIN — Medication 1 MILLIGRAM(S): at 23:29

## 2020-05-26 RX ADMIN — FENTANYL CITRATE 1 PATCH: 50 INJECTION INTRAVENOUS at 06:55

## 2020-05-26 RX ADMIN — Medication 40 MILLIEQUIVALENT(S): at 12:25

## 2020-05-26 RX ADMIN — ZOLPIDEM TARTRATE 5 MILLIGRAM(S): 10 TABLET ORAL at 23:08

## 2020-05-26 RX ADMIN — ZOLPIDEM TARTRATE 5 MILLIGRAM(S): 10 TABLET ORAL at 00:45

## 2020-05-26 RX ADMIN — Medication 1 MILLIGRAM(S): at 12:57

## 2020-05-26 RX ADMIN — ENOXAPARIN SODIUM 40 MILLIGRAM(S): 100 INJECTION SUBCUTANEOUS at 18:07

## 2020-05-26 RX ADMIN — TRAMADOL HYDROCHLORIDE 50 MILLIGRAM(S): 50 TABLET ORAL at 12:34

## 2020-05-26 RX ADMIN — Medication 400 MILLIGRAM(S): at 00:45

## 2020-05-26 RX ADMIN — AMLODIPINE BESYLATE 5 MILLIGRAM(S): 2.5 TABLET ORAL at 07:00

## 2020-05-26 RX ADMIN — DEXTROSE MONOHYDRATE, SODIUM CHLORIDE, AND POTASSIUM CHLORIDE 75 MILLILITER(S): 50; .745; 4.5 INJECTION, SOLUTION INTRAVENOUS at 12:59

## 2020-05-26 RX ADMIN — Medication 400 MILLIGRAM(S): at 12:22

## 2020-05-26 RX ADMIN — Medication 400 MILLIGRAM(S): at 18:06

## 2020-05-26 NOTE — PROGRESS NOTE ADULT - ASSESSMENT
68 yo man with PMH chronic back pain s/p multiple lumbar laminectomy/fusion with recent admission from 1/22/2020 to 2/3/2020 for G63-Ykimkt Fusion on 1/28 , NPH s/p  shunt, HTN, HLD, anxiety, GULSHAN on CPAP presents to ED after he was found to have loosening of hardware on CT by his spine surgeon.

## 2020-05-26 NOTE — PROGRESS NOTE ADULT - SUBJECTIVE AND OBJECTIVE BOX
Irving Mcdonnell   Pager [phone]  Office [phone]      CC: Patient is a 69y old  Male who presents with a chief complaint of falls, loose hardware (26 May 2020 12:08)      SUBJECTIVE / OVERNIGHT EVENTS:    MEDICATIONS  (STANDING):  amLODIPine   Tablet 5 milliGRAM(s) Oral daily  enoxaparin Injectable 40 milliGRAM(s) SubCutaneous <User Schedule>  losartan 25 milliGRAM(s) Oral daily  senna 2 Tablet(s) Oral at bedtime  sodium chloride 0.9% with potassium chloride 20 mEq/L 1000 milliLiter(s) (75 mL/Hr) IV Continuous <Continuous>  tamsulosin 0.8 milliGRAM(s) Oral at bedtime    MEDICATIONS  (PRN):  acetaminophen   Tablet .. 650 milliGRAM(s) Oral every 6 hours PRN Temp greater or equal to 38C (100.4F), Mild Pain (1 - 3)  ALBUTerol    90 MICROgram(s) HFA Inhaler 1 Puff(s) Inhalation every 6 hours PRN Shortness of Breath and/or Wheezing  ALPRAZolam 1 milliGRAM(s) Oral every 6 hours PRN Anxiety  diazepam    Tablet 5 milliGRAM(s) Oral every 8 hours PRN muscle spasms  polyethylene glycol 3350 17 Gram(s) Oral two times a day PRN Constipation  traMADol 25 milliGRAM(s) Oral every 4 hours PRN Moderate Pain (4 - 6)  traMADol 50 milliGRAM(s) Oral every 4 hours PRN Severe Pain (7 - 10)  zolpidem 5 milliGRAM(s) Oral at bedtime PRN Insomnia      Vital Signs Last 24 Hrs  T(C): 36.8 (26 May 2020 08:56), Max: 37.7 (26 May 2020 00:10)  T(F): 98.3 (26 May 2020 08:56), Max: 99.9 (26 May 2020 00:10)  HR: 81 (26 May 2020 10:10) (62 - 85)  BP: 154/53 (26 May 2020 08:56) (118/69 - 154/53)  BP(mean): --  RR: 16 (26 May 2020 08:56) (16 - 18)  SpO2: 97% (26 May 2020 10:10) (95% - 97%)  CAPILLARY BLOOD GLUCOSE        I&O's Summary    25 May 2020 07:01  -  26 May 2020 07:00  --------------------------------------------------------  IN: 1520 mL / OUT: 1050 mL / NET: 470 mL      tele:    PHYSICAL EXAM:    GENERAL: NAD   HEENT: EOMI, PERRL  PULM: Clear to auscultation bilaterally  CV: Regular rate and rhythm; nl S1, S2; No murmurs, rubs, or gallops  ABDOMEN: Soft, Nontender, Nondistended; Bowel sounds present  EXTREMITIES/MSK:  No edema, calf tenderness   PSYCH: AAOx3  NEUROLOGY: non-focal          LABS:                        8.8    10.15 )-----------( 425      ( 26 May 2020 06:45 )             31.8     05-26    139  |  102  |  11  ----------------------------<  91  3.4<L>   |  25  |  0.95    Ca    9.3      26 May 2020 06:45      PT/INR - ( 26 May 2020 07:43 )   PT: 15.3 sec;   INR: 1.33 ratio         PTT - ( 26 May 2020 07:43 )  PTT:32.4 sec      Urinalysis Basic - ( 24 May 2020 14:25 )    Color: Light Yellow / Appearance: Clear / S.009 / pH: x  Gluc: x / Ketone: Negative  / Bili: Negative / Urobili: Negative   Blood: x / Protein: Negative / Nitrite: Negative   Leuk Esterase: Negative / RBC: 2 /hpf / WBC 0 /HPF   Sq Epi: x / Non Sq Epi: 0 /hpf / Bacteria: Negative          RADIOLOGY & ADDITIONAL TESTS:    Imaging Personally Reviewed:    Consultant(s) Notes Reviewed:      Care Discussed with Consultants/Other Providers: Irving Mcdonnell   Pager [phone]  Office [phone]      CC: Patient is a 69y old  Male who presents with a chief complaint of falls, loose hardware (26 May 2020 12:08)      SUBJECTIVE / OVERNIGHT EVENTS: Pt reports worsened back pain today. Denies an neurologic deficits. When he was told that his family can bring in the Nucynta since we do not have it here, he replied that he was only started on it 2 weeks ago and didnt want to continue taking it bec it didnt help. Reports that he mistakenly said that he takes trazodone every other day and actually takes it everyday, recently. Also reports that he takes Xanax 4 x/day. No cp/sob/n/v/d/abd pain.     MEDICATIONS  (STANDING):  amLODIPine   Tablet 5 milliGRAM(s) Oral daily  enoxaparin Injectable 40 milliGRAM(s) SubCutaneous <User Schedule>  losartan 25 milliGRAM(s) Oral daily  senna 2 Tablet(s) Oral at bedtime  sodium chloride 0.9% with potassium chloride 20 mEq/L 1000 milliLiter(s) (75 mL/Hr) IV Continuous <Continuous>  tamsulosin 0.8 milliGRAM(s) Oral at bedtime    MEDICATIONS  (PRN):  acetaminophen   Tablet .. 650 milliGRAM(s) Oral every 6 hours PRN Temp greater or equal to 38C (100.4F), Mild Pain (1 - 3)  ALBUTerol    90 MICROgram(s) HFA Inhaler 1 Puff(s) Inhalation every 6 hours PRN Shortness of Breath and/or Wheezing  ALPRAZolam 1 milliGRAM(s) Oral every 6 hours PRN Anxiety  diazepam    Tablet 5 milliGRAM(s) Oral every 8 hours PRN muscle spasms  polyethylene glycol 3350 17 Gram(s) Oral two times a day PRN Constipation  traMADol 25 milliGRAM(s) Oral every 4 hours PRN Moderate Pain (4 - 6)  traMADol 50 milliGRAM(s) Oral every 4 hours PRN Severe Pain (7 - 10)  zolpidem 5 milliGRAM(s) Oral at bedtime PRN Insomnia      Vital Signs Last 24 Hrs  T(C): 36.8 (26 May 2020 08:56), Max: 37.7 (26 May 2020 00:10)  T(F): 98.3 (26 May 2020 08:56), Max: 99.9 (26 May 2020 00:10)  HR: 81 (26 May 2020 10:10) (62 - 85)  BP: 154/53 (26 May 2020 08:56) (118/69 - 154/53)  BP(mean): --  RR: 16 (26 May 2020 08:56) (16 - 18)  SpO2: 97% (26 May 2020 10:10) (95% - 97%)  CAPILLARY BLOOD GLUCOSE        I&O's Summary    25 May 2020 07:01  -  26 May 2020 07:00  --------------------------------------------------------  IN: 1520 mL / OUT: 1050 mL / NET: 470 mL          PHYSICAL EXAM:    GENERAL: NAD   HEENT: EOMI, PERRL  PULM: Clear to auscultation bilaterally  CV: Regular rate and rhythm; nl S1, S2; No murmurs, rubs, or gallops  ABDOMEN: Soft, Nontender, Nondistended; Bowel sounds present  EXTREMITIES/MSK:  No edema, calf tenderness   PSYCH: AAOx3  NEUROLOGY: non-focal          LABS:                        8.8    10.15 )-----------( 425      ( 26 May 2020 06:45 )             31.8     05-    139  |  102  |  11  ----------------------------<  91  3.4<L>   |  25  |  0.95    Ca    9.3      26 May 2020 06:45      PT/INR - ( 26 May 2020 07:43 )   PT: 15.3 sec;   INR: 1.33 ratio         PTT - ( 26 May 2020 07:43 )  PTT:32.4 sec      Urinalysis Basic - ( 24 May 2020 14:25 )    Color: Light Yellow / Appearance: Clear / S.009 / pH: x  Gluc: x / Ketone: Negative  / Bili: Negative / Urobili: Negative   Blood: x / Protein: Negative / Nitrite: Negative   Leuk Esterase: Negative / RBC: 2 /hpf / WBC 0 /HPF   Sq Epi: x / Non Sq Epi: 0 /hpf / Bacteria: Negative          RADIOLOGY & ADDITIONAL TESTS:    Imaging Personally Reviewed:    Consultant(s) Notes Reviewed:      Care Discussed with Consultants/Other Providers: neurosurg

## 2020-05-26 NOTE — PROGRESS NOTE ADULT - SUBJECTIVE AND OBJECTIVE BOX
PULMONARY PROGRESS NOTE    DACIA NAVARRO  MRN-[id number]    Patient is a 69y old  Male who presents with a chief complaint of falls, loose hardware (25 May 2020 16:35)      HPI:  -asked to clear patient for back surgery.  I had visit with patient on 5/19/20 and cleared him from pulmonary standpoint, see allscripts chart.  -Patient reports back pain, awaiting surgery.  -compliant with cpap nightly.    ROS:   -neg    ACTIVE MEDICATION LIST:  MEDICATIONS  (STANDING):  amLODIPine   Tablet 5 milliGRAM(s) Oral daily  enoxaparin Injectable 40 milliGRAM(s) SubCutaneous <User Schedule>  losartan 25 milliGRAM(s) Oral daily  senna 2 Tablet(s) Oral at bedtime  tamsulosin 0.8 milliGRAM(s) Oral at bedtime    MEDICATIONS  (PRN):  acetaminophen   Tablet .. 650 milliGRAM(s) Oral every 6 hours PRN Temp greater or equal to 38C (100.4F), Mild Pain (1 - 3)  ALBUTerol    90 MICROgram(s) HFA Inhaler 1 Puff(s) Inhalation every 6 hours PRN Shortness of Breath and/or Wheezing  ALPRAZolam 1 milliGRAM(s) Oral every 6 hours PRN Anxiety  polyethylene glycol 3350 17 Gram(s) Oral two times a day PRN Constipation  traMADol 25 milliGRAM(s) Oral every 4 hours PRN Moderate Pain (4 - 6)  traMADol 50 milliGRAM(s) Oral every 4 hours PRN Severe Pain (7 - 10)  zolpidem 5 milliGRAM(s) Oral at bedtime PRN Insomnia      EXAM:  Vital Signs Last 24 Hrs  T(C): 36.8 (26 May 2020 08:56), Max: 37.7 (26 May 2020 00:10)  T(F): 98.3 (26 May 2020 08:56), Max: 99.9 (26 May 2020 00:10)  HR: 85 (26 May 2020 08:56) (62 - 91)  BP: 154/53 (26 May 2020 08:56) (116/71 - 154/53)  BP(mean): --  RR: 16 (26 May 2020 08:56) (16 - 18)  SpO2: 97% (26 May 2020 08:56) (93% - 97%)    GENERAL: The patient is awake and alert in no apparent distress.     LUNGS: Clear to auscultation without wheezing, rales or rhonchi; respirations unlabored    HEART: Regular rate and rhythm without murmur.    LABS/IMAGING: reviewed                        8.8    10.15 )-----------( 425      ( 26 May 2020 06:45 )             31.8   05-26    139  |  102  |  11  ----------------------------<  91  3.4<L>   |  25  |  0.95    Ca    9.3      26 May 2020 06:45    TPro  7.4  /  Alb  3.6  /  TBili  0.4  /  DBili  x   /  AST  8<L>  /  ALT  <5<L>  /  AlkPhos  88  05-24        PROBLEM LIST:  69y Male with HEALTH ISSUES - PROBLEM Dx:  Anemia: Anemia  HTN (hypertension): HTN (hypertension)  GULSHAN (obstructive sleep apnea): GULSHAN (obstructive sleep apnea)  Low back pain: Low back pain    RECS:  -No pulmonary objection to back surgery, pt compliant and benefitting from CPAP as outpt  -standard GULSHAN precautions  -Use CPAP 10 perioperatively and during sleep  -please call with further questions/concerns       Ashley Bear MD   [phone]

## 2020-05-26 NOTE — PROGRESS NOTE ADULT - ASSESSMENT
70 yo man with PMH chronic back pain s/p multiple lumbar laminectomy/fusion with recent admission from 1/22/2020 to 2/3/2020 for C03-Oqalfh Fusion on 1/28 , NPH s/p  shunt, HTN, HLD, anxiety, depression, GULSHAN on CPAP  was admitted to the hospital on 2/8 from home in setting of frequent falls. Subsequently D/C to [facility], presents to ED with recent falls and found to have loosening of hardware on CT, plan for RTOR. (24 May 2020 13:20)    PLAN:  -Neuro: OR possibly tomorrow as per neurosurgery team. Medicine and pulmonary cleared for OR  -Patient made NPO after midnight. Pre op labs done  -Hypokalemia (3.4) Supplemented.   -Tramadol for pain control. Valium for muscle spasms   -Encouraged incentive spirometry   -Continue norvasc for hypertension  -Senna, miralax for bowel regimen  -Hospitalist saw and cleared for OR demi.    -Continue flomax for bph   -Continue home meds  -DVT ppx: venodynes and sql; LED negative for dvt   -PT: after OR    Will discuss above with Dr. Lg Soto #[id number]      Assessment:  Please Check When Present   []  GCS  E   V  M     Heart Failure: []Acute, [] acute on chronic , []chronic  Heart Failure:  [] Diastolic (HFpEF), [] Systolic (HFrEF), []Combined (HFpEF and HFrEF), [] RHF, [] Pulm HTN, [] Other    [] JOSE MIGUEL, [] ATN, [] AIN, [] other  [] CKD1, [] CKD2, [] CKD 3, [] CKD 4, [] CKD 5, []ESRD    Encephalopathy: [] Metabolic, [] Hepatic, [] toxic, [] Neurological, [] Other    Abnormal Nurtitional Status: [] malnurtition (see nutrition note), [ ]underweight: BMI < 19, [] morbid obesity: BMI >40, [] Cachexia    [] Sepsis  [] hypovolemic shock,[] cardiogenic shock, [] hemorrhagic shock, [] neuogenic shock  [] Acute Respiratory Failure  []Cerebral edema, [] Brain compression/ herniation,   [] Functional quadriplegia  [] Acute blood loss anemia    More than 30 minutes were spent educating the patient and family regarding condition, medications, follow up plans, signs and symptoms to be concerned with, preparing paperwork, and questions answered regarding discharge.

## 2020-05-26 NOTE — PROGRESS NOTE ADULT - SUBJECTIVE AND OBJECTIVE BOX
SUBJECTIVE: Patient seen and examined at bedside. Complains of left lower leg pain. Pulmonary and medicine saw and cleared for OR.      OVERNIGHT EVENTS: no acute overnight events.     Vital Signs Last 24 Hrs  T(C): 36.8 (26 May 2020 08:56), Max: 37.7 (26 May 2020 00:10)  T(F): 98.3 (26 May 2020 08:56), Max: 99.9 (26 May 2020 00:10)  HR: 85 (26 May 2020 08:56) (62 - 91)  BP: 154/53 (26 May 2020 08:56) (116/71 - 154/53)  BP(mean): --  RR: 16 (26 May 2020 08:56) (16 - 18)  SpO2: 97% (26 May 2020 08:56) (93% - 97%)    PHYSICAL EXAM:    General: No Acute Distress     Neurological: Awake, alert oriented to person, place and time, Following Commands, PERRL, EOMI, Face Symmetrical, Speech Fluent, Moving all extremities, b/l UE 5/5, LLE 4/5 (pain limited) RLE 5/5, No Drift, Sensation to Light Touch Intact    Pulmonary: Clear to Auscultation, No Rales, No Rhonchi, No Wheezes     Cardiovascular: S1, S2, Regular Rate and Rhythm     Gastrointestinal: Soft, Nontender, Nondistended     LABS:                                   8.8    10.15 )-----------( 425      ( 26 May 2020 06:45 )             31.8   05-26    139  |  102  |  11  ----------------------------<  91  3.4<L>   |  25  |  0.95    Ca    9.3      26 May 2020 06:45    TPro  7.4  /  Alb  3.6  /  TBili  0.4  /  DBili  x   /  AST  8<L>  /  ALT  <5<L>  /  AlkPhos  88  05-24      DIET: regular diet     IMAGING:   < from: [facility] Duplex Lower Ext Vein Scan, Bilat (05.26.20 @ 11:35) >    IMPRESSION:   No evidence of deep venous thrombosis in either lower extremity.    < end of copied text >      < from: CT 3D Reconstruct w/o Workstation (05.18.20 @ 11:43) >  IMPRESSION: Increased lucency surrounding the bilateral sacral screws consistent with screw loosening. Unchanged lucency surrounding the bilateral L5 screws.    < end of copied text >  < from: CT Thoracic Spine No Cont (05.18.20 @ 11:43) >  INTERPRETATION:  LUMBOSACRAL AND THORACIC SPINE CT    CLINICAL INFORMATION: Back pain after prior fusion. Evaluate for osseous fusion  TECHNIQUE: Axial CT images were obtained of the lumbosacral spine and thoracic spine with coronal and sagittal reconstructions. 3-dimensional reconstructions were obtained for improved evaluation of the hardware. Comparisonis made to prior study from 3/10/2020.    FINDINGS:    As seen previously the patient is status post posterior decompression from L2 through S1 with instrumented posterior fusion extending from T11 through the sacrum and across the sacroiliac joints.The patient is status post anterior fusion at L5-S1. 1 of the left-sided screw remains somewhat rounded relative to the anterior device without change compared to multiple prior studies. There is unchanged lucency seen around the L5-S1 hardware alongwith cystic change, lucency and sclerosis on both sides of the disc space. There is disc graft placement is present at L4-L5.    There is increased lucency surrounding the bilateral sacral screws that extend across the SI joint consistent with screw loosening. Relatively unchanged lucency is seen surrounding the bilateral L5 pedicle screws consistent with screw loosening. There is solid posterior osseous fusion at L3-L4 and L4-L5 with very small regions of fusion seen at L2-L3    There is unchanged central compression deformity of the L1 vertebral body. There is unchanged mild retrolisthesis at L2-L3.    Posterior soft tissue swelling is seen within the subcutaneous soft tissues and paraspinal musculature at the level of the laminectomy defects in the lower lumbar spine.    IMPRESSION: Increased lucency surrounding the bilateral sacral screws consistent with screw loosening. Unchanged lucency surrounding the bilateral L5 screws.    < end of copied text >

## 2020-05-27 ENCOUNTER — APPOINTMENT (OUTPATIENT)
Dept: SPINE | Facility: HOSPITAL | Age: 70
End: 2020-05-27
Payer: MEDICARE

## 2020-05-27 ENCOUNTER — TREATMENT (OUTPATIENT)
Dept: PHYSICAL THERAPY | Facility: CLINIC | Age: 70
End: 2020-05-27

## 2020-05-27 DIAGNOSIS — G89.29 CHRONIC PAIN OF BOTH SHOULDERS: Primary | ICD-10-CM

## 2020-05-27 DIAGNOSIS — M25.512 CHRONIC PAIN OF BOTH SHOULDERS: Primary | ICD-10-CM

## 2020-05-27 DIAGNOSIS — M25.511 CHRONIC PAIN OF BOTH SHOULDERS: Primary | ICD-10-CM

## 2020-05-27 LAB
ANION GAP SERPL CALC-SCNC: 15 MMOL/L — SIGNIFICANT CHANGE UP (ref 5–17)
BLD GP AB SCN SERPL QL: NEGATIVE — SIGNIFICANT CHANGE UP
BUN SERPL-MCNC: 11 MG/DL — SIGNIFICANT CHANGE UP (ref 7–23)
CALCIUM SERPL-MCNC: 8.4 MG/DL — SIGNIFICANT CHANGE UP (ref 8.4–10.5)
CHLORIDE SERPL-SCNC: 102 MMOL/L — SIGNIFICANT CHANGE UP (ref 96–108)
CO2 SERPL-SCNC: 18 MMOL/L — LOW (ref 22–31)
CREAT SERPL-MCNC: 0.8 MG/DL — SIGNIFICANT CHANGE UP (ref 0.5–1.3)
GAS PNL BLDA: SIGNIFICANT CHANGE UP
GLUCOSE SERPL-MCNC: 136 MG/DL — HIGH (ref 70–99)
HCT VFR BLD CALC: 33.8 % — LOW (ref 39–50)
HGB BLD-MCNC: 9.5 G/DL — LOW (ref 13–17)
MAGNESIUM SERPL-MCNC: 2.2 MG/DL — SIGNIFICANT CHANGE UP (ref 1.6–2.6)
MCHC RBC-ENTMCNC: 20.5 PG — LOW (ref 27–34)
MCHC RBC-ENTMCNC: 28.1 GM/DL — LOW (ref 32–36)
MCV RBC AUTO: 73 FL — LOW (ref 80–100)
NRBC # BLD: 0 /100 WBCS — SIGNIFICANT CHANGE UP (ref 0–0)
PHOSPHATE SERPL-MCNC: 4.9 MG/DL — HIGH (ref 2.5–4.5)
PLATELET # BLD AUTO: 401 K/UL — HIGH (ref 150–400)
POTASSIUM SERPL-MCNC: 4.6 MMOL/L — SIGNIFICANT CHANGE UP (ref 3.5–5.3)
POTASSIUM SERPL-SCNC: 4.6 MMOL/L — SIGNIFICANT CHANGE UP (ref 3.5–5.3)
RBC # BLD: 4.63 M/UL — SIGNIFICANT CHANGE UP (ref 4.2–5.8)
RBC # FLD: 18.1 % — HIGH (ref 10.3–14.5)
RH IG SCN BLD-IMP: POSITIVE — SIGNIFICANT CHANGE UP
SODIUM SERPL-SCNC: 135 MMOL/L — SIGNIFICANT CHANGE UP (ref 135–145)
WBC # BLD: 8.66 K/UL — SIGNIFICANT CHANGE UP (ref 3.8–10.5)
WBC # FLD AUTO: 8.66 K/UL — SIGNIFICANT CHANGE UP (ref 3.8–10.5)

## 2020-05-27 PROCEDURE — 22830 EXPLORATION OF SPINAL FUSION: CPT | Mod: 59

## 2020-05-27 PROCEDURE — 99233 SBSQ HOSP IP/OBS HIGH 50: CPT

## 2020-05-27 PROCEDURE — 97110 THERAPEUTIC EXERCISES: CPT | Performed by: PHYSICAL THERAPIST

## 2020-05-27 PROCEDURE — G0283 ELEC STIM OTHER THAN WOUND: HCPCS | Performed by: PHYSICAL THERAPIST

## 2020-05-27 PROCEDURE — 22849 REINSERT SPINAL FIXATION: CPT

## 2020-05-27 PROCEDURE — 22614 ARTHRD PST TQ 1NTRSPC EA ADD: CPT

## 2020-05-27 PROCEDURE — 97140 MANUAL THERAPY 1/> REGIONS: CPT | Performed by: PHYSICAL THERAPIST

## 2020-05-27 PROCEDURE — 22612 ARTHRD PST TQ 1NTRSPC LUMBAR: CPT

## 2020-05-27 RX ORDER — TRAZODONE HCL 50 MG
100 TABLET ORAL
Refills: 0 | Status: DISCONTINUED | OUTPATIENT
Start: 2020-05-27 | End: 2020-05-28

## 2020-05-27 RX ORDER — ACETAMINOPHEN 500 MG
650 TABLET ORAL EVERY 6 HOURS
Refills: 0 | Status: DISCONTINUED | OUTPATIENT
Start: 2020-05-27 | End: 2020-06-04

## 2020-05-27 RX ORDER — TAMSULOSIN HYDROCHLORIDE 0.4 MG/1
0.8 CAPSULE ORAL AT BEDTIME
Refills: 0 | Status: DISCONTINUED | OUTPATIENT
Start: 2020-05-27 | End: 2020-06-04

## 2020-05-27 RX ORDER — TRAZODONE HCL 50 MG
100 TABLET ORAL DAILY
Refills: 0 | Status: DISCONTINUED | OUTPATIENT
Start: 2020-05-27 | End: 2020-05-29

## 2020-05-27 RX ORDER — HYDROMORPHONE HYDROCHLORIDE 2 MG/ML
4 INJECTION INTRAMUSCULAR; INTRAVENOUS; SUBCUTANEOUS EVERY 4 HOURS
Refills: 0 | Status: DISCONTINUED | OUTPATIENT
Start: 2020-05-27 | End: 2020-05-27

## 2020-05-27 RX ORDER — CEFAZOLIN SODIUM 1 G
2000 VIAL (EA) INJECTION EVERY 8 HOURS
Refills: 0 | Status: COMPLETED | OUTPATIENT
Start: 2020-05-28 | End: 2020-05-28

## 2020-05-27 RX ORDER — CYCLOBENZAPRINE HYDROCHLORIDE 10 MG/1
10 TABLET, FILM COATED ORAL THREE TIMES A DAY
Refills: 0 | Status: DISCONTINUED | OUTPATIENT
Start: 2020-05-27 | End: 2020-05-27

## 2020-05-27 RX ORDER — ZOLPIDEM TARTRATE 10 MG/1
5 TABLET ORAL AT BEDTIME
Refills: 0 | Status: DISCONTINUED | OUTPATIENT
Start: 2020-05-28 | End: 2020-06-02

## 2020-05-27 RX ORDER — LOSARTAN POTASSIUM 100 MG/1
25 TABLET, FILM COATED ORAL DAILY
Refills: 0 | Status: DISCONTINUED | OUTPATIENT
Start: 2020-05-27 | End: 2020-06-04

## 2020-05-27 RX ORDER — ALPRAZOLAM 0.25 MG
1 TABLET ORAL EVERY 6 HOURS
Refills: 0 | Status: DISCONTINUED | OUTPATIENT
Start: 2020-05-27 | End: 2020-05-28

## 2020-05-27 RX ORDER — HYDROMORPHONE HYDROCHLORIDE 2 MG/ML
0.5 INJECTION INTRAMUSCULAR; INTRAVENOUS; SUBCUTANEOUS
Refills: 0 | Status: DISCONTINUED | OUTPATIENT
Start: 2020-05-27 | End: 2020-05-28

## 2020-05-27 RX ORDER — HYDROMORPHONE HYDROCHLORIDE 2 MG/ML
30 INJECTION INTRAMUSCULAR; INTRAVENOUS; SUBCUTANEOUS
Refills: 0 | Status: DISCONTINUED | OUTPATIENT
Start: 2020-05-27 | End: 2020-05-28

## 2020-05-27 RX ORDER — ONDANSETRON 8 MG/1
4 TABLET, FILM COATED ORAL EVERY 6 HOURS
Refills: 0 | Status: DISCONTINUED | OUTPATIENT
Start: 2020-05-27 | End: 2020-06-04

## 2020-05-27 RX ORDER — ONDANSETRON 8 MG/1
4 TABLET, FILM COATED ORAL ONCE
Refills: 0 | Status: DISCONTINUED | OUTPATIENT
Start: 2020-05-27 | End: 2020-05-28

## 2020-05-27 RX ORDER — DIAZEPAM 5 MG
5 TABLET ORAL EVERY 8 HOURS
Refills: 0 | Status: DISCONTINUED | OUTPATIENT
Start: 2020-05-27 | End: 2020-05-29

## 2020-05-27 RX ORDER — SENNA PLUS 8.6 MG/1
2 TABLET ORAL AT BEDTIME
Refills: 0 | Status: DISCONTINUED | OUTPATIENT
Start: 2020-05-27 | End: 2020-06-04

## 2020-05-27 RX ORDER — ALBUTEROL 90 UG/1
1 AEROSOL, METERED ORAL EVERY 6 HOURS
Refills: 0 | Status: DISCONTINUED | OUTPATIENT
Start: 2020-05-27 | End: 2020-06-04

## 2020-05-27 RX ORDER — AMLODIPINE BESYLATE 2.5 MG/1
5 TABLET ORAL DAILY
Refills: 0 | Status: DISCONTINUED | OUTPATIENT
Start: 2020-05-27 | End: 2020-06-04

## 2020-05-27 RX ORDER — POLYETHYLENE GLYCOL 3350 17 G/17G
17 POWDER, FOR SOLUTION ORAL
Refills: 0 | Status: DISCONTINUED | OUTPATIENT
Start: 2020-05-27 | End: 2020-05-28

## 2020-05-27 RX ORDER — DEXTROSE MONOHYDRATE, SODIUM CHLORIDE, AND POTASSIUM CHLORIDE 50; .745; 4.5 G/1000ML; G/1000ML; G/1000ML
1000 INJECTION, SOLUTION INTRAVENOUS
Refills: 0 | Status: DISCONTINUED | OUTPATIENT
Start: 2020-05-27 | End: 2020-05-28

## 2020-05-27 RX ORDER — NALOXONE HYDROCHLORIDE 4 MG/.1ML
0.1 SPRAY NASAL
Refills: 0 | Status: DISCONTINUED | OUTPATIENT
Start: 2020-05-27 | End: 2020-06-04

## 2020-05-27 RX ORDER — ACETAMINOPHEN 500 MG
1000 TABLET ORAL ONCE
Refills: 0 | Status: COMPLETED | OUTPATIENT
Start: 2020-05-27 | End: 2020-05-27

## 2020-05-27 RX ORDER — HYDROMORPHONE HYDROCHLORIDE 2 MG/ML
1 INJECTION INTRAMUSCULAR; INTRAVENOUS; SUBCUTANEOUS
Refills: 0 | Status: DISCONTINUED | OUTPATIENT
Start: 2020-05-27 | End: 2020-05-28

## 2020-05-27 RX ORDER — CYCLOBENZAPRINE HYDROCHLORIDE 10 MG/1
10 TABLET, FILM COATED ORAL THREE TIMES A DAY
Refills: 0 | Status: DISCONTINUED | OUTPATIENT
Start: 2020-05-27 | End: 2020-06-04

## 2020-05-27 RX ADMIN — Medication 1 MILLIGRAM(S): at 11:32

## 2020-05-27 RX ADMIN — HYDROMORPHONE HYDROCHLORIDE 30 MILLILITER(S): 2 INJECTION INTRAMUSCULAR; INTRAVENOUS; SUBCUTANEOUS at 21:53

## 2020-05-27 RX ADMIN — TAMSULOSIN HYDROCHLORIDE 0.8 MILLIGRAM(S): 0.4 CAPSULE ORAL at 21:24

## 2020-05-27 RX ADMIN — TRAMADOL HYDROCHLORIDE 25 MILLIGRAM(S): 50 TABLET ORAL at 04:40

## 2020-05-27 RX ADMIN — LOSARTAN POTASSIUM 25 MILLIGRAM(S): 100 TABLET, FILM COATED ORAL at 05:01

## 2020-05-27 RX ADMIN — Medication 5 MILLIGRAM(S): at 06:35

## 2020-05-27 RX ADMIN — AMLODIPINE BESYLATE 5 MILLIGRAM(S): 2.5 TABLET ORAL at 21:37

## 2020-05-27 RX ADMIN — DEXTROSE MONOHYDRATE, SODIUM CHLORIDE, AND POTASSIUM CHLORIDE 75 MILLILITER(S): 50; .745; 4.5 INJECTION, SOLUTION INTRAVENOUS at 20:59

## 2020-05-27 RX ADMIN — Medication 400 MILLIGRAM(S): at 06:31

## 2020-05-27 RX ADMIN — Medication 1 MILLIGRAM(S): at 23:31

## 2020-05-27 RX ADMIN — HYDROMORPHONE HYDROCHLORIDE 30 MILLILITER(S): 2 INJECTION INTRAMUSCULAR; INTRAVENOUS; SUBCUTANEOUS at 23:53

## 2020-05-27 RX ADMIN — HYDROMORPHONE HYDROCHLORIDE 4 MILLIGRAM(S): 2 INJECTION INTRAMUSCULAR; INTRAVENOUS; SUBCUTANEOUS at 11:51

## 2020-05-27 RX ADMIN — Medication 1 MILLIGRAM(S): at 05:01

## 2020-05-27 RX ADMIN — HYDROMORPHONE HYDROCHLORIDE 0.5 MILLIGRAM(S): 2 INJECTION INTRAMUSCULAR; INTRAVENOUS; SUBCUTANEOUS at 21:17

## 2020-05-27 RX ADMIN — SENNA PLUS 2 TABLET(S): 8.6 TABLET ORAL at 21:24

## 2020-05-27 RX ADMIN — Medication 100 MILLIGRAM(S): at 22:02

## 2020-05-27 RX ADMIN — HYDROMORPHONE HYDROCHLORIDE 0.5 MILLIGRAM(S): 2 INJECTION INTRAMUSCULAR; INTRAVENOUS; SUBCUTANEOUS at 20:57

## 2020-05-27 RX ADMIN — AMLODIPINE BESYLATE 5 MILLIGRAM(S): 2.5 TABLET ORAL at 05:01

## 2020-05-27 RX ADMIN — Medication 5 MILLIGRAM(S): at 21:37

## 2020-05-27 NOTE — PROGRESS NOTE ADULT - SUBJECTIVE AND OBJECTIVE BOX
Irving Mcdonnell   Pager [phone]  Office [phone]      CC: Patient is a 69y old  Male who presents with a chief complaint of falls, loose hardware (26 May 2020 14:01)      SUBJECTIVE / OVERNIGHT EVENTS:    MEDICATIONS  (STANDING):  ALPRAZolam 1 milliGRAM(s) Oral every 6 hours  amLODIPine   Tablet 5 milliGRAM(s) Oral daily  losartan 25 milliGRAM(s) Oral daily  senna 2 Tablet(s) Oral at bedtime  sodium chloride 0.9% with potassium chloride 20 mEq/L 1000 milliLiter(s) (75 mL/Hr) IV Continuous <Continuous>  tamsulosin 0.8 milliGRAM(s) Oral at bedtime  traZODone 100 milliGRAM(s) Oral daily    MEDICATIONS  (PRN):  acetaminophen   Tablet .. 650 milliGRAM(s) Oral every 6 hours PRN Temp greater or equal to 38C (100.4F), Mild Pain (1 - 3)  ALBUTerol    90 MICROgram(s) HFA Inhaler 1 Puff(s) Inhalation every 6 hours PRN Shortness of Breath and/or Wheezing  cyclobenzaprine 10 milliGRAM(s) Oral three times a day PRN Muscle Spasm  diazepam    Tablet 5 milliGRAM(s) Oral every 8 hours PRN muscle spasms  HYDROmorphone   Tablet 4 milliGRAM(s) Oral every 4 hours PRN Severe Pain (7 - 10)  polyethylene glycol 3350 17 Gram(s) Oral two times a day PRN Constipation  traMADol 25 milliGRAM(s) Oral every 4 hours PRN Moderate Pain (4 - 6)  zolpidem 5 milliGRAM(s) Oral at bedtime PRN Insomnia      Vital Signs Last 24 Hrs  T(C): 36.9 (27 May 2020 11:15), Max: 37.2 (26 May 2020 14:07)  T(F): 98.5 (27 May 2020 09:41), Max: 99 (26 May 2020 14:07)  HR: 76 (27 May 2020 11:15) (72 - 91)  BP: 134/75 (27 May 2020 11:15) (124/66 - 151/75)  BP(mean): --  RR: 18 (27 May 2020 11:15) (18 - 18)  SpO2: 97% (27 May 2020 11:15) (95% - 99%)  CAPILLARY BLOOD GLUCOSE        I&O's Summary    26 May 2020 07:01  -  27 May 2020 07:00  --------------------------------------------------------  IN: 940 mL / OUT: 1351 mL / NET: -411 mL      tele:    PHYSICAL EXAM:    GENERAL: NAD   HEENT: EOMI, PERRL  PULM: Clear to auscultation bilaterally  CV: Regular rate and rhythm; nl S1, S2; No murmurs, rubs, or gallops  ABDOMEN: Soft, Nontender, Nondistended; Bowel sounds present  EXTREMITIES/MSK:  No edema, calf tenderness   PSYCH: AAOx3  NEUROLOGY: non-focal          LABS:                        8.8    10.15 )-----------( 425      ( 26 May 2020 06:45 )             31.8     05-26    139  |  102  |  11  ----------------------------<  91  3.4<L>   |  25  |  0.95    Ca    9.3      26 May 2020 06:45      PT/INR - ( 26 May 2020 07:43 )   PT: 15.3 sec;   INR: 1.33 ratio         PTT - ( 26 May 2020 07:43 )  PTT:32.4 sec            RADIOLOGY & ADDITIONAL TESTS:    Imaging Personally Reviewed:    Consultant(s) Notes Reviewed:      Care Discussed with Consultants/Other Providers: Irving Mcdonnell   Pager [phone]  Office [phone]      CC: Patient is a 69y old  Male who presents with a chief complaint of falls, loose hardware (26 May 2020 14:01)      SUBJECTIVE / OVERNIGHT EVENTS: back pain improved yest. no f/c/r. no cp/sob. No h/o black/bloody stools    MEDICATIONS  (STANDING):  ALPRAZolam 1 milliGRAM(s) Oral every 6 hours  amLODIPine   Tablet 5 milliGRAM(s) Oral daily  losartan 25 milliGRAM(s) Oral daily  senna 2 Tablet(s) Oral at bedtime  sodium chloride 0.9% with potassium chloride 20 mEq/L 1000 milliLiter(s) (75 mL/Hr) IV Continuous <Continuous>  tamsulosin 0.8 milliGRAM(s) Oral at bedtime  traZODone 100 milliGRAM(s) Oral daily    MEDICATIONS  (PRN):  acetaminophen   Tablet .. 650 milliGRAM(s) Oral every 6 hours PRN Temp greater or equal to 38C (100.4F), Mild Pain (1 - 3)  ALBUTerol    90 MICROgram(s) HFA Inhaler 1 Puff(s) Inhalation every 6 hours PRN Shortness of Breath and/or Wheezing  cyclobenzaprine 10 milliGRAM(s) Oral three times a day PRN Muscle Spasm  diazepam    Tablet 5 milliGRAM(s) Oral every 8 hours PRN muscle spasms  HYDROmorphone   Tablet 4 milliGRAM(s) Oral every 4 hours PRN Severe Pain (7 - 10)  polyethylene glycol 3350 17 Gram(s) Oral two times a day PRN Constipation  traMADol 25 milliGRAM(s) Oral every 4 hours PRN Moderate Pain (4 - 6)  zolpidem 5 milliGRAM(s) Oral at bedtime PRN Insomnia      Vital Signs Last 24 Hrs  T(C): 36.9 (27 May 2020 11:15), Max: 37.2 (26 May 2020 14:07)  T(F): 98.5 (27 May 2020 09:41), Max: 99 (26 May 2020 14:07)  HR: 76 (27 May 2020 11:15) (72 - 91)  BP: 134/75 (27 May 2020 11:15) (124/66 - 151/75)  BP(mean): --  RR: 18 (27 May 2020 11:15) (18 - 18)  SpO2: 97% (27 May 2020 11:15) (95% - 99%)  CAPILLARY BLOOD GLUCOSE        I&O's Summary    26 May 2020 07:01  -  27 May 2020 07:00  --------------------------------------------------------  IN: 940 mL / OUT: 1351 mL / NET: -411 mL          PHYSICAL EXAM:    GENERAL: NAD   HEENT: EOMI, PERRL  PULM: Clear to auscultation bilaterally  CV: Regular rate and rhythm; nl S1, S2; No murmurs, rubs, or gallops  ABDOMEN: Soft, Nontender, Nondistended; Bowel sounds present  EXTREMITIES/MSK:  No edema, calf tenderness   PSYCH: AAOx3  NEUROLOGY: non-focal          LABS:                        8.8    10.15 )-----------( 425      ( 26 May 2020 06:45 )             31.8     05-26    139  |  102  |  11  ----------------------------<  91  3.4<L>   |  25  |  0.95    Ca    9.3      26 May 2020 06:45      PT/INR - ( 26 May 2020 07:43 )   PT: 15.3 sec;   INR: 1.33 ratio         PTT - ( 26 May 2020 07:43 )  PTT:32.4 sec            RADIOLOGY & ADDITIONAL TESTS:    Imaging Personally Reviewed:    Consultant(s) Notes Reviewed:      Care Discussed with Consultants/Other Providers: neurosurg

## 2020-05-27 NOTE — PROGRESS NOTE ADULT - ASSESSMENT
68 yo man with PMH chronic back pain s/p multiple lumbar laminectomy/fusion with recent admission from 1/22/2020 to 2/3/2020 for D77-[id number] Fusion on 1/28 , NPH s/p  shunt, HTN, HLD, anxiety, GULSHAN on CPAP presents to ED after he was found to have loosening of hardware on CT by his spine surgeon. no

## 2020-05-27 NOTE — PROGRESS NOTES
Physical Therapy Daily Progress Note      Patient: Orion Harvey   : 1950  Diagnosis/ICD-10 Code:  Chronic pain of both shoulders [M25.511, G89.29, M25.512]  Referring practitioner: Nazanin Garcia*  Date of Initial Visit: Type: THERAPY  Noted: 2020  Today's Date: 2020  Patient seen for 4 sessions         Orion Harvey reports: he is able to move his arms a little better but states he feels like strength is slow to gain.    Objective : Educated pt that strength gains take time and to not get discourage. Pt verbalized understanding. R shoulder flex PROM improved.  See Exercise, Manual, and Modality Logs for complete treatment.       Assessment/Plan : Good tolerance to ther ex with mild reports of R shoulder pain at end range flexion. Fatigued with supine punch with eccentric lower to 30 deg. Strength progressing. Needs continued scap and shoulder strengthening to gain optimal functional use of B UE.    Progress per Plan of Care and Progress strengthening /stabilization /functional activity           Timed:         Manual Therapy:    10     mins  65275;     Therapeutic Exercise:    20     mins  91258;     Neuromuscular Taylor:        mins  06532;    Therapeutic Activity:          mins  37939;     Gait Training:           mins  29967;     Ultrasound:          mins  52718;    Ionto                                   mins   35556  Self Care                            mins   73917  Canalith Repos                   mins  4209    Un-Timed:  Electrical Stimulation:    15     mins  69223 ( );  Dry Needling          mins self-pay  Traction          mins 14691  Low Eval          Mins  31013  Mod Eval          Mins  75735  High Eval                            Mins  52718    Timed Treatment:   30   mins   Total Treatment:     45   mins    Vangie Torres, PT  Physical Therapist

## 2020-05-27 NOTE — PRE-ANESTHESIA EVALUATION ADULT - NSANTHPMHFT_GEN_ALL_CORE
70 yo man with PMH chronic back pain s/p multiple lumbar laminectomy/fusion with recent admission from 1/22/2020 to 2/3/2020 for N35-Rqdveu Fusion on 1/28 , NPH s/p  shunt, HTN, HLD, anxiety, depression, GULSHAN on CPAP  was admitted to the hospital on 2/8 from home in setting of frequent falls. Subsequently D/C to [facility], presents to ED with recent falls and found to have loosening of hardware on CT, 70 yo man with PMH chronic back pain s/p multiple lumbar laminectomy/fusion with recent admission from 1/22/2020 to 2/3/2020 for L10-Kfxtdi Fusion on 1/28 , NPH s/p  shunt, HTN, HLD, anxiety, depression, GULSHAN on CPAP  was admitted to the hospital on 2/8 from home in setting of frequent falls. Subsequently D/C to [facility], presents to ED with recent falls and found to have loosening of hardware on CT,    ambulates with cane.   Denies CP/SOB, cough, fever, hx of TIA/stroke

## 2020-05-28 DIAGNOSIS — K11.20 SIALOADENITIS, UNSPECIFIED: ICD-10-CM

## 2020-05-28 LAB
ANION GAP SERPL CALC-SCNC: 10 MMOL/L — SIGNIFICANT CHANGE UP (ref 5–17)
BASOPHILS # BLD AUTO: 0.02 K/UL — SIGNIFICANT CHANGE UP (ref 0–0.2)
BASOPHILS # BLD AUTO: 0.04 K/UL — SIGNIFICANT CHANGE UP (ref 0–0.2)
BASOPHILS NFR BLD AUTO: 0.2 % — SIGNIFICANT CHANGE UP (ref 0–2)
BASOPHILS NFR BLD AUTO: 0.3 % — SIGNIFICANT CHANGE UP (ref 0–2)
BUN SERPL-MCNC: 13 MG/DL — SIGNIFICANT CHANGE UP (ref 7–23)
CALCIUM SERPL-MCNC: 8.6 MG/DL — SIGNIFICANT CHANGE UP (ref 8.4–10.5)
CHLORIDE SERPL-SCNC: 104 MMOL/L — SIGNIFICANT CHANGE UP (ref 96–108)
CO2 SERPL-SCNC: 23 MMOL/L — SIGNIFICANT CHANGE UP (ref 22–31)
CREAT SERPL-MCNC: 0.8 MG/DL — SIGNIFICANT CHANGE UP (ref 0.5–1.3)
EOSINOPHIL # BLD AUTO: 0 K/UL — SIGNIFICANT CHANGE UP (ref 0–0.5)
EOSINOPHIL # BLD AUTO: 0.03 K/UL — SIGNIFICANT CHANGE UP (ref 0–0.5)
EOSINOPHIL NFR BLD AUTO: 0 % — SIGNIFICANT CHANGE UP (ref 0–6)
EOSINOPHIL NFR BLD AUTO: 0.2 % — SIGNIFICANT CHANGE UP (ref 0–6)
GLUCOSE SERPL-MCNC: 108 MG/DL — HIGH (ref 70–99)
HCT VFR BLD CALC: 30.8 % — LOW (ref 39–50)
HCT VFR BLD CALC: 32.3 % — LOW (ref 39–50)
HGB BLD-MCNC: 8.7 G/DL — LOW (ref 13–17)
HGB BLD-MCNC: 9.6 G/DL — LOW (ref 13–17)
IMM GRANULOCYTES NFR BLD AUTO: 0.6 % — SIGNIFICANT CHANGE UP (ref 0–1.5)
IMM GRANULOCYTES NFR BLD AUTO: 0.9 % — SIGNIFICANT CHANGE UP (ref 0–1.5)
LYMPHOCYTES # BLD AUTO: 0.89 K/UL — LOW (ref 1–3.3)
LYMPHOCYTES # BLD AUTO: 1.14 K/UL — SIGNIFICANT CHANGE UP (ref 1–3.3)
LYMPHOCYTES # BLD AUTO: 8.3 % — LOW (ref 13–44)
LYMPHOCYTES # BLD AUTO: 8.9 % — LOW (ref 13–44)
MCHC RBC-ENTMCNC: 20.4 PG — LOW (ref 27–34)
MCHC RBC-ENTMCNC: 20.8 PG — LOW (ref 27–34)
MCHC RBC-ENTMCNC: 28.2 GM/DL — LOW (ref 32–36)
MCHC RBC-ENTMCNC: 29.7 GM/DL — LOW (ref 32–36)
MCV RBC AUTO: 70.1 FL — LOW (ref 80–100)
MCV RBC AUTO: 72.3 FL — LOW (ref 80–100)
MONOCYTES # BLD AUTO: 0.88 K/UL — SIGNIFICANT CHANGE UP (ref 0–0.9)
MONOCYTES # BLD AUTO: 1.19 K/UL — HIGH (ref 0–0.9)
MONOCYTES NFR BLD AUTO: 8.2 % — SIGNIFICANT CHANGE UP (ref 2–14)
MONOCYTES NFR BLD AUTO: 9.3 % — SIGNIFICANT CHANGE UP (ref 2–14)
NEUTROPHILS # BLD AUTO: 10.29 K/UL — HIGH (ref 1.8–7.4)
NEUTROPHILS # BLD AUTO: 8.81 K/UL — HIGH (ref 1.8–7.4)
NEUTROPHILS NFR BLD AUTO: 80.7 % — HIGH (ref 43–77)
NEUTROPHILS NFR BLD AUTO: 82.4 % — HIGH (ref 43–77)
NRBC # BLD: 0 /100 WBCS — SIGNIFICANT CHANGE UP (ref 0–0)
NRBC # BLD: 0 /100 WBCS — SIGNIFICANT CHANGE UP (ref 0–0)
PLATELET # BLD AUTO: 349 K/UL — SIGNIFICANT CHANGE UP (ref 150–400)
PLATELET # BLD AUTO: 397 K/UL — SIGNIFICANT CHANGE UP (ref 150–400)
POTASSIUM SERPL-MCNC: 4.9 MMOL/L — SIGNIFICANT CHANGE UP (ref 3.5–5.3)
POTASSIUM SERPL-SCNC: 4.9 MMOL/L — SIGNIFICANT CHANGE UP (ref 3.5–5.3)
PROCALCITONIN SERPL-MCNC: 0.08 NG/ML — SIGNIFICANT CHANGE UP (ref 0.02–0.1)
RBC # BLD: 4.26 M/UL — SIGNIFICANT CHANGE UP (ref 4.2–5.8)
RBC # BLD: 4.61 M/UL — SIGNIFICANT CHANGE UP (ref 4.2–5.8)
RBC # FLD: 17.7 % — HIGH (ref 10.3–14.5)
RBC # FLD: 18.1 % — HIGH (ref 10.3–14.5)
SODIUM SERPL-SCNC: 137 MMOL/L — SIGNIFICANT CHANGE UP (ref 135–145)
WBC # BLD: 10.7 K/UL — HIGH (ref 3.8–10.5)
WBC # BLD: 12.77 K/UL — HIGH (ref 3.8–10.5)
WBC # FLD AUTO: 10.7 K/UL — HIGH (ref 3.8–10.5)
WBC # FLD AUTO: 12.77 K/UL — HIGH (ref 3.8–10.5)

## 2020-05-28 PROCEDURE — 72131 CT LUMBAR SPINE W/O DYE: CPT | Mod: 26

## 2020-05-28 PROCEDURE — 99232 SBSQ HOSP IP/OBS MODERATE 35: CPT

## 2020-05-28 PROCEDURE — 99233 SBSQ HOSP IP/OBS HIGH 50: CPT

## 2020-05-28 RX ORDER — SODIUM CHLORIDE 9 MG/ML
1000 INJECTION INTRAMUSCULAR; INTRAVENOUS; SUBCUTANEOUS
Refills: 0 | Status: DISCONTINUED | OUTPATIENT
Start: 2020-05-28 | End: 2020-05-29

## 2020-05-28 RX ORDER — ALPRAZOLAM 0.25 MG
0.5 TABLET ORAL ONCE
Refills: 0 | Status: DISCONTINUED | OUTPATIENT
Start: 2020-05-28 | End: 2020-05-28

## 2020-05-28 RX ORDER — HYDROMORPHONE HYDROCHLORIDE 2 MG/ML
2 INJECTION INTRAMUSCULAR; INTRAVENOUS; SUBCUTANEOUS EVERY 4 HOURS
Refills: 0 | Status: DISCONTINUED | OUTPATIENT
Start: 2020-05-28 | End: 2020-05-29

## 2020-05-28 RX ORDER — ACETAMINOPHEN 500 MG
1000 TABLET ORAL ONCE
Refills: 0 | Status: COMPLETED | OUTPATIENT
Start: 2020-05-28 | End: 2020-05-28

## 2020-05-28 RX ORDER — TRAZODONE HCL 50 MG
100 TABLET ORAL AT BEDTIME
Refills: 0 | Status: DISCONTINUED | OUTPATIENT
Start: 2020-05-28 | End: 2020-06-04

## 2020-05-28 RX ORDER — ENOXAPARIN SODIUM 100 MG/ML
40 INJECTION SUBCUTANEOUS AT BEDTIME
Refills: 0 | Status: DISCONTINUED | OUTPATIENT
Start: 2020-05-28 | End: 2020-06-03

## 2020-05-28 RX ORDER — POLYETHYLENE GLYCOL 3350 17 G/17G
17 POWDER, FOR SOLUTION ORAL
Refills: 0 | Status: DISCONTINUED | OUTPATIENT
Start: 2020-05-28 | End: 2020-06-04

## 2020-05-28 RX ORDER — HYDROMORPHONE HYDROCHLORIDE 2 MG/ML
0.5 INJECTION INTRAMUSCULAR; INTRAVENOUS; SUBCUTANEOUS EVERY 6 HOURS
Refills: 0 | Status: DISCONTINUED | OUTPATIENT
Start: 2020-05-28 | End: 2020-05-29

## 2020-05-28 RX ORDER — HYDROMORPHONE HYDROCHLORIDE 2 MG/ML
4 INJECTION INTRAMUSCULAR; INTRAVENOUS; SUBCUTANEOUS EVERY 6 HOURS
Refills: 0 | Status: DISCONTINUED | OUTPATIENT
Start: 2020-05-28 | End: 2020-05-29

## 2020-05-28 RX ORDER — CEPHALEXIN 500 MG
500 CAPSULE ORAL EVERY 12 HOURS
Refills: 0 | Status: DISCONTINUED | OUTPATIENT
Start: 2020-05-28 | End: 2020-06-04

## 2020-05-28 RX ADMIN — Medication 1 MILLIGRAM(S): at 06:35

## 2020-05-28 RX ADMIN — HYDROMORPHONE HYDROCHLORIDE 4 MILLIGRAM(S): 2 INJECTION INTRAMUSCULAR; INTRAVENOUS; SUBCUTANEOUS at 20:51

## 2020-05-28 RX ADMIN — HYDROMORPHONE HYDROCHLORIDE 30 MILLILITER(S): 2 INJECTION INTRAMUSCULAR; INTRAVENOUS; SUBCUTANEOUS at 01:16

## 2020-05-28 RX ADMIN — Medication 100 MILLIGRAM(S): at 11:56

## 2020-05-28 RX ADMIN — Medication 100 MILLIGRAM(S): at 21:17

## 2020-05-28 RX ADMIN — POLYETHYLENE GLYCOL 3350 17 GRAM(S): 17 POWDER, FOR SOLUTION ORAL at 16:46

## 2020-05-28 RX ADMIN — Medication 1 MILLIGRAM(S): at 11:56

## 2020-05-28 RX ADMIN — Medication 100 MILLIGRAM(S): at 02:55

## 2020-05-28 RX ADMIN — SODIUM CHLORIDE 50 MILLILITER(S): 9 INJECTION INTRAMUSCULAR; INTRAVENOUS; SUBCUTANEOUS at 17:54

## 2020-05-28 RX ADMIN — Medication 5 MILLIGRAM(S): at 05:13

## 2020-05-28 RX ADMIN — HYDROMORPHONE HYDROCHLORIDE 30 MILLILITER(S): 2 INJECTION INTRAMUSCULAR; INTRAVENOUS; SUBCUTANEOUS at 19:24

## 2020-05-28 RX ADMIN — HYDROMORPHONE HYDROCHLORIDE 30 MILLILITER(S): 2 INJECTION INTRAMUSCULAR; INTRAVENOUS; SUBCUTANEOUS at 07:55

## 2020-05-28 RX ADMIN — HYDROMORPHONE HYDROCHLORIDE 30 MILLILITER(S): 2 INJECTION INTRAMUSCULAR; INTRAVENOUS; SUBCUTANEOUS at 16:29

## 2020-05-28 RX ADMIN — Medication 400 MILLIGRAM(S): at 20:44

## 2020-05-28 RX ADMIN — Medication 5 MILLIGRAM(S): at 20:45

## 2020-05-28 RX ADMIN — HYDROMORPHONE HYDROCHLORIDE 30 MILLILITER(S): 2 INJECTION INTRAMUSCULAR; INTRAVENOUS; SUBCUTANEOUS at 12:02

## 2020-05-28 RX ADMIN — ZOLPIDEM TARTRATE 5 MILLIGRAM(S): 10 TABLET ORAL at 01:26

## 2020-05-28 RX ADMIN — HYDROMORPHONE HYDROCHLORIDE 30 MILLILITER(S): 2 INJECTION INTRAMUSCULAR; INTRAVENOUS; SUBCUTANEOUS at 06:44

## 2020-05-28 RX ADMIN — Medication 0.5 MILLIGRAM(S): at 16:46

## 2020-05-28 RX ADMIN — Medication 5 MILLIGRAM(S): at 15:06

## 2020-05-28 RX ADMIN — DEXTROSE MONOHYDRATE, SODIUM CHLORIDE, AND POTASSIUM CHLORIDE 75 MILLILITER(S): 50; .745; 4.5 INJECTION, SOLUTION INTRAVENOUS at 07:59

## 2020-05-28 RX ADMIN — AMLODIPINE BESYLATE 5 MILLIGRAM(S): 2.5 TABLET ORAL at 06:35

## 2020-05-28 RX ADMIN — LOSARTAN POTASSIUM 25 MILLIGRAM(S): 100 TABLET, FILM COATED ORAL at 06:36

## 2020-05-28 NOTE — PROVIDER CONTACT NOTE (OTHER) - ASSESSMENT
VSS. pt c/o anxiety states he is having a panic attack. pt c/o pain under R jaw, palpable lump noted w/ pain to the touch

## 2020-05-28 NOTE — PROGRESS NOTE ADULT - SUBJECTIVE AND OBJECTIVE BOX
SUBJECTIVE:   Incisional back pain. Didnt understand how pain pump worked last night but now using appropriately. Very anxious   OVERNIGHT EVENTS: none    Vital Signs Last 24 Hrs  T(C): 36.6 (28 May 2020 04:05), Max: 36.8 (27 May 2020 12:58)  T(F): 97.8 (28 May 2020 04:05), Max: 98.2 (27 May 2020 12:58)  HR: 70 (28 May 2020 04:05) (65 - 98)  BP: 135/74 (28 May 2020 04:05) (124/71 - 160/58)  BP(mean): 103 (28 May 2020 00:00) (91 - 113)  RR: 16 (28 May 2020 04:05) (14 - 18)  SpO2: 97% (28 May 2020 08:05) (95% - 100%)    PHYSICAL EXAM:    Constitutional: No Acute Distress     Neurological: Awake alert Ox3, Speech clear Following Commands, Moving all Extremities 5/5. Sensation intact Midback dressing C/D/I R & L  & 60ccc/ overnight     Pulmonary: Clear to Auscultation,    Cardiovascular: S1, S2, Regular rate and rhythm     Gastrointestinal: Soft, Non-tender, Non-distended     Extremities: No calf tenderness     LABS:                        8.7    10.70 )-----------( 349      ( 28 May 2020 07:04 )             30.8    05-28    137  |  104  |  13  ----------------------------<  108<H>  4.9   |  23  |  0.80    Ca    8.6      28 May 2020 07:04  Phos  4.9     05-27  Mg     2.2     05-27            IMAGING:     CT T/L spine pending     MEDICATIONS:    acetaminophen   Tablet .. 650 milliGRAM(s) Oral every 6 hours PRN Temp greater or equal to 38C (100.4F), Mild Pain (1 - 3)  cyclobenzaprine 10 milliGRAM(s) Oral three times a day PRN Muscle Spasm  diazepam    Tablet 5 milliGRAM(s) Oral every 8 hours PRN muscle spasms  diazepam    Tablet 5 milliGRAM(s) Oral every 8 hours  HYDROmorphone PCA (1 mG/mL) 30 milliLiter(s) PCA Continuous PCA Continuous  HYDROmorphone PCA (1 mG/mL) Rescue Clinician Bolus 0.5 milliGRAM(s) IV Push every 15 minutes PRN for Pain Scale GREATER THAN 6  ondansetron Injectable 4 milliGRAM(s) IV Push every 6 hours PRN Nausea  ondansetron Injectable 4 milliGRAM(s) IV Push every 6 hours PRN Nausea  traZODone 100 milliGRAM(s) Oral daily  traZODone 100 milliGRAM(s) Oral <User Schedule>  zolpidem 5 milliGRAM(s) Oral at bedtime PRN Insomnia  amLODIPine   Tablet 5 milliGRAM(s) Oral daily  losartan 25 milliGRAM(s) Oral daily  tamsulosin 0.8 milliGRAM(s) Oral at bedtime  ALBUTerol    90 MICROgram(s) HFA Inhaler 1 Puff(s) Inhalation every 6 hours PRN Shortness of Breath and/or Wheezing  polyethylene glycol 3350 17 Gram(s) Oral two times a day PRN Constipation  senna 2 Tablet(s) Oral at bedtime  enoxaparin Injectable 40 milliGRAM(s) SubCutaneous at bedtime  naloxone Injectable 0.1 milliGRAM(s) IV Push every 3 minutes PRN For ANY of the following changes in patient status:  A. RR LESS THAN 10 breaths per minute, B. Oxygen saturation LESS THAN 90%, C. Sedation score of 6  sodium chloride 0.9% with potassium chloride 20 mEq/L 1000 milliLiter(s) IV Continuous <Continuous>      DIET:

## 2020-05-28 NOTE — PROGRESS NOTE ADULT - SUBJECTIVE AND OBJECTIVE BOX
PLASTIC SURGERY DAILY PROGRESS NOTE:    Interval:  No acute events overnight endorsed.    Subjective:  Patient seen and examined this am.     Vital Signs Last 24 Hrs  T(C): 36.6 (28 May 2020 04:05), Max: 36.9 (27 May 2020 09:41)  T(F): 97.8 (28 May 2020 04:05), Max: 98.5 (27 May 2020 09:41)  HR: 70 (28 May 2020 04:05) (65 - 98)  BP: 135/74 (28 May 2020 04:05) (124/71 - 160/58)  BP(mean): 103 (28 May 2020 00:00) (91 - 113)  RR: 16 (28 May 2020 04:05) (14 - 18)  SpO2: 97% (28 May 2020 04:05) (95% - 100%)    Exam:  Gen: NAD, resting in bed, alert and responding appropriately  Resp: Airway patent, non-labored respirations  Abd: Soft, ND, NTTP x 4 quadrants, no rebound or guarding.  Back: Dressing cdi R HMV stripped L HMV flushed  Ext: No edema, WWP  Neuro: AAOx3, no focal deficits    I&O's Detail    27 May 2020 07:01  -  28 May 2020 07:00  --------------------------------------------------------  IN:    Oral Fluid: 350 mL    sodium chloride 0.9% with potassium chloride 20 mEq/L: 300 mL  Total IN: 650 mL    OUT:    Accordian: 160 mL    Accordian: 60 mL    Indwelling Catheter - Urethral: 315 mL  Total OUT: 535 mL    Total NET: 115 mL          Daily Height in cm: 172.72 (27 May 2020 11:15)    Daily     MEDICATIONS  (STANDING):  ALPRAZolam 1 milliGRAM(s) Oral every 6 hours  amLODIPine   Tablet 5 milliGRAM(s) Oral daily  ceFAZolin   IVPB 2000 milliGRAM(s) IV Intermittent every 8 hours  diazepam    Tablet 5 milliGRAM(s) Oral every 8 hours  HYDROmorphone PCA (1 mG/mL) 30 milliLiter(s) PCA Continuous PCA Continuous  losartan 25 milliGRAM(s) Oral daily  senna 2 Tablet(s) Oral at bedtime  sodium chloride 0.9% with potassium chloride 20 mEq/L 1000 milliLiter(s) (75 mL/Hr) IV Continuous <Continuous>  tamsulosin 0.8 milliGRAM(s) Oral at bedtime  traZODone 100 milliGRAM(s) Oral daily  traZODone 100 milliGRAM(s) Oral <User Schedule>    MEDICATIONS  (PRN):  acetaminophen   Tablet .. 650 milliGRAM(s) Oral every 6 hours PRN Temp greater or equal to 38C (100.4F), Mild Pain (1 - 3)  ALBUTerol    90 MICROgram(s) HFA Inhaler 1 Puff(s) Inhalation every 6 hours PRN Shortness of Breath and/or Wheezing  cyclobenzaprine 10 milliGRAM(s) Oral three times a day PRN Muscle Spasm  diazepam    Tablet 5 milliGRAM(s) Oral every 8 hours PRN muscle spasms  HYDROmorphone PCA (1 mG/mL) Rescue Clinician Bolus 0.5 milliGRAM(s) IV Push every 15 minutes PRN for Pain Scale GREATER THAN 6  naloxone Injectable 0.1 milliGRAM(s) IV Push every 3 minutes PRN For ANY of the following changes in patient status:  A. RR LESS THAN 10 breaths per minute, B. Oxygen saturation LESS THAN 90%, C. Sedation score of 6  ondansetron Injectable 4 milliGRAM(s) IV Push every 6 hours PRN Nausea  ondansetron Injectable 4 milliGRAM(s) IV Push every 6 hours PRN Nausea  polyethylene glycol 3350 17 Gram(s) Oral two times a day PRN Constipation  zolpidem 5 milliGRAM(s) Oral at bedtime PRN Insomnia      LABS:                        8.7    10.70 )-----------( 349      ( 28 May 2020 07:04 )             30.8     05-27    135  |  102  |  11  ----------------------------<  136<H>  4.6   |  18<L>  |  0.80    Ca    8.4      27 May 2020 20:18  Phos  4.9     05-27  Mg     2.2     05-27      PT/INR - ( 26 May 2020 07:43 )   PT: 15.3 sec;   INR: 1.33 ratio         PTT - ( 26 May 2020 07:43 )  PTT:32.4 sec      NIKKI Little, PGY-1  Plastic Surgery  [phone]

## 2020-05-28 NOTE — PROGRESS NOTE ADULT - SUBJECTIVE AND OBJECTIVE BOX
Irving Mcdonnell   Pager [phone]  Office [phone]      CC: Patient is a 69y old  Male who presents with a chief complaint of falls, loose hardware (28 May 2020 12:16)      SUBJECTIVE / OVERNIGHT EVENTS:    MEDICATIONS  (STANDING):  amLODIPine   Tablet 5 milliGRAM(s) Oral daily  diazepam    Tablet 5 milliGRAM(s) Oral every 8 hours  enoxaparin Injectable 40 milliGRAM(s) SubCutaneous at bedtime  HYDROmorphone PCA (1 mG/mL) 30 milliLiter(s) PCA Continuous PCA Continuous  losartan 25 milliGRAM(s) Oral daily  polyethylene glycol 3350 17 Gram(s) Oral two times a day  senna 2 Tablet(s) Oral at bedtime  sodium chloride 0.9% with potassium chloride 20 mEq/L 1000 milliLiter(s) (75 mL/Hr) IV Continuous <Continuous>  tamsulosin 0.8 milliGRAM(s) Oral at bedtime  traZODone 100 milliGRAM(s) Oral daily  traZODone 100 milliGRAM(s) Oral <User Schedule>    MEDICATIONS  (PRN):  acetaminophen   Tablet .. 650 milliGRAM(s) Oral every 6 hours PRN Temp greater or equal to 38C (100.4F), Mild Pain (1 - 3)  ALBUTerol    90 MICROgram(s) HFA Inhaler 1 Puff(s) Inhalation every 6 hours PRN Shortness of Breath and/or Wheezing  cyclobenzaprine 10 milliGRAM(s) Oral three times a day PRN Muscle Spasm  diazepam    Tablet 5 milliGRAM(s) Oral every 8 hours PRN muscle spasms  HYDROmorphone PCA (1 mG/mL) Rescue Clinician Bolus 0.5 milliGRAM(s) IV Push every 15 minutes PRN for Pain Scale GREATER THAN 6  naloxone Injectable 0.1 milliGRAM(s) IV Push every 3 minutes PRN For ANY of the following changes in patient status:  A. RR LESS THAN 10 breaths per minute, B. Oxygen saturation LESS THAN 90%, C. Sedation score of 6  ondansetron Injectable 4 milliGRAM(s) IV Push every 6 hours PRN Nausea  ondansetron Injectable 4 milliGRAM(s) IV Push every 6 hours PRN Nausea  zolpidem 5 milliGRAM(s) Oral at bedtime PRN Insomnia      Vital Signs Last 24 Hrs  T(C): 36.6 (28 May 2020 04:05), Max: 36.6 (28 May 2020 04:05)  T(F): 97.8 (28 May 2020 04:05), Max: 97.8 (28 May 2020 04:05)  HR: 70 (28 May 2020 04:05) (65 - 78)  BP: 135/74 (28 May 2020 04:05) (124/71 - 152/85)  BP(mean): 103 (28 May 2020 00:00) (91 - 113)  RR: 16 (28 May 2020 04:05) (14 - 18)  SpO2: 97% (28 May 2020 08:05) (95% - 100%)  CAPILLARY BLOOD GLUCOSE        I&O's Summary    27 May 2020 07:01  -  28 May 2020 07:00  --------------------------------------------------------  IN: 650 mL / OUT: 535 mL / NET: 115 mL    28 May 2020 07:01  -  28 May 2020 13:21  --------------------------------------------------------  IN: 120 mL / OUT: 160 mL / NET: -40 mL      tele:    PHYSICAL EXAM:    GENERAL: NAD   HEENT: EOMI, PERRL  PULM: Clear to auscultation bilaterally  CV: Regular rate and rhythm; nl S1, S2; No murmurs, rubs, or gallops  ABDOMEN: Soft, Nontender, Nondistended; Bowel sounds present  EXTREMITIES/MSK:  No edema, calf tenderness   PSYCH: AAOx3  NEUROLOGY: non-focal          LABS:                        8.7    10.70 )-----------( 349      ( 28 May 2020 07:04 )             30.8     05-28    137  |  104  |  13  ----------------------------<  108<H>  4.9   |  23  |  0.80    Ca    8.6      28 May 2020 07:04  Phos  4.9     05-27  Mg     2.2     05-27              ABG - ( 27 May 2020 18:26 )  pH, Arterial: 7.32  pH, Blood: x     /  pCO2: 42    /  pO2: 300   / HCO3: 21    / Base Excess: -3.9  /  SaO2: 100                 RADIOLOGY & ADDITIONAL TESTS:    Imaging Personally Reviewed:    Consultant(s) Notes Reviewed:      Care Discussed with Consultants/Other Providers: Irving Mcdonnell   Pager [phone]  Office [phone]      CC: Patient is a 69y old  Male who presents with a chief complaint of falls, loose hardware (28 May 2020 12:16)      SUBJECTIVE / OVERNIGHT EVENTS: pt reports unchanged back pain post-surgery. No CP/SOB. No focal deficits.     MEDICATIONS  (STANDING):  amLODIPine   Tablet 5 milliGRAM(s) Oral daily  diazepam    Tablet 5 milliGRAM(s) Oral every 8 hours  enoxaparin Injectable 40 milliGRAM(s) SubCutaneous at bedtime  HYDROmorphone PCA (1 mG/mL) 30 milliLiter(s) PCA Continuous PCA Continuous  losartan 25 milliGRAM(s) Oral daily  polyethylene glycol 3350 17 Gram(s) Oral two times a day  senna 2 Tablet(s) Oral at bedtime  sodium chloride 0.9% with potassium chloride 20 mEq/L 1000 milliLiter(s) (75 mL/Hr) IV Continuous <Continuous>  tamsulosin 0.8 milliGRAM(s) Oral at bedtime  traZODone 100 milliGRAM(s) Oral daily  traZODone 100 milliGRAM(s) Oral <User Schedule>    MEDICATIONS  (PRN):  acetaminophen   Tablet .. 650 milliGRAM(s) Oral every 6 hours PRN Temp greater or equal to 38C (100.4F), Mild Pain (1 - 3)  ALBUTerol    90 MICROgram(s) HFA Inhaler 1 Puff(s) Inhalation every 6 hours PRN Shortness of Breath and/or Wheezing  cyclobenzaprine 10 milliGRAM(s) Oral three times a day PRN Muscle Spasm  diazepam    Tablet 5 milliGRAM(s) Oral every 8 hours PRN muscle spasms  HYDROmorphone PCA (1 mG/mL) Rescue Clinician Bolus 0.5 milliGRAM(s) IV Push every 15 minutes PRN for Pain Scale GREATER THAN 6  naloxone Injectable 0.1 milliGRAM(s) IV Push every 3 minutes PRN For ANY of the following changes in patient status:  A. RR LESS THAN 10 breaths per minute, B. Oxygen saturation LESS THAN 90%, C. Sedation score of 6  ondansetron Injectable 4 milliGRAM(s) IV Push every 6 hours PRN Nausea  ondansetron Injectable 4 milliGRAM(s) IV Push every 6 hours PRN Nausea  zolpidem 5 milliGRAM(s) Oral at bedtime PRN Insomnia      Vital Signs Last 24 Hrs  T(C): 36.6 (28 May 2020 04:05), Max: 36.6 (28 May 2020 04:05)  T(F): 97.8 (28 May 2020 04:05), Max: 97.8 (28 May 2020 04:05)  HR: 70 (28 May 2020 04:05) (65 - 78)  BP: 135/74 (28 May 2020 04:05) (124/71 - 152/85)  BP(mean): 103 (28 May 2020 00:00) (91 - 113)  RR: 16 (28 May 2020 04:05) (14 - 18)  SpO2: 97% (28 May 2020 08:05) (95% - 100%)  CAPILLARY BLOOD GLUCOSE        I&O's Summary    27 May 2020 07:01  -  28 May 2020 07:00  --------------------------------------------------------  IN: 650 mL / OUT: 535 mL / NET: 115 mL    28 May 2020 07:01  -  28 May 2020 13:21  --------------------------------------------------------  IN: 120 mL / OUT: 160 mL / NET: -40 mL          PHYSICAL EXAM:    GENERAL: NAD   HEENT: EOMI, PERRL  PULM: Clear to auscultation bilaterally  CV: Regular rate and rhythm; nl S1, S2; No murmurs, rubs, or gallops  ABDOMEN: Soft, Nontender, Nondistended; Bowel sounds present  EXTREMITIES/MSK:  No edema, calf tenderness   PSYCH: AAOx3  NEUROLOGY: non-focal          LABS:                        8.7    10.70 )-----------( 349      ( 28 May 2020 07:04 )             30.8     05-28    137  |  104  |  13  ----------------------------<  108<H>  4.9   |  23  |  0.80    Ca    8.6      28 May 2020 07:04  Phos  4.9     05-27  Mg     2.2     05-27              ABG - ( 27 May 2020 18:26 )  pH, Arterial: 7.32  pH, Blood: x     /  pCO2: 42    /  pO2: 300   / HCO3: 21    / Base Excess: -3.9  /  SaO2: 100                 RADIOLOGY & ADDITIONAL TESTS:    Imaging Personally Reviewed:    Consultant(s) Notes Reviewed:      Care Discussed with Consultants/Other Providers:

## 2020-05-28 NOTE — PROGRESS NOTE ADULT - ASSESSMENT
70 yo man with PMH chronic back pain s/p multiple lumbar laminectomy/fusion with recent admission from 1/22/2020 to 2/3/2020 for [id number] Fusion on 1/28 , NPH s/p  shunt, HTN, HLD, anxiety, depression, GULSHAN on CPAP  was admitted to the hospital on 2/8 from home in setting of frequent falls. Subsequently D/C to [facility], presents to ED with recent falls and found to have loosening of hardware on imaging. s/p Revision of T11-pelvis fusion w removal of left T11 screw, and removal and replacement of right T11 screw, sacral screws, and pelvic screws plastics closure with paraspinal flaps 5/27/20     Plan    Neuro stable . Maintain HMV drains . CT spine pending   Vitals stable . On Norvasc and Losartan   Pain control- Maintain PCA for today. on valium 5mg q8 for muscle spasms   . D/c xanax   Trial of void. d/c lindsay cath  am labs  H/H stable. Start SQL tonight   Home PT

## 2020-05-28 NOTE — CONSULT NOTE ADULT - PROBLEM SELECTOR RECOMMENDATION 9
Pt discussed in detail with Dr. Martínez, plan to for   - ct soft tissue head/neck    -HOB elevation  - DM control  - Antibiotics with gram coverage (augmentin 875mg bid x 10 days, or keflex 500mg po bid)  - Massage and warm compresses and sialogogues (anything sour- lemon neftaly) q 4  - hydration Pt discussed in detail with Dr. Martínez, plan to for   - ct soft tissue head/neck    -HOB elevation  - Antibiotics with gram coverage (augmentin 875mg bid x 10 days, or keflex 500mg po bid)  - Massage and warm compresses and sialogogues (anything sour- lemon neftaly) q 4  - hydration

## 2020-05-28 NOTE — PROGRESS NOTE ADULT - SUBJECTIVE AND OBJECTIVE BOX
Day 1 of Anesthesia Pain Management Service    SUBJECTIVE: I'm doing ok    Pain Scale Score:	[X] Refer to charted pain scores    THERAPY:    [ ] IV PCA Morphine		[ ] 5 mg/mL	[ ] 1 mg/mL  [X] IV PCA Hydromorphone	[ ] 5 mg/mL	[X] 1 mg/mL  [ ] IV PCA Fentanyl		[ ] 50 micrograms/mL    Demand dose: 0.2 mg     Lockout: 6 minutes   Continuous Rate: 0 mg/hr  4 Hour Limit: 4 mg    MEDICATIONS  (STANDING):  ALPRAZolam 1 milliGRAM(s) Oral every 6 hours  amLODIPine   Tablet 5 milliGRAM(s) Oral daily  ceFAZolin   IVPB 2000 milliGRAM(s) IV Intermittent every 8 hours  diazepam    Tablet 5 milliGRAM(s) Oral every 8 hours  HYDROmorphone PCA (1 mG/mL) 30 milliLiter(s) PCA Continuous PCA Continuous  losartan 25 milliGRAM(s) Oral daily  senna 2 Tablet(s) Oral at bedtime  sodium chloride 0.9% with potassium chloride 20 mEq/L 1000 milliLiter(s) (75 mL/Hr) IV Continuous <Continuous>  tamsulosin 0.8 milliGRAM(s) Oral at bedtime  traZODone 100 milliGRAM(s) Oral daily  traZODone 100 milliGRAM(s) Oral <User Schedule>    MEDICATIONS  (PRN):  acetaminophen   Tablet .. 650 milliGRAM(s) Oral every 6 hours PRN Temp greater or equal to 38C (100.4F), Mild Pain (1 - 3)  ALBUTerol    90 MICROgram(s) HFA Inhaler 1 Puff(s) Inhalation every 6 hours PRN Shortness of Breath and/or Wheezing  cyclobenzaprine 10 milliGRAM(s) Oral three times a day PRN Muscle Spasm  diazepam    Tablet 5 milliGRAM(s) Oral every 8 hours PRN muscle spasms  HYDROmorphone PCA (1 mG/mL) Rescue Clinician Bolus 0.5 milliGRAM(s) IV Push every 15 minutes PRN for Pain Scale GREATER THAN 6  naloxone Injectable 0.1 milliGRAM(s) IV Push every 3 minutes PRN For ANY of the following changes in patient status:  A. RR LESS THAN 10 breaths per minute, B. Oxygen saturation LESS THAN 90%, C. Sedation score of 6  ondansetron Injectable 4 milliGRAM(s) IV Push every 6 hours PRN Nausea  ondansetron Injectable 4 milliGRAM(s) IV Push every 6 hours PRN Nausea  polyethylene glycol 3350 17 Gram(s) Oral two times a day PRN Constipation  zolpidem 5 milliGRAM(s) Oral at bedtime PRN Insomnia      OBJECTIVE:    Sedation Score:	[ X] Alert 	[ ] Drowsy 	[ ] Arousable	[ ] Asleep	[ ] Unresponsive    Side Effects:	[X ] None	[ ] Nausea	[ ] Vomiting	[ ] Pruritus  		[ ] Other:    Vital Signs Last 24 Hrs  T(C): 36.6 (28 May 2020 04:05), Max: 36.9 (27 May 2020 09:41)  T(F): 97.8 (28 May 2020 04:05), Max: 98.5 (27 May 2020 09:41)  HR: 70 (28 May 2020 04:05) (65 - 98)  BP: 135/74 (28 May 2020 04:05) (124/71 - 160/58)  BP(mean): 103 (28 May 2020 00:00) (91 - 113)  RR: 16 (28 May 2020 04:05) (14 - 18)  SpO2: 97% (28 May 2020 08:05) (95% - 100%)    ASSESSMENT/ PLAN    Therapy to  be:               [X] Continued   [ ] Discontinued   [ ] Changed to PRN Analgesics    Documentation and Verification of current medications:   [X] Done	[ ] Not done, not eligible    Comments: Endorsing good analgesia with IV ofirmev. PCA use 1-7x/hr. Reeducated to PCA use. Consider transition to prn analgesics once OOB.  Duragesic patch and oxycodone noted preoperatively.

## 2020-05-28 NOTE — CHART NOTE - NSCHARTNOTEFT_GEN_A_CORE
Called by RN. Patient c/o R submandibular swelling . + tenderness and erythema. No swallowing difficulty . ENT consult called . CBC w diff . Imaging as per ENT recs

## 2020-05-28 NOTE — PROGRESS NOTE ADULT - ASSESSMENT
68 yo man with PMH chronic back pain s/p multiple lumbar laminectomy/fusion with recent admission from 1/22/2020 to 2/3/2020 for [id number] Fusion on 1/28 , NPH s/p  shunt, HTN, HLD, anxiety, GULSHAN on CPAP presents to ED after he was found to have loosening of hardware on CT by his spine surgeon. 70 yo man with PMH chronic back pain s/p multiple lumbar laminectomy/fusion with recent admission from 1/22/2020 to 2/3/2020 for [id number] Fusion on 1/28 , NPH s/p  shunt, HTN, HLD, anxiety, GULSHAN on CPAP presents to ED after he was found to have loosening of hardware on CT by his spine surgeon. Revision of T11-pelvis fusion w removal of left T11 screw, and removal and replacement of right T11 screw, sacral screws, and pelvic screws, plastics closure with paraspinal flaps and complex wound closure. EBL 500cc

## 2020-05-28 NOTE — PROGRESS NOTE ADULT - ASSESSMENT
69M sp T10-pelvis revision PSF by nsx and paraspinal muscle flap closure 5/28    Plan:  - Will continue to follow when pt in house  - Care per primary team    Plastic Surgery  [phone]

## 2020-05-28 NOTE — CONSULT NOTE ADULT - SUBJECTIVE AND OBJECTIVE BOX
CC: right submandibular swelling     HPI:  68 yo man with PMH chronic back pain s/p multiple lumbar laminectomy/fusion with recent admission from 1/22/2020 to 2/3/2020 for [id number] Fusion on 1/28 , NPH s/p  shunt, HTN, HLD, anxiety, GULSHAN on CPAP foudn to have loose hardware now POD 1 from revision, now with right submandibular swelling and redness for one day. Pt tolerating normal diet and doesn't notice any change in saliva or recent decrease in intake of water. Pt denies dysphagia, odynophagia, dysphonia, sob, dyspnea, changes in voice or inability to tolerated secretions     PAST MEDICAL & SURGICAL HISTORY:  NPH (normal pressure hydrocephalus)  Chronic back pain greater than 3 months duration  Small intestine disorder: &quot;ilitis&quot;   steroids   1967  Lumbar disc displacement without myelopathy  Sciatica  Anxiety  Vertebral Sciatica  Insomnia  Depression  HTN (Hypertension)  S/P lumbar fusion: L4-L5 on Oct 2011  pins/screws 2012   spinal surgery 2013  Dehiscence, Operation Wound/Debridement: infection  S/P Laminectomy: L4-L5 2009  complicated by infection requiring  antibiiotcis    Allergies    Biaxin (Other)  Lidoderm (Unknown)  morphine (Short breath; Rash)    Intolerances      MEDICATIONS  (STANDING):  amLODIPine   Tablet 5 milliGRAM(s) Oral daily  diazepam    Tablet 5 milliGRAM(s) Oral every 8 hours  enoxaparin Injectable 40 milliGRAM(s) SubCutaneous at bedtime  HYDROmorphone PCA (1 mG/mL) 30 milliLiter(s) PCA Continuous PCA Continuous  losartan 25 milliGRAM(s) Oral daily  polyethylene glycol 3350 17 Gram(s) Oral two times a day  senna 2 Tablet(s) Oral at bedtime  sodium chloride 0.9%. 1000 milliLiter(s) (50 mL/Hr) IV Continuous <Continuous>  tamsulosin 0.8 milliGRAM(s) Oral at bedtime  traZODone 100 milliGRAM(s) Oral daily  traZODone 100 milliGRAM(s) Oral <User Schedule>    MEDICATIONS  (PRN):  acetaminophen   Tablet .. 650 milliGRAM(s) Oral every 6 hours PRN Temp greater or equal to 38C (100.4F), Mild Pain (1 - 3)  ALBUTerol    90 MICROgram(s) HFA Inhaler 1 Puff(s) Inhalation every 6 hours PRN Shortness of Breath and/or Wheezing  cyclobenzaprine 10 milliGRAM(s) Oral three times a day PRN Muscle Spasm  diazepam    Tablet 5 milliGRAM(s) Oral every 8 hours PRN muscle spasms  HYDROmorphone PCA (1 mG/mL) Rescue Clinician Bolus 0.5 milliGRAM(s) IV Push every 15 minutes PRN for Pain Scale GREATER THAN 6  naloxone Injectable 0.1 milliGRAM(s) IV Push every 3 minutes PRN For ANY of the following changes in patient status:  A. RR LESS THAN 10 breaths per minute, B. Oxygen saturation LESS THAN 90%, C. Sedation score of 6  ondansetron Injectable 4 milliGRAM(s) IV Push every 6 hours PRN Nausea  ondansetron Injectable 4 milliGRAM(s) IV Push every 6 hours PRN Nausea  zolpidem 5 milliGRAM(s) Oral at bedtime PRN Insomnia      Social History: no tobacco, no etoh     Family history: Pt denies any sign FHx    ROS:   ENT: all negative except as noted in HPI   CV: denies palpitations  Pulm: denies SOB, cough, hemoptysis  GI: denies change in apetite, indigestion, n/v  : denies pertinent urinary symptoms, urgency  Neuro: denies numbness/tingling, loss of sensation  Psych: denies anxiety  MS: denies muscle weakness, instability  Heme: denies easy bruising or bleeding  Endo: denies heat/cold intolerance, excessive sweating  Vascular: denies LE edema    Vital Signs Last 24 Hrs  T(C): 37.1 (28 May 2020 15:50), Max: 37.4 (28 May 2020 14:01)  T(F): 98.7 (28 May 2020 15:50), Max: 99.4 (28 May 2020 14:01)  HR: 92 (28 May 2020 15:50) (65 - 92)  BP: 149/72 (28 May 2020 15:50) (124/71 - 161/84)  BP(mean): 103 (28 May 2020 00:00) (91 - 113)  RR: 18 (28 May 2020 15:50) (14 - 18)  SpO2: 96% (28 May 2020 15:50) (95% - 100%)                          8.7    10.70 )-----------( 349      ( 28 May 2020 07:04 )             30.8    05-28    137  |  104  |  13  ----------------------------<  108<H>  4.9   |  23  |  0.80    Ca    8.6      28 May 2020 07:04  Phos  4.9     05-27  Mg     2.2     05-27         PHYSICAL EXAM:  Gen: NAD  Skin: No rashes, bruises, or lesions  Head: Normocephalic, Atraumatic  Face: with R submandibular edema minimal, erythema, no fluctuance  Eyes: no scleral injection  Nose: Nares bilaterally patent, no discharge  Mouth: No Stridor / Drooling / Trismus.  Mucosa moist, tongue/uvula midline, oropharynx clear no d/c from abad or deneen duct  Neck: Flat, supple, no lymphadenopathy, trachea midline, no masses  Lymphatic: No lymphadenopathy  Resp: breathing easily, no stridor  CV: no peripheral edema/cyanosis  GI: nondistended   Peripheral vascular: no JVD or edema  Neuro: facial nerve intact, no facial droop CC: right submandibular swelling     HPI:  68 yo man with PMH chronic back pain s/p multiple lumbar laminectomy/fusion with recent admission from 1/22/2020 to 2/3/2020 for [id number] Fusion on 1/28 , NPH s/p  shunt, HTN, HLD, anxiety, GULSHAN on CPAP foudn to have loose hardware now POD 1 from revision, now with right submandibular swelling and redness for one day. Pt tolerating normal diet and doesn't notice any change in saliva or recent decrease in intake of water. Pt denies dysphagia, odynophagia, dysphonia, sob, dyspnea, changes in voice or inability to tolerated secretions Pt does admit to sever pain from back and increase stress prior to surgery. Pt very nervous.    PAST MEDICAL & SURGICAL HISTORY:  NPH (normal pressure hydrocephalus)  Chronic back pain greater than 3 months duration  Small intestine disorder: &quot;ilitis&quot;   steroids   1967  Lumbar disc displacement without myelopathy  Sciatica  Anxiety  Vertebral Sciatica  Insomnia  Depression  HTN (Hypertension)  S/P lumbar fusion: L4-L5 on Oct 2011  pins/screws 2012   spinal surgery 2013  Dehiscence, Operation Wound/Debridement: infection  S/P Laminectomy: L4-L5 2009  complicated by infection requiring  antibiiotcis    Allergies    Biaxin (Other)  Lidoderm (Unknown)  morphine (Short breath; Rash)    Intolerances      MEDICATIONS  (STANDING):  amLODIPine   Tablet 5 milliGRAM(s) Oral daily  diazepam    Tablet 5 milliGRAM(s) Oral every 8 hours  enoxaparin Injectable 40 milliGRAM(s) SubCutaneous at bedtime  HYDROmorphone PCA (1 mG/mL) 30 milliLiter(s) PCA Continuous PCA Continuous  losartan 25 milliGRAM(s) Oral daily  polyethylene glycol 3350 17 Gram(s) Oral two times a day  senna 2 Tablet(s) Oral at bedtime  sodium chloride 0.9%. 1000 milliLiter(s) (50 mL/Hr) IV Continuous <Continuous>  tamsulosin 0.8 milliGRAM(s) Oral at bedtime  traZODone 100 milliGRAM(s) Oral daily  traZODone 100 milliGRAM(s) Oral <User Schedule>    MEDICATIONS  (PRN):  acetaminophen   Tablet .. 650 milliGRAM(s) Oral every 6 hours PRN Temp greater or equal to 38C (100.4F), Mild Pain (1 - 3)  ALBUTerol    90 MICROgram(s) HFA Inhaler 1 Puff(s) Inhalation every 6 hours PRN Shortness of Breath and/or Wheezing  cyclobenzaprine 10 milliGRAM(s) Oral three times a day PRN Muscle Spasm  diazepam    Tablet 5 milliGRAM(s) Oral every 8 hours PRN muscle spasms  HYDROmorphone PCA (1 mG/mL) Rescue Clinician Bolus 0.5 milliGRAM(s) IV Push every 15 minutes PRN for Pain Scale GREATER THAN 6  naloxone Injectable 0.1 milliGRAM(s) IV Push every 3 minutes PRN For ANY of the following changes in patient status:  A. RR LESS THAN 10 breaths per minute, B. Oxygen saturation LESS THAN 90%, C. Sedation score of 6  ondansetron Injectable 4 milliGRAM(s) IV Push every 6 hours PRN Nausea  ondansetron Injectable 4 milliGRAM(s) IV Push every 6 hours PRN Nausea  zolpidem 5 milliGRAM(s) Oral at bedtime PRN Insomnia      Social History: no tobacco, no etoh     Family history: Pt denies any sign FHx    ROS:   ENT: all negative except as noted in HPI   CV: denies palpitations  Pulm: denies SOB, cough, hemoptysis  GI: denies change in apetite, indigestion, n/v  : denies pertinent urinary symptoms, urgency  Neuro: denies numbness/tingling, loss of sensation  Psych: denies anxiety  MS: denies muscle weakness, instability  Heme: denies easy bruising or bleeding  Endo: denies heat/cold intolerance, excessive sweating  Vascular: denies LE edema    Vital Signs Last 24 Hrs  T(C): 37.1 (28 May 2020 15:50), Max: 37.4 (28 May 2020 14:01)  T(F): 98.7 (28 May 2020 15:50), Max: 99.4 (28 May 2020 14:01)  HR: 92 (28 May 2020 15:50) (65 - 92)  BP: 149/72 (28 May 2020 15:50) (124/71 - 161/84)  BP(mean): 103 (28 May 2020 00:00) (91 - 113)  RR: 18 (28 May 2020 15:50) (14 - 18)  SpO2: 96% (28 May 2020 15:50) (95% - 100%)                          8.7    10.70 )-----------( 349      ( 28 May 2020 07:04 )             30.8    05-28    137  |  104  |  13  ----------------------------<  108<H>  4.9   |  23  |  0.80    Ca    8.6      28 May 2020 07:04  Phos  4.9     05-27  Mg     2.2     05-27         PHYSICAL EXAM:  Gen: NAD  Skin: No rashes, bruises, or lesions  Head: Normocephalic, Atraumatic  Face: with R submandibular edema, firm, erythema, no fluctuance, no stone   Eyes: no scleral injection  Nose: Nares bilaterally patent, no discharge  Mouth: No Stridor / Drooling / Trismus.  Mucosa moist, tongue/uvula midline, oropharynx clear +pus from abad none from deneen duct  Neck: Flat, supple, no lymphadenopathy, trachea midline, no masses  Lymphatic: No lymphadenopathy  Resp: breathing easily, no stridor  CV: no peripheral edema/cyanosis  GI: nondistended   Peripheral vascular: no JVD or edema  Neuro: facial nerve intact, no facial droop

## 2020-05-28 NOTE — PROGRESS NOTE ADULT - SUBJECTIVE AND OBJECTIVE BOX
PULMONARY PROGRESS NOTE    DACIA NAVARRO  MRN-[id number]    Patient is a 69y old  Male who presents with a chief complaint of falls, loose hardware (25 May 2020 16:35)      HPI:  unable to tolerate cpap last night bc mask uncomfortable and has back pain    ROS:   -neg    MEDICATIONS  (STANDING):  ALPRAZolam 1 milliGRAM(s) Oral every 6 hours  amLODIPine   Tablet 5 milliGRAM(s) Oral daily  ceFAZolin   IVPB 2000 milliGRAM(s) IV Intermittent every 8 hours  diazepam    Tablet 5 milliGRAM(s) Oral every 8 hours  HYDROmorphone PCA (1 mG/mL) 30 milliLiter(s) PCA Continuous PCA Continuous  losartan 25 milliGRAM(s) Oral daily  senna 2 Tablet(s) Oral at bedtime  sodium chloride 0.9% with potassium chloride 20 mEq/L 1000 milliLiter(s) (75 mL/Hr) IV Continuous <Continuous>  tamsulosin 0.8 milliGRAM(s) Oral at bedtime  traZODone 100 milliGRAM(s) Oral daily  traZODone 100 milliGRAM(s) Oral <User Schedule>    MEDICATIONS  (PRN):  acetaminophen   Tablet .. 650 milliGRAM(s) Oral every 6 hours PRN Temp greater or equal to 38C (100.4F), Mild Pain (1 - 3)  ALBUTerol    90 MICROgram(s) HFA Inhaler 1 Puff(s) Inhalation every 6 hours PRN Shortness of Breath and/or Wheezing  cyclobenzaprine 10 milliGRAM(s) Oral three times a day PRN Muscle Spasm  diazepam    Tablet 5 milliGRAM(s) Oral every 8 hours PRN muscle spasms  HYDROmorphone PCA (1 mG/mL) Rescue Clinician Bolus 0.5 milliGRAM(s) IV Push every 15 minutes PRN for Pain Scale GREATER THAN 6  naloxone Injectable 0.1 milliGRAM(s) IV Push every 3 minutes PRN For ANY of the following changes in patient status:  A. RR LESS THAN 10 breaths per minute, B. Oxygen saturation LESS THAN 90%, C. Sedation score of 6  ondansetron Injectable 4 milliGRAM(s) IV Push every 6 hours PRN Nausea  ondansetron Injectable 4 milliGRAM(s) IV Push every 6 hours PRN Nausea  polyethylene glycol 3350 17 Gram(s) Oral two times a day PRN Constipation  zolpidem 5 milliGRAM(s) Oral at bedtime PRN Insomnia      EXAM:  Vital Signs Last 24 Hrs  T(C): 36.6 (28 May 2020 04:05), Max: 36.8 (27 May 2020 12:58)  T(F): 97.8 (28 May 2020 04:05), Max: 98.2 (27 May 2020 12:58)  HR: 70 (28 May 2020 04:05) (65 - 98)  BP: 135/74 (28 May 2020 04:05) (124/71 - 160/58)  BP(mean): 103 (28 May 2020 00:00) (91 - 113)  RR: 16 (28 May 2020 04:05) (14 - 18)  SpO2: 97% (28 May 2020 08:05) (95% - 100%)    GENERAL: The patient is awake and alert in no apparent distress.     LUNGS: Clear to auscultation without wheezing, rales or rhonchi; respirations unlabored    LABS/IMAGING: reviewed                                   8.7    10.70 )-----------( 349      ( 28 May 2020 07:04 )             30.8   05-28    137  |  104  |  13  ----------------------------<  108<H>  4.9   |  23  |  0.80    Ca    8.6      28 May 2020 07:04  Phos  4.9     05-27  Mg     2.2     05-27          PROBLEM LIST:  69y Male with HEALTH ISSUES - PROBLEM Dx:  Anemia: Anemia  HTN (hypertension): HTN (hypertension)  GULSHAN (obstructive sleep apnea): GULSHAN (obstructive sleep apnea)  Low back pain: Low back pain    RECS:  -unable to tolerate hospital cpap  -can ask family to bring his CPAP from home OR try hospital cpap again at a later time when pain better controlled  -at minimum if not using cpap would monitor O2 sats during sleep  -standard GULSHAN precautions  -please call with further questions/concerns       Ashley Bear MD   [phone]

## 2020-05-28 NOTE — PHYSICAL THERAPY INITIAL EVALUATION ADULT - DID THE PATIENT HAVE SURGERY?
Revision of T11-pelvis fusion w removal of L T11 screw, and removal/replacement of RT11 screw, sacral screws, and pelvic screws plastics closure with paraspinal flaps and complex wound closure/yes

## 2020-05-28 NOTE — PROGRESS NOTE ADULT - PROBLEM SELECTOR PLAN 1
awaiting surgery s/p s/p Revision of T11-pelvis fusion w removal of left T11 screw, and removal and replacement of right T11 screw, sacral screws, and pelvic screws  plastics closure with paraspinal flaps and complex wound closure. pain control and PT

## 2020-05-29 ENCOUNTER — TREATMENT (OUTPATIENT)
Dept: PHYSICAL THERAPY | Facility: CLINIC | Age: 70
End: 2020-05-29

## 2020-05-29 DIAGNOSIS — M25.512 CHRONIC PAIN OF BOTH SHOULDERS: Primary | ICD-10-CM

## 2020-05-29 DIAGNOSIS — M25.511 CHRONIC PAIN OF BOTH SHOULDERS: Primary | ICD-10-CM

## 2020-05-29 DIAGNOSIS — G89.29 CHRONIC PAIN OF BOTH SHOULDERS: Primary | ICD-10-CM

## 2020-05-29 DIAGNOSIS — K11.20 SIALOADENITIS, UNSPECIFIED: ICD-10-CM

## 2020-05-29 LAB
ANION GAP SERPL CALC-SCNC: 16 MMOL/L — SIGNIFICANT CHANGE UP (ref 5–17)
BUN SERPL-MCNC: 10 MG/DL — SIGNIFICANT CHANGE UP (ref 7–23)
CALCIUM SERPL-MCNC: 9.1 MG/DL — SIGNIFICANT CHANGE UP (ref 8.4–10.5)
CHLORIDE SERPL-SCNC: 104 MMOL/L — SIGNIFICANT CHANGE UP (ref 96–108)
CO2 SERPL-SCNC: 17 MMOL/L — LOW (ref 22–31)
CREAT SERPL-MCNC: 0.87 MG/DL — SIGNIFICANT CHANGE UP (ref 0.5–1.3)
GLUCOSE SERPL-MCNC: 85 MG/DL — SIGNIFICANT CHANGE UP (ref 70–99)
HCT VFR BLD CALC: 34.3 % — LOW (ref 39–50)
HGB BLD-MCNC: 10.1 G/DL — LOW (ref 13–17)
MCHC RBC-ENTMCNC: 20.9 PG — LOW (ref 27–34)
MCHC RBC-ENTMCNC: 29.4 GM/DL — LOW (ref 32–36)
MCV RBC AUTO: 70.9 FL — LOW (ref 80–100)
NRBC # BLD: 0 /100 WBCS — SIGNIFICANT CHANGE UP (ref 0–0)
PLATELET # BLD AUTO: 432 K/UL — HIGH (ref 150–400)
POTASSIUM SERPL-MCNC: 4.1 MMOL/L — SIGNIFICANT CHANGE UP (ref 3.5–5.3)
POTASSIUM SERPL-SCNC: 4.1 MMOL/L — SIGNIFICANT CHANGE UP (ref 3.5–5.3)
RBC # BLD: 4.84 M/UL — SIGNIFICANT CHANGE UP (ref 4.2–5.8)
RBC # FLD: 18.8 % — HIGH (ref 10.3–14.5)
SODIUM SERPL-SCNC: 137 MMOL/L — SIGNIFICANT CHANGE UP (ref 135–145)
WBC # BLD: 12.36 K/UL — HIGH (ref 3.8–10.5)
WBC # FLD AUTO: 12.36 K/UL — HIGH (ref 3.8–10.5)

## 2020-05-29 PROCEDURE — 97110 THERAPEUTIC EXERCISES: CPT | Performed by: PHYSICAL THERAPIST

## 2020-05-29 PROCEDURE — G0283 ELEC STIM OTHER THAN WOUND: HCPCS | Performed by: PHYSICAL THERAPIST

## 2020-05-29 PROCEDURE — 97140 MANUAL THERAPY 1/> REGIONS: CPT | Performed by: PHYSICAL THERAPIST

## 2020-05-29 PROCEDURE — 99232 SBSQ HOSP IP/OBS MODERATE 35: CPT

## 2020-05-29 RX ORDER — HYDROMORPHONE HYDROCHLORIDE 2 MG/ML
4 INJECTION INTRAMUSCULAR; INTRAVENOUS; SUBCUTANEOUS EVERY 4 HOURS
Refills: 0 | Status: DISCONTINUED | OUTPATIENT
Start: 2020-05-29 | End: 2020-05-30

## 2020-05-29 RX ORDER — ALPRAZOLAM 0.25 MG
1 TABLET ORAL EVERY 6 HOURS
Refills: 0 | Status: DISCONTINUED | OUTPATIENT
Start: 2020-05-29 | End: 2020-06-03

## 2020-05-29 RX ADMIN — Medication 100 MILLIGRAM(S): at 21:47

## 2020-05-29 RX ADMIN — CYCLOBENZAPRINE HYDROCHLORIDE 10 MILLIGRAM(S): 10 TABLET, FILM COATED ORAL at 11:13

## 2020-05-29 RX ADMIN — ZOLPIDEM TARTRATE 5 MILLIGRAM(S): 10 TABLET ORAL at 00:26

## 2020-05-29 RX ADMIN — TAMSULOSIN HYDROCHLORIDE 0.8 MILLIGRAM(S): 0.4 CAPSULE ORAL at 21:46

## 2020-05-29 RX ADMIN — Medication 100 MILLIGRAM(S): at 11:13

## 2020-05-29 RX ADMIN — HYDROMORPHONE HYDROCHLORIDE 4 MILLIGRAM(S): 2 INJECTION INTRAMUSCULAR; INTRAVENOUS; SUBCUTANEOUS at 05:07

## 2020-05-29 RX ADMIN — Medication 500 MILLIGRAM(S): at 16:47

## 2020-05-29 RX ADMIN — Medication 5 MILLIGRAM(S): at 05:11

## 2020-05-29 RX ADMIN — Medication 1 MILLIGRAM(S): at 16:47

## 2020-05-29 RX ADMIN — ENOXAPARIN SODIUM 40 MILLIGRAM(S): 100 INJECTION SUBCUTANEOUS at 21:46

## 2020-05-29 RX ADMIN — Medication 1 MILLIGRAM(S): at 21:46

## 2020-05-29 NOTE — DIETITIAN INITIAL EVALUATION ADULT. - OTHER INFO
Visited pt at bedside; pt states that he wasn't feeling well and couldn't speak for long. Pt reports having a good appetite PTA; denies following a specific diet at home. Pt does report a decreased appetite in-house since admission date due to not feeling well; consuming <75% of meals. Food preferences noted; pt does not like eggs. Pt denies any known food allergies or intolerances.     Pt denies any chewing/swallowing difficulty, self-feeding difficulty, nausea/vomiting, constipation, or diarrhea. Last BM 5/29. Pt denies any micronutrient supplementation at home. Pt reports a UBW of 200lbs; consistent with dosing weight. Pt denies any recent weight changes.     Education: Encouraged balanced meals with adequate protein for post-surgical healing; sources discussed. Pt currently declined any nutritional supplements. Pt made known that RD remains available.

## 2020-05-29 NOTE — PROGRESS NOTE ADULT - ASSESSMENT
70 yo man with PMH chronic back pain s/p multiple lumbar laminectomy/fusion with recent admission from 1/22/2020 to 2/3/2020 for [id number] Fusion on 1/28 , NPH s/p  shunt, HTN, HLD, anxiety, GULSHAN on CPAP presents to ED after he was found to have loosening of hardware on CT by his spine surgeon. Revision of T11-pelvis fusion w removal of left T11 screw, and removal and replacement of right T11 screw, sacral screws, and pelvic screws, plastics closure with paraspinal flaps and complex wound closure. EBL 500cc

## 2020-05-29 NOTE — PROGRESS NOTE ADULT - PROBLEM SELECTOR PLAN 1
s/p Revision of T11-pelvis fusion w removal of left T11 screw, and removal and replacement of right T11 screw, sacral screws, and pelvic screws  plastics closure with paraspinal flaps and complex wound closure. pain control and PT

## 2020-05-29 NOTE — PROGRESS NOTE ADULT - PROBLEM SELECTOR PLAN 4
iron deficiency. will need GI eval after surgery. check FOBT may increase Losartan if BP >140 while pain controlled. monitor

## 2020-05-29 NOTE — PROGRESS NOTE ADULT - SUBJECTIVE AND OBJECTIVE BOX
Irving Mcdonnell   Pager [phone]  Office [phone]      CC: Patient is a 69y old  Male who presents with a chief complaint of falls, loose hardware (29 May 2020 10:55)      SUBJECTIVE / OVERNIGHT EVENTS: pt developed right submandibular swelling yest and diagnosed c R sialoadenitis, resolved today.    MEDICATIONS  (STANDING):  amLODIPine   Tablet 5 milliGRAM(s) Oral daily  cephalexin 500 milliGRAM(s) Oral every 12 hours  enoxaparin Injectable 40 milliGRAM(s) SubCutaneous at bedtime  losartan 25 milliGRAM(s) Oral daily  polyethylene glycol 3350 17 Gram(s) Oral two times a day  senna 2 Tablet(s) Oral at bedtime  tamsulosin 0.8 milliGRAM(s) Oral at bedtime  traZODone 100 milliGRAM(s) Oral daily  traZODone 100 milliGRAM(s) Oral at bedtime    MEDICATIONS  (PRN):  acetaminophen   Tablet .. 650 milliGRAM(s) Oral every 6 hours PRN Temp greater or equal to 38C (100.4F), Mild Pain (1 - 3)  ALBUTerol    90 MICROgram(s) HFA Inhaler 1 Puff(s) Inhalation every 6 hours PRN Shortness of Breath and/or Wheezing  cyclobenzaprine 10 milliGRAM(s) Oral three times a day PRN Muscle Spasm  diazepam    Tablet 5 milliGRAM(s) Oral every 8 hours PRN muscle spasms  HYDROmorphone   Tablet 4 milliGRAM(s) Oral every 4 hours PRN Moderate Pain (4 - 6)  naloxone Injectable 0.1 milliGRAM(s) IV Push every 3 minutes PRN For ANY of the following changes in patient status:  A. RR LESS THAN 10 breaths per minute, B. Oxygen saturation LESS THAN 90%, C. Sedation score of 6  ondansetron Injectable 4 milliGRAM(s) IV Push every 6 hours PRN Nausea  ondansetron Injectable 4 milliGRAM(s) IV Push every 6 hours PRN Nausea  zolpidem 5 milliGRAM(s) Oral at bedtime PRN Insomnia      Vital Signs Last 24 Hrs  T(C): 37.3 (29 May 2020 08:30), Max: 37.4 (28 May 2020 14:01)  T(F): 99.1 (29 May 2020 08:30), Max: 99.4 (28 May 2020 14:01)  HR: 99 (29 May 2020 08:30) (79 - 102)  BP: 150/78 (29 May 2020 08:30) (121/74 - 161/84)  BP(mean): --  RR: 18 (29 May 2020 08:30) (16 - 18)  SpO2: 96% (29 May 2020 08:30) (96% - 100%)  CAPILLARY BLOOD GLUCOSE        I&O's Summary    28 May 2020 07:01  -  29 May 2020 07:00  --------------------------------------------------------  IN: 1670 mL / OUT: 1870 mL / NET: -200 mL    29 May 2020 07:01  -  29 May 2020 13:34  --------------------------------------------------------  IN: 240 mL / OUT: 530 mL / NET: -290 mL      tele:    PHYSICAL EXAM:    GENERAL: NAD   HEENT: EOMI, PERRL  PULM: Clear to auscultation bilaterally  CV: Regular rate and rhythm; nl S1, S2; No murmurs, rubs, or gallops  ABDOMEN: Soft, Nontender, Nondistended; Bowel sounds present  EXTREMITIES/MSK:  No edema, calf tenderness   PSYCH: AAOx3  NEUROLOGY: non-focal          LABS:                        10.1   12.36 )-----------( 432      ( 29 May 2020 09:58 )             34.3     05-29    137  |  104  |  10  ----------------------------<  85  4.1   |  17<L>  |  0.87    Ca    9.1      29 May 2020 06:56  Phos  4.9     05-27  Mg     2.2     05-27              ABG - ( 27 May 2020 18:26 )  pH, Arterial: 7.32  pH, Blood: x     /  pCO2: 42    /  pO2: 300   / HCO3: 21    / Base Excess: -3.9  /  SaO2: 100                 RADIOLOGY & ADDITIONAL TESTS:    Imaging Personally Reviewed:    Consultant(s) Notes Reviewed:      Care Discussed with Consultants/Other Providers: Irivng Mcdonnell   Pager [phone]  Office [phone]      CC: Patient is a 69y old  Male who presents with a chief complaint of falls, loose hardware (29 May 2020 10:55)      SUBJECTIVE / OVERNIGHT EVENTS: pt developed right submandibular swelling yest and diagnosed c R sialoadenitis, resolved today. back pain better controlled today    MEDICATIONS  (STANDING):  amLODIPine   Tablet 5 milliGRAM(s) Oral daily  cephalexin 500 milliGRAM(s) Oral every 12 hours  enoxaparin Injectable 40 milliGRAM(s) SubCutaneous at bedtime  losartan 25 milliGRAM(s) Oral daily  polyethylene glycol 3350 17 Gram(s) Oral two times a day  senna 2 Tablet(s) Oral at bedtime  tamsulosin 0.8 milliGRAM(s) Oral at bedtime  traZODone 100 milliGRAM(s) Oral daily  traZODone 100 milliGRAM(s) Oral at bedtime    MEDICATIONS  (PRN):  acetaminophen   Tablet .. 650 milliGRAM(s) Oral every 6 hours PRN Temp greater or equal to 38C (100.4F), Mild Pain (1 - 3)  ALBUTerol    90 MICROgram(s) HFA Inhaler 1 Puff(s) Inhalation every 6 hours PRN Shortness of Breath and/or Wheezing  cyclobenzaprine 10 milliGRAM(s) Oral three times a day PRN Muscle Spasm  diazepam    Tablet 5 milliGRAM(s) Oral every 8 hours PRN muscle spasms  HYDROmorphone   Tablet 4 milliGRAM(s) Oral every 4 hours PRN Moderate Pain (4 - 6)  naloxone Injectable 0.1 milliGRAM(s) IV Push every 3 minutes PRN For ANY of the following changes in patient status:  A. RR LESS THAN 10 breaths per minute, B. Oxygen saturation LESS THAN 90%, C. Sedation score of 6  ondansetron Injectable 4 milliGRAM(s) IV Push every 6 hours PRN Nausea  ondansetron Injectable 4 milliGRAM(s) IV Push every 6 hours PRN Nausea  zolpidem 5 milliGRAM(s) Oral at bedtime PRN Insomnia      Vital Signs Last 24 Hrs  T(C): 37.3 (29 May 2020 08:30), Max: 37.4 (28 May 2020 14:01)  T(F): 99.1 (29 May 2020 08:30), Max: 99.4 (28 May 2020 14:01)  HR: 99 (29 May 2020 08:30) (79 - 102)  BP: 150/78 (29 May 2020 08:30) (121/74 - 161/84)  BP(mean): --  RR: 18 (29 May 2020 08:30) (16 - 18)  SpO2: 96% (29 May 2020 08:30) (96% - 100%)  CAPILLARY BLOOD GLUCOSE        I&O's Summary    28 May 2020 07:01  -  29 May 2020 07:00  --------------------------------------------------------  IN: 1670 mL / OUT: 1870 mL / NET: -200 mL    29 May 2020 07:01  -  29 May 2020 13:34  --------------------------------------------------------  IN: 240 mL / OUT: 530 mL / NET: -290 mL          PHYSICAL EXAM:    GENERAL: NAD   HEENT: EOMI, PERRL  PULM: Clear to auscultation bilaterally  CV: Regular rate and rhythm; nl S1, S2; No murmurs, rubs, or gallops  ABDOMEN: Soft, Nontender, Nondistended; Bowel sounds present  EXTREMITIES/MSK:  No edema, calf tenderness   PSYCH: AAOx3  NEUROLOGY: non-focal          LABS:                        10.1   12.36 )-----------( 432      ( 29 May 2020 09:58 )             34.3     05-29    137  |  104  |  10  ----------------------------<  85  4.1   |  17<L>  |  0.87    Ca    9.1      29 May 2020 06:56  Phos  4.9     05-27  Mg     2.2     05-27              ABG - ( 27 May 2020 18:26 )  pH, Arterial: 7.32  pH, Blood: x     /  pCO2: 42    /  pO2: 300   / HCO3: 21    / Base Excess: -3.9  /  SaO2: 100                 RADIOLOGY & ADDITIONAL TESTS:    Imaging Personally Reviewed:    Consultant(s) Notes Reviewed:      Care Discussed with Consultants/Other Providers: neurosurg

## 2020-05-29 NOTE — PROGRESS NOTE ADULT - ASSESSMENT
HPI:  Patient is a 69 year old male with chronic back pain s/p multiple prior spine surgeries (most recent one 1/28/2020.  Presented to ED with recent falls.  Imaging revealed loosened hardware.  Admitted to neurosurgery service for further management.    PROCEDURE: s/p revision of t11 to pelvis fusion with plastics closure on 5/27/2020  POD#2    PLAN:  Neuro:   -q4 hour neuro checks  -dilaudid and iv tylenol for pain control  -valium and flexeril for muscle spasms  -continue trazodone  -continue hemovacs, monitor outputs  -out of bed with assistance  -TLSO brace when out of bed  -pt - home pt upon discharge    Respiratory:   -satting well on room air  -continue albuterol  -incentive spirometer for lung expansion    CV:  -keep sbp 100-140  -continue losartan and norvasc    Endocrine:   -maintain euglycemia    Heme/Onc:   -lovenox and SCDs for DVT prophylaxis    Renal:   -flomax for bph  -creatinine stable  -monitor electrolytes, replete as needed    ID:   -right parotitis, currently on keflex for 10 days  -no need for CT head/neck since symptoms significantly improved today as per ENT    GI:   -regular diet  -senna and miralax for bowel regimen      Spectra #[id number]                    Assessment:  Please Check When Present   []  GCS  E   V  M     Heart Failure: []Acute, [] acute on chronic , []chronic  Heart Failure:  [] Diastolic (HFpEF), [] Systolic (HFrEF), []Combined (HFpEF and HFrEF), [] RHF, [] Pulm HTN, [] Other    [] JOSE MIGUEL, [] ATN, [] AIN, [] other  [] CKD1, [] CKD2, [] CKD 3, [] CKD 4, [] CKD 5, []ESRD    Encephalopathy: [] Metabolic, [] Hepatic, [] toxic, [] Neurological, [] Other    Abnormal Nurtitional Status: [] malnurtition (see nutrition note), [ ]underweight: BMI < 19, [] morbid obesity: BMI >40, [] Cachexia    [] Sepsis  [] hypovolemic shock,[] cardiogenic shock, [] hemorrhagic shock, [] neuogenic shock  [] Acute Respiratory Failure  []Cerebral edema, [] Brain compression/ herniation,   [] Functional quadriplegia  [] Acute blood loss anemia

## 2020-05-29 NOTE — PROGRESS NOTE ADULT - ASSESSMENT
69M sp T10-pelvis revision PSF by nsx and paraspinal muscle flap closure 5/28    Plan:  - Will continue to follow when pt in house  - Care per primary team    Plastic Surgery  [phone]

## 2020-05-29 NOTE — PROGRESS NOTES
Physical Therapy Daily Progress Note      Patient: Orion Harvey   : 1950  Diagnosis/ICD-10 Code:  Chronic pain of both shoulders [M25.511, G89.29, M25.512]  Referring practitioner: Nazanin Garcia*  Date of Initial Visit: Type: THERAPY  Noted: 2020  Today's Date: 2020  Patient seen for 5 sessions         Orion Harvey reports: shoulders are sore but is gaining more range. States his goal is to bass fish in [place] this winter. Interested in getting a TENS unit for home.    Objective : demonstrated ~95 deg shoulder flex AROM standing. Provided pt with info regarding home TENS unit and offered to review use if he receives one while still in PT. Pt verbalized understanding and plans to order one.  See Exercise, Manual, and Modality Logs for complete treatment.       Assessment/Plan : Improved shoulder flex AROM and increased PROM in flex and abd. Continued tightness in ext rotation. Needs continued scap strengthening and progression of shoulder strengthening.    Progress per Plan of Care           Timed:         Manual Therapy:    10     mins  39908;     Therapeutic Exercise:    20     mins  60473;     Neuromuscular Taylor:        mins  83872;    Therapeutic Activity:          mins  98060;     Gait Training:           mins  73944;     Ultrasound:          mins  27214;    Ionto                                   mins   61661  Self Care                            mins   31073  Canalith Repos                   mins  4209    Un-Timed:  Electrical Stimulation:    15     mins  05532 ( );  Dry Needling          mins self-pay  Traction          mins 56679  Low Eval          Mins  47945  Mod Eval          Mins  34564  High Eval                            Mins  46476    Timed Treatment:   30   mins   Total Treatment:     45   mins    Vangie Torres, PT  Physical Therapist

## 2020-05-29 NOTE — DIETITIAN INITIAL EVALUATION ADULT. - REASON INDICATOR FOR ASSESSMENT
Pt seen for length of stay.   Source: EMR and pt  Pertinent chart information: Patient is a 69 year old male with chronic back pain s/p multiple prior spine surgeries (most recent one 1/28/2020.  Presented to ED with recent falls.  Imaging revealed loosened hardware. s/p revision of t11 to pelvis fusion with plastics closure on 5/27/2020

## 2020-05-29 NOTE — DIETITIAN INITIAL EVALUATION ADULT. - ADD RECOMMEND
1) Continue current diet as tolerated. 2) Monitor PO intake, diet tolerance, weight trends, labs, and skin integrity.

## 2020-05-29 NOTE — PROGRESS NOTE ADULT - SUBJECTIVE AND OBJECTIVE BOX
ENT ISSUE/POD: R sialoadenitis    HPI:  70 yo man with PMH chronic back pain s/p multiple lumbar laminectomy/fusion found to have loose hardware now POD 2 from revision, found to have right submandibular swelling and redness for one day. Pt says he put warm compress and felt a gush and no longer has swelling or pain    PAST MEDICAL & SURGICAL HISTORY:  NPH (normal pressure hydrocephalus)  Chronic back pain greater than 3 months duration  Small intestine disorder: &quot;ilitis&quot;   steroids   1967  Lumbar disc displacement without myelopathy  Sciatica  Anxiety  Vertebral Sciatica  Insomnia  Depression  HTN (Hypertension)  S/P lumbar fusion: L4-L5 on Oct 2011  pins/screws 2012   spinal surgery 2013  Dehiscence, Operation Wound/Debridement: infection  S/P Laminectomy: L4-L5 2009  complicated by infection requiring  antibiotics    Allergies    Biaxin (Other)  Lidoderm (Unknown)  morphine (Short breath; Rash)    Intolerances      MEDICATIONS  (STANDING):  amLODIPine   Tablet 5 milliGRAM(s) Oral daily  cephalexin 500 milliGRAM(s) Oral every 12 hours  enoxaparin Injectable 40 milliGRAM(s) SubCutaneous at bedtime  losartan 25 milliGRAM(s) Oral daily  polyethylene glycol 3350 17 Gram(s) Oral two times a day  senna 2 Tablet(s) Oral at bedtime  sodium chloride 0.9%. 1000 milliLiter(s) (50 mL/Hr) IV Continuous <Continuous>  tamsulosin 0.8 milliGRAM(s) Oral at bedtime  traZODone 100 milliGRAM(s) Oral daily  traZODone 100 milliGRAM(s) Oral at bedtime    MEDICATIONS  (PRN):  acetaminophen   Tablet .. 650 milliGRAM(s) Oral every 6 hours PRN Temp greater or equal to 38C (100.4F), Mild Pain (1 - 3)  ALBUTerol    90 MICROgram(s) HFA Inhaler 1 Puff(s) Inhalation every 6 hours PRN Shortness of Breath and/or Wheezing  cyclobenzaprine 10 milliGRAM(s) Oral three times a day PRN Muscle Spasm  diazepam    Tablet 5 milliGRAM(s) Oral every 8 hours PRN muscle spasms  HYDROmorphone   Tablet 4 milliGRAM(s) Oral every 4 hours PRN Moderate Pain (4 - 6)  naloxone Injectable 0.1 milliGRAM(s) IV Push every 3 minutes PRN For ANY of the following changes in patient status:  A. RR LESS THAN 10 breaths per minute, B. Oxygen saturation LESS THAN 90%, C. Sedation score of 6  ondansetron Injectable 4 milliGRAM(s) IV Push every 6 hours PRN Nausea  ondansetron Injectable 4 milliGRAM(s) IV Push every 6 hours PRN Nausea  zolpidem 5 milliGRAM(s) Oral at bedtime PRN Insomnia      ROS:   ENT: all negative except as noted in HPI   Pulm: denies SOB, cough, hemoptysis  Neuro: denies numbness/tingling, loss of sensation  Endo: denies heat/cold intolerance, excessive sweating      Vital Signs Last 24 Hrs  T(C): 37.3 (29 May 2020 08:30), Max: 37.4 (28 May 2020 14:01)  T(F): 99.1 (29 May 2020 08:30), Max: 99.4 (28 May 2020 14:01)  HR: 99 (29 May 2020 08:30) (79 - 102)  BP: 150/78 (29 May 2020 08:30) (121/74 - 161/84)  BP(mean): --  RR: 18 (29 May 2020 08:30) (16 - 18)  SpO2: 96% (29 May 2020 08:30) (96% - 100%)                          9.6    12.77 )-----------( 397      ( 28 May 2020 18:25 )             32.3    05-29    137  |  104  |  10  ----------------------------<  85  4.1   |  17<L>  |  0.87    Ca    9.1      29 May 2020 06:56  Phos  4.9     05-27  Mg     2.2     05-27         PHYSICAL EXAM:  Gen: NAD  Skin: No rashes, bruises, or lesions  Head: Normocephalic, Atraumatic  Face: no edema, erythema, or fluctuance. Parotid glands soft without mass  Eyes: no scleral injection  Nose: Nares bilaterally patent, no discharge  Mouth: No Stridor / Drooling / Trismus.  Mucosa moist, tongue/uvula midline, oropharynx clear  Neck: Flat, supple, no lymphadenopathy, trachea midline, no masses  Lymphatic: No lymphadenopathy  Resp: breathing easily, no stridor  Neuro: facial nerve intact, no facial droop

## 2020-05-29 NOTE — PROGRESS NOTE ADULT - SUBJECTIVE AND OBJECTIVE BOX
Patient evaluated measured and fitted for TLSO spinal orthosis ordered by neurosurgery  delivered by [facility] [phone]

## 2020-05-29 NOTE — PROGRESS NOTE ADULT - SUBJECTIVE AND OBJECTIVE BOX
PLASTIC SURGERY DAILY PROGRESS NOTE:    Interval:  No acute events overnight endorsed.    Subjective:  Patient seen and examined this am. Counselled regarding pain and pain meds    Vital Signs Last 24 Hrs  T(C): 37.2 (29 May 2020 04:30), Max: 37.4 (28 May 2020 14:01)  T(F): 98.9 (29 May 2020 04:30), Max: 99.4 (28 May 2020 14:01)  HR: 102 (29 May 2020 04:30) (79 - 102)  BP: 121/74 (29 May 2020 04:30) (121/74 - 161/84)  RR: 18 (29 May 2020 04:30) (16 - 18)  SpO2: 100% (29 May 2020 04:30) (96% - 100%)    Exam:  Gen: NAD, resting in bed, alert and responding appropriately  Resp: Airway patent, non-labored respirations  Abd: Soft, ND, NTTP x 4 quadrants, no rebound or guarding.  Back: Dressing cdi HMV x2 stripped   Ext: No edema, WWP  Neuro: AAOx3, no focal deficits    LABS:             9.6    12.77 )-----------( 397      ( 28 May 2020 18:25 )             32.3     05-29    137  |  104  |  10  ----------------------------<  85  4.1   |  17<L>  |  0.87    Ca    9.1      29 May 2020 06:56  Phos  4.9     05-27  Mg     2.2     05-27

## 2020-05-29 NOTE — PROGRESS NOTE ADULT - SUBJECTIVE AND OBJECTIVE BOX
HPI:  Patient is a 69 year old male with chronic back pain s/p multiple prior spine surgeries (most recent one 1/28/2020.  Presented to ED with recent falls.  Imaging revealed loosened hardware.  Admitted to neurosurgery service for further management.    OVERNIGHT EVENTS:  Patient had right facial swelling and erythema yesterday evening.  ENT saw and thought it was parotitis, started on keflex.  Has been using warm compresses and he felt a gush which has subsided the swelling.  PCA was d/c'ed overnight.  Incisional pain well controlled on current regimen.  Tolerating diet.    Vital Signs Last 24 Hrs  T(C): 37.3 (29 May 2020 08:30), Max: 37.4 (28 May 2020 14:01)  T(F): 99.1 (29 May 2020 08:30), Max: 99.4 (28 May 2020 14:01)  HR: 99 (29 May 2020 08:30) (79 - 102)  BP: 150/78 (29 May 2020 08:30) (121/74 - 161/84)  BP(mean): --  RR: 18 (29 May 2020 08:30) (16 - 18)  SpO2: 96% (29 May 2020 08:30) (96% - 100%)    I&O's Detail    28 May 2020 07:01  -  29 May 2020 07:00  --------------------------------------------------------  IN:    Oral Fluid: 870 mL    sodium chloride 0.9% with potassium chloride 20 mEq/L: 750 mL    sodium chloride 0.9%.: 50 mL  Total IN: 1670 mL    OUT:    Accordian: 50 mL    Accordian: 120 mL    Indwelling Catheter - Urethral: 900 mL    Voided: 800 mL  Total OUT: 1870 mL    Total NET: -200 mL      29 May 2020 07:01  -  29 May 2020 10:56  --------------------------------------------------------  IN:    Oral Fluid: 240 mL  Total IN: 240 mL    OUT:    Accordian: 100 mL    Voided: 300 mL  Total OUT: 400 mL    Total NET: -160 mL        I&O's Summary    28 May 2020 07:01  -  29 May 2020 07:00  --------------------------------------------------------  IN: 1670 mL / OUT: 1870 mL / NET: -200 mL    29 May 2020 07:01  -  29 May 2020 10:56  --------------------------------------------------------  IN: 240 mL / OUT: 400 mL / NET: -160 mL        PHYSICAL EXAM:  Neurological: awake, alert, oriented x3, follows commands, speech clear and fluent, moves all extremities x4 w/ 5/5 strength throughout, sensation present, intact, equal throughout    Cardiovascular: +s1, s2  Respiratory: clear to auscultation b/l  HEENT: right facial swelling/erythema resolved, non-tender, no fluctuance/induration  Gastrointestinal: soft, non-distended, non-tender  Genitourinary: +voiding  Incision/Wound: posterior midline vertical incision dressing c/d/i w/ aquacel, hemovac x2    TUBES/LINES:  [x] hemovac x2 (120cc and 50cc over 24 hours)    DIET:  [x] regular    LABS:                        10.1   12.36 )-----------( 432      ( 29 May 2020 09:58 )             34.3     05-29    137  |  104  |  10  ----------------------------<  85  4.1   |  17<L>  |  0.87    Ca    9.1      29 May 2020 06:56  Phos  4.9     05-27  Mg     2.2     05-27            Allergies    Biaxin (Other)  Lidoderm (Unknown)  morphine (Short breath; Rash)        MEDICATIONS:  Antibiotics:  cephalexin 500 milliGRAM(s) Oral every 12 hours    Neuro:  acetaminophen   Tablet .. 650 milliGRAM(s) Oral every 6 hours PRN  cyclobenzaprine 10 milliGRAM(s) Oral three times a day PRN  diazepam    Tablet 5 milliGRAM(s) Oral every 8 hours PRN  HYDROmorphone   Tablet 4 milliGRAM(s) Oral every 4 hours PRN  ondansetron Injectable 4 milliGRAM(s) IV Push every 6 hours PRN  ondansetron Injectable 4 milliGRAM(s) IV Push every 6 hours PRN  traZODone 100 milliGRAM(s) Oral daily  traZODone 100 milliGRAM(s) Oral at bedtime  zolpidem 5 milliGRAM(s) Oral at bedtime PRN    Anticoagulation:  enoxaparin Injectable 40 milliGRAM(s) SubCutaneous at bedtime    OTHER:  ALBUTerol    90 MICROgram(s) HFA Inhaler 1 Puff(s) Inhalation every 6 hours PRN  amLODIPine   Tablet 5 milliGRAM(s) Oral daily  losartan 25 milliGRAM(s) Oral daily  naloxone Injectable 0.1 milliGRAM(s) IV Push every 3 minutes PRN  polyethylene glycol 3350 17 Gram(s) Oral two times a day  senna 2 Tablet(s) Oral at bedtime  tamsulosin 0.8 milliGRAM(s) Oral at bedtime    IVF:  sodium chloride 0.9%. 1000 milliLiter(s) IV Continuous <Continuous>    CULTURES:  none    RADIOLOGY & ADDITIONAL TESTS:  CT L-spine: post op changes

## 2020-05-29 NOTE — DIETITIAN INITIAL EVALUATION ADULT. - PHYSICAL APPEARANCE
well nourished/other (specify) Ht: 68in, Wt: 200lbs, BMI: 30.4kg/m2, IBW: 154lbs +/- 10%  Edema: none noted  Skin per nursing flowsheets: no pressure injury documented

## 2020-05-29 NOTE — DIETITIAN INITIAL EVALUATION ADULT. - PERTINENT MEDS FT
MEDICATIONS  (STANDING):  ALPRAZolam 1 milliGRAM(s) Oral every 6 hours  amLODIPine   Tablet 5 milliGRAM(s) Oral daily  cephalexin 500 milliGRAM(s) Oral every 12 hours  enoxaparin Injectable 40 milliGRAM(s) SubCutaneous at bedtime  losartan 25 milliGRAM(s) Oral daily  polyethylene glycol 3350 17 Gram(s) Oral two times a day  senna 2 Tablet(s) Oral at bedtime  tamsulosin 0.8 milliGRAM(s) Oral at bedtime  traZODone 100 milliGRAM(s) Oral at bedtime    MEDICATIONS  (PRN):  acetaminophen   Tablet .. 650 milliGRAM(s) Oral every 6 hours PRN Temp greater or equal to 38C (100.4F), Mild Pain (1 - 3)  ALBUTerol    90 MICROgram(s) HFA Inhaler 1 Puff(s) Inhalation every 6 hours PRN Shortness of Breath and/or Wheezing  cyclobenzaprine 10 milliGRAM(s) Oral three times a day PRN Muscle Spasm  HYDROmorphone   Tablet 4 milliGRAM(s) Oral every 4 hours PRN Moderate Pain (4 - 6)  naloxone Injectable 0.1 milliGRAM(s) IV Push every 3 minutes PRN For ANY of the following changes in patient status:  A. RR LESS THAN 10 breaths per minute, B. Oxygen saturation LESS THAN 90%, C. Sedation score of 6  ondansetron Injectable 4 milliGRAM(s) IV Push every 6 hours PRN Nausea  ondansetron Injectable 4 milliGRAM(s) IV Push every 6 hours PRN Nausea  zolpidem 5 milliGRAM(s) Oral at bedtime PRN Insomnia

## 2020-05-30 LAB
ANION GAP SERPL CALC-SCNC: 11 MMOL/L — SIGNIFICANT CHANGE UP (ref 5–17)
BUN SERPL-MCNC: 8 MG/DL — SIGNIFICANT CHANGE UP (ref 7–23)
CALCIUM SERPL-MCNC: 9 MG/DL — SIGNIFICANT CHANGE UP (ref 8.4–10.5)
CHLORIDE SERPL-SCNC: 107 MMOL/L — SIGNIFICANT CHANGE UP (ref 96–108)
CO2 SERPL-SCNC: 21 MMOL/L — LOW (ref 22–31)
CREAT SERPL-MCNC: 0.81 MG/DL — SIGNIFICANT CHANGE UP (ref 0.5–1.3)
GLUCOSE SERPL-MCNC: 93 MG/DL — SIGNIFICANT CHANGE UP (ref 70–99)
HCT VFR BLD CALC: 34.1 % — LOW (ref 39–50)
HGB BLD-MCNC: 9.6 G/DL — LOW (ref 13–17)
MCHC RBC-ENTMCNC: 20.3 PG — LOW (ref 27–34)
MCHC RBC-ENTMCNC: 28.2 GM/DL — LOW (ref 32–36)
MCV RBC AUTO: 72.2 FL — LOW (ref 80–100)
NRBC # BLD: 0 /100 WBCS — SIGNIFICANT CHANGE UP (ref 0–0)
PLATELET # BLD AUTO: 343 K/UL — SIGNIFICANT CHANGE UP (ref 150–400)
POTASSIUM SERPL-MCNC: 4.1 MMOL/L — SIGNIFICANT CHANGE UP (ref 3.5–5.3)
POTASSIUM SERPL-SCNC: 4.1 MMOL/L — SIGNIFICANT CHANGE UP (ref 3.5–5.3)
RBC # BLD: 4.72 M/UL — SIGNIFICANT CHANGE UP (ref 4.2–5.8)
RBC # FLD: 18.6 % — HIGH (ref 10.3–14.5)
SODIUM SERPL-SCNC: 139 MMOL/L — SIGNIFICANT CHANGE UP (ref 135–145)
WBC # BLD: 9.22 K/UL — SIGNIFICANT CHANGE UP (ref 3.8–10.5)
WBC # FLD AUTO: 9.22 K/UL — SIGNIFICANT CHANGE UP (ref 3.8–10.5)

## 2020-05-30 PROCEDURE — 99233 SBSQ HOSP IP/OBS HIGH 50: CPT

## 2020-05-30 RX ORDER — TRAMADOL HYDROCHLORIDE 50 MG/1
50 TABLET ORAL EVERY 4 HOURS
Refills: 0 | Status: DISCONTINUED | OUTPATIENT
Start: 2020-05-30 | End: 2020-06-04

## 2020-05-30 RX ORDER — TRAMADOL HYDROCHLORIDE 50 MG/1
25 TABLET ORAL EVERY 4 HOURS
Refills: 0 | Status: DISCONTINUED | OUTPATIENT
Start: 2020-05-30 | End: 2020-06-04

## 2020-05-30 RX ORDER — ACETAMINOPHEN 500 MG
1000 TABLET ORAL ONCE
Refills: 0 | Status: COMPLETED | OUTPATIENT
Start: 2020-05-30 | End: 2020-05-30

## 2020-05-30 RX ADMIN — LOSARTAN POTASSIUM 25 MILLIGRAM(S): 100 TABLET, FILM COATED ORAL at 06:36

## 2020-05-30 RX ADMIN — Medication 1 MILLIGRAM(S): at 06:36

## 2020-05-30 RX ADMIN — Medication 650 MILLIGRAM(S): at 17:20

## 2020-05-30 RX ADMIN — CYCLOBENZAPRINE HYDROCHLORIDE 10 MILLIGRAM(S): 10 TABLET, FILM COATED ORAL at 17:20

## 2020-05-30 RX ADMIN — ENOXAPARIN SODIUM 40 MILLIGRAM(S): 100 INJECTION SUBCUTANEOUS at 21:18

## 2020-05-30 RX ADMIN — Medication 100 MILLIGRAM(S): at 21:17

## 2020-05-30 RX ADMIN — TAMSULOSIN HYDROCHLORIDE 0.8 MILLIGRAM(S): 0.4 CAPSULE ORAL at 21:17

## 2020-05-30 RX ADMIN — Medication 400 MILLIGRAM(S): at 01:12

## 2020-05-30 RX ADMIN — CYCLOBENZAPRINE HYDROCHLORIDE 10 MILLIGRAM(S): 10 TABLET, FILM COATED ORAL at 00:19

## 2020-05-30 RX ADMIN — TRAMADOL HYDROCHLORIDE 50 MILLIGRAM(S): 50 TABLET ORAL at 12:35

## 2020-05-30 RX ADMIN — AMLODIPINE BESYLATE 5 MILLIGRAM(S): 2.5 TABLET ORAL at 06:36

## 2020-05-30 RX ADMIN — SENNA PLUS 2 TABLET(S): 8.6 TABLET ORAL at 21:18

## 2020-05-30 RX ADMIN — Medication 1 MILLIGRAM(S): at 21:18

## 2020-05-30 RX ADMIN — Medication 500 MILLIGRAM(S): at 17:21

## 2020-05-30 RX ADMIN — Medication 500 MILLIGRAM(S): at 06:36

## 2020-05-30 NOTE — PROGRESS NOTE ADULT - SUBJECTIVE AND OBJECTIVE BOX
Patient was seen at bedside this am. Patient said dilaudid was not helping with his back pain and would like to try other pain meds. Denied any cp, n/v, sob, or abd pain.    OVERNIGHT EVENTS: No acute event overnight    Vital Signs Last 24 Hrs  T(C): 37.1 (30 May 2020 13:47), Max: 37.6 (29 May 2020 23:57)  T(F): 98.7 (30 May 2020 13:47), Max: 99.6 (29 May 2020 23:57)  HR: 100 (30 May 2020 13:47) (89 - 103)  BP: 131/88 (30 May 2020 13:47) (131/88 - 159/84)  BP(mean): --  RR: 18 (30 May 2020 13:47) (18 - 18)  SpO2: 99% (30 May 2020 13:47) (95% - 100%)    I&O's Detail    29 May 2020 07:01  -  30 May 2020 07:00  --------------------------------------------------------  IN:    Oral Fluid: 860 mL  Total IN: 860 mL    OUT:    Accordian: 165 mL    Accordian: 295 mL    Voided: 1600 mL  Total OUT: 2060 mL    Total NET: -1200 mL      30 May 2020 07:01  -  30 May 2020 15:18  --------------------------------------------------------  IN:    Oral Fluid: 560 mL  Total IN: 560 mL    OUT:    Accordian: 60 mL    Accordian: 25 mL    Voided: 400 mL  Total OUT: 485 mL    Total NET: 75 mL        I&O's Summary    29 May 2020 07:01  -  30 May 2020 07:00  --------------------------------------------------------  IN: 860 mL / OUT: 2060 mL / NET: -1200 mL    30 May 2020 07:01  -  30 May 2020 15:18  --------------------------------------------------------  IN: 560 mL / OUT: 485 mL / NET: 75 mL        PHYSICAL EXAM:  Neurological:  Awake, alert, oriented to person, place, and date, PERRL, speech fluent and clear, following commands, no drift, moving all extremities 5/5, sensation intact to light touch  Cardiovascular: +s1, s2  Respiratory: clear to auscultation b/l  Gastrointestinal: soft, non-distended, non-tender  Extremities: warm and dry  Incision/Wound: incision dressing C/D/I, + HMV x2      LABS:                        9.6    9.22  )-----------( 343      ( 30 May 2020 07:06 )             34.1     05-30    139  |  107  |  8   ----------------------------<  93  4.1   |  21<L>  |  0.81    Ca    9.0      30 May 2020 07:06              CAPILLARY BLOOD GLUCOSE          Drug Levels: [] N/A    CSF Analysis: [] N/A      Allergies    Biaxin (Other)  Lidoderm (Unknown)  morphine (Short breath; Rash)    Intolerances      MEDICATIONS:  Antibiotics:  cephalexin 500 milliGRAM(s) Oral every 12 hours    Neuro:  acetaminophen   Tablet .. 650 milliGRAM(s) Oral every 6 hours PRN  ALPRAZolam 1 milliGRAM(s) Oral every 6 hours  cyclobenzaprine 10 milliGRAM(s) Oral three times a day PRN  ondansetron Injectable 4 milliGRAM(s) IV Push every 6 hours PRN  ondansetron Injectable 4 milliGRAM(s) IV Push every 6 hours PRN  traMADol 25 milliGRAM(s) Oral every 4 hours PRN  traMADol 50 milliGRAM(s) Oral every 4 hours PRN  traZODone 100 milliGRAM(s) Oral at bedtime  zolpidem 5 milliGRAM(s) Oral at bedtime PRN    Anticoagulation:  enoxaparin Injectable 40 milliGRAM(s) SubCutaneous at bedtime    OTHER:  ALBUTerol    90 MICROgram(s) HFA Inhaler 1 Puff(s) Inhalation every 6 hours PRN  amLODIPine   Tablet 5 milliGRAM(s) Oral daily  losartan 25 milliGRAM(s) Oral daily  naloxone Injectable 0.1 milliGRAM(s) IV Push every 3 minutes PRN  polyethylene glycol 3350 17 Gram(s) Oral two times a day  senna 2 Tablet(s) Oral at bedtime  tamsulosin 0.8 milliGRAM(s) Oral at bedtime    IVF:    CULTURES:    RADIOLOGY & ADDITIONAL TESTS:

## 2020-05-30 NOTE — PROGRESS NOTE ADULT - ASSESSMENT
70 yo man with PMH chronic back pain s/p multiple lumbar laminectomy/fusion with recent admission from 1/22/2020 to 2/3/2020 for [id number] Fusion on 1/28 , NPH s/p  shunt, HTN, HLD, anxiety, GULSHAN on CPAP presents to ED after he was found to have loosening of hardware on CT by his spine surgeon. Revision of T11-pelvis fusion w removal of left T11 screw, and removal and replacement of right T11 screw, sacral screws, and pelvic screws, plastics closure with paraspinal flaps and complex wound closure. EBL 500cc

## 2020-05-30 NOTE — PROGRESS NOTE ADULT - SUBJECTIVE AND OBJECTIVE BOX
Patient states that his back pain is tolerable today. No other complaints.    MEDICATIONS  (STANDING):  ALPRAZolam 1 milliGRAM(s) Oral every 6 hours  amLODIPine   Tablet 5 milliGRAM(s) Oral daily  cephalexin 500 milliGRAM(s) Oral every 12 hours  enoxaparin Injectable 40 milliGRAM(s) SubCutaneous at bedtime  losartan 25 milliGRAM(s) Oral daily  polyethylene glycol 3350 17 Gram(s) Oral two times a day  senna 2 Tablet(s) Oral at bedtime  tamsulosin 0.8 milliGRAM(s) Oral at bedtime  traZODone 100 milliGRAM(s) Oral at bedtime    MEDICATIONS  (PRN):  acetaminophen   Tablet .. 650 milliGRAM(s) Oral every 6 hours PRN Temp greater or equal to 38C (100.4F), Mild Pain (1 - 3)  ALBUTerol    90 MICROgram(s) HFA Inhaler 1 Puff(s) Inhalation every 6 hours PRN Shortness of Breath and/or Wheezing  cyclobenzaprine 10 milliGRAM(s) Oral three times a day PRN Muscle Spasm  naloxone Injectable 0.1 milliGRAM(s) IV Push every 3 minutes PRN For ANY of the following changes in patient status:  A. RR LESS THAN 10 breaths per minute, B. Oxygen saturation LESS THAN 90%, C. Sedation score of 6  ondansetron Injectable 4 milliGRAM(s) IV Push every 6 hours PRN Nausea  ondansetron Injectable 4 milliGRAM(s) IV Push every 6 hours PRN Nausea  traMADol 25 milliGRAM(s) Oral every 4 hours PRN Moderate Pain (4 - 6)  traMADol 50 milliGRAM(s) Oral every 4 hours PRN Severe Pain (7 - 10)  zolpidem 5 milliGRAM(s) Oral at bedtime PRN Insomnia    Vital Signs Last 24 Hrs  T(C): 36.8 (30 May 2020 04:56), Max: 37.6 (29 May 2020 23:57)  T(F): 98.2 (30 May 2020 04:56), Max: 99.6 (29 May 2020 23:57)  HR: 94 (30 May 2020 04:56) (89 - 115)  BP: 142/83 (30 May 2020 04:56) (126/73 - 159/84)  BP(mean): --  RR: 18 (30 May 2020 04:56) (18 - 18)  SpO2: 95% (30 May 2020 04:56) (95% - 100%)    PHYSICAL EXAM:    GENERAL: NAD   HEENT: EOMI, PERRL  PULM: Clear to auscultation bilaterally  CV: Regular rate and rhythm; nl S1, S2; No murmurs, rubs, or gallops  ABDOMEN: Soft, Nontender, Nondistended; Bowel sounds present  EXTREMITIES/MSK:  No edema, calf tenderness   PSYCH: AAOx3  NEUROLOGY: non-focal    .  LABS:                         9.6    9.22  )-----------( 343      ( 30 May 2020 07:06 )             34.1     05-30    139  |  107  |  8   ----------------------------<  93  4.1   |  21<L>  |  0.81    Ca    9.0      30 May 2020 07:06                RADIOLOGY, EKG & ADDITIONAL TESTS: Reviewed.

## 2020-05-30 NOTE — PROGRESS NOTE ADULT - ASSESSMENT
69M sp T10-pelvis revision PSF by nsx and paraspinal muscle flap closure 5/28    Plan:  - Planning on dressing change 6/1  - Will continue to follow when pt in house  - Care per primary team    Plastic Surgery  [phone]

## 2020-05-30 NOTE — PROVIDER CONTACT NOTE (OTHER) - ASSESSMENT
VSS, pt c/o pain to back, denies wanting dilaudid ordered- requesting IV tylenol. Also pt's L hemovac has been opened to gravity- loss of suction, covered drain exit site w additional tegaderm but drain still not staying suctioned closed.

## 2020-05-30 NOTE — PROGRESS NOTE ADULT - ASSESSMENT
HPI:  70 yo man with PMH chronic back pain s/p multiple lumbar laminectomy/fusion with recent admission from 1/22/2020 to 2/3/2020 for [id number] Fusion on 1/28 , NPH s/p  shunt, HTN, HLD, anxiety, depression, GULSHAN on CPAP  was admitted to the hospital on 2/8 from home in setting of frequent falls. Subsequently D/C to [facility], presents to ED with recent falls and found to have loosening of hardware on CT, plan for RTOR. (24 May 2020 13:20)    PROCEDURE: s/p revision of t11 to pelvis fusion with plastics closure on 5/27/2020  POD#3    PLAN:  - post op CT lumbar spine done on 5/28 showed intact hardware  - tramadol prn and tylenol prn for pain control, flexeril for muscle spasms  - continue hemovacs, monitor outputs, H/H this am 9.6/34.1 stable  - TLSO brace when out of bed  - right parotitis, currently on keflex for 10 days, no need for CT head/neck per ENT  - continue trazodone  - continue losartan and norvasc  - continue flomax for bph  - regular diet, last BM 5/29, senna and miralax for bowel regimen  - out of bed with assistance  - incentive spirometer for lung expansion  - lovenox and SCDs for DVT prophylaxis  - continue PT  dispo plan: home PT when medically cleared    will discuss plan with Dr. Castanon    Spectra #[id number]

## 2020-05-30 NOTE — PROGRESS NOTE ADULT - SUBJECTIVE AND OBJECTIVE BOX
PLASTIC SURGERY DAILY PROGRESS NOTE:    Interval:  No acute events overnight endorsed.    Subjective:  Patient seen and examined this am.     Vital Signs Last 24 Hrs  T(C): 36.8 (30 May 2020 04:56), Max: 37.6 (29 May 2020 23:57)  T(F): 98.2 (30 May 2020 04:56), Max: 99.6 (29 May 2020 23:57)  HR: 94 (30 May 2020 04:56) (87 - 115)  BP: 142/83 (30 May 2020 04:56) (126/73 - 160/82)  RR: 18 (30 May 2020 04:56) (18 - 18)  SpO2: 95% (30 May 2020 04:56) (95% - 100%)    Exam:  Gen: NAD, resting in bed, alert and responding appropriately  Resp: Airway patent, non-labored respirations  Abd: Soft, ND, NTTP x 4 quadrants, no rebound or guarding.  Back: Dressing cdi HMV x2 serosang output stripped soft no collections  Ext: No edema, WWP  Neuro: AAOx3, no focal deficits    LABS:             10.1   12.36 )-----------( 432      ( 29 May 2020 09:58 )             34.3     05-29    137  |  104  |  10  ----------------------------<  85  4.1   |  17<L>  |  0.87    Ca    9.1      29 May 2020 06:56

## 2020-05-31 LAB
ANION GAP SERPL CALC-SCNC: 11 MMOL/L — SIGNIFICANT CHANGE UP (ref 5–17)
BUN SERPL-MCNC: 12 MG/DL — SIGNIFICANT CHANGE UP (ref 7–23)
CALCIUM SERPL-MCNC: 8.8 MG/DL — SIGNIFICANT CHANGE UP (ref 8.4–10.5)
CHLORIDE SERPL-SCNC: 105 MMOL/L — SIGNIFICANT CHANGE UP (ref 96–108)
CO2 SERPL-SCNC: 24 MMOL/L — SIGNIFICANT CHANGE UP (ref 22–31)
CREAT SERPL-MCNC: 0.85 MG/DL — SIGNIFICANT CHANGE UP (ref 0.5–1.3)
GLUCOSE SERPL-MCNC: 115 MG/DL — HIGH (ref 70–99)
HCT VFR BLD CALC: 30.7 % — LOW (ref 39–50)
HGB BLD-MCNC: 8.9 G/DL — LOW (ref 13–17)
MCHC RBC-ENTMCNC: 20.7 PG — LOW (ref 27–34)
MCHC RBC-ENTMCNC: 29 GM/DL — LOW (ref 32–36)
MCV RBC AUTO: 71.4 FL — LOW (ref 80–100)
NRBC # BLD: 0 /100 WBCS — SIGNIFICANT CHANGE UP (ref 0–0)
OB PNL STL: NEGATIVE — SIGNIFICANT CHANGE UP
PLATELET # BLD AUTO: 399 K/UL — SIGNIFICANT CHANGE UP (ref 150–400)
POTASSIUM SERPL-MCNC: 3.4 MMOL/L — LOW (ref 3.5–5.3)
POTASSIUM SERPL-SCNC: 3.4 MMOL/L — LOW (ref 3.5–5.3)
RBC # BLD: 4.3 M/UL — SIGNIFICANT CHANGE UP (ref 4.2–5.8)
RBC # FLD: 18.6 % — HIGH (ref 10.3–14.5)
SARS-COV-2 RNA SPEC QL NAA+PROBE: SIGNIFICANT CHANGE UP
SODIUM SERPL-SCNC: 140 MMOL/L — SIGNIFICANT CHANGE UP (ref 135–145)
WBC # BLD: 9.38 K/UL — SIGNIFICANT CHANGE UP (ref 3.8–10.5)
WBC # FLD AUTO: 9.38 K/UL — SIGNIFICANT CHANGE UP (ref 3.8–10.5)

## 2020-05-31 PROCEDURE — 99232 SBSQ HOSP IP/OBS MODERATE 35: CPT

## 2020-05-31 RX ORDER — POTASSIUM CHLORIDE 20 MEQ
20 PACKET (EA) ORAL
Refills: 0 | Status: COMPLETED | OUTPATIENT
Start: 2020-05-31 | End: 2020-05-31

## 2020-05-31 RX ADMIN — Medication 1 MILLIGRAM(S): at 17:30

## 2020-05-31 RX ADMIN — Medication 1 MILLIGRAM(S): at 05:31

## 2020-05-31 RX ADMIN — Medication 650 MILLIGRAM(S): at 05:32

## 2020-05-31 RX ADMIN — Medication 100 MILLIGRAM(S): at 21:47

## 2020-05-31 RX ADMIN — Medication 500 MILLIGRAM(S): at 05:31

## 2020-05-31 RX ADMIN — Medication 20 MILLIEQUIVALENT(S): at 14:29

## 2020-05-31 RX ADMIN — Medication 650 MILLIGRAM(S): at 14:30

## 2020-05-31 RX ADMIN — ENOXAPARIN SODIUM 40 MILLIGRAM(S): 100 INJECTION SUBCUTANEOUS at 21:45

## 2020-05-31 RX ADMIN — AMLODIPINE BESYLATE 5 MILLIGRAM(S): 2.5 TABLET ORAL at 05:31

## 2020-05-31 RX ADMIN — Medication 1 MILLIGRAM(S): at 21:45

## 2020-05-31 RX ADMIN — Medication 1 MILLIGRAM(S): at 11:27

## 2020-05-31 RX ADMIN — Medication 500 MILLIGRAM(S): at 18:11

## 2020-05-31 RX ADMIN — Medication 20 MILLIEQUIVALENT(S): at 11:27

## 2020-05-31 RX ADMIN — Medication 20 MILLIEQUIVALENT(S): at 12:15

## 2020-05-31 RX ADMIN — LOSARTAN POTASSIUM 25 MILLIGRAM(S): 100 TABLET, FILM COATED ORAL at 05:31

## 2020-05-31 RX ADMIN — TAMSULOSIN HYDROCHLORIDE 0.8 MILLIGRAM(S): 0.4 CAPSULE ORAL at 21:45

## 2020-05-31 NOTE — PROGRESS NOTE ADULT - ASSESSMENT
HPI:  70 yo man with PMH chronic back pain s/p multiple lumbar laminectomy/fusion with recent admission from 1/22/2020 to 2/3/2020 for C19-Xpuaix Fusion on 1/28 , NPH s/p  shunt, HTN, HLD, anxiety, depression, GULSHAN on CPAP  was admitted to the hospital on 2/8 from home in setting of frequent falls. Subsequently D/C to [facility], presents to ED with recent falls and found to have loosening of hardware on CT, plan for RTOR. (24 May 2020 13:20)    PROCEDURE: s/p revision of t11 to pelvis fusion with plastics closure on 5/27/2020  POD#4    PLAN:  - post op CT lumbar spine done on 5/28 showed intact hardware  - tramadol prn and tylenol prn for pain control, flexeril for muscle spasms  - continue hemovacs, monitor outputs, H/H this am 8.9/30.7, mildly decrease from yesterday, pt currently hemodynamically stable, will f/u repeat in am, FOB negative  - TLSO brace when out of bed  - right parotitis, currently on keflex for 10 days, no need for CT head/neck per ENT  - continue trazodone  - continue losartan and norvasc  - continue flomax for bph  - regular diet, last BM today, continue senna and miralax   - out of bed with assistance  - incentive spirometer for lung expansion  - lovenox and SCDs for DVT prophylaxis  - continue PT; OT pending  dispo plan: home PT when medically cleared    will discuss plan with Dr. Castanon    Spectra #[id number]

## 2020-05-31 NOTE — PROGRESS NOTE ADULT - SUBJECTIVE AND OBJECTIVE BOX
PULMONARY PROGRESS NOTE    DACIA NAVARRO  MRN-[id number]    Patient is a 69y old  Male who presents with a chief complaint of falls, loose hardware (30 May 2020 15:17)      HPI:  awake/ alert on RA  ROS:   -    ACTIVE MEDICATION LIST:  MEDICATIONS  (STANDING):  ALPRAZolam 1 milliGRAM(s) Oral every 6 hours  amLODIPine   Tablet 5 milliGRAM(s) Oral daily  cephalexin 500 milliGRAM(s) Oral every 12 hours  enoxaparin Injectable 40 milliGRAM(s) SubCutaneous at bedtime  losartan 25 milliGRAM(s) Oral daily  polyethylene glycol 3350 17 Gram(s) Oral two times a day  potassium chloride    Tablet ER 20 milliEquivalent(s) Oral every 2 hours  senna 2 Tablet(s) Oral at bedtime  tamsulosin 0.8 milliGRAM(s) Oral at bedtime  traZODone 100 milliGRAM(s) Oral at bedtime    MEDICATIONS  (PRN):  acetaminophen   Tablet .. 650 milliGRAM(s) Oral every 6 hours PRN Temp greater or equal to 38C (100.4F), Mild Pain (1 - 3)  ALBUTerol    90 MICROgram(s) HFA Inhaler 1 Puff(s) Inhalation every 6 hours PRN Shortness of Breath and/or Wheezing  cyclobenzaprine 10 milliGRAM(s) Oral three times a day PRN Muscle Spasm  naloxone Injectable 0.1 milliGRAM(s) IV Push every 3 minutes PRN For ANY of the following changes in patient status:  A. RR LESS THAN 10 breaths per minute, B. Oxygen saturation LESS THAN 90%, C. Sedation score of 6  ondansetron Injectable 4 milliGRAM(s) IV Push every 6 hours PRN Nausea  ondansetron Injectable 4 milliGRAM(s) IV Push every 6 hours PRN Nausea  traMADol 25 milliGRAM(s) Oral every 4 hours PRN Moderate Pain (4 - 6)  traMADol 50 milliGRAM(s) Oral every 4 hours PRN Severe Pain (7 - 10)  zolpidem 5 milliGRAM(s) Oral at bedtime PRN Insomnia      EXAM:  Vital Signs Last 24 Hrs  T(C): 36.8 (31 May 2020 09:46), Max: 37.3 (30 May 2020 16:36)  T(F): 98.2 (31 May 2020 09:46), Max: 99.1 (30 May 2020 16:36)  HR: 95 (31 May 2020 09:46) (82 - 100)  BP: 127/71 (31 May 2020 09:46) (127/71 - 148/89)  BP(mean): --  RR: 16 (31 May 2020 09:46) (16 - 18)  SpO2: 97% (31 May 2020 09:46) (94% - 99%)    GENERAL: The patient is awake and alert in no apparent distress.     LUNGS: Clear to auscultation without wheezing, rales or rhonchi; respirations unlabored    HEART: Regular rate and rhythm without murmur.                            8.9    9.38  )-----------( 399      ( 31 May 2020 06:40 )             30.7       05-31    140  |  105  |  12  ----------------------------<  115<H>  3.4<L>   |  24  |  0.85    Ca    8.8      31 May 2020 06:38     < from: [facility] Duplex Lower Ext Vein Scan, [place] (05.26.20 @ 11:35) >    EXAM:  DUPLEX SCAN EXT VEINS LOWER BI                            PROCEDURE DATE:  05/26/2020            INTERPRETATION:  CLINICAL INFORMATION: Back pain, immobile, rule out DVT.    COMPARISON: A prior examination, dated 2/12/2020, showed no DVT.    TECHNIQUE: Duplex sonography of the BILATERAL LOWER extremity veins with color and spectral Doppler, with and without compression.      FINDINGS:  There is normal compressibility of the bilateral common femoral, femoral and popliteal veins.   Dopplerexamination shows normal spontaneous and phasic flow.    No calf vein thrombosis is detected.    IMPRESSION:   No evidence of deep venous thrombosis in either lower extremity.                        JOSE MIGUEL ROLLE M.D., ATTENDING RADIOLOGIST  This document has been electronically signed. May 26 2020 11:42AM              < end of copied text >      PROBLEM LIST:  69y Male with HEALTH ISSUES - PROBLEM Dx:  Sialadenitis: Sialadenitis  Sialoadenitis of submandibular gland: Sialoadenitis of submandibular gland  Parotitis: Parotitis  Anemia: Anemia  HTN (hypertension): HTN (hypertension)  GULSHAN (obstructive sleep apnea): GULSHAN (obstructive sleep apnea)  Low back pain: Low back pain           RECS:  - PAP as tolerated  - standard GULSHAN precautions  -close f/u with DR Chema Sheffield,   [facility]  [phone]

## 2020-05-31 NOTE — PROGRESS NOTE ADULT - SUBJECTIVE AND OBJECTIVE BOX
Patient has no new complaints today.    MEDICATIONS  (STANDING):  ALPRAZolam 1 milliGRAM(s) Oral every 6 hours  amLODIPine   Tablet 5 milliGRAM(s) Oral daily  cephalexin 500 milliGRAM(s) Oral every 12 hours  enoxaparin Injectable 40 milliGRAM(s) SubCutaneous at bedtime  losartan 25 milliGRAM(s) Oral daily  polyethylene glycol 3350 17 Gram(s) Oral two times a day  potassium chloride    Tablet ER 20 milliEquivalent(s) Oral every 2 hours  senna 2 Tablet(s) Oral at bedtime  tamsulosin 0.8 milliGRAM(s) Oral at bedtime  traZODone 100 milliGRAM(s) Oral at bedtime    MEDICATIONS  (PRN):  acetaminophen   Tablet .. 650 milliGRAM(s) Oral every 6 hours PRN Temp greater or equal to 38C (100.4F), Mild Pain (1 - 3)  ALBUTerol    90 MICROgram(s) HFA Inhaler 1 Puff(s) Inhalation every 6 hours PRN Shortness of Breath and/or Wheezing  cyclobenzaprine 10 milliGRAM(s) Oral three times a day PRN Muscle Spasm  naloxone Injectable 0.1 milliGRAM(s) IV Push every 3 minutes PRN For ANY of the following changes in patient status:  A. RR LESS THAN 10 breaths per minute, B. Oxygen saturation LESS THAN 90%, C. Sedation score of 6  ondansetron Injectable 4 milliGRAM(s) IV Push every 6 hours PRN Nausea  ondansetron Injectable 4 milliGRAM(s) IV Push every 6 hours PRN Nausea  traMADol 25 milliGRAM(s) Oral every 4 hours PRN Moderate Pain (4 - 6)  traMADol 50 milliGRAM(s) Oral every 4 hours PRN Severe Pain (7 - 10)  zolpidem 5 milliGRAM(s) Oral at bedtime PRN Insomnia    Vital Signs Last 24 Hrs  T(C): 36.8 (31 May 2020 09:46), Max: 37.3 (30 May 2020 16:36)  T(F): 98.2 (31 May 2020 09:46), Max: 99.1 (30 May 2020 16:36)  HR: 95 (31 May 2020 09:46) (82 - 100)  BP: 127/71 (31 May 2020 09:46) (127/71 - 148/89)  BP(mean): --  RR: 16 (31 May 2020 09:46) (16 - 18)  SpO2: 97% (31 May 2020 09:46) (94% - 99%)      PHYSICAL EXAM:    GENERAL: NAD   HEENT: EOMI, PERRL  PULM: Clear to auscultation bilaterally  CV: Regular rate and rhythm; nl S1, S2; No murmurs, rubs, or gallops  ABDOMEN: Soft, Nontender, Nondistended; Bowel sounds present  EXTREMITIES/MSK:  No edema, calf tenderness   PSYCH: AAOx3  NEUROLOGY: non-focal    .  LABS:                         8.9    9.38  )-----------( 399      ( 31 May 2020 06:40 )             30.7     05-31    140  |  105  |  12  ----------------------------<  115<H>  3.4<L>   |  24  |  0.85    Ca    8.8      31 May 2020 06:38                RADIOLOGY, EKG & ADDITIONAL TESTS: Reviewed.

## 2020-05-31 NOTE — PROGRESS NOTE ADULT - ASSESSMENT
68 yo man with PMH chronic back pain s/p multiple lumbar laminectomy/fusion with recent admission from 1/22/2020 to 2/3/2020 for [id number] Fusion on 1/28 , NPH s/p  shunt, HTN, HLD, anxiety, GULSHAN on CPAP presents to ED after he was found to have loosening of hardware on CT by his spine surgeon. Revision of T11-pelvis fusion w removal of left T11 screw, and removal and replacement of right T11 screw, sacral screws, and pelvic screws, plastics closure with paraspinal flaps and complex wound closure. EBL 500cc

## 2020-05-31 NOTE — PROGRESS NOTE ADULT - SUBJECTIVE AND OBJECTIVE BOX
Patient was seen at bedside this am. Patient was able to walk 2 laps in the unit with physical therapy yesterday. Denied any numbness/tingling, cp, sob, n/v, or abd pain.    OVERNIGHT EVENTS: No acute event overnight    Vital Signs Last 24 Hrs  T(C): 36.8 (31 May 2020 09:46), Max: 37.3 (30 May 2020 16:36)  T(F): 98.2 (31 May 2020 09:46), Max: 99.1 (30 May 2020 16:36)  HR: 95 (31 May 2020 09:46) (82 - 95)  BP: 127/71 (31 May 2020 09:46) (127/71 - 148/89)  BP(mean): --  RR: 16 (31 May 2020 09:46) (16 - 18)  SpO2: 97% (31 May 2020 09:46) (94% - 97%)    I&O's Detail    30 May 2020 07:01  -  31 May 2020 07:00  --------------------------------------------------------  IN:    Oral Fluid: 880 mL  Total IN: 880 mL    OUT:    Accordian: 75 mL    Accordian: 125 mL    Voided: 1510 mL  Total OUT: 1710 mL    Total NET: -830 mL      31 May 2020 07:01  -  31 May 2020 15:37  --------------------------------------------------------  IN:    Oral Fluid: 240 mL  Total IN: 240 mL    OUT:    Accordian: 20 mL    Accordian: 60 mL    Voided: 500 mL  Total OUT: 580 mL    Total NET: -340 mL        I&O's Summary    30 May 2020 07:01  -  31 May 2020 07:00  --------------------------------------------------------  IN: 880 mL / OUT: 1710 mL / NET: -830 mL    31 May 2020 07:01  -  31 May 2020 15:37  --------------------------------------------------------  IN: 240 mL / OUT: 580 mL / NET: -340 mL        PHYSICAL EXAM:  Neurological:  Awake, alert, oriented to person, place, and date, PERRL, speech fluent and clear, following commands, no drift, moving all extremities 5/5, sensation intact to light touch  Cardiovascular: +s1, s2  Respiratory: clear to auscultation b/l  Gastrointestinal: soft, non-distended, non-tender  Extremities: warm and dry  Incision/Wound: incision dressing C/D/I, + HMV x2    LABS:                        8.9    9.38  )-----------( 399      ( 31 May 2020 06:40 )             30.7     05-31    140  |  105  |  12  ----------------------------<  115<H>  3.4<L>   |  24  |  0.85    Ca    8.8      31 May 2020 06:38              CAPILLARY BLOOD GLUCOSE          Drug Levels: [] N/A    CSF Analysis: [] N/A      Allergies    Biaxin (Other)  Lidoderm (Unknown)  morphine (Short breath; Rash)    Intolerances      MEDICATIONS:  Antibiotics:  cephalexin 500 milliGRAM(s) Oral every 12 hours    Neuro:  acetaminophen   Tablet .. 650 milliGRAM(s) Oral every 6 hours PRN  ALPRAZolam 1 milliGRAM(s) Oral every 6 hours  cyclobenzaprine 10 milliGRAM(s) Oral three times a day PRN  ondansetron Injectable 4 milliGRAM(s) IV Push every 6 hours PRN  ondansetron Injectable 4 milliGRAM(s) IV Push every 6 hours PRN  traMADol 25 milliGRAM(s) Oral every 4 hours PRN  traMADol 50 milliGRAM(s) Oral every 4 hours PRN  traZODone 100 milliGRAM(s) Oral at bedtime  zolpidem 5 milliGRAM(s) Oral at bedtime PRN    Anticoagulation:  enoxaparin Injectable 40 milliGRAM(s) SubCutaneous at bedtime    OTHER:  ALBUTerol    90 MICROgram(s) HFA Inhaler 1 Puff(s) Inhalation every 6 hours PRN  amLODIPine   Tablet 5 milliGRAM(s) Oral daily  losartan 25 milliGRAM(s) Oral daily  naloxone Injectable 0.1 milliGRAM(s) IV Push every 3 minutes PRN  polyethylene glycol 3350 17 Gram(s) Oral two times a day  senna 2 Tablet(s) Oral at bedtime  tamsulosin 0.8 milliGRAM(s) Oral at bedtime    IVF:    CULTURES:    RADIOLOGY & ADDITIONAL TESTS:

## 2020-06-01 LAB
ANION GAP SERPL CALC-SCNC: 9 MMOL/L — SIGNIFICANT CHANGE UP (ref 5–17)
BUN SERPL-MCNC: 10 MG/DL — SIGNIFICANT CHANGE UP (ref 7–23)
CALCIUM SERPL-MCNC: 9 MG/DL — SIGNIFICANT CHANGE UP (ref 8.4–10.5)
CHLORIDE SERPL-SCNC: 109 MMOL/L — HIGH (ref 96–108)
CO2 SERPL-SCNC: 23 MMOL/L — SIGNIFICANT CHANGE UP (ref 22–31)
CREAT SERPL-MCNC: 0.8 MG/DL — SIGNIFICANT CHANGE UP (ref 0.5–1.3)
GLUCOSE SERPL-MCNC: 106 MG/DL — HIGH (ref 70–99)
HCT VFR BLD CALC: 31.4 % — LOW (ref 39–50)
HGB BLD-MCNC: 8.9 G/DL — LOW (ref 13–17)
MCHC RBC-ENTMCNC: 20.6 PG — LOW (ref 27–34)
MCHC RBC-ENTMCNC: 28.3 GM/DL — LOW (ref 32–36)
MCV RBC AUTO: 72.5 FL — LOW (ref 80–100)
NRBC # BLD: 0 /100 WBCS — SIGNIFICANT CHANGE UP (ref 0–0)
PLATELET # BLD AUTO: 442 K/UL — HIGH (ref 150–400)
POTASSIUM SERPL-MCNC: 3.9 MMOL/L — SIGNIFICANT CHANGE UP (ref 3.5–5.3)
POTASSIUM SERPL-SCNC: 3.9 MMOL/L — SIGNIFICANT CHANGE UP (ref 3.5–5.3)
RBC # BLD: 4.33 M/UL — SIGNIFICANT CHANGE UP (ref 4.2–5.8)
RBC # FLD: 18.6 % — HIGH (ref 10.3–14.5)
SODIUM SERPL-SCNC: 141 MMOL/L — SIGNIFICANT CHANGE UP (ref 135–145)
WBC # BLD: 7.29 K/UL — SIGNIFICANT CHANGE UP (ref 3.8–10.5)
WBC # FLD AUTO: 7.29 K/UL — SIGNIFICANT CHANGE UP (ref 3.8–10.5)

## 2020-06-01 PROCEDURE — 99232 SBSQ HOSP IP/OBS MODERATE 35: CPT

## 2020-06-01 RX ADMIN — CYCLOBENZAPRINE HYDROCHLORIDE 10 MILLIGRAM(S): 10 TABLET, FILM COATED ORAL at 08:12

## 2020-06-01 RX ADMIN — LOSARTAN POTASSIUM 25 MILLIGRAM(S): 100 TABLET, FILM COATED ORAL at 05:04

## 2020-06-01 RX ADMIN — Medication 1 MILLIGRAM(S): at 21:58

## 2020-06-01 RX ADMIN — Medication 650 MILLIGRAM(S): at 07:53

## 2020-06-01 RX ADMIN — Medication 100 MILLIGRAM(S): at 21:58

## 2020-06-01 RX ADMIN — TRAMADOL HYDROCHLORIDE 25 MILLIGRAM(S): 50 TABLET ORAL at 10:14

## 2020-06-01 RX ADMIN — Medication 500 MILLIGRAM(S): at 16:40

## 2020-06-01 RX ADMIN — TAMSULOSIN HYDROCHLORIDE 0.8 MILLIGRAM(S): 0.4 CAPSULE ORAL at 21:58

## 2020-06-01 RX ADMIN — Medication 1 MILLIGRAM(S): at 11:15

## 2020-06-01 RX ADMIN — ENOXAPARIN SODIUM 40 MILLIGRAM(S): 100 INJECTION SUBCUTANEOUS at 21:58

## 2020-06-01 RX ADMIN — TRAMADOL HYDROCHLORIDE 25 MILLIGRAM(S): 50 TABLET ORAL at 16:40

## 2020-06-01 RX ADMIN — AMLODIPINE BESYLATE 5 MILLIGRAM(S): 2.5 TABLET ORAL at 05:04

## 2020-06-01 RX ADMIN — Medication 1 MILLIGRAM(S): at 16:40

## 2020-06-01 RX ADMIN — CYCLOBENZAPRINE HYDROCHLORIDE 10 MILLIGRAM(S): 10 TABLET, FILM COATED ORAL at 21:58

## 2020-06-01 RX ADMIN — Medication 500 MILLIGRAM(S): at 05:04

## 2020-06-01 RX ADMIN — Medication 1 MILLIGRAM(S): at 05:04

## 2020-06-01 NOTE — PROGRESS NOTE ADULT - PROBLEM SELECTOR PLAN 2
overall, H/H had been downtrending since 5/29 from 10.1 to 8.9, but suspect CBC from 5/29 from hemoconcentration.  - H/H remains stable over last 24 hours  - no signs/symptoms of acute bleed with minimal serosanguinous output from hemovacs  - FOBT negative 5/31  - continue to monitor H/H daily

## 2020-06-01 NOTE — OCCUPATIONAL THERAPY INITIAL EVALUATION ADULT - PERTINENT HX OF CURRENT PROBLEM, REHAB EVAL
Pt is a 70 y/o male with PMH of chronic back pain s/p multiple lumbar laminectomy/fusion with recent admission from 1/2020 for H24-Udoixm Fusion on 1/28 , NPH s/p  shunt, HTN, HLD, anxiety, GULSHAN on CPAP presents to ED after he was found to have loosening of hardware on CT by his spine surgeon. Now s/p Revision of T11-pelvis fusion w removal of L T11 screw, and removal/replacement of RT11 screw, sacral screws, and pelvic screws plastics closure with paraspinal flaps and complex wound closure

## 2020-06-01 NOTE — PROGRESS NOTE ADULT - SUBJECTIVE AND OBJECTIVE BOX
PLASTIC SURGERY DAILY PROGRESS NOTE:    Subjective:  No acute events overnight endorsed. Pain controlled and dressing changed on rounds    Objective:    Exam:  Gen: NAD, resting in bed, alert and responding appropriately  Resp: Airway patent, non-labored respirations  Abd: ND  Back: Dressing c/d/i, soft, drains in place  Ext: No edema      Vital Signs Last 24 Hrs  T(C): 36.8 (01 Jun 2020 04:12), Max: 37.1 (31 May 2020 15:40)  T(F): 98.3 (01 Jun 2020 04:12), Max: 98.8 (31 May 2020 15:40)  HR: 82 (01 Jun 2020 04:12) (73 - 95)  BP: 139/67 (01 Jun 2020 04:12) (127/71 - 151/81)  BP(mean): --  RR: 16 (01 Jun 2020 04:12) (16 - 16)  SpO2: 97% (01 Jun 2020 04:12) (96% - 99%)    I&O's Detail    31 May 2020 07:01  -  01 Jun 2020 07:00  --------------------------------------------------------  IN:    Oral Fluid: 940 mL  Total IN: 940 mL    OUT:    Accordian: 60 mL    Accordian: 130 mL    Voided: 700 mL  Total OUT: 890 mL    Total NET: 50 mL      MEDICATIONS  (STANDING):  ALPRAZolam 1 milliGRAM(s) Oral every 6 hours  amLODIPine   Tablet 5 milliGRAM(s) Oral daily  cephalexin 500 milliGRAM(s) Oral every 12 hours  enoxaparin Injectable 40 milliGRAM(s) SubCutaneous at bedtime  losartan 25 milliGRAM(s) Oral daily  polyethylene glycol 3350 17 Gram(s) Oral two times a day  senna 2 Tablet(s) Oral at bedtime  tamsulosin 0.8 milliGRAM(s) Oral at bedtime  traZODone 100 milliGRAM(s) Oral at bedtime    MEDICATIONS  (PRN):  acetaminophen   Tablet .. 650 milliGRAM(s) Oral every 6 hours PRN Temp greater or equal to 38C (100.4F), Mild Pain (1 - 3)  ALBUTerol    90 MICROgram(s) HFA Inhaler 1 Puff(s) Inhalation every 6 hours PRN Shortness of Breath and/or Wheezing  cyclobenzaprine 10 milliGRAM(s) Oral three times a day PRN Muscle Spasm  naloxone Injectable 0.1 milliGRAM(s) IV Push every 3 minutes PRN For ANY of the following changes in patient status:  A. RR LESS THAN 10 breaths per minute, B. Oxygen saturation LESS THAN 90%, C. Sedation score of 6  ondansetron Injectable 4 milliGRAM(s) IV Push every 6 hours PRN Nausea  ondansetron Injectable 4 milliGRAM(s) IV Push every 6 hours PRN Nausea  traMADol 25 milliGRAM(s) Oral every 4 hours PRN Moderate Pain (4 - 6)  traMADol 50 milliGRAM(s) Oral every 4 hours PRN Severe Pain (7 - 10)  zolpidem 5 milliGRAM(s) Oral at bedtime PRN Insomnia      LABS:                        8.9    7.29  )-----------( 442      ( 01 Jun 2020 06:08 )             31.4     06-01    141  |  109<H>  |  10  ----------------------------<  106<H>  3.9   |  23  |  0.80    Ca    9.0      01 Jun 2020 06:06

## 2020-06-01 NOTE — PROGRESS NOTE ADULT - SUBJECTIVE AND OBJECTIVE BOX
[facility] Division of Hospital Medicine  Rozina Nicholson MD  Pager (M-F, 4V-2S): [phone]  Other Times:  [phone]      Patient is a 69y old  Male who presents with a chief complaint of falls, loose hardware (01 Jun 2020 08:44)      SUBJECTIVE / OVERNIGHT EVENTS: no acute events overnight. states had multiple BMs yesterday after receiving senna. some low back at time of my exam, but overall improved.    ADDITIONAL REVIEW OF SYSTEMS:    MEDICATIONS  (STANDING):  ALPRAZolam 1 milliGRAM(s) Oral every 6 hours  amLODIPine   Tablet 5 milliGRAM(s) Oral daily  cephalexin 500 milliGRAM(s) Oral every 12 hours  enoxaparin Injectable 40 milliGRAM(s) SubCutaneous at bedtime  losartan 25 milliGRAM(s) Oral daily  polyethylene glycol 3350 17 Gram(s) Oral two times a day  senna 2 Tablet(s) Oral at bedtime  tamsulosin 0.8 milliGRAM(s) Oral at bedtime  traZODone 100 milliGRAM(s) Oral at bedtime    MEDICATIONS  (PRN):  acetaminophen   Tablet .. 650 milliGRAM(s) Oral every 6 hours PRN Temp greater or equal to 38C (100.4F), Mild Pain (1 - 3)  ALBUTerol    90 MICROgram(s) HFA Inhaler 1 Puff(s) Inhalation every 6 hours PRN Shortness of Breath and/or Wheezing  cyclobenzaprine 10 milliGRAM(s) Oral three times a day PRN Muscle Spasm  naloxone Injectable 0.1 milliGRAM(s) IV Push every 3 minutes PRN For ANY of the following changes in patient status:  A. RR LESS THAN 10 breaths per minute, B. Oxygen saturation LESS THAN 90%, C. Sedation score of 6  ondansetron Injectable 4 milliGRAM(s) IV Push every 6 hours PRN Nausea  ondansetron Injectable 4 milliGRAM(s) IV Push every 6 hours PRN Nausea  traMADol 25 milliGRAM(s) Oral every 4 hours PRN Moderate Pain (4 - 6)  traMADol 50 milliGRAM(s) Oral every 4 hours PRN Severe Pain (7 - 10)  zolpidem 5 milliGRAM(s) Oral at bedtime PRN Insomnia      CAPILLARY BLOOD GLUCOSE        I&O's Summary    31 May 2020 07:01  -  01 Jun 2020 07:00  --------------------------------------------------------  IN: 940 mL / OUT: 890 mL / NET: 50 mL        PHYSICAL EXAM:  Vital Signs Last 24 Hrs  T(C): 36.8 (01 Jun 2020 04:12), Max: 37.1 (31 May 2020 15:40)  T(F): 98.3 (01 Jun 2020 04:12), Max: 98.8 (31 May 2020 15:40)  HR: 82 (01 Jun 2020 04:12) (73 - 95)  BP: 139/67 (01 Jun 2020 04:12) (132/84 - 151/81)  BP(mean): --  RR: 16 (01 Jun 2020 04:12) (16 - 16)  SpO2: 97% (01 Jun 2020 04:12) (96% - 99%)    CONSTITUTIONAL: NAD, sitting upright in chair with TLSO brace  EYES: PERRLA; conjunctiva and sclera clear  ENMT: Moist oral mucosa, no pharyngeal injection or exudates; normal dentition  NECK: Supple, no palpable masses; no thyromegaly  RESPIRATORY: Normal respiratory effort; lungs are clear to auscultation bilaterally  CARDIOVASCULAR: Regular rate and rhythm, normal S1 and S2, no murmur/rub/gallop; No lower extremity edema; Peripheral pulses are 2+ bilaterally  ABDOMEN: Soft, Nondistended,  Nontender to palpation, normoactive bowel sounds  MUSCULOSKELETAL:  No clubbing or cyanosis of digits; no calf tenderness to palpation b/l, +TLSO brace, +hemovacs x2 with serosanguinous draining L>R  PSYCH: A+O to person, place, and time; affect appropriate  NEUROLOGY: CN 2-12 are intact and symmetric; no gross sensory deficits   SKIN: No rashes; no palpable lesions    LABS:                        8.9    7.29  )-----------( 442      ( 01 Jun 2020 06:08 )             31.4     06-01    141  |  109<H>  |  10  ----------------------------<  106<H>  3.9   |  23  |  0.80    Ca    9.0      01 Jun 2020 06:06      FOBT negative    COVID-19 PCR negative      RADIOLOGY & ADDITIONAL TESTS:  Results Reviewed: H/H stable, FOBT negative  Imaging Personally Reviewed:  Electrocardiogram Personally Reviewed:    COORDINATION OF CARE:  Care Discussed with Consultants/Other Providers [Y]: Neurosurgery PA Oren  Prior or Outpatient Records Reviewed [Y/N]:

## 2020-06-01 NOTE — PROGRESS NOTE ADULT - SUBJECTIVE AND OBJECTIVE BOX
SUBJECTIVE: Patient seen and examined this morning, NAD, no acute events overnight, no new complaints today.       Vital Signs Last 24 Hrs  T(C): 36.8 (01 Jun 2020 04:12), Max: 37.1 (31 May 2020 15:40)  T(F): 98.3 (01 Jun 2020 04:12), Max: 98.8 (31 May 2020 15:40)  HR: 82 (01 Jun 2020 04:12) (73 - 95)  BP: 139/67 (01 Jun 2020 04:12) (127/71 - 151/81)  BP(mean): --  RR: 16 (01 Jun 2020 04:12) (16 - 16)  SpO2: 97% (01 Jun 2020 04:12) (96% - 99%)    PHYSICAL EXAM:    General: No Acute Distress     Neurological: Awake, alert oriented to person, place and time, Following Commands, PERRL, EOMI, Face Symmetrical, Speech Fluent, Moving all extremities, Muscle Strength normal in all four extremities, No Drift, Sensation to Light Touch Intact    Pulmonary: Clear to Auscultation, No Rales, No Rhonchi, No Wheezes     Cardiovascular: S1, S2, Regular Rate and Rhythm     Gastrointestinal: Soft, Nontender, Nondistended     Incision/Wound: dressing c/d/i, HMV x2    LABS:                        8.9    7.29  )-----------( 442      ( 01 Jun 2020 06:08 )             31.4    06-01    141  |  109<H>  |  10  ----------------------------<  106<H>  3.9   |  23  |  0.80    Ca    9.0      01 Jun 2020 06:06          05-31 @ 07:01  -  06-01 @ 07:00  --------------------------------------------------------  IN: 940 mL / OUT: 890 mL / NET: 50 mL      DRAINS:   Left HMV 130cc  Right HMV 60cc     MEDICATIONS:  Antibiotics:  cephalexin 500 milliGRAM(s) Oral every 12 hours    Neuro:  acetaminophen   Tablet .. 650 milliGRAM(s) Oral every 6 hours PRN Temp greater or equal to 38C (100.4F), Mild Pain (1 - 3)  ALPRAZolam 1 milliGRAM(s) Oral every 6 hours  cyclobenzaprine 10 milliGRAM(s) Oral three times a day PRN Muscle Spasm  ondansetron Injectable 4 milliGRAM(s) IV Push every 6 hours PRN Nausea  ondansetron Injectable 4 milliGRAM(s) IV Push every 6 hours PRN Nausea  traMADol 25 milliGRAM(s) Oral every 4 hours PRN Moderate Pain (4 - 6)  traMADol 50 milliGRAM(s) Oral every 4 hours PRN Severe Pain (7 - 10)  traZODone 100 milliGRAM(s) Oral at bedtime  zolpidem 5 milliGRAM(s) Oral at bedtime PRN Insomnia    Cardiac:  amLODIPine   Tablet 5 milliGRAM(s) Oral daily  losartan 25 milliGRAM(s) Oral daily  tamsulosin 0.8 milliGRAM(s) Oral at bedtime    Pulm:  ALBUTerol    90 MICROgram(s) HFA Inhaler 1 Puff(s) Inhalation every 6 hours PRN Shortness of Breath and/or Wheezing    GI/:  polyethylene glycol 3350 17 Gram(s) Oral two times a day  senna 2 Tablet(s) Oral at bedtime    Other:   enoxaparin Injectable 40 milliGRAM(s) SubCutaneous at bedtime  naloxone Injectable 0.1 milliGRAM(s) IV Push every 3 minutes PRN For ANY of the following changes in patient status:  A. RR LESS THAN 10 breaths per minute, B. Oxygen saturation LESS THAN 90%, C. Sedation score of 6    DIET: [x] Regular [] CCD [] Renal [] Puree [] Dysphagia [] Tube Feeds:     IMAGING:

## 2020-06-01 NOTE — PROGRESS NOTE ADULT - ASSESSMENT
68 yo man with PMH chronic back pain s/p multiple lumbar laminectomy/fusion with recent admission from 1/22/2020 to 2/3/2020 for N80-[id number] Fusion on 1/28 , NPH s/p  shunt, HTN, HLD, anxiety, GULSHAN on CPAP presents to ED after he was found to have loosening of hardware on CT by his spine surgeon. Revision of T11-pelvis fusion w removal of left T11 screw, and removal and replacement of right T11 screw, sacral screws, and pelvic screws, plastics closure with paraspinal flaps and complex wound closure. EBL 500cc.

## 2020-06-01 NOTE — PROGRESS NOTE ADULT - ASSESSMENT
70 yo man with PMH chronic back pain s/p multiple lumbar laminectomy/fusion with recent admission from 1/22/2020 to 2/3/2020 for N75-Icnitj Fusion on 1/28, NPH s/p  shunt, HTN, HLD, anxiety, depression, GULSHAN on CPAP  was admitted to the hospital on 2/8 from home in setting of frequent falls. Subsequently discharged to [facility], presented to ED with recent falls and found to have loosening of hardware on CT, plan for RTOR. (24 May 2020 13:20)    PROCEDURE: s/p revision of T11 to pelvis fusion with plastics closure on 5/27/2020, POD#5    PLAN:  - Pain control as needed (Tramadol, Tylenol prn, Flexeril for muscle spasms)  - Monitor drain output   - H/H stable 8.9/30 today   - Plastics recommendations appreciated, next dressing change 6/6 per plastics   - post-op lumbar spine CT completed on  5/28 showed intact hardware  - TLSO brace when OOB   - Right parotitis, currently on Keflex for 10 days, no need for CT head/neck per ENT  - Continue Trazodone, Norvasc, Losartan and Flomax   - Regular diet  - Bowel regimen, LBM 5/31  - DVT ppx (SQL, Venodynes)  - Encouraged OOB, mobilization with assistance, incentive spirometry  - Dispo: home PT when medically ready/cleared     Will discuss plan with Dr. Lg Umana PA-C # [id number]       Assessment:  Please Check When Present   []  GCS  E   V  M     Heart Failure: []Acute, [] acute on chronic , []chronic  Heart Failure:  [] Diastolic (HFpEF), [] Systolic (HFrEF), []Combined (HFpEF and HFrEF), [] RHF, [] Pulm HTN, [] Other    [] JOSE MIGUEL, [] ATN, [] AIN, [] other  [] CKD1, [] CKD2, [] CKD 3, [] CKD 4, [] CKD 5, []ESRD    Encephalopathy: [] Metabolic, [] Hepatic, [] toxic, [] Neurological, [] Other    Abnormal Nurtitional Status: [] malnurtition (see nutrition note), [ ]underweight: BMI < 19, [] morbid obesity: BMI >40, [] Cachexia    [] Sepsis  [] hypovolemic shock,[] cardiogenic shock, [] hemorrhagic shock, [] neuogenic shock  [] Acute Respiratory Failure  []Cerebral edema, [] Brain compression/ herniation,   [] Functional quadriplegia  [] Acute blood loss anemia

## 2020-06-01 NOTE — PROGRESS NOTE ADULT - ASSESSMENT
69M sp T10-pelvis revision PSF by nsx and paraspinal muscle flap closure 5/28, doing well    Plan:  - dressing changed 6/1, next change 6/6  - drains per nsgy  - Will continue to follow when pt in house  - Care per primary team    Plastic Surgery  [phone]

## 2020-06-01 NOTE — PROGRESS NOTE ADULT - PROBLEM SELECTOR PLAN 1
s/p Revision of T11-pelvis fusion w removal of left T11 screw, and removal and replacement of right T11 screw, sacral screws, and pelvic screws  - plastics closure with paraspinal flaps and complex wound closure  - dressing changes as per plastics, next 6/6  - hemovac management as per NSGY  - pain control and PT  - PT recs: home PT when medically cleared

## 2020-06-01 NOTE — PROGRESS NOTE ADULT - SUBJECTIVE AND OBJECTIVE BOX
PULMONARY PROGRESS NOTE    DACIA NAVARRO  MRN-[id number]    Patient is a 69y old  Male who presents with a chief complaint of falls, loose hardware (01 Jun 2020 10:06)      HPI:  remains on RA  denies any resp distress        ROS:   -    ACTIVE MEDICATION LIST:  MEDICATIONS  (STANDING):  ALPRAZolam 1 milliGRAM(s) Oral every 6 hours  amLODIPine   Tablet 5 milliGRAM(s) Oral daily  cephalexin 500 milliGRAM(s) Oral every 12 hours  enoxaparin Injectable 40 milliGRAM(s) SubCutaneous at bedtime  losartan 25 milliGRAM(s) Oral daily  polyethylene glycol 3350 17 Gram(s) Oral two times a day  senna 2 Tablet(s) Oral at bedtime  tamsulosin 0.8 milliGRAM(s) Oral at bedtime  traZODone 100 milliGRAM(s) Oral at bedtime    MEDICATIONS  (PRN):  acetaminophen   Tablet .. 650 milliGRAM(s) Oral every 6 hours PRN Temp greater or equal to 38C (100.4F), Mild Pain (1 - 3)  ALBUTerol    90 MICROgram(s) HFA Inhaler 1 Puff(s) Inhalation every 6 hours PRN Shortness of Breath and/or Wheezing  cyclobenzaprine 10 milliGRAM(s) Oral three times a day PRN Muscle Spasm  naloxone Injectable 0.1 milliGRAM(s) IV Push every 3 minutes PRN For ANY of the following changes in patient status:  A. RR LESS THAN 10 breaths per minute, B. Oxygen saturation LESS THAN 90%, C. Sedation score of 6  ondansetron Injectable 4 milliGRAM(s) IV Push every 6 hours PRN Nausea  ondansetron Injectable 4 milliGRAM(s) IV Push every 6 hours PRN Nausea  traMADol 25 milliGRAM(s) Oral every 4 hours PRN Moderate Pain (4 - 6)  traMADol 50 milliGRAM(s) Oral every 4 hours PRN Severe Pain (7 - 10)  zolpidem 5 milliGRAM(s) Oral at bedtime PRN Insomnia      EXAM:  Vital Signs Last 24 Hrs  T(C): 36.9 (01 Jun 2020 12:40), Max: 37.1 (31 May 2020 15:40)  T(F): 98.5 (01 Jun 2020 12:40), Max: 98.8 (31 May 2020 15:40)  HR: 85 (01 Jun 2020 12:40) (73 - 95)  BP: 148/83 (01 Jun 2020 12:40) (132/84 - 151/81)  BP(mean): --  RR: 18 (01 Jun 2020 12:40) (16 - 18)  SpO2: 98% (01 Jun 2020 12:40) (96% - 99%)    GENERAL: The patient is awake and alert in no apparent distress.     LUNGS: Clear to auscultation without wheezing, rales or rhonchi; respirations unlabored    HEART: Regular rate and rhythm without murmur.                            8.9    7.29  )-----------( 442      ( 01 Jun 2020 06:08 )             31.4       06-01    141  |  109<H>  |  10  ----------------------------<  106<H>  3.9   |  23  |  0.80    Ca    9.0      01 Jun 2020 06:06           PROBLEM LIST:  69y Male with HEALTH ISSUES - PROBLEM Dx:  Sialadenitis: Sialadenitis  Sialoadenitis of submandibular gland: Sialoadenitis of submandibular gland  Parotitis: Parotitis  Anemia: Anemia  HTN (hypertension): HTN (hypertension)  GULSHAN (obstructive sleep apnea): GULSHAN (obstructive sleep apnea)  Low back pain: Low back pain            RECS:  outpatient f/u with DR Bear for his GULSHAN/CPAP      Please call with any questions.    Blanche Sheffield DO  [facility] Pulmonary/Sleep Medicine  [phone]

## 2020-06-01 NOTE — OCCUPATIONAL THERAPY INITIAL EVALUATION ADULT - ADL RETRAINING, OT EVAL
Goals: Pt will be I with all dressing tasks within 2 weeks. Pt will be I with toileting within 2 weeks.

## 2020-06-02 ENCOUNTER — RX RENEWAL (OUTPATIENT)
Age: 70
End: 2020-06-02

## 2020-06-02 ENCOUNTER — TREATMENT (OUTPATIENT)
Dept: PHYSICAL THERAPY | Facility: CLINIC | Age: 70
End: 2020-06-02

## 2020-06-02 DIAGNOSIS — M25.512 CHRONIC PAIN OF BOTH SHOULDERS: Primary | ICD-10-CM

## 2020-06-02 DIAGNOSIS — G89.29 CHRONIC PAIN OF BOTH SHOULDERS: Primary | ICD-10-CM

## 2020-06-02 DIAGNOSIS — F32.9 MAJOR DEPRESSIVE DISORDER, SINGLE EPISODE, UNSPECIFIED: ICD-10-CM

## 2020-06-02 DIAGNOSIS — M25.511 CHRONIC PAIN OF BOTH SHOULDERS: Primary | ICD-10-CM

## 2020-06-02 LAB
HCT VFR BLD CALC: 31.8 % — LOW (ref 39–50)
HGB BLD-MCNC: 9.2 G/DL — LOW (ref 13–17)
MCHC RBC-ENTMCNC: 20.9 PG — LOW (ref 27–34)
MCHC RBC-ENTMCNC: 28.9 GM/DL — LOW (ref 32–36)
MCV RBC AUTO: 72.3 FL — LOW (ref 80–100)
NRBC # BLD: 0 /100 WBCS — SIGNIFICANT CHANGE UP (ref 0–0)
PLATELET # BLD AUTO: 441 K/UL — HIGH (ref 150–400)
RBC # BLD: 4.4 M/UL — SIGNIFICANT CHANGE UP (ref 4.2–5.8)
RBC # FLD: 18.7 % — HIGH (ref 10.3–14.5)
WBC # BLD: 10.31 K/UL — SIGNIFICANT CHANGE UP (ref 3.8–10.5)
WBC # FLD AUTO: 10.31 K/UL — SIGNIFICANT CHANGE UP (ref 3.8–10.5)

## 2020-06-02 PROCEDURE — G0283 ELEC STIM OTHER THAN WOUND: HCPCS | Performed by: PHYSICAL THERAPIST

## 2020-06-02 PROCEDURE — 99221 1ST HOSP IP/OBS SF/LOW 40: CPT

## 2020-06-02 PROCEDURE — 99233 SBSQ HOSP IP/OBS HIGH 50: CPT

## 2020-06-02 PROCEDURE — 97110 THERAPEUTIC EXERCISES: CPT | Performed by: PHYSICAL THERAPIST

## 2020-06-02 PROCEDURE — 97140 MANUAL THERAPY 1/> REGIONS: CPT | Performed by: PHYSICAL THERAPIST

## 2020-06-02 RX ORDER — CITALOPRAM 10 MG/1
20 TABLET, FILM COATED ORAL DAILY
Refills: 0 | Status: DISCONTINUED | OUTPATIENT
Start: 2020-06-02 | End: 2020-06-04

## 2020-06-02 RX ORDER — CITALOPRAM 10 MG/1
40 TABLET, FILM COATED ORAL DAILY
Refills: 0 | Status: DISCONTINUED | OUTPATIENT
Start: 2020-06-02 | End: 2020-06-02

## 2020-06-02 RX ADMIN — TRAMADOL HYDROCHLORIDE 50 MILLIGRAM(S): 50 TABLET ORAL at 19:32

## 2020-06-02 RX ADMIN — CITALOPRAM 40 MILLIGRAM(S): 10 TABLET, FILM COATED ORAL at 11:36

## 2020-06-02 RX ADMIN — CYCLOBENZAPRINE HYDROCHLORIDE 10 MILLIGRAM(S): 10 TABLET, FILM COATED ORAL at 18:13

## 2020-06-02 RX ADMIN — Medication 500 MILLIGRAM(S): at 06:12

## 2020-06-02 RX ADMIN — Medication 100 MILLIGRAM(S): at 20:48

## 2020-06-02 RX ADMIN — Medication 1 MILLIGRAM(S): at 09:32

## 2020-06-02 RX ADMIN — Medication 1 MILLIGRAM(S): at 20:48

## 2020-06-02 RX ADMIN — AMLODIPINE BESYLATE 5 MILLIGRAM(S): 2.5 TABLET ORAL at 06:13

## 2020-06-02 RX ADMIN — Medication 1 MILLIGRAM(S): at 04:35

## 2020-06-02 RX ADMIN — ZOLPIDEM TARTRATE 5 MILLIGRAM(S): 10 TABLET ORAL at 00:25

## 2020-06-02 RX ADMIN — TAMSULOSIN HYDROCHLORIDE 0.8 MILLIGRAM(S): 0.4 CAPSULE ORAL at 20:48

## 2020-06-02 RX ADMIN — Medication 500 MILLIGRAM(S): at 17:05

## 2020-06-02 RX ADMIN — LOSARTAN POTASSIUM 25 MILLIGRAM(S): 100 TABLET, FILM COATED ORAL at 06:13

## 2020-06-02 RX ADMIN — ENOXAPARIN SODIUM 40 MILLIGRAM(S): 100 INJECTION SUBCUTANEOUS at 20:48

## 2020-06-02 NOTE — CONSULT NOTE ADULT - ATTENDING COMMENTS
Requested by Dr. Castanon to assess for SI joint fusion to help stabilize SI joint in the setting of a long spinal revision construct which may help prevent construct failure.  Extensive discussion had with the patient regarding the procedure and its usefulness.  R/B/A discussed.  He wishes to proceed w b/l perc SI joint fusion.  Will plan for 6/4

## 2020-06-02 NOTE — PROGRESS NOTE ADULT - ASSESSMENT
70 yo man with PMH chronic back pain s/p multiple lumbar laminectomy/fusion with recent admission from 1/22/2020 to 2/3/2020 for K59-Nxqyck Fusion on 1/28 , NPH s/p  shunt, HTN, HLD, anxiety, depression, GULSHAN on CPAP  was admitted to the hospital on 2/8 from home in setting of frequent falls. Subsequently D/C to [facility], presents to ED with recent falls and found to have loosening of hardware on CT, plan for RTOR. (24 May 2020 13:20)    PROCEDURE:  Adm 5/24. 5/27 s/p revision of T11 to pelvis fusion with plastics closure    POD#6    PLAN:  Neuro: Cont Lt HMV drain, Rt HMV DC'd this AM. Keflex x 10days per ENT for parotiditis. Inc activity/OOB with TLSO OOB.  Plan for OR Thursday with Ortho Dr Lopez for B/L Sacro Iliac Fusion. Possible Tx to Orho surgery postop Ortho procedure.    Ortho Dr Lopez saw pt this AM and discussed with pt plans for surgery on Thursday-B/L Sacro Iliac Fusion.    Pulm note of 6/1-Sialadenitis: Sialadenitis. Sialoadenitis of submandibular gland: Sialoadenitis of submandibular gland. Parotitis: Parotitis. Anemia: Anemia. HTN hypertension): HTN (hypertension). GULSHAN (obstructive sleep apnea): GULSHAN (obstructive sleep apnea). Low back pain: Low back pain.  RECS:  outpatient f/u with DR Bear for his GULSHAN/CPAP. Please call with any questions. Blanche Sheffield DO [facility] Pulmonary/Sleep Medicine [phone]    Plastic note of 6/1-- dressing changed 6/1, next change 6/6. - drains per nsgy. - Will continue to follow when pt in house. - Care per primary team. Plastic Surgery  [phone]. Electronic Signatures: Memo Geller)    ENT note of 5/29-70 yo male w resolved R sialoadenitis w warm compress and abx.  Problem: Sialoadenitis of submandibular gland.  Plan: complete course of abx  reconsult ENT should infection recur. Electronic Signatures: Cinthia Malcolm)   (Signed 29-May-2020 09:05) Authored: Progress Note, Reason for Admission, Subjective and Objective, Assessment and Plan  Flavio Martínez)    Medicine Hosp note of 6/1-Problem: Low back pain.  Plan: s/p Revision of T11-pelvis fusion w removal of left T11 screw, and removal and replacement of right T11 screw, sacral screws, and pelvic screws. - plastics closure with paraspinal flaps and complex wound closure - dressing changes as per plastics, next 6/6 - hemovac management as per NSGY - pain control and PT - PT recs: home PT when medically cleared.  Problem: Anemia.  Plan: overall, H/H had been downtrending since 5/29 from 10.1 to 8.9, but suspect CBC from 5/29 from hemoconcentration. - H/H remains stable over last 24 hours. - no signs/symptoms of acute bleed with minimal serosanguinous output from hemovacs. - FOBT negative 5/31 - continue to monitor H/H daily. Problem: Sialadenitis.  Plan: appreciate ENT input. - continue with keflex for 10 day course, (5/28- ), last day will be 6/7.  Problem: HTN (hypertension).  Plan: BP within acceptable range. - c/w amlodipine 5mg daily. - c/w losartan 25mg daily. Problem: GULSHAN (obstructive sleep apnea).  Plan: - continue with CPAP.     Respiratory: Patient instructed to use incentive spirometer [ X] YES [ ] NO              DVT ppx: [X ] SQL [ ] SQH and Venodynes [ ] Left [ ] Right [ X] Bilateral    Discharge Planning:  The patient was evaluated by PT/OT and recommended Home PT/OT with tub bench and raised toilet seat.   Need PT FU post ortho procedure-P    More than 30 minutes spent on total encounter: more than 50% of the visit was spent on educating the patient and family regarding condition, medications, follow up plans, signs and symptoms to be concerned with, preparing paperwork, and questions answered regarding discharge.      Assessment:  Please Check When Present   []  GCS  E   V  M     Heart Failure: []Acute, [] acute on chronic , []chronic  Heart Failure:  [] Diastolic (HFpEF), [] Systolic (HFrEF), []Combined (HFpEF and HFrEF), [] RHF, [] Pulm HTN, [] Other    [] JOSE MIGUEL, [] ATN, [] AIN, [] other  [] CKD1, [] CKD2, [] CKD 3, [] CKD 4, [] CKD 5, []ESRD    Encephalopathy: [] Metabolic, [] Hepatic, [] toxic, [] Neurological, [] Other    Abnormal Nurtitional Status: [] malnurtition (see nutrition note), [ ]underweight: BMI < 19, [] morbid obesity: BMI >40, [] Cachexia    [] Sepsis  [] hypovolemic shock,[] cardiogenic shock, [] hemorrhagic shock, [] neuogenic shock  [] Acute Respiratory Failure  []Cerebral edema, [] Brain compression/ herniation,   [] Functional quadriplegia  [] Acute blood loss anemia

## 2020-06-02 NOTE — PROGRESS NOTE ADULT - ASSESSMENT
70 yo man with PMH chronic back pain s/p multiple lumbar laminectomy/fusion with recent admission from 1/22/2020 to 2/3/2020 for R31-Pewqvy Fusion on 1/28 , NPH s/p  shunt, HTN, HLD, anxiety, GULSHAN on CPAP presents to ED after he was found to have loosening of hardware on CT by his spine surgeon. Revision of T11-pelvis fusion w removal of left T11 screw, and removal and replacement of right T11 screw, sacral screws, and pelvic screws, plastics closure with paraspinal flaps and complex wound closure. EBL 500cc. Now planned for OR with ortho on 6/4 for sacroiliac screw placement.

## 2020-06-02 NOTE — PROGRESS NOTE ADULT - SUBJECTIVE AND OBJECTIVE BOX
[facility] Division of Hospital Medicine  Rozina Nicholson MD  Pager (M-F, 9I-9F): [phone]  Other Times:  [phone]      Patient is a 69y old  Male who presents with a chief complaint of falls, loose hardware (02 Jun 2020 10:03)      SUBJECTIVE / OVERNIGHT EVENTS: No acute events overnight. R hemovac removed this AM. Now with plan for sacroiliac screw placement with ortho on 6/4.     ADDITIONAL REVIEW OF SYSTEMS:    MEDICATIONS  (STANDING):  ALPRAZolam 1 milliGRAM(s) Oral every 6 hours  amLODIPine   Tablet 5 milliGRAM(s) Oral daily  cephalexin 500 milliGRAM(s) Oral every 12 hours  enoxaparin Injectable 40 milliGRAM(s) SubCutaneous at bedtime  losartan 25 milliGRAM(s) Oral daily  polyethylene glycol 3350 17 Gram(s) Oral two times a day  senna 2 Tablet(s) Oral at bedtime  tamsulosin 0.8 milliGRAM(s) Oral at bedtime  traZODone 100 milliGRAM(s) Oral at bedtime    MEDICATIONS  (PRN):  acetaminophen   Tablet .. 650 milliGRAM(s) Oral every 6 hours PRN Temp greater or equal to 38C (100.4F), Mild Pain (1 - 3)  ALBUTerol    90 MICROgram(s) HFA Inhaler 1 Puff(s) Inhalation every 6 hours PRN Shortness of Breath and/or Wheezing  cyclobenzaprine 10 milliGRAM(s) Oral three times a day PRN Muscle Spasm  naloxone Injectable 0.1 milliGRAM(s) IV Push every 3 minutes PRN For ANY of the following changes in patient status:  A. RR LESS THAN 10 breaths per minute, B. Oxygen saturation LESS THAN 90%, C. Sedation score of 6  ondansetron Injectable 4 milliGRAM(s) IV Push every 6 hours PRN Nausea  ondansetron Injectable 4 milliGRAM(s) IV Push every 6 hours PRN Nausea  traMADol 25 milliGRAM(s) Oral every 4 hours PRN Moderate Pain (4 - 6)  traMADol 50 milliGRAM(s) Oral every 4 hours PRN Severe Pain (7 - 10)  zolpidem 5 milliGRAM(s) Oral at bedtime PRN Insomnia      CAPILLARY BLOOD GLUCOSE        I&O's Summary    01 Jun 2020 07:01  -  02 Jun 2020 07:00  --------------------------------------------------------  IN: 680 mL / OUT: 1550 mL / NET: -870 mL    02 Jun 2020 07:01  -  02 Jun 2020 10:44  --------------------------------------------------------  IN: 240 mL / OUT: 0 mL / NET: 240 mL        PHYSICAL EXAM:  Vital Signs Last 24 Hrs  T(C): 37 (02 Jun 2020 08:56), Max: 37.2 (01 Jun 2020 21:18)  T(F): 98.6 (02 Jun 2020 08:56), Max: 99 (01 Jun 2020 21:18)  HR: 96 (02 Jun 2020 08:56) (77 - 96)  BP: 145/78 (02 Jun 2020 08:56) (137/78 - 161/92)  BP(mean): --  RR: 16 (02 Jun 2020 08:56) (16 - 18)  SpO2: 100% (02 Jun 2020 08:56) (95% - 100%)    CONSTITUTIONAL: NAD, lying comfortably in bed  EYES: PERRLA; conjunctiva and sclera clear  ENMT: Moist oral mucosa, no pharyngeal injection or exudates; normal dentition  NECK: Supple, no palpable masses; no thyromegaly  RESPIRATORY: Normal respiratory effort; lungs are clear to auscultation bilaterally  CARDIOVASCULAR: Regular rate and rhythm, normal S1 and S2, no murmur/rub/gallop; No lower extremity edema; Peripheral pulses are 2+ bilaterally  ABDOMEN: Soft, Nondistended,  Nontender to palpation, normoactive bowel sounds  MUSCULOSKELETAL:  No clubbing or cyanosis of digits; no calf tenderness to palpation b/l, +L hemovac with serosanguinous drainage with some underlying fluctuance, dressing c/d/i  PSYCH: A+O to person, place, and time; affect appropriate  NEUROLOGY: CN 2-12 are intact and symmetric; no gross sensory deficits   SKIN: No rashes; no palpable lesions    LABS:                        9.2    10.31 )-----------( 441      ( 02 Jun 2020 06:09 )             31.8     06-01    141  |  109<H>  |  10  ----------------------------<  106<H>  3.9   |  23  |  0.80    Ca    9.0      01 Jun 2020 06:06        RADIOLOGY & ADDITIONAL TESTS:  Results Reviewed: H/H stable, Cr stable  Imaging Personally Reviewed:  Electrocardiogram Personally Reviewed:    COORDINATION OF CARE:  Care Discussed with Consultants/Other Providers [Y]: Neurosurgery YOSSI Carpio  Prior or Outpatient Records Reviewed [Y]: Ortho consult note

## 2020-06-02 NOTE — PROGRESS NOTE ADULT - PROBLEM SELECTOR PLAN 4
patient with baseline depression, slightly worse today as found out will not be going home.   - c/w trazodone 100mg qHS  - resumed home citalopram 40mg daily - this is dose he is on outpatient  - patient does not wish psych/behavioral to be called inpatient. should follow-up with his outpatient psychiatrist upon discharge

## 2020-06-02 NOTE — PROGRESS NOTE ADULT - SUBJECTIVE AND OBJECTIVE BOX
SUBJECTIVE: In Chair with TLSO Brace on. Comfortable. No HA. Some LBP only    Vital Signs Last 24 Hrs  T(C): 37 (02 Jun 2020 08:56), Max: 37.2 (01 Jun 2020 21:18)  T(F): 98.6 (02 Jun 2020 08:56), Max: 99 (01 Jun 2020 21:18)  HR: 96 (02 Jun 2020 08:56) (77 - 96)  BP: 145/78 (02 Jun 2020 08:56) (137/78 - 161/92)  BP(mean): --  RR: 16 (02 Jun 2020 08:56) (16 - 18)  SpO2: 100% (02 Jun 2020 08:56) (95% - 100%)  Diet:  Reg  IVF:  IVL  BM on 6/1  +voiding    I&O's Detail    01 Jun 2020 07:01  -  02 Jun 2020 07:00  --------------------------------------------------------  IN:    Oral Fluid: 680 mL  Total IN: 680 m    OUT:    Accordian: 80 mL    Accordian: 120 mL    Voided: 1350 mL  Total OUT: 1550 mL    Total NET: -870 mL    PHYSICAL EXAM:    General: No Acute Distress     Neurological: Awake, alert oriented to person, place and time, Following Commands, PERRL, EOMI, Face Symmetrical, Speech Fluent, Moving all extremities, Muscle Strength normal in all four extremities, No Drift, Sensation to Light Touch Intact    Pulmonary: Clear to Auscultation, No Rales, No Rhonchi, No Wheezes     Cardiovascular: S1, S2, Regular Rate and Rhythm     Gastrointestinal: Soft, Nontender, Nondistended     Incision/Wound: Aquacel dressing c/d/i, HMV x2 active.  Rt HMV not holding suction with CSF'romana output    LABS:                                 9.2    10.31 )-----------( 441      ( 02 Jun 2020 06:09 )             31.8   06-01    141  |  109<H>  |  10  ----------------------------<  106<H>  3.9   |  23  |  0.80    Ca    9.0      01 Jun 2020 06:06    COVID-19 PCR . (05.31.20 @ 14:35)    COVID-19 PCR: NotDetec: This test has been validated by [facility] [employer] to be accurate    < from: [facility] Duplex Lower Ext Vein Scan, Bilat (05.26.20 @ 11:35) >  IMPRESSION:   No evidence of deep venous thrombosis in either lower extremity.    < end of copied text >  < from: [facility] Duplex Lower Ext Vein Scan, Bilat (05.26.20 @ 11:35) >  IMPRESSION:   No evidence of deep venous thrombosis in either lower extremity.    < from: CT Lumbar Spine No Cont (05.28.20 @ 14:02) >  IMPRESSION: Post revision of lumbar fusion after removal of transpedicular screws T11 and S1 screws extending into the iliac wings bilaterally. T12-L5 screws remain intact with connecting rods. No change in interposition graft at L5-S1.      MEDICATIONS  (STANDING):  ALPRAZolam 1 milliGRAM(s) Oral every 6 hours  amLODIPine   Tablet 5 milliGRAM(s) Oral daily  cephalexin 500 milliGRAM(s) Oral every 12 hours  enoxaparin Injectable 40 milliGRAM(s) SubCutaneous at bedtime  losartan 25 milliGRAM(s) Oral daily  polyethylene glycol 3350 17 Gram(s) Oral two times a day  senna 2 Tablet(s) Oral at bedtime  tamsulosin 0.8 milliGRAM(s) Oral at bedtime  traZODone 100 milliGRAM(s) Oral at bedtime    MEDICATIONS  (PRN):  acetaminophen   Tablet .. 650 milliGRAM(s) Oral every 6 hours PRN Temp greater or equal to 38C (100.4F), Mild Pain (1 - 3)  ALBUTerol    90 MICROgram(s) HFA Inhaler 1 Puff(s) Inhalation every 6 hours PRN Shortness of Breath and/or Wheezing  cyclobenzaprine 10 milliGRAM(s) Oral three times a day PRN Muscle Spasm  naloxone Injectable 0.1 milliGRAM(s) IV Push every 3 minutes PRN For ANY of the following changes in patient status:  A. RR LESS THAN 10 breaths per minute, B. Oxygen saturation LESS THAN 90%, C. Sedation score of 6  ondansetron Injectable 4 milliGRAM(s) IV Push every 6 hours PRN Nausea  ondansetron Injectable 4 milliGRAM(s) IV Push every 6 hours PRN Nausea  traMADol 25 milliGRAM(s) Oral every 4 hours PRN Moderate Pain (4 - 6)  traMADol 50 milliGRAM(s) Oral every 4 hours PRN Severe Pain (7 - 10)  zolpidem 5 milliGRAM(s) Oral at bedtime PRN Insomnia

## 2020-06-02 NOTE — CONSULT NOTE ADULT - SUBJECTIVE AND OBJECTIVE BOX
HPI:   69yMale with PMH chronic back pain s/p multiple lumbar laminectomy/fusion with recent admission from 1/22/2020 to 2/3/2020 for [id number] Fusion on 1/28 , NPH s/p  shunt, HTN, HLD, anxiety, depression, GULSHAN on CPAP  was admitted to the hospital on 2/8 from home in setting of frequent falls. Subsequently D/C to [facility], presented again to ED with recent falls and found to have loosening of hardware on CT. S/p Revision of T11-pelvis fusion with removal of left T11 screw and sacral screws on 5/27/20. Orthopedics was consulted for sacroiliac screw placement.     Review of systems: As per HPI, otherwise negative.     PAST MEDICAL & SURGICAL HISTORY:  NPH (normal pressure hydrocephalus)  Chronic back pain greater than 3 months duration  Small intestine disorder: &quot;ilitis&quot;   steroids   1967  Lumbar disc displacement without myelopathy  Sciatica  Anxiety  Vertebral Sciatica  Insomnia  Depression  HTN (Hypertension)  S/P lumbar fusion: L4-L5 on Oct 2011  pins/screws 2012   spinal surgery 2013  Dehiscence, Operation Wound/Debridement: infection  S/P Laminectomy: L4-L5 2009  complicated by infection requiring  antibiiotcis    Family History: FAMILY HISTORY:  No pertinent family history in first degree relatives      Medications: MEDICATIONS  (STANDING):  ALPRAZolam 1 milliGRAM(s) Oral every 6 hours  amLODIPine   Tablet 5 milliGRAM(s) Oral daily  cephalexin 500 milliGRAM(s) Oral every 12 hours  enoxaparin Injectable 40 milliGRAM(s) SubCutaneous at bedtime  losartan 25 milliGRAM(s) Oral daily  polyethylene glycol 3350 17 Gram(s) Oral two times a day  senna 2 Tablet(s) Oral at bedtime  tamsulosin 0.8 milliGRAM(s) Oral at bedtime  traZODone 100 milliGRAM(s) Oral at bedtime    MEDICATIONS  (PRN):  acetaminophen   Tablet .. 650 milliGRAM(s) Oral every 6 hours PRN Temp greater or equal to 38C (100.4F), Mild Pain (1 - 3)  ALBUTerol    90 MICROgram(s) HFA Inhaler 1 Puff(s) Inhalation every 6 hours PRN Shortness of Breath and/or Wheezing  cyclobenzaprine 10 milliGRAM(s) Oral three times a day PRN Muscle Spasm  naloxone Injectable 0.1 milliGRAM(s) IV Push every 3 minutes PRN For ANY of the following changes in patient status:  A. RR LESS THAN 10 breaths per minute, B. Oxygen saturation LESS THAN 90%, C. Sedation score of 6  ondansetron Injectable 4 milliGRAM(s) IV Push every 6 hours PRN Nausea  ondansetron Injectable 4 milliGRAM(s) IV Push every 6 hours PRN Nausea  traMADol 25 milliGRAM(s) Oral every 4 hours PRN Moderate Pain (4 - 6)  traMADol 50 milliGRAM(s) Oral every 4 hours PRN Severe Pain (7 - 10)  zolpidem 5 milliGRAM(s) Oral at bedtime PRN Insomnia      T(C): 37 (06-02-20 @ 08:56), Max: 37.2 (06-01-20 @ 21:18)  HR: 96 (06-02-20 @ 08:56) (77 - 96)  BP: 145/78 (06-02-20 @ 08:56) (137/78 - 161/92)  RR: 16 (06-02-20 @ 08:56) (16 - 18)  SpO2: 100% (06-02-20 @ 08:56) (95% - 100%)  Wt(kg): --                        9.2    10.31 )-----------( 441      ( 02 Jun 2020 06:09 )             31.8     06-01    141  |  109<H>  |  10  ----------------------------<  106<H>  3.9   |  23  |  0.80    Ca    9.0      01 Jun 2020 06:06        EXAM:  Gen: awake, alert, NAD  Resp: no increased work of breathing  Back: HV serosang drainage. Aquacel dressings c/d/i   Bilateral LE:  Skin intact  +EHL/FHL/TA/GS  SILT S/S/SP/DP  +DP/PT Pulses  Compartments soft  No calf TTP     Imaging  < from: CT Lumbar Spine No Cont (05.28.20 @ 14:02) >    INTERPRETATION:  Clinical indication: Post revision of lumbar fusion    Serial thin sections on a multi slice scanner were obtained through the lumbosacral spine from the T10 to the S4-S5 level in a stacked axial fashion reformatted at 1.25 mm sections with sagittal and coronal computer generated reconstructed views.      Comparison is made with the prior CT of 5/18/2020.    Since the previous examination, the transpedicular screws at T11 and S1 have been removed. The screws from T12 to L5 remain intact with connecting rods. New screws are identified extending into the iliac wing bilaterally.    The laminectomy defects are unchanged. The interposition graft at L5-S1 is unchanged. Endplate sclerosis at L5-S1 is unchanged. There are normal postoperative changes identified with a drain and a small amount of postoperative air.        The paraspinal soft tissues are unremarkable.    IMPRESSION: Post revision of lumbar fusion after removal of transpedicular screws T11 and S1 screws extending into the iliac wings bilaterally. T12-L5 screws remain intact with connecting rods. No change in interposition graft at L5-S1.    < end of copied text > HPI:   69yMale with PMH chronic back pain s/p multiple lumbar laminectomy/fusion with recent admission from 1/22/2020 to 2/3/2020 for [id number] Fusion on 1/28 , NPH s/p  shunt, HTN, HLD, anxiety, depression, GULSHAN on CPAP  was admitted to the hospital on 2/8 from home in setting of frequent falls. Subsequently D/C to [facility], presented again to ED with recent falls and found to have loosening of hardware on CT. S/p Revision of T11-pelvis fusion with removal of left T11 screw and sacral screws on 5/27/20. Orthopedics was consulted for sacroiliac joint fusion    Review of systems: As per HPI, otherwise negative.     PAST MEDICAL & SURGICAL HISTORY:  NPH (normal pressure hydrocephalus)  Chronic back pain greater than 3 months duration  Small intestine disorder: &quot;ilitis&quot;   steroids   1967  Lumbar disc displacement without myelopathy  Sciatica  Anxiety  Vertebral Sciatica  Insomnia  Depression  HTN (Hypertension)  S/P lumbar fusion: L4-L5 on Oct 2011  pins/screws 2012   spinal surgery 2013  Dehiscence, Operation Wound/Debridement: infection  S/P Laminectomy: L4-L5 2009  complicated by infection requiring  antibiiotcis    Family History: FAMILY HISTORY:  No pertinent family history in first degree relatives      Medications: MEDICATIONS  (STANDING):  ALPRAZolam 1 milliGRAM(s) Oral every 6 hours  amLODIPine   Tablet 5 milliGRAM(s) Oral daily  cephalexin 500 milliGRAM(s) Oral every 12 hours  enoxaparin Injectable 40 milliGRAM(s) SubCutaneous at bedtime  losartan 25 milliGRAM(s) Oral daily  polyethylene glycol 3350 17 Gram(s) Oral two times a day  senna 2 Tablet(s) Oral at bedtime  tamsulosin 0.8 milliGRAM(s) Oral at bedtime  traZODone 100 milliGRAM(s) Oral at bedtime    MEDICATIONS  (PRN):  acetaminophen   Tablet .. 650 milliGRAM(s) Oral every 6 hours PRN Temp greater or equal to 38C (100.4F), Mild Pain (1 - 3)  ALBUTerol    90 MICROgram(s) HFA Inhaler 1 Puff(s) Inhalation every 6 hours PRN Shortness of Breath and/or Wheezing  cyclobenzaprine 10 milliGRAM(s) Oral three times a day PRN Muscle Spasm  naloxone Injectable 0.1 milliGRAM(s) IV Push every 3 minutes PRN For ANY of the following changes in patient status:  A. RR LESS THAN 10 breaths per minute, B. Oxygen saturation LESS THAN 90%, C. Sedation score of 6  ondansetron Injectable 4 milliGRAM(s) IV Push every 6 hours PRN Nausea  ondansetron Injectable 4 milliGRAM(s) IV Push every 6 hours PRN Nausea  traMADol 25 milliGRAM(s) Oral every 4 hours PRN Moderate Pain (4 - 6)  traMADol 50 milliGRAM(s) Oral every 4 hours PRN Severe Pain (7 - 10)  zolpidem 5 milliGRAM(s) Oral at bedtime PRN Insomnia      T(C): 37 (06-02-20 @ 08:56), Max: 37.2 (06-01-20 @ 21:18)  HR: 96 (06-02-20 @ 08:56) (77 - 96)  BP: 145/78 (06-02-20 @ 08:56) (137/78 - 161/92)  RR: 16 (06-02-20 @ 08:56) (16 - 18)  SpO2: 100% (06-02-20 @ 08:56) (95% - 100%)  Wt(kg): --                        9.2    10.31 )-----------( 441      ( 02 Jun 2020 06:09 )             31.8     06-01    141  |  109<H>  |  10  ----------------------------<  106<H>  3.9   |  23  |  0.80    Ca    9.0      01 Jun 2020 06:06        EXAM:  Gen: awake, alert, NAD  Resp: no increased work of breathing  Back: HV serosang drainage. Aquacel dressings c/d/i   Bilateral LE:  Skin intact  +EHL/FHL/TA/GS  SILT S/S/SP/DP  +DP/PT Pulses  Compartments soft  No calf TTP     Imaging  < from: CT Lumbar Spine No Cont (05.28.20 @ 14:02) >    INTERPRETATION:  Clinical indication: Post revision of lumbar fusion    Serial thin sections on a multi slice scanner were obtained through the lumbosacral spine from the T10 to the S4-S5 level in a stacked axial fashion reformatted at 1.25 mm sections with sagittal and coronal computer generated reconstructed views.      Comparison is made with the prior CT of 5/18/2020.    Since the previous examination, the transpedicular screws at T11 and S1 have been removed. The screws from T12 to L5 remain intact with connecting rods. New screws are identified extending into the iliac wing bilaterally.    The laminectomy defects are unchanged. The interposition graft at L5-S1 is unchanged. Endplate sclerosis at L5-S1 is unchanged. There are normal postoperative changes identified with a drain and a small amount of postoperative air.        The paraspinal soft tissues are unremarkable.    IMPRESSION: Post revision of lumbar fusion after removal of transpedicular screws T11 and S1 screws extending into the iliac wings bilaterally. T12-L5 screws remain intact with connecting rods. No change in interposition graft at L5-S1.    < end of copied text >

## 2020-06-02 NOTE — PROGRESS NOTES
Physical Therapy Daily Progress Note      Patient: Orion Harvey   : 1950  Diagnosis/ICD-10 Code:  Chronic pain of both shoulders [M25.511, G89.29, M25.512]  Referring practitioner: Nazanin Garcia*  Date of Initial Visit: Type: THERAPY  Noted: 2020  Today's Date: 2020  Patient seen for 6 sessions         Orion Harvey reports: R shoulder was sore after last visit for ~3-4 days, feeling better now. States he practices casting in the yard this weekend and was able to do it a little better. Plans to wash his RV after PT today.    Objective :  See Exercise, Manual, and Modality Logs for complete treatment.       Assessment/Plan : Good tolerance to ther ex with reports of pain at end range flexion on R. Improved R flex PROM. Very tight and limited ER B, R>L. Pt will benefit from continued scap and shoulder strengthening to achieve improved functional use of B UE with overhead activities.    Progress per Plan of Care           Timed:         Manual Therapy:    15     mins  76379;     Therapeutic Exercise:    20     mins  71429;     Neuromuscular Taylor:        mins  53121;    Therapeutic Activity:          mins  93857;     Gait Training:           mins  81056;     Ultrasound:          mins  68341;    Ionto                                   mins   47746  Self Care                            mins   20531  Canalith Repos                   mins  4209    Un-Timed:  Electrical Stimulation:    15     mins  63468 ( );  Dry Needling          mins self-pay  Traction          mins 47481  Low Eval          Mins  49630  Mod Eval          Mins  30202  High Eval                            Mins  14950    Timed Treatment:   35   mins   Total Treatment:     50   mins    Vangie Torres, PT  Physical Therapist

## 2020-06-02 NOTE — CONSULT NOTE ADULT - ASSESSMENT
A/P: 69M s/p revision of T11-pelvis fusion for hardware loosening     - Pain control  - NPO at midnight on 6/3 for OR on 6/4  - Needs medical clearance documented for OR  - Plan for OR with Dr. Lopez on Thursday for sacroiliac screws  - Discussed with attending and will advise if there are any changes    Orthopedic Surgery  [id number]

## 2020-06-02 NOTE — PROGRESS NOTE ADULT - PROBLEM SELECTOR PLAN 1
s/p Revision of T11-pelvis fusion w removal of left T11 screw, and removal and replacement of right T11 screw, sacral screws, and pelvic screws.  - plastics closure with paraspinal flaps and complex wound closure  - dressing changes as per plastics, next 6/6  - hemovac management as per NSGY  - pain control and PT  - PT recs: home PT when medically cleared  - now with plan for OR with ortho on 6/4 for sacroiliac screw placement.       * medically clear for OR on 6/4 with no further testing required.

## 2020-06-02 NOTE — PROVIDER CONTACT NOTE (OTHER) - ACTION/TREATMENT ORDERED:
pca dc'd, other pain meds ordered
IV tylenol x1 to be given, Tunde donato will change HMV drain canister. Now holding suction. No other interventions
AS PER NP Marlon pt to have one time dose on Xanax, and she will assess the lump at the bedside
Continue to monitor.

## 2020-06-03 ENCOUNTER — TRANSCRIPTION ENCOUNTER (OUTPATIENT)
Age: 70
End: 2020-06-03

## 2020-06-03 DIAGNOSIS — F33.1 MAJOR DEPRESSIVE DISORDER, RECURRENT, MODERATE: ICD-10-CM

## 2020-06-03 DIAGNOSIS — R26.2 DIFFICULTY IN WALKING, NOT ELSEWHERE CLASSIFIED: ICD-10-CM

## 2020-06-03 LAB — SARS-COV-2 RNA SPEC QL NAA+PROBE: SIGNIFICANT CHANGE UP

## 2020-06-03 PROCEDURE — 71045 X-RAY EXAM CHEST 1 VIEW: CPT | Mod: 26

## 2020-06-03 PROCEDURE — 99232 SBSQ HOSP IP/OBS MODERATE 35: CPT

## 2020-06-03 PROCEDURE — 99233 SBSQ HOSP IP/OBS HIGH 50: CPT

## 2020-06-03 PROCEDURE — 90792 PSYCH DIAG EVAL W/MED SRVCS: CPT

## 2020-06-03 RX ORDER — CLONAZEPAM 1 MG
1 TABLET ORAL EVERY 8 HOURS
Refills: 0 | Status: DISCONTINUED | OUTPATIENT
Start: 2020-06-03 | End: 2020-06-04

## 2020-06-03 RX ORDER — ALPRAZOLAM 0.25 MG
0.5 TABLET ORAL EVERY 6 HOURS
Refills: 0 | Status: DISCONTINUED | OUTPATIENT
Start: 2020-06-03 | End: 2020-06-04

## 2020-06-03 RX ORDER — SODIUM CHLORIDE 9 MG/ML
1000 INJECTION, SOLUTION INTRAVENOUS
Refills: 0 | Status: DISCONTINUED | OUTPATIENT
Start: 2020-06-03 | End: 2020-06-04

## 2020-06-03 RX ORDER — CLONAZEPAM 1 MG
1 TABLET ORAL EVERY 8 HOURS
Refills: 0 | Status: DISCONTINUED | OUTPATIENT
Start: 2020-06-03 | End: 2020-06-03

## 2020-06-03 RX ADMIN — CITALOPRAM 20 MILLIGRAM(S): 10 TABLET, FILM COATED ORAL at 12:03

## 2020-06-03 RX ADMIN — Medication 1 MILLIGRAM(S): at 05:07

## 2020-06-03 RX ADMIN — Medication 100 MILLIGRAM(S): at 21:04

## 2020-06-03 RX ADMIN — Medication 1 MILLIGRAM(S): at 16:09

## 2020-06-03 RX ADMIN — AMLODIPINE BESYLATE 5 MILLIGRAM(S): 2.5 TABLET ORAL at 05:07

## 2020-06-03 RX ADMIN — TRAMADOL HYDROCHLORIDE 50 MILLIGRAM(S): 50 TABLET ORAL at 11:09

## 2020-06-03 RX ADMIN — Medication 1 MILLIGRAM(S): at 09:37

## 2020-06-03 RX ADMIN — Medication 500 MILLIGRAM(S): at 17:19

## 2020-06-03 RX ADMIN — TAMSULOSIN HYDROCHLORIDE 0.8 MILLIGRAM(S): 0.4 CAPSULE ORAL at 21:04

## 2020-06-03 RX ADMIN — TRAMADOL HYDROCHLORIDE 50 MILLIGRAM(S): 50 TABLET ORAL at 17:29

## 2020-06-03 RX ADMIN — LOSARTAN POTASSIUM 25 MILLIGRAM(S): 100 TABLET, FILM COATED ORAL at 05:07

## 2020-06-03 RX ADMIN — Medication 1 MILLIGRAM(S): at 21:04

## 2020-06-03 RX ADMIN — Medication 500 MILLIGRAM(S): at 05:07

## 2020-06-03 NOTE — BEHAVIORAL HEALTH ASSESSMENT NOTE - CASE SUMMARY
Pt is a 74 y.o. CM, single, never , non-caregiver, has three sisters, domiciled in private residence with best friend (caregiver in past, post op), on disability since 2007 for dep/anx, former employment involved working with "old[employer]," no substance use/hx, PPHx of depression and anxiety, followed by Dr. Cory Bhandari outpt for 20 years, on Celexa 40 mg, Trazadone 400 mg qhs, Ambien 10 qhs, and xanax for chronic back pain, has been tried on gabapentin, Wellbutrin, Lexapro, and ketamine. He has no suicidality and no thoughts of harming self or others.  Pt is now requesting to restart Celexa 20mg and gradually increase dose. I agree with the recommendations to change to Clonazepam 1mg po TID and discontinue the Xanax 1mg q 6 hrs standing.  Pt may take Xanax 0.5mg as a prn until he adjusts to the Clonazepam.

## 2020-06-03 NOTE — PROGRESS NOTE ADULT - SUBJECTIVE AND OBJECTIVE BOX
PULMONARY PROGRESS NOTE    DACIA NAVARRO  MRN-[id number]    Patient is a 69y old  Male who presents with a chief complaint of loose hardware (03 Jun 2020 10:15)      HPI:  on RA  going to OR tomorrow for SI joint fusion      ROS:   -    ACTIVE MEDICATION LIST:  MEDICATIONS  (STANDING):  amLODIPine   Tablet 5 milliGRAM(s) Oral daily  cephalexin 500 milliGRAM(s) Oral every 12 hours  citalopram 20 milliGRAM(s) Oral daily  clonazePAM  Tablet 1 milliGRAM(s) Oral every 8 hours  lactated ringers. 1000 milliLiter(s) (100 mL/Hr) IV Continuous <Continuous>  losartan 25 milliGRAM(s) Oral daily  polyethylene glycol 3350 17 Gram(s) Oral two times a day  senna 2 Tablet(s) Oral at bedtime  tamsulosin 0.8 milliGRAM(s) Oral at bedtime  traZODone 100 milliGRAM(s) Oral at bedtime    MEDICATIONS  (PRN):  acetaminophen   Tablet .. 650 milliGRAM(s) Oral every 6 hours PRN Temp greater or equal to 38C (100.4F), Mild Pain (1 - 3)  ALBUTerol    90 MICROgram(s) HFA Inhaler 1 Puff(s) Inhalation every 6 hours PRN Shortness of Breath and/or Wheezing  ALPRAZolam 0.5 milliGRAM(s) Oral every 6 hours PRN anxiety  cyclobenzaprine 10 milliGRAM(s) Oral three times a day PRN Muscle Spasm  naloxone Injectable 0.1 milliGRAM(s) IV Push every 3 minutes PRN For ANY of the following changes in patient status:  A. RR LESS THAN 10 breaths per minute, B. Oxygen saturation LESS THAN 90%, C. Sedation score of 6  ondansetron Injectable 4 milliGRAM(s) IV Push every 6 hours PRN Nausea  ondansetron Injectable 4 milliGRAM(s) IV Push every 6 hours PRN Nausea  traMADol 25 milliGRAM(s) Oral every 4 hours PRN Moderate Pain (4 - 6)  traMADol 50 milliGRAM(s) Oral every 4 hours PRN Severe Pain (7 - 10)      EXAM:  Vital Signs Last 24 Hrs  T(C): 37 (03 Jun 2020 14:35), Max: 37.3 (03 Jun 2020 00:02)  T(F): 98.6 (03 Jun 2020 14:35), Max: 99.1 (03 Jun 2020 00:02)  HR: 90 (03 Jun 2020 14:35) (79 - 98)  BP: 143/84 (03 Jun 2020 14:35) (130/80 - 154/83)  BP(mean): --  RR: 18 (03 Jun 2020 14:35) (16 - 18)  SpO2: 96% (03 Jun 2020 14:35) (95% - 97%)    GENERAL: The patient is awake and alert in no apparent distress.     LUNGS: Clear to auscultation without wheezing, rales or rhonchi; respirations unlabored    HEART: Regular rate and rhythm without murmur.                            9.2    10.31 )-----------( 441      ( 02 Jun 2020 06:09 )             31.8              PROBLEM LIST:  69y Male with HEALTH ISSUES - PROBLEM Dx:  Moderate episode of recurrent major depressive disorder  Trouble walking: Trouble walking  Depression: Depression  Sialadenitis: Sialadenitis  Sialoadenitis of submandibular gland: Sialoadenitis of submandibular gland  Parotitis: Parotitis  Anemia: Anemia  HTN (hypertension): HTN (hypertension)  GULSHAN (obstructive sleep apnea): GULSHAN (obstructive sleep apnea)  Low back pain: Low back pain            RECS:  OR tomorrow  suggest CPAP  post operatively   IS post operatively  i suggested he have family bring in his CPAP machine but he refuses to have it in the hospital     Please call with any questions.    Blanche Sheffield DO  [facility] Pulmonary/Sleep Medicine  [phone] PULMONARY PROGRESS NOTE    DACIA NAVARRO  MRN-[id number]    Patient is a 69y old  Male who presents with a chief complaint of loose hardware (03 Jun 2020 10:15)      HPI:  on RA  going to OR tomorrow for SI joint fusion      ROS:   -    ACTIVE MEDICATION LIST:  MEDICATIONS  (STANDING):  amLODIPine   Tablet 5 milliGRAM(s) Oral daily  cephalexin 500 milliGRAM(s) Oral every 12 hours  citalopram 20 milliGRAM(s) Oral daily  clonazePAM  Tablet 1 milliGRAM(s) Oral every 8 hours  lactated ringers. 1000 milliLiter(s) (100 mL/Hr) IV Continuous <Continuous>  losartan 25 milliGRAM(s) Oral daily  polyethylene glycol 3350 17 Gram(s) Oral two times a day  senna 2 Tablet(s) Oral at bedtime  tamsulosin 0.8 milliGRAM(s) Oral at bedtime  traZODone 100 milliGRAM(s) Oral at bedtime    MEDICATIONS  (PRN):  acetaminophen   Tablet .. 650 milliGRAM(s) Oral every 6 hours PRN Temp greater or equal to 38C (100.4F), Mild Pain (1 - 3)  ALBUTerol    90 MICROgram(s) HFA Inhaler 1 Puff(s) Inhalation every 6 hours PRN Shortness of Breath and/or Wheezing  ALPRAZolam 0.5 milliGRAM(s) Oral every 6 hours PRN anxiety  cyclobenzaprine 10 milliGRAM(s) Oral three times a day PRN Muscle Spasm  naloxone Injectable 0.1 milliGRAM(s) IV Push every 3 minutes PRN For ANY of the following changes in patient status:  A. RR LESS THAN 10 breaths per minute, B. Oxygen saturation LESS THAN 90%, C. Sedation score of 6  ondansetron Injectable 4 milliGRAM(s) IV Push every 6 hours PRN Nausea  ondansetron Injectable 4 milliGRAM(s) IV Push every 6 hours PRN Nausea  traMADol 25 milliGRAM(s) Oral every 4 hours PRN Moderate Pain (4 - 6)  traMADol 50 milliGRAM(s) Oral every 4 hours PRN Severe Pain (7 - 10)      EXAM:  Vital Signs Last 24 Hrs  T(C): 37 (03 Jun 2020 14:35), Max: 37.3 (03 Jun 2020 00:02)  T(F): 98.6 (03 Jun 2020 14:35), Max: 99.1 (03 Jun 2020 00:02)  HR: 90 (03 Jun 2020 14:35) (79 - 98)  BP: 143/84 (03 Jun 2020 14:35) (130/80 - 154/83)  BP(mean): --  RR: 18 (03 Jun 2020 14:35) (16 - 18)  SpO2: 96% (03 Jun 2020 14:35) (95% - 97%)    GENERAL: The patient is awake and alert in no apparent distress.     LUNGS: Clear to auscultation without wheezing, rales or rhonchi; respirations unlabored    HEART: Regular rate and rhythm without murmur.                            9.2    10.31 )-----------( 441      ( 02 Jun 2020 06:09 )             31.8              PROBLEM LIST:  69y Male with HEALTH ISSUES - PROBLEM Dx:  Moderate episode of recurrent major depressive disorder  Trouble walking: Trouble walking  Depression: Depression  Sialadenitis: Sialadenitis  Sialoadenitis of submandibular gland: Sialoadenitis of submandibular gland  Parotitis: Parotitis  Anemia: Anemia  HTN (hypertension): HTN (hypertension)  GULSHAN (obstructive sleep apnea): GULSHAN (obstructive sleep apnea)  Low back pain: Low back pain            RECS:  OR tomorrow for SI Joint fusion  he is in optimal pulmonary condition for his upcoming neurosurgery  I do suggest CPAP  post operatively  for his GULSHAN- he is on autoCPAP at home , mean pressure approx [id number]  IS post operatively    Please call with any questions.    Blanche Sheffield DO  [facility] Pulmonary/Sleep Medicine  [phone]

## 2020-06-03 NOTE — PROGRESS NOTE ADULT - PROBLEM SELECTOR PLAN 1
s/p Revision of T11-pelvis fusion with removal of left T11 screw, and removal and replacement of right T11 screw, sacral screws, and pelvic screws.  - plastics closure with paraspinal flaps and complex wound closure  - dressing changes as per plastics, next 6/6  - hemovac management as per NSGY  - pain control and PT  - now with plan for OR with ortho on 6/4 for sacroiliac joint fusion with Dr. Lopez      * medically clear for OR on 6/4 with no further testing required

## 2020-06-03 NOTE — BEHAVIORAL HEALTH ASSESSMENT NOTE - RISK ASSESSMENT
Low Acute Suicide Risk Risk factors: chronic pain    Protective factors: no current SIIP/HIIP, no h/o SA/SIB, no h/o psych admissions, no access to weapons, no active substance abuse, no psychosis, domiciled, social supports, positive therapeutic relationship, engaged in treatment, compliant with treatment, help-seeking behaviors    Overall, pt is a low risk of harm to self/others and does not meet criteria for psychiatric admission at this time.

## 2020-06-03 NOTE — PROGRESS NOTE ADULT - ASSESSMENT
A/P : Patient is a 69y Male s/p revision T11 to pelvis fusion with plastics closure, planned for OR tomorrow for SI Joint Fusion.

## 2020-06-03 NOTE — PROGRESS NOTE ADULT - SUBJECTIVE AND OBJECTIVE BOX
[facility] Division of Hospital Medicine  Rozina Nicholson MD  Pager ([place], 9O-8F): [phone]  Other Times:  [phone]      Patient is a 69y old  Male who presents with a chief complaint of falls, loose hardware (03 Jun 2020 07:35)      SUBJECTIVE / OVERNIGHT EVENTS: No acute events overnight. But states felt more tired yesterday after resuming his home citalopram dose that was resumed for his depression. Otherwise doing well. He's in much better spirits today compared to day prior, no longer tearful. Smiling today. No fevers nor chills, Last BM this AM.     ADDITIONAL REVIEW OF SYSTEMS:    MEDICATIONS  (STANDING):  ALPRAZolam 1 milliGRAM(s) Oral every 6 hours  amLODIPine   Tablet 5 milliGRAM(s) Oral daily  cephalexin 500 milliGRAM(s) Oral every 12 hours  citalopram 20 milliGRAM(s) Oral daily  enoxaparin Injectable 40 milliGRAM(s) SubCutaneous at bedtime  lactated ringers. 1000 milliLiter(s) (100 mL/Hr) IV Continuous <Continuous>  losartan 25 milliGRAM(s) Oral daily  polyethylene glycol 3350 17 Gram(s) Oral two times a day  senna 2 Tablet(s) Oral at bedtime  tamsulosin 0.8 milliGRAM(s) Oral at bedtime  traZODone 100 milliGRAM(s) Oral at bedtime    MEDICATIONS  (PRN):  acetaminophen   Tablet .. 650 milliGRAM(s) Oral every 6 hours PRN Temp greater or equal to 38C (100.4F), Mild Pain (1 - 3)  ALBUTerol    90 MICROgram(s) HFA Inhaler 1 Puff(s) Inhalation every 6 hours PRN Shortness of Breath and/or Wheezing  cyclobenzaprine 10 milliGRAM(s) Oral three times a day PRN Muscle Spasm  naloxone Injectable 0.1 milliGRAM(s) IV Push every 3 minutes PRN For ANY of the following changes in patient status:  A. RR LESS THAN 10 breaths per minute, B. Oxygen saturation LESS THAN 90%, C. Sedation score of 6  ondansetron Injectable 4 milliGRAM(s) IV Push every 6 hours PRN Nausea  ondansetron Injectable 4 milliGRAM(s) IV Push every 6 hours PRN Nausea  traMADol 25 milliGRAM(s) Oral every 4 hours PRN Moderate Pain (4 - 6)  traMADol 50 milliGRAM(s) Oral every 4 hours PRN Severe Pain (7 - 10)      CAPILLARY BLOOD GLUCOSE        I&O's Summary    02 Jun 2020 07:01  -  03 Jun 2020 07:00  --------------------------------------------------------  IN: 960 mL / OUT: 1165 mL / NET: -205 mL        PHYSICAL EXAM:  Vital Signs Last 24 Hrs  T(C): 36.9 (03 Jun 2020 10:05), Max: 37.3 (03 Jun 2020 00:02)  T(F): 98.5 (03 Jun 2020 10:05), Max: 99.1 (03 Jun 2020 00:02)  HR: 98 (03 Jun 2020 10:05) (79 - 105)  BP: 147/89 (03 Jun 2020 10:05) (130/80 - 162/93)  BP(mean): --  RR: 18 (03 Jun 2020 10:05) (16 - 18)  SpO2: 96% (03 Jun 2020 10:05) (95% - 97%)    CONSTITUTIONAL: NAD, sitting comfortable in the chair this AM  EYES: PERRLA; conjunctiva and sclera clear  ENMT: Moist oral mucosa, no pharyngeal injection or exudates; normal dentition  NECK: Supple, no palpable masses; no thyromegaly  RESPIRATORY: Normal respiratory effort; lungs are clear to auscultation bilaterally  CARDIOVASCULAR: Regular rate and rhythm, normal S1 and S2, no murmur/rub/gallop; No lower extremity edema; Peripheral pulses are 2+ bilaterally  ABDOMEN: Soft, Nondistended,  Nontender to palpation, normoactive bowel sounds  MUSCULOSKELETAL:  No clubbing or cyanosis of digits; no calf tenderness to palpation b/l, +L hemovac with serosanguinous drainage with some underlying fluctuance, dressing c/d/i  PSYCH: A+O to person, place, and time; affect appropriate, in better spirits today compared to day prior  NEUROLOGY: CN 2-12 are intact and symmetric; no gross sensory deficits   SKIN: No rashes; no palpable lesions    LABS:                        9.2    10.31 )-----------( 441      ( 02 Jun 2020 06:09 )             31.8         RADIOLOGY & ADDITIONAL TESTS:  Results Reviewed: no new labs to review today. but H/H has remained stable. COVID-19 PCR negative.  Imaging Personally Reviewed:  Electrocardiogram Personally Reviewed:    COORDINATION OF CARE:  Care Discussed with Consultants/Other Providers [Y]: Neurosurgery YOSSI Granados  Prior or Outpatient Records Reviewed [Y]: Orthopedics Consult Note

## 2020-06-03 NOTE — PROGRESS NOTE ADULT - SUBJECTIVE AND OBJECTIVE BOX
HPI:  Patient is a 69 year old male with chronic back pain s/p multiple prior spine surgeries (most recent one 1/28/2020.  Presented to ED with recent falls.  Imaging revealed loosened hardware.  Admitted to neurosurgery service for further management.    OVERNIGHT EVENTS:  Patient felt more depressed yesterday and was more tired then usual, celexa dose was decreased by day team.  Patient states he feels a little better today.  Denies any thoughts of harming himself or harming others.  Denies auditory or visual hallucinations.  Offered patient to speak to psych and he was agreeable.  Has been out of bed and ambulating.  Planned for OR with ortho on 6/4.      Vital Signs Last 24 Hrs  T(C): 36.9 (03 Jun 2020 10:05), Max: 37.3 (03 Jun 2020 00:02)  T(F): 98.5 (03 Jun 2020 10:05), Max: 99.1 (03 Jun 2020 00:02)  HR: 98 (03 Jun 2020 10:05) (79 - 105)  BP: 147/89 (03 Jun 2020 10:05) (130/80 - 162/93)  BP(mean): --  RR: 18 (03 Jun 2020 10:05) (16 - 18)  SpO2: 96% (03 Jun 2020 10:05) (95% - 97%)    I&O's Detail    02 Jun 2020 07:01  -  03 Jun 2020 07:00  --------------------------------------------------------  IN:    Oral Fluid: 960 mL  Total IN: 960 mL    OUT:    Accordian: 115 mL    Voided: 1050 mL  Total OUT: 1165 mL    Total NET: -205 mL        I&O's Summary    02 Jun 2020 07:01  -  03 Jun 2020 07:00  --------------------------------------------------------  IN: 960 mL / OUT: 1165 mL / NET: -205 mL        PHYSICAL EXAM:  Neurological: awake, alert, oriented x3, follows commands, speech clear and fluent, moves all extremities x4 w/ 5/5 strength throughout, sensation present, intact, equal throughout    Cardiovascular: +s1, s2  Respiratory: clear to auscultation b/l  HEENT: right facial swelling/erythema resolved, non-tender, no fluctuance/induration  Gastrointestinal: soft, non-distended, non-tender  Genitourinary: +voiding  Incision/Wound: posterior midline vertical incision dressing c/d/i w/ aquacel, hemovac x1    TUBES/LINES:  [x] hemovac x1 (115cc over 24 hours)    DIET:  [x] regular    LABS:                        9.2    10.31 )-----------( 441      ( 02 Jun 2020 06:09 )             31.8           Allergies    Biaxin (Other)  Lidoderm (Unknown)  morphine (Short breath; Rash)        MEDICATIONS:  Antibiotics:  cephalexin 500 milliGRAM(s) Oral every 12 hours    Neuro:  acetaminophen   Tablet .. 650 milliGRAM(s) Oral every 6 hours PRN  ALPRAZolam 1 milliGRAM(s) Oral every 6 hours  citalopram 20 milliGRAM(s) Oral daily  cyclobenzaprine 10 milliGRAM(s) Oral three times a day PRN  ondansetron Injectable 4 milliGRAM(s) IV Push every 6 hours PRN  ondansetron Injectable 4 milliGRAM(s) IV Push every 6 hours PRN  traMADol 25 milliGRAM(s) Oral every 4 hours PRN  traMADol 50 milliGRAM(s) Oral every 4 hours PRN  traZODone 100 milliGRAM(s) Oral at bedtime    Anticoagulation:  enoxaparin Injectable 40 milliGRAM(s) SubCutaneous at bedtime    OTHER:  ALBUTerol    90 MICROgram(s) HFA Inhaler 1 Puff(s) Inhalation every 6 hours PRN  amLODIPine   Tablet 5 milliGRAM(s) Oral daily  losartan 25 milliGRAM(s) Oral daily  naloxone Injectable 0.1 milliGRAM(s) IV Push every 3 minutes PRN  polyethylene glycol 3350 17 Gram(s) Oral two times a day  senna 2 Tablet(s) Oral at bedtime  tamsulosin 0.8 milliGRAM(s) Oral at bedtime    IVF:  lactated ringers. 1000 milliLiter(s) IV Continuous <Continuous>    CULTURES:  none    RADIOLOGY & ADDITIONAL TESTS:  no new imaging

## 2020-06-03 NOTE — BEHAVIORAL HEALTH ASSESSMENT NOTE - NSBHCONSULTMEDS_PSY_A_CORE FT
Consider discontinuing standing xanax to prevent withdrawal/seizures  Consider starting standing Klonopin 1 mg TID (hold for excess sedation)  Consider continuing trazodone 100 mg qhs for insomnia  Consider restarting Celexa 20 mg qd Consider discontinuing standing xanax to prevent withdrawal/seizures  Consider starting standing Klonopin 1 mg TID (hold for excess sedation)  Consider continuing trazodone 100 mg qhs for insomnia  Consider continuing Celexa 20 mg qd

## 2020-06-03 NOTE — BEHAVIORAL HEALTH ASSESSMENT NOTE - VIOLENCE PROTECTIVE FACTORS:
Engagement in treatment/Good treatment response/compliance/Relationship stability/Residential stability

## 2020-06-03 NOTE — BEHAVIORAL HEALTH ASSESSMENT NOTE - SUMMARY
Pt is a 74 y.o. 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 Cory Bhandari outpt for 20 years, on Celexa 40 mg, Trazadone 400 mg qhs, ambien 10 qhs, and xanax for chronic back pain, has been tried on gabapentin, Wellbutrin, Lexapro, and ketamine injection in the past, no past psych hosp, no hx of SA/SIB, no known legal hx, no known hx of violence, FH of alcohol use d/o in father and depression in mother, PMHx of chronic back pain s/p multiple lumbar laminectomy/fusion with recent admission from 1/22/2020 to 2/3/2020 for W86-Fannci Fusion on 1/28 , NPH s/p  shunt, HTN, HLD, GULSHAN on CPAP, admitted for loosening of spinal hardware, planned for OR with ortho on 6/4 for sacroiliac screw placement, psychiatry consulted for depression, sleepiness, and medication management during current hospitalization. On interview, pt is A&Ox4, pleasant, cooperative, engaged, and insightful. Pt reports he has become more "emotional" since the celexa has been stopped after his last OR procedure, endorsing poorer sleep. Pt reports good appetite. At this time, pt not endorsing SIIP/HIIP, anxiety, AH/VH, paranoia, or delusional thoughts. Pt identifies "fear of suicide failure and remaining alive" as a protective factor against suicidality. Pt amenable to medication changes involving adding long acting benzo (i.e. Klonopin) and using xanax as PRN in order to prevent withdrawal/seizures, while continuing Celexa. Pt future oriented and is looking forward to eventually decreasing dose of benzos. Denies access to firearms.    At this time, pt is feeling down, but is amenable to medication changes (i.e. adding Klonopin 1 mg TID and making xanax 0.5 mg po q6h PRN, continuing the celexa 20 mg and trazodone 100mg qhs). Pt is a 74 y.o. 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 Cory Bhandari outpt for 20 years, on Celexa 40 mg, Trazadone 400 mg qhs, ambien 10 qhs, and xanax for chronic back pain, has been tried on gabapentin, Wellbutrin, Lexapro, and ketamine injection in the past, no past psych hosp, no hx of SA/SIB, no known legal hx, no known hx of violence, FH of alcohol use d/o in father and depression in mother, PMHx of chronic back pain s/p multiple lumbar laminectomy/fusion with recent admission from 1/22/2020 to 2/3/2020 for W82-Ifpszc Fusion on 1/28 , NPH s/p  shunt, HTN, HLD, GULSHAN on CPAP, admitted for loosening of spinal hardware, planned for OR with ortho on 6/4 for sacroiliac screw placement, psychiatry consulted for depression, sleepiness, and medication management during current hospitalization. On interview, pt is A&Ox4, pleasant, cooperative, engaged, and insightful. Pt reports he has become more "emotional" since the Celexa has been stopped after his last OR procedure, endorsing poorer sleep. Pt reports good appetite. At this time, pt not endorsing SIIP/HIIP, anxiety, AH/VH, paranoia, or delusional thoughts. Pt identifies "fear of suicide failure and remaining alive" as a protective factor against suicidality. Pt amenable to medication changes involving adding long acting benzo (i.e. Klonopin) and using xanax as PRN in order to prevent withdrawal/seizures, while continuing Celexa. Pt future oriented and is looking forward to eventually decreasing dose of benzos. Denies access to firearms.    At this time, pt is feeling down, but is amenable to medication changes (i.e. adding Klonopin 1 mg TID and making Xanax 0.5 mg po q6h PRN, continuing the Celexa 20 mg and trazodone 100mg qhs).

## 2020-06-03 NOTE — PROGRESS NOTE ADULT - PROBLEM SELECTOR PLAN 2
stable. overall, H/H had been downtrending since 5/29 from 10.1 to 8.9, but suspect CBC from 5/29 from hemoconcentration.  - H/H remains stable  - no signs/symptoms of acute bleed with minimal serosanguinous output from hemovac  - FOBT negative 5/31  - continue to monitor H/H daily

## 2020-06-03 NOTE — PROGRESS NOTE ADULT - ASSESSMENT
68 yo man with PMH chronic back pain s/p multiple lumbar laminectomy/fusion with recent admission from 1/22/2020 to 2/3/2020 for M39-Bpflgf Fusion on 1/28 , NPH s/p  shunt, HTN, HLD, anxiety, GULSHAN on CPAP presents to ED after he was found to have loosening of hardware on CT by his spine surgeon. Revision of T11-pelvis fusion w removal of left T11 screw, and removal and replacement of right T11 screw, sacral screws, and pelvic screws, plastics closure with paraspinal flaps and complex wound closure. EBL 500cc. Now planned for OR with Ortho tomorrow 6/4 for SI joint fusion.

## 2020-06-03 NOTE — BEHAVIORAL HEALTH ASSESSMENT NOTE - SUICIDE PROTECTIVE FACTORS
Ability to cope with stress/Has future plans/Identifies reasons for living/Supportive social network of family or friends/Fear of death or the actual act of killing self/Frustration tolerance

## 2020-06-03 NOTE — BEHAVIORAL HEALTH ASSESSMENT NOTE - AXIS III
chronic back pain s/p multiple lumbar laminectomy/fusion with recent admission from 1/22/2020 to 2/3/2020 for N34-Gymigy Fusion on 1/28 , NPH s/p  shunt, HTN, HLD, GULSHAN on CPAP

## 2020-06-03 NOTE — BEHAVIORAL HEALTH ASSESSMENT NOTE - DESCRIPTION
chronic back pain s/p multiple lumbar laminectomy/fusion with recent admission from 1/22/2020 to 2/3/2020 for I46-Xgmfrm Fusion on 1/28 , NPH s/p  shunt, HTN, HLD, GULSHAN on CPAP

## 2020-06-03 NOTE — BEHAVIORAL HEALTH ASSESSMENT NOTE - NSBHCHARTREVIEWINVESTIGATE_PSY_A_CORE FT
< from: 12 Lead ECG (05.24.20 @ 14:13) >      Ventricular Rate 77 BPM    Atrial Rate 77 BPM    P-R Interval 144 ms    QRS Duration 86 ms    Q-T Interval 398 ms    QTC Calculation(Bezet) 450 ms    P Axis 41 degrees    R Axis 11 degrees    T Axis 18 degrees    Diagnosis Line NORMAL SINUS RHYTHM  NORMAL ECG    Confirmed by RAVI CHANG MD ([id number]) on 5/25/2020 2:45:31 PM    < end of copied text >

## 2020-06-03 NOTE — BEHAVIORAL HEALTH ASSESSMENT NOTE - NSBHCHARTREVIEWVS_PSY_A_CORE FT
Vital Signs Last 24 Hrs  T(C): 36.9 (03 Jun 2020 10:05), Max: 37.3 (03 Jun 2020 00:02)  T(F): 98.5 (03 Jun 2020 10:05), Max: 99.1 (03 Jun 2020 00:02)  HR: 98 (03 Jun 2020 10:05) (79 - 105)  BP: 147/89 (03 Jun 2020 10:05) (130/80 - 162/93)  BP(mean): --  RR: 18 (03 Jun 2020 10:05) (16 - 18)  SpO2: 96% (03 Jun 2020 10:05) (95% - 97%)

## 2020-06-03 NOTE — BEHAVIORAL HEALTH ASSESSMENT NOTE - NSBHCONSULTRECOMMENDOTHER_PSY_A_CORE FT
Consider discontinuing standing xanax to prevent withdrawal/seizures  Consider starting standing Klonopin 1 mg TID (hold for excess sedation)  Consider continuing trazodone 100 mg qhs for insomnia  Consider restarting Celexa 20 mg qd  Consider xanax 0.5 mg po q6h PRN for anxiety Consider discontinuing standing xanax to prevent withdrawal/seizures  Consider starting standing Klonopin 1 mg TID (hold for excess sedation)  Consider continuing trazodone 100 mg qhs for insomnia  Consider continuing Celexa 20 mg qd  Consider xanax 0.5 mg po q6h PRN for anxiety

## 2020-06-03 NOTE — PROGRESS NOTE ADULT - PROBLEM SELECTOR PLAN 4
patient with baseline depression, was worse yesterday and tearful, but in better spirits today.  - c/w trazodone 100mg qHS  - he is on citalopram 40mg daily at home which was resumed yesterday, but he was concerned it made him too lethargic today and requested for 20mg daily instead - no contraindication from medicine standpoint  - per NSGY, patient states amenable to inpatient behavioral health consult today  - f/u psych recs  - ultimately, should still follow-up with his outpatient psychiatrist upon discharge

## 2020-06-03 NOTE — PROGRESS NOTE ADULT - PROBLEM SELECTOR PLAN 1
-    Pain control  -    DVT ppx: Lovenox QD   -    Physical Therapy  -    Weight bearing status: WBAT [x]        PWB    [ ]     TTWB  [ ]      NWB  [ ]  -    Plan for OR tomorrow with Dr. Lopez  -    NPO at midnight for OR tomorrow  -   Medically cleared for OR     Nella Kurtz PA-C  Team Pager #[phone]

## 2020-06-03 NOTE — PROGRESS NOTE ADULT - ASSESSMENT
HPI:  Patient is a 69 year old male with chronic back pain s/p multiple prior spine surgeries (most recent one 1/28/2020.  Presented to ED with recent falls.  Imaging revealed loosened hardware.  Admitted to neurosurgery service for further management.    PROCEDURE: s/p revision of t11 to pelvis fusion with plastics closure on 5/27/2020  POD#7    PLAN:  Neuro:   -q4 hour neuro checks  -tramadol for pain control  -flexeril for muscle spasms  -psych consult called, will see patient  -continue celexa, trazodone, and xanax  -OR in AM w/o ortho for SI joint fusion  -out of bed with assistance  -tlso when out of bed  -pt re-eval post procedure tomorrow    Respiratory:   -continue albuterol  -satting well on room air  -incentive spirometer for lung expansion    CV:  -keep sbp 100-140  -losartan and norvasc for bp control    Endocrine:   -maintain euglycemia    Heme/Onc:   -hold lovenox tonight in preparation for OR in AM  -SCDs only for DVT prophylaxis    Renal:   -flomax for bph  -Cr stable    ID:   -afebrile  -repeat Covid pcr for OR in progress    GI:   -regular diet  -senna and miralax for bowel regimen      Spectra #[id number]                    Assessment:  Please Check When Present   []  GCS  E   V  M     Heart Failure: []Acute, [] acute on chronic , []chronic  Heart Failure:  [] Diastolic (HFpEF), [] Systolic (HFrEF), []Combined (HFpEF and HFrEF), [] RHF, [] Pulm HTN, [] Other    [] JOSE MIGUEL, [] ATN, [] AIN, [] other  [] CKD1, [] CKD2, [] CKD 3, [] CKD 4, [] CKD 5, []ESRD    Encephalopathy: [] Metabolic, [] Hepatic, [] toxic, [] Neurological, [] Other    Abnormal Nurtitional Status: [] malnurtition (see nutrition note), [ ]underweight: BMI < 19, [] morbid obesity: BMI >40, [] Cachexia    [] Sepsis  [] hypovolemic shock,[] cardiogenic shock, [] hemorrhagic shock, [] neuogenic shock  [] Acute Respiratory Failure  []Cerebral edema, [] Brain compression/ herniation,   [] Functional quadriplegia  [] Acute blood loss anemia HPI:  Patient is a 69 year old male with chronic back pain s/p multiple prior spine surgeries (most recent one 1/28/2020.  Presented to ED with recent falls.  Imaging revealed loosened hardware.  Admitted to neurosurgery service for further management.    PROCEDURE: s/p revision of t11 to pelvis fusion with plastics closure on 5/27/2020  POD#7    PLAN:  Neuro:   -q4 hour neuro checks  -tramadol for pain control  -flexeril for muscle spasms  -psych consult called, will see patient  -continue celexa, trazodone, and xanax  -OR in AM w/o ortho for SI joint fusion  -out of bed with assistance  -tlso when out of bed  -pt re-eval post procedure tomorrow    Respiratory:   -continue albuterol  -satting well on room air  -incentive spirometer for lung expansion    CV:  -keep sbp 100-140  -losartan and norvasc for bp control    Endocrine:   -maintain euglycemia    Heme/Onc:   -hold lovenox tonight in preparation for OR in AM  -SCDs only for DVT prophylaxis    Renal:   -flomax for bph  -Cr stable    ID:   -afebrile  -keflex for parotitis as per ENT until 6/7  -repeat Covid pcr for OR in progress    GI:   -regular diet  -senna and miralax for bowel regimen      Spectra #[id number]                    Assessment:  Please Check When Present   []  GCS  E   V  M     Heart Failure: []Acute, [] acute on chronic , []chronic  Heart Failure:  [] Diastolic (HFpEF), [] Systolic (HFrEF), []Combined (HFpEF and HFrEF), [] RHF, [] Pulm HTN, [] Other    [] JOSE MIGUEL, [] ATN, [] AIN, [] other  [] CKD1, [] CKD2, [] CKD 3, [] CKD 4, [] CKD 5, []ESRD    Encephalopathy: [] Metabolic, [] Hepatic, [] toxic, [] Neurological, [] Other    Abnormal Nurtitional Status: [] malnurtition (see nutrition note), [ ]underweight: BMI < 19, [] morbid obesity: BMI >40, [] Cachexia    [] Sepsis  [] hypovolemic shock,[] cardiogenic shock, [] hemorrhagic shock, [] neuogenic shock  [] Acute Respiratory Failure  []Cerebral edema, [] Brain compression/ herniation,   [] Functional quadriplegia  [] Acute blood loss anemia

## 2020-06-04 ENCOUNTER — APPOINTMENT (OUTPATIENT)
Dept: ORTHOPEDIC SURGERY | Facility: HOSPITAL | Age: 70
End: 2020-06-04

## 2020-06-04 ENCOUNTER — TREATMENT (OUTPATIENT)
Dept: PHYSICAL THERAPY | Facility: CLINIC | Age: 70
End: 2020-06-04

## 2020-06-04 DIAGNOSIS — G89.29 CHRONIC PAIN OF BOTH SHOULDERS: Primary | ICD-10-CM

## 2020-06-04 DIAGNOSIS — M25.512 CHRONIC PAIN OF BOTH SHOULDERS: Primary | ICD-10-CM

## 2020-06-04 DIAGNOSIS — M25.511 CHRONIC PAIN OF BOTH SHOULDERS: Primary | ICD-10-CM

## 2020-06-04 LAB
ANION GAP SERPL CALC-SCNC: 11 MMOL/L — SIGNIFICANT CHANGE UP (ref 5–17)
APTT BLD: 30.2 SEC — SIGNIFICANT CHANGE UP (ref 27.5–36.3)
BLD GP AB SCN SERPL QL: NEGATIVE — SIGNIFICANT CHANGE UP
BUN SERPL-MCNC: 12 MG/DL — SIGNIFICANT CHANGE UP (ref 7–23)
CALCIUM SERPL-MCNC: 8.9 MG/DL — SIGNIFICANT CHANGE UP (ref 8.4–10.5)
CHLORIDE SERPL-SCNC: 105 MMOL/L — SIGNIFICANT CHANGE UP (ref 96–108)
CO2 SERPL-SCNC: 22 MMOL/L — SIGNIFICANT CHANGE UP (ref 22–31)
CREAT SERPL-MCNC: 0.8 MG/DL — SIGNIFICANT CHANGE UP (ref 0.5–1.3)
GLUCOSE SERPL-MCNC: 109 MG/DL — HIGH (ref 70–99)
HCT VFR BLD CALC: 30.8 % — LOW (ref 39–50)
HGB BLD-MCNC: 8.9 G/DL — LOW (ref 13–17)
INR BLD: 1.2 RATIO — HIGH (ref 0.88–1.16)
MCHC RBC-ENTMCNC: 20.7 PG — LOW (ref 27–34)
MCHC RBC-ENTMCNC: 28.9 GM/DL — LOW (ref 32–36)
MCV RBC AUTO: 71.8 FL — LOW (ref 80–100)
NRBC # BLD: 0 /100 WBCS — SIGNIFICANT CHANGE UP (ref 0–0)
PLATELET # BLD AUTO: 421 K/UL — HIGH (ref 150–400)
POTASSIUM SERPL-MCNC: 3.8 MMOL/L — SIGNIFICANT CHANGE UP (ref 3.5–5.3)
POTASSIUM SERPL-SCNC: 3.8 MMOL/L — SIGNIFICANT CHANGE UP (ref 3.5–5.3)
PROTHROM AB SERPL-ACNC: 13.8 SEC — HIGH (ref 10–12.9)
RBC # BLD: 4.29 M/UL — SIGNIFICANT CHANGE UP (ref 4.2–5.8)
RBC # FLD: 19.2 % — HIGH (ref 10.3–14.5)
RH IG SCN BLD-IMP: POSITIVE — SIGNIFICANT CHANGE UP
SODIUM SERPL-SCNC: 138 MMOL/L — SIGNIFICANT CHANGE UP (ref 135–145)
WBC # BLD: 9.32 K/UL — SIGNIFICANT CHANGE UP (ref 3.8–10.5)
WBC # FLD AUTO: 9.32 K/UL — SIGNIFICANT CHANGE UP (ref 3.8–10.5)

## 2020-06-04 PROCEDURE — 97110 THERAPEUTIC EXERCISES: CPT | Performed by: PHYSICAL THERAPIST

## 2020-06-04 PROCEDURE — G0283 ELEC STIM OTHER THAN WOUND: HCPCS | Performed by: PHYSICAL THERAPIST

## 2020-06-04 PROCEDURE — 99233 SBSQ HOSP IP/OBS HIGH 50: CPT

## 2020-06-04 PROCEDURE — 99232 SBSQ HOSP IP/OBS MODERATE 35: CPT

## 2020-06-04 PROCEDURE — 97140 MANUAL THERAPY 1/> REGIONS: CPT | Performed by: PHYSICAL THERAPIST

## 2020-06-04 PROCEDURE — 27279 ARTHRD SI JT PLMT TARTCLR DV: CPT | Mod: 50

## 2020-06-04 RX ORDER — TRAMADOL HYDROCHLORIDE 50 MG/1
25 TABLET ORAL EVERY 4 HOURS
Refills: 0 | Status: DISCONTINUED | OUTPATIENT
Start: 2020-06-04 | End: 2020-06-05

## 2020-06-04 RX ORDER — TRAZODONE HCL 50 MG
100 TABLET ORAL AT BEDTIME
Refills: 0 | Status: DISCONTINUED | OUTPATIENT
Start: 2020-06-04 | End: 2020-06-05

## 2020-06-04 RX ORDER — ENOXAPARIN SODIUM 100 MG/ML
40 INJECTION SUBCUTANEOUS DAILY
Refills: 0 | Status: DISCONTINUED | OUTPATIENT
Start: 2020-06-04 | End: 2020-06-05

## 2020-06-04 RX ORDER — CITALOPRAM 10 MG/1
20 TABLET, FILM COATED ORAL DAILY
Refills: 0 | Status: DISCONTINUED | OUTPATIENT
Start: 2020-06-04 | End: 2020-06-05

## 2020-06-04 RX ORDER — CLONAZEPAM 1 MG
1 TABLET ORAL EVERY 8 HOURS
Refills: 0 | Status: DISCONTINUED | OUTPATIENT
Start: 2020-06-04 | End: 2020-06-05

## 2020-06-04 RX ORDER — SENNA PLUS 8.6 MG/1
2 TABLET ORAL AT BEDTIME
Refills: 0 | Status: DISCONTINUED | OUTPATIENT
Start: 2020-06-04 | End: 2020-06-05

## 2020-06-04 RX ORDER — ALPRAZOLAM 0.25 MG
0.5 TABLET ORAL EVERY 6 HOURS
Refills: 0 | Status: DISCONTINUED | OUTPATIENT
Start: 2020-06-04 | End: 2020-06-05

## 2020-06-04 RX ORDER — BENZOCAINE AND MENTHOL 5; 1 G/100ML; G/100ML
1 LIQUID ORAL
Refills: 0 | Status: DISCONTINUED | OUTPATIENT
Start: 2020-06-04 | End: 2020-06-05

## 2020-06-04 RX ORDER — SODIUM CHLORIDE 9 MG/ML
1000 INJECTION, SOLUTION INTRAVENOUS
Refills: 0 | Status: DISCONTINUED | OUTPATIENT
Start: 2020-06-04 | End: 2020-06-05

## 2020-06-04 RX ORDER — HYDROMORPHONE HYDROCHLORIDE 2 MG/ML
0.5 INJECTION INTRAMUSCULAR; INTRAVENOUS; SUBCUTANEOUS
Refills: 0 | Status: DISCONTINUED | OUTPATIENT
Start: 2020-06-04 | End: 2020-06-04

## 2020-06-04 RX ORDER — TAMSULOSIN HYDROCHLORIDE 0.4 MG/1
0.8 CAPSULE ORAL AT BEDTIME
Refills: 0 | Status: DISCONTINUED | OUTPATIENT
Start: 2020-06-04 | End: 2020-06-05

## 2020-06-04 RX ORDER — CEFAZOLIN SODIUM 1 G
2000 VIAL (EA) INJECTION EVERY 8 HOURS
Refills: 0 | Status: COMPLETED | OUTPATIENT
Start: 2020-06-04 | End: 2020-06-05

## 2020-06-04 RX ORDER — CYCLOBENZAPRINE HYDROCHLORIDE 10 MG/1
10 TABLET, FILM COATED ORAL THREE TIMES A DAY
Refills: 0 | Status: DISCONTINUED | OUTPATIENT
Start: 2020-06-04 | End: 2020-06-05

## 2020-06-04 RX ORDER — HYDROMORPHONE HYDROCHLORIDE 2 MG/ML
1 INJECTION INTRAMUSCULAR; INTRAVENOUS; SUBCUTANEOUS
Refills: 0 | Status: DISCONTINUED | OUTPATIENT
Start: 2020-06-04 | End: 2020-06-04

## 2020-06-04 RX ORDER — POLYETHYLENE GLYCOL 3350 17 G/17G
17 POWDER, FOR SOLUTION ORAL
Refills: 0 | Status: DISCONTINUED | OUTPATIENT
Start: 2020-06-04 | End: 2020-06-05

## 2020-06-04 RX ORDER — TRAMADOL HYDROCHLORIDE 50 MG/1
50 TABLET ORAL EVERY 4 HOURS
Refills: 0 | Status: DISCONTINUED | OUTPATIENT
Start: 2020-06-04 | End: 2020-06-05

## 2020-06-04 RX ORDER — LOSARTAN POTASSIUM 100 MG/1
25 TABLET, FILM COATED ORAL DAILY
Refills: 0 | Status: DISCONTINUED | OUTPATIENT
Start: 2020-06-04 | End: 2020-06-05

## 2020-06-04 RX ORDER — AMLODIPINE BESYLATE 2.5 MG/1
5 TABLET ORAL DAILY
Refills: 0 | Status: DISCONTINUED | OUTPATIENT
Start: 2020-06-04 | End: 2020-06-05

## 2020-06-04 RX ORDER — ACETAMINOPHEN 500 MG
650 TABLET ORAL EVERY 6 HOURS
Refills: 0 | Status: DISCONTINUED | OUTPATIENT
Start: 2020-06-04 | End: 2020-06-05

## 2020-06-04 RX ORDER — ONDANSETRON 8 MG/1
4 TABLET, FILM COATED ORAL ONCE
Refills: 0 | Status: DISCONTINUED | OUTPATIENT
Start: 2020-06-04 | End: 2020-06-04

## 2020-06-04 RX ORDER — SODIUM CHLORIDE 9 MG/ML
1000 INJECTION, SOLUTION INTRAVENOUS
Refills: 0 | Status: DISCONTINUED | OUTPATIENT
Start: 2020-06-04 | End: 2020-06-04

## 2020-06-04 RX ORDER — ALBUTEROL 90 UG/1
1 AEROSOL, METERED ORAL EVERY 6 HOURS
Refills: 0 | Status: DISCONTINUED | OUTPATIENT
Start: 2020-06-04 | End: 2020-06-05

## 2020-06-04 RX ORDER — ONDANSETRON 8 MG/1
4 TABLET, FILM COATED ORAL EVERY 6 HOURS
Refills: 0 | Status: DISCONTINUED | OUTPATIENT
Start: 2020-06-04 | End: 2020-06-05

## 2020-06-04 RX ADMIN — LOSARTAN POTASSIUM 25 MILLIGRAM(S): 100 TABLET, FILM COATED ORAL at 13:45

## 2020-06-04 RX ADMIN — TRAMADOL HYDROCHLORIDE 50 MILLIGRAM(S): 50 TABLET ORAL at 13:49

## 2020-06-04 RX ADMIN — Medication 1 MILLIGRAM(S): at 05:02

## 2020-06-04 RX ADMIN — Medication 100 MILLIGRAM(S): at 21:22

## 2020-06-04 RX ADMIN — Medication 100 MILLIGRAM(S): at 17:23

## 2020-06-04 RX ADMIN — BENZOCAINE AND MENTHOL 1 LOZENGE: 5; 1 LIQUID ORAL at 21:22

## 2020-06-04 RX ADMIN — Medication 1 MILLIGRAM(S): at 21:22

## 2020-06-04 RX ADMIN — TRAMADOL HYDROCHLORIDE 50 MILLIGRAM(S): 50 TABLET ORAL at 00:31

## 2020-06-04 RX ADMIN — SODIUM CHLORIDE 100 MILLILITER(S): 9 INJECTION, SOLUTION INTRAVENOUS at 05:02

## 2020-06-04 RX ADMIN — CITALOPRAM 20 MILLIGRAM(S): 10 TABLET, FILM COATED ORAL at 11:44

## 2020-06-04 RX ADMIN — ENOXAPARIN SODIUM 40 MILLIGRAM(S): 100 INJECTION SUBCUTANEOUS at 11:43

## 2020-06-04 RX ADMIN — SODIUM CHLORIDE 80 MILLILITER(S): 9 INJECTION, SOLUTION INTRAVENOUS at 11:44

## 2020-06-04 RX ADMIN — Medication 100 MILLIGRAM(S): at 22:26

## 2020-06-04 RX ADMIN — Medication 500 MILLIGRAM(S): at 05:02

## 2020-06-04 RX ADMIN — AMLODIPINE BESYLATE 5 MILLIGRAM(S): 2.5 TABLET ORAL at 13:45

## 2020-06-04 RX ADMIN — TAMSULOSIN HYDROCHLORIDE 0.8 MILLIGRAM(S): 0.4 CAPSULE ORAL at 21:22

## 2020-06-04 NOTE — PHYSICAL THERAPY INITIAL EVALUATION ADULT - TRANSFER SAFETY CONCERNS NOTED: SIT/STAND, REHAB EVAL
decreased balance during turns/decreased weight-shifting ability
decreased weight-shifting ability/decreased step length

## 2020-06-04 NOTE — PROGRESS NOTE ADULT - ASSESSMENT
70 yo man with PMH chronic back pain s/p multiple lumbar laminectomy/fusion with recent admission from 1/22/2020 to 2/3/2020 for S14-Rchqma Fusion on 1/28 , NPH s/p  shunt, HTN, HLD, anxiety, GULSHAN on CPAP presents to ED after he was found to have loosening of hardware on CT by his spine surgeon. Revision of T11-pelvis fusion w removal of left T11 screw, and removal and replacement of right T11 screw, sacral screws, and pelvic screws, plastics closure with paraspinal flaps and complex wound closure. EBL 500cc. Now s/p SI joint fusion with Ortho today.

## 2020-06-04 NOTE — PHYSICAL THERAPY INITIAL EVALUATION ADULT - GAIT DEVIATIONS NOTED, PT EVAL
decreased stride length/decreased step length/decreased mario/decreased weight-shifting ability
decreased step length/decreased stride length/decreased weight-shifting ability/decreased mario/increased time in double stance

## 2020-06-04 NOTE — PHYSICAL THERAPY INITIAL EVALUATION ADULT - IMPAIRMENTS FOUND, PT EVAL
muscle strength/gait, locomotion, and balance/aerobic capacity/endurance
gait, locomotion, and balance/muscle strength/aerobic capacity/endurance

## 2020-06-04 NOTE — PHYSICAL THERAPY INITIAL EVALUATION ADULT - BALANCE TRAINING, PT EVAL
GOAL: Pt will improve sitting/standing balance by 1 grade to assist with functional mobility in 2 weeks.
GOAL: Pt will demonstrate improved static/dynamic standing balance to good, in order to improve stability, decrease fall risk and increase independence with ADLs within 2 weeks.

## 2020-06-04 NOTE — BRIEF OPERATIVE NOTE - OPERATION/FINDINGS
see op note
Revision of T11-pelvis fusion w removal of left T11 screw, and removal and replacement of right T11 screw, sacral screws, and pelvic screws  plastics closure with paraspinal flaps and complex wound closure

## 2020-06-04 NOTE — PROGRESS NOTE ADULT - SUBJECTIVE AND OBJECTIVE BOX
[facility] Division of Hospital Medicine  Rozina Nicholson MD  Pager ([id number]-F, [id number]): [phone]  Other Times:  [phone]      Patient is a 69y old  Male who presents with a chief complaint of falls, loose hardware (04 Jun 2020 13:33)      SUBJECTIVE / OVERNIGHT EVENTS: patient in afternoon post-op. mild pain as surgical site but otherwise feels well.    ADDITIONAL REVIEW OF SYSTEMS:    MEDICATIONS  (STANDING):  amLODIPine   Tablet 5 milliGRAM(s) Oral daily  ceFAZolin   IVPB 2000 milliGRAM(s) IV Intermittent every 8 hours  citalopram 20 milliGRAM(s) Oral daily  enoxaparin Injectable 40 milliGRAM(s) SubCutaneous daily  lactated ringers. 1000 milliLiter(s) (80 mL/Hr) IV Continuous <Continuous>  losartan 25 milliGRAM(s) Oral daily  polyethylene glycol 3350 17 Gram(s) Oral two times a day  senna 2 Tablet(s) Oral at bedtime  tamsulosin 0.8 milliGRAM(s) Oral at bedtime  traZODone 100 milliGRAM(s) Oral at bedtime    MEDICATIONS  (PRN):  acetaminophen   Tablet .. 650 milliGRAM(s) Oral every 6 hours PRN Temp greater or equal to 38C (100.4F), Mild Pain (1 - 3)  ALBUTerol    90 MICROgram(s) HFA Inhaler 1 Puff(s) Inhalation every 6 hours PRN Shortness of Breath and/or Wheezing  ALPRAZolam 0.5 milliGRAM(s) Oral every 6 hours PRN anxiety  cyclobenzaprine 10 milliGRAM(s) Oral three times a day PRN Muscle Spasm  ondansetron Injectable 4 milliGRAM(s) IV Push every 6 hours PRN Nausea  traMADol 50 milliGRAM(s) Oral every 4 hours PRN Severe Pain (7 - 10)  traMADol 25 milliGRAM(s) Oral every 4 hours PRN Moderate Pain (4 - 6)      CAPILLARY BLOOD GLUCOSE        I&O's Summary    03 Jun 2020 07:01  -  04 Jun 2020 07:00  --------------------------------------------------------  IN: 1480 mL / OUT: 2025 mL / NET: -545 mL    04 Jun 2020 07:01  -  04 Jun 2020 13:57  --------------------------------------------------------  IN: 0 mL / OUT: 375 mL / NET: -375 mL        PHYSICAL EXAM:  Vital Signs Last 24 Hrs  T(C): 36.6 (04 Jun 2020 13:12), Max: 37.1 (04 Jun 2020 06:27)  T(F): 97.9 (04 Jun 2020 13:12), Max: 98.8 (04 Jun 2020 06:27)  HR: 83 (04 Jun 2020 13:12) (76 - 90)  BP: 151/80 (04 Jun 2020 13:12) (118/71 - 158/87)  BP(mean): 102 (04 Jun 2020 12:00) (90 - 102)  RR: 18 (04 Jun 2020 13:12) (16 - 18)  SpO2: 97% (04 Jun 2020 13:12) (94% - 100%)    CONSTITUTIONAL: NAD, lying comfortably in bed  EYES: PERRLA; conjunctiva and sclera clear  ENMT: Moist oral mucosa, no pharyngeal injection or exudates; normal dentition  NECK: Supple, no palpable masses; no thyromegaly  RESPIRATORY: Normal respiratory effort; lungs are clear to auscultation bilaterally  CARDIOVASCULAR: Regular rate and rhythm, normal S1 and S2, no murmur/rub/gallop; No lower extremity edema; Peripheral pulses are 2+ bilaterally  ABDOMEN: Soft, Nondistended,  Nontender to palpation, normoactive bowel sounds  MUSCULOSKELETAL:  No clubbing or cyanosis of digits; no calf tenderness to palpation b/l, +L hemovac with serosanguinous drainage with some underlying fluctuance, dressing c/d/i  PSYCH: A+O to person, place, and time; affect appropriate, in better spirits today compared to day prior  NEUROLOGY: CN 2-12 are intact and symmetric; no gross sensory deficits   SKIN: No rashes; no palpable lesions    LABS:                        8.9    9.32  )-----------( 421      ( 04 Jun 2020 05:47 )             30.8     06-04    138  |  105  |  12  ----------------------------<  109<H>  3.8   |  22  |  0.80    Ca    8.9      04 Jun 2020 05:47      PT/INR - ( 04 Jun 2020 05:47 )   PT: 13.8 sec;   INR: 1.20 ratio         PTT - ( 04 Jun 2020 05:47 )  PTT:30.2 sec            RADIOLOGY & ADDITIONAL TESTS:  Results Reviewed: H/h stable, no leukocytosis  Imaging Personally Reviewed:  Electrocardiogram Personally Reviewed:    COORDINATION OF CARE:  Care Discussed with Consultants/Other Providers [Y]: neurosurgery YOSSI Granados  Prior or Outpatient Records Reviewed [Y]: ortho, behavioral health notes

## 2020-06-04 NOTE — PHYSICAL THERAPY INITIAL EVALUATION ADULT - TRANSFER TRAINING, PT EVAL
GOAL: Pt will perform sit<>stand independently in 2 weeks.
GOAL: Pt will perform sit<>stand independently in 2 weeks.

## 2020-06-04 NOTE — PRE-ANESTHESIA EVALUATION ADULT - NSANTHAIRWAYFT_ENT_ALL_CORE
mp2, good opening, missing back molars all quadrants, no loose teeth
good mouth opening, TM 6 cm, neck FROM

## 2020-06-04 NOTE — PROGRESS NOTE ADULT - ASSESSMENT
HPI:  Patient is a 69 year old male with chronic back pain s/p multiple prior spine surgeries (most recent one 1/28/2020.  Presented to ED with recent falls.  Imaging revealed loosened hardware.  Admitted to neurosurgery service for further management.    PROCEDURE: s/p revision of t11 to pelvis fusion with plastics closure on 5/27/2020, now s/p b/l SI joint fusion on 6/4/2020  POD#8, #0    PLAN:  Neuro:   -q4 hour neuro checks  -tramadol for pain control  -flexeril for muscle spasms  -klonopin and xanax for anxiety  -trazodone and celexa for depression  -out of bed with assistance  -tlso when out of bed  -pt re-eval pending    Respiratory:   -keep O2 sat > 94%  -incentive spirometer for lung expansion    CV:  -keep sbp 100-140  -losartan and norvasc for bp control    Endocrine:   -maintain euglycemia    Heme/Onc:   -hold lovenox for today due to recent surgery  -SCDs only for DVT prophylaxis    Renal:   -voiding  -continue flomax    ID:   -ancef x24 hours for periop antibiotic coverage    GI:   -regular diet  -senna and miralax for bowel regimen      Spectra #[id number]              Assessment:  Please Check When Present   []  GCS  E   V  M     Heart Failure: []Acute, [] acute on chronic , []chronic  Heart Failure:  [] Diastolic (HFpEF), [] Systolic (HFrEF), []Combined (HFpEF and HFrEF), [] RHF, [] Pulm HTN, [] Other    [] JOSE MIGUEL, [] ATN, [] AIN, [] other  [] CKD1, [] CKD2, [] CKD 3, [] CKD 4, [] CKD 5, []ESRD    Encephalopathy: [] Metabolic, [] Hepatic, [] toxic, [] Neurological, [] Other    Abnormal Nurtitional Status: [] malnurtition (see nutrition note), [ ]underweight: BMI < 19, [] morbid obesity: BMI >40, [] Cachexia    [] Sepsis  [] hypovolemic shock,[] cardiogenic shock, [] hemorrhagic shock, [] neuogenic shock  [] Acute Respiratory Failure  []Cerebral edema, [] Brain compression/ herniation,   [] Functional quadriplegia  [] Acute blood loss anemia

## 2020-06-04 NOTE — PHYSICAL THERAPY INITIAL EVALUATION ADULT - PLANNED THERAPY INTERVENTIONS, PT EVAL
GOAL: Pt will be able to negotiate 3 steps independently in 2 weeks./balance training/gait training/transfer training/bed mobility training
balance training/gait training/transfer training/stair negotiation GOAL: Pt will be able to negotiate 3 steps independently in 2 weeks./bed mobility training

## 2020-06-04 NOTE — PHYSICAL THERAPY INITIAL EVALUATION ADULT - PERTINENT HX OF CURRENT PROBLEM, REHAB EVAL
Pt is a 68 y/o male with PMH of chronic back pain s/p multiple lumbar laminectomy/fusion with recent admission from 1/2020 for [id number]-[id number] Fusion on 1/28 , NPH s/p  shunt, HTN, HLD, anxiety, GULSHAN on CPAP presents to ED after he was found to have loosening of hardware on CT by his spine surgeon. Now s/p Revision of T11-pelvis fusion w removal of L T11 screw, and removal/replacement of RT11 screw, sacral screws, and pelvic screws plastics closure with paraspinal flaps and complex wound closure
68 y/o male with PMH of chronic back pain s/p multiple lumbar laminectomy/fusion with recent admission from 1/2020 for [id number] Fusion on 1/28 , NPH s/p  shunt, HTN, HLD, anxiety, GULSHAN on CPAP presents to ED after he was found to have loosening of hardware on CT by his spine surgeon. Now s/p Revision of T11-pelvis fusion w removal of L T11 screw, and removal/replacement of RT11 screw, sacral screws, and pelvic screws plastics closure with paraspinal flaps CONT BELOW

## 2020-06-04 NOTE — PROGRESS NOTES
Physical Therapy Daily Progress Note      Patient: Orion Harvey   : 1950  Diagnosis/ICD-10 Code:  Chronic pain of both shoulders [M25.511, G89.29, M25.512]  Referring practitioner: Nazanin Garcia*  Date of Initial Visit: Type: THERAPY  Noted: 2020  Today's Date: 2020  Patient seen for 7 sessions         Orion Harvey reports: he continues to have gradual improvement of both shoulders and states his HEP is helping him achieve greater mobility which he is happy about.    Objective   See Exercise, Manual, and Modality Logs for complete treatment.     Assessment/Plan  Pt. Tolerates exercise and stretches well today however does show signs of fatigue in shoulders throughout showing need for further improvement of endurance with UE activity. Pt. Has no complaints of pain throughout treatment this date.    Progress per Plan of Care           Timed:         Manual Therapy:    20     mins  40967;     Therapeutic Exercise:    24     mins  30562;     Neuromuscular Taylor:        mins  78930;    Therapeutic Activity:          mins  33752;     Gait Training:           mins  27055;     Ultrasound:          mins  01390;    Ionto                                   mins   80454  Self Care                            mins   13756  Canalith Repos                   mins  4209    Un-Timed:  Electrical Stimulation:    15     mins  27955 ( );  Dry Needling          mins self-pay  Traction          mins 13209  Low Eval          Mins  96045  Mod Eval          Mins  95633  High Eval                            Mins  05726    Timed Treatment:   44   mins   Total Treatment:     59   mins    Mandie Ventura PTA  Physical Therapist Assistant License #[id number]

## 2020-06-04 NOTE — PHYSICAL THERAPY INITIAL EVALUATION ADULT - GAIT TRAINING, PT EVAL
GOAL: Pt will amb 200 ft with RW independently in 2 weeks.
GOAL: Pt will amb 200 ft with RW independently in 2 weeks.

## 2020-06-04 NOTE — PHYSICAL THERAPY INITIAL EVALUATION ADULT - BED MOBILITY TRAINING, PT EVAL
GOAL: Pt will be independent in bed mobility via log roll in 2 weeks.
GOAL: Pt will be independent in bed mobility via log roll in 2 weeks.

## 2020-06-04 NOTE — PHYSICAL THERAPY INITIAL EVALUATION ADULT - DIAGNOSIS, PT EVAL
deconditioning, dec mobility, impaired balance
Decreased strength, balance and impaired functional mobility

## 2020-06-04 NOTE — PROGRESS NOTE ADULT - SUBJECTIVE AND OBJECTIVE BOX
PULMONARY PROGRESS NOTE    DACIA NAVARRO  MRN-[id number]    Patient is a 69y old  Male who presents with a chief complaint of falls, loose hardware (04 Jun 2020 13:57)      HPI:  s/p surgery  onR A  resting    ROS:   -    ACTIVE MEDICATION LIST:  MEDICATIONS  (STANDING):  amLODIPine   Tablet 5 milliGRAM(s) Oral daily  ceFAZolin   IVPB 2000 milliGRAM(s) IV Intermittent every 8 hours  citalopram 20 milliGRAM(s) Oral daily  clonazePAM  Tablet 1 milliGRAM(s) Oral every 8 hours  enoxaparin Injectable 40 milliGRAM(s) SubCutaneous daily  lactated ringers. 1000 milliLiter(s) (80 mL/Hr) IV Continuous <Continuous>  losartan 25 milliGRAM(s) Oral daily  polyethylene glycol 3350 17 Gram(s) Oral two times a day  senna 2 Tablet(s) Oral at bedtime  tamsulosin 0.8 milliGRAM(s) Oral at bedtime  traZODone 100 milliGRAM(s) Oral at bedtime    MEDICATIONS  (PRN):  acetaminophen   Tablet .. 650 milliGRAM(s) Oral every 6 hours PRN Temp greater or equal to 38C (100.4F), Mild Pain (1 - 3)  ALBUTerol    90 MICROgram(s) HFA Inhaler 1 Puff(s) Inhalation every 6 hours PRN Shortness of Breath and/or Wheezing  ALPRAZolam 0.5 milliGRAM(s) Oral every 6 hours PRN anxiety  cyclobenzaprine 10 milliGRAM(s) Oral three times a day PRN Muscle Spasm  ondansetron Injectable 4 milliGRAM(s) IV Push every 6 hours PRN Nausea  traMADol 50 milliGRAM(s) Oral every 4 hours PRN Severe Pain (7 - 10)  traMADol 25 milliGRAM(s) Oral every 4 hours PRN Moderate Pain (4 - 6)      EXAM:  Vital Signs Last 24 Hrs  T(C): 36.8 (04 Jun 2020 15:30), Max: 37.1 (04 Jun 2020 06:27)  T(F): 98.3 (04 Jun 2020 15:30), Max: 98.8 (04 Jun 2020 06:27)  HR: 85 (04 Jun 2020 15:30) (71 - 90)  BP: 123/78 (04 Jun 2020 15:30) (118/71 - 158/87)  BP(mean): 102 (04 Jun 2020 12:00) (90 - 102)  RR: 18 (04 Jun 2020 15:30) (16 - 18)  SpO2: 95% (04 Jun 2020 15:30) (94% - 100%)    GENERAL: The patient is awake and alert in no apparent distress.     LUNGS: Clear to auscultation without wheezing, rales or rhonchi; respirations unlabored    HEART: Regular rate and rhythm without murmur.                            8.9    9.32  )-----------( 421      ( 04 Jun 2020 05:47 )             30.8       06-04    138  |  105  |  12  ----------------------------<  109<H>  3.8   |  22  |  0.80    Ca    8.9      04 Jun 2020 05:47       PROBLEM LIST:  69y Male with HEALTH ISSUES - PROBLEM Dx:  Moderate episode of recurrent major depressive disorder  Trouble walking: Trouble walking  Depression: Depression  Sialadenitis: Sialadenitis  Sialoadenitis of submandibular gland: Sialoadenitis of submandibular gland  Parotitis: Parotitis  Anemia: Anemia  HTN (hypertension): HTN (hypertension)  GULSHAN (obstructive sleep apnea): GULSHAN (obstructive sleep apnea)  Low back pain: Low back pain            RECS:  02 tonight if he desaturates in setting of pain management  appreciate neurosurg  dvt prophylaxis  outpatient f/u with Dr Bear for GULSHAN    Please call with any questions.    Blanche Sheffield DO  [facility] Pulmonary/Sleep Medicine  [phone]

## 2020-06-04 NOTE — PHYSICAL THERAPY INITIAL EVALUATION ADULT - BED MOBILITY LIMITATIONS, REHAB EVAL
decreased ability to use arms for pushing/pulling/decreased ability to use legs for bridging/pushing
decreased ability to use legs for bridging/pushing/decreased ability to use arms for pushing/pulling

## 2020-06-04 NOTE — PHYSICAL THERAPY INITIAL EVALUATION ADULT - PRECAUTIONS/LIMITATIONS, REHAB EVAL
and complex wound closure on 5/27. CT L-spine 5/27: Post revision of lumbar fusion after removal of transpedicular screws T11 and S1 screws extending into the iliac wings bilaterally. T12-L5 screws remain intact with connecting rods. No change in interposition graft at L5-S1. Pt now s/p  B/L SI Joint Fusion with I-Fuse on 6/4 with orthopedics now WBAT to B/L LEs

## 2020-06-04 NOTE — PHYSICAL THERAPY INITIAL EVALUATION ADULT - MANUAL MUSCLE TESTING RESULTS, REHAB EVAL
grossly assessed due to/B UE and B LE >3+/5 upon functional assessment against gravity.
grossly assessed due to/B UE and B LE >3+/5

## 2020-06-04 NOTE — PROGRESS NOTE ADULT - PROBLEM SELECTOR PLAN 1
s/p Revision of T11-pelvis fusion with removal of left T11 screw, and removal and replacement of right T11 screw, sacral screws, and pelvic screws.  - plastics closure with paraspinal flaps and complex wound closure  - dressing changes as per plastics, next 6/6  - hemovac management as per NSGY  - pain control and PT  - s/p OR today for sacroiliac joint fusion with Dr. Lopez

## 2020-06-04 NOTE — PHYSICAL THERAPY INITIAL EVALUATION ADULT - ADDITIONAL COMMENTS
Pt lives alone in a home with 3 steps to enter. Pt stated he has been staying on the first floor since he was discharged from [facility] ~1 month ago. Pt was (I) with ADLs and functional mobility with a RW or cane but was having increasing difficulty recently. Pt owns RW, cane, grab bars in shower.
Pt lives alone in a home with 3 steps to enter. Pt stated he has been staying on the first floor since he was discharged from [facility] ~1 month ago. Pt was (I) with ADLs and functional mobility with a RW or cane but was having increasing difficulty recently. Pt owns RW, cane, grab bars in shower.

## 2020-06-04 NOTE — PROGRESS NOTE ADULT - PROBLEM SELECTOR PLAN 4
patient with baseline depression, was worse yesterday and tearful, but in better spirits today.  - c/w trazodone 100mg qHS  - c/w citalopram 20mg daily  - f/u psych recs  - ultimately, should still follow-up with his outpatient psychiatrist upon discharge

## 2020-06-04 NOTE — PHYSICAL THERAPY INITIAL EVALUATION ADULT - CRITERIA FOR SKILLED THERAPEUTIC INTERVENTIONS
impairments found/functional limitations in following categories/rehab potential/therapy frequency/anticipated discharge recommendation
functional limitations in following categories/impairments found

## 2020-06-04 NOTE — CHART NOTE - NSCHARTNOTEFT_GEN_A_CORE
Patient seen in RR, resting without complaints.  No Chest Pain, SOB, N/V.    T(C): 36 (06-04-20 @ 10:05), Max: 37.1 (06-04-20 @ 06:27)  HR: 82 (06-04-20 @ 11:30) (76 - 90)  BP: 135/77 (06-04-20 @ 11:30) (118/71 - 158/87)  RR: 16 (06-04-20 @ 11:30) (16 - 18)  SpO2: 98% (06-04-20 @ 11:30) (94% - 100%)  Wt(kg): --    Exam:  Alert and [place], No Acute Distress  Card: +S1/S2, RRR  Pulm: CTAB  Laterality: B/L hip aquacels c/d/i  Calves/compartments soft, non-tender bilaterally  +PF/DF/EHL/FHL  SILT  + DP pulse    Xray: Intra-op images in chart                          8.9    9.32  )-----------( 421      ( 04 Jun 2020 05:47 )             30.8    06-04    138  |  105  |  12  ----------------------------<  109<H>  3.8   |  22  |  0.80    Ca    8.9      04 Jun 2020 05:47        A/P: Patient is a 69y Male S/p B/L SI Joint Fusion with I-Fuse. VSS. NAD  -PT/OT- WBAT LLE  -IS encouraged  -DVT PPx- Lovenox 40 mg QD to start POD#1  -Pain Control   -Remainder of care as per Neurosx  -OK to d/c tomorrow if stable and PT cleared      Nella Kurtz PA-C  Team Pager #[phone]

## 2020-06-04 NOTE — PROGRESS NOTE ADULT - SUBJECTIVE AND OBJECTIVE BOX
HPI:  Patient is a 69 year old male with chronic back pain s/p multiple prior spine surgeries (most recent one 1/28/2020.  Presented to ED with recent falls.  Imaging revealed loosened hardware.  Admitted to neurosurgery service for further management.    OVERNIGHT EVENTS:  No acute events overnight.  Patient went to OR this AM with ortho.  Tolerated well, no immediate complications.  Has one hemovac still.      Vital Signs Last 24 Hrs  T(C): 36.6 (04 Jun 2020 13:12), Max: 37.1 (04 Jun 2020 06:27)  T(F): 97.9 (04 Jun 2020 13:12), Max: 98.8 (04 Jun 2020 06:27)  HR: 83 (04 Jun 2020 13:12) (76 - 90)  BP: 151/80 (04 Jun 2020 13:12) (118/71 - 158/87)  BP(mean): 102 (04 Jun 2020 12:00) (90 - 102)  RR: 18 (04 Jun 2020 13:12) (16 - 18)  SpO2: 97% (04 Jun 2020 13:12) (94% - 100%)    I&O's Detail    03 Jun 2020 07:01  -  04 Jun 2020 07:00  --------------------------------------------------------  IN:    lactated ringers.: 800 mL    Oral Fluid: 680 mL  Total IN: 1480 mL    OUT:    Accordian: 100 mL    Voided: 1925 mL  Total OUT: 2025 mL    Total NET: -545 mL      04 Jun 2020 07:01  -  04 Jun 2020 13:33  --------------------------------------------------------  IN:  Total IN: 0 mL    OUT:    Voided: 375 mL  Total OUT: 375 mL    Total NET: -375 mL        I&O's Summary    03 Jun 2020 07:01  -  04 Jun 2020 07:00  --------------------------------------------------------  IN: 1480 mL / OUT: 2025 mL / NET: -545 mL    04 Jun 2020 07:01  -  04 Jun 2020 13:33  --------------------------------------------------------  IN: 0 mL / OUT: 375 mL / NET: -375 mL        PHYSICAL EXAM:  Neurological: awake, alert, oriented x3, follows commands, speech clear and fluent, moves all extremities x4 w/ 5/5 strength throughout, sensation present, intact, equal throughout    Cardiovascular: +s1, s2  Respiratory: clear to auscultation b/l  HEENT: right facial swelling/erythema resolved, non-tender, no fluctuance/induration  Gastrointestinal: soft, non-distended, non-tender  Genitourinary: +voiding  Incision/Wound: posterior midline vertical incision dressing c/d/i w/ aquacel, hemovac x1    TUBES/LINES:  [x] hemovac x1 (70cc over 24 hours)    DIET:  [x] regular    LABS:                        8.9    9.32  )-----------( 421      ( 04 Jun 2020 05:47 )             30.8     06-04    138  |  105  |  12  ----------------------------<  109<H>  3.8   |  22  |  0.80    Ca    8.9      04 Jun 2020 05:47      PT/INR - ( 04 Jun 2020 05:47 )   PT: 13.8 sec;   INR: 1.20 ratio         PTT - ( 04 Jun 2020 05:47 )  PTT:30.2 sec        CAPILLARY BLOOD GLUCOSE            Allergies    Biaxin (Other)  Lidoderm (Unknown)  morphine (Short breath; Rash)          MEDICATIONS:  Antibiotics:  ceFAZolin   IVPB 2000 milliGRAM(s) IV Intermittent every 8 hours    Neuro:  acetaminophen   Tablet .. 650 milliGRAM(s) Oral every 6 hours PRN  ALPRAZolam 0.5 milliGRAM(s) Oral every 6 hours PRN  citalopram 20 milliGRAM(s) Oral daily  cyclobenzaprine 10 milliGRAM(s) Oral three times a day PRN  ondansetron Injectable 4 milliGRAM(s) IV Push every 6 hours PRN  traMADol 50 milliGRAM(s) Oral every 4 hours PRN  traMADol 25 milliGRAM(s) Oral every 4 hours PRN  traZODone 100 milliGRAM(s) Oral at bedtime    Anticoagulation:  enoxaparin Injectable 40 milliGRAM(s) SubCutaneous daily    OTHER:  ALBUTerol    90 MICROgram(s) HFA Inhaler 1 Puff(s) Inhalation every 6 hours PRN  amLODIPine   Tablet 5 milliGRAM(s) Oral daily  losartan 25 milliGRAM(s) Oral daily  polyethylene glycol 3350 17 Gram(s) Oral two times a day  senna 2 Tablet(s) Oral at bedtime  tamsulosin 0.8 milliGRAM(s) Oral at bedtime    IVF:  lactated ringers. 1000 milliLiter(s) IV Continuous <Continuous>    CULTURES:  none    RADIOLOGY & ADDITIONAL TESTS:  no new imaging

## 2020-06-05 ENCOUNTER — TRANSCRIPTION ENCOUNTER (OUTPATIENT)
Age: 70
End: 2020-06-05

## 2020-06-05 VITALS
DIASTOLIC BLOOD PRESSURE: 65 MMHG | TEMPERATURE: 99 F | SYSTOLIC BLOOD PRESSURE: 116 MMHG | OXYGEN SATURATION: 96 % | HEART RATE: 95 BPM | RESPIRATION RATE: 18 BRPM

## 2020-06-05 LAB
ANION GAP SERPL CALC-SCNC: 10 MMOL/L — SIGNIFICANT CHANGE UP (ref 5–17)
BUN SERPL-MCNC: 14 MG/DL — SIGNIFICANT CHANGE UP (ref 7–23)
CALCIUM SERPL-MCNC: 8.3 MG/DL — LOW (ref 8.4–10.5)
CHLORIDE SERPL-SCNC: 108 MMOL/L — SIGNIFICANT CHANGE UP (ref 96–108)
CO2 SERPL-SCNC: 22 MMOL/L — SIGNIFICANT CHANGE UP (ref 22–31)
CREAT SERPL-MCNC: 0.87 MG/DL — SIGNIFICANT CHANGE UP (ref 0.5–1.3)
GLUCOSE SERPL-MCNC: 101 MG/DL — HIGH (ref 70–99)
HCT VFR BLD CALC: 25 % — LOW (ref 39–50)
HCT VFR BLD CALC: 27.4 % — LOW (ref 39–50)
HGB BLD-MCNC: 7.2 G/DL — LOW (ref 13–17)
HGB BLD-MCNC: 7.7 G/DL — LOW (ref 13–17)
MCHC RBC-ENTMCNC: 20.6 PG — LOW (ref 27–34)
MCHC RBC-ENTMCNC: 20.9 PG — LOW (ref 27–34)
MCHC RBC-ENTMCNC: 28.1 GM/DL — LOW (ref 32–36)
MCHC RBC-ENTMCNC: 28.8 GM/DL — LOW (ref 32–36)
MCV RBC AUTO: 72.7 FL — LOW (ref 80–100)
MCV RBC AUTO: 73.3 FL — LOW (ref 80–100)
NRBC # BLD: 0 /100 WBCS — SIGNIFICANT CHANGE UP (ref 0–0)
NRBC # BLD: 0 /100 WBCS — SIGNIFICANT CHANGE UP (ref 0–0)
PLATELET # BLD AUTO: 385 K/UL — SIGNIFICANT CHANGE UP (ref 150–400)
PLATELET # BLD AUTO: 426 K/UL — HIGH (ref 150–400)
POTASSIUM SERPL-MCNC: 3.7 MMOL/L — SIGNIFICANT CHANGE UP (ref 3.5–5.3)
POTASSIUM SERPL-SCNC: 3.7 MMOL/L — SIGNIFICANT CHANGE UP (ref 3.5–5.3)
RBC # BLD: 3.44 M/UL — LOW (ref 4.2–5.8)
RBC # BLD: 3.74 M/UL — LOW (ref 4.2–5.8)
RBC # FLD: 19.1 % — HIGH (ref 10.3–14.5)
RBC # FLD: 19.1 % — HIGH (ref 10.3–14.5)
SODIUM SERPL-SCNC: 140 MMOL/L — SIGNIFICANT CHANGE UP (ref 135–145)
WBC # BLD: 11.7 K/UL — HIGH (ref 3.8–10.5)
WBC # BLD: 12.59 K/UL — HIGH (ref 3.8–10.5)
WBC # FLD AUTO: 11.7 K/UL — HIGH (ref 3.8–10.5)
WBC # FLD AUTO: 12.59 K/UL — HIGH (ref 3.8–10.5)

## 2020-06-05 PROCEDURE — 82272 OCCULT BLD FECES 1-3 TESTS: CPT

## 2020-06-05 PROCEDURE — 97530 THERAPEUTIC ACTIVITIES: CPT

## 2020-06-05 PROCEDURE — U0003: CPT

## 2020-06-05 PROCEDURE — 71045 X-RAY EXAM CHEST 1 VIEW: CPT

## 2020-06-05 PROCEDURE — C1769: CPT

## 2020-06-05 PROCEDURE — 97161 PT EVAL LOW COMPLEX 20 MIN: CPT

## 2020-06-05 PROCEDURE — 82728 ASSAY OF FERRITIN: CPT

## 2020-06-05 PROCEDURE — 72131 CT LUMBAR SPINE W/O DYE: CPT

## 2020-06-05 PROCEDURE — C1776: CPT

## 2020-06-05 PROCEDURE — C1889: CPT

## 2020-06-05 PROCEDURE — 83735 ASSAY OF MAGNESIUM: CPT

## 2020-06-05 PROCEDURE — P9016: CPT

## 2020-06-05 PROCEDURE — 94640 AIRWAY INHALATION TREATMENT: CPT

## 2020-06-05 PROCEDURE — 84145 PROCALCITONIN (PCT): CPT

## 2020-06-05 PROCEDURE — 80048 BASIC METABOLIC PNL TOTAL CA: CPT

## 2020-06-05 PROCEDURE — 84295 ASSAY OF SERUM SODIUM: CPT

## 2020-06-05 PROCEDURE — 86901 BLOOD TYPING SEROLOGIC RH(D): CPT

## 2020-06-05 PROCEDURE — 99233 SBSQ HOSP IP/OBS HIGH 50: CPT

## 2020-06-05 PROCEDURE — 97110 THERAPEUTIC EXERCISES: CPT

## 2020-06-05 PROCEDURE — 85027 COMPLETE CBC AUTOMATED: CPT

## 2020-06-05 PROCEDURE — 85610 PROTHROMBIN TIME: CPT

## 2020-06-05 PROCEDURE — 80053 COMPREHEN METABOLIC PANEL: CPT

## 2020-06-05 PROCEDURE — 82330 ASSAY OF CALCIUM: CPT

## 2020-06-05 PROCEDURE — 97164 PT RE-EVAL EST PLAN CARE: CPT

## 2020-06-05 PROCEDURE — 82803 BLOOD GASES ANY COMBINATION: CPT

## 2020-06-05 PROCEDURE — C1713: CPT

## 2020-06-05 PROCEDURE — 83540 ASSAY OF IRON: CPT

## 2020-06-05 PROCEDURE — 85014 HEMATOCRIT: CPT

## 2020-06-05 PROCEDURE — 83605 ASSAY OF LACTIC ACID: CPT

## 2020-06-05 PROCEDURE — 94660 CPAP INITIATION&MGMT: CPT

## 2020-06-05 PROCEDURE — 86900 BLOOD TYPING SEROLOGIC ABO: CPT

## 2020-06-05 PROCEDURE — 76000 FLUOROSCOPY <1 HR PHYS/QHP: CPT

## 2020-06-05 PROCEDURE — 93970 EXTREMITY STUDY: CPT

## 2020-06-05 PROCEDURE — 99285 EMERGENCY DEPT VISIT HI MDM: CPT

## 2020-06-05 PROCEDURE — 82435 ASSAY OF BLOOD CHLORIDE: CPT

## 2020-06-05 PROCEDURE — 85730 THROMBOPLASTIN TIME PARTIAL: CPT

## 2020-06-05 PROCEDURE — 83550 IRON BINDING TEST: CPT

## 2020-06-05 PROCEDURE — 81001 URINALYSIS AUTO W/SCOPE: CPT

## 2020-06-05 PROCEDURE — 86923 COMPATIBILITY TEST ELECTRIC: CPT

## 2020-06-05 PROCEDURE — 97116 GAIT TRAINING THERAPY: CPT

## 2020-06-05 PROCEDURE — 85045 AUTOMATED RETICULOCYTE COUNT: CPT

## 2020-06-05 PROCEDURE — 84132 ASSAY OF SERUM POTASSIUM: CPT

## 2020-06-05 PROCEDURE — 84100 ASSAY OF PHOSPHORUS: CPT

## 2020-06-05 PROCEDURE — 82565 ASSAY OF CREATININE: CPT

## 2020-06-05 PROCEDURE — 97165 OT EVAL LOW COMPLEX 30 MIN: CPT

## 2020-06-05 PROCEDURE — 97535 SELF CARE MNGMENT TRAINING: CPT

## 2020-06-05 PROCEDURE — 86850 RBC ANTIBODY SCREEN: CPT

## 2020-06-05 PROCEDURE — 82607 VITAMIN B-12: CPT

## 2020-06-05 PROCEDURE — 36430 TRANSFUSION BLD/BLD COMPNT: CPT

## 2020-06-05 PROCEDURE — 71045 X-RAY EXAM CHEST 1 VIEW: CPT | Mod: 26

## 2020-06-05 PROCEDURE — 82947 ASSAY GLUCOSE BLOOD QUANT: CPT

## 2020-06-05 PROCEDURE — 84466 ASSAY OF TRANSFERRIN: CPT

## 2020-06-05 RX ORDER — HYDROMORPHONE HYDROCHLORIDE 2 MG/ML
1 INJECTION INTRAMUSCULAR; INTRAVENOUS; SUBCUTANEOUS
Qty: 0 | Refills: 0 | DISCHARGE

## 2020-06-05 RX ORDER — ZOLPIDEM TARTRATE 10 MG/1
5 TABLET ORAL AT BEDTIME
Refills: 0 | Status: DISCONTINUED | OUTPATIENT
Start: 2020-06-05 | End: 2020-06-05

## 2020-06-05 RX ORDER — LANOLIN ALCOHOL/MO/W.PET/CERES
5 CREAM (GRAM) TOPICAL AT BEDTIME
Refills: 0 | Status: DISCONTINUED | OUTPATIENT
Start: 2020-06-05 | End: 2020-06-05

## 2020-06-05 RX ORDER — SENNA PLUS 8.6 MG/1
2 TABLET ORAL
Qty: 0 | Refills: 0 | DISCHARGE
Start: 2020-06-05

## 2020-06-05 RX ORDER — POLYETHYLENE GLYCOL 3350 17 G/17G
17 POWDER, FOR SOLUTION ORAL
Qty: 0 | Refills: 0 | DISCHARGE
Start: 2020-06-05

## 2020-06-05 RX ORDER — IRBESARTAN 75 MG/1
1 TABLET ORAL
Qty: 30 | Refills: 0
Start: 2020-06-05 | End: 2020-07-04

## 2020-06-05 RX ORDER — TAPENTADOL HYDROCHLORIDE 50 MG/1
1 TABLET, FILM COATED ORAL
Qty: 0 | Refills: 0 | DISCHARGE

## 2020-06-05 RX ORDER — IPRATROPIUM/ALBUTEROL SULFATE 18-103MCG
3 AEROSOL WITH ADAPTER (GRAM) INHALATION ONCE
Refills: 0 | Status: COMPLETED | OUTPATIENT
Start: 2020-06-05 | End: 2020-06-05

## 2020-06-05 RX ORDER — CEPHALEXIN 500 MG
1 CAPSULE ORAL
Qty: 8 | Refills: 0
Start: 2020-06-05 | End: 2020-06-08

## 2020-06-05 RX ORDER — CEPHALEXIN 500 MG
500 CAPSULE ORAL EVERY 12 HOURS
Refills: 0 | Status: DISCONTINUED | OUTPATIENT
Start: 2020-06-05 | End: 2020-06-05

## 2020-06-05 RX ORDER — TRAMADOL HYDROCHLORIDE 50 MG/1
1 TABLET ORAL
Qty: 20 | Refills: 0
Start: 2020-06-05

## 2020-06-05 RX ORDER — CITALOPRAM 10 MG/1
1 TABLET, FILM COATED ORAL
Qty: 30 | Refills: 0
Start: 2020-06-05

## 2020-06-05 RX ADMIN — Medication 650 MILLIGRAM(S): at 06:16

## 2020-06-05 RX ADMIN — CITALOPRAM 20 MILLIGRAM(S): 10 TABLET, FILM COATED ORAL at 11:24

## 2020-06-05 RX ADMIN — Medication 1 MILLIGRAM(S): at 06:13

## 2020-06-05 RX ADMIN — ENOXAPARIN SODIUM 40 MILLIGRAM(S): 100 INJECTION SUBCUTANEOUS at 11:24

## 2020-06-05 RX ADMIN — Medication 5 MILLIGRAM(S): at 00:16

## 2020-06-05 RX ADMIN — Medication 3 MILLILITER(S): at 00:30

## 2020-06-05 RX ADMIN — Medication 100 MILLIGRAM(S): at 00:17

## 2020-06-05 RX ADMIN — Medication 1 MILLIGRAM(S): at 12:48

## 2020-06-05 RX ADMIN — LOSARTAN POTASSIUM 25 MILLIGRAM(S): 100 TABLET, FILM COATED ORAL at 06:13

## 2020-06-05 RX ADMIN — AMLODIPINE BESYLATE 5 MILLIGRAM(S): 2.5 TABLET ORAL at 06:13

## 2020-06-05 NOTE — PROGRESS NOTE ADULT - PROBLEM SELECTOR PLAN 2
stable. overall, H/H had been downtrending since 5/29 from 10.1 to 8.9, but suspect CBC from 5/29 from hemoconcentration.  - H/H low this AM at 7.2 from 8.9 yesterday, repeat CBC sent with Hb 7.7  - no signs/evidence of any acute bleed. suspect this downtrend was from franca-operative losses.  - no signs/symptoms of acute bleed with minimal serosanguinous output from hemovac  - FOBT negative 5/31  - continue to monitor H/H daily

## 2020-06-05 NOTE — PROGRESS NOTE ADULT - PROBLEM SELECTOR PLAN 1
s/p Revision of T11-pelvis fusion with removal of left T11 screw, and removal and replacement of right T11 screw, sacral screws, and pelvic screws.  - plastics closure with paraspinal flaps and complex wound closure  - dressing changes as per plastics, next 6/9  - pain control

## 2020-06-05 NOTE — PROGRESS NOTE ADULT - PROBLEM SELECTOR PLAN 5
BP within acceptable range.  - c/w amlodipine 5mg daily   - was on losartan inpatient as irbesartan non-formulary  - resume home irbesartan-HCTZ on discharge BP within acceptable range.  - c/w amlodipine 5mg daily  - was on losartan inpatient as irbesartan non-formulary  - resume home irbesartan on discharge  - was on irbesartan-HCTZ combination pill at home, would send home on irbesartan and amlodipine and discontinue HCTZ

## 2020-06-05 NOTE — PROGRESS NOTE ADULT - ASSESSMENT
A/P: Patient is a 69y Male S/p B/L SI Joint Fusion with I-Fuse. VSS. NAD  -PT/OT- WBAT   -F/U AM Labs  -IS encouraged  -DVT PPx- Lovenox 40 mg QD to start POD#1  -Pain Control   -Remainder of care as per Neurosx  -OK to d/c today as per Orthopedic standpoint if stable and PT cleared.    Clint Sky PA-C  Orthopedic Surgery Team  Team Pager: #[phone]

## 2020-06-05 NOTE — PROGRESS NOTE ADULT - SUBJECTIVE AND OBJECTIVE BOX
PLASTIC SURGERY DAILY PROGRESS NOTE:    Subjective:  No acute events overnight endorsed. Pain controlled and otherwise doing well    Objective:    Exam:  Gen: NAD, resting in bed, alert and responding appropriately  Resp: Airway patent, non-labored respirations  Abd: ND  Back: Dressing c/d/i, soft, drain in place  Ext: No edema      Vital Signs Last 24 Hrs  T(C): 37 (05 Jun 2020 04:10), Max: 37 (05 Jun 2020 04:10)  T(F): 98.6 (05 Jun 2020 04:10), Max: 98.6 (05 Jun 2020 04:10)  HR: 106 (05 Jun 2020 04:10) (71 - 106)  BP: 134/77 (05 Jun 2020 04:10) (123/78 - 151/87)  BP(mean): 102 (04 Jun 2020 12:00) (90 - 102)  RR: 18 (05 Jun 2020 04:10) (16 - 18)  SpO2: 96% (05 Jun 2020 04:10) (93% - 100%)    I&O's Detail    04 Jun 2020 07:01  -  05 Jun 2020 07:00  --------------------------------------------------------  IN:    Oral Fluid: 240 mL  Total IN: 240 mL    OUT:    Accordian: 60 mL    Voided: 775 mL  Total OUT: 835 mL    Total NET: -595 mL      MEDICATIONS  (STANDING):  amLODIPine   Tablet 5 milliGRAM(s) Oral daily  citalopram 20 milliGRAM(s) Oral daily  clonazePAM  Tablet 1 milliGRAM(s) Oral every 8 hours  enoxaparin Injectable 40 milliGRAM(s) SubCutaneous daily  lactated ringers. 1000 milliLiter(s) (80 mL/Hr) IV Continuous <Continuous>  losartan 25 milliGRAM(s) Oral daily  polyethylene glycol 3350 17 Gram(s) Oral two times a day  senna 2 Tablet(s) Oral at bedtime  tamsulosin 0.8 milliGRAM(s) Oral at bedtime  traZODone 100 milliGRAM(s) Oral at bedtime    MEDICATIONS  (PRN):  acetaminophen   Tablet .. 650 milliGRAM(s) Oral every 6 hours PRN Temp greater or equal to 38C (100.4F), Mild Pain (1 - 3)  ALBUTerol    90 MICROgram(s) HFA Inhaler 1 Puff(s) Inhalation every 6 hours PRN Shortness of Breath and/or Wheezing  ALPRAZolam 0.5 milliGRAM(s) Oral every 6 hours PRN anxiety  benzocaine 15 mG/menthol 3.6 mG (Sugar-Free) Lozenge 1 Lozenge Oral four times a day PRN Sore Throat  benzonatate 100 milliGRAM(s) Oral three times a day PRN Cough  cyclobenzaprine 10 milliGRAM(s) Oral three times a day PRN Muscle Spasm  melatonin 5 milliGRAM(s) Oral at bedtime PRN Sleep  ondansetron Injectable 4 milliGRAM(s) IV Push every 6 hours PRN Nausea  traMADol 50 milliGRAM(s) Oral every 4 hours PRN Severe Pain (7 - 10)  traMADol 25 milliGRAM(s) Oral every 4 hours PRN Moderate Pain (4 - 6)  zolpidem 5 milliGRAM(s) Oral at bedtime PRN Insomnia      LABS:                        7.2    12.59 )-----------( 385      ( 05 Jun 2020 06:48 )             25.0     06-05    140  |  108  |  14  ----------------------------<  101<H>  3.7   |  22  |  0.87    Ca    8.3<L>      05 Jun 2020 06:48      PT/INR - ( 04 Jun 2020 05:47 )   PT: 13.8 sec;   INR: 1.20 ratio         PTT - ( 04 Jun 2020 05:47 )  PTT:30.2 sec

## 2020-06-05 NOTE — PROGRESS NOTE ADULT - PROBLEM SELECTOR PROBLEM 5
Anemia
GULSHAN (obstructive sleep apnea)
HTN (hypertension)
Anemia

## 2020-06-05 NOTE — PROGRESS NOTE ADULT - SUBJECTIVE AND OBJECTIVE BOX
[facility] Division of Hospital Medicine  Rozina Nicholson MD  Pager (M-F, 0S-2P): [phone]  Other Times:  [phone]      Patient is a 69y old  Male who presents with a chief complaint of loose hardware (05 Jun 2020 11:17)      SUBJECTIVE / OVERNIGHT EVENTS: reports some cough and clear phlegm overnight. no fevers, no shortness of breath. last hemovac removed this AM by primary team.    ADDITIONAL REVIEW OF SYSTEMS:    MEDICATIONS  (STANDING):  amLODIPine   Tablet 5 milliGRAM(s) Oral daily  cephalexin 500 milliGRAM(s) Oral every 12 hours  citalopram 20 milliGRAM(s) Oral daily  clonazePAM  Tablet 1 milliGRAM(s) Oral every 8 hours  enoxaparin Injectable 40 milliGRAM(s) SubCutaneous daily  losartan 25 milliGRAM(s) Oral daily  polyethylene glycol 3350 17 Gram(s) Oral two times a day  senna 2 Tablet(s) Oral at bedtime  tamsulosin 0.8 milliGRAM(s) Oral at bedtime  traZODone 100 milliGRAM(s) Oral at bedtime    MEDICATIONS  (PRN):  acetaminophen   Tablet .. 650 milliGRAM(s) Oral every 6 hours PRN Temp greater or equal to 38C (100.4F), Mild Pain (1 - 3)  ALBUTerol    90 MICROgram(s) HFA Inhaler 1 Puff(s) Inhalation every 6 hours PRN Shortness of Breath and/or Wheezing  ALPRAZolam 0.5 milliGRAM(s) Oral every 6 hours PRN anxiety  benzocaine 15 mG/menthol 3.6 mG (Sugar-Free) Lozenge 1 Lozenge Oral four times a day PRN Sore Throat  benzonatate 100 milliGRAM(s) Oral three times a day PRN Cough  cyclobenzaprine 10 milliGRAM(s) Oral three times a day PRN Muscle Spasm  melatonin 5 milliGRAM(s) Oral at bedtime PRN Sleep  ondansetron Injectable 4 milliGRAM(s) IV Push every 6 hours PRN Nausea  traMADol 50 milliGRAM(s) Oral every 4 hours PRN Severe Pain (7 - 10)  traMADol 25 milliGRAM(s) Oral every 4 hours PRN Moderate Pain (4 - 6)  zolpidem 5 milliGRAM(s) Oral at bedtime PRN Insomnia      CAPILLARY BLOOD GLUCOSE        I&O's Summary    04 Jun 2020 07:01  -  05 Jun 2020 07:00  --------------------------------------------------------  IN: 240 mL / OUT: 835 mL / NET: -595 mL        PHYSICAL EXAM:  Vital Signs Last 24 Hrs  T(C): 37.4 (05 Jun 2020 09:27), Max: 37.4 (05 Jun 2020 09:27)  T(F): 99.3 (05 Jun 2020 09:27), Max: 99.3 (05 Jun 2020 09:27)  HR: 95 (05 Jun 2020 09:27) (71 - 106)  BP: 116/65 (05 Jun 2020 09:27) (116/65 - 151/87)  BP(mean): --  RR: 18 (05 Jun 2020 09:27) (16 - 18)  SpO2: 96% (05 Jun 2020 09:27) (93% - 98%)    CONSTITUTIONAL: NAD, lying comfortably in bed  EYES: PERRLA; conjunctiva and sclera clear  ENMT: Moist oral mucosa, no pharyngeal injection or exudates; normal dentition  NECK: Supple, no palpable masses; no thyromegaly  RESPIRATORY: Normal respiratory effort; lungs are clear to auscultation bilaterally  CARDIOVASCULAR: Regular rate and rhythm, normal S1 and S2, no murmur/rub/gallop; No lower extremity edema; Peripheral pulses are 2+ bilaterally  ABDOMEN: Soft, Nondistended,  Nontender to palpation, normoactive bowel sounds  MUSCULOSKELETAL:  No clubbing or cyanosis of digits; no calf tenderness to palpation b/l, hemovac removed  PSYCH: A+O to person, place, and time; affect appropriate  NEUROLOGY: CN 2-12 are intact and symmetric; no gross sensory deficits   SKIN: No rashes; no palpable lesions, dressing c/d/i    LABS:                        7.7    11.70 )-----------( 426      ( 05 Jun 2020 09:32 )             27.4     06-05    140  |  108  |  14  ----------------------------<  101<H>  3.7   |  22  |  0.87    Ca    8.3<L>      05 Jun 2020 06:48      PT/INR - ( 04 Jun 2020 05:47 )   PT: 13.8 sec;   INR: 1.20 ratio         PTT - ( 04 Jun 2020 05:47 )  PTT:30.2 sec      RADIOLOGY & ADDITIONAL TESTS:  Results Reviewed: H/H downtrended on initial CBC, repeat higher at 7.7, Cr stable  Imaging Personally Reviewed:  Electrocardiogram Personally Reviewed:    COORDINATION OF CARE:  Care Discussed with Consultants/Other Providers [Y]: Neurosurgery YOSSI Granados  Prior or Outpatient Records Reviewed [Y]: Ortho and plastic surgery consult notes

## 2020-06-05 NOTE — PROGRESS NOTE ADULT - SUBJECTIVE AND OBJECTIVE BOX
HPI:  Patient is a 69 year old male with chronic back pain s/p multiple prior spine surgeries (most recent one 1/28/2020.  Presented to ED with recent falls.  Imaging revealed loosened hardware.  Admitted to neurosurgery service for further management.    OVERNIGHT EVENTS:  Patient had cough overnight.  CXR done.  Nebulizer given and symptoms resolved after.  Now feels well.  Was out of bed and ambulating this am.  Incisional pain well controlled.    Vital Signs Last 24 Hrs  T(C): 37.4 (05 Jun 2020 09:27), Max: 37.4 (05 Jun 2020 09:27)  T(F): 99.3 (05 Jun 2020 09:27), Max: 99.3 (05 Jun 2020 09:27)  HR: 95 (05 Jun 2020 09:27) (71 - 106)  BP: 116/65 (05 Jun 2020 09:27) (116/65 - 151/87)  BP(mean): 102 (04 Jun 2020 12:00) (90 - 102)  RR: 18 (05 Jun 2020 09:27) (16 - 18)  SpO2: 96% (05 Jun 2020 09:27) (93% - 100%)    I&O's Detail    04 Jun 2020 07:01  -  05 Jun 2020 07:00  --------------------------------------------------------  IN:    Oral Fluid: 240 mL  Total IN: 240 mL    OUT:    Accordian: 60 mL    Voided: 775 mL  Total OUT: 835 mL    Total NET: -595 mL        I&O's Summary    04 Jun 2020 07:01  -  05 Jun 2020 07:00  --------------------------------------------------------  IN: 240 mL / OUT: 835 mL / NET: -595 mL        PHYSICAL EXAM:  Neurological: awake, alert, oriented x3, follows commands, speech clear and fluent, moves all extremities x4 w/ 5/5 strength throughout, sensation present, intact, equal throughout    Cardiovascular: +s1, s2  Respiratory: clear to auscultation b/l  HEENT: right facial swelling/erythema resolved, non-tender, no fluctuance/induration  Gastrointestinal: soft, non-distended, non-tender  Genitourinary: +voiding  Incision/Wound: posterior midline vertical incision dressing c/d/i w/ aquacel, b/l hip incisions c/d/i w/ aquacel hemovac x1    TUBES/LINES:  [x] hemovac x1 (60cc over 24 hours)    DIET:  [x] regular    LABS:                        7.7    11.70 )-----------( 426      ( 05 Jun 2020 09:32 )             27.4     06-05    140  |  108  |  14  ----------------------------<  101<H>  3.7   |  22  |  0.87    Ca    8.3<L>      05 Jun 2020 06:48      PT/INR - ( 04 Jun 2020 05:47 )   PT: 13.8 sec;   INR: 1.20 ratio         PTT - ( 04 Jun 2020 05:47 )  PTT:30.2 sec          Allergies    Biaxin (Other)  Lidoderm (Unknown)  morphine (Short breath; Rash)        MEDICATIONS:  Antibiotics:  none    Neuro:  acetaminophen   Tablet .. 650 milliGRAM(s) Oral every 6 hours PRN  ALPRAZolam 0.5 milliGRAM(s) Oral every 6 hours PRN  citalopram 20 milliGRAM(s) Oral daily  clonazePAM  Tablet 1 milliGRAM(s) Oral every 8 hours  cyclobenzaprine 10 milliGRAM(s) Oral three times a day PRN  melatonin 5 milliGRAM(s) Oral at bedtime PRN  ondansetron Injectable 4 milliGRAM(s) IV Push every 6 hours PRN  traMADol 50 milliGRAM(s) Oral every 4 hours PRN  traMADol 25 milliGRAM(s) Oral every 4 hours PRN  traZODone 100 milliGRAM(s) Oral at bedtime  zolpidem 5 milliGRAM(s) Oral at bedtime PRN    Anticoagulation:  enoxaparin Injectable 40 milliGRAM(s) SubCutaneous daily    OTHER:  ALBUTerol    90 MICROgram(s) HFA Inhaler 1 Puff(s) Inhalation every 6 hours PRN  amLODIPine   Tablet 5 milliGRAM(s) Oral daily  benzocaine 15 mG/menthol 3.6 mG (Sugar-Free) Lozenge 1 Lozenge Oral four times a day PRN  benzonatate 100 milliGRAM(s) Oral three times a day PRN  losartan 25 milliGRAM(s) Oral daily  polyethylene glycol 3350 17 Gram(s) Oral two times a day  senna 2 Tablet(s) Oral at bedtime  tamsulosin 0.8 milliGRAM(s) Oral at bedtime    IVF:  none    CULTURES:  none    RADIOLOGY & ADDITIONAL TESTS:  CXR (6/5/2020): clear lungs

## 2020-06-05 NOTE — DISCHARGE NOTE PROVIDER - NSDCCPCAREPLAN_GEN_ALL_CORE_FT
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  No lifting.  Do not return to work until cleared to do so by physician.  No driving until cleared to do so by physician.  keep incisions clean and dry. do not get incision wet until post op day #4. do not soak in water. no baths. pat dry. do not apply cream/moisture to incisions.  items for follow up: wound check  follow up with neurosurgery, Dr. Castanon, within 1 week of discharge, call [phone]  follow up with plastic surgery, Dr. Lilly, within 1 week of discharge, call [phone]  follow up with orthopedics, Dr. Lopez, call [phone]  follow up with pulmonology, Dr. 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  No lifting.  Do not return to work until cleared to do so by physician.  No driving until cleared to do so by physician.  keep incisions clean and dry. do not get incision wet until post op day #4. do not soak in water. no baths. pat dry. do not apply cream/moisture to incisions.  Needs TLSO brace when out of bed  items for follow up: wound check  follow up with neurosurgery, Dr. Castanon, within 1 week of discharge, call [phone]  follow up with plastic surgery, Dr. Lilly, within 1 week of discharge, call [phone]  follow up with orthopedics, Dr. Lopez, call [phone]  follow up with pulmonology, Dr. 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  No lifting.  Do not return to work until cleared to do so by physician.  No driving until cleared to do so by physician.  keep incisions clean and dry. do not get incision wet until post op day #4. do not soak in water. no baths. pat dry. do not apply cream/moisture to incisions.  Needs TLSO brace when out of bed  items for follow up: wound check  follow up with neurosurgery, Dr. Castanon, within 1 week of discharge, call [phone]  follow up with plastic surgery, Dr. Lilly, within 1 week of discharge, call [phone]  follow up with orthopedics, Dr. Lopez, call [phone]  follow up with pulmonology, Dr. Bear, within 1 week of discharge, call [phone]  follow up with private psychiatrist within 1 week of discharge  follow up with PMD within 1 week of discharge

## 2020-06-05 NOTE — DISCHARGE NOTE PROVIDER - HOSPITAL COURSE
Patient is a 69 year old male with chronic back pain s/p multiple prior spine surgeries (most recent one 1/28/2020.  Presented to ED with recent falls.  Imaging revealed loosened hardware.  Admitted to neurosurgery service for further management.  Was optimized by medicine and pulmonology.  Underwent revision of t11 to pelvis fusion with plastics closure on 5/27/2020.  Tolerated procedure well with no immediate complications.  Post op pain controlled with tramadol.  Patient developed right facial swelling and ENT saw, believed to have parotitis and keflex was started.  Symptoms have resolved.  Psych saw and left temporary medication recommendations.  Patient went back to the OR on 6/4/2020 with ortho and underwent b/l SI joint fusion.  Tolerated procedure well with no immediate complications.  Physical therapy evaluated patient and said home pt w/ rolling walker upon discharge.  Occupational therapy evaluated patient and said home ot upon discharge.  Stable for discharge. Patient is a 69 year old male with chronic back pain s/p multiple prior spine surgeries (most recent one 1/28/2020.  Presented to ED with recent falls.  Imaging revealed loosened hardware.  Admitted to neurosurgery service for further management.  Was optimized by medicine and pulmonology.  Underwent revision of t11 to pelvis fusion with plastics closure on 5/27/2020.  Tolerated procedure well with no immediate complications.  Post op pain controlled with tramadol.  Patient developed right facial swelling and ENT saw, believed to have parotitis and keflex was started.  Symptoms have resolved.  Psych saw and left temporary medication recommendations.  Patient went back to the OR on 6/4/2020 with ortho and underwent b/l SI joint fusion.  Tolerated procedure well with no immediate complications.  Physical therapy evaluated patient and said home pt w/ rolling walker upon discharge.  Occupational therapy evaluated patient and said home ot upon discharge.  Stable for discharge.                  I-stop reference #[id number] Patient is a 69 year old male with chronic back pain s/p multiple prior spine surgeries (most recent one 1/28/2020.  Presented to ED with recent falls.  Imaging revealed loosened hardware.  Admitted to neurosurgery service for further management.  Was optimized by medicine and pulmonology.  Underwent revision of t11 to pelvis fusion with plastics closure on 5/27/2020.  Tolerated procedure well with no immediate complications.  Post op pain controlled with tramadol.  Patient developed right facial swelling and ENT saw, believed to have parotitis and keflex was started.  Symptoms have resolved.  Psych saw and left temporary medication recommendations.  Patient went back to the OR on 6/4/2020 with ortho and underwent b/l SI joint fusion.  Tolerated procedure well with no immediate complications.  Needs TLSO brace when out of bed.  Physical therapy evaluated patient and said home pt w/ rolling walker upon discharge.  Occupational therapy evaluated patient and said home ot upon discharge.  Stable for discharge.                  I-stop reference #[id number] Patient is a 69 year old male with chronic back pain s/p multiple prior spine surgeries (most recent one 1/28/2020.  Presented to ED with recent falls.  Imaging revealed loosened hardware.  Admitted to neurosurgery service for further management.  Was optimized by medicine and pulmonology.  Underwent revision of t11 to pelvis fusion with plastics closure on 5/27/2020.  Tolerated procedure well with no immediate complications.  Post op pain controlled with tramadol.  Patient developed right facial swelling and ENT saw, believed to have parotitis and keflex was started.  Symptoms have resolved.  Psych saw and left temporary medication recommendations.  Patient went back to the OR on 6/4/2020 with ortho and underwent b/l SI joint fusion.  Tolerated procedure well with no immediate complications.  Needs TLSO brace when out of bed.  Physical therapy evaluated patient and said home pt w/ rolling walker upon discharge.  Occupational therapy evaluated patient and said home ot upon discharge.  Stable for discharge.                  I-stop reference #[id number]    Patient previously prescribed nucynta, dilaudid, and ritalin, however he states he stopped taking these medications because he did not like the way they made him feel so he has been off of them since before surgery.

## 2020-06-05 NOTE — PROGRESS NOTE ADULT - SUBJECTIVE AND OBJECTIVE BOX
POST OPERATIVE DAY #:  [1]     Patient reports feeling "A little weak in his legs this AM"  Overall reports no complaints or acute events overnight.  T(C): 37 (06-05-20 @ 04:10), Max: 37 (06-05-20 @ 04:10)  HR: 106 (06-05-20 @ 04:10) (71 - 106)  BP: 134/77 (06-05-20 @ 04:10) (123/78 - 151/87)  RR: 18 (06-05-20 @ 04:10) (16 - 18)  SpO2: 96% (06-05-20 @ 04:10) (93% - 100%)  Wt(kg): --  Exam:   Alert/Oriented, No Acute Distress           Dressing: Aquacel dressing c/d/i.           Drains: x1 Hemovac on suction         Sensation: [x] intact to light touch          Motor exam:                    [x] Lower extremity           PF          DF         EHL       FHL                                                                                   R        5/5        5/5        5/5       5/5                                               L         5/5        5/5        5/5       5/5                                                                  Calves Soft/Non-tender bilaterally          +DP pulses             LABS:                        7.2    12.59 )-----------( 385      ( 05 Jun 2020 06:48 )             25.0     06-05    140  |  108  |  14  ----------------------------<  101<H>  3.7   |  22  |  0.87    Ca    8.3<L>      05 Jun 2020 06:48

## 2020-06-05 NOTE — DISCHARGE NOTE PROVIDER - CARE PROVIDERS DIRECT ADDRESSES
[email or web address]

## 2020-06-05 NOTE — DISCHARGE NOTE PROVIDER - NSDCACTIVITY_GEN_ALL_CORE
No heavy lifting/straining/Walking - Indoors allowed/Do not drive or operate machinery/Do not make important decisions

## 2020-06-05 NOTE — PROGRESS NOTE ADULT - ASSESSMENT
70 yo man with PMH chronic back pain s/p multiple lumbar laminectomy/fusion with recent admission from 1/22/2020 to 2/3/2020 for B83-Hkeyik Fusion on 1/28 , NPH s/p  shunt, HTN, HLD, anxiety, GULSHAN on CPAP presents to ED after he was found to have loosening of hardware on CT by his spine surgeon. Revision of T11-pelvis fusion w removal of left T11 screw, and removal and replacement of right T11 screw, sacral screws, and pelvic screws, plastics closure with paraspinal flaps and complex wound closure. EBL 500cc. Now s/p SI joint fusion with Ortho on 6/4.

## 2020-06-05 NOTE — PROGRESS NOTE ADULT - NSHPATTENDINGPLANDISCUSS_GEN_ALL_CORE
BERTA Black
patient and Neurosurgery PA Oren
15-Sep-2019 19:07
patient and Neurosurgery YOSSI Carpio
patient and Neurosurgery YOSSI Granados

## 2020-06-05 NOTE — PROGRESS NOTE ADULT - ASSESSMENT
69M s/p T10-pelvis revision PSF by nsx and paraspinal muscle flap closure 5/28, doing well    Plan:  - dressing changed 6/4, next change 6/9  - drain per nsgy  - Will continue to follow when pt in house  - Care per primary team    Plastic Surgery  [phone]

## 2020-06-05 NOTE — DISCHARGE NOTE PROVIDER - NSDCMRMEDTOKEN_GEN_ALL_CORE_FT
ALPRAZolam 1 mg oral tablet: 1 tab(s) orally every 6 hours, As needed, Anxiety  amLODIPine 5 mg oral tablet: 1 tab(s) orally   Dilaudid 4 mg oral tablet: 1 tab(s) orally every 8 hours  irbesartan-hydrochlorothiazide 300mg-12.5mg oral tablet: 1 tab(s) orally once a day  Nucynta  mg oral tablet, extended release: 1 tab(s) orally 2 times a day  tamsulosin 0.4 mg oral capsule: 2 cap(s) orally once a day (at bedtime)  TLSO brace: use as directed    dx: hardware failure  traZODone 100 mg oral tablet: 1 tab(s) orally once a day  Vitamin B12 1000 mcg oral tablet: 1 tab(s) orally once a day  Vitamin D3 1000 intl units oral tablet: 2 tab(s) orally once a day  zolpidem 5 mg oral tablet: 1 tab(s) orally every other day (at bedtime), As Needed, alternating with Trazadone ALPRAZolam 1 mg oral tablet: 1 tab(s) orally every 6 hours, As needed, Anxiety  amLODIPine 5 mg oral tablet: 1 tab(s) orally   cephalexin 500 mg oral capsule: 1 cap(s) orally every 12 hours  citalopram 20 mg oral tablet: 1 tab(s) orally once a day  irbesartan-hydrochlorothiazide 300mg-12.5mg oral tablet: 1 tab(s) orally once a day  polyethylene glycol 3350 oral powder for reconstitution: 17 gram(s) orally 2 times a day  senna oral tablet: 2 tab(s) orally once a day (at bedtime)  tamsulosin 0.4 mg oral capsule: 2 cap(s) orally once a day (at bedtime)  TLSO brace: use as directed    dx: hardware failure  traMADol 50 mg oral tablet: 1 tab(s) orally every 4 hours, As needed, Severe Pain (7 - 10) MDD:four tabs  traZODone 100 mg oral tablet: 1 tab(s) orally once every other day at bedtime (alternates with ambien)  Vitamin B12 1000 mcg oral tablet: 1 tab(s) orally once a day  Vitamin D3 1000 intl units oral tablet: 2 tab(s) orally once a day  zolpidem 5 mg oral tablet: 1 tab(s) orally every other day (at bedtime), As Needed, alternating with Trazadone ALPRAZolam 1 mg oral tablet: 1 tab(s) orally every 6 hours, As needed, Anxiety  amLODIPine 5 mg oral tablet: 1 tab(s) orally   cephalexin 500 mg oral capsule: 1 cap(s) orally every 12 hours  citalopram 20 mg oral tablet: 1 tab(s) orally once a day  irbesartan 300 mg oral tablet: 1 tab(s) orally once a day   polyethylene glycol 3350 oral powder for reconstitution: 17 gram(s) orally 2 times a day  senna oral tablet: 2 tab(s) orally once a day (at bedtime)  tamsulosin 0.4 mg oral capsule: 2 cap(s) orally once a day (at bedtime)  TLSO brace: use as directed    dx: hardware failure  traMADol 50 mg oral tablet: 1 tab(s) orally every 4 hours, As needed, Severe Pain (7 - 10) MDD:four tabs  traZODone 100 mg oral tablet: 1 tab(s) orally once every other day at bedtime (alternates with ambien)  Vitamin B12 1000 mcg oral tablet: 1 tab(s) orally once a day  Vitamin D3 1000 intl units oral tablet: 2 tab(s) orally once a day  zolpidem 5 mg oral tablet: 1 tab(s) orally every other day (at bedtime), As Needed, alternating with Trazadone

## 2020-06-05 NOTE — PROGRESS NOTE ADULT - REASON FOR ADMISSION
falls, loose hardware
loose hardware
loose hardware
falls, loose hardware

## 2020-06-05 NOTE — PROGRESS NOTE ADULT - PROBLEM SELECTOR PLAN 4
stable.  - c/w trazodone 100mg qHS  - c/w citalopram 20mg daily - decreased from 40mg  - seen by psychiatry, recs noted  - c/w home xanax PRN and ambien as rx'ed by his psychiatrist outpatient  - ultimately, should still follow-up with his outpatient psychiatrist upon discharge for  any further medication adjustments

## 2020-06-05 NOTE — PROGRESS NOTE ADULT - ASSESSMENT
HPI:  Patient is a 69 year old male with chronic back pain s/p multiple prior spine surgeries (most recent one 1/28/2020.  Presented to ED with recent falls.  Imaging revealed loosened hardware.  Admitted to neurosurgery service for further management.    PROCEDURE: s/p revision of t11 to pelvis fusion with plastics closure on 5/27/2020, now s/p b/l SI joint fusion on 6/4/2020  POD#9, #1    PLAN:  Neuro:   -q4 hour neuro checks  -tramadol for pain control  -flexeril for muscle spasms  -klonopin/xanax for anxiety  -celexa and trazodone for depression  -out of bed with assistance  -tlso brace when out of bed  -pt - home pt with rolling walker upon discharge  -ot - home ot upon discharge    Respiratory:   -satting well on room air  -cxr clear  -incentive spirometer for lung expansion    CV:  -keep sbp 100-140  -losartan and norvasc for bp control    Endocrine:   -maintain euglycemia    Heme/Onc:   -lovenox and SCDs for DVT prophylaxis    Renal:   -normal creatinine  -voiding    ID:   -wbc count mildly elevated secondary to OR yesterday, has been afebrile    GI:   -regular diet  -senna and miralax for bowel regimen      Spectra #[id number]                    Assessment:  Please Check When Present   []  GCS  E   V  M     Heart Failure: []Acute, [] acute on chronic , []chronic  Heart Failure:  [] Diastolic (HFpEF), [] Systolic (HFrEF), []Combined (HFpEF and HFrEF), [] RHF, [] Pulm HTN, [] Other    [] JOSE MIGUEL, [] ATN, [] AIN, [] other  [] CKD1, [] CKD2, [] CKD 3, [] CKD 4, [] CKD 5, []ESRD    Encephalopathy: [] Metabolic, [] Hepatic, [] toxic, [] Neurological, [] Other    Abnormal Nurtitional Status: [] malnurtition (see nutrition note), [ ]underweight: BMI < 19, [] morbid obesity: BMI >40, [] Cachexia    [] Sepsis  [] hypovolemic shock,[] cardiogenic shock, [] hemorrhagic shock, [] neuogenic shock  [] Acute Respiratory Failure  []Cerebral edema, [] Brain compression/ herniation,   [] Functional quadriplegia  [] Acute blood loss anemia HPI:  Patient is a 69 year old male with chronic back pain s/p multiple prior spine surgeries (most recent one 1/28/2020.  Presented to ED with recent falls.  Imaging revealed loosened hardware.  Admitted to neurosurgery service for further management.    PROCEDURE: s/p revision of t11 to pelvis fusion with plastics closure on 5/27/2020, now s/p b/l SI joint fusion on 6/4/2020  POD#9, #1    PLAN:  Neuro:   -q4 hour neuro checks  -hemovac taken off suction and removed with no difficulties, patient tolerated well with no immediate complications, dressing placed  -tramadol for pain control  -flexeril for muscle spasms  -klonopin/xanax for anxiety  -celexa and trazodone for depression  -out of bed with assistance  -tlso brace when out of bed  -pt - home pt with rolling walker upon discharge  -ot - home ot upon discharge    Respiratory:   -satting well on room air  -cxr clear  -incentive spirometer for lung expansion    CV:  -keep sbp 100-140  -losartan and norvasc for bp control    Endocrine:   -maintain euglycemia    Heme/Onc:   -lovenox and SCDs for DVT prophylaxis  -h/h slight drop after OR yesterday, patient asymptomatic    Renal:   -normal creatinine  -voiding    ID:   -wbc count mildly elevated secondary to OR yesterday, has been afebrile    GI:   -regular diet  -senna and miralax for bowel regimen      Spectra #[phone]                    Assessment:  Please Check When Present   []  GCS  E   V  M     Heart Failure: []Acute, [] acute on chronic , []chronic  Heart Failure:  [] Diastolic (HFpEF), [] Systolic (HFrEF), []Combined (HFpEF and HFrEF), [] RHF, [] Pulm HTN, [] Other    [] JOSE MIGUEL, [] ATN, [] AIN, [] other  [] CKD1, [] CKD2, [] CKD 3, [] CKD 4, [] CKD 5, []ESRD    Encephalopathy: [] Metabolic, [] Hepatic, [] toxic, [] Neurological, [] Other    Abnormal Nurtitional Status: [] malnurtition (see nutrition note), [ ]underweight: BMI < 19, [] morbid obesity: BMI >40, [] Cachexia    [] Sepsis  [] hypovolemic shock,[] cardiogenic shock, [] hemorrhagic shock, [] neuogenic shock  [] Acute Respiratory Failure  []Cerebral edema, [] Brain compression/ herniation,   [] Functional quadriplegia  [] Acute blood loss anemia

## 2020-06-05 NOTE — PROGRESS NOTE ADULT - ATTENDING COMMENTS
Pt seen walking and standing comfortably.  Exam and plan as above.  D/C home
Pt seen and examined.  Exam and plan as above. for SIJ fusion tomorrow
Dr. Rozina Nicholson  Division of Hospital Medicine  [facility]   Spectra: [phone]
discussed with [facility] neurosurgery NP Bud Farrell at [phone]    Prabha Burger D.O.  Hospitalist Pager # [phone]
discussed with [facility] neurosurgery NP Bud Farrell at [phone]    Prabha Burger D.O.  Hospitalist Pager # [phone]
Dr. Rozina Nicholson  Division of Hospital Medicine  [facility]   Spectra: [phone]
Dr. Rozina Nicholson  Division of Hospital Medicine  Health system   Spectra: [phone]
Dr. Rozina Nicholson  Division of Hospital Medicine  [facility]   Spectra: [phone]
Dr. Rozina Nicholson  Division of Hospital Medicine  [facility]   Spectra: [phone]

## 2020-06-05 NOTE — DISCHARGE NOTE PROVIDER - CARE PROVIDER_API CALL
Keira Castanon  NEUROSURGERY  [address] [place] [address]  Phone: [phone]  Fax: [phone]  Follow Up Time: 1 week    Kendell Lilly  PLASTIC SURGERY  [address] [place] [address]  Phone: [phone]  Fax: [phone]  Follow Up Time: 1 week    Pedro Lopez  ORTHOPAEDIC SURGERY  [address] [place] [address]  Phone: [phone]  Fax: [phone]  Follow Up Time: 1 week    Ashley Bear  CRITICAL CARE MEDICINE  [address]  [place] [address]  Phone: [phone]  Fax: [phone]  Follow Up Time: 1 week

## 2020-06-05 NOTE — DISCHARGE NOTE PROVIDER - PROVIDER TOKENS
PROVIDER:[TOKEN:[[id number]],FOLLOWUP:[1 week]],PROVIDER:[TOKEN:[[id number]],FOLLOWUP:[1 week]],PROVIDER:[TOKEN:[[id number]],FOLLOWUP:[1 week]],PROVIDER:[TOKEN:[[id number]],FOLLOWUP:[1 week]]

## 2020-06-05 NOTE — DISCHARGE NOTE PROVIDER - NSDCFUADDINST_GEN_ALL_CORE_FT
s/p revision of t11 to pelvis fusion with plastics closure on 5/27/2020, now s/p b/l SI joint fusion on 6/4/2020  No strenuous activity.  No lifting.  Do not return to work until cleared to do so by physician.  No driving until cleared to do so by physician.  keep incisions clean and dry. do not get incision wet until post op day #4. do not soak in water. no baths. pat dry. do not apply cream/moisture to incisions.  items for follow up: wound check  follow up with neurosurgery, Dr. Castanon, within 1 week of discharge, call [phone]  follow up with plastic surgery, Dr. Lilly, within 1 week of discharge, call [phone]  follow up with orthopedics, Dr. Lopez, call [phone]  follow up with pulmonology, Dr. Bear, within 1 week of discharge, call [phone]  follow up with PMD within 1 week of discharge s/p revision of t11 to pelvis fusion with plastics closure on 5/27/2020, now s/p b/l SI joint fusion on 6/4/2020  No strenuous activity.  No lifting.  Do not return to work until cleared to do so by physician.  No driving until cleared to do so by physician.  keep incisions clean and dry. do not get incision wet until post op day #4. do not soak in water. no baths. pat dry. do not apply cream/moisture to incisions.    Needs TLSO brace when out of bed    items for follow up: wound check  follow up with neurosurgery, Dr. Castanon, within 1 week of discharge, call [phone]  follow up with plastic surgery, Dr. Lilly, within 1 week of discharge, call [phone]  follow up with orthopedics, Dr. Lopez, call [phone]  follow up with pulmonology, Dr. Bear, within 1 week of discharge, call [phone]  follow up with PMD within 1 week of discharge s/p revision of t11 to pelvis fusion with plastics closure on 5/27/2020, now s/p b/l SI joint fusion on 6/4/2020  No strenuous activity.  No lifting.  Do not return to work until cleared to do so by physician.  No driving until cleared to do so by physician.  keep incisions clean and dry. do not get incision wet until post op day #4. do not soak in water. no baths. pat dry. do not apply cream/moisture to incisions.    Needs TLSO brace when out of bed    items for follow up: wound check  follow up with neurosurgery, Dr. Castanon, within 1 week of discharge, call [phone]  follow up with plastic surgery, Dr. Lilly, within 1 week of discharge, call [phone]  follow up with orthopedics, Dr. Lopez, call [phone]  follow up with pulmonology, Dr. Bear, within 1 week of discharge, call [phone]  follow up with private psychiatrist within 1 week of discharge  follow up with PMD within 1 week of discharge

## 2020-06-05 NOTE — DISCHARGE NOTE NURSING/CASE MANAGEMENT/SOCIAL WORK - PATIENT PORTAL LINK FT
You can access the FollowMyHealth Patient Portal offered by [facility] by registering at the following website: [email or web address]. By joining [employer]’s FollowMyHealth portal, you will also be able to view your health information using other applications (apps) compatible with our system.

## 2020-06-09 ENCOUNTER — TREATMENT (OUTPATIENT)
Dept: PHYSICAL THERAPY | Facility: CLINIC | Age: 70
End: 2020-06-09

## 2020-06-09 ENCOUNTER — APPOINTMENT (OUTPATIENT)
Dept: PULMONOLOGY | Facility: CLINIC | Age: 70
End: 2020-06-09

## 2020-06-09 DIAGNOSIS — G89.29 CHRONIC PAIN OF BOTH SHOULDERS: Primary | ICD-10-CM

## 2020-06-09 DIAGNOSIS — M25.512 CHRONIC PAIN OF BOTH SHOULDERS: Primary | ICD-10-CM

## 2020-06-09 DIAGNOSIS — M25.511 CHRONIC PAIN OF BOTH SHOULDERS: Primary | ICD-10-CM

## 2020-06-09 PROCEDURE — 97140 MANUAL THERAPY 1/> REGIONS: CPT | Performed by: PHYSICAL THERAPIST

## 2020-06-09 PROCEDURE — 97110 THERAPEUTIC EXERCISES: CPT | Performed by: PHYSICAL THERAPIST

## 2020-06-09 PROCEDURE — G0283 ELEC STIM OTHER THAN WOUND: HCPCS | Performed by: PHYSICAL THERAPIST

## 2020-06-09 NOTE — PROGRESS NOTES
Physical Therapy Daily Progress Note      Patient: Orion Harvey   : 1950  Diagnosis/ICD-10 Code:  Chronic pain of both shoulders [M25.511, G89.29, M25.512]  Referring practitioner: Nazanin Garcia*  Date of Initial Visit: Type: THERAPY  Noted: 2020  Today's Date: 2020  Patient seen for 8 sessions         Orion Harvey reports: he was able to wash his RV since last visit, had no problems with L shoulder but had pain and grinding feeling in R shoulder. Pt happy with overall mobility he has gained in B shoulders since start of PT.    Objective : pain intermittently throughout visit of R shoulder. Crepitus noted B shoulders, R>L.  See Exercise, Manual, and Modality Logs for complete treatment.       Assessment/Plan : Good tolerance to ther ex with occasional c/o R shoulder pain. Continues to show ROM gains B. Strength gains slow but progressing. Will benefit from continued strengthening B shoulders.    Progress per Plan of Care           Timed:         Manual Therapy:    15     mins  33625;     Therapeutic Exercise:    25     mins  43206;     Neuromuscular Taylor:        mins  12515;    Therapeutic Activity:          mins  07309;     Gait Training:           mins  58371;     Ultrasound:          mins  53577;    Ionto                                   mins   51851  Self Care                            mins   54104  Canalith Repos                   mins  4209    Un-Timed:  Electrical Stimulation:    15     mins  89402 ( );  Dry Needling          mins self-pay  Traction          mins 89584  Low Eval          Mins  50802  Mod Eval          Mins  46349  High Eval                            Mins  68811    Timed Treatment:   40   mins   Total Treatment:     55   mins    Vangie Torres, PT  Physical Therapist

## 2020-06-11 ENCOUNTER — TREATMENT (OUTPATIENT)
Dept: PHYSICAL THERAPY | Facility: CLINIC | Age: 70
End: 2020-06-11

## 2020-06-11 ENCOUNTER — APPOINTMENT (OUTPATIENT)
Dept: SPINE | Facility: CLINIC | Age: 70
End: 2020-06-11
Payer: MEDICARE

## 2020-06-11 VITALS
HEIGHT: 68 IN | RESPIRATION RATE: 18 BRPM | TEMPERATURE: 98 F | BODY MASS INDEX: 30.31 KG/M2 | SYSTOLIC BLOOD PRESSURE: 120 MMHG | DIASTOLIC BLOOD PRESSURE: 70 MMHG | OXYGEN SATURATION: 97 % | HEART RATE: 95 BPM | WEIGHT: 200 LBS

## 2020-06-11 DIAGNOSIS — G89.18 OTHER ACUTE POSTPROCEDURAL PAIN: ICD-10-CM

## 2020-06-11 DIAGNOSIS — M25.511 CHRONIC PAIN OF BOTH SHOULDERS: Primary | ICD-10-CM

## 2020-06-11 DIAGNOSIS — G89.29 CHRONIC PAIN OF BOTH SHOULDERS: Primary | ICD-10-CM

## 2020-06-11 DIAGNOSIS — M25.512 CHRONIC PAIN OF BOTH SHOULDERS: Primary | ICD-10-CM

## 2020-06-11 PROCEDURE — 97110 THERAPEUTIC EXERCISES: CPT | Performed by: PHYSICAL THERAPIST

## 2020-06-11 PROCEDURE — 97140 MANUAL THERAPY 1/> REGIONS: CPT | Performed by: PHYSICAL THERAPIST

## 2020-06-11 PROCEDURE — 99024 POSTOP FOLLOW-UP VISIT: CPT

## 2020-06-11 PROCEDURE — G0283 ELEC STIM OTHER THAN WOUND: HCPCS | Performed by: PHYSICAL THERAPIST

## 2020-06-11 RX ORDER — HYDROMORPHONE HYDROCHLORIDE 4 MG/1
4 TABLET ORAL
Qty: 21 | Refills: 0 | Status: DISCONTINUED | COMMUNITY
Start: 2020-03-10 | End: 2020-06-11

## 2020-06-11 RX ORDER — TAPENTADOL HYDROCHLORIDE 100 MG/1
TABLET, FILM COATED ORAL
Refills: 0 | Status: DISCONTINUED | COMMUNITY
End: 2020-06-11

## 2020-06-11 NOTE — PROGRESS NOTES
Physical Therapy Daily Progress Note      Patient: Orion Harvey   : 1950  Diagnosis/ICD-10 Code:  Chronic pain of both shoulders [M25.511, G89.29, M25.512]  Referring practitioner: Nazanin Garcia*  Date of Initial Visit: Type: THERAPY  Noted: 2020  Today's Date: 2020  Patient seen for 9 sessions         Orion Harvey reports: his shoulders are feeling better. States he feels like he is going to probably have to do R TSR if he wants to be able to do the things he wants with less pain. Pleased with ROM and strength gains of both shoulders but still having a lot of pain using R UE and not seeing much more gains over the last couple weeks.    Objective : Educated pt benefits of TSR if approved by MD. Pt verbalized understanding.  See Exercise, Manual, and Modality Logs for complete treatment.       Assessment/Plan : Very limited R ER due to pain and crepitus. Pt continues to make gains with L UE but nearing plateau in R UE.     Progress per Plan of Care           Timed:         Manual Therapy:    10     mins  34042;     Therapeutic Exercise:    20     mins  91714;     Neuromuscular Taylor:        mins  78094;    Therapeutic Activity:          mins  42228;     Gait Training:           mins  82654;     Ultrasound:          mins  33058;    Ionto                                   mins   48571  Self Care                            mins   88726  Canalith Repos                   mins  4209    Un-Timed:  Electrical Stimulation:    15     mins  65924 ( );  Dry Needling          mins self-pay  Traction          mins 96921  Low Eval          Mins  99879  Mod Eval          Mins  01752  High Eval                            Mins  47257    Timed Treatment:   30   mins   Total Treatment:     45   mins    Vangie Torres, PT  Physical Therapist

## 2020-06-11 NOTE — PHYSICAL EXAM
[General Appearance - Alert] : alert [Well-Healed] : not well-healed [Healing Well] : healing well [Longitudinal] : longitudinal [Normal Skin] : normal [No Drainage] : without drainage [Oriented To Time, Place, And Person] : oriented to person, place, and time [Cranial Nerves Optic (II)] : visual acuity intact bilaterally,  pupils equal round and reactive to light [Cranial Nerves Facial (VII)] : face symmetrical [Cranial Nerves Oculomotor (III)] : extraocular motion intact [Cranial Nerves Trigeminal (V)] : facial sensation intact symmetrically [Cranial Nerves Vestibulocochlear (VIII)] : hearing was intact bilaterally [Cranial Nerves Hypoglossal (XII)] : there was no tongue deviation with protrusion [Cranial Nerves Accessory (XI - Cranial And Spinal)] : head turning and shoulder shrug symmetric [Cranial Nerves Glossopharyngeal (IX)] : tongue and palate midline [Motor Strength] : muscle strength was normal in all four extremities [Motor Tone] : muscle tone was normal in all four extremities [Abnormal Walk] : normal gait [Balance] : balance was intact [No Visual Abnormalities] : no visible abnormailities [Sclera] : the sclera and conjunctiva were normal [Abdomen Soft] : soft [Heart Rate And Rhythm] : heart rate was normal and rhythm regular [Outer Ear] : the ears and nose were normal in appearance [FreeTextEntry1] : Ambulate with Walker [Skin Color & Pigmentation] : normal skin color and pigmentation

## 2020-06-11 NOTE — REASON FOR VISIT
[de-identified] : 6/4/20 [de-identified] : S/P Bilateral percutaneous sacroiliac joint fusion.\par  [Other: _____] : [unfilled] [de-identified] : Today he comes to the office for postop evaluation\par Wearing TLSO Brace, Ambulating with wheeled walker with steady gait\par \par THORACOLUMBAR  incision well healed; Occlusive dressing removed\par No signs of infection noted\par Wound edges approximated, NO WOUND DRAINAGE\par \par Pt reports that he is doing better\par

## 2020-06-11 NOTE — ASSESSMENT
[FreeTextEntry1] : S/P Sacroliiac Fusion\par Doing well'\par No signs of infection noted at incision\par \par Pain well controlled with Tramadol. Pt tolerating medication without adverse effects\par medication renewed\par Medication Purpose, Action, Possible interactions, Side effects as well as adverse effects explain to pt.\par The risk of ADDICTION explained as well as following instructions recommended.\par Teachback Protocol implemented\par \par Follow up to see Dr. juarez with Xrays\par

## 2020-06-11 NOTE — REVIEW OF SYSTEMS
[Abnormal Sensation] : an abnormal sensation [Difficulty Walking] : difficulty walking [Negative] : Heme/Lymph [de-identified] : IMPROVED PAIN SYNDROME\par IMPROVED GAIT AND BALANCE

## 2020-06-16 ENCOUNTER — TREATMENT (OUTPATIENT)
Dept: PHYSICAL THERAPY | Facility: CLINIC | Age: 70
End: 2020-06-16

## 2020-06-16 ENCOUNTER — APPOINTMENT (OUTPATIENT)
Dept: INTERNAL MEDICINE | Facility: CLINIC | Age: 70
End: 2020-06-16
Payer: MEDICARE

## 2020-06-16 VITALS
SYSTOLIC BLOOD PRESSURE: 146 MMHG | HEART RATE: 82 BPM | HEIGHT: 68 IN | DIASTOLIC BLOOD PRESSURE: 86 MMHG | WEIGHT: 200 LBS | BODY MASS INDEX: 30.31 KG/M2

## 2020-06-16 DIAGNOSIS — F41.8 OTHER SPECIFIED ANXIETY DISORDERS: ICD-10-CM

## 2020-06-16 DIAGNOSIS — M25.511 CHRONIC PAIN OF BOTH SHOULDERS: Primary | ICD-10-CM

## 2020-06-16 DIAGNOSIS — G89.29 CHRONIC PAIN OF BOTH SHOULDERS: Primary | ICD-10-CM

## 2020-06-16 DIAGNOSIS — M25.512 CHRONIC PAIN OF BOTH SHOULDERS: Primary | ICD-10-CM

## 2020-06-16 PROCEDURE — 97110 THERAPEUTIC EXERCISES: CPT | Performed by: PHYSICAL THERAPIST

## 2020-06-16 PROCEDURE — 99213 OFFICE O/P EST LOW 20 MIN: CPT | Mod: 95

## 2020-06-16 PROCEDURE — G0283 ELEC STIM OTHER THAN WOUND: HCPCS | Performed by: PHYSICAL THERAPIST

## 2020-06-16 PROCEDURE — 97140 MANUAL THERAPY 1/> REGIONS: CPT | Performed by: PHYSICAL THERAPIST

## 2020-06-16 RX ORDER — IRBESARTAN AND HYDROCHLOROTHIAZIDE 300; 12.5 MG/1; MG/1
300-12.5 TABLET ORAL DAILY
Qty: 90 | Refills: 3 | Status: DISCONTINUED | COMMUNITY
Start: 2018-08-23 | End: 2020-06-16

## 2020-06-16 NOTE — ASSESSMENT
[FreeTextEntry1] : 68 y/o male with h/oHTN,  acute/chronic back pain s/p multiple surgeries in the past, most recent in 3/2020 by Dr. Castanon, followed by loosening of hardware.  then, s/p Bilateral percutaneous sacroiliac joint fusion, Surgery Date: 6/4/20 preformed by Dr. Lopez.  \par now pain is severe at times, takes tramadol as needed with mild relief. also wearing vest following procedure.  \par during hospitalization, hctz was discontinued.  BP stable \par -advsied to f/u with spine center regarding pain, \par -cont meds as directed\par -will f/u in 1 month \par \par

## 2020-06-16 NOTE — HISTORY OF PRESENT ILLNESS
[de-identified] : 68 y/o male with h/o acute/chronic back pain s/p multiple surgeries in the past, most recent in 3/2020 by Dr. Castanon, followed by loosening of hardware.  then, s/p Bilateral percutaneous sacroiliac joint fusion, Surgery Date: 6/4/20 preformed by Dr. Lopez.  \par now pain is severe at times, takes tramadol as needed with mild relief. also wearing vest following procedure.  \par during hospitalization, hctz was discontinued.  BP stable

## 2020-06-16 NOTE — PROGRESS NOTES
Physical Therapy Daily Progress Note / 10th Visit Progress Note      Patient: Orion Harvey   : 1950  Diagnosis/ICD-10 Code:  Chronic pain of both shoulders [M25.511, G89.29, M25.512]  Referring practitioner: Nazanin Garcia*  Date of Initial Visit: Type: THERAPY  Noted: 2020  Today's Date: 2020  Patient seen for 10 sessions         Orion Harvey reports: he used weed eater over the weekend and had better ability but no change in pain. Has following questions regarding TSR: how long does it last? Will he be able to target shoot with rifle and shotgun after recovery phase?    Subjective questionnaire not completed this date in error.      Objective : Educated pt that this PT has never seen a pt that had a revision of TSR and that generally you are able to return to full activity after recovery. Asked pt's question to ortho surgeon and awaiting response.  See Exercise, Manual, and Modality Logs for complete treatment.       Assessment/Plan : Good tolerance to ther ex. Continued pain, especially R shoulder. L shoulder ROM and strength continue to improve. Pt will benefit from continued PT to further progress strength and ROM of B shoulders for improved functional use.    STG:  - Increase R shoulder flex PROM to 110 deg in 3 weeks. - MET  - Pt to demonstrate improved posture with min cues in 3 weeks. - Progressing  - Pt to be independent with HEP in 3 weeks. - MET  LTG:  - Increase R shoulder flex AROM to 110 deg for improved ability to reach overhead by discharge.  - Not met  - Improve Quick DASH to 40% or less by discharge. - Not met  - Pt to report ability to cast while fishing with no increase in pain by discharge. - Progressing    Progress per Plan of Care           Timed:         Manual Therapy:    10     mins  12421;     Therapeutic Exercise:    20     mins  82214;     Neuromuscular Taylor:        mins  36719;    Therapeutic Activity:          mins  97938;     Gait Training:            mins  58752;     Ultrasound:          mins  57338;    Ionto                                   mins   72579  Self Care                            mins   56494  Canalith Repos                   mins  4209    Un-Timed:  Electrical Stimulation:    15     mins  24534 ( );  Dry Needling          mins self-pay  Traction          mins 12197  Low Eval          Mins  65241  Mod Eval          Mins  02664  High Eval                            Mins  69036    Timed Treatment:   30   mins   Total Treatment:     45   mins    Vangie Torres PT  Physical Therapist

## 2020-06-18 ENCOUNTER — TREATMENT (OUTPATIENT)
Dept: PHYSICAL THERAPY | Facility: CLINIC | Age: 70
End: 2020-06-18

## 2020-06-18 DIAGNOSIS — M25.511 CHRONIC PAIN OF BOTH SHOULDERS: Primary | ICD-10-CM

## 2020-06-18 DIAGNOSIS — M25.512 CHRONIC PAIN OF BOTH SHOULDERS: Primary | ICD-10-CM

## 2020-06-18 DIAGNOSIS — G89.29 CHRONIC PAIN OF BOTH SHOULDERS: Primary | ICD-10-CM

## 2020-06-18 PROCEDURE — 97140 MANUAL THERAPY 1/> REGIONS: CPT | Performed by: PHYSICAL THERAPIST

## 2020-06-18 PROCEDURE — G0283 ELEC STIM OTHER THAN WOUND: HCPCS | Performed by: PHYSICAL THERAPIST

## 2020-06-18 PROCEDURE — 97110 THERAPEUTIC EXERCISES: CPT | Performed by: PHYSICAL THERAPIST

## 2020-06-18 NOTE — PROGRESS NOTES
Physical Therapy Daily Progress Note      Patient: Orion Harvey   : 1950  Diagnosis/ICD-10 Code:  Chronic pain of both shoulders [M25.511, G89.29, M25.512]  Referring practitioner: Nazanin Garcia*  Date of Initial Visit: Type: THERAPY  Noted: 2020  Today's Date: 2020  Patient seen for 11 sessions         Orion Harvey reports: R shoulder is more sore today and has been popping more. Plans to make appt to see his MD for referral to ortho. Will be out of town next week on vacation.      Objective :   See Exercise, Manual, and Modality Logs for complete treatment.       Assessment/Plan : Slight improvement in R shoulder flex PROM/AAROM. Good tolerance to all ther ex with R shoulder fatigue. L shoulder strength continues to show gains.    Progress per Plan of Care           Timed:         Manual Therapy:    10     mins  04267;     Therapeutic Exercise:    20     mins  29302;     Neuromuscular Taylor:        mins  52193;    Therapeutic Activity:          mins  43503;     Gait Training:           mins  54150;     Ultrasound:          mins  76052;    Ionto                                   mins   14108  Self Care                            mins   12015  Canalith Repos                   mins  4209    Un-Timed:  Electrical Stimulation:    15     mins  57203 ( );  Dry Needling          mins self-pay  Traction          mins 82286  Low Eval          Mins  45595  Mod Eval          Mins  91107  High Eval                            Mins  74993    Timed Treatment:   30   mins   Total Treatment:     45   mins    Vangie Torres, PT  Physical Therapist

## 2020-06-19 ENCOUNTER — APPOINTMENT (OUTPATIENT)
Dept: PULMONOLOGY | Facility: CLINIC | Age: 70
End: 2020-06-19
Payer: MEDICARE

## 2020-06-19 PROCEDURE — 99213 OFFICE O/P EST LOW 20 MIN: CPT | Mod: 95

## 2020-06-19 NOTE — REASON FOR VISIT
[Sleep Apnea] : sleep apnea [Follow-Up] : a follow-up visit [Home] : at home, [unfilled] , at the time of the visit. [Medical Office: ([facility])___] : at the medical office located in  [Verbal consent obtained from patient] : the patient, [unfilled]

## 2020-06-19 NOTE — HISTORY OF PRESENT ILLNESS
[TextBox_4] : s/p back surgery, still in pain\par using cpap\par needs new nasal pillow mask bc falling apart\par sleeping well\par ahi 1.3\par uses ~8hrs/night

## 2020-06-23 ENCOUNTER — APPOINTMENT (OUTPATIENT)
Dept: PLASTIC SURGERY | Facility: CLINIC | Age: 70
End: 2020-06-23
Payer: MEDICARE

## 2020-06-23 ENCOUNTER — APPOINTMENT (OUTPATIENT)
Dept: ORTHOPEDIC SURGERY | Facility: CLINIC | Age: 70
End: 2020-06-23
Payer: MEDICARE

## 2020-06-23 ENCOUNTER — APPOINTMENT (OUTPATIENT)
Dept: PLASTIC SURGERY | Facility: CLINIC | Age: 70
End: 2020-06-23

## 2020-06-23 VITALS — TEMPERATURE: 97.6 F

## 2020-06-23 VITALS
OXYGEN SATURATION: 96 % | HEIGHT: 68 IN | TEMPERATURE: 98 F | BODY MASS INDEX: 29.7 KG/M2 | HEART RATE: 87 BPM | DIASTOLIC BLOOD PRESSURE: 79 MMHG | WEIGHT: 196 LBS | SYSTOLIC BLOOD PRESSURE: 144 MMHG

## 2020-06-23 VITALS — SYSTOLIC BLOOD PRESSURE: 151 MMHG | HEART RATE: 79 BPM | DIASTOLIC BLOOD PRESSURE: 82 MMHG

## 2020-06-23 PROCEDURE — 99024 POSTOP FOLLOW-UP VISIT: CPT

## 2020-06-23 NOTE — HISTORY OF PRESENT ILLNESS
[FreeTextEntry1] : 69 year male presents for follow up status post back closure with bilateral muscle flaps. Patient is doing well no fever no chills no drainage.

## 2020-06-23 NOTE — REASON FOR VISIT
[Post Op: _________] : a [unfilled] post op visit [FreeTextEntry1] : DOS: 05/27/2020 s/p back closure following spinal hardware instrumentation. Pt states he is doing well, denies any complaints.

## 2020-06-30 ENCOUNTER — TREATMENT (OUTPATIENT)
Dept: PHYSICAL THERAPY | Facility: CLINIC | Age: 70
End: 2020-06-30

## 2020-06-30 ENCOUNTER — APPOINTMENT (OUTPATIENT)
Dept: RADIOLOGY | Facility: CLINIC | Age: 70
End: 2020-06-30
Payer: MEDICARE

## 2020-06-30 ENCOUNTER — OUTPATIENT (OUTPATIENT)
Dept: OUTPATIENT SERVICES | Facility: HOSPITAL | Age: 70
LOS: 1 days | End: 2020-06-30
Payer: MEDICARE

## 2020-06-30 ENCOUNTER — RESULT REVIEW (OUTPATIENT)
Age: 70
End: 2020-06-30

## 2020-06-30 DIAGNOSIS — M25.511 CHRONIC PAIN OF BOTH SHOULDERS: Primary | ICD-10-CM

## 2020-06-30 DIAGNOSIS — M25.512 CHRONIC PAIN OF BOTH SHOULDERS: Primary | ICD-10-CM

## 2020-06-30 DIAGNOSIS — Z00.8 ENCOUNTER FOR OTHER GENERAL EXAMINATION: ICD-10-CM

## 2020-06-30 DIAGNOSIS — G89.29 CHRONIC PAIN OF BOTH SHOULDERS: Primary | ICD-10-CM

## 2020-06-30 PROCEDURE — 72070 X-RAY EXAM THORAC SPINE 2VWS: CPT

## 2020-06-30 PROCEDURE — G0283 ELEC STIM OTHER THAN WOUND: HCPCS | Performed by: PHYSICAL THERAPIST

## 2020-06-30 PROCEDURE — 97110 THERAPEUTIC EXERCISES: CPT | Performed by: PHYSICAL THERAPIST

## 2020-06-30 PROCEDURE — 72100 X-RAY EXAM L-S SPINE 2/3 VWS: CPT | Mod: 26

## 2020-06-30 PROCEDURE — 72070 X-RAY EXAM THORAC SPINE 2VWS: CPT | Mod: 26

## 2020-06-30 PROCEDURE — 72100 X-RAY EXAM L-S SPINE 2/3 VWS: CPT

## 2020-06-30 PROCEDURE — 97140 MANUAL THERAPY 1/> REGIONS: CPT | Performed by: PHYSICAL THERAPIST

## 2020-06-30 NOTE — PROGRESS NOTES
Physical Therapy Daily Progress Note / Discharge Summary      Patient: Orion Harvey   : 1950  Diagnosis/ICD-10 Code:  Chronic pain of both shoulders [M25.511, G89.29, M25.512]  Referring practitioner: Nazanin Garcia*  Date of Initial Visit: Type: THERAPY  Noted: 2020  Today's Date: 2020  Patient seen for 12 sessions         Orion Harvey reports: shoulder is feeling about the same. Can lift R arm higher but still painful. Reports had R shoulder achiness after walking and just wt of arm and swinging. Sees PCP next week. Feels like R shoulder is as good as it is going to get at this point. Casting a little better but still painful and not as good as he hopes to be. Looking into have TSR. In agreement with PT D/C at this time and will manage with HEP.  Subjective Questionnaire: QuickDASH: 61% impairment      Objective : R shoulder PROM flex = approx 130 deg, abd = 100 deg, ER very limited and painful. R shoulder flex AROM = ~80 deg with pain.  L shoulder flexion AROM WFL, ER limited with c/o pain.  See Exercise, Manual, and Modality Logs for complete treatment.       Assessment/Plan : Good tolerance to ther ex. Continued pain and limitations R shoulder. Will benefit from ortho consult for TSR. Pt has reached a plateau in progress at this time. Pt compliant with HEP and to continue with HEP.  STG:  - Increase R shoulder flex PROM to 110 deg in 3 weeks. - MET  - Pt to demonstrate improved posture with min cues in 3 weeks. - MET  - Pt to be independent with HEP in 3 weeks. - MET  LTG:  - Increase R shoulder flex AROM to 110 deg for improved ability to reach overhead by discharge. - Not met  - Improve Quick DASH to 40% or less by discharge. - Not met  - Pt to report ability to cast while fishing with no increase in pain by discharge. - Not met    D/C to HEP.           Timed:         Manual Therapy:    15     mins  86306;     Therapeutic Exercise:    25     mins  83517;      Neuromuscular Taylor:        mins  56652;    Therapeutic Activity:          mins  84505;     Gait Training:           mins  02356;     Ultrasound:          mins  02287;    Ionto                                   mins   01658  Self Care                            mins   16455  Canalith Repos                   mins  4209    Un-Timed:  Electrical Stimulation:    15     mins  50811 ( );  Dry Needling          mins self-pay  Traction          mins 54073  Low Eval          Mins  66653  Mod Eval          Mins  76560  High Eval                            Mins  13099    Timed Treatment:   40   mins   Total Treatment:     55   mins    Vangie Torres, PT  Physical Therapist

## 2020-07-02 ENCOUNTER — TRANSCRIPTION ENCOUNTER (OUTPATIENT)
Age: 70
End: 2020-07-02

## 2020-07-07 ENCOUNTER — OFFICE VISIT (OUTPATIENT)
Dept: FAMILY MEDICINE CLINIC | Facility: CLINIC | Age: 70
End: 2020-07-07

## 2020-07-07 ENCOUNTER — APPOINTMENT (OUTPATIENT)
Dept: SPINE | Facility: CLINIC | Age: 70
End: 2020-07-07
Payer: MEDICARE

## 2020-07-07 VITALS
OXYGEN SATURATION: 98 % | HEIGHT: 69 IN | WEIGHT: 288 LBS | DIASTOLIC BLOOD PRESSURE: 76 MMHG | TEMPERATURE: 97.8 F | BODY MASS INDEX: 42.65 KG/M2 | HEART RATE: 68 BPM | SYSTOLIC BLOOD PRESSURE: 138 MMHG

## 2020-07-07 VITALS
TEMPERATURE: 36.7 F | HEIGHT: 68 IN | HEART RATE: 90 BPM | SYSTOLIC BLOOD PRESSURE: 125 MMHG | DIASTOLIC BLOOD PRESSURE: 92 MMHG | RESPIRATION RATE: 12 BRPM | OXYGEN SATURATION: 99 % | BODY MASS INDEX: 28.64 KG/M2 | WEIGHT: 189 LBS

## 2020-07-07 DIAGNOSIS — M67.912 DISORDER OF ROTATOR CUFF OF BOTH SHOULDERS: ICD-10-CM

## 2020-07-07 DIAGNOSIS — M19.011 OSTEOARTHRITIS OF BOTH SHOULDERS, UNSPECIFIED OSTEOARTHRITIS TYPE: Primary | ICD-10-CM

## 2020-07-07 DIAGNOSIS — G89.29 CHRONIC PAIN OF BOTH SHOULDERS: ICD-10-CM

## 2020-07-07 DIAGNOSIS — E11.9 TYPE 2 DIABETES MELLITUS WITHOUT COMPLICATION, WITHOUT LONG-TERM CURRENT USE OF INSULIN (HCC): ICD-10-CM

## 2020-07-07 DIAGNOSIS — M25.512 CHRONIC PAIN OF BOTH SHOULDERS: ICD-10-CM

## 2020-07-07 DIAGNOSIS — M25.511 CHRONIC PAIN OF BOTH SHOULDERS: ICD-10-CM

## 2020-07-07 DIAGNOSIS — M19.012 OSTEOARTHRITIS OF BOTH SHOULDERS, UNSPECIFIED OSTEOARTHRITIS TYPE: Primary | ICD-10-CM

## 2020-07-07 DIAGNOSIS — M67.911 DISORDER OF ROTATOR CUFF OF BOTH SHOULDERS: ICD-10-CM

## 2020-07-07 PROCEDURE — 99214 OFFICE O/P EST MOD 30 MIN: CPT | Performed by: FAMILY MEDICINE

## 2020-07-07 PROCEDURE — 99024 POSTOP FOLLOW-UP VISIT: CPT

## 2020-07-07 NOTE — PROGRESS NOTES
Chief Complaint   Patient presents with   • right shoulder pain       Subjective     Orion Harvey is a 69 y.o. male.   Patient here for follow-up on bilateral shoulder pain, right much greater than left.  states went to physical therapy twice a week for 2 months now and still having pain. Pain is nearly constant  Throbbing now. Tylenol minimal help, Voltaren no help. Worse if try to lift greater than shoulder level or put hand behind back.     Diabetes- watching diet better and numbers improving some. Pre - prandial readings typically 130'140's.  As physically active as can be with restrictions due to shoulder pain.       The following portions of the patient's history were reviewed and updated as appropriate: allergies, current medications, past family history, past medical history, past social history, past surgical history and problem list.    Current Outpatient Medications on File Prior to Visit   Medication Sig   • acetaminophen (TYLENOL) 650 MG 8 hr tablet Take 2 tablets by mouth Every 8 (Eight) Hours As Needed for Mild Pain  or Moderate Pain .   • ascorbic acid (VITAMIN C) 1000 MG tablet Take 1 tablet by mouth Daily.   • aspirin 325 MG tablet Take 1 tablet by mouth Daily.   • Dulaglutide 1.5 MG/0.5ML solution pen-injector Inject 1.5 mg under the skin into the appropriate area as directed 1 (One) Time Per Week.   • glimepiride (AMARYL) 4 MG tablet TAKE 1 TABLET BY MOUTH TWICE DAILY   • lisinopril (PRINIVIL,ZESTRIL) 40 MG tablet Take 1 tablet by mouth Daily.   • metFORMIN (GLUCOPHAGE) 1000 MG tablet TAKE 1 TABLET BY MOUTH TWICE DAILY   • Multiple Vitamins-Minerals (CENTRUM SILVER) tablet Take 1 tablet by mouth Daily.   • ONE TOUCH ULTRA TEST test strip TEST BLOOD SUGAR ONCE DAILY   • ONETOUCH DELICA LANCETS 33G misc 1 tablet by In Vitro route Every 12 (Twelve) Hours.   • rosuvastatin (CRESTOR) 10 MG tablet Take 1 tablet by mouth once daily   • vitamin d ( VITAMIN D3) 5000 units capsule Take 1 tablet by  "mouth Daily.   • [DISCONTINUED] diclofenac (VOLTAREN) 1 % gel gel Apply 4 g topically to the appropriate area as directed 4 (Four) Times a Day As Needed (shoulder pain).     No current facility-administered medications on file prior to visit.      Medications Discontinued During This Encounter   Medication Reason   • diclofenac (VOLTAREN) 1 % gel gel *Therapy completed       Review of Systems   Constitutional: Negative for fatigue, unexpected weight gain and unexpected weight loss.   Eyes: Negative for blurred vision.   Cardiovascular: Negative for chest pain.   Endocrine: Negative for polydipsia, polyphagia and polyuria.   Musculoskeletal: Positive for arthralgias ( Particularly in his shoulders and knees).   Skin: Negative for pallor and rash.   Neurological: Negative for dizziness, tremors, seizures, speech difficulty, weakness and confusion.   Psychiatric/Behavioral: The patient is not nervous/anxious.         Objective   Vitals:    07/07/20 1131   BP: 138/76   BP Location: Left arm   Patient Position: Sitting   Cuff Size: Adult   Pulse: 68   Temp: 97.8 °F (36.6 °C)   TempSrc: Infrared   SpO2: 98%   Weight: 131 kg (288 lb)   Height: 175.3 cm (69.02\")      Body mass index is 42.51 kg/m².    Physical Exam   Constitutional: He is oriented to person, place, and time. He appears well-developed and well-nourished. No distress.   Morbidly obese   Eyes: Pupils are equal, round, and reactive to light. Conjunctivae and EOM are normal.   Neck: Normal range of motion.   Cardiovascular: Normal rate and regular rhythm.   No murmur heard.  Pulmonary/Chest: Effort normal and breath sounds normal. He has no wheezes.   Musculoskeletal: He exhibits no edema.   Significantly limited range of motion in his right, much greater than left shoulder.  Passive abduction of right remains limited at about 100 degrees.  Positive impingement test on the right.  Greatly reduced internal rotation bilaterally right limited by pain. There is " significant tenderness to palpation of the right lateral at the glenohumeral head much greater than at the AC joint.   Neurological: He is alert and oriented to person, place, and time.   Skin: Skin is warm and dry.   Psychiatric: He has a normal mood and affect.   Nursing note and vitals reviewed.      Lab Results   Component Value Date     (H) 05/01/2020    BUN 13 05/01/2020    CREATININE 0.79 05/01/2020    EGFRIFNONA 92 05/01/2020    EGFRIFAFRI 107 05/01/2020     05/01/2020    K 4.8 05/01/2020    CL 99 05/01/2020    CALCIUM 9.0 05/01/2020    ALBUMIN 4.0 05/01/2020    BILITOT 0.4 05/01/2020    ALKPHOS 56 05/01/2020    AST 50 (H) 05/01/2020    ALT 45 (H) 05/01/2020    CHLPL 117 05/01/2020    TRIG 245 (H) 05/01/2020    HDL 39 (L) 05/01/2020    VLDL 49 (H) 05/01/2020    LDL 29 05/01/2020    NHFE96VB 70.0 05/01/2020      Lab Results   Component Value Date    HGBA1C 10.1 (H) 05/01/2020    HGBA1C 9.3 (H) 01/31/2020    HGBA1C 7.3 (H) 10/30/2019    HGBA1C 9.1 (H) 07/24/2019        Procedures   DATE OF EXAM:  5/6/2020 2:06 PM     PROCEDURE:  XR SHOULDER 2+ VW BILATERAL-     INDICATIONS:  bilateral shoulder pain R>L; M25.511-Pain in right shoulder;  G89.29-Other chronic pain; M25.512-Pain in left shoulder     COMPARISON:  No Comparisons Available     TECHNIQUE:   3 views of the bilateral shoulder(s) was/were obtained.     FINDINGS:  Left shoulder: Severe glenohumeral joint space narrowing with complete  loss of joint space and large inferior medial humeral head osteophyte.  Remote healed fracture of the mid clavicle with apex dorsal angulation.  There is a small to moderate size subacromial osteophyte. There is  sclerosis and subchondral cyst changes greater tuberosity suspicious for  chronic rotator cuff pathology. The visualized thorax is clear.     Right shoulder: Severe glenohumeral joint arthritis. Large inferior  medial humeral head osteophyte. Sclerosis and subchondral cyst changes  glenohumeral joint.  Probable intra-articular ossified body seen at the  superior joint space. Mild arthritis acromioclavicular joint.  Subchondral cystic change and mild sclerosis greater tuberosity.  Visualized thorax is clear.     IMPRESSION:  1.Severe arthritis of both glenohumeral joints, right worse than left.  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  2.Findings of chronic rotator cuff pathology bilaterally.  3.Moderate size subacromial osteophyte on the left.  4.Remote healed fracture of the mid left clavicle.     Electronically Signed By-Fely Bradley MD On:5/6/2020 3:01 PM  This report was finalized on [id number]   Assessment/Plan   Diagnoses and all orders for this visit:    1. Osteoarthritis of both shoulders, unspecified osteoarthritis type (Primary)  -     Ambulatory Referral to Orthopedic Surgery    2. Chronic pain of both shoulders  -     Ambulatory Referral to Orthopedic Surgery    3. Disorder of rotator cuff of both shoulders  -     Ambulatory Referral to Orthopedic Surgery    4. Type 2 diabetes mellitus without complication, without long-term current use of insulin (CMS/[facility])    Significant arthritis and probable rotator cuff pathology of the right greater than the left shoulders.  Referral to orthopedist for possible steroid injection or more definitive surgical intervention.Declines pain medication RX, states he will continue Tylenol for now.  Encouraged to continue to do passive range of motion exercises to preserve current level of range of motion.  Stressed need to get diabetes under control specifically stressed that if he needed surgical intervention poorly controlled diabetes increases risk of complication and delays healing.  Encouraged weight reduction.    Medications Discontinued During This Encounter   Medication Reason   • diclofenac (VOLTAREN) 1 % gel gel *Therapy completed          Return in about  1 month (around 8/7/2020) for Recheck, diabetes and shoulder pain as already scheduled, labs prior.        AVS given

## 2020-07-08 NOTE — REASON FOR VISIT
[Follow-Up: _____] : a [unfilled] follow-up visit [FreeTextEntry1] : Today he is doing well\par Pain management continue with Dr. Florence\par \par New Postop Xrays [de-identified] : S/P Bilateral percutaneous sacroiliac joint fusion.\par  [de-identified] : 6/4/20 [de-identified] : Today he comes to the office for postop evaluation\par Wearing TLSO Brace, Ambulating with wheeled walker with steady gait\par \par THORACOLUMBAR  incision well healed; Occlusive dressing removed\par No signs of infection noted\par Wound edges approximated, NO WOUND DRAINAGE\par \par Pt reports that he is doing better\par

## 2020-07-08 NOTE — PHYSICAL EXAM
[General Appearance - Alert] : alert [Longitudinal] : longitudinal [Healing Well] : healing well [Well-Healed] : not well-healed [No Drainage] : without drainage [Normal Skin] : normal [Oriented To Time, Place, And Person] : oriented to person, place, and time [Cranial Nerves Optic (II)] : visual acuity intact bilaterally,  pupils equal round and reactive to light [Cranial Nerves Oculomotor (III)] : extraocular motion intact [Cranial Nerves Trigeminal (V)] : facial sensation intact symmetrically [Cranial Nerves Facial (VII)] : face symmetrical [Cranial Nerves Vestibulocochlear (VIII)] : hearing was intact bilaterally [Cranial Nerves Glossopharyngeal (IX)] : tongue and palate midline [Cranial Nerves Accessory (XI - Cranial And Spinal)] : head turning and shoulder shrug symmetric [Cranial Nerves Hypoglossal (XII)] : there was no tongue deviation with protrusion [Motor Tone] : muscle tone was normal in all four extremities [Motor Strength] : muscle strength was normal in all four extremities [Abnormal Walk] : normal gait [Balance] : balance was intact [No Visual Abnormalities] : no visible abnormailities [Sclera] : the sclera and conjunctiva were normal [Outer Ear] : the ears and nose were normal in appearance [Heart Rate And Rhythm] : heart rate was normal and rhythm regular [Abdomen Soft] : soft [FreeTextEntry1] : Ambulate with Walker [Skin Color & Pigmentation] : normal skin color and pigmentation

## 2020-07-08 NOTE — REVIEW OF SYSTEMS
[Abnormal Sensation] : an abnormal sensation [Difficulty Walking] : difficulty walking [Negative] : Heme/Lymph [de-identified] : IMPROVED PAIN SYNDROME\par IMPROVED GAIT AND BALANCE

## 2020-07-13 ENCOUNTER — TELEPHONE (OUTPATIENT)
Dept: FAMILY MEDICINE CLINIC | Facility: CLINIC | Age: 70
End: 2020-07-13

## 2020-07-14 ENCOUNTER — OFFICE VISIT (OUTPATIENT)
Dept: ORTHOPEDIC SURGERY | Facility: CLINIC | Age: 70
End: 2020-07-14

## 2020-07-14 VITALS
SYSTOLIC BLOOD PRESSURE: 150 MMHG | HEART RATE: 76 BPM | HEIGHT: 69 IN | BODY MASS INDEX: 42.36 KG/M2 | WEIGHT: 286 LBS | DIASTOLIC BLOOD PRESSURE: 81 MMHG

## 2020-07-14 DIAGNOSIS — M19.011 PRIMARY LOCALIZED OSTEOARTHROSIS OF RIGHT SHOULDER REGION: Primary | ICD-10-CM

## 2020-07-14 PROCEDURE — 99214 OFFICE O/P EST MOD 30 MIN: CPT | Performed by: FAMILY MEDICINE

## 2020-07-14 PROCEDURE — 20611 DRAIN/INJ JOINT/BURSA W/US: CPT | Performed by: FAMILY MEDICINE

## 2020-07-14 RX ORDER — TRIAMCINOLONE ACETONIDE 40 MG/ML
80 INJECTION, SUSPENSION INTRA-ARTICULAR; INTRAMUSCULAR
Status: COMPLETED | OUTPATIENT
Start: 2020-07-14 | End: 2020-07-14

## 2020-07-14 RX ADMIN — TRIAMCINOLONE ACETONIDE 80 MG: 40 INJECTION, SUSPENSION INTRA-ARTICULAR; INTRAMUSCULAR at 12:52

## 2020-07-14 NOTE — PROGRESS NOTES
Primary Care Sports Medicine Office Visit Note     Patient ID: Oroin Harvey is a 69 y.o. male.    Chief Complaint:  Chief Complaint   Patient presents with   • Left Shoulder - Initial Evaluation   • Right Shoulder - Initial Evaluation     HPI:    Mr. Orion Harvey is a 69 y.o. male who presents to the clinic today for bilateral shoulder pain. Pt states that both shoulders are moderately painful. R>L, right hand dominant. Mild ROM loss in the R shoulder. Has attempted PT with recommendation from his PCP. States his ROM has improved mildly, but still moderately painful daily, even to simply let it hang at his side.     Answers for HPI/ROS submitted by the patient on 7/14/2020   What is the primary reason for your visit?: Other  Please describe your symptoms.: Pain in both shoulders, extreme in the right shoulder.  Have you had these symptoms before?: Yes  How long have you been having these symptoms?: Greater than 2 weeks  Please list any medications you are currently taking for this condition.: current list on file, will bring one with me  Please describe any probable cause for these symptoms. : unknown    Past Medical History:   Diagnosis Date   • Arthritis    • Diabetes mellitus (CMS/HCC)    • Hyperlipidemia    • Hypertension        Past Surgical History:   Procedure Laterality Date   • CLAVICLE SURGERY     • CYSTOSCOPY W/ LASER LITHOTRIPSY  2019   • REPAIR TENDONS LEG     • SKULL FRACTURE ELEVATION         Family History   Problem Relation Age of Onset   • Diabetes Mother    • Hyperlipidemia Mother    • Hearing loss Mother    • Diabetes Father    • Hyperlipidemia Father    • Hearing loss Father    • Heart disease Father    • Diabetes Brother    • Hyperlipidemia Brother    • Heart disease Brother    • Heart disease Brother      Social History     Occupational History   • Not on file   Tobacco Use   • Smoking status: Former Smoker     Packs/day: 1.00     Years: 0.00     Pack years: 0.00     Types:  "Cigarettes     Start date:      Last attempt to quit:      Years since quittin.5   • Smokeless tobacco: Never Used   Substance and Sexual Activity   • Alcohol use: No     Frequency: Never   • Drug use: No   • Sexual activity: Yes     Partners: Female      Review of Systems   Constitutional: Negative for activity change and fever.   Respiratory: Negative for cough and shortness of breath.    Cardiovascular: Negative for chest pain.   Gastrointestinal: Negative for constipation, diarrhea, nausea and vomiting.   Musculoskeletal: Positive for arthralgias.   Skin: Negative for color change and rash.   Neurological: Negative for weakness.   Hematological: Does not bruise/bleed easily.       Objective:    /81   Pulse 76   Ht 175.3 cm (69\")   Wt 130 kg (286 lb)   BMI 42.23 kg/m²     Physical Examination:  Physical Exam   Constitutional: He appears well-developed and well-nourished. No distress.   HENT:   Head: Normocephalic and atraumatic.   Eyes: Conjunctivae are normal.   Cardiovascular: Intact distal pulses.   Pulmonary/Chest: Effort normal. No respiratory distress.   Neurological: He is alert.   Skin: Skin is warm. Capillary refill takes less than 2 seconds. He is not diaphoretic.   Nursing note and vitals reviewed.    Right Shoulder Exam     Range of Motion   Active abduction:  70 abnormal   Passive abduction:  80 abnormal   Extension:  30 abnormal   Forward flexion:  90 abnormal   Internal rotation 0 degrees:  Sacrum abnormal     Muscle Strength   External rotation: 5/5   Supraspinatus: 5/5   Subscapularis: 5/5   Biceps: 5/5     Other   Erythema: absent  Sensation: normal  Pulse: present          Imaging and other tests:  Three-view x-ray of the right and bilateral shoulders show severe and significant joint degradation with osteophytic and sclerotic activity both of the right shoulder.  There is significant inferior bone spurring of the humeral head, and reciprocal changes of the glenoid.  " "Severe OA.  The left shoulder shows near end-stage similar changes with osteophytic activity at the inferior joint as well, to a lesser extent.    Assessment and Plan:    1. Primary localized osteoarthrosis of right shoulder region    After discussion of risks and benefits, the patient elected to proceed with Ultrasound guided corticosteroid injection to the R glenohumeral joint.  The patient tolerated this procedure well without any complaints or problems.  I recommended continuation of conservative management as previous, RTC in 3-6 months or sooner if symptoms recur.      Vargas RUIZ \"Chance\" Greg SMITH DO, CAQSM  07/14/20  12:12    Disclaimer: Please note that areas of this note were completed with computer voice recognition software.  Quite often unanticipated grammatical, syntax, homophones, and other interpretive errors are inadvertently transcribed by the computer software. Please excuse any errors that have escaped final proofreading.  "

## 2020-07-14 NOTE — PROGRESS NOTES
Procedure   Large Joint Arthrocentesis: R glenohumeral  Date/Time: 7/14/2020 12:52 PM  Consent given by: patient  Site marked: site marked  Timeout: Immediately prior to procedure a time out was called to verify the correct patient, procedure, equipment, support staff and site/side marked as required   Supporting Documentation  Indications: pain   Procedure Details  Location: shoulder - R glenohumeral  Preparation: Patient was prepped and draped in the usual sterile fashion  Needle size: 25 G  Approach: posterior (ultrasound guidance was used to ensure accuracy of medication delivery. Please see ultrasound machine for saved images. )  Medications administered: 80 mg triamcinolone acetonide 40 MG/ML  Patient tolerance: patient tolerated the procedure well with no immediate complications

## 2020-08-04 ENCOUNTER — RESULTS ENCOUNTER (OUTPATIENT)
Dept: FAMILY MEDICINE CLINIC | Facility: CLINIC | Age: 70
End: 2020-08-04

## 2020-08-04 DIAGNOSIS — E11.9 TYPE 2 DIABETES MELLITUS WITHOUT COMPLICATION, WITHOUT LONG-TERM CURRENT USE OF INSULIN (HCC): ICD-10-CM

## 2020-08-04 DIAGNOSIS — E78.2 MIXED HYPERLIPIDEMIA: ICD-10-CM

## 2020-08-04 DIAGNOSIS — I10 ESSENTIAL HYPERTENSION: ICD-10-CM

## 2020-08-04 LAB
ALBUMIN SERPL-MCNC: 4.3 G/DL (ref 3.8–4.8)
ALBUMIN/GLOB SERPL: 2 {RATIO} (ref 1.2–2.2)
ALP SERPL-CCNC: 49 IU/L (ref 39–117)
ALT SERPL-CCNC: 47 IU/L (ref 0–44)
AST SERPL-CCNC: 40 IU/L (ref 0–40)
BILIRUB SERPL-MCNC: 0.4 MG/DL (ref 0–1.2)
BUN SERPL-MCNC: 21 MG/DL (ref 8–27)
BUN/CREAT SERPL: 22 (ref 10–24)
CALCIUM SERPL-MCNC: 9.9 MG/DL (ref 8.6–10.2)
CHLORIDE SERPL-SCNC: 98 MMOL/L (ref 96–106)
CHOLEST SERPL-MCNC: 139 MG/DL (ref 100–199)
CO2 SERPL-SCNC: 24 MMOL/L (ref 20–29)
CREAT SERPL-MCNC: 0.94 MG/DL (ref 0.76–1.27)
GLOBULIN SER CALC-MCNC: 2.2 G/DL (ref 1.5–4.5)
GLUCOSE SERPL-MCNC: 138 MG/DL (ref 65–99)
HBA1C MFR BLD: 8.8 % (ref 4.8–5.6)
HDLC SERPL-MCNC: 53 MG/DL
LDLC SERPL CALC-MCNC: 56 MG/DL (ref 0–99)
POTASSIUM SERPL-SCNC: 5.6 MMOL/L (ref 3.5–5.2)
PROT SERPL-MCNC: 6.5 G/DL (ref 6–8.5)
SODIUM SERPL-SCNC: 139 MMOL/L (ref 134–144)
TRIGL SERPL-MCNC: 148 MG/DL (ref 0–149)
VLDLC SERPL CALC-MCNC: 30 MG/DL (ref 5–40)

## 2020-08-04 NOTE — TELEPHONE ENCOUNTER
Received request from [employer] for one touch ultra blue test strips along with Dx code for medicare part B

## 2020-08-07 ENCOUNTER — OFFICE VISIT (OUTPATIENT)
Dept: FAMILY MEDICINE CLINIC | Facility: CLINIC | Age: 70
End: 2020-08-07

## 2020-08-07 VITALS
SYSTOLIC BLOOD PRESSURE: 133 MMHG | HEART RATE: 74 BPM | OXYGEN SATURATION: 95 % | DIASTOLIC BLOOD PRESSURE: 79 MMHG | WEIGHT: 279 LBS | BODY MASS INDEX: 41.32 KG/M2 | TEMPERATURE: 97.1 F | HEIGHT: 69 IN

## 2020-08-07 DIAGNOSIS — N20.0 KIDNEY STONES: ICD-10-CM

## 2020-08-07 DIAGNOSIS — E11.9 TYPE 2 DIABETES MELLITUS WITHOUT COMPLICATION, WITHOUT LONG-TERM CURRENT USE OF INSULIN (HCC): Primary | ICD-10-CM

## 2020-08-07 DIAGNOSIS — G89.29 CHRONIC RIGHT SHOULDER PAIN: ICD-10-CM

## 2020-08-07 DIAGNOSIS — I10 ESSENTIAL HYPERTENSION: ICD-10-CM

## 2020-08-07 DIAGNOSIS — M25.511 CHRONIC RIGHT SHOULDER PAIN: ICD-10-CM

## 2020-08-07 DIAGNOSIS — E66.01 OBESITY, CLASS III, BMI 40-49.9 (MORBID OBESITY) (HCC): ICD-10-CM

## 2020-08-07 DIAGNOSIS — E78.2 MIXED HYPERLIPIDEMIA: ICD-10-CM

## 2020-08-07 PROCEDURE — 99214 OFFICE O/P EST MOD 30 MIN: CPT | Performed by: FAMILY MEDICINE

## 2020-08-07 NOTE — PROGRESS NOTES
Chief Complaint   Patient presents with   • Diabetes       Subjective     Orion Harvey is a 69 y.o. male.  He presents today to follow-up on diabetes, cholesterol, and recent labs. Home glucose readings up and down typically 130 to 220, one high of 250. On 2 occasions feel like not all of trulicity stays in skin will get a small clear bubble of fluid at injection site.     Constant shoulder pain. Patient does report he is seeing orthopedist for his right shoulder pain and has had a steroid shot but has not had any improvement in pain yet. Plan to follow up in October and proceed from there.   Not as physically active due to constant pain.   Patient states he did pass 3 kidney stones a  2 days ago. Saved them going to take to Dr Moreno.  Feel fine now.       The following portions of the patient's history were reviewed and updated as appropriate: allergies, current medications, past family history, past medical history, past social history, past surgical history and problem list.    Current Outpatient Medications on File Prior to Visit   Medication Sig   • acetaminophen (TYLENOL) 650 MG 8 hr tablet Take 2 tablets by mouth Every 8 (Eight) Hours As Needed for Mild Pain  or Moderate Pain .   • ascorbic acid (VITAMIN C) 1000 MG tablet Take 1 tablet by mouth Daily.   • aspirin 325 MG tablet Take 1 tablet by mouth Daily.   • Dulaglutide 1.5 MG/0.5ML solution pen-injector Inject 1.5 mg under the skin into the appropriate area as directed 1 (One) Time Per Week.   • glimepiride (AMARYL) 4 MG tablet TAKE 1 TABLET BY MOUTH TWICE DAILY   • glucose blood (ONE TOUCH ULTRA TEST) test strip Use as instructed: Diagnosis E 11.9   • lisinopril (PRINIVIL,ZESTRIL) 40 MG tablet Take 1 tablet by mouth Daily.   • metFORMIN (GLUCOPHAGE) 1000 MG tablet TAKE 1 TABLET BY MOUTH TWICE DAILY   • Multiple Vitamins-Minerals (CENTRUM SILVER) tablet Take 1 tablet by mouth Daily.   • ONETOUCH DELICA LANCETS 33G misc 1 tablet by In Vitro route  "Every 12 (Twelve) Hours.   • rosuvastatin (CRESTOR) 10 MG tablet Take 1 tablet by mouth once daily   • vitamin d ( VITAMIN D3) 5000 units capsule Take 1 tablet by mouth Daily.     No current facility-administered medications on file prior to visit.      There are no discontinued medications.    Review of Systems   Constitutional: Negative for chills.   Gastrointestinal: Negative for nausea and vomiting.   Genitourinary: Positive for dysuria, flank pain and urgency. Negative for frequency and hematuria.        Symptoms resolved after passing stones couple days ago   Musculoskeletal: Positive for arthralgias.        Objective   Vitals:    08/07/20 0923   BP: 133/79   BP Location: Right arm   Patient Position: Sitting   Cuff Size: Adult   Pulse: 74   Temp: 97.1 °F (36.2 °C)   TempSrc: Infrared   SpO2: 95%   Weight: 127 kg (279 lb)   Height: 175.3 cm (69\")      Body mass index is 41.2 kg/m².    Physical Exam   Constitutional: He is oriented to person, place, and time. He appears well-developed and well-nourished. No distress.   Morbidly obese   Eyes: Pupils are equal, round, and reactive to light. Conjunctivae and EOM are normal.   Neck: Normal range of motion.   Cardiovascular: Normal rate and regular rhythm.   No murmur heard.  Pulmonary/Chest: Effort normal and breath sounds normal. He has no wheezes.   Musculoskeletal: He exhibits no edema.   Significantly limited range of motion in his right, much greater than left shoulder.  Passive abduction of right limited at about 100 degrees.  Positive impingement test on the right.  Greatly reduced internal rotation bilaterally right limited by pain left limited more by body habitus.  There is significant tenderness to palpation of the right anterior shoulder at the AC joint but also posteriorly and laterally at the glenohumeral head.   Neurological: He is alert and oriented to person, place, and time.   Skin: Skin is warm and dry.   Psychiatric: He has a normal mood and " affect.   Nursing note and vitals reviewed.      Lab Results   Component Value Date     (H) 08/03/2020    BUN 21 08/03/2020    CREATININE 0.94 08/03/2020    EGFRIFNONA 83 08/03/2020    EGFRIFAFRI 96 08/03/2020     08/03/2020    K 5.6 (H) 08/03/2020    CL 98 08/03/2020    CALCIUM 9.9 08/03/2020    ALBUMIN 4.3 08/03/2020    BILITOT 0.4 08/03/2020    ALKPHOS 49 08/03/2020    AST 40 08/03/2020    ALT 47 (H) 08/03/2020    CHLPL 139 08/03/2020    TRIG 148 08/03/2020    HDL 53 08/03/2020    VLDL 30 08/03/2020    LDL 56 08/03/2020      Lab Results   Component Value Date    HGBA1C 8.8 (H) 08/03/2020    HGBA1C 10.1 (H) 05/01/2020    HGBA1C 9.3 (H) 01/31/2020    HGBA1C 7.3 (H) 10/30/2019    HGBA1C 9.1 (H) 07/24/2019        Procedures     Assessment/Plan   Diagnoses and all orders for this visit:    1. Type 2 diabetes mellitus without complication, without long-term current use of insulin (CMS/[facility]) (Primary)  -     Hemoglobin A1c; Future    2. Kidney stones    3. Obesity, Class III, BMI 40-49.9 (morbid obesity) (CMS/[facility])    4. Essential hypertension  -     Comprehensive Metabolic Panel; Future    5. Mixed hyperlipidemia  -     Lipid Panel; Future    6. Chronic right shoulder pain      Diabetes, improved but significant room for continued improvement.  Did discuss changing Trulicity to another GLP-1 agonist, possibly 1 that does not have a self-contained needle so needle length could be adjusted.  Patient states if medication leakage continues to be of concern he would want to consider that.  Otherwise he will continue medications as per current medication list.  Discussed reduced calorie diet and increasing physical activity any way he can.  Advised if shoulder pain continues to be severe he should contact orthopedist office sooner than waiting until follow-up in October.  Advised to keep follow-up with Dr. Moreno for recurrent kidney stones.    There are no discontinued medications.       Return in about 3 months  (around 11/7/2020) for Recheck.        AVS given   Answers for HPI/ROS submitted by the patient on 8/5/2020   Dysuria  What is the primary reason for your visit?: Painful Urination  Onset: in the past 7 days  Frequency: intermittently  Progression since onset: resolved  Pain quality: stabbing  Fever: no fever  Sexually active?: Yes  History of pyelonephritis?: Yes  discharge: No  hesitancy: No  possible pregnancy: No  sweats: No

## 2020-08-07 NOTE — PROGRESS NOTES
Chief Complaint   Patient presents with   • Diabetes       Subjective     Orion Harvey is a 69 y.o. male. Patient here to follow up on diabetes, states to check at home and forgot readings, states it runs from low of 120 to high of 220  Patient states to have passed 3 kindney stones on 8/5/20  Patient has seen the orthopedic doctor and received shots in shoulder and so far they are not helping     The following portions of the patient's history were reviewed and updated as appropriate: allergies, current medications, past family history, past medical history, past social history, past surgical history and problem list.    Current Outpatient Medications on File Prior to Visit   Medication Sig   • acetaminophen (TYLENOL) 650 MG 8 hr tablet Take 2 tablets by mouth Every 8 (Eight) Hours As Needed for Mild Pain  or Moderate Pain .   • ascorbic acid (VITAMIN C) 1000 MG tablet Take 1 tablet by mouth Daily.   • aspirin 325 MG tablet Take 1 tablet by mouth Daily.   • Dulaglutide 1.5 MG/0.5ML solution pen-injector Inject 1.5 mg under the skin into the appropriate area as directed 1 (One) Time Per Week.   • glimepiride (AMARYL) 4 MG tablet TAKE 1 TABLET BY MOUTH TWICE DAILY   • glucose blood (ONE TOUCH ULTRA TEST) test strip Use as instructed: Diagnosis E 11.9   • lisinopril (PRINIVIL,ZESTRIL) 40 MG tablet Take 1 tablet by mouth Daily.   • metFORMIN (GLUCOPHAGE) 1000 MG tablet TAKE 1 TABLET BY MOUTH TWICE DAILY   • Multiple Vitamins-Minerals (CENTRUM SILVER) tablet Take 1 tablet by mouth Daily.   • ONETOUCH DELICA LANCETS 33G misc 1 tablet by In Vitro route Every 12 (Twelve) Hours.   • rosuvastatin (CRESTOR) 10 MG tablet Take 1 tablet by mouth once daily   • vitamin d ( VITAMIN D3) 5000 units capsule Take 1 tablet by mouth Daily.     No current facility-administered medications on file prior to visit.      There are no discontinued medications.    Review of Systems   Constitutional: Positive for unexpected weight loss (  "Have lost 11 pounds since starting Trulicity in May). Negative for chills.   Gastrointestinal: Negative for nausea and vomiting.   Genitourinary: Positive for flank pain and urgency. Negative for frequency and hematuria.   Musculoskeletal: Positive for arthralgias.        Objective   Vitals:    08/07/20 0923   BP: 133/79   BP Location: Right arm   Patient Position: Sitting   Cuff Size: Adult   Pulse: 74   Temp: 97.1 °F (36.2 °C)   TempSrc: Infrared   SpO2: 95%   Weight: 127 kg (279 lb)   Height: 175.3 cm (69\")      Body mass index is 41.2 kg/m².    Physical Exam    Lab Results   Component Value Date     (H) 08/03/2020    BUN 21 08/03/2020    CREATININE 0.94 08/03/2020    EGFRIFNONA 83 08/03/2020    EGFRIFAFRI 96 08/03/2020     08/03/2020    K 5.6 (H) 08/03/2020    CL 98 08/03/2020    CALCIUM 9.9 08/03/2020    ALBUMIN 4.3 08/03/2020    BILITOT 0.4 08/03/2020    ALKPHOS 49 08/03/2020    AST 40 08/03/2020    ALT 47 (H) 08/03/2020    CHLPL 139 08/03/2020    TRIG 148 08/03/2020    HDL 53 08/03/2020    VLDL 30 08/03/2020    LDL 56 08/03/2020      Lab Results   Component Value Date    HGBA1C 8.8 (H) 08/03/2020    HGBA1C 10.1 (H) 05/01/2020    HGBA1C 9.3 (H) 01/31/2020    HGBA1C 7.3 (H) 10/30/2019    HGBA1C 9.1 (H) 07/24/2019        Procedures     Assessment/Plan   Diagnoses and all orders for this visit:    1. Type 2 diabetes mellitus without complication, without long-term current use of insulin (CMS/[facility]) (Primary)    2. Kidney stones    3. Obesity, Class III, BMI 40-49.9 (morbid obesity) (CMS/[facility])    4. Essential hypertension    5. Mixed hyperlipidemia    6. Chronic right shoulder pain          There are no discontinued medications.       Return in about 3 months (around 11/7/2020) for Recheck.        AVS given   Answers for HPI/ROS submitted by the patient on 8/5/2020   Dysuria  What is the primary reason for your visit?: Painful Urination  Onset: in the past 7 days  Frequency: intermittently  Progression " since onset: resolved  Pain quality: stabbing  Fever: no fever  Sexually active?: Yes  History of pyelonephritis?: Yes  discharge: No  hesitancy: No  possible pregnancy: No  sweats: No

## 2020-08-12 ENCOUNTER — APPOINTMENT (OUTPATIENT)
Dept: ORTHOPEDIC SURGERY | Facility: CLINIC | Age: 70
End: 2020-08-12
Payer: MEDICARE

## 2020-08-12 PROCEDURE — 72190 X-RAY EXAM OF PELVIS: CPT

## 2020-08-12 PROCEDURE — 99024 POSTOP FOLLOW-UP VISIT: CPT

## 2020-08-18 ENCOUNTER — APPOINTMENT (OUTPATIENT)
Dept: SPINE | Facility: CLINIC | Age: 70
End: 2020-08-18
Payer: MEDICARE

## 2020-08-18 VITALS
SYSTOLIC BLOOD PRESSURE: 160 MMHG | BODY MASS INDEX: 28.64 KG/M2 | RESPIRATION RATE: 14 BRPM | DIASTOLIC BLOOD PRESSURE: 80 MMHG | WEIGHT: 189 LBS | HEIGHT: 68 IN | HEART RATE: 84 BPM | TEMPERATURE: 98.2 F

## 2020-08-18 PROCEDURE — 99024 POSTOP FOLLOW-UP VISIT: CPT

## 2020-08-18 NOTE — REASON FOR VISIT
[Follow-Up: _____] : a [unfilled] follow-up visit [FreeTextEntry1] : The patient continues to experience back pain and stiffness.

## 2020-08-18 NOTE — PHYSICAL EXAM
[FreeTextEntry1] : He presents to the office in a wheel chair.  The patient is alert and oriented to person, place and time.  Higher cortical functions are intact.  Face is symmetrical.  \par

## 2020-09-02 ENCOUNTER — TELEPHONE (OUTPATIENT)
Dept: FAMILY MEDICINE CLINIC | Facility: CLINIC | Age: 70
End: 2020-09-02

## 2020-09-02 DIAGNOSIS — E11.9 TYPE 2 DIABETES MELLITUS WITHOUT COMPLICATION, WITHOUT LONG-TERM CURRENT USE OF INSULIN (HCC): ICD-10-CM

## 2020-09-02 PROBLEM — H52.4 PRESBYOPIA: Status: ACTIVE | Noted: 2019-08-12

## 2020-09-02 PROBLEM — H25.10 NUCLEAR SCLEROSIS: Status: ACTIVE | Noted: 2019-08-12

## 2020-09-02 NOTE — TELEPHONE ENCOUNTER
PT CALLED AND STATES HE REQUESTED A REFILL ON HIS TRULICITY. PHARM STATES WE DENIED THE REFILL. I DIDN'T SEE ANYTHING IN CHART. PLEASE ADVISE.

## 2020-09-02 NOTE — TELEPHONE ENCOUNTER
I have not denied any request for Trulicity.  Best guess is pharmacy may have sent request to my old office.  Please reassure patient it has been approved and if he has any problems obtaining the medication he needs to let us know.

## 2020-09-08 ENCOUNTER — APPOINTMENT (OUTPATIENT)
Dept: SPINE | Facility: CLINIC | Age: 70
End: 2020-09-08

## 2020-09-14 DIAGNOSIS — E11.9 TYPE 2 DIABETES MELLITUS WITHOUT COMPLICATION, WITHOUT LONG-TERM CURRENT USE OF INSULIN (HCC): ICD-10-CM

## 2020-09-15 NOTE — ED ADULT NURSE NOTE - NS ED NURSE LEVEL OF CONSCIOUSNESS MENTAL STATUS
POSTPARTUM DISCHARGE INSTRUCTIONS FOR MOM    YOB: 1998   Age: 22 y.o.               Admit Date: 2020     Discharge Date: 9/15/2020  Attending Doctor:  Wade Bustamante M.D.                  Allergies:  Sulfate    Discharged to home by car. Discharged via wheelchair, hospital escort: Yes.  Special equipment needed: Not Applicable  Belongings with: Personal  Be sure to schedule a follow-up appointment with your primary care doctor or any specialists as instructed.     Discharge Plan:   Diet Plan: Discussed  Activity Level: Discussed  Confirmed Follow up Appointment: Patient to Call and Schedule Appointment  Medication Reconciliation Updated: Yes    REASONS TO CALL YOUR OBSTETRICIAN:  1.   Persistent fever or shaking chills (Temperature higher than 100.4)  2.   Heavy bleeding (soaking more than 1 pad per hour); Passing clots  3.   Foul odor from vagina  4.   Mastitis (Breast infection; breast pain, chills, fever, redness)  5.   Urinary pain, burning or frequency  6.   Abdominal incision infection  7.   Severe depression longer than 24 hours    HAND WASHING  · Prior to handling the baby.  · Before breastfeeding or bottle feeding baby.  · After using the bathroom or changing the baby's diaper.    WOUND CARE  Ask your physician for additional care instructions.  In general:    ·  Incision:      · Keep clean and dry.    · Do NOT lift anything heavier than your baby for up to 6 weeks.    · There should not be any opening or pus.      VAGINAL CARE  · Nothing inside vagina for 6 weeks: no sexual intercourse, tampons or douching.  · Bleeding may continue for 2-4 weeks.  Amount may vary.    · Call your physician for heavy bleeding which means soaking more than 1 pad per hour    BIRTH CONTROL  · It is possible to become pregnant at any time after delivery and while breastfeeding.  · Plan to discuss a method of birth control with your physician at your follow up visit. visit.    DIET AND  "ELIMINATION  · Eating more fiber (bran cereal, fruits, and vegetables) and drinking plenty of fluids will help to avoid constipation.  · Urinary frequency after childbirth is normal.    POSTPARTUM BLUES  During the first few days after birth, you may experience a sense of the \"blues\" which may include impatience, irritability or even crying.  These feeling come and go quickly.  However, as many as 1 in 10 women experience emotional symptoms known as postpartum depression.    Postpartum depression:  May start as early as the second or third day after delivery or take several weeks or months to develop.  Symptoms of \"blues\" are present, but are more intense:  Crying spells; loss of appetite; feelings of hopelessness or loss of control; fear of touching the baby; over concern or no concern at all about the baby; little or no concern about your own appearance/caring for yourself; and/or inability to sleep or excessive sleeping.  Contact your physician if you are experiencing any of these symptoms.    Crisis Hotline:  · [place] Crisis Hotline:  7-457-YSHJYJW  Or 1-932.587.8831  · [place] Crisis Hotline:  1-796.251.7015  Or 335-966-0047    PREVENTING SHAKEN BABY:  If you are angry or stressed, PUT THE BABY IN THE CRIB, step away, take some deep breaths, and wait until you are calm to care for the baby.  DO NOT SHAKE THE BABY.  You are not alone, call a supporter for help.    · Crisis Call Center 24/7 crisis line 949-871-4777 or 1-213.633.8619  · You can also text them, text \"ANSWER\" to 201192    QUIT SMOKING/TOBACCO USE:  I understand the use of any tobacco products increases my chance of suffering from future heart disease and could cause other illnesses which may shorten my life. Quitting the use of tobacco products is the single most important thing I can do to improve my health. For further information on smoking / tobacco cessation call a Toll Free Quit Line at 1-728.715.4961 (*National Cancer Trosper) or " 1-305.518.8079 (American Lung Association) or you can access the web based program at www.lungusa.org.    · [place] Tobacco Users Help Line:  (965) 149-8224       Toll Free: 1-807.400.4249  · Quit Tobacco Program [facility] (158)406-1355    DEPRESSION / SUICIDE RISK:  As you are discharged from this [facility], it is important to learn how to keep safe from harming yourself.    Recognize the warning signs:  · Abrupt changes in personality, positive or negative- including increase in energy   · Giving away possessions  · Change in eating patterns- significant weight changes-  positive or negative  · Change in sleeping patterns- unable to sleep or sleeping all the time   · Unwillingness or inability to communicate  · Depression  · Unusual sadness, discouragement and loneliness  · Talk of wanting to die  · Neglect of personal appearance   · Rebelliousness- reckless behavior  · Withdrawal from people/activities they love  · Confusion- inability to concentrate     If you or a loved one observes any of these behaviors or has concerns about self-harm, here's what you can do:  · Talk about it- your feelings and reasons for harming yourself  · Remove any means that you might use to hurt yourself (examples: pills, rope, extension cords, firearm)  · Get professional help from the community (Mental Health, Substance Abuse, psychological counseling)  · Do not be alone:Call your Safe Contact- someone whom you trust who will be there for you.  · Call your local CRISIS HOTLINE 652-6242 or 488-467-5872  · Call your local Children's Mobile Crisis Response Team [place] (176) 630-8076 or www.SalesGossip  · Call the toll free National Suicide Prevention Hotlines   · National Suicide Prevention Lifeline 291-358-WMWT (7205)  · National Hope Line Network 800-SUICIDE (602-5904)    DISCHARGE SURVEY:  Thank you for choosing [facility].  We hope we provided you with very good care.  You may be  receiving a survey in the mail.  Please fill it out.  Your opinion is valuable to us.    ADDITIONAL EDUCATIONAL MATERIALS GIVEN TO PATIENT:        My signature on this form indicates that:  1.  I have reviewed and understand the above information  2.  My questions regarding this information have been answered to my satisfaction.  3.  I have formulated a plan with my discharge nurse to obtain my prescribed medication for home.     Cooperative/Alert/Awake

## 2020-09-16 ENCOUNTER — OFFICE VISIT (OUTPATIENT)
Dept: ORTHOPEDIC SURGERY | Facility: CLINIC | Age: 70
End: 2020-09-16

## 2020-09-16 VITALS
HEIGHT: 69 IN | SYSTOLIC BLOOD PRESSURE: 138 MMHG | BODY MASS INDEX: 41.32 KG/M2 | WEIGHT: 279 LBS | HEART RATE: 66 BPM | DIASTOLIC BLOOD PRESSURE: 79 MMHG

## 2020-09-16 DIAGNOSIS — M19.011 PRIMARY LOCALIZED OSTEOARTHROSIS OF RIGHT SHOULDER REGION: Primary | ICD-10-CM

## 2020-09-16 PROCEDURE — 99213 OFFICE O/P EST LOW 20 MIN: CPT | Performed by: FAMILY MEDICINE

## 2020-09-18 NOTE — PROGRESS NOTES
Primary Care Sports Medicine Office Visit Note     Patient ID: Orion Harvey is a 70 y.o. male.    Chief Complaint:  Chief Complaint   Patient presents with   • Right Shoulder - Pain     HPI:    Mr. Orion Harvey is a 70 y.o. male who presents to the clinic today for increasing right shoulder pain.  The patient was previously seen and evaluated here on 2020.  He states that after that visit, glenohumeral joint injection was unfortunately not very beneficial.  He states he had a moderate lidocaine benefit for a few days, but pain in the shoulder was not relieved much at all.  At this point, he knows and understands that he has severe degenerative osteoarthritis of the shoulder, and would like to further be evaluated for shoulder replacement.  Continued chronic aching knee pain.  Unable to use his arm to throw his flyfishing jacki, which he enjoys.    Past Medical History:   Diagnosis Date   • Arthritis    • Diabetes mellitus (CMS/HCC)    • Hyperlipidemia    • Hypertension    • Kidney stone        Past Surgical History:   Procedure Laterality Date   • CLAVICLE SURGERY     • CYSTOSCOPY W/ LASER LITHOTRIPSY     • REPAIR TENDONS LEG     • SKULL FRACTURE ELEVATION         Family History   Problem Relation Age of Onset   • Diabetes Mother    • Hyperlipidemia Mother    • Hearing loss Mother    • Diabetes Father    • Hyperlipidemia Father    • Hearing loss Father    • Heart disease Father    • Diabetes Brother    • Hyperlipidemia Brother    • Heart disease Brother    • Heart disease Brother      Social History     Occupational History   • Not on file   Tobacco Use   • Smoking status: Former Smoker     Packs/day: 1.00     Years: 0.00     Pack years: 0.00     Types: Cigarettes     Start date:      Quit date:      Years since quittin.7   • Smokeless tobacco: Never Used   Substance and Sexual Activity   • Alcohol use: No     Frequency: Never   • Drug use: No   • Sexual activity: Yes     Partners:  "Female      Review of Systems   Constitutional: Negative for activity change, fatigue and fever.   Musculoskeletal: Positive for arthralgias.   Skin: Negative for color change and rash.   Neurological: Negative for numbness.       Objective:    /79   Pulse 66   Ht 175.3 cm (69\")   Wt 127 kg (279 lb)   BMI 41.20 kg/m²     Physical Examination:  Physical Exam  Vitals signs and nursing note reviewed.   Constitutional:       General: He is not in acute distress.     Appearance: He is well-developed. He is not diaphoretic.   HENT:      Head: Normocephalic and atraumatic.   Eyes:      Conjunctiva/sclera: Conjunctivae normal.   Pulmonary:      Effort: Pulmonary effort is normal. No respiratory distress.   Skin:     General: Skin is warm.      Capillary Refill: Capillary refill takes less than 2 seconds.   Neurological:      Mental Status: He is alert.       Right Shoulder Exam     Range of Motion   Active abduction:  100 abnormal   Passive abduction:  120 (With crepitus) abnormal   Extension:  50 abnormal   External rotation:  40 (With severe pain and crepitus) abnormal   Forward flexion:  100 abnormal     Other   Erythema: absent  Sensation: normal  Pulse: present        Imaging and other tests:  No new imaging today.    Assessment and Plan:    1. Primary localized osteoarthrosis of right shoulder region    I discussed with the patient today with his severe limitation of motion, crepitus, and significant of pain, I feel his end-stage arthritis is worthy of joint replacement should he choose.  He would like to discuss this with surgeon.  I will get him an appointment with my partner Dr. Alfonso Richard for further evaluation.  In the meantime, continue conservative measures as we previously discussed.    Vargas RUIZ \"Chance\" Greg SMITH DO, CAQSM  09/18/20  16:45 EDT    Disclaimer: Please note that areas of this note were completed with computer voice recognition software.  Quite often unanticipated grammatical, " syntax, homophones, and other interpretive errors are inadvertently transcribed by the computer software. Please excuse any errors that have escaped final proofreading.

## 2020-09-21 ENCOUNTER — PREP FOR SURGERY (OUTPATIENT)
Dept: OTHER | Facility: HOSPITAL | Age: 70
End: 2020-09-21

## 2020-09-21 ENCOUNTER — OFFICE VISIT (OUTPATIENT)
Dept: ORTHOPEDIC SURGERY | Facility: CLINIC | Age: 70
End: 2020-09-21

## 2020-09-21 VITALS
HEIGHT: 69 IN | BODY MASS INDEX: 42.06 KG/M2 | WEIGHT: 284 LBS | HEART RATE: 63 BPM | SYSTOLIC BLOOD PRESSURE: 144 MMHG | DIASTOLIC BLOOD PRESSURE: 83 MMHG

## 2020-09-21 DIAGNOSIS — R79.9 ABNORMAL FINDING OF BLOOD CHEMISTRY, UNSPECIFIED: ICD-10-CM

## 2020-09-21 DIAGNOSIS — M19.011 OSTEOARTHRITIS OF RIGHT GLENOHUMERAL JOINT: Primary | ICD-10-CM

## 2020-09-21 DIAGNOSIS — M19.011 PRIMARY LOCALIZED OSTEOARTHROSIS OF RIGHT SHOULDER REGION: Primary | ICD-10-CM

## 2020-09-21 DIAGNOSIS — R79.1 ABNORMAL COAGULATION PROFILE: ICD-10-CM

## 2020-09-21 PROBLEM — M19.019 DJD OF SHOULDER: Status: ACTIVE | Noted: 2020-09-21

## 2020-09-21 PROCEDURE — 99214 OFFICE O/P EST MOD 30 MIN: CPT | Performed by: ORTHOPAEDIC SURGERY

## 2020-09-21 RX ORDER — MELOXICAM 15 MG/1
15 TABLET ORAL ONCE
Status: CANCELLED | OUTPATIENT
Start: 2020-09-21 | End: 2020-09-21

## 2020-09-21 RX ORDER — CEFAZOLIN SODIUM IN 0.9 % NACL 3 G/100 ML
3 INTRAVENOUS SOLUTION, PIGGYBACK (ML) INTRAVENOUS ONCE
Status: CANCELLED | OUTPATIENT
Start: 2020-09-21 | End: 2020-09-21

## 2020-09-21 RX ORDER — PREGABALIN 75 MG/1
150 CAPSULE ORAL ONCE
Status: CANCELLED | OUTPATIENT
Start: 2020-09-21 | End: 2020-09-21

## 2020-09-21 RX ORDER — OXYCODONE HCL 10 MG/1
10 TABLET, FILM COATED, EXTENDED RELEASE ORAL ONCE
Status: CANCELLED | OUTPATIENT
Start: 2020-09-21 | End: 2020-09-21

## 2020-09-21 NOTE — PROGRESS NOTES
"     Patient ID: Orion Harvey is a 70 y.o. male.    Chief Complaint:    Chief Complaint   Patient presents with   • Right Shoulder - Initial Evaluation     Primary localized osteoarthrosis        HPI:  Orion is a 70-year-old gentleman here with longstanding right shoulder pain.  There is no injury or surgical history.  He has pain and popping deep in the shoulder joint with difficulty fishing and shooting his gun.  He has taken Tylenol and had a cortisone injection with my partner about 2 months ago with little relief.  Pain is sharp and a 7/10  Past Medical History:   Diagnosis Date   • Arthritis    • Diabetes mellitus (CMS/HCC)    • Hyperlipidemia    • Hypertension    • Kidney stone        Past Surgical History:   Procedure Laterality Date   • CLAVICLE SURGERY     • CYSTOSCOPY W/ LASER LITHOTRIPSY  2019   • REPAIR TENDONS LEG     • SKULL FRACTURE ELEVATION         Family History   Problem Relation Age of Onset   • Diabetes Mother    • Hyperlipidemia Mother    • Hearing loss Mother    • Diabetes Father    • Hyperlipidemia Father    • Hearing loss Father    • Heart disease Father    • Diabetes Brother    • Hyperlipidemia Brother    • Heart disease Brother    • Heart disease Brother           Social History     Occupational History   • Not on file   Tobacco Use   • Smoking status: Former Smoker     Packs/day: 1.00     Years: 0.00     Pack years: 0.00     Types: Cigarettes     Start date:      Quit date:      Years since quittin.7   • Smokeless tobacco: Never Used   Substance and Sexual Activity   • Alcohol use: No     Frequency: Never   • Drug use: No   • Sexual activity: Yes     Partners: Female      Review of Systems   Cardiovascular: Negative for chest pain.   Musculoskeletal: Positive for arthralgias.       Objective:    /83   Pulse 63   Ht 175.3 cm (69\")   Wt 129 kg (284 lb)   BMI 41.94 kg/m²     Physical Examination:  He is a pleasant male in no distress. He is alert and oriented " x3 and appears his stated age.  Right shoulder demonstrates no scars and no atrophy.  He has pain over the bicep groove.  Passive elevation 90 degrees, abduction 60 degrees, external rotation 10 degrees, he has pain and weakness on Speed, Grundy, supraspinatus testing.  Belly press and liftoff are 4/5.  Deltoid function is intact.Sensory and motor exam are intact all distributions. Radial pulse is palpable and capillary refill is less than two seconds to all digits    Imaging:  X-rays demonstrate end-stage degenerative joint disease    Assessment:  Right shoulder degenerative disease    Plan:  Further treatment options were discussed and he would like to proceed with right reverse total shoulder arthroplasty  Discussed the risks including bleeding, scar, infection, stiffness, nerve artery vein tendon damage, instability, fracture dvt, loss of life or limb. Issues of scapular notching and component revision were discussed. Questions were answered and addressed          Disclaimer: Please note that areas of this note were completed with computer voice recognition software.  Quite often unanticipated grammatical, syntax, homophones, and other interpretive errors are inadvertently transcribed by the computer software. Please excuse any errors that have escaped final proofreading.

## 2020-09-23 ENCOUNTER — OUTPATIENT (OUTPATIENT)
Dept: OUTPATIENT SERVICES | Facility: HOSPITAL | Age: 70
LOS: 1 days | End: 2020-09-23
Payer: MEDICARE

## 2020-09-23 ENCOUNTER — APPOINTMENT (OUTPATIENT)
Dept: RADIOLOGY | Facility: CLINIC | Age: 70
End: 2020-09-23
Payer: MEDICARE

## 2020-09-23 DIAGNOSIS — M54.9 DORSALGIA, UNSPECIFIED: ICD-10-CM

## 2020-09-23 PROCEDURE — 72082 X-RAY EXAM ENTIRE SPI 2/3 VW: CPT

## 2020-09-23 PROCEDURE — 72082 X-RAY EXAM ENTIRE SPI 2/3 VW: CPT | Mod: 26

## 2020-09-24 ENCOUNTER — TRANSCRIPTION ENCOUNTER (OUTPATIENT)
Age: 70
End: 2020-09-24

## 2020-09-25 ENCOUNTER — HOSPITAL ENCOUNTER (OUTPATIENT)
Dept: CT IMAGING | Facility: HOSPITAL | Age: 70
Discharge: HOME | End: 2020-09-25
Admitting: ORTHOPAEDIC SURGERY

## 2020-09-25 DIAGNOSIS — M19.011 PRIMARY LOCALIZED OSTEOARTHROSIS OF RIGHT SHOULDER REGION: ICD-10-CM

## 2020-09-25 PROCEDURE — 73200 CT UPPER EXTREMITY W/O DYE: CPT

## 2020-09-29 ENCOUNTER — HOSPITAL ENCOUNTER (OUTPATIENT)
Dept: GENERAL RADIOLOGY | Facility: HOSPITAL | Age: 70
Discharge: HOME | End: 2020-09-29

## 2020-09-29 ENCOUNTER — LAB (OUTPATIENT)
Dept: LAB | Facility: HOSPITAL | Age: 70
End: 2020-09-29

## 2020-09-29 ENCOUNTER — APPOINTMENT (OUTPATIENT)
Dept: SPINE | Facility: CLINIC | Age: 70
End: 2020-09-29
Payer: MEDICARE

## 2020-09-29 ENCOUNTER — HOSPITAL ENCOUNTER (OUTPATIENT)
Dept: CARDIOLOGY | Facility: HOSPITAL | Age: 70
Discharge: HOME | End: 2020-09-29

## 2020-09-29 VITALS
SYSTOLIC BLOOD PRESSURE: 176 MMHG | HEIGHT: 68 IN | OXYGEN SATURATION: 93 % | DIASTOLIC BLOOD PRESSURE: 93 MMHG | HEART RATE: 87 BPM | TEMPERATURE: 98.5 F | BODY MASS INDEX: 30.31 KG/M2 | WEIGHT: 200 LBS

## 2020-09-29 DIAGNOSIS — M19.011 OSTEOARTHRITIS OF RIGHT GLENOHUMERAL JOINT: ICD-10-CM

## 2020-09-29 DIAGNOSIS — R79.9 ABNORMAL FINDING OF BLOOD CHEMISTRY, UNSPECIFIED: ICD-10-CM

## 2020-09-29 DIAGNOSIS — R79.1 ABNORMAL COAGULATION PROFILE: ICD-10-CM

## 2020-09-29 DIAGNOSIS — I10 ESSENTIAL HYPERTENSION: ICD-10-CM

## 2020-09-29 LAB
ABO GROUP BLD: NORMAL
ALBUMIN SERPL-MCNC: 4.6 G/DL (ref 3.5–5.2)
ALBUMIN/GLOB SERPL: 2.2 G/DL
ALP SERPL-CCNC: 51 U/L (ref 39–117)
ALT SERPL W P-5'-P-CCNC: 42 U/L (ref 1–41)
ANION GAP SERPL CALCULATED.3IONS-SCNC: 9.8 MMOL/L (ref 5–15)
APTT PPP: 23.6 SECONDS (ref 24–31)
AST SERPL-CCNC: 37 U/L (ref 1–40)
BASOPHILS # BLD AUTO: 0.04 10*3/MM3 (ref 0–0.2)
BASOPHILS NFR BLD AUTO: 0.7 % (ref 0–1.5)
BILIRUB SERPL-MCNC: 0.5 MG/DL (ref 0–1.2)
BLD GP AB SCN SERPL QL: NEGATIVE
BUN SERPL-MCNC: 17 MG/DL (ref 8–23)
BUN/CREAT SERPL: 18.7 (ref 7–25)
CALCIUM SPEC-SCNC: 9.5 MG/DL (ref 8.6–10.5)
CHLORIDE SERPL-SCNC: 102 MMOL/L (ref 98–107)
CO2 SERPL-SCNC: 28.2 MMOL/L (ref 22–29)
CREAT SERPL-MCNC: 0.91 MG/DL (ref 0.76–1.27)
DEPRECATED RDW RBC AUTO: 46.3 FL (ref 37–54)
EOSINOPHIL # BLD AUTO: 0.11 10*3/MM3 (ref 0–0.4)
EOSINOPHIL NFR BLD AUTO: 1.9 % (ref 0.3–6.2)
ERYTHROCYTE [DISTWIDTH] IN BLOOD BY AUTOMATED COUNT: 13.6 % (ref 12.3–15.4)
GFR SERPL CREATININE-BSD FRML MDRD: 83 ML/MIN/1.73
GLOBULIN UR ELPH-MCNC: 2.1 GM/DL
GLUCOSE SERPL-MCNC: 181 MG/DL (ref 65–99)
HBA1C MFR BLD: 7.8 % (ref 3.5–5.6)
HCT VFR BLD AUTO: 41.8 % (ref 37.5–51)
HGB BLD-MCNC: 13.8 G/DL (ref 13–17.7)
IMM GRANULOCYTES # BLD AUTO: 0.01 10*3/MM3 (ref 0–0.05)
IMM GRANULOCYTES NFR BLD AUTO: 0.2 % (ref 0–0.5)
INR PPP: 1.07 (ref 0.93–1.1)
LYMPHOCYTES # BLD AUTO: 1.28 10*3/MM3 (ref 0.7–3.1)
LYMPHOCYTES NFR BLD AUTO: 22 % (ref 19.6–45.3)
MCH RBC QN AUTO: 30.7 PG (ref 26.6–33)
MCHC RBC AUTO-ENTMCNC: 33 G/DL (ref 31.5–35.7)
MCV RBC AUTO: 92.9 FL (ref 79–97)
MONOCYTES # BLD AUTO: 0.45 10*3/MM3 (ref 0.1–0.9)
MONOCYTES NFR BLD AUTO: 7.7 % (ref 5–12)
MRSA DNA SPEC QL NAA+PROBE: NORMAL
NEUTROPHILS NFR BLD AUTO: 3.92 10*3/MM3 (ref 1.7–7)
NEUTROPHILS NFR BLD AUTO: 67.5 % (ref 42.7–76)
NRBC BLD AUTO-RTO: 0 /100 WBC (ref 0–0.2)
PLATELET # BLD AUTO: 184 10*3/MM3 (ref 140–450)
PMV BLD AUTO: 12 FL (ref 6–12)
POTASSIUM SERPL-SCNC: 5.1 MMOL/L (ref 3.5–5.2)
PROT SERPL-MCNC: 6.7 G/DL (ref 6–8.5)
PROTHROMBIN TIME: 11.7 SECONDS (ref 9.6–11.7)
RBC # BLD AUTO: 4.5 10*6/MM3 (ref 4.14–5.8)
RH BLD: POSITIVE
SODIUM SERPL-SCNC: 140 MMOL/L (ref 136–145)
T&S EXPIRATION DATE: NORMAL
WBC # BLD AUTO: 5.81 10*3/MM3 (ref 3.4–10.8)

## 2020-09-29 PROCEDURE — 93010 ELECTROCARDIOGRAM REPORT: CPT | Performed by: INTERNAL MEDICINE

## 2020-09-29 PROCEDURE — 85730 THROMBOPLASTIN TIME PARTIAL: CPT

## 2020-09-29 PROCEDURE — 83036 HEMOGLOBIN GLYCOSYLATED A1C: CPT

## 2020-09-29 PROCEDURE — 85610 PROTHROMBIN TIME: CPT

## 2020-09-29 PROCEDURE — 86900 BLOOD TYPING SEROLOGIC ABO: CPT | Performed by: ORTHOPAEDIC SURGERY

## 2020-09-29 PROCEDURE — 87641 MR-STAPH DNA AMP PROBE: CPT

## 2020-09-29 PROCEDURE — 99212 OFFICE O/P EST SF 10 MIN: CPT

## 2020-09-29 PROCEDURE — 85025 COMPLETE CBC W/AUTO DIFF WBC: CPT

## 2020-09-29 PROCEDURE — 86900 BLOOD TYPING SEROLOGIC ABO: CPT

## 2020-09-29 PROCEDURE — 86901 BLOOD TYPING SEROLOGIC RH(D): CPT

## 2020-09-29 PROCEDURE — 86850 RBC ANTIBODY SCREEN: CPT | Performed by: ORTHOPAEDIC SURGERY

## 2020-09-29 PROCEDURE — 93005 ELECTROCARDIOGRAM TRACING: CPT | Performed by: ORTHOPAEDIC SURGERY

## 2020-09-29 PROCEDURE — 36415 COLL VENOUS BLD VENIPUNCTURE: CPT

## 2020-09-29 PROCEDURE — 80053 COMPREHEN METABOLIC PANEL: CPT

## 2020-09-29 PROCEDURE — 71046 X-RAY EXAM CHEST 2 VIEWS: CPT

## 2020-09-29 PROCEDURE — 86901 BLOOD TYPING SEROLOGIC RH(D): CPT | Performed by: ORTHOPAEDIC SURGERY

## 2020-09-30 ENCOUNTER — RX RENEWAL (OUTPATIENT)
Age: 70
End: 2020-09-30

## 2020-09-30 NOTE — PHYSICAL EXAM
[General Appearance - Alert] : alert [General Appearance - In No Acute Distress] : in no acute distress [Oriented To Time, Place, And Person] : oriented to person, place, and time [Impaired Insight] : insight and judgment were intact [Cranial Nerves Optic (II)] : visual acuity intact bilaterally,  pupils equal round and reactive to light [Cranial Nerves Oculomotor (III)] : extraocular motion intact [Cranial Nerves Trigeminal (V)] : facial sensation intact symmetrically [Cranial Nerves Facial (VII)] : face symmetrical [Cranial Nerves Vestibulocochlear (VIII)] : hearing was intact bilaterally [Cranial Nerves Glossopharyngeal (IX)] : tongue and palate midline [Cranial Nerves Accessory (XI - Cranial And Spinal)] : head turning and shoulder shrug symmetric [Cranial Nerves Hypoglossal (XII)] : there was no tongue deviation with protrusion [Motor Tone] : muscle tone was normal in all four extremities [Motor Strength] : muscle strength was normal in all four extremities [Involuntary Movements] : no involuntary movements were seen [Sensation Tactile Decrease] : light touch was intact [Sclera] : the sclera and conjunctiva were normal [Outer Ear] : the ears and nose were normal in appearance [Neck Appearance] : the appearance of the neck was normal [] : no respiratory distress [Abdomen Soft] : soft [Skin Color & Pigmentation] : normal skin color and pigmentation

## 2020-09-30 NOTE — DATA REVIEWED
[de-identified] : [facility]

## 2020-09-30 NOTE — REASON FOR VISIT
[Follow-Up: _____] : a [unfilled] follow-up visit [FreeTextEntry1] : Today he returns for review of x-rays\par He reports persistent Lowerback pain

## 2020-09-30 NOTE — ASSESSMENT
[FreeTextEntry1] : 70 year old man S/P Multiple Revision procedures\par \par Follow up with new CT THORACIC Lumbar\par \par Follow up with Pain management

## 2020-10-03 ENCOUNTER — LAB (OUTPATIENT)
Dept: LAB | Facility: HOSPITAL | Age: 70
End: 2020-10-03

## 2020-10-03 DIAGNOSIS — M19.011 PRIMARY LOCALIZED OSTEOARTHROSIS OF RIGHT SHOULDER REGION: ICD-10-CM

## 2020-10-03 PROCEDURE — C9803 HOPD COVID-19 SPEC COLLECT: HCPCS

## 2020-10-03 PROCEDURE — U0004 COV-19 TEST NON-CDC HGH THRU: HCPCS

## 2020-10-04 LAB — SARS-COV-2 RNA NOSE QL NAA+PROBE: NOT DETECTED

## 2020-10-06 ENCOUNTER — ANESTHESIA EVENT (OUTPATIENT)
Dept: PERIOP | Facility: HOSPITAL | Age: 70
End: 2020-10-06

## 2020-10-06 ENCOUNTER — OUTPATIENT (OUTPATIENT)
Dept: OUTPATIENT SERVICES | Facility: HOSPITAL | Age: 70
LOS: 1 days | End: 2020-10-06

## 2020-10-06 ENCOUNTER — APPOINTMENT (OUTPATIENT)
Dept: CT IMAGING | Facility: CLINIC | Age: 70
End: 2020-10-06
Payer: MEDICARE

## 2020-10-06 ENCOUNTER — APPOINTMENT (OUTPATIENT)
Dept: GENERAL RADIOLOGY | Facility: HOSPITAL | Age: 70
End: 2020-10-06

## 2020-10-06 ENCOUNTER — ANESTHESIA (OUTPATIENT)
Dept: PERIOP | Facility: HOSPITAL | Age: 70
End: 2020-10-06

## 2020-10-06 ENCOUNTER — HOSPITAL ENCOUNTER (INPATIENT)
Facility: HOSPITAL | Age: 70
LOS: 1 days | Discharge: HOME | End: 2020-10-07
Attending: ORTHOPAEDIC SURGERY | Admitting: ORTHOPAEDIC SURGERY

## 2020-10-06 ENCOUNTER — APPOINTMENT (OUTPATIENT)
Dept: RADIOLOGY | Facility: CLINIC | Age: 70
End: 2020-10-06
Payer: MEDICARE

## 2020-10-06 DIAGNOSIS — M25.551 PAIN IN RIGHT HIP: ICD-10-CM

## 2020-10-06 DIAGNOSIS — M53.3 SACROCOCCYGEAL DISORDERS, NOT ELSEWHERE CLASSIFIED: ICD-10-CM

## 2020-10-06 DIAGNOSIS — M19.011 OSTEOARTHRITIS OF RIGHT GLENOHUMERAL JOINT: ICD-10-CM

## 2020-10-06 DIAGNOSIS — M54.6 PAIN IN THORACIC SPINE: ICD-10-CM

## 2020-10-06 PROBLEM — G47.33 OSA ON CPAP: Chronic | Status: ACTIVE | Noted: 2019-07-29

## 2020-10-06 PROBLEM — G47.33 OBSTRUCTIVE SLEEP APNEA SYNDROME: Chronic | Status: ACTIVE | Noted: 2019-04-26

## 2020-10-06 PROBLEM — E66.813 OBESITY, CLASS III, BMI 40-49.9 (MORBID OBESITY): Chronic | Status: ACTIVE | Noted: 2019-07-29

## 2020-10-06 PROBLEM — E11.9 DIABETES MELLITUS, TYPE 2: Chronic | Status: ACTIVE | Noted: 2019-04-26

## 2020-10-06 PROBLEM — E78.5 HYPERLIPIDEMIA: Chronic | Status: ACTIVE | Noted: 2019-04-26

## 2020-10-06 PROBLEM — I10 HYPERTENSION: Chronic | Status: ACTIVE | Noted: 2019-04-26

## 2020-10-06 PROBLEM — E66.01 OBESITY, CLASS III, BMI 40-49.9 (MORBID OBESITY): Chronic | Status: ACTIVE | Noted: 2019-07-29

## 2020-10-06 PROBLEM — Z99.89 OSA ON CPAP: Chronic | Status: ACTIVE | Noted: 2019-07-29

## 2020-10-06 LAB
GLUCOSE BLDC GLUCOMTR-MCNC: 205 MG/DL (ref 70–105)
GLUCOSE BLDC GLUCOMTR-MCNC: 225 MG/DL (ref 70–105)
GLUCOSE BLDC GLUCOMTR-MCNC: 274 MG/DL (ref 70–105)
GLUCOSE BLDC GLUCOMTR-MCNC: 280 MG/DL (ref 70–105)

## 2020-10-06 PROCEDURE — C1776 JOINT DEVICE (IMPLANTABLE): HCPCS | Performed by: ORTHOPAEDIC SURGERY

## 2020-10-06 PROCEDURE — 25010000002 CEFTRIAXONE PER 250 MG: Performed by: ORTHOPAEDIC SURGERY

## 2020-10-06 PROCEDURE — 72131 CT LUMBAR SPINE W/O DYE: CPT | Mod: 26

## 2020-10-06 PROCEDURE — 25010000002 ONDANSETRON PER 1 MG: Performed by: NURSE ANESTHETIST, CERTIFIED REGISTERED

## 2020-10-06 PROCEDURE — 72192 CT PELVIS W/O DYE: CPT | Mod: 26

## 2020-10-06 PROCEDURE — C9290 INJ, BUPIVACAINE LIPOSOME: HCPCS | Performed by: ANESTHESIOLOGY

## 2020-10-06 PROCEDURE — 25010000002 FENTANYL CITRATE (PF) 100 MCG/2ML SOLUTION: Performed by: ANESTHESIOLOGY

## 2020-10-06 PROCEDURE — C1713 ANCHOR/SCREW BN/BN,TIS/BN: HCPCS | Performed by: ORTHOPAEDIC SURGERY

## 2020-10-06 PROCEDURE — 63710000001 INSULIN LISPRO (HUMAN) PER 5 UNITS: Performed by: NURSE PRACTITIONER

## 2020-10-06 PROCEDURE — 97165 OT EVAL LOW COMPLEX 30 MIN: CPT

## 2020-10-06 PROCEDURE — 97110 THERAPEUTIC EXERCISES: CPT

## 2020-10-06 PROCEDURE — 25010000002 PROPOFOL 10 MG/ML EMULSION: Performed by: NURSE ANESTHETIST, CERTIFIED REGISTERED

## 2020-10-06 PROCEDURE — 25010000002 VANCOMYCIN 1 G RECONSTITUTED SOLUTION 1 EACH VIAL: Performed by: ORTHOPAEDIC SURGERY

## 2020-10-06 PROCEDURE — 25010000002 DEXAMETHASONE PER 1 MG: Performed by: ANESTHESIOLOGY

## 2020-10-06 PROCEDURE — 25010000002 CEFAZOLIN PER 500 MG: Performed by: ORTHOPAEDIC SURGERY

## 2020-10-06 PROCEDURE — 63710000001 INSULIN REGULAR HUMAN PER 5 UNITS: Performed by: ANESTHESIOLOGY

## 2020-10-06 PROCEDURE — 99222 1ST HOSP IP/OBS MODERATE 55: CPT | Performed by: NURSE PRACTITIONER

## 2020-10-06 PROCEDURE — 73030 X-RAY EXAM OF SHOULDER: CPT

## 2020-10-06 PROCEDURE — 76942 ECHO GUIDE FOR BIOPSY: CPT | Performed by: ORTHOPAEDIC SURGERY

## 2020-10-06 PROCEDURE — 25010000002 MIDAZOLAM PER 1 MG: Performed by: ANESTHESIOLOGY

## 2020-10-06 PROCEDURE — 23472 RECONSTRUCT SHOULDER JOINT: CPT | Performed by: ORTHOPAEDIC SURGERY

## 2020-10-06 PROCEDURE — 25010000003 BUPIVACAINE LIPOSOME 1.3 % SUSPENSION: Performed by: ANESTHESIOLOGY

## 2020-10-06 PROCEDURE — 82962 GLUCOSE BLOOD TEST: CPT

## 2020-10-06 PROCEDURE — 0RRK00Z REPLACEMENT OF LEFT SHOULDER JOINT WITH REVERSE BALL AND SOCKET SYNTHETIC SUBSTITUTE, OPEN APPROACH: ICD-10-PCS | Performed by: ORTHOPAEDIC SURGERY

## 2020-10-06 DEVICE — HUMERAL REVERSE METAPHYSIS +9MM/0°
Type: IMPLANTABLE DEVICE | Site: SHOULDER | Status: FUNCTIONAL
Brand: SHOULDER SYSTEM

## 2020-10-06 DEVICE — SHORT HUMERAL DIAPHYSIS - CEMENTLESS - 10
Type: IMPLANTABLE DEVICE | Site: SHOULDER | Status: FUNCTIONAL
Brand: SHOULDER SYSTEM

## 2020-10-06 DEVICE — GLENOID BASEPLATE Ø27X15
Type: IMPLANTABLE DEVICE | Site: SHOULDER | Status: FUNCTIONAL
Brand: SHOULDER SYSTEM

## 2020-10-06 DEVICE — GLENOID POLYAXIAL LOCKING SCREW - L30
Type: IMPLANTABLE DEVICE | Site: SHOULDER | Status: FUNCTIONAL
Brand: SHOULDER SYSTEM

## 2020-10-06 DEVICE — HUMERAL REVERSE HC LINER Ø39/+0MM
Type: IMPLANTABLE DEVICE | Site: SHOULDER | Status: FUNCTIONAL
Brand: SHOULDER SYSTEM

## 2020-10-06 DEVICE — IMPLANTABLE DEVICE: Type: IMPLANTABLE DEVICE | Site: SHOULDER | Status: FUNCTIONAL

## 2020-10-06 DEVICE — SHOULDER DISPOSABLE K-WIRE - Ø2.5
Type: IMPLANTABLE DEVICE | Site: SHOULDER | Status: FUNCTIONAL
Brand: SHOULDER INSTRUMENTS

## 2020-10-06 DEVICE — IMPLANTABLE DEVICE: Type: IMPLANTABLE DEVICE | Status: FUNCTIONAL

## 2020-10-06 DEVICE — GLENOID POLYAXIAL LOCKING SCREW - L26
Type: IMPLANTABLE DEVICE | Site: SHOULDER | Status: FUNCTIONAL
Brand: SHOULDER SYSTEM

## 2020-10-06 DEVICE — DEV CONTRL TISS STRATAFIX SPIRAL MNCRYL UD 3/0 PLS 30CM: Type: IMPLANTABLE DEVICE | Site: SHOULDER | Status: FUNCTIONAL

## 2020-10-06 DEVICE — SUT NONABS MAXBRAID/PE NMBR2 HC5 38IN BLU 900334: Type: IMPLANTABLE DEVICE | Site: SHOULDER | Status: FUNCTIONAL

## 2020-10-06 RX ORDER — ONDANSETRON 2 MG/ML
4 INJECTION INTRAMUSCULAR; INTRAVENOUS ONCE AS NEEDED
Status: DISCONTINUED | OUTPATIENT
Start: 2020-10-06 | End: 2020-10-06 | Stop reason: HOSPADM

## 2020-10-06 RX ORDER — MELOXICAM 15 MG/1
15 TABLET ORAL DAILY
Status: DISCONTINUED | OUTPATIENT
Start: 2020-10-07 | End: 2020-10-07 | Stop reason: HOSPADM

## 2020-10-06 RX ORDER — OXYCODONE HCL 10 MG/1
10 TABLET, FILM COATED, EXTENDED RELEASE ORAL ONCE
Status: COMPLETED | OUTPATIENT
Start: 2020-10-06 | End: 2020-10-06

## 2020-10-06 RX ORDER — NICOTINE POLACRILEX 4 MG
15 LOZENGE BUCCAL
Status: DISCONTINUED | OUTPATIENT
Start: 2020-10-06 | End: 2020-10-07 | Stop reason: HOSPADM

## 2020-10-06 RX ORDER — SODIUM CHLORIDE, SODIUM LACTATE, POTASSIUM CHLORIDE, CALCIUM CHLORIDE 600; 310; 30; 20 MG/100ML; MG/100ML; MG/100ML; MG/100ML
1000 INJECTION, SOLUTION INTRAVENOUS CONTINUOUS
Status: DISCONTINUED | OUTPATIENT
Start: 2020-10-06 | End: 2020-10-07 | Stop reason: HOSPADM

## 2020-10-06 RX ORDER — PROPOFOL 10 MG/ML
VIAL (ML) INTRAVENOUS AS NEEDED
Status: DISCONTINUED | OUTPATIENT
Start: 2020-10-06 | End: 2020-10-06 | Stop reason: SURG

## 2020-10-06 RX ORDER — ROSUVASTATIN CALCIUM 10 MG/1
10 TABLET, COATED ORAL NIGHTLY
Status: DISCONTINUED | OUTPATIENT
Start: 2020-10-06 | End: 2020-10-07 | Stop reason: HOSPADM

## 2020-10-06 RX ORDER — DIPHENHYDRAMINE HYDROCHLORIDE 50 MG/ML
25 INJECTION INTRAMUSCULAR; INTRAVENOUS NIGHTLY PRN
Status: DISCONTINUED | OUTPATIENT
Start: 2020-10-06 | End: 2020-10-07 | Stop reason: HOSPADM

## 2020-10-06 RX ORDER — ASCORBIC ACID 500 MG
1000 TABLET ORAL DAILY
Status: DISCONTINUED | OUTPATIENT
Start: 2020-10-07 | End: 2020-10-07 | Stop reason: HOSPADM

## 2020-10-06 RX ORDER — NALOXONE HCL 0.4 MG/ML
0.4 VIAL (ML) INJECTION
Status: DISCONTINUED | OUTPATIENT
Start: 2020-10-06 | End: 2020-10-07 | Stop reason: HOSPADM

## 2020-10-06 RX ORDER — MULTIPLE VITAMINS W/ MINERALS TAB 2148-113
1 TAB ORAL DAILY
Status: DISCONTINUED | OUTPATIENT
Start: 2020-10-07 | End: 2020-10-07 | Stop reason: HOSPADM

## 2020-10-06 RX ORDER — ONDANSETRON 2 MG/ML
INJECTION INTRAMUSCULAR; INTRAVENOUS AS NEEDED
Status: DISCONTINUED | OUTPATIENT
Start: 2020-10-06 | End: 2020-10-06 | Stop reason: SURG

## 2020-10-06 RX ORDER — GLYCOPYRROLATE 1 MG/5 ML
SYRINGE (ML) INTRAVENOUS AS NEEDED
Status: DISCONTINUED | OUTPATIENT
Start: 2020-10-06 | End: 2020-10-06 | Stop reason: SURG

## 2020-10-06 RX ORDER — LIDOCAINE HYDROCHLORIDE 20 MG/ML
INJECTION, SOLUTION EPIDURAL; INFILTRATION; INTRACAUDAL; PERINEURAL AS NEEDED
Status: DISCONTINUED | OUTPATIENT
Start: 2020-10-06 | End: 2020-10-06 | Stop reason: SURG

## 2020-10-06 RX ORDER — CEFAZOLIN SODIUM IN 0.9 % NACL 3 G/100 ML
3 INTRAVENOUS SOLUTION, PIGGYBACK (ML) INTRAVENOUS ONCE
Status: DISCONTINUED | OUTPATIENT
Start: 2020-10-06 | End: 2020-10-06 | Stop reason: HOSPADM

## 2020-10-06 RX ORDER — SODIUM CHLORIDE 9 MG/ML
75 INJECTION, SOLUTION INTRAVENOUS CONTINUOUS
Status: DISCONTINUED | OUTPATIENT
Start: 2020-10-06 | End: 2020-10-07 | Stop reason: HOSPADM

## 2020-10-06 RX ORDER — LABETALOL HYDROCHLORIDE 5 MG/ML
5 INJECTION, SOLUTION INTRAVENOUS
Status: DISCONTINUED | OUTPATIENT
Start: 2020-10-06 | End: 2020-10-06 | Stop reason: HOSPADM

## 2020-10-06 RX ORDER — MELOXICAM 15 MG/1
15 TABLET ORAL ONCE
Status: COMPLETED | OUTPATIENT
Start: 2020-10-06 | End: 2020-10-06

## 2020-10-06 RX ORDER — TRAMADOL HYDROCHLORIDE 50 MG/1
50 TABLET ORAL EVERY 6 HOURS PRN
Status: DISCONTINUED | OUTPATIENT
Start: 2020-10-06 | End: 2020-10-07 | Stop reason: HOSPADM

## 2020-10-06 RX ORDER — NEOSTIGMINE METHYLSULFATE 5 MG/5 ML
SYRINGE (ML) INTRAVENOUS AS NEEDED
Status: DISCONTINUED | OUTPATIENT
Start: 2020-10-06 | End: 2020-10-06 | Stop reason: SURG

## 2020-10-06 RX ORDER — ROCURONIUM BROMIDE 10 MG/ML
INJECTION, SOLUTION INTRAVENOUS AS NEEDED
Status: DISCONTINUED | OUTPATIENT
Start: 2020-10-06 | End: 2020-10-06 | Stop reason: SURG

## 2020-10-06 RX ORDER — FENTANYL CITRATE 50 UG/ML
INJECTION, SOLUTION INTRAMUSCULAR; INTRAVENOUS
Status: COMPLETED | OUTPATIENT
Start: 2020-10-06 | End: 2020-10-06

## 2020-10-06 RX ORDER — BUPIVACAINE HYDROCHLORIDE 5 MG/ML
INJECTION, SOLUTION EPIDURAL; INTRACAUDAL
Status: COMPLETED | OUTPATIENT
Start: 2020-10-06 | End: 2020-10-06

## 2020-10-06 RX ORDER — NALOXONE HCL 0.4 MG/ML
0.4 VIAL (ML) INJECTION AS NEEDED
Status: DISCONTINUED | OUTPATIENT
Start: 2020-10-06 | End: 2020-10-06 | Stop reason: HOSPADM

## 2020-10-06 RX ORDER — DEXTROSE MONOHYDRATE 25 G/50ML
25 INJECTION, SOLUTION INTRAVENOUS
Status: DISCONTINUED | OUTPATIENT
Start: 2020-10-06 | End: 2020-10-07 | Stop reason: HOSPADM

## 2020-10-06 RX ORDER — DIPHENHYDRAMINE HCL 25 MG
25 CAPSULE ORAL EVERY 6 HOURS PRN
Status: DISCONTINUED | OUTPATIENT
Start: 2020-10-06 | End: 2020-10-07 | Stop reason: HOSPADM

## 2020-10-06 RX ORDER — ONDANSETRON 2 MG/ML
4 INJECTION INTRAMUSCULAR; INTRAVENOUS EVERY 6 HOURS PRN
Status: DISCONTINUED | OUTPATIENT
Start: 2020-10-06 | End: 2020-10-07 | Stop reason: HOSPADM

## 2020-10-06 RX ORDER — PREGABALIN 75 MG/1
150 CAPSULE ORAL ONCE
Status: COMPLETED | OUTPATIENT
Start: 2020-10-06 | End: 2020-10-06

## 2020-10-06 RX ORDER — HYDROMORPHONE HCL 110MG/55ML
0.5 PATIENT CONTROLLED ANALGESIA SYRINGE INTRAVENOUS
Status: DISCONTINUED | OUTPATIENT
Start: 2020-10-06 | End: 2020-10-06 | Stop reason: HOSPADM

## 2020-10-06 RX ORDER — LISINOPRIL 20 MG/1
40 TABLET ORAL DAILY
Status: DISCONTINUED | OUTPATIENT
Start: 2020-10-07 | End: 2020-10-07 | Stop reason: HOSPADM

## 2020-10-06 RX ORDER — LIDOCAINE HYDROCHLORIDE 10 MG/ML
0.5 INJECTION, SOLUTION INFILTRATION; PERINEURAL ONCE AS NEEDED
Status: DISCONTINUED | OUTPATIENT
Start: 2020-10-06 | End: 2020-10-06 | Stop reason: HOSPADM

## 2020-10-06 RX ORDER — ACETAMINOPHEN 325 MG/1
650 TABLET ORAL EVERY 4 HOURS PRN
Status: DISCONTINUED | OUTPATIENT
Start: 2020-10-06 | End: 2020-10-07 | Stop reason: HOSPADM

## 2020-10-06 RX ORDER — CEFAZOLIN SODIUM IN 0.9 % NACL 3 G/100 ML
3 INTRAVENOUS SOLUTION, PIGGYBACK (ML) INTRAVENOUS EVERY 8 HOURS
Status: COMPLETED | OUTPATIENT
Start: 2020-10-06 | End: 2020-10-07

## 2020-10-06 RX ORDER — MORPHINE SULFATE 4 MG/ML
2 INJECTION, SOLUTION INTRAMUSCULAR; INTRAVENOUS EVERY 4 HOURS PRN
Status: DISCONTINUED | OUTPATIENT
Start: 2020-10-06 | End: 2020-10-07 | Stop reason: HOSPADM

## 2020-10-06 RX ORDER — GLIPIZIDE 5 MG/1
2.5 TABLET ORAL
Status: DISCONTINUED | OUTPATIENT
Start: 2020-10-07 | End: 2020-10-07 | Stop reason: HOSPADM

## 2020-10-06 RX ORDER — ASPIRIN 325 MG
325 TABLET, DELAYED RELEASE (ENTERIC COATED) ORAL EVERY 12 HOURS SCHEDULED
Status: DISCONTINUED | OUTPATIENT
Start: 2020-10-06 | End: 2020-10-07 | Stop reason: HOSPADM

## 2020-10-06 RX ORDER — OXYCODONE HYDROCHLORIDE 5 MG/1
10 TABLET ORAL EVERY 4 HOURS PRN
Status: DISCONTINUED | OUTPATIENT
Start: 2020-10-06 | End: 2020-10-07 | Stop reason: HOSPADM

## 2020-10-06 RX ORDER — BISACODYL 10 MG
10 SUPPOSITORY, RECTAL RECTAL DAILY PRN
Status: DISCONTINUED | OUTPATIENT
Start: 2020-10-06 | End: 2020-10-07 | Stop reason: HOSPADM

## 2020-10-06 RX ORDER — OXYCODONE HCL 10 MG/1
10 TABLET, FILM COATED, EXTENDED RELEASE ORAL EVERY 12 HOURS SCHEDULED
Status: DISCONTINUED | OUTPATIENT
Start: 2020-10-06 | End: 2020-10-07 | Stop reason: HOSPADM

## 2020-10-06 RX ORDER — DEXAMETHASONE SODIUM PHOSPHATE 4 MG/ML
INJECTION, SOLUTION INTRA-ARTICULAR; INTRALESIONAL; INTRAMUSCULAR; INTRAVENOUS; SOFT TISSUE
Status: COMPLETED | OUTPATIENT
Start: 2020-10-06 | End: 2020-10-06

## 2020-10-06 RX ORDER — ACETAMINOPHEN 650 MG/1
650 SUPPOSITORY RECTAL EVERY 4 HOURS PRN
Status: DISCONTINUED | OUTPATIENT
Start: 2020-10-06 | End: 2020-10-07 | Stop reason: HOSPADM

## 2020-10-06 RX ORDER — ONDANSETRON 4 MG/1
4 TABLET, FILM COATED ORAL EVERY 6 HOURS PRN
Status: DISCONTINUED | OUTPATIENT
Start: 2020-10-06 | End: 2020-10-07 | Stop reason: HOSPADM

## 2020-10-06 RX ORDER — HYDROMORPHONE HCL 110MG/55ML
0.25 PATIENT CONTROLLED ANALGESIA SYRINGE INTRAVENOUS
Status: DISCONTINUED | OUTPATIENT
Start: 2020-10-06 | End: 2020-10-06 | Stop reason: HOSPADM

## 2020-10-06 RX ORDER — MIDAZOLAM HYDROCHLORIDE 1 MG/ML
INJECTION INTRAMUSCULAR; INTRAVENOUS
Status: COMPLETED | OUTPATIENT
Start: 2020-10-06 | End: 2020-10-06

## 2020-10-06 RX ORDER — DIPHENHYDRAMINE HCL 25 MG
25 CAPSULE ORAL NIGHTLY PRN
Status: DISCONTINUED | OUTPATIENT
Start: 2020-10-06 | End: 2020-10-07 | Stop reason: HOSPADM

## 2020-10-06 RX ORDER — SODIUM CHLORIDE 0.9 % (FLUSH) 0.9 %
10 SYRINGE (ML) INJECTION AS NEEDED
Status: DISCONTINUED | OUTPATIENT
Start: 2020-10-06 | End: 2020-10-06 | Stop reason: HOSPADM

## 2020-10-06 RX ADMIN — BUPIVACAINE 10 ML: 13.3 INJECTION, SUSPENSION, LIPOSOMAL INFILTRATION at 07:15

## 2020-10-06 RX ADMIN — INSULIN LISPRO 6 UNITS: 100 INJECTION, SOLUTION INTRAVENOUS; SUBCUTANEOUS at 16:58

## 2020-10-06 RX ADMIN — Medication 0.8 MG: at 09:18

## 2020-10-06 RX ADMIN — SODIUM CHLORIDE, SODIUM LACTATE, POTASSIUM CHLORIDE, AND CALCIUM CHLORIDE 1000 ML: .6; .31; .03; .02 INJECTION, SOLUTION INTRAVENOUS at 06:51

## 2020-10-06 RX ADMIN — FENTANYL CITRATE 150 MCG: 50 INJECTION, SOLUTION INTRAMUSCULAR; INTRAVENOUS at 07:31

## 2020-10-06 RX ADMIN — PROPOFOL 200 MG: 10 INJECTION, EMULSION INTRAVENOUS at 07:31

## 2020-10-06 RX ADMIN — BENZOYL PEROXIDE: 10 SUSPENSION TOPICAL at 06:51

## 2020-10-06 RX ADMIN — MIDAZOLAM 1 MG: 1 INJECTION INTRAMUSCULAR; INTRAVENOUS at 07:15

## 2020-10-06 RX ADMIN — DEXAMETHASONE SODIUM PHOSPHATE 4 MG: 4 INJECTION, SOLUTION INTRAMUSCULAR; INTRAVENOUS at 07:15

## 2020-10-06 RX ADMIN — MELOXICAM 15 MG: 15 TABLET ORAL at 06:50

## 2020-10-06 RX ADMIN — SODIUM CHLORIDE 3 G: 9 INJECTION, SOLUTION INTRAVENOUS at 23:44

## 2020-10-06 RX ADMIN — INSULIN HUMAN 8 UNITS: 100 INJECTION, SOLUTION PARENTERAL at 07:02

## 2020-10-06 RX ADMIN — FENTANYL CITRATE 50 MCG: 50 INJECTION, SOLUTION INTRAMUSCULAR; INTRAVENOUS at 07:15

## 2020-10-06 RX ADMIN — ASPIRIN 325 MG: 325 TABLET, COATED ORAL at 20:37

## 2020-10-06 RX ADMIN — PREGABALIN 150 MG: 75 CAPSULE ORAL at 06:50

## 2020-10-06 RX ADMIN — CEFAZOLIN SODIUM 3 G: 1 INJECTION, POWDER, FOR SOLUTION INTRAMUSCULAR; INTRAVENOUS at 07:41

## 2020-10-06 RX ADMIN — TRANEXAMIC ACID 1000 MG: 100 INJECTION, SOLUTION INTRAVENOUS at 07:43

## 2020-10-06 RX ADMIN — ROCURONIUM BROMIDE 50 MG: 10 INJECTION, SOLUTION INTRAVENOUS at 07:31

## 2020-10-06 RX ADMIN — OXYCODONE HYDROCHLORIDE 10 MG: 10 TABLET, FILM COATED, EXTENDED RELEASE ORAL at 20:37

## 2020-10-06 RX ADMIN — SODIUM CHLORIDE 3 G: 9 INJECTION, SOLUTION INTRAVENOUS at 16:58

## 2020-10-06 RX ADMIN — LIDOCAINE HYDROCHLORIDE 100 MG: 20 INJECTION, SOLUTION EPIDURAL; INFILTRATION; INTRACAUDAL; PERINEURAL at 07:31

## 2020-10-06 RX ADMIN — Medication 4 MG: at 09:18

## 2020-10-06 RX ADMIN — BUPIVACAINE HYDROCHLORIDE 10 ML: 5 INJECTION, SOLUTION EPIDURAL; INTRACAUDAL; PERINEURAL at 07:15

## 2020-10-06 RX ADMIN — INSULIN LISPRO 6 UNITS: 100 INJECTION, SOLUTION INTRAVENOUS; SUBCUTANEOUS at 21:23

## 2020-10-06 RX ADMIN — ROSUVASTATIN CALCIUM 10 MG: 10 TABLET, FILM COATED ORAL at 20:37

## 2020-10-06 RX ADMIN — SODIUM CHLORIDE 75 ML/HR: 900 INJECTION, SOLUTION INTRAVENOUS at 16:59

## 2020-10-06 RX ADMIN — OXYCODONE HYDROCHLORIDE 10 MG: 10 TABLET, FILM COATED, EXTENDED RELEASE ORAL at 06:50

## 2020-10-06 RX ADMIN — ONDANSETRON 4 MG: 2 INJECTION INTRAMUSCULAR; INTRAVENOUS at 09:26

## 2020-10-06 NOTE — PROGRESS NOTES
Discharge Planning Assessment  [facility]     Patient Name: Orion Harvey  MRN: [id number]  Today's Date: 10/6/2020    Admit Date: 10/6/2020    Discharge Needs Assessment     Row Name 10/06/20 1504       Living Environment    Lives With  spouse    Current Living Arrangements  home/apartment/condo    Primary Care Provided by  self    Provides Primary Care For  no one    Family Caregiver if Needed  spouse    Quality of Family Relationships  helpful;supportive    Able to Return to Prior Arrangements  yes       Resource/Environmental Concerns    Resource/Environmental Concerns  none    Transportation Concerns  car, none       Transition Planning    Patient/Family Anticipates Transition to  home with family    Patient/Family Anticipated Services at Transition  none       Discharge Needs Assessment    Readmission Within the Last 30 Days  no previous admission in last 30 days    Current Outpatient/Agency/Support Group  other (see comments)    Equipment Currently Used at Home  none    Concerns to be Addressed  no discharge needs identified    Anticipated Changes Related to Illness  none    Equipment Needed After Discharge  none    Provided Post Acute Provider List?  Yes    Post Acute Provider List  Outpatient Therapy    Delivered To  Patient    Method of Delivery  Other (comment)    Patient's Choice of Community Agency(s)  Patient was mailed a list before surgery    Discharge Coordination/Progress  Home with [facility] outpatient therapy at [facility].        Discharge Plan     Row Name 10/06/20 1507       Plan    Plan  Home with [facility] outpatient therapy at [facility].    Patient/Family in Agreement with Plan  yes    Plan Comments  Spoke with spouse over the phone. She verified PCP, pharmacy, and DME usage. Patient is independent. Jonel will need outpatient PT. Info sent to [facility] per patient's request. Barriers to discharge: POD#0 TSR        Continued Care and Services - Admitted Since 10/6/2020     Therapy     Service  Provider Request Status Selected Services Address Phone Fax    [facility] PHYSICAL [facility] [place]  Pending - Request Sent N/A [address], [place] [address] [phone]                Demographic Summary     Row Name 10/06/20 1503       General Information    Admission Type  inpatient    Required Notices Provided  Important Message from Medicare    Referral Source  admission list    Reason for Consult  discharge planning    Preferred Language  English     Used During This Interaction  no       Contact Information    Permission Granted to Share Info With                Anna Naegele RN Case Manager  [address]  [address] [phone]  office  [phone]  fax  [email or web address]

## 2020-10-06 NOTE — ANESTHESIA PREPROCEDURE EVALUATION
Anesthesia Evaluation     Patient summary reviewed and Nursing notes reviewed   NPO Solid Status: > 8 hours  NPO Liquid Status: > 8 hours           Airway   Mallampati: II  TM distance: >3 FB  Neck ROM: full  No difficulty expected and Large neck circumference  Dental - normal exam     Comment: Missing teeth    Pulmonary - normal exam   (+) sleep apnea on CPAP,   Cardiovascular - normal exam    ECG reviewed    (+) hypertension, hyperlipidemia,       Neuro/Psych- negative ROS  GI/Hepatic/Renal/Endo    (+) morbid obesity,  renal disease stones, diabetes mellitus type 2 well controlled,     Musculoskeletal     Abdominal   (+) obese,     Bowel sounds: normal.   Substance History - negative use     OB/GYN negative ob/gyn ROS         Other   arthritis,                    Anesthesia Plan    ASA 3     general with block   (Lyrica, oxycontin, meloxicam)  intravenous induction     Anesthetic plan, all risks, benefits, and alternatives have been provided, discussed and informed consent has been obtained with: patient.

## 2020-10-06 NOTE — DISCHARGE PLACEMENT REQUEST
"Orion Harvey (70 y.o. Male)     Date of Birth Social Security Number Address Home Phone MRN    1950  [address] [address] [phone] [id number]    Zoroastrian Marital Status          Pentecostal        Admission Date Admission Type Admitting Provider Attending Provider Department, Room/Bed    10/6/20 Elective Alfonso Richard MD Abeln, Kristopher Todd, MD [facility] SURGICAL INPATIENT, 4113/1    Discharge Date Discharge Disposition Discharge Destination                       Attending Provider: Alfonso Richard MD    Allergies: No Known Allergies    Isolation: None   Infection: None   Code Status: Not on file    Ht: 175.3 cm (69\")   Wt: 133 kg (293 lb 10.4 oz)    Admission Cmt: None   Principal Problem: Osteoarthritis of right glenohumeral joint [M19.011] More...                 Active Insurance as of 10/6/2020     Primary Coverage     Payor Plan Insurance Group Employer/Plan Group    MEDICARE MEDICARE A & B      Payor Plan Address Payor Plan Phone Number Payor Plan Fax Number Effective Dates    [address] [phone]  8/1/2016 - None Entered    [facility] [address]       Subscriber Name Subscriber Birth Date Member ID       ORION HARVEY 1950 [id number]           Secondary Coverage     Payor Plan Insurance Group Employer/Plan Group    [employer] [place] [employer] [place]      Payor Plan Address Payor Plan Phone Number Payor Plan Fax Number Effective Dates    [address] [place] [address] [phone]  8/1/2016 - None Entered    [place] [address]       Subscriber Name Subscriber Birth Date Member ID       ORION HARVEY 1950 [id number]                 Emergency Contacts      (Rel.) Home Phone Work Phone Mobile Phone    YAO HARVEY (Spouse) [phone] -- [phone]            "

## 2020-10-06 NOTE — PLAN OF CARE
Goal Outcome Evaluation:  Plan of Care Reviewed With: patient  Progress: improving  Outcome Summary: pt just admitted with right shoulder replacement. no c/o pain at this time. able to urinate in pacu. OT in room at this time.

## 2020-10-06 NOTE — PLAN OF CARE
Problem: Adult Inpatient Plan of Care  Goal: Plan of Care Review  Recent Flowsheet Documentation  Taken 10/6/2020 6205 by Radha Eaton OT  Plan of Care Reviewed With: patient  Outcome Summary: Pt is POD#0 R TSA.  Nerve block still in effect and pt reports no pain.  Pt very receptive to education and tolerates exercises well.  Unable to complete R AROM exercises due to nerve block.  Pt and family educated on sling and polar pack placement.  He will benefit from HH vs OP rehab at discharge.  PPE: mask, shield, gloves.

## 2020-10-06 NOTE — ANESTHESIA PROCEDURE NOTES
Airway  Urgency: elective    Date/Time: 10/6/2020 7:40 AM  Airway not difficult    General Information and Staff    Patient location during procedure: OR    Indications and Patient Condition  Indications for airway management: airway protection    Preoxygenated: yes  MILS maintained throughout  Mask difficulty assessment: 2 - vent by mask + OA or adjuvant +/- NMBA    Final Airway Details  Final airway type: endotracheal airway      Successful airway: ETT  Cuffed: yes   Successful intubation technique: direct laryngoscopy  Facilitating devices/methods: intubating stylet and cricoid pressure  Endotracheal tube insertion site: oral  Blade: Ame  Blade size: 4  ETT size (mm): 7.5  Cormack-Lehane Classification: grade IIa - partial view of glottis  Placement verified by: chest auscultation and capnometry   Measured from: lips  ETT/EBT  to lips (cm): 23  Number of attempts at approach: 1  Assessment: lips, teeth, and gum same as pre-op and atraumatic intubation

## 2020-10-06 NOTE — ANESTHESIA PROCEDURE NOTES
Peripheral Block      Patient reassessed immediately prior to procedure    Patient location during procedure: pre-op  Start time: 10/6/2020 7:10 AM  Reason for block: at surgeon's request and post-op pain management  Performed by  Anesthesiologist: Emre Porter MD  Preanesthetic Checklist  Completed: patient identified, site marked, surgical consent, pre-op evaluation, timeout performed, IV checked, risks and benefits discussed and monitors and equipment checked  Prep:  Pt Position: supine  Sterile barriers:cap, gloves and sterile barriers  Prep: ChloraPrep  Patient monitoring: blood pressure monitoring, continuous pulse oximetry and EKG  Procedure  Sedation:yes  Performed under: local infiltration  Guidance:ultrasound guided  ULTRASOUND INTERPRETATION.  Using ultrasound guidance a 20 G gauge needle was placed in close proximity to the brachial plexus nerve, at which point, under ultrasound guidance anesthetic was injected in the area of the nerve and spread of the anesthesia was seen on ultrasound in close proximity thereto.  There were no abnormalities seen on ultrasound; a digital image was taken; and the patient tolerated the procedure with no complications. Images:still images obtained, printed/placed on chart    Laterality:right  Block Type:interscalene  Injection Technique:single-shot  Needle Type:echogenic  Needle Gauge:20 G  Resistance on Injection: none  Sedation medications used: midazolam (VERSED) injection, 1 mg  fentaNYL citrate (PF) (SUBLIMAZE) injection, 50 mcg  Medications Used: dexamethasone (DECADRON) injection, 4 mg  bupivacaine PF (MARCAINE) injection 0.5%, 10 mL  bupivacaine liposome (EXPAREL) injection 1.3%, 10 mL  Med admintered at 10/6/2020 7:15 AM      Post Assessment  Injection Assessment: negative aspiration for heme, no paresthesia on injection and incremental injection  Patient Tolerance:comfortable throughout block  Complications:no

## 2020-10-06 NOTE — THERAPY EVALUATION
"Patient Name: Orion Harvey  : 1950    MRN: [id number]                              Today's Date: 10/6/2020       Admit Date: 10/6/2020    Visit Dx:     ICD-10-CM ICD-9-CM   1. Osteoarthritis of right glenohumeral joint  M19.011 715.91     Patient Active Problem List   Diagnosis   • Cough due to ACE inhibitor   • Diabetes mellitus, type 2 (CMS/HCC)   • Gout   • Hyperlipidemia   • Hypertension   • Kidney stones   • Obstructive sleep apnea syndrome   • BPH (benign prostatic hyperplasia)   • Colon polyps   • Obesity, Class III, BMI 40-49.9 (morbid obesity) (CMS/HCC)   • Plantar fasciitis   • LLOYD on CPAP   • Nuclear sclerosis   • Presbyopia   • DJD of shoulder   • Osteoarthritis of right glenohumeral joint     Past Medical History:   Diagnosis Date   • Arthritis    • Diabetes mellitus (CMS/HCC)    • Hyperlipidemia    • Hypertension    • Kidney stone    • Kidney stones    • MRSA carrier     UNSURE IF WAS MRSA   • Sleep apnea     CPAP BRING DOS     Past Surgical History:   Procedure Laterality Date   • CLAVICLE SURGERY     • CYSTOSCOPY W/ LASER LITHOTRIPSY     • ORIF FINGER FRACTURE Right     RING FINGER   • REPAIR TENDONS LEG     • SKIN GRAFT Right     KNEE   • SKULL FRACTURE ELEVATION     • WOUND DEBRIDEMENT Right     MULTIPLE     General Information     Row Name 10/06/20 1605          OT Time and Intention    Document Type  evaluation  -SR     Mode of Treatment  individual therapy  -SR     Row Name 10/06/20 1605          General Information    Patient Profile Reviewed  yes  -SR     Existing Precautions/Restrictions  shoulder  -SR     Row Name 10/06/20 1605          Occupational Profile    Reason for Services/Referral (Occupational Profile)  Pt underwent R TSA on 10/6.  -SR     Successful Occupations (Occupational Profile)  Pt lives at home with wife.  Pt is normally able to complete ADLs and mobility.  He reports he has a cane at times due to \"bad\" knees.  -SR     Row Name 10/06/20 1605          " Living Environment    Lives With  spouse  -SR     Row Name 10/06/20 1605          Stairs Within Home, Primary    Number of Stairs, Within Home, Primary  one  -SR     Row Name 10/06/20 1605          Cognition    Orientation Status (Cognition)  oriented x 3  -SR       User Key  (r) = Recorded By, (t) = Taken By, (c) = Cosigned By    Initials Name Provider Type    SR Radha Eaton, OT Occupational Therapist        Mobility/ADL's     Row Name 10/06/20 1629          Bed Mobility    Bed Mobility  supine-sit  -SR     Supine-Sit [place] (Bed Mobility)  minimum assist (75% patient effort)  -SR     Row Name 10/06/20 1629          Transfers    Transfers  sit-stand transfer  -SR     Sit-Stand [place] (Transfers)  supervision  -     Row Name 10/06/20 1629          Functional Mobility    Functional Mobility- Ind. Level  supervision required  -SR       User Key  (r) = Recorded By, (t) = Taken By, (c) = Cosigned By    Initials Name Provider Type    SR Radha Eaton, OT Occupational Therapist        Obj/Interventions     Row Name 10/06/20 1609          Range of Motion Comprehensive    Comment, General Range of Motion  LUE WFL.  R shoulder numb from nerve block.  Min AROM in pain.  -     Row Name 10/06/20 1609          Strength Comprehensive (MMT)    Comment, General Manual Muscle Testing (MMT) Assessment  LUE WFL.  Unable to test RUE due to sx and nerve block.  -     Row Name 10/06/20 1609          Shoulder (Therapeutic Exercise)    Shoulder (Therapeutic Exercise)  PROM (passive range of motion)  -SR     Shoulder PROM (Therapeutic Exercise)  external rotation;flexion Pendulums  -     Row Name 10/06/20 1609          Balance    Balance Interventions  sitting;standing;sit to stand;supported;static;dynamic;moderate challenge  -     Row Name 10/06/20 1609          Therapeutic Exercise    Therapeutic Exercise  shoulder;elbow/forearm  -SR       User Key  (r) = Recorded By, (t) = Taken By, (c) =  Cosigned By    Initials Name Provider Type    Radha Olmos OT Occupational Therapist        Goals/Plan    No documentation.       Clinical Impression     Row Name 10/06/20 1625          Plan of Care Review    Plan of Care Reviewed With  patient  -SR     Outcome Summary  Pt is POD#0 R TSA.  Nerve block still in effect and pt reports no pain.  Pt very receptive to education and tolerates exercises well.  Unable to complete R AROM exercises due to nerve block.  Pt and family educated on sling and polar pack placement.  He will benefit from HH vs OP rehab at discharge.  PPE: mask, shield, gloves.  -SR     Row Name 10/06/20 1625          Therapy Assessment/Plan (OT)    Rehab Potential (OT)  good, to achieve stated therapy goals  -SR     Criteria for Skilled Therapeutic Interventions Met (OT)  yes  -SR     Therapy Frequency (OT)  5 times/wk  -SR     Predicted Duration of Therapy Intervention (OT)  Until discharge  -SR     Row Name 10/06/20 1625          Therapy Plan Review/Discharge Plan (OT)    Anticipated Discharge Disposition (OT)  home with 24/7 care;home with outpatient therapy services  -SR     Row Name 10/06/20 1625          Positioning and Restraints    Pre-Treatment Position  in bed  -SR     Post Treatment Position  chair  -SR     In Chair  notified nsg;encouraged to call for assist;exit alarm on  -SR       User Key  (r) = Recorded By, (t) = Taken By, (c) = Cosigned By    Initials Name Provider Type    SR Radha Eaton OT Occupational Therapist        Outcome Measures    No documentation.       Occupational Therapy Education                 Title: PT OT SLP Therapies (In Progress)     Topic: Occupational Therapy (In Progress)     Point: ADL training (In Progress)     Description:   Instruct learner(s) on proper safety adaptation and remediation techniques during self care or transfers.   Instruct in proper use of assistive devices.              Learning Progress Summary           Patient  Acceptance, E,TB, NR by SR at 10/6/2020 1632                   Point: Home exercise program (In Progress)     Description:   Instruct learner(s) on appropriate technique for monitoring, assisting and/or progressing therapeutic exercises/activities.              Learning Progress Summary           Patient Acceptance, E,TB, NR by SR at 10/6/2020 1632                   Point: Precautions (In Progress)     Description:   Instruct learner(s) on prescribed precautions during self-care and functional transfers.              Learning Progress Summary           Patient Acceptance, E,TB, NR by SR at 10/6/2020 1632                   Point: Body mechanics (Not Started)     Description:   Instruct learner(s) on proper positioning and spine alignment during self-care, functional mobility activities and/or exercises.              Learner Progress:  Not documented in this visit.                      User Key     Initials Effective Dates Name Provider Type Discipline     03/01/19 -  Radha Eaton OT Occupational Therapist OT              OT Recommendation and Plan  Retired Outcome Summary/Treatment Plan (OT)  Anticipated Discharge Disposition (OT): home with 24/7 care, home with outpatient therapy services  Therapy Frequency (OT): 5 times/wk  Plan of Care Review  Plan of Care Reviewed With: patient  Outcome Summary: Pt is POD#0 R TSA.  Nerve block still in effect and pt reports no pain.  Pt very receptive to education and tolerates exercises well.  Unable to complete R AROM exercises due to nerve block.  Pt and family educated on sling and polar pack placement.  He will benefit from HH vs OP rehab at discharge.  PPE: mask, shield, gloves.  Plan of Care Reviewed With: patient  Outcome Summary: Pt is POD#0 R TSA.  Nerve block still in effect and pt reports no pain.  Pt very receptive to education and tolerates exercises well.  Unable to complete R AROM exercises due to nerve block.  Pt and family educated on sling and  polar pack placement.  He will benefit from HH vs OP rehab at discharge.  PPE: mask, shield, gloves.     Time Calculation:   Time Calculation- OT     Row Name 10/06/20 1634             Time Calculation- OT    OT Start Time  1600  -SR      OT Stop Time  1635  -SR      OT Time Calculation (min)  35 min  -SR      Total Timed Code Minutes- OT  10 minute(s)  -SR      OT Non-Billable Time (min)  25 min  -SR      OT Received On  10/06/20  -SR      OT - Next Appointment  10/07/20  -SR      OT Goal Re-Cert Due Date  10/20/20  -SR        User Key  (r) = Recorded By, (t) = Taken By, (c) = Cosigned By    Initials Name Provider Type    SR Radha Eaton OT Occupational Therapist        Therapy Charges for Today     Code Description Service Date Service Provider Modifiers Qty    [id number]  OT EVAL LOW COMPLEXITY 3 10/6/2020 Radha Eaton OT GO 1    [id number]  OT THER PROC EA 15 MIN 10/6/2020 Radha Eaton OT GO 1               Radha Eaton OT  10/6/2020

## 2020-10-06 NOTE — OP NOTE
TOTAL SHOULDER REVERSE ARTHROPLASTY  Procedure Report    Patient Name:  Orion Harvey  YOB: 1950    Date of Surgery:  10/6/2020     Indications: This is a 70 y.o. male with right shoulder pain.  Imaging demonstrated degenerative joint disease.  Treatment options were discussed.  They desired to proceed with reverse shoulder arthroplasty after discussing the risks including bleeding, scarring, infection, stiffness, nerve damage, tendon damage, artery damage, continued  pain, DVT, loss of life or limb, fracture, dislocation, and a need for further surgery.      Pre-op Diagnosis:   Osteoarthritis of right glenohumeral joint [M19.011]       Post-op Diagnosis:    Post-Op Diagnosis Codes:     * Osteoarthritis of right glenohumeral joint [M19.011]    Procedure/CPT® Codes: 76626    Procedure(s):  TOTAL SHOULDER REVERSE ARTHROPLASTY    Staff: Aubrey Jaeger certified first assist    was responsible for performing the following activities: Retraction, Suction, Irrigation, Suturing, Closing and Placing Dressing and their skilled assistance was necessary for the success of this case.       Anesthesia: General with Block    Estimated Blood Loss: 100ml    Implants:    Implant Name Type Inv. Item Serial No.  Lot No. LRB No. Used Action   SUT CONTRL TISS STRATAFIX SPIRAL MNCRYL UD 3/0 PLS 30CM - [id number] Implant SUT CONTRL TISS STRATAFIX SPIRAL MNCRYL UD 3/0 PLS 30CM  [employer]  DIV OF J AND J [id number] Right 1 Implanted   SUT NONABS MAXBRAID/PE NMBR2 HC5 38IN CYRUS [id number] - [id number] Implant SUT NONABS MAXBRAID/PE NMBR2 HC5 38IN CYRUS [id number]  LISA [facility] [id number] Right 3 Implanted   KWIRE SHLDR 2.5MM DISP - [id number] Implant KWIRE SHLDR 2.5MM DISP  [employer] [id number] Right 1 Implanted   BASEPLT PEG RAJ [id number] 84T08RZ - [id number] Implant BASEPLT PEG RAJ [id number]  [employer] [id number] Right 1 Implanted   GLENOSPHERE REV INF OFFST 06H42CF PLS4 - [id number] Implant GLENOSPHERE REV  INF OFFST [id number] PLS4  [employer] [id number] Right 1 Implanted   SCRW RAJ PA LK 4.5X30MM - [id number] Implant SCRW RAJ PA LK 4.5X30MM  [employer] [id number] Right 1 Implanted   SCRW RAJ PA LK 4.5X26MM - [id number] Implant SCRW RAJ PA LK 4.5X26MM  [employer] [id number] Right 1 Implanted   SCRW RAJ PA LK 4.5X26MM - [id number] Implant SCRW RAJ PA LK 4.5X26MM  [employer] [id number] Right 1 Implanted   DIAPHYSIS HUM/SHLDR SZ10 12X59.1MM SHT - [id number] Implant DIAPHYSIS HUM/SHLDR SZ10 12X59.1MM SHT  [employer] [id number] Right 1 Implanted   METAPHYSIS REV HUM/SHLDR TI6L4V 0DEG 37.5X22MM PLS9 - [id number] Implant METAPHYSIS REV HUM/SHLDR TI6L4V 0DEG 37.5X22MM PLS9  [employer] [id number] Right 1 Implanted   LINER REV HUM/SHLDR HXPE 39X19.1MM PLS0 - [id number] Implant LINER REV HUM/SHLDR HXPE 39X19.1MM PLS0  [employer] [id number] Right 1 Implanted       IVF: see anesthesia      Complications: None    Specimens:none    Description of Procedure: The patient's operative site was marked in the  preoperative holding area.  They received regional anesthesia and were brought to  the operating room and placed supine on a well-padded operating room table.  General anesthesia was administered.  Antibiotics were dosed.  A timeout was  taken, confirming the correct operative site and procedure.  They were placed in  the beach chair position.  The right shoulder was prepped and draped in the  standard surgical fashion.  SCDs were placed. A deltopectoral incision was marked and injected with local anesthetic.  The skin was opened.  Flaps were raised.  The interval was opened and the cephalic vein was protected.  Adhesions were released from the deltoid.  The circumflex vessels were identified and ligated with suture and cautery.  The rotator interval was opened and a retractor was placed.  The subscapularis was  Tenotomized and the capsule was released down to the 6 o'clock position on the  humeral head protecting the axillary nerve.  A María Elena  retractor was placed and an inferior and anterior capsular release was performed palpating and protecting the axillary  nerve with my finger at all times.  The shoulder was dislocated.  The biceps  was tenodesed to the upper biceps groove and circumferential osteophytes  were removed to identify the native head-neck junction.  Broaching was started  behind the biceps groove up to a size 10 after the cut was made in 20  degrees of retroversion. A trial was placed.  The glenoid was exposed after placing retractors.  The soft tissue was removed circumferentially.  The center point was marked and the glenoid was prepared in standard fashion.  The base plate was placed with good press-fit.  3 screws were placed with good purchase Then locked   The glenosphere was placed with good purchase and screw purchased.  The shoulder was  dislocated and the trial was removed from the canal and irrigated.  The true  stem was placed with good press-fit and trialing demonstrated 9 thickness to offer the best restoration of joint stability, muscle tension and range of motion.  The true polyethylene was placed with similar range of motion and stability.  A 3-minute Betadine wash performed.  Tear in the cephalic vein was ligated with suture. the wound was closed in layers with absorbable suture and skin glue.  A sterile dressing and sling were applied.  They were awakened and taken to the recovery room.  There were no complications.  I was present for all portions.  All counts were correct.   Good capillary refill was noted to the hand.      Alfonso Richard MD     Date: 10/6/2020  Time: 09:18 EDT

## 2020-10-06 NOTE — CONSULTS
[facility] Medicine Services      Patient Name: Orion Harvey  : 1950  MRN: [id number]  Primary Care Physician: Nazanin Garcia MD  Date of admission: 10/6/2020    Patient Care Team:  Nazanin Garcia MD as PCP - General  Nazanin Garcia MD as PCP - Family Medicine  Nazanin Garcia MD as PCP - [facility] Rosmery Meadows as Technologist          Subjective   History Present Illness     Chief Complaint:  Right shoulder pain      Mr. Harvey is a 70 y.o. morbidly obese male with a history of osteoarthritis and right shoulder DJD who presented to [facility] on 10/06/2020 and underwent a right total shoulder reverse arthroplasty by Dr. Richard. The patient was admitted to the surgical inpatient unit postoperatively, and the Hospitalist team was consulted for medical management.    The patient is alert and oriented and sitting up in bed. He denies any current pain or other complaint. He states his right arm is numb from the nerve block he received in surgery. In addition to above, he reports a history of Type 2 diabetes, hypertension, hyperlipidemia, and sleep apnea. He is a former smoker.          Review of Systems   Musculoskeletal: Positive for arthritis.   Neurological: Positive for numbness.        RUE numb from nerve block   All other systems reviewed and are negative.        Personal History     Past Medical History:   Past Medical History:   Diagnosis Date   • Arthritis    • Diabetes mellitus (CMS/HCC)    • Hyperlipidemia    • Hypertension    • Kidney stone    • Kidney stones    • MRSA carrier     UNSURE IF WAS MRSA   • Sleep apnea     CPAP BRING DOS       Surgical History:      Past Surgical History:   Procedure Laterality Date   • CLAVICLE SURGERY     • CYSTOSCOPY W/ LASER LITHOTRIPSY     • ORIF FINGER FRACTURE Right     RING FINGER   • REPAIR TENDONS LEG     • SKIN GRAFT Right     KNEE   • SKULL FRACTURE  ELEVATION     • WOUND DEBRIDEMENT Right     MULTIPLE       Family History: family history includes Diabetes in his brother, father, and mother; Hearing loss in his father and mother; Heart disease in his brother, brother, and father; Hyperlipidemia in his brother, father, and mother. Otherwise pertinent FHx was reviewed and unremarkable.     Social History:  reports that he quit smoking about 21 years ago. His smoking use included cigarettes. He started smoking about 41 years ago. He smoked 1.00 pack per day for 0.00 years. He has never used smokeless tobacco. He reports that he does not drink alcohol or use drugs.      Medications:  Prior to Admission medications    Medication Sig Start Date End Date Taking? Authorizing Provider   acetaminophen (TYLENOL) 650 MG 8 hr tablet Take 2 tablets by mouth Every 8 (Eight) Hours As Needed for Mild Pain  or Moderate Pain .  Patient taking differently: Take 1,300 mg by mouth 2 (two) times a day. DONT TAKE PREOP 11/5/19  Yes Nazanin Garcia MD   ascorbic acid (VITAMIN C) 1000 MG tablet Take 1 tablet by mouth Daily. LAST DOSE 9/29 4/26/19  Yes Adalgisa Negrete MD   aspirin 325 MG tablet Take 1 tablet by mouth Daily. LAST DOSE 9/29 4/26/19  Yes ProviderAdalgisa MD   Dulaglutide 1.5 MG/0.5ML solution pen-injector Inject 1.5 mg under the skin into the appropriate area as directed 1 (One) Time Per Week.  Patient taking differently: Inject 1.5 mg under the skin into the appropriate area as directed 1 (One) Time Per Week. Wednesday  EVENINGS 9/2/20  Yes Nazanin Garcia MD   glimepiride (AMARYL) 4 MG tablet TAKE 1 TABLET BY MOUTH TWICE DAILY  Patient taking differently: Take 4 mg by mouth 2 (two) times a day. DONT TAKE PREOP 9/18/19  Yes Nazanin Garcia MD   glucose blood (ONE TOUCH ULTRA TEST) test strip Use as instructed: Diagnosis E 11.9 8/4/20  Yes Nazanin Garcia MD   lisinopril (PRINIVIL,ZESTRIL) 40 MG tablet Take 1 tablet by mouth  Daily.  Patient taking differently: Take 40 mg by mouth Every Evening. LAST DOSE 10/4 2/17/20  Yes Nazanin Garcia MD   metFORMIN (GLUCOPHAGE) 1000 MG tablet Take 1 tablet by mouth 2 (Two) Times a Day.  Patient taking differently: Take 1,000 mg by mouth 2 (Two) Times a Day. LAST DOSE 10/4 AM 9/14/20  Yes Nazanin Garcia MD   Multiple Vitamins-Minerals (CENTRUM SILVER) tablet Take 1 tablet by mouth Daily. LAST DOSE 9/29 4/26/19  Yes ProviderAdalgisa MD   ONETOUCH DELICA LANCETS 33G misc 1 tablet by In Vitro route Every 12 (Twelve) Hours. 4/26/19  Yes Adalgisa Negrete MD   rosuvastatin (CRESTOR) 10 MG tablet Take 1 tablet by mouth once daily  Patient taking differently: Take 10 mg by mouth Every Night. 4/22/20  Yes Nazanin Garcia MD   vitamin d ( VITAMIN D3) 5000 units capsule Take 1 tablet by mouth Daily. LAST DOSE 9/29 4/26/19  Yes ProviderAdalgisa MD       Allergies:  No Known Allergies      Objective   Objective     Vital Signs  Temp:  [97.2 °F (36.2 °C)-97.7 °F (36.5 °C)] 97.7 °F (36.5 °C)  Heart Rate:  [52-87] 67  Resp:  [8-19] 15  BP: (134-176)/() 153/71  SpO2:  [93 %-99 %] 95 %  on  Flow (L/min):  [2-10] 2;   Device (Oxygen Therapy): nasal cannula  Body mass index is 43.36 kg/m².    Physical Exam  Vitals signs reviewed.   Constitutional:       Appearance: He is obese.   HENT:      Head: Normocephalic.      Nose: Nose normal.      Mouth/Throat:      Mouth: Mucous membranes are moist.   Eyes:      Extraocular Movements: Extraocular movements intact.      Conjunctiva/sclera: Conjunctivae normal.      Pupils: Pupils are equal, round, and reactive to light.   Neck:      Musculoskeletal: Normal range of motion and neck supple.   Cardiovascular:      Rate and Rhythm: Normal rate and regular rhythm.   Pulmonary:      Effort: Pulmonary effort is normal.      Breath sounds: Normal breath sounds.   Abdominal:      General: Bowel sounds are normal. There is no  distension.      Palpations: Abdomen is soft.      Tenderness: There is no abdominal tenderness.   Musculoskeletal:      Right lower leg: No edema.      Left lower leg: No edema.      Comments: RUE limited ROM s/p total shoulder arthroplasty, in sling   Skin:     General: Skin is warm and dry.      Capillary Refill: Capillary refill takes less than 2 seconds.      Comments: Right shoulder surgical incision covered with dry dressing   Neurological:      General: No focal deficit present.      Mental Status: He is alert and oriented to person, place, and time.   Psychiatric:         Mood and Affect: Mood normal.         Behavior: Behavior normal.           Results Review:  I have personally reviewed most recent lab results and agree with findings.        Estimated Creatinine Clearance: 103.6 mL/min (by C-G formula based on SCr of 0.91 mg/dL).  Brief Urine Lab Results     None          Microbiology Results (last 10 days)     Procedure Component Value - Date/Time    COVID PRE-OP / PRE-PROCEDURE SCREENING ORDER (NO ISOLATION) - Swab, Nasopharynx [[id number]] Collected: 10/03/20 1114    Lab Status: Final result Specimen: Swab from Nasopharynx Updated: 10/04/20 1957    Narrative:      The following orders were created for panel order COVID PRE-OP / PRE-PROCEDURE SCREENING ORDER (NO ISOLATION) - Swab, Nasopharynx.  Procedure                               Abnormality         Status                     ---------                               -----------         ------                     COVID-19,[facility], NP ...[[id number]]                      Final result                 Please view results for these tests on the individual orders.    COVID-19,[facility], NP SWAB IN LEXAR VIRAL TRANSPORT MEDIA 24-30 HR TAT - Swab, Nasopharynx [[id number]] Collected: 10/03/20 1114    Lab Status: Final result Specimen: Swab from Nasopharynx Updated: 10/04/20 1957     SARS-CoV-2 NATACHA Not Detected    MRSA Screen, PCR (Inpatient) - Swab, Nares  [[id number]]  (Normal) Collected: 09/29/20 1213    Lab Status: Final result Specimen: Swab from Nares Updated: 09/29/20 1436     MRSA PCR No MRSA Detected          ECG/EMG Results (most recent)     None          Xr Chest 2 View    Result Date: 9/29/2020  No acute cardiopulmonary abnormality.  Electronically Signed By-Raji Barrow On:9/29/2020 12:44 PM This report was finalized on [id number] by  Raji Barrow, .    Xr Shoulder 2+ View Right    Result Date: 10/6/2020   1. Satisfactory postoperative appearance status post right shoulder replacement. 2. Calcifications medial to the proximal humerus, within the soft tissues, thought to be postsurgical in nature. 3. Calcified loose bodies in the subcoracoid region, thought to be similar to the 09/25/2020 preoperative CT shoulder.  Electronically Signed By-Dr. Anitha Hampton MD On:10/6/2020 10:57 AM This report was finalized on [id number] by Dr. Anitha Hampton MD.       Estimated Creatinine Clearance: 103.6 mL/min (by C-G formula based on SCr of 0.91 mg/dL).      Assessment/Plan   Assessment/Plan       Active Hospital Problems    Diagnosis  POA   • **Osteoarthritis of right glenohumeral joint [M19.011]  Yes   • DJD of shoulder [M19.019]  Yes   • Obesity, Class III, BMI 40-49.9 (morbid obesity) (CMS/[facility]) [E66.01]  Yes   • Diabetes mellitus, type 2 (CMS/[facility]) [E11.9]  Yes   • Hyperlipidemia [E78.5]  Yes   • Hypertension [I10]  Yes   • Obstructive sleep apnea syndrome [G47.33]  Yes      Resolved Hospital Problems   No resolved problems to display.       Osteoarthritis of right glenohumeral joint / DJD of shoulder  -status post right total shoulder reverse arthroplasty (10/06/20)  -post-op care and PRN analgesia per orthopedic surgery  -PT to eval and treat    Diabetes mellitus, type 2   -A1c 7.8% on 09/29/20  -consult diabetes educator  -continue metformin, glipizide substituted for glimepiride   -accu checks AC&HS with SSI  -hold dulaglutide while inpatient  (non-formulary)  -diabetic consistent carb diet    Hyperlipidemia  -continue statin     Hypertension, chronic, controlled  -continue lisinopril  -monitor BP    Obstructive sleep apnea syndrome  -uses CPAP    Obesity, Class III, BMI 40-49.9 (morbid obesity)  -BMI 43.36  -encourage lifestyle modifications               VTE Prophylaxis -   Mechanical Order History:     None      Pharmalogical Order History:     None          CODE STATUS:    Code Status and Medical Interventions:   Ordered at: 10/06/20 1505     Code Status:    CPR     Medical Interventions (Level of Support Prior to Arrest):    Full       This patient has been examined wearing appropriate Personal Protective Equipment. 10/06/20      I discussed the patient's findings and my recommendations with patient, family and nursing staff.        Electronically signed by JAMIE Herndon, 10/06/20, 4:17 PM EDT.  [facility] Hospitalist Team

## 2020-10-06 NOTE — ANESTHESIA POSTPROCEDURE EVALUATION
Patient: Orion Harvey    Procedure Summary     Date: 10/06/20 Room / Location: [facility] OR 08 / [facility] MAIN OR    Anesthesia Start: 0724 Anesthesia Stop: 0946    Procedure: TOTAL SHOULDER REVERSE ARTHROPLASTY (Right Shoulder) Diagnosis:       Osteoarthritis of right glenohumeral joint      (Osteoarthritis of right glenohumeral joint [M19.011])    Surgeon: Alfonso Richard MD Provider: Emre Porter MD    Anesthesia Type: general with block ASA Status: 3          Anesthesia Type: general with block    Vitals  Vitals Value Taken Time   /113 10/06/20 0950   Temp 97.5 °F (36.4 °C) 10/06/20 0942   Pulse 63 10/06/20 0952   Resp 15 10/06/20 0942   SpO2 97 % 10/06/20 0952   Vitals shown include unvalidated device data.        Anesthesia Post Evaluation

## 2020-10-07 ENCOUNTER — READMISSION MANAGEMENT (OUTPATIENT)
Dept: CALL CENTER | Facility: HOSPITAL | Age: 70
End: 2020-10-07

## 2020-10-07 VITALS
SYSTOLIC BLOOD PRESSURE: 124 MMHG | WEIGHT: 293.65 LBS | RESPIRATION RATE: 17 BRPM | DIASTOLIC BLOOD PRESSURE: 71 MMHG | BODY MASS INDEX: 43.49 KG/M2 | TEMPERATURE: 98.3 F | OXYGEN SATURATION: 94 % | HEIGHT: 69 IN | HEART RATE: 70 BPM

## 2020-10-07 LAB
ANION GAP SERPL CALCULATED.3IONS-SCNC: 9 MMOL/L (ref 5–15)
BUN SERPL-MCNC: 12 MG/DL (ref 8–23)
BUN SERPL-MCNC: ABNORMAL MG/DL
BUN/CREAT SERPL: ABNORMAL
CALCIUM SPEC-SCNC: 8.6 MG/DL (ref 8.6–10.5)
CHLORIDE SERPL-SCNC: 100 MMOL/L (ref 98–107)
CO2 SERPL-SCNC: 28 MMOL/L (ref 22–29)
CREAT SERPL-MCNC: 0.82 MG/DL (ref 0.76–1.27)
GFR SERPL CREATININE-BSD FRML MDRD: 93 ML/MIN/1.73
GLUCOSE BLDC GLUCOMTR-MCNC: 179 MG/DL (ref 70–105)
GLUCOSE SERPL-MCNC: 177 MG/DL (ref 65–99)
HCT VFR BLD AUTO: 38.2 % (ref 37.5–51)
HGB BLD-MCNC: 12.6 G/DL (ref 13–17.7)
POTASSIUM SERPL-SCNC: 4.4 MMOL/L (ref 3.5–5.2)
SODIUM SERPL-SCNC: 137 MMOL/L (ref 136–145)

## 2020-10-07 PROCEDURE — 82962 GLUCOSE BLOOD TEST: CPT

## 2020-10-07 PROCEDURE — 97535 SELF CARE MNGMENT TRAINING: CPT

## 2020-10-07 PROCEDURE — 97110 THERAPEUTIC EXERCISES: CPT

## 2020-10-07 PROCEDURE — 85014 HEMATOCRIT: CPT | Performed by: ORTHOPAEDIC SURGERY

## 2020-10-07 PROCEDURE — 85018 HEMOGLOBIN: CPT | Performed by: ORTHOPAEDIC SURGERY

## 2020-10-07 PROCEDURE — 25010000002 CEFAZOLIN PER 500 MG: Performed by: ORTHOPAEDIC SURGERY

## 2020-10-07 PROCEDURE — 80048 BASIC METABOLIC PNL TOTAL CA: CPT | Performed by: ORTHOPAEDIC SURGERY

## 2020-10-07 PROCEDURE — 97161 PT EVAL LOW COMPLEX 20 MIN: CPT

## 2020-10-07 RX ORDER — LANCETS 30 GAUGE
1 EACH MISCELLANEOUS DAILY
Qty: 100 EACH | Refills: 0 | OUTPATIENT
Start: 2020-10-07

## 2020-10-07 RX ORDER — BLOOD SUGAR DIAGNOSTIC
1 STRIP MISCELLANEOUS DAILY
Qty: 100 EACH | Refills: 0 | OUTPATIENT
Start: 2020-10-07

## 2020-10-07 RX ORDER — ISOPROPYL ALCOHOL 700 MG/ML
1 CLOTH TOPICAL DAILY
Qty: 100 EACH | Refills: 0 | OUTPATIENT
Start: 2020-10-07

## 2020-10-07 RX ORDER — OXYCODONE HYDROCHLORIDE AND ACETAMINOPHEN 5; 325 MG/1; MG/1
1 TABLET ORAL EVERY 6 HOURS PRN
Qty: 28 TABLET | Refills: 0 | Status: SHIPPED | OUTPATIENT
Start: 2020-10-07 | End: 2020-11-10

## 2020-10-07 RX ADMIN — OXYCODONE HYDROCHLORIDE AND ACETAMINOPHEN 1000 MG: 500 TABLET ORAL at 09:58

## 2020-10-07 RX ADMIN — METFORMIN HYDROCHLORIDE 1000 MG: 500 TABLET ORAL at 09:58

## 2020-10-07 RX ADMIN — MELOXICAM 15 MG: 15 TABLET ORAL at 09:58

## 2020-10-07 RX ADMIN — Medication 5000 UNITS: at 09:58

## 2020-10-07 RX ADMIN — MULTIPLE VITAMINS W/ MINERALS TAB 1 TABLET: TAB at 09:58

## 2020-10-07 RX ADMIN — GLIPIZIDE 2.5 MG: 5 TABLET ORAL at 09:58

## 2020-10-07 RX ADMIN — OXYCODONE HYDROCHLORIDE 10 MG: 10 TABLET, FILM COATED, EXTENDED RELEASE ORAL at 09:58

## 2020-10-07 RX ADMIN — LISINOPRIL 40 MG: 20 TABLET ORAL at 09:58

## 2020-10-07 RX ADMIN — SODIUM CHLORIDE 3 G: 9 INJECTION, SOLUTION INTRAVENOUS at 10:00

## 2020-10-07 RX ADMIN — ASPIRIN 325 MG: 325 TABLET, COATED ORAL at 09:58

## 2020-10-07 NOTE — PLAN OF CARE
Problem: Adult Inpatient Plan of Care  Goal: Plan of Care Review  Outcome: Ongoing, Progressing  Flowsheets (Taken 10/7/2020 2709)  Progress: improving  Plan of Care Reviewed With: patient  Outcome Summary: Pt lives at home with spouse, typically very independent, drives, no recent falls. Pt demonstrates good functional mobilty this date, ambulates with no LOB. Pt reports no pain this date d/t block, demonstrates good understanding of ther ex, and sling usage, as well as polar pack. PT with no concerns at this time, Recommendation is to follow up with OPPT following d/c. PPE worn includes gloves, mask and goggles.

## 2020-10-07 NOTE — DISCHARGE SUMMARY
Date of Discharge:  10/7/2020    Discharge Diagnosis: Right shoulder degenerative joint disease    Presenting Problem/History of Present Illness  Active Hospital Problems    Diagnosis  POA   • **Osteoarthritis of right glenohumeral joint [M19.011]  Yes   • DJD of shoulder [M19.019]  Yes   • Obesity, Class III, BMI 40-49.9 (morbid obesity) (CMS/[facility]) [E66.01]  Yes   • Diabetes mellitus, type 2 (CMS/[facility]) [E11.9]  Yes   • Hyperlipidemia [E78.5]  Yes   • Hypertension [I10]  Yes   • Obstructive sleep apnea syndrome [G47.33]  Yes      Resolved Hospital Problems   No resolved problems to display.        Hospital Course  Patient is a 70 y.o. male presented with right shoulder degenerative disease and underwent reverse total shoulder arthroplasty date of admission.  Postop day 1 was started diet and oral medication.      Procedures Performed: Right reverse total shoulder      Consults:   Consults     Date and Time Order Name Status Description    10/6/2020 1504 Inpatient Consult to Hospitalist Completed             Condition on Discharge:  Improved    Vital Signs  Vitals:    10/06/20 2043 10/07/20 0009 10/07/20 0520 10/07/20 0832   BP: 127/79 125/76 135/69 124/71   BP Location: Left arm Left arm Left arm Left arm   Patient Position: Lying Lying Lying Lying   Pulse: 82 85 79 70   Resp: 18 16 16 17   Temp: 98.1 °F (36.7 °C) 98.1 °F (36.7 °C) 98.4 °F (36.9 °C) 98.3 °F (36.8 °C)   TempSrc: Oral Oral Oral Oral   SpO2: 93% 94% 93% 94%   Weight:       Height:           Physical Exam:  Dry dressing, Sensory and motor exam are intact all distributions. Radial pulse is palpable and capillary refill is less than two seconds to all digits       Discharge Disposition  Home    Discharge Medications     Discharge Medications      New Medications      Instructions Start Date   oxyCODONE-acetaminophen 5-325 MG per tablet  Commonly known as: Percocet   1 tablet, Oral, Every 6 Hours PRN         Changes to Medications      Instructions Start  Date   Dulaglutide 1.5 MG/0.5ML solution pen-injector  What changed: additional instructions   1.5 mg, Subcutaneous, Weekly      glimepiride 4 MG tablet  Commonly known as: AMARYL  What changed:   · when to take this  · additional instructions   TAKE 1 TABLET BY MOUTH TWICE DAILY      lisinopril 40 MG tablet  Commonly known as: PRINIVILZESTRIL  What changed:   · when to take this  · additional instructions   40 mg, Oral, Daily      metFORMIN 1000 MG tablet  Commonly known as: GLUCOPHAGE  What changed: additional instructions   1,000 mg, Oral, 2 Times Daily      rosuvastatin 10 MG tablet  Commonly known as: CRESTOR  What changed: when to take this   Take 1 tablet by mouth once daily         Continue These Medications      Instructions Start Date   ascorbic acid 1000 MG tablet  Commonly known as: VITAMIN C   1 tablet, Oral, Daily, LAST DOSE 9/29      aspirin 325 MG tablet   1 tablet, Oral, Daily, LAST DOSE 9/29      Centrum Silver tablet   1 tablet, Oral, Daily, LAST DOSE 9/29      glucose blood test strip  Commonly known as: ONE TOUCH ULTRA TEST   Use as instructed: Diagnosis E 11.9      OneTouch Delica Lancets 33G misc   1 tablet, In Vitro, Every 12 Hours      SM Vitamin D3 125 MCG (5000 UT) capsule  Generic drug: vitamin d   1 tablet, Oral, Daily, LAST DOSE 9/29         Stop These Medications    acetaminophen 650 MG 8 hr tablet  Commonly known as: TYLENOL              Discharge instructions:  Continue sling.  Keep dressing on.  Okay to shower and perform home exercises.  Continue polar care.    Follow-up Appointments  Future Appointments   Date Time Provider Department Center   10/21/2020  8:15 AM Alfonso Richard MD [facility]   11/10/2020  8:30 AM Nazanin Garcia MD [facility] None              Alfonso Richard MD  10/07/20  09:40 EDT      Disclaimer: Please note that areas of this note were completed with computer voice recognition software.  Quite often unanticipated grammatical,  syntax, homophones, and other interpretive errors are inadvertently transcribed by the computer software. Please excuse any errors that have escaped final proofreading.

## 2020-10-07 NOTE — THERAPY TREATMENT NOTE
Patient Name: Orion Harvey  : 1950    MRN: [id number]                              Today's Date: 10/7/2020       Admit Date: 10/6/2020    Visit Dx:     ICD-10-CM ICD-9-CM   1. Osteoarthritis of right glenohumeral joint  M19.011 715.91     Patient Active Problem List   Diagnosis   • Cough due to ACE inhibitor   • Diabetes mellitus, type 2 (CMS/HCC)   • Gout   • Hyperlipidemia   • Hypertension   • Kidney stones   • Obstructive sleep apnea syndrome   • BPH (benign prostatic hyperplasia)   • Colon polyps   • Obesity, Class III, BMI 40-49.9 (morbid obesity) (CMS/HCC)   • Plantar fasciitis   • LLOYD on CPAP   • Nuclear sclerosis   • Presbyopia   • DJD of shoulder   • Osteoarthritis of right glenohumeral joint     Past Medical History:   Diagnosis Date   • Arthritis    • Diabetes mellitus (CMS/HCC)    • Hyperlipidemia    • Hypertension    • Kidney stone    • Kidney stones    • MRSA carrier     UNSURE IF WAS MRSA   • Sleep apnea     CPAP BRING DOS     Past Surgical History:   Procedure Laterality Date   • CLAVICLE SURGERY     • CYSTOSCOPY W/ LASER LITHOTRIPSY     • ORIF FINGER FRACTURE Right     RING FINGER   • REPAIR TENDONS LEG     • SKIN GRAFT Right     KNEE   • SKULL FRACTURE ELEVATION     • WOUND DEBRIDEMENT Right     MULTIPLE     General Information     Row Name 10/07/20 1111          OT Time and Intention    Document Type  therapy note (daily note)  (Pended)   -AB     Mode of Treatment  occupational therapy  (Pended)   -AB     Row Name 10/07/20 1111          General Information    Patient Profile Reviewed  yes  (Pended)   -AB       User Key  (r) = Recorded By, (t) = Taken By, (c) = Cosigned By    Initials Name Provider Type    AB Ramos Avery OT Student OT Student        Mobility/ADL's     Row Name 10/07/20 1111          Bed Mobility    Bed Mobility  bed mobility (all) activities  (Pended)   -AB     All Activities, [place] (Bed Mobility)  modified independence  (Pended)   -AB     Supine-Sit  [place] (Bed Mobility)  modified independence  (Pended)   -AB     Assistive Device (Bed Mobility)  bed rails  (Pended)   -AB     Row Name 10/07/20 1111          Transfers    Transfers  sit-stand transfer;bed-chair transfer  (Pended)   -AB     Bed-Chair [place] (Transfers)  standby assist  (Pended)   -AB     Sit-Stand [place] (Transfers)  standby assist  (Pended)   -AB     Row Name 10/07/20 1111          Functional Mobility    Functional Mobility- Ind. Level  supervision required  (Pended)   -AB     Functional Mobility-Maintain WBing  able to maintain weight bearing status  (Pended)   -AB     Row Name 10/07/20 1111          Activities of Daily Living    BADL Assessment/Intervention  upper body dressing;lower body dressing  (Pended)   -AB     Row Name 10/07/20 1111          Upper Body Dressing Assessment/Training    [place] Level (Upper Body Dressing)  don;front opening garment;moderate assist (50% patient effort)  (Pended)   -AB     Position (Upper Body Dressing)  supported sitting  (Pended)   -AB     Row Name 10/07/20 1111          Lower Body Dressing Assessment/Training    [place] Level (Lower Body Dressing)  lower body dressing skills;don;pants/bottoms;maximum assist (25% patient effort)  (Pended)   -AB     Position (Lower Body Dressing)  edge of bed sitting  (Pended)   -AB       User Key  (r) = Recorded By, (t) = Taken By, (c) = Cosigned By    Initials Name Provider Type    Ramos Gallegos, OT Student OT Student        Obj/Interventions     Row Name 10/07/20 1113          Shoulder (Therapeutic Exercise)    Shoulder (Therapeutic Exercise)  pendulum exercises  (Pended)   -AB     Shoulder AROM (Therapeutic Exercise)  extension;flexion;10 repetitions  (Pended)   -AB     Row Name 10/07/20 1113          Balance    Balance Assessment  sitting dynamic balance;sit to stand dynamic balance  (Pended)   -AB     Dynamic Sitting Balance  WNL  (Pended)   -AB     Sit to Stand Dynamic Balance  WNL   (Pended)   -AB     Row Name 10/07/20 1113          Therapeutic Exercise    Therapeutic Exercise  wrist;hand;elbow/forearm  (Pended)   -AB       User Key  (r) = Recorded By, (t) = Taken By, (c) = Cosigned By    Initials Name Provider Type    Ramos Gallegos, OT Student OT Student        Goals/Plan    No documentation.       Clinical Impression     Row Name 10/07/20 1114          Pain Assessment    Additional Documentation  Pain Scale: Numbers Pre/Post-Treatment (Group)  (Pended)   -AB     Row Name 10/07/20 1114          Pain Scale: Numbers Pre/Post-Treatment    Pretreatment Pain Rating  0/10 - no pain  (Pended)   -AB     Posttreatment Pain Rating  0/10 - no pain  (Pended)   -AB     Row Name 10/07/20 1114          Plan of Care Review    Plan of Care Reviewed With  patient;spouse  (Pended)   -AB     Progress  improving  (Pended)   -AB     Outcome Summary  Pt is POD#1 right rTSAP  and spouse educated on don/doff sling, polar pack as well as UBD/LBD. Pt demonstrates good understanding of precautions and HEP for AROM distal RUE and PROM pendulum exercises for RLU shoulder. Pt educated on sleeping position as well as protecting RUE when not in sling. Pt's spouse is able to assist as needed and demonstrates good understanding of precautions/pt's care. PPE: gloves, mask, and shield.  (Pended)   -AB     Row Name 10/07/20 1114          Therapy Plan Review/Discharge Plan (OT)    Anticipated Discharge Disposition (OT)  home with outpatient therapy services  (Pended)   -AB     Row Name 10/07/20 1114          Vital Signs    O2 Delivery Pre Treatment  room air  (Pended)   -AB     O2 Delivery Intra Treatment  room air  (Pended)   -AB     O2 Delivery Post Treatment  room air  (Pended)   -AB     Pre Patient Position  Supine  (Pended)   -AB     Intra Patient Position  Standing  (Pended)   -AB     Post Patient Position  Sitting  (Pended)   -AB     Row Name 10/07/20 1114          Positioning and Restraints    Pre-Treatment Position  in  bed  (Pended)   -AB     Post Treatment Position  chair  (Pended)   -AB     In Chair  call light within reach;with family/caregiver;encouraged to call for assist;sitting;notified nsg  (Pended)   -AB       User Key  (r) = Recorded By, (t) = Taken By, (c) = Cosigned By    Initials Name Provider Type    AB Ramos Avery, OT Student OT Student        Outcome Measures    No documentation.       Occupational Therapy Education                 Title: PT OT SLP Therapies (Done)     Topic: Occupational Therapy (Done)     Point: ADL training (Done)     Description:   Instruct learner(s) on proper safety adaptation and remediation techniques during self care or transfers.   Instruct in proper use of assistive devices.              Learning Progress Summary           Patient Acceptance, E,TB, VU by AB at 10/7/2020 1116    Acceptance, E,TB, NR by  at 10/6/2020 1632                   Point: Home exercise program (Done)     Description:   Instruct learner(s) on appropriate technique for monitoring, assisting and/or progressing therapeutic exercises/activities.              Learning Progress Summary           Patient Acceptance, E,TB, VU by AB at 10/7/2020 1116    Acceptance, E,TB, NR by  at 10/6/2020 1632                   Point: Precautions (Done)     Description:   Instruct learner(s) on prescribed precautions during self-care and functional transfers.              Learning Progress Summary           Patient Acceptance, E,TB, VU by AB at 10/7/2020 1116    Acceptance, E,TB, NR by  at 10/6/2020 1632                   Point: Body mechanics (Done)     Description:   Instruct learner(s) on proper positioning and spine alignment during self-care, functional mobility activities and/or exercises.              Learning Progress Summary           Patient Acceptance, E,TB, VU by AB at 10/7/2020 1116                               User Key     Initials Effective Dates Name Provider Type Discipline     03/01/19 -  Radha Eaton  P, OT Occupational Therapist OT    AB 07/20/20 -  Ramos Avery, OT Student OT Student OT              OT Recommendation and Plan  Retired Outcome Summary/Treatment Plan (OT)  Anticipated Discharge Disposition (OT): (P) home with outpatient therapy services  Plan of Care Review  Plan of Care Reviewed With: (P) patient, spouse  Progress: (P) improving  Outcome Summary: (P) Pt is POD#1 right rTSAP  and spouse educated on don/doff sling, polar pack as well as UBD/LBD. Pt demonstrates good understanding of precautions and HEP for AROM distal RUE and PROM pendulum exercises for RLU shoulder. Pt educated on sleeping position as well as protecting RUE when not in sling. Pt's spouse is able to assist as needed and demonstrates good understanding of precautions/pt's care. PPE: gloves, mask, and shield.  Plan of Care Reviewed With: (P) patient, spouse  Outcome Summary: (P) Pt is POD#1 right rTSAP  and spouse educated on don/doff sling, polar pack as well as UBD/LBD. Pt demonstrates good understanding of precautions and HEP for AROM distal RUE and PROM pendulum exercises for RLU shoulder. Pt educated on sleeping position as well as protecting RUE when not in sling. Pt's spouse is able to assist as needed and demonstrates good understanding of precautions/pt's care. PPE: gloves, mask, and shield.     Time Calculation:   Time Calculation- OT     Row Name 10/07/20 1116             Time Calculation- OT    OT Start Time  0953  (Pended)   -AB      OT Stop Time  1016  (Pended)   -AB      OT Time Calculation (min)  23 min  (Pended)   -AB      Total Timed Code Minutes- OT  23 minute(s)  (Pended)   -AB      OT Received On  10/07/20  (Pended)   -AB      OT - Next Appointment  10/08/20  (Pended)   -AB        User Key  (r) = Recorded By, (t) = Taken By, (c) = Cosigned By    Initials Name Provider Type    AB Ramos Avery, OT Student OT Student        Therapy Charges for Today     Code Description Service Date Service Provider Modifiers Qty     87375631648 HC OT SELF CARE/MGMT/TRAIN EA 15 MIN 10/7/2020 Ramos Avery, OT Student GO 1    89112636762 HC OT THER PROC EA 15 MIN 10/7/2020 Ramos Avery, OT Student GO 1               Ramos Aveyr OT Student  10/7/2020

## 2020-10-07 NOTE — PLAN OF CARE
Goal Outcome Evaluation:  Plan of Care Reviewed With: patient  Progress: improving  Outcome Summary: denies pain d/t block. amb with sba, min assist to sitting. anticipates d/c home today.

## 2020-10-07 NOTE — OUTREACH NOTE
Prep Survey      Responses   [facility] facility patient discharged from?  [facility]   Is LACE score < 7 ?  Yes   Eligibility  [facility]   Date of Admission  10/06/20   Date of Discharge  10/07/20   Discharge Disposition  Home or Self Care   Discharge diagnosis  Right reverse total shoulder   Does the patient have one of the following disease processes/diagnoses(primary or secondary)?  Total Joint Replacement   Does the patient have Home health ordered?  No [[facility] outpatient therapy ]   Is there a DME ordered?  No   Prep survey completed?  Yes          Armida Evans RN

## 2020-10-07 NOTE — PLAN OF CARE
Pt is POD#1 right rTSAP. Pt and spouse educated on don/doff sling, polar pack as well as UBD/LBD. Pt demonstrates good understanding of precautions and HEP for AROM distal RUE and PROM pendulum exercises for RUE shoulder. Pt educated on sleeping position as well as protecting RUE when not in sling. Pt's spouse is able to assist as needed and demonstrates good understanding of precautions/pt's care. Recommend home with OP and assist from spouse. PPE: gloves, mask, and shield.

## 2020-10-07 NOTE — THERAPY EVALUATION
Patient Name: Orion Harvey  : 1950    MRN: [id number]                              Today's Date: 10/7/2020       Admit Date: 10/6/2020    Visit Dx:     ICD-10-CM ICD-9-CM   1. Osteoarthritis of right glenohumeral joint  M19.011 715.91     Patient Active Problem List   Diagnosis   • Cough due to ACE inhibitor   • Diabetes mellitus, type 2 (CMS/HCC)   • Gout   • Hyperlipidemia   • Hypertension   • Kidney stones   • Obstructive sleep apnea syndrome   • BPH (benign prostatic hyperplasia)   • Colon polyps   • Obesity, Class III, BMI 40-49.9 (morbid obesity) (CMS/HCC)   • Plantar fasciitis   • LLOYD on CPAP   • Nuclear sclerosis   • Presbyopia   • DJD of shoulder   • Osteoarthritis of right glenohumeral joint     Past Medical History:   Diagnosis Date   • Arthritis    • Diabetes mellitus (CMS/HCC)    • Hyperlipidemia    • Hypertension    • Kidney stone    • Kidney stones    • MRSA carrier     UNSURE IF WAS MRSA   • Sleep apnea     CPAP BRING DOS     Past Surgical History:   Procedure Laterality Date   • CLAVICLE SURGERY     • CYSTOSCOPY W/ LASER LITHOTRIPSY     • ORIF FINGER FRACTURE Right     RING FINGER   • REPAIR TENDONS LEG     • SKIN GRAFT Right     KNEE   • SKULL FRACTURE ELEVATION     • WOUND DEBRIDEMENT Right     MULTIPLE     General Information     Row Name 10/07/20 1235          Physical Therapy Time and Intention    Document Type  evaluation  -EL     Mode of Treatment  individual therapy  -     Row Name 10/07/20 1235          General Information    Patient Profile Reviewed  yes  -EL     Existing Precautions/Restrictions  shoulder  -EL     Row Name 10/07/20 1235          Living Environment    Lives With  spouse  -     Row Name 10/07/20 1235          Home Main Entrance    Number of Stairs, Main Entrance  three  -EL     Stair Railings, Main Entrance  railing on left side (ascending)  -     Row Name 10/07/20 1235          Cognition    Orientation Status (Cognition)  oriented x 4  -EL      Row Name 10/07/20 1235          Safety Issues, Functional Mobility    Impairments Affecting Function (Mobility)  pain;range of motion (ROM);strength  -EL       User Key  (r) = Recorded By, (t) = Taken By, (c) = Cosigned By    Initials Name Provider Type    Brendan Lan, PT Physical Therapist        Mobility     Row Name 10/07/20 1236          Bed Mobility    Comment (Bed Mobility)  Pt sitting in chair when PT arrived  -EL     Row Name 10/07/20 1236          Bed-Chair Transfer    Bed-Chair [place] (Transfers)  supervision  -EL     Row Name 10/07/20 1236          Sit-Stand Transfer    Sit-Stand [place] (Transfers)  supervision  -EL     Row Name 10/07/20 1236          Gait/Stairs (Locomotion)    [place] Level (Gait)  supervision  -EL     Distance in Feet (Gait)  30 feet  -EL     Right Sided Gait Deviations  weight shift ability decreased  -EL       User Key  (r) = Recorded By, (t) = Taken By, (c) = Cosigned By    Initials Name Provider Type    Brendan Lan, CHERYLE Physical Therapist        Obj/Interventions     Row Name 10/07/20 1237          Range of Motion Comprehensive    General Range of Motion  bilateral lower extremity ROM WFL  -     Row Name 10/07/20 1237          Strength Comprehensive (MMT)    General Manual Muscle Testing (MMT) Assessment  no strength deficits identified  -EL     Comment, General Manual Muscle Testing (MMT) Assessment  BLE 5/5 gross  -     Row Name 10/07/20 1237          Motor Skills    Therapeutic Exercise  other (see comments) Pt just performed exercises with OT, demonstrates good understanding  -EL       User Key  (r) = Recorded By, (t) = Taken By, (c) = Cosigned By    Initials Name Provider Type    Brendan Lan PT Physical Therapist        Goals/Plan     Row Name 10/07/20 1251          Gait Training Goal 1 (PT)    Activity/Assistive Device (Gait Training Goal 1, PT)  gait (walking locomotion)  -EL     [place] Level (Gait Training Goal 1, PT)  modified independence   -EL     Distance (Gait Training Goal 1, PT)  300  -EL     Time Frame (Gait Training Goal 1, PT)  long term goal (LTG);2 weeks  -EL     Row Name 10/07/20 1251          Stairs Goal 1 (PT)    Activity/Assistive Device (Stairs Goal 1, PT)  stairs, all skills  -EL     [place] Level/Cues Needed (Stairs Goal 1, PT)  modified independence  -EL     Number of Stairs (Stairs Goal 1, PT)  3  -EL     Time Frame (Stairs Goal 1, PT)  long term goal (LTG);2 weeks  -EL       User Key  (r) = Recorded By, (t) = Taken By, (c) = Cosigned By    Initials Name Provider Type    EL Brendan Mckeon, PT Physical Therapist        Clinical Impression     Row Name 10/07/20 1238          Pain Scale: Numbers Pre/Post-Treatment    Pretreatment Pain Rating  0/10 - no pain  -EL     Posttreatment Pain Rating  0/10 - no pain  -EL     Row Name 10/07/20 1238          Plan of Care Review    Plan of Care Reviewed With  patient  -EL     Progress  improving  -EL     Outcome Summary  Pt lives at home with spouse, typically very independent, drives, no recent falls. Pt demonstrates good functional mobilty this date, ambulates with no LOB. Pt reports no pain this date d/t block, demonstrates good understanding of ther ex, and sling usage, as well as polar pack. PT with no concerns at this time, Recommendation is to follow up with OPPT following d/c. PPE worn includes gloves, mask and goggles.  -EL     Row Name 10/07/20 1238          Therapy Assessment/Plan (PT)    Rehab Potential (PT)  good, to achieve stated therapy goals  -EL     Criteria for Skilled Interventions Met (PT)  yes  -EL     Predicted Duration of Therapy Intervention (PT)  until d/c  -EL     Row Name 10/07/20 1238          Vital Signs    O2 Delivery Pre Treatment  room air  -EL     O2 Delivery Intra Treatment  room air  -EL     O2 Delivery Post Treatment  room air  -EL     Pre Patient Position  Sitting  -EL     Intra Patient Position  Standing  -EL     Post Patient Position  Sitting  -EL     Row  Name 10/07/20 1238          Positioning and Restraints    Pre-Treatment Position  sitting in chair/recliner  -EL     Post Treatment Position  chair  -EL     In Chair  notified nsg;sitting;call light within reach;encouraged to call for assist;with family/caregiver  -EL       User Key  (r) = Recorded By, (t) = Taken By, (c) = Cosigned By    Initials Name Provider Type    EL Brendan Mckeon, PT Physical Therapist        Outcome Measures    No documentation.       Physical Therapy Education                 Title: PT OT SLP Therapies (Done)     Topic: Physical Therapy (Done)     Point: Mobility training (Done)     Learning Progress Summary           Patient Acceptance, E,TB, VU by  at 10/7/2020 1252                   Point: Home exercise program (Done)     Learning Progress Summary           Patient Acceptance, E,TB, VU by  at 10/7/2020 1252                               User Key     Initials Effective Dates Name Provider Type Discipline     06/23/20 -  Brendan Mckeon, PT Physical Therapist PT              PT Recommendation and Plan  Planned Therapy Interventions (PT): neuromuscular re-education, gait training, patient/family education, transfer training, ROM (range of motion), home exercise program  Plan of Care Reviewed With: patient  Progress: improving  Outcome Summary: Pt lives at home with spouse, typically very independent, drives, no recent falls. Pt demonstrates good functional mobilty this date, ambulates with no LOB. Pt reports no pain this date d/t block, demonstrates good understanding of ther ex, and sling usage, as well as polar pack. PT with no concerns at this time, Recommendation is to follow up with OPPT following d/c. PPE worn includes gloves, mask and goggles.     Time Calculation:   PT Charges     Row Name 10/07/20 1253             Time Calculation    Start Time  1100  -EL      Stop Time  1122  -EL      Time Calculation (min)  22 min  -EL      PT Received On  10/07/20  -EL      PT - Next Appointment   10/08/20  -[facility]      PT Goal Re-Cert Due Date  10/21/20  -[facility]        User Key  (r) = Recorded By, (t) = Taken By, (c) = Cosigned By    Initials Name Provider Type    Brendan Lan PT Physical Therapist        Therapy Charges for Today     Code Description Service Date Service Provider Modifiers Qty    48317008631  PT EVAL LOW COMPLEXITY 3 10/7/2020 Brendan Mckeon, CHERYLE GP 1               Brendan Mckeon PT  10/7/2020

## 2020-10-07 NOTE — CONSULTS
"Diabetes Education  Assessment/Teaching    Patient Name:  Orion Harvey  YOB: 1950  MRN: [id number]  Admit Date:  10/6/2020      Assessment Date:  10/7/2020    Most Recent Value   General Information    Referral From:  Blood glucose, MD order, A1c [MD consult for A1c >7%, admission blood sugar 205, and on 9/29/2020 A1c was 7.8%.]   Height  175.3 cm (69\")   Height Method  Stated   Weight  133 kg (293 lb 10.4 oz)   Weight Method  Bed scale   Pregnancy Assessment   Diabetes History   What type of diabetes do you have?  Type 2   Length of Diabetes Diagnosis  10 + years [Patient stated he was diagnosed in 2008.]   Current DM knowledge  fair   Have you had diabetes education/teaching in the past?  yes   When and where was your diabetes education?  [facility] 10 years ago   Do you test your blood sugar at home?  yes   Frequency of checks  daily varying he times before meals   Meter type  One Touch Ultra about 5-6 years old   Who performs the test?  patient   Typical readings  100-180s   Have you had low blood sugar? (<70mg/dl)  no   Have you had high blood sugar? (>140mg/dl)  yes   How often do you have high blood sugar?  frequently   When was your last high blood sugar?  Admission blood sugar 205   Do you have any diabetes complications?  circulation problems   Education Preferences   What areas of diabetes would you like to learn about?  avoiding high blood sugar, diabetes complications, testing my blood sugar at home   Nutrition Information   Assessment Topics   Problem Solving - Assessment  Needs education   Reducing Risk - Assessment  Needs education   Monitoring - Assessment  Needs education   DM Goals   Problem Solving - Goal  Today   Reducing Risk - Goal  Today   Monitoring - Goal  Today            Most Recent Value   DM Education Needs   Meter  Meter provided   Meter Type  One Touch [One Touch Verio Flex given to patient with return demonstration completed by patient using the lancing " device, inserting the test strip into the meter, and applying blood to the test strip. Blood sugar 228.]   Frequency of Testing  Daily [Log book given to patient to record results.]   Medication  Oral, Other injectables [Patient stated that he takes Metformin 1,000 mg BID, Glimepiride 4 mg daily, and Trulicity 1.5 mg weekly on Wednesday night at home.]   Problem Solving  Hyperglycemia, Signs, Symptoms, Treatment   Reducing Risks  A1C testing [On 9/29/2020 A1c was 7.8%. A1c info sheet given to patient with discussion on A1c target and healthy blood sugar range.]   Discharge Plan  Home   Motivation  Engaged, Strong   Teaching Method  Discussion, Demonstration, Handouts, Teach back   Patient Response  Demonstrates adequately, Verbalized understanding [Wife at bedside and involved with discussion.]            Other Comments:  Patient was happy with A1c results stating it was 10.1% on 5/1/2020 and 8.8% on 8/3/2020.  Patient stated that he has lost 50 pounds in the past year and has been working hard on lowering his A1c.  Prescriptions started in discharge orders for lancets, test strips, and alcohol wipes. Patient and wife have no further questions or concerns related to diabetes at this time.        Electronically signed by:  Amy Avalos RN  10/07/20 12:58 EDT

## 2020-10-08 ENCOUNTER — INPATIENT (INPATIENT)
Facility: HOSPITAL | Age: 70
LOS: 4 days | Discharge: HOME | DRG: 92 | End: 2020-10-13
Attending: HOSPITALIST | Admitting: HOSPITALIST
Payer: MEDICARE

## 2020-10-08 ENCOUNTER — TRANSITIONAL CARE MANAGEMENT TELEPHONE ENCOUNTER (OUTPATIENT)
Dept: CALL CENTER | Facility: HOSPITAL | Age: 70
End: 2020-10-08

## 2020-10-08 ENCOUNTER — TRANSCRIPTION ENCOUNTER (OUTPATIENT)
Age: 70
End: 2020-10-08

## 2020-10-08 VITALS
HEART RATE: 71 BPM | WEIGHT: 199.96 LBS | HEIGHT: 68 IN | DIASTOLIC BLOOD PRESSURE: 72 MMHG | TEMPERATURE: 98 F | OXYGEN SATURATION: 98 % | RESPIRATION RATE: 22 BRPM | SYSTOLIC BLOOD PRESSURE: 149 MMHG

## 2020-10-08 DIAGNOSIS — D64.9 ANEMIA, UNSPECIFIED: ICD-10-CM

## 2020-10-08 DIAGNOSIS — G47.33 OBSTRUCTIVE SLEEP APNEA (ADULT) (PEDIATRIC): ICD-10-CM

## 2020-10-08 DIAGNOSIS — G91.2 (IDIOPATHIC) NORMAL PRESSURE HYDROCEPHALUS: ICD-10-CM

## 2020-10-08 DIAGNOSIS — R55 SYNCOPE AND COLLAPSE: ICD-10-CM

## 2020-10-08 DIAGNOSIS — N40.0 BENIGN PROSTATIC HYPERPLASIA WITHOUT LOWER URINARY TRACT SYMPTOMS: ICD-10-CM

## 2020-10-08 DIAGNOSIS — R41.82 ALTERED MENTAL STATUS, UNSPECIFIED: ICD-10-CM

## 2020-10-08 DIAGNOSIS — Z92.89 PERSONAL HISTORY OF OTHER MEDICAL TREATMENT: Chronic | ICD-10-CM

## 2020-10-08 DIAGNOSIS — G47.00 INSOMNIA, UNSPECIFIED: ICD-10-CM

## 2020-10-08 DIAGNOSIS — I10 ESSENTIAL (PRIMARY) HYPERTENSION: ICD-10-CM

## 2020-10-08 DIAGNOSIS — M43.27 FUSION OF SPINE, LUMBOSACRAL REGION: Chronic | ICD-10-CM

## 2020-10-08 DIAGNOSIS — M43.28 FUSION OF SPINE, SACRAL AND SACROCOCCYGEAL REGION: Chronic | ICD-10-CM

## 2020-10-08 DIAGNOSIS — Z98.1 ARTHRODESIS STATUS: Chronic | ICD-10-CM

## 2020-10-08 DIAGNOSIS — M54.42 LUMBAGO WITH SCIATICA, LEFT SIDE: ICD-10-CM

## 2020-10-08 DIAGNOSIS — Z45.42 ENCOUNTER FOR ADJUSTMENT AND MANAGEMENT OF NEUROSTIMULATOR: Chronic | ICD-10-CM

## 2020-10-08 DIAGNOSIS — F32.9 MAJOR DEPRESSIVE DISORDER, SINGLE EPISODE, UNSPECIFIED: ICD-10-CM

## 2020-10-08 DIAGNOSIS — Z29.9 ENCOUNTER FOR PROPHYLACTIC MEASURES, UNSPECIFIED: ICD-10-CM

## 2020-10-08 DIAGNOSIS — Z98.890 OTHER SPECIFIED POSTPROCEDURAL STATES: Chronic | ICD-10-CM

## 2020-10-08 LAB
ALBUMIN SERPL ELPH-MCNC: 3.6 G/DL — SIGNIFICANT CHANGE UP (ref 3.3–5)
ALP SERPL-CCNC: 86 U/L — SIGNIFICANT CHANGE UP (ref 40–120)
ALT FLD-CCNC: <5 U/L — LOW (ref 10–45)
ANION GAP SERPL CALC-SCNC: 11 MMOL/L — SIGNIFICANT CHANGE UP (ref 5–17)
APAP SERPL-MCNC: <15 UG/ML — SIGNIFICANT CHANGE UP (ref 10–30)
APPEARANCE UR: CLEAR — SIGNIFICANT CHANGE UP
AST SERPL-CCNC: 5 U/L — LOW (ref 10–40)
BACTERIA # UR AUTO: NEGATIVE — SIGNIFICANT CHANGE UP
BASOPHILS # BLD AUTO: 0.17 K/UL — SIGNIFICANT CHANGE UP (ref 0–0.2)
BASOPHILS NFR BLD AUTO: 1.7 % — SIGNIFICANT CHANGE UP (ref 0–2)
BILIRUB SERPL-MCNC: 0.2 MG/DL — SIGNIFICANT CHANGE UP (ref 0.2–1.2)
BILIRUB UR-MCNC: NEGATIVE — SIGNIFICANT CHANGE UP
BUN SERPL-MCNC: 14 MG/DL — SIGNIFICANT CHANGE UP (ref 7–23)
CALCIUM SERPL-MCNC: 8.6 MG/DL — SIGNIFICANT CHANGE UP (ref 8.4–10.5)
CHLORIDE SERPL-SCNC: 103 MMOL/L — SIGNIFICANT CHANGE UP (ref 96–108)
CO2 SERPL-SCNC: 22 MMOL/L — SIGNIFICANT CHANGE UP (ref 22–31)
COLOR SPEC: COLORLESS — SIGNIFICANT CHANGE UP
CREAT SERPL-MCNC: 0.92 MG/DL — SIGNIFICANT CHANGE UP (ref 0.5–1.3)
DIFF PNL FLD: NEGATIVE — SIGNIFICANT CHANGE UP
EOSINOPHIL # BLD AUTO: 0.43 K/UL — SIGNIFICANT CHANGE UP (ref 0–0.5)
EOSINOPHIL NFR BLD AUTO: 4.4 % — SIGNIFICANT CHANGE UP (ref 0–6)
EPI CELLS # UR: 0 /HPF — SIGNIFICANT CHANGE UP
ETHANOL SERPL-MCNC: SIGNIFICANT CHANGE UP MG/DL (ref 0–10)
GLUCOSE SERPL-MCNC: 124 MG/DL — HIGH (ref 70–99)
GLUCOSE UR QL: NEGATIVE — SIGNIFICANT CHANGE UP
HCT VFR BLD CALC: 30.3 % — LOW (ref 39–50)
HGB BLD-MCNC: 8.2 G/DL — LOW (ref 13–17)
HYALINE CASTS # UR AUTO: 0 /LPF — SIGNIFICANT CHANGE UP (ref 0–2)
KETONES UR-MCNC: NEGATIVE — SIGNIFICANT CHANGE UP
LEUKOCYTE ESTERASE UR-ACNC: NEGATIVE — SIGNIFICANT CHANGE UP
LYMPHOCYTES # BLD AUTO: 0.51 K/UL — LOW (ref 1–3.3)
LYMPHOCYTES # BLD AUTO: 5.2 % — LOW (ref 13–44)
MAGNESIUM SERPL-MCNC: 1.9 MG/DL — SIGNIFICANT CHANGE UP (ref 1.6–2.6)
MCHC RBC-ENTMCNC: 17.7 PG — LOW (ref 27–34)
MCHC RBC-ENTMCNC: 27.1 GM/DL — LOW (ref 32–36)
MCV RBC AUTO: 65.6 FL — LOW (ref 80–100)
MONOCYTES # BLD AUTO: 0.42 K/UL — SIGNIFICANT CHANGE UP (ref 0–0.9)
MONOCYTES NFR BLD AUTO: 4.3 % — SIGNIFICANT CHANGE UP (ref 2–14)
NEUTROPHILS # BLD AUTO: 8.28 K/UL — HIGH (ref 1.8–7.4)
NEUTROPHILS NFR BLD AUTO: 84.4 % — HIGH (ref 43–77)
NITRITE UR-MCNC: NEGATIVE — SIGNIFICANT CHANGE UP
PH UR: 6.5 — SIGNIFICANT CHANGE UP (ref 5–8)
PHOSPHATE SERPL-MCNC: 2.7 MG/DL — SIGNIFICANT CHANGE UP (ref 2.5–4.5)
PLATELET # BLD AUTO: 338 K/UL — SIGNIFICANT CHANGE UP (ref 150–400)
POTASSIUM SERPL-MCNC: 3.7 MMOL/L — SIGNIFICANT CHANGE UP (ref 3.5–5.3)
POTASSIUM SERPL-SCNC: 3.7 MMOL/L — SIGNIFICANT CHANGE UP (ref 3.5–5.3)
PROT SERPL-MCNC: 6.7 G/DL — SIGNIFICANT CHANGE UP (ref 6–8.3)
PROT UR-MCNC: NEGATIVE — SIGNIFICANT CHANGE UP
RBC # BLD: 4.62 M/UL — SIGNIFICANT CHANGE UP (ref 4.2–5.8)
RBC # FLD: 18.6 % — HIGH (ref 10.3–14.5)
RBC CASTS # UR COMP ASSIST: 1 /HPF — SIGNIFICANT CHANGE UP (ref 0–4)
SALICYLATES SERPL-MCNC: <2 MG/DL — LOW (ref 15–30)
SARS-COV-2 RNA SPEC QL NAA+PROBE: SIGNIFICANT CHANGE UP
SODIUM SERPL-SCNC: 136 MMOL/L — SIGNIFICANT CHANGE UP (ref 135–145)
SP GR SPEC: 1.01 — LOW (ref 1.01–1.02)
TROPONIN T, HIGH SENSITIVITY RESULT: 8 NG/L — SIGNIFICANT CHANGE UP (ref 0–51)
TROPONIN T, HIGH SENSITIVITY RESULT: 8 NG/L — SIGNIFICANT CHANGE UP (ref 0–51)
UROBILINOGEN FLD QL: NEGATIVE — SIGNIFICANT CHANGE UP
WBC # BLD: 9.81 K/UL — SIGNIFICANT CHANGE UP (ref 3.8–10.5)
WBC # FLD AUTO: 9.81 K/UL — SIGNIFICANT CHANGE UP (ref 3.8–10.5)
WBC UR QL: 0 /HPF — SIGNIFICANT CHANGE UP (ref 0–5)

## 2020-10-08 PROCEDURE — 72170 X-RAY EXAM OF PELVIS: CPT | Mod: 26

## 2020-10-08 PROCEDURE — 99285 EMERGENCY DEPT VISIT HI MDM: CPT | Mod: CS,GC

## 2020-10-08 PROCEDURE — 71045 X-RAY EXAM CHEST 1 VIEW: CPT | Mod: 26

## 2020-10-08 PROCEDURE — 70450 CT HEAD/BRAIN W/O DYE: CPT | Mod: 26

## 2020-10-08 PROCEDURE — 71045 X-RAY EXAM CHEST 1 VIEW: CPT | Mod: 26,77

## 2020-10-08 PROCEDURE — 99223 1ST HOSP IP/OBS HIGH 75: CPT | Mod: GC

## 2020-10-08 PROCEDURE — 72125 CT NECK SPINE W/O DYE: CPT | Mod: 26

## 2020-10-08 PROCEDURE — 74018 RADEX ABDOMEN 1 VIEW: CPT | Mod: 26

## 2020-10-08 PROCEDURE — 93010 ELECTROCARDIOGRAM REPORT: CPT | Mod: GC

## 2020-10-08 PROCEDURE — 70250 X-RAY EXAM OF SKULL: CPT | Mod: 26

## 2020-10-08 RX ORDER — INFLUENZA VIRUS VACCINE 15; 15; 15; 15 UG/.5ML; UG/.5ML; UG/.5ML; UG/.5ML
0.5 SUSPENSION INTRAMUSCULAR ONCE
Refills: 0 | Status: COMPLETED | OUTPATIENT
Start: 2020-10-08 | End: 2020-10-13

## 2020-10-08 RX ORDER — CITALOPRAM 10 MG/1
1 TABLET, FILM COATED ORAL
Qty: 0 | Refills: 0 | DISCHARGE

## 2020-10-08 RX ORDER — TRAMADOL HYDROCHLORIDE 50 MG/1
50 TABLET ORAL EVERY 4 HOURS
Refills: 0 | Status: DISCONTINUED | OUTPATIENT
Start: 2020-10-08 | End: 2020-10-12

## 2020-10-08 RX ORDER — ACETAMINOPHEN 500 MG
0 TABLET ORAL
Qty: 0 | Refills: 0 | DISCHARGE

## 2020-10-08 RX ORDER — PREGABALIN 225 MG/1
1 CAPSULE ORAL
Qty: 0 | Refills: 0 | DISCHARGE

## 2020-10-08 RX ORDER — TAMSULOSIN HYDROCHLORIDE 0.4 MG/1
0.8 CAPSULE ORAL AT BEDTIME
Refills: 0 | Status: DISCONTINUED | OUTPATIENT
Start: 2020-10-08 | End: 2020-10-13

## 2020-10-08 RX ORDER — AMLODIPINE BESYLATE 2.5 MG/1
5 TABLET ORAL DAILY
Refills: 0 | Status: DISCONTINUED | OUTPATIENT
Start: 2020-10-08 | End: 2020-10-08

## 2020-10-08 RX ORDER — TRAMADOL HYDROCHLORIDE 50 MG/1
50 TABLET ORAL ONCE
Refills: 0 | Status: DISCONTINUED | OUTPATIENT
Start: 2020-10-08 | End: 2020-10-08

## 2020-10-08 RX ORDER — AMLODIPINE BESYLATE 2.5 MG/1
5 TABLET ORAL DAILY
Refills: 0 | Status: DISCONTINUED | OUTPATIENT
Start: 2020-10-08 | End: 2020-10-12

## 2020-10-08 RX ORDER — TRAZODONE HCL 50 MG
100 TABLET ORAL AT BEDTIME
Refills: 0 | Status: DISCONTINUED | OUTPATIENT
Start: 2020-10-08 | End: 2020-10-13

## 2020-10-08 RX ORDER — PREGABALIN 225 MG/1
1000 CAPSULE ORAL DAILY
Refills: 0 | Status: DISCONTINUED | OUTPATIENT
Start: 2020-10-08 | End: 2020-10-13

## 2020-10-08 RX ORDER — ZOLPIDEM TARTRATE 10 MG/1
5 TABLET ORAL AT BEDTIME
Refills: 0 | Status: DISCONTINUED | OUTPATIENT
Start: 2020-10-08 | End: 2020-10-08

## 2020-10-08 RX ORDER — TRAZODONE HCL 50 MG
1 TABLET ORAL
Qty: 0 | Refills: 0 | DISCHARGE

## 2020-10-08 RX ORDER — LOSARTAN POTASSIUM 100 MG/1
100 TABLET, FILM COATED ORAL DAILY
Refills: 0 | Status: DISCONTINUED | OUTPATIENT
Start: 2020-10-08 | End: 2020-10-08

## 2020-10-08 RX ORDER — ACETAMINOPHEN 500 MG
650 TABLET ORAL EVERY 6 HOURS
Refills: 0 | Status: DISCONTINUED | OUTPATIENT
Start: 2020-10-08 | End: 2020-10-13

## 2020-10-08 RX ORDER — LOSARTAN POTASSIUM 100 MG/1
100 TABLET, FILM COATED ORAL DAILY
Refills: 0 | Status: DISCONTINUED | OUTPATIENT
Start: 2020-10-08 | End: 2020-10-13

## 2020-10-08 RX ORDER — ALPRAZOLAM 0.25 MG
1 TABLET ORAL EVERY 6 HOURS
Refills: 0 | Status: DISCONTINUED | OUTPATIENT
Start: 2020-10-08 | End: 2020-10-10

## 2020-10-08 RX ORDER — CHOLECALCIFEROL (VITAMIN D3) 125 MCG
2 CAPSULE ORAL
Qty: 0 | Refills: 0 | DISCHARGE

## 2020-10-08 RX ORDER — ENOXAPARIN SODIUM 100 MG/ML
40 INJECTION SUBCUTANEOUS DAILY
Refills: 0 | Status: DISCONTINUED | OUTPATIENT
Start: 2020-10-08 | End: 2020-10-13

## 2020-10-08 RX ORDER — CITALOPRAM 10 MG/1
40 TABLET, FILM COATED ORAL DAILY
Refills: 0 | Status: DISCONTINUED | OUTPATIENT
Start: 2020-10-08 | End: 2020-10-13

## 2020-10-08 RX ADMIN — Medication 1 MILLIGRAM(S): at 22:22

## 2020-10-08 RX ADMIN — LOSARTAN POTASSIUM 100 MILLIGRAM(S): 100 TABLET, FILM COATED ORAL at 17:29

## 2020-10-08 RX ADMIN — TRAMADOL HYDROCHLORIDE 50 MILLIGRAM(S): 50 TABLET ORAL at 22:52

## 2020-10-08 RX ADMIN — CITALOPRAM 40 MILLIGRAM(S): 10 TABLET, FILM COATED ORAL at 17:29

## 2020-10-08 RX ADMIN — TRAMADOL HYDROCHLORIDE 50 MILLIGRAM(S): 50 TABLET ORAL at 22:22

## 2020-10-08 RX ADMIN — PREGABALIN 1000 MICROGRAM(S): 225 CAPSULE ORAL at 23:05

## 2020-10-08 RX ADMIN — Medication 1 MILLIGRAM(S): at 16:07

## 2020-10-08 RX ADMIN — Medication 100 MILLIGRAM(S): at 23:05

## 2020-10-08 RX ADMIN — TAMSULOSIN HYDROCHLORIDE 0.8 MILLIGRAM(S): 0.4 CAPSULE ORAL at 22:24

## 2020-10-08 RX ADMIN — AMLODIPINE BESYLATE 5 MILLIGRAM(S): 2.5 TABLET ORAL at 17:29

## 2020-10-08 RX ADMIN — TRAMADOL HYDROCHLORIDE 50 MILLIGRAM(S): 50 TABLET ORAL at 10:34

## 2020-10-08 NOTE — H&P ADULT - PROBLEM SELECTOR PLAN 7
Patient follows with Psychiatrist Dr. Cory Bhandari. Per patient's friend, patient's depression has been uncontrolled in the previous months.   -Continue home celexa 40mg qday   -Continue home Xanax 1mg q6H PRN -hold for sedation   -Will get collateral with Dr. Bhandari  -Consider Psych consult tomorrow AM Patient desatting to 91% in EMS. Satting 96-98% on 2L NC in ED.   -Continue with NC - wean as tolerated   -Patient does not know home CPAP settings - will touch base with pulmonologist tomorrow   -CPAP qHS

## 2020-10-08 NOTE — ED ADULT NURSE NOTE - OBJECTIVE STATEMENT
70y M arrived to the ED BIBEMS s/p fall. As per EMS "pt was sleeping when fell off of bed, upon our arrival pt O2 sat 91%RA and pill bottles were laying around. Pt family member reported pt took ambien and tramadol unsure if anything else was taken. Pt uses CPAP at night." Pt A&Ox3, drowsy at present. Pt 98% 2L NC at present. Pt unable to recall fall tonight. Pt denies chest pain, SOB, HA, N/V/D, abdominal pain, fever/chills, urinary symptoms, hematuria, weakness, dizziness, numbness, tinging. Peripheral pulses present b/l. Skin warm, dry and pink. Pt placed in position of comfort. Pt educated on call bell system and provided call bell. Bed in lowest position, wheels locked, appropriate side rails raised. Pt denies needs at this time. 70y M arrived to the ED BIBEMS s/p fall. As per EMS "pt was sleeping when fell off of bed, upon our arrival pt O2 sat 91%RA and pill bottles were laying around. Pt family member reported pt took ambien and tramadol unsure if anything else was taken. Pt uses CPAP at night." Pt A&Ox3, endorses drowsiness at present. Pt unable to recall fall tonight. Pt denies chest pain, SOB, HA, N/V/D, abdominal pain, fever/chills, urinary symptoms, hematuria, numbness, tinging. Peripheral pulses present b/l. Skin warm, dry and pink. Pt placed in position of comfort. Pt educated on call bell system and provided call bell. Bed in lowest position, wheels locked, appropriate side rails raised. Pt denies needs at this time. 70y M arrived to the ED BIBEMS s/p fall. As per EMS "pt was sleeping when fell off of bed, upon our arrival pt O2 sat 91%RA and pill bottles were laying around. Pt family member reported pt took ambien and tramadol tonight, unsure if anything else was taken. Pt uses CPAP at night." Pt A&Ox3, endorses drowsiness at present. Pt unable to recall fall tonight. Pt denies chest pain, SOB, HA, N/V/D, abdominal pain, fever/chills, urinary symptoms, hematuria, numbness, tinging. Peripheral pulses present b/l. Skin warm, dry and pink. Pt placed in position of comfort. Pt educated on call bell system and provided call bell. Bed in lowest position, wheels locked, appropriate side rails raised. Pt denies needs at this time.

## 2020-10-08 NOTE — ED PROVIDER NOTE - PHYSICAL EXAMINATION
Gen: NAD, tired appearing, AOx3, able to make needs known, non-toxic  Head: NCAT  HEENT: EOMI, oral mucosa moist, normal conjunctiva  Lung: CTAB, no respiratory distress, no wheezes/rhonchi/rales B/L, speaking in full sentences  CV: RRR, no murmurs  Abd: soft, NTND, no guarding  MSK: no visible deformities  Neuro: Appears non focal. CN 2-12 grossly intact b/l  Skin: Warm, well perfused  Psych: normal affect

## 2020-10-08 NOTE — H&P ADULT - NSHPSOCIALHISTORY_GEN_ALL_CORE
Patient used to work as a /; no longer working. Uses wheelchair to ambulate. Lives with male roommate/friend. Denies ETOH or illicit drug use. Never smoker.

## 2020-10-08 NOTE — ED ADULT NURSE NOTE - ED STAT RN HANDOFF DETAILS
Report received from primary RN Joselin, patient to be transported to er holding. patient RTM. Maggie RN

## 2020-10-08 NOTE — H&P ADULT - NSICDXFAMILYHX_GEN_ALL_CORE_FT
FAMILY HISTORY:  No pertinent family history in first degree relatives     FAMILY HISTORY:  FH: diabetes mellitus, Mother  FH: lung cancer, Father  FH: renal failure, Mother

## 2020-10-08 NOTE — ED ADULT NURSE REASSESSMENT NOTE - NS ED NURSE REASSESS COMMENT FT1
Report received from AMRIT Renee at 0655  Pt AAOx3  NAD, resp nonlabored, skin warm/dry, resting/ sleeping comfortably in bed. Pt c/o . Pt denies headache, dizziness, chest pain, palpitations, SOB, abd pain, n/v/d, urinary symptoms, fevers, chills, weakness at this time. Pt awaiting ct results  . Safety maintained with call bell within reach.

## 2020-10-08 NOTE — H&P ADULT - NSHPREVIEWOFSYSTEMS_GEN_ALL_CORE
Review of Systems:     Constitutional: No weakness, fever, chills     Eyes: No blindness, no eye pain    ENT: No throat pain, no rhinorrhea     Neck: No pain or stiffness     Respiratory: No cough, no shortness of breath     Cardiovascular: No chest pain, no palpitations     Gastrointestinal: No abdominal or epigastric pain. No nausea, vomiting, or diarrhea     Genitourinary: No dysuria, no change in frequency, no hematuria     Neurological: No numbness or weakness      Skin: No itching, burning, rashes, or lesions     Psych: No depression or anxiety     Musculoskeletal: +Worsening back pain Review of Systems:     Constitutional: No weakness, fever, chills     Eyes: No blindness, no eye pain    ENT: No throat pain, no rhinorrhea     Neck: No pain or stiffness     Respiratory: No cough, no shortness of breath     Cardiovascular: No chest pain, no palpitations     Gastrointestinal: No abdominal or epigastric pain. No nausea, vomiting, or diarrhea     Genitourinary: No dysuria, no change in frequency, no hematuria. +1 episode of urinary incontinence.     Neurological: No numbness or weakness      Skin: No itching, burning, rashes, or lesions     Psych: +Depression and anxiety     Musculoskeletal: +Worsening back pain

## 2020-10-08 NOTE — H&P ADULT - HISTORY OF PRESENT ILLNESS
69 year old male with HTN, GULSHAN (on home CPAP), BPH, NPH (s/p  shunt), and chronic back pain (s/p multiple spinal surgeries; most recently bilateral percutaneous sacroiliac joint fusions on 6/4/20). 69 year old male with HTN, GULSHAN (on home CPAP), BPH, NPH (s/p  shunt), sciatica, and chronic back pain (s/p multiple spinal surgeries; most recently bilateral percutaneous sacroiliac joint fusions on 6/4/20) presenting with worsening back pain over the past few weeks. He describes the pain predominantly in the lumbar region with frequent radiation down his legs (L>R). Patient states he was previously prescribed opioids for pain management but stopped taking those several months ago as he was getting dizzy and had several falls. Since his recent back surgery in June, patient has been taking tramadol, which he states will control the severe bursts of pain but at baseline he continues to have a great deal of pain. Over the past few weeks, he feels like he's unable to tolerate sitting up or standing for more than 30 minutes. Of note, patient has slow speech and is a poor historian. Will need further collateral. 69 year old male with HTN, GULSHAN (on home CPAP), BPH, NPH (s/p  shunt), sciatica, and chronic back pain (s/p multiple spinal surgeries; most recently bilateral percutaneous sacroiliac joint fusions on 6/4/20) presenting with worsening back pain over the past few weeks. He describes the pain predominantly in the lumbar region with frequent radiation down his legs (L>R). Patient states he was previously prescribed opioids for pain management but stopped taking those several months ago as he was getting dizzy and had several falls. Since his recent back surgery in June, patient has been taking tramadol, which he states will control the severe bursts of pain but at baseline he continues to have a great deal of pain. Over the past few weeks, he feels like he's unable to tolerate sitting up or standing for more than 30 minutes. Of note, patient has slow speech and is a poor historian.         ED Course: 70 year old male with HTN, GULSHAN (on home CPAP), BPH, NPH (s/p  shunt), sciatica, and chronic back pain (s/p multiple spinal surgeries; most recently bilateral percutaneous sacroiliac joint fusions on 6/4/20) presenting with worsening back pain over the past few weeks. He describes the pain predominantly in the lumbar region with frequent radiation down his legs (L>R). Patient states he was previously prescribed opioids for pain management but stopped taking those several months ago as he was getting dizzy and had several falls. Since his recent back surgery in June, patient has been taking tramadol, which he states will control the severe bursts of pain but at baseline he continues to have a great deal of pain. Over the past few weeks, he feels like he's unable to tolerate sitting up or standing for more than 30 minutes. Of note, patient has slow speech and is a poor historian.     In speaking with patient's roommate/friend, it appears that patient has had severe pain and difficulty ambulating since his most recent back surgery in June without much help from PT. Patient most recently followed up with Dr. Castanon on 9/29 with plans for repeat imaging. His pain has acutely worsened in the past month and in the past week, patient has been complaining of generalized pain. Patient's friend states the patient went to bed early last night and overnight he found him on the floor looking for something he'd dropped, but appeared confused. The friend helped the patient to bed and a few hours later, heard some noise and found patient on his hands and knees on the floor again. He said the patient's legs were like rubber bands, he was very focused on looking for something and was unable to get up on his own. The friend called EMS and says the patient had an episode of urinary incontinence en route to the hospital. The patient states that the patient has been known to take more pain medications/benzodiazepines than he's prescribed. He also reports the patient has severe anxiety and depression, but denies suicidal ideations.     ED Course: 70 year old male with HTN, GULSHAN (on home CPAP), BPH, NPH (s/p  shunt and MRI-incompatible valve in 2018), sciatica, and chronic back pain (s/p multiple spinal surgeries; most recently bilateral percutaneous sacroiliac joint fusions in 6/20) presenting with acutely worsening back pain in the past month and AMS. He describes the pain predominantly in the lumbar region with frequent radiation down his legs (L>R). Patient states he was previously prescribed opioids for pain management but stopped taking those several months ago as he was getting dizzy and had several falls. Since his recent back surgery in June, patient has been taking tramadol, which he states takes the edge off the sharp pains but at baseline he continues to have severe pain. Over the past few weeks, he feels like he's unable to tolerate sitting or standing for more than 30 minutes. Of note, patient has slow speech and is a poor historian.     In speaking with patient's roommate/friend for collateral, it appears that     Additionally, patient has had severe pain and difficulty ambulating since his most recent back surgery in June without much help from PT. Patient most recently followed up with Dr. Castanon on 9/29 with plans for repeat imaging. His pain has acutely worsened in the past month and in the past week, patient has been complaining of generalized pain. Patient's friend states the patient went to bed early last night and overnight he found him on the floor looking for something he'd dropped, but appeared confused. The friend helped the patient to bed and a few hours later, heard some noise and found patient on his hands and knees on the floor again. He said the patient's legs were like rubber bands, he was very focused on looking for something and was unable to get up on his own. The friend called EMS and says the patient had an episode of urinary incontinence en route to the hospital. The patient states that the patient has been known to take more pain medications/benzodiazepines than he's prescribed. He also reports the patient has severe anxiety and depression, but denies suicidal ideations.     ED Course: 70 year old male with HTN, GULSHAN (on home CPAP), BPH, NPH (s/p  shunt and MRI-incompatible valve in 2018), sciatica, and chronic back pain (s/p multiple spinal surgeries; most recently bilateral percutaneous sacroiliac joint fusions in 6/20) presenting with AMS and acutely worsening back pain in the past month. He describes the pain predominantly in the lumbar region with frequent radiation down his legs (L>R). Patient states he was previously prescribed opioids for pain management but stopped taking those several months ago as he was getting dizzy and had several falls. Since his recent back surgery in June, patient has been taking tramadol, which he states takes the edge off the sharp pains but at baseline he continues to have severe pain. Over the past few weeks, he feels like he's unable to tolerate sitting or standing for more than 30 minutes. Of note, patient has slow speech and is a poor historian.     In speaking with patient's roommate/friend for collateral, it appears that patient went to bed early yesterday evening and appeared to be behaving at his baseline. When his roommate checked on him later in the evening, he found him on the floor and he appeared to be looking for something. The roommate helped him up and got him to bed. Around 5AM, roommate heard a noise and found patient on his hands and knees by his bed again, saying repeatedly that he was looking for something and appeared confused. Roommate states that the patient was acting abnormal, speaking slower, and could not get him up. He called EMS and patient had an episode of urinary incontinence en route to hospital. Denies fever/chills, dysuria, LOC, recent trauma, abdominal pain, n/v/d, visual changes, CP, palpitations. Denies history of seizures.     Additionally, per the roommate, patient has had severe pain and difficulty ambulating since his most recent back surgery in June without much help from PT. Patient most recently followed up with Dr. Castanon on 9/29 with plans for repeat imaging. His pain has acutely worsened in the past month and in the past week, patient has been complaining of generalized pain. The patient states that the patient has been known to take more pain medications/benzodiazepines than he's prescribed. He also reports the patient has severe anxiety and depression, but denies suicidal ideations.     ED Course:  Found hypoxic to 91% on room air by EMS. VS T 98.2, HR 71, /72, RR 22 (Satting 98% on 2L NC). Labs significant for Hgb 8.2 (MCV 65.6), Trop 8. UA wnl. Urine tox positive for Benzo/THC. CT Head shows no acute intracranial hemorrhage or depressed calvarial fracture. CT CSpine shows no fracture or acute traumatic malalignment; degenerative changes throughout the cervical spine. Patient is given his home tramadol 50mg po x 1 and ativan 1mg x 1 in ED. 70 year old male with HTN, GULSHAN (on home CPAP), BPH, NPH (s/p  shunt and MRI-incompatible valve in 2017), sciatica, and chronic back pain (s/p multiple spinal surgeries; most recently bilateral percutaneous sacroiliac joint fusions in 6/20) presenting with AMS and acutely worsening back pain in the past month. He describes the pain predominantly in the lumbar region with frequent radiation down his legs (L>R). Patient states he was previously prescribed opioids for pain management but stopped taking those several months ago as he was getting dizzy and had several falls. Since his recent back surgery in June, patient has been taking tramadol, which he states takes the edge off the sharp pains but at baseline he continues to have severe pain. Over the past few weeks, he feels like he's unable to tolerate sitting or standing for more than 30 minutes. Of note, patient has slow speech and is a poor historian.     In speaking with patient's roommate/friend for collateral, it appears that patient went to bed early yesterday evening and appeared to be behaving at his baseline. When his roommate checked on him later in the evening, he found him on the floor and he appeared to be looking for something. The roommate helped him up and got him to bed. Around 5AM, roommate heard a noise and found patient on his hands and knees by his bed again, saying repeatedly that he was looking for something and appeared confused. Roommate states that the patient was acting abnormal, speaking slower, and could not get him up. He called EMS and patient had an episode of urinary incontinence en route to hospital. Denies fever/chills, dysuria, LOC, recent trauma, abdominal pain, n/v/d, visual changes, CP, palpitations. Denies history of seizures.     Additionally, per the roommate, patient has had severe pain and difficulty ambulating since his most recent back surgery in June without much help from PT. Patient most recently followed up with Dr. Castanon on 9/29 with plans for repeat imaging. His pain has acutely worsened in the past month and in the past week, patient has been complaining of generalized pain. The patient states that the patient has been known to take more pain medications/benzodiazepines than he's prescribed. He also reports the patient has severe anxiety and depression, but denies suicidal ideations.     ED Course:  Found hypoxic to 91% on room air by EMS. VS T 98.2, HR 71, /72, RR 22 (Satting 98% on 2L NC). Labs significant for Hgb 8.2 (MCV 65.6), Trop 8. UA wnl. Urine tox positive for Benzo/THC. CT Head shows no acute intracranial hemorrhage or depressed calvarial fracture. CT CSpine shows no fracture or acute traumatic malalignment; degenerative changes throughout the cervical spine. Patient is given his home tramadol 50mg po x 1 and ativan 1mg x 1 in ED.

## 2020-10-08 NOTE — ED ADULT NURSE NOTE - NSIMPLEMENTINTERV_GEN_ALL_ED
Implemented All Fall Risk Interventions:  [place] to call system. Call bell, personal items and telephone within reach. Instruct patient to call for assistance. Room bathroom lighting operational. Non-slip footwear when patient is off stretcher. Physically safe environment: no spills, clutter or unnecessary equipment. Stretcher in lowest position, wheels locked, appropriate side rails in place. Provide visual cue, wrist band, yellow gown, etc. Monitor gait and stability. Monitor for mental status changes and reorient to person, place, and time. Review medications for side effects contributing to fall risk. Reinforce activity limits and safety measures with patient and family.

## 2020-10-08 NOTE — ED PROVIDER NOTE - OBJECTIVE STATEMENT
69 y/o M w/ PMH of depression, anxiety, chronic back pain, HTN, NPH, sciatica presenting w/ a complaint of fall. Purple room 2. Per EMS, they were called by pt's roommate for assistance after pt was found on the ground next to his bed. Pt does not recall how he got on the ground. Unknown if head strike. Endorses falling this past Saturday however. Pt reports he did take ambien before bed. EMS reports finding many pill bottles by pt including tramadol and dilaudid but pt denies taking those tonight. Found hypoxic to 91% on room air by EMS, but they reported pt does have GULSHAN and was not using his CPAP. Pt complaining now or R sided lower back and leg pain which he reports is chronic. No new pain at this time. Denies fevers, chills, headache, dizziness, blurred vision, chest pain, cough, shortness of breath, abdominal pain, n/v/d/c, urinary symptoms, rash.

## 2020-10-08 NOTE — H&P ADULT - NSICDXPASTSURGICALHX_GEN_ALL_CORE_FT
PAST SURGICAL HISTORY:  Dehiscence, Operation Wound/Debridement infection    S/P Laminectomy L4-L5 2009  complicated by infection requiring  antibiiotcis    S/P lumbar fusion L4-L5 on Oct 2011  pins/screws 2012   spinal surgery 2013     PAST SURGICAL HISTORY:  Battery end of life of spinal cord stimulator Removal of Nervo spinal battery at [facility]   7/10/2018    Dehiscence, Operation Wound/Debridement 1/10/2010  Infection  [facility]    Fusion of lumbosacral spine S1-L5 Fusion at [facility]   4/26/2018    Fusion of sacral region of spine 6/4/20 at [facility]   2 surgeries for bilateral percutaneous sacroiliac joint fusions    History of creation of ventriculoperitoneal shunt 12/19/2017 at [facility]    S/P fusion of thoracic spine T11-Pelvis fusion at [facility]   1/28/20    S/P Laminectomy L4-L5 2009  complicated by infection requiring abx  [facility]    S/P laminectomy L3-L4 at [facility]  7/29/2013    S/P lumbar fusion L3-L5 Fusion at [facility]  3/18/2014    S/P lumbar fusion L4-L5 on Oct 2011  pins/screws 2012   spinal surgery 2013    S/P lumbar fusion Revision L1-L5 at [facility] by Dr. Huynh  6/5/19

## 2020-10-08 NOTE — H&P ADULT - NSHPLABSRESULTS_GEN_ALL_CORE
CT Pelvis Bony/Lumbar Spine (10/6/20 OUTPATIENT): IMPRESSION:  Postsurgical changes status post attempted fusion of bilateral sacroiliac joints with 2 metallic fusion devices extending across bilateral sacroiliac joints from the iliac bones into the sacrum. The more cephalad left-sided fusion device extends beyond the lateral cortical margin of the iliac bone x 1.5 cm. The more caudal left-sided fusion device extends beyond the lateral cortical margin of the iliac bone x 1.3 cm. Lucent sacroiliac joint spaces remain present bilaterally. Lucencies measuring up to 3 mm in thickness around the 2 right sided iliac screws which extend from the spinal fusion construct, concerning for possible loosening and/or infection.    Redemonstrated postsurgical changes status post posterior fusion extending from T12 to bilateral iliac bones with bilateral pedicle screws at T12, L1, L2, L3, L4, and L5. Posterior decompression changes again seen at L2-L3, L3-L4, L4-L5, L5-S1. Anterior fusion hardware with lucencies around the screws at S1 redemonstrated, concerning for possible loosening and/or infection. Multilevel spondylosis, with severe neural foraminal stenosis at multiple levels, as described above.    Mild concavity of the superior endplate of L1, without significant change.    Grade 1 retrolisthesis of L2 on L3 redemonstrated.    Small fluid/edema in the posterior subcutaneous tissues.      < from: CT Cervical Spine No Cont (10.08.20 @ 07:48) >    IMPRESSION:  CT HEAD: There is no acute intracranial hemorrhage or depressed calvarial fracture.    CT CERVICAL SPINE: No fracture or acute traumatic malalignment. There are degenerative changes throughout the cervical spine as described.    < end of copied text >      < from: Xray Pelvis AP only (10.08.20 @ 08:24) >    FINDINGS: There has been prior posterior lumbar spinal and bilateral sacroiliac joint fusion.  The hardware is more optimally evaluated on the recent CT from 10/6/2020. There is no definite acute hardware complication. There is no fracture or acute traumatic dislocation. No soft tissue abnormalities are seen.    IMPRESSION:  No fracture.    < end of copied text >    < from: Xray Chest 1 View- PORTABLE-Routine (Xray Chest 1 View- PORTABLE-Routine .) (10.08.20 @ 08:25) >    FINDINGS:     shunt unchanged.  No pleural effusion or pneumothorax.  Heart size cannot be accurately assessed on this AP projection.  Elevated right diaphragm with bilateral basilar atelectasis.  No acute bony defect.    IMPRESSION:  Elevated right diaphragm with atelectasis.    < end of copied text >

## 2020-10-08 NOTE — H&P ADULT - NSICDXPASTMEDICALHX_GEN_ALL_CORE_FT
PAST MEDICAL HISTORY:  Anxiety     Chronic back pain greater than 3 months duration     Depression     HTN (Hypertension)     Insomnia     Lumbar disc displacement without myelopathy     NPH (normal pressure hydrocephalus)     Sciatica     Small intestine disorder "ilitis"   steroids   1967    Vertebral Sciatica      PAST MEDICAL HISTORY:  Anxiety     Chronic back pain greater than 3 months duration     Depression     HTN (Hypertension)     Insomnia     Lumbar disc displacement without myelopathy     NPH (normal pressure hydrocephalus)     GULSHAN on CPAP     Sciatica     Small intestine disorder "ilitis"   steroids   1967    Vertebral Sciatica

## 2020-10-08 NOTE — ED PROVIDER NOTE - CLINICAL SUMMARY MEDICAL DECISION MAKING FREE TEXT BOX
71 y/o M w/ PMH of depression, anxiety, chronic back pain, HTN, NPH, sciatica presenting w/ a complaint of fall. Pt A&O x3, but somewhat somnolent and slow to answer questions. No external signs of trauma. Unclear as to why pt was found on the ground. Possible syncope. Oxygen mid 90s in ED on room air. ? tox component or polypharmacy. Pt denies illicit drugs or alcohol use. Low suspicion for ICH. No AC use. Plan for labs, EKG, UA, CTs, xrays, and likely admission for syncope work up. Will reassess the need for additional interventions as clinically warranted.

## 2020-10-08 NOTE — H&P ADULT - PROBLEM SELECTOR PLAN 9
DVT ppx: Lovenox   Diet: Regular   PT consulted    Alfredo Lopez (Friend/Roommate): [phone] Patient takes ambien 10mg at home and trazadone 100mg qHS.   -Will hold ambien  -Continue trazadone 100mg qHS

## 2020-10-08 NOTE — H&P ADULT - PROBLEM SELECTOR PLAN 8
DVT ppx: Lovenox   Diet: Regular   PT consulted    Alfredo Lopez (Friend/Roommate): [phone] Patient takes ambien 10mg at home and trazadone 100mg qHS.   -Will hold ambien  -Continue trazadone 100mg qHS Patient follows with Psychiatrist Dr. Cory Bhandari. Per patient's friend, patient's depression has been uncontrolled in the previous months.   -Continue home celexa 40mg qday   -Continue home Xanax 1mg q6H PRN -hold for sedation   -Will get collateral with Dr. Bhandari  -Consider Psych consult tomorrow AM

## 2020-10-08 NOTE — H&P ADULT - PROBLEM SELECTOR PLAN 3
-XR AP (10/8): No fracture. P/w worsening back pain for past month. Patient follows with NSGY Dr. Castanon with m/r appt last week. Outpatient imaging below.   -CT Pelvis Bony/Lumbar Spine (10/6): Postsurgical changes. Lucencies measuring up to 3 mm in thickness around the 2 right sided iliac screws which extend from the spinal fusion construct, concerning for possible loosening and/or infection. Mild concavity of the superior endplate of L1, without significant change. Grade 1 retrolisthesis of L2 on L3 redemonstrated. Small fluid/edema in the posterior subcutaneous tissues. Anterior fusion hardware with lucencies around the screws at S1 redemonstrated, concerning for possible loosening and/or infection.   -XR AP (10/8): No fracture.  -Will touch base with Dr. Rogers  -Consult Neurosurgery   -Consider starting abx if patient becomes febrile and get cx.   -Patient's pain has been uncontrolled and patient's quality of life has declined significantly since his most recent surgery  -Continue with tramadol 50mg q6H PRN  -Consult Pain management   -Order AM ESR/CRP

## 2020-10-08 NOTE — H&P ADULT - PROBLEM SELECTOR PLAN 4
-Continue home amlodipine 5mg qday and irbesartan 300mg qday Hgb 8.2 (MCV 65.6) on admission. M/r Hgb 7.7 in June 2020.   -Iron studies ordered   -Continue to monitor daily CBCs

## 2020-10-08 NOTE — OUTREACH NOTE
Call Center TCM Note      Responses   [facility], [place], operated by [facility] patient discharged from?  [facility]   Does the patient have one of the following disease processes/diagnoses(primary or secondary)?  Total Joint Replacement   Joint surgery performed?  Shoulder   TCM attempt successful?  Yes   Call start time  1041   Call end time  1046   Discharge diagnosis  Right reverse total shoulder   Does the patient have all medications related to this admission filled (includes all antibiotics, pain medications, etc.)  Yes   Is the patient taking all medications as directed (includes completed medication regime)?  Yes   Is the patient able to teach back alternate methods of pain control?  Ice, Shoulder-elevate above heart/ keep in sling as advised   Does the patient have a follow up appointment with their surgeon?  Yes [10/21/20]   Has the patient kept scheduled appointments due by today?  N/A   Comments  No hospital d/c f/u appt available until 10/23/20   Psychosocial issues?  No   Has the patient began therapy sessions (either in the home or as an out patient)?  No   Does the patient have a wound vac in place?  No   Has the patient fallen since discharge?  No   Did the patient receive a copy of their discharge instructions?  Yes   Nursing interventions  Reviewed instructions with patient   What is the patient's perception of their functional status since discharge?  Improving   Is the patient able to teach back signs and symptoms of infection?  Temp >100.4 for 24h or longer   Is the patient able to teach back signs and symptoms of DVT?  Redness in calf, Swelling in calf, Severe pain in calf, Area hot to touch, Shortness of breath or chest pain   Is the patient able to teach back home safety measures?  Modifications with ADLs such as dressing, cooking, toileting   Did the patient implement home safety suggestions from pre-surgery classes if attended?  Yes   If the patient is a current smoker, are they able to teach back resources for  cessation?  Not a smoker   Is the patient/caregiver able to teach back the hierarchy of who to call/visit for symptoms/problems? PCP, Specialist, Home health nurse, Urgent Care, ED, 911  Yes   TCM call completed?  Yes          Ronel Larkin RN    10/8/2020, 10:49 EDT

## 2020-10-08 NOTE — H&P ADULT - PROBLEM SELECTOR PLAN 6
Patient desatting to 91% in EMS. Satting 96-98% on 2L NC in ED.   -Continue with NC - wean as tolerated   -Patient does not know home CPAP settings - will touch base with pulmonologist tomorrow   -CPAP qHS -Continue home tamsulosin 0.8mg qHS

## 2020-10-08 NOTE — H&P ADULT - ATTENDING COMMENTS
-Patient seen/examined on 10/8/20. Case/plan discussed with the intern and resident as reviewed/edited by me above and in any comments below.  -His symptoms of some confusion, unsteadiness, urinary incontinence seem to suggest a worsening of his NPH. Will eval  shunt and ask RICH to eval and consider adjusting his shunt. Consider tapping the shunt to eval for infection, etc.   -F/u recent spine imaging patient had done.   -Syncope eval with telemetry and echo. -Check orthostatics.   -?polypharmacy. Reduce/avoid narcotics if possible. -Seek psych input regarding medications. -Chronic pain eval.   -Look for possible infectious causes.   -PT eval.

## 2020-10-08 NOTE — H&P ADULT - ASSESSMENT
70 year old male with HTN, GULSHAN (on home CPAP), BPH, NPH (s/p  shunt and MRI-incompatible valve in 2018), sciatica, and chronic back pain (s/p multiple spinal surgeries; most recently bilateral percutaneous sacroiliac joint fusions in 6/20) presenting with acutely worsening back pain in the past month and AMS with history of unwitnessed ?fall. 70 year old male with HTN, GULSHAN (on home CPAP), BPH, NPH (s/p  shunt and MRI-incompatible valve in 2017), sciatica, and chronic back pain (s/p multiple spinal surgeries; most recently bilateral percutaneous sacroiliac joint fusions in 6/20) presenting with acutely worsening back pain in the past month and AMS with history of unwitnessed ?fall.

## 2020-10-08 NOTE — ED PROVIDER NOTE - ATTENDING CONTRIBUTION TO CARE
Attending MD Villalobos:   I personally have seen and examined this patient.  ACP, Resident, medical student note reviewed and agree on plan of care and except where noted.     70y M PMH HTN, NPH s/p  shunt, chronic back pain, s/p multiple spinal surgeries presents with AMS and worsening back pain radiating down bilateral legs x several weeks. Has been taking tramadol at home with limited relief, admits to taking more pain meds than is prescribed. Last night patient was noted to be at baseline mental status, but when roommate checked on him later was found down on the floor, seemed confused. Patient had episode of urinary incontinence but no seizure like activity reported. Patient is a poor historian. Patient noted to be hypoxic to 91% by EMS, improved with supplemental O2.    on exam patient appears uncomfortable, slow speech, tachypneic, hypoxic on RA, rrr s1s2, lungs clear, abdomen soft, ntnd, no rebound or guarding, no midline spinal tenderness to palpation, A+Ox3, CN II-XII grossly intact, 5/5 strength in all 4 extremities, sensation intact in all 4 extremities, EOMI, PERRL, skin warm and well perfused.    Differential includes but is not limited to tox/medication effect, ICH, syncope, seizure. Will obtain labs, imaging, neuro consult, likely admission.

## 2020-10-08 NOTE — CHART NOTE - NSCHARTNOTEFT_GEN_A_CORE
iSTOP Reference ID: [id number]    Patient Name: Teto PerdomoBirth Date: 1950  Address: [address] [place], [place] [address]Psp: Male  Rx Written	Rx Dispensed	Drug	Quantity	Days Supply	Prescriber Name	Payment Method	Dispenser  03/18/2020	03/19/2020	hydromorphone 4 mg tablet	9	3	Joy Phillips A Cash	[facility] #[id number]    Patient Name: Teto PerdomoBirth Date: 1950  Address: [address] [place] , [place] [address]Rhk: Male  Rx Written	Rx Dispensed	Drug	Quantity	Days Supply	Prescriber Name	Payment Method	Dispenser  03/05/2020	03/05/2020	oxycodone hcl 10 mg tablet	20	6	MD Rafa, Swetha	[facility]  03/01/2020	03/02/2020	alprazolam 0.5 mg tablet	60	15	MD Rafa, Swetha	Dee	[facility]  02/29/2020	02/29/2020	oxycodone hcl 10 mg tablet	5	1	Petar Rajan DO	Dee	[facility]  02/27/2020	02/27/2020	zolpidem tartrate 5 mg tablet	30	30	MD Rafa, Swetha	Dee	[facility]  02/25/2020	02/25/2020	oxycodone hcl 10 mg tablet	30	7	MD Rafa, Swetha Dee	[facility]  02/25/2020	02/25/2020	fentanyl 25 mcg/hr patch	10	30	MD Craig Karen	Dee	[facility]  02/20/2020	02/20/2020	alprazolam 0.5 mg tablet	14	7	MD Rafa, Swetha	Dee	[facility]  02/20/2020	02/20/2020	alprazolam 0.5 mg tablet	14	14	MD Craig Karen	Dee	[facility]  02/16/2020	02/18/2020	alprazolam odt 1 mg tab	30	7	MD Rafa, Swetha	Dee	[facility]  02/14/2020	02/14/2020	fentanyl 25 mcg/hr patch	5	15	MD Craig Karen	Dee	[facility]  02/14/2020	02/14/2020	zolpidem tartrate 5 mg tablet	10	10	MD Rafa, Swetha	Dee	[facility]    Patient Name: Teto PerdomoBirth Date: 1950  Address: [address] , [place] [address]Vee: Male  Rx Written	Rx Dispensed	Drug	Quantity	Days Supply	Prescriber Name	Payment Method	Dispenser  09/24/2020	09/29/2020	alprazolam 1 mg tablet	120	30	Green, Cory QUINTANA MD	Insurance	[facility] #[id number] 09/08/2020	09/08/2020	methylphenidate 10 mg tablet	60	30	Green, Cory QUINTANA MD	[facility] #[id number] 09/08/2020	09/08/2020	zolpidem tartrate 10 mg tablet	30	30	Green, Cory QUINTANA MD	Insurance	[facility] #[id number]  08/31/2020	08/31/2020	alprazolam 1 mg tablet	120	30	Green, Cory QUINTANA MD	Insurance	[facility] #[id number]  08/08/2020	08/08/2020	methylphenidate 10 mg tablet	30	30	Green, Cory QUINTANA MD	[facility] #[id number]  08/07/2020	08/08/2020	zolpidem tartrate 10 mg tablet	30	30	Green, Cory QUINTANA MD	Insurance	[facility] #[id number]  07/28/2020	07/30/2020	alprazolam 1 mg tablet	120	30	Green, Cory QUINTANA MD	Insurance	[facility] #[id number]  07/17/2020	07/17/2020	nucynta er 200 mg tablet	60	30	VarmaBill	Insurance	[facility] #[id number]  07/17/2020	07/17/2020	hydromorphone 4 mg tablet	90	30	Varma, Bill SYSUSANA	Insurance	[facility] #[id number]  07/07/2020	07/08/2020	zolpidem tartrate 10 mg tablet	30	30	Green, Cory QUINTANA MD	Insurance	[facility] #[id number]  07/07/2020	07/08/2020	alprazolam 1 mg tablet	120	20	Green, Cory QUINTANA MD	Dee	[facility] #[id number]  06/24/2020	06/24/2020	tramadol hcl 50 mg tablet	60	15	Pedro Lopez MD	Insurance	[facility] #[id number]  06/11/2020	06/14/2020	tramadol hcl 50 mg tablet	42	7	[facility]	Insurance	[facility] #[id number]  06/09/2020	06/09/2020	alprazolam 1 mg tablet	120	30	Green, Cory QUINTANA MD	Insurance	[facility] #[id number]  06/09/2020	06/09/2020	zolpidem tartrate 10 mg tablet	30	30	Green, Cory QUINTANA MD	Insurance	[facility] #[id number]  06/05/2020	06/08/2020	tramadol hcl 50 mg tablet	20	5	Terri LAURO Eaton	Insurance	[facility] #[id number]  05/19/2020	05/22/2020	nucynta er 150 mg tablet	60	30	FlorenceRamesh	Insurance	[facility] #[id number]  05/14/2020	05/18/2020	methylphenidate 20 mg tablet	30	30	Green, Cory QUINTANA MD	Insurance	[facility] #[id number]  04/27/2020	05/07/2020	hydromorphone 4 mg tablet	90	30	FlorenceRamesh	Insurance	[facility] #[id number]  05/01/2020	05/02/2020	alprazolam 1 mg tablet	120	30	Green, Cory QUINTANA MD	Insurance	[facility] #[id number]  04/27/2020	05/01/2020	nucynta er 100 mg tablet	50	25	FlorenceRamesh	Insurance	[facility] #[id number]  04/23/2020	04/28/2020	zolpidem tart er 12.5 mg tab	26	26	Green, Cory QUINTANA MD	Insurance	[facility] #[id number]  04/16/2020	04/25/2020	methylphenidate 10 mg tablet	30	30	Green, Cory QUINTANA MD	Insurance	[facility] #[id number]  04/06/2020	04/08/2020	hydromorphone 4 mg tablet	90	30	FlorenceRamesh	Insurance	[facility] #[id number]  03/26/2020	03/27/2020	hydromorphone 4 mg tablet	21	7	[facility]	Insurance	[facility] #[id number]  12/05/2019	03/27/2020	zolpidem tartrate 10 mg tablet	30	30	Green, Cory QUINTANA MD	Insurance	[facility] #[id number]  03/26/2020	03/27/2020	alprazolam 1 mg tablet	120	30	Green, Cory QUINTANA MD	Insurance	[facility] #[id number]  03/10/2020	03/10/2020	hydromorphone 4 mg tablet	21	7	[facility]	Insurance	[facility] #[id number]  12/05/2019	02/07/2020	zolpidem tartrate 10 mg tablet	30	30	Green, Cory QUINTANA MD	Insurance	[facility] #[id number]  02/05/2020	02/07/2020	alprazolam 1 mg tablet	120	30	Green, Cory QUINTANA MD	Insurance	[facility] #[id number]  02/03/2020	02/04/2020	hydromorphone 4 mg tablet	28	7	Blanca Valera	Insurance	[facility] #[id number]  12/05/2019	01/09/2020	zolpidem tartrate 10 mg tablet	30	30	GreenCory MD	[facility] #[id number]  01/08/2020	01/09/2020	alprazolam 1 mg tablet	120	30	Green, Cory QUINTANA MD	[facility] #[id number]  04/04/2019	01/04/2020	curaleaf (1:20) 0.24mgthc and 5 mgcbd/0.5 ml tct lemon	1	5	Alleva, Alcides	Dee	[facility]  04/04/2019	01/04/2020	curaleaf (1:20) 0.24mgthc and 5 mgcbd/0.5 ml tct lemon	1	5	Alleva, [facility]  04/04/2019	12/21/2019	curaleaf (1:20) 0.24mgthc and 5 mgcbd/0.5 ml tct lemon	1	5	Alleva, Alcides	Dee	[facility]  04/04/2019	12/21/2019	curaleaf (20:1) 5mgthc and 0.24 mgcbd/0.5 ml tct lemon	1	5	Alleva, Alcides	Dee	[facility] [place]  04/04/2019	12/21/2019	curaleaf (20:1) 5mgthc and 0.24 mgcbd/0.5 ml tct lemon	1	5	Alleva, Alcides	Dee	[facility] [place]  04/04/2019	12/13/2019	curaleaf (20:1) 5mgthc and 0.24 mgcbd/0.5 ml tct lemon	1	5	Alleva, Alcides	Dee	[facility] [place]  04/04/2019	12/13/2019	curaleaf (20:1) 5mgthc and 0.24 mgcbd/0.5 ml tct lemon	1	5	Alleva, Alcides	Dee	[facility] [place]  04/04/2019	12/13/2019	curaleaf (20:1) 5mgthc and 0.24 mgcbd/0.5 ml tct lemon	1	5	Alleva, Alcides	Dee	[facility] [place]  12/05/2019	12/11/2019	alprazolam 1 mg tablet	120	30	Green, Cory QUINTANA MD	[facility] #[id number]  12/05/2019	12/11/2019	zolpidem tartrate 10 mg tablet	30	30	Green, Cory QUINTANA MD	Insurance	[facility] #[id number]  12/05/2019	12/06/2019	methylphenidate 10 mg tablet	120	30	Green, Cory QUINTANA MD	[facility] #[id number]  04/04/2019	11/29/2019	curaleaf (20:1) 5mgthc and 0.24 mgcbd/0.5 ml tct peppermint	1	5	Alleva, Alcides	Dee	[facility] [place]  04/04/2019	11/29/2019	curaleaf (20:1) 5mgthc and 0.24 mgcbd/0.5 ml tct peppermint	1	5	Alleva, [facility]  04/04/2019	11/29/2019	curaleaf (20:1) 5mgthc and 0.24 mgcbd/0.5 ml tct lemon	1	5	Alleva, Alcides	Dee	[facility] [place]  11/14/2019	11/15/2019	methylphenidate 20 mg tablet	30	30	Green, Cory QUINTANA MD	[facility] #[id number]  04/04/2019	11/12/2019	curaleaf (20:1) 5mgthc and 0.24 mgcbd/0.5 ml tct lemon	1	5	Alleva, Alcides	Dee	[facility] [place]  04/04/2019	11/12/2019	curaleaf (20:1) 5mgthc and 0.24 mgcbd/0.5 ml tct lemon	1	5	Alleva, Alcides	Dee	[facility] [place]  04/04/2019	11/12/2019	curaleaf (20:1) 5mgthc and 0.24 mgcbd/0.5 ml tct lemon	1	5	Alleva, Alcides	Dee	[facility] [place]  11/01/2019	11/11/2019	zolpidem tartrate 10 mg tablet	30	30	Green, Cory QUINTANA MD	[facility] #[id number]  11/09/2019	11/11/2019	alprazolam 1 mg tablet	120	30	Cory Bhandari MD	[facility] #[id number]  04/04/2019	10/31/2019	curaleaf (1:20) 0.24mgthc and 5 mgcbd/0.5 ml tct lemon	1	5	Alleva, Alcides	Dee	[facility] [place]  04/04/2019	10/31/2019	curaleaf (1:20) 0.24mgthc and 5 mgcbd/0.5 ml tct lemon	1	5	Alleva, Alcides	Dee	[facility] [place]  04/04/2019	10/19/2019	curaleaf (20:1) 5mgthc and 0.24 mgcbd/0.5 ml tct lemon	1	5	Alleva, Alcides	Dee	[facility] [place]  04/04/2019	10/19/2019	curaleaf (20:1) 5mgthc and 0.24 mgcbd/0.5 ml tct lemon	1	5	Alleva, Alcides	Dee	[facility] [place]  04/04/2019	10/19/2019	curaleaf (1:20) 0.24mgthc and 5 mgcbd/0.5 ml tct lemon	1	5	Alleva, Alcides	Dee	[facility] [place]  07/11/2019	10/13/2019	zolpidem tartrate 10 mg tablet	30	30	Alleva, Aldo	Medicare	[facility] #[id number]    Ramesh Hunt MD  Internal Medicine PGY-2  [phone] / [phone]

## 2020-10-08 NOTE — H&P ADULT - NSHPPHYSICALEXAM_GEN_ALL_CORE
Physical Exam:     General: Patient appears uncomfortable; lying in stretcher. Slow speech and poor historian.     Eyes: PERRL, EOMI, no conjunctival injection      ENT: MMM, no oropharyngeal lesions or erythema appreciated     Pulm: No increased WOB. CTAB. No wheezing. NC in place at 2L.     CV: RRR. S1&S2+. No M/R/G appreciated.     Abdomen: +BS. Soft, NTND. No organomegaly.     MSK: Nml ROM    Extremities: No peripheral edema or cyanosis.     Neuro: A&Ox3, no focal deficits     Skin: Warm and dry. No visible rash.

## 2020-10-08 NOTE — H&P ADULT - PROBLEM SELECTOR PLAN 1
Patient with questionable unwitnessed fall this AM. Found to be confused with slow speech by roommate with difficulty standing up. Urinary incontinence in EMS. Worsening hydrocephalus vs polypharmacy vs syncope   -CT Head (10/8): There is no acute intracranial hemorrhage or depressed calvarial fracture.  -CT Cspine (10/8): No fracture or acute traumatic malalignment. There are degenerative changes throughout the cervical spine  -Urine tox positive for benzos and THC  -Monitor on telemetry x 24 hours  -TTE ordered   -TFTs ordered in AM

## 2020-10-08 NOTE — ED ADULT NURSE REASSESSMENT NOTE - NS ED NURSE REASSESS COMMENT FT1
(Covering RN) Patient resting in bed comfortably, alert and oriented x 3, vital signs stable, patient updated on plan of care, patient to be transported to ed holding. Maggie RN

## 2020-10-08 NOTE — H&P ADULT - PROBLEM SELECTOR PLAN 2
Patient with history of  shunt and magnetic (MRI-incompatible) valve placement in 2017 at [place]. Pt p/w AMS, 1 episode of urinary incontinence, and difficulty ambulating.   -CT Head (10/8): No evidence of worsening hydrocephalus or misplaced shunt/valve   -Will order XR Shunt Series to evaluate for kinking of the  shunt   -Will consult NSGY Patient with history of  shunt and magnetic (MRI-incompatible) valve placement in 2017 at [place]. Pt p/w AMS, 1 episode of urinary incontinence, and difficulty ambulating.   -CT Head (10/8): No evidence of worsening hydrocephalus or misplaced shunt/valve   -Will order XR Shunt Series to evaluate for kinking of the  shunt   -Will consult NSGY to assess for possible adjustment of  shunt to improve symptoms.

## 2020-10-08 NOTE — H&P ADULT - NSHPOUTPATIENTPROVIDERS_GEN_ALL_CORE
Neurosurgeon - Dr. Keira Castanon Neurosurgeon - Dr. Keira Castanon  PMD - Dr. Cantor, Alcides  [place] - Dr. Coreas Neurosurgeon - Dr. Keira Castanon  PMD - Dr. Cantor, Alcides  [place] - Dr. Coreas  Psych - Dr. Cory Bhandari Neurosurg: Dr. Keira Castanon  PMD: Alcides Nielson  Pulm: Dr. Coreas  Psych: Dr. Cory Bhandari

## 2020-10-09 LAB
24R-OH-CALCIDIOL SERPL-MCNC: 34.7 NG/ML — SIGNIFICANT CHANGE UP (ref 30–80)
A1C WITH ESTIMATED AVERAGE GLUCOSE RESULT: 5.8 % — HIGH (ref 4–5.6)
ALBUMIN SERPL ELPH-MCNC: 3.6 G/DL — SIGNIFICANT CHANGE UP (ref 3.3–5)
ALP SERPL-CCNC: 82 U/L — SIGNIFICANT CHANGE UP (ref 40–120)
ALT FLD-CCNC: <5 U/L — LOW (ref 10–45)
ANION GAP SERPL CALC-SCNC: 23 MMOL/L — HIGH (ref 5–17)
AST SERPL-CCNC: 7 U/L — LOW (ref 10–40)
BILIRUB SERPL-MCNC: 0.3 MG/DL — SIGNIFICANT CHANGE UP (ref 0.2–1.2)
BUN SERPL-MCNC: 10 MG/DL — SIGNIFICANT CHANGE UP (ref 7–23)
CALCIUM SERPL-MCNC: 9.2 MG/DL — SIGNIFICANT CHANGE UP (ref 8.4–10.5)
CHLORIDE SERPL-SCNC: 89 MMOL/L — LOW (ref 96–108)
CK SERPL-CCNC: 24 U/L — LOW (ref 30–200)
CO2 SERPL-SCNC: 26 MMOL/L — SIGNIFICANT CHANGE UP (ref 22–31)
CREAT SERPL-MCNC: 0.94 MG/DL — SIGNIFICANT CHANGE UP (ref 0.5–1.3)
CRP SERPL-MCNC: 3.51 MG/DL — HIGH (ref 0–0.4)
CULTURE RESULTS: SIGNIFICANT CHANGE UP
ERYTHROCYTE [SEDIMENTATION RATE] IN BLOOD: 66 MM/HR — HIGH (ref 0–20)
ESTIMATED AVERAGE GLUCOSE: 120 MG/DL — HIGH (ref 68–114)
FERRITIN SERPL-MCNC: 12 NG/ML — LOW (ref 30–400)
GAS PNL BLDV: SIGNIFICANT CHANGE UP
GLUCOSE SERPL-MCNC: 103 MG/DL — HIGH (ref 70–99)
HCT VFR BLD CALC: 32.3 % — LOW (ref 39–50)
HGB BLD-MCNC: 8.7 G/DL — LOW (ref 13–17)
IRON SATN MFR SERPL: 17 UG/DL — LOW (ref 45–165)
IRON SATN MFR SERPL: 6 % — LOW (ref 16–55)
MAGNESIUM SERPL-MCNC: 2.1 MG/DL — SIGNIFICANT CHANGE UP (ref 1.6–2.6)
MCHC RBC-ENTMCNC: 17.6 PG — LOW (ref 27–34)
MCHC RBC-ENTMCNC: 26.9 GM/DL — LOW (ref 32–36)
MCV RBC AUTO: 65.4 FL — LOW (ref 80–100)
NRBC # BLD: 0 /100 WBCS — SIGNIFICANT CHANGE UP (ref 0–0)
PHOSPHATE SERPL-MCNC: 3.8 MG/DL — SIGNIFICANT CHANGE UP (ref 2.5–4.5)
PLATELET # BLD AUTO: 347 K/UL — SIGNIFICANT CHANGE UP (ref 150–400)
POTASSIUM SERPL-MCNC: 4.2 MMOL/L — SIGNIFICANT CHANGE UP (ref 3.5–5.3)
POTASSIUM SERPL-SCNC: 4.2 MMOL/L — SIGNIFICANT CHANGE UP (ref 3.5–5.3)
PROT SERPL-MCNC: 6.9 G/DL — SIGNIFICANT CHANGE UP (ref 6–8.3)
RBC # BLD: 4.94 M/UL — SIGNIFICANT CHANGE UP (ref 4.2–5.8)
RBC # FLD: 18.7 % — HIGH (ref 10.3–14.5)
SODIUM SERPL-SCNC: 138 MMOL/L — SIGNIFICANT CHANGE UP (ref 135–145)
SPECIMEN SOURCE: SIGNIFICANT CHANGE UP
TIBC SERPL-MCNC: 297 UG/DL — SIGNIFICANT CHANGE UP (ref 220–430)
TSH SERPL-MCNC: 1.69 UIU/ML — SIGNIFICANT CHANGE UP (ref 0.27–4.2)
UIBC SERPL-MCNC: 281 UG/DL — SIGNIFICANT CHANGE UP (ref 110–370)
WBC # BLD: 7.07 K/UL — SIGNIFICANT CHANGE UP (ref 3.8–10.5)
WBC # FLD AUTO: 7.07 K/UL — SIGNIFICANT CHANGE UP (ref 3.8–10.5)

## 2020-10-09 PROCEDURE — 93306 TTE W/DOPPLER COMPLETE: CPT | Mod: 26

## 2020-10-09 PROCEDURE — 99233 SBSQ HOSP IP/OBS HIGH 50: CPT | Mod: GC

## 2020-10-09 RX ORDER — FERROUS SULFATE 325(65) MG
325 TABLET ORAL DAILY
Refills: 0 | Status: DISCONTINUED | OUTPATIENT
Start: 2020-10-09 | End: 2020-10-13

## 2020-10-09 RX ORDER — ASCORBIC ACID 60 MG
500 TABLET,CHEWABLE ORAL DAILY
Refills: 0 | Status: DISCONTINUED | OUTPATIENT
Start: 2020-10-09 | End: 2020-10-13

## 2020-10-09 RX ORDER — ZOLPIDEM TARTRATE 10 MG/1
5 TABLET ORAL ONCE
Refills: 0 | Status: DISCONTINUED | OUTPATIENT
Start: 2020-10-09 | End: 2020-10-09

## 2020-10-09 RX ADMIN — Medication 100 MILLIGRAM(S): at 21:08

## 2020-10-09 RX ADMIN — PREGABALIN 1000 MICROGRAM(S): 225 CAPSULE ORAL at 11:13

## 2020-10-09 RX ADMIN — Medication 325 MILLIGRAM(S): at 12:39

## 2020-10-09 RX ADMIN — ENOXAPARIN SODIUM 40 MILLIGRAM(S): 100 INJECTION SUBCUTANEOUS at 11:13

## 2020-10-09 RX ADMIN — TRAMADOL HYDROCHLORIDE 50 MILLIGRAM(S): 50 TABLET ORAL at 21:54

## 2020-10-09 RX ADMIN — ZOLPIDEM TARTRATE 5 MILLIGRAM(S): 10 TABLET ORAL at 21:09

## 2020-10-09 RX ADMIN — CITALOPRAM 40 MILLIGRAM(S): 10 TABLET, FILM COATED ORAL at 11:13

## 2020-10-09 RX ADMIN — TRAMADOL HYDROCHLORIDE 50 MILLIGRAM(S): 50 TABLET ORAL at 11:13

## 2020-10-09 RX ADMIN — TAMSULOSIN HYDROCHLORIDE 0.8 MILLIGRAM(S): 0.4 CAPSULE ORAL at 21:09

## 2020-10-09 RX ADMIN — AMLODIPINE BESYLATE 5 MILLIGRAM(S): 2.5 TABLET ORAL at 05:25

## 2020-10-09 RX ADMIN — Medication 500 MILLIGRAM(S): at 12:39

## 2020-10-09 RX ADMIN — Medication 1 MILLIGRAM(S): at 18:03

## 2020-10-09 RX ADMIN — TRAMADOL HYDROCHLORIDE 50 MILLIGRAM(S): 50 TABLET ORAL at 21:09

## 2020-10-09 RX ADMIN — LOSARTAN POTASSIUM 100 MILLIGRAM(S): 100 TABLET, FILM COATED ORAL at 05:25

## 2020-10-09 RX ADMIN — Medication 1 MILLIGRAM(S): at 12:37

## 2020-10-09 NOTE — PHYSICAL THERAPY INITIAL EVALUATION ADULT - PERTINENT HX OF CURRENT PROBLEM, REHAB EVAL
70M h/o HTN, GULSHAN (on home CPAP), BPH, NPH (s/p  shunt and MRI-incompatible valve in 2017), sciatica, and chronic back pain (s/p multiple spinal surgeries; most recently bilateral percutaneous sacroiliac joint fusions in 6/20) presenting with acutely worsening back pain in the past month and AMS with history of unwitnessed ?fall.

## 2020-10-09 NOTE — PROGRESS NOTE ADULT - PROBLEM SELECTOR PLAN 3
P/w worsening back pain for past month. Patient follows with NSGY Dr. Castanon with m/r appt last week. Outpatient imaging below.   -CT Pelvis Bony/Lumbar Spine (10/6): Postsurgical changes. Lucencies measuring up to 3 mm in thickness around the 2 right sided iliac screws which extend from the spinal fusion construct, concerning for possible loosening and/or infection. Mild concavity of the superior endplate of L1, without significant change. Grade 1 retrolisthesis of L2 on L3 redemonstrated. Small fluid/edema in the posterior subcutaneous tissues. Anterior fusion hardware with lucencies around the screws at S1 redemonstrated, concerning for possible loosening and/or infection.   -XR AP (10/8): No fracture.  -Will touch base with Dr. Rogers  -Consult Neurosurgery   -Consider starting abx if patient becomes febrile and get cx.   -Patient's pain has been uncontrolled and patient's quality of life has declined significantly since his most recent surgery  -Continue with tramadol 50mg q6H PRN  -Consult Pain management   -Order AM ESR/CRP P/w worsening back pain for past month. Patient follows with NSGY Dr. Castanon with m/r appt last week. Outpatient imaging below.   -CT Pelvis Bony/Lumbar Spine (10/6): Postsurgical changes. Lucencies measuring up to 3 mm in thickness around the 2 right sided iliac screws which extend from the spinal fusion construct, concerning for possible loosening and/or infection. Mild concavity of the superior endplate of L1, without significant change. Grade 1 retrolisthesis of L2 on L3 redemonstrated. Small fluid/edema in the posterior subcutaneous tissues. Anterior fusion hardware with lucencies around the screws at S1 redemonstrated, concerning for possible loosening and/or infection.   -XR AP (10/8): No fracture.  -Will touch base with Dr. Rogers  -Neurosurg consulted this AM, pending recs   -Consider starting abx if patient becomes febrile and get cx.   -Patient's pain has been uncontrolled and patient's quality of life has declined significantly since his most recent surgery  -Continue with tramadol 50mg q6H PRN  -Pain management consulted, pending recs   -ESR/CRP elevated

## 2020-10-09 NOTE — PROGRESS NOTE ADULT - PROBLEM SELECTOR PLAN 2
Patient with history of  shunt and magnetic (MRI-incompatible) valve placement in 2017 at [place]. Pt p/w AMS, 1 episode of urinary incontinence, and difficulty ambulating.   -CT Head (10/8): No evidence of worsening hydrocephalus or misplaced shunt/valve   -Will order XR Shunt Series to evaluate for kinking of the  shunt   -Will consult NSGY to assess for possible adjustment of  shunt to improve symptoms. Patient with history of  shunt and magnetic (MRI-incompatible) valve placement in 2017 at [place]. Pt p/w AMS, 1 episode of urinary incontinence, and difficulty ambulating.   -CT Head (10/8): No evidence of worsening hydrocephalus or misplaced shunt/valve   -XR Shunt Series w/o evidence of kinking

## 2020-10-09 NOTE — CONSULT NOTE ADULT - SUBJECTIVE AND OBJECTIVE BOX
p ([id number])     HPI:  70 year old male with HTN, GULSHAN (on home CPAP), BPH, NPH (s/p  shunt and MRI-incompatible valve in 2017), sciatica, and chronic back pain (s/p multiple spinal surgeries; most recently bilateral percutaneous sacroiliac joint fusions in 6/20) presenting with AMS and acutely worsening back pain in the past month. He describes the pain predominantly in the lumbar region with frequent radiation down his legs (L>R). Patient states he was previously prescribed opioids for pain management but stopped taking those several months ago as he was getting dizzy and had several falls. Since his recent back surgery in June, patient has been taking tramadol, which he states takes the edge off the sharp pains but at baseline he continues to have severe pain. Over the past few weeks, he feels like he's unable to tolerate sitting or standing for more than 30 minutes. Of note, patient has slow speech and is a poor historian.     In speaking with patient's roommate/friend for collateral, it appears that patient went to bed early yesterday evening and appeared to be behaving at his baseline. When his roommate checked on him later in the evening, he found him on the floor and he appeared to be looking for something. The roommate helped him up and got him to bed. Around 5AM, roommate heard a noise and found patient on his hands and knees by his bed again, saying repeatedly that he was looking for something and appeared confused. Roommate states that the patient was acting abnormal, speaking slower, and could not get him up. He called EMS and patient had an episode of urinary incontinence en route to hospital. Denies fever/chills, dysuria, LOC, recent trauma, abdominal pain, n/v/d, visual changes, CP, palpitations. Denies history of seizures.     Additionally, per the roommate, patient has had severe pain and difficulty ambulating since his most recent back surgery in June without much help from PT. Patient most recently followed up with Dr. Castanon on 9/29 with plans for repeat imaging. His pain has acutely worsened in the past month and in the past week, patient has been complaining of generalized pain. The patient states that the patient has been known to take more pain medications/benzodiazepines than he's prescribed. He also reports the patient has severe anxiety and depression, but denies suicidal ideations.     ED Course:  Found hypoxic to 91% on room air by EMS. VS T 98.2, HR 71, /72, RR 22 (Satting 98% on 2L NC). Labs significant for Hgb 8.2 (MCV 65.6), Trop 8. UA wnl. Urine tox positive for Benzo/THC. CT Head shows no acute intracranial hemorrhage or depressed calvarial fracture. CT CSpine shows no fracture or acute traumatic malalignment; degenerative changes throughout the cervical spine. Patient is given his home tramadol 50mg po x 1 and ativan 1mg x 1 in ED.  (08 Oct 2020 14:43)      Imaging:    Exam:    --Anticoagulation:  enoxaparin Injectable 40 milliGRAM(s) SubCutaneous daily    =====================  PAST MEDICAL HISTORY   GULSHAN on CPAP    NPH (normal pressure hydrocephalus)    Chronic back pain greater than 3 months duration    Small intestine disorder    Lumbar disc displacement without myelopathy    Sciatica    Anxiety    Vertebral Sciatica    Insomnia    Depression    HTN (Hypertension)      PAST SURGICAL HISTORY   Fusion of sacral region of spine    S/P fusion of thoracic spine    S/P lumbar fusion    Battery end of life of spinal cord stimulator    Fusion of lumbosacral spine    History of creation of ventriculoperitoneal shunt    S/P lumbar fusion    S/P laminectomy    S/P lumbar fusion    Dehiscence, Operation Wound/Debridement    S/P Laminectomy      Biaxin (Other)  Lidoderm (Unknown)  morphine (Short breath; Rash)      MEDICATIONS:  Antibiotics:    Neuro:  acetaminophen   Tablet .. 650 milliGRAM(s) Oral every 6 hours PRN  ALPRAZolam 1 milliGRAM(s) Oral every 6 hours PRN  citalopram 40 milliGRAM(s) Oral daily  traMADol 50 milliGRAM(s) Oral every 4 hours PRN  traZODone 100 milliGRAM(s) Oral at bedtime    Other:  amLODIPine   Tablet 5 milliGRAM(s) Oral daily  ascorbic acid 500 milliGRAM(s) Oral daily  cyanocobalamin 1000 MICROGram(s) Oral daily  ferrous    sulfate 325 milliGRAM(s) Oral daily  influenza   Vaccine 0.5 milliLiter(s) IntraMuscular once  losartan 100 milliGRAM(s) Oral daily  tamsulosin 0.8 milliGRAM(s) Oral at bedtime      SOCIAL HISTORY:   Occupation:   Marital Status:     FAMILY HISTORY:  FH: renal failure    FH: diabetes mellitus    FH: lung cancer    No pertinent family history in first degree relatives    Family history of CKD (chronic kidney disease)    Family history of lung cancer        ROS: Negative except per HPI    LABS:                          8.7    7.07  )-----------( 347      ( 09 Oct 2020 05:25 )             32.3     10-09    138  |  89<L>  |  10  ----------------------------<  103<H>  4.2   |  26  |  0.94    Ca    9.2      09 Oct 2020 05:25  Phos  3.8     10-09  Mg     2.1     10-09    TPro  6.9  /  Alb  3.6  /  TBili  0.3  /  DBili  x   /  AST  7<L>  /  ALT  <5<L>  /  AlkPhos  82  10-09

## 2020-10-09 NOTE — PROGRESS NOTE ADULT - PROBLEM SELECTOR PLAN 7
Patient desatting to 91% in EMS. Satting 96-98% on 2L NC in ED.   -Continue with NC - wean as tolerated   -Patient does not know home CPAP settings - will touch base with pulmonologist tomorrow   -CPAP qHS Patient desatting to 91% in EMS. Satting 96-98% on 2L NC in ED.   -Continue with NC - wean as tolerated   -Patient does not know home CPAP settings - will ask patient for settings   -CPAP qHS

## 2020-10-09 NOTE — PHYSICAL THERAPY INITIAL EVALUATION ADULT - ADDITIONAL COMMENTS
Pt states he lives in a private house, 3 steps to enter, no handrails; bedroom is on the main floor. Pt reports very limited ambulation due to persistent LBP since surgery May 2020. Pt reports ambulating short distances at home using straight cane; RW for outdoor ambulation. Pt was going to outpatient PT, last appointment was 1 week ago.    XRAY SHUNT SERIES 10/8:  Ventriculoperitoneal shunt distal  catheter tip in the region of the right lower quadrant without discontinuity along its course. Catheter forms a loop in the right hemiabdomen but does not appear to be kinked. CHEST XRAY 10/8: Elevated right diaphragm with atelectasis. CT HEAD 10/8: There is no acute intracranial hemorrhage or depressed calvarial fracture. CT CERVICAL SPINE 10/8: No fracture or acute traumatic malalignment. There are degenerative changes throughout the cervical spine as described.

## 2020-10-09 NOTE — PHYSICAL THERAPY INITIAL EVALUATION ADULT - GAIT TRAINING, PT EVAL
GOAL: Pt will ambulate with or without appropriate assistive device x 150 feet indep in 2 weeks. GOAL: Pt will ambulate with or without appropriate assistive device x 500 feet indep in 2 weeks.

## 2020-10-09 NOTE — INPATIENT CERTIFICATION FOR MEDICARE PATIENTS - RISKS OF ADVERSE EVENTS
Concern for delay in diagnosis and treatment/Concern for worsening infectious process/Concern for cardiopulmonary deterioration/Concern for neurologic deterioration

## 2020-10-09 NOTE — CONSULT NOTE ADULT - ASSESSMENT
DACIA NAVARRO    71YO M pt of Dr. Castanon's seen in office earlier this week, hx HTN, NPH s/p VPS at [place] ([facility]), GULSHAN, BPH, chronic back pain s/p multilevel fusion most recently with Lg in May and with Jessica. P/w AMS a few days, now improved and acutely worsening back pain and ble radic. CT L earlier this week stable from June (postsurgical changes s/p T12-Pelvis, redemonstrated lucencies around S1 screws, stable from previous). Exam intact.   - Pt explicitly does not want further surgery at this time   - No further imaging at this time  - Pain control  - Can continue to fu with Dr. Castanon in the office per routine  - Already d/w Dr. Castanon

## 2020-10-09 NOTE — PHYSICAL THERAPY INITIAL EVALUATION ADULT - GENERAL OBSERVATIONS, REHAB EVAL
Received in bed, +IVL, c/o 10/10 low back pain however states he does not want any pain meds. TRUNG Black notified

## 2020-10-09 NOTE — PROGRESS NOTE ADULT - ASSESSMENT
70 year old male with HTN, GULSHAN (on home CPAP), BPH, NPH (s/p  shunt and MRI-incompatible valve in 2017), sciatica, and chronic back pain (s/p multiple spinal surgeries; most recently bilateral percutaneous sacroiliac joint fusions in 6/20) presenting with acutely worsening back pain in the past month and AMS with history of unwitnessed ?fall.

## 2020-10-09 NOTE — PROGRESS NOTE ADULT - PROBLEM SELECTOR PLAN 8
Patient follows with Psychiatrist Dr. Cory Bhandari. Per patient's friend, patient's depression has been uncontrolled in the previous months.   -Continue home celexa 40mg qday   -Continue home Xanax 1mg q6H PRN -hold for sedation   -Will get collateral with Dr. Bhandari  -Consider Psych consult tomorrow AM Patient follows with Psychiatrist Dr. Cory Bhandari. Per patient's friend, patient's depression has been uncontrolled in the previous months. No SI/HI.   -Continue home celexa 40mg qday   -Continue home Xanax 1mg q6H PRN -hold for sedation   -Patient to follow up with oupatient Psychiatrist

## 2020-10-09 NOTE — PROGRESS NOTE ADULT - PROBLEM SELECTOR PLAN 4
Hgb 8.2 (MCV 65.6) on admission. M/r Hgb 7.7 in June 2020.   -Iron studies ordered   -Continue to monitor daily CBCs Hgb 8.2 (MCV 65.6) on admission. M/r Hgb 7.7 in June 2020.   -Iron studies show IRINA   -Start iron supplementation with Vit C   -Continue to monitor daily CBCs

## 2020-10-09 NOTE — PROGRESS NOTE ADULT - SUBJECTIVE AND OBJECTIVE BOX
**********************************************  Hailey Carver, PGY-1  Internal Medicine   [facility] Pager #: [phone]  **********************************************     Patient is a 70y old  Male who presents with a chief complaint of Back Pain (08 Oct 2020 14:43)    SUBJECTIVE / OVERNIGHT EVENTS:    OBJECTIVE:  Vital Signs Last 24 Hrs  T(C): 36.2 (09 Oct 2020 04:15), Max: 37.1 (08 Oct 2020 15:36)  T(F): 97.2 (09 Oct 2020 04:15), Max: 98.8 (08 Oct 2020 16:49)  HR: 73 (09 Oct 2020 04:15) (71 - 74)  BP: 149/77 (09 Oct 2020 04:15) (149/77 - 182/92)  BP(mean): 115 (08 Oct 2020 10:30) (115 - 115)  RR: 18 (09 Oct 2020 04:15) (18 - 20)  SpO2: 94% (09 Oct 2020 04:15) (93% - 99%)    I&O's Summary    08 Oct 2020 07:01  -  09 Oct 2020 07:00  --------------------------------------------------------  IN: 0 mL / OUT: 1400 mL / NET: -1400 mL      Physical Examination:    General: Patient lying in bed comfortably. Slow speech and poor historian.     Eyes: PERRL, EOMI, no conjunctival injection      ENT: MMM, no oropharyngeal lesions or erythema appreciated     Pulm: No increased WOB. CTAB. No wheezing. NC in place at 2L.     CV: RRR. S1&S2+. No M/R/G appreciated.     Abdomen: +BS. Soft, NTND. No organomegaly.     MSK: Nml ROM    Extremities: No peripheral edema or cyanosis.     Neuro: A&Ox3, no focal deficits     Skin: Warm and dry. No visible rash.      Labs:                8.7  (8.2 yd)   7.07  )-----------( 347      ( 09 Oct 2020 05:25 )             32.3     10-09    138  |  89<L>  |  10  ----------------------------<  103<H>  4.2   |  26  |  0.94    Ca    9.2      09 Oct 2020 05:25  Phos  3.8     10-09  Mg     2.1     10-09    TPro  6.9  /  Alb  3.6  /  TBili  0.3  /  DBili  x   /  AST  7<L>  /  ALT  <5<L>  /  AlkPhos  82  10-09      CARDIAC MARKERS ( 09 Oct 2020 05:25 )  x     / x     / 24 U/L / x     / x            Urinalysis Basic - ( 08 Oct 2020 08:36 )    Color: Colorless / Appearance: Clear / S.009 / pH: x  Gluc: x / Ketone: Negative  / Bili: Negative / Urobili: Negative   Blood: x / Protein: Negative / Nitrite: Negative   Leuk Esterase: Negative / RBC: 1 /hpf / WBC 0 /HPF   Sq Epi: x / Non Sq Epi: 0 /hpf / Bacteria: Negative      Imaging Personally Reviewed:     XR Shunt Series (10/8): IMPRESSION:  Ventriculoperitoneal shunt distal catheter tip in the region of the right lower quadrant without discontinuity along its course. Catheter forms a loop in the right hemiabdomen but does not appear to be kinked.    MEDICATIONS  (STANDING):  amLODIPine   Tablet 5 milliGRAM(s) Oral daily  citalopram 40 milliGRAM(s) Oral daily  cyanocobalamin 1000 MICROGram(s) Oral daily  enoxaparin Injectable 40 milliGRAM(s) SubCutaneous daily  influenza   Vaccine 0.5 milliLiter(s) IntraMuscular once  losartan 100 milliGRAM(s) Oral daily  tamsulosin 0.8 milliGRAM(s) Oral at bedtime  traZODone 100 milliGRAM(s) Oral at bedtime    MEDICATIONS  (PRN):  acetaminophen   Tablet .. 650 milliGRAM(s) Oral every 6 hours PRN Mild Pain (1 - 3), Moderate Pain (4 - 6)  ALPRAZolam 1 milliGRAM(s) Oral every 6 hours PRN Anxiety  traMADol 50 milliGRAM(s) Oral every 4 hours PRN Severe Pain (7 - 10)   **********************************************  Hailey Carver, PGY-1  Internal Medicine   [facility] Pager #: [phone]  **********************************************     Patient is a 70y old  Male who presents with a chief complaint of Back Pain (08 Oct 2020 14:43)    SUBJECTIVE / OVERNIGHT EVENTS: NAEON. Patient examined at bedside this AM, appears to be doing much better. Patient's speech is now longer slow and he is no longer confused. He says he doesn't remember a lot of the last 2 days but is feeling better now. He says he's continuing to have whole body soreness but no sharp pains right now. Otherwise, denies lightheadedness/dizziness, headache, CP, palpitations, SOB, urinary incontinence.     OBJECTIVE:  Vital Signs Last 24 Hrs  T(C): 36.2 (09 Oct 2020 04:15), Max: 37.1 (08 Oct 2020 15:36)  T(F): 97.2 (09 Oct 2020 04:15), Max: 98.8 (08 Oct 2020 16:49)  HR: 73 (09 Oct 2020 04:15) (71 - 74)  BP: 149/77 (09 Oct 2020 04:15) (149/77 - 182/92)  BP(mean): 115 (08 Oct 2020 10:30) (115 - 115)  RR: 18 (09 Oct 2020 04:15) (18 - 20)  SpO2: 94% (09 Oct 2020 04:15) (93% - 99%)    I&O's Summary    08 Oct 2020 07:01  -  09 Oct 2020 07:00  --------------------------------------------------------  IN: 0 mL / OUT: 1400 mL / NET: -1400 mL      Physical Examination:    General: Patient lying in bed comfortably. Slow speech and poor historian.     Eyes: PERRL, EOMI, no conjunctival injection      ENT: MMM, no oropharyngeal lesions or erythema appreciated     Pulm: No increased WOB. CTAB. No wheezing. NC in place at 2L.     CV: RRR. S1&S2+. No M/R/G appreciated.     Abdomen: +BS. Soft, NTND. No organomegaly.     MSK: Nml ROM    Extremities: No peripheral edema or cyanosis.     Neuro: A&Ox3, no focal deficits     Skin: Warm and dry. No visible rash.      Labs:                8.7  (8.2 yd)   7.07  )-----------( 347      ( 09 Oct 2020 05:25 )             32.3     10    138  |  89<L>  |  10  ----------------------------<  103<H>  4.2   |  26  |  0.94    Ca    9.2      09 Oct 2020 05:25  Phos  3.8     10-  Mg     2.1     10-09    TPro  6.9  /  Alb  3.6  /  TBili  0.3  /  DBili  x   /  AST  7<L>  /  ALT  <5<L>  /  AlkPhos  82  10-      CARDIAC MARKERS ( 09 Oct 2020 05:25 )  x     / x     / 24 U/L / x     / x            Urinalysis Basic - ( 08 Oct 2020 08:36 )    Color: Colorless / Appearance: Clear / S.009 / pH: x  Gluc: x / Ketone: Negative  / Bili: Negative / Urobili: Negative   Blood: x / Protein: Negative / Nitrite: Negative   Leuk Esterase: Negative / RBC: 1 /hpf / WBC 0 /HPF   Sq Epi: x / Non Sq Epi: 0 /hpf / Bacteria: Negative      Imaging Personally Reviewed:     XR Shunt Series (10/8): IMPRESSION:  Ventriculoperitoneal shunt distal catheter tip in the region of the right lower quadrant without discontinuity along its course. Catheter forms a loop in the right hemiabdomen but does not appear to be kinked.    MEDICATIONS  (STANDING):  amLODIPine   Tablet 5 milliGRAM(s) Oral daily  citalopram 40 milliGRAM(s) Oral daily  cyanocobalamin 1000 MICROGram(s) Oral daily  enoxaparin Injectable 40 milliGRAM(s) SubCutaneous daily  influenza   Vaccine 0.5 milliLiter(s) IntraMuscular once  losartan 100 milliGRAM(s) Oral daily  tamsulosin 0.8 milliGRAM(s) Oral at bedtime  traZODone 100 milliGRAM(s) Oral at bedtime    MEDICATIONS  (PRN):  acetaminophen   Tablet .. 650 milliGRAM(s) Oral every 6 hours PRN Mild Pain (1 - 3), Moderate Pain (4 - 6)  ALPRAZolam 1 milliGRAM(s) Oral every 6 hours PRN Anxiety  traMADol 50 milliGRAM(s) Oral every 4 hours PRN Severe Pain (7 - 10)

## 2020-10-09 NOTE — PROGRESS NOTE ADULT - PROBLEM SELECTOR PLAN 9
Patient takes ambien 10mg at home and trazadone 100mg qHS.   -Will hold ambien  -Continue trazadone 100mg qHS Patient takes ambien 10mg at home and trazadone 400mg qHS.   -Will hold ambien  -Continue trazadone 100mg qHS

## 2020-10-09 NOTE — PROGRESS NOTE ADULT - PROBLEM SELECTOR PLAN 1
Patient with questionable unwitnessed fall this AM. Found to be confused with slow speech by roommate with difficulty standing up. Urinary incontinence in EMS. Worsening hydrocephalus vs polypharmacy vs syncope   -CT Head (10/8): There is no acute intracranial hemorrhage or depressed calvarial fracture.  -CT Cspine (10/8): No fracture or acute traumatic malalignment. There are degenerative changes throughout the cervical spine  -Urine tox positive for benzos and THC  -Monitor on telemetry x 24 hours  -TTE ordered   -TFTs ordered in AM Patient with questionable unwitnessed fall this AM. Found to be confused with slow speech by roommate with difficulty standing up. Urinary incontinence in EMS. Worsening hydrocephalus vs polypharmacy vs syncope --> improved this AM likely in the setting of polypharmacy.   -CT Head (10/8): There is no acute intracranial hemorrhage or depressed calvarial fracture.  -CT Cspine (10/8): No fracture or acute traumatic malalignment. There are degenerative changes throughout the cervical spine  -Urine tox positive for benzos and THC  -No events on telemetry >24 hours. Will discontinue telemetry.   -TTE ordered

## 2020-10-10 ENCOUNTER — TRANSCRIPTION ENCOUNTER (OUTPATIENT)
Age: 70
End: 2020-10-10

## 2020-10-10 LAB
ANION GAP SERPL CALC-SCNC: 11 MMOL/L — SIGNIFICANT CHANGE UP (ref 5–17)
BUN SERPL-MCNC: 11 MG/DL — SIGNIFICANT CHANGE UP (ref 7–23)
CALCIUM SERPL-MCNC: 9.6 MG/DL — SIGNIFICANT CHANGE UP (ref 8.4–10.5)
CHLORIDE SERPL-SCNC: 102 MMOL/L — SIGNIFICANT CHANGE UP (ref 96–108)
CO2 SERPL-SCNC: 24 MMOL/L — SIGNIFICANT CHANGE UP (ref 22–31)
CREAT SERPL-MCNC: 0.9 MG/DL — SIGNIFICANT CHANGE UP (ref 0.5–1.3)
GLUCOSE SERPL-MCNC: 98 MG/DL — SIGNIFICANT CHANGE UP (ref 70–99)
HCT VFR BLD CALC: 35 % — LOW (ref 39–50)
HGB BLD-MCNC: 9.6 G/DL — LOW (ref 13–17)
MAGNESIUM SERPL-MCNC: 2.2 MG/DL — SIGNIFICANT CHANGE UP (ref 1.6–2.6)
MCHC RBC-ENTMCNC: 17.9 PG — LOW (ref 27–34)
MCHC RBC-ENTMCNC: 27.4 GM/DL — LOW (ref 32–36)
MCV RBC AUTO: 65.4 FL — LOW (ref 80–100)
NRBC # BLD: 0 /100 WBCS — SIGNIFICANT CHANGE UP (ref 0–0)
PHOSPHATE SERPL-MCNC: 3.2 MG/DL — SIGNIFICANT CHANGE UP (ref 2.5–4.5)
PLATELET # BLD AUTO: 382 K/UL — SIGNIFICANT CHANGE UP (ref 150–400)
POTASSIUM SERPL-MCNC: 3.5 MMOL/L — SIGNIFICANT CHANGE UP (ref 3.5–5.3)
POTASSIUM SERPL-SCNC: 3.5 MMOL/L — SIGNIFICANT CHANGE UP (ref 3.5–5.3)
RBC # BLD: 5.35 M/UL — SIGNIFICANT CHANGE UP (ref 4.2–5.8)
RBC # FLD: 19.3 % — HIGH (ref 10.3–14.5)
SARS-COV-2 IGG SERPL QL IA: NEGATIVE — SIGNIFICANT CHANGE UP
SARS-COV-2 IGM SERPL IA-ACNC: 0.09 INDEX — SIGNIFICANT CHANGE UP
SODIUM SERPL-SCNC: 137 MMOL/L — SIGNIFICANT CHANGE UP (ref 135–145)
WBC # BLD: 8.19 K/UL — SIGNIFICANT CHANGE UP (ref 3.8–10.5)
WBC # FLD AUTO: 8.19 K/UL — SIGNIFICANT CHANGE UP (ref 3.8–10.5)

## 2020-10-10 PROCEDURE — 99233 SBSQ HOSP IP/OBS HIGH 50: CPT | Mod: GC

## 2020-10-10 RX ORDER — KETOROLAC TROMETHAMINE 30 MG/ML
15 SYRINGE (ML) INJECTION ONCE
Refills: 0 | Status: DISCONTINUED | OUTPATIENT
Start: 2020-10-10 | End: 2020-10-10

## 2020-10-10 RX ORDER — ZOLPIDEM TARTRATE 10 MG/1
5 TABLET ORAL AT BEDTIME
Refills: 0 | Status: DISCONTINUED | OUTPATIENT
Start: 2020-10-10 | End: 2020-10-13

## 2020-10-10 RX ORDER — POTASSIUM CHLORIDE 20 MEQ
40 PACKET (EA) ORAL ONCE
Refills: 0 | Status: COMPLETED | OUTPATIENT
Start: 2020-10-10 | End: 2020-10-10

## 2020-10-10 RX ORDER — DIAZEPAM 5 MG
5 TABLET ORAL EVERY 6 HOURS
Refills: 0 | Status: DISCONTINUED | OUTPATIENT
Start: 2020-10-10 | End: 2020-10-12

## 2020-10-10 RX ADMIN — TAMSULOSIN HYDROCHLORIDE 0.8 MILLIGRAM(S): 0.4 CAPSULE ORAL at 21:25

## 2020-10-10 RX ADMIN — AMLODIPINE BESYLATE 5 MILLIGRAM(S): 2.5 TABLET ORAL at 09:56

## 2020-10-10 RX ADMIN — Medication 15 MILLIGRAM(S): at 16:00

## 2020-10-10 RX ADMIN — Medication 500 MILLIGRAM(S): at 09:59

## 2020-10-10 RX ADMIN — ZOLPIDEM TARTRATE 5 MILLIGRAM(S): 10 TABLET ORAL at 21:25

## 2020-10-10 RX ADMIN — ENOXAPARIN SODIUM 40 MILLIGRAM(S): 100 INJECTION SUBCUTANEOUS at 09:59

## 2020-10-10 RX ADMIN — PREGABALIN 1000 MICROGRAM(S): 225 CAPSULE ORAL at 09:59

## 2020-10-10 RX ADMIN — Medication 15 MILLIGRAM(S): at 15:21

## 2020-10-10 RX ADMIN — Medication 40 MILLIEQUIVALENT(S): at 09:56

## 2020-10-10 RX ADMIN — LOSARTAN POTASSIUM 100 MILLIGRAM(S): 100 TABLET, FILM COATED ORAL at 09:56

## 2020-10-10 RX ADMIN — CITALOPRAM 40 MILLIGRAM(S): 10 TABLET, FILM COATED ORAL at 09:59

## 2020-10-10 RX ADMIN — Medication 5 MILLIGRAM(S): at 15:38

## 2020-10-10 RX ADMIN — Medication 325 MILLIGRAM(S): at 09:59

## 2020-10-10 RX ADMIN — Medication 1 MILLIGRAM(S): at 10:48

## 2020-10-10 RX ADMIN — Medication 100 MILLIGRAM(S): at 21:25

## 2020-10-10 NOTE — PROGRESS NOTE ADULT - PROBLEM SELECTOR PLAN 4
Hgb 8.2 (MCV 65.6) on admission. M/r Hgb 7.7 in June 2020.   -Iron studies show IRINA   -C/w iron supplementation with Vit C   -Continue to monitor daily CBCs

## 2020-10-10 NOTE — DISCHARGE NOTE PROVIDER - CARE PROVIDERS DIRECT ADDRESSES
,DirectAddress_Unknown ,DirectAddress_Unknown,[email or web address],DirectAddress_Unknown,DirectAddress_Unknown ,DirectAddress_Unknown,[email or web address],DirectAddress_Unknown,DirectAddress_Unknown,DirectAddress_Unknown

## 2020-10-10 NOTE — DISCHARGE NOTE PROVIDER - PROVIDER TOKENS
PROVIDER:[[id number]],FOLLOWUP:[1 week]] PROVIDER:[[id number]],FOLLOWUP:[1 week]],PROVIDER:[[id number]],SCHEDULEDAPPT:[10/13/2020],ESTABLISHEDPATIENT:[T]],PROVIDER:[[id number]],FOLLOWUP:[1 week],ESTABLISHEDPATIENT:[T]],PROVIDER:[[id number]],FOLLOWUP:[1 week],ESTABLISHEDPATIENT:[T]] PROVIDER:[TOKEN:[[id number]],FOLLOWUP:[1 week]],PROVIDER:[[id number]],SCHEDULEDAPPT:[10/13/2020],ESTABLISHEDPATIENT:[T]],PROVIDER:[[id number]],FOLLOWUP:[1 week],ESTABLISHEDPATIENT:[T]],PROVIDER:[[id number]],FOLLOWUP:[1 week],ESTABLISHEDPATIENT:[T]],PROVIDER:[[id number]],FOLLOWUP:[1 week]] PROVIDER:[[id number]],FOLLOWUP:[1 week]],PROVIDER:[[id number]],FOLLOWUP:[1 week],ESTABLISHEDPATIENT:[T]],PROVIDER:[[id number]],FOLLOWUP:[1 week],ESTABLISHEDPATIENT:[T]],PROVIDER:[[id number]],FOLLOWUP:[1-3 days],ESTABLISHEDPATIENT:[T]],PROVIDER:[[id number]],FOLLOWUP:[1-3 days]]

## 2020-10-10 NOTE — DISCHARGE NOTE PROVIDER - HOSPITAL COURSE
HPI: 70 year old male with HTN, GULSHAN (on home CPAP), BPH, NPH (s/p Codman Hakim VPS placed 2017 at [facility] by Dr. Ibarra), sciatica, and chronic back pain (s/p multiple spinal surgeries; most recently bilateral percutaneous sacroiliac joint fusions in 6/20) presenting with AMS and worsening back pain in the past month. Patient was found altered on floor by roommate; questionable fall vs confusion. Unsteady gait and one episode of urinary incontinence en route to hospital. CT Head shows no acute intracranial hemorrhage or depressed calvarial fracture. CT CSpine shows no fracture or acute traumatic malalignment; degenerative changes throughout the cervical spine. XR Shunt series showed no kinks. Urine tox was positive for benzos and THC, both of which patient is prescribed at home. Patient's ambien was held and lower dose of trazadone was given. Next morning, patient's mental status improved to baseline. He reported polypharmacy with his prescribed ambien 10mg and trazadone 40mmg qHS. Patient was counseled on polypharmacy. Chronic pain was consulted and recommended _____. PT worked with patient and recommended ___. Neurosurgery evaluated patient and spoke with patient's outpatient Neurosurgeon Dr. Castanon, plan to follow up patient in the outpatient setting.    HPI: 70 year old male with HTN, GULSHAN (on home CPAP), BPH, NPH (s/p Codman Hakim VPS placed 2017 at [facility] by Dr. Ibarra), sciatica, and chronic back pain (s/p multiple spinal surgeries; most recently bilateral percutaneous sacroiliac joint fusions in 6/20) presenting with AMS and worsening back pain in the past month. Patient was found altered on floor by roommate; questionable fall vs confusion. Unsteady gait and one episode of urinary incontinence en route to hospital. CT Head shows no acute intracranial hemorrhage or depressed calvarial fracture. CT CSpine shows no fracture or acute traumatic malalignment; degenerative changes throughout the cervical spine. XR Shunt series showed no kinks. Urine tox was positive for benzos and THC, both of which patient is prescribed at home. Patient's ambien was held and lower dose of trazadone was given. Next morning, patient's mental status improved to baseline. He reported polypharmacy with his prescribed ambien 10mg and trazadone 40mmg qHS. Patient was counseled on polypharmacy. Chronic pain was consulted and recommended to discontinue the Tramadol and Diazepam and restart Xanax at home dose 1 mg every 6 hours + Robaxin 500 mg PO every 6 hours PRN spasm. PT worked with patient and recommended home with outpt PT. Neurosurgery evaluated patient and spoke with patient's outpatient Neurosurgeon Dr. Castanon, plan to follow up patient in the outpatient setting.    HPI: 70 year old male with HTN, GULSHAN (on home CPAP), BPH, NPH (s/p Codman Hakim VPS placed 2017 at [facility] by Dr. Ibarra), sciatica, and chronic back pain (s/p multiple spinal surgeries; most recently bilateral percutaneous sacroiliac joint fusions in 6/20) presenting with AMS and worsening back pain in the past month. Patient was found altered on floor by roommate; questionable fall vs confusion. Unsteady gait and one episode of urinary incontinence en route to hospital. CT Head shows no acute intracranial hemorrhage or depressed calvarial fracture. CT CSpine shows no fracture or acute traumatic malalignment; degenerative changes throughout the cervical spine. XR Shunt series showed no kinks. Urine tox was positive for benzos and THC, both of which patient is prescribed at home. Patient's ambien was held and lower dose of trazadone was given. Next morning, patient's mental status improved to baseline. He reported polypharmacy with his prescribed ambien 10mg and trazadone 40mg qHS. Patient was counseled on polypharmacy. Chronic pain was consulted and recommended to discontinue the Tramadol and Diazepam and restart Xanax at home dose 1 mg every 6 hours + Robaxin 500 mg PO every 6 hours PRN spasm. PT worked with patient and recommended home with outpt PT. Neurosurgery evaluated patient and spoke with patient's outpatient Neurosurgeon Dr. Castanon, plan to follow up patient in the outpatient setting.

## 2020-10-10 NOTE — PROGRESS NOTE ADULT - PROBLEM SELECTOR PLAN 8
Patient follows with Psychiatrist Dr. Cory Bhandari. Per patient's friend, patient's depression has been uncontrolled in the previous months. No SI/HI.   -Continue home celexa 40mg qday   -Continue home Xanax 1mg q6H PRN -hold for sedation   -Patient to follow up with oupatient Psychiatrist Patient follows with Psychiatrist Dr. Cory Bhandari. Per patient's friend, patient's depression has been uncontrolled in the previous months. No SI/HI.   -Continue home celexa 40mg qday   -Home Xanax transitioned to Valium 5mg q6 PRN as it may help with patient's pain as well. Adjust as needed.   -Patient to follow up with oupatient Psychiatrist

## 2020-10-10 NOTE — PROGRESS NOTE ADULT - PROBLEM SELECTOR PLAN 2
Patient with history of  shunt (Codman Hakim) placement in 2017 at [facility] by Dr. Ibarra. Pt p/w AMS, 1 episode of urinary incontinence, and difficulty ambulating.   -CT Head (10/8): No evidence of worsening hydrocephalus or misplaced shunt/valve   -XR Shunt Series w/o evidence of kinking

## 2020-10-10 NOTE — PROGRESS NOTE ADULT - PROBLEM SELECTOR PLAN 9
Patient takes ambien 10mg at home and trazadone 400mg qHS.   -Will hold ambien  -Continue trazadone 100mg qHS Patient takes ambien 10mg at home and trazadone 400mg qHS.   -Will start Ambien 5mg qday   -Continue trazadone 100mg qHS

## 2020-10-10 NOTE — DISCHARGE NOTE PROVIDER - CARE PROVIDER_API CALL
Marlon Uribe  ANESTHESIOLOGY  [address]  [place] [address]  Phone: [phone]  Fax: [phone]  Follow Up Time: 1 week   Marlon Uribe  ANESTHESIOLOGY  [address]  [place] [address]  Phone: [phone]  Fax: [phone]  Follow Up Time: 1 week    Keira Castanon  NEUROSURGERY  [address] [facility], [address]  [place] [address]  Phone: [phone]  Fax: [phone]  Established Patient  Scheduled Appointment: 10/13/2020    Cory Bhandari)  Psychiatry  [address] [place] [address], [address]  [place] [address]  Phone: [phone]  Fax: [phone]  Established Patient  Follow Up Time: 1 week    Alcides Cantor  [facility] MEDICINE  [address] [facility] [address], [address]  [place] [address]  Phone: [phone]  Fax: [phone]  Established Patient  Follow Up Time: 1 week   Marlon Uribe  ANESTHESIOLOGY  [address]  [place] [address]  Phone: [phone]  Fax: [phone]  Follow Up Time: 1 week    Keira Castanon  NEUROSURGERY  [address] [facility], [address]  [place] [address]  Phone: [phone]  Fax: [phone]  Established Patient  Scheduled Appointment: 10/13/2020    Cory Bhandari)  Psychiatry  [address] [place] [address], [address]  [place] [address]  Phone: [phone]  Fax: [phone]  Established Patient  Follow Up Time: 1 week    Alcides Cantor  [facility] MEDICINE  [address] [facility], [address]  [place] [address]  Phone: [phone]  Fax: [phone]  Established Patient  Follow Up Time: 1 week    Ramesh Florence  ANESTHESIOLOGY  [address] [facility], [address]  [place] [address]  Phone: [phone]  Fax: [phone]  Follow Up Time: 1 week   Marlon Uribe  ANESTHESIOLOGY  [address]  [place] [address]  Phone: [phone]  Fax: [phone]  Follow Up Time: 1 week    Keira Castanon  NEUROSURGERY  [address] [facility], [address]  [place] [address]  Phone: [phone]  Fax: [phone]  Established Patient  Follow Up Time: 1 week    Cory Bhandari)  Psychiatry  [address] [place] [address], [address]  [place] [address]  Phone: [phone]  Fax: [phone]  Established Patient  Follow Up Time: 1 week    Alcides Cantor  [facility] MEDICINE  [address] [facility], [address]  [place] [address]  Phone: [phone]  Fax: [phone]  Established Patient  Follow Up Time: 1-3 days    Ramesh Florence  ANESTHESIOLOGY  [address] [facility], [address]  [place] [address]  Phone: [phone]  Fax: [phone]  Follow Up Time: 1-3 days

## 2020-10-10 NOTE — PROGRESS NOTE ADULT - PROBLEM SELECTOR PLAN 3
P/w worsening back pain for past month. Patient follows with NSGY Dr. Castanon with m/r appt last week. Outpatient imaging below.   -CT Pelvis Bony/Lumbar Spine (10/6): Postsurgical changes. Lucencies measuring up to 3 mm in thickness around the 2 right sided iliac screws which extend from the spinal fusion construct, concerning for possible loosening and/or infection. Mild concavity of the superior endplate of L1, without significant change. Grade 1 retrolisthesis of L2 on L3 redemonstrated. Small fluid/edema in the posterior subcutaneous tissues. Anterior fusion hardware with lucencies around the screws at S1 redemonstrated, concerning for possible loosening and/or infection.   -XR AP (10/8): No fracture.  -Will touch base with Dr. Rogers  -Neurosurg consulted this AM, pending recs   -Consider starting abx if patient becomes febrile and get cx.   -Patient's pain has been uncontrolled and patient's quality of life has declined significantly since his most recent surgery  -Continue with tramadol 50mg q6H PRN  -Pain management consulted, pending recs   -ESR/CRP mildly elevated P/w worsening back pain for past month. Patient follows with NSGY Dr. Castanon with m/r appt last week. Outpatient imaging below.   -CT Pelvis Bony/Lumbar Spine (10/6): Postsurgical changes. Lucencies measuring up to 3 mm in thickness around the 2 right sided iliac screws which extend from the spinal fusion construct, concerning for possible loosening and/or infection. Mild concavity of the superior endplate of L1, without significant change. Grade 1 retrolisthesis of L2 on L3 redemonstrated. Small fluid/edema in the posterior subcutaneous tissues. Anterior fusion hardware with lucencies around the screws at S1 redemonstrated, concerning for possible loosening and/or infection.   -XR AP (10/8): No fracture.  -Will touch base with Dr. Rogers  -Neurosurg consulted this AM, pending recs   -Consider starting abx if patient becomes febrile and get cx.   -Patient's pain has been uncontrolled and patient's quality of life has declined significantly since his most recent surgery  -Continue with tramadol 50mg q6H PRN  -Pain management consulted, pending recs. Reached out to them today and left a message.    -ESR/CRP mildly elevated

## 2020-10-10 NOTE — DISCHARGE NOTE PROVIDER - NSDCFUSCHEDAPPT_GEN_ALL_CORE_FT
DACIA NAVARRO ; 10/13/2020 ; [facility] [address] [facility]  DACIA NAVARRO ; 10/14/2020 ; [facility] [address] [facility]

## 2020-10-10 NOTE — PROGRESS NOTE ADULT - PROBLEM SELECTOR PLAN 1
Patient with questionable unwitnessed fall this AM. Found to be confused with slow speech by roommate with difficulty standing up. Urinary incontinence in EMS. Worsening hydrocephalus vs polypharmacy vs syncope --> improved; likely in the setting of polypharmacy.   -CT Head (10/8): There is no acute intracranial hemorrhage or depressed calvarial fracture.  -CT Cspine (10/8): No fracture or acute traumatic malalignment. There are degenerative changes throughout the cervical spine  -Urine tox positive for benzos and THC  -TTE ordered Patient with questionable unwitnessed fall this AM. Found to be confused with slow speech by roommate with difficulty standing up. Urinary incontinence in EMS. Worsening hydrocephalus vs polypharmacy vs syncope --> improved; likely in the setting of polypharmacy.   -CT Head (10/8): There is no acute intracranial hemorrhage or depressed calvarial fracture.  -CT Cspine (10/8): No fracture or acute traumatic malalignment. There are degenerative changes throughout the cervical spine  -Urine tox positive for benzos and THC  -TTE (10/9) wnl

## 2020-10-10 NOTE — DISCHARGE NOTE PROVIDER - NSDCMRMEDTOKEN_GEN_ALL_CORE_FT
ALPRAZolam 1 mg oral tablet: 1 tab(s) orally every 6 hours, As needed, Anxiety  amLODIPine 5 mg oral tablet: 1 tab(s) orally   citalopram 40 mg oral tablet: 1 tab(s) orally once a day  irbesartan 300 mg oral tablet: 1 tab(s) orally once a day   tamsulosin 0.4 mg oral capsule: 2 cap(s) orally once a day (at bedtime)  traMADol 50 mg oral tablet: 1 tab(s) orally every 4 hours, As needed, Severe Pain (7 - 10) MDD:four tabs    note: last filled 6/24  traZODone 100 mg oral tablet: 4 tablets by mouth at bedtime  Tylenol 325 mg oral tablet:   Vitamin B12 1000 mcg oral tablet: 3 tab(s) orally once a day  zolpidem 10 mg oral tablet: 1 tab(s) orally once a day (at bedtime), As Needed   ALPRAZolam 1 mg oral tablet: 1 tab(s) orally every 6 hours MDD:4mg  amLODIPine 10 mg oral tablet: 1 tab(s) orally once a day  ascorbic acid 500 mg oral tablet: 1 tab(s) orally once a day  citalopram 40 mg oral tablet: 1 tab(s) orally once a day  ferrous sulfate 325 mg (65 mg elemental iron) oral tablet: 1 tab(s) orally once a day  irbesartan 300 mg oral tablet: 1 tab(s) orally once a day   methocarbamol 500 mg oral tablet: 1 tab(s) orally every 6 hours, As needed, muscle spasm/pain  outpatient physical therapy: outpatient physical therapy  tamsulosin 0.4 mg oral capsule: 2 cap(s) orally once a day (at bedtime)  traZODone 100 mg oral tablet: 4 tablets by mouth at bedtime  Tylenol 325 mg oral tablet:   Vitamin B12 1000 mcg oral tablet: 3 tab(s) orally once a day  zolpidem 5 mg oral tablet: 1 tab(s) orally once a day (at bedtime), As needed, Insomnia MDD:5mg   ALPRAZolam 1 mg oral tablet: 1 tab(s) orally every 6 hours MDD:4mg  amLODIPine 10 mg oral tablet: 1 tab(s) orally once a day  ascorbic acid 500 mg oral tablet: 1 tab(s) orally once a day  citalopram 40 mg oral tablet: 1 tab(s) orally once a day  ferrous sulfate 325 mg (65 mg elemental iron) oral tablet: 1 tab(s) orally once a day  irbesartan 300 mg oral tablet: 1 tab(s) orally once a day   methocarbamol 500 mg oral tablet: 1 tab(s) orally every 6 hours, As needed, muscle spasm/pain  outpatient physical therapy: outpatient physical therapy  tamsulosin 0.4 mg oral capsule: 2 cap(s) orally once a day (at bedtime)  traZODone 100 mg oral tablet: 4 tablets by mouth at bedtime  Tylenol 325 mg oral tablet:   Vitamin B12 1000 mcg oral tablet: 3 tab(s) orally once a day  zolpidem 5 mg oral tablet: 1 tab(s) orally once a day (at bedtime), As needed, Insomnia MDD:5mg. Resume only after discussion with outpatient prescribing doctor.   ALPRAZolam 1 mg oral tablet: 1 tab(s) orally every 6 hours  amLODIPine 10 mg oral tablet: 1 tab(s) orally once a day  ascorbic acid 500 mg oral tablet: 1 tab(s) orally once a day  citalopram 40 mg oral tablet: 1 tab(s) orally once a day  ferrous sulfate 325 mg (65 mg elemental iron) oral tablet: 1 tab(s) orally once a day  irbesartan 300 mg oral tablet: 1 tab(s) orally once a day   methocarbamol 500 mg oral tablet: 1 tab(s) orally every 6 hours, As needed, muscle spasm/pain  outpatient physical therapy: outpatient physical therapy  tamsulosin 0.4 mg oral capsule: 2 cap(s) orally once a day (at bedtime)  traZODone 100 mg oral tablet: 4 tablets by mouth at bedtime  Tylenol 325 mg oral tablet:   Vitamin B12 1000 mcg oral tablet: 3 tab(s) orally once a day

## 2020-10-10 NOTE — PROGRESS NOTE ADULT - PROBLEM SELECTOR PLAN 7
Patient desatting to 91% in EMS. Satting 96-98% on 2L NC in ED.   -Patient does not know home CPAP settings - will ask patient for settings   -CPAP qHS Patient desatting to 91% in EMS. Satting 96-98% on 2L NC in ED.   -Patient does not know home CPAP settings - patient is unsure of CPAP settings and has questionable home compliance with CPAP Patient desatting to 91% in EMS. Satting 96-98% on 2L NC in ED.   -Patient does not know home CPAP settings - patient is unsure of CPAP settings and  is refusing inpt CPAP.

## 2020-10-10 NOTE — DISCHARGE NOTE PROVIDER - NSDCCPCAREPLAN_GEN_ALL_CORE_FT
PRINCIPAL DISCHARGE DIAGNOSIS  Diagnosis: Altered mental state  Assessment and Plan of Treatment: You were brought into the hospital after you were found to be confused and with difficulty walking. Your confusion improved after 24 hours and you were counseled on the dangers of polypharmacy. Please take your medications as prescribed and follow up with outpatient Dr. Uribe for pain management recs and with Neurosurgery.       PRINCIPAL DISCHARGE DIAGNOSIS  Diagnosis: Altered mental state  Assessment and Plan of Treatment: You were brought into the hospital after you were found to be confused and with difficulty walking. Your confusion improved after 24 hours and you were counseled on the dangers of polypharmacy. Please take your medications as prescribed and follow up with outpatient Dr. Uribe for pain management recs and with Neurosurgery.  Pain managment recommends:  Stop Tramadol and Diazepam and restart   Continue taking Xanax at your home dose 1 mg every 6 hours   Start Robaxin 500 mg PO every 6 hours as needed   Avoid Opioids  Continue Celexa and Trazodone, may help with your neuropathic pain  Warm/cool packs for comfort  Bowel Regimen  Physiotherapy  You should follow up with Dr. Esau Florence for continued pain management and possible interventional procedure after discharge.

## 2020-10-10 NOTE — PROGRESS NOTE ADULT - SUBJECTIVE AND OBJECTIVE BOX
**********************************************  Hailey Carver, PGY-1  Internal Medicine   [facility] Pager #: [phone]  **********************************************     Patient is a 70y old  Male who presents with a chief complaint of Back Pain (10 Oct 2020 07:22)    SUBJECTIVE / OVERNIGHT EVENTS:     OBJECTIVE:  Vital Signs Last 24 Hrs  T(C): 36.6 (10 Oct 2020 04:10), Max: 36.9 (09 Oct 2020 11:52)  T(F): 97.9 (10 Oct 2020 04:10), Max: 98.5 (09 Oct 2020 11:52)  HR: 78 (10 Oct 2020 04:10) (72 - 79)  BP: 156/83 (10 Oct 2020 04:10) (151/81 - 160/85)  RR: 18 (10 Oct 2020 04:10) (18 - 18)  SpO2: 96% (10 Oct 2020 04:10) (94% - 96%)    I&O's Summary    09 Oct 2020 07:01  -  10 Oct 2020 07:00  --------------------------------------------------------  IN: 730 mL / OUT: 200 mL / NET: 530 mL      Physical Examination:    General: Patient lying in bed comfortably.     Eyes: PERRL, EOMI, no conjunctival injection      ENT: MMM, no oropharyngeal lesions or erythema appreciated     Pulm: No increased WOB. CTAB. No wheezing.    CV: RRR. S1&S2+. No M/R/G appreciated.     Abdomen: +BS. Soft, NTND. No organomegaly.     MSK: Nml ROM    Extremities: No peripheral edema or cyanosis.     Neuro: A&Ox3, no focal deficits     Skin: Warm and dry. No visible rash.    Labs:               9.6    8.19  )-----------( 382      ( 10 Oct 2020 06:56 )             35.0     10-10    137  |  102  |  11  ----------------------------<  98  3.5   |  24  |  0.90    Ca    9.6      10 Oct 2020 06:53  Phos  3.2     10-10  Mg     2.2     10-10    TPro  6.9  /  Alb  3.6  /  TBili  0.3  /  DBili  x   /  AST  7<L>  /  ALT  <5<L>  /  AlkPhos  82  10-09      CARDIAC MARKERS ( 09 Oct 2020 05:25 )  x     / x     / 24 U/L / x     / x            Culture - Urine (collected 08 Oct 2020 12:26)  Source: .Urine Clean Catch (Midstream)  Final Report (09 Oct 2020 10:49):    <10,000 CFU/mL Normal Urogenital Joyce      Urinalysis Basic - ( 08 Oct 2020 08:36 )    Color: Colorless / Appearance: Clear / S.009 / pH: x  Gluc: x / Ketone: Negative  / Bili: Negative / Urobili: Negative   Blood: x / Protein: Negative / Nitrite: Negative   Leuk Esterase: Negative / RBC: 1 /hpf / WBC 0 /HPF   Sq Epi: x / Non Sq Epi: 0 /hpf / Bacteria: Negative      Imaging Personally Reviewed:      MEDICATIONS  (STANDING):  amLODIPine   Tablet 5 milliGRAM(s) Oral daily  ascorbic acid 500 milliGRAM(s) Oral daily  citalopram 40 milliGRAM(s) Oral daily  cyanocobalamin 1000 MICROGram(s) Oral daily  enoxaparin Injectable 40 milliGRAM(s) SubCutaneous daily  ferrous    sulfate 325 milliGRAM(s) Oral daily  influenza   Vaccine 0.5 milliLiter(s) IntraMuscular once  losartan 100 milliGRAM(s) Oral daily  potassium chloride    Tablet ER 40 milliEquivalent(s) Oral once  tamsulosin 0.8 milliGRAM(s) Oral at bedtime  traZODone 100 milliGRAM(s) Oral at bedtime    MEDICATIONS  (PRN):  acetaminophen   Tablet .. 650 milliGRAM(s) Oral every 6 hours PRN Mild Pain (1 - 3), Moderate Pain (4 - 6)  ALPRAZolam 1 milliGRAM(s) Oral every 6 hours PRN Anxiety  traMADol 50 milliGRAM(s) Oral every 4 hours PRN Severe Pain (7 - 10)   **********************************************  Hailey Carver, PGY-1  Internal Medicine   [facility] Pager #: [phone]  **********************************************     Patient is a 70y old  Male who presents with a chief complaint of Back Pain (10 Oct 2020 07:22)    SUBJECTIVE / OVERNIGHT EVENTS: NAEON. Patient offered CPAP but patient refused as he cannot sleep with the loud hospital CPAP. Patient examined at bedside this AM, looking to be much better than admission. Alert and oriented x 3. Patient counseled extensively this AM about polypharmacy. Otherwise, doing well. Denies CP, palpitations, SOB, worsening back pain.     OBJECTIVE:  Vital Signs Last 24 Hrs  T(C): 36.6 (10 Oct 2020 04:10), Max: 36.9 (09 Oct 2020 11:52)  T(F): 97.9 (10 Oct 2020 04:10), Max: 98.5 (09 Oct 2020 11:52)  HR: 78 (10 Oct 2020 04:10) (72 - 79)  BP: 156/83 (10 Oct 2020 04:10) (151/81 - 160/85)  RR: 18 (10 Oct 2020 04:10) (18 - 18)  SpO2: 96% (10 Oct 2020 04:10) (94% - 96%)    I&O's Summary    09 Oct 2020 07:01  -  10 Oct 2020 07:00  --------------------------------------------------------  IN: 730 mL / OUT: 200 mL / NET: 530 mL      Physical Examination:    General: Patient lying in bed comfortably.     Eyes: PERRL, EOMI, no conjunctival injection      ENT: MMM, no oropharyngeal lesions or erythema appreciated     Pulm: No increased WOB. CTAB. No wheezing.    CV: RRR. S1&S2+. No M/R/G appreciated.     Abdomen: +BS. Soft, NTND. No organomegaly.     MSK: Nml ROM    Extremities: No peripheral edema or cyanosis.     Neuro: A&Ox3, no focal deficits     Skin: Warm and dry. No visible rash.    Labs:               9.6    8.19  )-----------( 382      ( 10 Oct 2020 06:56 )             35.0     10-10    137  |  102  |  11  ----------------------------<  98  3.5   |  24  |  0.90    Ca    9.6      10 Oct 2020 06:53  Phos  3.2     10-10  Mg     2.2     10-10    TPro  6.9  /  Alb  3.6  /  TBili  0.3  /  DBili  x   /  AST  7<L>  /  ALT  <5<L>  /  AlkPhos  82  10-09      CARDIAC MARKERS ( 09 Oct 2020 05:25 )  x     / x     / 24 U/L / x     / x            Culture - Urine (collected 08 Oct 2020 12:26)  Source: .Urine Clean Catch (Midstream)  Final Report (09 Oct 2020 10:49):    <10,000 CFU/mL Normal Urogenital Joyce      Urinalysis Basic - ( 08 Oct 2020 08:36 )    Color: Colorless / Appearance: Clear / S.009 / pH: x  Gluc: x / Ketone: Negative  / Bili: Negative / Urobili: Negative   Blood: x / Protein: Negative / Nitrite: Negative   Leuk Esterase: Negative / RBC: 1 /hpf / WBC 0 /HPF   Sq Epi: x / Non Sq Epi: 0 /hpf / Bacteria: Negative      Imaging Personally Reviewed:      MEDICATIONS  (STANDING):  amLODIPine   Tablet 5 milliGRAM(s) Oral daily  ascorbic acid 500 milliGRAM(s) Oral daily  citalopram 40 milliGRAM(s) Oral daily  cyanocobalamin 1000 MICROGram(s) Oral daily  enoxaparin Injectable 40 milliGRAM(s) SubCutaneous daily  ferrous    sulfate 325 milliGRAM(s) Oral daily  influenza   Vaccine 0.5 milliLiter(s) IntraMuscular once  losartan 100 milliGRAM(s) Oral daily  potassium chloride    Tablet ER 40 milliEquivalent(s) Oral once  tamsulosin 0.8 milliGRAM(s) Oral at bedtime  traZODone 100 milliGRAM(s) Oral at bedtime    MEDICATIONS  (PRN):  acetaminophen   Tablet .. 650 milliGRAM(s) Oral every 6 hours PRN Mild Pain (1 - 3), Moderate Pain (4 - 6)  ALPRAZolam 1 milliGRAM(s) Oral every 6 hours PRN Anxiety  traMADol 50 milliGRAM(s) Oral every 4 hours PRN Severe Pain (7 - 10)   **********************************************  Hailey Carver, PGY-1  Internal Medicine   [facility] Pager #: [phone]  **********************************************     Patient is a 70y old  Male who presents with a chief complaint of Back Pain (10 Oct 2020 07:22)    SUBJECTIVE / OVERNIGHT EVENTS: NAEON. Patient offered CPAP but patient refused as he cannot sleep with the loud hospital CPAP. Patient examined at bedside this AM, looking to be much better than admission. Alert and oriented x 3. Patient counseled extensively this AM about polypharmacy. Otherwise, doing well. Denies CP, palpitations, SOB, worsening back pain.     OBJECTIVE:  Vital Signs Last 24 Hrs  T(C): 36.6 (10 Oct 2020 04:10), Max: 36.9 (09 Oct 2020 11:52)  T(F): 97.9 (10 Oct 2020 04:10), Max: 98.5 (09 Oct 2020 11:52)  HR: 78 (10 Oct 2020 04:10) (72 - 79)  BP: 156/83 (10 Oct 2020 04:10) (151/81 - 160/85)  RR: 18 (10 Oct 2020 04:10) (18 - 18)  SpO2: 96% (10 Oct 2020 04:10) (94% - 96%)    I&O's Summary    09 Oct 2020 07:01  -  10 Oct 2020 07:00  --------------------------------------------------------  IN: 730 mL / OUT: 200 mL / NET: 530 mL      Physical Examination:    General: Patient lying in bed comfortably.     Eyes: PERRL, EOMI, no conjunctival injection      ENT: MMM, no oropharyngeal lesions or erythema appreciated     Pulm: No increased WOB. CTAB. No wheezing.    CV: RRR. S1&S2+. No M/R/G appreciated.     Abdomen: +BS. Soft, NTND. No organomegaly.     MSK: Nml ROM    Extremities: No peripheral edema or cyanosis.     Neuro: A&Ox3, no focal deficits     Skin: Warm and dry. No visible rash.    Labs:               9.6    8.19  )-----------( 382      ( 10 Oct 2020 06:56 )             35.0     10-10    137  |  102  |  11  ----------------------------<  98  3.5   |  24  |  0.90    Ca    9.6      10 Oct 2020 06:53  Phos  3.2     10-10  Mg     2.2     10-10    TPro  6.9  /  Alb  3.6  /  TBili  0.3  /  DBili  x   /  AST  7<L>  /  ALT  <5<L>  /  AlkPhos  82  10-09      CARDIAC MARKERS ( 09 Oct 2020 05:25 )  x     / x     / 24 U/L / x     / x            Culture - Urine (collected 08 Oct 2020 12:26)  Source: .Urine Clean Catch (Midstream)  Final Report (09 Oct 2020 10:49):    <10,000 CFU/mL Normal Urogenital Joyce      Urinalysis Basic - ( 08 Oct 2020 08:36 )    Color: Colorless / Appearance: Clear / S.009 / pH: x  Gluc: x / Ketone: Negative  / Bili: Negative / Urobili: Negative   Blood: x / Protein: Negative / Nitrite: Negative   Leuk Esterase: Negative / RBC: 1 /hpf / WBC 0 /HPF   Sq Epi: x / Non Sq Epi: 0 /hpf / Bacteria: Negative      Imaging Personally Reviewed:    < from: Transthoracic Echocardiogram (10.09.20 @ 15:55) >  Conclusions:  1. Aortic valve not well visualized; appears calcified. No  significant gradients to suggest aortic valve stenosis.  2. Endocardium not well visualized; grossly normal left  ventricular systolic function.  3. Normal diastolic function  4. The right ventricle is not well visualized; grossly  normal right ventricular systolic function.  5. Estimated pulmonary artery systolic pressure equals 21  mm Hg, assuming right atrial pressure equals 8  mm Hg,  consistent with normal pulmonary pressures.  *** Compared with echocardiogram of 3/14/2014, no  significant changes noted.    < end of copied text >      MEDICATIONS  (STANDING):  amLODIPine   Tablet 5 milliGRAM(s) Oral daily  ascorbic acid 500 milliGRAM(s) Oral daily  citalopram 40 milliGRAM(s) Oral daily  cyanocobalamin 1000 MICROGram(s) Oral daily  enoxaparin Injectable 40 milliGRAM(s) SubCutaneous daily  ferrous    sulfate 325 milliGRAM(s) Oral daily  influenza   Vaccine 0.5 milliLiter(s) IntraMuscular once  losartan 100 milliGRAM(s) Oral daily  potassium chloride    Tablet ER 40 milliEquivalent(s) Oral once  tamsulosin 0.8 milliGRAM(s) Oral at bedtime  traZODone 100 milliGRAM(s) Oral at bedtime    MEDICATIONS  (PRN):  acetaminophen   Tablet .. 650 milliGRAM(s) Oral every 6 hours PRN Mild Pain (1 - 3), Moderate Pain (4 - 6)  ALPRAZolam 1 milliGRAM(s) Oral every 6 hours PRN Anxiety  traMADol 50 milliGRAM(s) Oral every 4 hours PRN Severe Pain (7 - 10)

## 2020-10-10 NOTE — PROGRESS NOTE ADULT - PROBLEM SELECTOR PLAN 10
DVT ppx: Lovenox   Diet: Regular   PT consulted    Alfredo Lopez (Friend/Roommate): [phone] DVT ppx: Lovenox   Diet: Regular   PT consulted, pending final recs     Alfredo Lopez (Friend/Roommate): [phone]

## 2020-10-11 LAB
ANION GAP SERPL CALC-SCNC: 11 MMOL/L — SIGNIFICANT CHANGE UP (ref 5–17)
BUN SERPL-MCNC: 13 MG/DL — SIGNIFICANT CHANGE UP (ref 7–23)
CALCIUM SERPL-MCNC: 9 MG/DL — SIGNIFICANT CHANGE UP (ref 8.4–10.5)
CHLORIDE SERPL-SCNC: 104 MMOL/L — SIGNIFICANT CHANGE UP (ref 96–108)
CO2 SERPL-SCNC: 24 MMOL/L — SIGNIFICANT CHANGE UP (ref 22–31)
CREAT SERPL-MCNC: 0.84 MG/DL — SIGNIFICANT CHANGE UP (ref 0.5–1.3)
GLUCOSE SERPL-MCNC: 101 MG/DL — HIGH (ref 70–99)
HCT VFR BLD CALC: 36.6 % — LOW (ref 39–50)
HGB BLD-MCNC: 10.2 G/DL — LOW (ref 13–17)
MAGNESIUM SERPL-MCNC: 2.2 MG/DL — SIGNIFICANT CHANGE UP (ref 1.6–2.6)
MCHC RBC-ENTMCNC: 18.1 PG — LOW (ref 27–34)
MCHC RBC-ENTMCNC: 27.9 GM/DL — LOW (ref 32–36)
MCV RBC AUTO: 65.1 FL — LOW (ref 80–100)
NRBC # BLD: 0 /100 WBCS — SIGNIFICANT CHANGE UP (ref 0–0)
OB PNL STL: NEGATIVE — SIGNIFICANT CHANGE UP
PHOSPHATE SERPL-MCNC: 3 MG/DL — SIGNIFICANT CHANGE UP (ref 2.5–4.5)
PLATELET # BLD AUTO: 385 K/UL — SIGNIFICANT CHANGE UP (ref 150–400)
POTASSIUM SERPL-MCNC: 3.7 MMOL/L — SIGNIFICANT CHANGE UP (ref 3.5–5.3)
POTASSIUM SERPL-SCNC: 3.7 MMOL/L — SIGNIFICANT CHANGE UP (ref 3.5–5.3)
RBC # BLD: 5.62 M/UL — SIGNIFICANT CHANGE UP (ref 4.2–5.8)
RBC # FLD: 20 % — HIGH (ref 10.3–14.5)
SODIUM SERPL-SCNC: 139 MMOL/L — SIGNIFICANT CHANGE UP (ref 135–145)
WBC # BLD: 9.4 K/UL — SIGNIFICANT CHANGE UP (ref 3.8–10.5)
WBC # FLD AUTO: 9.4 K/UL — SIGNIFICANT CHANGE UP (ref 3.8–10.5)

## 2020-10-11 PROCEDURE — 99232 SBSQ HOSP IP/OBS MODERATE 35: CPT | Mod: GC

## 2020-10-11 RX ORDER — ACETAMINOPHEN 500 MG
1000 TABLET ORAL ONCE
Refills: 0 | Status: COMPLETED | OUTPATIENT
Start: 2020-10-11 | End: 2020-10-11

## 2020-10-11 RX ORDER — LANOLIN ALCOHOL/MO/W.PET/CERES
5 CREAM (GRAM) TOPICAL ONCE
Refills: 0 | Status: COMPLETED | OUTPATIENT
Start: 2020-10-11 | End: 2020-10-11

## 2020-10-11 RX ORDER — SIMETHICONE 80 MG/1
80 TABLET, CHEWABLE ORAL THREE TIMES A DAY
Refills: 0 | Status: DISCONTINUED | OUTPATIENT
Start: 2020-10-11 | End: 2020-10-13

## 2020-10-11 RX ADMIN — Medication 400 MILLIGRAM(S): at 18:07

## 2020-10-11 RX ADMIN — Medication 650 MILLIGRAM(S): at 00:06

## 2020-10-11 RX ADMIN — Medication 5 MILLIGRAM(S): at 00:06

## 2020-10-11 RX ADMIN — Medication 5 MILLIGRAM(S): at 23:36

## 2020-10-11 RX ADMIN — Medication 500 MILLIGRAM(S): at 13:01

## 2020-10-11 RX ADMIN — Medication 100 MILLIGRAM(S): at 22:57

## 2020-10-11 RX ADMIN — Medication 400 MILLIGRAM(S): at 20:15

## 2020-10-11 RX ADMIN — Medication 5 MILLIGRAM(S): at 13:00

## 2020-10-11 RX ADMIN — TRAMADOL HYDROCHLORIDE 50 MILLIGRAM(S): 50 TABLET ORAL at 06:41

## 2020-10-11 RX ADMIN — Medication 1000 MILLIGRAM(S): at 20:45

## 2020-10-11 RX ADMIN — SIMETHICONE 80 MILLIGRAM(S): 80 TABLET, CHEWABLE ORAL at 09:57

## 2020-10-11 RX ADMIN — TRAMADOL HYDROCHLORIDE 50 MILLIGRAM(S): 50 TABLET ORAL at 05:32

## 2020-10-11 RX ADMIN — Medication 650 MILLIGRAM(S): at 00:36

## 2020-10-11 RX ADMIN — Medication 5 MILLIGRAM(S): at 20:16

## 2020-10-11 RX ADMIN — AMLODIPINE BESYLATE 5 MILLIGRAM(S): 2.5 TABLET ORAL at 05:32

## 2020-10-11 RX ADMIN — PREGABALIN 1000 MICROGRAM(S): 225 CAPSULE ORAL at 13:01

## 2020-10-11 RX ADMIN — CITALOPRAM 40 MILLIGRAM(S): 10 TABLET, FILM COATED ORAL at 13:01

## 2020-10-11 RX ADMIN — ENOXAPARIN SODIUM 40 MILLIGRAM(S): 100 INJECTION SUBCUTANEOUS at 13:01

## 2020-10-11 RX ADMIN — Medication 325 MILLIGRAM(S): at 13:02

## 2020-10-11 RX ADMIN — LOSARTAN POTASSIUM 100 MILLIGRAM(S): 100 TABLET, FILM COATED ORAL at 05:32

## 2020-10-11 RX ADMIN — Medication 650 MILLIGRAM(S): at 06:47

## 2020-10-11 RX ADMIN — SIMETHICONE 80 MILLIGRAM(S): 80 TABLET, CHEWABLE ORAL at 18:07

## 2020-10-11 RX ADMIN — Medication 5 MILLIGRAM(S): at 06:47

## 2020-10-11 RX ADMIN — Medication 650 MILLIGRAM(S): at 07:17

## 2020-10-11 RX ADMIN — TAMSULOSIN HYDROCHLORIDE 0.8 MILLIGRAM(S): 0.4 CAPSULE ORAL at 21:59

## 2020-10-11 NOTE — PROGRESS NOTE ADULT - PROBLEM SELECTOR PLAN 10
DVT ppx: Lovenox   Diet: Regular   PT consulted, pending final recs     Alfredo Lopez (Friend/Roommate): [phone]

## 2020-10-11 NOTE — PROVIDER CONTACT NOTE (OTHER) - SITUATION
Patient complaining of severe pain not relieved with prescribed pain medication, refusing tramadol dose

## 2020-10-11 NOTE — PROGRESS NOTE ADULT - PROBLEM SELECTOR PLAN 2
Patient with history of  shunt (Codman Hakim) placement in 2017 at [facility] by Dr. Ibarra. Pt p/w AMS, 1 episode of urinary incontinence, and difficulty ambulating.   -CT Head (10/8): No evidence of worsening hydrocephalus or misplaced shunt/valve   -XR Shunt Series w/o evidence of kinking

## 2020-10-11 NOTE — PROGRESS NOTE ADULT - PROBLEM SELECTOR PLAN 3
P/w worsening back pain for past month. Patient follows with NSGY Dr. Castanon with m/r appt last week. Outpatient imaging below.   -CT Pelvis Bony/Lumbar Spine (10/6): Postsurgical changes. Lucencies measuring up to 3 mm in thickness around the 2 right sided iliac screws which extend from the spinal fusion construct, concerning for possible loosening and/or infection. Mild concavity of the superior endplate of L1, without significant change. Grade 1 retrolisthesis of L2 on L3 redemonstrated. Small fluid/edema in the posterior subcutaneous tissues. Anterior fusion hardware with lucencies around the screws at S1 redemonstrated, concerning for possible loosening and/or infection.   -XR AP (10/8): No fracture.  -Will touch base with Dr. Rogers  -Neurosurg consulted this AM, pending recs   -Consider starting abx if patient becomes febrile and get cx.   -Patient's pain has been uncontrolled and patient's quality of life has declined significantly since his most recent surgery  -Continue with tramadol 50mg q6H PRN  -Pain management consulted, pending recs. Reached out to them today and left a message.    -ESR/CRP mildly elevated

## 2020-10-11 NOTE — PROGRESS NOTE ADULT - SUBJECTIVE AND OBJECTIVE BOX
PROGRESS NOTE:   Authored by Ramesh Hunt MD PGY-2  Pager [phone] [facility], [address] [facility]   Please page [phone] after 7PM    Patient is a 70y old  Male who presents with a chief complaint of Back Pain (10 Oct 2020 09:51)      SUBJECTIVE / OVERNIGHT EVENTS:    ADDITIONAL REVIEW OF SYSTEMS:    MEDICATIONS  (STANDING):  amLODIPine   Tablet 5 milliGRAM(s) Oral daily  ascorbic acid 500 milliGRAM(s) Oral daily  citalopram 40 milliGRAM(s) Oral daily  cyanocobalamin 1000 MICROGram(s) Oral daily  enoxaparin Injectable 40 milliGRAM(s) SubCutaneous daily  ferrous    sulfate 325 milliGRAM(s) Oral daily  influenza   Vaccine 0.5 milliLiter(s) IntraMuscular once  losartan 100 milliGRAM(s) Oral daily  tamsulosin 0.8 milliGRAM(s) Oral at bedtime  traZODone 100 milliGRAM(s) Oral at bedtime    MEDICATIONS  (PRN):  acetaminophen   Tablet .. 650 milliGRAM(s) Oral every 6 hours PRN Mild Pain (1 - 3), Moderate Pain (4 - 6)  diazepam    Tablet 5 milliGRAM(s) Oral every 6 hours PRN Anxiety  traMADol 50 milliGRAM(s) Oral every 4 hours PRN Severe Pain (7 - 10)  zolpidem 5 milliGRAM(s) Oral at bedtime PRN Insomnia      I&O's Summary    10 Oct 2020 07:01  -  11 Oct 2020 07:00  --------------------------------------------------------  IN: 960 mL / OUT: 300 mL / NET: 660 mL        PHYSICAL EXAM:  Vital Signs Last 24 Hrs  T(C): 36.7 (11 Oct 2020 04:49), Max: 37.3 (10 Oct 2020 11:51)  T(F): 98.1 (11 Oct 2020 04:49), Max: 99.1 (10 Oct 2020 11:51)  HR: 74 (11 Oct 2020 04:49) (74 - 89)  BP: 167/93 (11 Oct 2020 04:49) (143/77 - 167/93)  BP(mean): --  RR: 18 (11 Oct 2020 04:49) (18 - 18)  SpO2: 97% (11 Oct 2020 04:49) (96% - 97%)    CONSTITUTIONAL: NAD, well-developed  RESPIRATORY: Normal respiratory effort; lungs are clear to auscultation bilaterally  CARDIOVASCULAR: Regular rate and rhythm, normal S1 and S2, no murmur/rub/gallop; No lower extremity edema; Peripheral pulses are 2+ bilaterally  ABDOMEN: Nontender to palpation, normoactive bowel sounds, no rebound/guarding  MUSCULOSKELETAL: no clubbing or cyanosis of digits; no joint swelling or tenderness to palpation  PSYCH: A+O to person, place, and time; affect appropriate    LABS:                        9.6    8.19  )-----------( 382      ( 10 Oct 2020 06:56 )             35.0     10-11    139  |  104  |  13  ----------------------------<  101<H>  3.7   |  24  |  0.84    Ca    9.0      11 Oct 2020 06:23  Phos  3.0     10-11  Mg     2.2     10-11    Culture - Urine (collected 08 Oct 2020 12:26)  Source: .Urine Clean Catch (Midstream)  Final Report (09 Oct 2020 10:49):    <10,000 CFU/mL Normal Urogenital Joyce        RADIOLOGY & ADDITIONAL TESTS:  Results Reviewed:   Imaging Personally Reviewed:  Electrocardiogram Personally Reviewed:    COORDINATION OF CARE:  Care Discussed with Consultants/Other Providers [Y/N]:  Prior or Outpatient Records Reviewed [Y/N]:   PROGRESS NOTE:   Authored by Ramesh Hunt MD PGY-2  Pager [phone] [facility], [address] [facility]   Please page [phone] after 7PM    Patient is a 70y old  Male who presents with a chief complaint of Back Pain (10 Oct 2020 09:51)      SUBJECTIVE / OVERNIGHT EVENTS:  Patient still having severe back pain but acknowledges that nothing relieves his pain including ketamine infusion. Currently denies fever, chills, nausea, and vomiting.    ADDITIONAL REVIEW OF SYSTEMS:    MEDICATIONS  (STANDING):  amLODIPine   Tablet 5 milliGRAM(s) Oral daily  ascorbic acid 500 milliGRAM(s) Oral daily  citalopram 40 milliGRAM(s) Oral daily  cyanocobalamin 1000 MICROGram(s) Oral daily  enoxaparin Injectable 40 milliGRAM(s) SubCutaneous daily  ferrous    sulfate 325 milliGRAM(s) Oral daily  influenza   Vaccine 0.5 milliLiter(s) IntraMuscular once  losartan 100 milliGRAM(s) Oral daily  tamsulosin 0.8 milliGRAM(s) Oral at bedtime  traZODone 100 milliGRAM(s) Oral at bedtime    MEDICATIONS  (PRN):  acetaminophen   Tablet .. 650 milliGRAM(s) Oral every 6 hours PRN Mild Pain (1 - 3), Moderate Pain (4 - 6)  diazepam    Tablet 5 milliGRAM(s) Oral every 6 hours PRN Anxiety  traMADol 50 milliGRAM(s) Oral every 4 hours PRN Severe Pain (7 - 10)  zolpidem 5 milliGRAM(s) Oral at bedtime PRN Insomnia      I&O's Summary    10 Oct 2020 07:01  -  11 Oct 2020 07:00  --------------------------------------------------------  IN: 960 mL / OUT: 300 mL / NET: 660 mL        PHYSICAL EXAM:  Vital Signs Last 24 Hrs  T(C): 36.7 (11 Oct 2020 04:49), Max: 37.3 (10 Oct 2020 11:51)  T(F): 98.1 (11 Oct 2020 04:49), Max: 99.1 (10 Oct 2020 11:51)  HR: 74 (11 Oct 2020 04:49) (74 - 89)  BP: 167/93 (11 Oct 2020 04:49) (143/77 - 167/93)  BP(mean): --  RR: 18 (11 Oct 2020 04:49) (18 - 18)  SpO2: 97% (11 Oct 2020 04:49) (96% - 97%)      General: Patient lying in bed comfortably.     Eyes: PERRL, EOMI, no conjunctival injection      ENT: MMM, no oropharyngeal lesions or erythema appreciated     Pulm: No increased WOB. CTAB. No wheezing.    CV: RRR. S1&S2+. No M/R/G appreciated.     Abdomen: +BS. Soft, NTND. No organomegaly.     MSK: Nml ROM    Extremities: No peripheral edema or cyanosis.     Neuro: A&Ox3, no focal deficits     Skin: Warm and dry. No visible rash.    LABS:             10.2   9.40  )-----------( 385      ( 11 Oct 2020 06:23 )             36.6     11 Oct 2020 06:23    139    |  104    |  13     ----------------------------<  101    3.7     |  24     |  0.84     Ca    9.0        11 Oct 2020 06:23  Phos  3.0       11 Oct 2020 06:23  Mg     2.2       11 Oct 2020 06:23      RADIOLOGY & ADDITIONAL TESTS:  Results Reviewed:   Imaging Personally Reviewed:  Electrocardiogram Personally Reviewed:    COORDINATION OF CARE:  Care Discussed with Consultants/Other Providers [Y/N]:  Prior or Outpatient Records Reviewed [Y/N]:

## 2020-10-11 NOTE — PROGRESS NOTE ADULT - ASSESSMENT
70 year old male with HTN, GULSHAN (on home CPAP), BPH, NPH (s/p  shunt and MRI-incompatible valve in 2017), sciatica, and chronic back pain (s/p multiple spinal surgeries; most recently bilateral percutaneous sacroiliac joint fusions in 6/20) presenting with acutely worsening back pain in the past month and AMS with history of unwitnessed ?fall. 70M h/o HTN, GULSHAN (on home CPAP), BPH, NPH (s/p  shunt and MRI-incompatible valve in 2017), sciatica, and chronic back pain (s/p multiple spinal surgeries; most recently bilateral percutaneous sacroiliac joint fusions in 6/20) presenting with acutely worsening back pain in the past month and AMS with history of unwitnessed ?fall.

## 2020-10-11 NOTE — PROVIDER CONTACT NOTE (OTHER) - ASSESSMENT
Patient is AxO x4, VS- 177/90, hr-89, rr-18, temp-98.6, SPO2-96% room air.  Patient is c/o of pain in lower back -10/10  Patient is not restless but states pain is unbearable. Refused tramadol,  states" it does not work".

## 2020-10-11 NOTE — PROVIDER CONTACT NOTE (OTHER) - ACTION/TREATMENT ORDERED:
Team 4- Medical notified and aware, will prescribe acetaminophen IV and administer PRN dose of diazepam.  RN awaiting order, will administer as per order. Informed of risks and benefits of tramadol. Team 4, Ailin Lawson. MD  notified aware, will prescribe acetaminophen IV and administer PRN dose of diazepam.  RN awaiting order, will administer as per order. Informed of risks and benefits of tramadol.

## 2020-10-11 NOTE — PROGRESS NOTE ADULT - PROBLEM SELECTOR PLAN 7
Patient desatting to 91% in EMS. Satting 96-98% on 2L NC in ED.   -Patient does not know home CPAP settings - patient is unsure of CPAP settings and  is refusing inpt CPAP.

## 2020-10-11 NOTE — PROGRESS NOTE ADULT - PROBLEM SELECTOR PLAN 8
Patient follows with Psychiatrist Dr. Cory Bhandari. Per patient's friend, patient's depression has been uncontrolled in the previous months. No SI/HI.   -Continue home celexa 40mg qday   -Home Xanax transitioned to Valium 5mg q6 PRN as it may help with patient's pain as well. Adjust as needed.   -Patient to follow up with oupatient Psychiatrist

## 2020-10-11 NOTE — PROGRESS NOTE ADULT - PROBLEM SELECTOR PLAN 9
Patient takes ambien 10mg at home and trazadone 400mg qHS.   -Will start Ambien 5mg qday   -Continue trazadone 100mg qHS

## 2020-10-11 NOTE — PROGRESS NOTE ADULT - PROBLEM SELECTOR PLAN 1
s/p unwitnessed fall. Found to be confused with slow speech by roommate with difficulty standing up. Urinary incontinence in EMS. Worsening hydrocephalus vs polypharmacy vs syncope --> improved; likely in the setting of polypharmacy.   -CT Head (10/8): There is no acute intracranial hemorrhage or depressed calvarial fracture.  -CT Cspine (10/8): No fracture or acute traumatic malalignment. There are degenerative changes throughout the cervical spine  -Urine tox positive for benzos and THC  -TTE (10/9) wnl

## 2020-10-11 NOTE — PROVIDER CONTACT NOTE (OTHER) - RECOMMENDATIONS
Notify Team 4- Medical, patient had one time dose of acetaminophen IV and Toradol 15 mg prescribed earlier, recommend another dose of acetaminophen IV or Toradol.  Provide supportive care. Notify Ailin Lawson MD -Team 4  patient had one time dose of acetaminophen IV and Toradol 15 mg prescribed earlier, recommend another dose of acetaminophen IV or Toradol.  Provide supportive care.

## 2020-10-12 LAB
ANION GAP SERPL CALC-SCNC: 12 MMOL/L — SIGNIFICANT CHANGE UP (ref 5–17)
BUN SERPL-MCNC: 14 MG/DL — SIGNIFICANT CHANGE UP (ref 7–23)
CALCIUM SERPL-MCNC: 9.3 MG/DL — SIGNIFICANT CHANGE UP (ref 8.4–10.5)
CHLORIDE SERPL-SCNC: 104 MMOL/L — SIGNIFICANT CHANGE UP (ref 96–108)
CO2 SERPL-SCNC: 23 MMOL/L — SIGNIFICANT CHANGE UP (ref 22–31)
CREAT SERPL-MCNC: 0.79 MG/DL — SIGNIFICANT CHANGE UP (ref 0.5–1.3)
GLUCOSE SERPL-MCNC: 102 MG/DL — HIGH (ref 70–99)
HCT VFR BLD CALC: 35.2 % — LOW (ref 39–50)
HGB BLD-MCNC: 9.9 G/DL — LOW (ref 13–17)
MAGNESIUM SERPL-MCNC: 2.2 MG/DL — SIGNIFICANT CHANGE UP (ref 1.6–2.6)
MCHC RBC-ENTMCNC: 18.1 PG — LOW (ref 27–34)
MCHC RBC-ENTMCNC: 28.1 GM/DL — LOW (ref 32–36)
MCV RBC AUTO: 64.2 FL — LOW (ref 80–100)
NRBC # BLD: 0 /100 WBCS — SIGNIFICANT CHANGE UP (ref 0–0)
PHOSPHATE SERPL-MCNC: 2.9 MG/DL — SIGNIFICANT CHANGE UP (ref 2.5–4.5)
PLATELET # BLD AUTO: 389 K/UL — SIGNIFICANT CHANGE UP (ref 150–400)
POTASSIUM SERPL-MCNC: 3.3 MMOL/L — LOW (ref 3.5–5.3)
POTASSIUM SERPL-SCNC: 3.3 MMOL/L — LOW (ref 3.5–5.3)
RBC # BLD: 5.48 M/UL — SIGNIFICANT CHANGE UP (ref 4.2–5.8)
RBC # FLD: 20.3 % — HIGH (ref 10.3–14.5)
SODIUM SERPL-SCNC: 139 MMOL/L — SIGNIFICANT CHANGE UP (ref 135–145)
WBC # BLD: 9.04 K/UL — SIGNIFICANT CHANGE UP (ref 3.8–10.5)
WBC # FLD AUTO: 9.04 K/UL — SIGNIFICANT CHANGE UP (ref 3.8–10.5)

## 2020-10-12 PROCEDURE — 99233 SBSQ HOSP IP/OBS HIGH 50: CPT | Mod: GC

## 2020-10-12 PROCEDURE — 99231 SBSQ HOSP IP/OBS SF/LOW 25: CPT

## 2020-10-12 RX ORDER — ALPRAZOLAM 0.25 MG
1 TABLET ORAL EVERY 6 HOURS
Refills: 0 | Status: DISCONTINUED | OUTPATIENT
Start: 2020-10-12 | End: 2020-10-13

## 2020-10-12 RX ORDER — POTASSIUM CHLORIDE 20 MEQ
40 PACKET (EA) ORAL EVERY 4 HOURS
Refills: 0 | Status: COMPLETED | OUTPATIENT
Start: 2020-10-12 | End: 2020-10-12

## 2020-10-12 RX ORDER — METHOCARBAMOL 500 MG/1
500 TABLET, FILM COATED ORAL EVERY 6 HOURS
Refills: 0 | Status: DISCONTINUED | OUTPATIENT
Start: 2020-10-12 | End: 2020-10-13

## 2020-10-12 RX ORDER — AMLODIPINE BESYLATE 2.5 MG/1
10 TABLET ORAL DAILY
Refills: 0 | Status: DISCONTINUED | OUTPATIENT
Start: 2020-10-12 | End: 2020-10-13

## 2020-10-12 RX ORDER — ACETAMINOPHEN 500 MG
1000 TABLET ORAL ONCE
Refills: 0 | Status: COMPLETED | OUTPATIENT
Start: 2020-10-12 | End: 2020-10-12

## 2020-10-12 RX ORDER — AMLODIPINE BESYLATE 2.5 MG/1
5 TABLET ORAL ONCE
Refills: 0 | Status: COMPLETED | OUTPATIENT
Start: 2020-10-12 | End: 2020-10-12

## 2020-10-12 RX ADMIN — CITALOPRAM 40 MILLIGRAM(S): 10 TABLET, FILM COATED ORAL at 11:09

## 2020-10-12 RX ADMIN — LOSARTAN POTASSIUM 100 MILLIGRAM(S): 100 TABLET, FILM COATED ORAL at 05:10

## 2020-10-12 RX ADMIN — Medication 325 MILLIGRAM(S): at 11:09

## 2020-10-12 RX ADMIN — Medication 5 MILLIGRAM(S): at 06:17

## 2020-10-12 RX ADMIN — Medication 40 MILLIEQUIVALENT(S): at 13:42

## 2020-10-12 RX ADMIN — Medication 650 MILLIGRAM(S): at 06:47

## 2020-10-12 RX ADMIN — Medication 400 MILLIGRAM(S): at 09:44

## 2020-10-12 RX ADMIN — Medication 500 MILLIGRAM(S): at 11:09

## 2020-10-12 RX ADMIN — Medication 5 MILLIGRAM(S): at 13:41

## 2020-10-12 RX ADMIN — METHOCARBAMOL 500 MILLIGRAM(S): 500 TABLET, FILM COATED ORAL at 22:09

## 2020-10-12 RX ADMIN — AMLODIPINE BESYLATE 5 MILLIGRAM(S): 2.5 TABLET ORAL at 05:10

## 2020-10-12 RX ADMIN — TAMSULOSIN HYDROCHLORIDE 0.8 MILLIGRAM(S): 0.4 CAPSULE ORAL at 22:10

## 2020-10-12 RX ADMIN — ENOXAPARIN SODIUM 40 MILLIGRAM(S): 100 INJECTION SUBCUTANEOUS at 11:09

## 2020-10-12 RX ADMIN — Medication 1000 MILLIGRAM(S): at 10:20

## 2020-10-12 RX ADMIN — AMLODIPINE BESYLATE 5 MILLIGRAM(S): 2.5 TABLET ORAL at 11:09

## 2020-10-12 RX ADMIN — ZOLPIDEM TARTRATE 5 MILLIGRAM(S): 10 TABLET ORAL at 22:10

## 2020-10-12 RX ADMIN — Medication 650 MILLIGRAM(S): at 06:17

## 2020-10-12 RX ADMIN — Medication 100 MILLIGRAM(S): at 22:10

## 2020-10-12 RX ADMIN — PREGABALIN 1000 MICROGRAM(S): 225 CAPSULE ORAL at 13:41

## 2020-10-12 RX ADMIN — Medication 40 MILLIEQUIVALENT(S): at 09:44

## 2020-10-12 NOTE — PROGRESS NOTE ADULT - PROBLEM SELECTOR PLAN 2
Patient with history of  shunt (Codman Hakim) placement in 2017 at [facility] by Dr. Ibarra. Pt p/w AMS, 1 episode of urinary incontinence, and difficulty ambulating.   -CT Head (10/8): No evidence of worsening hydrocephalus or misplaced shunt/valve   -XR Shunt Series w/o evidence of kinking Pt hypertensive SBP ~170 in the past few days.   -Continue irbesartan 300mg qday (losartan 100 equivalent dose while inpt)  -increase Amlo to 10mg qd. Pt hypertensive SBP ~170 in the past few days.   -Continue irbesartan 300mg qday (losartan 100 equivalent dose while inpt)  -increase Amlo to 10mg qd.  -monitor BP

## 2020-10-12 NOTE — PROGRESS NOTE ADULT - PROBLEM SELECTOR PLAN 7
Patient desatting to 91% in EMS. Satting 96-98% on 2L NC in ED.   -Patient does not know home CPAP settings - patient is unsure of CPAP settings and  is refusing inpt CPAP. Patient follows with Psychiatrist Dr. Cory Bhandari. Per patient's friend, patient's depression has been uncontrolled in the previous months. No SI/HI.   -Continue home celexa 40mg qday   -Home Xanax transitioned to Valium 5mg q6 PRN as it may help with patient's pain as well. Adjust as needed.   -Patient to follow up with outpatient Psychiatrist

## 2020-10-12 NOTE — PROGRESS NOTE ADULT - PROBLEM SELECTOR PLAN 5
-Continue home amlodipine 5mg qday and irbesartan 300mg qday (losartan equivalent dose while inpt) -Continue home tamsulosin 0.8mg qHS

## 2020-10-12 NOTE — PROGRESS NOTE ADULT - SUBJECTIVE AND OBJECTIVE BOX
Pete Starr, PGY-1  Pager: [phone] / [phone]    CHIEF COMPLAINT: Patient is a 70y old  Male who presents with a chief complaint of Back Pain (12 Oct 2020 06:50)    INTERVAL HPI/ OVERNIGHT EVENTS: Overnight pt complained of  L lower back pain 10/10, radiating to left leg, worse on movement of leg and sitting. The pain is similar to his chronic pain. He reports the only thing that helped him recently was IV tylenol and Valium. Had BM yesterday, have them daily. No urinary sx, denies cp, sob, abdominal pain.   Received Tylenol  and valium 5 at 6AM    MEDICATIONS (STANDING):  amLODIPine   Tablet 5 milliGRAM(s) Oral daily  ascorbic acid 500 milliGRAM(s) Oral daily  citalopram 40 milliGRAM(s) Oral daily  cyanocobalamin 1000 MICROGram(s) Oral daily  enoxaparin Injectable 40 milliGRAM(s) SubCutaneous daily  ferrous    sulfate 325 milliGRAM(s) Oral daily  influenza   Vaccine 0.5 milliLiter(s) IntraMuscular once  losartan 100 milliGRAM(s) Oral daily  tamsulosin 0.8 milliGRAM(s) Oral at bedtime  traZODone 100 milliGRAM(s) Oral at bedtime    MEDICATIONS  (PRN):  acetaminophen   Tablet .. 650 milliGRAM(s) Oral every 6 hours PRN  diazepam    Tablet 5 milliGRAM(s) Oral every 6 hours PRN  simethicone 80 milliGRAM(s) Chew three times a day PRN  traMADol 50 milliGRAM(s) Oral every 4 hours PRN  zolpidem 5 milliGRAM(s) Oral at bedtime PRN    REVIEW OF SYSTEMS:  CONSTITUTIONAL: No fever, weight loss, or fatigue  RESPIRATORY: No cough, No shortness of breath  CARDIOVASCULAR: No chest pain, palpitations, dizziness, or leg swelling  GASTROINTESTINAL: No abdominal or epigastric pain. No nausea, vomiting, or hematemesis; No diarrhea or constipation.  GENITOURINARY: No dysuria, frequency, hematuria, or incontinence  MUSCULOSKELETAL: No joint pain or swelling; ++back pain    VITAL SIGNS:  T(F): 98.7 (10-12-20 @ 05:03), Max: 98.7 (10-12-20 @ 05:03)  HR: 72 (10-12-20 @ 05:03) (68 - 89)  BP: 179/90 (10-12-20 @ 05:03) (173/92 - 179/90)  RR: 18 (10-12-20 @ 05:03) (18 - 18)  SpO2: 95% (10-12-20 @ 05:03) (95% - 99%)    I&O's Summary  11 Oct 2020 07:01  -  12 Oct 2020 07:00  --------------------------------------------------------  IN: 240 mL / OUT: 200 mL / NET: 40 mL    PHYSICAL EXAM:  GENERAL: Pt in pain, no resp distress, well-groomed, well-developed  HEAD:  Atraumatic, Normocephalic  NERVOUS SYSTEM:  Alert & Oriented X3, Motor Strength 5/5 B/L upper and lower extremities  CHEST/LUNG: Clear to auscultation bilaterally; No rales, rhonchi, wheezing, or rubs  HEART: Regular rate and rhythm; No murmurs, rubs, or gallops  ABDOMEN: Soft, Nontender, Nondistended; Bowel sounds present  EXTREMITIES:  2+ Peripheral Pulses, No clubbing, cyanosis, or edema, LLE SLR +  BACK: Tenderness to palpation L lower back- gluteal area, no erythema or swelling. Old surgical midline scar, no tenderness to palpation of spine.      LABS:                        9.9    9.04  )-----------( 389      ( 12 Oct 2020 06:38 )             35.2     10-12    139  |  104  |  14  ----------------------------<  102<H>  3.3<L>   |  23  |  0.79    Ca    9.3      12 Oct 2020 06:37  Phos  2.9     10-12  Mg     2.2     10-12    RADIOLOGY & ADDITIONAL TESTS:    < from: CT Head No Cont (10.08.20 @ 07:55) >  EXAM:  PELVIS AP ONLY                        EXAM:  CT BRAIN                          *** ADDENDUM 10/08/2020  ***  Please note radiograph of the pelvis is also included in this study.  X-ray pelvis dated 10/8/2020 7:55 AM    INDICATION: 70 years-old Male with Fall.  PRIOR STUDIES: CT pelvis from 10/6/2020  FINDINGS: There has been prior posterior lumbar spinal and bilateral sacroiliac joint fusion.  The hardware is more optimally evaluated on the recent CT from 10/6/2020. There is no definite acute hardware complication. There is no fracture or acute traumatic dislocation. No soft tissue abnormalities are seen.    IMPRESSION:  No fracture.    *** END OF ADDENDUM 10/08/2020  ***  PROCEDURE DATE:  10/08/2020      INTERPRETATION:  CT OF THE HEAD WITHOUT CONTRAST  CT CERVICAL SPINE WITHOUT CONTRAST    CLINICAL INDICATION: Status post unwitnessed fall.    TECHNIQUE: Volumetric CT acquisition was performed through the brain and reviewed using brain and bone windowtechnique. Dose optimization techniques were utilized including kVp/mA modulation along with iterative reconstructions.  Thin section CT images were obtained through cervical spine with overlapping reconstructions.  Sagittal, coronal and bilateral oblique 2D reformats were then generated from the initial images. Dose reduction techniques were utilized including kVp/mA dose modulation based on patient size and iterative reconstruction. 3-D reconstructions of the spine was performed on a separate workstation and reviewed.    COMPARISON: CT head from 2/7/2020    FINDINGS:  CT head:  There is a right frontal approach ventriculoperitoneal shunt which is in stable position, terminating near the foramen of Monro. Ventricular caliber is relatively stable.  There is no acute loss of gray-white differentiation. There is a chronic right subinsular lacunar infarct.  There is no evidence of mass effect, midline shift, acute intracranial hemorrhage, or extra-axial collections.   The calvarium is intact. The paranasal sinuses are clear.The mastoid air cells are predominantly clear. The orbits are unremarkable.    CT cervical spine  There is reversal of usual cervical lordosis. There is no significant subluxation. Vertebral body height is preserved throughout the cervical spine. There is moderate loss of intervertebral disc height at C4-C5 and C6-C7, there is severe loss of intervertebral disc height at C5-C6.  There are multilevel diffuse disc osteophyte complexes and ligamentum flavum thickening which contribute to multilevel central canal narrowing to variable degrees.  There is multilevel facet hypertrophy and uncovertebral spurring which contribute to multilevel neural foraminal narrowing.,  The paravertebral soft tissues are unremarkable.      IMPRESSION:  CT HEAD: There is no acute intracranial hemorrhage or depressed calvarial fracture.  CT CERVICAL SPINE: No fracture or acute traumatic malalignment. There are degenerative changes throughout the cervical spineas described.  ***Please see the addendum at the top of this report. It may contain additional important information or changes.****    ILEANA HINES M.D., ATTENDING RADIOLOGIST  This document has been electronically signed. Oct  8 2020  8:48AM  Addend:  ILEANA HINES M.D., ATTENDING RADIOLOGIST  This addendum was electronically signed on: Oct  8 2020  9:26AM.    < end of copied text >     Pete Starr, PGY-1  Pager: [phone] / [phone]    CHIEF COMPLAINT: Patient is a 70y old  Male who presents with a chief complaint of Back Pain (12 Oct 2020 06:50)    INTERVAL HPI/ OVERNIGHT EVENTS: Overnight pt complained of  L lower back pain 10/10, radiating to left leg, worse on movement of leg and sitting. The pain is similar to his chronic pain. He reports the only thing that helped him recently was IV tylenol and Valium. Had BM yesterday, have them daily. No urinary sx, denies cp, sob, abdominal pain.   Received Tylenol  and valium 5 at 6AM    MEDICATIONS (STANDING):  amLODIPine   Tablet 5 milliGRAM(s) Oral daily  ascorbic acid 500 milliGRAM(s) Oral daily  citalopram 40 milliGRAM(s) Oral daily  cyanocobalamin 1000 MICROGram(s) Oral daily  enoxaparin Injectable 40 milliGRAM(s) SubCutaneous daily  ferrous    sulfate 325 milliGRAM(s) Oral daily  influenza   Vaccine 0.5 milliLiter(s) IntraMuscular once  losartan 100 milliGRAM(s) Oral daily  tamsulosin 0.8 milliGRAM(s) Oral at bedtime  traZODone 100 milliGRAM(s) Oral at bedtime    MEDICATIONS  (PRN):  acetaminophen   Tablet .. 650 milliGRAM(s) Oral every 6 hours PRN  diazepam Tablet 5 milliGRAM(s) Oral every 6 hours PRN  simethicone 80 milliGRAM(s) Chew three times a day PRN  traMADol 50 milliGRAM(s) Oral every 4 hours PRN  zolpidem 5 milliGRAM(s) Oral at bedtime PRN    REVIEW OF SYSTEMS:  CONSTITUTIONAL: No fever, weight loss, or fatigue  RESPIRATORY: No cough, No shortness of breath  CARDIOVASCULAR: No chest pain, palpitations, dizziness, or leg swelling  GASTROINTESTINAL: No abdominal or epigastric pain. No nausea, vomiting, or hematemesis; No diarrhea or constipation.  GENITOURINARY: No dysuria, frequency, hematuria, or incontinence  MUSCULOSKELETAL: No joint pain or swelling; ++back pain    VITAL SIGNS:  T(F): 98.7 (10-12-20 @ 05:03), Max: 98.7 (10-12-20 @ 05:03)  HR: 72 (10-12-20 @ 05:03) (68 - 89)  BP: 179/90 (10-12-20 @ 05:03) (173/92 - 179/90)  RR: 18 (10-12-20 @ 05:03) (18 - 18)  SpO2: 95% (10-12-20 @ 05:03) (95% - 99%)    I&O's Summary  11 Oct 2020 07:01  -  12 Oct 2020 07:00  --------------------------------------------------------  IN: 240 mL / OUT: 200 mL / NET: 40 mL    PHYSICAL EXAM:  GENERAL: Pt in pain, no resp distress, well-groomed, well-developed  HEAD:  Atraumatic, Normocephalic  NERVOUS SYSTEM:  Alert & Oriented X3, Motor Strength 5/5 B/L upper and lower extremities  CHEST/LUNG: Clear to auscultation bilaterally; No rales, rhonchi, wheezing, or rubs  HEART: Regular rate and rhythm; No murmurs, rubs, or gallops  ABDOMEN: Soft, Nontender, Nondistended; Bowel sounds present  EXTREMITIES:  2+ Peripheral Pulses, No clubbing, cyanosis, or edema, LLE SLR +  BACK: Tenderness to palpation L lower back- gluteal area, no erythema or swelling. Old surgical midline scar, no tenderness to palpation of spine.      LABS:                        9.9    9.04  )-----------( 389      ( 12 Oct 2020 06:38 )             35.2     CBC Full  -  ( 12 Oct 2020 06:38 )  WBC Count : 9.04 K/uL  RBC Count : 5.48 M/uL  Hemoglobin : 9.9 g/dL  Hematocrit : 35.2 %  Platelet Count - Automated : 389 K/uL  Mean Cell Volume : 64.2 fl  Mean Cell Hemoglobin : 18.1 pg  Mean Cell Hemoglobin Concentration : 28.1 gm/dL  Auto Neutrophil # : x  Auto Lymphocyte # : x  Auto Monocyte # : x  Auto Eosinophil # : x  Auto Basophil # : x  Auto Neutrophil % : x  Auto Lymphocyte % : x  Auto Monocyte % : x  Auto Eosinophil % : x  Auto Basophil % : x    10-12    139  |  104  |  14  ----------------------------<  102<H>  3.3<L>   |  23  |  0.79    Ca    9.3      12 Oct 2020 06:37  Phos  2.9     10-12  Mg     2.2     10-12      RADIOLOGY & ADDITIONAL TESTS:    < from: CT Head No Cont (10.08.20 @ 07:55) >  EXAM:  PELVIS AP ONLY                        EXAM:  CT BRAIN                          *** ADDENDUM 10/08/2020  ***  Please note radiograph of the pelvis is also included in this study.  X-ray pelvis dated 10/8/2020 7:55 AM    INDICATION: 70 years-old Male with Fall.  PRIOR STUDIES: CT pelvis from 10/6/2020  FINDINGS: There has been prior posterior lumbar spinal and bilateral sacroiliac joint fusion.  The hardware is more optimally evaluated on the recent CT from 10/6/2020. There is no definite acute hardware complication. There is no fracture or acute traumatic dislocation. No soft tissue abnormalities are seen.    IMPRESSION:  No fracture.    *** END OF ADDENDUM 10/08/2020  ***  PROCEDURE DATE:  10/08/2020      INTERPRETATION:  CT OF THE HEAD WITHOUT CONTRAST  CT CERVICAL SPINE WITHOUT CONTRAST    CLINICAL INDICATION: Status post unwitnessed fall.    TECHNIQUE: Volumetric CT acquisition was performed through the brain and reviewed using brain and bone windowtechnique. Dose optimization techniques were utilized including kVp/mA modulation along with iterative reconstructions.  Thin section CT images were obtained through cervical spine with overlapping reconstructions.  Sagittal, coronal and bilateral oblique 2D reformats were then generated from the initial images. Dose reduction techniques were utilized including kVp/mA dose modulation based on patient size and iterative reconstruction. 3-D reconstructions of the spine was performed on a separate workstation and reviewed.    COMPARISON: CT head from 2/7/2020    FINDINGS:  CT head:  There is a right frontal approach ventriculoperitoneal shunt which is in stable position, terminating near the foramen of Monro. Ventricular caliber is relatively stable.  There is no acute loss of gray-white differentiation. There is a chronic right subinsular lacunar infarct.  There is no evidence of mass effect, midline shift, acute intracranial hemorrhage, or extra-axial collections.   The calvarium is intact. The paranasal sinuses are clear.The mastoid air cells are predominantly clear. The orbits are unremarkable.    CT cervical spine  There is reversal of usual cervical lordosis. There is no significant subluxation. Vertebral body height is preserved throughout the cervical spine. There is moderate loss of intervertebral disc height at C4-C5 and C6-C7, there is severe loss of intervertebral disc height at C5-C6.  There are multilevel diffuse disc osteophyte complexes and ligamentum flavum thickening which contribute to multilevel central canal narrowing to variable degrees.  There is multilevel facet hypertrophy and uncovertebral spurring which contribute to multilevel neural foraminal narrowing.,  The paravertebral soft tissues are unremarkable.      IMPRESSION:  CT HEAD: There is no acute intracranial hemorrhage or depressed calvarial fracture.  CT CERVICAL SPINE: No fracture or acute traumatic malalignment. There are degenerative changes throughout the cervical spineas described.  ***Please see the addendum at the top of this report. It may contain additional important information or changes.****    ILEANA HINES M.D., ATTENDING RADIOLOGIST  This document has been electronically signed. Oct  8 2020  8:48AM  Addend:  ILEANA HINES M.D., ATTENDING RADIOLOGIST  This addendum was electronically signed on: Oct  8 2020  9:26AM.    < end of copied text >

## 2020-10-12 NOTE — PROGRESS NOTE ADULT - PROBLEM SELECTOR PLAN 10
DVT ppx: Lovenox   Diet: Regular   PT consulted, pending final recs     Alfredo Lopez (Friend/Roommate): [phone]

## 2020-10-12 NOTE — PROVIDER CONTACT NOTE (MEDICATION) - RECOMMENDATIONS
Notify  Ailin Lawson MD Team 4-Medicine Notify  Ailin Lawson MD Team 4-Medicine, recommend an order for melatonin.

## 2020-10-12 NOTE — CONSULT NOTE ADULT - SUBJECTIVE AND OBJECTIVE BOX
Chief Complaint:  Patient is a 70y old  Male who presents with a chief complaint of Back Pain (12 Oct 2020 06:50)    HPI:  70 year old male with HTN, GULSHAN (on home CPAP), BPH, NPH (s/p  shunt and MRI-incompatible valve in 2017), sciatica, and chronic back pain (s/p multiple spinal surgeries; most recently bilateral percutaneous sacroiliac joint fusions in 6/20) presenting with AMS and acutely worsening back pain in the past month. He describes the pain predominantly in the lumbar region with frequent radiation down his legs (L>R). Patient states he was previously prescribed opioids for pain management but stopped taking those several months ago as he was getting dizzy and had several falls. Since his recent back surgery in June, patient has been taking tramadol, which he states takes the edge off the sharp pains but at baseline he continues to have severe pain. Over the past few weeks, he feels like he's unable to tolerate sitting or standing for more than 30 minutes. Of note, patient has slow speech and is a poor historian.     In speaking with patient's roommate/friend for collateral, it appears that patient went to bed early yesterday evening and appeared to be behaving at his baseline. When his roommate checked on him later in the evening, he found him on the floor and he appeared to be looking for something. The roommate helped him up and got him to bed. Around 5AM, roommate heard a noise and found patient on his hands and knees by his bed again, saying repeatedly that he was looking for something and appeared confused. Roommate states that the patient was acting abnormal, speaking slower, and could not get him up. He called EMS and patient had an episode of urinary incontinence en route to hospital. Denies fever/chills, dysuria, LOC, recent trauma, abdominal pain, n/v/d, visual changes, CP, palpitations. Denies history of seizures.     Additionally, per the roommate, patient has had severe pain and difficulty ambulating since his most recent back surgery in June without much help from PT. Patient most recently followed up with Dr. Castanon on 9/29 with plans for repeat imaging. His pain has acutely worsened in the past month and in the past week, patient has been complaining of generalized pain. The patient states that the patient has been known to take more pain medications/benzodiazepines than he's prescribed. He also reports the patient has severe anxiety and depression, but denies suicidal ideations.     Current out- patient pain regimen: None    Out Patient Pain Management provider: Dr. Esau AGUILERA Prescription Monitoring Program: Reference #[id number]    Opioid Risk Tool (ORT-OUD) Score: High on original consult  Endorsed Oxycodone OD in past    Pt sitting at dge of bed, friend at bedside. C/O LLE "throbbing" pain starting at posterior buttock and radiating postero-lateral to anterior knee. Pain is constant but worse when sitting. States he is aware his problem is "a pinched nerve" and knows opioids will not help and does not wish to take opioids. Endorses muscle spasm.   Denies any other issues.   Appears mild to moderately uncomfortable at times.    PHYSICAL EXAM  GENERAL: NAD, well-groomed, well-developed, no signs of toxicity,   NEURO: Alert & Oriented X3, Good concentration; Cranial Nerves II-XII intact  HEENT: Normocephalic; speech clear and fluid  RESP: Lungs Clear to auscultation bilaterally; no rales or rhonchi  CV: Regular rate and rhythm  GI: Abdomen Soft, Nontender, Nondistended; Bowel sounds present  EXTREMITIES: 2+ Peripheral Pulses, No cyanosis or edema; moves all extremities equally against gravity  MSK: Motor Strength 5/5 B/L upper and lower extremities; ROM intact; Straight Leg Raise LLE positive at 35 degrees, RLE negative; no paravertebral tenderness; no tenderness on spinal palpation  SKIN: No rashes or lesions  PSYCH: affect normal; good eye contact; no signs of depression or anxiety    PAST MEDICAL & SURGICAL HISTORY:  GULSHAN on CPAP    NPH (normal pressure hydrocephalus)    Chronic back pain greater than 3 months duration    Small intestine disorder  &quot;ilitis&quot;   steroids   1967    Lumbar disc displacement without myelopathy    Sciatica    Anxiety    Vertebral Sciatica    Insomnia    Depression    HTN (Hypertension)    Fusion of sacral region of spine  6/4/20 at [facility]   2 surgeries for bilateral percutaneous sacroiliac joint fusions    S/P fusion of thoracic spine  T11-Pelvis fusion at [facility]   1/28/20    S/P lumbar fusion  Revision L1-L5 at [facility] by Dr. Huynh  6/5/19    Battery end of life of spinal cord stimulator  Removal of Nervo spinal battery at [facility]   7/10/2018    Fusion of lumbosacral spine  S1-L5 Fusion at [facility]   4/26/2018    History of creation of ventriculoperitoneal shunt  12/19/2017 at [facility]    S/P lumbar fusion  L3-L5 Fusion at [facility]  3/18/2014    S/P laminectomy  L3-L4 at [facility]  7/29/2013    S/P lumbar fusion  L4-L5 on Oct 2011  pins/screws 2012   spinal surgery 2013    Dehiscence, Operation Wound/Debridement  1/10/2010  Infection  [facility]    S/P Laminectomy  L4-L5 2009  complicated by infection requiring abx  [facility]        FAMILY HISTORY:  FH: renal failure  Mother    FH: diabetes mellitus  Mother    FH: lung cancer  Father        Allergies    Biaxin (Other)  Lidoderm (Unknown)  morphine (Short breath; Rash)      MEDICATIONS  (STANDING):  amLODIPine   Tablet 10 milliGRAM(s) Oral daily  ascorbic acid 500 milliGRAM(s) Oral daily  citalopram 40 milliGRAM(s) Oral daily  cyanocobalamin 1000 MICROGram(s) Oral daily  enoxaparin Injectable 40 milliGRAM(s) SubCutaneous daily  ferrous    sulfate 325 milliGRAM(s) Oral daily  influenza   Vaccine 0.5 milliLiter(s) IntraMuscular once  losartan 100 milliGRAM(s) Oral daily  tamsulosin 0.8 milliGRAM(s) Oral at bedtime  traZODone 100 milliGRAM(s) Oral at bedtime    MEDICATIONS  (PRN):  acetaminophen   Tablet .. 650 milliGRAM(s) Oral every 6 hours PRN Mild Pain (1 - 3), Moderate Pain (4 - 6)  diazepam    Tablet 5 milliGRAM(s) Oral every 6 hours PRN Anxiety  simethicone 80 milliGRAM(s) Chew three times a day PRN Gas  traMADol 50 milliGRAM(s) Oral every 4 hours PRN Severe Pain (7 - 10)  zolpidem 5 milliGRAM(s) Oral at bedtime PRN Insomnia      Vital Signs:  T(C): 37 (10-12-20 @ 13:25)  HR: 70 (10-12-20 @ 13:25)  BP: 166/84 (10-12-20 @ 13:25)  RR: 18 (10-12-20 @ 13:25)  SpO2: 96% (10-12-20 @ 13:25)    Pertinent labs/radiology:  Reviewed                          9.9    9.04  )-----------( 389      ( 12 Oct 2020 06:38 )             35.2       10-12    139  |  104  |  14  ----------------------------<  102<H>  3.3<L>   |  23  |  0.79    Ca    9.3      12 Oct 2020 06:37  Phos  2.9     10-12  Mg     2.2     10-12    < from: Xray Pelvis AP only (10.08.20 @ 08:24) >  FINDINGS: There has been prior posterior lumbar spinal and bilateral sacroiliac joint fusion.  The hardware is more optimally evaluated on the recent CT from 10/6/2020. There is no definite acute hardware complication. There is no fracture or acute traumatic dislocation. No soft tissue abnormalities are seen.    < end of copied text >    < from: Xray Shunt Series (10.08.20 @ 22:57) >  IMPRESSION:Ventriculoperitoneal shunt distal  catheter tip in the region of the right lower quadrant without discontinuity along its course. Catheter forms a loop in the right hemiabdomen but does not appear to be kinked.    < end of copied text >

## 2020-10-12 NOTE — PROVIDER CONTACT NOTE (MEDICATION) - ACTION/TREATMENT ORDERED:
Ailin Lawson MD Team 4-Medicine notified and aware.  Awaiting an order for melatonin, will administer as per order.

## 2020-10-12 NOTE — CONSULT NOTE ADULT - ASSESSMENT
69M known to our service, PMHx chronic back pain s/p multiple lumbar laminectomy/fusion, NPH s/p  shunt (MRI-incompatible valve in 2017), WILMER w/ Oxycodone OD, HTN, HLD, anxiety, depression, GULSHAN on CPAP, BPH, G80-Zyvfop Fusion 1/2020, Falls  Admitted for Back pain, AMS, Fall    Recommend:  Discontinue Tramadol  Discontinue Diazepam and restart Xanax home dose 1 mg every 6 hours per   Robaxin 500 mg PO every 6 hours PRN spasm  Avoid Opioids  Continue Celexa and Trazodone, may help with neuropathic pain  Lidoderm  Warm/cool packs for comfort  Bowel Regimen  PT  Monitor for sedation, respiratory depression  Pt knows to follow up with Dr. Esau Florence for continued pain management and possible interventional procedure after discharge  Narcan Rescue Kit for discharge (Naloxone 4 mg/0.1 ml nasal spray - 1 spray q 2-3 minutes alternating between nostrils)    Signing off, reconsult as needed      Time spent on encounter:   20     Minutes    [facility]  [phone]

## 2020-10-12 NOTE — PROVIDER CONTACT NOTE (MEDICATION) - ASSESSMENT
Patient is AxO x4, VS- 177/90, hr-89, rr-18, temp-98.6, SPO2-96% room air.  Patient is unable to sleep, chronic pain not completely relieved with medication.

## 2020-10-12 NOTE — PROGRESS NOTE ADULT - PROBLEM SELECTOR PLAN 9
Patient takes ambien 10mg at home and trazadone 400mg qHS.   -Will start Ambien 5mg qday   -Continue trazadone 100mg qHS DVT ppx: Lovenox   Diet: Regular   PT consulted, pending final recs     Alfredo Lopez (Friend/Roommate): [phone]

## 2020-10-12 NOTE — DATA REVIEWED
[de-identified] : LUMBAR PELVIS 9/23/20  [facility] [de-identified] : Scoliosis  9/23/20 Radha

## 2020-10-12 NOTE — PROGRESS NOTE ADULT - PROBLEM SELECTOR PLAN 3
P/w worsening back pain for past month. Patient follows with NSGY Dr. Castanon with m/r appt last week. Outpatient imaging below.   -CT Pelvis Bony/Lumbar Spine (10/6): Postsurgical changes. Lucencies measuring up to 3 mm in thickness around the 2 right sided iliac screws which extend from the spinal fusion construct, concerning for possible loosening and/or infection. Mild concavity of the superior endplate of L1, without significant change. Grade 1 retrolisthesis of L2 on L3 redemonstrated. Small fluid/edema in the posterior subcutaneous tissues. Anterior fusion hardware with lucencies around the screws at S1 redemonstrated, concerning for possible loosening and/or infection.   -XR AP (10/8): No fracture.  -Will touch base with Dr. Rogers  -Neurosurg consulted this AM, pending recs   -Consider starting abx if patient becomes febrile and get cx.   -Patient's pain has been uncontrolled and patient's quality of life has declined significantly since his most recent surgery  -Continue with tramadol 50mg q6H PRN  -Pain management consulted, pending recs. Reached out to them today and left a message.    -ESR/CRP mildly elevated Hgb 8.2 (MCV 65.6) on admission. M/r Hgb 7.7 in June 2020.   -Iron studies show IRINA   -C/w iron supplementation with Vit C   -CBC qd, outpt colonoscopy

## 2020-10-12 NOTE — PROGRESS NOTE ADULT - PROBLEM SELECTOR PLAN 6
-Continue home tamsulosin 0.8mg qHS Patient desatting to 91% in EMS. Satting 96-98% on 2L NC in ED.   -Patient does not know home CPAP settings and is refusing inpt CPAP.

## 2020-10-12 NOTE — PROGRESS NOTE ADULT - PROBLEM SELECTOR PLAN 4
Hgb 8.2 (MCV 65.6) on admission. M/r Hgb 7.7 in June 2020.   -Iron studies show IRINA   -C/w iron supplementation with Vit C   -Continue to monitor daily CBCs Patient with history of  shunt (Codman Dudleyrhodam) placement in 2017 at [facility] by Dr. Ibarra.   -CT Head (10/8): No evidence of worsening hydrocephalus or misplaced shunt/valve   -XR Shunt Series w/o evidence of kinking

## 2020-10-12 NOTE — PROGRESS NOTE ADULT - PROBLEM SELECTOR PLAN 8
Patient follows with Psychiatrist Dr. Cory Bhandari. Per patient's friend, patient's depression has been uncontrolled in the previous months. No SI/HI.   -Continue home celexa 40mg qday   -Home Xanax transitioned to Valium 5mg q6 PRN as it may help with patient's pain as well. Adjust as needed.   -Patient to follow up with oupatient Psychiatrist Patient takes ambien 10mg at home and Trazadone 400mg qHS.   -c/w Ambien 5mg qday and trazadone 100mg qHS

## 2020-10-12 NOTE — PROGRESS NOTE ADULT - PROBLEM SELECTOR PLAN 1
s/p unwitnessed fall. Found to be confused with slow speech by roommate with difficulty standing up. Urinary incontinence in EMS. Worsening hydrocephalus vs polypharmacy vs syncope --> improved; likely in the setting of polypharmacy.   -CT Head (10/8): There is no acute intracranial hemorrhage or depressed calvarial fracture.  -CT Cspine (10/8): No fracture or acute traumatic malalignment. There are degenerative changes throughout the cervical spine  -Urine tox positive for benzos and THC  -TTE (10/9) wnl P/w worsening back pain for past month, pt was using polypharmacy for pain control and was BIBEMS in AMS 2/2 pain meds OD, with Utox pos for THC and benzos.   -Repeat CT pelvis, L+C spine - no fractures, chronic degenerative changes.   -Neurosurgery consulted - no surgical intervention, pain control and outpt f/u with Dr. Castanon.    -C/w IV tylenols prn and valium (opioids does not work for him)  -Pain management consulted, pending recs.   - will contact pt psychiatrist for possible change from SSRI to SNRI as pain seems to be neuropathic.  -PT pending dispo recs, will contact today to reevaluate.

## 2020-10-13 ENCOUNTER — APPOINTMENT (OUTPATIENT)
Dept: SPINE | Facility: CLINIC | Age: 70
End: 2020-10-13

## 2020-10-13 ENCOUNTER — TRANSCRIPTION ENCOUNTER (OUTPATIENT)
Age: 70
End: 2020-10-13

## 2020-10-13 VITALS
RESPIRATION RATE: 18 BRPM | HEART RATE: 102 BPM | TEMPERATURE: 99 F | DIASTOLIC BLOOD PRESSURE: 89 MMHG | SYSTOLIC BLOOD PRESSURE: 151 MMHG | OXYGEN SATURATION: 92 %

## 2020-10-13 LAB
ANION GAP SERPL CALC-SCNC: 13 MMOL/L — SIGNIFICANT CHANGE UP (ref 5–17)
BUN SERPL-MCNC: 15 MG/DL — SIGNIFICANT CHANGE UP (ref 7–23)
CALCIUM SERPL-MCNC: 9.5 MG/DL — SIGNIFICANT CHANGE UP (ref 8.4–10.5)
CHLORIDE SERPL-SCNC: 105 MMOL/L — SIGNIFICANT CHANGE UP (ref 96–108)
CO2 SERPL-SCNC: 22 MMOL/L — SIGNIFICANT CHANGE UP (ref 22–31)
CREAT SERPL-MCNC: 0.82 MG/DL — SIGNIFICANT CHANGE UP (ref 0.5–1.3)
GLUCOSE SERPL-MCNC: 91 MG/DL — SIGNIFICANT CHANGE UP (ref 70–99)
HCT VFR BLD CALC: 37.2 % — LOW (ref 39–50)
HGB BLD-MCNC: 10.5 G/DL — LOW (ref 13–17)
MAGNESIUM SERPL-MCNC: 2.1 MG/DL — SIGNIFICANT CHANGE UP (ref 1.6–2.6)
MCHC RBC-ENTMCNC: 18.4 PG — LOW (ref 27–34)
MCHC RBC-ENTMCNC: 28.2 GM/DL — LOW (ref 32–36)
MCV RBC AUTO: 65 FL — LOW (ref 80–100)
NRBC # BLD: 0 /100 WBCS — SIGNIFICANT CHANGE UP (ref 0–0)
PHOSPHATE SERPL-MCNC: 3 MG/DL — SIGNIFICANT CHANGE UP (ref 2.5–4.5)
PLATELET # BLD AUTO: 393 K/UL — SIGNIFICANT CHANGE UP (ref 150–400)
POTASSIUM SERPL-MCNC: 3.5 MMOL/L — SIGNIFICANT CHANGE UP (ref 3.5–5.3)
POTASSIUM SERPL-SCNC: 3.5 MMOL/L — SIGNIFICANT CHANGE UP (ref 3.5–5.3)
RBC # BLD: 5.72 M/UL — SIGNIFICANT CHANGE UP (ref 4.2–5.8)
RBC # FLD: 20.9 % — HIGH (ref 10.3–14.5)
SODIUM SERPL-SCNC: 140 MMOL/L — SIGNIFICANT CHANGE UP (ref 135–145)
WBC # BLD: 10.19 K/UL — SIGNIFICANT CHANGE UP (ref 3.8–10.5)
WBC # FLD AUTO: 10.19 K/UL — SIGNIFICANT CHANGE UP (ref 3.8–10.5)

## 2020-10-13 PROCEDURE — 72131 CT LUMBAR SPINE W/O DYE: CPT

## 2020-10-13 PROCEDURE — 72170 X-RAY EXAM OF PELVIS: CPT

## 2020-10-13 PROCEDURE — 84295 ASSAY OF SERUM SODIUM: CPT

## 2020-10-13 PROCEDURE — 83540 ASSAY OF IRON: CPT

## 2020-10-13 PROCEDURE — 74018 RADEX ABDOMEN 1 VIEW: CPT

## 2020-10-13 PROCEDURE — 82330 ASSAY OF CALCIUM: CPT

## 2020-10-13 PROCEDURE — 86769 SARS-COV-2 COVID-19 ANTIBODY: CPT

## 2020-10-13 PROCEDURE — 84100 ASSAY OF PHOSPHORUS: CPT

## 2020-10-13 PROCEDURE — 82306 VITAMIN D 25 HYDROXY: CPT

## 2020-10-13 PROCEDURE — 70250 X-RAY EXAM OF SKULL: CPT

## 2020-10-13 PROCEDURE — 84132 ASSAY OF SERUM POTASSIUM: CPT

## 2020-10-13 PROCEDURE — 82565 ASSAY OF CREATININE: CPT

## 2020-10-13 PROCEDURE — 71045 X-RAY EXAM CHEST 1 VIEW: CPT

## 2020-10-13 PROCEDURE — 82947 ASSAY GLUCOSE BLOOD QUANT: CPT

## 2020-10-13 PROCEDURE — 80053 COMPREHEN METABOLIC PANEL: CPT

## 2020-10-13 PROCEDURE — U0003: CPT

## 2020-10-13 PROCEDURE — 80307 DRUG TEST PRSMV CHEM ANLYZR: CPT

## 2020-10-13 PROCEDURE — 85027 COMPLETE CBC AUTOMATED: CPT

## 2020-10-13 PROCEDURE — 85018 HEMOGLOBIN: CPT

## 2020-10-13 PROCEDURE — 99239 HOSP IP/OBS DSCHRG MGMT >30: CPT

## 2020-10-13 PROCEDURE — 90686 IIV4 VACC NO PRSV 0.5 ML IM: CPT

## 2020-10-13 PROCEDURE — 82550 ASSAY OF CK (CPK): CPT

## 2020-10-13 PROCEDURE — 85025 COMPLETE CBC W/AUTO DIFF WBC: CPT

## 2020-10-13 PROCEDURE — 72192 CT PELVIS W/O DYE: CPT

## 2020-10-13 PROCEDURE — 83550 IRON BINDING TEST: CPT

## 2020-10-13 PROCEDURE — 82272 OCCULT BLD FECES 1-3 TESTS: CPT

## 2020-10-13 PROCEDURE — 86140 C-REACTIVE PROTEIN: CPT

## 2020-10-13 PROCEDURE — 83605 ASSAY OF LACTIC ACID: CPT

## 2020-10-13 PROCEDURE — 84484 ASSAY OF TROPONIN QUANT: CPT

## 2020-10-13 PROCEDURE — 97116 GAIT TRAINING THERAPY: CPT

## 2020-10-13 PROCEDURE — 83036 HEMOGLOBIN GLYCOSYLATED A1C: CPT

## 2020-10-13 PROCEDURE — 85014 HEMATOCRIT: CPT

## 2020-10-13 PROCEDURE — 80048 BASIC METABOLIC PNL TOTAL CA: CPT

## 2020-10-13 PROCEDURE — 87086 URINE CULTURE/COLONY COUNT: CPT

## 2020-10-13 PROCEDURE — 97530 THERAPEUTIC ACTIVITIES: CPT

## 2020-10-13 PROCEDURE — 82803 BLOOD GASES ANY COMBINATION: CPT

## 2020-10-13 PROCEDURE — 93005 ELECTROCARDIOGRAM TRACING: CPT

## 2020-10-13 PROCEDURE — 84443 ASSAY THYROID STIM HORMONE: CPT

## 2020-10-13 PROCEDURE — 93306 TTE W/DOPPLER COMPLETE: CPT

## 2020-10-13 PROCEDURE — 82728 ASSAY OF FERRITIN: CPT

## 2020-10-13 PROCEDURE — 99285 EMERGENCY DEPT VISIT HI MDM: CPT | Mod: 25

## 2020-10-13 PROCEDURE — 70450 CT HEAD/BRAIN W/O DYE: CPT

## 2020-10-13 PROCEDURE — 85652 RBC SED RATE AUTOMATED: CPT

## 2020-10-13 PROCEDURE — 81001 URINALYSIS AUTO W/SCOPE: CPT

## 2020-10-13 PROCEDURE — 83735 ASSAY OF MAGNESIUM: CPT

## 2020-10-13 PROCEDURE — 97161 PT EVAL LOW COMPLEX 20 MIN: CPT

## 2020-10-13 PROCEDURE — 72125 CT NECK SPINE W/O DYE: CPT

## 2020-10-13 PROCEDURE — 82435 ASSAY OF BLOOD CHLORIDE: CPT

## 2020-10-13 RX ORDER — ALPRAZOLAM 0.25 MG
1 TABLET ORAL
Qty: 28 | Refills: 0
Start: 2020-10-13 | End: 2020-10-19

## 2020-10-13 RX ORDER — ASCORBIC ACID 60 MG
1 TABLET,CHEWABLE ORAL
Qty: 0 | Refills: 0 | DISCHARGE
Start: 2020-10-13

## 2020-10-13 RX ORDER — METHOCARBAMOL 500 MG/1
1 TABLET, FILM COATED ORAL
Qty: 120 | Refills: 0
Start: 2020-10-13 | End: 2020-11-11

## 2020-10-13 RX ORDER — FERROUS SULFATE 325(65) MG
1 TABLET ORAL
Qty: 30 | Refills: 0
Start: 2020-10-13 | End: 2020-11-11

## 2020-10-13 RX ORDER — ALPRAZOLAM 0.25 MG
1 TABLET ORAL
Qty: 0 | Refills: 0 | DISCHARGE
Start: 2020-10-13

## 2020-10-13 RX ORDER — ZOLPIDEM TARTRATE 10 MG/1
1 TABLET ORAL
Qty: 30 | Refills: 0
Start: 2020-10-13 | End: 2020-11-11

## 2020-10-13 RX ORDER — AMLODIPINE BESYLATE 2.5 MG/1
1 TABLET ORAL
Qty: 30 | Refills: 0
Start: 2020-10-13 | End: 2020-11-11

## 2020-10-13 RX ORDER — ZOLPIDEM TARTRATE 10 MG/1
1 TABLET ORAL
Qty: 0 | Refills: 0 | DISCHARGE

## 2020-10-13 RX ADMIN — LOSARTAN POTASSIUM 100 MILLIGRAM(S): 100 TABLET, FILM COATED ORAL at 05:12

## 2020-10-13 RX ADMIN — METHOCARBAMOL 500 MILLIGRAM(S): 500 TABLET, FILM COATED ORAL at 05:15

## 2020-10-13 RX ADMIN — Medication 500 MILLIGRAM(S): at 12:00

## 2020-10-13 RX ADMIN — ENOXAPARIN SODIUM 40 MILLIGRAM(S): 100 INJECTION SUBCUTANEOUS at 12:00

## 2020-10-13 RX ADMIN — INFLUENZA VIRUS VACCINE 0.5 MILLILITER(S): 15; 15; 15; 15 SUSPENSION INTRAMUSCULAR at 16:09

## 2020-10-13 RX ADMIN — PREGABALIN 1000 MICROGRAM(S): 225 CAPSULE ORAL at 12:00

## 2020-10-13 RX ADMIN — AMLODIPINE BESYLATE 10 MILLIGRAM(S): 2.5 TABLET ORAL at 05:12

## 2020-10-13 RX ADMIN — Medication 1 MILLIGRAM(S): at 03:18

## 2020-10-13 RX ADMIN — Medication 1 MILLIGRAM(S): at 10:13

## 2020-10-13 RX ADMIN — Medication 1 MILLIGRAM(S): at 16:04

## 2020-10-13 RX ADMIN — CITALOPRAM 40 MILLIGRAM(S): 10 TABLET, FILM COATED ORAL at 16:04

## 2020-10-13 RX ADMIN — Medication 325 MILLIGRAM(S): at 12:00

## 2020-10-13 NOTE — PROGRESS NOTE ADULT - PROBLEM SELECTOR PLAN 1
P/w worsening back pain for past month, pt was using polypharmacy for pain control and was BIBEMS in AMS 2/2 pain meds OD, with Utox pos for THC and benzos.   -Repeat CT pelvis, L+C spine - no fractures, chronic degenerative changes.   -Neurosurgery consulted - no surgical intervention, pain control and outpt f/u with Dr. Castanon.    -C/w IV tylenols prn and valium (opioids does not work for him)  -Pain management consulted, pending recs.   - will contact pt psychiatrist for possible change from SSRI to SNRI as pain seems to be neuropathic.  -PT pending dispo recs, will contact today to reevaluate. Improving.   P/w worsening back pain for past month, pt was using polypharmacy for pain control and was BIBEMS in AMS 2/2 pain meds OD, with Utox pos for THC and benzos.   -Repeat CT pelvis, L+C spine - no fractures, chronic degenerative changes.   -Neurosurgery consulted - no surgical intervention, pain control and outpt f/u with Dr. Castanon.    -C/w Xanex and robaxan.  -Pain management consulted, pending recs.   - contacted pt psychiatrist for possible change from SSRI to SNRI: pt is too emotionally unstable to medication change and high suicide risk.   -PT rec home w outpt PT. d/c planned today, pending BP control.

## 2020-10-13 NOTE — PROGRESS NOTE ADULT - SUBJECTIVE AND OBJECTIVE BOX
Pete Starr, PGY-1  Pager: [phone] / [phone]    CHIEF COMPLAINT: Patient is a 70y old  Male who presents with a chief complaint of Back Pain (13 Oct 2020 06:49)    INTERVAL HPI/OVERNIGHT EVENTS: Pt slept for the first time in a few nights. Was confused early this morning according to RN and pt himself but when I saw him he was oriented. He reports the neuropathic pain is much improved but he has now muscle pain from working with PT. The pain is 8/10, not radiating, feels like a stretch. He denies H/A, sob, abdominal pain, Last BM last night.     MEDICATIONS (STANDING):  ALPRAZolam 1 milliGRAM(s) Oral every 6 hours  amLODIPine   Tablet 10 milliGRAM(s) Oral daily  ascorbic acid 500 milliGRAM(s) Oral daily  citalopram 40 milliGRAM(s) Oral daily  cyanocobalamin 1000 MICROGram(s) Oral daily  enoxaparin Injectable 40 milliGRAM(s) SubCutaneous daily  ferrous    sulfate 325 milliGRAM(s) Oral daily  influenza   Vaccine 0.5 milliLiter(s) IntraMuscular once  losartan 100 milliGRAM(s) Oral daily  tamsulosin 0.8 milliGRAM(s) Oral at bedtime  traZODone 100 milliGRAM(s) Oral at bedtime    MEDICATIONS  (PRN):  acetaminophen   Tablet .. 650 milliGRAM(s) Oral every 6 hours PRN  methocarbamol 500 milliGRAM(s) Oral every 6 hours PRN  simethicone 80 milliGRAM(s) Chew three times a day PRN  zolpidem 5 milliGRAM(s) Oral at bedtime PRN    REVIEW OF SYSTEMS:  CONSTITUTIONAL: No fever, weight loss, or fatigue  EYES: No eye pain, visual disturbances, or discharge  ENMT:  No difficulty hearing, tinnitus, vertigo; No sinus or throat pain  NECK: No pain or stiffness  RESPIRATORY: No cough, wheezing, chills or hemoptysis; No shortness of breath  CARDIOVASCULAR: No chest pain, palpitations, dizziness, or leg swelling  GASTROINTESTINAL: No abdominal or epigastric pain. No nausea, vomiting, or hematemesis; No diarrhea or constipation. No melena or hematochezia.  GENITOURINARY: No dysuria, frequency, hematuria, or incontinence  NEUROLOGICAL: No headaches, memory loss, loss of strength, numbness, or tremors  SKIN: No itching, burning, rashes, or lesions   MUSCULOSKELETAL: No joint pain or swelling; No muscle, back, or extremity pain    VITAL SIGNS:  T(F): 99.1 (10-13-20 @ 05:03), Max: 99.1 (10-13-20 @ 05:03)  HR: 64 (10-13-20 @ 05:03) (64 - 83)  BP: 160/88 (10-13-20 @ 06:05) (160/88 - 192/100)  RR: 18 (10-13-20 @ 05:03) (18 - 18)  SpO2: 97% (10-13-20 @ 05:03) (95% - 97%)    I&O's Summary  12 Oct 2020 07:01  -  13 Oct 2020 07:00  --------------------------------------------------------  IN: 840 mL / OUT: 550 mL / NET: 290 mL    PHYSICAL EXAM:  GENERAL: NAD, well-groomed, well-developed  HEAD:  Atraumatic, Normocephalic  EYES: EOMI, PERRLA, conjunctiva and sclera clear  ENMT: No tonsillar erythema, exudates, or enlargement; Moist mucous membranes  NECK: Supple, No JVD, Normal thyroid  NERVOUS SYSTEM:  Alert & Oriented X3, Good concentration; Motor Strength 5/5 B/L upper and lower extremities  CHEST/LUNG: Clear to auscultation bilaterally; No rales, rhonchi, wheezing, or rubs  HEART: Regular rate and rhythm; No murmurs, rubs, or gallops  ABDOMEN: Soft, Nontender, Nondistended; Bowel sounds present  EXTREMITIES:  2+ Peripheral Pulses, No clubbing, cyanosis, or edema  LYMPH: No lymphadenopathy noted  SKIN: No rashes or lesions    LABS:                        10.5   10.19 )-----------( 393      ( 13 Oct 2020 06:54 )             37.2     10-13    140  |  105  |  15  ----------------------------<  91  3.5   |  22  |  0.82    Ca    9.5      13 Oct 2020 06:54  Phos  3.0     10-13  Mg     2.1     10-13       Pete Starr, PGY-1  Pager: [phone] / [phone]    CHIEF COMPLAINT: Patient is a 70y old  Male who presents with a chief complaint of Back Pain (13 Oct 2020 06:49)    INTERVAL HPI/OVERNIGHT EVENTS: Pt slept for the first time in a few nights. Was confused early this morning according to RN and pt himself but when I saw him he was oriented. He reports the neuropathic pain is much improved but he has now muscle pain from working with PT. The pain is 8/10, not radiating, feels like a stretch. He denies H/A, sob, abdominal pain, Last BM last night.     MEDICATIONS (STANDING):  ALPRAZolam 1 milliGRAM(s) Oral every 6 hours  amLODIPine   Tablet 10 milliGRAM(s) Oral daily  ascorbic acid 500 milliGRAM(s) Oral daily  citalopram 40 milliGRAM(s) Oral daily  cyanocobalamin 1000 MICROGram(s) Oral daily  enoxaparin Injectable 40 milliGRAM(s) SubCutaneous daily  ferrous    sulfate 325 milliGRAM(s) Oral daily  influenza   Vaccine 0.5 milliLiter(s) IntraMuscular once  losartan 100 milliGRAM(s) Oral daily  tamsulosin 0.8 milliGRAM(s) Oral at bedtime  traZODone 100 milliGRAM(s) Oral at bedtime    MEDICATIONS  (PRN):  acetaminophen   Tablet .. 650 milliGRAM(s) Oral every 6 hours PRN  methocarbamol 500 milliGRAM(s) Oral every 6 hours PRN  simethicone 80 milliGRAM(s) Chew three times a day PRN  zolpidem 5 milliGRAM(s) Oral at bedtime PRN    REVIEW OF SYSTEMS:  CONSTITUTIONAL: No fever, weight loss, or fatigue  RESPIRATORY: No cough No shortness of breath  CARDIOVASCULAR: No chest pain, palpitations, dizziness, or leg swelling  GASTROINTESTINAL: No abdominal or epigastric pain. No nausea, vomiting, or hematemesis; No diarrhea or constipation.  GENITOURINARY: No dysuria, frequency, hematuria, or incontinence  NEUROLOGICAL: No headaches, memory loss, loss of strength, numbness, or tremors    VITAL SIGNS:  T(F): 99.1 (10-13-20 @ 05:03), Max: 99.1 (10-13-20 @ 05:03)  HR: 64 (10-13-20 @ 05:03) (64 - 83)  BP: 160/88 (10-13-20 @ 06:05) (160/88 - 192/100)  RR: 18 (10-13-20 @ 05:03) (18 - 18)  SpO2: 97% (10-13-20 @ 05:03) (95% - 97%)    I&O's Summary  12 Oct 2020 07:01  -  13 Oct 2020 07:00  --------------------------------------------------------  IN: 840 mL / OUT: 550 mL / NET: 290 mL    PHYSICAL EXAM:  GENERAL: NAD, well-groomed, well-developed. comfortable in bed  HEAD:  Atraumatic, Normocephalic  NERVOUS SYSTEM:  Alert & Oriented X3, Good concentration; Motor Strength 5/5 B/L upper and lower extremities  CHEST/LUNG: Clear to auscultation bilaterally; No rales, rhonchi, wheezing, or rubs  HEART: Regular rate and rhythm; No murmurs, rubs, or gallops  ABDOMEN: Soft, Nontender, Nondistended; Bowel sounds present  EXTREMITIES:  2+ Peripheral Pulses, No clubbing, cyanosis, or edema, SLR negative with full range wo pain.     LABS:                        10.5   10.19 )-----------( 393      ( 13 Oct 2020 06:54 )             37.2     10-13    140  |  105  |  15  ----------------------------<  91  3.5   |  22  |  0.82    Ca    9.5      13 Oct 2020 06:54  Phos  3.0     10-13  Mg     2.1     10-13

## 2020-10-13 NOTE — PROGRESS NOTE ADULT - PROBLEM SELECTOR PLAN 3
Hgb 8.2 (MCV 65.6) on admission. M/r Hgb 7.7 in June 2020.   -Iron studies show IRINA   -C/w iron supplementation with Vit C   -CBC qd, outpt colonoscopy

## 2020-10-13 NOTE — PROGRESS NOTE ADULT - PROBLEM SELECTOR PLAN 6
Patient desatting to 91% in EMS. Satting 96-98% on 2L NC in ED.   -Patient does not know home CPAP settings and is refusing inpt CPAP.

## 2020-10-13 NOTE — DISCHARGE NOTE NURSING/CASE MANAGEMENT/SOCIAL WORK - NSDCVIVACCINE_GEN_ALL_CORE_FT
Influenza , 2018/11/7 15:56 , Hill Mcclain (RN)  Influenza , 2020/10/13 16:09 , Nemo Yeboah (RN)  Pneumococcal , 2014/6/17 12:24 , Abhi Billings (RN)

## 2020-10-13 NOTE — PROGRESS NOTE ADULT - PROBLEM SELECTOR PLAN 4
Patient with history of  shunt (Codman Dudleyrhodam) placement in 2017 at [facility] by Dr. Ibarra.   -CT Head (10/8): No evidence of worsening hydrocephalus or misplaced shunt/valve   -XR Shunt Series w/o evidence of kinking

## 2020-10-13 NOTE — PROGRESS NOTE ADULT - PROBLEM SELECTOR PLAN 9
DVT ppx: Lovenox   Diet: Regular   PT consulted, pending final recs     Alfredo Lopez (Friend/Roommate): [phone] DVT ppx: Lovenox   Diet: Regular   PT: Home with outpt PT. Pending d/c later today.     Alfredo Lopez (Friend/Roommate): [phone]

## 2020-10-13 NOTE — PROVIDER CONTACT NOTE (OTHER) - ACTION/TREATMENT ORDERED:
Team 4 Medicine resident notified, RN to administer BP and pain medication and re-check BP in an hour.

## 2020-10-13 NOTE — PROVIDER CONTACT NOTE (OTHER) - ASSESSMENT
Patient complaining of pain 7/10, VS-bp-192/100 manually, temp-99.1, SPO2-97% room air, hr-64, rr-18. BP exceeds parameters in Provider to RN notes.

## 2020-10-13 NOTE — PROVIDER CONTACT NOTE (OTHER) - RECOMMENDATIONS
Notify Team 4 Medicine resident as per Provider to RN notes, patient's bp- 192/100 manually auscultated. Administer BP medication, Amlodipine and Losartan  and pain medication as per order.

## 2020-10-13 NOTE — DISCHARGE NOTE NURSING/CASE MANAGEMENT/SOCIAL WORK - PATIENT PORTAL LINK FT
You can access the FollowMyHealth Patient Portal offered by [facility] by registering at the following website: [email or web address]. By joining [facility]’s FollowMyHealth portal, you will also be able to view your health information using other applications (apps) compatible with our system.

## 2020-10-13 NOTE — PROGRESS NOTE ADULT - PROBLEM SELECTOR PLAN 2
Pt hypertensive SBP ~170 in the past few days.   -Continue irbesartan 300mg qday (losartan 100 equivalent dose while inpt)  -increase Amlo to 10mg qd.  -monitor BP Pt hypertensive SBP ~170 in the past few days, 190 this AM.   -Continue irbesartan 300mg qday (losartan 100 equivalent dose while inpt)  -increased Amlo to 10mg qd yesterday   -monitor BP

## 2020-10-13 NOTE — PROGRESS NOTE ADULT - PROBLEM SELECTOR PLAN 7
Patient follows with Psychiatrist Dr. Cory Bhandari. Per patient's friend, patient's depression has been uncontrolled in the previous months. No SI/HI.   -Continue home celexa 40mg qday   -Home Xanax transitioned to Valium 5mg q6 PRN as it may help with patient's pain as well. Adjust as needed.   -Patient to follow up with outpatient Psychiatrist

## 2020-10-13 NOTE — PROGRESS NOTE ADULT - ATTENDING COMMENTS
mental status baseline now.  suspect polypharmacy.  chronic severe back pain s/p multiple back surgeries  f/up pain mgmt and Neuro-surgery recs  PT eval pending
difficult case  has tried multi-modal pain mgmt with different providers; however, not much relief.  counseled on avoiding polypharmacy.  has seen multiple outpatient providers  ambulated with PT - recommend outpt PT; script written  d/w pain mgmt - outpt f/up with pain mgmt suggested.  attempted to make appt with Dr. Uribe - no response over phone.  offered patient to stay overnight if he does not feel well enough to go home today.  unfortunately his pain is chronic and unresponsive to almost all of the common modalities for pain control (or he is allergic to some agents)  he is advised to continue close outpatient follow up.  neurosurgery evaluated and signed off the case  d/w friend as well at bedside.  we did our level best to help him this admission.
chronic pain  polypharmacy  none of the common pain meds work effectively.  willing to try dose of IV Tylenol  Valium helps  f/up pain mgmt recs and PT recs
await final PT recs.  await chronic pain mgmt recs  given outpt pain MD info for 'fresh' opinion on chronic severe pain issues  fall precautions
await PT re-eval and chronic pain mgmt input  reports poor sleep - encouraged Valium use at bedtime

## 2020-10-13 NOTE — PROGRESS NOTE ADULT - PROBLEM SELECTOR PLAN 8
Patient takes ambien 10mg at home and Trazadone 400mg qHS.   -c/w Ambien 5mg qday and trazadone 100mg qHS Patient takes ambien 10mg at home and Trazadone 400mg qHS.   -c/w Ambien 5mg qday and trazadone 100mg qHS  -will consult pain management if should hold ambien as pt was disoriented this AM.

## 2020-10-14 ENCOUNTER — TREATMENT (OUTPATIENT)
Dept: PHYSICAL THERAPY | Facility: CLINIC | Age: 70
End: 2020-10-14

## 2020-10-14 ENCOUNTER — APPOINTMENT (OUTPATIENT)
Dept: ORTHOPEDIC SURGERY | Facility: CLINIC | Age: 70
End: 2020-10-14

## 2020-10-14 DIAGNOSIS — Z96.611 STATUS POST REVERSE TOTAL SHOULDER REPLACEMENT, RIGHT: Primary | ICD-10-CM

## 2020-10-14 DIAGNOSIS — M25.511 ACUTE PAIN OF RIGHT SHOULDER: ICD-10-CM

## 2020-10-14 PROCEDURE — G0283 ELEC STIM OTHER THAN WOUND: HCPCS | Performed by: PHYSICAL THERAPIST

## 2020-10-14 PROCEDURE — 97161 PT EVAL LOW COMPLEX 20 MIN: CPT | Performed by: PHYSICAL THERAPIST

## 2020-10-14 PROCEDURE — 97140 MANUAL THERAPY 1/> REGIONS: CPT | Performed by: PHYSICAL THERAPIST

## 2020-10-14 NOTE — PROGRESS NOTES
Physical Therapy Initial Evaluation and Plan of Care    Patient: Orion Harvey   : 1950  Diagnosis/ICD-10 Code:  Status post reverse total shoulder replacement, right [Z96.611]  Referring practitioner: Alfonso Richard, *  Date of Initial Visit: 10/14/2020  Today's Date: 10/14/2020  Patient seen for 1 sessions           Subjective Questionnaire: QuickDASH: 80% impairment      Subjective Evaluation    History of Present Illness  Date of surgery: 10/6/2020  Mechanism of injury: Pt underwent R reverse TSR on 10/6/2020. Pt states he has had muscle soreness but no severe pain since surgery. Limited use of R UE with surgery protocol. Wants to get back to fishing. Has not taken any pain meds in a few days and using ice pack as needed. Sleeping pretty good in bed that elevates.      Patient Occupation: retired Quality of life: good    Pain  Current pain rating: 3  At best pain ratin  At worst pain rating: 3  Location: R shoulder  Quality: dull ache  Relieving factors: ice  Aggravating factors: lifting and movement  Progression: improved    Social Support  Lives with: spouse    Hand dominance: right    Treatments  Previous treatment: physical therapy  Patient Goals  Patient goals for therapy: decreased edema, decreased pain, increased motion, increased strength, independence with ADLs/IADLs and return to sport/leisure activities  Patient goal: be able to fish again           Objective          Observations     Additional Shoulder Observation Details  Waterproof dressing in place over R TSR incision. Edema noted anterior R shoulder. No redness or warmth noted in the surrounding area. Sling positioned appropriately.    Cervical/Thoracic Screen   Cervical range of motion within normal limits with the following exceptions: Mild limitations in lateral flexion B.    Active Range of Motion   Left Shoulder   Flexion: 125 degrees   External rotation BTH: C3   Internal rotation BTB: L4     Left Elbow   Normal  active range of motion    Right Elbow   Normal active range of motion    Passive Range of Motion     Right Shoulder   Flexion: 100 degrees with pain  Abduction: 90 degrees with pain  External rotation 45°: 10 degrees with pain    Scapular Mobility     Right Shoulder   Scapular mobility: fair    Strength/Myotome Testing     Left Shoulder     Planes of Motion   Flexion: 4+   Abduction: 4   External rotation at 0°: 4+   Internal rotation at 0°: 4+     Left Elbow   Flexion: 4+  Extension: 4+    Right Elbow   Flexion: 3  Extension: 3          Assessment & Plan     Assessment  Impairments: abnormal coordination, abnormal muscle tone, abnormal or restricted ROM, activity intolerance, impaired physical strength, lacks appropriate home exercise program and pain with function  Assessment details: Pt is 69 yo male s/p reverse R TSR. He presents with decreased strength and ROM of R shoulder. Pt with no current use of R UE. Pt is having difficulty sleeping. Quick DASH indicates 80% impairment.    Patient presents with the impairments listed above and based on the objective findings and the physical therapy evaluation, the patient’s condition has the potential to improve in response to therapy.   The patient’s condition and/or services required are at a level of complexity that necessitates the skill & supervision of a physical therapist.    Prognosis: good  Functional Limitations: carrying objects, lifting, sleeping, pulling, pushing, uncomfortable because of pain, moving in bed, reaching behind back, reaching overhead and unable to perform repetitive tasks  Goals  Plan Goals: STG:  - Increase R shoulder flex PROM to 130 deg in 3 weeks.  - Pt to demonstrated improved posture with min verbal cues in 3 weeks.  - Initiate AAROM per protocol in 3 weeks.  LTG:  - Improve Quick DASH to 25% or less impairment by discharge.  - Increase R shoulder flex AROM to 130 deg for improved over head use of R UE by discharge.  - Increase R  shoulder flex strength to 4-/5 or better in order to return to fly fishing by discharge.  - Pt to be independent with HEP by discharge.    Plan  Therapy options: will be seen for skilled physical therapy services  Planned modality interventions: electrical stimulation/Russian stimulation and thermotherapy (hydrocollator packs)  Other planned modality interventions: modalities as indicated  Planned therapy interventions: manual therapy, body mechanics training, flexibility, functional ROM exercises, home exercise program, joint mobilization, postural training, soft tissue mobilization, spinal/joint mobilization, strengthening, stretching and therapeutic activities  Frequency: 2x week  Duration in visits: 20  Treatment plan discussed with: patient        Timed:         Manual Therapy:    15     mins  02288;     Therapeutic Exercise:    5     mins  92606;     Neuromuscular Taylor:        mins  74678;    Therapeutic Activity:          mins  72585;     Gait Training:           mins  39337;     Ultrasound:          mins  26636;    Ionto                                   mins   67274  Can Repos          mins 90503    Un-Timed:  Electrical Stimulation:    15     mins  18942 ( );  Dry Needling          mins self-pay  Traction          mins 40254  Low Eval     25     Mins  60648  Mod Eval          Mins  59599  High Eval                            Mins  14056  Self - Care                          mins  94845        Timed Treatment:   20   mins   Total Treatment:     60   mins    PT SIGNATURE: Vangie Torres, PT   DATE TREATMENT INITIATED: 10/14/2020    Initial Certification  Certification Period: 1/12/2021  I certify that the therapy services are furnished while this patient is under my care.  The services outlined above are required by this patient, and will be reviewed every 90 days.     PHYSICIAN: Alfonso Richard MD  _____________________________________________________    DATE:     Please sign and return via  fax to [phone].. Thank you, [facility].

## 2020-10-15 ENCOUNTER — LABORATORY RESULT (OUTPATIENT)
Age: 70
End: 2020-10-15

## 2020-10-15 ENCOUNTER — APPOINTMENT (OUTPATIENT)
Dept: INTERNAL MEDICINE | Facility: CLINIC | Age: 70
End: 2020-10-15
Payer: MEDICARE

## 2020-10-15 VITALS
HEIGHT: 68 IN | DIASTOLIC BLOOD PRESSURE: 83 MMHG | BODY MASS INDEX: 28.79 KG/M2 | WEIGHT: 190 LBS | HEART RATE: 103 BPM | TEMPERATURE: 98 F | SYSTOLIC BLOOD PRESSURE: 125 MMHG

## 2020-10-15 DIAGNOSIS — I10 ESSENTIAL (PRIMARY) HYPERTENSION: ICD-10-CM

## 2020-10-15 DIAGNOSIS — F09 UNSPECIFIED MENTAL DISORDER DUE TO KNOWN PHYSIOLOGICAL CONDITION: ICD-10-CM

## 2020-10-15 DIAGNOSIS — M79.605 LOW BACK PAIN: ICD-10-CM

## 2020-10-15 DIAGNOSIS — M54.5 LOW BACK PAIN: ICD-10-CM

## 2020-10-15 DIAGNOSIS — M48.07 SPINAL STENOSIS, LUMBOSACRAL REGION: ICD-10-CM

## 2020-10-15 PROBLEM — G47.33 OBSTRUCTIVE SLEEP APNEA (ADULT) (PEDIATRIC): Chronic | Status: ACTIVE | Noted: 2020-10-08

## 2020-10-15 PROCEDURE — 36415 COLL VENOUS BLD VENIPUNCTURE: CPT

## 2020-10-15 PROCEDURE — 99496 TRANSJ CARE MGMT HIGH F2F 7D: CPT | Mod: 25

## 2020-10-15 RX ORDER — METHOCARBAMOL 500 MG/1
500 TABLET, FILM COATED ORAL
Qty: 21 | Refills: 0 | Status: ACTIVE | COMMUNITY
Start: 2020-10-15

## 2020-10-15 RX ORDER — TRAMADOL HYDROCHLORIDE 50 MG/1
50 TABLET, COATED ORAL 4 TIMES DAILY
Qty: 60 | Refills: 0 | Status: DISCONTINUED | COMMUNITY
Start: 2020-06-24 | End: 2020-10-15

## 2020-10-15 RX ORDER — CITALOPRAM HYDROBROMIDE 40 MG/1
40 TABLET, FILM COATED ORAL DAILY
Refills: 0 | Status: ACTIVE | COMMUNITY
Start: 2020-06-16

## 2020-10-15 RX ORDER — IBUPROFEN 400 MG/1
400 TABLET, FILM COATED ORAL
Qty: 90 | Refills: 0 | Status: DISCONTINUED | COMMUNITY
Start: 2020-07-07 | End: 2020-10-15

## 2020-10-15 RX ORDER — TRAMADOL HYDROCHLORIDE 50 MG/1
50 TABLET, COATED ORAL EVERY 4 HOURS
Qty: 42 | Refills: 0 | Status: DISCONTINUED | COMMUNITY
Start: 2020-06-11 | End: 2020-10-15

## 2020-10-15 NOTE — PHYSICAL EXAM
[No Acute Distress] : no acute distress [Normal] : no respiratory distress, lungs were clear to auscultation bilaterally and no accessory muscle use [No Varicosities] : no varicosities [Pedal Pulses Present] : the pedal pulses are present [No Edema] : there was no peripheral edema [No Extremity Clubbing/Cyanosis] : no extremity clubbing/cyanosis [Soft] : abdomen soft [Non Tender] : non-tender [Normal Bowel Sounds] : normal bowel sounds [Normal Posterior Cervical Nodes] : no posterior cervical lymphadenopathy [Normal Anterior Cervical Nodes] : no anterior cervical lymphadenopathy [Normal Affect] : the affect was normal [de-identified] : +paraspinal tednerness in lumbar region  [79843 - High Complexity requires an extensive number of possible diagnoses and/or the management options, extensive complexity of the medical data (tests, etc.) to be reviewed, and a high risk of significant complications, morbidity, and/or mortality as w] : High Complexity

## 2020-10-15 NOTE — ASSESSMENT
[FreeTextEntry1] : 70 year old male with HTN, GULSHAN (on home CPAP), BPH, NPH (s/p Codman Hakim VPS placed 2017 at [facility] by Dr. Ibarra), sciatica, and chronic back pain (s/p multiple spinal surgeries; most recently bilateral percutaneous sacroiliac joint fusions in 6/20) presents to [facility] with AMS and worsening back pain in the past month.\par  \par now pain is severe at times, but appears more tolerable with pain med regimen.  walks with rolling walker \par -cont PT \par -advsied to f/u with spine center, and NS\par -cont meds as directed\par \par HTN, controlled \par -cont ccb, ARB\par \par f/u in 3 months \par \par

## 2020-10-15 NOTE — HISTORY OF PRESENT ILLNESS
[Post-hospitalization from ___ Hospital] : Post-hospitalization from [unfilled] Hospital [FreeTextEntry2] : 70 year old male with HTN, GULSHAN (on home CPAP), BPH, NPH (s/p Codman Hakim VPS placed 2017 at [facility] by Dr. Ibarra), sciatica, and chronic back pain (s/p multiple spinal surgeries; most recently bilateral percutaneous sacroiliac\par joint fusions in 6/20) presents to [facility] with AMS and worsening back pain in the past month. Patient was found altered on floor by roommate; questionable fall vs confusion. Unsteady gait and one episode of urinary incontinence en route to hospital. \par CT Head -no acute intracranial hemorrhage or depressed calvarial fracture. \par CT CSpine shows no fracture or acute traumatic malalignment; degenerative changes throughout the cervical spine. XR Shunt series showed no kinks. \par Urine tox was positive for benzos and THC, both of which patient is prescribed at home. \par Patient's ambien was discontinued and lower dose of trazadone was given. \par Next morning, patient's mental status improved to baseline. \par He reported polypharmacy with his prescribed ambien 10mg and trazadone 40mg qHS. Patient was counseled on polypharmacy. Chronic pain was consulted and recommended to discontinue the Tramadol and Diazepam and restart Xanax at home dose 1 mg every 6 hours + Robaxin 500 mg PO every 6 hours PRN spasm. \par PT worked with patient and recommended home with outpt PT. Neurosurgery evaluated patient and spoke with\par patient's outpatient Neurosurgeon Dr. Castanon, plan to follow up patient in the outpatient setting. \par \par today, still having chronic back pain but walking better with rolling walker,  motivated to restart outpatient PT.  BP at goal today \par

## 2020-10-16 ENCOUNTER — TREATMENT (OUTPATIENT)
Dept: PHYSICAL THERAPY | Facility: CLINIC | Age: 70
End: 2020-10-16

## 2020-10-16 DIAGNOSIS — M25.511 ACUTE PAIN OF RIGHT SHOULDER: ICD-10-CM

## 2020-10-16 DIAGNOSIS — Z96.611 STATUS POST REVERSE TOTAL SHOULDER REPLACEMENT, RIGHT: Primary | ICD-10-CM

## 2020-10-16 PROCEDURE — 97110 THERAPEUTIC EXERCISES: CPT | Performed by: PHYSICAL THERAPIST

## 2020-10-16 PROCEDURE — 97140 MANUAL THERAPY 1/> REGIONS: CPT | Performed by: PHYSICAL THERAPIST

## 2020-10-16 NOTE — PROGRESS NOTES
Physical Therapy Daily Progress Note      Patient: Orion Hravey   : 1950  Diagnosis/ICD-10 Code:  Status post reverse total shoulder replacement, right [Z96.611]   Problems Addressed this Visit     None      Visit Diagnoses     Status post reverse total shoulder replacement, right    -  Primary    Acute pain of right shoulder          Diagnoses       Codes Comments    Status post reverse total shoulder replacement, right    -  Primary ICD-10-CM: Z96.611  ICD-9-CM: V43.61     Acute pain of right shoulder     ICD-10-CM: M25.511  ICD-9-CM: 719.41         Referring practitioner: Alfonso Richard, *  Date of Initial Visit: Type: THERAPY  Noted: 10/14/2020  Today's Date: 10/16/2020    VISIT#: 2    Subjective : pt reports having increased soreness after last visit, using ice regularly and pain is improving.      Objective : Tightness R UT. Pt declined e-stim this date. Ice applied post-tx.    See Exercise, Manual, and Modality Logs for complete treatment.     Assessment/Plan : Good tolerance to manual techniques. PROM improving with flex ~110-115 today. Will benefit from continued stretching and strengthening as allowed per protocol.    Progress per Plan of Care. Progress ther ex per protocol.         Timed:         Manual Therapy:    20     mins  67510;     Therapeutic Exercise:    10     mins  61486;     Neuromuscular Taylor:        mins  33648;    Therapeutic Activity:          mins  53965;     Gait Training:           mins  58433;     Ultrasound:          mins  48262;    Ionto                                   mins   01046  Self Care                            mins   97550  Canalith Repos                   mins  39497    Un-Timed:  Electrical Stimulation:         mins  06523 ( );  Dry Needling          mins self-pay  Traction          mins 15386  Low Eval          Mins  16773  Mod Eval          Mins  05531  High Eval                            Mins  01519  Re-Eval                                mins  35957    Timed Treatment:   30   mins   Total Treatment:     40   mins    Vangie Torres, PT  Physical Therapist

## 2020-10-19 ENCOUNTER — TREATMENT (OUTPATIENT)
Dept: PHYSICAL THERAPY | Facility: CLINIC | Age: 70
End: 2020-10-19

## 2020-10-19 DIAGNOSIS — Z96.611 STATUS POST REVERSE TOTAL SHOULDER REPLACEMENT, RIGHT: Primary | ICD-10-CM

## 2020-10-19 DIAGNOSIS — M25.511 ACUTE PAIN OF RIGHT SHOULDER: ICD-10-CM

## 2020-10-19 PROCEDURE — 97140 MANUAL THERAPY 1/> REGIONS: CPT | Performed by: PHYSICAL THERAPIST

## 2020-10-19 PROCEDURE — 97110 THERAPEUTIC EXERCISES: CPT | Performed by: PHYSICAL THERAPIST

## 2020-10-19 RX ORDER — GLIMEPIRIDE 4 MG/1
TABLET ORAL
Qty: 180 TABLET | Refills: 3 | Status: SHIPPED | OUTPATIENT
Start: 2020-10-19 | End: 2021-09-22

## 2020-10-19 NOTE — PROGRESS NOTES
Physical Therapy Daily Progress Note      Patient: Orion Harvey   : 1950  Diagnosis/ICD-10 Code:  Status post reverse total shoulder replacement, right [Z96.611]   Problems Addressed this Visit     None      Visit Diagnoses     Status post reverse total shoulder replacement, right    -  Primary    Acute pain of right shoulder          Diagnoses       Codes Comments    Status post reverse total shoulder replacement, right    -  Primary ICD-10-CM: Z96.611  ICD-9-CM: V43.61     Acute pain of right shoulder     ICD-10-CM: M25.511  ICD-9-CM: 719.41         Referring practitioner: Alfonso Richard, *  Date of Initial Visit: Type: THERAPY  Noted: 10/14/2020  Today's Date: 10/19/2020    VISIT#: 3    Subjective : Pt states shoulder is feeling about same, sore at the front and still having swelling at front of shoulder.      Objective : edema present anterior aspect of R shoulder. No redness or warmth noted.    See Exercise, Manual, and Modality Logs for complete treatment.     Assessment/Plan : Good tolerance to manual techniques and gentle ROM ther ex. PROM flex improving. Will benefit from continued PT to progress ROM and AAROM ther ex.    Progress per Plan of Care. Progress ther ex per protocol.         Timed:         Manual Therapy:    15     mins  07105;     Therapeutic Exercise:    10     mins  12859;     Neuromuscular Taylor:        mins  19621;    Therapeutic Activity:          mins  56113;     Gait Training:           mins  85804;     Ultrasound:          mins  14821;    Ionto                                   mins   32957  Self Care                            mins   81940  Canalith Repos                   mins  33949    Un-Timed:  Electrical Stimulation:         mins  98938 ( );  Dry Needling          mins self-pay  Traction          mins 73849  Low Eval          Mins  36924  Mod Eval          Mins  23637  High Eval                            Mins  54001  Re-Eval                                mins  26735    Timed Treatment:   25   mins   Total Treatment:     35   mins    Vangie Torres, PT  Physical Therapist

## 2020-10-20 ENCOUNTER — OFFICE VISIT (OUTPATIENT)
Dept: FAMILY MEDICINE CLINIC | Facility: CLINIC | Age: 70
End: 2020-10-20

## 2020-10-20 VITALS
WEIGHT: 277 LBS | HEART RATE: 77 BPM | DIASTOLIC BLOOD PRESSURE: 78 MMHG | TEMPERATURE: 98 F | HEIGHT: 69 IN | SYSTOLIC BLOOD PRESSURE: 120 MMHG | OXYGEN SATURATION: 97 % | BODY MASS INDEX: 41.03 KG/M2

## 2020-10-20 DIAGNOSIS — Z23 NEED FOR INFLUENZA VACCINATION: ICD-10-CM

## 2020-10-20 DIAGNOSIS — Z96.611 HISTORY OF RIGHT SHOULDER REPLACEMENT: ICD-10-CM

## 2020-10-20 DIAGNOSIS — Z11.59 ENCOUNTER FOR HEPATITIS C SCREENING TEST FOR LOW RISK PATIENT: ICD-10-CM

## 2020-10-20 DIAGNOSIS — Z12.11 SCREENING FOR COLON CANCER: ICD-10-CM

## 2020-10-20 DIAGNOSIS — E78.2 MIXED HYPERLIPIDEMIA: Chronic | ICD-10-CM

## 2020-10-20 DIAGNOSIS — Z76.89 ENCOUNTER FOR SUPPORT AND COORDINATION OF TRANSITION OF CARE: Primary | ICD-10-CM

## 2020-10-20 DIAGNOSIS — E11.65 TYPE 2 DIABETES MELLITUS WITH HYPERGLYCEMIA, WITHOUT LONG-TERM CURRENT USE OF INSULIN (HCC): Chronic | ICD-10-CM

## 2020-10-20 PROCEDURE — G0008 ADMIN INFLUENZA VIRUS VAC: HCPCS | Performed by: FAMILY MEDICINE

## 2020-10-20 PROCEDURE — 99495 TRANSJ CARE MGMT MOD F2F 14D: CPT | Performed by: FAMILY MEDICINE

## 2020-10-20 PROCEDURE — 90694 VACC AIIV4 NO PRSRV 0.5ML IM: CPT | Performed by: FAMILY MEDICINE

## 2020-10-20 NOTE — PROGRESS NOTES
Chief Complaint   Patient presents with   • hosptal follow up       Subjective     Orion Harvey is a 70 y.o. male.  Hospital follow-up, 2 weeks status post reverse total shoulder arthroplasty, by Dr. Massey, at [facility].  States pain is much better following surgery than prior to surgery, he is having some muscle soreness, have started PT, 3 sessions some irritation following.  He only took 1 dose of pain medication postoperative but has not needed to continue.  He is doing home exercises on hand wrist and shoulder pendulum exercises. Follow up with Dr Massey in am. Still with some swelling, using polar pack daily and at the end of therapy sessions.   In sling for 2 more weeks then will start working on muscle recovery.    The following portions of the patient's history were reviewed and updated as appropriate: allergies, current medications, past family history, past medical history, past social history, past surgical history and problem list.    Current Outpatient Medications on File Prior to Visit   Medication Sig   • ascorbic acid (VITAMIN C) 1000 MG tablet Take 1 tablet by mouth Daily. LAST DOSE 9/29   • aspirin 325 MG tablet Take 1 tablet by mouth Daily. LAST DOSE 9/29   • Dulaglutide 1.5 MG/0.5ML solution pen-injector Inject 1.5 mg under the skin into the appropriate area as directed 1 (One) Time Per Week. (Patient taking differently: Inject 1.5 mg under the skin into the appropriate area as directed 1 (One) Time Per Week. Wednesday  EVENINGS)   • glimepiride (AMARYL) 4 MG tablet Take 1 tablet by mouth twice daily   • glucose blood (ONE TOUCH ULTRA TEST) test strip Use as instructed: Diagnosis E 11.9   • lisinopril (PRINIVIL,ZESTRIL) 40 MG tablet Take 1 tablet by mouth Daily. (Patient taking differently: Take 40 mg by mouth Every Evening. LAST DOSE 10/4)   • metFORMIN (GLUCOPHAGE) 1000 MG tablet Take 1 tablet by mouth 2 (Two) Times a Day. (Patient taking differently: Take 1,000 mg by mouth 2 (Two)  "Times a Day. LAST DOSE 10/4 AM)   • Multiple Vitamins-Minerals (CENTRUM SILVER) tablet Take 1 tablet by mouth Daily. LAST DOSE 9/29   • ONETOUCH DELICA LANCETS 33G misc 1 tablet by In Vitro route Every 12 (Twelve) Hours.   • rosuvastatin (CRESTOR) 10 MG tablet Take 1 tablet by mouth once daily (Patient taking differently: Take 10 mg by mouth Every Night.)   • vitamin d ( VITAMIN D3) 5000 units capsule Take 1 tablet by mouth Daily. LAST DOSE 9/29   • oxyCODONE-acetaminophen (Percocet) 5-325 MG per tablet Take 1 tablet by mouth Every 6 (Six) Hours As Needed for Moderate Pain .     No current facility-administered medications on file prior to visit.      There are no discontinued medications.    Review of Systems   Constitutional: Negative for fatigue and fever.   Eyes: Negative for visual disturbance.   Respiratory: Negative for cough, shortness of breath and wheezing.    Cardiovascular: Negative for chest pain, palpitations and leg swelling.   Gastrointestinal: Negative.  Negative for abdominal pain.   Skin: Negative for rash.   Psychiatric/Behavioral: Negative for depressed mood.        Objective   Vitals:    10/20/20 1351   BP: 120/78   BP Location: Left arm   Patient Position: Sitting   Cuff Size: Adult   Pulse: 77   Temp: 98 °F (36.7 °C)   TempSrc: Infrared   SpO2: 97%   Weight: 126 kg (277 lb)   Height: 175.3 cm (69\")      Body mass index is 40.91 kg/m².    Physical Exam  Vitals signs and nursing note reviewed.   Constitutional:       General: He is not in acute distress.     Appearance: He is well-developed. He is obese.   Eyes:      Conjunctiva/sclera: Conjunctivae normal.      Pupils: Pupils are equal, round, and reactive to light.   Neck:      Musculoskeletal: Normal range of motion.   Cardiovascular:      Rate and Rhythm: Normal rate and regular rhythm.      Heart sounds: No murmur.   Pulmonary:      Effort: Pulmonary effort is normal.      Breath sounds: No wheezing.   Musculoskeletal: Normal range of " motion.      Comments: Right arm in rotator cuff sling.  Right anterior shoulder with intact bandage without any visible drainage, no increased warmth or erythema   Skin:     General: Skin is warm and dry.   Neurological:      Mental Status: He is alert and oriented to person, place, and time.         Lab Results   Component Value Date     (H) 08/03/2020    BUN  10/07/2020      Comment:      Testing performed by alternate method    BUN 12 10/07/2020    CREATININE 0.82 10/07/2020    EGFRIFNONA 93 10/07/2020    EGFRIFAFRI 96 08/03/2020     10/07/2020    K 4.4 10/07/2020     10/07/2020    CALCIUM 8.6 10/07/2020    ALBUMIN 4.60 09/29/2020    BILITOT 0.5 09/29/2020    ALKPHOS 51 09/29/2020    AST 37 09/29/2020    ALT 42 (H) 09/29/2020    CHLPL 139 08/03/2020    TRIG 148 08/03/2020    HDL 53 08/03/2020    VLDL 30 08/03/2020    LDL 56 08/03/2020    WBC 5.81 09/29/2020    RBC 4.50 09/29/2020    HCT 38.2 10/07/2020    MCV 92.9 09/29/2020    MCH 30.7 09/29/2020    INR 1.07 09/29/2020      Lab Results   Component Value Date    HGBA1C 7.8 (H) 09/29/2020    HGBA1C 8.8 (H) 08/03/2020    HGBA1C 10.1 (H) 05/01/2020    HGBA1C 9.3 (H) 01/31/2020    HGBA1C 7.3 (H) 10/30/2019    HGBA1C 9.1 (H) 07/24/2019        Procedures     Assessment/Plan   Diagnoses and all orders for this visit:    1. Encounter for support and coordination of transition of care (Primary)    2. Need for influenza vaccination  -     Fluad Quad >65 years    3. Type 2 diabetes mellitus with hyperglycemia, without long-term current use of insulin (CMS/HCC)  -     Hemoglobin A1c; Future    4. Mixed hyperlipidemia  -     Comprehensive Metabolic Panel; Future  -     Lipid Panel; Future    5. History of right shoulder replacement    6. Encounter for hepatitis C screening test for low risk patient  -     Hepatitis C Antibody; Future    7. Screening for colon cancer  -     Ambulatory Referral to Gastroenterology          There are no discontinued  medications.       Return in about 3 weeks (around 11/10/2020) for as previously scheduled, diabetes.    Education reference material relevant to visit available in AVS through [employer] or printed copy given.  Answers for HPI/ROS submitted by the patient on 10/19/2020   What is the primary reason for your visit?: Other  Please describe your symptoms.: right shoulder joint replacement  Have you had these symptoms before?: No  How long have you been having these symptoms?: 1-2 weeks

## 2020-10-21 ENCOUNTER — OFFICE VISIT (OUTPATIENT)
Dept: ORTHOPEDIC SURGERY | Facility: CLINIC | Age: 70
End: 2020-10-21

## 2020-10-21 VITALS — BODY MASS INDEX: 41.03 KG/M2 | HEIGHT: 69 IN | WEIGHT: 277 LBS

## 2020-10-21 DIAGNOSIS — M19.011 PRIMARY LOCALIZED OSTEOARTHROSIS OF RIGHT SHOULDER REGION: Primary | ICD-10-CM

## 2020-10-21 DIAGNOSIS — Z47.89 ORTHOPEDIC AFTERCARE: ICD-10-CM

## 2020-10-21 PROCEDURE — 99024 POSTOP FOLLOW-UP VISIT: CPT | Performed by: ORTHOPAEDIC SURGERY

## 2020-10-21 NOTE — PROGRESS NOTES
"     Patient ID: Orion Harvey is a 70 y.o. male.    10/6/20 right reverse total shoulder arthroplasty  Pain mild    Objective:    Ht 175.3 cm (69\")   Wt 126 kg (277 lb)   BMI 40.91 kg/m²     Physical Examination:  Incision is healing well without infection there is a mild hematoma.Sensory and motor exam are intact all distributions. Radial pulse is palpable and capillary refill is less than two seconds to all digits      Imaging:  X-rays demonstrate reverse total shoulder in position    Assessment:  Doing well after reverse total shoulder    Plan:  Continue physical therapy, see me in a month  "

## 2020-10-21 NOTE — PATIENT INSTRUCTIONS
Shoulder Arthroplasty: Post- Operative Visit Objectives    1) Review the operative findings, procedures and photos.  2) Make sure medications are effective and not causing problems.  a) Pain: Oxycodone or hydrocodone is for pain. You may take 1 tablet every 6 hours as necessary.  Some patients don’t require the use of these…in those circumstances just use Tylenol extra-strength 1 or 2 tablets every 4-6 hours.   b) NSAIDs. For pain and inflammation.  You can take an over the counter anti-inflammatory of choice such as Aleve, Ibuprofen, Motrin or Advil during this time.  If you have had any problems stop taking these medicines and please tell us!  3) Wound Care:  a) Today we will remove your dressings.  There may be some residual glue on your incision.  It is now ok to shower without covering it. Please avoid submerging the incision for another two weeks.  Continue ice pack as needed.  4) Exercises and Physical Therapy   Continue your physical therapy and daily home exercises focusing especially on overhead motion.  Continue the sling and remove for activities such as showering and bringing you hand to your face or hair, no motion behind your back for the next 4 weeks.  Some shoulder replacements with a rotator cuff repair will have more restrictions and we will go over those.   5) Follow Up appointments Schedule Follow up visits as directed usually in 4 weeks..  6) Notes  Make sure you have all necessary notes and documentation for school or work.  7) Issues: Remember our goal is to make this process smooth and easy if there is any thing you need please ask us or call back [phone]  8)

## 2020-10-22 ENCOUNTER — TREATMENT (OUTPATIENT)
Dept: PHYSICAL THERAPY | Facility: CLINIC | Age: 70
End: 2020-10-22

## 2020-10-22 DIAGNOSIS — Z96.611 STATUS POST REVERSE TOTAL SHOULDER REPLACEMENT, RIGHT: Primary | ICD-10-CM

## 2020-10-22 DIAGNOSIS — G89.29 CHRONIC PAIN OF BOTH SHOULDERS: ICD-10-CM

## 2020-10-22 DIAGNOSIS — M25.511 CHRONIC PAIN OF BOTH SHOULDERS: ICD-10-CM

## 2020-10-22 DIAGNOSIS — M25.512 CHRONIC PAIN OF BOTH SHOULDERS: ICD-10-CM

## 2020-10-22 DIAGNOSIS — M25.511 ACUTE PAIN OF RIGHT SHOULDER: ICD-10-CM

## 2020-10-22 PROCEDURE — G0283 ELEC STIM OTHER THAN WOUND: HCPCS | Performed by: PHYSICAL THERAPIST

## 2020-10-22 PROCEDURE — 97140 MANUAL THERAPY 1/> REGIONS: CPT | Performed by: PHYSICAL THERAPIST

## 2020-10-22 PROCEDURE — 97110 THERAPEUTIC EXERCISES: CPT | Performed by: PHYSICAL THERAPIST

## 2020-10-22 NOTE — PROGRESS NOTES
Physical Therapy Daily Progress Note      Patient: Orion Harvey   : 1950  Diagnosis/ICD-10 Code:  Status post reverse total shoulder replacement, right [Z96.611]  Referring practitioner: Alfonso Richard, *  Date of Initial Visit: Type: THERAPY  Noted: 10/14/2020  Today's Date: 10/22/2020  Patient seen for 4 sessions         Orion Harvey reports: he has had some soreness and puffiness in the front of the shoulder which he states the MD assured him was normal. Pt. States his pain now is already better than it was prior to surgery. Pt. State she has done all his stretches and exercises as instructed.    Objective   See Exercise, Manual, and Modality Logs for complete treatment.     Assessment/Plan   Pt. Continues to improve having flexion WFL passively at this time and tolerating increasing abduction and IR. Pt. ER is limited but is reminded not to push or move into ER excessively at this time.     Progress per Plan of Care           Timed:         Manual Therapy:    15     mins  40426;     Therapeutic Exercise:    15     mins  16268;     Neuromuscular Taylor:        mins  83701;    Therapeutic Activity:          mins  98179;     Gait Training:           mins  92941;     Ultrasound:          mins  43112;    Ionto                                   mins   94310  Self Care                            mins   98389  Canalith Repos                   mins  4209    Un-Timed:  Electrical Stimulation:    15     mins  32178 ( );  Dry Needling          mins self-pay  Traction          mins 29831  Low Eval          Mins  90634  Mod Eval          Mins  60964  High Eval                            Mins  16938    Timed Treatment:   30   mins   Total Treatment:     45   mins    Mandie Ventura PTA  Physical Therapist Assistant License #[id number]

## 2020-10-26 ENCOUNTER — TREATMENT (OUTPATIENT)
Dept: PHYSICAL THERAPY | Facility: CLINIC | Age: 70
End: 2020-10-26

## 2020-10-26 DIAGNOSIS — Z96.611 STATUS POST REVERSE TOTAL SHOULDER REPLACEMENT, RIGHT: Primary | ICD-10-CM

## 2020-10-26 DIAGNOSIS — M25.511 ACUTE PAIN OF RIGHT SHOULDER: ICD-10-CM

## 2020-10-26 LAB
ALBUMIN SERPL ELPH-MCNC: 4.4 G/DL
ALP BLD-CCNC: 102 U/L
ALT SERPL-CCNC: 11 U/L
ANION GAP SERPL CALC-SCNC: 15 MMOL/L
AST SERPL-CCNC: 11 U/L
BASOPHILS # BLD AUTO: 0.05 K/UL
BASOPHILS NFR BLD AUTO: 0.5 %
BILIRUB DIRECT SERPL-MCNC: 0.2 MG/DL
BILIRUB INDIRECT SERPL-MCNC: 0.5 MG/DL
BILIRUB SERPL-MCNC: 0.8 MG/DL
BUN SERPL-MCNC: 25 MG/DL
CALCIUM SERPL-MCNC: 9.5 MG/DL
CHLORIDE SERPL-SCNC: 102 MMOL/L
CHOLEST SERPL-MCNC: 141 MG/DL
CHOLEST/HDLC SERPL: 3.6 RATIO
CO2 SERPL-SCNC: 22 MMOL/L
CREAT SERPL-MCNC: 1.09 MG/DL
EOSINOPHIL # BLD AUTO: 0.16 K/UL
EOSINOPHIL NFR BLD AUTO: 1.5 %
ESTIMATED AVERAGE GLUCOSE: 111 MG/DL
FOLATE SERPL-MCNC: 13.3 NG/ML
GLUCOSE SERPL-MCNC: 95 MG/DL
HBA1C MFR BLD HPLC: 5.5 %
HCT VFR BLD CALC: 39 %
HDLC SERPL-MCNC: 39 MG/DL
HGB BLD-MCNC: 10.4 G/DL
IMM GRANULOCYTES NFR BLD AUTO: 0.5 %
LDLC SERPL CALC-MCNC: 86 MG/DL
LYMPHOCYTES # BLD AUTO: 1.67 K/UL
LYMPHOCYTES NFR BLD AUTO: 16.1 %
MAN DIFF?: NORMAL
MCHC RBC-ENTMCNC: 18.2 PG
MCHC RBC-ENTMCNC: 26.7 GM/DL
MCV RBC AUTO: 68.1 FL
MONOCYTES # BLD AUTO: 1.03 K/UL
MONOCYTES NFR BLD AUTO: 10 %
NEUTROPHILS # BLD AUTO: 7.39 K/UL
NEUTROPHILS NFR BLD AUTO: 71.4 %
PLATELET # BLD AUTO: 371 K/UL
POTASSIUM SERPL-SCNC: 3.5 MMOL/L
PROT SERPL-MCNC: 7.6 G/DL
RBC # BLD: 5.73 M/UL
RBC # FLD: 22.3 %
SAVE SPECIMEN: NORMAL
SODIUM SERPL-SCNC: 140 MMOL/L
TRIGL SERPL-MCNC: 81 MG/DL
TSH SERPL-ACNC: 0.76 UIU/ML
VIT B12 SERPL-MCNC: 1934 PG/ML
WBC # FLD AUTO: 10.35 K/UL

## 2020-10-26 PROCEDURE — 97140 MANUAL THERAPY 1/> REGIONS: CPT | Performed by: PHYSICAL THERAPIST

## 2020-10-26 PROCEDURE — 97110 THERAPEUTIC EXERCISES: CPT | Performed by: PHYSICAL THERAPIST

## 2020-10-26 NOTE — PROGRESS NOTES
Physical Therapy Daily Progress Note      Patient: Orion Harvey   : 1950  Diagnosis/ICD-10 Code:  Status post reverse total shoulder replacement, right [Z96.611]   Problems Addressed this Visit     None      Visit Diagnoses     Status post reverse total shoulder replacement, right    -  Primary    Acute pain of right shoulder          Diagnoses       Codes Comments    Status post reverse total shoulder replacement, right    -  Primary ICD-10-CM: Z96.611  ICD-9-CM: V43.61     Acute pain of right shoulder     ICD-10-CM: M25.511  ICD-9-CM: 719.41         Referring practitioner: Alfonso Richard, *  Date of Initial Visit: Type: THERAPY  Noted: 10/14/2020  Today's Date: 10/26/2020    VISIT#: 5    Subjective : Pt states his shoulder is feeling so good. Has not been using ice after exercises at home and has not noticed a difference. Pt states he is thrilled with his progress.      Objective : pt declined need for modalities this date. Swelling at anterior aspect R shoulder improving.    See Exercise, Manual, and Modality Logs for complete treatment.     Assessment/Plan : Good tolerance to supine AAROM. Pt making good gains in flex and abd PROM and is nearing full range. Continued tightness in ER and not forced per protocol. Plan to add submax shoulder isometrics next week per protocol.    Progress per Plan of Care. Progress ther ex per protocol.         Timed:         Manual Therapy:    15     mins  83931;     Therapeutic Exercise:    15     mins  08312;     Neuromuscular Taylor:        mins  57063;    Therapeutic Activity:          mins  78907;     Gait Training:           mins  89448;     Ultrasound:          mins  47513;    Ionto                                   mins   03538  Self Care                            mins   34864  Canalith Repos                   mins  04023    Un-Timed:  Electrical Stimulation:         mins  76979 ( );  Dry Needling          mins self-pay  Traction          mins  70288  Low Eval          Mins  30543  Mod Eval          Mins  49531  High Eval                            Mins  79586  Re-Eval                               mins  81466    Timed Treatment:   30   mins   Total Treatment:     30   mins    Vangie Torres, PT  Physical Therapist

## 2020-10-27 ENCOUNTER — APPOINTMENT (OUTPATIENT)
Dept: PULMONOLOGY | Facility: CLINIC | Age: 70
End: 2020-10-27
Payer: MEDICARE

## 2020-10-27 DIAGNOSIS — G47.33 OBSTRUCTIVE SLEEP APNEA (ADULT) (PEDIATRIC): ICD-10-CM

## 2020-10-27 PROCEDURE — 99213 OFFICE O/P EST LOW 20 MIN: CPT | Mod: 95

## 2020-10-27 NOTE — REASON FOR VISIT
[Home] : at home, [unfilled] , at the time of the visit. [Medical Office: ([facility])___] : at the medical office located in  [Verbal consent obtained from patient] : the patient, [unfilled] [Follow-Up] : a follow-up visit [Sleep Apnea] : sleep apnea

## 2020-10-27 NOTE — HISTORY OF PRESENT ILLNESS
[TextBox_4] : using cpap every night\par no issues\par chronic back pain, was recently hospitalized\par

## 2020-10-29 ENCOUNTER — TREATMENT (OUTPATIENT)
Dept: PHYSICAL THERAPY | Facility: CLINIC | Age: 70
End: 2020-10-29

## 2020-10-29 DIAGNOSIS — M25.511 ACUTE PAIN OF RIGHT SHOULDER: ICD-10-CM

## 2020-10-29 DIAGNOSIS — M25.512 CHRONIC PAIN OF BOTH SHOULDERS: ICD-10-CM

## 2020-10-29 DIAGNOSIS — Z96.611 STATUS POST REVERSE TOTAL SHOULDER REPLACEMENT, RIGHT: Primary | ICD-10-CM

## 2020-10-29 DIAGNOSIS — M25.511 CHRONIC PAIN OF BOTH SHOULDERS: ICD-10-CM

## 2020-10-29 DIAGNOSIS — G89.29 CHRONIC PAIN OF BOTH SHOULDERS: ICD-10-CM

## 2020-10-29 PROCEDURE — 97140 MANUAL THERAPY 1/> REGIONS: CPT | Performed by: PHYSICAL THERAPIST

## 2020-10-29 PROCEDURE — 97110 THERAPEUTIC EXERCISES: CPT | Performed by: PHYSICAL THERAPIST

## 2020-10-29 NOTE — PROGRESS NOTES
Physical Therapy Daily Progress Note      Patient: Orion Harvey   : 1950  Diagnosis/ICD-10 Code:  Status post reverse total shoulder replacement, right [Z96.611]  Referring practitioner: Alfonso Richard, *  Date of Initial Visit: Type: THERAPY  Noted: 10/14/2020  Today's Date: 10/29/2020  Patient seen for 6 sessions         Orion Harvey reports: he continues to have no pain and has been happy about getting better sleep recently and continues to have improving movement of shoulder within protocol.    Objective   See Exercise, Manual, and Modality Logs for complete treatment.     Assessment/Plan  Pt. Has minimal muscle tension in scapular region this visit and moderate tightness of the R UT this date that responds well to manual intervention for relief. Pt. ROM is improving each visit achieving ranges WFL into flexion at this time. Other motions remain limited but continue to improve as well. Pt. Denies modalities at this time due to having no pain.    Progress per Plan of Care           Timed:         Manual Therapy:    20     mins  96156;     Therapeutic Exercise:    15     mins  71669;     Neuromuscular Taylor:        mins  39878;    Therapeutic Activity:          mins  77626;     Gait Training:           mins  06052;     Ultrasound:          mins  43827;    Ionto                                   mins   54406  Self Care                            mins   72026  Canalith Repos                   mins  4209    Un-Timed:  Electrical Stimulation:         mins  18039 ( );  Dry Needling          mins self-pay  Traction          mins 82667  Low Eval          Mins  38451  Mod Eval          Mins  12775  High Eval                            Mins  05061    Timed Treatment:   35   mins   Total Treatment:     35   mins    Mandie Ventura PTA  Physical Therapist Assistant License #[id number]

## 2020-10-30 ENCOUNTER — APPOINTMENT (OUTPATIENT)
Dept: NEUROSURGERY | Facility: CLINIC | Age: 70
End: 2020-10-30

## 2020-10-30 VITALS
HEART RATE: 94 BPM | HEIGHT: 68 IN | WEIGHT: 198 LBS | DIASTOLIC BLOOD PRESSURE: 86 MMHG | BODY MASS INDEX: 30.01 KG/M2 | SYSTOLIC BLOOD PRESSURE: 168 MMHG

## 2020-11-02 ENCOUNTER — TREATMENT (OUTPATIENT)
Dept: PHYSICAL THERAPY | Facility: CLINIC | Age: 70
End: 2020-11-02

## 2020-11-02 ENCOUNTER — RESULTS ENCOUNTER (OUTPATIENT)
Dept: FAMILY MEDICINE CLINIC | Facility: CLINIC | Age: 70
End: 2020-11-02

## 2020-11-02 DIAGNOSIS — M25.511 CHRONIC PAIN OF BOTH SHOULDERS: ICD-10-CM

## 2020-11-02 DIAGNOSIS — G89.29 CHRONIC PAIN OF BOTH SHOULDERS: ICD-10-CM

## 2020-11-02 DIAGNOSIS — Z96.611 STATUS POST REVERSE TOTAL SHOULDER REPLACEMENT, RIGHT: Primary | ICD-10-CM

## 2020-11-02 DIAGNOSIS — E11.65 TYPE 2 DIABETES MELLITUS WITH HYPERGLYCEMIA, WITHOUT LONG-TERM CURRENT USE OF INSULIN (HCC): Chronic | ICD-10-CM

## 2020-11-02 DIAGNOSIS — E78.2 MIXED HYPERLIPIDEMIA: Chronic | ICD-10-CM

## 2020-11-02 DIAGNOSIS — M25.511 ACUTE PAIN OF RIGHT SHOULDER: ICD-10-CM

## 2020-11-02 DIAGNOSIS — M25.512 CHRONIC PAIN OF BOTH SHOULDERS: ICD-10-CM

## 2020-11-02 DIAGNOSIS — Z11.59 ENCOUNTER FOR HEPATITIS C SCREENING TEST FOR LOW RISK PATIENT: ICD-10-CM

## 2020-11-02 PROCEDURE — 97110 THERAPEUTIC EXERCISES: CPT | Performed by: PHYSICAL THERAPIST

## 2020-11-02 PROCEDURE — 97140 MANUAL THERAPY 1/> REGIONS: CPT | Performed by: PHYSICAL THERAPIST

## 2020-11-02 NOTE — PROGRESS NOTES
Physical Therapy Daily Progress Note      Patient: Orion Harvey   : 1950  Diagnosis/ICD-10 Code:  Status post reverse total shoulder replacement, right [Z96.611]  Referring practitioner: Alfonso Richard, *  Date of Initial Visit: Type: THERAPY  Noted: 10/14/2020  Today's Date: 2020  Patient seen for 7 sessions         Orion Harvey reports: he continues to feel better everyday and has been pleased with how little of pain he has had post operation.    Objective   See Exercise, Manual, and Modality Logs for complete treatment.     Assessment/Plan   Pt. continues to improve each visit and tolerates the addition of isometrics and rows with 4kg resistance well this visit. Pt. Denies the need for modalities due to no pain this visit.    Progress per Plan of Care           Timed:         Manual Therapy:    15     mins  02081;     Therapeutic Exercise:    15     mins  13998;     Neuromuscular Taylor:        mins  21261;    Therapeutic Activity:          mins  96020;     Gait Training:           mins  32497;     Ultrasound:          mins  56161;    Ionto                                   mins   10158  Self Care                            mins   67433  Canalith Repos                   mins  4209    Un-Timed:  Electrical Stimulation:         mins  55997 ( );  Dry Needling          mins self-pay  Traction          mins 76901  Low Eval          Mins  59098  Mod Eval          Mins  89489  High Eval                            Mins  97452    Timed Treatment:   30   mins   Total Treatment:     30   mins    Mandie Ventura PTA  Physical Therapist Assistant License #[id number]

## 2020-11-05 ENCOUNTER — TREATMENT (OUTPATIENT)
Dept: PHYSICAL THERAPY | Facility: CLINIC | Age: 70
End: 2020-11-05

## 2020-11-05 DIAGNOSIS — M25.511 ACUTE PAIN OF RIGHT SHOULDER: ICD-10-CM

## 2020-11-05 DIAGNOSIS — Z96.611 STATUS POST REVERSE TOTAL SHOULDER REPLACEMENT, RIGHT: Primary | ICD-10-CM

## 2020-11-05 PROCEDURE — 97140 MANUAL THERAPY 1/> REGIONS: CPT | Performed by: PHYSICAL THERAPIST

## 2020-11-05 PROCEDURE — 97110 THERAPEUTIC EXERCISES: CPT | Performed by: PHYSICAL THERAPIST

## 2020-11-05 NOTE — PROGRESS NOTES
Physical Therapy Daily Progress Note      Patient: Orion Harvey   : 1950  Diagnosis/ICD-10 Code:  Status post reverse total shoulder replacement, right [Z96.611]   Problems Addressed this Visit     None      Visit Diagnoses     Status post reverse total shoulder replacement, right    -  Primary    Acute pain of right shoulder          Diagnoses       Codes Comments    Status post reverse total shoulder replacement, right    -  Primary ICD-10-CM: Z96.611  ICD-9-CM: V43.61     Acute pain of right shoulder     ICD-10-CM: M25.511  ICD-9-CM: 719.41         Referring practitioner: Alfonso Richard, *  Date of Initial Visit: Type: THERAPY  Noted: 10/14/2020  Today's Date: 2020    VISIT#: 8    Subjective : Pt reports that his shoulder is feeling good. Very happy with his range of motion and is looking forward to bass fishing next year.      Objective : via secure messaging, MD stated pt can be out of the sling during the day at home, wear it if he leaves the house, and to wear at night. Pt was informed of this and verbalized understanding.    See Exercise, Manual, and Modality Logs for complete treatment.     Assessment/Plan : Good tolerance to all ther ex with no increase in symptoms. Pt with good ability during sub-max isometrics. Pt will benefit from continued scap base strengthening and progression of ther ex per protocol to regain functional use of R UE.    Progress per Plan of Care         Timed:         Manual Therapy:    15     mins  86873;     Therapeutic Exercise:    15     mins  21525;     Neuromuscular Taylor:        mins  27757;    Therapeutic Activity:          mins  95186;     Gait Training:           mins  70709;     Ultrasound:          mins  59288;    Ionto                                   mins   56011  Self Care                            mins   65515  Canalith Repos                   mins  70698    Un-Timed:  Electrical Stimulation:         mins  96112 ( );  Dry Needling           mins self-pay  Traction          mins 68807  Low Eval          Mins  49400  Mod Eval          Mins  47844  High Eval                            Mins  17394  Re-Eval                               mins  77715    Timed Treatment:   30   mins   Total Treatment:     30   mins    Vangie Torres, PT  Physical Therapist

## 2020-11-07 LAB
ALBUMIN SERPL-MCNC: 4.1 G/DL (ref 3.8–4.8)
ALBUMIN/GLOB SERPL: 1.9 {RATIO} (ref 1.2–2.2)
ALP SERPL-CCNC: 67 IU/L (ref 39–117)
ALT SERPL-CCNC: 43 IU/L (ref 0–44)
AST SERPL-CCNC: 41 IU/L (ref 0–40)
BILIRUB SERPL-MCNC: 0.4 MG/DL (ref 0–1.2)
BUN SERPL-MCNC: 14 MG/DL (ref 8–27)
BUN/CREAT SERPL: 18 (ref 10–24)
CALCIUM SERPL-MCNC: 9.5 MG/DL (ref 8.6–10.2)
CHLORIDE SERPL-SCNC: 101 MMOL/L (ref 96–106)
CHOLEST SERPL-MCNC: 119 MG/DL (ref 100–199)
CO2 SERPL-SCNC: 24 MMOL/L (ref 20–29)
CREAT SERPL-MCNC: 0.76 MG/DL (ref 0.76–1.27)
GLOBULIN SER CALC-MCNC: 2.2 G/DL (ref 1.5–4.5)
GLUCOSE SERPL-MCNC: 110 MG/DL (ref 65–99)
HBA1C MFR BLD: 7.7 % (ref 4.8–5.6)
HCV AB S/CO SERPL IA: <0.1 S/CO RATIO (ref 0–0.9)
HDLC SERPL-MCNC: 44 MG/DL
LDLC SERPL CALC-MCNC: 44 MG/DL (ref 0–99)
POTASSIUM SERPL-SCNC: 4.8 MMOL/L (ref 3.5–5.2)
PROT SERPL-MCNC: 6.3 G/DL (ref 6–8.5)
SODIUM SERPL-SCNC: 138 MMOL/L (ref 134–144)
TRIGL SERPL-MCNC: 190 MG/DL (ref 0–149)
VLDLC SERPL CALC-MCNC: 31 MG/DL (ref 5–40)

## 2020-11-09 ENCOUNTER — TREATMENT (OUTPATIENT)
Dept: PHYSICAL THERAPY | Facility: CLINIC | Age: 70
End: 2020-11-09

## 2020-11-09 DIAGNOSIS — M25.512 CHRONIC PAIN OF BOTH SHOULDERS: ICD-10-CM

## 2020-11-09 DIAGNOSIS — M25.511 CHRONIC PAIN OF BOTH SHOULDERS: ICD-10-CM

## 2020-11-09 DIAGNOSIS — G89.29 CHRONIC PAIN OF BOTH SHOULDERS: ICD-10-CM

## 2020-11-09 DIAGNOSIS — Z96.611 STATUS POST REVERSE TOTAL SHOULDER REPLACEMENT, RIGHT: Primary | ICD-10-CM

## 2020-11-09 DIAGNOSIS — M25.511 ACUTE PAIN OF RIGHT SHOULDER: ICD-10-CM

## 2020-11-09 PROCEDURE — 97110 THERAPEUTIC EXERCISES: CPT | Performed by: PHYSICAL THERAPIST

## 2020-11-09 PROCEDURE — 97140 MANUAL THERAPY 1/> REGIONS: CPT | Performed by: PHYSICAL THERAPIST

## 2020-11-09 NOTE — PROGRESS NOTES
Physical Therapy Daily Progress Note      Patient: Orion Harvey   : 1950  Diagnosis/ICD-10 Code:  Status post reverse total shoulder replacement, right [Z96.611]  Referring practitioner: Alfonso Richard, *  Date of Initial Visit: Type: THERAPY  Noted: 10/14/2020  Today's Date: 2020  Patient seen for 9 sessions         Orion Harvey reports: no pain or discomfort at this time and reports he has continued improvement of motion daily.    Objective   See Exercise, Manual, and Modality Logs for complete treatment.     Assessment/Plan   Pt. Presents with improving PROM and tolerates greater stretch into flexion and abduction this visit however ER continues to be with poor tolerance at this time. Pt. Is encouraged to continue AAROM daily to improve motion in preparation for next phase of protocol.    Progress per Plan of Care           Timed:         Manual Therapy:    15     mins  47474;     Therapeutic Exercise:    20     mins  75642;     Neuromuscular Taylor:        mins  15498;    Therapeutic Activity:          mins  44531;     Gait Training:           mins  44237;     Ultrasound:          mins  22202;    Ionto                                   mins   91976  Self Care                            mins   79932  Canalith Repos                   mins  4209    Un-Timed:  Electrical Stimulation:         mins  38076 ( );  Dry Needling          mins self-pay  Traction          mins 16491  Low Eval          Mins  23014  Mod Eval          Mins  45669  High Eval                            Mins  44838    Timed Treatment:   35   mins   Total Treatment:     35   mins    Mandie Ventura PTA  Physical Therapist Assistant License #[id number]

## 2020-11-10 ENCOUNTER — OFFICE VISIT (OUTPATIENT)
Dept: FAMILY MEDICINE CLINIC | Facility: CLINIC | Age: 70
End: 2020-11-10

## 2020-11-10 VITALS
HEART RATE: 80 BPM | WEIGHT: 280 LBS | HEIGHT: 69 IN | SYSTOLIC BLOOD PRESSURE: 115 MMHG | TEMPERATURE: 97.3 F | BODY MASS INDEX: 41.47 KG/M2 | OXYGEN SATURATION: 95 % | DIASTOLIC BLOOD PRESSURE: 68 MMHG

## 2020-11-10 DIAGNOSIS — E78.2 MIXED HYPERLIPIDEMIA: Chronic | ICD-10-CM

## 2020-11-10 DIAGNOSIS — Z96.611 STATUS POST REPLACEMENT OF RIGHT SHOULDER JOINT: ICD-10-CM

## 2020-11-10 DIAGNOSIS — I10 ESSENTIAL HYPERTENSION: Chronic | ICD-10-CM

## 2020-11-10 DIAGNOSIS — E66.01 OBESITY, CLASS III, BMI 40-49.9 (MORBID OBESITY) (HCC): Chronic | ICD-10-CM

## 2020-11-10 DIAGNOSIS — E11.65 TYPE 2 DIABETES MELLITUS WITH HYPERGLYCEMIA, WITHOUT LONG-TERM CURRENT USE OF INSULIN (HCC): Primary | Chronic | ICD-10-CM

## 2020-11-10 PROCEDURE — 99214 OFFICE O/P EST MOD 30 MIN: CPT | Performed by: FAMILY MEDICINE

## 2020-11-12 ENCOUNTER — TELEPHONE (OUTPATIENT)
Dept: FAMILY MEDICINE CLINIC | Facility: CLINIC | Age: 70
End: 2020-11-12

## 2020-11-12 ENCOUNTER — TREATMENT (OUTPATIENT)
Dept: PHYSICAL THERAPY | Facility: CLINIC | Age: 70
End: 2020-11-12

## 2020-11-12 DIAGNOSIS — M25.511 CHRONIC PAIN OF BOTH SHOULDERS: ICD-10-CM

## 2020-11-12 DIAGNOSIS — M25.512 CHRONIC PAIN OF BOTH SHOULDERS: ICD-10-CM

## 2020-11-12 DIAGNOSIS — Z96.611 STATUS POST REVERSE TOTAL SHOULDER REPLACEMENT, RIGHT: Primary | ICD-10-CM

## 2020-11-12 DIAGNOSIS — M25.511 ACUTE PAIN OF RIGHT SHOULDER: ICD-10-CM

## 2020-11-12 DIAGNOSIS — G89.29 CHRONIC PAIN OF BOTH SHOULDERS: ICD-10-CM

## 2020-11-12 PROCEDURE — 97140 MANUAL THERAPY 1/> REGIONS: CPT | Performed by: PHYSICAL THERAPIST

## 2020-11-12 PROCEDURE — 97110 THERAPEUTIC EXERCISES: CPT | Performed by: PHYSICAL THERAPIST

## 2020-11-12 NOTE — PROGRESS NOTES
Physical Therapy Daily Progress Note      Patient: Orion Harvey   : 1950  Diagnosis/ICD-10 Code:  Status post reverse total shoulder replacement, right [Z96.611]  Referring practitioner: Alfonso Richard, *  Date of Initial Visit: Type: THERAPY  Noted: 10/14/2020  Today's Date: 2020  Patient seen for 10 sessions         Orion Harvey reports: he continues to have no pain and is improving all the time Pt. continues to remain pleased with results and is adamant that he is ready to be allowed to fish again.    [place] DASH 14%     Objective   See Exercise, Manual, and Modality Logs for complete treatment.     Assessment/Plan   Pt. Continues to progress motion and strength within protocol at this time. Pt. Currently not taking any ,edication for pain relief. Pt. Needs continued therapy to progress appropriately in protocol and utilize appropriate technique for strengthening and stability.    Goals  Plan Goals: STG:  - Increase R shoulder flex PROM to 130 deg in 3 weeks. MET  - Pt to demonstrated improved posture with min verbal cues in 3 weeks. MET  - Initiate AAROM per protocol in 3 weeks. MET  LTG:  - Improve Quick DASH to 25% or less impairment by discharge. MET  - Increase R shoulder flex AROM to 130 deg for improved over head use of R UE by discharge. MET (easily weakened)  - Increase R shoulder flex strength to 4-/5 or better in order to return to fly fishing by discharge. Progressing  - Pt to be independent with HEP by discharge. Progressing    Progress per Plan of Care           Timed:         Manual Therapy:    16     mins  69656;     Therapeutic Exercise:    24     mins  26668;     Neuromuscular Taylor:        mins  67899;    Therapeutic Activity:          mins  70629;     Gait Training:           mins  41436;     Ultrasound:          mins  87990;    Ionto                                   mins   93181  Self Care                            mins   14336  [facility]                    mins  4209    Un-Timed:  Electrical Stimulation:         mins  54617 ( );  Dry Needling          mins self-pay  Traction          mins 08892  Low Eval          Mins  41171  Mod Eval          Mins  54932  High Eval                            Mins  08570    Timed Treatment:   40   mins   Total Treatment:     40   mins    Mandie Ventura PTA  Physical Therapist Assistant License #[id number]

## 2020-11-12 NOTE — TELEPHONE ENCOUNTER
Pt needing one touch verio test strips sent to [facility]. Patient states that  was going to send them in when he was in office the other day, but nothing was at pharmacy.

## 2020-11-13 RX ORDER — BLOOD SUGAR DIAGNOSTIC
STRIP MISCELLANEOUS
Qty: 100 EACH | Refills: 3 | Status: SHIPPED | OUTPATIENT
Start: 2020-11-13 | End: 2021-12-15 | Stop reason: SDUPTHER

## 2020-11-16 ENCOUNTER — TELEPHONE (OUTPATIENT)
Dept: FAMILY MEDICINE CLINIC | Facility: CLINIC | Age: 70
End: 2020-11-16

## 2020-11-16 ENCOUNTER — TREATMENT (OUTPATIENT)
Dept: PHYSICAL THERAPY | Facility: CLINIC | Age: 70
End: 2020-11-16

## 2020-11-16 DIAGNOSIS — M25.512 CHRONIC PAIN OF BOTH SHOULDERS: ICD-10-CM

## 2020-11-16 DIAGNOSIS — Z96.611 STATUS POST REVERSE TOTAL SHOULDER REPLACEMENT, RIGHT: Primary | ICD-10-CM

## 2020-11-16 DIAGNOSIS — M25.511 ACUTE PAIN OF RIGHT SHOULDER: ICD-10-CM

## 2020-11-16 DIAGNOSIS — M25.511 CHRONIC PAIN OF BOTH SHOULDERS: ICD-10-CM

## 2020-11-16 DIAGNOSIS — G89.29 CHRONIC PAIN OF BOTH SHOULDERS: ICD-10-CM

## 2020-11-16 PROCEDURE — 97140 MANUAL THERAPY 1/> REGIONS: CPT | Performed by: PHYSICAL THERAPIST

## 2020-11-16 PROCEDURE — 97110 THERAPEUTIC EXERCISES: CPT | Performed by: PHYSICAL THERAPIST

## 2020-11-16 NOTE — PROGRESS NOTES
Physical Therapy Daily Progress Note      Patient: Orion Harvey   : 1950  Diagnosis/ICD-10 Code:  Status post reverse total shoulder replacement, right [Z96.611]  Referring practitioner: Alfonso Richard, *  Date of Initial Visit: Type: THERAPY  Noted: 10/14/2020  Today's Date: 2020  Patient seen for 11 sessions         Orion Harvey reports: he has had no pain only limitation he can note is inability to tuck shirt in due to limited ability to reach back. Pt.states he continues to be relieved how well he has done with the surgery and is hopeful to get back to fishing.    [place] DASH 14%     Objective   See Exercise, Manual, and Modality Logs for complete treatment.   R shoulder AAROM Flex 160 deg, Abd 148 deg, ER 53 deg, IR 65 deg     Assessment/Plan   Pt. Continues to show consistent progress with ROM and strengthening within protocol at this time. Pt. Has done well to adhere to restrictions and advised HEP at this time. Pt. Presents with good healing and progressive tolerance to being out of sling at home. Pt. Will continue to benefit from therapy for improving strengthening, endurance, and functionality as protocol progresses.    Goals  Plan Goals: STG:  - Increase R shoulder flex PROM to 130 deg in 3 weeks. MET  - Pt to demonstrated improved posture with min verbal cues in 3 weeks. MET  - Initiate AAROM per protocol in 3 weeks. MET  LTG:  - Improve Quick DASH to 25% or less impairment by discharge. MET  - Increase R shoulder flex AROM to 130 deg for improved over head use of R UE by discharge. MET (easily weakened)  - Increase R shoulder flex strength to 4-/5 or better in order to return to fly fishing by discharge. Progressing  - Pt to be independent with HEP by discharge. Progressing    Progress per Plan of Care           Timed:         Manual Therapy:    15     mins  83128;     Therapeutic Exercise:    20     mins  20173;     Neuromuscular Taylor:        mins  89665;    Therapeutic  Activity:          mins  05755;     Gait Training:           mins  34077;     Ultrasound:          mins  02283;    Ionto                                   mins   29261  Self Care                            mins   58862  Canalith Repos                   mins  4209    Un-Timed:  Electrical Stimulation:         mins  45053 ( );  Dry Needling          mins self-pay  Traction          mins 04434  Low Eval          Mins  32506  Mod Eval          Mins  71775  High Eval                            Mins  39703    Timed Treatment:   35   mins   Total Treatment:     35   mins    Mandie Ventura PTA  Physical Therapist Assistant License #[id number]

## 2020-11-16 NOTE — TELEPHONE ENCOUNTER
Received request today for instructions on use, that has been completed and once signed will fax back

## 2020-11-16 NOTE — TELEPHONE ENCOUNTER
sent over a sript for a blood testing kit for Orion, they one that was sent over did not have instructions and insurance wont cover it. Can a new script please be sent to [facility] in [place]

## 2020-11-18 ENCOUNTER — OFFICE VISIT (OUTPATIENT)
Dept: ORTHOPEDIC SURGERY | Facility: CLINIC | Age: 70
End: 2020-11-18

## 2020-11-18 VITALS
BODY MASS INDEX: 41.47 KG/M2 | SYSTOLIC BLOOD PRESSURE: 120 MMHG | WEIGHT: 280 LBS | HEIGHT: 69 IN | DIASTOLIC BLOOD PRESSURE: 72 MMHG | HEART RATE: 86 BPM

## 2020-11-18 DIAGNOSIS — Z47.89 ORTHOPEDIC AFTERCARE: Primary | ICD-10-CM

## 2020-11-18 DIAGNOSIS — D50.9 IRON DEFICIENCY ANEMIA, UNSPECIFIED: ICD-10-CM

## 2020-11-18 PROCEDURE — 99024 POSTOP FOLLOW-UP VISIT: CPT | Performed by: ORTHOPAEDIC SURGERY

## 2020-11-18 RX ORDER — FERROUS SULFATE TAB EC 325 MG (65 MG FE EQUIVALENT) 325 (65 FE) MG
325 (65 FE) TABLET DELAYED RESPONSE ORAL DAILY
Qty: 90 | Refills: 3 | Status: ACTIVE | COMMUNITY
Start: 2020-11-18 | End: 1900-01-01

## 2020-11-18 NOTE — PROGRESS NOTES
"     Patient ID: Orion Harvey is a 70 y.o. male.  10/6/20 right reverse total shoulder arthroplasty  Pain mild      Objective:    /72   Pulse 86   Ht 175.3 cm (69\")   Wt 127 kg (280 lb)   BMI 41.35 kg/m²     Physical Examination:    Incision is healed, passive elevation 140 degrees abduction 110 degrees external rotation 20 degrees    Imaging:      Assessment:  Doing well after reverse total shoulder    Plan:  Discontinue sling, finish out formal therapy, otherwise gradual activity as tolerated and see me with x-ray in 4 months  "

## 2020-11-19 ENCOUNTER — TREATMENT (OUTPATIENT)
Dept: PHYSICAL THERAPY | Facility: CLINIC | Age: 70
End: 2020-11-19

## 2020-11-19 DIAGNOSIS — Z96.611 STATUS POST REVERSE TOTAL SHOULDER REPLACEMENT, RIGHT: Primary | ICD-10-CM

## 2020-11-19 PROCEDURE — 97110 THERAPEUTIC EXERCISES: CPT | Performed by: PHYSICAL THERAPIST

## 2020-11-19 PROCEDURE — 97140 MANUAL THERAPY 1/> REGIONS: CPT | Performed by: PHYSICAL THERAPIST

## 2020-11-19 NOTE — PROGRESS NOTES
Physical Therapy Daily Progress Note      Patient: Orion Harvey   : 1950  Diagnosis/ICD-10 Code:  Status post reverse total shoulder replacement, right [Z96.611]   Problems Addressed this Visit     None      Visit Diagnoses     Status post reverse total shoulder replacement, right    -  Primary      Diagnoses       Codes Comments    Status post reverse total shoulder replacement, right    -  Primary ICD-10-CM: Z96.611  ICD-9-CM: V43.61         Referring practitioner: Alfonso Richard, *  Date of Initial Visit: Type: THERAPY  Noted: 10/14/2020  Today's Date: 2020    VISIT#: 12    Subjective : Reports that MD appt went well yesterday, goes back in 4 months. States he is so happy be be done with the sling. Slept very good last night out of sling.      Objective : pt out of sling. Added bent over rows and ext to exercises. Also added flexion wall slides and NuStep. No pain reported during exercises.    See Exercise, Manual, and Modality Logs for complete treatment.     Assessment/Plan : Good tolerance to progression of ther ex with no reports of pain. Fatigue noted at end of session. Pt will benefit from continued PT services to further progress scap stability and shoulder strengthening per protocol.    Progress per Plan of Care         Timed:         Manual Therapy:    10     mins  14636;     Therapeutic Exercise:    20     mins  76305;     Neuromuscular Taylor:        mins  75469;    Therapeutic Activity:          mins  39662;     Gait Training:           mins  98554;     Ultrasound:          mins  17986;    Ionto                                   mins   89616  Self Care                            mins   88354  Canalith Repos                   mins  08145    Un-Timed:  Electrical Stimulation:         mins  40450 ( );  Dry Needling          mins self-pay  Traction          mins 11083  Low Eval          Mins  41698  Mod Eval          Mins  26034  High Eval                            Mins   60021  Re-Eval                               mins  31077    Timed Treatment:   30   mins   Total Treatment:     30   mins    Vangie Torres, PT  Physical Therapist

## 2020-11-23 ENCOUNTER — TREATMENT (OUTPATIENT)
Dept: PHYSICAL THERAPY | Facility: CLINIC | Age: 70
End: 2020-11-23

## 2020-11-23 DIAGNOSIS — G89.29 CHRONIC PAIN OF BOTH SHOULDERS: ICD-10-CM

## 2020-11-23 DIAGNOSIS — M25.512 CHRONIC PAIN OF BOTH SHOULDERS: ICD-10-CM

## 2020-11-23 DIAGNOSIS — M25.511 CHRONIC PAIN OF BOTH SHOULDERS: ICD-10-CM

## 2020-11-23 DIAGNOSIS — M25.511 ACUTE PAIN OF RIGHT SHOULDER: ICD-10-CM

## 2020-11-23 DIAGNOSIS — Z96.611 STATUS POST REVERSE TOTAL SHOULDER REPLACEMENT, RIGHT: Primary | ICD-10-CM

## 2020-11-23 PROCEDURE — 97140 MANUAL THERAPY 1/> REGIONS: CPT | Performed by: PHYSICAL THERAPIST

## 2020-11-23 PROCEDURE — 97110 THERAPEUTIC EXERCISES: CPT | Performed by: PHYSICAL THERAPIST

## 2020-11-23 NOTE — PROGRESS NOTES
Physical Therapy Daily Progress Note      Patient: Orion Harvey   : 1950  Diagnosis/ICD-10 Code:  Status post reverse total shoulder replacement, right [Z96.611]  Referring practitioner: Alfonso Richard, *  Date of Initial Visit: Type: THERAPY  Noted: 10/14/2020  Today's Date: 2020  Patient seen for 13 sessions         Orion Harvey reports: he continues have no pain or discomfort and has been pleased with progress. Pt. State she stretches and exercises daily and tolerate sit well. Pt. reports he was instructed to avoid shooting guns with heavy recoil until January.     Objective   See Exercise, Manual, and Modality Logs for complete treatment.     Assessment/Plan   Pt. Tolerates treatment well this visit and continues to improve with strength and mobility. Pt. Requires minimal VC's for scapular awareness throughout treatment today.    Progress per Plan of Care           Timed:         Manual Therapy:    15     mins  69117;     Therapeutic Exercise:    25     mins  99009;       Timed Treatment:   40   mins   Total Treatment:     40   mins    Mandie Ventura PTA  Physical Therapist Assistant License #[id number]

## 2020-11-30 ENCOUNTER — TREATMENT (OUTPATIENT)
Dept: PHYSICAL THERAPY | Facility: CLINIC | Age: 70
End: 2020-11-30

## 2020-11-30 DIAGNOSIS — Z96.611 STATUS POST REVERSE TOTAL SHOULDER REPLACEMENT, RIGHT: Primary | ICD-10-CM

## 2020-11-30 DIAGNOSIS — M25.511 ACUTE PAIN OF RIGHT SHOULDER: ICD-10-CM

## 2020-11-30 DIAGNOSIS — M25.512 CHRONIC PAIN OF BOTH SHOULDERS: ICD-10-CM

## 2020-11-30 DIAGNOSIS — G89.29 CHRONIC PAIN OF BOTH SHOULDERS: ICD-10-CM

## 2020-11-30 DIAGNOSIS — M25.511 CHRONIC PAIN OF BOTH SHOULDERS: ICD-10-CM

## 2020-11-30 PROCEDURE — 97110 THERAPEUTIC EXERCISES: CPT | Performed by: PHYSICAL THERAPIST

## 2020-11-30 PROCEDURE — 97140 MANUAL THERAPY 1/> REGIONS: CPT | Performed by: PHYSICAL THERAPIST

## 2020-11-30 NOTE — PROGRESS NOTES
Physical Therapy Daily Progress Note      Patient: Orion Harvey   : 1950  Diagnosis/ICD-10 Code:  Status post reverse total shoulder replacement, right [Z96.611]  Referring practitioner: Alfonso Richard, *  Date of Initial Visit: Type: THERAPY  Noted: 10/14/2020  Today's Date: 2020  Patient seen for 14 sessions         Orion Harvey reports: he continues to have improved strength and mobility at this time and has had no pain. Pt. States he has practiced with his fly fishing jacki and it was awkward to get back into the movement but he had no pain or discomfort.    Objective   See Exercise, Manual, and Modality Logs for complete treatment.     Assessment/Plan   Pt. Tolerates treatment well today adding ball roll on table with no pain or discomfort. Pt. Continues to have progressive increase in ranges WFL at this time only lacking ER at this time.    Progress per Plan of Care           Timed:         Manual Therapy:    15     mins  13524;     Therapeutic Exercise:    30     mins  37613;       Timed Treatment:   45   mins   Total Treatment:     45   mins    Mandie Ventura PTA  Physical Therapist Assistant License #[id number]

## 2020-12-02 ENCOUNTER — OFFICE VISIT (OUTPATIENT)
Dept: FAMILY MEDICINE CLINIC | Facility: CLINIC | Age: 70
End: 2020-12-02

## 2020-12-02 VITALS
OXYGEN SATURATION: 98 % | HEART RATE: 75 BPM | SYSTOLIC BLOOD PRESSURE: 128 MMHG | WEIGHT: 282 LBS | TEMPERATURE: 97.3 F | BODY MASS INDEX: 41.77 KG/M2 | DIASTOLIC BLOOD PRESSURE: 74 MMHG | HEIGHT: 69 IN

## 2020-12-02 DIAGNOSIS — Z96.611 STATUS POST REPLACEMENT OF RIGHT SHOULDER JOINT: ICD-10-CM

## 2020-12-02 DIAGNOSIS — Z87.891 ENCOUNTER FOR SCREENING FOR ABDOMINAL AORTIC ANEURYSM (AAA) IN PATIENT 50 YEARS OF AGE OR OLDER WITH HISTORY OF SMOKING: ICD-10-CM

## 2020-12-02 DIAGNOSIS — E11.9 ENCOUNTER FOR DIABETIC FOOT EXAM (HCC): ICD-10-CM

## 2020-12-02 DIAGNOSIS — Z00.00 ENCOUNTER FOR SUBSEQUENT ANNUAL WELLNESS VISIT IN MEDICARE PATIENT: Primary | ICD-10-CM

## 2020-12-02 DIAGNOSIS — Z13.6 ENCOUNTER FOR SCREENING FOR ABDOMINAL AORTIC ANEURYSM (AAA) IN PATIENT 50 YEARS OF AGE OR OLDER WITH HISTORY OF SMOKING: ICD-10-CM

## 2020-12-02 DIAGNOSIS — M19.012 OSTEOARTHRITIS OF LEFT SHOULDER, UNSPECIFIED OSTEOARTHRITIS TYPE: ICD-10-CM

## 2020-12-02 DIAGNOSIS — E11.65 TYPE 2 DIABETES MELLITUS WITH HYPERGLYCEMIA, WITHOUT LONG-TERM CURRENT USE OF INSULIN (HCC): Chronic | ICD-10-CM

## 2020-12-02 DIAGNOSIS — E66.01 OBESITY, CLASS III, BMI 40-49.9 (MORBID OBESITY) (HCC): Chronic | ICD-10-CM

## 2020-12-02 PROCEDURE — 99213 OFFICE O/P EST LOW 20 MIN: CPT | Performed by: FAMILY MEDICINE

## 2020-12-02 PROCEDURE — G0439 PPPS, SUBSEQ VISIT: HCPCS | Performed by: FAMILY MEDICINE

## 2020-12-02 NOTE — PROGRESS NOTES
The ABCs of the Annual Wellness Visit  Subsequent Medicare Wellness Visit    Chief Complaint   Patient presents with   • Medicare Wellness-subsequent       Subjective   History of Present Illness:  Orion Harvey is a 70 y.o. male who presents for a Subsequent Medicare Wellness Visit.    Colonoscopy- has not had yet, states has sent papers in to be scheduled at gastroenterology of [facility], has not yet heard back from them yet. Will contact them.   Diabetic eye exam-previous exam was at Dr [facility] in [place] plan to schedule at [facility] after the 1st of the year.   Patient quit smoking 20 years ago, no alcohol use in the past 10 years.  He has not had a AAA screen.      PHQ-9 Depression Screening  Little interest or pleasure in doing things? 0   Feeling down, depressed, or hopeless? 0   Trouble falling or staying asleep, or sleeping too much? 0   Feeling tired or having little energy? 0   Poor appetite or overeating? 0   Feeling bad about yourself - or that you are a failure or have let yourself or your family down? 0   Trouble concentrating on things, such as reading the newspaper or watching television? 0   Moving or speaking so slowly that other people could have noticed? Or the opposite - being so fidgety or restless that you have been moving around a lot more than usual? 0   Thoughts that you would be better off dead, or of hurting yourself in some way? 0   PHQ-9 Total Score 0   If you checked off any problems, how difficult have these problems made it for you to do your work, take care of things at home, or get along with other people?         Fall Risk Assessment was completed, and patient is at low risk for falls.    ATTENTION  What is the year: correct  What is the month of the year: correct  What is the day of the week?: correct  What is the date?: correct  MEMORY  Repeat address three times, only score third attempt: [address], [place]: 7  HOW  MANY ANIMALS DID THE PATIENT NAME  Verbal Fluency -- Animal Names (0-25): 22+  CLOCK DRAWING  Clock Drawing: All Correct  MEMORY RECALL  Tell me what you remember about that name and address we were repeating at the beginnin  ACE TOTAL SCORE  Total ACE Score - <25/30 strongly suggests cognitive impairment; <21/30 almost certainly shows dementia: 29      HEALTH RISK ASSESSMENT    Recent Hospitalizations:  Recently treated at the following:  [facility] Elective shoulder surgery, , recovering very well.    Current Medical Providers:  Patient Care Team:  Nazanin Garcia MD as PCP - General  Nazanin Garcia MD as PCP - Family Medicine  Rosmery Manuel as Technologist    Smoking Status:  Social History     Tobacco Use   Smoking Status Former Smoker   • Packs/day: 1.00   • Years: 0.00   • Pack years: 0.00   • Types: Cigarettes   • Start date:    • Quit date:    • Years since quittin.9   Smokeless Tobacco Never Used       Alcohol Consumption:  Social History     Substance and Sexual Activity   Alcohol Use No   • Frequency: Never       Depression Screen:   PHQ-2/PHQ-9 Depression Screening 2020   Little interest or pleasure in doing things 0   Feeling down, depressed, or hopeless 0   Trouble falling or staying asleep, or sleeping too much 0   Feeling tired or having little energy 0   Poor appetite or overeating 0   Feeling bad about yourself - or that you are a failure or have let yourself or your family down 0   Trouble concentrating on things, such as reading the newspaper or watching television 0   Moving or speaking so slowly that other people could have noticed. Or the opposite - being so fidgety or restless that you have been moving around a lot more than usual 0   Thoughts that you would be better off dead, or of hurting yourself in some way 0   Total Score 0       Fall Risk Screen:  STEADI Fall Risk Assessment was completed, and patient is at LOW risk for  falls.Assessment completed on:12/2/2020    Health Habits and Functional and Cognitive Screening:  Functional & Cognitive Status 12/2/2020   Do you have difficulty preparing food and eating? No   Do you have difficulty bathing yourself, getting dressed or grooming yourself? No   Do you have difficulty using the toilet? No   Do you have difficulty moving around from place to place? No   Do you have trouble with steps or getting out of a bed or a chair? No   Do you need help using the phone?  No   Are you deaf or do you have serious difficulty hearing?  No   Do you need help with transportation? No   Do you need help shopping? No   Do you need help preparing meals?  No   Do you need help with housework?  No   Do you need help with laundry? No   Do you need help taking your medications? No   Do you need help managing money? No   Do you ever drive or ride in a car without wearing a seat belt? No   Have you felt unusual stress, anger or loneliness in the last month? No   Who do you live with? Spouse   If you need help, do you have trouble finding someone available to you? No   Have you been bothered in the last four weeks by sexual problems? No   Do you have difficulty concentrating, remembering or making decisions? No         Does the patient have evidence of cognitive impairment? No    Asprin use counseling:Taking ASA appropriately as indicated    Age-appropriate Screening Schedule:  Refer to the list below for future screening recommendations based on patient's age, sex and/or medical conditions. Orders for these recommended tests are listed in the plan section. The patient has been provided with a written plan.    Health Maintenance   Topic Date Due   • URINE MICROALBUMIN  1950   • COLONOSCOPY  1950   • ZOSTER VACCINE (2 of 3) 10/27/2012   • DIABETIC FOOT EXAM  07/22/2019   • DIABETIC EYE EXAM  08/12/2020   • HEMOGLOBIN A1C  05/06/2021   • LIPID PANEL  11/06/2021   • TDAP/TD VACCINES (2 - Td) 04/16/2028    • INFLUENZA VACCINE  Completed          The following portions of the patient's history were reviewed and updated as appropriate: allergies, current medications, past family history, past medical history, past social history, past surgical history and problem list.    Outpatient Medications Prior to Visit   Medication Sig Dispense Refill   • ascorbic acid (VITAMIN C) 1000 MG tablet Take 1 tablet by mouth Daily. LAST DOSE 9/29     • aspirin 325 MG tablet Take 1 tablet by mouth Daily. LAST DOSE 9/29     • Dulaglutide 1.5 MG/0.5ML solution pen-injector Inject 1.5 mg under the skin into the appropriate area as directed 1 (One) Time Per Week. (Patient taking differently: Inject 1.5 mg under the skin into the appropriate area as directed 1 (One) Time Per Week. Wednesday  EVENINGS) 4 pen 12   • glimepiride (AMARYL) 4 MG tablet Take 1 tablet by mouth twice daily 180 tablet 3   • glucose blood (OneTouch Verio) test strip Use as instructed 100 each 3   • glucose blood test strip 1 each by Other route As Needed. Use as instructed     • lisinopril (PRINIVIL,ZESTRIL) 40 MG tablet Take 1 tablet by mouth Daily. (Patient taking differently: Take 40 mg by mouth Every Evening. LAST DOSE 10/4) 90 tablet 3   • metFORMIN (GLUCOPHAGE) 1000 MG tablet Take 1 tablet by mouth 2 (Two) Times a Day. (Patient taking differently: Take 1,000 mg by mouth 2 (Two) Times a Day. LAST DOSE 10/4 AM) 60 tablet 12   • Multiple Vitamins-Minerals (CENTRUM SILVER) tablet Take 1 tablet by mouth Daily. LAST DOSE 9/29     • ONETOUCH DELICA LANCETS 33G misc 1 tablet by In Vitro route Every 12 (Twelve) Hours.     • rosuvastatin (CRESTOR) 10 MG tablet Take 1 tablet by mouth once daily (Patient taking differently: Take 10 mg by mouth Every Night.) 90 tablet 3   • vitamin d ( VITAMIN D3) 5000 units capsule Take 1 tablet by mouth Daily. LAST DOSE 9/29       No facility-administered medications prior to visit.        Patient Active Problem List   Diagnosis   •  Cough due to ACE inhibitor   • Diabetes mellitus, type 2 (CMS/HCC)   • Gout   • Hyperlipidemia   • Hypertension   • Kidney stones   • Obstructive sleep apnea syndrome   • BPH (benign prostatic hyperplasia)   • Colon polyps   • Obesity, Class III, BMI 40-49.9 (morbid obesity) (CMS/[facility])   • Plantar fasciitis   • LLOYD on CPAP   • Nuclear sclerosis   • Presbyopia   • Osteoarthritis of left shoulder   • Osteoarthritis of right glenohumeral joint   • Status post replacement of right shoulder joint       Advanced Care Planning:  ACP discussion was held with the patient during this visit. Patient has an advance directive (not in EMR), copy requested.    Review of Systems   Constitutional: Positive for activity change ( increasing activity). Negative for appetite change, fatigue, fever and unexpected weight change.   Eyes: Negative for discharge and visual disturbance.   Respiratory: Positive for apnea ( On CPAP). Negative for chest tightness, shortness of breath and wheezing.    Cardiovascular: Negative for chest pain and leg swelling.   Gastrointestinal: Negative for abdominal pain, constipation, diarrhea, nausea and vomiting.   Genitourinary: Negative for difficulty urinating.   Musculoskeletal: Positive for arthralgias ( Some pain and limited range of motion of left shoulder.  Right shoulder is doing great).   Neurological: Negative for speech difficulty.   Psychiatric/Behavioral: Negative for confusion, sleep disturbance and suicidal ideas. The patient is not nervous/anxious.        Compared to one year ago, the patient feels his physical health is better.  Compared to one year ago, the patient feels his mental health is better.    Reviewed chart for potential of high risk medication in the elderly: yes  Reviewed chart for potential of harmful drug interactions in the elderly:yes    Objective         Vitals:    12/02/20 0826   BP: 128/74   BP Location: Left arm   Patient Position: Sitting   Cuff Size: Adult   Pulse: 75  "  Temp: 97.3 °F (36.3 °C)   TempSrc: Infrared   SpO2: 98%   Weight: 128 kg (282 lb)   Height: 175.3 cm (69\")       Body mass index is 41.64 kg/m².  Discussed the patient's BMI with him. The BMI is above average; BMI management plan is completed.    Physical Exam  Constitutional:       General: He is not in acute distress.     Appearance: He is well-developed. He is obese.   HENT:      Head: Normocephalic and atraumatic.   Eyes:      General: No scleral icterus.        Right eye: No discharge.         Left eye: No discharge.      Pupils: Pupils are equal, round, and reactive to light.   Cardiovascular:      Pulses:           Dorsalis pedis pulses are 2+ on the right side and 2+ on the left side.        Posterior tibial pulses are 2+ on the right side and 2+ on the left side.   Musculoskeletal: Normal range of motion.      Right foot: Normal range of motion. No deformity.      Left foot: Normal range of motion. No deformity.   Feet:      Right foot:      Protective Sensation: 9 sites tested. 9 sites sensed.      Skin integrity: No ulcer, skin breakdown, callus, dry skin or fissure.      Toenail Condition: Right toenails are normal.      Left foot:      Protective Sensation: 9 sites tested. 9 sites sensed.      Skin integrity: No ulcer, skin breakdown, callus, dry skin or fissure.      Toenail Condition: Left toenails are normal.   Skin:     General: Skin is dry.      Findings: No rash.   Psychiatric:         Behavior: Behavior normal.         Thought Content: Thought content normal.         Judgment: Judgment normal.         Lab Results   Component Value Date     (H) 11/06/2020    CHLPL 119 11/06/2020    TRIG 190 (H) 11/06/2020    HDL 44 11/06/2020    LDL 44 11/06/2020    VLDL 31 11/06/2020    HGBA1C 7.7 (H) 11/06/2020    HGBA1C 7.8 (H) 09/29/2020        Assessment/Plan   Medicare Risks and Personalized Health Plan  CMS Preventative Services Quick Reference  Abdominal Aortic Aneurysm Screening  Advance " Directive Discussion  Colon Cancer Screening  Fall Risk  Immunizations Discussed/Encouraged (specific immunizations; Shingrix )  Inactivity/Sedentary  Obesity/Overweight     The above risks/problems have been discussed with the patient.  Pertinent information has been shared with the patient in the After Visit Summary.  Follow up plans and orders are seen below in the Assessment/Plan Section.    Diagnoses and all orders for this visit:    1. Encounter for subsequent annual wellness visit in Medicare patient (Primary)    2. Encounter for screening for abdominal aortic aneurysm (AAA) in patient 50 years of age or older with history of smoking  -      AAA Screen Limited; Future    3. Encounter for diabetic foot exam (CMS/[facility])    4. Obesity, Class III, BMI 40-49.9 (morbid obesity) (CMS/[facility])    5. Type 2 diabetes mellitus with hyperglycemia, without long-term current use of insulin (CMS/[facility])    6. Osteoarthritis of left shoulder, unspecified osteoarthritis type  -     Misc Natural Products (Osteo Bi-Flex Triple Strength) tablet; Take 1 tablet by mouth Daily.  Dispense: 30 tablet; Refill: 0    7. Status post replacement of right shoulder joint    Patient states he is going to work on weight, going to start hiking now that he is recovered from shoulder surgery and the weather is cooler.  Declines Zostavax due to cost and previously having Shingrix vaccination.  Also did discuss upcoming availability of COVID-19 vaccination and did encourage to get what is offered.  Will check microalbumin when return in February for diabetes follow-up.  Left shoulder arthritis, advised to ask physical therapy for specific exercises to help with range of motion and muscle strengthening, also advised to start Osteo Bi-Flex and discuss further with Dr. Massey at follow-up.  Follow Up:  Return in about 10 weeks (around 2/10/2021) for as previously scheduled.     An After Visit Summary and PPPS were given to the patient.

## 2020-12-03 ENCOUNTER — TREATMENT (OUTPATIENT)
Dept: PHYSICAL THERAPY | Facility: CLINIC | Age: 70
End: 2020-12-03

## 2020-12-03 DIAGNOSIS — Z96.611 STATUS POST REVERSE TOTAL SHOULDER REPLACEMENT, RIGHT: Primary | ICD-10-CM

## 2020-12-03 DIAGNOSIS — G89.29 CHRONIC PAIN OF BOTH SHOULDERS: ICD-10-CM

## 2020-12-03 DIAGNOSIS — M25.511 ACUTE PAIN OF RIGHT SHOULDER: ICD-10-CM

## 2020-12-03 DIAGNOSIS — M25.511 CHRONIC PAIN OF BOTH SHOULDERS: ICD-10-CM

## 2020-12-03 DIAGNOSIS — M25.512 CHRONIC PAIN OF BOTH SHOULDERS: ICD-10-CM

## 2020-12-03 PROCEDURE — 97140 MANUAL THERAPY 1/> REGIONS: CPT | Performed by: PHYSICAL THERAPIST

## 2020-12-03 PROCEDURE — 97110 THERAPEUTIC EXERCISES: CPT | Performed by: PHYSICAL THERAPIST

## 2020-12-03 NOTE — PROGRESS NOTES
Physical Therapy Daily Progress Note      Patient: Orion Harvey   : 1950  Diagnosis/ICD-10 Code:  Status post reverse total shoulder replacement, right [Z96.611]  Referring practitioner: Alfonso Richard, *  Date of Initial Visit: Type: THERAPY  Noted: 10/14/2020  Today's Date: 12/3/2020  Patient seen for 15 sessions         Orion Harvey reports: he continues to feel better every day and has no pain to reports. Pt. States motion is feeling better all the time.    Objective   See Exercise, Manual, and Modality Logs for complete treatment.     Assessment/Plan   Pt. Has continued to show progressive increase in active and passive ROM in all planes with ER being the most limited at this time. Pt. Completes all exercise without pain today and continues to tolerate progressed weight and resistance for all UE exercise.    Progress per Plan of Care      Timed:         Manual Therapy:    15     mins  12788;     Therapeutic Exercise:    20     mins  02891;       Timed Treatment:   35   mins   Total Treatment:     35   mins    Mandie Ventura PTA  Physical Therapist Assistant License #[id number]

## 2020-12-08 ENCOUNTER — TREATMENT (OUTPATIENT)
Dept: PHYSICAL THERAPY | Facility: CLINIC | Age: 70
End: 2020-12-08

## 2020-12-08 ENCOUNTER — APPOINTMENT (OUTPATIENT)
Dept: SPINE | Facility: CLINIC | Age: 70
End: 2020-12-08
Payer: MEDICARE

## 2020-12-08 DIAGNOSIS — M54.6 PAIN IN THORACIC SPINE: ICD-10-CM

## 2020-12-08 DIAGNOSIS — M25.512 CHRONIC PAIN OF BOTH SHOULDERS: ICD-10-CM

## 2020-12-08 DIAGNOSIS — G89.29 CHRONIC PAIN OF BOTH SHOULDERS: ICD-10-CM

## 2020-12-08 DIAGNOSIS — Z96.611 STATUS POST REVERSE TOTAL SHOULDER REPLACEMENT, RIGHT: Primary | ICD-10-CM

## 2020-12-08 DIAGNOSIS — M25.511 ACUTE PAIN OF RIGHT SHOULDER: ICD-10-CM

## 2020-12-08 DIAGNOSIS — M25.511 CHRONIC PAIN OF BOTH SHOULDERS: ICD-10-CM

## 2020-12-08 PROCEDURE — 99213 OFFICE O/P EST LOW 20 MIN: CPT

## 2020-12-08 PROCEDURE — 97140 MANUAL THERAPY 1/> REGIONS: CPT | Performed by: PHYSICAL THERAPIST

## 2020-12-08 PROCEDURE — 97110 THERAPEUTIC EXERCISES: CPT | Performed by: PHYSICAL THERAPIST

## 2020-12-08 NOTE — PROGRESS NOTES
Physical Therapy Daily Progress Note      Patient: Orion Harvey   : 1950  Diagnosis/ICD-10 Code:  Status post reverse total shoulder replacement, right [Z96.611]  Referring practitioner: Alfonso Richard, *  Date of Initial Visit: Type: THERAPY  Noted: 10/14/2020  Today's Date: 2020  Patient seen for 16 sessions         Orion Harvey reports: he continues to have improving strength and movement with no pain as exercise is increased.    Objective   See Exercise, Manual, and Modality Logs for complete treatment.     Assessment/Plan   Pt. Tolerates all progressions this date improving nustep to 7 min and adding ball roll on wall for endurance and stability. Pt. Has no pain or discomfort with addition of two pounds with punch plus eccentric lower. Pt. Will continue to benefit from progressed activity for ROM and strength.    Progress per Plan of Care           Timed:         Manual Therapy:    15     mins  70960;     Therapeutic Exercise:    24     mins  14672;       Timed Treatment:   39   mins   Total Treatment:     39   mins    Mandie Ventura PTA  Physical Therapist Assistant License #[id number]

## 2020-12-09 ENCOUNTER — HOSPITAL ENCOUNTER (OUTPATIENT)
Dept: ULTRASOUND IMAGING | Facility: HOSPITAL | Age: 70
Discharge: HOME | End: 2020-12-09
Admitting: FAMILY MEDICINE

## 2020-12-09 DIAGNOSIS — Z13.6 ENCOUNTER FOR SCREENING FOR ABDOMINAL AORTIC ANEURYSM (AAA) IN PATIENT 50 YEARS OF AGE OR OLDER WITH HISTORY OF SMOKING: ICD-10-CM

## 2020-12-09 DIAGNOSIS — Z87.891 ENCOUNTER FOR SCREENING FOR ABDOMINAL AORTIC ANEURYSM (AAA) IN PATIENT 50 YEARS OF AGE OR OLDER WITH HISTORY OF SMOKING: ICD-10-CM

## 2020-12-09 PROCEDURE — 76706 US ABDL AORTA SCREEN AAA: CPT

## 2020-12-10 ENCOUNTER — TREATMENT (OUTPATIENT)
Dept: PHYSICAL THERAPY | Facility: CLINIC | Age: 70
End: 2020-12-10

## 2020-12-10 ENCOUNTER — NON-APPOINTMENT (OUTPATIENT)
Age: 70
End: 2020-12-10

## 2020-12-10 DIAGNOSIS — Z96.611 STATUS POST REVERSE TOTAL SHOULDER REPLACEMENT, RIGHT: Primary | ICD-10-CM

## 2020-12-10 DIAGNOSIS — M25.511 CHRONIC PAIN OF BOTH SHOULDERS: ICD-10-CM

## 2020-12-10 DIAGNOSIS — G89.29 CHRONIC PAIN OF BOTH SHOULDERS: ICD-10-CM

## 2020-12-10 DIAGNOSIS — M25.511 ACUTE PAIN OF RIGHT SHOULDER: ICD-10-CM

## 2020-12-10 DIAGNOSIS — M25.512 CHRONIC PAIN OF BOTH SHOULDERS: ICD-10-CM

## 2020-12-10 PROCEDURE — 97110 THERAPEUTIC EXERCISES: CPT | Performed by: PHYSICAL THERAPIST

## 2020-12-10 PROCEDURE — 97140 MANUAL THERAPY 1/> REGIONS: CPT | Performed by: PHYSICAL THERAPIST

## 2020-12-10 NOTE — PHYSICAL EXAM
[Cranial Nerves Optic (II)] : visual acuity intact bilaterally,  pupils equal round and reactive to light [Cranial Nerves Oculomotor (III)] : extraocular motion intact [Cranial Nerves Trigeminal (V)] : facial sensation intact symmetrically [Cranial Nerves Facial (VII)] : face symmetrical [Cranial Nerves Glossopharyngeal (IX)] : tongue and palate midline [Cranial Nerves Accessory (XI - Cranial And Spinal)] : head turning and shoulder shrug symmetric [Cranial Nerves Hypoglossal (XII)] : there was no tongue deviation with protrusion [Limited Balance] : the patient's balance was impaired [FreeTextEntry6] : Motor Strength 4.5/:5 BLE [No Visual Abnormalities] : no visible abnormailities [] : no respiratory distress [Heart Rate And Rhythm] : heart rate was normal and rhythm regular

## 2020-12-10 NOTE — PROGRESS NOTES
Physical Therapy Daily Progress Note      Patient: Orion Harvey   : 1950  Diagnosis/ICD-10 Code:  Status post reverse total shoulder replacement, right [Z96.611]  Referring practitioner: Alfonso Richard, *  Date of Initial Visit: Type: THERAPY  Noted: 10/14/2020  Today's Date: 12/10/2020  Patient seen for 17 sessions         Orion Harvey reports: he continues having decreasing pain and increasing range at home noticing he can do more without pain all the time.     Objective   See Exercise, Manual, and Modality Logs for complete treatment.     Assessment/Plan  Pt. Continues tolerating treatment well and responds well to increased resistance with activity showing improving endurance this date going longer on the Nustep. Pt. Range is progressing and achieves WFL in all planes except ER at this time with slow progression noted into ER.    Progress strengthening /stabilization /functional activity           Timed:         Manual Therapy:    15     mins  19197;     Therapeutic Exercise:    25     mins  29408;       Timed Treatment:   40   mins   Total Treatment:     40   mins    Mandie Ventura PTA  Physical Therapist Assistant License #[id number]

## 2020-12-10 NOTE — REVIEW OF SYSTEMS
[Leg Weakness] : leg weakness [Poor Coordination] : poor coordination [Abnormal Sensation] : an abnormal sensation [Difficulty Walking] : difficulty walking [Arthralgias] : arthralgias [Joint Pain] : joint pain [Joint Swelling] : no joint swelling [Joint Stiffness] : joint stiffness [Limb Pain] : limb pain [Limb Swelling] : no limb swelling [Negative] : Heme/Lymph

## 2020-12-10 NOTE — ASSESSMENT
[FreeTextEntry1] : 70-year-old man with multiple back surgeries\par Chronic pain syndrome\par \par Continue follow up pain management\par Follow up with Dr. Pedro Lopez for further discussion concerning Sacroiliac spine\par \par

## 2020-12-15 ENCOUNTER — TREATMENT (OUTPATIENT)
Dept: PHYSICAL THERAPY | Facility: CLINIC | Age: 70
End: 2020-12-15

## 2020-12-15 DIAGNOSIS — M25.512 CHRONIC PAIN OF BOTH SHOULDERS: ICD-10-CM

## 2020-12-15 DIAGNOSIS — M25.511 ACUTE PAIN OF RIGHT SHOULDER: ICD-10-CM

## 2020-12-15 DIAGNOSIS — M25.511 CHRONIC PAIN OF BOTH SHOULDERS: ICD-10-CM

## 2020-12-15 DIAGNOSIS — Z96.611 STATUS POST REVERSE TOTAL SHOULDER REPLACEMENT, RIGHT: Primary | ICD-10-CM

## 2020-12-15 DIAGNOSIS — G89.29 CHRONIC PAIN OF BOTH SHOULDERS: ICD-10-CM

## 2020-12-15 PROCEDURE — 97110 THERAPEUTIC EXERCISES: CPT | Performed by: PHYSICAL THERAPIST

## 2020-12-15 PROCEDURE — 97140 MANUAL THERAPY 1/> REGIONS: CPT | Performed by: PHYSICAL THERAPIST

## 2020-12-15 NOTE — PROGRESS NOTES
Physical Therapy Daily Progress Note      Patient: Orion Harvey   : 1950  Diagnosis/ICD-10 Code:  Status post reverse total shoulder replacement, right [Z96.611]  Referring practitioner: Alfonso Richard, *  Date of Initial Visit: Type: THERAPY  Noted: 10/14/2020  Today's Date: 12/15/2020  Patient seen for 18 sessions         Orion Harvey reports: he has no pain and continues to be pleased stating ER continues to be his weak point stating its the only motion he notices limitation in.    Objective   See Exercise, Manual, and Modality Logs for complete treatment.     Assessment/Plan   Pt. continues to tolerate progressed exercise and activity at this time due to no pain with current exercise and mobility. Pt. scapular base strength and mobility are within functional limits and strength is progressing appropriately.    Progress per Plan of Care           Timed:         Manual Therapy:    15     mins  73585;     Therapeutic Exercise:    24     mins  68498;       Timed Treatment:   39   mins   Total Treatment:     39   mins    Mandie Ventura PTA  Physical Therapist Assistant License #[id number]

## 2020-12-18 ENCOUNTER — TREATMENT (OUTPATIENT)
Dept: PHYSICAL THERAPY | Facility: CLINIC | Age: 70
End: 2020-12-18

## 2020-12-18 DIAGNOSIS — Z96.611 STATUS POST REVERSE TOTAL SHOULDER REPLACEMENT, RIGHT: Primary | ICD-10-CM

## 2020-12-18 DIAGNOSIS — M25.511 ACUTE PAIN OF RIGHT SHOULDER: ICD-10-CM

## 2020-12-18 PROCEDURE — 97140 MANUAL THERAPY 1/> REGIONS: CPT | Performed by: PHYSICAL THERAPIST

## 2020-12-18 PROCEDURE — 97110 THERAPEUTIC EXERCISES: CPT | Performed by: PHYSICAL THERAPIST

## 2020-12-18 NOTE — PROGRESS NOTES
Physical Therapy Daily Progress Note      Patient: Orion Harvey   : 1950  Diagnosis/ICD-10 Code:  Status post reverse total shoulder replacement, right [Z96.611]   Problems Addressed this Visit     None      Visit Diagnoses     Status post reverse total shoulder replacement, right    -  Primary    Acute pain of right shoulder          Diagnoses       Codes Comments    Status post reverse total shoulder replacement, right    -  Primary ICD-10-CM: Z96.611  ICD-9-CM: V43.61     Acute pain of right shoulder     ICD-10-CM: M25.511  ICD-9-CM: 719.41         Referring practitioner: Alfonso Richard, *  Date of Initial Visit: Type: THERAPY  Noted: 10/14/2020  Today's Date: 2020    VISIT#: 19    Subjective : Pt reports he is feeling great and is very pleased with progress he has made. States he has used fishing pole and is able to cast. Reports that his R shoulder is able to go further than it has in years. States the main thing he has trouble with is reaching behind his back but MD told him he might never be able to do that.      Objective : Decreased wt to 5# on bicep curls due to pt compensating when attempting 8#. Talked with pt about possible D/C next visit due to nearing all goals met. Pt states he is feeling good about this. Will talk again at next visit and ensure good understanding of all ther ex.    See Exercise, Manual, and Modality Logs for complete treatment.     Assessment/Plan : Mild discomfort at end range flex PROM. Pt with functional flex and abd ROM. Strength progressing well with good scap awareness. Possible D/C to HEP next visit.    Progress per Plan of Care and Anticipate DC next Visit         Timed:         Manual Therapy:    10     mins  58806;     Therapeutic Exercise:    30     mins  28581;     Neuromuscular Taylor:        mins  02488;    Therapeutic Activity:          mins  53428;     Gait Training:           mins  69850;     Ultrasound:          mins  81119;    Ionto                                    mins   80645  Self Care                            mins   74986  Canalith Repos                   mins  07948    Un-Timed:  Electrical Stimulation:         mins  01100 ( );  Dry Needling          mins self-pay  Traction          mins 60733  Low Eval          Mins  69359  Mod Eval          Mins  12417  High Eval                            Mins  48750  Re-Eval                               mins  66775    Timed Treatment:   40   mins   Total Treatment:     40   mins    Vangie Torres, PT  Physical Therapist

## 2020-12-22 ENCOUNTER — TREATMENT (OUTPATIENT)
Dept: PHYSICAL THERAPY | Facility: CLINIC | Age: 70
End: 2020-12-22

## 2020-12-22 DIAGNOSIS — G89.29 CHRONIC PAIN OF BOTH SHOULDERS: ICD-10-CM

## 2020-12-22 DIAGNOSIS — Z96.611 STATUS POST REVERSE TOTAL SHOULDER REPLACEMENT, RIGHT: Primary | ICD-10-CM

## 2020-12-22 DIAGNOSIS — M25.512 CHRONIC PAIN OF BOTH SHOULDERS: ICD-10-CM

## 2020-12-22 DIAGNOSIS — M25.511 CHRONIC PAIN OF BOTH SHOULDERS: ICD-10-CM

## 2020-12-22 DIAGNOSIS — M25.511 ACUTE PAIN OF RIGHT SHOULDER: ICD-10-CM

## 2020-12-22 PROCEDURE — 97140 MANUAL THERAPY 1/> REGIONS: CPT | Performed by: PHYSICAL THERAPIST

## 2020-12-22 PROCEDURE — 97110 THERAPEUTIC EXERCISES: CPT | Performed by: PHYSICAL THERAPIST

## 2020-12-22 NOTE — PROGRESS NOTES
Physical Therapy Daily Progress Note      Patient: Orion Harvey   : 1950  Diagnosis/ICD-10 Code:  Status post reverse total shoulder replacement, right [Z96.611]  Referring practitioner: Alfonso Richard, *  Date of Initial Visit: Type: THERAPY  Noted: 10/14/2020  Today's Date: 2020  Patient seen for 20 sessions         Orion Harvey reports: no pain and continued improvement and function with home and extracurricular activities stating he couldn't be happier with progress.    QuickDASH = 2%    Objective   See Exercise, Manual, and Modality Logs for complete treatment.     Assessment/Plan   Pt. Has met all goals at this time and has progressed motion and strength appropriately. Pt. Presents with HEP compliance and Competence at this time. Pt. Is appropriate for discharge at this time.    Goals  Plan Goals: STG:  - Increase R shoulder flex PROM to 130 deg in 3 weeks. MET  - Pt to demonstrated improved posture with min verbal cues in 3 weeks. MET  - Initiate AAROM per protocol in 3 weeks. MET  LTG:  - Improve Quick DASH to 25% or less impairment by discharge. MET  - Increase R shoulder flex AROM to 130 deg for improved over head use of R UE by discharge. MET   - Increase R shoulder flex strength to 4-/5 or better in order to return to fly fishing by discharge. MET  - Pt to be independent with HEP by discharge. MET             Timed:         Manual Therapy:    15     mins  03764;     Therapeutic Exercise:    30     mins  57400;       Timed Treatment:   45   mins   Total Treatment:     45   mins    Mandie Ventura PTA  Physical Therapist Assistant License #[id number]

## 2020-12-23 ENCOUNTER — APPOINTMENT (OUTPATIENT)
Dept: ORTHOPEDIC SURGERY | Facility: CLINIC | Age: 70
End: 2020-12-23
Payer: MEDICARE

## 2020-12-23 VITALS — TEMPERATURE: 97.7 F

## 2020-12-23 DIAGNOSIS — M53.3 SACROCOCCYGEAL DISORDERS, NOT ELSEWHERE CLASSIFIED: ICD-10-CM

## 2020-12-23 PROCEDURE — 99213 OFFICE O/P EST LOW 20 MIN: CPT

## 2020-12-23 PROCEDURE — 72190 X-RAY EXAM OF PELVIS: CPT

## 2020-12-25 PROBLEM — M53.3 SACROILIAC PAIN: Status: ACTIVE | Noted: 2020-06-11

## 2020-12-25 NOTE — HISTORY OF PRESENT ILLNESS
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  The patient felt that he had been doing better.  He is referred back here from neurosurgery due to increasing buttock and gluteal pain.  He states that it is now difficult for him to walk.  There was concern that the implants for the SI joint fusions may have migrated [de-identified] : Pelvis \par posterior spine incision healed well. \par posterior SI incisions healed well no erythema or drainage \par There is no increase in pain on flexion, abduction and external rotation.  There is tenderness over the gluteal region.  Stable neurovascular exam compared to prior exams\par SILT \par able to Flex hip to 80 deg [de-identified] : Ap pelvis inlet/ outlet views show B/L SIJ fusion devices in good postion no signs of mechanical failure.  CT scan reviewed which shows implants in place.  There is no halo around the implants to signify loosening.  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  These appear to be stable and there appears to be bony ingrowth.  I am not certain why this patient is having an increase in gluteal pain.  I am referring back to Dr. Castanon for further management

## 2020-12-26 ENCOUNTER — RX RENEWAL (OUTPATIENT)
Age: 70
End: 2020-12-26

## 2021-01-05 ENCOUNTER — HOSPITAL ENCOUNTER (OUTPATIENT)
Dept: CARDIOLOGY | Facility: HOSPITAL | Age: 71
Discharge: HOME | End: 2021-01-05

## 2021-01-05 ENCOUNTER — LAB (OUTPATIENT)
Dept: LAB | Facility: HOSPITAL | Age: 71
End: 2021-01-05

## 2021-01-05 ENCOUNTER — APPOINTMENT (OUTPATIENT)
Dept: SPINE | Facility: CLINIC | Age: 71
End: 2021-01-05
Payer: MEDICARE

## 2021-01-05 ENCOUNTER — TRANSCRIBE ORDERS (OUTPATIENT)
Dept: ADMINISTRATIVE | Facility: HOSPITAL | Age: 71
End: 2021-01-05

## 2021-01-05 VITALS
TEMPERATURE: 98.4 F | BODY MASS INDEX: 29.55 KG/M2 | SYSTOLIC BLOOD PRESSURE: 151 MMHG | HEART RATE: 100 BPM | HEIGHT: 68 IN | WEIGHT: 195 LBS | DIASTOLIC BLOOD PRESSURE: 84 MMHG | OXYGEN SATURATION: 93 %

## 2021-01-05 DIAGNOSIS — M62.830 MUSCLE SPASM OF BACK: ICD-10-CM

## 2021-01-05 DIAGNOSIS — Z01.818 PREOP TESTING: Primary | ICD-10-CM

## 2021-01-05 DIAGNOSIS — G89.29 DORSALGIA, UNSPECIFIED: ICD-10-CM

## 2021-01-05 DIAGNOSIS — Z01.818 PREOP TESTING: ICD-10-CM

## 2021-01-05 DIAGNOSIS — M54.9 DORSALGIA, UNSPECIFIED: ICD-10-CM

## 2021-01-05 LAB
ANION GAP SERPL CALCULATED.3IONS-SCNC: 10.5 MMOL/L (ref 5–15)
BASOPHILS # BLD AUTO: 0.05 10*3/MM3 (ref 0–0.2)
BASOPHILS NFR BLD AUTO: 0.9 % (ref 0–1.5)
BUN SERPL-MCNC: 13 MG/DL (ref 8–23)
BUN/CREAT SERPL: 16.5 (ref 7–25)
CALCIUM SPEC-SCNC: 8.9 MG/DL (ref 8.6–10.5)
CHLORIDE SERPL-SCNC: 100 MMOL/L (ref 98–107)
CO2 SERPL-SCNC: 25.5 MMOL/L (ref 22–29)
CREAT SERPL-MCNC: 0.79 MG/DL (ref 0.76–1.27)
DEPRECATED RDW RBC AUTO: 41.3 FL (ref 37–54)
EOSINOPHIL # BLD AUTO: 0.15 10*3/MM3 (ref 0–0.4)
EOSINOPHIL NFR BLD AUTO: 2.8 % (ref 0.3–6.2)
ERYTHROCYTE [DISTWIDTH] IN BLOOD BY AUTOMATED COUNT: 12.9 % (ref 12.3–15.4)
GFR SERPL CREATININE-BSD FRML MDRD: 97 ML/MIN/1.73
GLUCOSE SERPL-MCNC: 217 MG/DL (ref 65–99)
HCT VFR BLD AUTO: 40.3 % (ref 37.5–51)
HGB BLD-MCNC: 13.1 G/DL (ref 13–17.7)
IMM GRANULOCYTES # BLD AUTO: 0.01 10*3/MM3 (ref 0–0.05)
IMM GRANULOCYTES NFR BLD AUTO: 0.2 % (ref 0–0.5)
LYMPHOCYTES # BLD AUTO: 1.49 10*3/MM3 (ref 0.7–3.1)
LYMPHOCYTES NFR BLD AUTO: 28.1 % (ref 19.6–45.3)
MCH RBC QN AUTO: 28.7 PG (ref 26.6–33)
MCHC RBC AUTO-ENTMCNC: 32.5 G/DL (ref 31.5–35.7)
MCV RBC AUTO: 88.2 FL (ref 79–97)
MONOCYTES # BLD AUTO: 0.48 10*3/MM3 (ref 0.1–0.9)
MONOCYTES NFR BLD AUTO: 9.1 % (ref 5–12)
NEUTROPHILS NFR BLD AUTO: 3.12 10*3/MM3 (ref 1.7–7)
NEUTROPHILS NFR BLD AUTO: 58.9 % (ref 42.7–76)
NRBC BLD AUTO-RTO: 0 /100 WBC (ref 0–0.2)
PLATELET # BLD AUTO: 195 10*3/MM3 (ref 140–450)
PMV BLD AUTO: 12 FL (ref 6–12)
POTASSIUM SERPL-SCNC: 4.5 MMOL/L (ref 3.5–5.2)
RBC # BLD AUTO: 4.57 10*6/MM3 (ref 4.14–5.8)
SODIUM SERPL-SCNC: 136 MMOL/L (ref 136–145)
WBC # BLD AUTO: 5.3 10*3/MM3 (ref 3.4–10.8)

## 2021-01-05 PROCEDURE — 85025 COMPLETE CBC W/AUTO DIFF WBC: CPT

## 2021-01-05 PROCEDURE — 80048 BASIC METABOLIC PNL TOTAL CA: CPT

## 2021-01-05 PROCEDURE — 93010 ELECTROCARDIOGRAM REPORT: CPT | Performed by: INTERNAL MEDICINE

## 2021-01-05 PROCEDURE — 36415 COLL VENOUS BLD VENIPUNCTURE: CPT

## 2021-01-05 PROCEDURE — 93005 ELECTROCARDIOGRAM TRACING: CPT | Performed by: UROLOGY

## 2021-01-05 PROCEDURE — 99213 OFFICE O/P EST LOW 20 MIN: CPT

## 2021-01-05 NOTE — REASON FOR VISIT
[Follow-Up: _____] : a [unfilled] follow-up visit [FreeTextEntry1] : Patient is here for an evaluation\par Was seen and evaluated by Dr. Velia Lopez who advised patient to return to see Dr. YEH for further evaluation\par Remains under chronic pain management

## 2021-01-06 LAB — QT INTERVAL: 401 MS

## 2021-01-07 ENCOUNTER — OUTPATIENT (OUTPATIENT)
Dept: OUTPATIENT SERVICES | Facility: HOSPITAL | Age: 71
LOS: 1 days | End: 2021-01-07
Payer: MEDICARE

## 2021-01-07 ENCOUNTER — RESULT REVIEW (OUTPATIENT)
Age: 71
End: 2021-01-07

## 2021-01-07 ENCOUNTER — APPOINTMENT (OUTPATIENT)
Dept: CT IMAGING | Facility: CLINIC | Age: 71
End: 2021-01-07
Payer: MEDICARE

## 2021-01-07 DIAGNOSIS — Z98.1 ARTHRODESIS STATUS: Chronic | ICD-10-CM

## 2021-01-07 DIAGNOSIS — Z45.42 ENCOUNTER FOR ADJUSTMENT AND MANAGEMENT OF NEUROSTIMULATOR: Chronic | ICD-10-CM

## 2021-01-07 DIAGNOSIS — Z98.890 OTHER SPECIFIED POSTPROCEDURAL STATES: Chronic | ICD-10-CM

## 2021-01-07 DIAGNOSIS — M43.28 FUSION OF SPINE, SACRAL AND SACROCOCCYGEAL REGION: Chronic | ICD-10-CM

## 2021-01-07 DIAGNOSIS — M43.27 FUSION OF SPINE, LUMBOSACRAL REGION: Chronic | ICD-10-CM

## 2021-01-07 DIAGNOSIS — Z92.89 PERSONAL HISTORY OF OTHER MEDICAL TREATMENT: Chronic | ICD-10-CM

## 2021-01-07 DIAGNOSIS — Z00.8 ENCOUNTER FOR OTHER GENERAL EXAMINATION: ICD-10-CM

## 2021-01-07 PROCEDURE — 72131 CT LUMBAR SPINE W/O DYE: CPT | Mod: 26

## 2021-01-07 PROCEDURE — 72131 CT LUMBAR SPINE W/O DYE: CPT

## 2021-01-07 PROCEDURE — 76376 3D RENDER W/INTRP POSTPROCES: CPT | Mod: 26

## 2021-01-07 PROCEDURE — 76376 3D RENDER W/INTRP POSTPROCES: CPT

## 2021-01-14 ENCOUNTER — APPOINTMENT (OUTPATIENT)
Dept: INTERNAL MEDICINE | Facility: CLINIC | Age: 71
End: 2021-01-14

## 2021-01-15 ENCOUNTER — TRANSCRIPTION ENCOUNTER (OUTPATIENT)
Age: 71
End: 2021-01-15

## 2021-01-19 ENCOUNTER — APPOINTMENT (OUTPATIENT)
Dept: SPINE | Facility: CLINIC | Age: 71
End: 2021-01-19
Payer: MEDICARE

## 2021-01-19 VITALS
SYSTOLIC BLOOD PRESSURE: 179 MMHG | WEIGHT: 195 LBS | OXYGEN SATURATION: 95 % | BODY MASS INDEX: 29.55 KG/M2 | TEMPERATURE: 98.3 F | HEART RATE: 92 BPM | DIASTOLIC BLOOD PRESSURE: 104 MMHG | HEIGHT: 68 IN

## 2021-01-19 DIAGNOSIS — M96.1 POSTLAMINECTOMY SYNDROME, NOT ELSEWHERE CLASSIFIED: ICD-10-CM

## 2021-01-19 DIAGNOSIS — G89.29 OTHER CHRONIC PAIN: ICD-10-CM

## 2021-01-19 PROCEDURE — 99212 OFFICE O/P EST SF 10 MIN: CPT

## 2021-01-20 ENCOUNTER — APPOINTMENT (OUTPATIENT)
Dept: INTERNAL MEDICINE | Facility: CLINIC | Age: 71
End: 2021-01-20
Payer: MEDICARE

## 2021-01-20 VITALS
SYSTOLIC BLOOD PRESSURE: 169 MMHG | WEIGHT: 195 LBS | OXYGEN SATURATION: 97 % | TEMPERATURE: 97.2 F | HEART RATE: 94 BPM | HEIGHT: 68 IN | BODY MASS INDEX: 29.55 KG/M2 | DIASTOLIC BLOOD PRESSURE: 94 MMHG

## 2021-01-20 PROCEDURE — 99213 OFFICE O/P EST LOW 20 MIN: CPT

## 2021-01-20 NOTE — PHYSICAL EXAM
[Normal] : normal rate, regular rhythm, normal S1 and S2 and no murmur heard [No Varicosities] : no varicosities [Pedal Pulses Present] : the pedal pulses are present [No Extremity Clubbing/Cyanosis] : no extremity clubbing/cyanosis [No Edema] : there was no peripheral edema [Soft] : abdomen soft [Non Tender] : non-tender [Normal Bowel Sounds] : normal bowel sounds [Normal Posterior Cervical Nodes] : no posterior cervical lymphadenopathy [Normal Anterior Cervical Nodes] : no anterior cervical lymphadenopathy [Normal Affect] : the affect was normal [de-identified] : +paraspinal tenderness in lumbar region

## 2021-01-20 NOTE — HISTORY OF PRESENT ILLNESS
[Friend] : friend [de-identified] : 71 y/o male with h/o acute/chronic back pain s/p multiple surgeries in the past, most recent in 3/2020 by Dr. Castanon, followed by loosening of hardware.  then, s/p Bilateral percutaneous sacroiliac joint fusion, Surgery Date: 6/4/20 preformed by Dr. Lopez.  \par Was admitted initially here for preop clearance due to planned procedure for February for loosening of screws.  Patient is having second thoughts as this was adjusted after appointment with neurosurgery yesterday.  Concerned as he still might have pain following the procedure.  Currently still having a lot of pain on ambulation spends most of the time in bed.  Still sees Dr. Bhandari, psychiatrist, on a regular basis.  Again asking for something for the pain.  States the Valium worked in the hospital.  Has a history of prior hospitalizations and ER visits due to overdose on opiates and benzodiazepines and other muscle relaxers.\par

## 2021-01-20 NOTE — ASSESSMENT
[FreeTextEntry1] : 69 y/o male with h/o acute/chronic back pain s/p multiple surgeries in the past, most recent in 3/2020 by Dr. Castanon, followed by loosening of hardware.  then, s/p Bilateral percutaneous sacroiliac joint fusion, Surgery Date: 6/4/20 preformed by Dr. Lopez.  \par Was admitted initially here for preop clearance due to planned procedure for February for loosening of screws.  Patient is having second thoughts as this was adjusted after appointment with neurosurgery yesterday.  Concerned as he still might have pain following the procedure.  Currently still having a lot of pain on ambulation spends most of the time in bed.  Still sees Dr. Bhandari, psychiatrist, on a regular basis.  Again asking for something for the pain.  States the Valium worked in the hospital.  Has a history of prior hospitalizations and ER visits due to overdose on opiates and benzodiazepines and other muscle relaxers.\par -Discussed multiple times that I am not a pain management specialist, has been seen by multiple in the past with no avail.\par -We will restart low Robaxin, risks and benefits of medication discussed in detail.\par -Advised will need to follow-up with neurosurgery.  Advised may benefit from second opinion.\par -Blood pressure noted to be elevated today and yesterday.  Likely related to pain\par

## 2021-01-21 NOTE — ASSESSMENT
[FreeTextEntry1] : 70 Year old Man S/P Multiple Spine fusion\par \par Persistent chronic Lowerback pain\par Diagnostic Results: Pseudoarthrosis\par \par Surgery recommended: Removal of hardware\par \par After detailed imaging review and patient examination surgical intervention was discussed with the patient to halt progression of symptoms. \par Potential surgical risks and benefits and alternative discussed with time allowed for questions and answers given. \par \par Dr Castanon discussed in detail that loss of function preop is not guaranteed to return. Surgical intervention is to halt further progression although at times the process still continues despite intervention Risks could include but is not limited to infection, no resolution of symptoms/device failure; Paralysis, death\par .\par Pre-op requirements and postop recovery and expectations discussed regarding Follow up with  NP for wound care assessment and medication management.\par PREOP MEDICATIONS REVIEWED. PT will STOP Blood Thinners 7-10 Days prior to Surgery)\par Verbalized understanding of Pre-op Plan of care\par \par \par \par \par \par \par

## 2021-01-21 NOTE — PHYSICAL EXAM
[General Appearance - Alert] : alert [Oriented To Time, Place, And Person] : oriented to person, place, and time [Cranial Nerves Optic (II)] : visual acuity intact bilaterally,  pupils equal round and reactive to light [Cranial Nerves Oculomotor (III)] : extraocular motion intact [Cranial Nerves Trigeminal (V)] : facial sensation intact symmetrically [Cranial Nerves Facial (VII)] : face symmetrical [Cranial Nerves Vestibulocochlear (VIII)] : hearing was intact bilaterally [Cranial Nerves Glossopharyngeal (IX)] : tongue and palate midline [Cranial Nerves Accessory (XI - Cranial And Spinal)] : head turning and shoulder shrug symmetric [Cranial Nerves Hypoglossal (XII)] : there was no tongue deviation with protrusion [] : no respiratory distress [Heart Rate And Rhythm] : heart rate was normal and rhythm regular [Skin Color & Pigmentation] : normal skin color and pigmentation [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

## 2021-02-02 ENCOUNTER — DOCUMENTATION (OUTPATIENT)
Dept: PHYSICAL THERAPY | Facility: CLINIC | Age: 71
End: 2021-02-02

## 2021-02-02 DIAGNOSIS — M25.511 ACUTE PAIN OF RIGHT SHOULDER: ICD-10-CM

## 2021-02-02 DIAGNOSIS — Z96.611 STATUS POST REVERSE TOTAL SHOULDER REPLACEMENT, RIGHT: Primary | ICD-10-CM

## 2021-02-02 NOTE — PROGRESS NOTES
Discharge Summary  Discharge Summary from Physical Therapy Report      Dates  PT visit: 10/14/2020-12/22/2020  Number of Visits: 20     Discharge Status of Patient: See MD Note dated 12/22/2020    Goals: All Met    Discharge Plan: Continue with current home exercise program as instructed    Comments : pt made great gains in ROM and strength following R reverse TSR.    Date of Discharge 2/1/2021        Vangie Torres, PT  Physical Therapist

## 2021-02-03 ENCOUNTER — ON CAMPUS - OUTPATIENT (AMBULATORY)
Dept: URBAN - METROPOLITAN AREA HOSPITAL 2 | Facility: HOSPITAL | Age: 71
End: 2021-02-03
Payer: OTHER GOVERNMENT

## 2021-02-03 ENCOUNTER — OFFICE (AMBULATORY)
Dept: URBAN - METROPOLITAN AREA PATHOLOGY 4 | Facility: PATHOLOGY | Age: 71
End: 2021-02-03
Payer: COMMERCIAL

## 2021-02-03 ENCOUNTER — OFFICE (AMBULATORY)
Dept: URBAN - METROPOLITAN AREA PATHOLOGY 4 | Facility: PATHOLOGY | Age: 71
End: 2021-02-03
Payer: OTHER GOVERNMENT

## 2021-02-03 VITALS
HEART RATE: 74 BPM | WEIGHT: 280 LBS | HEART RATE: 71 BPM | SYSTOLIC BLOOD PRESSURE: 135 MMHG | OXYGEN SATURATION: 99 % | HEIGHT: 69 IN | DIASTOLIC BLOOD PRESSURE: 54 MMHG | HEART RATE: 69 BPM | SYSTOLIC BLOOD PRESSURE: 110 MMHG | DIASTOLIC BLOOD PRESSURE: 71 MMHG | DIASTOLIC BLOOD PRESSURE: 50 MMHG | RESPIRATION RATE: 18 BRPM | TEMPERATURE: 97 F | HEART RATE: 66 BPM | DIASTOLIC BLOOD PRESSURE: 90 MMHG | DIASTOLIC BLOOD PRESSURE: 63 MMHG | SYSTOLIC BLOOD PRESSURE: 104 MMHG | OXYGEN SATURATION: 98 % | SYSTOLIC BLOOD PRESSURE: 137 MMHG | OXYGEN SATURATION: 100 % | SYSTOLIC BLOOD PRESSURE: 109 MMHG | OXYGEN SATURATION: 97 % | SYSTOLIC BLOOD PRESSURE: 153 MMHG | SYSTOLIC BLOOD PRESSURE: 132 MMHG | RESPIRATION RATE: 16 BRPM | HEART RATE: 64 BPM | RESPIRATION RATE: 14 BRPM | DIASTOLIC BLOOD PRESSURE: 89 MMHG | SYSTOLIC BLOOD PRESSURE: 152 MMHG | DIASTOLIC BLOOD PRESSURE: 64 MMHG | HEART RATE: 63 BPM | RESPIRATION RATE: 20 BRPM

## 2021-02-03 DIAGNOSIS — Z12.11 ENCOUNTER FOR SCREENING FOR MALIGNANT NEOPLASM OF COLON: ICD-10-CM

## 2021-02-03 DIAGNOSIS — K63.5 POLYP OF COLON: ICD-10-CM

## 2021-02-03 DIAGNOSIS — K64.1 SECOND DEGREE HEMORRHOIDS: ICD-10-CM

## 2021-02-03 LAB
GI HISTOLOGY: A. UNSPECIFIED: (no result)
GI HISTOLOGY: PDF REPORT: (no result)

## 2021-02-03 PROCEDURE — 88305 TISSUE EXAM BY PATHOLOGIST: CPT | Mod: 26 | Performed by: INTERNAL MEDICINE

## 2021-02-03 PROCEDURE — 45380 COLONOSCOPY AND BIOPSY: CPT | Mod: PT | Performed by: INTERNAL MEDICINE

## 2021-02-12 ENCOUNTER — APPOINTMENT (OUTPATIENT)
Dept: SPINE | Facility: HOSPITAL | Age: 71
End: 2021-02-12

## 2021-02-25 ENCOUNTER — RX RENEWAL (OUTPATIENT)
Age: 71
End: 2021-02-25

## 2021-02-25 RX ORDER — CYCLOBENZAPRINE HYDROCHLORIDE 5 MG/1
5 TABLET, FILM COATED ORAL
Qty: 14 | Refills: 0 | Status: ACTIVE | COMMUNITY
Start: 2021-01-05 | End: 1900-01-01

## 2021-02-27 ENCOUNTER — RX RENEWAL (OUTPATIENT)
Age: 71
End: 2021-02-27

## 2021-03-03 DIAGNOSIS — E11.9 TYPE 2 DIABETES MELLITUS WITHOUT COMPLICATION, WITHOUT LONG-TERM CURRENT USE OF INSULIN (HCC): ICD-10-CM

## 2021-03-08 ENCOUNTER — NON-APPOINTMENT (OUTPATIENT)
Age: 71
End: 2021-03-08

## 2021-03-12 DIAGNOSIS — I10 ESSENTIAL HYPERTENSION: ICD-10-CM

## 2021-03-12 RX ORDER — LISINOPRIL 40 MG/1
TABLET ORAL
Qty: 90 TABLET | Refills: 3 | Status: SHIPPED | OUTPATIENT
Start: 2021-03-12 | End: 2022-03-30 | Stop reason: SDUPTHER

## 2021-03-18 ENCOUNTER — OFFICE VISIT (OUTPATIENT)
Dept: ORTHOPEDIC SURGERY | Facility: CLINIC | Age: 71
End: 2021-03-18

## 2021-03-18 VITALS — HEIGHT: 69 IN | BODY MASS INDEX: 41.77 KG/M2 | WEIGHT: 282 LBS

## 2021-03-18 DIAGNOSIS — Z47.89 ORTHOPEDIC AFTERCARE: Primary | ICD-10-CM

## 2021-03-18 DIAGNOSIS — M19.011 PRIMARY LOCALIZED OSTEOARTHROSIS OF RIGHT SHOULDER REGION: ICD-10-CM

## 2021-03-18 PROCEDURE — 99212 OFFICE O/P EST SF 10 MIN: CPT | Performed by: ORTHOPAEDIC SURGERY

## 2021-03-18 NOTE — PROGRESS NOTES
"     Patient ID: Orion Harvey is a 70 y.o. male.  Right shoulder pain  10/6/20 right reverse total shoulder arthroplasty  Pain resolved    Review of Systems:  Resolved shoulder pain      Objective:    Ht 175.3 cm (69\")   Wt 128 kg (282 lb)   BMI 41.64 kg/m²     Physical Examination:  Incision healed without tenderness, active elevation 170 degrees abduction 130 degrees external rotation 40 degrees internal rotation S1       Imaging:   right Shoulder X-Ray  Indication: Postop reverse total shoulder  AP Y and Lateral views  Findings: Reverse total shoulder unchanged position  no bony lesion  Soft tissues normal  not examined joint spaces  Hardware appropriately positioned yes      yes prior studies available for comparison    Assessment:    Doing well after reverse total shoulder    Plan:   Activity as tolerated with x-ray in 6 months      Procedures          Disclaimer: Please note that areas of this note were completed with computer voice recognition software.  Quite often unanticipated grammatical, syntax, homophones, and other interpretive errors are inadvertently transcribed by the computer software. Please excuse any errors that have escaped final proofreading.  "

## 2021-03-23 ENCOUNTER — OFFICE VISIT (OUTPATIENT)
Dept: FAMILY MEDICINE CLINIC | Facility: CLINIC | Age: 71
End: 2021-03-23

## 2021-03-23 VITALS
DIASTOLIC BLOOD PRESSURE: 76 MMHG | BODY MASS INDEX: 42.86 KG/M2 | OXYGEN SATURATION: 97 % | HEIGHT: 69 IN | SYSTOLIC BLOOD PRESSURE: 139 MMHG | TEMPERATURE: 97.3 F | HEART RATE: 69 BPM | WEIGHT: 289.4 LBS | RESPIRATION RATE: 16 BRPM

## 2021-03-23 DIAGNOSIS — M25.562 CHRONIC PAIN OF BOTH KNEES: ICD-10-CM

## 2021-03-23 DIAGNOSIS — I10 ESSENTIAL HYPERTENSION: Chronic | ICD-10-CM

## 2021-03-23 DIAGNOSIS — M25.561 CHRONIC PAIN OF BOTH KNEES: ICD-10-CM

## 2021-03-23 DIAGNOSIS — E66.01 OBESITY, CLASS III, BMI 40-49.9 (MORBID OBESITY) (HCC): Chronic | ICD-10-CM

## 2021-03-23 DIAGNOSIS — M25.531 RIGHT WRIST PAIN: ICD-10-CM

## 2021-03-23 DIAGNOSIS — G89.29 CHRONIC PAIN OF BOTH KNEES: ICD-10-CM

## 2021-03-23 DIAGNOSIS — M19.90 ARTHRITIS: ICD-10-CM

## 2021-03-23 DIAGNOSIS — E11.9 TYPE 2 DIABETES MELLITUS WITHOUT COMPLICATION, WITHOUT LONG-TERM CURRENT USE OF INSULIN (HCC): ICD-10-CM

## 2021-03-23 DIAGNOSIS — E11.65 TYPE 2 DIABETES MELLITUS WITH HYPERGLYCEMIA, WITHOUT LONG-TERM CURRENT USE OF INSULIN (HCC): Primary | Chronic | ICD-10-CM

## 2021-03-23 PROCEDURE — 99214 OFFICE O/P EST MOD 30 MIN: CPT | Performed by: FAMILY MEDICINE

## 2021-03-23 RX ORDER — CELECOXIB 200 MG/1
200 CAPSULE ORAL DAILY
Qty: 30 CAPSULE | Refills: 3 | Status: SHIPPED | OUTPATIENT
Start: 2021-03-23 | End: 2021-05-17

## 2021-03-23 RX ORDER — DULAGLUTIDE 3 MG/.5ML
0.5 INJECTION, SOLUTION SUBCUTANEOUS WEEKLY
Qty: 4 PEN | Refills: 12 | Status: SHIPPED | OUTPATIENT
Start: 2021-03-23 | End: 2021-03-31 | Stop reason: SDUPTHER
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
## 2021-03-24 ENCOUNTER — RX RENEWAL (OUTPATIENT)
Age: 71
End: 2021-03-24

## 2021-03-24 RX ORDER — TAMSULOSIN HYDROCHLORIDE 0.4 MG/1
0.4 CAPSULE ORAL
Qty: 180 | Refills: 1 | Status: ACTIVE | COMMUNITY
Start: 2020-06-02 | End: 1900-01-01

## 2021-03-24 RX ORDER — IRBESARTAN 300 MG/1
300 TABLET ORAL
Qty: 90 | Refills: 1 | Status: ACTIVE | COMMUNITY
Start: 2020-06-16 | End: 1900-01-01

## 2021-03-31 DIAGNOSIS — E11.65 TYPE 2 DIABETES MELLITUS WITH HYPERGLYCEMIA, WITHOUT LONG-TERM CURRENT USE OF INSULIN (HCC): Chronic | ICD-10-CM

## 2021-03-31 RX ORDER — DULAGLUTIDE 3 MG/.5ML
0.5 INJECTION, SOLUTION SUBCUTANEOUS WEEKLY
Qty: 4 PEN | Refills: 12 | Status: SHIPPED | OUTPATIENT
Start: 2021-03-31 | End: 2021-08-04 | Stop reason: SDUPTHER

## 2021-04-20 DIAGNOSIS — E78.2 MIXED HYPERLIPIDEMIA: ICD-10-CM

## 2021-04-21 RX ORDER — ROSUVASTATIN CALCIUM 10 MG/1
TABLET, COATED ORAL
Qty: 90 TABLET | Refills: 0 | Status: SHIPPED | OUTPATIENT
Start: 2021-04-21 | End: 2021-07-23

## 2021-04-27 ENCOUNTER — PROCEDURE VISIT (OUTPATIENT)
Dept: FAMILY MEDICINE CLINIC | Facility: CLINIC | Age: 71
End: 2021-04-27

## 2021-04-27 VITALS
RESPIRATION RATE: 16 BRPM | DIASTOLIC BLOOD PRESSURE: 72 MMHG | BODY MASS INDEX: 41.86 KG/M2 | HEIGHT: 69 IN | OXYGEN SATURATION: 97 % | HEART RATE: 71 BPM | TEMPERATURE: 98 F | WEIGHT: 282.6 LBS | SYSTOLIC BLOOD PRESSURE: 126 MMHG

## 2021-04-27 DIAGNOSIS — M25.561 CHRONIC PAIN OF BOTH KNEES: Primary | ICD-10-CM

## 2021-04-27 DIAGNOSIS — S63.8X1D TEAR OF RIGHT SCAPHOLUNATE LIGAMENT, SUBSEQUENT ENCOUNTER: ICD-10-CM

## 2021-04-27 DIAGNOSIS — M25.531 RIGHT WRIST PAIN: ICD-10-CM

## 2021-04-27 DIAGNOSIS — M17.0 OSTEOARTHRITIS OF BOTH KNEES, UNSPECIFIED OSTEOARTHRITIS TYPE: ICD-10-CM

## 2021-04-27 DIAGNOSIS — E66.01 OBESITY, CLASS III, BMI 40-49.9 (MORBID OBESITY) (HCC): ICD-10-CM

## 2021-04-27 DIAGNOSIS — M25.562 CHRONIC PAIN OF BOTH KNEES: Primary | ICD-10-CM

## 2021-04-27 DIAGNOSIS — M94.269 CHONDROMALACIA OF KNEE, UNSPECIFIED LATERALITY: ICD-10-CM

## 2021-04-27 DIAGNOSIS — E11.65 TYPE 2 DIABETES MELLITUS WITH HYPERGLYCEMIA, WITHOUT LONG-TERM CURRENT USE OF INSULIN (HCC): ICD-10-CM

## 2021-04-27 DIAGNOSIS — G89.29 CHRONIC PAIN OF BOTH KNEES: Primary | ICD-10-CM

## 2021-04-27 PROBLEM — S63.8X1A TEAR OF RIGHT SCAPHOLUNATE LIGAMENT: Status: ACTIVE | Noted: 2021-04-27

## 2021-04-27 PROCEDURE — 20610 DRAIN/INJ JOINT/BURSA W/O US: CPT | Performed by: FAMILY MEDICINE

## 2021-04-27 PROCEDURE — 99213 OFFICE O/P EST LOW 20 MIN: CPT | Performed by: FAMILY MEDICINE

## 2021-04-27 RX ORDER — METHYLPREDNISOLONE ACETATE 80 MG/ML
80 INJECTION, SUSPENSION INTRA-ARTICULAR; INTRALESIONAL; INTRAMUSCULAR; SOFT TISSUE ONCE
Status: COMPLETED | OUTPATIENT
Start: 2021-04-27 | End: 2021-04-27

## 2021-04-27 RX ADMIN — METHYLPREDNISOLONE ACETATE 80 MG: 80 INJECTION, SUSPENSION INTRA-ARTICULAR; INTRALESIONAL; INTRAMUSCULAR; SOFT TISSUE at 12:27

## 2021-04-27 NOTE — PROGRESS NOTES
Chief Complaint  Knee Pain and Wrist Pain    Subjective          History of Present Illness  Orion Harvey presents to [facility] for knee pain and wrist pain.     Patient states he is here for knee injections today. Patient states his pain today is a 5 today. He states he really has not been on his legs today. He mowed and was on them yesterday and his pain was a 9 at that time.     Patient states his right wrist pain is getting worse. He does not have a brace. He states just sitting there is pain. He states the pain is down in his hand and up his arm.     X-ray of right wrist shows abnormal increased gap between the scaphoid bone and the lunate bones strongly suspicious for tearing of the right scapholunate  ligaments.  There is also mild arthritis and hypertrophic spurring between the distal end of the right scaphoid bone and the right trapezium bone and in the joint  between the right trapezium bone base the right first metacarpal (Thumb).  I would recommend referral to hand specialists, [facility] for further evaluation, hopefully Celebrex will help with the discomfort.  Let me know if you would like me to place referral to the hand group.    Did increase trulicity to 3.0 mg weekly no side effects , some improvement in glucose readings.     Current Outpatient Medications on File Prior to Visit   Medication Sig   • ascorbic acid (VITAMIN C) 1000 MG tablet Take 1 tablet by mouth Daily. LAST DOSE 9/29   • aspirin 325 MG tablet Take 1 tablet by mouth Daily. LAST DOSE 9/29   • Dulaglutide (Trulicity) 3 MG/0.5ML solution pen-injector Inject 0.5 mL under the skin into the appropriate area as directed 1 (One) Time Per Week. (Patient taking differently: Inject 3 mL under the skin into the appropriate area as directed 1 (One) Time Per Week.)   • glimepiride (AMARYL) 4 MG tablet Take 1 tablet by mouth twice daily   • glucose blood (OneTouch Verio) test strip Use as instructed   •  "glucose blood test strip 1 each by Other route As Needed. Use as instructed   • lisinopril (PRINIVIL,ZESTRIL) 40 MG tablet Take 1 tablet by mouth once daily   • metFORMIN (GLUCOPHAGE) 1000 MG tablet Take 1 tablet by mouth 2 (Two) Times a Day. (Patient taking differently: Take 1,000 mg by mouth 2 (Two) Times a Day. LAST DOSE 10/4 AM)   • Misc Natural Products (Osteo Bi-Flex Triple Strength) tablet Take 1 tablet by mouth Daily.   • Multiple Vitamins-Minerals (CENTRUM SILVER) tablet Take 1 tablet by mouth Daily. LAST DOSE 9/29   • ONETOUCH DELICA LANCETS 33G misc 1 tablet by In Vitro route Every 12 (Twelve) Hours.   • rosuvastatin (CRESTOR) 10 MG tablet Take 1 tablet by mouth once daily   • celecoxib (CeleBREX) 200 MG capsule Take 1 capsule by mouth Daily.   • [DISCONTINUED] rosuvastatin (CRESTOR) 10 MG tablet Take 1 tablet by mouth once daily (Patient taking differently: Take 10 mg by mouth Every Night.)     No current facility-administered medications on file prior to visit.       Objective   Vital Signs:   /72 (BP Location: Left arm, Patient Position: Sitting, Cuff Size: Large Adult)   Pulse 71   Temp 98 °F (36.7 °C) (Infrared)   Resp 16   Ht 175.3 cm (69\")   Wt 128 kg (282 lb 9.6 oz)   SpO2 97%   BMI 41.73 kg/m²     Physical Exam  Vitals and nursing note reviewed.   Constitutional:       General: He is not in acute distress.     Appearance: He is well-developed. He is obese.   Eyes:      Conjunctiva/sclera: Conjunctivae normal.      Pupils: Pupils are equal, round, and reactive to light.   Cardiovascular:      Rate and Rhythm: Normal rate and regular rhythm.      Heart sounds: No murmur heard.     Pulmonary:      Effort: Pulmonary effort is normal.      Breath sounds: No wheezing.   Musculoskeletal:         General: Normal range of motion.      Cervical back: Normal range of motion.      Comments: Arthritic changes and mild tenderness of knees bilaterally with minimal joint effusion no increased warmth " extensive scarring immediately below right knee   Skin:     General: Skin is warm and dry.   Neurological:      Mental Status: He is alert and oriented to person, place, and time.      Procedure:  Steroid injection bilateral knees  Informed consent is obtained  Bilateral  infero-lateral knees are cleansed with Betadine x3.  Anesthetized with topical ethyl chloride  Under aseptic technique a 25-gauge 1-1/2 inch needle is used to inject 80 mg Depo-Medrol and 2 cc 1% lidocaine without epinephrine the infero-lateral approach.  First in left knee immediately following repeated exact same procedure in right knee  There were no complications,  minimal spot bleeding, Betadine was removed and Band-Aid applied.   Patient denied any increased discomfort following the procedure.      Lab Results   Component Value Date     (H) 02/05/2021    BUN 13 02/05/2021    CREATININE 0.83 02/05/2021    EGFRIFNONA 90 02/05/2021    EGFRIFAFRI 104 02/05/2021     02/05/2021    K 4.5 02/05/2021     02/05/2021    CALCIUM 9.5 02/05/2021    ALBUMIN 4.1 02/05/2021    BILITOT 0.4 02/05/2021    ALKPHOS 58 02/05/2021    AST 30 02/05/2021    ALT 37 02/05/2021        Lab Results   Component Value Date    HGBA1C 9.3 (H) 02/05/2021    HGBA1C 7.7 (H) 11/06/2020    HGBA1C 7.8 (H) 09/29/2020    HGBA1C 8.8 (H) 08/03/2020    HGBA1C 10.1 (H) 05/01/2020    HGBA1C 7.3 (H) 10/30/2019       Result Review :                       Assessment and Plan    Diagnoses and all orders for this visit:    1. Chronic pain of both knees (Primary)  -     methylPREDNISolone acetate (DEPO-medrol) injection 80 mg  -     methylPREDNISolone acetate (DEPO-medrol) injection 80 mg    2. Chondromalacia of knee, unspecified laterality  Comments:  bilateral  Orders:  -     methylPREDNISolone acetate (DEPO-medrol) injection 80 mg  -     methylPREDNISolone acetate (DEPO-medrol) injection 80 mg    3. Tear of right scapholunate ligament, subsequent encounter  -     Ambulatory  Referral to Hand Surgery    4. Right wrist pain  -     Ambulatory Referral to Hand Surgery    5. Obesity, Class III, BMI 40-49.9 (morbid obesity) (CMS/[facility])    6. Osteoarthritis of both knees, unspecified osteoarthritis type  -     methylPREDNISolone acetate (DEPO-medrol) injection 80 mg  -     methylPREDNISolone acetate (DEPO-medrol) injection 80 mg    7. Type 2 diabetes mellitus with hyperglycemia, without long-term current use of insulin (CMS/[facility])      Bilateral knee pain from osteoarthritis and chondromalacia after discussion steroid injection today, alternatively had offered referral for consideration of Hyalgan injections.  Referring to [facility] for right wrist pain with abnormal x-ray indicating possible tear of right scapholunate ligament.  Diabetes, improved control with increased Trulicity encouraged continued weight reduction reduce calorie low concentrated sweets diet.  There are no discontinued medications.      Follow Up     Return in about 17 days (around 5/14/2021) for Recheck, diabetes etc, labs prior - need to change to [place]  .    Patient was given instructions and counseling regarding his condition or for health maintenance advice. Please see specific information pulled into the AVS if appropriate.

## 2021-05-10 ENCOUNTER — TELEPHONE (OUTPATIENT)
Dept: FAMILY MEDICINE CLINIC | Facility: CLINIC | Age: 71
End: 2021-05-10

## 2021-05-10 NOTE — TELEPHONE ENCOUNTER
Caller: Orion Harvey    Relationship: Self    Best call back number:     What orders are you requesting (i.e. lab or imaging): LAB ORDERS    In what timeframe would the patient need to come in: ASAP    Where will you receive your lab/imaging services: OFFICE OF DR BOB    Additional notes: PATIENT IS REQUESTING ORDERS SO THAT HE CAN HAVE HIS LABS AT THE OFFICE OF DR BOB BEFORE HIS OFFICE APPOINTMENT WITH HER ON MAY 17, 2021

## 2021-05-11 DIAGNOSIS — E11.65 TYPE 2 DIABETES MELLITUS WITH HYPERGLYCEMIA, WITHOUT LONG-TERM CURRENT USE OF INSULIN (HCC): Primary | ICD-10-CM

## 2021-05-17 ENCOUNTER — OFFICE VISIT (OUTPATIENT)
Dept: FAMILY MEDICINE CLINIC | Facility: CLINIC | Age: 71
End: 2021-05-17

## 2021-05-17 VITALS
BODY MASS INDEX: 41.5 KG/M2 | OXYGEN SATURATION: 100 % | SYSTOLIC BLOOD PRESSURE: 142 MMHG | WEIGHT: 280.2 LBS | RESPIRATION RATE: 16 BRPM | TEMPERATURE: 97.1 F | HEIGHT: 69 IN | HEART RATE: 78 BPM | DIASTOLIC BLOOD PRESSURE: 72 MMHG

## 2021-05-17 DIAGNOSIS — E78.2 MIXED HYPERLIPIDEMIA: ICD-10-CM

## 2021-05-17 DIAGNOSIS — E11.65 TYPE 2 DIABETES MELLITUS WITH HYPERGLYCEMIA, WITHOUT LONG-TERM CURRENT USE OF INSULIN (HCC): Primary | Chronic | ICD-10-CM

## 2021-05-17 DIAGNOSIS — I10 ESSENTIAL HYPERTENSION: Chronic | ICD-10-CM

## 2021-05-17 PROCEDURE — 99214 OFFICE O/P EST MOD 30 MIN: CPT | Performed by: FAMILY MEDICINE

## 2021-05-17 NOTE — PATIENT INSTRUCTIONS
Managing Your Hypertension  Hypertension, also called high blood pressure, is when the force of the blood pressing against the walls of the arteries is too strong. Arteries are blood vessels that carry blood from your heart throughout your body. Hypertension forces the heart to work harder to pump blood and may cause the arteries to become narrow or stiff.  Understanding blood pressure readings  Your personal target blood pressure may vary depending on your medical conditions, your age, and other factors. A blood pressure reading includes a higher number over a lower number. Ideally, your blood pressure should be below 120/80. You should know that:  · The first, or top, number is called the systolic pressure. It is a measure of the pressure in your arteries as your heart beats.  · The second, or bottom number, is called the diastolic pressure. It is a measure of the pressure in your arteries as the heart relaxes.  Blood pressure is classified into four stages. Based on your blood pressure reading, your health care provider may use the following stages to determine what type of treatment you need, if any. Systolic pressure and diastolic pressure are measured in a unit called mmHg.  Normal  · Systolic pressure: below 120.  · Diastolic pressure: below 80.  Elevated  · Systolic pressure: 120-129.  · Diastolic pressure: below 80.  Hypertension stage 1  · Systolic pressure: 130-139.  · Diastolic pressure: 80-89.  Hypertension stage 2  · Systolic pressure: 140 or above.  · Diastolic pressure: 90 or above.  How can this condition affect me?  Managing your hypertension is an important responsibility. Over time, hypertension can damage the arteries and decrease blood flow to important parts of the body, including the brain, heart, and kidneys. Having untreated or uncontrolled hypertension can lead to:  · A heart attack.  · A stroke.  · A weakened blood vessel (aneurysm).  · Heart failure.  · Kidney damage.  · Eye  damage.  · Metabolic syndrome.  · Memory and concentration problems.  · Vascular dementia.  What actions can I take to manage this condition?  Hypertension can be managed by making lifestyle changes and possibly by taking medicines. Your health care provider will help you make a plan to bring your blood pressure within a normal range.  Nutrition    · Eat a diet that is high in fiber and potassium, and low in salt (sodium), added sugar, and fat. An example eating plan is called the Dietary Approaches to Stop Hypertension (DASH) diet. To eat this way:  ? Eat plenty of fresh fruits and vegetables. Try to fill one-half of your plate at each meal with fruits and vegetables.  ? Eat whole grains, such as whole-wheat pasta, brown rice, or whole-grain bread. Fill about one-fourth of your plate with whole grains.  ? Eat low-fat dairy products.  ? Avoid fatty cuts of meat, processed or cured meats, and poultry with skin. Fill about one-fourth of your plate with lean proteins such as fish, chicken without skin, beans, eggs, and tofu.  ? Avoid pre-made and processed foods. These tend to be higher in sodium, added sugar, and fat.  · Reduce your daily sodium intake. Most people with hypertension should eat less than 1,500 mg of sodium a day.  Lifestyle    · Work with your health care provider to maintain a healthy body weight or to lose weight. Ask what an ideal weight is for you.  · Get at least 30 minutes of exercise that causes your heart to beat faster (aerobic exercise) most days of the week. Activities may include walking, swimming, or biking.  · Include exercise to strengthen your muscles (resistance exercise), such as weight lifting, as part of your weekly exercise routine. Try to do these types of exercises for 30 minutes at least 3 days a week.  · Do not use any products that contain nicotine or tobacco, such as cigarettes, e-cigarettes, and chewing tobacco. If you need help quitting, ask your health care  provider.  · Control any long-term (chronic) conditions you have, such as high cholesterol or diabetes.  · Identify your sources of stress and find ways to manage stress. This may include meditation, deep breathing, or making time for fun activities.  Alcohol use  · Do not drink alcohol if:  ? Your health care provider tells you not to drink.  ? You are pregnant, may be pregnant, or are planning to become pregnant.  · If you drink alcohol:  ? Limit how much you use to:  § 0-1 drink a day for women.  § 0-2 drinks a day for men.  ? Be aware of how much alcohol is in your drink. In the [place] one drink equals one 12 oz bottle of beer (355 mL), one 5 oz glass of wine (148 mL), or one 1½ oz glass of hard liquor (44 mL).  Medicines  Your health care provider may prescribe medicine if lifestyle changes are not enough to get your blood pressure under control and if:  · Your systolic blood pressure is 130 or higher.  · Your diastolic blood pressure is 80 or higher.  Take medicines only as told by your health care provider. Follow the directions carefully. Blood pressure medicines must be taken as told by your health care provider. The medicine does not work as well when you skip doses. Skipping doses also puts you at risk for problems.  Monitoring  Before you monitor your blood pressure:  · Do not smoke, drink caffeinated beverages, or exercise within 30 minutes before taking a measurement.  · Use the bathroom and empty your bladder (urinate).  · Sit quietly for at least 5 minutes before taking measurements.  Monitor your blood pressure at home as told by your health care provider. To do this:  · Sit with your back straight and supported.  · Place your feet flat on the floor. Do not cross your legs.  · Support your arm on a flat surface, such as a table. Make sure your upper arm is at heart level.  · Each time you measure, take two or three readings one minute apart and record the results.  You may also need to have your  blood pressure checked regularly by your health care provider.  General information  · Talk with your health care provider about your diet, exercise habits, and other lifestyle factors that may be contributing to hypertension.  · Review all the medicines you take with your health care provider because there may be side effects or interactions.  · Keep all visits as told by your health care provider. Your health care provider can help you create and adjust your plan for managing your high blood pressure.  Where to find more information  · National Heart, Lung, and Blood [place]: www.nhlbi.nih.gov  · American Heart Association: www.heart.org  Contact a health care provider if:  · You think you are having a reaction to medicines you have taken.  · You have repeated (recurrent) headaches.  · You feel dizzy.  · You have swelling in your ankles.  · You have trouble with your vision.  Get help right away if:  · You develop a severe headache or confusion.  · You have unusual weakness or numbness, or you feel faint.  · You have severe pain in your chest or abdomen.  · You vomit repeatedly.  · You have trouble breathing.  These symptoms may represent a serious problem that is an emergency. Do not wait to see if the symptoms will go away. Get medical help right away. Call your local emergency services (911 in the [place] Do not drive yourself to the hospital.  Summary  · Hypertension is when the force of blood pumping through your arteries is too strong. If this condition is not controlled, it may put you at risk for serious complications.  · Your personal target blood pressure may vary depending on your medical conditions, your age, and other factors. For most people, a normal blood pressure is less than 120/80.  · Hypertension is managed by lifestyle changes, medicines, or both.  · Lifestyle changes to help manage hypertension include losing weight, eating a healthy, low-sodium diet, exercising more, stopping smoking, and  "limiting alcohol.  This information is not intended to replace advice given to you by your health care provider. Make sure you discuss any questions you have with your health care provider.  Document Revised: 01/22/2021 Document Reviewed: 11/17/2020  Elsevier Patient Education © 2021 [employer]    https://www.nhlbi.nih.gov/files/docs/public/heart/dash_brief.pdf\">   DASH Eating Plan  DASH stands for Dietary Approaches to Stop Hypertension. The DASH eating plan is a healthy eating plan that has been shown to:  · Reduce high blood pressure (hypertension).  · Reduce your risk for type 2 diabetes, heart disease, and stroke.  · Help with weight loss.  What are tips for following this plan?  Reading food labels  · Check food labels for the amount of salt (sodium) per serving. Choose foods with less than 5 percent of the Daily Value of sodium. Generally, foods with less than 300 milligrams (mg) of sodium per serving fit into this eating plan.  · To find whole grains, look for the word \"whole\" as the first word in the ingredient list.  Shopping  · Buy products labeled as \"low-sodium\" or \"no salt added.\"  · Buy fresh foods. Avoid canned foods and pre-made or frozen meals.  Cooking  · Avoid adding salt when cooking. Use salt-free seasonings or herbs instead of table salt or sea salt. Check with your health care provider or pharmacist before using salt substitutes.  · Do not espana foods. Cook foods using healthy methods such as baking, boiling, grilling, roasting, and broiling instead.  · Cook with heart-healthy oils, such as olive, canola, avocado, soybean, or sunflower oil.  Meal planning    · Eat a balanced diet that includes:  ? 4 or more servings of fruits and 4 or more servings of vegetables each day. Try to fill one-half of your plate with fruits and vegetables.  ? 6-8 servings of whole grains each day.  ? Less than 6 oz (170 g) of lean meat, poultry, or fish each day. A 3-oz (85-g) serving of meat is about the same " size as a deck of cards. One egg equals 1 oz (28 g).  ? 2-3 servings of low-fat dairy each day. One serving is 1 cup (237 mL).  ? 1 serving of nuts, seeds, or beans 5 times each week.  ? 2-3 servings of heart-healthy fats. Healthy fats called omega-3 fatty acids are found in foods such as walnuts, flaxseeds, fortified milks, and eggs. These fats are also found in cold-water fish, such as sardines, salmon, and mackerel.  · Limit how much you eat of:  ? Canned or prepackaged foods.  ? Food that is high in trans fat, such as some fried foods.  ? Food that is high in saturated fat, such as fatty meat.  ? Desserts and other sweets, sugary drinks, and other foods with added sugar.  ? Full-fat dairy products.  · Do not salt foods before eating.  · Do not eat more than 4 egg yolks a week.  · Try to eat at least 2 vegetarian meals a week.  · Eat more home-cooked food and less restaurant, buffet, and fast food.  Lifestyle  · When eating at a restaurant, ask that your food be prepared with less salt or no salt, if possible.  · If you drink alcohol:  ? Limit how much you use to:  § 0-1 drink a day for women who are not pregnant.  § 0-2 drinks a day for men.  ? Be aware of how much alcohol is in your drink. In the [place] one drink equals one 12 oz bottle of beer (355 mL), one 5 oz glass of wine (148 mL), or one 1½ oz glass of hard liquor (44 mL).  General information  · Avoid eating more than 2,300 mg of salt a day. If you have hypertension, you may need to reduce your sodium intake to 1,500 mg a day.  · Work with your health care provider to maintain a healthy body weight or to lose weight. Ask what an ideal weight is for you.  · Get at least 30 minutes of exercise that causes your heart to beat faster (aerobic exercise) most days of the week. Activities may include walking, swimming, or biking.  · Work with your health care provider or dietitian to adjust your eating plan to your individual calorie needs.  What foods should I  eat?  Fruits  All fresh, dried, or frozen fruit. Canned fruit in natural juice (without added sugar).  Vegetables  Fresh or frozen vegetables (raw, steamed, roasted, or grilled). Low-sodium or reduced-sodium tomato and vegetable juice. Low-sodium or reduced-sodium tomato sauce and tomato paste. Low-sodium or reduced-sodium canned vegetables.  Grains  Whole-grain or whole-wheat bread. Whole-grain or whole-wheat pasta. Brown rice. Oatmeal. Quinoa. Bulgur. Whole-grain and low-sodium cereals. [place] bread. Low-fat, low-sodium crackers. Whole-wheat flour tortillas.  Meats and other proteins  Skinless chicken or turkey. Ground chicken or turkey. Pork with fat trimmed off. Fish and seafood. Egg whites. Dried beans, peas, or lentils. Unsalted nuts, nut butters, and seeds. Unsalted canned beans. Lean cuts of beef with fat trimmed off. Low-sodium, lean precooked or cured meat, such as sausages or meat loaves.  Dairy  Low-fat (1%) or fat-free (skim) milk. Reduced-fat, low-fat, or fat-free cheeses. Nonfat, low-sodium ricotta or cottage cheese. Low-fat or nonfat yogurt. Low-fat, low-sodium cheese.  Fats and oils  Soft margarine without trans fats. Vegetable oil. Reduced-fat, low-fat, or light mayonnaise and salad dressings (reduced-sodium). Canola, safflower, olive, avocado, soybean, and sunflower oils. Avocado.  Seasonings and condiments  Herbs. Spices. Seasoning mixes without salt.  Other foods  Unsalted popcorn and pretzels. Fat-free sweets.  The items listed above may not be a complete list of foods and beverages you can eat. Contact a dietitian for more information.  What foods should I avoid?  Fruits  Canned fruit in a light or heavy syrup. Fried fruit. Fruit in cream or butter sauce.  Vegetables  Creamed or fried vegetables. Vegetables in a cheese sauce. Regular canned vegetables (not low-sodium or reduced-sodium). Regular canned tomato sauce and paste (not low-sodium or reduced-sodium). Regular tomato and vegetable juice  (not low-sodium or reduced-sodium). Pickles. Olives.  Grains  Baked goods made with fat, such as croissants, muffins, or some breads. Dry pasta or rice meal packs.  Meats and other proteins  Fatty cuts of meat. Ribs. Fried meat. August. Bologna, salami, and other precooked or cured meats, such as sausages or meat loaves. Fat from the back of a pig (fatback). Bratwurst. Salted nuts and seeds. Canned beans with added salt. Canned or smoked fish. Whole eggs or egg yolks. Chicken or turkey with skin.  Dairy  Whole or 2% milk, cream, and half-and-half. Whole or full-fat cream cheese. Whole-fat or sweetened yogurt. Full-fat cheese. Nondairy creamers. Whipped toppings. Processed cheese and cheese spreads.  Fats and oils  Butter. Stick margarine. Lard. Shortening. Ghee. August fat. Tropical oils, such as coconut, palm kernel, or palm oil.  Seasonings and condiments  Onion salt, garlic salt, seasoned salt, table salt, and sea salt. Worcestershire sauce. Tartar sauce. Barbecue sauce. Teriyaki sauce. Soy sauce, including reduced-sodium. Steak sauce. Canned and packaged gravies. Fish sauce. Oyster sauce. Cocktail sauce. Store-bought horseradish. Ketchup. Mustard. Meat flavorings and tenderizers. Bouillon cubes. Hot sauces. Pre-made or packaged marinades. Pre-made or packaged taco seasonings. Relishes. Regular salad dressings.  Other foods  Salted popcorn and pretzels.  The items listed above may not be a complete list of foods and beverages you should avoid. Contact a dietitian for more information.  Where to find more information  · National Heart, Lung, and Blood [place]: www.nhlbi.nih.gov  · American Heart Association: www.heart.org  · Academy of Nutrition and Dietetics: www.eatright.org  · National Kidney Foundation: www.kidney.org  Summary  · The DASH eating plan is a healthy eating plan that has been shown to reduce high blood pressure (hypertension). It may also reduce your risk for type 2 diabetes, heart disease, and  stroke.  · When on the DASH eating plan, aim to eat more fresh fruits and vegetables, whole grains, lean proteins, low-fat dairy, and heart-healthy fats.  · With the DASH eating plan, you should limit salt (sodium) intake to 2,300 mg a day. If you have hypertension, you may need to reduce your sodium intake to 1,500 mg a day.  · Work with your health care provider or dietitian to adjust your eating plan to your individual calorie needs.  This information is not intended to replace advice given to you by your health care provider. Make sure you discuss any questions you have with your health care provider.  Document Revised: 11/20/2020 Document Reviewed: 11/20/2020  Elsevier Patient Education © 2021 [employer] Inc.

## 2021-05-17 NOTE — PROGRESS NOTES
Chief Complaint  Diabetes and Hypertension    Subjective          History of Present Illness  Orion Harvey presents today for diabetes and hypertension.       Patient checks his pressures daily and sometimes twice daily. Patient states his pressures are running well at home. 125 to 133/ 78 to 82    Patient checks his sugars daily if not twice daily. Patient states his sugars are better since he has increased his trulicity. Patient states he is doing well on the increase and his sugars are easier to control now.     Patient had labs done on 5/3/21    Patient state his knees are better with the recent injections. Patient states he went to [facility] and they did the injection in the right wrist and it has not responded like the knee injection. He was told if it does not work than the only other treatment would be to fuse the bones together.  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      Patient has no other questions or concerns.    Current Outpatient Medications on File Prior to Visit   Medication Sig   • ascorbic acid (VITAMIN C) 1000 MG tablet Take 1 tablet by mouth Daily. LAST DOSE 9/29   • aspirin 325 MG tablet Take 1 tablet by mouth Daily. LAST DOSE 9/29   • Dulaglutide (Trulicity) 3 MG/0.5ML solution pen-injector Inject 0.5 mL under the skin into the appropriate area as directed 1 (One) Time Per Week. (Patient taking differently: Inject 3 mL under the skin into the appropriate area as directed 1 (One) Time Per Week.)   • glimepiride (AMARYL) 4 MG tablet Take 1 tablet by mouth twice daily   • glucose blood (OneTouch Verio) test strip Use as instructed   • glucose blood test strip 1 each by Other route As Needed. Use as instructed   • lisinopril (PRINIVIL,ZESTRIL) 40 MG tablet Take 1 tablet by mouth once daily   •  "metFORMIN (GLUCOPHAGE) 1000 MG tablet Take 1 tablet by mouth 2 (Two) Times a Day. (Patient taking differently: Take 1,000 mg by mouth 2 (Two) Times a Day. LAST DOSE 10/4 AM)   • Misc Natural Products (Osteo Bi-Flex Triple Strength) tablet Take 1 tablet by mouth Daily.   • Multiple Vitamins-Minerals (CENTRUM SILVER) tablet Take 1 tablet by mouth Daily. LAST DOSE 9/29   • ONETOUCH DELICA LANCETS 33G misc 1 tablet by In Vitro route Every 12 (Twelve) Hours.   • rosuvastatin (CRESTOR) 10 MG tablet Take 1 tablet by mouth once daily   • [DISCONTINUED] celecoxib (CeleBREX) 200 MG capsule Take 1 capsule by mouth Daily.   • [DISCONTINUED] rosuvastatin (CRESTOR) 10 MG tablet Take 1 tablet by mouth once daily (Patient taking differently: Take 10 mg by mouth Every Night.)     No current facility-administered medications on file prior to visit.       Objective   Vital Signs:   /72 (BP Location: Left arm, Patient Position: Sitting, Cuff Size: Large Adult)   Pulse 78   Temp 97.1 °F (36.2 °C) (Infrared)   Resp 16   Ht 175.3 cm (69\")   Wt 127 kg (280 lb 3.2 oz)   SpO2 100%   BMI 41.38 kg/m²     Physical Exam  Vitals and nursing note reviewed.   Constitutional:       General: He is not in acute distress.     Appearance: He is well-developed. He is obese.   Eyes:      Conjunctiva/sclera: Conjunctivae normal.      Pupils: Pupils are equal, round, and reactive to light.   Cardiovascular:      Rate and Rhythm: Normal rate and regular rhythm.      Heart sounds: No murmur heard.     Pulmonary:      Effort: Pulmonary effort is normal.      Breath sounds: No wheezing.   Musculoskeletal:         General: Normal range of motion.      Cervical back: Normal range of motion.      Comments: Improved flexion of right wrist, still discomfort with dorsiflexion   Skin:     General: Skin is warm and dry.   Neurological:      Mental Status: He is alert and oriented to person, place, and time.          Lab Results   Component Value Date    "  (H) 05/13/2021    BUN 21 05/13/2021    CREATININE 0.88 05/13/2021    EGFRIFNONA 88 05/13/2021    EGFRIFAFRI 101 05/13/2021     05/13/2021    K 5.2 05/13/2021     05/13/2021    CALCIUM 9.7 05/13/2021    ALBUMIN 4.4 05/13/2021    BILITOT 0.5 05/13/2021    ALKPHOS 56 05/13/2021    AST 42 (H) 05/13/2021    ALT 46 (H) 05/13/2021    MICROALBUR 67.5 05/13/2021        Lab Results   Component Value Date    HGBA1C 8.4 (H) 05/13/2021    HGBA1C 9.3 (H) 02/05/2021    HGBA1C 7.7 (H) 11/06/2020    HGBA1C 7.8 (H) 09/29/2020    HGBA1C 8.8 (H) 08/03/2020    HGBA1C 7.3 (H) 10/30/2019       Result Review :                       Assessment and Plan    Diagnoses and all orders for this visit:    1. Type 2 diabetes mellitus with hyperglycemia, without long-term current use of insulin (CMS/[facility]) (Primary)  -     Hemoglobin A1c; Future    2. Essential hypertension  -     Comprehensive Metabolic Panel; Future    3. Mixed hyperlipidemia  -     Lipid Panel; Future        A1c improved from 9.3-8.4, not quite at goal discussed controlled carbohydrate reduced calorie diet, weight reduction, continuing current medications versus increasing Trulicity. No low readings, typically 140 - 150 in am may be 200 at night.     Hepatic function test slightly elevated AST 42 ALT 46, alk phosphatase normal at 56.    Hypertension above goal today, patient states home readings are good but did not bring for review.    Obesity, weight is down 2 pounds in less than a month, encouraged continued weight reduction for improved glycemic and blood pressure control.    Knee pain has improved following steroid injection on April 27th.    Wrist pain not as much improvement after shot.     Continue Tylenol as needed for arthritic pains, may use Voltaren gel or other topicals as needed.    Medications Discontinued During This Encounter   Medication Reason   • celecoxib (CeleBREX) 200 MG capsule Not Efficacious         Follow Up     Return in about 3  months (around 8/17/2021) for Recheck, diabetes, htn, cholesterol.    Patient was given instructions and counseling regarding his condition or for health maintenance advice. Please see specific information pulled into the AVS if appropriate.

## 2021-05-23 ENCOUNTER — RX RENEWAL (OUTPATIENT)
Age: 71
End: 2021-05-23

## 2021-05-23 RX ORDER — AMLODIPINE BESYLATE 5 MG/1
5 TABLET ORAL
Qty: 90 | Refills: 0 | Status: ACTIVE | COMMUNITY
Start: 2020-09-30 | End: 1900-01-01

## 2021-06-01 ENCOUNTER — NON-APPOINTMENT (OUTPATIENT)
Age: 71
End: 2021-06-01

## 2021-07-23 DIAGNOSIS — E78.2 MIXED HYPERLIPIDEMIA: ICD-10-CM

## 2021-07-23 RX ORDER — ROSUVASTATIN CALCIUM 10 MG/1
TABLET, COATED ORAL
Qty: 90 TABLET | Refills: 0 | Status: SHIPPED | OUTPATIENT
Start: 2021-07-23 | End: 2021-10-18

## 2021-08-04 DIAGNOSIS — E11.65 TYPE 2 DIABETES MELLITUS WITH HYPERGLYCEMIA, WITHOUT LONG-TERM CURRENT USE OF INSULIN (HCC): Chronic | ICD-10-CM

## 2021-08-04 RX ORDER — DULAGLUTIDE 3 MG/.5ML
0.5 INJECTION, SOLUTION SUBCUTANEOUS WEEKLY
Qty: 4 PEN | Refills: 12 | Status: SHIPPED | OUTPATIENT
Start: 2021-08-04 | End: 2021-09-16

## 2021-08-04 NOTE — PROGRESS NOTE ADULT - PROBLEM SELECTOR PROBLEM 8
Patient ID: Clarisse El is a 81 y o  male  Assessment/Plan:    Parkinson's disease ([facility] [address] )  Patient continues to have some wearing off between doses despite being on the Comtan  No side effects to the [place] at this point  We did discuss the option of increasing his Sinemet dose further however he is hesitant given he felt higher doses caused worsening freezing in the past   For now he will remain on his current dose of medication however if the wearing off or parkinson's symptoms worsen then may consider taking Sinemet 1 5tabs every other dose  He was encouraged to continue with the BIG program exercises  Benign essential hypertension  BP was elevated in the office today, however repeat testing was normal   Will have him continue to follow with PCP for management  Subjective:    Clarisse El is an 81 y o  retired  with prostate cancer on hormonal treatment, coronary artery disease s/p CABG x3 on 11/5/19, and Parkinson's disease who presents for follow up  To review, symptoms began with right hand rest tremor and decrease in fine motor movements which progressed to include right lower extremity tremor and slowness in movements  He was diagnosed August 2010 and started on Sinemet 25/100 tid and Azilect  Previous trial of ropinirole ER was associated with side effects  In the past increased doses of Sinemet made his freezing worse  At his last visit Zonisamide was stopped given he felt it was making his stiffness worse  He was started on a trial of Comtan and referred to home PT       INTERVAL HISTORY:  He feels that he is overall stable  No clear benefit with the addition of Comtan   He will still notice tremors at the end of his medication     When the medication kicks in the tremors are overall well controlled   Tremors will breakthrough with stress   He can still perform his ADLs for the most part however he will occasionally need his wife to help with dressing   He had the BIG program which he find to be very helpful   He is trying to keep up with the exercises on his own  No hallucinations  He sleeps well for the most part   No issues swallowing, no choking  He does not have all of his teeth and he has to chew more  Current medications:  Sinemet 1 tab 6am, 9am, noon, 3pm, 6pm and an extra tab at 3am if he wakes up  Azilect 1mg daily   Comtan with Sinemet     Prior medications:  Zonisamide 25mg 2tabs qhs - made stiffness worse, no benefit       I personally reviewed and updated the ROS  Objective:    Blood pressure 153/66, pulse 81, height 5' 8" (1 727 m), weight 97 3 kg (214 lb 9 6 oz)  Physical Exam  Constitutional:       Appearance: Normal appearance  HENT:      Left Ear: Hearing normal    Eyes:      Extraocular Movements: Extraocular movements intact  Pupils: Pupils are equal, round, and reactive to light  Pulmonary:      Effort: Pulmonary effort is normal    Neurological:      Mental Status: He is alert  Deep Tendon Reflexes: Strength normal    Psychiatric:         Speech: Speech normal          Neurological Exam  Mental Status  Alert  Oriented only to person, place and situation  Speech is normal  Speech: hypophonia  Language is fluent with no aphasia  Cranial Nerves  CN III, IV, VI: Extraocular movements intact bilaterally  Pupils equal round and reactive to light bilaterally  CN V:  Right: Facial sensation is normal   Left: Facial sensation is normal on the left  CN VIII:  Right: Hearing is decreased  Left: Hearing is normal   Patient wearing face mask   Decreased left shoulder shrug   Motor   Strength is 5/5 throughout all four extremities  Sensory  Light touch is normal in upper and lower extremities  Reflexes  Glabellar tap present      Coordination  Right: Finger-to-nose normal  Rapid alternating movement abnormality:  Left: Finger-to-nose normal  Rapid alternating movement abnormality:  See MDS UPDRS III     Gait  Casual gait: Abnormal pull test  Recovered in 4 steps    Able to rise from chair without using arms  Moderately stooped posture  Freezing was improved in the office today compared to the last visit  Ambulating with walking stick however he just held it in his hand           MDS UPDRS III                              8/4/21 2/17/21   Time since last dose:   1 5 hrs   Speech  2  2   Facial Expression        Rigidity - Neck    0   Rigidity - Upper Extremity (Right)  2  1   Rigidity - Upper Extremity (Left)   1  1   Rigidity - Lower Extremity (Right)  2  1   Rigidity - Lower Extremity (Left)   1  0   Finger Taps (Right)   2  2   Finger Taps (Left)   1  1   Hand Movement (Right)  2  2   Hand Movement (Left)   1  1   Pronation/Supination (Right)  2  2   Pronation/Supination (Left)   2  2   Toe Tapping (Right) 2  2   Toe Tapping (Left) 2  3   Leg Agility (Right)  2  2   Leg Agility (Left)   2  2   Arising from Chair   2  2   Gait   0  2   Freezing of Gait 0  3   Postural Stability        Posture 3  3   Global spontaneity of movement 3  3   Postural Tremor (Right) 0 0   Postural Tremor (Left) 0 0   Kinetic Tremor (Right)  0 0   Kinetic Tremor (Left)  0  0   Rest tremor amplitude RUE 2  2   Rest tremor amplitude LUE 1  1   Rest tremor amplitude RLE 0  0   Reset tremor amplitude LLE 0  0   Lip/Jaw Tremor               Motor Exam Total:              ROS:    Review of Systems   Constitutional: Negative  Negative for appetite change and fever  HENT: Negative  Negative for hearing loss, tinnitus, trouble swallowing and voice change  Eyes: Negative  Negative for photophobia and pain  Respiratory: Negative  Negative for shortness of breath  Cardiovascular: Negative  Negative for palpitations  Gastrointestinal: Negative  Negative for nausea and vomiting  Endocrine: Negative  Negative for cold intolerance  Genitourinary: Negative  Negative for dysuria, frequency and urgency     Musculoskeletal: Positive for gait problem  Negative for myalgias and neck pain  Skin: Negative  Negative for rash  Neurological: Positive for tremors  Negative for dizziness, seizures, syncope, facial asymmetry, speech difficulty, weakness, light-headedness, numbness and headaches  Hematological: Negative  Does not bruise/bleed easily  Psychiatric/Behavioral: Negative  Negative for confusion, hallucinations and sleep disturbance  Insomnia

## 2021-08-13 NOTE — PROGRESS NOTES
"Chief Complaint  Diabetes, Hypertension, and Hyperlipidemia    Subjective          Orion Harvey presents to [facility] for diabetes, hypertension and hyperlipidemia.     Patient states he checks his sugars at least once a day. He states he will some times check it twice daily. Patient states he checks it at different times. He states he does not like the Trulicity he states it does not seem to be working. Patient states lowest sugar 140's and highest 190's.  Not getting much exercse due to increased pain in knees and hip. Had knee injection in knees but has worn off.     Patient states he checks her pressures everytime he checks his sugars. He states it average is 125/75. Patient states 106/64 is the lowest and the highest 132/82.    Patient had labs done last Monday.    In [facility], [employer] is currently $221      History of Present Illness    Objective   Vital Signs:   /64 (BP Location: Right arm, Patient Position: Sitting, Cuff Size: Large Adult)   Pulse 99   Temp 96.4 °F (35.8 °C) (Infrared)   Resp 16   Ht 175.3 cm (69\")   Wt 127 kg (280 lb)   SpO2 97%   BMI 41.35 kg/m²     Physical Exam  Vitals and nursing note reviewed.   Constitutional:       General: He is not in acute distress.     Appearance: He is well-developed. He is obese.   Eyes:      Conjunctiva/sclera: Conjunctivae normal.      Pupils: Pupils are equal, round, and reactive to light.   Cardiovascular:      Rate and Rhythm: Normal rate and regular rhythm.      Heart sounds: No murmur heard.     Pulmonary:      Effort: Pulmonary effort is normal.      Breath sounds: No wheezing.   Musculoskeletal:         General: Normal range of motion.      Cervical back: Normal range of motion.   Skin:     General: Skin is warm and dry.   Neurological:      Mental Status: He is alert and oriented to person, place, and time.        Result Review :     Common labs    Common Labsle 2/5/21 2/5/21 5/13/21 5/13/21 " 5/13/21 8/9/21 8/9/21 8/9/21    0838 0838 0830 0830 0830 0813 0813 0813   Glucose 215 (A)  142 (A)    181 (A)    BUN 13  21    15    Creatinine 0.83  0.88    0.75 (A)    eGFR Non  Am 90  88    94    eGFR  Am 104  101    108    Sodium 138  141    138    Potassium 4.5  5.2    5.0    Chloride 102  103    100    Calcium 9.5  9.7    9.4    Total Protein 6.2  6.3    6.3    Albumin 4.1  4.4    4.5    Total Bilirubin 0.4  0.5    0.4    Alkaline Phosphatase 58  56    54    AST (SGOT) 30  42 (A)    33    ALT (SGPT) 37  46 (A)    44    Total Cholesterol        122   Triglycerides        194 (A)   HDL Cholesterol        43   LDL Cholesterol         47   Hemoglobin A1C  9.3 (A)  8.4 (A)  8.8 (A)     Microalbumin, Urine     67.5      (A) Abnormal value       Comments are available for some flowsheets but are not being displayed.           A1C Last 3 Results    HGBA1C Last 3 Results 2/5/21 5/13/21 8/9/21   Hemoglobin A1C 9.3 (A) 8.4 (A) 8.8 (A)   (A) Abnormal value       Comments are available for some flowsheets but are not being displayed.                     Assessment and Plan    Diagnoses and all orders for this visit:    1. Type 2 diabetes mellitus with hyperglycemia, without long-term current use of insulin (CMS/[facility]) (Primary)  -     empagliflozin (Jardiance) 25 MG tablet tablet; Take 1 tablet by mouth Daily.  Dispense: 30 tablet; Refill: 2    2. Mixed hyperlipidemia    3. Obesity, Class III, BMI 40-49.9 (morbid obesity) (CMS/[facility])    4. Essential hypertension    5. Easy bruising    6. Cardiac risk counseling  -     aspirin (aspirin) 81 MG EC tablet; Take 1 tablet by mouth Daily.  Dispense: 30 tablet; Refill: 12         Diabetes not at goal and and A1c is increasing on maximum dose of Metformin, glimepiride, and Trulicity..  Discussed treatment options.  Stressed that he needs to increase physical activity and continue a low concentrated sweets diet.  To help reduce insulin resistance.  Patient is resistant to  starting insulin, we are adding Jardiance, if this is cost prohibitive would use nightly basal insulin.    Patient is reporting easier bruising.  This is been going on for at least a couple of years, he is taking a 325 mg aspirin daily and does not have personal history of coronary artery disease.  Advised to reduce to 81 mg daily.    Bilateral knee and hip pain limiting mobility.  He will return for steroid knee injections and continue Tylenol.  Advised to try Voltaren gel topically.    Hypertension and cholesterol at goal.  Continue current occasions.      Follow Up   Return in about 2 weeks (around 8/30/2021) for steroid knee injections and 3 months for diabetes.  Patient was given instructions and counseling regarding his condition or for health maintenance advice. Please see specific information pulled into the AVS if appropriate.

## 2021-08-16 ENCOUNTER — OFFICE VISIT (OUTPATIENT)
Dept: FAMILY MEDICINE CLINIC | Facility: CLINIC | Age: 71
End: 2021-08-16

## 2021-08-16 VITALS
BODY MASS INDEX: 41.47 KG/M2 | WEIGHT: 280 LBS | DIASTOLIC BLOOD PRESSURE: 64 MMHG | TEMPERATURE: 96.4 F | OXYGEN SATURATION: 97 % | RESPIRATION RATE: 16 BRPM | HEIGHT: 69 IN | SYSTOLIC BLOOD PRESSURE: 106 MMHG | HEART RATE: 99 BPM

## 2021-08-16 DIAGNOSIS — E66.01 OBESITY, CLASS III, BMI 40-49.9 (MORBID OBESITY) (HCC): Chronic | ICD-10-CM

## 2021-08-16 DIAGNOSIS — R23.3 EASY BRUISING: ICD-10-CM

## 2021-08-16 DIAGNOSIS — E78.2 MIXED HYPERLIPIDEMIA: Chronic | ICD-10-CM

## 2021-08-16 DIAGNOSIS — I10 ESSENTIAL HYPERTENSION: Chronic | ICD-10-CM

## 2021-08-16 DIAGNOSIS — E11.65 TYPE 2 DIABETES MELLITUS WITH HYPERGLYCEMIA, WITHOUT LONG-TERM CURRENT USE OF INSULIN (HCC): Primary | Chronic | ICD-10-CM

## 2021-08-16 DIAGNOSIS — Z71.89 CARDIAC RISK COUNSELING: ICD-10-CM

## 2021-08-16 PROCEDURE — 99214 OFFICE O/P EST MOD 30 MIN: CPT | Performed by: FAMILY MEDICINE

## 2021-08-16 RX ORDER — ASPIRIN 81 MG/1
81 TABLET ORAL DAILY
Qty: 30 TABLET | Refills: 12
Start: 2021-08-16 | End: 2021-12-01 | Stop reason: HOSPADM

## 2021-09-08 ENCOUNTER — TELEPHONE (OUTPATIENT)
Dept: FAMILY MEDICINE CLINIC | Facility: CLINIC | Age: 71
End: 2021-09-08

## 2021-09-08 NOTE — TELEPHONE ENCOUNTER
DOES PATIENT NEED LAB APPOINTMENT BEFORE HIS 3 MONTH FOLLOW UP APPOINTMENT IN November? IF SO PLEASE CALL AND SCHEDULE    CALLBACK # [phone]

## 2021-09-16 ENCOUNTER — OFFICE VISIT (OUTPATIENT)
Dept: ORTHOPEDIC SURGERY | Facility: CLINIC | Age: 71
End: 2021-09-16

## 2021-09-16 VITALS
WEIGHT: 272.2 LBS | DIASTOLIC BLOOD PRESSURE: 79 MMHG | HEART RATE: 66 BPM | BODY MASS INDEX: 40.32 KG/M2 | HEIGHT: 69 IN | SYSTOLIC BLOOD PRESSURE: 144 MMHG

## 2021-09-16 DIAGNOSIS — Z47.89 ORTHOPEDIC AFTERCARE: Primary | ICD-10-CM

## 2021-09-16 DIAGNOSIS — M19.011 PRIMARY LOCALIZED OSTEOARTHROSIS OF RIGHT SHOULDER REGION: ICD-10-CM

## 2021-09-16 PROCEDURE — 99212 OFFICE O/P EST SF 10 MIN: CPT | Performed by: ORTHOPAEDIC SURGERY

## 2021-09-16 NOTE — PROGRESS NOTES
"     Patient ID: Orion Harvey is a 71 y.o. male.  Right shoulder pain  10/6/20 right reverse total shoulder arthroplasty  No pain    Review of Systems:  Resolved shoulder pain      Objective:    /79 (BP Location: Left arm, Patient Position: Sitting, Cuff Size: Large Adult)   Pulse 66   Ht 175.3 cm (69\")   Wt 123 kg (272 lb 3.2 oz)   BMI 40.20 kg/m²     Physical Examination:  Incision healed no bony tenderness active elevation 170 abduction 150 external rotation 50 internal rotation L5       Imaging:   right Shoulder X-Ray  Indication: Postop reverse total shoulder  AP Y and Lateral views  Findings: Reverse total shoulder in position  no bony lesion  Soft tissues normal  not examined joint spaces  Hardware appropriately positioned yes      yes prior studies available for comparison    Assessment:    Doing well after reverse total shoulder    Plan:   Activity as tolerated and see me as needed      Procedures          Disclaimer: Please note that areas of this note were completed with computer voice recognition software.  Quite often unanticipated grammatical, syntax, homophones, and other interpretive errors are inadvertently transcribed by the computer software. Please excuse any errors that have escaped final proofreading.  "

## 2021-09-22 RX ORDER — GLIMEPIRIDE 4 MG/1
4 TABLET ORAL 2 TIMES DAILY
Qty: 180 TABLET | Refills: 3 | Status: SHIPPED | OUTPATIENT
Start: 2021-09-22 | End: 2022-10-20 | Stop reason: SDUPTHER

## 2021-10-18 DIAGNOSIS — E11.9 TYPE 2 DIABETES MELLITUS WITHOUT COMPLICATION, WITHOUT LONG-TERM CURRENT USE OF INSULIN (HCC): ICD-10-CM

## 2021-10-18 DIAGNOSIS — E78.2 MIXED HYPERLIPIDEMIA: ICD-10-CM

## 2021-10-18 RX ORDER — ROSUVASTATIN CALCIUM 10 MG/1
TABLET, COATED ORAL
Qty: 90 TABLET | Refills: 3 | Status: SHIPPED | OUTPATIENT
Start: 2021-10-18 | End: 2022-07-18

## 2021-11-01 ENCOUNTER — OFFICE VISIT (OUTPATIENT)
Dept: ORTHOPEDIC SURGERY | Facility: CLINIC | Age: 71
End: 2021-11-01

## 2021-11-01 VITALS
DIASTOLIC BLOOD PRESSURE: 76 MMHG | HEIGHT: 69 IN | SYSTOLIC BLOOD PRESSURE: 119 MMHG | WEIGHT: 266 LBS | BODY MASS INDEX: 39.4 KG/M2 | HEART RATE: 65 BPM

## 2021-11-01 DIAGNOSIS — M17.12 PRIMARY LOCALIZED OSTEOARTHROSIS OF THE KNEE, LEFT: Primary | ICD-10-CM

## 2021-11-01 PROCEDURE — 99213 OFFICE O/P EST LOW 20 MIN: CPT | Performed by: ORTHOPAEDIC SURGERY

## 2021-11-01 RX ORDER — SENNOSIDES 8.6 MG
1300 CAPSULE ORAL EVERY 8 HOURS PRN
COMMUNITY
End: 2021-12-01 | Stop reason: HOSPADM

## 2021-11-01 NOTE — PROGRESS NOTES
Patient ID: Orion Harvey is a 71 y.o. male.    Chief Complaint:    Chief Complaint   Patient presents with   • Left Knee - Initial Evaluation       HPI:  Orion is a 71-year-old gentleman here with several months of left knee pain.  No injury.  He had an injection with his primary care physician in May with mild to moderate relief but pain has returned worsen it has ever been.  He takes oral medication at times.  Pain is diffuse in the knee but worse over the medial joint line  Past Medical History:   Diagnosis Date   • Arthritis    • Diabetes mellitus (HCC)    • Hyperlipidemia    • Hypertension    • Kidney stone    • Kidney stones    • MRSA carrier     UNSURE IF WAS MRSA   • Sleep apnea     CPAP BRING DOS       Past Surgical History:   Procedure Laterality Date   • CLAVICLE SURGERY     • CYSTOSCOPY W/ LASER LITHOTRIPSY     • JOINT REPLACEMENT Right 10/06/2020    right shoulder   • ORIF FINGER FRACTURE Right     RING FINGER   • REPAIR TENDONS LEG     • SKIN GRAFT Right     KNEE   • SKULL FRACTURE ELEVATION     • TOTAL SHOULDER ARTHROPLASTY W/ DISTAL CLAVICLE EXCISION Right 10/6/2020    Procedure: TOTAL SHOULDER REVERSE ARTHROPLASTY;  Surgeon: Alfonso Richard MD;  Location: [facility] OR;  Service: Orthopedics;  Laterality: Right;   • WOUND DEBRIDEMENT Right     MULTIPLE       Family History   Problem Relation Age of Onset   • Diabetes Mother    • Hyperlipidemia Mother    • Hearing loss Mother    • Diabetes Father    • Hyperlipidemia Father    • Hearing loss Father    • Heart disease Father    • Diabetes Brother    • Hyperlipidemia Brother    • Heart disease Brother    • Heart disease Brother           Social History     Occupational History   • Not on file   Tobacco Use   • Smoking status: Former Smoker     Packs/day: 1.00     Years: 0.00     Pack years: 0.00     Types: Cigarettes     Start date:      Quit date:      Years since quittin.8   • Smokeless tobacco: Never Used  "  Vaping Use   • Vaping Use: Never used   • Passive vaping exposure: Yes   Substance and Sexual Activity   • Alcohol use: No   • Drug use: No   • Sexual activity: Yes     Partners: Female     Birth control/protection: None      Review of Systems   Cardiovascular: Negative for chest pain.   Musculoskeletal: Positive for arthralgias.       Objective:    /76   Pulse 65   Ht 175.3 cm (69\")   Wt 121 kg (266 lb)   BMI 39.28 kg/m²     Physical Examination:  Left knee demonstrates mild to moderate effusion no redness or warmth range of motion 2 to 110 degrees negative Aura    Imaging:    Previous x-rays demonstrate end-stage degenerative joint disease  Assessment:  Left knee degenerative joint disease    Plan:  Options discussed, he would like to consider knee arthroplasty and I have referred him to my partner for consideration      Procedures         Disclaimer: Please note that areas of this note were completed with computer voice recognition software.  Quite often unanticipated grammatical, syntax, homophones, and other interpretive errors are inadvertently transcribed by the computer software. Please excuse any errors that have escaped final proofreading.  "

## 2021-11-05 DIAGNOSIS — I10 ESSENTIAL HYPERTENSION: ICD-10-CM

## 2021-11-05 DIAGNOSIS — E11.9 TYPE 2 DIABETES MELLITUS WITHOUT COMPLICATION, WITHOUT LONG-TERM CURRENT USE OF INSULIN (HCC): Primary | ICD-10-CM

## 2021-11-06 LAB
ALBUMIN SERPL-MCNC: 4.6 G/DL (ref 3.8–4.8)
ALBUMIN/GLOB SERPL: 2.7 {RATIO} (ref 1.2–2.2)
ALP SERPL-CCNC: 51 IU/L (ref 44–121)
ALT SERPL-CCNC: 41 IU/L (ref 0–44)
AST SERPL-CCNC: 31 IU/L (ref 0–40)
BILIRUB SERPL-MCNC: 0.4 MG/DL (ref 0–1.2)
BUN SERPL-MCNC: 19 MG/DL (ref 8–27)
BUN/CREAT SERPL: 21 (ref 10–24)
CALCIUM SERPL-MCNC: 9.2 MG/DL (ref 8.6–10.2)
CHLORIDE SERPL-SCNC: 103 MMOL/L (ref 96–106)
CO2 SERPL-SCNC: 25 MMOL/L (ref 20–29)
CREAT SERPL-MCNC: 0.9 MG/DL (ref 0.76–1.27)
GLOBULIN SER CALC-MCNC: 1.7 G/DL (ref 1.5–4.5)
GLUCOSE SERPL-MCNC: 166 MG/DL (ref 65–99)
HBA1C MFR BLD: 6.7 % (ref 4.8–5.6)
POTASSIUM SERPL-SCNC: 4.9 MMOL/L (ref 3.5–5.2)
PROT SERPL-MCNC: 6.3 G/DL (ref 6–8.5)
SODIUM SERPL-SCNC: 141 MMOL/L (ref 134–144)

## 2021-11-08 ENCOUNTER — OFFICE VISIT (OUTPATIENT)
Dept: FAMILY MEDICINE CLINIC | Facility: CLINIC | Age: 71
End: 2021-11-08

## 2021-11-08 VITALS
TEMPERATURE: 97.1 F | WEIGHT: 267.2 LBS | OXYGEN SATURATION: 98 % | SYSTOLIC BLOOD PRESSURE: 134 MMHG | DIASTOLIC BLOOD PRESSURE: 86 MMHG | HEART RATE: 69 BPM | RESPIRATION RATE: 18 BRPM | BODY MASS INDEX: 41.94 KG/M2 | HEIGHT: 67 IN

## 2021-11-08 DIAGNOSIS — M17.0 OSTEOARTHRITIS OF BOTH KNEES, UNSPECIFIED OSTEOARTHRITIS TYPE: ICD-10-CM

## 2021-11-08 DIAGNOSIS — E78.2 MIXED HYPERLIPIDEMIA: Chronic | ICD-10-CM

## 2021-11-08 DIAGNOSIS — E11.65 TYPE 2 DIABETES MELLITUS WITH HYPERGLYCEMIA, WITHOUT LONG-TERM CURRENT USE OF INSULIN (HCC): Primary | Chronic | ICD-10-CM

## 2021-11-08 DIAGNOSIS — E66.01 OBESITY, CLASS III, BMI 40-49.9 (MORBID OBESITY) (HCC): Chronic | ICD-10-CM

## 2021-11-08 DIAGNOSIS — I10 PRIMARY HYPERTENSION: Chronic | ICD-10-CM

## 2021-11-08 PROCEDURE — 99214 OFFICE O/P EST MOD 30 MIN: CPT | Performed by: FAMILY MEDICINE

## 2021-11-08 NOTE — PROGRESS NOTES
Chief Complaint  Diabetes, Hypertension, and Hyperlipidemia      Subjective          Orion Harvey presents today for Jardiance was started on August 20, he stopped Trulicity on September 1.  He brings in a log of glucose readings, blood pressures, and heart rate.  Home glucose readings are doing very well.  Working much better than Trulicity. Loosing weight , appetite is a little lower in a good way.     Orion A1c was done out of office and was 6.7 on 11/5/2021, which has improved from his A1C on 8/9/2021 which was an 8.8.    Diabetes  He presents for his follow-up diabetic visit. He has type 2 diabetes mellitus. There are no hypoglycemic associated symptoms. Pertinent negatives for hypoglycemia include no headaches or sweats. Pertinent negatives for diabetes include no chest pain and no weakness. There are no hypoglycemic complications. Risk factors for coronary artery disease include dyslipidemia, diabetes mellitus, hypertension, male sex and obesity. Current diabetic treatment includes oral agent (triple therapy). Meal planning includes avoidance of concentrated sweets. He rarely participates in exercise. His overall blood glucose range is 140-180 mg/dl. (Home blood sugar was 110.) An ACE inhibitor/angiotensin II receptor blocker is being taken. He does not see a podiatrist.Eye exam is current.   Hypertension  This is a chronic problem. The current episode started more than 1 year ago. The problem is controlled. Pertinent negatives include no chest pain, headaches, orthopnea, palpitations, peripheral edema, PND, shortness of breath or sweats. Risk factors for coronary artery disease include diabetes mellitus, dyslipidemia, male gender, obesity and family history. Current antihypertension treatment includes ACE inhibitors. The current treatment provides moderate improvement.   Hyperlipidemia  This is a chronic problem. The current episode started more than 1 year ago. The problem is uncontrolled. Recent  lipid tests were reviewed and are high. Exacerbating diseases include diabetes and obesity. Pertinent negatives include no chest pain, focal sensory loss, focal weakness or shortness of breath. Current antihyperlipidemic treatment includes statins. The current treatment provides moderate improvement of lipids. Risk factors for coronary artery disease include diabetes mellitus, dyslipidemia, hypertension, male sex, obesity and family history.       Have seen Dr Richard who recommend left knee replacement Seeing Dr Martínez 11/17.  anticipating to schedule surgery.  He is anxious to get this scheduled as soon as possible.  He typically leaves for [place] right after Christmas and would like to do that again this year if he is recovered well enough to do so.      Current Outpatient Medications on File Prior to Visit   Medication Sig   • acetaminophen (TYLENOL) 650 MG 8 hr tablet Take 650 mg by mouth Every 8 (Eight) Hours As Needed for Mild Pain .   • ascorbic acid (VITAMIN C) 1000 MG tablet Take 1 tablet by mouth Daily. LAST DOSE 9/29   • aspirin (aspirin) 81 MG EC tablet Take 1 tablet by mouth Daily.   • glimepiride (AMARYL) 4 MG tablet Take 1 tablet by mouth 2 (Two) Times a Day.   • glucose blood (OneTouch Verio) test strip Use as instructed   • lisinopril (PRINIVIL,ZESTRIL) 40 MG tablet Take 1 tablet by mouth once daily   • metFORMIN (GLUCOPHAGE) 1000 MG tablet Take 1 tablet by mouth twice daily   • Misc Natural Products (Osteo Bi-Flex Triple Strength) tablet Take 1 tablet by mouth Daily.   • Multiple Vitamins-Minerals (CENTRUM SILVER) tablet Take 1 tablet by mouth Daily. LAST DOSE 9/29   • ONETOUCH DELICA LANCETS 33G misc 1 tablet by In Vitro route Every 12 (Twelve) Hours.   • rosuvastatin (CRESTOR) 10 MG tablet Take 1 tablet by mouth once daily   • [DISCONTINUED] empagliflozin (Jardiance) 25 MG tablet tablet Take 1 tablet by mouth Daily.   • [DISCONTINUED] glucose blood test strip 1 each by Other route As Needed. Use  "as instructed   • [DISCONTINUED] rosuvastatin (CRESTOR) 10 MG tablet Take 1 tablet by mouth once daily (Patient taking differently: Take 10 mg by mouth Every Night.)     No current facility-administered medications on file prior to visit.       Objective   Vital Signs:   /86 (BP Location: Right arm, Patient Position: Sitting, Cuff Size: Adult)   Pulse 69   Temp 97.1 °F (36.2 °C) (Temporal)   Resp 18   Ht 170.2 cm (67\")   Wt 121 kg (267 lb 3.2 oz)   SpO2 98% Comment: room air  BMI 41.85 kg/m²     Physical Exam  Vitals and nursing note reviewed.   Constitutional:       General: He is not in acute distress.     Appearance: Normal appearance. He is well-developed.   HENT:      Head: Normocephalic and atraumatic.   Eyes:      Conjunctiva/sclera: Conjunctivae normal.      Pupils: Pupils are equal, round, and reactive to light.   Neck:      Thyroid: No thyromegaly.      Vascular: No JVD.   Cardiovascular:      Rate and Rhythm: Normal rate and regular rhythm.      Heart sounds: Normal heart sounds. No murmur heard.      Pulmonary:      Breath sounds: Normal breath sounds. No wheezing or rales.   Musculoskeletal:         General: Normal range of motion.      Cervical back: Normal range of motion.      Comments: Antalgic gait.  Posttraumatic changes of right knee and  Arthritic changes/thickening of knees bilaterally with crepitus   Lymphadenopathy:      Cervical: No cervical adenopathy.   Skin:     General: Skin is warm and dry.      Findings: No rash.   Neurological:      Mental Status: He is alert and oriented to person, place, and time.   Psychiatric:         Mood and Affect: Mood normal.         Behavior: Behavior normal.            No visits with results within 1 Day(s) from this visit.   Latest known visit with results is:   Orders Only on 11/05/2021   Component Date Value Ref Range Status   • Glucose 11/05/2021 166* 65 - 99 mg/dL Final   • BUN 11/05/2021 19  8 - 27 mg/dL Final   • Creatinine 11/05/2021 " 0.90  0.76 - 1.27 mg/dL Final   • eGFR Non  Am 11/05/2021 86  >59 mL/min/1.73 Final   • eGFR African Am 11/05/2021 100  >59 mL/min/1.73 Final    Comment: **In accordance with recommendations from the NKF-ASN Task force,**    [facility] is in the process of updating its eGFR calculation to the    2021 CKD-EPI creatinine equation that estimates kidney function    without a race variable.     • BUN/Creatinine Ratio 11/05/2021 21  10 - 24 Final   • Sodium 11/05/2021 141  134 - 144 mmol/L Final   • Potassium 11/05/2021 4.9  3.5 - 5.2 mmol/L Final   • Chloride 11/05/2021 103  96 - 106 mmol/L Final   • Total CO2 11/05/2021 25  20 - 29 mmol/L Final   • Calcium 11/05/2021 9.2  8.6 - 10.2 mg/dL Final   • Total Protein 11/05/2021 6.3  6.0 - 8.5 g/dL Final   • Albumin 11/05/2021 4.6  3.8 - 4.8 g/dL Final   • Globulin 11/05/2021 1.7  1.5 - 4.5 g/dL Final   • A/G Ratio 11/05/2021 2.7* 1.2 - 2.2 Final   • Total Bilirubin 11/05/2021 0.4  0.0 - 1.2 mg/dL Final   • Alkaline Phosphatase 11/05/2021 51  44 - 121 IU/L Final                  **Please note reference interval change**   • AST (SGOT) 11/05/2021 31  0 - 40 IU/L Final   • ALT (SGPT) 11/05/2021 41  0 - 44 IU/L Final   • Hemoglobin A1C 11/05/2021 6.7* 4.8 - 5.6 % Final    Comment:          Prediabetes: 5.7 - 6.4           Diabetes: >6.4           Glycemic control for adults with diabetes: <7.0         Lab Results   Component Value Date    BUN 19 11/05/2021    CREATININE 0.90 11/05/2021    EGFRIFNONA 86 11/05/2021    EGFRIFAFRI 100 11/05/2021     11/05/2021    K 4.9 11/05/2021     11/05/2021    CALCIUM 9.2 11/05/2021    ALBUMIN 4.6 11/05/2021    BILITOT 0.4 11/05/2021    ALKPHOS 51 11/05/2021    AST 31 11/05/2021    ALT 41 11/05/2021        Lab Results   Component Value Date    HGBA1C 6.7 (H) 11/05/2021    HGBA1C 8.8 (H) 08/09/2021    HGBA1C 8.4 (H) 05/13/2021    HGBA1C 9.3 (H) 02/05/2021    HGBA1C 7.8 (H) 09/29/2020    HGBA1C 7.3 (H) 10/30/2019     Lab Results    Component Value Date    CHOL 115 10/30/2019    CHLPL 122 08/09/2021    TRIG 194 (H) 08/09/2021    HDL 43 08/09/2021    LDL 47 08/09/2021                Assessment and Plan    Diagnoses and all orders for this visit:    1. Type 2 diabetes mellitus with hyperglycemia, without long-term current use of insulin ([facility]) (Primary)  -     empagliflozin (Jardiance) 25 MG tablet tablet; Take 1 tablet by mouth Daily.  Dispense: 30 tablet; Refill: 12  -     Hemoglobin A1c; Future  -     Comprehensive Metabolic Panel; Future  -     Lipid Panel; Future  -     CBC & Differential; Future  -     MicroAlbumin, Urine, Random - Urine, Clean Catch; Future  -     TSH; Future    2. Primary hypertension  -     Comprehensive Metabolic Panel; Future  -     MicroAlbumin, Urine, Random - Urine, Clean Catch; Future    3. Mixed hyperlipidemia  -     Lipid Panel; Future    4. Obesity, Class III, BMI 40-49.9 (morbid obesity) ([facility])    5. Osteoarthritis of both knees, unspecified osteoarthritis type      Diabetes, incredible improvement since starting Jardiance, renewing.   Is not having any hypoglycemic episodes, lowest reading is 84, lowest a.m. fasting readings 97.  Did advise of risk of hypoglycemia on Amaryl advised if any readings below 80 may need to reduce and possibly discontinue.  Do recommend continued effort at weight reduction and increasing physical activity as able.    Blood pressure is stable, acceptable control, heart rate chronically low end of normal.  No symptomatic bradycardia.  Continue current medications.    Bilateral knee pain left greater than right, seeing orthopedic specialist for consultation for knee replacement surgery.    Patient does not have history of coronary artery disease, does not have any current symptoms, risk factors do include diabetes, hypertension, hyperlipidemia, and male sex.    Do recommend Shingrix vaccine through pharmacy.    Medications Discontinued During This Encounter   Medication Reason   •  empagliflozin (Jardiance) 25 MG tablet tablet Reorder   • rosuvastatin (CRESTOR) 10 MG tablet Reorder   • glucose blood test strip Alternate therapy         Follow Up     Return in about 21 weeks (around 4/4/2022) for diabetes, htn, cholesterol, upon return from wintering in [place].  Or if needed for any new concerns or problems.    Patient was given instructions and counseling regarding his condition or for health maintenance advice. Please see specific information pulled into the AVS if appropriate.

## 2021-11-17 ENCOUNTER — OFFICE VISIT (OUTPATIENT)
Dept: ORTHOPEDIC SURGERY | Facility: CLINIC | Age: 71
End: 2021-11-17

## 2021-11-17 VITALS — HEIGHT: 67 IN | BODY MASS INDEX: 41.5 KG/M2 | WEIGHT: 264.4 LBS

## 2021-11-17 DIAGNOSIS — M25.562 PAIN IN BOTH KNEES, UNSPECIFIED CHRONICITY: ICD-10-CM

## 2021-11-17 DIAGNOSIS — M19.011 PRIMARY LOCALIZED OSTEOARTHROSIS OF RIGHT SHOULDER REGION: ICD-10-CM

## 2021-11-17 DIAGNOSIS — M25.561 PAIN IN BOTH KNEES, UNSPECIFIED CHRONICITY: ICD-10-CM

## 2021-11-17 DIAGNOSIS — M17.12 PRIMARY LOCALIZED OSTEOARTHROSIS OF THE KNEE, LEFT: Primary | ICD-10-CM

## 2021-11-17 PROCEDURE — 99214 OFFICE O/P EST MOD 30 MIN: CPT | Performed by: ORTHOPAEDIC SURGERY
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
## 2021-11-22 ENCOUNTER — HOSPITAL ENCOUNTER (OUTPATIENT)
Dept: GENERAL RADIOLOGY | Facility: HOSPITAL | Age: 71
Discharge: HOME | End: 2021-11-22

## 2021-11-22 ENCOUNTER — HOSPITAL ENCOUNTER (OUTPATIENT)
Dept: CARDIOLOGY | Facility: HOSPITAL | Age: 71
Discharge: HOME | End: 2021-11-22

## 2021-11-22 ENCOUNTER — LAB (OUTPATIENT)
Dept: LAB | Facility: HOSPITAL | Age: 71
End: 2021-11-22

## 2021-11-22 DIAGNOSIS — M17.12 PRIMARY LOCALIZED OSTEOARTHROSIS OF THE KNEE, LEFT: ICD-10-CM

## 2021-11-22 LAB
ABO GROUP BLD: NORMAL
ANION GAP SERPL CALCULATED.3IONS-SCNC: 9.4 MMOL/L (ref 5–15)
BACTERIA UR QL AUTO: ABNORMAL /HPF
BILIRUB UR QL STRIP: NEGATIVE
BLD GP AB SCN SERPL QL: NEGATIVE
BUN SERPL-MCNC: 16 MG/DL (ref 8–23)
BUN/CREAT SERPL: 16 (ref 7–25)
CALCIUM SPEC-SCNC: 9.8 MG/DL (ref 8.6–10.5)
CHLORIDE SERPL-SCNC: 98 MMOL/L (ref 98–107)
CLARITY UR: CLEAR
CO2 SERPL-SCNC: 26.6 MMOL/L (ref 22–29)
COLOR UR: YELLOW
CREAT SERPL-MCNC: 1 MG/DL (ref 0.76–1.27)
DEPRECATED RDW RBC AUTO: 43.1 FL (ref 37–54)
ERYTHROCYTE [DISTWIDTH] IN BLOOD BY AUTOMATED COUNT: 13.3 % (ref 12.3–15.4)
GFR SERPL CREATININE-BSD FRML MDRD: 74 ML/MIN/1.73
GLUCOSE SERPL-MCNC: 146 MG/DL (ref 65–99)
GLUCOSE UR STRIP-MCNC: ABNORMAL MG/DL
HCT VFR BLD AUTO: 46.6 % (ref 37.5–51)
HGB BLD-MCNC: 15.6 G/DL (ref 13–17.7)
HGB UR QL STRIP.AUTO: NEGATIVE
HYALINE CASTS UR QL AUTO: ABNORMAL /LPF
KETONES UR QL STRIP: ABNORMAL
LEUKOCYTE ESTERASE UR QL STRIP.AUTO: ABNORMAL
MCH RBC QN AUTO: 30.1 PG (ref 26.6–33)
MCHC RBC AUTO-ENTMCNC: 33.5 G/DL (ref 31.5–35.7)
MCV RBC AUTO: 89.8 FL (ref 79–97)
MRSA DNA SPEC QL NAA+PROBE: NORMAL
NITRITE UR QL STRIP: NEGATIVE
PH UR STRIP.AUTO: 5.5 [PH] (ref 5–8)
PLATELET # BLD AUTO: 210 10*3/MM3 (ref 140–450)
PMV BLD AUTO: 12 FL (ref 6–12)
POTASSIUM SERPL-SCNC: 4.7 MMOL/L (ref 3.5–5.2)
PROT UR QL STRIP: NEGATIVE
RBC # BLD AUTO: 5.19 10*6/MM3 (ref 4.14–5.8)
RBC # UR STRIP: ABNORMAL /HPF
REF LAB TEST METHOD: ABNORMAL
RH BLD: POSITIVE
SODIUM SERPL-SCNC: 134 MMOL/L (ref 136–145)
SP GR UR STRIP: >=1.03 (ref 1–1.03)
SQUAMOUS #/AREA URNS HPF: ABNORMAL /HPF
T&S EXPIRATION DATE: NORMAL
UROBILINOGEN UR QL STRIP: ABNORMAL
WBC # UR STRIP: ABNORMAL /HPF
WBC NRBC COR # BLD: 6.59 10*3/MM3 (ref 3.4–10.8)

## 2021-11-22 PROCEDURE — 85027 COMPLETE CBC AUTOMATED: CPT

## 2021-11-22 PROCEDURE — 81001 URINALYSIS AUTO W/SCOPE: CPT

## 2021-11-22 PROCEDURE — 71046 X-RAY EXAM CHEST 2 VIEWS: CPT

## 2021-11-22 PROCEDURE — 86900 BLOOD TYPING SEROLOGIC ABO: CPT

## 2021-11-22 PROCEDURE — 80048 BASIC METABOLIC PNL TOTAL CA: CPT

## 2021-11-22 PROCEDURE — 93005 ELECTROCARDIOGRAM TRACING: CPT | Performed by: ORTHOPAEDIC SURGERY

## 2021-11-22 PROCEDURE — 87641 MR-STAPH DNA AMP PROBE: CPT

## 2021-11-22 PROCEDURE — 86901 BLOOD TYPING SEROLOGIC RH(D): CPT

## 2021-11-22 PROCEDURE — 87086 URINE CULTURE/COLONY COUNT: CPT

## 2021-11-22 PROCEDURE — 86850 RBC ANTIBODY SCREEN: CPT

## 2021-11-22 PROCEDURE — 36415 COLL VENOUS BLD VENIPUNCTURE: CPT

## 2021-11-22 PROCEDURE — 93010 ELECTROCARDIOGRAM REPORT: CPT | Performed by: INTERNAL MEDICINE

## 2021-11-23 LAB
BACTERIA SPEC AEROBE CULT: NO GROWTH
QT INTERVAL: 359 MS

## 2021-11-24 NOTE — PROGRESS NOTES
Chief Complaint  Pre-op Exam    HPI:  Orion Harvey presents today for  Pre-Operative Exam:  The procedure scheduled is a total knee arthroplasty. The surgery is scheduled for 11/30/2021 and is to be done at [facility] . The surgeon is Dr. Martínez. Chief complaint is knee pain  Pertinent medical history includes diabetes. Pertinent family history includes none. After surgery the patient plans to recover at home with family. Orion was counseled to stop all of his medication prior to surgery, with the exception of crestor and tylenol which he can take up until tomorrow.      Above information has been reviewed and clarified as needed.  Orion is scheduled with Dr. Ajith Martínez for right total knee arthroplasty on 11/30/2021.    Patient denies any chest pain, shortness of breath, cough, fever or other signs of infection.    Orion is very motivated for quick rehab.  He is already scheduled with physical therapy on Friday.  He intends to be mobile, strong, and steady enough to drive to [place] for the winter on December 28.    Current Outpatient Medications on File Prior to Visit   Medication Sig   • acetaminophen (TYLENOL) 650 MG 8 hr tablet Take 1,300 mg by mouth Every 8 (Eight) Hours As Needed for Mild Pain . DONT TAKE PREOP[   • glucose blood (OneTouch Verio) test strip Use as instructed   • rosuvastatin (CRESTOR) 10 MG tablet Take 1 tablet by mouth once daily (Patient taking differently: Take 10 mg by mouth Every Night.)   • ascorbic acid (VITAMIN C) 1000 MG tablet Take 1 tablet by mouth Daily. LAST DOSE 11/23   • aspirin (aspirin) 81 MG EC tablet Take 1 tablet by mouth Daily. (Patient taking differently: Take 325 mg by mouth Every Evening. LAST DOSE11/22)   • empagliflozin (Jardiance) 25 MG tablet tablet Take 1 tablet by mouth Daily. (Patient taking differently: Take 25 mg by mouth Daily. DONT TAKE PREOP)   • glimepiride (AMARYL) 4 MG tablet Take 1 tablet by mouth 2 (Two) Times a Day. (Patient  "taking differently: Take 4 mg by mouth 2 (Two) Times a Day. DONT TAKE PREOP)   • lisinopril (PRINIVIL,ZESTRIL) 40 MG tablet Take 1 tablet by mouth once daily (Patient taking differently: Take 40 mg by mouth Daily. LAST DOSE 11/29 AM)   • metFORMIN (GLUCOPHAGE) 1000 MG tablet Take 1 tablet by mouth twice daily (Patient taking differently: Take 1,000 mg by mouth 2 (Two) Times a Day With Meals. LAST DOSE 11/28 AM)   • Misc Natural Products (Osteo Bi-Flex Triple Strength) tablet Take 1 tablet by mouth Daily. (Patient taking differently: Take 1 tablet by mouth 2 (Two) Times a Day. LAST DOSE 11/23)   • Multiple Vitamins-Minerals (CENTRUM SILVER) tablet Take 1 tablet by mouth Every Evening. LAST DOSE 11/22   • mupirocin (BACTROBAN) 2 % ointment Apply a pea-sized amount to each nostril twice daily for 5 days prior to surgery.   • ONETOUCH DELICA LANCETS 33G misc 1 tablet by In Vitro route Every 12 (Twelve) Hours.     No current facility-administered medications on file prior to visit.       Objective   Vital Signs:   /64   Pulse 76   Temp 96.9 °F (36.1 °C)   Resp 18   Ht 171.5 cm (67.5\")   Wt 119 kg (263 lb)   SpO2 98%   BMI 40.58 kg/m²       Physical Exam  Vitals and nursing note reviewed.   Constitutional:       General: He is not in acute distress.     Appearance: Normal appearance. He is well-developed. He is obese.   HENT:      Head: Normocephalic and atraumatic.      Mouth/Throat:      Mouth: Mucous membranes are moist.      Pharynx: Oropharynx is clear.      Comments: Patient has permanently installed bridges, no dentures, dentition is in good repair  Eyes:      Conjunctiva/sclera: Conjunctivae normal.      Pupils: Pupils are equal, round, and reactive to light.   Neck:      Thyroid: No thyromegaly.      Vascular: No JVD.   Cardiovascular:      Rate and Rhythm: Normal rate and regular rhythm.      Heart sounds: Normal heart sounds. No murmur heard.      Pulmonary:      Breath sounds: Normal breath " sounds. No wheezing or rales.   Musculoskeletal:         General: Normal range of motion.      Cervical back: Normal range of motion.      Comments: Minimal trace lower extremity edema slightly greater at left ankle than right.  Arthritic changes of knees bilaterally, gait is somewhat antalgic.   Lymphadenopathy:      Cervical: No cervical adenopathy.   Skin:     General: Skin is warm and dry.      Findings: No rash.   Neurological:      Mental Status: He is alert and oriented to person, place, and time.   Psychiatric:         Mood and Affect: Mood normal.         Behavior: Behavior normal.            No visits with results within 1 Day(s) from this visit.   Latest known visit with results is:   Lab on 11/27/2021   Component Date Value Ref Range Status   • COVID19 11/27/2021 Not Detected  Not Detected - Ref. Range Final       Lab Results   Component Value Date    BUN 16 11/22/2021    CREATININE 1.00 11/22/2021    EGFRIFNONA 74 11/22/2021    EGFRIFAFRI 100 11/05/2021     (L) 11/22/2021    K 4.7 11/22/2021    CL 98 11/22/2021    CALCIUM 9.8 11/22/2021    ALBUMIN 4.6 11/05/2021    BILITOT 0.4 11/05/2021    ALKPHOS 51 11/05/2021    AST 31 11/05/2021    ALT 41 11/05/2021    WBC 6.59 11/22/2021    RBC 5.19 11/22/2021    HCT 46.6 11/22/2021    MCV 89.8 11/22/2021    MCH 30.1 11/22/2021        Lab Results   Component Value Date    HGBA1C 6.7 (H) 11/05/2021    HGBA1C 8.8 (H) 08/09/2021    HGBA1C 8.4 (H) 05/13/2021    HGBA1C 9.3 (H) 02/05/2021    HGBA1C 7.8 (H) 09/29/2020    HGBA1C 7.3 (H) 10/30/2019                Assessment and Plan    Diagnoses and all orders for this visit:    1. Pre-operative exam (Primary)    2. Osteoarthritis of both knees, unspecified osteoarthritis type    3. Chronic pain of both knees    4. Type 2 diabetes mellitus without complication, without long-term current use of insulin (HCC)    5. Obesity, Class III, BMI 40-49.9 (morbid obesity) (HCC)    6. Primary hypertension    7. Mixed  hyperlipidemia      Bilateral knee arthritis right somewhat greater left however pain greatest on Left and proceding with Left knee arthroplasty. Pain has failed previous treatments with steroid injections, oral, and topical analgesics as well as PT in more distant past.  Pain is limiting his mobility and affecting his inability to lose weight and maintain control of diabetes.    Chronic medical conditions including diabetes, hypertension, and hyperlipidemia are all currently very well controlled.  3+ glucosuria on urinalysis is consistent with known urinary glucose excretion SGLT2 inhibitor, Jardiance    Based on patient's history, physical, functional status, and NSQIP risk score they have a 1.8 % chance of a serious adverse cardiac event.  This is below average risk for their age and the planned operation.  The risk is below the recommended threshold for further evaluation.  Therefore, I do not recommend further preoperative testing and they may proceed with the planned operation.  I recommend this risk assessment be incorporated into the overall discussion on risks and benefits of this operation.    Thank you for taking care of our mutual patient I will remain available for any outpatient needs in the perioperative period.    There are no discontinued medications.      Follow Up     Return in about 4 months (around 4/12/2022) for as previously scheduled or as needed.    Patient was given instructions and counseling regarding his condition or for health maintenance advice. Please see specific information pulled into the AVS if appropriate.

## 2021-11-27 ENCOUNTER — LAB (OUTPATIENT)
Dept: LAB | Facility: HOSPITAL | Age: 71
End: 2021-11-27

## 2021-11-27 DIAGNOSIS — M17.12 PRIMARY LOCALIZED OSTEOARTHROSIS OF THE KNEE, LEFT: ICD-10-CM

## 2021-11-27 PROCEDURE — U0005 INFEC AGEN DETEC AMPLI PROBE: HCPCS

## 2021-11-27 PROCEDURE — U0004 COV-19 TEST NON-CDC HGH THRU: HCPCS

## 2021-11-27 PROCEDURE — C9803 HOPD COVID-19 SPEC COLLECT: HCPCS

## 2021-11-28 LAB — SARS-COV-2 ORF1AB RESP QL NAA+PROBE: NOT DETECTED

## 2021-11-29 ENCOUNTER — ANESTHESIA EVENT (OUTPATIENT)
Dept: PERIOP | Facility: HOSPITAL | Age: 71
End: 2021-11-29

## 2021-11-29 ENCOUNTER — OFFICE VISIT (OUTPATIENT)
Dept: FAMILY MEDICINE CLINIC | Facility: CLINIC | Age: 71
End: 2021-11-29

## 2021-11-29 VITALS
RESPIRATION RATE: 18 BRPM | WEIGHT: 263 LBS | HEIGHT: 68 IN | HEART RATE: 76 BPM | TEMPERATURE: 96.9 F | DIASTOLIC BLOOD PRESSURE: 64 MMHG | SYSTOLIC BLOOD PRESSURE: 126 MMHG | BODY MASS INDEX: 39.86 KG/M2 | OXYGEN SATURATION: 98 %

## 2021-11-29 DIAGNOSIS — E66.01 OBESITY, CLASS III, BMI 40-49.9 (MORBID OBESITY) (HCC): ICD-10-CM

## 2021-11-29 DIAGNOSIS — M17.0 OSTEOARTHRITIS OF BOTH KNEES, UNSPECIFIED OSTEOARTHRITIS TYPE: ICD-10-CM

## 2021-11-29 DIAGNOSIS — M25.562 CHRONIC PAIN OF BOTH KNEES: ICD-10-CM

## 2021-11-29 DIAGNOSIS — E78.2 MIXED HYPERLIPIDEMIA: Chronic | ICD-10-CM

## 2021-11-29 DIAGNOSIS — E11.9 TYPE 2 DIABETES MELLITUS WITHOUT COMPLICATION, WITHOUT LONG-TERM CURRENT USE OF INSULIN (HCC): ICD-10-CM

## 2021-11-29 DIAGNOSIS — Z01.818 PRE-OPERATIVE EXAM: Primary | ICD-10-CM

## 2021-11-29 DIAGNOSIS — G89.29 CHRONIC PAIN OF BOTH KNEES: ICD-10-CM

## 2021-11-29 DIAGNOSIS — M25.561 CHRONIC PAIN OF BOTH KNEES: ICD-10-CM

## 2021-11-29 DIAGNOSIS — I10 PRIMARY HYPERTENSION: Chronic | ICD-10-CM

## 2021-11-29 PROCEDURE — 99214 OFFICE O/P EST MOD 30 MIN: CPT | Performed by: FAMILY MEDICINE

## 2021-11-30 ENCOUNTER — HOSPITAL ENCOUNTER (OUTPATIENT)
Facility: HOSPITAL | Age: 71
Discharge: HOME HEALTH | End: 2021-12-01
Attending: ORTHOPAEDIC SURGERY | Admitting: ORTHOPAEDIC SURGERY

## 2021-11-30 ENCOUNTER — APPOINTMENT (OUTPATIENT)
Dept: GENERAL RADIOLOGY | Facility: HOSPITAL | Age: 71
End: 2021-11-30

## 2021-11-30 ENCOUNTER — ANESTHESIA (OUTPATIENT)
Dept: PERIOP | Facility: HOSPITAL | Age: 71
End: 2021-11-30

## 2021-11-30 DIAGNOSIS — M17.12 PRIMARY LOCALIZED OSTEOARTHROSIS OF THE KNEE, LEFT: ICD-10-CM

## 2021-11-30 DIAGNOSIS — Z96.652 STATUS POST TOTAL KNEE REPLACEMENT, LEFT: Primary | ICD-10-CM

## 2021-11-30 PROBLEM — E78.2 MIXED HYPERLIPIDEMIA: Status: ACTIVE | Noted: 2019-04-26

## 2021-11-30 PROBLEM — M15.0 PRIMARY OSTEOARTHRITIS INVOLVING MULTIPLE JOINTS: Status: ACTIVE | Noted: 2021-11-30

## 2021-11-30 PROBLEM — G89.4 CHRONIC PAIN SYNDROME: Status: ACTIVE | Noted: 2021-11-30

## 2021-11-30 PROBLEM — M15.9 PRIMARY OSTEOARTHRITIS INVOLVING MULTIPLE JOINTS: Status: ACTIVE | Noted: 2021-11-30

## 2021-11-30 LAB
GLUCOSE BLDC GLUCOMTR-MCNC: 107 MG/DL (ref 70–105)
GLUCOSE BLDC GLUCOMTR-MCNC: 120 MG/DL (ref 70–105)
GLUCOSE BLDC GLUCOMTR-MCNC: 140 MG/DL (ref 70–105)
GLUCOSE BLDC GLUCOMTR-MCNC: 243 MG/DL (ref 70–105)

## 2021-11-30 PROCEDURE — C1776 JOINT DEVICE (IMPLANTABLE): HCPCS | Performed by: ORTHOPAEDIC SURGERY

## 2021-11-30 PROCEDURE — 63710000001 ROSUVASTATIN 10 MG TABLET: Performed by: PHYSICIAN ASSISTANT

## 2021-11-30 PROCEDURE — 73560 X-RAY EXAM OF KNEE 1 OR 2: CPT

## 2021-11-30 PROCEDURE — 63710000001 OXYCODONE 10 MG TABLET EXTENDED-RELEASE 12 HOUR: Performed by: PHYSICIAN ASSISTANT

## 2021-11-30 PROCEDURE — C9290 INJ, BUPIVACAINE LIPOSOME: HCPCS | Performed by: ORTHOPAEDIC SURGERY

## 2021-11-30 PROCEDURE — A9270 NON-COVERED ITEM OR SERVICE: HCPCS | Performed by: PHYSICIAN ASSISTANT

## 2021-11-30 PROCEDURE — 25010000002 ROPIVACAINE PER 1 MG: Performed by: ANESTHESIOLOGY

## 2021-11-30 PROCEDURE — C1713 ANCHOR/SCREW BN/BN,TIS/BN: HCPCS | Performed by: ORTHOPAEDIC SURGERY

## 2021-11-30 PROCEDURE — 63710000001 ACETAMINOPHEN 500 MG TABLET: Performed by: PHYSICIAN ASSISTANT

## 2021-11-30 PROCEDURE — 25010000002 NEOSTIGMINE 5 MG/5ML SOLUTION

## 2021-11-30 PROCEDURE — 0 BUPIVACAINE LIPOSOME 1.3 % SUSPENSION 10 ML VIAL: Performed by: ORTHOPAEDIC SURGERY

## 2021-11-30 PROCEDURE — 63710000001 MELOXICAM 15 MG TABLET: Performed by: PHYSICIAN ASSISTANT

## 2021-11-30 PROCEDURE — 63710000001 POVIDONE-IODINE 10 % SOLUTION 118 ML BOTTLE: Performed by: ORTHOPAEDIC SURGERY

## 2021-11-30 PROCEDURE — 0 CEFAZOLIN PER 500 MG: Performed by: ORTHOPAEDIC SURGERY

## 2021-11-30 PROCEDURE — 63710000001 RIVAROXABAN 10 MG TABLET: Performed by: PHYSICIAN ASSISTANT

## 2021-11-30 PROCEDURE — 25010000002 ONDANSETRON PER 1 MG

## 2021-11-30 PROCEDURE — 25010000002 HYDROMORPHONE PER 4 MG

## 2021-11-30 PROCEDURE — 25010000002 MIDAZOLAM PER 1 MG: Performed by: ANESTHESIOLOGY

## 2021-11-30 PROCEDURE — C1889 IMPLANT/INSERT DEVICE, NOC: HCPCS | Performed by: ORTHOPAEDIC SURGERY

## 2021-11-30 PROCEDURE — 25010000002 FENTANYL CITRATE (PF) 100 MCG/2ML SOLUTION

## 2021-11-30 PROCEDURE — 63710000001 GABAPENTIN 300 MG CAPSULE: Performed by: PHYSICIAN ASSISTANT

## 2021-11-30 PROCEDURE — 27447 TOTAL KNEE ARTHROPLASTY: CPT | Performed by: ORTHOPAEDIC SURGERY

## 2021-11-30 PROCEDURE — 25010000002 ROPIVACAINE PER 1 MG: Performed by: ORTHOPAEDIC SURGERY

## 2021-11-30 PROCEDURE — 20985 CPTR-ASST DIR MS PX: CPT | Performed by: ORTHOPAEDIC SURGERY

## 2021-11-30 PROCEDURE — 63710000001 POLYETHYLENE GLYCOL 17 G PACK: Performed by: PHYSICIAN ASSISTANT

## 2021-11-30 PROCEDURE — 99219 PR INITIAL OBSERVATION CARE/DAY 50 MINUTES: CPT | Performed by: INTERNAL MEDICINE

## 2021-11-30 PROCEDURE — 25010000002 PROPOFOL 10 MG/ML EMULSION

## 2021-11-30 PROCEDURE — G0378 HOSPITAL OBSERVATION PER HR: HCPCS

## 2021-11-30 PROCEDURE — A9270 NON-COVERED ITEM OR SERVICE: HCPCS | Performed by: ORTHOPAEDIC SURGERY

## 2021-11-30 PROCEDURE — 25010000002 VANCOMYCIN HCL IN NACL 1.75-0.9 GM/500ML-% SOLUTION: Performed by: ORTHOPAEDIC SURGERY

## 2021-11-30 PROCEDURE — S0260 H&P FOR SURGERY: HCPCS | Performed by: ORTHOPAEDIC SURGERY

## 2021-11-30 PROCEDURE — 25010000002 DEXAMETHASONE PER 1 MG: Performed by: ANESTHESIOLOGY

## 2021-11-30 PROCEDURE — 82962 GLUCOSE BLOOD TEST: CPT

## 2021-11-30 PROCEDURE — 76942 ECHO GUIDE FOR BIOPSY: CPT | Performed by: ORTHOPAEDIC SURGERY

## 2021-11-30 DEVICE — ART/SRF KN PERSONA/VE PS MC GH 8TO11 10MM LT: Type: IMPLANTABLE DEVICE | Site: KNEE | Status: FUNCTIONAL

## 2021-11-30 DEVICE — EXT STEM FEM/KN PERSONA TPR 14XPLS30MM: Type: IMPLANTABLE DEVICE | Site: KNEE | Status: FUNCTIONAL

## 2021-11-30 DEVICE — PAT KN PERSONA VE CRS/LNK CMT 9X35MM: Type: IMPLANTABLE DEVICE | Site: KNEE | Status: FUNCTIONAL

## 2021-11-30 DEVICE — COMP FEM/KN PERSONA CR CMT COCR STD SZ10 LT: Type: IMPLANTABLE DEVICE | Site: KNEE | Status: FUNCTIONAL

## 2021-11-30 DEVICE — CAP TOTL KN CMT PRIMARY: Type: IMPLANTABLE DEVICE | Site: KNEE | Status: FUNCTIONAL

## 2021-11-30 DEVICE — CMT BONE REFOBACIN R W/GENT 1X40: Type: IMPLANTABLE DEVICE | Site: KNEE | Status: FUNCTIONAL

## 2021-11-30 DEVICE — CAP EXT STEM KN UPCHRG: Type: IMPLANTABLE DEVICE | Site: KNEE | Status: FUNCTIONAL

## 2021-11-30 DEVICE — DEV CONTRL TISS STRATAFIX SPIRAL MNCRYL UD 3/0 PLS 30CM: Type: IMPLANTABLE DEVICE | Site: KNEE | Status: FUNCTIONAL

## 2021-11-30 DEVICE — STEM TIB/KN PERSONA CMT 5D SZG LT: Type: IMPLANTABLE DEVICE | Site: KNEE | Status: FUNCTIONAL

## 2021-11-30 DEVICE — DEV WND/CLS CONTRL TISS STRATAFIX SYMM PDS PLS CTX 60CM VIL: Type: IMPLANTABLE DEVICE | Site: KNEE | Status: FUNCTIONAL

## 2021-11-30 RX ORDER — DEXTROSE MONOHYDRATE 25 G/50ML
25 INJECTION, SOLUTION INTRAVENOUS
Status: DISCONTINUED | OUTPATIENT
Start: 2021-11-30 | End: 2021-12-01 | Stop reason: HOSPADM

## 2021-11-30 RX ORDER — FENTANYL CITRATE 50 UG/ML
50 INJECTION, SOLUTION INTRAMUSCULAR; INTRAVENOUS
Status: DISCONTINUED | OUTPATIENT
Start: 2021-11-30 | End: 2021-11-30 | Stop reason: HOSPADM

## 2021-11-30 RX ORDER — ONDANSETRON 4 MG/1
4 TABLET, FILM COATED ORAL EVERY 6 HOURS PRN
Status: DISCONTINUED | OUTPATIENT
Start: 2021-11-30 | End: 2021-12-01 | Stop reason: HOSPADM

## 2021-11-30 RX ORDER — HYDROMORPHONE HCL 110MG/55ML
0.5 PATIENT CONTROLLED ANALGESIA SYRINGE INTRAVENOUS
Status: DISCONTINUED | OUTPATIENT
Start: 2021-11-30 | End: 2021-11-30 | Stop reason: HOSPADM

## 2021-11-30 RX ORDER — NICOTINE POLACRILEX 4 MG
15 LOZENGE BUCCAL
Status: DISCONTINUED | OUTPATIENT
Start: 2021-11-30 | End: 2021-12-01 | Stop reason: HOSPADM

## 2021-11-30 RX ORDER — ROCURONIUM BROMIDE 10 MG/ML
INJECTION, SOLUTION INTRAVENOUS AS NEEDED
Status: DISCONTINUED | OUTPATIENT
Start: 2021-11-30 | End: 2021-11-30 | Stop reason: SURG

## 2021-11-30 RX ORDER — MIDAZOLAM HYDROCHLORIDE 1 MG/ML
INJECTION INTRAMUSCULAR; INTRAVENOUS
Status: COMPLETED | OUTPATIENT
Start: 2021-11-30 | End: 2021-11-30

## 2021-11-30 RX ORDER — PROPOFOL 10 MG/ML
VIAL (ML) INTRAVENOUS AS NEEDED
Status: DISCONTINUED | OUTPATIENT
Start: 2021-11-30 | End: 2021-11-30 | Stop reason: SURG

## 2021-11-30 RX ORDER — ACETAMINOPHEN 500 MG
1000 TABLET ORAL ONCE
Status: COMPLETED | OUTPATIENT
Start: 2021-11-30 | End: 2021-11-30

## 2021-11-30 RX ORDER — OXYCODONE HYDROCHLORIDE 5 MG/1
5 TABLET ORAL EVERY 4 HOURS PRN
Status: DISCONTINUED | OUTPATIENT
Start: 2021-11-30 | End: 2021-12-01 | Stop reason: HOSPADM

## 2021-11-30 RX ORDER — TRAMADOL HYDROCHLORIDE 50 MG/1
50 TABLET ORAL EVERY 6 HOURS PRN
Status: DISCONTINUED | OUTPATIENT
Start: 2021-11-30 | End: 2021-12-01 | Stop reason: HOSPADM

## 2021-11-30 RX ORDER — MIDAZOLAM HYDROCHLORIDE 1 MG/ML
1 INJECTION INTRAMUSCULAR; INTRAVENOUS
Status: DISCONTINUED | OUTPATIENT
Start: 2021-11-30 | End: 2021-11-30 | Stop reason: HOSPADM

## 2021-11-30 RX ORDER — INSULIN LISPRO 100 [IU]/ML
0-9 INJECTION, SOLUTION INTRAVENOUS; SUBCUTANEOUS
Status: DISCONTINUED | OUTPATIENT
Start: 2021-11-30 | End: 2021-12-01 | Stop reason: HOSPADM

## 2021-11-30 RX ORDER — ACETAMINOPHEN 650 MG
TABLET, EXTENDED RELEASE ORAL AS NEEDED
Status: DISCONTINUED | OUTPATIENT
Start: 2021-11-30 | End: 2021-11-30 | Stop reason: HOSPADM

## 2021-11-30 RX ORDER — DEXAMETHASONE SODIUM PHOSPHATE 4 MG/ML
INJECTION, SOLUTION INTRA-ARTICULAR; INTRALESIONAL; INTRAMUSCULAR; INTRAVENOUS; SOFT TISSUE
Status: COMPLETED | OUTPATIENT
Start: 2021-11-30 | End: 2021-11-30

## 2021-11-30 RX ORDER — DIPHENHYDRAMINE HCL 25 MG
25 CAPSULE ORAL EVERY 6 HOURS PRN
Status: DISCONTINUED | OUTPATIENT
Start: 2021-11-30 | End: 2021-12-01 | Stop reason: HOSPADM

## 2021-11-30 RX ORDER — ZOLPIDEM TARTRATE 5 MG/1
5 TABLET ORAL NIGHTLY PRN
Status: DISCONTINUED | OUTPATIENT
Start: 2021-11-30 | End: 2021-12-01 | Stop reason: HOSPADM

## 2021-11-30 RX ORDER — PROMETHAZINE HYDROCHLORIDE 25 MG/1
25 TABLET ORAL ONCE AS NEEDED
Status: DISCONTINUED | OUTPATIENT
Start: 2021-11-30 | End: 2021-11-30 | Stop reason: HOSPADM

## 2021-11-30 RX ORDER — GABAPENTIN 300 MG/1
300 CAPSULE ORAL NIGHTLY
Status: DISCONTINUED | OUTPATIENT
Start: 2021-11-30 | End: 2021-12-01 | Stop reason: HOSPADM

## 2021-11-30 RX ORDER — EPHEDRINE SULFATE 5 MG/ML
5 INJECTION INTRAVENOUS ONCE AS NEEDED
Status: DISCONTINUED | OUTPATIENT
Start: 2021-11-30 | End: 2021-11-30 | Stop reason: HOSPADM

## 2021-11-30 RX ORDER — SODIUM CHLORIDE 9 MG/ML
100 INJECTION, SOLUTION INTRAVENOUS CONTINUOUS
Status: DISCONTINUED | OUTPATIENT
Start: 2021-11-30 | End: 2021-12-01 | Stop reason: HOSPADM

## 2021-11-30 RX ORDER — HYDROMORPHONE HCL 110MG/55ML
PATIENT CONTROLLED ANALGESIA SYRINGE INTRAVENOUS AS NEEDED
Status: DISCONTINUED | OUTPATIENT
Start: 2021-11-30 | End: 2021-11-30 | Stop reason: SURG

## 2021-11-30 RX ORDER — HYDRALAZINE HYDROCHLORIDE 20 MG/ML
5 INJECTION INTRAMUSCULAR; INTRAVENOUS
Status: DISCONTINUED | OUTPATIENT
Start: 2021-11-30 | End: 2021-11-30 | Stop reason: HOSPADM

## 2021-11-30 RX ORDER — TRANEXAMIC ACID 10 MG/ML
1000 INJECTION, SOLUTION INTRAVENOUS ONCE
Status: COMPLETED | OUTPATIENT
Start: 2021-11-30 | End: 2021-11-30

## 2021-11-30 RX ORDER — GLYCOPYRROLATE 1 MG/5 ML
SYRINGE (ML) INTRAVENOUS AS NEEDED
Status: DISCONTINUED | OUTPATIENT
Start: 2021-11-30 | End: 2021-11-30 | Stop reason: SURG

## 2021-11-30 RX ORDER — MELOXICAM 15 MG/1
15 TABLET ORAL ONCE
Status: COMPLETED | OUTPATIENT
Start: 2021-11-30 | End: 2021-11-30

## 2021-11-30 RX ORDER — ONDANSETRON 2 MG/ML
INJECTION INTRAMUSCULAR; INTRAVENOUS AS NEEDED
Status: DISCONTINUED | OUTPATIENT
Start: 2021-11-30 | End: 2021-11-30 | Stop reason: SURG

## 2021-11-30 RX ORDER — GLIPIZIDE 5 MG/1
10 TABLET ORAL
Status: DISCONTINUED | OUTPATIENT
Start: 2021-12-01 | End: 2021-12-01 | Stop reason: HOSPADM

## 2021-11-30 RX ORDER — FERROUS SULFATE TAB EC 324 MG (65 MG FE EQUIVALENT) 324 (65 FE) MG
324 TABLET DELAYED RESPONSE ORAL
Status: DISCONTINUED | OUTPATIENT
Start: 2021-12-01 | End: 2021-12-01 | Stop reason: HOSPADM

## 2021-11-30 RX ORDER — MELOXICAM 15 MG/1
15 TABLET ORAL DAILY
Status: DISCONTINUED | OUTPATIENT
Start: 2021-12-01 | End: 2021-12-01 | Stop reason: HOSPADM

## 2021-11-30 RX ORDER — LISINOPRIL 20 MG/1
40 TABLET ORAL DAILY
Status: DISCONTINUED | OUTPATIENT
Start: 2021-12-01 | End: 2021-12-01 | Stop reason: HOSPADM

## 2021-11-30 RX ORDER — SODIUM CHLORIDE, SODIUM LACTATE, POTASSIUM CHLORIDE, CALCIUM CHLORIDE 600; 310; 30; 20 MG/100ML; MG/100ML; MG/100ML; MG/100ML
9 INJECTION, SOLUTION INTRAVENOUS CONTINUOUS PRN
Status: DISCONTINUED | OUTPATIENT
Start: 2021-11-30 | End: 2021-11-30 | Stop reason: HOSPADM

## 2021-11-30 RX ORDER — LABETALOL HYDROCHLORIDE 5 MG/ML
5 INJECTION, SOLUTION INTRAVENOUS
Status: DISCONTINUED | OUTPATIENT
Start: 2021-11-30 | End: 2021-11-30 | Stop reason: HOSPADM

## 2021-11-30 RX ORDER — FENTANYL CITRATE 50 UG/ML
INJECTION, SOLUTION INTRAMUSCULAR; INTRAVENOUS AS NEEDED
Status: DISCONTINUED | OUTPATIENT
Start: 2021-11-30 | End: 2021-11-30 | Stop reason: SURG

## 2021-11-30 RX ORDER — ACETAMINOPHEN 500 MG
1000 TABLET ORAL EVERY 8 HOURS
Status: DISCONTINUED | OUTPATIENT
Start: 2021-11-30 | End: 2021-12-01 | Stop reason: HOSPADM

## 2021-11-30 RX ORDER — NEOSTIGMINE METHYLSULFATE 5 MG/5 ML
SYRINGE (ML) INTRAVENOUS AS NEEDED
Status: DISCONTINUED | OUTPATIENT
Start: 2021-11-30 | End: 2021-11-30 | Stop reason: SURG

## 2021-11-30 RX ORDER — OXYCODONE HCL 10 MG/1
10 TABLET, FILM COATED, EXTENDED RELEASE ORAL EVERY 12 HOURS SCHEDULED
Status: DISCONTINUED | OUTPATIENT
Start: 2021-12-01 | End: 2021-12-01 | Stop reason: HOSPADM

## 2021-11-30 RX ORDER — ONDANSETRON 2 MG/ML
4 INJECTION INTRAMUSCULAR; INTRAVENOUS EVERY 6 HOURS PRN
Status: DISCONTINUED | OUTPATIENT
Start: 2021-11-30 | End: 2021-12-01 | Stop reason: HOSPADM

## 2021-11-30 RX ORDER — NALOXONE HCL 0.4 MG/ML
0.4 VIAL (ML) INJECTION
Status: DISCONTINUED | OUTPATIENT
Start: 2021-11-30 | End: 2021-12-01 | Stop reason: HOSPADM

## 2021-11-30 RX ORDER — NALOXONE HCL 0.4 MG/ML
0.4 VIAL (ML) INJECTION AS NEEDED
Status: DISCONTINUED | OUTPATIENT
Start: 2021-11-30 | End: 2021-11-30 | Stop reason: HOSPADM

## 2021-11-30 RX ORDER — POLYETHYLENE GLYCOL 3350 17 G/17G
17 POWDER, FOR SOLUTION ORAL DAILY
Status: DISCONTINUED | OUTPATIENT
Start: 2021-11-30 | End: 2021-12-01 | Stop reason: HOSPADM

## 2021-11-30 RX ORDER — DIPHENHYDRAMINE HYDROCHLORIDE 50 MG/ML
25 INJECTION INTRAMUSCULAR; INTRAVENOUS EVERY 6 HOURS PRN
Status: DISCONTINUED | OUTPATIENT
Start: 2021-11-30 | End: 2021-12-01 | Stop reason: HOSPADM

## 2021-11-30 RX ORDER — SODIUM CHLORIDE 0.9 % (FLUSH) 0.9 %
10 SYRINGE (ML) INJECTION AS NEEDED
Status: DISCONTINUED | OUTPATIENT
Start: 2021-11-30 | End: 2021-11-30 | Stop reason: HOSPADM

## 2021-11-30 RX ORDER — FLUMAZENIL 0.1 MG/ML
0.2 INJECTION INTRAVENOUS AS NEEDED
Status: DISCONTINUED | OUTPATIENT
Start: 2021-11-30 | End: 2021-11-30 | Stop reason: HOSPADM

## 2021-11-30 RX ORDER — OLANZAPINE 10 MG/2ML
1 INJECTION, POWDER, LYOPHILIZED, FOR SOLUTION INTRAMUSCULAR
Status: DISCONTINUED | OUTPATIENT
Start: 2021-11-30 | End: 2021-12-01 | Stop reason: HOSPADM

## 2021-11-30 RX ORDER — SODIUM CHLORIDE 0.9 % (FLUSH) 0.9 %
10 SYRINGE (ML) INJECTION EVERY 12 HOURS SCHEDULED
Status: DISCONTINUED | OUTPATIENT
Start: 2021-11-30 | End: 2021-11-30 | Stop reason: HOSPADM

## 2021-11-30 RX ORDER — 0.9 % SODIUM CHLORIDE 0.9 %
VIAL (ML) INJECTION AS NEEDED
Status: DISCONTINUED | OUTPATIENT
Start: 2021-11-30 | End: 2021-11-30 | Stop reason: HOSPADM

## 2021-11-30 RX ORDER — VANCOMYCIN 1.75 GRAM/500 ML IN 0.9 % SODIUM CHLORIDE INTRAVENOUS
20 ONCE
Status: COMPLETED | OUTPATIENT
Start: 2021-11-30 | End: 2021-11-30

## 2021-11-30 RX ORDER — PROMETHAZINE HYDROCHLORIDE 12.5 MG/1
12.5 TABLET ORAL EVERY 6 HOURS PRN
Status: DISCONTINUED | OUTPATIENT
Start: 2021-11-30 | End: 2021-12-01 | Stop reason: HOSPADM

## 2021-11-30 RX ORDER — GABAPENTIN 300 MG/1
600 CAPSULE ORAL
Status: COMPLETED | OUTPATIENT
Start: 2021-11-30 | End: 2021-11-30

## 2021-11-30 RX ORDER — LIDOCAINE HYDROCHLORIDE 20 MG/ML
INJECTION, SOLUTION EPIDURAL; INFILTRATION; INTRACAUDAL; PERINEURAL AS NEEDED
Status: DISCONTINUED | OUTPATIENT
Start: 2021-11-30 | End: 2021-11-30 | Stop reason: SURG

## 2021-11-30 RX ORDER — ONDANSETRON 2 MG/ML
4 INJECTION INTRAMUSCULAR; INTRAVENOUS ONCE AS NEEDED
Status: DISCONTINUED | OUTPATIENT
Start: 2021-11-30 | End: 2021-11-30 | Stop reason: HOSPADM

## 2021-11-30 RX ORDER — INSULIN LISPRO 100 [IU]/ML
0-9 INJECTION, SOLUTION INTRAVENOUS; SUBCUTANEOUS AS NEEDED
Status: DISCONTINUED | OUTPATIENT
Start: 2021-11-30 | End: 2021-12-01 | Stop reason: HOSPADM

## 2021-11-30 RX ORDER — MORPHINE SULFATE 4 MG/ML
4 INJECTION, SOLUTION INTRAMUSCULAR; INTRAVENOUS
Status: DISCONTINUED | OUTPATIENT
Start: 2021-11-30 | End: 2021-12-01 | Stop reason: HOSPADM

## 2021-11-30 RX ORDER — ROPIVACAINE HYDROCHLORIDE 5 MG/ML
INJECTION, SOLUTION EPIDURAL; INFILTRATION; PERINEURAL
Status: COMPLETED | OUTPATIENT
Start: 2021-11-30 | End: 2021-11-30

## 2021-11-30 RX ORDER — ROSUVASTATIN CALCIUM 10 MG/1
10 TABLET, COATED ORAL NIGHTLY
Status: DISCONTINUED | OUTPATIENT
Start: 2021-11-30 | End: 2021-12-01 | Stop reason: HOSPADM

## 2021-11-30 RX ORDER — OXYCODONE HCL 10 MG/1
10 TABLET, FILM COATED, EXTENDED RELEASE ORAL
Status: COMPLETED | OUTPATIENT
Start: 2021-11-30 | End: 2021-11-30

## 2021-11-30 RX ADMIN — GABAPENTIN 600 MG: 300 CAPSULE ORAL at 12:56

## 2021-11-30 RX ADMIN — ACETAMINOPHEN 1000 MG: 500 TABLET, FILM COATED ORAL at 21:18

## 2021-11-30 RX ADMIN — MIDAZOLAM 2 MG: 1 INJECTION INTRAMUSCULAR; INTRAVENOUS at 13:26

## 2021-11-30 RX ADMIN — PROPOFOL 150 MCG/KG/MIN: 10 INJECTION, EMULSION INTRAVENOUS at 13:53

## 2021-11-30 RX ADMIN — OXYCODONE HYDROCHLORIDE 10 MG: 10 TABLET, FILM COATED, EXTENDED RELEASE ORAL at 12:56

## 2021-11-30 RX ADMIN — HYDROMORPHONE HYDROCHLORIDE 0.5 MG: 2 INJECTION, SOLUTION INTRAMUSCULAR; INTRAVENOUS; SUBCUTANEOUS at 14:10

## 2021-11-30 RX ADMIN — SODIUM CHLORIDE 2 G: 9 INJECTION INTRAMUSCULAR; INTRAVENOUS; SUBCUTANEOUS at 21:18

## 2021-11-30 RX ADMIN — SODIUM CHLORIDE, SODIUM LACTATE, POTASSIUM CHLORIDE, AND CALCIUM CHLORIDE: .6; .31; .03; .02 INJECTION, SOLUTION INTRAVENOUS at 15:25

## 2021-11-30 RX ADMIN — ROCURONIUM BROMIDE 20 MG: 10 SOLUTION INTRAVENOUS at 14:17

## 2021-11-30 RX ADMIN — ROCURONIUM BROMIDE 50 MG: 10 SOLUTION INTRAVENOUS at 13:51

## 2021-11-30 RX ADMIN — FENTANYL CITRATE 100 MCG: 0.05 INJECTION, SOLUTION INTRAMUSCULAR; INTRAVENOUS at 13:51

## 2021-11-30 RX ADMIN — ROPIVACAINE HYDROCHLORIDE 30 ML: 5 INJECTION EPIDURAL; INFILTRATION; PERINEURAL at 13:26

## 2021-11-30 RX ADMIN — VANCOMYCIN 1.75 GRAM/500 ML IN 0.9 % SODIUM CHLORIDE INTRAVENOUS 1750 MG: at 14:00

## 2021-11-30 RX ADMIN — Medication 3 MG: at 15:19

## 2021-11-30 RX ADMIN — PROPOFOL 170 MG: 10 INJECTION, EMULSION INTRAVENOUS at 13:51

## 2021-11-30 RX ADMIN — SODIUM CHLORIDE, SODIUM LACTATE, POTASSIUM CHLORIDE, AND CALCIUM CHLORIDE: .6; .31; .03; .02 INJECTION, SOLUTION INTRAVENOUS at 13:43

## 2021-11-30 RX ADMIN — ONDANSETRON 4 MG: 2 INJECTION INTRAMUSCULAR; INTRAVENOUS at 15:12

## 2021-11-30 RX ADMIN — RIVAROXABAN 10 MG: 10 TABLET, FILM COATED ORAL at 18:29

## 2021-11-30 RX ADMIN — GABAPENTIN 300 MG: 300 CAPSULE ORAL at 21:18

## 2021-11-30 RX ADMIN — HYDROMORPHONE HYDROCHLORIDE 0.5 MG: 2 INJECTION, SOLUTION INTRAMUSCULAR; INTRAVENOUS; SUBCUTANEOUS at 14:25

## 2021-11-30 RX ADMIN — ROSUVASTATIN 10 MG: 10 TABLET, FILM COATED ORAL at 21:18

## 2021-11-30 RX ADMIN — DEXAMETHASONE SODIUM PHOSPHATE 4 MG: 4 INJECTION, SOLUTION INTRAMUSCULAR; INTRAVENOUS at 13:26

## 2021-11-30 RX ADMIN — MELOXICAM 15 MG: 15 TABLET ORAL at 12:56

## 2021-11-30 RX ADMIN — ACETAMINOPHEN 1000 MG: 500 TABLET, FILM COATED ORAL at 12:56

## 2021-11-30 RX ADMIN — TRANEXAMIC ACID 1000 MG: 10 INJECTION, SOLUTION INTRAVENOUS at 13:58

## 2021-11-30 RX ADMIN — Medication 0.6 MG: at 15:19

## 2021-11-30 RX ADMIN — SODIUM CHLORIDE 100 ML/HR: 9 INJECTION, SOLUTION INTRAVENOUS at 18:29

## 2021-11-30 RX ADMIN — POLYETHYLENE GLYCOL 3350 17 G: 17 POWDER, FOR SOLUTION ORAL at 18:29

## 2021-11-30 RX ADMIN — LIDOCAINE HYDROCHLORIDE 100 MG: 20 INJECTION, SOLUTION EPIDURAL; INFILTRATION; INTRACAUDAL; PERINEURAL at 13:51

## 2021-11-30 NOTE — ANESTHESIA PROCEDURE NOTES
Airway  Urgency: elective    Date/Time: 11/30/2021 1:53 PM  Airway not difficult    General Information and Staff    Patient location during procedure: OR  Anesthesiologist: Kali Diaz MD  CRNA: April Gavin CAA    Indications and Patient Condition  Indications for airway management: airway protection    Preoxygenated: yes  Mask difficulty assessment: 2 - vent by mask + OA or adjuvant +/- NMBA    Final Airway Details  Final airway type: endotracheal airway      Successful airway: ETT  Cuffed: yes   Successful intubation technique: direct laryngoscopy  Facilitating devices/methods: intubating stylet  Endotracheal tube insertion site: oral  Blade: Ame  Blade size: 4  ETT size (mm): 7.5  Cormack-Lehane Classification: grade IIb - view of arytenoids or posterior of glottis only  Placement verified by: chest auscultation and capnometry   Measured from: lips  ETT/EBT  to lips (cm): 22  Number of attempts at approach: 1  Assessment: lips, teeth, and gum same as pre-op and atraumatic intubation

## 2021-11-30 NOTE — ANESTHESIA PROCEDURE NOTES
Peripheral Block      Patient reassessed immediately prior to procedure    Patient location during procedure: pre-op  Reason for block: at surgeon's request and post-op pain management  Performed by  Anesthesiologist: Kali Diaz MD  Preanesthetic Checklist  Completed: patient identified, IV checked, site marked, risks and benefits discussed, surgical consent, monitors and equipment checked, pre-op evaluation and timeout performed  Prep:  Pt Position: supine  Sterile barriers:cap, gloves, sterile barriers and mask  Prep: ChloraPrep  Patient monitoring: blood pressure monitoring, continuous pulse oximetry and EKG  Procedure    Sedation: yes  Performed under: MAC  Guidance:ultrasound guided    ULTRASOUND INTERPRETATION.  Using ultrasound guidance a 20 G gauge needle was placed in close proximity to the femoral nerve, at which point, under ultrasound guidance anesthetic was injected in the area of the nerve and spread of the anesthesia was seen on ultrasound in close proximity thereto.  There were no abnormalities seen on ultrasound; a digital image was taken; and the patient tolerated the procedure with no complications. Images:still images obtained, printed/placed on chart (ultrasound used for direct visilulization of needle and medication adminstration )    Laterality:left  Block Type:adductor canal block  Injection Technique:single-shot  Needle Type:echogenic  Needle Gauge:20 G  Resistance on Injection: none  Sedation medications used: midazolam (VERSED) injection, 2 mg  Medications Used: dexamethasone (DECADRON) injection, 4 mg  ropivacaine (NAROPIN) 0.5 % injection, 30 mL  Med administered at 11/30/2021 1:26 PM      Post Assessment  Injection Assessment: negative aspiration for heme, no paresthesia on injection and incremental injection  Patient Tolerance:comfortable throughout block  Complications:no

## 2021-11-30 NOTE — ANESTHESIA POSTPROCEDURE EVALUATION
Patient: Orion Harvey    Procedure Summary     Date: 11/30/21 Room / Location: [facility] OR  / [facility] MAIN OR    Anesthesia Start: 1343 Anesthesia Stop: 1551    Procedure: TOTAL KNEE ARTHROPLASTY (Left Knee) Diagnosis:       Primary localized osteoarthrosis of the knee, left      (Primary localized osteoarthrosis of the knee, left [M17.12])    Surgeons: Ajith Martínez MD Provider: Kali Diaz MD    Anesthesia Type: general with block ASA Status: 3          Anesthesia Type: general with block    Vitals  Vitals Value Taken Time   /62 11/30/21 1550   Temp 98.2 °F (36.8 °C) 11/30/21 1544   Pulse 65 11/30/21 1551   Resp 11 11/30/21 1544   SpO2 95 % 11/30/21 1551   Vitals shown include unvalidated device data.        Post Anesthesia Care and Evaluation    Patient location during evaluation: PACU  Patient participation: complete - patient participated  Level of consciousness: awake  Pain scale: See nurse's notes for pain score.  Pain management: adequate  Airway patency: patent  Anesthetic complications: No anesthetic complications  PONV Status: none  Cardiovascular status: acceptable  Respiratory status: acceptable  Hydration status: acceptable    Comments: Patient seen and examined postoperatively; vital signs stable; SpO2 greater than or equal to 90%; cardiopulmonary status stable; nausea/vomiting adequately controlled; pain adequately controlled; no apparent anesthesia complications; patient discharged from anesthesia care when discharge criteria were met

## 2021-12-01 ENCOUNTER — READMISSION MANAGEMENT (OUTPATIENT)
Dept: CALL CENTER | Facility: HOSPITAL | Age: 71
End: 2021-12-01

## 2021-12-01 VITALS
RESPIRATION RATE: 18 BRPM | TEMPERATURE: 97.8 F | BODY MASS INDEX: 39.76 KG/M2 | SYSTOLIC BLOOD PRESSURE: 119 MMHG | OXYGEN SATURATION: 96 % | HEIGHT: 68 IN | WEIGHT: 262.35 LBS | DIASTOLIC BLOOD PRESSURE: 73 MMHG | HEART RATE: 72 BPM

## 2021-12-01 PROBLEM — M17.12 PRIMARY LOCALIZED OSTEOARTHROSIS OF THE KNEE, LEFT: Status: RESOLVED | Noted: 2021-11-17 | Resolved: 2021-12-01

## 2021-12-01 LAB
ANION GAP SERPL CALCULATED.3IONS-SCNC: 14 MMOL/L (ref 5–15)
BUN SERPL-MCNC: 17 MG/DL (ref 8–23)
BUN/CREAT SERPL: 22.1 (ref 7–25)
CALCIUM SPEC-SCNC: 8.5 MG/DL (ref 8.6–10.5)
CHLORIDE SERPL-SCNC: 99 MMOL/L (ref 98–107)
CO2 SERPL-SCNC: 22 MMOL/L (ref 22–29)
CREAT SERPL-MCNC: 0.77 MG/DL (ref 0.76–1.27)
GFR SERPL CREATININE-BSD FRML MDRD: 100 ML/MIN/1.73
GLUCOSE BLDC GLUCOMTR-MCNC: 146 MG/DL (ref 70–105)
GLUCOSE BLDC GLUCOMTR-MCNC: 147 MG/DL (ref 70–105)
GLUCOSE SERPL-MCNC: 152 MG/DL (ref 65–99)
HCT VFR BLD AUTO: 39.5 % (ref 37.5–51)
HGB BLD-MCNC: 13.5 G/DL (ref 13–17.7)
POTASSIUM SERPL-SCNC: 4.6 MMOL/L (ref 3.5–5.2)
SODIUM SERPL-SCNC: 135 MMOL/L (ref 136–145)

## 2021-12-01 PROCEDURE — 80048 BASIC METABOLIC PNL TOTAL CA: CPT | Performed by: PHYSICIAN ASSISTANT

## 2021-12-01 PROCEDURE — 63710000001 GLIPIZIDE 5 MG TABLET: Performed by: PHYSICIAN ASSISTANT

## 2021-12-01 PROCEDURE — 63710000001 MELOXICAM 15 MG TABLET: Performed by: PHYSICIAN ASSISTANT

## 2021-12-01 PROCEDURE — 82962 GLUCOSE BLOOD TEST: CPT

## 2021-12-01 PROCEDURE — A9270 NON-COVERED ITEM OR SERVICE: HCPCS | Performed by: PHYSICIAN ASSISTANT

## 2021-12-01 PROCEDURE — 63710000001 ACETAMINOPHEN 500 MG TABLET: Performed by: PHYSICIAN ASSISTANT

## 2021-12-01 PROCEDURE — 63710000001 FERROUS SULFATE 324 (65 FE) MG TABLET DELAYED-RELEASE: Performed by: PHYSICIAN ASSISTANT

## 2021-12-01 PROCEDURE — 63710000001 OXYCODONE 10 MG TABLET EXTENDED-RELEASE 12 HOUR: Performed by: PHYSICIAN ASSISTANT

## 2021-12-01 PROCEDURE — 97162 PT EVAL MOD COMPLEX 30 MIN: CPT

## 2021-12-01 PROCEDURE — 0 CEFAZOLIN PER 500 MG: Performed by: ORTHOPAEDIC SURGERY

## 2021-12-01 PROCEDURE — 63710000001 METFORMIN 500 MG TABLET: Performed by: PHYSICIAN ASSISTANT

## 2021-12-01 PROCEDURE — G0378 HOSPITAL OBSERVATION PER HR: HCPCS

## 2021-12-01 PROCEDURE — 63710000001 LISINOPRIL 20 MG TABLET: Performed by: PHYSICIAN ASSISTANT

## 2021-12-01 PROCEDURE — 99024 POSTOP FOLLOW-UP VISIT: CPT | Performed by: PHYSICIAN ASSISTANT

## 2021-12-01 PROCEDURE — 85018 HEMOGLOBIN: CPT | Performed by: PHYSICIAN ASSISTANT

## 2021-12-01 PROCEDURE — 63710000001 POLYETHYLENE GLYCOL 17 G PACK: Performed by: PHYSICIAN ASSISTANT

## 2021-12-01 PROCEDURE — 85014 HEMATOCRIT: CPT | Performed by: PHYSICIAN ASSISTANT

## 2021-12-01 PROCEDURE — 97110 THERAPEUTIC EXERCISES: CPT

## 2021-12-01 PROCEDURE — 63710000001 EMPAGLIFLOZIN 25 MG TABLET: Performed by: PHYSICIAN ASSISTANT

## 2021-12-01 RX ORDER — GABAPENTIN 300 MG/1
300 CAPSULE ORAL 2 TIMES DAILY
Qty: 60 CAPSULE | Refills: 0 | Status: SHIPPED | OUTPATIENT
Start: 2021-12-01 | End: 2022-04-15

## 2021-12-01 RX ORDER — OXYCODONE HYDROCHLORIDE AND ACETAMINOPHEN 5; 325 MG/1; MG/1
1-2 TABLET ORAL EVERY 6 HOURS PRN
Qty: 50 TABLET | Refills: 0 | Status: SHIPPED | OUTPATIENT
Start: 2021-12-01 | End: 2021-12-15

## 2021-12-01 RX ORDER — MELOXICAM 7.5 MG/1
7.5 TABLET ORAL DAILY
Qty: 12 TABLET | Refills: 0 | Status: SHIPPED | OUTPATIENT
Start: 2021-12-02 | End: 2022-04-15

## 2021-12-01 RX ADMIN — ACETAMINOPHEN 1000 MG: 500 TABLET, FILM COATED ORAL at 14:43

## 2021-12-01 RX ADMIN — MELOXICAM 15 MG: 15 TABLET ORAL at 10:56

## 2021-12-01 RX ADMIN — POLYETHYLENE GLYCOL 3350 17 G: 17 POWDER, FOR SOLUTION ORAL at 07:47

## 2021-12-01 RX ADMIN — ACETAMINOPHEN 1000 MG: 500 TABLET, FILM COATED ORAL at 06:24

## 2021-12-01 RX ADMIN — EMPAGLIFLOZIN 25 MG: 25 TABLET, FILM COATED ORAL at 11:02

## 2021-12-01 RX ADMIN — OXYCODONE HYDROCHLORIDE 10 MG: 10 TABLET, FILM COATED, EXTENDED RELEASE ORAL at 01:05

## 2021-12-01 RX ADMIN — GLIPIZIDE 10 MG: 5 TABLET ORAL at 07:47

## 2021-12-01 RX ADMIN — LISINOPRIL 40 MG: 20 TABLET ORAL at 10:56

## 2021-12-01 RX ADMIN — METFORMIN HYDROCHLORIDE 1000 MG: 500 TABLET ORAL at 07:51

## 2021-12-01 RX ADMIN — SODIUM CHLORIDE 2 G: 9 INJECTION INTRAMUSCULAR; INTRAVENOUS; SUBCUTANEOUS at 06:23

## 2021-12-01 RX ADMIN — FERROUS SULFATE TAB EC 324 MG (65 MG FE EQUIVALENT) 324 MG: 324 (65 FE) TABLET DELAYED RESPONSE at 07:47

## 2021-12-01 NOTE — DISCHARGE SUMMARY
DISCHARGE SUMMARY      Date of Discharge:  12/1/2021    Discharge Diagnosis: Left knee DJD    Presenting Problem/History of Present Illness  Active Hospital Problems    Diagnosis  POA    Primary osteoarthritis involving multiple joints [M89.49]  Yes    Chronic pain syndrome [G89.4]  Yes    LLOYD on CPAP [G47.33, Z99.89]  Not Applicable    Morbid obesity (HCC) [E66.01]  Yes    BPH (benign prostatic hyperplasia) [N40.0]  Yes    Obstructive sleep apnea syndrome [G47.33]  Yes    Primary hypertension [I10]  Yes    Mixed hyperlipidemia [E78.2]  Yes    Diabetes mellitus, type 2 (HCC) [E11.9]  Yes      Resolved Hospital Problems    Diagnosis Date Resolved POA    **Primary localized osteoarthrosis of the knee, left [M17.12] 12/01/2021 Yes          Hospital Course  Orion Harvey is a 71 y.o. male who has longstanding history of left knee pain.  Preoperative imaging did reveal changes of DJD in the area of the left knee.  After exhausting conservative measures and reviewing the risks and benefits of surgery, he elected to proceed with a left total knee replacement.  He underwent that procedure on 11/30/2021 and was delivered to the recovery room in stable condition.  Upon meeting their transfer criteria, he was transferred to the surgical medicine floor in stable condition.  He did receive perioperative antibiotics and DVT prophylaxis.  He was evaluated by physical therapy team and did receive their services throughout his stay.  His hospital course was unremarkable.  Upon discharge, he was ambulating with the assistance of a walker, tolerating oral diet, and his pain was controlled with oral medication.  Prior to discharge, all his questions and concerns were addressed.    Procedures Performed    Procedure(s):  TOTAL KNEE ARTHROPLASTY  -------------------       Consults:   Consults       Date and Time Order Name Status Description    11/30/2021  5:22 PM Inpatient Consult to Hospitalist Completed             Pertinent  Test Results: labs: Reviewed and radiology: X-Ray: Reviewed    Condition on Discharge: To home in stable condition    Vital Signs  Temp:  [97.8 °F (36.6 °C)-98.9 °F (37.2 °C)] 97.8 °F (36.6 °C)  Heart Rate:  [59-89] 72  Resp:  [11-18] 18  BP: (119-158)/(62-88) 119/73    Physical Exam:   General Appearance alert, pleasant, appears stated age, interactive, and cooperative  Extremities left lower extremity is grossly well aligned and perfused, sensation intact, dressing intact, plantar and dorsiflexion intact  Neurologic Mental Status orientated to person, place, time and situation    Discharge Medications     Discharge Medications        New Medications        Instructions Start Date   gabapentin 300 MG capsule  Commonly known as: NEURONTIN   300 mg, Oral, 2 Times Daily      meloxicam 7.5 MG tablet  Commonly known as: MOBIC   7.5 mg, Oral, Daily   Start Date: December 2, 2021     oxyCODONE-acetaminophen 5-325 MG per tablet  Commonly known as: PERCOCET   1-2 tablets, Oral, Every 6 Hours PRN      rivaroxaban 10 MG tablet  Commonly known as: XARELTO   10 mg, Oral, Daily With Dinner             Changes to Medications        Instructions Start Date   empagliflozin 25 MG tablet tablet  Commonly known as: Jardiance  What changed: additional instructions   25 mg, Oral, Daily      glimepiride 4 MG tablet  Commonly known as: AMARYL  What changed: additional instructions   4 mg, Oral, 2 Times Daily      lisinopril 40 MG tablet  Commonly known as: PRINIVIL,ZESTRIL  What changed: additional instructions   Take 1 tablet by mouth once daily      metFORMIN 1000 MG tablet  Commonly known as: GLUCOPHAGE  What changed:   when to take this  additional instructions   Take 1 tablet by mouth twice daily      Osteo Bi-Flex Triple Strength tablet  What changed:   when to take this  additional instructions   1 tablet, Oral, Daily      rosuvastatin 10 MG tablet  Commonly known as: CRESTOR  What changed: when to take this   Take 1 tablet by  mouth once daily             Continue These Medications        Instructions Start Date   ascorbic acid 1000 MG tablet  Commonly known as: VITAMIN C   1 tablet, Oral, Daily, LAST DOSE 11/23      Centrum Silver tablet tablet  Generic drug: multivitamin with minerals   1 tablet, Oral, Every Evening, LAST DOSE 11/22      OneTouch Delica Lancets 33G misc   1 tablet, In Vitro, Every 12 Hours      OneTouch Verio test strip  Generic drug: glucose blood   Use as instructed             Stop These Medications      acetaminophen 650 MG 8 hr tablet  Commonly known as: TYLENOL     aspirin 81 MG EC tablet              Activity at Discharge:   Activity Instructions       Discharge Activity      Total Knee Replacement Discharge Instructions:    I. ACTIVITIES:  1. Exercises:  Complete exercise program as taught by the hospital physical therapist 2 times per day  Exercise program will be advanced by the physical therapist  During the day be up ambulating every 2 hours (while awake) for short distances  Complete the ankle pump exercises at least 10 times per hour (while awake)  Elevate legs when in bed and for at least 30 minutes during the day.Use cold packs 20-30 minutes approximately 5 times per day. This should be done before and after completing your exercises and at any time you are experiencing pain/ stiffness in your operative extremity.      2. Activities of Daily Living:  No tub baths, hot tubs, or swimming pools for 4 weeks  May shower with waterproof dressing in place as long as it is intact.  Do not scrub or rub the incision. Let water run over dressing and pat dry.     II. Restrictions  Continue precautions as taught at the hospital  Your surgeon will discuss with you when you will be able to drive again, typically it is when you have enough strength in your leg to step hard on the brake and are off the pain medication.  Weight bearing is as tolerated  First week stay inside on even terrain. May go up and down stairs one  stair at a time utilizing the hand rail once cleared by physical therapy to do so.  After one week, you may venture outside (if cleared to do so by physical therapist).    III. Precautions:  Everyone that comes near you should wash their hands  No elective dental, genital-urinary, or colon procedures or surgical procedures for 12 weeks after surgery unless absolutely necessary.   If dental work or surgical procedure is deemed absolutely necessary, you will need to contact your surgeon as you will need to take antibiotics 1 hour prior to any dental work (including teeth cleanings).  Please discuss with your surgeon prophylactic antibiotics as the length of time this intervention will be necessary for you varies with each patient's health history and situation.  Avoid sick people. If you must be around someone who is ill, they should wear a mask.  Avoid visits to the Emergency Room or Urgent Care unless you are having a life threatening event.   Stockings/ace wraps are to be placed on in the morning and removed at night. Monitor the stockings to ensure that any swelling is not causing the stockings to become too tight. In this case, remove stockings immediately.    IV. INCISION CARE:  Wash your hands often.  Notify office if dressing becomes dislodged or drainage noted. Change the dressing as directed by your physician.   No creams or ointments to the incision  Do not touch or pick at the incision  Check incision every day and notify surgeon immediately if any of the following signs or symptoms are noted:  Increase in redness  Increase in swelling around the incision/dressing and of the entire extremity  Increase in pain  Drainage oozing touching the edges of the dressing  Pulling apart of the edges of the incision  Increase in overall body temperature (greater than 100.5 degrees)  Your surgeon will instruct you regarding suture or staple removal    V. Medications:   1. Anticoagulants: You will be discharged on an  anticoagulant. This is a prophylactic medication that helps prevent blood clots during your post-operative period. The type and length of dosage varies based on your individual needs, procedure performed, and surgeon's preference.  While taking the anticoagulant, you should avoid taking any additional aspirin, ibuprofen (Advil or Motrin), Aleve (Naprosyn) or other non-steroidal anti-inflammatory medications, unless prescribed by your surgeon.   Notify surgeon immediately if any gian bleeding is noted in the urine, stool, emesis, or from the nose or the incision. Blood in the stool will often appear as black rather than red. Blood in urine may appear as pink. Blood in emesis may appear as brown/black like coffee grounds.  You will need to apply pressure for longer periods of time to any cuts or abrasions to stop bleeding  Avoid alcohol while taking anticoagulants    2. Stool Softeners: You will be at greater risk of constipation after surgery due to being less mobile and the pain medications.   Take stool softeners as instructed by your surgeon while on pain medications. Over the counter Colace 100 mg 1-2 capsules twice daily.   If stools become too loose or too frequent, please decreases the dosage or stop the stool softener.  If constipation occurs despite use of stool softeners, you are to continue the stool softeners and add a laxative (Milk of Magnesia 1 ounce daily as needed)  Drink plenty of fluids, and eat fruits and vegetables during your recovery time    3. Pain Medications utilized after surgery are narcotics and the law requires that the following information be given to all patients that are prescribed narcotics:  CLASSIFICATION: Pain medications are called Opioids and are narcotics  LEGALITIES: It is illegal to share narcotics with others and to drive within 24 hours of taking narcotics  POTENTIAL SIDE EFFECTS: Potential side effects of opioids include: nausea, vomiting, itching, dizziness, drowsiness,  dry mouth, constipation, and difficulty urinating.  POTENTIAL ADVERSE EFFECTS:   Opioid tolerance can develop with use of pain medications and this simply means that it requires more and more of the medication to control pain; however, this is seen more in patients that use opioids for longer periods of time.  Opioid dependence can develop with use of Opioids and this simply means that to stop the medication can cause withdrawal symptoms; however, this is seen with patients that use Opioids for longer periods of time.  Opioid addiction can develop with use of Opioids and the incidence of this is very unlikely in patients who take the medications as ordered and stop the medications as instructed.  Opioid overdose can be dangerous, but is unlikely when the medication is taken as ordered and stopped when ordered. It is important not to mix opioids with alcohol or with and type of sedative such as Benadryl as this can lead to over sedation and respiratory difficulty.  DOSAGE:   Pain medications will need to be taken consistently for the first week to decrease pain and promote adequate pain relief and participation in physical therapy.  After the initial surgical pain begins to resolve, you may begin to decrease the pain medication. By the end of 6 weeks, you should be off of pain medications.  Refills will not be given by the office during evening hours, on weekends, or after 6 weeks post-op.  To seek refills on pain medications during the initial 6 week post-operative period, you must call the office 48 hours in advance to request the refill. The office will then notify you when to  the prescription. DO NOT wait until you are out of the medication to request a refill.    V. FOLLOW-UP VISITS:  You will need to follow up in the office with Dr. Ajith Martínez/ Ciarra Boateng PA-C in 1 week. Please call this number [phone] to schedule this appointment.  You will need to follow up with your primary care physician  in 4 weeks.  If you have any concerns or suspected complications prior to your follow up visit, please call your surgeons office. Do not wait until your appointment time if you suspect complications. These will need to be addressed in the office promptly.            Follow-up Appointments  Future Appointments   Date Time Provider Department [place]   12/3/2021  8:00 AM Vangie Torres PT [facility]   12/8/2021 10:30 AM Ciarra Boateng PA [facility]   4/12/2022 10:00 AM Nazanin Garcia MD [facility]     Additional Instructions for the Follow-ups that You Need to Schedule       Ambulatory Referral to Physical Therapy Evaluate and treat, POST OP, Ortho   As directed      Specialty needed: Evaluate and treat POST OP Ortho         Discharge Follow-up with Specialty: Orthopedics; 1 Week   As directed      Specialty: Orthopedics    Follow Up: 1 Week    Follow Up Details: Return to the office to see Dr. Ajith Martínez/ Ciarra Boateng PA-C                 Test Results Pending at Discharge        FELIZ Sapp  12/01/21  12:30 EST  Ajith Martínez MD   I certify that this patient is under my care and that I had a face to face encounter that meets the physician face to face encounter requirements.    The encounter occurred on: 12/1/2021 12:30 EST    Based on the findings of the above encounter, I certify that this patient is confined to the home and needs intermittent skilled nursing care, physical therapy and/or speech therapy.  The patient is under my care, and I have initiated the establishment of the plan of care.    This patient will be followed by a physician who will, periodically, review the plan of care. The findings from this face to face encounter have been communicated with the patient's community based physician who will be assuming this patient's home health plan of care.    I also have provided the agency additional information to support the patient's homebound status and need for  skilled care. (Examples of this information could include physician progress notes, discharge summaries, history and physical forms, operative reports, referral order, etc.)    EMR Dragon/Transcription disclaimer:  Much of this encounter note is an electronic transcription/translation of spoken language to printed text. The electronic translation of spoken language may permit erroneous, or at times, nonsensical words or phrases to be inadvertently transcribed; Although I have reviewed the note for such errors, some may still exist.

## 2021-12-01 NOTE — CASE MANAGEMENT/SOCIAL WORK
Discharge Planning Assessment  [facility]     Patient Name: Orion Harvey  MRN: [id number]  Today's Date: 12/1/2021    Admit Date: 11/30/2021     Discharge Needs Assessment     Row Name 12/01/21 1419       Living Environment    Lives With spouse    Name(s) of Who Lives With Patient Amy    Current Living Arrangements home/apartment/condo    Primary Care Provided by self    Provides Primary Care For no one    Family Caregiver if Needed none    Able to Return to Prior Arrangements yes       Resource/Environmental Concerns    Transportation Concerns car, none       Transition Planning    Patient/Family Anticipates Transition to home with family    Patient/Family Anticipated Services at Transition     Transportation Anticipated family or friend will provide       Discharge Needs Assessment    Readmission Within the Last 30 Days no previous admission in last 30 days    Current Outpatient/Agency/Support Group other (see comments)  Outpatient PT at  PT in [place]    Equipment Currently Used at Home cpap; glucometer; cane, straight; walker, rolling    Concerns to be Addressed denies needs/concerns at this time    Anticipated Changes Related to Illness none    Equipment Needed After Discharge none    Outpatient/Agency/Support Group Needs outpatient therapy    Discharge Facility/Level of Care Needs outpatient therapy    Provided Post Acute Provider List? N/A    N/A Provider List Comment Denies dc needs               Discharge Plan     Row Name 12/01/21 1424       Plan    Plan Home with family.  Patient will go to OP PT at [facility] in [place]    Plan Comments Spoke with patient at bedside.  Confirmed pcp and pharmacy.  Patient to go home with family and go to OP therapy at  PT in [place]  Patients wife will drive patient home.              Continued Care and Services - Admitted Since 11/30/2021    Coordination has not been started for this encounter.       Expected Discharge Date and Time     Expected Discharge  Date Expected Discharge Time    Dec 1, 2021          Demographic Summary     Row Name 12/01/21 1416       General Information    Admission Type observation    Arrived From PACU/recovery room    Referral Source admission list    Reason for Consult discharge planning    Preferred Language English               Functional Status     Row Name 12/01/21 1418       Functional Status    Usual Activity Tolerance good    Current Activity Tolerance moderate       Functional Status, IADL    Medications independent    Meal Preparation independent    Housekeeping independent    Laundry independent    Shopping independent       Mental Status    General Appearance WDL WDL       Mental Status Summary    Recent Changes in Mental Status/Cognitive Functioning no changes                         Kandice Davidson RN   Met with patient in room wearing PPE: mask, face shield/goggles, gloves, gown.      Maintained distance greater than six feet and spent less than 15 minutes in the room.

## 2021-12-01 NOTE — PLAN OF CARE
Goal Outcome Evaluation:  Plan of Care Reviewed With: patient           Outcome Summary: 70 yo male POD 1 L TKR.  Pt is doing very well, pain 1/10 with AROM L knee 0- 90 degrees.  Reviewed HEP and issued written program. Pt able to complete all exercises x 10 reps each.  Pt using RW to ambulate x 150', progressed well to reciprocal gait pattern.  Pt has 1 MARCELO home, discussed safe technique to enter home using walker.  Plans d/c home with spouse and has OP PT appointment arranged.
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
## 2021-12-01 NOTE — PLAN OF CARE
Goal Outcome Evaluation:  Plan of Care Reviewed With: patient        Progress: improving  Outcome Summary: Patient had no c/o pain during this shift. Patient slept well.

## 2021-12-01 NOTE — PLAN OF CARE
Problem: Adult Inpatient Plan of Care  Goal: Plan of Care Review  12/1/2021 1241 by Maria De Jesus Clayton LPN  Outcome: Ongoing, Progressing  12/1/2021 1241 by Maria De Jesus Clayton LPN  Outcome: Ongoing, Progressing  Goal: Patient-Specific Goal (Individualized)  12/1/2021 1241 by Maria De Jesus Clayton LPN  Outcome: Ongoing, Progressing  12/1/2021 1241 by Maria De Jesus Clayton LPN  Outcome: Ongoing, Progressing  Goal: Absence of Hospital-Acquired Illness or Injury  12/1/2021 1241 by Maria De Jesus Clayton LPN  Outcome: Ongoing, Progressing  12/1/2021 1241 by Maria De Jesus Clayton LPN  Outcome: Ongoing, Progressing  Intervention: Identify and Manage Fall Risk  Description: Perform standard risk assessment with a validated tool or comprehensive approach appropriate to the patient on admission; reassess fall risk frequently, with change in status or transfer to another level of care.  Communicate fall injury risk to interprofessional healthcare team.  Determine need for increased observation, equipment and environmental modification, such as low bed and signage, as well as supportive, nonskid footwear.  Adjust safety measures to individual developmental age, stage and identified risk factors.  Reinforce the importance of safety and physical activity with patient and family.  Perform regular intentional rounding to assess need for position change, pain assessment, personal needs, including assistance with toileting.  Recent Flowsheet Documentation  Taken 12/1/2021 1104 by Maria De Jesus Clayton LPN  Safety Promotion/Fall Prevention:   safety round/check completed   nonskid shoes/slippers when out of bed   fall prevention program maintained  Taken 12/1/2021 0926 by Maria De Jesus Clayton LPN  Safety Promotion/Fall Prevention:   safety round/check completed   nonskid shoes/slippers when out of bed   fall prevention program maintained  Taken 12/1/2021 0734 by Maria De Jesus Clayton LPN  Safety Promotion/Fall Prevention:   safety round/check completed   nonskid  shoes/slippers when out of bed   fall prevention program maintained  Intervention: Prevent and Manage VTE (venous thromboembolism) Risk  Description: Assess for VTE risk.  Encourage/assist with early ambulation.  Initiate and maintain compression or other therapy, as indicated based on identified risk in accordance with organizational protocol/provider order.  Encourage both active and passive leg exercises while in bed, if unable to ambulate.  Recent Flowsheet Documentation  Taken 12/1/2021 0734 by Maria De Jesus Clayton LPN  VTE Prevention/Management:   bilateral   sequential compression devices off  Intervention: Prevent Infection  Description: Maintain skin and mucous membrane integrity; promote hand, oral and pulmonary hygiene.  Optimize fluid balance, nutrition, sleep and glycemic control to maximize infection resistance.  Identify potential sources of infection early to prevent or mitigate progression of infection (e.g., wound, lines, devices).  Evaluate ongoing need for invasive devices; remove promptly when no longer indicated.  Recent Flowsheet Documentation  Taken 12/1/2021 0734 by Maria De Jesus Clayton LPN  Infection Prevention: single patient room provided  Goal: Optimal Comfort and Wellbeing  12/1/2021 1241 by Maria De Jesus Clayton LPN  Outcome: Ongoing, Progressing  12/1/2021 1241 by Maria De Jesus Clayton LPN  Outcome: Ongoing, Progressing  Intervention: Provide Person-Centered Care  Description: Use a family-focused approach to care.  Develop trust and rapport by proactively providing information, encouraging questions, addressing concerns and offering reassurance.  Acknowledge emotional response to hospitalization.  Recognize and utilize personal coping strategies.  Honor spiritual and cultural preferences.  Recent Flowsheet Documentation  Taken 12/1/2021 0734 by Maria De Jesus Clayton LPN  Trust Relationship/Rapport:   care explained   choices provided  Goal: Readiness for Transition of Care  12/1/2021 1241 by Forrest  AIDA Pretty  Outcome: Ongoing, Progressing  12/1/2021 1241 by Maria De Jesus Clayton LPN  Outcome: Ongoing, Progressing   Hourly rounding completed this shift, no complaints or needs voiced.  Patient observed with eyes open in sitting position in chair. Rise and fall of chest observed. Dressings clean dry and intact. Goal Outcome Evaluation:

## 2021-12-01 NOTE — OUTREACH NOTE
Prep Survey      Responses   [facility] patient discharged from? [facility]   Is LACE score < 7 ? Yes   Emergency Room discharge w/ pulse ox? No   Eligibility [facility]   Date of Admission 11/30/21   Date of Discharge 12/01/21   Discharge Disposition Home or Self Care   Discharge diagnosis TOTAL KNEE ARTHROPLASTY Left    Does the patient have one of the following disease processes/diagnoses(primary or secondary)? Total Joint Replacement   Does the patient have Home health ordered? No   Is there a DME ordered? No   Prep survey completed? Yes          Yumi Velasquez RN

## 2021-12-02 ENCOUNTER — TRANSITIONAL CARE MANAGEMENT TELEPHONE ENCOUNTER (OUTPATIENT)
Dept: CALL CENTER | Facility: HOSPITAL | Age: 71
End: 2021-12-02

## 2021-12-02 NOTE — OUTREACH NOTE
Call Center TCM Note      Responses   [facility] patient discharged from? [facility]   Does the patient have one of the following disease processes/diagnoses(primary or secondary)? Total Joint Replacement   Joint surgery performed? Knee   TCM attempt successful? Yes   Discharge diagnosis TOTAL KNEE ARTHROPLASTY Left    Is patient permission given to speak with other caregiver? Yes   Person spoke with today (if not patient) and relationship wife   Does the patient have all medications related to this admission filled (includes all antibiotics, pain medications, etc.) Yes   Is the patient taking all medications as directed (includes completed medication regime)? Yes   Is the patient able to teach back alternate methods of pain control? Ice,  Reposition,  Correct alignment,  Knee-elevation/no pillow under knee,  Short, frequent activity   Does the patient have a follow up appointment with their surgeon? Yes   Has the patient kept scheduled appointments due by today? N/A   Has home health visited the patient within 72 hours of discharge? N/A   Home health comments No  pt is starting outpt P.T. tomorrow.   Has all DME been delivered? Yes   Psychosocial issues? No   Has the patient began therapy sessions (either in the home or as an out patient)? No   Does the patient have a wound vac in place? N/A   Has the patient fallen since discharge? No   Did the patient receive a copy of their discharge instructions? Yes   Nursing interventions Reviewed instructions with patient   What is the patient's perception of their functional status since discharge? Improving   Is the patient able to teach back signs and symptoms of infection? Temp >100.4 for 24h or longer,  Blisters around incision,  Severe discomfort or pain,  Shortness of breath or chest pain,  Changes in mobility,  Increased swelling or redness around incision (not associated with surgical edema),  Incisional drainage   Is the patient able to teach back how to prevent  infection? Check incision daily,  Shower only as directed by surgeon,  No lotion or creams,  Monitor blood sugar if diabetic,  Wash hands before and after touching incision,  Keep incision covered if drainage,  Eat well-balanced diet,  No tub baths, hot tub or swimming,  Keep incision covered if pets in house   Is the patient able to teach back signs and symptoms of DVT? Redness in calf,  Swelling in calf,  Area hot to touch,  Severe pain in calf,  Shortness of breath or chest pain   Is the patient able to teach back home safety measures? Ability to shower,  Modifications with ADLs such as dressing, cooking, toileting,  Accessibility to necessary areas in home,  Modifications to reach items   Did the patient implement home safety suggestions from pre-surgery classes if attended? Yes   If the patient is a current smoker, are they able to teach back resources for cessation? Not a smoker   Is the patient/caregiver able to teach back the hierarchy of who to call/visit for symptoms/problems? PCP, Specialist, Home health nurse, Urgent Care, ED, 911 Yes   TCM call completed? Yes   Wrap up additional comments Per wife pt doing fair. Rough night as pt was opposed to taking pain meds but has changed his mind, pt sleeping at time of this call. No HH pt is starting outpt P.T. tomorrow.Wife states understanding of ice, elevation, symptoms. precuations, protocols of DVT/PE, infection. POST OP is 12/08/2021. Pt will wait on fwp with PCP until further along in recovery from TKR.          Amy Watkins MA    12/2/2021, 15:04 EST

## 2021-12-03 ENCOUNTER — TREATMENT (OUTPATIENT)
Dept: PHYSICAL THERAPY | Facility: CLINIC | Age: 71
End: 2021-12-03

## 2021-12-03 DIAGNOSIS — R26.2 DIFFICULTY WALKING: ICD-10-CM

## 2021-12-03 DIAGNOSIS — Z96.652 S/P TOTAL KNEE REPLACEMENT, LEFT: Primary | ICD-10-CM

## 2021-12-03 DIAGNOSIS — M25.562 ACUTE PAIN OF LEFT KNEE: ICD-10-CM

## 2021-12-03 PROCEDURE — 97110 THERAPEUTIC EXERCISES: CPT | Performed by: PHYSICAL THERAPIST

## 2021-12-03 PROCEDURE — 97161 PT EVAL LOW COMPLEX 20 MIN: CPT | Performed by: PHYSICAL THERAPIST
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
## 2021-12-06 ENCOUNTER — TREATMENT (OUTPATIENT)
Dept: PHYSICAL THERAPY | Facility: CLINIC | Age: 71
End: 2021-12-06

## 2021-12-06 DIAGNOSIS — Z96.652 S/P TOTAL KNEE REPLACEMENT, LEFT: Primary | ICD-10-CM

## 2021-12-06 DIAGNOSIS — R26.2 DIFFICULTY WALKING: ICD-10-CM

## 2021-12-06 DIAGNOSIS — M25.562 ACUTE PAIN OF LEFT KNEE: ICD-10-CM

## 2021-12-06 PROCEDURE — 97110 THERAPEUTIC EXERCISES: CPT | Performed by: PHYSICAL THERAPIST

## 2021-12-06 NOTE — PROGRESS NOTES
Physical Therapy Daily Progress Note      Patient: Orion Harvey   : 1950  Diagnosis/ICD-10 Code:  S/P total knee replacement, left [Z96.652]   Problems Addressed this Visit     None      Visit Diagnoses     S/P total knee replacement, left    -  Primary    Acute pain of left knee        Difficulty walking          Diagnoses       Codes Comments    S/P total knee replacement, left    -  Primary ICD-10-CM: Z96.652  ICD-9-CM: V43.65     Acute pain of left knee     ICD-10-CM: M25.562  ICD-9-CM: 719.46     Difficulty walking     ICD-10-CM: R26.2  ICD-9-CM: 719.7          Referring practitioner: FELIZ Sapp  Date of Initial Visit: Type: THERAPY  Noted: 12/3/2021  Today's Date: 2021    VISIT#: 2    Subjective Patient reports feeling good with only mild muscle muscle soreness, pleased with early progress.       Objective added standing hip abduction/ MIP/ mini squats, HS curls/TKE    See Exercise, Manual, and Modality Logs for complete treatment.     Assessment/Plan Patient tolerated progressed therapeutic exercise well with no reports of increased symptoms. Patient continues to use straight cane with community ambulation and no AD within home. Patient making good early gains towards goals.   Plan Goals: STG:  - Increase L knee flexion AROM to 115 deg in 3 weeks.  - Pt to ambulate with standard cane safely in community in 3 weeks.  - Pt to be independent with HEP in 3 weeks.  LTG:  - Improve Oxford Knee to 20% or less impairment by discharge.  - Increase L knee strength to 4+/5 or greater for improved stair climbing in order to get into RV by discharge.  - Ambulation to be returned to [facility] by discharge.  Progress per Plan of Care         Timed:         Manual Therapy:         mins  83382;     Therapeutic Exercise:    30     mins  82462;     Neuromuscular Taylor:        mins  30242;    Therapeutic Activity:          mins  50410;     Gait Training:           mins  17971;     Ultrasound:         mins   64516;    Ionto                                   mins   58280  Self Care                    _____  mins   95339  Canalith Repos                   mins  61981    Un-Timed:  Electrical Stimulation:         mins  07871 ( );  Dry Needling          mins self-pay  Traction          mins 77986    Timed Treatment:   30   mins   Total Treatment:     40   mins    Gavino Stover PTA  [place] License [id number]  Physical Therapist Assistant

## 2021-12-08 ENCOUNTER — OFFICE VISIT (OUTPATIENT)
Dept: ORTHOPEDIC SURGERY | Facility: CLINIC | Age: 71
End: 2021-12-08

## 2021-12-08 ENCOUNTER — TREATMENT (OUTPATIENT)
Dept: PHYSICAL THERAPY | Facility: CLINIC | Age: 71
End: 2021-12-08

## 2021-12-08 VITALS
DIASTOLIC BLOOD PRESSURE: 77 MMHG | SYSTOLIC BLOOD PRESSURE: 129 MMHG | HEART RATE: 91 BPM | WEIGHT: 262 LBS | BODY MASS INDEX: 39.71 KG/M2 | HEIGHT: 68 IN

## 2021-12-08 DIAGNOSIS — Z96.652 S/P TOTAL KNEE REPLACEMENT, LEFT: Primary | ICD-10-CM

## 2021-12-08 DIAGNOSIS — E66.01 MORBID OBESITY (HCC): ICD-10-CM

## 2021-12-08 DIAGNOSIS — R26.2 DIFFICULTY WALKING: ICD-10-CM

## 2021-12-08 DIAGNOSIS — Z96.652 HX OF TOTAL KNEE REPLACEMENT, LEFT: Primary | ICD-10-CM

## 2021-12-08 DIAGNOSIS — M25.562 ACUTE PAIN OF LEFT KNEE: ICD-10-CM

## 2021-12-08 PROCEDURE — 97140 MANUAL THERAPY 1/> REGIONS: CPT | Performed by: PHYSICAL THERAPIST

## 2021-12-08 PROCEDURE — 99024 POSTOP FOLLOW-UP VISIT: CPT | Performed by: PHYSICIAN ASSISTANT

## 2021-12-08 PROCEDURE — 97110 THERAPEUTIC EXERCISES: CPT | Performed by: PHYSICAL THERAPIST

## 2021-12-08 NOTE — PATIENT INSTRUCTIONS
Total Knee Replacement, Care After  After the procedure, it is common to have stiffness and discomfort, or redness, pain, and swelling around your cut from surgery (incision). You may also have a small amount of blood or clear fluid coming from your incision.  Follow these instructions at home:  Your doctor may give you more instructions. If you have problems, contact your doctor.  Medicines  · Take over-the-counter and prescription medicines only as told by your doctor.  · If you were prescribed a blood thinner, take it as told by your doctor.  · If told, take steps to prevent problems with pooping (constipation). You may need to:  ? Drink enough fluid to keep your pee (urine) pale yellow.  ? Take medicines. You will be told what medicines to take.  ? Eat foods that are high in fiber. These include beans, whole grains, and fresh fruits and vegetables.  ? Limit foods that are high in fat and sugar. These include fried or sweet foods.  Incision care    · Follow instructions from your doctor about how to take care of your incision. Make sure you:  ? Wash your hands with soap and water for at least 20 seconds before and after you change your bandage. If you cannot use soap and water, use hand .  ? Change your bandage.  ? Leave stitches, staples, or skin glue in place for at least 2 weeks.  ? Leave tape strips alone unless you are told to take them off. You may trim the edges of the tape strips if they curl up.  · Do not take baths, swim, or use a hot tub until your doctor says it is okay.  · Check your incision every day for signs of infection. Check for:  ? More redness, swelling, or pain.  ? More fluid or blood.  ? Warmth.  ? Pus or a bad smell.    Activity  · Rest as told by your doctor.  · Get up to take short walks every 1 to 2 hours. Ask for help if you feel weak or unsteady.  · Follow instructions from your doctor about:  ? Using a walker, crutches, or a cane.  ? How much body weight you may safely  support on your affected leg (weight-bearing restrictions).  ? How to get out of a bed and chair and how to go up and down stairs. A physical therapist will show you how to do this.  · Do exercises as told by your doctor or physical therapist.  · Avoid activities that put stress on your knees. These include running, jumping rope, and doing jumping jacks.  · Do not play contact sports until your doctor says it is okay.  · Return to your normal activities when your doctor says that it is safe.  Managing pain, stiffness, and swelling    · If told, put ice on your knee. To do this:  ? Put ice in a plastic bag or use an icing device (cold flow pad). Follow your doctor's directions about how to use the icing device.  ? Place a towel between your skin and the bag or between your skin and the icing device.  ? Leave the ice on for 20 minutes, 2-3 times a day.  ? Take off the ice if your skin turns bright red. This is very important. If you cannot feel pain, heat, or cold, you have a greater risk of damage to the area.  · Move your toes often.  · Raise your leg above the level of your heart while you are sitting or lying down.  ? Use a few pillows to keep your leg straight.  ? Do not put a pillow just under the knee. If the knee is bent for a long time, this may make the knee stiff.  · Wear elastic knee support as told by your doctor.    Safety    · To help prevent falls:  ? Keep floors clear of objects you may trip over.  ? Place items that you may need within easy reach.  · Wear an apron or tool belt with pockets for carrying objects. This leaves your hands free to help with your balance.  · Do not drive or use machines until your doctor says it is safe.    General instructions  · Wear compression stockings as told by your doctor.  · Continue with breathing exercises. This helps prevent lung infection.  · Do not smoke or use any products that contain nicotine or tobacco. If you need help quitting, ask your doctor.  · If you  plan to visit a dentist:  ? Tell your doctor. Ask about things to do before your teeth are cleaned.  ? Tell your dentist about your new joint.  · Keep all follow-up visits.  Contact a doctor if:  · You have a fever or chills.  · You have a cough or feel short of breath.  · Your medicine is not controlling your pain.  · You have any of these signs of infection around your incision:  ? More redness, swelling, or pain.  ? More fluid or blood.  ? Warmth.  ? Pus or a bad smell.  · You fall.  Get help right away if:  · You have very bad pain.  · You have trouble breathing.  · You have chest pain.  · You have pain in your calf or leg.  · You have redness, swelling, or warmth in your calf or leg.  · Your incision breaks open after the stitches or staples are taken out.  These symptoms may be an emergency. Get help right away. Call your local emergency services (911 in the [place]  · Do not wait to see if the symptoms will go away.  · Do not drive yourself to the hospital.  Summary  · After the procedure, it is common to have stiffness and discomfort, or redness, pain, and swelling around your incision.  · Follow instructions from your doctor about how to take care of your incision.  · Use crutches, a walker, or a cane as told by your doctor.  · If you were prescribed a blood thinner, take it as told by your doctor.  · Keep all follow-up visits.  This information is not intended to replace advice given to you by your health care provider. Make sure you discuss any questions you have with your health care provider.  Document Revised: 06/08/2021 Document Reviewed: 06/08/2021  Elsevier Patient Education © 2021 Elsevier Inc.      Preventing Health Risks of Being Overweight  Maintaining a healthy body weight is an important part of your overall health. Your healthy body weight depends on your age, gender, and height. Being overweight puts you at risk for many health problems, including:  · Heart disease.  · Diabetes.  · Problems  sleeping.  · Joint problems.  You can make changes to your diet and lifestyle to prevent these risks. Consider working with a health care provider or a dietitian to make these changes.  What nutrition changes can be made?    · Eat only as much as your body needs. In most cases, this is about 2,000 calories a day, but the amount varies depending on your height, gender, and activity level. Ask your health care provider how many calories you should have each day. Eating more than your body needs on a regular basis can cause you to become overweight or obese.  · Eat slowly, and stop eating when you feel full.  · Choose healthy foods, including:  ? Fruits and vegetables.  ? Lean meats.  ? Low-fat dairy products.  ? High-fiber foods, such as whole grains and beans.  ? Healthy snacks like vegetable sticks, a piece of fruit, or a small amount of yogurt or cheese.  · Avoid foods and drinks that are high in sugar, salt (sodium), saturated fat, or trans fat. This includes:  ? Many desserts such as candy, cookies, and ice cream.  ? Soda.  ? Fried foods.  ? Processed meats such as hot dogs or lunch meats.  ? Prepackaged snack foods.  What lifestyle changes can be made?    · Exercise for at least 150 minutes a week to prevent weight gain, or as often as recommended by your health care provider. Do moderate-intensity exercise, such as brisk walking.  ? Spread it out by exercising for 30 minutes 5 days a week, or in short 10-minute bursts several times a day.  · Find other ways to stay active and burn calories, such as yard work or a hobby that involves physical activity.  · Get at least 8 hours of sleep each night. When you are well-rested, you are more likely to be active and make healthy choices during the day. To sleep better:  ? Try to go to bed and wake up at about the same time every day.  ? Keep your bedroom dark, quiet, and cool.  ? Make sure that your bed is comfortable.  ? Avoid stimulating activities, such as watching  television or exercising, for at least one hour before bedtime.  Why are these changes important?  Eating healthy and being active helps you lose weight and prevent health problems caused by being overweight. Making these changes can also help you manage stress, feel better mentally, and connect with friends and family.  What can happen if changes are not made?  Being overweight can affect you for your entire life. You may develop joint or bone problems that make it painful or difficult for you to play sports or do activities you enjoy. Being overweight puts stress on your heart and lungs and can lead to medical problems like diabetes, heart disease, and sleeping problems.  Where to find support  You can get support for preventing health risks of being overweight from:  · Your health care provider or a dietitian. They can provide guidance about healthy eating and healthy lifestyle choices.  · Weight loss support groups, online or in-person.  Where to find more information  · MyPlate: www.[employer].gov  ? This an online tool that provides personalized recommendations about foods to eat each day.  · The Centers for Disease Control and Prevention: www.cdc.gov/healthyweight  ? This resource gives tips for managing weight and having an active lifestyle.  Summary  · To prevent unhealthy weight gain, it is important to maintain a healthy diet high in vegetables and whole grains, exercise regularly, and get at least 8 hours of sleep each night.  · Making these changes helps prevent many long-term (chronic) health conditions that can shorten your life, such as diabetes, heart disease, and stroke.  This information is not intended to replace advice given to you by your health care provider. Make sure you discuss any questions you have with your health care provider.  Document Revised: 04/15/2021 Document Reviewed: 04/15/2021  Elsevier Patient Education © 2021 Elsevier Inc.

## 2021-12-08 NOTE — PROGRESS NOTES
ORTHO POSTOP VISIT       Subjective:    HPI:  Orion Harvey is a 71 y.o. male who presents about 1 week out from having a left total knee replacement.  He reports he is doing well with mild intermittent discomfort.  He is currently in outpatient physical therapy.    Medications:    Current Outpatient Medications:   •  ascorbic acid (VITAMIN C) 1000 MG tablet, Take 1 tablet by mouth Daily. LAST DOSE 11/23, Disp: , Rfl:   •  empagliflozin (Jardiance) 25 MG tablet tablet, Take 1 tablet by mouth Daily. (Patient taking differently: Take 25 mg by mouth Daily. DONT TAKE PREOP), Disp: 30 tablet, Rfl: 12  •  gabapentin (NEURONTIN) 300 MG capsule, Take 1 capsule by mouth 2 (Two) Times a Day., Disp: 60 capsule, Rfl: 0  •  glimepiride (AMARYL) 4 MG tablet, Take 1 tablet by mouth 2 (Two) Times a Day. (Patient taking differently: Take 4 mg by mouth 2 (Two) Times a Day. DONT TAKE PREOP), Disp: 180 tablet, Rfl: 3  •  glucose blood (OneTouch Verio) test strip, Use as instructed, Disp: 100 each, Rfl: 3  •  lisinopril (PRINIVIL,ZESTRIL) 40 MG tablet, Take 1 tablet by mouth once daily (Patient taking differently: Take 40 mg by mouth Daily. LAST DOSE 11/29 AM), Disp: 90 tablet, Rfl: 3  •  meloxicam (MOBIC) 7.5 MG tablet, Take 1 tablet by mouth Daily., Disp: 12 tablet, Rfl: 0  •  metFORMIN (GLUCOPHAGE) 1000 MG tablet, Take 1 tablet by mouth twice daily (Patient taking differently: Take 1,000 mg by mouth 2 (Two) Times a Day With Meals. LAST DOSE 11/28 AM), Disp: 180 tablet, Rfl: 3  •  Misc Natural Products (Osteo Bi-Flex Triple Strength) tablet, Take 1 tablet by mouth Daily. (Patient taking differently: Take 1 tablet by mouth 2 (Two) Times a Day. LAST DOSE 11/23), Disp: 30 tablet, Rfl: 0  •  Multiple Vitamins-Minerals (CENTRUM SILVER) tablet, Take 1 tablet by mouth Every Evening. LAST DOSE 11/22, Disp: , Rfl:   •  ONETOUCH DELICA LANCETS 33G misc, 1 tablet by In Vitro route Every 12 (Twelve) Hours., Disp: , Rfl:   •   "oxyCODONE-acetaminophen (PERCOCET) 5-325 MG per tablet, Take 1-2 tablets by mouth Every 6 (Six) Hours As Needed for Moderate Pain ., Disp: 50 tablet, Rfl: 0  •  rivaroxaban (XARELTO) 10 MG tablet, Take 1 tablet by mouth Daily With Dinner. Indications: Post Surgical - Knee, Prophylaxis of Venous Thromboembolism, Disp: 12 tablet, Rfl: 0  •  rosuvastatin (CRESTOR) 10 MG tablet, Take 1 tablet by mouth once daily (Patient taking differently: Take 10 mg by mouth Every Night.), Disp: 90 tablet, Rfl: 3  Current outpatient and discharge medications have been reconciled for the patient.  Reviewed by: FELIZ Sapp      Allergies:  No Known Allergies       Objective   Objective:    /77 (BP Location: Left arm, Patient Position: Sitting, Cuff Size: Large Adult)   Pulse 91   Ht 172.7 cm (68\")   Wt 119 kg (262 lb)   BMI 39.84 kg/m²     Physical Examination:  Alert, oriented, obese individual in no acute distress, ambulating with the assistance of a cane  Left lower extremity shows a well-healing surgical incision with no erythema, drainage, or open skin lesions. There is a mild amount of swelling. It is grossly well aligned, and the patient is neurovascularly intact distally.  There is minimal tenderness to palpation hand with range of motion.           Imaging:  xrays to be obtained at next visit            Assessment:  1. Hx of total knee replacement, left    2. Morbid obesity (HCC)       About 1 week out from surgery        Plan:  His incision was cleansed thoroughly with chlorhexidine soap and saline solution and redressed with a bordered Mepilex silver dressing.  This dressing should left in place till patient seen back in 1 week.  He may shower with dressing in place.  We will plan to see him back in 1 week with imaging at that time.  He should call with any questions or concerns.             FELIZ Sapp  12/08/21  10:50 EST    EMR Dragon/Transcription disclaimer:  Much of this encounter note is an " electronic transcription/translation of spoken language to printed text. The electronic translation of spoken language may permit erroneous, or at times, nonsensical words or phrases to be inadvertently transcribed; Although I have reviewed the note for such errors, some may still exist.

## 2021-12-09 NOTE — PROGRESS NOTES
Physical Therapy Daily Progress Note      Patient: Orion Harvey   : 1950  Diagnosis/ICD-10 Code:  S/P total knee replacement, left [Z96.652]  Referring practitioner: FELIZ Sapp  Date of Initial Visit: Type: THERAPY  Noted: 12/3/2021  Today's Date: 2021  Patient seen for 3 sessions         Orion Harvey reports: he is doing well having virtually no pain to speak of and states he had his bandages changed and he was told his incision I healing well.    Objective   See Exercise, Manual, and Modality Logs for complete treatment.     Assessment/Plan   Pt. Progressing very well at this time completing all appropriate exercise without pain at this time. Pt. Tolerates gentle flexion stretch well at this time and weight bearing equally with gait.    Progress strengthening /stabilization /functional activity        Timed:         Manual Therapy:    10     mins  12556;     Therapeutic Exercise:    20     mins  66965;       Timed Treatment:   30   mins   Total Treatment:     40   mins    Mandie Ventura PTA  Physical Therapist Assistant License #[id number]

## 2021-12-10 ENCOUNTER — TREATMENT (OUTPATIENT)
Dept: PHYSICAL THERAPY | Facility: CLINIC | Age: 71
End: 2021-12-10

## 2021-12-10 DIAGNOSIS — M25.562 ACUTE PAIN OF LEFT KNEE: ICD-10-CM

## 2021-12-10 DIAGNOSIS — Z96.652 S/P TOTAL KNEE REPLACEMENT, LEFT: Primary | ICD-10-CM

## 2021-12-10 DIAGNOSIS — R26.2 DIFFICULTY WALKING: ICD-10-CM

## 2021-12-10 PROCEDURE — 97140 MANUAL THERAPY 1/> REGIONS: CPT | Performed by: PHYSICAL THERAPIST

## 2021-12-10 PROCEDURE — 97110 THERAPEUTIC EXERCISES: CPT | Performed by: PHYSICAL THERAPIST

## 2021-12-10 NOTE — PROGRESS NOTES
Physical Therapy Daily Progress Note      Patient: Orion Harvey   : 1950  Diagnosis/ICD-10 Code:  S/P total knee replacement, left [Z96.652]   Problems Addressed this Visit     None      Visit Diagnoses     S/P total knee replacement, left    -  Primary    Acute pain of left knee        Difficulty walking          Diagnoses       Codes Comments    S/P total knee replacement, left    -  Primary ICD-10-CM: Z96.652  ICD-9-CM: V43.65     Acute pain of left knee     ICD-10-CM: M25.562  ICD-9-CM: 719.46     Difficulty walking     ICD-10-CM: R26.2  ICD-9-CM: 719.7          Referring practitioner: FELIZ Sapp  Date of Initial Visit: Type: THERAPY  Noted: 12/3/2021  Today's Date: 12/10/2021    VISIT#: 4    Subjective Patient reports MD visit went well and was pleased with progress at this time.       Objective     See Exercise, Manual, and Modality Logs for complete treatment.     Assessment/Plan Patient demonstrated good tolerance to therapeutic exercise with no reports of increased symptoms. Patient demonstrating good early passive motion with soft end feel.     Progress per Plan of Care and Progress strengthening /stabilization /functional activity         Timed:         Manual Therapy:    10     mins  98306;     Therapeutic Exercise:    20     mins  83896;     Neuromuscular Taylor:        mins  74444;    Therapeutic Activity:          mins  42661;     Gait Training:           mins  78582;     Ultrasound:          mins  96657;    Ionto                                   mins   05945  Self Care                    _____  mins   13050  Canalith Repos                   mins  32564    Un-Timed:  Electrical Stimulation:         mins  33373 ( );  Dry Needling          mins self-pay  Traction          mins 69854    Timed Treatment:   30   mins   Total Treatment:     30   mins    Gavino Stover PTA  [place] License [id number]  Physical Therapist Assistant

## 2021-12-13 ENCOUNTER — TREATMENT (OUTPATIENT)
Dept: PHYSICAL THERAPY | Facility: CLINIC | Age: 71
End: 2021-12-13

## 2021-12-13 DIAGNOSIS — R26.2 DIFFICULTY WALKING: ICD-10-CM

## 2021-12-13 DIAGNOSIS — M25.562 ACUTE PAIN OF LEFT KNEE: ICD-10-CM

## 2021-12-13 DIAGNOSIS — Z96.652 S/P TOTAL KNEE REPLACEMENT, LEFT: Primary | ICD-10-CM

## 2021-12-13 PROCEDURE — 97110 THERAPEUTIC EXERCISES: CPT | Performed by: PHYSICAL THERAPIST

## 2021-12-13 PROCEDURE — 97140 MANUAL THERAPY 1/> REGIONS: CPT | Performed by: PHYSICAL THERAPIST

## 2021-12-13 NOTE — PROGRESS NOTES
Physical Therapy Daily Progress Note      Patient: Orion Harvey   : 1950  Diagnosis/ICD-10 Code:  S/P total knee replacement, left [Z96.652]   Problems Addressed this Visit     None      Visit Diagnoses     S/P total knee replacement, left    -  Primary    Acute pain of left knee        Difficulty walking          Diagnoses       Codes Comments    S/P total knee replacement, left    -  Primary ICD-10-CM: Z96.652  ICD-9-CM: V43.65     Acute pain of left knee     ICD-10-CM: M25.562  ICD-9-CM: 719.46     Difficulty walking     ICD-10-CM: R26.2  ICD-9-CM: 719.7          Referring practitioner: FELIZ Sapp  Date of Initial Visit: Type: THERAPY  Noted: 12/3/2021  Today's Date: 2021    VISIT#: 5    Subjective Patient reports feeling a little stiff today following increased activity levels yesterday with return to [place].       Objective added leg press     See Exercise, Manual, and Modality Logs for complete treatment.     Assessment/Plan Patient with decreased symptoms following treatment and demonstrated good tolerance to progressed therapeutic exercise. Patient will continue to benefit from improved L lower extremity strengthening for improved stability and activity tolerance.     Progress per Plan of Care and Progress strengthening /stabilization /functional activity         Timed:         Manual Therapy:    10     mins  46532;     Therapeutic Exercise:    25     mins  96482;     Neuromuscular Taylor:        mins  97327;    Therapeutic Activity:          mins  92618;     Gait Training:           mins  19943;     Ultrasound:          mins  99483;    Ionto                                   mins   63694  Self Care                    _____  mins   53569  Canalith Repos                   mins  42430    Un-Timed:  Electrical Stimulation:         mins  01568 ( );  Dry Needling          mins self-pay  Traction          mins 90496    Timed Treatment:   35   mins   Total Treatment:     35    radha Stover, [facility]  [place] License [id number]  Physical Therapist Assistant

## 2021-12-15 ENCOUNTER — TREATMENT (OUTPATIENT)
Dept: PHYSICAL THERAPY | Facility: CLINIC | Age: 71
End: 2021-12-15

## 2021-12-15 ENCOUNTER — OFFICE VISIT (OUTPATIENT)
Dept: ORTHOPEDIC SURGERY | Facility: CLINIC | Age: 71
End: 2021-12-15

## 2021-12-15 VITALS
HEART RATE: 88 BPM | WEIGHT: 261.2 LBS | HEIGHT: 68 IN | SYSTOLIC BLOOD PRESSURE: 148 MMHG | BODY MASS INDEX: 39.59 KG/M2 | DIASTOLIC BLOOD PRESSURE: 74 MMHG

## 2021-12-15 DIAGNOSIS — E11.65 TYPE 2 DIABETES MELLITUS WITH HYPERGLYCEMIA, WITHOUT LONG-TERM CURRENT USE OF INSULIN (HCC): Chronic | ICD-10-CM

## 2021-12-15 DIAGNOSIS — M25.562 ACUTE PAIN OF LEFT KNEE: ICD-10-CM

## 2021-12-15 DIAGNOSIS — R26.2 DIFFICULTY WALKING: ICD-10-CM

## 2021-12-15 DIAGNOSIS — Z96.652 HX OF TOTAL KNEE REPLACEMENT, LEFT: Primary | ICD-10-CM

## 2021-12-15 DIAGNOSIS — Z96.652 S/P TOTAL KNEE REPLACEMENT, LEFT: Primary | ICD-10-CM

## 2021-12-15 DIAGNOSIS — E66.01 MORBID OBESITY (HCC): ICD-10-CM

## 2021-12-15 PROCEDURE — 97140 MANUAL THERAPY 1/> REGIONS: CPT | Performed by: PHYSICAL THERAPIST

## 2021-12-15 PROCEDURE — 99024 POSTOP FOLLOW-UP VISIT: CPT | Performed by: PHYSICIAN ASSISTANT

## 2021-12-15 PROCEDURE — 97110 THERAPEUTIC EXERCISES: CPT | Performed by: PHYSICAL THERAPIST

## 2021-12-15 RX ORDER — TRAMADOL HYDROCHLORIDE 50 MG/1
50 TABLET ORAL EVERY 6 HOURS PRN
Qty: 40 TABLET | Refills: 0 | Status: SHIPPED | OUTPATIENT
Start: 2021-12-15 | End: 2022-04-13

## 2021-12-15 NOTE — PROGRESS NOTES
Physical Therapy Daily Progress Note      Patient: Orion Harvey   : 1950  Diagnosis/ICD-10 Code:  S/P total knee replacement, left [Z96.652]  Referring practitioner: FELIZ Sapp  Date of Initial Visit: Type: THERAPY  Noted: 12/3/2021  Today's Date: 12/15/2021  Patient seen for 6 sessions         Orion Harvey reports: he has no knee pain to speak of at this time and states he continues to have improving tolerance to walking and exercise. Pt. reports he goes today to have his bandaging removed and healing assessed.    Objective   See Exercise, Manual, and Modality Logs for complete treatment.     Assessment/Plan   Pt. Tolerates exercise and stretch perfectly and has no increase in discomfort only increase in muscle fatigue. Pt. Will continue to benefit from gradual progression of exercise and stretch for return to PLOF.    Progress strengthening /stabilization /functional activity           Timed:         Manual Therapy:    10     mins  30279;     Therapeutic Exercise:    20     mins  36187;       Timed Treatment:   30   mins   Total Treatment:     40   mins    Mandie Ventura PTA  Physical Therapist Assistant License #[id number]

## 2021-12-15 NOTE — PATIENT INSTRUCTIONS
Total Knee Replacement, Care After  After the procedure, it is common to have stiffness and discomfort, or redness, pain, and swelling around your cut from surgery (incision). You may also have a small amount of blood or clear fluid coming from your incision.  Follow these instructions at home:  Your doctor may give you more instructions. If you have problems, contact your doctor.  Medicines  · Take over-the-counter and prescription medicines only as told by your doctor.  · If you were prescribed a blood thinner, take it as told by your doctor.  · If told, take steps to prevent problems with pooping (constipation). You may need to:  ? Drink enough fluid to keep your pee (urine) pale yellow.  ? Take medicines. You will be told what medicines to take.  ? Eat foods that are high in fiber. These include beans, whole grains, and fresh fruits and vegetables.  ? Limit foods that are high in fat and sugar. These include fried or sweet foods.  Incision care    · Follow instructions from your doctor about how to take care of your incision. Make sure you:  ? Wash your hands with soap and water for at least 20 seconds before and after you change your bandage. If you cannot use soap and water, use hand .  ? Change your bandage.  ? Leave stitches, staples, or skin glue in place for at least 2 weeks.  ? Leave tape strips alone unless you are told to take them off. You may trim the edges of the tape strips if they curl up.  · Do not take baths, swim, or use a hot tub until your doctor says it is okay.  · Check your incision every day for signs of infection. Check for:  ? More redness, swelling, or pain.  ? More fluid or blood.  ? Warmth.  ? Pus or a bad smell.    Activity  · Rest as told by your doctor.  · Get up to take short walks every 1 to 2 hours. Ask for help if you feel weak or unsteady.  · Follow instructions from your doctor about:  ? Using a walker, crutches, or a cane.  ? How much body weight you may safely  support on your affected leg (weight-bearing restrictions).  ? How to get out of a bed and chair and how to go up and down stairs. A physical therapist will show you how to do this.  · Do exercises as told by your doctor or physical therapist.  · Avoid activities that put stress on your knees. These include running, jumping rope, and doing jumping jacks.  · Do not play contact sports until your doctor says it is okay.  · Return to your normal activities when your doctor says that it is safe.  Managing pain, stiffness, and swelling    · If told, put ice on your knee. To do this:  ? Put ice in a plastic bag or use an icing device (cold flow pad). Follow your doctor's directions about how to use the icing device.  ? Place a towel between your skin and the bag or between your skin and the icing device.  ? Leave the ice on for 20 minutes, 2-3 times a day.  ? Take off the ice if your skin turns bright red. This is very important. If you cannot feel pain, heat, or cold, you have a greater risk of damage to the area.  · Move your toes often.  · Raise your leg above the level of your heart while you are sitting or lying down.  ? Use a few pillows to keep your leg straight.  ? Do not put a pillow just under the knee. If the knee is bent for a long time, this may make the knee stiff.  · Wear elastic knee support as told by your doctor.    Safety    · To help prevent falls:  ? Keep floors clear of objects you may trip over.  ? Place items that you may need within easy reach.  · Wear an apron or tool belt with pockets for carrying objects. This leaves your hands free to help with your balance.  · Do not drive or use machines until your doctor says it is safe.    General instructions  · Wear compression stockings as told by your doctor.  · Continue with breathing exercises. This helps prevent lung infection.  · Do not smoke or use any products that contain nicotine or tobacco. If you need help quitting, ask your doctor.  · If you  plan to visit a dentist:  ? Tell your doctor. Ask about things to do before your teeth are cleaned.  ? Tell your dentist about your new joint.  · Keep all follow-up visits.  Contact a doctor if:  · You have a fever or chills.  · You have a cough or feel short of breath.  · Your medicine is not controlling your pain.  · You have any of these signs of infection around your incision:  ? More redness, swelling, or pain.  ? More fluid or blood.  ? Warmth.  ? Pus or a bad smell.  · You fall.  Get help right away if:  · You have very bad pain.  · You have trouble breathing.  · You have chest pain.  · You have pain in your calf or leg.  · You have redness, swelling, or warmth in your calf or leg.  · Your incision breaks open after the stitches or staples are taken out.  These symptoms may be an emergency. Get help right away. Call your local emergency services (911 in the [place]  · Do not wait to see if the symptoms will go away.  · Do not drive yourself to the hospital.  Summary  · After the procedure, it is common to have stiffness and discomfort, or redness, pain, and swelling around your incision.  · Follow instructions from your doctor about how to take care of your incision.  · Use crutches, a walker, or a cane as told by your doctor.  · If you were prescribed a blood thinner, take it as told by your doctor.  · Keep all follow-up visits.  This information is not intended to replace advice given to you by your health care provider. Make sure you discuss any questions you have with your health care provider.  Document Revised: 06/08/2021 Document Reviewed: 06/08/2021  Elsevier Patient Education © 2021 [employer]

## 2021-12-15 NOTE — PROGRESS NOTES
ORTHO POSTOP VISIT       Subjective:    HPI:  Orion Harvey is a 71 y.o. male who presents about 2 weeks out from having a left total knee replacement.  He reports that he is doing well with mild intermittent discomfort.  He is already in outpatient physical therapy.  He reports that they plan to leave for [place] at the end of this month.    Medications:    Current Outpatient Medications:   •  ascorbic acid (VITAMIN C) 1000 MG tablet, Take 1 tablet by mouth Daily. LAST DOSE 11/23, Disp: , Rfl:   •  empagliflozin (Jardiance) 25 MG tablet tablet, Take 1 tablet by mouth Daily. (Patient taking differently: Take 25 mg by mouth Daily. DONT TAKE PREOP), Disp: 30 tablet, Rfl: 12  •  gabapentin (NEURONTIN) 300 MG capsule, Take 1 capsule by mouth 2 (Two) Times a Day., Disp: 60 capsule, Rfl: 0  •  glimepiride (AMARYL) 4 MG tablet, Take 1 tablet by mouth 2 (Two) Times a Day. (Patient taking differently: Take 4 mg by mouth 2 (Two) Times a Day. DONT TAKE PREOP), Disp: 180 tablet, Rfl: 3  •  glucose blood (OneTouch Verio) test strip, Use as instructed, Disp: 100 each, Rfl: 3  •  lisinopril (PRINIVIL,ZESTRIL) 40 MG tablet, Take 1 tablet by mouth once daily (Patient taking differently: Take 40 mg by mouth Daily. LAST DOSE 11/29 AM), Disp: 90 tablet, Rfl: 3  •  meloxicam (MOBIC) 7.5 MG tablet, Take 1 tablet by mouth Daily., Disp: 12 tablet, Rfl: 0  •  metFORMIN (GLUCOPHAGE) 1000 MG tablet, Take 1 tablet by mouth twice daily (Patient taking differently: Take 1,000 mg by mouth 2 (Two) Times a Day With Meals. LAST DOSE 11/28 AM), Disp: 180 tablet, Rfl: 3  •  Misc Natural Products (Osteo Bi-Flex Triple Strength) tablet, Take 1 tablet by mouth Daily. (Patient taking differently: Take 1 tablet by mouth 2 (Two) Times a Day. LAST DOSE 11/23), Disp: 30 tablet, Rfl: 0  •  Multiple Vitamins-Minerals (CENTRUM SILVER) tablet, Take 1 tablet by mouth Every Evening. LAST DOSE 11/22, Disp: , Rfl:   •  ONETOUCH DELICA LANCETS 33G misc, 1  "tablet by In Vitro route Every 12 (Twelve) Hours., Disp: , Rfl:   •  rosuvastatin (CRESTOR) 10 MG tablet, Take 1 tablet by mouth once daily (Patient taking differently: Take 10 mg by mouth Every Night.), Disp: 90 tablet, Rfl: 3  •  traMADol (ULTRAM) 50 MG tablet, Take 1 tablet by mouth Every 6 (Six) Hours As Needed for Moderate Pain ., Disp: 40 tablet, Rfl: 0  Current outpatient and discharge medications have been reconciled for the patient.  Reviewed by: FELIZ Sapp      Allergies:  No Known Allergies       Objective   Objective:    /74 (BP Location: Left arm, Patient Position: Sitting, Cuff Size: Large Adult)   Pulse 88   Ht 172.7 cm (68\")   Wt 118 kg (261 lb 3.2 oz)   BMI 39.72 kg/m²     Physical Examination:  Alert, oriented, obese individual in no acute distress, ambulating unassisted  Left lower extremity shows a well-healing surgical incision with no erythema, drainage, or open skin lesions. There is a mild amount of swelling. It is grossly well aligned, and the patient is neurovascularly intact distally. The knee is stable to varus and valgus stress, there is no patellar maltracking or crepitus noted, and plantar and dorsiflexion is 5/5. There is no tenderness to palpation or with range of motion, which is about 0-95.           Imaging:  xrays obtained today  left Knee X-Ray    Date of exam: 12/15/2021    Indication: 2 weeks status post left total knee replacement    AP, Lateral, [place] views    Findings:A well positioned knee replacement without evidence of bony or implant failure. and No fractures or dislocations are appreciated    within normal limits joint spaces    Hardware appropriately positioned yes    yes prior studies available for comparison.    This patient's x-ray report was graded according to the Kellgren and Demetris classification.  This took into account the joint space narrowing, osteophyte formation, sclerosis of the distal femur/proximal tibia along with deformity of " those bones.  The findings were indicative of K L grade na.    X-RAY was ordered and reviewed by FELIZ Sapp            Assessment:  1. Hx of total knee replacement, left    2. Morbid obesity (HCC)       About 2 weeks out from surgery        Plan:  Since they are leaving for [place] at the end of this month, we will plan to see him back when he returns in March.  , GI, and dental antibiotic prophylaxis discussed.  He should call with any questions or concerns.  I will step him down to tramadol at this time, risks and addiction properties were discussed.             FELIZ Sapp  12/15/21  12:56 EST    EMR Dragon/Transcription disclaimer:  Much of this encounter note is an electronic transcription/translation of spoken language to printed text. The electronic translation of spoken language may permit erroneous, or at times, nonsensical words or phrases to be inadvertently transcribed; Although I have reviewed the note for such errors, some may still exist.

## 2021-12-16 RX ORDER — BLOOD SUGAR DIAGNOSTIC
STRIP MISCELLANEOUS
Qty: 100 EACH | Refills: 3 | Status: SHIPPED | OUTPATIENT
Start: 2021-12-16 | End: 2021-12-21 | Stop reason: SDUPTHER

## 2021-12-17 ENCOUNTER — TREATMENT (OUTPATIENT)
Dept: PHYSICAL THERAPY | Facility: CLINIC | Age: 71
End: 2021-12-17

## 2021-12-17 DIAGNOSIS — R26.2 DIFFICULTY WALKING: ICD-10-CM

## 2021-12-17 DIAGNOSIS — M25.562 ACUTE PAIN OF LEFT KNEE: ICD-10-CM

## 2021-12-17 DIAGNOSIS — Z96.652 S/P TOTAL KNEE REPLACEMENT, LEFT: Primary | ICD-10-CM

## 2021-12-17 PROCEDURE — 97110 THERAPEUTIC EXERCISES: CPT | Performed by: PHYSICAL THERAPIST

## 2021-12-17 PROCEDURE — 97140 MANUAL THERAPY 1/> REGIONS: CPT | Performed by: PHYSICAL THERAPIST

## 2021-12-17 NOTE — PROGRESS NOTES
Physical Therapy Daily Progress Note      Patient: Orion Harvey   : 1950  Diagnosis/ICD-10 Code:  S/P total knee replacement, left [Z96.652]   Problems Addressed this Visit     None      Visit Diagnoses     S/P total knee replacement, left    -  Primary    Acute pain of left knee        Difficulty walking          Diagnoses       Codes Comments    S/P total knee replacement, left    -  Primary ICD-10-CM: Z96.652  ICD-9-CM: V43.65     Acute pain of left knee     ICD-10-CM: M25.562  ICD-9-CM: 719.46     Difficulty walking     ICD-10-CM: R26.2  ICD-9-CM: 719.7          Referring practitioner: FELIZ Sapp  Date of Initial Visit: Type: THERAPY  Noted: 12/3/2021  Today's Date: 2021    VISIT#: 7  eval 12/3/2021  POC 12 wk, exp  Subjective Patient reports feeling pleased with progress and continues to notice improved bend in knee. Patient reports MD visit went well and was pleased with progress.       Objective flexion to 110 degrees     See Exercise, Manual, and Modality Logs for complete treatment.     Assessment/Plan Patient demonstrating good tolerance to therapeutic exercise along with improved flexion. Patient will continue to benefit from progressed lower extremity strengthening for improved tolerance/stabilty with daily functional activity.   STG:  - Increase L knee flexion AROM to 115 deg in 3 weeks.  - Pt to ambulate with standard cane safely in community in 3 weeks.  - Pt to be independent with HEP in 3 weeks.  LTG:  - Improve Oxford Knee to 20% or less impairment by discharge.  - Increase L knee strength to 4+/5 or greater for improved stair climbing in order to get into RV by discharge.  - Ambulation to be returned to PLOF by discharge.    Progress per Plan of Care         Timed:         Manual Therapy:    10     mins  04051;     Therapeutic Exercise:    20     mins  40422;     Neuromuscular Taylor:        mins  01240;    Therapeutic Activity:          mins  44310;     Gait Training:            mins  63167;     Ultrasound:          mins  05125;    Ionto                                   mins   21808  Self Care                    _____  mins   09459  Canalith Repos                   mins  95922    Un-Timed:  Electrical Stimulation:        mins  20599 ( );  Dry Needling          mins self-pay   Traction          mins 78113    Timed Treatment:   30   mins   Total Treatment:     40   mins    Gavino Stover PTA  [place] License [id number]  Physical Therapist Assistant

## 2021-12-20 ENCOUNTER — TREATMENT (OUTPATIENT)
Dept: PHYSICAL THERAPY | Facility: CLINIC | Age: 71
End: 2021-12-20

## 2021-12-20 DIAGNOSIS — Z96.652 S/P TOTAL KNEE REPLACEMENT, LEFT: Primary | ICD-10-CM

## 2021-12-20 DIAGNOSIS — R26.2 DIFFICULTY WALKING: ICD-10-CM

## 2021-12-20 DIAGNOSIS — M25.562 ACUTE PAIN OF LEFT KNEE: ICD-10-CM

## 2021-12-20 PROCEDURE — 97110 THERAPEUTIC EXERCISES: CPT | Performed by: PHYSICAL THERAPIST

## 2021-12-20 PROCEDURE — 97140 MANUAL THERAPY 1/> REGIONS: CPT | Performed by: PHYSICAL THERAPIST

## 2021-12-20 NOTE — PROGRESS NOTES
Physical Therapy Daily Progress Note      Patient: Orion Harvey   : 1950  Diagnosis/ICD-10 Code:  S/P total knee replacement, left [Z96.652]  Referring practitioner: FELIZ Sapp  Date of Initial Visit: Type: THERAPY  Noted: 12/3/2021  Today's Date: 2021  Patient seen for 8 sessions         Orion Harvey reports: he had been feeling so good he got the notion to [place] and [place] and after 20-30 minutes he had realized his knee was fatiguing significantly     Objective   See Exercise, Manual, and Modality Logs for complete treatment.     Assessment/Plan   Pt. Still healing very well and responding to therapy with notable improvement despite fatigue Pt. presents with even initially to treatment this date. Pt. Needs continued quad strengthening as well as hamstrings for dynamic knee stability and greater ease with ambulation.    STG:  - Increase L knee flexion AROM to 115 deg in 3 weeks.  - Pt to ambulate with standard cane safely in community in 3 weeks.  - Pt to be independent with HEP in 3 weeks.  LTG:  - Improve [place] Knee to 20% or less impairment by discharge.  - Increase L knee strength to 4+/5 or greater for improved stair climbing in order to get into RV by discharge.  - Ambulation to be returned to [facility] by discharge.    Progress strengthening /stabilization /functional activity           Timed:         Manual Therapy:    10     mins  12575;     Therapeutic Exercise:    20     mins  66932;         Timed Treatment:   30   mins   Total Treatment:     30   mins    Mandie Ventura PTA  Physical Therapist Assistant License #[id number]

## 2021-12-21 ENCOUNTER — TELEPHONE (OUTPATIENT)
Dept: FAMILY MEDICINE CLINIC | Facility: CLINIC | Age: 71
End: 2021-12-21

## 2021-12-21 DIAGNOSIS — E11.65 TYPE 2 DIABETES MELLITUS WITH HYPERGLYCEMIA, WITHOUT LONG-TERM CURRENT USE OF INSULIN (HCC): Chronic | ICD-10-CM

## 2021-12-21 RX ORDER — LANCETS 33 GAUGE
1 EACH MISCELLANEOUS EVERY 12 HOURS
Qty: 100 EACH | Refills: 3 | Status: SHIPPED | OUTPATIENT
Start: 2021-12-21

## 2021-12-21 NOTE — TELEPHONE ENCOUNTER
Caller: YAO ENG    Relationship: Emergency Contact    Best call back number: [phone]     Requested Prescriptions:   Requested Prescriptions     Pending Prescriptions Disp Refills   • glucose blood (OneTouch Verio) test strip 100 each 3     Sig: Use as instructed        Pharmacy where request should be sent: [facility] [place], [place] - [address] - [phone]  - [phone]     Additional details provided by patient: PHARMACY NEEDS TO KNOW HOW OFTEN THE PATIENT IS SUPPOSED TO TEST    Does the patient have less than a 3 day supply:  [x] Yes  [] No    Issa Campbell Rep   12/21/21 14:35 EST

## 2021-12-22 ENCOUNTER — TREATMENT (OUTPATIENT)
Dept: PHYSICAL THERAPY | Facility: CLINIC | Age: 71
End: 2021-12-22

## 2021-12-22 DIAGNOSIS — R26.2 DIFFICULTY WALKING: ICD-10-CM

## 2021-12-22 DIAGNOSIS — M25.562 ACUTE PAIN OF LEFT KNEE: ICD-10-CM

## 2021-12-22 DIAGNOSIS — Z96.652 S/P TOTAL KNEE REPLACEMENT, LEFT: Primary | ICD-10-CM

## 2021-12-22 PROCEDURE — 97140 MANUAL THERAPY 1/> REGIONS: CPT | Performed by: PHYSICAL THERAPIST

## 2021-12-22 PROCEDURE — 97110 THERAPEUTIC EXERCISES: CPT | Performed by: PHYSICAL THERAPIST

## 2021-12-22 NOTE — PROGRESS NOTES
Physical Therapy Daily Progress Note      Patient: Orion Harvey   : 1950  Diagnosis/ICD-10 Code:  S/P total knee replacement, left [Z96.652]  Referring practitioner: FELIZ Sapp  Date of Initial Visit: Type: THERAPY  Noted: 12/3/2021  Today's Date: 2021  Patient seen for 9 sessions         Orion Harvey reports: he is not having knee pain at all but states he does have muscle soreness and at times It leans more toward muscle pain particualrly in his calf and hamstring.    Objective   See Exercise, Manual, and Modality Logs for complete treatment.     Assessment/Plan  Pt. Shows continued improvement as Hamstring gains more strength and mobility for improving active flexion soreness is noted. Pt. Was educated on the normal process of muscles soreness with activity and how isolating those muscles has increased the soreness he feels especially following an aggressive surgery like a knee replacement that what he is experiencing is a normal part of the healing process.     STG:  - Increase L knee flexion AROM to 115 deg in 3 weeks.  - Pt to ambulate with standard cane safely in community in 3 weeks.  - Pt to be independent with HEP in 3 weeks.  LTG:  - Improve [place] Knee to 20% or less impairment by discharge.  - Increase L knee strength to 4+/5 or greater for improved stair climbing in order to get into RV by discharge.  - Ambulation to be returned to [facility] by discharge.    Progress strengthening /stabilization /functional activity           Timed:         Manual Therapy:    10     mins  95227;     Therapeutic Exercise:    20     mins  07617;       Timed Treatment:   30   mins   Total Treatment:     30   mins    Mandie Ventura PTA  Physical Therapist Assistant License #[id number]

## 2021-12-23 ENCOUNTER — TREATMENT (OUTPATIENT)
Dept: PHYSICAL THERAPY | Facility: CLINIC | Age: 71
End: 2021-12-23

## 2021-12-23 DIAGNOSIS — Z96.652 S/P TOTAL KNEE REPLACEMENT, LEFT: Primary | ICD-10-CM

## 2021-12-23 DIAGNOSIS — M25.562 ACUTE PAIN OF LEFT KNEE: ICD-10-CM

## 2021-12-23 DIAGNOSIS — R26.2 DIFFICULTY WALKING: ICD-10-CM

## 2021-12-23 PROCEDURE — 97110 THERAPEUTIC EXERCISES: CPT | Performed by: PHYSICAL THERAPIST

## 2021-12-23 RX ORDER — BLOOD SUGAR DIAGNOSTIC
1 STRIP MISCELLANEOUS 2 TIMES DAILY
Qty: 100 EACH | Refills: 3 | Status: SHIPPED | OUTPATIENT
Start: 2021-12-23 | End: 2022-04-12 | Stop reason: SDUPTHER

## 2021-12-23 NOTE — PROGRESS NOTES
Physical Therapy Daily Progress Note      Patient: Orion Harvey   : 1950  Diagnosis/ICD-10 Code:  S/P total knee replacement, left [Z96.652]   Problems Addressed this Visit     None      Visit Diagnoses     S/P total knee replacement, left    -  Primary    Acute pain of left knee        Difficulty walking          Diagnoses       Codes Comments    S/P total knee replacement, left    -  Primary ICD-10-CM: Z96.652  ICD-9-CM: V43.65     Acute pain of left knee     ICD-10-CM: M25.562  ICD-9-CM: 719.46     Difficulty walking     ICD-10-CM: R26.2  ICD-9-CM: 719.7          Referring practitioner: FELIZ Sapp  Date of Initial Visit: Type: THERAPY  Noted: 12/3/2021  Today's Date: 2021    VISIT#: 10    Subjective Patient reports noticing decreased swelling and decrease aching, overall pleased with early progress. Patient to leave for [place] next 21.       Objective oxford knee functional score 52% impairment improved from 73% impairment.     See Exercise, Manual, and Modality Logs for complete treatment.     Assessment/Plan Patient continues to make gradual gains towards goals at this time as indicated by improved functional oxford score.   STG:  - Increase L knee flexion AROM to 115 deg in 3 weeks. MET  - Pt to ambulate with standard cane safely in community in 3 weeks.  MET  - Pt to be independent with HEP in 3 weeks. MET  LTG:  - Improve Oxford Knee to 20% or less impairment by discharge. Progressing currently at 52%   - Increase L knee strength to 4+/5 or greater for improved stair climbing in order to get into RV by discharge. Not MET  - Ambulation to be returned to [facility] by discharge. progressing    Progress per Plan of Care and Progress strengthening /stabilization /functional activity         Timed:         Manual Therapy:    5     mins  09027;     Therapeutic Exercise:    25     mins  60917;     Neuromuscular Taylor:        mins  84424;    Therapeutic Activity:          mins   70520;     Gait Training:           mins  36773;     Ultrasound:          mins  49795;    Ionto                                   mins   39948  Self Care                    _____  mins   98363  Canalith Repos                   mins  74350    Un-Timed:  Electrical Stimulation:         mins  62066 ( );  Dry Needling          mins self-pay   Traction          mins 61416    Timed Treatment:   30   mins   Total Treatment:     30   mins    Gavino Stover PTA  [place] [id number]  Physical Therapist Assistant

## 2021-12-27 ENCOUNTER — TREATMENT (OUTPATIENT)
Dept: PHYSICAL THERAPY | Facility: CLINIC | Age: 71
End: 2021-12-27

## 2021-12-27 DIAGNOSIS — M25.562 ACUTE PAIN OF LEFT KNEE: ICD-10-CM

## 2021-12-27 DIAGNOSIS — R26.2 DIFFICULTY WALKING: ICD-10-CM

## 2021-12-27 DIAGNOSIS — Z96.652 S/P TOTAL KNEE REPLACEMENT, LEFT: Primary | ICD-10-CM

## 2021-12-27 PROCEDURE — 97110 THERAPEUTIC EXERCISES: CPT | Performed by: PHYSICAL THERAPIST

## 2021-12-27 PROCEDURE — 97530 THERAPEUTIC ACTIVITIES: CPT | Performed by: PHYSICAL THERAPIST

## 2021-12-27 NOTE — PROGRESS NOTES
Physical Therapy Daily Progress Note / DISCHARGE SUMMARY      Patient: Orion Harvey   : 1950  Diagnosis/ICD-10 Code:  S/P total knee replacement, left [Z96.652]   Problems Addressed this Visit     None      Visit Diagnoses     S/P total knee replacement, left    -  Primary    Acute pain of left knee        Difficulty walking          Diagnoses       Codes Comments    S/P total knee replacement, left    -  Primary ICD-10-CM: Z96.652  ICD-9-CM: V43.65     Acute pain of left knee     ICD-10-CM: M25.562  ICD-9-CM: 719.46     Difficulty walking     ICD-10-CM: R26.2  ICD-9-CM: 719.7         Referring practitioner: FELIZ Sapp  Date of Initial Visit: Type: THERAPY  Noted: 12/3/2021  Today's Date: 2021    VISIT#: 11    Subjective : Pt reports he is very happy with his knee. Is leaving for [place] in the morning and will be gone for 3 months. States he has been going up RV steps with minimal difficulty.  Oxford Knee score = 40/48, indicating 83% ability and 17% impairment.    Objective : Had pt fill out a new [place] knee, reports he filled it out last week based on the past 4 wks as it says on the form. Asked pt to complete it today based on past wk.  L knee AROM = -5 -115 deg  L knee = 4+/5 flex and ext  See Exercise, Manual, and Modality Logs for complete treatment.     Assessment/Plan : Pt has made very good progress toward goals. He lacks a few degrees in extension, this should improved with continued HEP. Pt encouraged to call or email with any questions while he is out of town.    STG:  - Increase L knee flexion AROM to 115 deg in 3 weeks. - Partially met  - Pt to ambulate with standard cane safely in community in 3 weeks. - MET  - Pt to be independent with HEP in 3 weeks. - MET  LTG:  - Improve Oxford Knee to 20% or less impairment by discharge. - MET  - Increase L knee strength to 4+/5 or greater for improved stair climbing in order to get into RV by discharge. - MET  - Ambulation to be  returned to [facility] by discharge. - MET    Plan to DC with HEP         Timed:         Manual Therapy:         mins  67377;     Therapeutic Exercise:    20     mins  28521;     Neuromuscular Taylor:        mins  31375;    Therapeutic Activity:     10     mins  63373;     Gait Training:           mins  53824;     Ultrasound:          mins  69991;    Ionto                                   mins   35325  Self Care                            mins   20379  Canalith Repos                   mins  83227    Un-Timed:  Electrical Stimulation:         mins  01631 ( );  Dry Needling          mins 39313/11381  Traction          mins 95708  Low Eval          Mins  87136  Mod Eval          Mins  30055  High Eval                            Mins  66212  Re-Eval                               mins  99217    Timed Treatment:   30   mins   Total Treatment:     30   mins    Vangie Torres, PT, CLT, Cert DN  Physical Therapist  [place] Lic # [id number]

## 2022-01-01 ENCOUNTER — TRANSCRIPTION ENCOUNTER (OUTPATIENT)
Age: 72
End: 2022-01-01

## 2022-01-14 ENCOUNTER — TELEPHONE (OUTPATIENT)
Dept: FAMILY MEDICINE CLINIC | Facility: CLINIC | Age: 72
End: 2022-01-14

## 2022-03-03 ENCOUNTER — TRANSCRIPTION ENCOUNTER (OUTPATIENT)
Age: 72
End: 2022-03-03

## 2022-03-25 NOTE — PROGRESS NOTE ADULT - PROBLEM/PLAN-2
DISPLAY PLAN FREE TEXT
[id number]

## 2022-03-30 DIAGNOSIS — I10 ESSENTIAL HYPERTENSION: ICD-10-CM

## 2022-03-30 RX ORDER — LISINOPRIL 40 MG/1
40 TABLET ORAL DAILY
Qty: 90 TABLET | Refills: 0 | Status: SHIPPED | OUTPATIENT
Start: 2022-03-30 | End: 2022-04-04 | Stop reason: SDUPTHER

## 2022-03-30 NOTE — TELEPHONE ENCOUNTER
Caller: Orion Harvey    Relationship: Self    Best call back number: [phone]     Requested Prescriptions:   Requested Prescriptions     Pending Prescriptions Disp Refills   • lisinopril (PRINIVIL,ZESTRIL) 40 MG tablet 90 tablet 3     Sig: Take 1 tablet by mouth Daily.        Pharmacy where request should be sent:   [place] [address]  PHONE: [phone]  FAX:  [phone]    Additional details provided by patient: 5 DAYS LEFT, 90 DAY SUPPLY REQUESTED    Does the patient have less than a 3 day supply:  [] Yes  [x] No    Issa Campbell Rep   03/30/22 11:09 EDT

## 2022-04-04 DIAGNOSIS — I10 ESSENTIAL HYPERTENSION: ICD-10-CM

## 2022-04-04 RX ORDER — LISINOPRIL 40 MG/1
40 TABLET ORAL DAILY
Qty: 90 TABLET | Refills: 0 | Status: SHIPPED | OUTPATIENT
Start: 2022-04-04 | End: 2022-04-06

## 2022-04-04 NOTE — TELEPHONE ENCOUNTER
Patient and the pharmacy state that the medication did not reach the pharmacy. Medication resent.

## 2022-04-04 NOTE — TELEPHONE ENCOUNTER
Caller: Orion Harvey    Relationship: Self    Best call back number: [phone]    Requested Prescriptions:   Requested Prescriptions     Pending Prescriptions Disp Refills   • lisinopril (PRINIVIL,ZESTRIL) 40 MG tablet 90 tablet 0     Sig: Take 1 tablet by mouth Daily.        Pharmacy where request should be sent: [id number] - [address] AT [facility] [place] [address]  [address] [facility] - [phone]  - [phone]     Additional details provided by patient: PATIENT STATES THIS WENT TO THE WRONG PHARMACY.  PLEASE RESEND    Does the patient have less than a 3 day supply:  [x] Yes  [] No    Issa Allison Rep   04/04/22 15:44 EDT

## 2022-04-06 DIAGNOSIS — I10 ESSENTIAL HYPERTENSION: ICD-10-CM

## 2022-04-06 RX ORDER — LISINOPRIL 40 MG/1
40 TABLET ORAL DAILY
Qty: 90 TABLET | Refills: 3 | Status: SHIPPED | OUTPATIENT
Start: 2022-04-06 | End: 2022-04-12 | Stop reason: SDUPTHER

## 2022-04-12 ENCOUNTER — OFFICE VISIT (OUTPATIENT)
Dept: FAMILY MEDICINE CLINIC | Facility: CLINIC | Age: 72
End: 2022-04-12

## 2022-04-12 VITALS
HEART RATE: 76 BPM | TEMPERATURE: 97.8 F | RESPIRATION RATE: 14 BRPM | DIASTOLIC BLOOD PRESSURE: 62 MMHG | OXYGEN SATURATION: 99 % | WEIGHT: 262 LBS | HEIGHT: 68 IN | SYSTOLIC BLOOD PRESSURE: 124 MMHG | BODY MASS INDEX: 39.71 KG/M2

## 2022-04-12 DIAGNOSIS — E66.9 OBESITY (BMI 30-39.9): ICD-10-CM

## 2022-04-12 DIAGNOSIS — I10 ESSENTIAL HYPERTENSION: ICD-10-CM

## 2022-04-12 DIAGNOSIS — E11.9 TYPE 2 DIABETES MELLITUS WITHOUT COMPLICATION, WITHOUT LONG-TERM CURRENT USE OF INSULIN: Primary | ICD-10-CM

## 2022-04-12 DIAGNOSIS — E78.2 MIXED HYPERLIPIDEMIA: ICD-10-CM

## 2022-04-12 PROCEDURE — 99214 OFFICE O/P EST MOD 30 MIN: CPT | Performed by: FAMILY MEDICINE

## 2022-04-12 RX ORDER — BLOOD SUGAR DIAGNOSTIC
1 STRIP MISCELLANEOUS 2 TIMES DAILY
Qty: 100 EACH | Refills: 3 | Status: SHIPPED | OUTPATIENT
Start: 2022-04-12 | End: 2022-08-26 | Stop reason: SDUPTHER

## 2022-04-12 RX ORDER — SENNOSIDES 8.6 MG
650 CAPSULE ORAL EVERY 8 HOURS PRN
COMMUNITY

## 2022-04-12 RX ORDER — LISINOPRIL 40 MG/1
40 TABLET ORAL DAILY
Qty: 90 TABLET | Refills: 3 | Status: SHIPPED | OUTPATIENT
Start: 2022-04-12

## 2022-04-12 RX ORDER — ASPIRIN 325 MG
325 TABLET ORAL DAILY
COMMUNITY

## 2022-04-12 NOTE — PROGRESS NOTES
Chief Complaint  Diabetes and Knee Pain     History of Present Illness  Orion Harvey presents today for Diabetes follow up. Patient had an A1C on 4/7/22, result was 7.3, previous was 6.7.    Pt has not taken his blood sugar at home due to not being able to get his test strips.  He has changed from [facility] due to them refusing the pt his test strips, and is now using [facility], requesting a new prescription.     Pt states his right knee hurts more than usual and is seeing Ciarra PIPER at orthopedic surgery tomorrow. Orion wants to know if he should request a cortisone shot from his PCP or have orthopedic take over his care for his right knee.       Current Outpatient Medications on File Prior to Visit   Medication Sig   • acetaminophen (TYLENOL) 650 MG 8 hr tablet Take 650 mg by mouth Every 8 (Eight) Hours As Needed for Mild Pain .   • ascorbic acid (VITAMIN C) 1000 MG tablet Take 1 tablet by mouth Daily. LAST DOSE 11/23   • aspirin 325 MG tablet Take 325 mg by mouth Daily.   • empagliflozin (Jardiance) 25 MG tablet tablet Take 1 tablet by mouth Daily. (Patient taking differently: Take 25 mg by mouth Daily. DONT TAKE PREOP)   • glimepiride (AMARYL) 4 MG tablet Take 1 tablet by mouth 2 (Two) Times a Day. (Patient taking differently: Take 4 mg by mouth 2 (Two) Times a Day. DONT TAKE PREOP)   • metFORMIN (GLUCOPHAGE) 1000 MG tablet Take 1 tablet by mouth twice daily (Patient taking differently: Take 1,000 mg by mouth 2 (Two) Times a Day With Meals. LAST DOSE 11/28 AM)   • Multiple Vitamins-Minerals (CENTRUM SILVER) tablet Take 1 tablet by mouth Every Evening. LAST DOSE 11/22   • rosuvastatin (CRESTOR) 10 MG tablet Take 1 tablet by mouth once daily (Patient taking differently: Take 10 mg by mouth Every Night.)   • [DISCONTINUED] lisinopril (PRINIVIL,ZESTRIL) 40 MG tablet TAKE 1 TABLET BY MOUTH DAILY   • gabapentin (NEURONTIN) 300 MG capsule Take 1 capsule by mouth 2 (Two) Times a Day.   • meloxicam (MOBIC) 7.5  "MG tablet Take 1 tablet by mouth Daily.   • Misc Natural Products (Osteo Bi-Flex Triple Strength) tablet Take 1 tablet by mouth Daily. (Patient taking differently: Take 1 tablet by mouth 2 (Two) Times a Day. LAST DOSE 11/23)   • traMADol (ULTRAM) 50 MG tablet Take 1 tablet by mouth Every 6 (Six) Hours As Needed for Moderate Pain .   • [DISCONTINUED] glucose blood (OneTouch Verio) test strip 1 each by Other route 2 (Two) Times a Day. Use as instructed     No current facility-administered medications on file prior to visit.       Objective   Vital Signs:   /62   Pulse 76   Temp 97.8 °F (36.6 °C)   Resp 14   Ht 172.7 cm (67.99\")   Wt 119 kg (262 lb)   SpO2 99%   BMI 39.85 kg/m²       Physical Exam  Vitals and nursing note reviewed.   Constitutional:       General: He is not in acute distress.     Appearance: Normal appearance. He is well-developed. He is obese.   HENT:      Head: Normocephalic and atraumatic.   Eyes:      Conjunctiva/sclera: Conjunctivae normal.      Pupils: Pupils are equal, round, and reactive to light.   Neck:      Thyroid: No thyromegaly.      Vascular: No JVD.   Cardiovascular:      Rate and Rhythm: Normal rate and regular rhythm.      Pulses:           Dorsalis pedis pulses are 1+ on the right side and 1+ on the left side.      Heart sounds: Normal heart sounds. No murmur heard.  Pulmonary:      Breath sounds: Normal breath sounds. No wheezing or rales.   Musculoskeletal:         General: Normal range of motion.      Cervical back: Normal range of motion.      Right foot: Normal range of motion. No deformity or bunion.      Left foot: Normal range of motion. No deformity or bunion.        Feet:    Feet:      Right foot:      Protective Sensation: 8 sites tested. 8 sites sensed.      Skin integrity: Callus and dry skin present. No ulcer, blister, skin breakdown or erythema.      Toenail Condition: Right toenails are normal.      Left foot:      Protective Sensation: 8 sites tested. 8 " sites sensed.      Skin integrity: Callus and dry skin present. No ulcer, blister, skin breakdown or erythema.      Toenail Condition: Left toenails are normal.   Lymphadenopathy:      Cervical: No cervical adenopathy.   Skin:     General: Skin is warm and dry.      Findings: No rash.   Neurological:      Mental Status: He is alert and oriented to person, place, and time.   Psychiatric:         Mood and Affect: Mood normal.         Behavior: Behavior normal.            No visits with results within 1 Day(s) from this visit.   Latest known visit with results is:   Results Encounter on 04/07/2022   Component Date Value Ref Range Status   • Hemoglobin A1C 04/07/2022 7.3 (A) 4.8 - 5.6 % Final    Comment:          Prediabetes: 5.7 - 6.4           Diabetes: >6.4           Glycemic control for adults with diabetes: <7.0     • Glucose 04/07/2022 138 (A) 65 - 99 mg/dL Final   • BUN 04/07/2022 21  8 - 27 mg/dL Final   • Creatinine 04/07/2022 0.95  0.76 - 1.27 mg/dL Final   • EGFR Result 04/07/2022 86  >59 mL/min/1.73 Final   • BUN/Creatinine Ratio 04/07/2022 22  10 - 24 Final   • Sodium 04/07/2022 140  134 - 144 mmol/L Final   • Potassium 04/07/2022 4.8  3.5 - 5.2 mmol/L Final   • Chloride 04/07/2022 101  96 - 106 mmol/L Final   • Total CO2 04/07/2022 21  20 - 29 mmol/L Final   • Calcium 04/07/2022 9.6  8.6 - 10.2 mg/dL Final   • Total Protein 04/07/2022 6.6  6.0 - 8.5 g/dL Final   • Albumin 04/07/2022 4.6  3.8 - 4.8 g/dL Final   • Globulin 04/07/2022 2.0  1.5 - 4.5 g/dL Final   • A/G Ratio 04/07/2022 2.3 (A) 1.2 - 2.2 Final   • Total Bilirubin 04/07/2022 0.5  0.0 - 1.2 mg/dL Final   • Alkaline Phosphatase 04/07/2022 52  44 - 121 IU/L Final   • AST (SGOT) 04/07/2022 25  0 - 40 IU/L Final   • ALT (SGPT) 04/07/2022 33  0 - 44 IU/L Final   • Total Cholesterol 04/07/2022 137  100 - 199 mg/dL Final   • Triglycerides 04/07/2022 194 (A) 0 - 149 mg/dL Final   • HDL Cholesterol 04/07/2022 49  >39 mg/dL Final   • VLDL Cholesterol Brad  04/07/2022 32  5 - 40 mg/dL Final   • LDL Chol Calc [facility]) 04/07/2022 56  0 - 99 mg/dL Final   • WBC 04/07/2022 6.5  3.4 - 10.8 x10E3/uL Final   • RBC 04/07/2022 4.94  4.14 - 5.80 x10E6/uL Final   • Hemoglobin 04/07/2022 14.6  13.0 - 17.7 g/dL Final   • Hematocrit 04/07/2022 44.1  37.5 - 51.0 % Final   • MCV 04/07/2022 89  79 - 97 fL Final   • MCH 04/07/2022 29.6  26.6 - 33.0 pg Final   • MCHC 04/07/2022 33.1  31.5 - 35.7 g/dL Final   • RDW 04/07/2022 14.3  11.6 - 15.4 % Final   • Platelets 04/07/2022 202  150 - 450 x10E3/uL Final   • Neutrophil Rel % 04/07/2022 59  Not Estab. % Final   • Lymphocyte Rel % 04/07/2022 29  Not Estab. % Final   • Monocyte Rel % 04/07/2022 8  Not Estab. % Final   • Eosinophil Rel % 04/07/2022 3  Not Estab. % Final   • Basophil Rel % 04/07/2022 1  Not Estab. % Final   • Neutrophils Absolute 04/07/2022 3.8  1.4 - 7.0 x10E3/uL Final   • Lymphocytes Absolute 04/07/2022 1.8  0.7 - 3.1 x10E3/uL Final   • Monocytes Absolute 04/07/2022 0.5  0.1 - 0.9 x10E3/uL Final   • Eosinophils Absolute 04/07/2022 0.2  0.0 - 0.4 x10E3/uL Final   • Basophils Absolute 04/07/2022 0.0  0.0 - 0.2 x10E3/uL Final   • Immature Granulocyte Rel % 04/07/2022 0  Not Estab. % Final   • Immature Grans Absolute 04/07/2022 0.0  0.0 - 0.1 x10E3/uL Final   • Microalbumin, Urine 04/07/2022 <3.0  Not Estab. ug/mL Final   • TSH 04/07/2022 1.190  0.450 - 4.500 uIU/mL Final       Lab Results   Component Value Date    BUN 21 04/07/2022    CREATININE 0.95 04/07/2022     04/07/2022    K 4.8 04/07/2022     04/07/2022    CALCIUM 9.6 04/07/2022    ALBUMIN 4.6 04/07/2022    BILITOT 0.5 04/07/2022    ALKPHOS 52 04/07/2022    AST 25 04/07/2022    ALT 33 04/07/2022    CHLPL 137 04/07/2022    TRIG 194 (H) 04/07/2022    HDL 49 04/07/2022    VLDL 32 04/07/2022    LDL 56 04/07/2022    MICROALBUR <3.0 04/07/2022    WBC 6.5 04/07/2022    RBC 4.94 04/07/2022    HCT 44.1 04/07/2022    MCV 89 04/07/2022    MCH 29.6 04/07/2022    TSH 1.190  04/07/2022        Lab Results   Component Value Date    HGBA1C 7.3 (H) 04/07/2022    HGBA1C 6.7 (H) 11/05/2021    HGBA1C 8.8 (H) 08/09/2021    HGBA1C 8.4 (H) 05/13/2021    HGBA1C 7.8 (H) 09/29/2020    HGBA1C 7.3 (H) 10/30/2019                Assessment and Plan    Diagnoses and all orders for this visit:    1. Type 2 diabetes mellitus without complication, without long-term current use of insulin (HCC) (Primary)  -     OneTouch Delica Lancets 33G misc; 1 strip by Other route Every 12 (Twelve) Hours.  Dispense: 100 each; Refill: 3  -     glucose blood (OneTouch Verio) test strip; 1 each by Other route 2 (Two) Times a Day. Once daily  DX E11.65  Dispense: 100 each; Refill: 3  -     Hemoglobin A1c; Future  -     Comprehensive Metabolic Panel; Future  -     MicroAlbumin, Urine, Random - Urine, Clean Catch; Future    2. Essential hypertension  -     lisinopril (PRINIVIL,ZESTRIL) 40 MG tablet; Take 1 tablet by mouth Daily.  Dispense: 90 tablet; Refill: 3  -     Comprehensive Metabolic Panel; Future  -     CBC & Differential; Future  -     MicroAlbumin, Urine, Random - Urine, Clean Catch; Future    3. Obesity (BMI 30-39.9)    4. Mixed hyperlipidemia  -     Comprehensive Metabolic Panel; Future  -     Lipid Panel; Future    Diabetes, A1c has increased somewhat but still well within goal, do encourage increase physical activity and reduced carbohydrate diet, reduce calorie diet for weight reduction and better glycemic control.  Getting new prescription for One Touch glucometer and testing strips.  He will start monitoring glucose.  Continue Amaryl, Jardiance and Metformin    Hypertension at goal continue lisinopril    Osteoarthritis of right knee.  He has follow-up for previous left knee replacement with Ortho tomorrow.  Advised to discuss treatment with them as Hyalgan injection may be more beneficial than steroid injection and Hyalgan would have to be done in their office due to insurance coverage.  If neither treatment is  scheduled with Ortho clinic, May work in if needed for steroid injection in right knee.    Medications Discontinued During This Encounter   Medication Reason   • ONETOUCH DELICA LANCETS 33G misc Reorder   • glucose blood (OneTouch Verio) test strip Reorder   • lisinopril (PRINIVIL,ZESTRIL) 40 MG tablet Reorder         Follow Up     Return for Medicare Wellness soon, then 3 mo Recheck, DM, and htn.    Patient was given instructions and counseling regarding his condition or for health maintenance advice. Please see specific information pulled into the AVS if appropriate.

## 2022-04-13 ENCOUNTER — OFFICE VISIT (OUTPATIENT)
Dept: ORTHOPEDIC SURGERY | Facility: CLINIC | Age: 72
End: 2022-04-13

## 2022-04-13 VITALS
HEART RATE: 72 BPM | WEIGHT: 261.8 LBS | SYSTOLIC BLOOD PRESSURE: 136 MMHG | HEIGHT: 68 IN | BODY MASS INDEX: 39.68 KG/M2 | DIASTOLIC BLOOD PRESSURE: 69 MMHG

## 2022-04-13 DIAGNOSIS — E66.01 MORBID OBESITY: ICD-10-CM

## 2022-04-13 DIAGNOSIS — Z96.652 HX OF TOTAL KNEE REPLACEMENT, LEFT: Primary | ICD-10-CM

## 2022-04-13 PROCEDURE — 99212 OFFICE O/P EST SF 10 MIN: CPT | Performed by: PHYSICIAN ASSISTANT
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
## 2022-04-15 ENCOUNTER — OFFICE VISIT (OUTPATIENT)
Dept: ORTHOPEDIC SURGERY | Facility: CLINIC | Age: 72
End: 2022-04-15

## 2022-04-15 VITALS
DIASTOLIC BLOOD PRESSURE: 77 MMHG | WEIGHT: 263.2 LBS | HEART RATE: 66 BPM | SYSTOLIC BLOOD PRESSURE: 127 MMHG | HEIGHT: 68 IN | BODY MASS INDEX: 39.89 KG/M2

## 2022-04-15 DIAGNOSIS — E66.01 MORBID OBESITY: ICD-10-CM

## 2022-04-15 DIAGNOSIS — M17.11 PRIMARY OSTEOARTHRITIS OF RIGHT KNEE: Primary | ICD-10-CM

## 2022-04-15 PROCEDURE — 20610 DRAIN/INJ JOINT/BURSA W/O US: CPT | Performed by: PHYSICIAN ASSISTANT

## 2022-04-15 PROCEDURE — 99213 OFFICE O/P EST LOW 20 MIN: CPT | Performed by: PHYSICIAN ASSISTANT

## 2022-04-15 RX ORDER — MELOXICAM 15 MG/1
15 TABLET ORAL DAILY
Qty: 30 TABLET | Refills: 2 | Status: SHIPPED | OUTPATIENT
Start: 2022-04-15 | End: 2022-06-13

## 2022-04-15 RX ORDER — LIDOCAINE HYDROCHLORIDE 10 MG/ML
2 INJECTION, SOLUTION EPIDURAL; INFILTRATION; INTRACAUDAL; PERINEURAL
Status: COMPLETED | OUTPATIENT
Start: 2022-04-15 | End: 2022-04-15

## 2022-04-15 RX ORDER — TRIAMCINOLONE ACETONIDE 40 MG/ML
80 INJECTION, SUSPENSION INTRA-ARTICULAR; INTRAMUSCULAR
Status: COMPLETED | OUTPATIENT
Start: 2022-04-15 | End: 2022-04-15

## 2022-04-15 RX ADMIN — LIDOCAINE HYDROCHLORIDE 2 ML: 10 INJECTION, SOLUTION EPIDURAL; INFILTRATION; INTRACAUDAL; PERINEURAL at 09:02

## 2022-04-15 RX ADMIN — TRIAMCINOLONE ACETONIDE 80 MG: 40 INJECTION, SUSPENSION INTRA-ARTICULAR; INTRAMUSCULAR at 09:02
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
## 2022-04-15 NOTE — PATIENT INSTRUCTIONS
"Osteoarthritis    Osteoarthritis is a type of arthritis. It refers to joint pain or joint disease. Osteoarthritis affects tissue that covers the ends of bones in joints (cartilage). Cartilage acts as a cushion between the bones and helps them move smoothly. Osteoarthritis occurs when cartilage in the joints gets worn down. Osteoarthritis is sometimes called \"wear and tear\" arthritis.  Osteoarthritis is the most common form of arthritis. It often occurs in older people. It is a condition that gets worse over time. The joints most often affected by this condition are in the fingers, toes, hips, knees, and spine, including the neck and lower back.  What are the causes?  This condition is caused by the wearing down of cartilage that covers the ends of bones.  What increases the risk?  The following factors may make you more likely to develop this condition:  Being age 50 or older.  Obesity.  Overuse of joints.  Past injury of a joint.  Past surgery on a joint.  Family history of osteoarthritis.  What are the signs or symptoms?  The main symptoms of this condition are pain, swelling, and stiffness in the joint. Other symptoms may include:  An enlarged joint.  More pain and further damage caused by small pieces of bone or cartilage that break off and float inside of the joint.  Small deposits of bone (osteophytes) that grow on the edges of the joint.  A grating or scraping feeling inside the joint when you move it.  Popping or creaking sounds when you move.  Difficulty walking or exercising.  An inability to  items, twist your hand(s), or control the movements of your hands and fingers.  How is this diagnosed?  This condition may be diagnosed based on:  Your medical history.  A physical exam.  Your symptoms.  X-rays of the affected joint(s).  Blood tests to rule out other types of arthritis.  How is this treated?  There is no cure for this condition, but treatment can help control pain and improve joint function. " Treatment may include a combination of therapies, such as:  Pain relief techniques, such as:  Applying heat and cold to the joint.  Massage.  A form of talk therapy called cognitive behavioral therapy (CBT). This therapy helps you set goals and follow up on the changes that you make.  Medicines for pain and inflammation. The medicines can be taken by mouth or applied to the skin. They include:  NSAIDs, such as ibuprofen.  Prescription medicines.  Strong anti-inflammatory medicines (corticosteroids).  Certain nutritional supplements.  A prescribed exercise program. You may work with a physical therapist.  Assistive devices, such as a brace, wrap, splint, specialized glove, or cane.  A weight control plan.  Surgery, such as:  An osteotomy. This is done to reposition the bones and relieve pain or to remove loose pieces of bone and cartilage.  Joint replacement surgery. You may need this surgery if you have advanced osteoarthritis.  Follow these instructions at home:  Activity  Rest your affected joints as told by your health care provider.  Exercise as told by your health care provider. He or she may recommend specific types of exercise, such as:  Strengthening exercises. These are done to strengthen the muscles that support joints affected by arthritis.  Aerobic activities. These are exercises, such as brisk walking or water aerobics, that increase your heart rate.  Range-of-motion activities. These help your joints move more easily.  Balance and agility exercises.  Managing pain, stiffness, and swelling         If directed, apply heat to the affected area as often as told by your health care provider. Use the heat source that your health care provider recommends, such as a moist heat pack or a heating pad.  If you have a removable assistive device, remove it as told by your health care provider.  Place a towel between your skin and the heat source. If your health care provider tells you to keep the assistive device  on while you apply heat, place a towel between the assistive device and the heat source.  Leave the heat on for 20-30 minutes.  Remove the heat if your skin turns bright red. This is especially important if you are unable to feel pain, heat, or cold. You may have a greater risk of getting burned.  If directed, put ice on the affected area. To do this:  If you have a removable assistive device, remove it as told by your health care provider.  Put ice in a plastic bag.  Place a towel between your skin and the bag. If your health care provider tells you to keep the assistive device on during icing, place a towel between the assistive device and the bag.  Leave the ice on for 20 minutes, 2-3 times a day.  Move your fingers or toes often to reduce stiffness and swelling.  Raise (elevate) the injured area above the level of your heart while you are sitting or lying down.  General instructions  Take over-the-counter and prescription medicines only as told by your health care provider.  Maintain a healthy weight. Follow instructions from your health care provider for weight control.  Do not use any products that contain nicotine or tobacco, such as cigarettes, e-cigarettes, and chewing tobacco. If you need help quitting, ask your health care provider.  Use assistive devices as told by your health care provider.  Keep all follow-up visits as told by your health care provider. This is important.  Where to find more information  National [place] of Arthritis and Musculoskeletal and Skin Diseases: www.niams.nih.gov  National [place] on Aging: www.keli.nih.gov  American College of Rheumatology: www.rheumatology.org  Contact a health care provider if:  You have redness, swelling, or a feeling of warmth in a joint that gets worse.  You have a fever along with joint or muscle aches.  You develop a rash.  You have trouble doing your normal activities.  Get help right away if:  You have pain that gets worse and is not relieved by  pain medicine.  Summary  Osteoarthritis is a type of arthritis that affects tissue covering the ends of bones in joints (cartilage).  This condition is caused by the wearing down of cartilage that covers the ends of bones.  The main symptom of this condition is pain, swelling, and stiffness in the joint.  There is no cure for this condition, but treatment can help control pain and improve joint function.  This information is not intended to replace advice given to you by your health care provider. Make sure you discuss any questions you have with your health care provider.  Document Revised: 12/14/2020 Document Reviewed: 12/14/2020  [employer] Patient Education © 2021 [employer]

## 2022-04-18 NOTE — CONSULT NOTE ADULT - PROVIDER SPECIALTY LIST ADULT
"RN-CDE Note    Subjective:   Endocrinology Clinic Progress Note  PCP: Rose Marie Gao M.D.    HPI:  Shirley Unger is a 69 y.o. old patient who is seen today for review of Type 2 Diabetes.    Recent changes in health: Health good.  DM:   Last A1c:   Lab Results   Component Value Date/Time    HBA1C 7.3 (H) 03/02/2022 09:45 AM      Previous A1c was 6.6 on 11/16/21  A1C GOAL: < 7    Diabetes Medications:   Trulicity 1.5 mg weekly  Invokana 300 mg daily  Metformin 1000 mg BID  Glimepiride 4 mg daily      Exercise: Starting to walk again after surgery  Diet: \"healthy\" diet  in general  Patient's body mass index is unknown because there is no height or weight on file. Exercise and nutrition counseling were performed at this visit.    Glucose monitoring frequency: Testing blood sugars weekly    Hypoglycemic episodes: no  Last Retinal Exam: on file and up-to-date  Daily Foot Exam: Yes   Foot Exam:  Monofilament: done  Monofilament testing with a 10 gram force: sensation intact: intact bilaterally  Visual Inspection: Feet without maceration, ulcers, fissures.  Pedal pulses: intact bilaterally   Lab Results   Component Value Date/Time    MALBCRT see below 11/22/2021 10:15 AM    MICROALBUR <1.2 11/22/2021 10:15 AM     She  reports that she has never smoked. She has never used smokeless tobacco.      Plan:     Discussed and educated on:   - All medications, side effects and compliance (discussed carefully)  - Annual eye examinations at Ophthalmology  - Home glucose monitoring emphasized  - Weight control and daily exercise    Recommended medication changes: She plans on getting more active.  She can increase her Trulicity if needed.   "
Infectious Disease
Neurosurgery
Pain Medicine

## 2022-04-19 ENCOUNTER — OFFICE VISIT (OUTPATIENT)
Dept: FAMILY MEDICINE CLINIC | Facility: CLINIC | Age: 72
End: 2022-04-19

## 2022-04-19 VITALS
WEIGHT: 261 LBS | DIASTOLIC BLOOD PRESSURE: 72 MMHG | OXYGEN SATURATION: 99 % | BODY MASS INDEX: 39.56 KG/M2 | HEART RATE: 77 BPM | HEIGHT: 68 IN | TEMPERATURE: 97.7 F | RESPIRATION RATE: 18 BRPM | SYSTOLIC BLOOD PRESSURE: 127 MMHG

## 2022-04-19 DIAGNOSIS — R35.1 NOCTURIA: ICD-10-CM

## 2022-04-19 DIAGNOSIS — Z00.00 ENCOUNTER FOR SUBSEQUENT ANNUAL WELLNESS VISIT IN MEDICARE PATIENT: Primary | ICD-10-CM

## 2022-04-19 DIAGNOSIS — R35.1 BENIGN PROSTATIC HYPERPLASIA WITH NOCTURIA: ICD-10-CM

## 2022-04-19 DIAGNOSIS — R39.15 URINARY URGENCY: ICD-10-CM

## 2022-04-19 DIAGNOSIS — N52.9 ERECTILE DYSFUNCTION, UNSPECIFIED ERECTILE DYSFUNCTION TYPE: ICD-10-CM

## 2022-04-19 DIAGNOSIS — N40.1 BENIGN PROSTATIC HYPERPLASIA WITH NOCTURIA: ICD-10-CM

## 2022-04-19 DIAGNOSIS — R09.81 NASAL SINUS CONGESTION: ICD-10-CM

## 2022-04-19 LAB
BILIRUB BLD-MCNC: NEGATIVE MG/DL
CLARITY, POC: CLEAR
COLOR UR: YELLOW
GLUCOSE UR STRIP-MCNC: NEGATIVE MG/DL
KETONES UR QL: NEGATIVE
LEUKOCYTE EST, POC: NEGATIVE
NITRITE UR-MCNC: NEGATIVE MG/ML
PH UR: 5.5 [PH] (ref 5–8)
PROT UR STRIP-MCNC: ABNORMAL MG/DL
RBC # UR STRIP: NEGATIVE /UL
SP GR UR: 1 (ref 1–1.03)
UROBILINOGEN UR QL: NORMAL

## 2022-04-19 PROCEDURE — 99214 OFFICE O/P EST MOD 30 MIN: CPT | Performed by: FAMILY MEDICINE

## 2022-04-19 PROCEDURE — 81003 URINALYSIS AUTO W/O SCOPE: CPT | Performed by: FAMILY MEDICINE

## 2022-04-19 PROCEDURE — G0439 PPPS, SUBSEQ VISIT: HCPCS | Performed by: FAMILY MEDICINE

## 2022-04-19 PROCEDURE — 1159F MED LIST DOCD IN RCRD: CPT | Performed by: FAMILY MEDICINE

## 2022-04-19 RX ORDER — AMOXICILLIN 500 MG/1
TABLET, FILM COATED ORAL
COMMUNITY
Start: 2022-04-18 | End: 2023-04-03

## 2022-04-19 RX ORDER — SILDENAFIL CITRATE 20 MG/1
20 TABLET ORAL DAILY
Qty: 30 TABLET | Refills: 12 | Status: SHIPPED | OUTPATIENT
Start: 2022-04-19 | End: 2022-08-15

## 2022-04-19 NOTE — PROGRESS NOTES
The ABCs of the Annual Wellness Visit  Subsequent Medicare Wellness Visit    Chief Complaint   Patient presents with   • Medicare Wellness-subsequent      Subjective    History of Present Illness:  Orion Harvey is a 71 y.o. male who presents for a Subsequent Medicare Wellness Visit.    He is also concerned with sinus congestion.    Increased nocturia in past 3 weeks, now 3 times a night. Decreased flow and and hesitancy. Also urgency when up moving around.    The following portions of the patient's history were reviewed and   updated as appropriate: allergies, current medications, past family history, past medical history, past social history, past surgical history and problem list.    Compared to one year ago, the patient feels his physical   health is the same.    Compared to one year ago, the patient feels his mental   health is the same.    Did have steroid shot in right knee with ortho, Ciarra Boateng on Friday, has been helping.  Prescribed meloxicam. Returning in 3 months, if does not last would discus hyalgan.     Recent Hospitalizations:  He was not admitted to the hospital during the last year.     Current Medical Providers:  Patient Care Team:  Nazanin Garcia MD as PCP - General (Family Medicine)  Rosmery Manuel as Technologist    Outpatient Medications Prior to Visit   Medication Sig Dispense Refill   • acetaminophen (TYLENOL) 650 MG 8 hr tablet Take 650 mg by mouth Every 8 (Eight) Hours As Needed for Mild Pain .     • amoxicillin (AMOXIL) 500 MG tablet For dentist work takes as needed     • ascorbic acid (VITAMIN C) 1000 MG tablet Take 1 tablet by mouth Daily. LAST DOSE 11/23     • aspirin 325 MG tablet Take 325 mg by mouth Daily.     • empagliflozin (Jardiance) 25 MG tablet tablet Take 1 tablet by mouth Daily. (Patient taking differently: Take 25 mg by mouth Daily. DONT TAKE PREOP) 30 tablet 12   • glimepiride (AMARYL) 4 MG tablet Take 1 tablet by mouth 2 (Two) Times a Day. (Patient  taking differently: Take 4 mg by mouth 2 (Two) Times a Day. DONT TAKE PREOP) 180 tablet 3   • glucose blood (OneTouch Verio) test strip 1 each by Other route 2 (Two) Times a Day. Once daily  DX E11.65 100 each 3   • lisinopril (PRINIVIL,ZESTRIL) 40 MG tablet Take 1 tablet by mouth Daily. 90 tablet 3   • meloxicam (MOBIC) 15 MG tablet Take 1 tablet by mouth Daily. Prn joint pain 30 tablet 2   • metFORMIN (GLUCOPHAGE) 1000 MG tablet Take 1 tablet by mouth twice daily (Patient taking differently: Take 1,000 mg by mouth 2 (Two) Times a Day With Meals. LAST DOSE 11/28 AM) 180 tablet 3   • Multiple Vitamins-Minerals (CENTRUM SILVER) tablet Take 1 tablet by mouth Every Evening. LAST DOSE 11/22     • OneTouch Delica Lancets 33G misc 1 strip by Other route Every 12 (Twelve) Hours. 100 each 3   • rosuvastatin (CRESTOR) 10 MG tablet Take 1 tablet by mouth once daily (Patient taking differently: Take 10 mg by mouth Every Night.) 90 tablet 3     No facility-administered medications prior to visit.       No opioid medication identified on active medication list. I have reviewed chart for other potential  high risk medication/s and harmful drug interactions in the elderly.          Aspirin is on active medication list. Aspirin use is indicated based on review of current medical condition/s. Pros and cons of this therapy have been discussed today. Benefits of this medication outweigh potential harm.  Patient has been encouraged to continue taking this medication.  .      Patient Active Problem List   Diagnosis   • Cough due to ACE inhibitor   • Diabetes mellitus, type 2 (HCC)   • Gout   • Mixed hyperlipidemia   • Primary hypertension   • Kidney stones   • Obstructive sleep apnea syndrome   • BPH (benign prostatic hyperplasia)   • Colon polyps   • Morbid obesity (HCC)   • Plantar fasciitis   • LLOYD on CPAP   • Nuclear sclerosis   • Presbyopia   • Osteoarthritis of left shoulder   • Osteoarthritis of right glenohumeral joint   •  "Status post replacement of right shoulder joint   • Right wrist pain   • Tear of right scapholunate ligament   • Chondromalacia of knee   • Chronic pain of both knees   • Osteoarthritis of both knees   • Easy bruising   • Pain in both knees   • Primary osteoarthritis involving multiple joints   • Chronic pain syndrome   • Hx of total knee replacement, left   • Primary osteoarthritis of right knee     Advance Care Planning  Advance Directive is on file.  ACP discussion was held with the patient during this visit. .  Advanced directives are on file        Objective    Vitals:    22 1118   BP: 127/72   Pulse: 77   Resp: 18   Temp: 97.7 °F (36.5 °C)   TempSrc: Temporal   SpO2: 99%   Weight: 118 kg (261 lb)   Height: 172.7 cm (68\")   PainSc: 0-No pain     BMI Readings from Last 1 Encounters:   22 39.68 kg/m²   BMI is above normal parameters. Recommendations include: exercise counseling and nutrition counseling  ATTENTION  What is the year: correct  What is the month of the year: correct  What is the day of the week?: correct  What is the date?: correct  MEMORY  Repeat address three times, only score third attempt: [address], [place]: 7  HOW MANY ANIMALS DID THE PATIENT NAME  Verbal Fluency -- Animal Names (0-25): 17-21  CLOCK DRAWING  Clock Drawing: All Correct  MEMORY RECALL  Tell me what you remember about that name and address we were repeating at the beginnin  ACE TOTAL SCORE  Total ACE Score - <25/30 strongly suggests cognitive impairment; <21/30 almost certainly shows dementia: 28      Does the patient have evidence of cognitive impairment? No    Physical Exam  Vitals and nursing note reviewed.   Constitutional:       General: He is not in acute distress.     Appearance: Normal appearance. He is well-developed. He is obese.   HENT:      Head: Normocephalic and atraumatic.   Eyes:      Conjunctiva/sclera: Conjunctivae normal.      Pupils: Pupils are equal, round, and " reactive to light.   Neck:      Thyroid: No thyromegaly.      Vascular: No JVD.   Cardiovascular:      Rate and Rhythm: Normal rate and regular rhythm.      Heart sounds: Normal heart sounds. No murmur heard.  Pulmonary:      Breath sounds: Normal breath sounds. No wheezing or rales.   Musculoskeletal:         General: Normal range of motion.      Cervical back: Normal range of motion.      Comments: Somewhat antalgic gait.  Arthritic changes of multiple joints   Lymphadenopathy:      Cervical: No cervical adenopathy.   Skin:     General: Skin is warm and dry.      Findings: No rash.   Neurological:      Mental Status: He is alert and oriented to person, place, and time.   Psychiatric:         Mood and Affect: Mood normal.         Behavior: Behavior normal.       Lab Results   Component Value Date    CHLPL 137 04/07/2022    TRIG 194 (H) 04/07/2022    HDL 49 04/07/2022    LDL 56 04/07/2022    VLDL 32 04/07/2022    HGBA1C 7.3 (H) 04/07/2022     Office Visit on 04/19/2022   Component Date Value Ref Range Status   • Color 04/19/2022 Yellow  Yellow, Straw, Dark Yellow, Chantal Final   • Clarity, UA 04/19/2022 Clear  Clear Final   • Glucose, UA 04/19/2022 Negative  Negative, 1000 mg/dL (3+) mg/dL Final   • Bilirubin 04/19/2022 Negative  Negative Final   • Ketones, UA 04/19/2022 Negative  Negative Final   • Specific Gravity  04/19/2022 1.005  1.005 - 1.030 Final   • Blood, UA 04/19/2022 Negative  Negative Final   • pH, Urine 04/19/2022 5.5  5.0 - 8.0 Final   • Protein, POC 04/19/2022 Trace (A) Negative mg/dL Final   • Urobilinogen, UA 04/19/2022 Normal  Normal Final   • Nitrite, UA 04/19/2022 Negative  Negative Final   • Leukocytes 04/19/2022 Negative  Negative Final              HEALTH RISK ASSESSMENT    Smoking Status:  Social History     Tobacco Use   Smoking Status Former Smoker   • Packs/day: 1.00   • Years: 10.00   • Pack years: 10.00   • Types: Cigarettes   • Start date: 1979   • Quit date: 1999   • Years since  quittin.3   Smokeless Tobacco Never Used     Alcohol Consumption:  Social History     Substance and Sexual Activity   Alcohol Use No     Fall Risk Screen:    STEADI Fall Risk Assessment was completed, and patient is at MODERATE risk for falls. Assessment completed on:2022    Depression Screening:  PHQ-2/PHQ-9 Depression Screening 2022   Retired Total Score -   Little Interest or Pleasure in Doing Things 0-->not at all   Feeling Down, Depressed or Hopeless 0-->not at all   PHQ-9: Brief Depression Severity Measure Score 0       Health Habits and Functional and Cognitive Screening:  Functional & Cognitive Status 2022   Do you have difficulty preparing food and eating? No   Do you have difficulty bathing yourself, getting dressed or grooming yourself? No   Do you have difficulty using the toilet? No   Do you have difficulty moving around from place to place? No   Do you have trouble with steps or getting out of a bed or a chair? No   Current Diet Well Balanced Diet   Dental Exam Up to date   Eye Exam Not up to date   Exercise (times per week) 0 times per week   Current Exercises Include Bicycling Outdoors   Do you need help using the phone?  No   Are you deaf or do you have serious difficulty hearing?  No   Do you need help with transportation? No   Do you need help shopping? No   Do you need help preparing meals?  No   Do you need help with housework?  No   Do you need help with laundry? No   Do you need help taking your medications? No   Do you need help managing money? No   Do you ever drive or ride in a car without wearing a seat belt? No   Have you felt unusual stress, anger or loneliness in the last month? No   Who do you live with? Spouse   If you need help, do you have trouble finding someone available to you? No   Have you been bothered in the last four weeks by sexual problems? No   Do you have difficulty concentrating, remembering or making decisions? No       Age-appropriate Screening  Schedule:  Refer to the list below for future screening recommendations based on patient's age, sex and/or medical conditions. Orders for these recommended tests are listed in the plan section. The patient has been provided with a written plan.    Health Maintenance   Topic Date Due   • ZOSTER VACCINE (2 of 3) 04/12/2023 (Originally 10/27/2012)   • DIABETIC EYE EXAM  05/25/2022   • INFLUENZA VACCINE  08/01/2022   • HEMOGLOBIN A1C  10/07/2022   • LIPID PANEL  04/07/2023   • URINE MICROALBUMIN  04/07/2023   • DIABETIC FOOT EXAM  04/12/2023   • TDAP/TD VACCINES (2 - Td or Tdap) 04/16/2028              Office Visit on 04/19/2022   Component Date Value Ref Range Status   • Color 04/19/2022 Yellow  Yellow, Straw, Dark Yellow, Chantal Final   • Clarity, UA 04/19/2022 Clear  Clear Final   • Glucose, UA 04/19/2022 Negative  Negative, 1000 mg/dL (3+) mg/dL Final   • Bilirubin 04/19/2022 Negative  Negative Final   • Ketones, UA 04/19/2022 Negative  Negative Final   • Specific Gravity  04/19/2022 1.005  1.005 - 1.030 Final   • Blood, UA 04/19/2022 Negative  Negative Final   • pH, Urine 04/19/2022 5.5  5.0 - 8.0 Final   • Protein, POC 04/19/2022 Trace (A) Negative mg/dL Final   • Urobilinogen, UA 04/19/2022 Normal  Normal Final   • Nitrite, UA 04/19/2022 Negative  Negative Final   • Leukocytes 04/19/2022 Negative  Negative Final         Assessment/Plan   CMS Preventative Services Quick Reference  Risk Factors Identified During Encounter  None Identified  The above risks/problems have been discussed with the patient.  Follow up actions/plans if indicated are seen below in the Assessment/Plan Section.  Pertinent information has been shared with the patient in the After Visit Summary.    Diagnoses and all orders for this visit:    1. Encounter for subsequent annual wellness visit in Medicare patient (Primary)    2. Nasal sinus congestion    3. Nocturia  -     sildenafil (REVATIO) 20 MG tablet; Take 1 tablet by mouth Daily.  Dispense:  30 tablet; Refill: 12    4. Benign prostatic hyperplasia with nocturia  -     sildenafil (REVATIO) 20 MG tablet; Take 1 tablet by mouth Daily.  Dispense: 30 tablet; Refill: 12    5. Urinary urgency  -     sildenafil (REVATIO) 20 MG tablet; Take 1 tablet by mouth Daily.  Dispense: 30 tablet; Refill: 12  -     POCT urinalysis dipstick, multipro    6. Erectile dysfunction, unspecified erectile dysfunction type    Multiple symptoms of BPH including nocturia, hesitancy, decreased urine flow, as well as urgency.  Urinalysis is negative for infection.  His patient also has erectile dysfunction starting on sildenafil 20 mg daily.  Initially avoiding alpha-blocker due to other antihypertensive therapy.  If phosphodiesterase 5 inhibitor is not covered and consider 5 alpha reductase inhibitor, ie Proscar(finasteride).    Sinus congestion advised to use nasal steroid such as Flonase, Nasonex, or Rhinocort.    Osteoarthritis of multiple joints, knee pain improved after steroid injection.  Advised since knee pain has resolved and does not need to take meloxicam every day but can use meloxicam on an as-needed basis for any arthritic pain did discuss risk of GI bleed or renal damage and advised Tylenol is still a safer choice.  And saline sprays or rinses. May also use claritin or allegra and  guaifenesin in the form of Mucinex or Robitussin    Follow Up:   Return in 12 weeks (on 7/12/2022) for as previously scheduled, diabetes, htn and bph.     An After Visit Summary and PPPS were made available to the patient.      I spent 45 minutes caring for Orion on this date of service. This time includes time spent by me in the following activities:preparing for the visit, reviewing tests, obtaining and/or reviewing a separately obtained history, performing a medically appropriate examination and/or evaluation , counseling and educating the patient/family/caregiver, ordering medications, tests, or procedures and documenting information in  the medical record

## 2022-04-27 NOTE — ANESTHESIA PREPROCEDURE EVALUATION
Anesthesia Evaluation     Patient summary reviewed and Nursing notes reviewed   no history of anesthetic complications:  NPO Solid Status: > 8 hours  NPO Liquid Status: > 8 hours           Airway   Mallampati: II  TM distance: >3 FB  Neck ROM: full  No difficulty expected and Large neck circumference  Dental - normal exam     Comment: Missing teeth    Pulmonary - normal exam   (+) a smoker Former, sleep apnea on CPAP,   Cardiovascular - normal exam    ECG reviewed    (+) hypertension, hyperlipidemia,       Neuro/Psych- negative ROS  GI/Hepatic/Renal/Endo    (+) morbid obesity,  renal disease stones, diabetes mellitus type 2 well controlled,     Musculoskeletal     Abdominal   (+) obese,     Bowel sounds: normal.   Substance History - negative use     OB/GYN negative ob/gyn ROS         Other   arthritis,                        Anesthesia Plan    ASA 3     general with block   total IV anesthesia(Lyrica, oxycontin, meloxicam)  intravenous induction     Anesthetic plan, all risks, benefits, and alternatives have been provided, discussed and informed consent has been obtained with: patient.    Plan discussed with CAA.       No

## 2022-05-18 ENCOUNTER — LAB (OUTPATIENT)
Dept: LAB | Facility: HOSPITAL | Age: 72
End: 2022-05-18

## 2022-05-18 ENCOUNTER — TRANSCRIBE ORDERS (OUTPATIENT)
Dept: ADMINISTRATIVE | Facility: HOSPITAL | Age: 72
End: 2022-05-18

## 2022-05-18 ENCOUNTER — HOSPITAL ENCOUNTER (OUTPATIENT)
Dept: CARDIOLOGY | Facility: HOSPITAL | Age: 72
Discharge: HOME | End: 2022-05-18

## 2022-05-18 DIAGNOSIS — Z01.818 PRE-OP TESTING: Primary | ICD-10-CM

## 2022-05-18 DIAGNOSIS — Z01.818 PRE-OP TESTING: ICD-10-CM

## 2022-05-18 LAB
ANION GAP SERPL CALCULATED.3IONS-SCNC: 15.8 MMOL/L (ref 5–15)
BASOPHILS # BLD AUTO: 0.01 10*3/MM3 (ref 0–0.2)
BASOPHILS NFR BLD AUTO: 0.2 % (ref 0–1.5)
BUN SERPL-MCNC: 23 MG/DL (ref 8–23)
BUN/CREAT SERPL: 29.1 (ref 7–25)
CALCIUM SPEC-SCNC: 9.9 MG/DL (ref 8.6–10.5)
CHLORIDE SERPL-SCNC: 103 MMOL/L (ref 98–107)
CO2 SERPL-SCNC: 21.2 MMOL/L (ref 22–29)
CREAT SERPL-MCNC: 0.79 MG/DL (ref 0.76–1.27)
DEPRECATED RDW RBC AUTO: 47.4 FL (ref 37–54)
EGFRCR SERPLBLD CKD-EPI 2021: 95.6 ML/MIN/1.73
EOSINOPHIL # BLD AUTO: 0.04 10*3/MM3 (ref 0–0.4)
EOSINOPHIL NFR BLD AUTO: 0.7 % (ref 0.3–6.2)
ERYTHROCYTE [DISTWIDTH] IN BLOOD BY AUTOMATED COUNT: 13.9 % (ref 12.3–15.4)
GLUCOSE SERPL-MCNC: 100 MG/DL (ref 65–99)
HCT VFR BLD AUTO: 44.1 % (ref 37.5–51)
HGB BLD-MCNC: 14.5 G/DL (ref 13–17.7)
IMM GRANULOCYTES # BLD AUTO: 0.02 10*3/MM3 (ref 0–0.05)
IMM GRANULOCYTES NFR BLD AUTO: 0.4 % (ref 0–0.5)
LYMPHOCYTES # BLD AUTO: 1.18 10*3/MM3 (ref 0.7–3.1)
LYMPHOCYTES NFR BLD AUTO: 20.9 % (ref 19.6–45.3)
MCH RBC QN AUTO: 30.4 PG (ref 26.6–33)
MCHC RBC AUTO-ENTMCNC: 32.9 G/DL (ref 31.5–35.7)
MCV RBC AUTO: 92.5 FL (ref 79–97)
MONOCYTES # BLD AUTO: 0.71 10*3/MM3 (ref 0.1–0.9)
MONOCYTES NFR BLD AUTO: 12.6 % (ref 5–12)
NEUTROPHILS NFR BLD AUTO: 3.69 10*3/MM3 (ref 1.7–7)
NEUTROPHILS NFR BLD AUTO: 65.2 % (ref 42.7–76)
NRBC BLD AUTO-RTO: 0 /100 WBC (ref 0–0.2)
PLATELET # BLD AUTO: 188 10*3/MM3 (ref 140–450)
PMV BLD AUTO: 10.9 FL (ref 6–12)
POTASSIUM SERPL-SCNC: 4.4 MMOL/L (ref 3.5–5.2)
QT INTERVAL: 366 MS
RBC # BLD AUTO: 4.77 10*6/MM3 (ref 4.14–5.8)
SODIUM SERPL-SCNC: 140 MMOL/L (ref 136–145)
WBC NRBC COR # BLD: 5.65 10*3/MM3 (ref 3.4–10.8)

## 2022-05-18 PROCEDURE — 85025 COMPLETE CBC W/AUTO DIFF WBC: CPT

## 2022-05-18 PROCEDURE — 93005 ELECTROCARDIOGRAM TRACING: CPT | Performed by: UROLOGY

## 2022-05-18 PROCEDURE — 93010 ELECTROCARDIOGRAM REPORT: CPT | Performed by: INTERNAL MEDICINE

## 2022-05-18 PROCEDURE — 80048 BASIC METABOLIC PNL TOTAL CA: CPT

## 2022-05-18 PROCEDURE — 36415 COLL VENOUS BLD VENIPUNCTURE: CPT

## 2022-06-11 DIAGNOSIS — M17.11 PRIMARY OSTEOARTHRITIS OF RIGHT KNEE: ICD-10-CM

## 2022-06-13 RX ORDER — MELOXICAM 15 MG/1
TABLET ORAL
Qty: 90 TABLET | Refills: 3 | Status: SHIPPED | OUTPATIENT
Start: 2022-06-13

## 2022-06-17 NOTE — PATIENT PROFILE ADULT - NSPROPTRIGHTREPNAME_GEN_A__NUR
Physical Therapy Visit    Referred by: José Teran MD; Medical Diagnosis (from order):    Diagnosis Information      Diagnosis    715.96 (ICD-9-CM) - M17.12 (ICD-10-CM) - Osteoarthritis of left knee              Visit: 8    Visit Type: Daily Treatment Note  Next referring provider appointment: 7/6/2022        SUBJECTIVE                                                                                                               Date of surgery: 5/23/2022  Procedure: left TKA.  Post Op: 3 weeks as of 6-13-22    Meena reports that her lower back started bothering her a few days ago. The more time she spends standing. Yesterday she was on the go from 11AM-8PM. Her knee is pretty good today, it's just her lower back. Patient presents without her cane today.  Functional Change: She drove her car for the first time yesterday - went well  Pain / Symptoms:  Pain rating (out of 10): Current: 0     OBJECTIVE                                                                                                                     Observed Gait:   Assistive Device: no assistive device and single point cane  Weight bearing: Left: full  Right: full    Analysis: decreased mya/pace;    • Left: decreased stance time and lateral trunk lean    Patient had improved stance time and decreased lateral trunk lean with use of straight cane versus no assistive device. She also did not have the low back pain when using the cane. Patient to use the cane again to decrease her low back pain, decrease her lateral trunk lean, and improve her stance time on her LLE.    Range of Motion (ROM)   (degrees unless noted; active unless noted; norms in ( ); negative=lacking to 0, positive=beyond 0)   Knee:    - Flexion (150):        • Left: 117 Passive: 119    - Extension (0-10):        • Left: 0      TREATMENT                                                                                                                  Therapeutic Exercise:  Sci-fit  stepper. Seat on 14. Arms 5. 6 minutes. Level 2.    AT STAIRS:  - 2nd step left hamstring stretch 2 x 30 seconds  - 2nd step knee flexion 3x10  - incline bilat gastroc stretch x 1 min     // Bars:   4 Inch Step  - up with left, backward down with right x10  6 Inch Step  - up with left, backward down with right x10  - lateral step ups Left/Right x10    SEATED:   - LLE knee Extension x5 seconds; with 2 lb ankle weight 10x5 seconds     SUPINE:   - AROM knee flexion heel slides LLE x10    measurements    Manual Therapy:  Supine knee extension PROM with accessory movements.  Supine posterior tibiofemoral mobilizations Grades II-III.    Skilled input: verbal instruction/cues and tactile instruction/cues    Writer verbally educated and received verbal consent for hand placement, positioning of patient, and techniques to be performed today from patient for hand placement and palpation for techniques and therapist position for techniques as described above and how they are pertinent to the patient's plan of care.    Home Exercise Program/Education Materials: Patient to continue with the following: ankle pumps, supine heel slides with use of a belt, and supine quads sets (recommended a small rolled towel). 05-25-22.  6/9/22: verbal quad stretch with chair.  Access Code: [email or web address]  URL: [email or web address]  Date: 06/15/2022  Prepared by: Mendel Walls  Exercises  · Seated Hamstring Stretch - 2-3 x daily - 7 x weekly - 3 sets - 30 hold  · Seated Knee Flexion AAROM - 2-3 x daily - 7 x weekly - 1-2 sets - 10 reps - 5 hold  · Supine Straight Leg Raises - 2 x daily - 7 x weekly - 2-3 sets - 10 reps  · Standing Knee Flexion Stretch on Step - 2-3 x daily - 7 x weekly - 2-3 sets - 10 reps  · Seated Long Arc Quad - 2 x daily - 7 x weekly - 2-3 sets - 10 reps - 5 hold     ASSESSMENT                                                                                                             Meena's knee flexion AROM  was the same as last visit; however was able to get to 119 degrees passively today. She was able to perform step exercise's on both 4 and 6 inch step today. She demonstrated improved stance time on her LLE, decreased lateral trunk lean, and improved mya speed with use of cane versus without it - she is to use the cane again for longer distances and community ambulation. Patient will continue to benefit from skilled therapy per the P.T. plan of care, to address impairments to be able to return to normal activities of daily living without limitations.  Pain after session: feels fine.  Patient Education:   Results of above outlined education: Verbalizes understanding and Demonstrates understanding      PLAN                                                                                                                           Suggestions for next session as indicated: Progress per plan of care  Knee ROM; Stretching; Quads-lower extremity strengthening progression         Therapy procedure time and total treatment time can be found documented on the Time Entry flowsheet   [place]

## 2022-07-17 DIAGNOSIS — E11.9 TYPE 2 DIABETES MELLITUS WITHOUT COMPLICATION, WITHOUT LONG-TERM CURRENT USE OF INSULIN: ICD-10-CM

## 2022-07-17 DIAGNOSIS — E78.2 MIXED HYPERLIPIDEMIA: ICD-10-CM

## 2022-07-18 ENCOUNTER — OFFICE VISIT (OUTPATIENT)
Dept: ORTHOPEDIC SURGERY | Facility: CLINIC | Age: 72
End: 2022-07-18

## 2022-07-18 VITALS
BODY MASS INDEX: 39.59 KG/M2 | HEART RATE: 67 BPM | HEIGHT: 68 IN | SYSTOLIC BLOOD PRESSURE: 143 MMHG | DIASTOLIC BLOOD PRESSURE: 80 MMHG | WEIGHT: 261.2 LBS

## 2022-07-18 DIAGNOSIS — E66.01 MORBID OBESITY: ICD-10-CM

## 2022-07-18 DIAGNOSIS — M17.11 PRIMARY OSTEOARTHRITIS OF RIGHT KNEE: Primary | ICD-10-CM

## 2022-07-18 PROCEDURE — 20610 DRAIN/INJ JOINT/BURSA W/O US: CPT | Performed by: PHYSICIAN ASSISTANT

## 2022-07-18 RX ORDER — TRIAMCINOLONE ACETONIDE 40 MG/ML
80 INJECTION, SUSPENSION INTRA-ARTICULAR; INTRAMUSCULAR
Status: COMPLETED | OUTPATIENT
Start: 2022-07-18 | End: 2022-07-18

## 2022-07-18 RX ORDER — ROSUVASTATIN CALCIUM 10 MG/1
TABLET, COATED ORAL
Qty: 90 TABLET | Refills: 3 | Status: SHIPPED | OUTPATIENT
Start: 2022-07-18

## 2022-07-18 RX ORDER — FLUTICASONE PROPIONATE 50 MCG
2 SPRAY, SUSPENSION (ML) NASAL DAILY PRN
COMMUNITY

## 2022-07-18 RX ORDER — LIDOCAINE HYDROCHLORIDE 10 MG/ML
2 INJECTION, SOLUTION EPIDURAL; INFILTRATION; INTRACAUDAL; PERINEURAL
Status: COMPLETED | OUTPATIENT
Start: 2022-07-18 | End: 2022-07-18

## 2022-07-18 RX ADMIN — LIDOCAINE HYDROCHLORIDE 2 ML: 10 INJECTION, SOLUTION EPIDURAL; INFILTRATION; INTRACAUDAL; PERINEURAL at 09:04

## 2022-07-18 RX ADMIN — TRIAMCINOLONE ACETONIDE 80 MG: 40 INJECTION, SUSPENSION INTRA-ARTICULAR; INTRAMUSCULAR at 09:04
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
## 2022-07-20 NOTE — ED ADULT NURSE NOTE - SUICIDE SCREENING DEPRESSION
Mr. Janice Lees is here today for anticoagulation monitoring for the diagnosis of Atrial Fibrillation. His INR goal is 2.0-3.0 and his current Coumadin dose is 10 mg, then 7.5 mg repeating every 2 days. Today's findings include an INR of 2.3     Considering Mr. Daigle's past history, todays findings, and per the coumadin policy/protocol, Mr. Lenin Jung was instructed to take Coumadin as follows,  10 mg, then 7.5 mg repeating every 2 days. He was also instructed to come back in 4 weeks for an INR check. A full discussion of the nature of anticoagulants has been carried out. A full discussion of the need for frequent and regular monitoring, precise dosage adjustment and compliance was stressed. Side effects of potential bleeding were discussed and Mr. Lenin Jung was instructed to call [phone] if there are any signs of abnormal bleeding. Mr. Lenin Jung was instructed to avoid any OTC items containing aspirin or ibuprofen and prior to starting any new OTC products to consult with his physician or pharmacist to ensure no drug interactions are present. Mr. Lenin Jung was instructed to avoid any major changes in his general diet and to avoid alcohol consumption. .      Mr. Lenin Jung verbalized his understanding of all instructions and will call the office with any questions, concerns, or signs of abnormal bleeding or blood clot. Negative

## 2022-08-08 NOTE — ED ADULT TRIAGE NOTE - TEMPERATURE IN CELSIUS (DEGREES C)
36.9 67 year old female with history of scleroderma, interstitial lung disease, pulmonary hypertension, and chronic hypoxemic respiratory failure here with shortness of breath. CT chest reviewed. Obtain TTE to assess pulmonary pressures. Further work up pending results. Continue home diuretics and Letairis. 67 year old female with history of scleroderma, interstitial lung disease, pulmonary hypertension, and chronic hypoxemic respiratory failure here with shortness of breath. CT chest reviewed. Obtain TTE to assess pulmonary pressures. Further work up pending results. Continue home diuretics and Letairis. Supplemental O2 to maintain SpO2 92 - 95%.

## 2022-08-15 ENCOUNTER — OFFICE VISIT (OUTPATIENT)
Dept: FAMILY MEDICINE CLINIC | Facility: CLINIC | Age: 72
End: 2022-08-15

## 2022-08-15 VITALS
DIASTOLIC BLOOD PRESSURE: 70 MMHG | OXYGEN SATURATION: 99 % | TEMPERATURE: 98.4 F | RESPIRATION RATE: 16 BRPM | BODY MASS INDEX: 39.92 KG/M2 | HEIGHT: 68 IN | WEIGHT: 263.4 LBS | SYSTOLIC BLOOD PRESSURE: 132 MMHG | HEART RATE: 71 BPM

## 2022-08-15 DIAGNOSIS — E11.9 TYPE 2 DIABETES MELLITUS WITHOUT COMPLICATION, WITHOUT LONG-TERM CURRENT USE OF INSULIN: Primary | ICD-10-CM

## 2022-08-15 DIAGNOSIS — I10 PRIMARY HYPERTENSION: ICD-10-CM

## 2022-08-15 DIAGNOSIS — E66.01 OBESITY, CLASS III, BMI 40-49.9 (MORBID OBESITY): ICD-10-CM

## 2022-08-15 PROCEDURE — 99214 OFFICE O/P EST MOD 30 MIN: CPT | Performed by: FAMILY MEDICINE

## 2022-08-26 DIAGNOSIS — E11.9 TYPE 2 DIABETES MELLITUS WITHOUT COMPLICATION, WITHOUT LONG-TERM CURRENT USE OF INSULIN: ICD-10-CM

## 2022-08-31 ENCOUNTER — TELEPHONE (OUTPATIENT)
Dept: ORTHOPEDIC SURGERY | Facility: CLINIC | Age: 72
End: 2022-08-31

## 2022-08-31 DIAGNOSIS — M17.11 PRIMARY OSTEOARTHRITIS OF RIGHT KNEE: Primary | ICD-10-CM

## 2022-09-13 NOTE — CHART NOTE - NSCHARTNOTEFT_GEN_A_CORE
Pt with c/o today that Fentanyl Patch and Oxy IR was not controlling his pain and requesting more pain medications.   Spoke with NP Alie who stated that it will take 3 days for pt to get the effect of Fentanyl patch. She recommended that Oxy dose be increased. Dose increased. Endorsed to Night PA. No

## 2022-10-05 NOTE — TELEPHONE ENCOUNTER
Visco referral was never entered when the patient called on 08/31/22, patient showed for appointment today but it was canceled due to not having authorization.    Patient is leaving for [place] at the end of the month.     Please obtain auth; he can come for an appointment on 10/19/22 he states if Monovisc is not approved he will get the steroid but wants to try for the visco first.

## 2022-10-10 ENCOUNTER — OFFICE VISIT (OUTPATIENT)
Dept: FAMILY MEDICINE CLINIC | Facility: CLINIC | Age: 72
End: 2022-10-10

## 2022-10-10 VITALS
OXYGEN SATURATION: 99 % | SYSTOLIC BLOOD PRESSURE: 132 MMHG | RESPIRATION RATE: 18 BRPM | WEIGHT: 263 LBS | TEMPERATURE: 98 F | BODY MASS INDEX: 39.86 KG/M2 | HEART RATE: 79 BPM | DIASTOLIC BLOOD PRESSURE: 76 MMHG | HEIGHT: 68 IN

## 2022-10-10 DIAGNOSIS — Z87.891 FORMER SMOKER: ICD-10-CM

## 2022-10-10 DIAGNOSIS — E66.01 CLASS 2 SEVERE OBESITY DUE TO EXCESS CALORIES WITH SERIOUS COMORBIDITY AND BODY MASS INDEX (BMI) OF 39.0 TO 39.9 IN ADULT: ICD-10-CM

## 2022-10-10 DIAGNOSIS — E11.9 TYPE 2 DIABETES MELLITUS WITHOUT COMPLICATION, WITHOUT LONG-TERM CURRENT USE OF INSULIN: Primary | Chronic | ICD-10-CM

## 2022-10-10 DIAGNOSIS — E78.2 MIXED HYPERLIPIDEMIA: ICD-10-CM

## 2022-10-10 DIAGNOSIS — Z23 INFLUENZA VACCINATION ADMINISTERED AT CURRENT VISIT: ICD-10-CM

## 2022-10-10 PROBLEM — E66.812 CLASS 2 SEVERE OBESITY DUE TO EXCESS CALORIES WITH SERIOUS COMORBIDITY AND BODY MASS INDEX (BMI) OF 39.0 TO 39.9 IN ADULT: Status: ACTIVE | Noted: 2019-07-29

## 2022-10-10 PROCEDURE — 90662 IIV NO PRSV INCREASED AG IM: CPT | Performed by: FAMILY MEDICINE

## 2022-10-10 PROCEDURE — G0008 ADMIN INFLUENZA VIRUS VAC: HCPCS | Performed by: FAMILY MEDICINE

## 2022-10-10 PROCEDURE — 99214 OFFICE O/P EST MOD 30 MIN: CPT | Performed by: FAMILY MEDICINE

## 2022-10-10 NOTE — ASSESSMENT & PLAN NOTE
Patient's (Body mass index is 39.99 kg/m².) indicates that they are obese (BMI >30) with health conditions that include hypertension and diabetes mellitus . Weight is unchanged. BMI is is above average; BMI management plan is completed. We discussed portion control and increasing exercise.

## 2022-10-10 NOTE — PROGRESS NOTES
Answers for HPI/ROS submitted by the patient on 10/3/2022  What is the primary reason for your visit?: Diabetes  Diabetes type: type 2  MedicAlert ID: No  Disease duration: 10 Years  blurred vision: No  chest pain: No  fatigue: No  foot paresthesias: No  foot ulcerations: No  polydipsia: No  polyphagia: No  polyuria: No  visual change: No  weakness: No  weight loss: No  Symptom course: stable  confusion: No  dizziness: No  headaches: No  hunger: No  mood changes: No  nervous/anxious: No  pallor: No  seizures: No  sleepiness: No  speech difficulty: No  sweats: No  tremors: No  blackouts: No  hospitalization: No  nocturnal hypoglycemia: No  required assistance: No  required glucagon: No  CVA: No  heart disease: No  impotence: No  nephropathy: No  peripheral neuropathy: No  PVD: No  retinopathy: No  Current treatments: diet, oral agent (dual therapy)  Treatment compliance: most of the time  Home blood tests: 1-2 x per day  Monitoring compliance: good  Blood glucose trend: no change  breakfast time: 7-8 am  breakfast glucose level: 110-130  lunch time: 12-1 pm  lunch glucose level: 110-130  dinner time: 5-6 pm  dinner glucose level: 110-130  High score: 140-180  Overall: 110-130  Weight trend: stable  Current diet: generally healthy  Meal planning: avoidance of concentrated sweets, carbohydrate counting  Exercise: three times a week  Dietitian visit: No  Eye exam current: Yes  Sees podiatrist: No    Chief Complaint  Diabetes     History of Present Illness    Orion Harvey presents today for diabetes.  Patient does check blood sugar at home 1-2 times per day.  Average blood sugar is 110-130.  Diabetic eye exam up to date.  Does not see podiatry.    A1C Last 3 Results    HGBA1C Last 3 Results 4/7/22 7/6/22 10/6/22   Hemoglobin A1C 7.3 (A) 7.1 (A) 7.4 (A)   (A) Abnormal value       Comments are available for some flowsheets but are not being displayed.               Current Outpatient Medications on File Prior to Visit  "  Medication Sig   • acetaminophen (TYLENOL) 650 MG 8 hr tablet Take 650 mg by mouth Every 8 (Eight) Hours As Needed for Mild Pain .   • amoxicillin (AMOXIL) 500 MG tablet For dentist work takes as needed   • ascorbic acid (VITAMIN C) 1000 MG tablet Take 1 tablet by mouth Daily. LAST DOSE 11/23   • aspirin 325 MG tablet Take 325 mg by mouth Daily.   • empagliflozin (Jardiance) 25 MG tablet tablet Take 1 tablet by mouth Daily. (Patient taking differently: Take 1 tablet by mouth Daily. DONT TAKE PREOP)   • fluticasone (FLONASE) 50 MCG/ACT nasal spray 2 sprays into the nostril(s) as directed by provider Daily As Needed for Rhinitis.   • glimepiride (AMARYL) 4 MG tablet Take 1 tablet by mouth 2 (Two) Times a Day. (Patient taking differently: Take 1 tablet by mouth 2 (Two) Times a Day. DONT TAKE PREOP)   • glucose blood (OneTouch Verio) test strip 1 each by Other route 2 (Two) Times a Day. Once daily  DX E11.65   • lisinopril (PRINIVIL,ZESTRIL) 40 MG tablet Take 1 tablet by mouth Daily.   • meloxicam (MOBIC) 15 MG tablet TAKE 1 TABLET BY MOUTH DAILY AS NEEDED FOR JOINT PAIN   • metFORMIN (GLUCOPHAGE) 1000 MG tablet TAKE 1 TABLET BY MOUTH TWICE DAILY   • Multiple Vitamins-Minerals (CENTRUM SILVER) tablet Take 1 tablet by mouth Every Evening. LAST DOSE 11/22   • OneTouch Delica Lancets 33G misc 1 strip by Other route Every 12 (Twelve) Hours.   • rosuvastatin (CRESTOR) 10 MG tablet TAKE 1 TABLET BY MOUTH DAILY     No current facility-administered medications on file prior to visit.       Objective   Vital Signs:   /76 (BP Location: Left arm, Patient Position: Sitting, Cuff Size: Adult)   Pulse 79   Temp 98 °F (36.7 °C) (Temporal)   Resp 18   Ht 172.7 cm (68\")   Wt 119 kg (263 lb)   SpO2 99% Comment: room air  BMI 39.99 kg/m²       Physical Exam  Vitals and nursing note reviewed.   Constitutional:       General: He is not in acute distress.     Appearance: Normal appearance. He is well-developed. He is obese. "   HENT:      Head: Normocephalic and atraumatic.   Eyes:      Conjunctiva/sclera: Conjunctivae normal.      Pupils: Pupils are equal, round, and reactive to light.   Neck:      Thyroid: No thyromegaly.      Vascular: No carotid bruit or JVD.      Comments: No thyromegaly or thyroid masses  Cardiovascular:      Rate and Rhythm: Normal rate and regular rhythm.      Heart sounds: Normal heart sounds. No murmur heard.  Pulmonary:      Breath sounds: Normal breath sounds. No wheezing or rales.   Musculoskeletal:         General: Normal range of motion.      Cervical back: Normal range of motion and neck supple.      Comments: Somewhat antalgic gait.  Arthritic changes of multiple joints   Lymphadenopathy:      Cervical: No cervical adenopathy.   Skin:     General: Skin is warm and dry.      Findings: No rash.   Neurological:      Mental Status: He is alert and oriented to person, place, and time.   Psychiatric:         Mood and Affect: Mood normal.         Behavior: Behavior normal.            No visits with results within 1 Day(s) from this visit.   Latest known visit with results is:   Results Encounter on 10/06/2022   Component Date Value Ref Range Status   • Hemoglobin A1C 10/06/2022 7.4 (H)  4.8 - 5.6 % Final    Comment:          Prediabetes: 5.7 - 6.4           Diabetes: >6.4           Glycemic control for adults with diabetes: <7.0     • Glucose 10/06/2022 142 (H)  70 - 99 mg/dL Final                  **Please note reference interval change**   • BUN 10/06/2022 23  8 - 27 mg/dL Final   • Creatinine 10/06/2022 0.97  0.76 - 1.27 mg/dL Final   • EGFR Result 10/06/2022 83  >59 mL/min/1.73 Final   • BUN/Creatinine Ratio 10/06/2022 24  10 - 24 Final   • Sodium 10/06/2022 139  134 - 144 mmol/L Final   • Potassium 10/06/2022 5.4 (H)  3.5 - 5.2 mmol/L Final   • Chloride 10/06/2022 100  96 - 106 mmol/L Final   • Total CO2 10/06/2022 23  20 - 29 mmol/L Final   • Calcium 10/06/2022 10.0  8.6 - 10.2 mg/dL Final   • Total  Protein 10/06/2022 6.7  6.0 - 8.5 g/dL Final   • Albumin 10/06/2022 4.7  3.7 - 4.7 g/dL Final   • Globulin 10/06/2022 2.0  1.5 - 4.5 g/dL Final   • A/G Ratio 10/06/2022 2.4 (H)  1.2 - 2.2 Final   • Total Bilirubin 10/06/2022 0.7  0.0 - 1.2 mg/dL Final   • Alkaline Phosphatase 10/06/2022 56  44 - 121 IU/L Final   • AST (SGOT) 10/06/2022 25  0 - 40 IU/L Final   • ALT (SGPT) 10/06/2022 37  0 - 44 IU/L Final       Lab Results   Component Value Date    BUN 23 10/06/2022    CREATININE 0.97 10/06/2022     10/06/2022    K 5.4 (H) 10/06/2022     10/06/2022    CALCIUM 10.0 10/06/2022    ALBUMIN 4.7 10/06/2022    BILITOT 0.7 10/06/2022    ALKPHOS 56 10/06/2022    AST 25 10/06/2022    ALT 37 10/06/2022        Lab Results   Component Value Date    HGBA1C 7.4 (H) 10/06/2022    HGBA1C 7.1 (H) 07/06/2022    HGBA1C 7.3 (H) 04/07/2022    HGBA1C 6.7 (H) 11/05/2021    HGBA1C 7.8 (H) 09/29/2020    HGBA1C 7.3 (H) 10/30/2019                Assessment and Plan    Diagnoses and all orders for this visit:    1. Type 2 diabetes mellitus without complication, without long-term current use of insulin ([facility]) (Primary)  -     Hemoglobin A1c; Future  -     Comprehensive Metabolic Panel; Future  -     Lipid Panel; Future    2. Class 2 severe obesity due to excess calories with serious comorbidity and body mass index (BMI) of 39.0 to 39.9 in adult ([facility])  Assessment & Plan:  Patient's (Body mass index is 39.99 kg/m².) indicates that they are obese (BMI >30) with health conditions that include hypertension and diabetes mellitus . Weight is unchanged. BMI is is above average; BMI management plan is completed. We discussed portion control and increasing exercise.       3. Former smoker    4. Influenza vaccination administered at current visit  -     Fluzone High-Dose 65+yrs    5. Mixed hyperlipidemia  -     Lipid Panel; Future    Diabetes at goal but slightly higher than previous.  Discussed low concentrated sweets diet and increasing  physical activity.  Getting hyalgan injection with Dr Martínez on 10/19 and plan to travel to [facility] [place] [facility] on 10/26, will return in April.  Advised to increase physical activity as able.  Will use [facility] in [place] starting in November  Did encourage getting the COVID bivalent booster      There are no discontinued medications.      Follow Up {Instructions Charge Capture  Follow-up Communications :23}    Return in about 6 months (around 4/10/2023) for diabetes, htn and weight.    Patient was given instructions and counseling regarding his condition or for health maintenance advice. Please see specific information pulled into the AVS if appropriate.

## 2022-10-17 ENCOUNTER — TELEPHONE (OUTPATIENT)
Dept: ORTHOPEDIC SURGERY | Facility: CLINIC | Age: 72
End: 2022-10-17

## 2022-10-17 NOTE — TELEPHONE ENCOUNTER
ATTEMPTED TO WARM TRANSFER    Caller: [facility]    Relationship to patient: SELF    Best call back number:  [phone]    Chief complaint: PATIENT HAS BEEN WAITING TO HEAR BACK FROM SOMEONE ABOUT AUTHORIZATION FOR GEL INJECTION. PATIENT SAYS HE WAS TOLD HE WOULD HAVE IT ON 10/19/22. PATIENT WILL BE LEAVING FOR [place] SOON AND NEEDS TO GET ASAP.    Type of visit: GEL INJ    Requested date: ASAP

## 2022-10-19 ENCOUNTER — OFFICE VISIT (OUTPATIENT)
Dept: ORTHOPEDIC SURGERY | Facility: CLINIC | Age: 72
End: 2022-10-19

## 2022-10-19 DIAGNOSIS — M17.11 PRIMARY OSTEOARTHRITIS OF RIGHT KNEE: Primary | ICD-10-CM

## 2022-10-19 PROCEDURE — 20610 DRAIN/INJ JOINT/BURSA W/O US: CPT | Performed by: ORTHOPAEDIC SURGERY

## 2022-10-19 RX ORDER — LIDOCAINE HYDROCHLORIDE 10 MG/ML
2 INJECTION, SOLUTION EPIDURAL; INFILTRATION; INTRACAUDAL; PERINEURAL
Status: COMPLETED | OUTPATIENT
Start: 2022-10-19 | End: 2022-10-19

## 2022-10-19 RX ADMIN — LIDOCAINE HYDROCHLORIDE 2 ML: 10 INJECTION, SOLUTION EPIDURAL; INFILTRATION; INTRACAUDAL; PERINEURAL at 11:07
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
## 2022-10-20 DIAGNOSIS — E11.65 TYPE 2 DIABETES MELLITUS WITH HYPERGLYCEMIA, WITHOUT LONG-TERM CURRENT USE OF INSULIN: Chronic | ICD-10-CM

## 2022-10-20 RX ORDER — EMPAGLIFLOZIN 25 MG/1
TABLET, FILM COATED ORAL
Qty: 30 TABLET | Refills: 12 | Status: SHIPPED | OUTPATIENT
Start: 2022-10-20

## 2022-10-20 NOTE — TELEPHONE ENCOUNTER
Caller: YAO ENG    Relationship: Emergency Contact    Best call back number: [phone]    Requested Prescriptions:   Requested Prescriptions     Pending Prescriptions Disp Refills   • glimepiride (AMARYL) 4 MG tablet 180 tablet 3     Sig: Take 1 tablet by mouth 2 (Two) Times a Day.        Pharmacy where request should be sent: [id number] - [place] [address] AT [facility]  & [facility] - [phone] [place] [phone] FX     Additional details provided by patient: LESS THAN 3 DAYS    Does the patient have less than a 3 day supply:  [x] Yes  [] No    Kris Sims   10/20/22 13:21 EDT

## 2022-10-20 NOTE — PROGRESS NOTE ADULT - SUBJECTIVE AND OBJECTIVE BOX
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

## 2022-10-21 RX ORDER — GLIMEPIRIDE 4 MG/1
4 TABLET ORAL 2 TIMES DAILY
Qty: 180 TABLET | Refills: 3 | Status: SHIPPED | OUTPATIENT
Start: 2022-10-21

## 2022-10-31 NOTE — ED ADULT TRIAGE NOTE - AS O2 DELIVERY
room air Advancement-Rotation Flap Text: The defect edges were debeveled with a #15 scalpel blade.  Given the location of the defect, shape of the defect and the proximity to free margins an advancement-rotation flap was deemed most appropriate.  Using a sterile surgical marker, an appropriate flap was drawn incorporating the defect and placing the expected incisions within the relaxed skin tension lines where possible. The area thus outlined was incised deep to adipose tissue with a #15 scalpel blade.  The skin margins were undermined to an appropriate distance in all directions utilizing iris scissors.

## 2023-03-21 ENCOUNTER — TELEPHONE (OUTPATIENT)
Dept: ORTHOPEDIC SURGERY | Facility: CLINIC | Age: 73
End: 2023-03-21
Payer: MEDICARE

## 2023-04-03 ENCOUNTER — OFFICE VISIT (OUTPATIENT)
Dept: ORTHOPEDIC SURGERY | Facility: CLINIC | Age: 73
End: 2023-04-03
Payer: MEDICARE

## 2023-04-03 ENCOUNTER — PREP FOR SURGERY (OUTPATIENT)
Dept: OTHER | Facility: HOSPITAL | Age: 73
End: 2023-04-03
Payer: MEDICARE

## 2023-04-03 VITALS — HEART RATE: 73 BPM | WEIGHT: 267 LBS | HEIGHT: 68 IN | BODY MASS INDEX: 40.47 KG/M2

## 2023-04-03 DIAGNOSIS — R94.120 ABNORMAL AUDITORY FUNCTION STUDY: ICD-10-CM

## 2023-04-03 DIAGNOSIS — M25.512 ACUTE PAIN OF LEFT SHOULDER: Primary | ICD-10-CM

## 2023-04-03 DIAGNOSIS — M19.012 OSTEOARTHRITIS OF LEFT GLENOHUMERAL JOINT: Primary | ICD-10-CM

## 2023-04-03 DIAGNOSIS — R79.1 ABNORMAL COAGULATION PROFILE: ICD-10-CM

## 2023-04-03 DIAGNOSIS — R79.9 ABNORMAL FINDING OF BLOOD CHEMISTRY, UNSPECIFIED: ICD-10-CM

## 2023-04-03 DIAGNOSIS — M19.012 OSTEOARTHRITIS OF LEFT GLENOHUMERAL JOINT: ICD-10-CM

## 2023-04-03 PROCEDURE — 99214 OFFICE O/P EST MOD 30 MIN: CPT | Performed by: ORTHOPAEDIC SURGERY

## 2023-04-03 PROCEDURE — 97760 ORTHOTIC MGMT&TRAING 1ST ENC: CPT | Performed by: ORTHOPAEDIC SURGERY

## 2023-04-03 RX ORDER — OXYCODONE HCL 10 MG/1
10 TABLET, FILM COATED, EXTENDED RELEASE ORAL ONCE
OUTPATIENT
Start: 2023-04-03 | End: 2023-04-03

## 2023-04-03 RX ORDER — MELOXICAM 15 MG/1
15 TABLET ORAL ONCE
OUTPATIENT
Start: 2023-04-03 | End: 2023-04-03

## 2023-04-03 RX ORDER — TRANEXAMIC ACID 10 MG/ML
1000 INJECTION, SOLUTION INTRAVENOUS ONCE
OUTPATIENT
Start: 2023-04-03 | End: 2023-04-03

## 2023-04-03 RX ORDER — PREGABALIN 75 MG/1
150 CAPSULE ORAL ONCE
OUTPATIENT
Start: 2023-04-03 | End: 2023-04-03
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
## 2023-04-07 NOTE — PROGRESS NOTES
Chief Complaint  Diabetes, Hypertension, and Hyperlipidemia  Answers for HPI/ROS submitted by the patient on 4/3/2023  What is the primary reason for your visit?: Other  Please describe your symptoms.: Left shoulder pain in shoulder joint area.  Have you had these symptoms before?: Yes  How long have you been having these symptoms?: Greater than 2 weeks  Please describe any probable cause for these symptoms. : 20 years ago had torn rotator cuff. Had an injection and physical therapy. worked very well. Now same type pain is back.      History of Present Illness  Orion Harvey presents today for follow-up on chronic medical conditions including diabetes, hypertension, hyperlipidemia, and shoulder pain.  Patient also reports episodes of chest pain.    Diabetes  Patient does check blood sugar at home 1 times daily.  Per patient fasting blood sugars range between 113-140.  Associated symptoms include {Neuropathy  Per patient current diet is Well Balanced.  Patient trys to avoid concentrated sweets.  Patient does not see podiatry.  Patient see's eye [place]  for diabetic eye exam and last eye exam was 05/2022.    Hypertension  Patient does check blood pressure at home, typically 1 time daily.  110 to 120/ 60 to 70. Patient denies chest pain, blurred vision,fatigue, palpitations, shortness of breath or headaches.  Patient states medications is working well.     Hyperlipidemia  Patient is following a low cholesterol diet.   Currently is on statin therapy.  Patient reports is exercising.     Shoulder pain:  Progressive increase in shoulder pain, since last appointment, with myself patient has seen Dr. Massey and they are scheduling for left reverse total shoulder arthroplasty at the end of may. .     Chronic right knee pain:   Have appt with Dr. Martínez on May 3 for right knee injection.    Chest pain:   Patient reports at least 2 episodes both occurred during homeowners meeting in his new [place] home in [place].  Both  "episodes were described as a tightening across his anterior chest associated with shortness of breath.  He denies palpitations.  He denies pain with exertion.  He has not had a recent cardiac evaluation.  New symptom        Current Outpatient Medications on File Prior to Visit   Medication Sig   • acetaminophen (TYLENOL) 650 MG 8 hr tablet Take 1 tablet by mouth Every 8 (Eight) Hours As Needed for Mild Pain.   • ascorbic acid (VITAMIN C) 1000 MG tablet Take 1 tablet by mouth Daily. LAST DOSE 11/23   • aspirin 325 MG tablet Take 1 tablet by mouth Daily.   • fluticasone (FLONASE) 50 MCG/ACT nasal spray 2 sprays into the nostril(s) as directed by provider Daily As Needed for Rhinitis.   • glimepiride (AMARYL) 4 MG tablet Take 1 tablet by mouth 2 (Two) Times a Day.   • glucose blood (OneTouch Verio) test strip 1 each by Other route 2 (Two) Times a Day. Once daily  DX E11.65   • Jardiance 25 MG tablet tablet TAKE 1 TABLET BY MOUTH DAILY   • lisinopril (PRINIVIL,ZESTRIL) 40 MG tablet Take 1 tablet by mouth Daily.   • meloxicam (MOBIC) 15 MG tablet TAKE 1 TABLET BY MOUTH DAILY AS NEEDED FOR JOINT PAIN   • metFORMIN (GLUCOPHAGE) 1000 MG tablet TAKE 1 TABLET BY MOUTH TWICE DAILY   • Multiple Vitamins-Minerals (CENTRUM SILVER) tablet Take 1 tablet by mouth Every Evening. LAST DOSE 11/22   • OneTouch Delica Lancets 33G misc 1 strip by Other route Every 12 (Twelve) Hours.   • rosuvastatin (CRESTOR) 10 MG tablet TAKE 1 TABLET BY MOUTH DAILY     No current facility-administered medications on file prior to visit.       Objective   Vital Signs:   /74 (BP Location: Right arm, Patient Position: Sitting, Cuff Size: Adult)   Pulse 76   Temp 97.7 °F (36.5 °C) (Temporal)   Resp 18   Ht 172.7 cm (68\")   Wt 120 kg (265 lb 2 oz)   SpO2 98% Comment: room air  BMI 40.31 kg/m²       Physical Exam  Vitals and nursing note reviewed.   Constitutional:       General: He is not in acute distress.     Appearance: Normal appearance. He " is well-developed. He is obese.   HENT:      Head: Normocephalic and atraumatic.   Eyes:      Conjunctiva/sclera: Conjunctivae normal.      Pupils: Pupils are equal, round, and reactive to light.   Neck:      Thyroid: No thyromegaly.      Vascular: No carotid bruit or JVD.   Cardiovascular:      Rate and Rhythm: Normal rate and regular rhythm.      Heart sounds: Normal heart sounds. No murmur heard.  Pulmonary:      Breath sounds: Normal breath sounds. No wheezing or rales.   Musculoskeletal:         General: Normal range of motion.      Cervical back: Normal range of motion and neck supple.      Comments:  antalgic gait.  Arthritic changes of multiple joints with limited range of motion of left shoulder and deformity of right knee   Lymphadenopathy:      Cervical: No cervical adenopathy.   Skin:     General: Skin is warm and dry.      Findings: No rash.   Neurological:      Mental Status: He is alert and oriented to person, place, and time.   Psychiatric:         Mood and Affect: Mood normal.         Behavior: Behavior normal.            No visits with results within 1 Day(s) from this visit.   Latest known visit with results is:   Results Encounter on 01/08/2023   Component Date Value Ref Range Status   • Hemoglobin A1C 04/04/2023 7.0 (H)  4.8 - 5.6 % Final    Comment:          Prediabetes: 5.7 - 6.4           Diabetes: >6.4           Glycemic control for adults with diabetes: <7.0     • Glucose 04/04/2023 141 (H)  70 - 99 mg/dL Final   • BUN 04/04/2023 21  8 - 27 mg/dL Final   • Creatinine 04/04/2023 1.06  0.76 - 1.27 mg/dL Final   • EGFR Result 04/04/2023 75  >59 mL/min/1.73 Final   • BUN/Creatinine Ratio 04/04/2023 20  10 - 24 Final   • Sodium 04/04/2023 142  134 - 144 mmol/L Final   • Potassium 04/04/2023 5.1  3.5 - 5.2 mmol/L Final   • Chloride 04/04/2023 105  96 - 106 mmol/L Final   • Total CO2 04/04/2023 23  20 - 29 mmol/L Final   • Calcium 04/04/2023 9.6  8.6 - 10.2 mg/dL Final   • Total Protein 04/04/2023  6.5  6.0 - 8.5 g/dL Final   • Albumin 04/04/2023 4.5  3.7 - 4.7 g/dL Final   • Globulin 04/04/2023 2.0  1.5 - 4.5 g/dL Final   • A/G Ratio 04/04/2023 2.3 (H)  1.2 - 2.2 Final   • Total Bilirubin 04/04/2023 0.4  0.0 - 1.2 mg/dL Final   • Alkaline Phosphatase 04/04/2023 53  44 - 121 IU/L Final   • AST (SGOT) 04/04/2023 24  0 - 40 IU/L Final   • ALT (SGPT) 04/04/2023 26  0 - 44 IU/L Final   • Total Cholesterol 04/04/2023 138  100 - 199 mg/dL Final   • Triglycerides 04/04/2023 290 (H)  0 - 149 mg/dL Final   • HDL Cholesterol 04/04/2023 40  >39 mg/dL Final   • VLDL Cholesterol Brad 04/04/2023 45 (H)  5 - 40 mg/dL Final   • LDL Chol Calc (NIH) 04/04/2023 53  0 - 99 mg/dL Final     A1C Last 3 Results    HGBA1C Last 3 Results 7/6/22 10/6/22 4/4/23   Hemoglobin A1C 7.1 (A) 7.4 (A) 7.0 (A)   (A) Abnormal value       Comments are available for some flowsheets but are not being displayed.           Lab Results   Component Value Date    CHOL 115 10/30/2019    CHLPL 138 04/04/2023    TRIG 290 (H) 04/04/2023    HDL 40 04/04/2023    LDL 53 04/04/2023     Lab Results   Component Value Date    TSH 1.190 04/07/2022     Lab Results   Component Value Date    GLUCOSE 141 (H) 04/04/2023    BUN 21 04/04/2023    CREATININE 1.06 04/04/2023    EGFRIFNONA 100 12/01/2021    EGFRIFAFRI 100 11/05/2021    BCR 20 04/04/2023    K 5.1 04/04/2023    CO2 23 04/04/2023    CALCIUM 9.6 04/04/2023    PROTENTOTREF 6.5 04/04/2023    ALBUMIN 4.5 04/04/2023    LABIL2 2.3 (H) 04/04/2023    AST 24 04/04/2023    ALT 26 04/04/2023     Lab Results   Component Value Date    WBC 6.1 07/06/2022    HGB 14.3 07/06/2022    HCT 44.7 07/06/2022    MCV 91 07/06/2022     07/06/2022               ECG 12 Lead    Date/Time: 4/10/2023 10:06 PM  Performed by: Nazanin Garcia MD  Authorized by: Nazanin Garcia MD   Comparison: compared with previous ECG from 5/18/2022  Similar to previous ECG  Rhythm: sinus rhythm  Rate: normal  Conduction: conduction  normal  ST Segments: ST segments normal  T Waves: T waves normal  QRS axis: normal  Other: no other findings    Clinical impression: normal ECG                Assessment and Plan    Diagnoses and all orders for this visit:    1. Type 2 diabetes mellitus without complication, without long-term current use of insulin (Primary)  -     Ambulatory Referral to Cardiology    2. Primary hypertension  -     Ambulatory Referral to Cardiology    3. Mixed hyperlipidemia  -     Ambulatory Referral to Cardiology    4. Osteoarthritis of left shoulder, unspecified osteoarthritis type    5. Class 3 severe obesity due to excess calories with serious comorbidity and body mass index (BMI) of 40.0 to 44.9 in adult  Assessment & Plan:  Patient's (Body mass index is 40.31 kg/m².) indicates that they are morbidly/severely obese (BMI > 40 or > 35 with obesity - related health condition) with health conditions that include obstructive sleep apnea, hypertension, diabetes mellitus, dyslipidemias and osteoarthritis . Weight is unchanged. BMI  is above average; BMI management plan is completed. We discussed portion control and increasing exercise.     Orders:  -     Ambulatory Referral to Cardiology    6. Former smoker  -     Ambulatory Referral to Cardiology    7. Precordial chest pain  -     Ambulatory Referral to Cardiology  -     ECG 12 Lead    Diabetes, A1c has improved to 7.0.  Continue current medications along with low concentrated sweets diet and increasing physical activity as tolerated.    Hyperlipidemia triglycerides elevated at 290 total cholesterol 138 continue Crestor and work on low concentrated sweets and low-cholesterol diet.    Hypertension at goal continue current medications    New precordial chest pain with SOA on 2 occasions uner stressful situation patient has multiple risk factors including diabetes, hypertension, hyperlipidemia, morbid obesity, and family history in both brothers and father with coronary artery disease.     Patient is planning to schedule orthopedic surgery in approximately 6 weeks.  Referring to Dr. Diana to coordinate any needed cardiac evaluation, chronic knee pain may need Cardiolite stress test as opposed to treadmill.  There are no discontinued medications.      Follow Up     No follow-ups on file.    Patient was given instructions and counseling regarding his condition or for health maintenance advice. Please see specific information pulled into the AVS if appropriate.

## 2023-04-10 ENCOUNTER — OFFICE VISIT (OUTPATIENT)
Dept: FAMILY MEDICINE CLINIC | Facility: CLINIC | Age: 73
End: 2023-04-10
Payer: MEDICARE

## 2023-04-10 VITALS
BODY MASS INDEX: 40.18 KG/M2 | SYSTOLIC BLOOD PRESSURE: 126 MMHG | DIASTOLIC BLOOD PRESSURE: 74 MMHG | HEIGHT: 68 IN | HEART RATE: 76 BPM | WEIGHT: 265.13 LBS | RESPIRATION RATE: 18 BRPM | TEMPERATURE: 97.7 F | OXYGEN SATURATION: 98 %

## 2023-04-10 DIAGNOSIS — M19.012 OSTEOARTHRITIS OF LEFT SHOULDER, UNSPECIFIED OSTEOARTHRITIS TYPE: ICD-10-CM

## 2023-04-10 DIAGNOSIS — Z87.891 FORMER SMOKER: ICD-10-CM

## 2023-04-10 DIAGNOSIS — E11.9 TYPE 2 DIABETES MELLITUS WITHOUT COMPLICATION, WITHOUT LONG-TERM CURRENT USE OF INSULIN: Primary | Chronic | ICD-10-CM

## 2023-04-10 DIAGNOSIS — I10 PRIMARY HYPERTENSION: ICD-10-CM

## 2023-04-10 DIAGNOSIS — E78.2 MIXED HYPERLIPIDEMIA: ICD-10-CM

## 2023-04-10 DIAGNOSIS — E66.01 CLASS 3 SEVERE OBESITY DUE TO EXCESS CALORIES WITH SERIOUS COMORBIDITY AND BODY MASS INDEX (BMI) OF 40.0 TO 44.9 IN ADULT: ICD-10-CM

## 2023-04-10 DIAGNOSIS — R07.2 PRECORDIAL CHEST PAIN: ICD-10-CM

## 2023-04-10 PROBLEM — E66.813 CLASS 3 SEVERE OBESITY DUE TO EXCESS CALORIES WITH SERIOUS COMORBIDITY AND BODY MASS INDEX (BMI) OF 40.0 TO 44.9 IN ADULT: Status: ACTIVE | Noted: 2019-07-29

## 2023-04-10 NOTE — ASSESSMENT & PLAN NOTE
Patient's (Body mass index is 40.31 kg/m².) indicates that they are morbidly/severely obese (BMI > 40 or > 35 with obesity - related health condition) with health conditions that include obstructive sleep apnea, hypertension, diabetes mellitus, dyslipidemias and osteoarthritis . Weight is unchanged. BMI  is above average; BMI management plan is completed. We discussed portion control and increasing exercise.

## 2023-04-12 ENCOUNTER — OFFICE VISIT (OUTPATIENT)
Dept: CARDIOLOGY | Facility: CLINIC | Age: 73
End: 2023-04-12
Payer: MEDICARE

## 2023-04-12 VITALS
HEIGHT: 68 IN | HEART RATE: 55 BPM | SYSTOLIC BLOOD PRESSURE: 133 MMHG | WEIGHT: 266 LBS | DIASTOLIC BLOOD PRESSURE: 78 MMHG | BODY MASS INDEX: 40.32 KG/M2 | OXYGEN SATURATION: 98 %

## 2023-04-12 DIAGNOSIS — I10 BENIGN ESSENTIAL HTN: ICD-10-CM

## 2023-04-12 DIAGNOSIS — E11.9 TYPE 2 DIABETES MELLITUS WITHOUT COMPLICATION, WITHOUT LONG-TERM CURRENT USE OF INSULIN: Primary | Chronic | ICD-10-CM

## 2023-04-12 DIAGNOSIS — E78.2 MIXED HYPERLIPIDEMIA: ICD-10-CM

## 2023-04-12 DIAGNOSIS — R07.9 CHEST PAIN, UNSPECIFIED TYPE: ICD-10-CM

## 2023-04-12 RX ORDER — NITROGLYCERIN 0.3 MG/1
TABLET SUBLINGUAL
Qty: 100 TABLET | Refills: 11 | Status: SHIPPED | OUTPATIENT
Start: 2023-04-12
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
## 2023-05-03 ENCOUNTER — OFFICE VISIT (OUTPATIENT)
Dept: ORTHOPEDIC SURGERY | Facility: CLINIC | Age: 73
End: 2023-05-03
Payer: MEDICARE

## 2023-05-03 DIAGNOSIS — M17.11 PRIMARY OSTEOARTHRITIS OF RIGHT KNEE: Primary | ICD-10-CM

## 2023-05-03 RX ORDER — LIDOCAINE HYDROCHLORIDE 10 MG/ML
2 INJECTION, SOLUTION EPIDURAL; INFILTRATION; INTRACAUDAL; PERINEURAL
Status: COMPLETED | OUTPATIENT
Start: 2023-05-03 | End: 2023-05-03

## 2023-05-03 RX ADMIN — LIDOCAINE HYDROCHLORIDE 2 ML: 10 INJECTION, SOLUTION EPIDURAL; INFILTRATION; INTRACAUDAL; PERINEURAL at 08:51
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
## 2023-05-04 ENCOUNTER — HOSPITAL ENCOUNTER (OUTPATIENT)
Dept: CARDIOLOGY | Facility: HOSPITAL | Age: 73
Discharge: HOME | End: 2023-05-04
Payer: MEDICARE

## 2023-05-04 ENCOUNTER — HOSPITAL ENCOUNTER (OUTPATIENT)
Dept: NUCLEAR MEDICINE | Facility: HOSPITAL | Age: 73
Discharge: HOME | End: 2023-05-04
Payer: MEDICARE

## 2023-05-04 DIAGNOSIS — I10 BENIGN ESSENTIAL HTN: ICD-10-CM

## 2023-05-04 DIAGNOSIS — E78.2 MIXED HYPERLIPIDEMIA: ICD-10-CM

## 2023-05-04 DIAGNOSIS — E11.9 TYPE 2 DIABETES MELLITUS WITHOUT COMPLICATION, WITHOUT LONG-TERM CURRENT USE OF INSULIN: Chronic | ICD-10-CM

## 2023-05-04 DIAGNOSIS — R07.9 CHEST PAIN, UNSPECIFIED TYPE: ICD-10-CM

## 2023-05-04 LAB
BH CV ECHO MEAS - ACS: 1.9 CM
BH CV ECHO MEAS - AO MAX PG: 11.2 MMHG
BH CV ECHO MEAS - AO MEAN PG: 6 MMHG
BH CV ECHO MEAS - AO V2 MAX: 167 CM/SEC
BH CV ECHO MEAS - AO V2 VTI: 37.2 CM
BH CV ECHO MEAS - AVA(I,D): 2.31 CM2
BH CV ECHO MEAS - EDV(CUBED): 148.9 ML
BH CV ECHO MEAS - EDV(MOD-SP4): 163 ML
BH CV ECHO MEAS - EF(MOD-BP): 45 %
BH CV ECHO MEAS - EF(MOD-SP4): 45.3 %
BH CV ECHO MEAS - EF_3D-VOL: 47 %
BH CV ECHO MEAS - ESV(CUBED): 35.9 ML
BH CV ECHO MEAS - ESV(MOD-SP4): 89.1 ML
BH CV ECHO MEAS - FS: 37.7 %
BH CV ECHO MEAS - IVS/LVPW: 1 CM
BH CV ECHO MEAS - IVSD: 1 CM
BH CV ECHO MEAS - LA DIMENSION: 4.6 CM
BH CV ECHO MEAS - LAT PEAK E' VEL: 12.1 CM/SEC
BH CV ECHO MEAS - LV DIASTOLIC VOL/BSA (35-75): 71.4 CM2
BH CV ECHO MEAS - LV MASS(C)D: 200.4 GRAMS
BH CV ECHO MEAS - LV MAX PG: 6.3 MMHG
BH CV ECHO MEAS - LV MEAN PG: 3 MMHG
BH CV ECHO MEAS - LV SYSTOLIC VOL/BSA (12-30): 39 CM2
BH CV ECHO MEAS - LV V1 MAX: 125 CM/SEC
BH CV ECHO MEAS - LV V1 VTI: 27.3 CM
BH CV ECHO MEAS - LVIDD: 5.3 CM
BH CV ECHO MEAS - LVIDS: 3.3 CM
BH CV ECHO MEAS - LVOT AREA: 3.1 CM2
BH CV ECHO MEAS - LVOT DIAM: 2 CM
BH CV ECHO MEAS - LVPWD: 1 CM
BH CV ECHO MEAS - MED PEAK E' VEL: 7.3 CM/SEC
BH CV ECHO MEAS - MV A MAX VEL: 77.6 CM/SEC
BH CV ECHO MEAS - MV DEC SLOPE: 137 CM/SEC2
BH CV ECHO MEAS - MV DEC TIME: 0.19 MSEC
BH CV ECHO MEAS - MV E MAX VEL: 68.7 CM/SEC
BH CV ECHO MEAS - MV E/A: 0.89
BH CV ECHO MEAS - MV MAX PG: 4.2 MMHG
BH CV ECHO MEAS - MV MEAN PG: 1 MMHG
BH CV ECHO MEAS - MV P1/2T: 194.3 MSEC
BH CV ECHO MEAS - MV V2 VTI: 40.5 CM
BH CV ECHO MEAS - MVA(P1/2T): 1.13 CM2
BH CV ECHO MEAS - MVA(VTI): 2.12 CM2
BH CV ECHO MEAS - PA ACC TIME: 0.16 SEC
BH CV ECHO MEAS - PA PR(ACCEL): 5.2 MMHG
BH CV ECHO MEAS - PA V2 MAX: 118 CM/SEC
BH CV ECHO MEAS - RV MAX PG: 2.6 MMHG
BH CV ECHO MEAS - RV V1 MAX: 80.6 CM/SEC
BH CV ECHO MEAS - RV V1 VTI: 20.2 CM
BH CV ECHO MEAS - RVDD: 4.5 CM
BH CV ECHO MEAS - SI(MOD-SP4): 32.4 ML/M2
BH CV ECHO MEAS - SV(LVOT): 85.8 ML
BH CV ECHO MEAS - SV(MOD-SP4): 73.9 ML
BH CV ECHO MEAS - TAPSE (>1.6): 2.34 CM
BH CV ECHO MEAS - TR MAX PG: 42.8 MMHG
BH CV ECHO MEAS - TR MAX VEL: 327 CM/SEC
BH CV ECHO MEASUREMENTS AVERAGE E/E' RATIO: 7.08
BH CV XLRA - TDI S': 13.2 CM/SEC
LEFT ATRIUM VOLUME INDEX: 27.5 ML/M2
MAXIMAL PREDICTED HEART RATE: 149 BPM
SINUS: 3.3 CM
STJ: 2.5 CM
STRESS TARGET HR: 127 BPM

## 2023-05-04 PROCEDURE — 0 TECHNETIUM TETROFOSMIN KIT: Performed by: INTERNAL MEDICINE

## 2023-05-04 PROCEDURE — 93306 TTE W/DOPPLER COMPLETE: CPT

## 2023-05-04 PROCEDURE — 25010000002 REGADENOSON 0.4 MG/5ML SOLUTION: Performed by: INTERNAL MEDICINE

## 2023-05-04 PROCEDURE — 25010000002 SULFUR HEXAFLUORIDE MICROSPH 60.7-25 MG RECONSTITUTED SUSPENSION: Performed by: INTERNAL MEDICINE

## 2023-05-04 PROCEDURE — 93017 CV STRESS TEST TRACING ONLY: CPT

## 2023-05-04 PROCEDURE — 78452 HT MUSCLE IMAGE SPECT MULT: CPT

## 2023-05-04 PROCEDURE — A9502 TC99M TETROFOSMIN: HCPCS | Performed by: INTERNAL MEDICINE

## 2023-05-04 RX ORDER — REGADENOSON 0.08 MG/ML
0.4 INJECTION, SOLUTION INTRAVENOUS
Status: COMPLETED | OUTPATIENT
Start: 2023-05-04 | End: 2023-05-04

## 2023-05-04 RX ADMIN — REGADENOSON 0.4 MG: 0.08 INJECTION, SOLUTION INTRAVENOUS at 12:55

## 2023-05-04 RX ADMIN — TETROFOSMIN 1 DOSE: 1.38 INJECTION, POWDER, LYOPHILIZED, FOR SOLUTION INTRAVENOUS at 12:55

## 2023-05-04 RX ADMIN — TETROFOSMIN 1 DOSE: 1.38 INJECTION, POWDER, LYOPHILIZED, FOR SOLUTION INTRAVENOUS at 11:54

## 2023-05-04 RX ADMIN — SULFUR HEXAFLUORIDE 1 ML: KIT at 12:38

## 2023-05-05 NOTE — INPATIENT CERTIFICATION FOR MEDICARE PATIENTS - IN ORDER TO MEET MEDICARE REQUIREMENTS.
05/05/23 1140   Team Meeting   Meeting Type Tx Team Meeting   Initial Conference Date 05/04/23   Team Members Present   Team Members Present Physician;Nurse;   Physician Team Member Waleska Dunbar   Nursing Team Member [id number] Management Team Member Atif   Patient/Family Present   Patient Present Yes   Patient's Family Present No   Tx plan was reviewed and discussed with Pt  Pt was encouraged to attend groups  Medication was discussed with Pt  Pt signed tx plan  In order to meet Medicare requirements, the clinical documentation must support the information cited in the admission order.  Please be sure to provide detailed and clear documentation about the following in the admitting note/history and physical:

## 2023-05-08 LAB
BH CV REST NUCLEAR ISOTOPE DOSE: 10 MCI
BH CV STRESS BP STAGE 1: NORMAL
BH CV STRESS BP STAGE 2: NORMAL
BH CV STRESS COMMENTS STAGE 1: NORMAL
BH CV STRESS COMMENTS STAGE 2: NORMAL
BH CV STRESS DOSE REGADENOSON STAGE 1: 0.4
BH CV STRESS DURATION MIN STAGE 1: 0
BH CV STRESS DURATION MIN STAGE 2: 4
BH CV STRESS DURATION SEC STAGE 1: 10
BH CV STRESS DURATION SEC STAGE 2: 0
BH CV STRESS HR STAGE 1: 53
BH CV STRESS HR STAGE 2: 70
BH CV STRESS NUCLEAR ISOTOPE DOSE: 33 MCI
BH CV STRESS PROTOCOL 1: NORMAL
BH CV STRESS RECOVERY BP: NORMAL MMHG
BH CV STRESS RECOVERY HR: 65 BPM
BH CV STRESS STAGE 1: 1
BH CV STRESS STAGE 2: 2
LV EF NUC BP: 82 %
MAXIMAL PREDICTED HEART RATE: 149 BPM
PERCENT MAX PREDICTED HR: 51.68 %
STRESS BASELINE BP: NORMAL MMHG
STRESS BASELINE HR: 53 BPM
STRESS PERCENT HR: 61 %
STRESS POST PEAK BP: NORMAL MMHG
STRESS POST PEAK HR: 77 BPM
STRESS TARGET HR: 127 BPM

## 2023-05-11 LAB
BH CV ECHO MEAS - ACS: 1.9 CM
BH CV ECHO MEAS - AO MAX PG: 11.2 MMHG
BH CV ECHO MEAS - AO MEAN PG: 6 MMHG
BH CV ECHO MEAS - AO V2 MAX: 167 CM/SEC
BH CV ECHO MEAS - AO V2 VTI: 37.2 CM
BH CV ECHO MEAS - AVA(I,D): 2.31 CM2
BH CV ECHO MEAS - EDV(CUBED): 148.9 ML
BH CV ECHO MEAS - EDV(MOD-SP4): 163 ML
BH CV ECHO MEAS - EF(MOD-SP4): 45.3 %
BH CV ECHO MEAS - ESV(CUBED): 35.9 ML
BH CV ECHO MEAS - ESV(MOD-SP4): 89.1 ML
BH CV ECHO MEAS - FS: 37.7 %
BH CV ECHO MEAS - IVS/LVPW: 1 CM
BH CV ECHO MEAS - IVSD: 1 CM
BH CV ECHO MEAS - LA DIMENSION: 4.6 CM
BH CV ECHO MEAS - LAT PEAK E' VEL: 12.1 CM/SEC
BH CV ECHO MEAS - LV DIASTOLIC VOL/BSA (35-75): 71.4 CM2
BH CV ECHO MEAS - LV MASS(C)D: 200.4 GRAMS
BH CV ECHO MEAS - LV MAX PG: 6.3 MMHG
BH CV ECHO MEAS - LV MEAN PG: 3 MMHG
BH CV ECHO MEAS - LV SYSTOLIC VOL/BSA (12-30): 39 CM2
BH CV ECHO MEAS - LV V1 MAX: 125 CM/SEC
BH CV ECHO MEAS - LV V1 VTI: 27.3 CM
BH CV ECHO MEAS - LVIDD: 5.3 CM
BH CV ECHO MEAS - LVIDS: 3.3 CM
BH CV ECHO MEAS - LVOT AREA: 3.1 CM2
BH CV ECHO MEAS - LVOT DIAM: 2 CM
BH CV ECHO MEAS - LVPWD: 1 CM
BH CV ECHO MEAS - MED PEAK E' VEL: 7.3 CM/SEC
BH CV ECHO MEAS - MV A MAX VEL: 77.6 CM/SEC
BH CV ECHO MEAS - MV DEC SLOPE: 137 CM/SEC2
BH CV ECHO MEAS - MV DEC TIME: 0.19 MSEC
BH CV ECHO MEAS - MV E MAX VEL: 68.7 CM/SEC
BH CV ECHO MEAS - MV E/A: 0.89
BH CV ECHO MEAS - MV MAX PG: 4.2 MMHG
BH CV ECHO MEAS - MV MEAN PG: 1 MMHG
BH CV ECHO MEAS - MV P1/2T: 194.3 MSEC
BH CV ECHO MEAS - MV V2 VTI: 40.5 CM
BH CV ECHO MEAS - MVA(P1/2T): 1.13 CM2
BH CV ECHO MEAS - MVA(VTI): 2.12 CM2
BH CV ECHO MEAS - PA ACC TIME: 0.16 SEC
BH CV ECHO MEAS - PA PR(ACCEL): 5.2 MMHG
BH CV ECHO MEAS - PA V2 MAX: 118 CM/SEC
BH CV ECHO MEAS - RV MAX PG: 2.6 MMHG
BH CV ECHO MEAS - RV V1 MAX: 80.6 CM/SEC
BH CV ECHO MEAS - RV V1 VTI: 20.2 CM
BH CV ECHO MEAS - RVDD: 4.5 CM
BH CV ECHO MEAS - SI(MOD-SP4): 32.4 ML/M2
BH CV ECHO MEAS - SV(LVOT): 85.8 ML
BH CV ECHO MEAS - SV(MOD-SP4): 73.9 ML
BH CV ECHO MEAS - TAPSE (>1.6): 2.34 CM
BH CV ECHO MEAS - TR MAX PG: 42.8 MMHG
BH CV ECHO MEAS - TR MAX VEL: 327 CM/SEC
BH CV ECHO MEASUREMENTS AVERAGE E/E' RATIO: 7.08
BH CV XLRA - TDI S': 13.2 CM/SEC
LEFT ATRIUM VOLUME INDEX: 27.5 ML/M2
MAXIMAL PREDICTED HEART RATE: 149 BPM
SINUS: 3.3 CM
STJ: 2.5 CM
STRESS TARGET HR: 127 BPM

## 2023-05-16 ENCOUNTER — HOSPITAL ENCOUNTER (OUTPATIENT)
Dept: CT IMAGING | Facility: HOSPITAL | Age: 73
Discharge: HOME | End: 2023-05-16
Admitting: ORTHOPAEDIC SURGERY
Payer: MEDICARE

## 2023-05-16 DIAGNOSIS — M19.012 OSTEOARTHRITIS OF LEFT GLENOHUMERAL JOINT: ICD-10-CM

## 2023-05-16 DIAGNOSIS — M25.512 ACUTE PAIN OF LEFT SHOULDER: ICD-10-CM

## 2023-05-16 PROCEDURE — 73200 CT UPPER EXTREMITY W/O DYE: CPT

## 2023-05-17 ENCOUNTER — HOSPITAL ENCOUNTER (OUTPATIENT)
Dept: CARDIOLOGY | Facility: HOSPITAL | Age: 73
Discharge: HOME | End: 2023-05-17
Payer: MEDICARE

## 2023-05-17 ENCOUNTER — LAB (OUTPATIENT)
Dept: LAB | Facility: HOSPITAL | Age: 73
End: 2023-05-17
Payer: MEDICARE

## 2023-05-17 DIAGNOSIS — M19.012 OSTEOARTHRITIS OF LEFT GLENOHUMERAL JOINT: ICD-10-CM

## 2023-05-17 LAB
ABO GROUP BLD: NORMAL
ANION GAP SERPL CALCULATED.3IONS-SCNC: 15.1 MMOL/L (ref 5–15)
APTT PPP: 25.7 SECONDS (ref 24–31)
BASOPHILS # BLD AUTO: 0.03 10*3/MM3 (ref 0–0.2)
BASOPHILS NFR BLD AUTO: 0.5 % (ref 0–1.5)
BLD GP AB SCN SERPL QL: NEGATIVE
BUN SERPL-MCNC: 19 MG/DL (ref 8–23)
BUN/CREAT SERPL: 18.8 (ref 7–25)
CALCIUM SPEC-SCNC: 10.1 MG/DL (ref 8.6–10.5)
CHLORIDE SERPL-SCNC: 101 MMOL/L (ref 98–107)
CO2 SERPL-SCNC: 21.9 MMOL/L (ref 22–29)
CREAT SERPL-MCNC: 1.01 MG/DL (ref 0.76–1.27)
DEPRECATED RDW RBC AUTO: 44.4 FL (ref 37–54)
EGFRCR SERPLBLD CKD-EPI 2021: 79.5 ML/MIN/1.73
EOSINOPHIL # BLD AUTO: 0.17 10*3/MM3 (ref 0–0.4)
EOSINOPHIL NFR BLD AUTO: 2.8 % (ref 0.3–6.2)
ERYTHROCYTE [DISTWIDTH] IN BLOOD BY AUTOMATED COUNT: 13.7 % (ref 12.3–15.4)
GLUCOSE SERPL-MCNC: 145 MG/DL (ref 65–99)
HBA1C MFR BLD: 7.3 % (ref 4.8–5.6)
HCT VFR BLD AUTO: 45.3 % (ref 37.5–51)
HGB BLD-MCNC: 15.5 G/DL (ref 13–17.7)
IMM GRANULOCYTES # BLD AUTO: 0.03 10*3/MM3 (ref 0–0.05)
IMM GRANULOCYTES NFR BLD AUTO: 0.5 % (ref 0–0.5)
INR PPP: 0.98 (ref 0.93–1.1)
LYMPHOCYTES # BLD AUTO: 1.48 10*3/MM3 (ref 0.7–3.1)
LYMPHOCYTES NFR BLD AUTO: 24.4 % (ref 19.6–45.3)
MCH RBC QN AUTO: 30.5 PG (ref 26.6–33)
MCHC RBC AUTO-ENTMCNC: 34.2 G/DL (ref 31.5–35.7)
MCV RBC AUTO: 89.2 FL (ref 79–97)
MONOCYTES # BLD AUTO: 0.43 10*3/MM3 (ref 0.1–0.9)
MONOCYTES NFR BLD AUTO: 7.1 % (ref 5–12)
MRSA DNA SPEC QL NAA+PROBE: NORMAL
NEUTROPHILS NFR BLD AUTO: 3.93 10*3/MM3 (ref 1.7–7)
NEUTROPHILS NFR BLD AUTO: 64.7 % (ref 42.7–76)
NRBC BLD AUTO-RTO: 0 /100 WBC (ref 0–0.2)
PLATELET # BLD AUTO: 172 10*3/MM3 (ref 140–450)
PMV BLD AUTO: 12.4 FL (ref 6–12)
POTASSIUM SERPL-SCNC: 4.6 MMOL/L (ref 3.5–5.2)
PROTHROMBIN TIME: 10.5 SECONDS (ref 9.6–11.7)
QT INTERVAL: 388 MS
RBC # BLD AUTO: 5.08 10*6/MM3 (ref 4.14–5.8)
RH BLD: POSITIVE
SODIUM SERPL-SCNC: 138 MMOL/L (ref 136–145)
T&S EXPIRATION DATE: NORMAL
WBC NRBC COR # BLD: 6.07 10*3/MM3 (ref 3.4–10.8)

## 2023-05-17 PROCEDURE — 85730 THROMBOPLASTIN TIME PARTIAL: CPT

## 2023-05-17 PROCEDURE — 86901 BLOOD TYPING SEROLOGIC RH(D): CPT

## 2023-05-17 PROCEDURE — 93005 ELECTROCARDIOGRAM TRACING: CPT | Performed by: ORTHOPAEDIC SURGERY

## 2023-05-17 PROCEDURE — 86900 BLOOD TYPING SEROLOGIC ABO: CPT

## 2023-05-17 PROCEDURE — 85025 COMPLETE CBC W/AUTO DIFF WBC: CPT

## 2023-05-17 PROCEDURE — 80048 BASIC METABOLIC PNL TOTAL CA: CPT

## 2023-05-17 PROCEDURE — 87641 MR-STAPH DNA AMP PROBE: CPT

## 2023-05-17 PROCEDURE — 36415 COLL VENOUS BLD VENIPUNCTURE: CPT

## 2023-05-17 PROCEDURE — 85610 PROTHROMBIN TIME: CPT

## 2023-05-17 PROCEDURE — 86850 RBC ANTIBODY SCREEN: CPT

## 2023-05-17 PROCEDURE — 83036 HEMOGLOBIN GLYCOSYLATED A1C: CPT

## 2023-05-30 ENCOUNTER — ANESTHESIA EVENT (OUTPATIENT)
Dept: PERIOP | Facility: HOSPITAL | Age: 73
End: 2023-05-30
Payer: MEDICARE
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
## 2023-05-30 NOTE — ANESTHESIA PREPROCEDURE EVALUATION
Anesthesia Evaluation     Patient summary reviewed and Nursing notes reviewed   no history of anesthetic complications:  NPO Solid Status: > 8 hours  NPO Liquid Status: > 2 hours           Airway   Dental      Pulmonary    (+) a smoker Former, sleep apnea on CPAP,   Cardiovascular     ECG reviewed  PT is on anticoagulation therapy    (+) hypertension, hyperlipidemia,       Neuro/Psych  GI/Hepatic/Renal/Endo    (+) morbid obesity,  renal disease stones, diabetes mellitus,     Musculoskeletal     Abdominal    Substance History      OB/GYN          Other   arthritis,      ROS/Med Hx Other: Additional History:  Chest pain, gout    Echo:  Echocardiogram Findings    Left Ventricle Left ventricular systolic function is normal. Left ventricular ejection fraction appears to be 61 - 65%.     Normal left ventricular cavity size noted. Left ventricular wall thickness is consistent with borderline concentric hypertrophy. All left ventricular wall segments contract normally. Left ventricular diastolic function is consistent with (grade I) impaired relaxation. Normal left atrial pressure. No evidence of left ventricular thrombus or mass present.  Right Ventricle Normal right ventricular cavity size and systolic function noted.  Left Atrium Normal left atrial cavity size noted.  Right Atrium Normal right atrial cavity size noted.  Aortic Valve The aortic valve is structurally normal. No significant aortic valve regurgitation is present. No hemodynamically significant aortic valve stenosis is present.  Mitral Valve The mitral valve is structurally normal with no significant stenosis present. No significant mitral valve regurgitation is present.  Tricuspid Valve The tricuspid valve is structurally normal with no significant stenosis present. Trace to mild tricuspid valve regurgitation is present.  Pulmonic Valve The pulmonic valve is not well visualized.  Greater Vessels No dilation of the aortic root is present.  Pericardium There  is no evidence of pericardial effusion. .        Stress Test:  •  Left ventricular ejection fraction is hyperdynamic (Calculated EF > 70%).  •  Myocardial perfusion imaging indicates a normal myocardial perfusion study with no evidence of ischemia.  •  Impressions are consistent with a low risk study.  •  This is normal Cardiolite imaging stress test with no evidence of ischemia or myocardial infarction.  Left ventricle size and function is normal on gated SPECT imaging.  No wall motion abnormality was noted.  Clinical correlation recommended.  Further recommendation as per ordering physician.  .  •  Findings consistent with a normal ECG stress test.      PSH:  CYSTOSCOPY W/ LASER LITHOTRIPSY REPAIR TENDONS LEG  CLAVICLE SURGERY ORIF FINGER FRACTURE  WOUND DEBRIDEMENT SKIN GRAFT  TOTAL SHOULDER ARTHROPLASTY W/ DISTAL CLAVICLE EXCISION HAND SURGERY  SHOULDER SURGERY TRIGGER POINT INJECTION  KNEE SURGERY JOINT REPLACEMENT  TOTAL KNEE ARTHROPLASTY                   Anesthesia Plan    ASA 3     ERAS Protocol and general with block   total IV anesthesia  (Patient identified; pre-operative vital signs, all relevant labs/studies, complete medical/surgical/anesthetic history, full medication list, full allergy list, and NPO status obtained/reviewed; physical assessment performed; anesthetic options, side effects, potential complications, risks, and benefits discussed; questions answered; written anesthesia consent obtained; patient cleared for procedure; anesthesia machine and equipment checked and functioning)  intravenous induction     Anesthetic plan, risks, benefits, and alternatives have been provided, discussed and informed consent has been obtained with: patient.    Plan discussed with CRNA and CAA.        CODE STATUS:

## 2023-05-31 ENCOUNTER — APPOINTMENT (OUTPATIENT)
Dept: GENERAL RADIOLOGY | Facility: HOSPITAL | Age: 73
End: 2023-05-31
Payer: MEDICARE

## 2023-05-31 ENCOUNTER — ANESTHESIA (OUTPATIENT)
Dept: PERIOP | Facility: HOSPITAL | Age: 73
End: 2023-05-31
Payer: MEDICARE

## 2023-05-31 ENCOUNTER — HOSPITAL ENCOUNTER (OUTPATIENT)
Facility: HOSPITAL | Age: 73
Discharge: HOME | End: 2023-06-01
Attending: ORTHOPAEDIC SURGERY | Admitting: ORTHOPAEDIC SURGERY
Payer: MEDICARE

## 2023-05-31 DIAGNOSIS — M19.012 OSTEOARTHRITIS OF LEFT GLENOHUMERAL JOINT: ICD-10-CM

## 2023-05-31 DIAGNOSIS — M19.012 PRIMARY OSTEOARTHRITIS OF LEFT SHOULDER: Primary | ICD-10-CM

## 2023-05-31 LAB
ANION GAP SERPL CALCULATED.3IONS-SCNC: 16 MMOL/L (ref 5–15)
BUN SERPL-MCNC: 18 MG/DL (ref 8–23)
BUN/CREAT SERPL: 20.2 (ref 7–25)
CALCIUM SPEC-SCNC: 8.6 MG/DL (ref 8.6–10.5)
CHLORIDE SERPL-SCNC: 101 MMOL/L (ref 98–107)
CO2 SERPL-SCNC: 21 MMOL/L (ref 22–29)
CREAT SERPL-MCNC: 0.89 MG/DL (ref 0.76–1.27)
EGFRCR SERPLBLD CKD-EPI 2021: 91.6 ML/MIN/1.73
GLUCOSE BLDC GLUCOMTR-MCNC: 155 MG/DL (ref 70–105)
GLUCOSE BLDC GLUCOMTR-MCNC: 163 MG/DL (ref 70–105)
GLUCOSE BLDC GLUCOMTR-MCNC: 175 MG/DL (ref 70–105)
GLUCOSE BLDC GLUCOMTR-MCNC: 176 MG/DL (ref 70–105)
GLUCOSE SERPL-MCNC: 153 MG/DL (ref 65–99)
HCT VFR BLD AUTO: 39.5 % (ref 37.5–51)
HGB BLD-MCNC: 13.6 G/DL (ref 13–17.7)
POTASSIUM SERPL-SCNC: 4.3 MMOL/L (ref 3.5–5.2)
SODIUM SERPL-SCNC: 138 MMOL/L (ref 136–145)

## 2023-05-31 PROCEDURE — 0 BUPIVACAINE LIPOSOME 1.3 % SUSPENSION: Performed by: ANESTHESIOLOGY

## 2023-05-31 PROCEDURE — A9270 NON-COVERED ITEM OR SERVICE: HCPCS | Performed by: ORTHOPAEDIC SURGERY

## 2023-05-31 PROCEDURE — 25010000002 SUGAMMADEX 200 MG/2ML SOLUTION: Performed by: NURSE ANESTHETIST, CERTIFIED REGISTERED

## 2023-05-31 PROCEDURE — 63710000001 OXYCODONE 10 MG TABLET EXTENDED-RELEASE 12 HOUR: Performed by: ORTHOPAEDIC SURGERY

## 2023-05-31 PROCEDURE — 63710000001 ACETAMINOPHEN 500 MG TABLET: Performed by: ANESTHESIOLOGY

## 2023-05-31 PROCEDURE — 63710000001 EMPAGLIFLOZIN 25 MG TABLET: Performed by: ORTHOPAEDIC SURGERY

## 2023-05-31 PROCEDURE — 82948 REAGENT STRIP/BLOOD GLUCOSE: CPT

## 2023-05-31 PROCEDURE — 73030 X-RAY EXAM OF SHOULDER: CPT

## 2023-05-31 PROCEDURE — 25010000002 PROPOFOL 200 MG/20ML EMULSION: Performed by: NURSE ANESTHETIST, CERTIFIED REGISTERED

## 2023-05-31 PROCEDURE — 23472 RECONSTRUCT SHOULDER JOINT: CPT | Performed by: ORTHOPAEDIC SURGERY

## 2023-05-31 PROCEDURE — C9290 INJ, BUPIVACAINE LIPOSOME: HCPCS | Performed by: ANESTHESIOLOGY

## 2023-05-31 PROCEDURE — C1776 JOINT DEVICE (IMPLANTABLE): HCPCS | Performed by: ORTHOPAEDIC SURGERY

## 2023-05-31 PROCEDURE — 63710000001 PREGABALIN 75 MG CAPSULE: Performed by: ORTHOPAEDIC SURGERY

## 2023-05-31 PROCEDURE — C1713 ANCHOR/SCREW BN/BN,TIS/BN: HCPCS | Performed by: ORTHOPAEDIC SURGERY

## 2023-05-31 PROCEDURE — A9270 NON-COVERED ITEM OR SERVICE: HCPCS | Performed by: ANESTHESIOLOGY

## 2023-05-31 PROCEDURE — 85014 HEMATOCRIT: CPT | Performed by: ORTHOPAEDIC SURGERY

## 2023-05-31 PROCEDURE — G0378 HOSPITAL OBSERVATION PER HR: HCPCS

## 2023-05-31 PROCEDURE — 85018 HEMOGLOBIN: CPT | Performed by: ORTHOPAEDIC SURGERY

## 2023-05-31 PROCEDURE — 25010000002 VANCOMYCIN 1 G RECONSTITUTED SOLUTION 1 EACH VIAL: Performed by: ORTHOPAEDIC SURGERY

## 2023-05-31 PROCEDURE — 80048 BASIC METABOLIC PNL TOTAL CA: CPT | Performed by: ORTHOPAEDIC SURGERY

## 2023-05-31 PROCEDURE — 63710000001 ROSUVASTATIN 10 MG TABLET: Performed by: ORTHOPAEDIC SURGERY

## 2023-05-31 PROCEDURE — 25010000002 CEFAZOLIN PER 500 MG: Performed by: ORTHOPAEDIC SURGERY

## 2023-05-31 PROCEDURE — 63710000001 METFORMIN 500 MG TABLET: Performed by: ORTHOPAEDIC SURGERY

## 2023-05-31 PROCEDURE — 25010000002 ONDANSETRON PER 1 MG: Performed by: NURSE ANESTHETIST, CERTIFIED REGISTERED

## 2023-05-31 PROCEDURE — 25010000002 DEXAMETHASONE PER 1 MG: Performed by: NURSE ANESTHETIST, CERTIFIED REGISTERED

## 2023-05-31 PROCEDURE — 23472 RECONSTRUCT SHOULDER JOINT: CPT | Performed by: PHYSICIAN ASSISTANT

## 2023-05-31 PROCEDURE — 25010000002 CEFTRIAXONE PER 250 MG: Performed by: ORTHOPAEDIC SURGERY

## 2023-05-31 PROCEDURE — 63710000001 LISINOPRIL 20 MG TABLET: Performed by: ORTHOPAEDIC SURGERY

## 2023-05-31 PROCEDURE — 25010000002 MIDAZOLAM PER 1 MG: Performed by: ANESTHESIOLOGY

## 2023-05-31 PROCEDURE — 63710000001 ASPIRIN 325 MG TABLET DELAYED-RELEASE: Performed by: ORTHOPAEDIC SURGERY

## 2023-05-31 PROCEDURE — 63710000001 MELOXICAM 15 MG TABLET: Performed by: ORTHOPAEDIC SURGERY

## 2023-05-31 PROCEDURE — 97166 OT EVAL MOD COMPLEX 45 MIN: CPT

## 2023-05-31 DEVICE — STEM HUM/SHLDR TRABECULARMETAL REV 12X130MM: Type: IMPLANTABLE DEVICE | Site: SHOULDER | Status: FUNCTIONAL

## 2023-05-31 DEVICE — LINER HUM/SHLDR TRABECULARMETAL REV POLY 60DEG 40MM PLS0: Type: IMPLANTABLE DEVICE | Site: SHOULDER | Status: FUNCTIONAL

## 2023-05-31 DEVICE — INVERSE/REVERSE SCREW SYSTEM, 4.5-33
Type: IMPLANTABLE DEVICE | Site: SHOULDER | Status: FUNCTIONAL
Brand: INVERSE/REVERSE

## 2023-05-31 DEVICE — INVERSE/REVERSE SCREW SYSTEM, 4.5-42
Type: IMPLANTABLE DEVICE | Site: SHOULDER | Status: FUNCTIONAL
Brand: INVERSE/REVERSE

## 2023-05-31 DEVICE — GLENSPHR TRABECULARMETAL REV 40MM: Type: IMPLANTABLE DEVICE | Site: SHOULDER | Status: FUNCTIONAL

## 2023-05-31 DEVICE — TOTL SHLDER REV: Type: IMPLANTABLE DEVICE | Site: SHOULDER | Status: FUNCTIONAL

## 2023-05-31 DEVICE — SPACR HUM TRABECULAR REV PLS12MM: Type: IMPLANTABLE DEVICE | Site: SHOULDER | Status: FUNCTIONAL

## 2023-05-31 DEVICE — BASEPLT GLEN TRABECULARMETAL REV 15MM: Type: IMPLANTABLE DEVICE | Site: SHOULDER | Status: FUNCTIONAL

## 2023-05-31 DEVICE — SUT NONABS MAXBRAID/PE NMBR2 HC5 38IN BLU 900334: Type: IMPLANTABLE DEVICE | Site: SHOULDER | Status: FUNCTIONAL

## 2023-05-31 RX ORDER — OXYCODONE HCL 10 MG/1
10 TABLET, FILM COATED, EXTENDED RELEASE ORAL ONCE
Status: COMPLETED | OUTPATIENT
Start: 2023-05-31 | End: 2023-05-31

## 2023-05-31 RX ORDER — ONDANSETRON 2 MG/ML
4 INJECTION INTRAMUSCULAR; INTRAVENOUS ONCE AS NEEDED
Status: DISCONTINUED | OUTPATIENT
Start: 2023-05-31 | End: 2023-05-31 | Stop reason: HOSPADM

## 2023-05-31 RX ORDER — PROPOFOL 10 MG/ML
INJECTION, EMULSION INTRAVENOUS AS NEEDED
Status: DISCONTINUED | OUTPATIENT
Start: 2023-05-31 | End: 2023-05-31 | Stop reason: SURG

## 2023-05-31 RX ORDER — ONDANSETRON 2 MG/ML
4 INJECTION INTRAMUSCULAR; INTRAVENOUS EVERY 6 HOURS PRN
Status: DISCONTINUED | OUTPATIENT
Start: 2023-05-31 | End: 2023-06-01 | Stop reason: HOSPADM

## 2023-05-31 RX ORDER — SODIUM CHLORIDE, SODIUM LACTATE, POTASSIUM CHLORIDE, CALCIUM CHLORIDE 600; 310; 30; 20 MG/100ML; MG/100ML; MG/100ML; MG/100ML
INJECTION, SOLUTION INTRAVENOUS CONTINUOUS PRN
Status: DISCONTINUED | OUTPATIENT
Start: 2023-05-31 | End: 2023-05-31 | Stop reason: SURG

## 2023-05-31 RX ORDER — CEFAZOLIN SODIUM IN 0.9 % NACL 3 G/100 ML
3 INTRAVENOUS SOLUTION, PIGGYBACK (ML) INTRAVENOUS ONCE
Status: COMPLETED | OUTPATIENT
Start: 2023-05-31 | End: 2023-05-31

## 2023-05-31 RX ORDER — MELOXICAM 15 MG/1
15 TABLET ORAL ONCE
Status: COMPLETED | OUTPATIENT
Start: 2023-05-31 | End: 2023-05-31

## 2023-05-31 RX ORDER — NALOXONE HCL 0.4 MG/ML
0.4 VIAL (ML) INJECTION
Status: DISCONTINUED | OUTPATIENT
Start: 2023-05-31 | End: 2023-06-01 | Stop reason: HOSPADM

## 2023-05-31 RX ORDER — HYDROCODONE BITARTRATE AND ACETAMINOPHEN 7.5; 325 MG/1; MG/1
2 TABLET ORAL EVERY 4 HOURS PRN
Status: DISCONTINUED | OUTPATIENT
Start: 2023-05-31 | End: 2023-05-31 | Stop reason: HOSPADM

## 2023-05-31 RX ORDER — MORPHINE SULFATE 2 MG/ML
2 INJECTION, SOLUTION INTRAMUSCULAR; INTRAVENOUS EVERY 4 HOURS PRN
Status: DISCONTINUED | OUTPATIENT
Start: 2023-05-31 | End: 2023-06-01 | Stop reason: HOSPADM

## 2023-05-31 RX ORDER — SODIUM CHLORIDE 9 MG/ML
75 INJECTION, SOLUTION INTRAVENOUS CONTINUOUS
Status: DISCONTINUED | OUTPATIENT
Start: 2023-05-31 | End: 2023-06-01 | Stop reason: HOSPADM

## 2023-05-31 RX ORDER — ACETAMINOPHEN 650 MG/1
650 SUPPOSITORY RECTAL EVERY 4 HOURS PRN
Status: DISCONTINUED | OUTPATIENT
Start: 2023-05-31 | End: 2023-06-01 | Stop reason: HOSPADM

## 2023-05-31 RX ORDER — SODIUM CHLORIDE 0.9 % (FLUSH) 0.9 %
10 SYRINGE (ML) INJECTION AS NEEDED
Status: DISCONTINUED | OUTPATIENT
Start: 2023-05-31 | End: 2023-05-31 | Stop reason: HOSPADM

## 2023-05-31 RX ORDER — OXYCODONE HCL 10 MG/1
10 TABLET, FILM COATED, EXTENDED RELEASE ORAL EVERY 12 HOURS SCHEDULED
Status: DISCONTINUED | OUTPATIENT
Start: 2023-05-31 | End: 2023-06-01 | Stop reason: HOSPADM

## 2023-05-31 RX ORDER — LISINOPRIL 20 MG/1
40 TABLET ORAL DAILY
Status: DISCONTINUED | OUTPATIENT
Start: 2023-05-31 | End: 2023-06-01 | Stop reason: HOSPADM

## 2023-05-31 RX ORDER — DIPHENHYDRAMINE HYDROCHLORIDE 50 MG/ML
12.5 INJECTION INTRAMUSCULAR; INTRAVENOUS
Status: DISCONTINUED | OUTPATIENT
Start: 2023-05-31 | End: 2023-05-31 | Stop reason: HOSPADM

## 2023-05-31 RX ORDER — FLUMAZENIL 0.1 MG/ML
0.1 INJECTION INTRAVENOUS AS NEEDED
Status: DISCONTINUED | OUTPATIENT
Start: 2023-05-31 | End: 2023-05-31 | Stop reason: HOSPADM

## 2023-05-31 RX ORDER — ACETAMINOPHEN 500 MG
1000 TABLET ORAL ONCE
Status: COMPLETED | OUTPATIENT
Start: 2023-05-31 | End: 2023-05-31

## 2023-05-31 RX ORDER — IPRATROPIUM BROMIDE AND ALBUTEROL SULFATE 2.5; .5 MG/3ML; MG/3ML
3 SOLUTION RESPIRATORY (INHALATION) ONCE AS NEEDED
Status: DISCONTINUED | OUTPATIENT
Start: 2023-05-31 | End: 2023-05-31 | Stop reason: HOSPADM

## 2023-05-31 RX ORDER — DIPHENHYDRAMINE HCL 25 MG
25 CAPSULE ORAL EVERY 6 HOURS PRN
Status: DISCONTINUED | OUTPATIENT
Start: 2023-05-31 | End: 2023-06-01 | Stop reason: HOSPADM

## 2023-05-31 RX ORDER — EPHEDRINE SULFATE 5 MG/ML
INJECTION INTRAVENOUS AS NEEDED
Status: DISCONTINUED | OUTPATIENT
Start: 2023-05-31 | End: 2023-05-31 | Stop reason: SURG

## 2023-05-31 RX ORDER — GLIPIZIDE 5 MG/1
2.5 TABLET ORAL
Status: DISCONTINUED | OUTPATIENT
Start: 2023-06-01 | End: 2023-06-01 | Stop reason: HOSPADM

## 2023-05-31 RX ORDER — BISACODYL 10 MG
10 SUPPOSITORY, RECTAL RECTAL DAILY PRN
Status: DISCONTINUED | OUTPATIENT
Start: 2023-05-31 | End: 2023-06-01 | Stop reason: HOSPADM

## 2023-05-31 RX ORDER — TRAMADOL HYDROCHLORIDE 50 MG/1
50 TABLET ORAL EVERY 6 HOURS PRN
Status: DISCONTINUED | OUTPATIENT
Start: 2023-05-31 | End: 2023-06-01 | Stop reason: HOSPADM

## 2023-05-31 RX ORDER — ACETAMINOPHEN 325 MG/1
650 TABLET ORAL EVERY 4 HOURS PRN
Status: DISCONTINUED | OUTPATIENT
Start: 2023-05-31 | End: 2023-06-01 | Stop reason: HOSPADM

## 2023-05-31 RX ORDER — DEXAMETHASONE SODIUM PHOSPHATE 4 MG/ML
INJECTION, SOLUTION INTRA-ARTICULAR; INTRALESIONAL; INTRAMUSCULAR; INTRAVENOUS; SOFT TISSUE AS NEEDED
Status: DISCONTINUED | OUTPATIENT
Start: 2023-05-31 | End: 2023-05-31 | Stop reason: SURG

## 2023-05-31 RX ORDER — BUPIVACAINE HYDROCHLORIDE 5 MG/ML
INJECTION, SOLUTION EPIDURAL; INTRACAUDAL
Status: COMPLETED | OUTPATIENT
Start: 2023-05-31 | End: 2023-05-31

## 2023-05-31 RX ORDER — ROSUVASTATIN CALCIUM 10 MG/1
10 TABLET, COATED ORAL NIGHTLY
Status: DISCONTINUED | OUTPATIENT
Start: 2023-05-31 | End: 2023-06-01 | Stop reason: HOSPADM

## 2023-05-31 RX ORDER — OXYCODONE HYDROCHLORIDE 5 MG/1
10 TABLET ORAL EVERY 4 HOURS PRN
Status: DISCONTINUED | OUTPATIENT
Start: 2023-05-31 | End: 2023-06-01 | Stop reason: HOSPADM

## 2023-05-31 RX ORDER — HYDROCODONE BITARTRATE AND ACETAMINOPHEN 5; 325 MG/1; MG/1
1 TABLET ORAL ONCE AS NEEDED
Status: DISCONTINUED | OUTPATIENT
Start: 2023-05-31 | End: 2023-05-31 | Stop reason: HOSPADM

## 2023-05-31 RX ORDER — NALOXONE HCL 0.4 MG/ML
0.4 VIAL (ML) INJECTION AS NEEDED
Status: DISCONTINUED | OUTPATIENT
Start: 2023-05-31 | End: 2023-05-31 | Stop reason: HOSPADM

## 2023-05-31 RX ORDER — DIPHENHYDRAMINE HYDROCHLORIDE 50 MG/ML
25 INJECTION INTRAMUSCULAR; INTRAVENOUS NIGHTLY PRN
Status: DISCONTINUED | OUTPATIENT
Start: 2023-05-31 | End: 2023-06-01 | Stop reason: HOSPADM

## 2023-05-31 RX ORDER — FENTANYL CITRATE 50 UG/ML
50 INJECTION, SOLUTION INTRAMUSCULAR; INTRAVENOUS
Status: DISCONTINUED | OUTPATIENT
Start: 2023-05-31 | End: 2023-05-31 | Stop reason: HOSPADM

## 2023-05-31 RX ORDER — MELOXICAM 15 MG/1
15 TABLET ORAL DAILY
Status: DISCONTINUED | OUTPATIENT
Start: 2023-05-31 | End: 2023-06-01 | Stop reason: HOSPADM

## 2023-05-31 RX ORDER — LABETALOL HYDROCHLORIDE 5 MG/ML
5 INJECTION, SOLUTION INTRAVENOUS
Status: DISCONTINUED | OUTPATIENT
Start: 2023-05-31 | End: 2023-05-31 | Stop reason: HOSPADM

## 2023-05-31 RX ORDER — NITROGLYCERIN 0.4 MG/1
0.4 TABLET SUBLINGUAL
Status: DISCONTINUED | OUTPATIENT
Start: 2023-05-31 | End: 2023-06-01 | Stop reason: HOSPADM

## 2023-05-31 RX ORDER — ONDANSETRON 2 MG/ML
INJECTION INTRAMUSCULAR; INTRAVENOUS AS NEEDED
Status: DISCONTINUED | OUTPATIENT
Start: 2023-05-31 | End: 2023-05-31 | Stop reason: SURG

## 2023-05-31 RX ORDER — SODIUM CHLORIDE 0.9 % (FLUSH) 0.9 %
10 SYRINGE (ML) INJECTION EVERY 12 HOURS SCHEDULED
Status: DISCONTINUED | OUTPATIENT
Start: 2023-05-31 | End: 2023-05-31 | Stop reason: HOSPADM

## 2023-05-31 RX ORDER — ASPIRIN 325 MG
325 TABLET, DELAYED RELEASE (ENTERIC COATED) ORAL EVERY 12 HOURS SCHEDULED
Status: DISCONTINUED | OUTPATIENT
Start: 2023-05-31 | End: 2023-06-01 | Stop reason: HOSPADM

## 2023-05-31 RX ORDER — HYDRALAZINE HYDROCHLORIDE 20 MG/ML
5 INJECTION INTRAMUSCULAR; INTRAVENOUS
Status: DISCONTINUED | OUTPATIENT
Start: 2023-05-31 | End: 2023-05-31 | Stop reason: HOSPADM

## 2023-05-31 RX ORDER — ONDANSETRON 4 MG/1
4 TABLET, FILM COATED ORAL EVERY 6 HOURS PRN
Status: DISCONTINUED | OUTPATIENT
Start: 2023-05-31 | End: 2023-06-01 | Stop reason: HOSPADM

## 2023-05-31 RX ORDER — LIDOCAINE HYDROCHLORIDE 20 MG/ML
INJECTION, SOLUTION EPIDURAL; INFILTRATION; INTRACAUDAL; PERINEURAL AS NEEDED
Status: DISCONTINUED | OUTPATIENT
Start: 2023-05-31 | End: 2023-05-31 | Stop reason: SURG

## 2023-05-31 RX ORDER — DIPHENHYDRAMINE HCL 25 MG
25 CAPSULE ORAL NIGHTLY PRN
Status: DISCONTINUED | OUTPATIENT
Start: 2023-05-31 | End: 2023-06-01 | Stop reason: HOSPADM

## 2023-05-31 RX ORDER — ROCURONIUM BROMIDE 10 MG/ML
INJECTION, SOLUTION INTRAVENOUS AS NEEDED
Status: DISCONTINUED | OUTPATIENT
Start: 2023-05-31 | End: 2023-05-31 | Stop reason: SURG

## 2023-05-31 RX ORDER — PROCHLORPERAZINE EDISYLATE 5 MG/ML
10 INJECTION INTRAMUSCULAR; INTRAVENOUS ONCE AS NEEDED
Status: DISCONTINUED | OUTPATIENT
Start: 2023-05-31 | End: 2023-05-31 | Stop reason: HOSPADM

## 2023-05-31 RX ORDER — SODIUM CHLORIDE, SODIUM LACTATE, POTASSIUM CHLORIDE, CALCIUM CHLORIDE 600; 310; 30; 20 MG/100ML; MG/100ML; MG/100ML; MG/100ML
9 INJECTION, SOLUTION INTRAVENOUS CONTINUOUS PRN
Status: DISCONTINUED | OUTPATIENT
Start: 2023-05-31 | End: 2023-06-01 | Stop reason: HOSPADM

## 2023-05-31 RX ORDER — TRANEXAMIC ACID 10 MG/ML
1000 INJECTION, SOLUTION INTRAVENOUS ONCE
Status: COMPLETED | OUTPATIENT
Start: 2023-05-31 | End: 2023-05-31

## 2023-05-31 RX ORDER — MIDAZOLAM HYDROCHLORIDE 1 MG/ML
INJECTION INTRAMUSCULAR; INTRAVENOUS AS NEEDED
Status: DISCONTINUED | OUTPATIENT
Start: 2023-05-31 | End: 2023-05-31 | Stop reason: SURG

## 2023-05-31 RX ORDER — EPHEDRINE SULFATE 5 MG/ML
5 INJECTION INTRAVENOUS ONCE AS NEEDED
Status: DISCONTINUED | OUTPATIENT
Start: 2023-05-31 | End: 2023-05-31 | Stop reason: HOSPADM

## 2023-05-31 RX ORDER — PREGABALIN 75 MG/1
150 CAPSULE ORAL ONCE
Status: COMPLETED | OUTPATIENT
Start: 2023-05-31 | End: 2023-05-31

## 2023-05-31 RX ADMIN — SUGAMMADEX 400 MG: 100 INJECTION, SOLUTION INTRAVENOUS at 11:35

## 2023-05-31 RX ADMIN — DEXAMETHASONE SODIUM PHOSPHATE 8 MG: 4 INJECTION, SOLUTION INTRAMUSCULAR; INTRAVENOUS at 10:15

## 2023-05-31 RX ADMIN — PROPOFOL 175 MCG/KG/MIN: 10 INJECTION, EMULSION INTRAVENOUS at 10:43

## 2023-05-31 RX ADMIN — METFORMIN HYDROCHLORIDE 1000 MG: 500 TABLET, FILM COATED ORAL at 20:01

## 2023-05-31 RX ADMIN — ONDANSETRON 4 MG: 2 INJECTION INTRAMUSCULAR; INTRAVENOUS at 10:15

## 2023-05-31 RX ADMIN — PROPOFOL 150 MG: 10 INJECTION, EMULSION INTRAVENOUS at 10:01

## 2023-05-31 RX ADMIN — EPHEDRINE SULFATE 10 MG: 5 INJECTION INTRAVENOUS at 10:58

## 2023-05-31 RX ADMIN — LISINOPRIL 40 MG: 20 TABLET ORAL at 16:36

## 2023-05-31 RX ADMIN — TRANEXAMIC ACID 1000 MG: 10 INJECTION, SOLUTION INTRAVENOUS at 10:11

## 2023-05-31 RX ADMIN — BUPIVACAINE HYDROCHLORIDE 10 ML: 5 INJECTION, SOLUTION EPIDURAL; INTRACAUDAL; PERINEURAL at 09:01

## 2023-05-31 RX ADMIN — ACETAMINOPHEN 1000 MG: 500 TABLET, FILM COATED ORAL at 08:26

## 2023-05-31 RX ADMIN — ROCURONIUM BROMIDE 20 MG: 50 INJECTION, SOLUTION INTRAVENOUS at 10:15

## 2023-05-31 RX ADMIN — Medication 3 G: at 09:53

## 2023-05-31 RX ADMIN — MIDAZOLAM 2 MG: 1 INJECTION INTRAMUSCULAR; INTRAVENOUS at 08:57

## 2023-05-31 RX ADMIN — PROPOFOL 50 MG: 10 INJECTION, EMULSION INTRAVENOUS at 10:03

## 2023-05-31 RX ADMIN — CEFAZOLIN 2 G: 2 INJECTION, POWDER, FOR SOLUTION INTRAMUSCULAR; INTRAVENOUS at 20:01

## 2023-05-31 RX ADMIN — SODIUM CHLORIDE, SODIUM LACTATE, POTASSIUM CHLORIDE, AND CALCIUM CHLORIDE: .6; .31; .03; .02 INJECTION, SOLUTION INTRAVENOUS at 09:53

## 2023-05-31 RX ADMIN — SODIUM CHLORIDE, POTASSIUM CHLORIDE, SODIUM LACTATE AND CALCIUM CHLORIDE 9 ML/HR: 600; 310; 30; 20 INJECTION, SOLUTION INTRAVENOUS at 08:26

## 2023-05-31 RX ADMIN — MELOXICAM 15 MG: 15 TABLET ORAL at 16:36

## 2023-05-31 RX ADMIN — PREGABALIN 150 MG: 75 CAPSULE ORAL at 08:26

## 2023-05-31 RX ADMIN — PROPOFOL 150 MCG/KG/MIN: 10 INJECTION, EMULSION INTRAVENOUS at 10:04

## 2023-05-31 RX ADMIN — MELOXICAM 15 MG: 15 TABLET ORAL at 08:26

## 2023-05-31 RX ADMIN — LIDOCAINE HYDROCHLORIDE 100 MG: 20 INJECTION, SOLUTION EPIDURAL; INFILTRATION; INTRACAUDAL; PERINEURAL at 10:01

## 2023-05-31 RX ADMIN — OXYCODONE HYDROCHLORIDE 10 MG: 10 TABLET, FILM COATED, EXTENDED RELEASE ORAL at 20:01

## 2023-05-31 RX ADMIN — EMPAGLIFLOZIN 25 MG: 25 TABLET, FILM COATED ORAL at 16:36

## 2023-05-31 RX ADMIN — EPHEDRINE SULFATE 5 MG: 5 INJECTION INTRAVENOUS at 10:53

## 2023-05-31 RX ADMIN — ASPIRIN 325 MG: 325 TABLET, COATED ORAL at 20:01

## 2023-05-31 RX ADMIN — ROCURONIUM BROMIDE 50 MG: 50 INJECTION, SOLUTION INTRAVENOUS at 10:01

## 2023-05-31 RX ADMIN — BUPIVACAINE 10 ML: 13.3 INJECTION, SUSPENSION, LIPOSOMAL INFILTRATION at 09:01

## 2023-05-31 RX ADMIN — Medication 3 G: at 11:29

## 2023-05-31 RX ADMIN — ROSUVASTATIN 10 MG: 10 TABLET, FILM COATED ORAL at 20:01

## 2023-05-31 RX ADMIN — OXYCODONE HYDROCHLORIDE 10 MG: 10 TABLET, FILM COATED, EXTENDED RELEASE ORAL at 08:26

## 2023-05-31 NOTE — ANESTHESIA PROCEDURE NOTES
Airway  Urgency: elective    Date/Time: 5/31/2023 9:59 AM  End Time:5/31/2023 10:02 AM  Airway not difficult    General Information and Staff    Patient location during procedure: OR  Anesthesiologist: Zi Sam MD  CRNA/CAA: Cecilia King CRNA    Indications and Patient Condition  Indications for airway management: airway protection    Preoxygenated: yes  MILS maintained throughout  Mask difficulty assessment: 2 - vent by mask + OA or adjuvant +/- NMBA    Final Airway Details  Final airway type: endotracheal airway      Successful airway: ETT  Cuffed: yes   Successful intubation technique: video laryngoscopy  Facilitating devices/methods: intubating stylet  Endotracheal tube insertion site: oral  Blade: Champion  Blade size: 4  ETT size (mm): 7.5  Cormack-Lehane Classification: grade I - full view of glottis  Placement verified by: chest auscultation, capnometry and palpation of cuff   Cuff volume (mL): 7  Measured from: lips  ETT/EBT  to lips (cm): 22  Number of attempts at approach: 1  Assessment: lips, teeth, and gum same as pre-op and atraumatic intubation

## 2023-05-31 NOTE — PLAN OF CARE
Goal Outcome Evaluation:              Outcome Evaluation: pt doing well with pain control and worked with OT. should discharge tomorrow.

## 2023-05-31 NOTE — ANESTHESIA PROCEDURE NOTES
Peripheral Block    Pre-sedation assessment completed: 5/31/2023 8:45 AM    Patient reassessed immediately prior to procedure    Patient location during procedure: pre-op  Reason for block: procedure for pain, at surgeon's request, post-op pain management and secondary anesthetic  Performed by  Anesthesiologist: Zi Sam MD  Preanesthetic Checklist  Completed: patient identified, IV checked, site marked, risks and benefits discussed, surgical consent, monitors and equipment checked, pre-op evaluation and timeout performed  Prep:  Pt Position: sitting  Sterile barriers:cap, gloves, mask and washed/disinfected hands  Prep: ChloraPrep  Patient monitoring: blood pressure monitoring, continuous pulse oximetry and EKG  Procedure    Sedation: yes  Performed under: local infiltration  Guidance:ultrasound guided and landmark technique    ULTRASOUND INTERPRETATION.  Using ultrasound guidance a 22 G gauge needle was placed in close proximity to the brachial plexus nerve, at which point, under ultrasound guidance anesthetic was injected in the area of the nerve and spread of the anesthesia was seen on ultrasound in close proximity thereto.  There were no abnormalities seen on ultrasound; a digital image was taken; and the patient tolerated the procedure with no complications. Images:still images obtained, printed/placed on chart    Laterality:left  Block Type:interscalene  Injection Technique:single-shot  Needle Type:echogenic  Needle Gauge:22 G  Resistance on Injection: less than 15 psi    Medications Used: bupivacaine PF (MARCAINE) injection 0.5% - Injection   10 mL - 5/31/2023 9:01:00 AM  bupivacaine liposome (EXPAREL) injection 1.3% - Injection   10 mL - 5/31/2023 9:01:00 AM      Post Assessment  Injection Assessment: negative aspiration for heme, no paresthesia on injection and incremental injection  Patient Tolerance:comfortable throughout block  Complications:no  Additional Notes  Pre-procedure:  Peripheral  nerve block performed preoperatively prior to the start of anesthesia time at the request of the patient and the surgeon for the management of postoperative acute surgical pain as well as for a secondary intraoperative anesthetic (general anesthesia is the primary intraoperative anesthetic); patient identified; pre-procedure vital signs, all relevant labs/studies, complete medical/surgical/anesthetic history, full medication list, full allergy list, and NPO status obtained/reviewed; physical assessment performed; anesthetic options, side effects, potential complications, risks, and benefits discussed; questions answered; patient wishes to proceed with the procedure; written anesthesia procedure consent obtained; patient cleared for procedure; time out performed; IV access in situ    Procedure:  ASA monitor placed; supplemental oxygen provided; patient positioned; hand hygiene performed; sterile technique maintained throughout the procedure; sterile prep applied; insertion site determined by anatomical landmarks, palpation, and ultrasound imaging; live ultrasound guidance throughout the procedure; target nerves/landmarks identified on live ultrasound; skin and subcutaneous tissues numbed by injection of 1% lidocaine; 2 inch 22G [employer] Ultra 360 Insulated Echogenic Needle used; realtime needle advancement and placement near the target nerves witnessed on live ultrasound; negative aspiration prior to injection; correct needle placement confirmed on live ultrasound by local anesthetic spread around the target nerves; local anesthetic mixture injected with negative aspiration prior to each injection and after each 1-5 mL injected; needle withdrawn; dressing applied; ultrasound image printed and placed in the patient's permanent medical record    Post-procedure:  Peripheral nerve block placed successfully; good block; no apparent complications; minimal estimated blood loss; vital signs stable throughout; see nurse's  notes for vitals; transported to the OR, general anesthesia induced, and surgery started
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
## 2023-05-31 NOTE — ANESTHESIA POSTPROCEDURE EVALUATION
Patient: Orion Harvey    Procedure Summary     Date: 05/31/23 Room / Location: [facility] OR 11 / [facility] MAIN OR    Anesthesia Start: 0953 Anesthesia Stop: 1151    Procedure: TOTAL SHOULDER REVERSE ARTHROPLASTY (Left: Shoulder) Diagnosis:       Osteoarthritis of left glenohumeral joint      (Osteoarthritis of left glenohumeral joint [M19.012])    Surgeons: Alfonso Richard MD Provider: Zi Sam MD    Anesthesia Type: ERAS Protocol, general with block ASA Status: 3          Anesthesia Type: ERAS Protocol, general with block    Vitals  Vitals Value Taken Time   /62 05/31/23 1340   Temp     Pulse 64 05/31/23 1350   Resp 13 05/31/23 1318   SpO2 92 % 05/31/23 1350   Vitals shown include unvalidated device data.        Post Anesthesia Care and Evaluation    Patient location during evaluation: PACU  Patient participation: complete - patient participated  Level of consciousness: awake  Pain scale: See nurse's notes for pain score.  Pain management: adequate    Airway patency: patent  Anesthetic complications: No anesthetic complications  PONV Status: none  Cardiovascular status: acceptable  Respiratory status: acceptable and spontaneous ventilation  Hydration status: acceptable    Comments: Patient seen and examined postoperatively; vital signs stable; SpO2 greater than or equal to 90%; cardiopulmonary status stable; nausea/vomiting adequately controlled; pain adequately controlled; no apparent anesthesia complications; patient discharged from anesthesia care when discharge criteria were met

## 2023-05-31 NOTE — PLAN OF CARE
Goal Outcome Evaluation:  Plan of Care Reviewed With: patient, spouse              Pt is a 72 y.o. yo male seen for evaluation this date following left rTSA with Dr. Richard this date. PMHx significant for DJD, with R TKA and R TSA in the past. Patient resides with spouse in one-level home with 1 step to enter and is Independent with all ADLs and driving at [facility]. Patient edu on TSA precautions, HEP, and compensations for one-handed techniques. Patient requires Mod A during mock dressing tasks and donning/doffing sling and cold therapy for one-handed technique. Edu on post-op exercises, pt reports understanding. Pt spouse present as well, verbalizing understanding. Anticipate patient is safe to d/c home with family assistance and outpatient therapy once medically stable.
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
## 2023-05-31 NOTE — OP NOTE
TOTAL SHOULDER REVERSE ARTHROPLASTY  Procedure Report    Patient Name:  Orion Harvey  YOB: 1950    Date of Surgery:  5/31/2023     Indications: This is a 72 y.o. male with left shoulder pain.  Imaging demonstrated degenerative joint disease.  Treatment options were discussed.  They desired to proceed with reverse shoulder arthroplasty after discussing the risks including bleeding, scarring, infection, stiffness, nerve damage, tendon damage, artery damage, continued  pain, DVT, loss of life or limb, fracture, dislocation, and a need for further surgery.      Pre-op Diagnosis:   Osteoarthritis of left glenohumeral joint [M19.012]       Post-op Diagnosis:    Post-Op Diagnosis Codes:     * Osteoarthritis of left glenohumeral joint [M19.012]    Procedure/CPT® Codes: 56539    Procedure(s): Left  TOTAL SHOULDER REVERSE ARTHROPLASTY    Staff: Lukas Grande physician assistant    was responsible for performing the following activities: Retraction, Suction, Irrigation, Suturing, Closing and Placing Dressing and their skilled assistance was necessary for the success of this case.       Anesthesia: General with Block    Estimated Blood Loss: 100ml    Implants:    Implant Name Type Inv. Item Serial No.  Lot No. LRB No. Used Action   SUT NONABS MAXBRAID/PE NMBR2 HC5 38IN CYRUS [id number] - [id number] Implant SUT NONABS MAXBRAID/PE NMBR2 HC5 38IN CYRUS [id number]  [employer] [id number] Left 3 Implanted   BASEPLT RAJ TRABECULARMETAL REV 15MM - [id number] Implant BASEPLT RAJ TRABECULARMETAL REV 15MM  [employer] [id number] Left 1 Implanted   GLENSPHR TRABECULARMETAL REV 40MM - [id number] Implant GLENSPHR TRABECULARMETAL REV 40MM  [employer] [id number] Left 1 Implanted   SCRW CANC INV/REV 4.5X33MM - [id number] Implant SCRW CANC INV/REV 4.5X33MM  [employer] [id number] Left 1 Implanted   SCRW CANC INV/REV 4.5X42MM - [id number] Implant SCRW CANC INV/REV 4.5X42MM  [employer] [id number] Left 1 Implanted    STEM HUM/SHLDR TRABECULARMETAL REV [id number] - [id number] Implant STEM HUM/SHLDR TRABECULARMETAL REV [id number]  [employer] [id number] Left 1 Implanted   LINER HUM/SHLDR TRABECULARMETAL REV POLY 60DEG 40MM PLS0 - [id number] Implant LINER HUM/SHLDR TRABECULARMETAL REV POLY 60DEG 40MM PLS0  [employer] [id number] Left 1 Implanted   SPACR HUM TRABECULAR REV [id number] - [id number] Implant SPACR HUM TRABECULAR REV [id number]  [employer] [id number] Left 1 Implanted       IVF: see anesthesia      Complications: None    Specimens:none    Description of Procedure: The patient's operative site was marked in the  preoperative holding area.  They received regional anesthesia and were brought to  the operating room and placed supine on a well-padded operating room table.  General anesthesia was administered.  Antibiotics were dosed.  A timeout was  taken, confirming the correct operative site and procedure.  They were placed in  the beach chair position.  The left shoulder was prepped and draped in the  standard surgical fashion.  SCDs were placed. A deltopectoral incision was marked and injected with local anesthetic.  The skin was opened.  Flaps were raised.  The interval was opened and the cephalic vein was protected.  Adhesions were released from the deltoid.  The circumflex vessels were identified and ligated with suture and cautery.  The rotator interval was opened and a retractor was placed.  The subscapularis was  Tenotomized and the capsule was released down to the 6 o'clock position on the  humeral head protecting the axillary nerve.  A Fukuda retractor was placed and an inferior and anterior capsular release was performed palpating and protecting the axillary  nerve with my finger at all times.  The shoulder was dislocated.  The biceps  was tenodesed to the upper biceps groove and circumferential osteophytes  were removed to identify the native head-neck junction.  Reaming was started  behind the biceps groove up to a  size 12.  The cut was made in 20  degrees of retroversion and the final two reamers were used to prepare the  proximal humerus.  A trial was placed.  The glenoid was exposed after placing retractors.  The soft tissue was removed circumferentially.  The center point was marked and the glenoid was prepared in standard fashion.  The base plate was placed with good press-fit.  Two screws were placed with good purchase.  The locking caps were  placed.  The glenosphere was placed with good purchase.  The shoulder was  dislocated and the trial was removed from the canal and irrigated.  The true  stem was placed with good press-fit and trialing demonstrated 12/0 thickness to offer the best restoration of joint stability, muscle tension and range of motion.  The true polyethylene was placed with similar range of motion and stability.  A 3-minute Betadine wash performed.  The wound was closed in layers with absorbable suture and skin glue.  A sterile dressing and sling were applied.  They were awakened and taken to the recovery room.  There were no complications.  I was present for all portions.  All counts were correct.   Good capillary refill was noted to the hand.      Alfonso Richard MD     Date: 5/31/2023  Time: 11:35 EDT

## 2023-06-01 ENCOUNTER — READMISSION MANAGEMENT (OUTPATIENT)
Dept: CALL CENTER | Facility: HOSPITAL | Age: 73
End: 2023-06-01

## 2023-06-01 VITALS
TEMPERATURE: 98.1 F | HEART RATE: 74 BPM | WEIGHT: 258.6 LBS | BODY MASS INDEX: 39.19 KG/M2 | SYSTOLIC BLOOD PRESSURE: 108 MMHG | OXYGEN SATURATION: 94 % | HEIGHT: 68 IN | RESPIRATION RATE: 20 BRPM | DIASTOLIC BLOOD PRESSURE: 64 MMHG

## 2023-06-01 LAB — GLUCOSE BLDC GLUCOMTR-MCNC: 141 MG/DL (ref 70–105)

## 2023-06-01 PROCEDURE — 63710000001 ASPIRIN 325 MG TABLET DELAYED-RELEASE: Performed by: ORTHOPAEDIC SURGERY

## 2023-06-01 PROCEDURE — A9270 NON-COVERED ITEM OR SERVICE: HCPCS | Performed by: ORTHOPAEDIC SURGERY

## 2023-06-01 PROCEDURE — 25010000002 CEFAZOLIN PER 500 MG: Performed by: ORTHOPAEDIC SURGERY

## 2023-06-01 PROCEDURE — 63710000001 OXYCODONE 10 MG TABLET EXTENDED-RELEASE 12 HOUR: Performed by: ORTHOPAEDIC SURGERY

## 2023-06-01 PROCEDURE — 63710000001 EMPAGLIFLOZIN 25 MG TABLET: Performed by: ORTHOPAEDIC SURGERY

## 2023-06-01 PROCEDURE — G0378 HOSPITAL OBSERVATION PER HR: HCPCS

## 2023-06-01 PROCEDURE — 63710000001 LISINOPRIL 20 MG TABLET: Performed by: ORTHOPAEDIC SURGERY

## 2023-06-01 PROCEDURE — 63710000001 GLIPIZIDE 5 MG TABLET: Performed by: ORTHOPAEDIC SURGERY

## 2023-06-01 PROCEDURE — 63710000001 MELOXICAM 15 MG TABLET: Performed by: ORTHOPAEDIC SURGERY

## 2023-06-01 PROCEDURE — 82948 REAGENT STRIP/BLOOD GLUCOSE: CPT

## 2023-06-01 PROCEDURE — 97535 SELF CARE MNGMENT TRAINING: CPT

## 2023-06-01 PROCEDURE — 63710000001 METFORMIN 500 MG TABLET: Performed by: ORTHOPAEDIC SURGERY

## 2023-06-01 PROCEDURE — 97110 THERAPEUTIC EXERCISES: CPT

## 2023-06-01 RX ORDER — PROMETHAZINE HYDROCHLORIDE 12.5 MG/1
12.5 TABLET ORAL EVERY 6 HOURS PRN
Qty: 21 TABLET | Refills: 1 | Status: SHIPPED | OUTPATIENT
Start: 2023-06-01 | End: 2023-06-12

## 2023-06-01 RX ORDER — OXYCODONE HYDROCHLORIDE AND ACETAMINOPHEN 5; 325 MG/1; MG/1
1 TABLET ORAL EVERY 6 HOURS PRN
Qty: 28 TABLET | Refills: 0 | Status: SHIPPED | OUTPATIENT
Start: 2023-06-01 | End: 2023-06-12

## 2023-06-01 RX ADMIN — GLIPIZIDE 2.5 MG: 5 TABLET ORAL at 08:35

## 2023-06-01 RX ADMIN — LISINOPRIL 40 MG: 20 TABLET ORAL at 08:35

## 2023-06-01 RX ADMIN — MELOXICAM 15 MG: 15 TABLET ORAL at 08:35

## 2023-06-01 RX ADMIN — OXYCODONE HYDROCHLORIDE 10 MG: 10 TABLET, FILM COATED, EXTENDED RELEASE ORAL at 08:35

## 2023-06-01 RX ADMIN — METFORMIN HYDROCHLORIDE 1000 MG: 500 TABLET, FILM COATED ORAL at 08:35

## 2023-06-01 RX ADMIN — EMPAGLIFLOZIN 25 MG: 25 TABLET, FILM COATED ORAL at 08:35

## 2023-06-01 RX ADMIN — ASPIRIN 325 MG: 325 TABLET, COATED ORAL at 08:35

## 2023-06-01 RX ADMIN — CEFAZOLIN 2 G: 2 INJECTION, POWDER, FOR SOLUTION INTRAMUSCULAR; INTRAVENOUS at 03:30

## 2023-06-01 NOTE — SIGNIFICANT NOTE
06/01/23 0908   OTHER   Discipline physical therapist   Rehab Time/Intention   Session Not Performed other (see comments)  (OT following pt; Pt has no PT needs thus PT will complete orders- Nsg is aware.)

## 2023-06-01 NOTE — PLAN OF CARE
Goal Outcome Evaluation:      Pt is aox4, able to voice needs/concerns at this time, and ambulates independently. Should be discharging home 06/01. Minimal pain this shift - see MAR. Pt has been in the chair for half of the evening. Currently sleeping well in bed. Plan of care ongoing.

## 2023-06-01 NOTE — PLAN OF CARE
Assessment: Patient demonstrates good progression toward stated goals. Also with good un derstanding of HEP and shoulder precautions. Anticipate discharge home with family assistance this date.     Plan: OP therapy

## 2023-06-01 NOTE — PROGRESS NOTES
[facility] Medicine Services   Daily Progress Note    Patient Name: Orion Harvey  : 1950  MRN: [id number]  Primary Care Physician:  Nazanin Garcia MD  Date of admission: 2023        Subjective      Patient seen and examined. Denies any complaints. States pain is controlled.     Denies chest pain, shortness of breath or palpitations.     Hemodynamically stable.     Plan for discharge today     14 point review of systems was performed and was negative except as noted in the HPI      Objective      Vitals:   Temp:  [97.5 °F (36.4 °C)-98.1 °F (36.7 °C)] 98.1 °F (36.7 °C)  Heart Rate:  [50-74] 74  Resp:  [11-22] 20  BP: (108-137)/(54-75) 108/64  Flow (L/min):  [2-4] 2    Physical Exam  Vitals reviewed.   Constitutional:       General: He is awake.      Appearance: He is obese.   HENT:      Head: Normocephalic and atraumatic.      Mouth/Throat:      Mouth: Mucous membranes are moist.      Pharynx: Oropharynx is clear.   Eyes:      Extraocular Movements: Extraocular movements intact.      Conjunctiva/sclera: Conjunctivae normal.      Pupils: Pupils are equal, round, and reactive to light.   Cardiovascular:      Rate and Rhythm: Regular rhythm. Bradycardia present.      Pulses: Normal pulses.      Heart sounds: Normal heart sounds, S1 normal and S2 normal.   Pulmonary:      Effort: Pulmonary effort is normal.      Breath sounds: Normal breath sounds.   Abdominal:      General: Bowel sounds are normal.      Palpations: Abdomen is soft.   Musculoskeletal:      Cervical back: Normal range of motion and neck supple.      Comments: Left shoulder sling in place   Skin:     General: Skin is warm and dry.      Capillary Refill: Capillary refill takes less than 2 seconds.   Neurological:      General: No focal deficit present.      Mental Status: He is alert and oriented to person, place, and time.   Psychiatric:         Mood and Affect: Mood normal.         Behavior: Behavior normal. Behavior  is cooperative.          Result Review    Result Review:  I have personally reviewed the results from the time of this admission to 6/1/2023 08:47 EDT and agree with these findings:  [x]  Laboratory  [x]  Microbiology  [x]  Radiology  [x]  EKG/Telemetry   []  Cardiology/Vascular   [x]  Pathology  [x]  Old records  []  Other:      Wounds (last 24 hours)     LDA Wound     Row Name 05/31/23 2001 05/31/23 1449 05/31/23 1345       Wound 05/31/23 1019 Left anterior shoulder Incision    Wound - Properties Group Placement Date: 05/31/23 -EW Placement Time: 1019  -EW Side: Left  -EW Orientation: anterior  -EW Location: shoulder  -EW Primary Wound Type: Incision  -EW    Dressing Appearance intact;dry;no drainage  -YADIRA dry;intact;no drainage  -LB dry;intact;no drainage  -LJ    Closure ANTONIO  -YADIRA ANTONIO  -LB --    Retired Wound - Properties Group Placement Date: 05/31/23 -EW Placement Time: 1019  -EW Side: Left  -EW Orientation: anterior  -EW Location: shoulder  -EW Primary Wound Type: Incision  -EW    Retired Wound - Properties Group Date first assessed: 05/31/23 -EW Time first assessed: 1019  -EW Side: Left  -EW Location: shoulder  -EW Primary Wound Type: Incision  -EW    Row Name 05/31/23 1248 05/31/23 1200 05/31/23 1153       Wound 05/31/23 1019 Left anterior shoulder Incision    Wound - Properties Group Placement Date: 05/31/23 -EW Placement Time: 1019  -EW Side: Left  -EW Orientation: anterior  -EW Location: shoulder  -EW Primary Wound Type: Incision  -EW    Dressing Appearance dry;intact;no drainage  -LJ dry;intact;no drainage  -LJ dry;intact;no drainage  -LJ    Closure ANTONIO  -LJ ANTONIO  -LJ ANTONIO  -LJ    Retired Wound - Properties Group Placement Date: 05/31/23 -EW Placement Time: 1019  -EW Side: Left  -EW Orientation: anterior  -EW Location: shoulder  -EW Primary Wound Type: Incision  -EW    Retired Wound - Properties Group Date first assessed: 05/31/23 -EW Time first assessed: 1019  -EW Side: Left  -EW Location: shoulder   -EW Primary Wound Type: Incision  -EW    Row Name 05/31/23 1123             Wound 05/31/23 1019 Left anterior shoulder Incision    Wound - Properties Group Placement Date: 05/31/23  -EW Placement Time: 1019 -EW Side: Left  -EW Orientation: anterior  -EW Location: shoulder  -EW Primary Wound Type: Incision  -EW    Dressing Appearance dry;intact  -EW      Closure Sutures;Liquid skin adhesive  EXOFIN, TELFA, TEGADERM  -EW      Retired Wound - Properties Group Placement Date: 05/31/23  -EW Placement Time: 1019 -EW Side: Left  -EW Orientation: anterior  -EW Location: shoulder  -EW Primary Wound Type: Incision  -EW    Retired Wound - Properties Group Date first assessed: 05/31/23 -EW Time first assessed: 1019 -EW Side: Left  -EW Location: shoulder  -EW Primary Wound Type: Incision  -EW          User Key  (r) = Recorded By, (t) = Taken By, (c) = Cosigned By    Initials Name Provider Type    Graciela Huggins RN Registered Nurse    Estefania Lazo RN Registered Nurse    Mini Sanches RN Registered Nurse    Sherin Zepeda LPN Licensed Nurse                  Assessment & Plan        aspirin, 325 mg, Oral, Q12H  ceFAZolin, 2 g, Intravenous, Q8H  empagliflozin, 25 mg, Oral, Daily  glipizide, 2.5 mg, Oral, QAM AC  lisinopril, 40 mg, Oral, Daily  meloxicam, 15 mg, Oral, Daily  metFORMIN, 1,000 mg, Oral, BID  oxyCODONE, 10 mg, Oral, Q12H  rosuvastatin, 10 mg, Oral, Nightly       lactated ringers, 9 mL/hr, Last Rate: 9 mL/hr (05/31/23 0826)  sodium chloride, 75 mL/hr         Active Hospital Problems:  Active Hospital Problems    Diagnosis    • **DJD of left shoulder      Plan:     DJD of Left Shoulder  - s/p left total reverse shoulder arthoplasty  -Neurovascular checks  -Pain medication per Ortho  -PT ordered  -Fall risk precautions  -Check CBC and CMP in the a.m.  -Ortho following  -Stable for discharge from a Medicine standpoint      Type 2 diabetes   Hyperlipidemia   Hypertension  - Glucose 176  -  Hemoglobin A1c 7.3 >> recommend follow-up with PCP outpatient  - /75  - Monitor BP  - Continue glipizide, Jardiance, metformin   - Continue lisinopril  - Continue rosuvastatin    DVT prophylaxis:  Mechanical DVT prophylaxis orders are present.    CODE STATUS:    Code Status (Patient has no pulse and is not breathing): CPR (Attempt to Resuscitate)  Medical Interventions (Patient has pulse or is breathing): Full      Disposition:  I expect patient to be discharged today           Electronically signed by Tyshawn Castro MD, 06/01/23, 08:47 EDT.  [facility] Hospitalist Team

## 2023-06-01 NOTE — OUTREACH NOTE
Prep Survey    Flowsheet Row Responses   [facility] facility patient discharged from? [facility]   Is LACE score < 7 ? Yes   Eligibility [facility]   Date of Admission 05/31/23   Date of Discharge 06/01/23   Discharge Disposition Home or Self Care   Discharge diagnosis Total shoulder arthro   Does the patient have one of the following disease processes/diagnoses(primary or secondary)? Total Joint Replacement   Does the patient have Home health ordered? No   Is there a DME ordered? No   Prep survey completed? Yes          ABBY RICHARDSON - Registered Nurse

## 2023-06-01 NOTE — THERAPY TREATMENT NOTE
Subjective: Orion Harvey is s/p TSA, anticipating discharge this date.     Pain level: 0/10 VAS p/o shoulder    Objective: Pt states completing TSA HEP yesterday after therapy services without difficulty. OT reviewed HEP AAROM of shoulder, AROM of elbow/wrist and hand this date. Pt demos good understanding.     Pt dons/doffs abductor sling with Supervision.  Completes UB dressing with hemiplegic techniques with Maritza, and LB dressing with Maritza.      Education: Post-op shoulder precautions, self-care techniques and HEP. Handout provided in total joint handbook.     Assessment: Patient demonstrates good progression toward stated goals. Also with good un derstanding of HEP and shoulder precautions. Anticipate discharge home with family assistance this date.     Plan: OP therapy

## 2023-06-01 NOTE — DISCHARGE SUMMARY
Date of Discharge:  6/1/2023    Discharge Diagnosis: Degenerative joint disease of left shoulder    Presenting Problem/History of Present Illness  Active Hospital Problems    Diagnosis  POA   • **DJD of left shoulder [M19.012]  Unknown      Resolved Hospital Problems   No resolved problems to display.        Hospital Course  Patient is a 72 y.o. male presented with left shoulder degenerative joint disease.  They underwent shoulder arthroplasty during admission and postop day 1 were tolerating diet and oral medication and pain was controlled    Procedures Performed: Left total shoulder      Consults:   Consults     Date and Time Order Name Status Description    5/31/2023  2:44 PM Inpatient Hospitalist Consult Completed             Condition on Discharge:  Improved    Vital Signs  Vitals:    05/31/23 1640 05/31/23 1902 05/31/23 1955 06/01/23 0440   BP: 129/75 112/62 126/69 108/64   BP Location: Right arm Right arm Right arm Right arm   Patient Position: Sitting Sitting Sitting Lying   Pulse: 57 70 69 74   Resp: 15 17 16 20   Temp: 97.5 °F (36.4 °C) 97.8 °F (36.6 °C) 97.8 °F (36.6 °C) 98.1 °F (36.7 °C)   TempSrc: Oral Oral Oral    SpO2: 95% 95% 91% 94%   Weight:       Height:           Physical Exam:  Dry dressing, Sensory and motor exam are intact all distributions. Radial pulse is palpable and capillary refill is less than two seconds to all digits.  Demonstrated full range of motion at the digits.  Demonstrated for the patient range of motion to be performed at the elbow, wrist and digits.       Discharge Disposition  Home    Discharge Medications     Discharge Medications      New Medications      Instructions Start Date   oxyCODONE-acetaminophen 5-325 MG per tablet  Commonly known as: Percocet   1 tablet, Oral, Every 6 Hours PRN      promethazine 12.5 MG tablet  Commonly known as: PHENERGAN   12.5 mg, Oral, Every 6 Hours PRN         Changes to Medications      Instructions Start Date   meloxicam 15 MG  tablet  Commonly known as: MOBIC  What changed: See the new instructions.   TAKE 1 TABLET BY MOUTH DAILY AS NEEDED FOR JOINT PAIN      metFORMIN 1000 MG tablet  Commonly known as: GLUCOPHAGE  What changed: when to take this   TAKE 1 TABLET BY MOUTH TWICE DAILY         Continue These Medications      Instructions Start Date   acetaminophen 650 MG 8 hr tablet  Commonly known as: TYLENOL   650 mg, Oral, Every 8 Hours PRN      ascorbic acid 1000 MG tablet  Commonly known as: VITAMIN C   1 tablet, Oral, Daily, LAST DOSE 11/23      aspirin 325 MG tablet   325 mg, Oral, Daily      Centrum Silver tablet tablet  Generic drug: multivitamin with minerals   1 tablet, Oral, Every Evening, LAST DOSE 11/22      fluticasone 50 MCG/ACT nasal spray  Commonly known as: FLONASE   2 sprays, Nasal, Daily PRN      glimepiride 4 MG tablet  Commonly known as: AMARYL   4 mg, Oral, 2 Times Daily      glucose blood test strip  Commonly known as: OneTouch Verio   1 each, Other, 2 Times Daily, Once daily  DX E11.65      Jardiance 25 MG tablet tablet  Generic drug: empagliflozin   TAKE 1 TABLET BY MOUTH DAILY      lisinopril 40 MG tablet  Commonly known as: PRINIVIL,ZESTRIL   40 mg, Oral, Daily      nitroglycerin 0.3 MG SL tablet  Commonly known as: NITROSTAT   1 under the tongue as needed for angina, may repeat q5mins for up three doses      OneTouch Delica Lancets 33G misc   1 strip, Other, Every 12 Hours      rosuvastatin 10 MG tablet  Commonly known as: CRESTOR   TAKE 1 TABLET BY MOUTH DAILY               Discharge instructions:  Continue wearing sling.  Keep dressing on.  Okay to shower and perform home exercises.  Continue polar care.  Follow-up with Dr. Richard in 2 weeks    Follow-up Appointments  Future Appointments   Date Time Provider Department Center   6/12/2023  9:30 AM Miller Diana MD [facility]   6/19/2023  1:00 PM Alfonso Richard MD [facility]   7/11/2023  8:15 AM Nazanin Garcia MD [facility]               Lukas Grande PA-C  06/01/23  08:03 EDT      Disclaimer: Part of this note may be an electronic transcription/translation of spoken language to printed text using the Dragon Dictation System

## 2023-06-01 NOTE — PLAN OF CARE
Goal Outcome Evaluation:               Patient doing well, will be discharged home and will follow up with Dr. Massey. Patient will have outpatient physical therapy starting 6/2. Patient discharged with family to help at home. Patient has call light within reach and is able to make needs known

## 2023-06-01 NOTE — DISCHARGE INSTR - ACTIVITY
Wear sling at all times except to shower and do stretches/exercises    Use ice packs    Keep dressing in place until follow up with Dr. Richard

## 2023-06-02 ENCOUNTER — TRANSITIONAL CARE MANAGEMENT TELEPHONE ENCOUNTER (OUTPATIENT)
Dept: CALL CENTER | Facility: HOSPITAL | Age: 73
End: 2023-06-02

## 2023-06-02 ENCOUNTER — TREATMENT (OUTPATIENT)
Dept: PHYSICAL THERAPY | Facility: CLINIC | Age: 73
End: 2023-06-02

## 2023-06-02 DIAGNOSIS — Z96.612 STATUS POST REVERSE TOTAL SHOULDER REPLACEMENT, LEFT: Primary | ICD-10-CM

## 2023-06-02 NOTE — OUTREACH NOTE
Call Center TCM Note    Flowsheet Row Responses   [facility] patient discharged from? [facility]   Does the patient have one of the following disease processes/diagnoses(primary or secondary)? Total Joint Replacement   Joint surgery performed? Shoulder   TCM attempt successful? Yes  [wife]   Call start time 1401   Call end time 1403   Discharge diagnosis Total shoulder arthro   Person spoke with today (if not patient) and relationship Wife   Does the patient have all medications related to this admission filled (includes all antibiotics, pain medications, etc.) Yes   Is the patient taking all medications as directed (includes completed medication regime)? Yes   Is the patient able to teach back alternate methods of pain control? Ice   Comments No available HOSP DC FU appt that meet TCM guidelines.   Does the patient have an appointment with their PCP within 7 days of discharge? No appointments available   Nursing Interventions Routed TCM call to PCP office   Home health comments OUT PT therapy   Psychosocial issues? No   Has the patient began therapy sessions (either in the home or as an out patient)? Yes   Has the patient fallen since discharge? No   Did the patient receive a copy of their discharge instructions? Yes   Nursing interventions Reviewed instructions with patient   What is the patient's perception of their functional status since discharge? Improving   Is the patient able to teach back signs and symptoms of infection? Temp >100.4 for 24h or longer, Incisional drainage, Blisters around incision, Increased swelling or redness around incision (not associated with surgical edema)   Is the patient able to teach back how to prevent infection? No tub baths, hot tub or swimming, Shower only as directed by surgeon, Check incision daily   Is the patient able to teach back signs and symptoms of DVT? Redness in calf, Swelling in calf, Severe pain in calf, Area hot to touch, Shortness of breath or chest pain   Is  the patient able to teach back home safety measures? Ability to shower   Is the patient/caregiver able to teach back the hierarchy of who to call/visit for symptoms/problems? PCP, Specialist, Home health nurse, Urgent Care, ED, 911 Yes   TCM call completed? Yes   Wrap up additional comments Wife reports Pt doing well at this time.   Call end time [id number]          Malka Galdamez RN    6/2/2023, 14:04 EDT

## 2023-06-02 NOTE — CASE MANAGEMENT/SOCIAL WORK
Case Management Discharge Note      Final Note: home         Selected Continued Care - Discharged on 6/1/2023 Admission date: 5/31/2023 - Discharge disposition: Home or Self Care            Transportation Services  Private: Car    Final Discharge Disposition Code: 01 - home or self-care
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
## 2023-06-05 ENCOUNTER — TREATMENT (OUTPATIENT)
Dept: PHYSICAL THERAPY | Facility: CLINIC | Age: 73
End: 2023-06-05
Payer: MEDICARE

## 2023-06-05 DIAGNOSIS — Z96.612 STATUS POST REVERSE TOTAL SHOULDER REPLACEMENT, LEFT: Primary | ICD-10-CM

## 2023-06-05 PROCEDURE — 97110 THERAPEUTIC EXERCISES: CPT | Performed by: PHYSICAL THERAPIST

## 2023-06-05 PROCEDURE — 97140 MANUAL THERAPY 1/> REGIONS: CPT | Performed by: PHYSICAL THERAPIST

## 2023-06-05 NOTE — PROGRESS NOTES
Physical Therapy Daily Treatment Note      Patient: Orion Harvey   : 1950  Diagnosis/ICD-10 Code:  Status post reverse total shoulder replacement, left [Z96.612]   Problems Addressed this Visit    None  Visit Diagnoses       Status post reverse total shoulder replacement, left    -  Primary          Diagnoses         Codes Comments    Status post reverse total shoulder replacement, left    -  Primary ICD-10-CM: Z96.612  ICD-9-CM: V43.61            Referring practitioner: Alfonso Richard, *  Date of Initial Visit: Type: THERAPY  Noted: 2023  Today's Date: 2023    VISIT#: 2    Subjective Patient reports feeling little to no pain has been sleeping well, surprised after only being one week out of surgery.       Objective     See Exercise, Manual, and Modality Logs for complete treatment.     Assessment/Plan Patient demonstrating good passive motion with soft end feel and is currently progressing well within protocol.   Goals  Plan Goals: STG to be met in 6 wks  1. Pt will be safe, independent, and compliant with HEP to optimize recovery and self management of symptoms.  2. Pt will demonstrate L shoulder PROM at least 120 deg flexion, 50 deg abd, 15 deg ER.     LTG to be met in 12 wks  1. Pt will improve QuickDASH from 73% impairment to no greater than 25% impairment.   2. Pt will demonstrate L shoulder PROM at least 140 deg flexion, 70 deg abd, 45 deg ER.  3. Pt will demonstrate L shoulder AROM at least 140 deg flexion, 90 deg abd, 60 deg ER for dressing & showering indep.   4. Pt will demonstrate L shoulder strength at least 4/5 in all planes to resume sport shooting & fishing.     Progress per Plan of Care and Progress strengthening /stabilization /functional activity         Timed:         Manual Therapy:    15     mins  96813;     Therapeutic Exercise:    15     mins  09425;     Neuromuscular Taylor:        mins  95566;    Therapeutic Activity:          mins  07611;     Gait Training:            mins  77663;     Ultrasound:          mins  56765;    Ionto                                   mins   98241  Self Care                    _____  mins   14807  Canalith Repos                   mins  77064    Un-Timed:  Electrical Stimulation:         mins  03772 ( );  Dry Needling          mins self-pay   Traction          mins 96070    Timed Treatment:   30   mins   Total Treatment:     30   mins    Gavino tSover PTA  [place] License [id number]  Physical Therapist Assistant

## 2023-06-08 ENCOUNTER — TREATMENT (OUTPATIENT)
Dept: PHYSICAL THERAPY | Facility: CLINIC | Age: 73
End: 2023-06-08
Payer: MEDICARE

## 2023-06-08 DIAGNOSIS — Z96.612 STATUS POST REVERSE TOTAL SHOULDER REPLACEMENT, LEFT: Primary | ICD-10-CM

## 2023-06-08 NOTE — PROGRESS NOTES
Physical Therapy Daily Treatment Note  [address] [facility] [address], [address], [place]  [address]    Patient: Orion Harvey   : 1950  Diagnosis/ICD-10 Code:  Status post reverse total shoulder replacement, left [Z96.612]   Problems Addressed this Visit    None  Visit Diagnoses       Status post reverse total shoulder replacement, left    -  Primary          Diagnoses         Codes Comments    Status post reverse total shoulder replacement, left    -  Primary ICD-10-CM: Z96.612  ICD-9-CM: V43.61           Referring practitioner: Alfonso Richard, *  Date of Initial Visit: Type: THERAPY  Noted: 2023  Today's Date: 2023    VISIT#: 3    Subjective   Orion reports his L shoulder is doing great. He's got no complaints.     Objective          Passive Range of Motion   Left Shoulder   Flexion: 90 degrees   Abduction: 50 degrees   External rotation 45°: 30 degrees       See Exercise, Manual, and Modality Logs for complete treatment.     Assessment/Plan  Orion is doing exceptionally well post L reverse TSA. Intro'd submax isometrics in supine today.   Progress strengthening /stabilization /functional activity         Timed:         Manual Therapy:    10     mins  00290;     Therapeutic Exercise:    20     mins  58154;     Neuromuscular Taylor:        mins  60188;    Therapeutic Activity:          mins  37022;     Gait Training:           mins  62197;     Ultrasound:          mins  80997;    Ionto                                   mins   86245  Self Care                            mins   31283  Canalith Repos                   mins  26251  Tests & Measures              mins   52424      Un-Timed:  Electrical Stimulation:         mins  47815 ( );  Dry Needling          mins 68795/11895  Traction          mins 28270  Low Eval          Mins  37443  Mod Eval          Mins  81560  High Eval                            Mins  09315  Re-Eval                               mins  09502    Timed Treatment:   30    mins   Total Treatment:     30   mins    Ami Guzmán, PT, DPT, cert. DN  Physical Therapist  [place] Lic # [id number]
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
## 2023-06-12 ENCOUNTER — OFFICE VISIT (OUTPATIENT)
Dept: CARDIOLOGY | Facility: CLINIC | Age: 73
End: 2023-06-12
Payer: MEDICARE

## 2023-06-12 VITALS
SYSTOLIC BLOOD PRESSURE: 126 MMHG | HEART RATE: 77 BPM | OXYGEN SATURATION: 95 % | HEIGHT: 68 IN | WEIGHT: 255 LBS | BODY MASS INDEX: 38.65 KG/M2 | DIASTOLIC BLOOD PRESSURE: 73 MMHG

## 2023-06-12 DIAGNOSIS — E78.2 MIXED HYPERLIPIDEMIA: ICD-10-CM

## 2023-06-12 DIAGNOSIS — I10 BENIGN ESSENTIAL HTN: ICD-10-CM

## 2023-06-12 DIAGNOSIS — E11.69 TYPE 2 DIABETES MELLITUS WITH OTHER SPECIFIED COMPLICATION, WITHOUT LONG-TERM CURRENT USE OF INSULIN: ICD-10-CM

## 2023-06-12 DIAGNOSIS — E11.9 TYPE 2 DIABETES MELLITUS WITHOUT COMPLICATION, WITHOUT LONG-TERM CURRENT USE OF INSULIN: Primary | Chronic | ICD-10-CM

## 2023-06-13 ENCOUNTER — TREATMENT (OUTPATIENT)
Dept: PHYSICAL THERAPY | Facility: CLINIC | Age: 73
End: 2023-06-13
Payer: MEDICARE

## 2023-06-13 DIAGNOSIS — Z96.612 STATUS POST REVERSE TOTAL SHOULDER REPLACEMENT, LEFT: Primary | ICD-10-CM

## 2023-06-13 PROCEDURE — 97140 MANUAL THERAPY 1/> REGIONS: CPT | Performed by: PHYSICAL THERAPIST

## 2023-06-13 PROCEDURE — 97110 THERAPEUTIC EXERCISES: CPT | Performed by: PHYSICAL THERAPIST

## 2023-06-13 NOTE — PROGRESS NOTES
Physical Therapy Daily Treatment Note  [address] [facility] [address], [place] [address]      Patient: Orion Harvey   : 1950  Diagnosis/ICD-10 Code:  Status post reverse total shoulder replacement, left [Z96.612]   Problems Addressed this Visit    None  Visit Diagnoses       Status post reverse total shoulder replacement, left    -  Primary          Diagnoses         Codes Comments    Status post reverse total shoulder replacement, left    -  Primary ICD-10-CM: Z96.612  ICD-9-CM: V43.61            Referring practitioner: Alfonso Richard, *  Date of Initial Visit: Type: THERAPY  Noted: 2023  Today's Date: 2023    VISIT#: 4    Subjective Patient reports his shoulder has been feeling good with minimal pain or soreness.  He states his HEP has been going well and has no complaints today.      Objective     See Exercise, Manual, and Modality Logs for complete treatment.     Assessment/Plan Patient tolerated progression of manual interventions and exercise well with mild soreness noted into L shoulder.  His PROM is improving with moderate tightness noted with flexion at approximately 145 degrees and abduction at 115 degrees with firm end feel noted.  Patient denied increased pain or soreness at end of session.    Plan Goals: STG to be met in 6 wks  1. Pt will be safe, independent, and compliant with HEP to optimize recovery and self management of symptoms.  2. Pt will demonstrate L shoulder PROM at least 120 deg flexion, 50 deg abd, 15 deg ER.     LTG to be met in 12 wks  1. Pt will improve QuickDASH from 73% impairment to no greater than 25% impairment.   2. Pt will demonstrate L shoulder PROM at least 140 deg flexion, 70 deg abd, 45 deg ER.  3. Pt will demonstrate L shoulder AROM at least 140 deg flexion, 90 deg abd, 60 deg ER for dressing & showering indep.   4. Pt will demonstrate L shoulder strength at least 4/5 in all planes to resume sport shooting & fishing.        Progress per Plan of  Care and Progress strengthening /stabilization /functional activity         Timed:         Manual Therapy:    15     mins  39398;     Therapeutic Exercise:    15     mins  40347;     Neuromuscular Taylor:        mins  48505;    Therapeutic Activity:          mins  26321;     Gait Training:           mins  15026;     Ultrasound:          mins  65231;    Ionto                                   mins   03719    Un-Timed:  Electrical Stimulation:         mins  28771 ( );  Dry Needling          mins self-pay 24852; 20561  Traction          mins 69912      Timed Treatment:   30   mins   Total Treatment:     30   mins    Amy Boucher PT  [place] License # [id number]  Physical Therapist

## 2023-06-16 ENCOUNTER — TREATMENT (OUTPATIENT)
Dept: PHYSICAL THERAPY | Facility: CLINIC | Age: 73
End: 2023-06-16
Payer: MEDICARE

## 2023-06-16 DIAGNOSIS — Z96.612 STATUS POST REVERSE TOTAL SHOULDER REPLACEMENT, LEFT: Primary | ICD-10-CM

## 2023-06-16 PROCEDURE — 97140 MANUAL THERAPY 1/> REGIONS: CPT | Performed by: PHYSICAL THERAPIST

## 2023-06-16 PROCEDURE — 97110 THERAPEUTIC EXERCISES: CPT | Performed by: PHYSICAL THERAPIST

## 2023-06-16 NOTE — PROGRESS NOTES
Physical Therapy Daily Treatment Note      Patient: Orion Harvey   : 1950  Diagnosis/ICD-10 Code:  Status post reverse total shoulder replacement, left [Z96.612]   Problems Addressed this Visit    None  Visit Diagnoses       Status post reverse total shoulder replacement, left    -  Primary          Diagnoses         Codes Comments    Status post reverse total shoulder replacement, left    -  Primary ICD-10-CM: Z96.612  ICD-9-CM: V43.61            Referring practitioner: Alfonso Richard, *  Date of Initial Visit: Type: THERAPY  Noted: 2023  Today's Date: 2023    VISIT#: 5    Subjective Patient reports feeling good and is pleased with progress thus far.       Objective     See Exercise, Manual, and Modality Logs for complete treatment.     Assessment/Plan Patient continues to demonstrate good passive motion with soft end feel and is progressing well within current protocol restrictions.   Plan Goals: STG to be met in 6 wks  1. Pt will be safe, independent, and compliant with HEP to optimize recovery and self management of symptoms.  2. Pt will demonstrate L shoulder PROM at least 120 deg flexion, 50 deg abd, 15 deg ER.     LTG to be met in 12 wks  1. Pt will improve QuickDASH from 73% impairment to no greater than 25% impairment.   2. Pt will demonstrate L shoulder PROM at least 140 deg flexion, 70 deg abd, 45 deg ER.  3. Pt will demonstrate L shoulder AROM at least 140 deg flexion, 90 deg abd, 60 deg ER for dressing & showering indep.   4. Pt will demonstrate L shoulder strength at least 4/5 in all planes to resume sport shooting & fishing.      Progress per Plan of Care and Progress strengthening /stabilization /functional activity         Timed:         Manual Therapy:    15     mins  59987;     Therapeutic Exercise:    15     mins  89146;     Neuromuscular Taylor:        mins  88282;    Therapeutic Activity:          mins  24024;     Gait Training:           mins  37956;      Ultrasound:          mins  01751;    Ionto                                   mins   41377  Self Care                    _____  mins   57717  Canalith Repos                   mins  84453    Un-Timed:  Electrical Stimulation:         mins  74294 ( );  Dry Needling          mins self-pay   Traction          mins 47145    Timed Treatment:   30   mins   Total Treatment:     30   mins    Gavino Stover PTA  [place] [id number]  Physical Therapist Assistant

## 2023-06-19 ENCOUNTER — OFFICE VISIT (OUTPATIENT)
Dept: ORTHOPEDIC SURGERY | Facility: CLINIC | Age: 73
End: 2023-06-19
Payer: MEDICARE

## 2023-06-19 VITALS — WEIGHT: 255 LBS | HEART RATE: 73 BPM | HEIGHT: 68 IN | BODY MASS INDEX: 38.65 KG/M2

## 2023-06-19 DIAGNOSIS — Z47.89 ORTHOPEDIC AFTERCARE: Primary | ICD-10-CM

## 2023-06-19 PROCEDURE — 99024 POSTOP FOLLOW-UP VISIT: CPT | Performed by: ORTHOPAEDIC SURGERY

## 2023-06-19 NOTE — PROGRESS NOTES
"     Patient ID: Orion Harvey is a 72 y.o. male.    5/31/23 left reverse total shoulder arthroplasty  Pain minimal  Objective:    Pulse 73   Ht 172.7 cm (67.99\")   Wt 116 kg (255 lb)   BMI 38.78 kg/m²     Physical Examination:    Incision healing well no sign of infection  Sensory and motor exam are intact all distributions. Radial pulse is palpable and capillary refill is less than two seconds to all digits.    Imaging:  left Shoulder X-Ray  Indication: Postop total shoulder  AP Y and Lateral views  Findings: Reverse total shoulder in position  no bony lesion  Soft tissues normal  not examined joint spaces  Hardware appropriately positioned yes      yes prior studies available for comparison    Assessment:  Doing well after shoulder arthroplasty    Plan:  Continue sling and therapy see me in a month  "

## 2023-06-19 NOTE — PATIENT INSTRUCTIONS
Shoulder Arthroplasty: Post- Operative Visit Objectives    Review the operative findings, procedures and photos.  Make sure medications are effective and not causing problems.  Pain: Oxycodone or hydrocodone is for pain. You may take 1 tablet every 6 hours as necessary.  Some patients don’t require the use of these…in those circumstances just use Tylenol extra-strength 1 or 2 tablets every 4-6 hours.   NSAIDs. For pain and inflammation.  You can take an over the counter anti-inflammatory of choice such as Aleve, Ibuprofen, Motrin or Advil during this time.  If you have had any problems stop taking these medicines and please tell us!  Wound Care:  Today we will remove your dressings.  There may be some residual glue on your incision.  It is now ok to shower without covering it. Please avoid submerging the incision for another two weeks.  Continue ice pack as needed.  Exercises and Physical Therapy   Continue your physical therapy and daily home exercises focusing especially on overhead motion.  Continue the sling and remove for activities such as showering and bringing you hand to your face or hair, no motion behind your back for the next 4 weeks.  Some shoulder replacements with a rotator cuff repair will have more restrictions and we will go over those.   Follow Up appointments Schedule Follow up visits as directed usually in 4 weeks..  Notes  Make sure you have all necessary notes and documentation for school or work.  Issues: Remember our goal is to make this process smooth and easy if there is any thing you need please ask us or call back [phone]

## 2023-08-08 NOTE — ED ADULT TRIAGE NOTE - MODE OF ARRIVAL
EMS Doxepin Counseling:  Patient advised that the medication is sedating and not to drive a car after taking this medication. Patient informed of potential adverse effects including but not limited to dry mouth, urinary retention, and blurry vision.  The patient verbalized understanding of the proper use and possible adverse effects of doxepin.  All of the patient's questions and concerns were addressed.

## 2023-08-16 PROBLEM — Z00.00 MEDICARE ANNUAL WELLNESS VISIT, SUBSEQUENT: Status: ACTIVE | Noted: 2023-08-16

## 2023-08-16 NOTE — PROGRESS NOTES
The ABCs of the Annual Wellness Visit  Subsequent Medicare Wellness Visit    Subjective    Orion Harvey is a 73 y.o. male who presents for a Subsequent Medicare Wellness Visit.    The following portions of the patient's history were reviewed and   updated as appropriate: allergies, current medications, past family history, past medical history, past social history, past surgical history, and problem list.    Compared to one year ago, the patient feels his physical   health is better.    Compared to one year ago, the patient feels his mental   health is the same.    Recent Hospitalizations:  He was admitted to the hospital for left shoulder arthroplasty May 2023.       Current Medical Providers:  Patient Care Team:  Nazanin Garcia MD as PCP - General (Family Medicine)  Rosmery Manuel as Technologist    Outpatient Medications Prior to Visit   Medication Sig Dispense Refill    acetaminophen (TYLENOL) 650 MG 8 hr tablet Take 1 tablet by mouth Every 8 (Eight) Hours As Needed for Mild Pain.      ascorbic acid (VITAMIN C) 1000 MG tablet Take 1 tablet by mouth Daily. LAST DOSE 11/23      aspirin 325 MG tablet Take 1 tablet by mouth Daily.      fluticasone (FLONASE) 50 MCG/ACT nasal spray 2 sprays into the nostril(s) as directed by provider Daily As Needed for Rhinitis.      glimepiride (AMARYL) 4 MG tablet Take 1 tablet by mouth 2 (Two) Times a Day. 180 tablet 3    glucose blood (OneTouch Verio) test strip 1 each by Other route 2 (Two) Times a Day. Once daily  DX E11.65 200 each 1    Jardiance 25 MG tablet tablet TAKE 1 TABLET BY MOUTH DAILY (Patient taking differently: Take 1 tablet by mouth Daily.) 30 tablet 12    lisinopril (PRINIVIL,ZESTRIL) 40 MG tablet TAKE 1 TABLET BY MOUTH DAILY 90 tablet 3    meloxicam (MOBIC) 15 MG tablet TAKE 1 TABLET BY MOUTH DAILY AS NEEDED FOR JOINT PAIN (Patient taking differently: Take 1 tablet by mouth Daily.) 90 tablet 3    metFORMIN (GLUCOPHAGE) 1000 MG tablet TAKE 1  TABLET BY MOUTH TWICE DAILY (Patient taking differently: Take 1 tablet by mouth 2 (Two) Times a Day With Meals.) 180 tablet 3    Multiple Vitamins-Minerals (CENTRUM SILVER) tablet Take 1 tablet by mouth Every Evening. LAST DOSE 11/22      nitroglycerin (NITROSTAT) 0.3 MG SL tablet 1 under the tongue as needed for angina, may repeat q5mins for up three doses 100 tablet 11    OneTouch Delica Lancets 33G misc 1 strip by Other route Every 12 (Twelve) Hours. 100 each 3    rosuvastatin (CRESTOR) 10 MG tablet TAKE 1 TABLET BY MOUTH DAILY 90 tablet 3     No facility-administered medications prior to visit.       No opioid medication identified on active medication list. I have reviewed chart for other potential  high risk medication/s and harmful drug interactions in the elderly.        Aspirin is on active medication list. Aspirin use is indicated based on review of current medical condition/s. Pros and cons of this therapy have been discussed today. Benefits of this medication outweigh potential harm.  Patient has been encouraged to continue taking this medication.  .      Patient Active Problem List   Diagnosis    Cough due to ACE inhibitor    Diabetes mellitus, type 2    Gout    Mixed hyperlipidemia    Kidney stones    Obstructive sleep apnea syndrome    BPH (benign prostatic hyperplasia)    Colon polyps    Class 3 severe obesity due to excess calories with serious comorbidity and body mass index (BMI) of 40.0 to 44.9 in adult    Plantar fasciitis    LLOYD on CPAP    Nuclear sclerosis    Presbyopia    Osteoarthritis of left shoulder    Osteoarthritis of right glenohumeral joint    Status post replacement of right shoulder joint    Right wrist pain    Tear of right scapholunate ligament    Chondromalacia of knee    Chronic pain of both knees    Osteoarthritis of both knees    Easy bruising    Pain in both knees    Primary osteoarthritis involving multiple joints    Chronic pain syndrome    Hx of total knee replacement,  "left    Primary osteoarthritis of right knee    Former smoker    DJD of left shoulder    Benign essential HTN    Chest pain    Medicare annual wellness visit, subsequent     Advance Care Planning   Advance Care Planning     Advance Directive is on file.  ACP discussion was held with the patient during this visit. Patient has an advance directive in EMR which is still valid.      Objective    Vitals:    23 1055   BP: 130/82   BP Location: Right arm   Patient Position: Sitting   Cuff Size: Adult   Pulse: 85   Resp: 18   Temp: 97.6 øF (36.4 øC)   TempSrc: Temporal   SpO2: 96%   Weight: 122 kg (268 lb 4 oz)   Height: 170.2 cm (67\")   PainSc: 0-No pain     Estimated body mass index is 42.01 kg/mý as calculated from the following:    Height as of this encounter: 170.2 cm (67\").    Weight as of this encounter: 122 kg (268 lb 4 oz).       ATTENTION  What is the year: correct  What is the month of the year: correct  What is the day of the week?: correct  What is the date?: correct  MEMORY  Repeat address three times, only score third attempt: [address], [place]: 7  HOW MANY ANIMALS DID THE PATIENT NAME  Verbal Fluency -- Animal Names (0-25): 22+  CLOCK DRAWING  Clock Drawing: All Correct  MEMORY RECALL  Tell me what you remember about that name and address we were repeating at the beginnin  ACE TOTAL SCORE  Total ACE Score - <25/30 strongly suggests cognitive impairment; <21/30 almost certainly shows dementia: 30      Does the patient have evidence of cognitive impairment? No    Lab Results   Component Value Date    CHLPL 128 08/15/2023    TRIG 207 (H) 08/15/2023    HDL 44 08/15/2023    LDL 51 08/15/2023    VLDL 33 08/15/2023    HGBA1C 7.1 (H) 08/15/2023        HEALTH RISK ASSESSMENT    Smoking Status:  Social History     Tobacco Use   Smoking Status Former    Packs/day: 1.00    Years: 25.00    Pack years: 25.00    Types: Cigarettes    Start date: 1979    Quit date: 1999    " Years since quittin.6   Smokeless Tobacco Never     Alcohol Consumption:  Social History     Substance and Sexual Activity   Alcohol Use No     Fall Risk Screen:    MARCELOADI Fall Risk Assessment was completed, and patient is at LOW risk for falls.Assessment completed on:2023    Depression Screenin/21/2023    10:56 AM   PHQ-2/PHQ-9 Depression Screening   Little Interest or Pleasure in Doing Things 0-->not at all   Feeling Down, Depressed or Hopeless 0-->not at all   PHQ-9: Brief Depression Severity Measure Score 0       Health Habits and Functional and Cognitive Screenin/21/2023    11:00 AM   Functional & Cognitive Status   Do you have difficulty preparing food and eating? No   Do you have difficulty bathing yourself, getting dressed or grooming yourself? No   Do you have difficulty using the toilet? No   Do you have difficulty moving around from place to place? No   Do you have trouble with steps or getting out of a bed or a chair? No   Current Diet Well Balanced Diet   Dental Exam Up to date   Eye Exam Up to date   Exercise (times per week) 7 times per week   Current Exercises Include Walking   Do you need help using the phone?  No   Are you deaf or do you have serious difficulty hearing?  No   Do you need help to go to places out of walking distance? No   Do you need help shopping? No   Do you need help preparing meals?  No   Do you need help with housework?  No   Do you need help with laundry? No   Do you need help taking your medications? No   Do you need help managing money? No   Do you ever drive or ride in a car without wearing a seat belt? No   Have you felt unusual stress, anger or loneliness in the last month? No   Who do you live with? Spouse   If you need help, do you have trouble finding someone available to you? No   Have you been bothered in the last four weeks by sexual problems? No   Do you have difficulty concentrating, remembering or making decisions? No        Age-appropriate Screening Schedule:  Refer to the list below for future screening recommendations based on patient's age, sex and/or medical conditions. Orders for these recommended tests are listed in the plan section. The patient has been provided with a written plan.    Health Maintenance   Topic Date Due    ZOSTER VACCINE (2 of 3) 10/27/2012    COVID-19 Vaccine (5 - Moderna series) 03/08/2023    DIABETIC FOOT EXAM  04/12/2023    INFLUENZA VACCINE  10/01/2023    HEMOGLOBIN A1C  02/15/2024    DIABETIC EYE EXAM  05/25/2024    LIPID PANEL  08/15/2024    URINE MICROALBUMIN  08/15/2024    ANNUAL WELLNESS VISIT  08/21/2024    TDAP/TD VACCINES (2 - Td or Tdap) 04/16/2028    COLORECTAL CANCER SCREENING  02/03/2031    HEPATITIS C SCREENING  Completed    Pneumococcal Vaccine 65+  Completed    AAA SCREEN (ONE-TIME)  Completed                  CMS Preventative Services Quick Reference  Risk Factors Identified During Encounter  Chronic Pain: Natural history and expected course discussed. Questions answered.  OTC analgesics as needed. Proper dosing schedule discussed.   Patient is under the care of of hand surgeon for right wrist pain  Immunizations Discussed/Encouraged: Shingrix and COVID19  Inactivity/Sedentary: Patient was advised to exercise at least 150 minutes a week per CDC recommendations. and Patient was advised to do at least 150 minutes a week of moderate intensity activity such as brisk walking and do at least 2 days a week of activities that strengthen muscles such as resistance training and do activities to improve balance such as standing on one foot about 3 days a week.  The above risks/problems have been discussed with the patient.  Pertinent information has been shared with the patient in the After Visit Summary.  An After Visit Summary and PPPS were made available to the patient.    Follow Up:   Next Medicare Wellness visit to be scheduled in 1 year.       Additional E&M Note during same encounter  "follows:  Patient has multiple medical problems which are significant and separately identifiable that require additional work above and beyond the Medicare Wellness Visit.      Chief Complaint  Medicare Wellness-subsequent, Diabetes, Hypertension, and Hyperlipidemia    Subjective        HPI  Orion Harvey is also being seen today for diabetes, hypertension, and hyperlipidemia.  Patient had labs August 15, 2023.    Diabetes  Patient does not check blood sugar at home.  Associated symptoms include Hunger  Per patient current diet is Well Balanced.  Patient trys to avoid concentrated sweets.  Patient does not see podiatry.  Patient see's [facility] for diabetic eye exam and last eye exam was within the last year.    Hypertension  Patient does check blood pressure at home occasionally.  Patient denies chest pain, blurred vision,fatigue, palpitations, shortness of breath or headaches.  Patient states medications is working well.     Hyperlipidemia  Patient is following a low cholesterol diet.   Currently is on statin therapy.  Patient reports is exercising.     Right wrist pain has increased is now constant. Taking tylenol and meloxicam is minimally helpful.   Have pre procedural testing on Thursday the 24th on 9/14 scheduled for right wrist surgery to either replace cartledge of fuse the bones.     Objective   Vital Signs:  /82 (BP Location: Right arm, Patient Position: Sitting, Cuff Size: Adult)   Pulse 85   Temp 97.6 øF (36.4 øC) (Temporal)   Resp 18   Ht 170.2 cm (67\")   Wt 122 kg (268 lb 4 oz)   SpO2 96%   BMI 42.01 kg/mý     Physical Exam  Vitals and nursing note reviewed.   Constitutional:       General: He is not in acute distress.     Appearance: Normal appearance. He is well-developed. He is obese.   HENT:      Head: Normocephalic and atraumatic.   Eyes:      Conjunctiva/sclera: Conjunctivae normal.      Pupils: Pupils are equal, round, and reactive to light.   Neck:      " Thyroid: No thyromegaly.      Vascular: No carotid bruit or JVD.   Cardiovascular:      Rate and Rhythm: Normal rate and regular rhythm.      Heart sounds: Normal heart sounds. No murmur heard.  Pulmonary:      Breath sounds: Normal breath sounds. No wheezing or rales.   Musculoskeletal:         General: Normal range of motion.      Cervical back: Normal range of motion and neck supple.      Comments: Shoulders with relatively normal range of motion.  There is tenderness and stiffness in the right wrist with flexion   Lymphadenopathy:      Cervical: No cervical adenopathy.   Skin:     General: Skin is warm and dry.      Findings: No rash.   Neurological:      Mental Status: He is alert and oriented to person, place, and time.   Psychiatric:         Mood and Affect: Mood normal.         Behavior: Behavior normal.        The following data was reviewed by: Nazanin Garcia MD on 08/21/2023:  Common labs          5/17/2023    11:54 5/31/2023    23:12 5/31/2023    23:13 8/15/2023    09:30   Common Labs   Glucose 145   153  131    BUN 19   18  19    Creatinine 1.01   0.89  0.89    Sodium 138   138  140    Potassium 4.6   4.3  4.9    Chloride 101   101  102    Calcium 10.1   8.6  9.3    Total Protein    6.2    Albumin    4.4    Total Bilirubin    0.4    Alkaline Phosphatase    52    AST (SGOT)    28    ALT (SGPT)    31    WBC 6.07    5.3    Hemoglobin 15.5  13.6   14.0    Hematocrit 45.3  39.5   42.9    Platelets 172    166    Total Cholesterol    128    Triglycerides    207    HDL Cholesterol    44    LDL Cholesterol     51    Hemoglobin A1C 7.30    7.1    Microalbumin, Urine    <3.0      A1C Last 3 Results          4/4/2023    09:10 5/17/2023    11:54 8/15/2023    09:30   HGBA1C Last 3 Results   Hemoglobin A1C 7.0  7.30  7.1                 Assessment and Plan   Diagnoses and all orders for this visit:    1. Medicare annual wellness visit, subsequent (Primary)    2. Type 2 diabetes mellitus without complication,  without long-term current use of insulin    3. Mixed hyperlipidemia    4. Benign essential HTN    5. Class 3 severe obesity due to excess calories with serious comorbidity and body mass index (BMI) of 40.0 to 44.9 in adult  Assessment & Plan:  Patient's (Body mass index is 42.01 kg/mý.) indicates that they are morbidly/severely obese (BMI > 40 or > 35 with obesity - related health condition) with health conditions that include obstructive sleep apnea, hypertension, diabetes mellitus, dyslipidemias, and osteoarthritis . Weight is unchanged. BMI  is above average; BMI management plan is completed. We discussed portion control and increasing exercise.       6. Former smoker      The patient was counseled regarding nutrition, physical activity, healthy weight, injury prevention, immunizations and preventative health screenings.  Do recommend COVID-19 and Shingrix No. 2. And flu shot in October    Diabetes, As part of preoperative labs he had an A1c on May 17, 2023 reported as 7.3 down to 7.1.  Adequate control, he denies any hypoglycemia, continue current medications including glimepiride 4 mg 2 times daily, Jardiance 25 mg daily and metformin 1000 mg twice daily. Activity limited by left shoulder and right wrist pain and mobility issues with knees. Will not be allowed any use with right wrist for 2 months following surgery.  Encouraged to increase activity as able, for example walking.     Hypertension at goal continue current medications, lisinopril 40 mg daily.    Hyperlipidemia at goal continue Crestor 10 mg daily.    Patient does have history of cigarette use but quit at least 25 years ago.  He is not interested in lung cancer screening and does not meet criteria.         Follow Up   Return in about 3 months (around 11/21/2023) for diabetes, cholesterol, htn and flu shot .  Patient was given instructions and counseling regarding his condition or for health maintenance advice. Please see specific information pulled  into the AVS if appropriate.

## 2023-08-21 ENCOUNTER — OFFICE VISIT (OUTPATIENT)
Dept: FAMILY MEDICINE CLINIC | Facility: CLINIC | Age: 73
End: 2023-08-21
Payer: MEDICARE

## 2023-08-21 VITALS
RESPIRATION RATE: 18 BRPM | HEIGHT: 67 IN | BODY MASS INDEX: 42.1 KG/M2 | SYSTOLIC BLOOD PRESSURE: 130 MMHG | DIASTOLIC BLOOD PRESSURE: 82 MMHG | WEIGHT: 268.25 LBS | HEART RATE: 85 BPM | OXYGEN SATURATION: 96 % | TEMPERATURE: 97.6 F

## 2023-08-21 DIAGNOSIS — E78.2 MIXED HYPERLIPIDEMIA: ICD-10-CM

## 2023-08-21 DIAGNOSIS — E66.01 CLASS 3 SEVERE OBESITY DUE TO EXCESS CALORIES WITH SERIOUS COMORBIDITY AND BODY MASS INDEX (BMI) OF 40.0 TO 44.9 IN ADULT: ICD-10-CM

## 2023-08-21 DIAGNOSIS — Z00.00 MEDICARE ANNUAL WELLNESS VISIT, SUBSEQUENT: Primary | ICD-10-CM

## 2023-08-21 DIAGNOSIS — E11.9 TYPE 2 DIABETES MELLITUS WITHOUT COMPLICATION, WITHOUT LONG-TERM CURRENT USE OF INSULIN: Chronic | ICD-10-CM

## 2023-08-21 DIAGNOSIS — I10 BENIGN ESSENTIAL HTN: ICD-10-CM

## 2023-08-21 DIAGNOSIS — Z87.891 FORMER SMOKER: ICD-10-CM

## 2023-08-21 PROCEDURE — 99214 OFFICE O/P EST MOD 30 MIN: CPT | Performed by: FAMILY MEDICINE

## 2023-08-21 PROCEDURE — 3051F HG A1C>EQUAL 7.0%<8.0%: CPT | Performed by: FAMILY MEDICINE

## 2023-08-21 PROCEDURE — 3079F DIAST BP 80-89 MM HG: CPT | Performed by: FAMILY MEDICINE

## 2023-08-21 PROCEDURE — 1170F FXNL STATUS ASSESSED: CPT | Performed by: FAMILY MEDICINE

## 2023-08-21 PROCEDURE — 3075F SYST BP GE 130 - 139MM HG: CPT | Performed by: FAMILY MEDICINE

## 2023-08-21 PROCEDURE — G0439 PPPS, SUBSEQ VISIT: HCPCS | Performed by: FAMILY MEDICINE

## 2023-08-21 NOTE — ASSESSMENT & PLAN NOTE
Patient's (Body mass index is 42.01 kg/mý.) indicates that they are morbidly/severely obese (BMI > 40 or > 35 with obesity - related health condition) with health conditions that include obstructive sleep apnea, hypertension, diabetes mellitus, dyslipidemias, and osteoarthritis . Weight is unchanged. BMI  is above average; BMI management plan is completed. We discussed portion control and increasing exercise.

## 2023-08-24 ENCOUNTER — TELEPHONE (OUTPATIENT)
Dept: FAMILY MEDICINE CLINIC | Facility: CLINIC | Age: 73
End: 2023-08-24

## 2023-08-24 NOTE — TELEPHONE ENCOUNTER
Caller: Orion Harvey    Relationship: Self    Best call back number: [phone]    What is the best time to reach you: ASAP    Who are you requesting to speak with (clinical staff, provider, specific staff member): CLINICAL     What was the call regarding: PT TAKES ASPRIN THERAPY PRESCRIBED BY PCP AND IS HAVING SURGERY ON 9/14/23. WAS TOLD TO CONTACT PCP TO SEE WHEN HE SOUL STOP PRIOR TO PROCEDURE.     Is it okay if the provider responds through MyChart: NO

## 2023-08-25 NOTE — TELEPHONE ENCOUNTER
Please have him stop aspirin 10 days prior to surgery and may restart same day following surgery unless advised otherwise by surgeon (if concern for increased bleeding, surgeon may have him postpone restarting).

## 2023-09-29 ENCOUNTER — TELEPHONE (OUTPATIENT)
Dept: ORTHOPEDIC SURGERY | Facility: CLINIC | Age: 73
End: 2023-09-29

## 2023-09-29 DIAGNOSIS — M17.11 PRIMARY OSTEOARTHRITIS OF RIGHT KNEE: Primary | ICD-10-CM

## 2023-09-29 NOTE — TELEPHONE ENCOUNTER
Patient last Rt knee Monovisc was done 5/3/2023. Not due for next one 11/4/2023. Can a referral please be put in. Thank you

## 2023-09-29 NOTE — TELEPHONE ENCOUNTER
Caller: HOMAR ENG     Relationship to patient: PATIENT     Best call back number: [phone] (home)     Chief complaint: RIGHT KNEE    Type of visit: GEL INJECTION     Requested date: ASAP      If rescheduling, when is the original appointment: LAST INJECTION 05/03/23     Additional notes:MONOVISC

## 2023-10-03 NOTE — PHYSICAL THERAPY INITIAL EVALUATION ADULT - WEIGHT-BEARING RESTRICTIONS: GAIT, REHAB EVAL
Rest, drink plenty of fluids.  Advance activity as tolerated.  Continue all previously prescribed medications as directed.  Follow up with your primary care physician in 48-72 hours- bring copies of your results.  Return to the ER for worsening or persistent symptoms, and/or ANY NEW OR CONCERNING SYMPTOMS. If you have issues obtaining follow up, please call: [phone] to obtain a doctor or specialist who takes your insurance in your area.  You may call [phone] to make an appointment with the internal medicine clinic.
full weight-bearing

## 2023-10-16 DIAGNOSIS — E78.2 MIXED HYPERLIPIDEMIA: ICD-10-CM

## 2023-10-16 DIAGNOSIS — E11.9 TYPE 2 DIABETES MELLITUS WITHOUT COMPLICATION, WITHOUT LONG-TERM CURRENT USE OF INSULIN: ICD-10-CM

## 2023-10-16 RX ORDER — ROSUVASTATIN CALCIUM 10 MG/1
TABLET, COATED ORAL
Qty: 90 TABLET | Refills: 3 | Status: SHIPPED | OUTPATIENT
Start: 2023-10-16

## 2023-10-16 RX ORDER — GLIMEPIRIDE 4 MG/1
4 TABLET ORAL 2 TIMES DAILY
Qty: 180 TABLET | Refills: 3 | Status: SHIPPED | OUTPATIENT
Start: 2023-10-16

## 2023-10-18 NOTE — TELEPHONE ENCOUNTER
Called patient to advised injection can only be done 6 months and day. Advised next injection due 11/4/2023. Will call patient to get On license of [facility]'ed once been approved by ins.

## 2023-10-18 NOTE — TELEPHONE ENCOUNTER
Caller: Orion Harvey    Relationship to patient: Self    Best call back number: [phone]    Patient is needing: PATIENT IS ASKING FOR AN UPDATE ON THIS. PLEASE ADVISE PATIENT IF HE CAN GET SCHEDULED

## 2023-11-15 NOTE — PROGRESS NOTES
Chief Complaint  Diabetes, Hyperlipidemia, and Hypertension    History of Present Illness  Orion Harvey presents today for diabetes, hyperlipidemia, and hypertension    Diabetes  Patient does check blood sugar at home occasionally.   Per patient fasting blood sugars range between 110 to 140.  Occasionally up to 180.  Associated symptoms include None  Per patient current diet is Variety of foods.  Patient does avoid concentrated sweets.  Patient does not see podiatry.  Patient see's [facility] for diabetic eye exam and last eye exam was 04/2023.  Patient reports they are taking medications as prescribed and they are not having side effects.    Diabetic Foot Exam Performed    Hyperlipidemia  Patient is not following a low cholesterol diet.   Currently is on statin therapy.  Patient reports is not exercising.  Patient reports they are taking medications as prescribed and they are not having side effects.    Hypertension  Patient does check blood pressure at home.   Home readings range from  120's/80's per patient.  Patient denies  blurred vision, chest pain, headache, neck aches, palpitations, and peripheral edema   Patient reports they are taking medications as prescribed and they are not having side effects.    Pt is about 5 weeks s/p right wrist surgery still in therepy will follow up with Dr Topete on Dec 7th.   Patient Care Team:  Nazanin Garcia MD as PCP - General (Family Medicine)  Rosmery Manuel as Technologist   Current Outpatient Medications on File Prior to Visit   Medication Sig    acetaminophen (TYLENOL) 650 MG 8 hr tablet Take 1 tablet by mouth Every 8 (Eight) Hours As Needed for Mild Pain.    ascorbic acid (VITAMIN C) 1000 MG tablet Take 1 tablet by mouth Daily. LAST DOSE 11/23    aspirin 325 MG tablet Take 1 tablet by mouth Daily.    fluticasone (FLONASE) 50 MCG/ACT nasal spray 2 sprays into the nostril(s) as directed by provider Daily As Needed for Rhinitis.     "glimepiride (AMARYL) 4 MG tablet TAKE 1 TABLET BY MOUTH TWICE DAILY    glucose blood (OneTouch Verio) test strip 1 each by Other route 2 (Two) Times a Day. Once daily  DX E11.65    lisinopril (PRINIVIL,ZESTRIL) 40 MG tablet TAKE 1 TABLET BY MOUTH DAILY    metFORMIN (GLUCOPHAGE) 1000 MG tablet Take 1 tablet by mouth 2 (Two) Times a Day With Meals.    Multiple Vitamins-Minerals (CENTRUM SILVER) tablet Take 1 tablet by mouth Every Evening. LAST DOSE 11/22    OneTouch Delica Lancets 33G misc 1 strip by Other route Every 12 (Twelve) Hours.    rosuvastatin (CRESTOR) 10 MG tablet TAKE 1 TABLET BY MOUTH DAILY    [DISCONTINUED] Jardiance 25 MG tablet tablet TAKE 1 TABLET BY MOUTH DAILY (Patient taking differently: Take 1 tablet by mouth Daily.)    Jardiance 25 MG tablet tablet TAKE 1 TABLET BY MOUTH DAILY    [DISCONTINUED] meloxicam (MOBIC) 15 MG tablet TAKE 1 TABLET BY MOUTH DAILY AS NEEDED FOR JOINT PAIN (Patient not taking: Reported on 11/16/2023)    [DISCONTINUED] nitroglycerin (NITROSTAT) 0.3 MG SL tablet 1 under the tongue as needed for angina, may repeat q5mins for up three doses     No current facility-administered medications on file prior to visit.       Objective   Vital Signs:   /76 (BP Location: Right arm, Patient Position: Sitting, Cuff Size: Adult)   Pulse 75   Temp 98.4 °F (36.9 °C) (Temporal)   Resp 18   Ht 172.7 cm (68\")   Wt 120 kg (265 lb)   SpO2 96%   BMI 40.29 kg/m²    BP Readings from Last 3 Encounters:   11/21/23 120/76   08/21/23 130/82   07/11/23 126/64     Wt Readings from Last 3 Encounters:   11/21/23 120 kg (265 lb)   11/16/23 120 kg (264 lb 6.4 oz)   08/21/23 122 kg (268 lb 4 oz)         Physical Exam  Vitals and nursing note reviewed.   Constitutional:       General: He is not in acute distress.     Appearance: Normal appearance. He is well-developed. He is obese.   HENT:      Head: Normocephalic and atraumatic.   Eyes:      Conjunctiva/sclera: Conjunctivae normal.      Pupils: " Pupils are equal, round, and reactive to light.   Neck:      Thyroid: No thyromegaly.      Vascular: No carotid bruit or JVD.   Cardiovascular:      Rate and Rhythm: Normal rate and regular rhythm.      Heart sounds: Normal heart sounds. No murmur heard.  Pulmonary:      Breath sounds: Normal breath sounds. No wheezing or rales.   Musculoskeletal:         General: Normal range of motion.      Cervical back: Normal range of motion and neck supple.      Comments: Well healed surgical scars right wrist significantly reduced flexion extension and rotation at the wrist.   Shoulders with very good range of motion bilaterally.    Feet:      Right foot:      Protective Sensation: 10 sites tested.  10 sites sensed.      Skin integrity: Dry skin present.      Toenail Condition: Right toenails are abnormally thick.      Left foot:      Protective Sensation: 10 sites tested.  10 sites sensed.      Toenail Condition: Left toenails are abnormally thick.   Lymphadenopathy:      Cervical: No cervical adenopathy.   Skin:     General: Skin is warm and dry.      Findings: No rash.   Neurological:      Mental Status: He is alert and oriented to person, place, and time.   Psychiatric:         Mood and Affect: Mood normal.         Behavior: Behavior normal.            Office Visit on 11/21/2023   Component Date Value Ref Range Status    Hemoglobin A1C 11/21/2023 8.0 (A)  4.5 - 5.7 % Final    Lot Number 11/21/2023 [id number]   Final    Expiration Date 11/21/2023 [id number]   Final     A1C Last 3 Results          5/17/2023    11:54 8/15/2023    09:30 11/21/2023    09:14   HGBA1C Last 3 Results   Hemoglobin A1C 7.30  7.1  8.0      Lab Results   Component Value Date    CHOL 115 10/30/2019    CHLPL 128 08/15/2023    TRIG 207 (H) 08/15/2023    HDL 44 08/15/2023    LDL 51 08/15/2023     Lab Results   Component Value Date    TSH 0.681 08/15/2023     Lab Results   Component Value Date    GLUCOSE 131 (H) 08/15/2023    BUN 19 08/15/2023    CREATININE  0.89 08/15/2023    EGFRIFNONA 100 12/01/2021    EGFRIFAFRI 100 11/05/2021    BCR 21 08/15/2023    K 4.9 08/15/2023    CO2 21 08/15/2023    CALCIUM 9.3 08/15/2023    PROTENTOTREF 6.2 08/15/2023    ALBUMIN 4.4 08/15/2023    LABIL2 2.4 (H) 08/15/2023    AST 28 08/15/2023    ALT 31 08/15/2023     Lab Results   Component Value Date    WBC 5.3 08/15/2023    HGB 14.0 08/15/2023    HCT 42.9 08/15/2023    MCV 91 08/15/2023     08/15/2023   PSA collected on 11/3/2023, was 0.4, ordered by Dr. Moreno.                   Assessment and Plan    Diagnoses and all orders for this visit:    1. Type 2 diabetes mellitus with hyperglycemia, without long-term current use of insulin (Primary)  -     POC Glycosylated Hemoglobin (Hb A1C)  -     Hemoglobin A1c; Future  -     MicroAlbumin, Urine, Random - Urine, Clean Catch; Future  -     Comprehensive Metabolic Panel; Future    2. Benign essential HTN  -     CBC & Differential; Future  -     MicroAlbumin, Urine, Random - Urine, Clean Catch; Future  -     Comprehensive Metabolic Panel; Future    3. Mixed hyperlipidemia  -     Lipid Panel; Future  -     Comprehensive Metabolic Panel; Future    4. Obesity, Class III, BMI 40-49.9 (morbid obesity)    5. Need for influenza vaccination  -     Fluzone High-Dose 65+yrs    6. Hx of total knee replacement, left  -     amoxicillin (AMOXIL) 500 MG capsule; 4 capsules 1 hour prior and 2 capsules 6 hours after dental cleaning or procedure  Dispense: 12 capsule; Refill: 1    7. Class 3 severe obesity due to excess calories with serious comorbidity and body mass index (BMI) of 40.0 to 44.9 in adult    8. Benign prostatic hyperplasia, unspecified whether lower urinary tract symptoms present    9. Kidney stones    10. Chronic gout without tophus, unspecified cause, unspecified site  -     Uric acid; Future    11. Screening for thyroid disorder  -     TSH Rfx On Abnormal To Free T4; Future    12. Vitamin D deficiency  -     Vitamin D,25-Hydroxy;  Future      Diabetes, A1c has increased to 8.0.  Currently on maximum doses of Jardiance 25 mg daily,  metformin 1000 mg twice daily and Amaryl 4 mg twice daily.  He does not want to discuss changing medications.  Activity has been limited with orthopedic issues. Encouraged to increase activity hope can go to [place] through April once released. Agrees to increase walking and watch diet better.     Hyperlipidemia on Crestor, will check level at least annually.  Ordering for when he returns and April.    Following with Dr Wood for recurrent kidney stones and BPH    Flu shot today encouraged to get annual COVID and second Shingrix vaccinations.    Patient hardware from multiple joint surgeries and was told by previous dentist to has retired that he needs to take amoxicillin prior to dental cleanings and procedures for at least 1 to 2 years.  His prescription has  and he does have an upcoming dental cleaning scheduled as well as a dental procedure scheduled in the spring.  Prescription given as above.    Medications Discontinued During This Encounter   Medication Reason    meloxicam (MOBIC) 15 MG tablet *Therapy completed    nitroglycerin (NITROSTAT) 0.3 MG SL tablet *Therapy completed    meloxicam (MOBIC) 15 MG tablet *Therapy completed         Follow Up     Return in about 4 months (around 2024) for when return from [place] for diabetes labs prior.    Patient was given instructions and counseling regarding his condition or for health maintenance advice. Please see specific information pulled into the AVS if appropriate.

## 2023-11-16 ENCOUNTER — OFFICE VISIT (OUTPATIENT)
Dept: ORTHOPEDIC SURGERY | Facility: CLINIC | Age: 73
End: 2023-11-16
Payer: MEDICARE

## 2023-11-16 VITALS — BODY MASS INDEX: 41.5 KG/M2 | WEIGHT: 264.4 LBS | HEART RATE: 67 BPM | HEIGHT: 67 IN

## 2023-11-16 DIAGNOSIS — Z47.89 ORTHOPEDIC AFTERCARE: Primary | ICD-10-CM

## 2023-11-16 DIAGNOSIS — M19.012 OSTEOARTHRITIS OF LEFT GLENOHUMERAL JOINT: ICD-10-CM

## 2023-11-16 PROCEDURE — 99212 OFFICE O/P EST SF 10 MIN: CPT | Performed by: ORTHOPAEDIC SURGERY

## 2023-11-16 NOTE — PROGRESS NOTES
"     Patient ID: Orion Harvey is a 73 y.o. male.  5/31/23 left reverse total shoulder arthroplasty   Pain controlled    Review of Systems:        Objective:    Pulse 67   Ht 170.2 cm (67\")   Wt 120 kg (264 lb 6.4 oz)   BMI 41.41 kg/m²     Physical Examination:     Left shoulder healed incision no tenderness active elevation 170 abduction 150 external rotation 40    Imaging:   left Shoulder X-Ray  Indication: Postop total      AP Y and Lateral views  Findings: Reverse total shoulder in position  no bony lesion  Soft tissues normal  not examined joint spaces  Hardware appropriately positioned yes      yes prior studies available for comparison.    X-RAY was ordered and reviewed by Alfonso Richard MD    Assessment:    Doing well after shoulder arthroplasty    Plan:   Activity as tolerated x-ray in 6 months      Procedures          Disclaimer: Part of this note may be an electronic transcription/translation of spoken language to printed text using the Dragon Dictation System  "

## 2023-11-20 DIAGNOSIS — E11.65 TYPE 2 DIABETES MELLITUS WITH HYPERGLYCEMIA, WITHOUT LONG-TERM CURRENT USE OF INSULIN: Chronic | ICD-10-CM

## 2023-11-21 ENCOUNTER — OFFICE VISIT (OUTPATIENT)
Dept: FAMILY MEDICINE CLINIC | Facility: CLINIC | Age: 73
End: 2023-11-21
Payer: MEDICARE

## 2023-11-21 VITALS
HEIGHT: 68 IN | BODY MASS INDEX: 40.16 KG/M2 | TEMPERATURE: 98.4 F | OXYGEN SATURATION: 96 % | RESPIRATION RATE: 18 BRPM | DIASTOLIC BLOOD PRESSURE: 76 MMHG | WEIGHT: 265 LBS | SYSTOLIC BLOOD PRESSURE: 120 MMHG | HEART RATE: 75 BPM

## 2023-11-21 DIAGNOSIS — E55.9 VITAMIN D DEFICIENCY: ICD-10-CM

## 2023-11-21 DIAGNOSIS — N20.0 KIDNEY STONES: ICD-10-CM

## 2023-11-21 DIAGNOSIS — Z23 NEED FOR INFLUENZA VACCINATION: ICD-10-CM

## 2023-11-21 DIAGNOSIS — N40.0 BENIGN PROSTATIC HYPERPLASIA, UNSPECIFIED WHETHER LOWER URINARY TRACT SYMPTOMS PRESENT: ICD-10-CM

## 2023-11-21 DIAGNOSIS — Z13.29 SCREENING FOR THYROID DISORDER: ICD-10-CM

## 2023-11-21 DIAGNOSIS — Z96.652 HX OF TOTAL KNEE REPLACEMENT, LEFT: ICD-10-CM

## 2023-11-21 DIAGNOSIS — E78.2 MIXED HYPERLIPIDEMIA: ICD-10-CM

## 2023-11-21 DIAGNOSIS — E66.01 OBESITY, CLASS III, BMI 40-49.9 (MORBID OBESITY): ICD-10-CM

## 2023-11-21 DIAGNOSIS — E66.01 CLASS 3 SEVERE OBESITY DUE TO EXCESS CALORIES WITH SERIOUS COMORBIDITY AND BODY MASS INDEX (BMI) OF 40.0 TO 44.9 IN ADULT: ICD-10-CM

## 2023-11-21 DIAGNOSIS — M1A.9XX0 CHRONIC GOUT WITHOUT TOPHUS, UNSPECIFIED CAUSE, UNSPECIFIED SITE: ICD-10-CM

## 2023-11-21 DIAGNOSIS — E11.65 TYPE 2 DIABETES MELLITUS WITH HYPERGLYCEMIA, WITHOUT LONG-TERM CURRENT USE OF INSULIN: Primary | ICD-10-CM

## 2023-11-21 DIAGNOSIS — I10 BENIGN ESSENTIAL HTN: ICD-10-CM

## 2023-11-21 LAB
EXPIRATION DATE: ABNORMAL
HBA1C MFR BLD: 8 % (ref 4.5–5.7)
Lab: ABNORMAL

## 2023-11-21 RX ORDER — AMOXICILLIN 500 MG/1
CAPSULE ORAL
Qty: 12 CAPSULE | Refills: 1 | Status: SHIPPED | OUTPATIENT
Start: 2023-11-21

## 2023-11-21 RX ORDER — EMPAGLIFLOZIN 25 MG/1
TABLET, FILM COATED ORAL
Qty: 30 TABLET | Refills: 12 | Status: SHIPPED | OUTPATIENT
Start: 2023-11-21

## 2023-12-26 ENCOUNTER — TELEPHONE (OUTPATIENT)
Dept: ORTHOPEDIC SURGERY | Facility: CLINIC | Age: 73
End: 2023-12-26

## 2023-12-26 NOTE — TELEPHONE ENCOUNTER
Caller: HOMAR ENG    Relationship to patient: SELF    Best call back number: [phone]    Chief complaint: RIGHT KNEE    Type of visit: GEL INJECTION    Requested date: ASAP    If rescheduling, when is the original appointment: N/A    Additional notes: HAD GEL INJECTION W/ DR SANTOS IN MAY 2023

## 2024-01-02 ENCOUNTER — OFFICE VISIT (OUTPATIENT)
Dept: ORTHOPEDIC SURGERY | Facility: CLINIC | Age: 74
End: 2024-01-02
Payer: MEDICARE

## 2024-01-02 VITALS — OXYGEN SATURATION: 99 % | HEIGHT: 68 IN | RESPIRATION RATE: 20 BRPM | BODY MASS INDEX: 40.16 KG/M2 | WEIGHT: 265 LBS

## 2024-01-02 DIAGNOSIS — M17.11 PRIMARY OSTEOARTHRITIS OF RIGHT KNEE: Primary | ICD-10-CM

## 2024-01-02 PROCEDURE — 20610 DRAIN/INJ JOINT/BURSA W/O US: CPT | Performed by: NURSE PRACTITIONER

## 2024-01-02 RX ORDER — LIDOCAINE HYDROCHLORIDE 10 MG/ML
2 INJECTION, SOLUTION INFILTRATION; PERINEURAL
Status: COMPLETED | OUTPATIENT
Start: 2024-01-02 | End: 2024-01-02

## 2024-01-02 RX ADMIN — LIDOCAINE HYDROCHLORIDE 2 ML: 10 INJECTION, SOLUTION INFILTRATION; PERINEURAL at 14:48
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
## 2024-03-13 NOTE — ED PROVIDER NOTE - DISPOSITION TYPE
Thank you letting us take care of you today. We hope that all your questions were addressed. If a question was overlooked or something else comes to mind after you return home, please call our office at [phone].    If you need to cancel or change an appointment, surgery or procedure, please contact the office at [phone].       ADMIT

## 2024-03-21 ENCOUNTER — TELEPHONE (OUTPATIENT)
Dept: CARDIOLOGY | Facility: CLINIC | Age: 74
End: 2024-03-21
Payer: MEDICARE

## 2024-03-21 NOTE — TELEPHONE ENCOUNTER
Caller: YAO ENG    Relationship to patient: Emergency Contact    Best call back number:  [phone]    Patient is needing: PATIENT IS HAVING KNEE SURGERY 4.11.24 BY DR. SANTOS . PATIENT IS REQUESTING CARDIAC CLEARANCE . PHONE [phone]. [phone] ORA PERDUE.

## 2024-04-04 ENCOUNTER — TELEPHONE (OUTPATIENT)
Dept: FAMILY MEDICINE CLINIC | Facility: CLINIC | Age: 74
End: 2024-04-04
Payer: MEDICARE

## 2024-04-04 NOTE — TELEPHONE ENCOUNTER
----- Message from Orion Harvey sent at 4/4/2024  9:00 AM EDT -----  Regarding: Blood work for upcoming visit 4/8/2024  Contact: [phone]  I have had blood work up for upcoming Right Knee replacement on 4/11//2024 By Dr. Martínez at [facility]. They completed a pre op blood work up 3/25/2024, With A1C.  could you get their test results from them for my blood work up?   [facility].  Pre Admission Testing [phone]

## 2024-04-05 NOTE — TELEPHONE ENCOUNTER
Left detailed voicemail with [facility] to try and obtain lab records again. Advised them to return my call with their fax number if a records release form would be preferred.
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
## 2024-04-05 NOTE — TELEPHONE ENCOUNTER
Called again earlier since I have not received the faxed labs. Was transferred to medical records but they did not answer again. Called back to try and obtain a fax number for a medical records release from, still no answer.

## 2024-04-08 ENCOUNTER — OFFICE VISIT (OUTPATIENT)
Dept: FAMILY MEDICINE CLINIC | Facility: CLINIC | Age: 74
End: 2024-04-08
Payer: MEDICARE

## 2024-04-08 VITALS
BODY MASS INDEX: 38.72 KG/M2 | HEART RATE: 76 BPM | HEIGHT: 69 IN | OXYGEN SATURATION: 97 % | TEMPERATURE: 96.6 F | WEIGHT: 261.4 LBS | RESPIRATION RATE: 18 BRPM | SYSTOLIC BLOOD PRESSURE: 108 MMHG | DIASTOLIC BLOOD PRESSURE: 56 MMHG

## 2024-04-08 DIAGNOSIS — G89.29 CHRONIC PAIN OF RIGHT KNEE: ICD-10-CM

## 2024-04-08 DIAGNOSIS — E55.9 VITAMIN D DEFICIENCY: ICD-10-CM

## 2024-04-08 DIAGNOSIS — E11.65 TYPE 2 DIABETES MELLITUS WITH HYPERGLYCEMIA, WITHOUT LONG-TERM CURRENT USE OF INSULIN: Primary | ICD-10-CM

## 2024-04-08 DIAGNOSIS — I10 BENIGN ESSENTIAL HTN: ICD-10-CM

## 2024-04-08 DIAGNOSIS — M1A.9XX0 CHRONIC GOUT WITHOUT TOPHUS, UNSPECIFIED CAUSE, UNSPECIFIED SITE: ICD-10-CM

## 2024-04-08 DIAGNOSIS — M17.11 PRIMARY OSTEOARTHRITIS OF RIGHT KNEE: ICD-10-CM

## 2024-04-08 DIAGNOSIS — M25.561 CHRONIC PAIN OF RIGHT KNEE: ICD-10-CM

## 2024-04-08 DIAGNOSIS — E78.2 MIXED HYPERLIPIDEMIA: ICD-10-CM

## 2024-04-08 PROCEDURE — G2211 COMPLEX E/M VISIT ADD ON: HCPCS | Performed by: FAMILY MEDICINE

## 2024-04-08 PROCEDURE — 1159F MED LIST DOCD IN RCRD: CPT | Performed by: FAMILY MEDICINE

## 2024-04-08 PROCEDURE — 3074F SYST BP LT 130 MM HG: CPT | Performed by: FAMILY MEDICINE

## 2024-04-08 PROCEDURE — 99214 OFFICE O/P EST MOD 30 MIN: CPT | Performed by: FAMILY MEDICINE

## 2024-04-08 PROCEDURE — 1160F RVW MEDS BY RX/DR IN RCRD: CPT | Performed by: FAMILY MEDICINE

## 2024-04-08 PROCEDURE — 3078F DIAST BP <80 MM HG: CPT | Performed by: FAMILY MEDICINE

## 2024-04-15 ENCOUNTER — TREATMENT (OUTPATIENT)
Dept: PHYSICAL THERAPY | Facility: CLINIC | Age: 74
End: 2024-04-15
Payer: MEDICARE

## 2024-04-15 DIAGNOSIS — R26.9 GAIT DISTURBANCE: ICD-10-CM

## 2024-04-15 DIAGNOSIS — R29.898 RIGHT LEG WEAKNESS: ICD-10-CM

## 2024-04-15 DIAGNOSIS — Z96.651 S/P TOTAL KNEE ARTHROPLASTY, RIGHT: ICD-10-CM

## 2024-04-15 DIAGNOSIS — M25.561 ACUTE PAIN OF RIGHT KNEE: Primary | ICD-10-CM

## 2024-04-15 PROCEDURE — 97161 PT EVAL LOW COMPLEX 20 MIN: CPT | Performed by: PHYSICAL THERAPIST

## 2024-04-15 PROCEDURE — 97110 THERAPEUTIC EXERCISES: CPT | Performed by: PHYSICAL THERAPIST

## 2024-04-15 PROCEDURE — 97140 MANUAL THERAPY 1/> REGIONS: CPT | Performed by: PHYSICAL THERAPIST
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
## 2024-04-17 ENCOUNTER — TREATMENT (OUTPATIENT)
Dept: PHYSICAL THERAPY | Facility: CLINIC | Age: 74
End: 2024-04-17
Payer: MEDICARE

## 2024-04-17 DIAGNOSIS — R29.898 RIGHT LEG WEAKNESS: ICD-10-CM

## 2024-04-17 DIAGNOSIS — R26.9 GAIT DISTURBANCE: ICD-10-CM

## 2024-04-17 DIAGNOSIS — Z96.651 S/P TOTAL KNEE ARTHROPLASTY, RIGHT: ICD-10-CM

## 2024-04-17 DIAGNOSIS — M25.561 ACUTE PAIN OF RIGHT KNEE: Primary | ICD-10-CM

## 2024-04-17 PROCEDURE — 97110 THERAPEUTIC EXERCISES: CPT | Performed by: PHYSICAL THERAPIST

## 2024-04-17 PROCEDURE — 97140 MANUAL THERAPY 1/> REGIONS: CPT | Performed by: PHYSICAL THERAPIST

## 2024-04-17 NOTE — PROGRESS NOTES
Physical Therapy Daily Treatment Note      Patient: Orion Harvey   : 1950  Diagnosis/ICD-10 Code:  Acute pain of right knee [M25.561]   Problems Addressed this Visit    None  Visit Diagnoses       Acute pain of right knee    -  Primary    S/P total knee arthroplasty, right        Right leg weakness        Gait disturbance              Diagnoses         Codes Comments    Acute pain of right knee    -  Primary ICD-10-CM: M25.561  ICD-9-CM: 719.46     S/P total knee arthroplasty, right     ICD-10-CM: Z96.651  ICD-9-CM: V43.65     Right leg weakness     ICD-10-CM: R29.898  ICD-9-CM: 729.89     Gait disturbance     ICD-10-CM: R26.9  ICD-9-CM: 781.2            Referring practitioner: Ajith Martínez MD  Date of Initial Visit: Type: THERAPY  Noted: 4/15/2024  Today's Date: 2024    VISIT#: 2    Subjective Patient reports feeling a little stiff when first getting up and walking but once up and moving feels good.       Objective     See Exercise, Manual, and Modality Logs for complete treatment.     Assessment/Plan Patient demonstrating good passive motion with soft end feel and tolerated all progressed therapeutic exercise/activity well with only reports of increased muscle fatigue. Patient will continue to benefit from R LE strengthening for improved stability/tolerance with daily functional activity.    Goals  Plan Goals: Patient independent with HEP in 2 weeks  Tolerate 10 min Nustep/Bike in 2 weeks  Able to return to driving in 2 weeks  Increase R knee flexion to 110 deg for full revolution on bike in 2 weeks  Able to ambulate independently without AD and minimal antalgia in 2 weeks  Improve function as evidenced by Oxford knee score of 20% or less in 12 weeks (75% impairment initially)  Able to return to fishing in 12 weeks  Perform 10 reps of repeated sit to stand without arms in 30 seconds in 12 weeks   Able to stand/walk on uneven ground to allow target practice in 12 weeks    Progress per Plan of  Care and Progress strengthening /stabilization /functional activity         Timed:         Manual Therapy:    10     mins  68222;     Therapeutic Exercise:    15     mins  79269;     Neuromuscular Taylor:        mins  56981;    Therapeutic Activity:     5     mins  90446;     Gait Training:           mins  72196;     Ultrasound:          mins  84620;    Ionto                                   mins   98067  Self Care                    _____  mins   57890  Canalith Repos                   mins  68730    Un-Timed:  Electrical Stimulation:         mins  58243 ( );  Dry Needling          mins self-pay   Traction          mins 45659    Timed Treatment:   30   mins   Total Treatment:     30   mins    Gavino Stover PTA  [place] License [id number]  Physical Therapist Assistant

## 2024-04-19 ENCOUNTER — TREATMENT (OUTPATIENT)
Dept: PHYSICAL THERAPY | Facility: CLINIC | Age: 74
End: 2024-04-19
Payer: MEDICARE

## 2024-04-19 DIAGNOSIS — M25.561 ACUTE PAIN OF RIGHT KNEE: Primary | ICD-10-CM

## 2024-04-19 DIAGNOSIS — R26.9 GAIT DISTURBANCE: ICD-10-CM

## 2024-04-19 DIAGNOSIS — R29.898 RIGHT LEG WEAKNESS: ICD-10-CM

## 2024-04-19 DIAGNOSIS — Z96.651 S/P TOTAL KNEE ARTHROPLASTY, RIGHT: ICD-10-CM

## 2024-04-19 PROCEDURE — 97110 THERAPEUTIC EXERCISES: CPT | Performed by: PHYSICAL THERAPIST

## 2024-04-19 PROCEDURE — 97140 MANUAL THERAPY 1/> REGIONS: CPT | Performed by: PHYSICAL THERAPIST

## 2024-04-19 NOTE — PROGRESS NOTES
Physical Therapy Daily Treatment Note      Patient: Orion Harvey   : 1950  Diagnosis/ICD-10 Code:  Acute pain of right knee [M25.561]   Problems Addressed this Visit    None  Visit Diagnoses       Acute pain of right knee    -  Primary    S/P total knee arthroplasty, right        Right leg weakness        Gait disturbance              Diagnoses         Codes Comments    Acute pain of right knee    -  Primary ICD-10-CM: M25.561  ICD-9-CM: 719.46     S/P total knee arthroplasty, right     ICD-10-CM: Z96.651  ICD-9-CM: V43.65     Right leg weakness     ICD-10-CM: R29.898  ICD-9-CM: 729.89     Gait disturbance     ICD-10-CM: R26.9  ICD-9-CM: 781.2            Referring practitioner: Ajith Martínez MD  Date of Initial Visit: Type: THERAPY  Noted: 4/15/2024  Today's Date: 2024    VISIT#: 3    Subjective Patient reports having trouble sleeping at night and AM stiffness.       Objective     See Exercise, Manual, and Modality Logs for complete treatment.     Assessment/Plan Patient continues to respond well to manual interventions with improved flexion and demonstrating improved heel strike/toe push off gait pattern with rolling walker. Patient will continue to benefit from improved LE strengthening and stretching for improved mobility/tolerance with daily functional activity.   Goals  Plan Goals: Patient independent with HEP in 2 weeks  Tolerate 10 min Nustep/Bike in 2 weeks  Able to return to driving in 2 weeks  Increase R knee flexion to 110 deg for full revolution on bike in 2 weeks  Able to ambulate independently without AD and minimal antalgia in 2 weeks  Improve function as evidenced by Oxford knee score of 20% or less in 12 weeks (75% impairment initially)  Able to return to fishing in 12 weeks  Perform 10 reps of repeated sit to stand without arms in 30 seconds in 12 weeks   Able to stand/walk on uneven ground to allow target practice in 12 weeks    Progress per Plan of Care and Progress  strengthening /stabilization /functional activity         Timed:         Manual Therapy:    10     mins  79950;     Therapeutic Exercise:    15     mins  13295;     Neuromuscular Taylor:        mins  84212;    Therapeutic Activity:     5     mins  41651;     Gait Training:           mins  93800;     Ultrasound:          mins  16356;    Ionto                                   mins   08572  Self Care                    _____  mins   27035  Canalith Repos                   mins  24339    Un-Timed:  Electrical Stimulation:         mins  19726 ( );  Dry Needling          mins self-pay   Traction          mins 08227    Timed Treatment:   30   mins   Total Treatment:     30   mins    Gavino Stover PTA  [place] License [id number]  Physical Therapist Assistant

## 2024-04-22 ENCOUNTER — TREATMENT (OUTPATIENT)
Dept: PHYSICAL THERAPY | Facility: CLINIC | Age: 74
End: 2024-04-22
Payer: MEDICARE

## 2024-04-22 DIAGNOSIS — Z96.651 S/P TOTAL KNEE ARTHROPLASTY, RIGHT: ICD-10-CM

## 2024-04-22 DIAGNOSIS — R26.9 GAIT DISTURBANCE: ICD-10-CM

## 2024-04-22 DIAGNOSIS — M25.561 ACUTE PAIN OF RIGHT KNEE: Primary | ICD-10-CM

## 2024-04-22 DIAGNOSIS — R29.898 RIGHT LEG WEAKNESS: ICD-10-CM

## 2024-04-22 PROCEDURE — 97530 THERAPEUTIC ACTIVITIES: CPT | Performed by: PHYSICAL THERAPIST

## 2024-04-22 PROCEDURE — 97110 THERAPEUTIC EXERCISES: CPT | Performed by: PHYSICAL THERAPIST

## 2024-04-22 NOTE — PROGRESS NOTES
Physical Therapy Daily Treatment Note      Patient: Orion Harvey   : 1950  Diagnosis/ICD-10 Code:  Acute pain of right knee [M25.561]   Problems Addressed this Visit    None  Visit Diagnoses       Acute pain of right knee    -  Primary    S/P total knee arthroplasty, right        Right leg weakness        Gait disturbance              Diagnoses         Codes Comments    Acute pain of right knee    -  Primary ICD-10-CM: M25.561  ICD-9-CM: 719.46     S/P total knee arthroplasty, right     ICD-10-CM: Z96.651  ICD-9-CM: V43.65     Right leg weakness     ICD-10-CM: R29.898  ICD-9-CM: 729.89     Gait disturbance     ICD-10-CM: R26.9  ICD-9-CM: 781.2            Referring practitioner: Ajith Martínez MD  Date of Initial Visit: Type: THERAPY  Noted: 4/15/2024  Today's Date: 2024    VISIT#: 4    Subjective Patient reports feeling good and is pleased with progress at this time. Patient is eager to get staples out this coming Thursday.       Objective     See Exercise, Manual, and Modality Logs for complete treatment.     Assessment/Plan Patient continues to demonstrate good passive motion with soft end feel. Patient currently ambulating with STC with mild antalgic gait first couple steps then demonstrating improved heel strike/toe push off mechanics. Patient will continue to benefit from improved R LE strengthening for improved stability/tolerance with daily functional activity.       Progress per Plan of Care and Progress strengthening /stabilization /functional activity         Timed:         Manual Therapy:    5     mins  94531;     Therapeutic Exercise:    15     mins  20378;     Neuromuscular Taylor:        mins  37740;    Therapeutic Activity:     15     mins  28652;     Gait Training:           mins  26998;     Ultrasound:          mins  50616;    Ionto                                   mins   05248  Self Care                    _____  mins   53857  [facility]                   mins   73032    Un-Timed:  Electrical Stimulation:         mins  96539 ( );  Dry Needling         mins self-pay   Traction          mins 73264    Timed Treatment:   35   mins   Total Treatment:     35   mins    Gavino Stover PTA  [place] License [id number]  Physical Therapist Assistant

## 2024-04-24 ENCOUNTER — TREATMENT (OUTPATIENT)
Dept: PHYSICAL THERAPY | Facility: CLINIC | Age: 74
End: 2024-04-24
Payer: MEDICARE

## 2024-04-24 DIAGNOSIS — R29.898 RIGHT LEG WEAKNESS: ICD-10-CM

## 2024-04-24 DIAGNOSIS — Z96.651 S/P TOTAL KNEE ARTHROPLASTY, RIGHT: ICD-10-CM

## 2024-04-24 DIAGNOSIS — M25.561 ACUTE PAIN OF RIGHT KNEE: Primary | ICD-10-CM

## 2024-04-24 DIAGNOSIS — R26.9 GAIT DISTURBANCE: ICD-10-CM

## 2024-04-24 PROCEDURE — 97530 THERAPEUTIC ACTIVITIES: CPT | Performed by: PHYSICAL THERAPIST

## 2024-04-24 PROCEDURE — 97110 THERAPEUTIC EXERCISES: CPT | Performed by: PHYSICAL THERAPIST

## 2024-04-24 NOTE — PROGRESS NOTES
Physical Therapy Daily Treatment Note      Patient: Orion Harvey   : 1950  Diagnosis/ICD-10 Code:  Acute pain of right knee [M25.561]   Problems Addressed this Visit    None  Visit Diagnoses       Acute pain of right knee    -  Primary    S/P total knee arthroplasty, right        Right leg weakness        Gait disturbance              Diagnoses         Codes Comments    Acute pain of right knee    -  Primary ICD-10-CM: M25.561  ICD-9-CM: 719.46     S/P total knee arthroplasty, right     ICD-10-CM: Z96.651  ICD-9-CM: V43.65     Right leg weakness     ICD-10-CM: R29.898  ICD-9-CM: 729.89     Gait disturbance     ICD-10-CM: R26.9  ICD-9-CM: 781.2            Referring practitioner: Ajith Martínez MD  Date of Initial Visit: Type: THERAPY  Noted: 4/15/2024  Today's Date: 2024    VISIT#: 5    Subjective Patient reports feeling mild but manageable muscle soreness following last visit, with symptoms resolving within 24 hours.       Objective     See Exercise, Manual, and Modality Logs for complete treatment.     Assessment/Plan Patient continues to demonstrate good passive motion with soft end feel and is progressing well at this time. Patient will continue to benefit from improved R LE strengthening for improved stability/tolerance with daily functional activity.       Progress per Plan of Care and Progress strengthening /stabilization /functional activity         Timed:         Manual Therapy:    5     mins  90134;     Therapeutic Exercise:    15     mins  53681;     Neuromuscular Taylor:        mins  41634;    Therapeutic Activity:     15     mins  59506;     Gait Training:           mins  48574;     Ultrasound:          mins  71206;    Ionto                                   mins   38245  Self Care                    _____  mins   39867  Canalith Repos                   mins  83912    Un-Timed:  Electrical Stimulation:         mins  24557 ( );  Dry Needling          mins self-pay   Traction           mins 31377    Timed Treatment:   35   mins   Total Treatment:     35   mins    Gavino Stover PTA  [place] License [id number]  Physical Therapist Assistant

## 2024-04-26 ENCOUNTER — TREATMENT (OUTPATIENT)
Dept: PHYSICAL THERAPY | Facility: CLINIC | Age: 74
End: 2024-04-26
Payer: MEDICARE

## 2024-04-26 DIAGNOSIS — Z96.651 S/P TOTAL KNEE ARTHROPLASTY, RIGHT: ICD-10-CM

## 2024-04-26 DIAGNOSIS — R29.898 RIGHT LEG WEAKNESS: ICD-10-CM

## 2024-04-26 DIAGNOSIS — R26.9 GAIT DISTURBANCE: ICD-10-CM

## 2024-04-26 DIAGNOSIS — M25.561 ACUTE PAIN OF RIGHT KNEE: Primary | ICD-10-CM

## 2024-04-26 PROCEDURE — 97530 THERAPEUTIC ACTIVITIES: CPT | Performed by: PHYSICAL THERAPIST

## 2024-04-26 PROCEDURE — 97110 THERAPEUTIC EXERCISES: CPT | Performed by: PHYSICAL THERAPIST

## 2024-04-26 NOTE — PROGRESS NOTES
Physical Therapy Daily Treatment Note      Patient: Orion Harvey   : 1950  Diagnosis/ICD-10 Code:  Acute pain of right knee [M25.561]   Problems Addressed this Visit    None  Visit Diagnoses       Acute pain of right knee    -  Primary    S/P total knee arthroplasty, right        Right leg weakness        Gait disturbance              Diagnoses         Codes Comments    Acute pain of right knee    -  Primary ICD-10-CM: M25.561  ICD-9-CM: 719.46     S/P total knee arthroplasty, right     ICD-10-CM: Z96.651  ICD-9-CM: V43.65     Right leg weakness     ICD-10-CM: R29.898  ICD-9-CM: 729.89     Gait disturbance     ICD-10-CM: R26.9  ICD-9-CM: 781.2            Referring practitioner: Ajith Martínez MD  Date of Initial Visit: Type: THERAPY  Noted: 4/15/2024  Today's Date: 2024    VISIT#: 6    Subjective Patient reports visiting MD to get staples out and was told to keep them in for another week due to slower healing along previous skin graft.       Objective     See Exercise, Manual, and Modality Logs for complete treatment.     Assessment/Plan Patient continues to demonstrate gradual improvements with passive flexion currently at 95 degrees. Patient will continue to benefit from improved flexion and LE strengthening for improved stability/tolerance with daily functional activity.   oals  Plan Goals: Patient independent with HEP in 2 weeks  Tolerate 10 min Nustep/Bike in 2 weeks  Able to return to driving in 2 weeks  Increase R knee flexion to 110 deg for full revolution on bike in 2 weeks  Able to ambulate independently without AD and minimal antalgia in 2 weeks  Improve function as evidenced by Oxford knee score of 20% or less in 12 weeks (75% impairment initially)  Able to return to fishing in 12 weeks  Perform 10 reps of repeated sit to stand without arms in 30 seconds in 12 weeks   Able to stand/walk on uneven ground to allow target practice in 12 weeks    Progress per Plan of Care and Progress  strengthening /stabilization /functional activity         Timed:         Manual Therapy:    5     mins  47594;     Therapeutic Exercise:    15     mins  08374;     Neuromuscular Taylor:        mins  05810;    Therapeutic Activity:     15     mins  26760;     Gait Training:           mins  44141;     Ultrasound:          mins  46594;    Ionto                                   mins   59221  Self Care                    _____  mins   12027  Canalith Repos                   mins  96704    Un-Timed:  Electrical Stimulation:         mins  79426 ( );  Dry Needling          mins self-pay   Traction          mins 83952    Timed Treatment:   35   mins   Total Treatment:     35   mins    Gavino Stover PTA  [place] License [id number]  Physical Therapist Assistant

## 2024-04-29 ENCOUNTER — TREATMENT (OUTPATIENT)
Dept: PHYSICAL THERAPY | Facility: CLINIC | Age: 74
End: 2024-04-29
Payer: MEDICARE

## 2024-04-29 DIAGNOSIS — R29.898 RIGHT LEG WEAKNESS: ICD-10-CM

## 2024-04-29 DIAGNOSIS — M25.561 ACUTE PAIN OF RIGHT KNEE: Primary | ICD-10-CM

## 2024-04-29 DIAGNOSIS — R26.9 GAIT DISTURBANCE: ICD-10-CM

## 2024-04-29 DIAGNOSIS — Z96.651 S/P TOTAL KNEE ARTHROPLASTY, RIGHT: ICD-10-CM

## 2024-04-29 PROCEDURE — 97110 THERAPEUTIC EXERCISES: CPT | Performed by: PHYSICAL THERAPIST

## 2024-04-29 PROCEDURE — 97530 THERAPEUTIC ACTIVITIES: CPT | Performed by: PHYSICAL THERAPIST

## 2024-04-29 NOTE — PROGRESS NOTES
Physical Therapy Daily Treatment Note      Patient: Orion Harvey   : 1950  Diagnosis/ICD-10 Code:  Acute pain of right knee [M25.561]   Problems Addressed this Visit    None  Visit Diagnoses       Acute pain of right knee    -  Primary    S/P total knee arthroplasty, right        Right leg weakness        Gait disturbance              Diagnoses         Codes Comments    Acute pain of right knee    -  Primary ICD-10-CM: M25.561  ICD-9-CM: 719.46     S/P total knee arthroplasty, right     ICD-10-CM: Z96.651  ICD-9-CM: V43.65     Right leg weakness     ICD-10-CM: R29.898  ICD-9-CM: 729.89     Gait disturbance     ICD-10-CM: R26.9  ICD-9-CM: 781.2            Referring practitioner: Ajith Martínez MD  Date of Initial Visit: Type: THERAPY  Noted: 4/15/2024  Today's Date: 2024    VISIT#: 7    Subjective Patient reports having mild but manageable quad soreness and is ready to get staples out this coming Thursday.       Objective     See Exercise, Manual, and Modality Logs for complete treatment.     Assessment/Plan patient continues to demonstrate good passive motion with soft end feel. Patient will continue to benefit from improved R LE strengthening for improved stability/tolerance with daily functional activity.    Goals  Plan Goals: Patient independent with HEP in 2 weeks  Tolerate 10 min Nustep/Bike in 2 weeks  Able to return to driving in 2 weeks  Increase R knee flexion to 110 deg for full revolution on bike in 2 weeks  Able to ambulate independently without AD and minimal antalgia in 2 weeks  Improve function as evidenced by Oxford knee score of 20% or less in 12 weeks (75% impairment initially)  Able to return to fishing in 12 weeks  Perform 10 reps of repeated sit to stand without arms in 30 seconds in 12 weeks   Able to stand/walk on uneven ground to allow target practice in 12 weeks    Progress per Plan of Care and Progress strengthening /stabilization /functional activity         Timed:          Manual Therapy:    5     mins  63568;     Therapeutic Exercise:    15     mins  74968;     Neuromuscular Taylor:        mins  01444;    Therapeutic Activity:     15     mins  82527;     Gait Training:           mins  50062;     Ultrasound:          mins  62770;    Ionto                                   mins   83172  Self Care                    _____  mins   25624  Canalith Repos                   mins  31770    Un-Timed:  Electrical Stimulation:        mins  79192 ( );  Dry Needling          mins self-pay   Traction          mins 58356    Timed Treatment:   35   mins   Total Treatment:     35   mins    Gavino Stover PTA  [place] License [id number]  Physical Therapist Assistant

## 2024-05-03 ENCOUNTER — TREATMENT (OUTPATIENT)
Dept: PHYSICAL THERAPY | Facility: CLINIC | Age: 74
End: 2024-05-03
Payer: MEDICARE

## 2024-05-03 DIAGNOSIS — M25.561 ACUTE PAIN OF RIGHT KNEE: Primary | ICD-10-CM

## 2024-05-03 DIAGNOSIS — Z96.651 S/P TOTAL KNEE ARTHROPLASTY, RIGHT: ICD-10-CM

## 2024-05-03 DIAGNOSIS — R26.9 GAIT DISTURBANCE: ICD-10-CM

## 2024-05-03 DIAGNOSIS — R29.898 RIGHT LEG WEAKNESS: ICD-10-CM

## 2024-05-03 PROCEDURE — 97110 THERAPEUTIC EXERCISES: CPT | Performed by: PHYSICAL THERAPIST

## 2024-05-03 PROCEDURE — 97530 THERAPEUTIC ACTIVITIES: CPT | Performed by: PHYSICAL THERAPIST

## 2024-05-03 NOTE — PROGRESS NOTES
Physical Therapy Daily Treatment Note      Patient: Orion Harvey   : 1950  Diagnosis/ICD-10 Code:  Acute pain of right knee [M25.561]   Problems Addressed this Visit    None  Visit Diagnoses       Acute pain of right knee    -  Primary    S/P total knee arthroplasty, right        Right leg weakness        Gait disturbance              Diagnoses         Codes Comments    Acute pain of right knee    -  Primary ICD-10-CM: M25.561  ICD-9-CM: 719.46     S/P total knee arthroplasty, right     ICD-10-CM: Z96.651  ICD-9-CM: V43.65     Right leg weakness     ICD-10-CM: R29.898  ICD-9-CM: 729.89     Gait disturbance     ICD-10-CM: R26.9  ICD-9-CM: 781.2            Referring practitioner: Ajith Martínez MD  Date of Initial Visit: Type: THERAPY  Noted: 4/15/2024  Today's Date: 5/3/2024    VISIT#: 8    Subjective Patient reports knee is feeling much better after getting staples removed yesterday.       Objective passive flexion 105 degrees flexion and extension is WNL    See Exercise, Manual, and Modality Logs for complete treatment.     Assessment/Plan Patient tolerated progressed therapeutic exercise and demonstrating improved passive motion. Patient will continue to benefit from improved flexion and R LE strengthening for improved stability/tolerance with ambulation and daily functional activity.   Goals  Plan Goals: Patient independent with HEP in 2 weeks  Tolerate 10 min Nustep/Bike in 2 weeks  Able to return to driving in 2 weeks  Increase R knee flexion to 110 deg for full revolution on bike in 2 weeks  Able to ambulate independently without AD and minimal antalgia in 2 weeks  Improve function as evidenced by Oxford knee score of 20% or less in 12 weeks (75% impairment initially)  Able to return to fishing in 12 weeks  Perform 10 reps of repeated sit to stand without arms in 30 seconds in 12 weeks   Able to stand/walk on uneven ground to allow target practice in 12 weeks    Progress per Plan of Care and  Progress strengthening /stabilization /functional activity         Timed:         Manual Therapy:    5     mins  27974;     Therapeutic Exercise:    15     mins  57503;     Neuromuscular Taylor:        mins  84832;    Therapeutic Activity:     15     mins  39115;     Gait Training:           mins  56195;     Ultrasound:          mins  57681;    Ionto                                   mins   26362  Self Care                    _____  mins   68938  Canalith Repos                   mins  18707    Un-Timed:  Electrical Stimulation:         mins  62650 ( );  Dry Needling          mins self-pay   Traction         mins 98178    Timed Treatment:   35   mins   Total Treatment:     35   mins    Gavino Stover PTA  [place] License [id number]  Physical Therapist Assistant

## 2024-05-06 ENCOUNTER — TREATMENT (OUTPATIENT)
Dept: PHYSICAL THERAPY | Facility: CLINIC | Age: 74
End: 2024-05-06
Payer: MEDICARE

## 2024-05-06 DIAGNOSIS — M25.561 ACUTE PAIN OF RIGHT KNEE: Primary | ICD-10-CM

## 2024-05-06 DIAGNOSIS — R29.898 RIGHT LEG WEAKNESS: ICD-10-CM

## 2024-05-06 DIAGNOSIS — Z96.651 S/P TOTAL KNEE ARTHROPLASTY, RIGHT: ICD-10-CM

## 2024-05-06 DIAGNOSIS — R26.9 GAIT DISTURBANCE: ICD-10-CM

## 2024-05-06 PROCEDURE — 97110 THERAPEUTIC EXERCISES: CPT | Performed by: PHYSICAL THERAPIST

## 2024-05-06 PROCEDURE — 97530 THERAPEUTIC ACTIVITIES: CPT | Performed by: PHYSICAL THERAPIST

## 2024-05-06 PROCEDURE — 97140 MANUAL THERAPY 1/> REGIONS: CPT | Performed by: PHYSICAL THERAPIST

## 2024-05-09 ENCOUNTER — TREATMENT (OUTPATIENT)
Dept: PHYSICAL THERAPY | Facility: CLINIC | Age: 74
End: 2024-05-09
Payer: MEDICARE

## 2024-05-09 DIAGNOSIS — R29.898 RIGHT LEG WEAKNESS: ICD-10-CM

## 2024-05-09 DIAGNOSIS — Z96.651 S/P TOTAL KNEE ARTHROPLASTY, RIGHT: ICD-10-CM

## 2024-05-09 DIAGNOSIS — M25.561 ACUTE PAIN OF RIGHT KNEE: Primary | ICD-10-CM

## 2024-05-09 DIAGNOSIS — R26.9 GAIT DISTURBANCE: ICD-10-CM

## 2024-05-09 PROCEDURE — 97110 THERAPEUTIC EXERCISES: CPT | Performed by: PHYSICAL THERAPIST

## 2024-05-09 PROCEDURE — 97112 NEUROMUSCULAR REEDUCATION: CPT | Performed by: PHYSICAL THERAPIST

## 2024-05-14 ENCOUNTER — TREATMENT (OUTPATIENT)
Dept: PHYSICAL THERAPY | Facility: CLINIC | Age: 74
End: 2024-05-14
Payer: MEDICARE

## 2024-05-14 DIAGNOSIS — Z96.651 S/P TOTAL KNEE ARTHROPLASTY, RIGHT: ICD-10-CM

## 2024-05-14 DIAGNOSIS — R26.9 GAIT DISTURBANCE: ICD-10-CM

## 2024-05-14 DIAGNOSIS — M25.561 ACUTE PAIN OF RIGHT KNEE: Primary | ICD-10-CM

## 2024-05-14 DIAGNOSIS — R29.898 RIGHT LEG WEAKNESS: ICD-10-CM

## 2024-05-14 PROCEDURE — 97110 THERAPEUTIC EXERCISES: CPT | Performed by: PHYSICAL THERAPIST

## 2024-05-14 PROCEDURE — 97530 THERAPEUTIC ACTIVITIES: CPT | Performed by: PHYSICAL THERAPIST

## 2024-05-14 NOTE — PROGRESS NOTES
Physical Therapy Daily Treatment Note      Patient: Orion Harvey   : 1950  Diagnosis/ICD-10 Code:  Acute pain of right knee [M25.561]   Problems Addressed this Visit    None  Visit Diagnoses       Acute pain of right knee    -  Primary    S/P total knee arthroplasty, right        Right leg weakness        Gait disturbance              Diagnoses         Codes Comments    Acute pain of right knee    -  Primary ICD-10-CM: M25.561  ICD-9-CM: 719.46     S/P total knee arthroplasty, right     ICD-10-CM: Z96.651  ICD-9-CM: V43.65     Right leg weakness     ICD-10-CM: R29.898  ICD-9-CM: 729.89     Gait disturbance     ICD-10-CM: R26.9  ICD-9-CM: 781.2            Referring practitioner: Ajith Martínez MD  Date of Initial Visit: Type: THERAPY  Noted: 4/15/2024  Today's Date: 2024    VISIT#: 11    Subjective Patient reports feeling better every day and is pleased with progress at this time.       Objective     See Exercise, Manual, and Modality Logs for complete treatment.     Assessment/Plan Patient continues to gradually demonstrate improved passive flexion and is demonstrating gradual improvements with activity tolerance. Patient will continue to benefit from improved R knee flexion and continued R LE strengthening for improved stability/tolerance with daily functional activity.   Patient independent with HEP in 2 weeks - MET  Tolerate 10 min Nustep/Bike in 2 weeks - MET  Able to return to driving in 2 weeks - NOT MET  Increase R knee flexion to 110 deg for full revolution on bike in 2 weeks - NOT MET  Able to ambulate independently without AD and minimal antalgia in 2 weeks - NOT  MET  Improve function as evidenced by Oxford knee score of 20% or less in 12 weeks (75% impairment initially) - PARTIALLY MET, 44% on   Able to return to fishing in 12 weeks - NOT MET  Perform 10 reps of repeated sit to stand without arms in 30 seconds in 12 weeks - NOT MET  Able to stand/walk on uneven ground to allow target  practice in 12 weeks - NOT MET    Progress per Plan of Care and Progress strengthening /stabilization /functional activity         Timed:         Manual Therapy:    5     mins  78671;     Therapeutic Exercise:    15     mins  30521;     Neuromuscular Taylor:        mins  47955;    Therapeutic Activity:      15    mins  02696;     Gait Training:           mins  13617;     Ultrasound:          mins  41249;    Ionto                                   mins   35729  Self Care                    _____  mins   16970  Canalith Repos                   mins  00256    Un-Timed:  Electrical Stimulation:         mins  52840 ( );  Dry Needling          mins self-pay   Traction          mins 97816    Timed Treatment:   35   mins   Total Treatment:     35   mins    Gavino Stover, PTA  [place] License [id number]  Physical Therapist Assistant

## 2024-05-16 ENCOUNTER — TREATMENT (OUTPATIENT)
Dept: PHYSICAL THERAPY | Facility: CLINIC | Age: 74
End: 2024-05-16
Payer: MEDICARE

## 2024-05-16 ENCOUNTER — OFFICE VISIT (OUTPATIENT)
Dept: ORTHOPEDIC SURGERY | Facility: CLINIC | Age: 74
End: 2024-05-16
Payer: MEDICARE

## 2024-05-16 VITALS — WEIGHT: 261 LBS | OXYGEN SATURATION: 96 % | HEIGHT: 69 IN | BODY MASS INDEX: 38.66 KG/M2

## 2024-05-16 DIAGNOSIS — M25.561 ACUTE PAIN OF RIGHT KNEE: Primary | ICD-10-CM

## 2024-05-16 DIAGNOSIS — Z96.651 S/P TOTAL KNEE ARTHROPLASTY, RIGHT: ICD-10-CM

## 2024-05-16 DIAGNOSIS — R29.898 RIGHT LEG WEAKNESS: ICD-10-CM

## 2024-05-16 DIAGNOSIS — Z47.89 ORTHOPEDIC AFTERCARE: Primary | ICD-10-CM

## 2024-05-16 DIAGNOSIS — R26.9 GAIT DISTURBANCE: ICD-10-CM

## 2024-05-16 DIAGNOSIS — M19.012 OSTEOARTHRITIS OF LEFT GLENOHUMERAL JOINT: ICD-10-CM

## 2024-05-16 PROCEDURE — 97530 THERAPEUTIC ACTIVITIES: CPT | Performed by: PHYSICAL THERAPIST

## 2024-05-16 PROCEDURE — 97110 THERAPEUTIC EXERCISES: CPT | Performed by: PHYSICAL THERAPIST

## 2024-05-16 PROCEDURE — 99212 OFFICE O/P EST SF 10 MIN: CPT | Performed by: ORTHOPAEDIC SURGERY

## 2024-05-16 NOTE — PROGRESS NOTES
"     Patient ID: Orion Harvey is a 73 y.o. male.  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    Assessment:      Doing well  After shoulder arthroplasty  Plan:   Activity as tolerated see me as needed      Procedures          Disclaimer: Part of this note may be an electronic transcription/translation of spoken language to printed text using the Dragon Dictation System  "

## 2024-05-16 NOTE — PROGRESS NOTES
Physical Therapy Daily Treatment Note    Patient: Orion Harvey   : 1950  Diagnosis/ICD-10 Code:  Acute pain of right knee [M25.561]  Referring practitioner: Ajith Martínez MD  Date of Initial Visit: Type: THERAPY  Noted: 4/15/2024  Today's Date: 2024  Patient seen for 12 sessions             Subjective Patient continues to improve, still unable to get full revolution on bike comfortably. Hasn't gotten in a boat yet to fish but plans to go to target practice next week.  He has returned to driving, getting out of the car is still difficult.    Objective   See Exercise, Manual, and Modality Logs for complete treatment. Oxford knee score indicates 44% impairment, improved from 75% impairment initially with limitations in pain, standing, walking, sleeping, ADLs.  AROM knee flexion = 100 deg R, 120 deg L; extension = -5 R, 0 deg L.  Ambulates with cane with antalgia.  Unable to perform independent SLR.  Poor quad tone and contraction.  No significant swelling or bruising noted.  MMT deferred.  Deconditioned abdominals noted.  Difficulty with sit to stand transfer and sit to/from supine.  Able to perform Nustep 10 min.  Still unable to complete full revolution on bike.  SLS = 14 sec R, 15 sec L.  Able to perform sit to stand from regular chair with equal weight and position.   Repeated sit to stand = 10 reps in 30 sec without arm support.  Reviewed HEP. Finished with  to R quad for muscle relaxation.         Assessment/Plan  Patient independent with HEP in 2 weeks - MET  Tolerate 10 min Nustep/Bike in 2 weeks - MET  Able to return to driving in 2 weeks - MET  Increase R knee flexion to 110 deg for full revolution on bike in 2 weeks - NOT MET  Able to ambulate independently without AD and minimal antalgia in 2 weeks - NOT  MET  Improve function as evidenced by Oxford knee score of 20% or less in 12 weeks (75% impairment initially) - PARTIALLY MET, 44% on   Able to return to fishing in 12 weeks -  NOT MET  Perform 10 reps of repeated sit to stand without arms in 30 seconds in 12 weeks - NOT MET  Able to stand/walk on uneven ground to allow target practice in 12 weeks - NOT MET    Progress per Plan of Care exp 7/8/24           Timed:         Manual Therapy:    5     mins  90936;     Therapeutic Exercise:    15     mins  35685;     Neuromuscular Taylor:        mins  14001;    Therapeutic Activity:     10     mins  83246;     Gait Training:           mins  23484;     Ultrasound:          mins  74475;    Ionto                                   mins   16181  Self Care                            mins   52496      Un-Timed:  Electrical Stimulation:         mins  29672 ( );  Dry Needling          mins self-pay  Traction          mins 87495  Low Eval          Mins  35044  Mod Eval          Mins  06855  High Eval                            Mins  72406  Canalith Repos                   mins  59577    Timed Treatment:   30   mins   Total Treatment:     30   mins    Robin A Sprigler, PT  Physical Therapist

## 2024-05-20 ENCOUNTER — TREATMENT (OUTPATIENT)
Dept: PHYSICAL THERAPY | Facility: CLINIC | Age: 74
End: 2024-05-20
Payer: MEDICARE

## 2024-05-20 DIAGNOSIS — R29.898 RIGHT LEG WEAKNESS: ICD-10-CM

## 2024-05-20 DIAGNOSIS — R26.9 GAIT DISTURBANCE: ICD-10-CM

## 2024-05-20 DIAGNOSIS — Z96.651 S/P TOTAL KNEE ARTHROPLASTY, RIGHT: ICD-10-CM

## 2024-05-20 DIAGNOSIS — M25.561 ACUTE PAIN OF RIGHT KNEE: Primary | ICD-10-CM

## 2024-05-20 PROCEDURE — 97110 THERAPEUTIC EXERCISES: CPT | Performed by: PHYSICAL THERAPIST

## 2024-05-20 PROCEDURE — 97112 NEUROMUSCULAR REEDUCATION: CPT | Performed by: PHYSICAL THERAPIST

## 2024-05-20 PROCEDURE — 97530 THERAPEUTIC ACTIVITIES: CPT | Performed by: PHYSICAL THERAPIST

## 2024-05-20 NOTE — PROGRESS NOTES
Physical Therapy Daily Treatment Note    Patient: Orion Harvey   : 1950  Diagnosis/ICD-10 Code:  Acute pain of right knee [M25.561]  Referring practitioner: Ajith Martínez MD  Date of Initial Visit: Type: THERAPY  Noted: 4/15/2024  Today's Date: 2024  Patient seen for 13 sessions             Subjective Patient reports he did yard work and weed eating on Saturday for several hours with no problems.  He is planning to go to target practice Wednesday.  No c/o pain, only mild annoyance.    Objective   See Exercise, Manual, and Modality Logs for complete treatment.  Oxford knee score indicates 44% impairment, improved from 75% impairment initially with limitations in pain, standing, walking, sleeping, ADLs.  AROM knee flexion = 110 deg R, 120 deg L; extension = -5 R, 0 deg L.  Ambulates without AD and only mild antalgia.  No significant swelling or bruising noted.  MMT deferred.  Deconditioned abdominals noted.  No difficulty with sit to stand transfer or sit to/from supine.  Able to perform Nustep 10 min.  Able to complete full revolution backwards on bike.  SLS = 14 sec R, 15 sec L.  Able to perform sit to stand from regular chair with equal weight and position.   Repeated sit to stand = 10 reps in 30 sec without arm support.  Reviewed HEP and updated with 1# on LAQ. No ice or MH needed.      Assessment/Plan  Patient independent with HEP in 2 weeks - MET  Tolerate 10 min Nustep/Bike in 2 weeks - MET  Able to return to driving in 2 weeks - MET  Increase R knee flexion to 110 deg for full revolution on bike in 2 weeks - MET  Able to ambulate independently without AD and minimal antalgia in 2 weeks - MET  Improve function as evidenced by Oxford knee score of 20% or less in 12 weeks (75% impairment initially) - PARTIALLY MET, 44% on   Able to return to fishing in 12 weeks - NOT MET  Perform 10 reps of repeated sit to stand without arms in 30 seconds in 12 weeks - MET  Able to stand/walk on uneven  ground to allow target practice in 12 weeks - NOT MET     Progress per Plan of Care exp 7/8/24           Timed:         Manual Therapy:    5     mins  20954;     Therapeutic Exercise:    15     mins  84840;     Neuromuscular Taylor:    10    mins  40199;    Therapeutic Activity:     15     mins  95580;     Gait Training:           mins  87920;     Ultrasound:          mins  77942;    Ionto                                   mins   97133  Self Care                            mins   32902      Un-Timed:  Electrical Stimulation:         mins  43269 ( );  Dry Needling          mins self-pay  Traction          mins 45122  Low Eval          Mins  18838  Mod Eval          Mins  65747  High Eval                            Mins  89887  Canalith Repos                   mins  96226    Timed Treatment:   45   mins   Total Treatment:     45   mins    Robin A Sprigler, PT  Physical Therapist

## 2024-05-23 ENCOUNTER — TREATMENT (OUTPATIENT)
Dept: PHYSICAL THERAPY | Facility: CLINIC | Age: 74
End: 2024-05-23
Payer: MEDICARE

## 2024-05-23 DIAGNOSIS — R29.898 RIGHT LEG WEAKNESS: ICD-10-CM

## 2024-05-23 DIAGNOSIS — R26.9 GAIT DISTURBANCE: ICD-10-CM

## 2024-05-23 DIAGNOSIS — M25.561 ACUTE PAIN OF RIGHT KNEE: Primary | ICD-10-CM

## 2024-05-23 DIAGNOSIS — Z96.651 S/P TOTAL KNEE ARTHROPLASTY, RIGHT: ICD-10-CM

## 2024-05-23 PROCEDURE — 97112 NEUROMUSCULAR REEDUCATION: CPT | Performed by: PHYSICAL THERAPIST

## 2024-05-23 PROCEDURE — 97110 THERAPEUTIC EXERCISES: CPT | Performed by: PHYSICAL THERAPIST

## 2024-05-23 NOTE — PROGRESS NOTES
Physical Therapy Daily Treatment Note    Patient: Orion Harvey   : 1950  Diagnosis/ICD-10 Code:  Acute pain of right knee [M25.561]  Referring practitioner: Ajith Martínez MD  Date of Initial Visit: Type: THERAPY  Noted: 4/15/2024  Today's Date: 2024  Patient seen for 14 sessions             Subjective Patient reports target practice went really well.  He's not had a chance to practice kneeling on bed    Objective   See Exercise, Manual, and Modality Logs for complete treatment.  Oxford knee score indicates 44% impairment, improved from 75% impairment initially with limitations in pain, standing, walking, sleeping, ADLs.  AROM knee flexion = 110 deg R, 120 deg L; extension = -5 R, 0 deg L.  Ambulates without AD and only mild antalgia.  No significant swelling or bruising noted.  MMT deferred.  Deconditioned abdominals noted.  No difficulty with sit to stand transfer or sit to/from supine.  Able to perform Nustep 10 min.  Able to complete full revolution backwards on bike.  SLS = 14 sec R, 15 sec L.  Able to perform sit to stand from regular chair with equal weight and position.   Repeated sit to stand = 10 reps in 30 sec without arm support.  Reviewed HEP and added half kneel to prepare for fishing. No ice or MH needed.           Assessment/Plan  Patient independent with HEP in 2 weeks - MET  Tolerate 10 min Nustep/Bike in 2 weeks - MET  Able to return to driving in 2 weeks - MET  Increase R knee flexion to 110 deg for full revolution on bike in 2 weeks - MET  Able to ambulate independently without AD and minimal antalgia in 2 weeks - MET  Improve function as evidenced by Oxford knee score of 20% or less in 12 weeks (75% impairment initially) - PARTIALLY MET, 44% on   Able to return to fishing in 12 weeks - NOT MET  Perform 10 reps of repeated sit to stand without arms in 30 seconds in 12 weeks - MET  Able to stand/walk on uneven ground to allow target practice in 12 weeks - MET     Progress  per Plan of Care exp 7/8/24            Timed:         Manual Therapy:    5     mins  15821;     Therapeutic Exercise:    15     mins  13270;     Neuromuscular Taylor:    10    mins  02901;    Therapeutic Activity:          mins  46771;     Gait Training:           mins  86887;     Ultrasound:          mins  90087;    Ionto                                   mins   87849  Self Care                            mins   68280      Un-Timed:  Electrical Stimulation:         mins  96972 ( );  Dry Needling          mins self-pay  Traction          mins 80872  Low Eval          Mins  20471  Mod Eval          Mins  13030  High Eval                            Mins  66794  Canalith Repos                   mins  03650    Timed Treatment:   30   mins   Total Treatment:     30   mins    Robin A Sprigler, PT  Physical Therapist

## 2024-05-30 ENCOUNTER — TREATMENT (OUTPATIENT)
Dept: PHYSICAL THERAPY | Facility: CLINIC | Age: 74
End: 2024-05-30
Payer: MEDICARE

## 2024-05-30 DIAGNOSIS — Z96.651 S/P TOTAL KNEE ARTHROPLASTY, RIGHT: ICD-10-CM

## 2024-05-30 DIAGNOSIS — R29.898 RIGHT LEG WEAKNESS: ICD-10-CM

## 2024-05-30 DIAGNOSIS — R26.9 GAIT DISTURBANCE: ICD-10-CM

## 2024-05-30 DIAGNOSIS — M25.561 ACUTE PAIN OF RIGHT KNEE: Primary | ICD-10-CM

## 2024-05-30 PROCEDURE — 97112 NEUROMUSCULAR REEDUCATION: CPT | Performed by: PHYSICAL THERAPIST

## 2024-05-30 PROCEDURE — 97110 THERAPEUTIC EXERCISES: CPT | Performed by: PHYSICAL THERAPIST

## 2024-05-30 NOTE — PROGRESS NOTES
Physical Therapy Daily Treatment Note    Patient: Orion Harvey   : 1950  Diagnosis/ICD-10 Code:  Acute pain of right knee [M25.561]  Referring practitioner: Ajith Martínez MD  Date of Initial Visit: Type: THERAPY  Noted: 4/15/2024  Today's Date: 2024  Patient seen for 15 sessions             Subjective Patient has been busy with target practice, he's been at it since 8 am this morning.  Has not tried fishing yet.    Objective   See Exercise, Manual, and Modality Logs for complete treatment. Oxford knee score indicates 44% impairment, improved from 75% impairment initially with limitations in pain, standing, walking, sleeping, ADLs.  AROM knee flexion = 110 deg R, 120 deg L; extension = -5 R, 0 deg L.  Ambulates without AD and only mild antalgia.  No significant swelling or bruising noted.  MMT deferred.  Deconditioned abdominals noted.  No difficulty with sit to stand transfer or sit to/from supine.  Able to perform Nustep 10 min.  Able to complete full revolution backwards on bike.  SLS = 14 sec R, 15 sec L.  Able to perform sit to stand from regular chair with equal weight and position.   Repeated sit to stand = 10 reps in 30 sec without arm support.  Reviewed HEP and half kneel to prepare for fishing. No ice or MH needed.        Assessment/Plan  Patient independent with HEP in 2 weeks - MET  Tolerate 10 min Nustep/Bike in 2 weeks - MET  Able to return to driving in 2 weeks - MET  Increase R knee flexion to 110 deg for full revolution on bike in 2 weeks - MET  Able to ambulate independently without AD and minimal antalgia in 2 weeks - MET  Improve function as evidenced by Oxford knee score of 20% or less in 12 weeks (75% impairment initially) - PARTIALLY MET, 44% on   Able to return to fishing in 12 weeks - NOT MET  Perform 10 reps of repeated sit to stand without arms in 30 seconds in 12 weeks - MET  Able to stand/walk on uneven ground to allow target practice in 12 weeks - MET      Progress per Plan of Care exp 7/8/24           Timed:         Manual Therapy:    5     mins  00183;     Therapeutic Exercise:    15     mins  38801;     Neuromuscular Taylor:    10    mins  10808;    Therapeutic Activity:          mins  80061;     Gait Training:           mins  27802;     Ultrasound:          mins  95640;    Ionto                                   mins   15351  Self Care                            mins   40291      Un-Timed:  Electrical Stimulation:         mins  19584 ( );  Dry Needling          mins self-pay  Traction          mins 58132  Low Eval          Mins  69411  Mod Eval          Mins  90598  High Eval                            Mins  59563  Canalith Repos                   mins  78664    Timed Treatment:   30   mins   Total Treatment:     30   mins    Robin A Sprigler, PT  Physical Therapist

## 2024-06-04 ENCOUNTER — TREATMENT (OUTPATIENT)
Dept: PHYSICAL THERAPY | Facility: CLINIC | Age: 74
End: 2024-06-04
Payer: MEDICARE

## 2024-06-04 DIAGNOSIS — Z96.651 S/P TOTAL KNEE ARTHROPLASTY, RIGHT: ICD-10-CM

## 2024-06-04 DIAGNOSIS — R26.9 GAIT DISTURBANCE: ICD-10-CM

## 2024-06-04 DIAGNOSIS — R29.898 RIGHT LEG WEAKNESS: ICD-10-CM

## 2024-06-04 DIAGNOSIS — M25.561 ACUTE PAIN OF RIGHT KNEE: Primary | ICD-10-CM

## 2024-06-04 PROCEDURE — 97110 THERAPEUTIC EXERCISES: CPT | Performed by: PHYSICAL THERAPIST

## 2024-06-04 PROCEDURE — 97112 NEUROMUSCULAR REEDUCATION: CPT | Performed by: PHYSICAL THERAPIST

## 2024-06-04 NOTE — PROGRESS NOTES
Physical Therapy Daily Treatment Note      Patient: Orion Harvey   : 1950  Diagnosis/ICD-10 Code:  Acute pain of right knee [M25.561]   Problems Addressed this Visit    None  Visit Diagnoses       Acute pain of right knee    -  Primary    S/P total knee arthroplasty, right        Right leg weakness        Gait disturbance              Diagnoses         Codes Comments    Acute pain of right knee    -  Primary ICD-10-CM: M25.561  ICD-9-CM: 719.46     S/P total knee arthroplasty, right     ICD-10-CM: Z96.651  ICD-9-CM: V43.65     Right leg weakness     ICD-10-CM: R29.898  ICD-9-CM: 729.89     Gait disturbance     ICD-10-CM: R26.9  ICD-9-CM: 781.2            Referring practitioner: Ajith Martínez MD  Date of Initial Visit: Type: THERAPY  Noted: 4/15/2024  Today's Date: 2024    VISIT#: 16    Subjective Patient reports feeling great at this time and has resumed all desired activity. Patient states MD visit went well and was released. Patient expresses readiness for discharge at this time.       Objective     See Exercise, Manual, and Modality Logs for complete treatment.     Assessment/Plan Patient has progressed well towards functional goals at this time demonstrating improved strength and stability with daily functional activity.   Patient independent with HEP in 2 weeks - MET  Tolerate 10 min Nustep/Bike in 2 weeks - MET  Able to return to driving in 2 weeks - MET  Increase R knee flexion to 110 deg for full revolution on bike in 2 weeks - MET  Able to ambulate independently without AD and minimal antalgia in 2 weeks - MET  Improve function as evidenced by Oxford knee score of 20% or less in 12 weeks (75% impairment initially) -  MET per subjective information.   Able to return to fishing in 12 weeks -  MET  Perform 10 reps of repeated sit to stand without arms in 30 seconds in 12 weeks - MET  Able to stand/walk on uneven ground to allow target practice in 12 weeks - MET    Plan to DC with HEP          Timed:         Manual Therapy:    5     mins  91260;     Therapeutic Exercise:    15     mins  81043;     Neuromuscular Taylor:    10    mins  90150;    Therapeutic Activity:          mins  96389;     Gait Training:           mins  21297;     Ultrasound:          mins  06534;    Ionto                                   mins   53127  Self Care                    _____  mins   02518  Canalith Repos                   mins  05644    Un-Timed:  Electrical Stimulation:         mins  96815 ( );  Dry Needling         mins self-pay   Traction          mins 92966    Timed Treatment:   30   mins   Total Treatment:     30   mins    Gavino Stover PTA  [place] [id number]  Physical Therapist Assistant

## 2024-06-06 NOTE — PROGRESS NOTES
Discharge Summary  Discharge Summary from Physical Therapy Report        Goals: All Met    Discharge Plan: Continue with current home exercise program as instructed    Comments See treatment note for details    Date of Discharge 6/6/24        Robin Sprigler, PT  Physical Therapist

## 2024-06-09 NOTE — ED ADULT NURSE NOTE - TEMPLATE LIST FOR HEAD TO TOE ASSESSMENT
Patient arrives with c/o low back pain for a week after jumping off a truck. Reports he felt pain immediately and couldn't walk for a few day. Pain started to get better but feels like something is wrong. Patient had back surgery 10 years ago. Taking ibuprofen with little relief. Reports numbness and tingling to both legs, worse on the right.    General
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
## 2024-06-12 ENCOUNTER — OFFICE VISIT (OUTPATIENT)
Dept: CARDIOLOGY | Facility: CLINIC | Age: 74
End: 2024-06-12
Payer: MEDICARE

## 2024-06-12 VITALS
RESPIRATION RATE: 16 BRPM | OXYGEN SATURATION: 99 % | HEIGHT: 69 IN | SYSTOLIC BLOOD PRESSURE: 128 MMHG | BODY MASS INDEX: 37.62 KG/M2 | WEIGHT: 254 LBS | HEART RATE: 57 BPM | DIASTOLIC BLOOD PRESSURE: 73 MMHG

## 2024-06-12 DIAGNOSIS — E78.2 MIXED HYPERLIPIDEMIA: Primary | ICD-10-CM

## 2024-06-12 DIAGNOSIS — E11.9 TYPE 2 DIABETES MELLITUS WITHOUT COMPLICATION, WITHOUT LONG-TERM CURRENT USE OF INSULIN: Chronic | ICD-10-CM

## 2024-06-12 DIAGNOSIS — I10 BENIGN ESSENTIAL HTN: ICD-10-CM

## 2024-06-12 DIAGNOSIS — G47.33 OBSTRUCTIVE SLEEP APNEA SYNDROME: Chronic | ICD-10-CM

## 2024-07-02 DIAGNOSIS — I10 BENIGN ESSENTIAL HTN: ICD-10-CM

## 2024-07-02 DIAGNOSIS — E78.2 MIXED HYPERLIPIDEMIA: ICD-10-CM

## 2024-07-02 DIAGNOSIS — E11.65 TYPE 2 DIABETES MELLITUS WITH HYPERGLYCEMIA, WITHOUT LONG-TERM CURRENT USE OF INSULIN: Primary | ICD-10-CM

## 2024-07-03 LAB
ALBUMIN SERPL-MCNC: 4.3 G/DL (ref 3.8–4.8)
ALP SERPL-CCNC: 59 IU/L (ref 44–121)
ALT SERPL-CCNC: 28 IU/L (ref 0–44)
AST SERPL-CCNC: 23 IU/L (ref 0–40)
BILIRUB SERPL-MCNC: 0.4 MG/DL (ref 0–1.2)
BUN SERPL-MCNC: 20 MG/DL (ref 8–27)
BUN/CREAT SERPL: 21 (ref 10–24)
CALCIUM SERPL-MCNC: 9.4 MG/DL (ref 8.6–10.2)
CHLORIDE SERPL-SCNC: 103 MMOL/L (ref 96–106)
CHOLEST SERPL-MCNC: 125 MG/DL (ref 100–199)
CHOLEST/HDLC SERPL: 2.7 RATIO (ref 0–5)
CO2 SERPL-SCNC: 24 MMOL/L (ref 20–29)
CREAT SERPL-MCNC: 0.94 MG/DL (ref 0.76–1.27)
EGFRCR SERPLBLD CKD-EPI 2021: 86 ML/MIN/1.73
GLOBULIN SER CALC-MCNC: 1.9 G/DL (ref 1.5–4.5)
GLUCOSE SERPL-MCNC: 115 MG/DL (ref 70–99)
HBA1C MFR BLD: 7.6 % (ref 4.8–5.6)
HDLC SERPL-MCNC: 46 MG/DL
LDLC SERPL CALC-MCNC: 55 MG/DL (ref 0–99)
POTASSIUM SERPL-SCNC: 5.2 MMOL/L (ref 3.5–5.2)
PROT SERPL-MCNC: 6.2 G/DL (ref 6–8.5)
SODIUM SERPL-SCNC: 140 MMOL/L (ref 134–144)
TRIGL SERPL-MCNC: 136 MG/DL (ref 0–149)
VLDLC SERPL CALC-MCNC: 24 MG/DL (ref 5–40)

## 2024-07-08 NOTE — PROGRESS NOTES
Chief Complaint  Diabetes, Hypertension, and Hyperlipidemia    History of Present Illness  Orion Harvey presents today for follow up on diabetes, hypertension, and hyperlipidemia      Diabetes  Patient does not check blood sugar at home.  Associated symptoms include Hunger  Per patient current diet is Variety of foods.  Patient does avoid concentrated sweets.  Patient does not see podiatry.  Patient see's [facility] for diabetic eye exam and last eye exam was 06/2024.  Patient reports they are taking medications as prescribed and they are not having side effects.    Hypertension  Patient does not check blood pressure at home.   Patient denies  blurred vision, chest pain, dyspnea, headache, neck aches, orthopnea, palpitations, paroxysmal nocturnal dyspnea, peripheral edema, pulsating in the ears, and tiredness/fatigue   Patient reports they are taking medications as prescribed and they are not having side effects.    Hyperlipidemia  Patient is not following a low cholesterol diet.   Currently is on statin therapy.  Patient reports is exercising. Patient has been working outside daily for the last 3 weeks.  Patient reports they are taking medications as prescribed and they are not having side effects.    Dr Diana has released from routine follow up.     Patient Care Team:  Nazanin Garcia MD as PCP - General (Family Medicine)  Rosmery Manuel as Technologist   Current Outpatient Medications on File Prior to Visit   Medication Sig    acetaminophen (TYLENOL) 650 MG 8 hr tablet Take 1 tablet by mouth Every 8 (Eight) Hours As Needed for Mild Pain.    ascorbic acid (VITAMIN C) 1000 MG tablet Take 1 tablet by mouth Daily. LAST DOSE 11/23    aspirin 325 MG tablet Take 1 tablet by mouth Daily.    diphenhydrAMINE (BENADRYL) 25 mg capsule Take 1 capsule by mouth Every 6 (Six) Hours As Needed for Itching.    empagliflozin (Jardiance) 25 MG tablet tablet Take 1 tablet by mouth Daily.    fluticasone  "(FLONASE) 50 MCG/ACT nasal spray 2 sprays into the nostril(s) as directed by provider Daily As Needed for Rhinitis.    glimepiride (AMARYL) 4 MG tablet TAKE 1 TABLET BY MOUTH TWICE DAILY    lisinopril (PRINIVIL,ZESTRIL) 40 MG tablet TAKE 1 TABLET BY MOUTH DAILY    metFORMIN (GLUCOPHAGE) 1000 MG tablet Take 1 tablet by mouth 2 (Two) Times a Day With Meals.    Multiple Vitamins-Minerals (CENTRUM SILVER) tablet Take 1 tablet by mouth Every Evening. LAST DOSE 11/22    rosuvastatin (CRESTOR) 10 MG tablet TAKE 1 TABLET BY MOUTH DAILY    amoxicillin (AMOXIL) 500 MG capsule 4 capsules 1 hour prior and 2 capsules 6 hours after dental cleaning or procedure (Patient not taking: Reported on 7/9/2024)    glucose blood (OneTouch Verio) test strip 1 each by Other route 2 (Two) Times a Day. Once daily  DX E11.65 (Patient not taking: Reported on 7/9/2024)    OneTouch Delica Lancets 33G misc 1 strip by Other route Every 12 (Twelve) Hours. (Patient not taking: Reported on 7/9/2024)     No current facility-administered medications on file prior to visit.       Objective   Vital Signs:   /60 (BP Location: Right arm, Patient Position: Sitting, Cuff Size: Large Adult)   Pulse 61   Temp 97.3 °F (36.3 °C) (Temporal)   Resp 18   Ht 175.3 cm (69\")   Wt 116 kg (255 lb)   SpO2 97%   BMI 37.66 kg/m²    BP Readings from Last 3 Encounters:   07/09/24 132/60   06/12/24 128/73   04/08/24 108/56     Wt Readings from Last 3 Encounters:   07/09/24 116 kg (255 lb)   06/12/24 115 kg (254 lb)   05/16/24 118 kg (261 lb)         Physical Exam  Vitals and nursing note reviewed.   Constitutional:       General: He is not in acute distress.     Appearance: Normal appearance. He is well-developed. He is obese.   HENT:      Head: Normocephalic and atraumatic.   Eyes:      Conjunctiva/sclera: Conjunctivae normal.      Pupils: Pupils are equal, round, and reactive to light.   Neck:      Thyroid: No thyromegaly.      Vascular: No JVD.      Comments: No " thyromegaly or palpable thyroid masses  Cardiovascular:      Rate and Rhythm: Normal rate and regular rhythm.      Heart sounds: Normal heart sounds. No murmur heard.  Pulmonary:      Breath sounds: Normal breath sounds. No wheezing or rales.   Musculoskeletal:         General: Normal range of motion.      Cervical back: Normal range of motion and neck supple.      Comments: Shoulders, elbows, hands, wrists with normal strength and range of motion.  Negative Tinel's and Phalen's at the wrist,  palpable tendon in the right medial distal elbow that reproduces tingling from elbow to fingertips.  Right knee with significant post injury and postoperative changes well healed very good range of motion and strength   Skin:     General: Skin is warm and dry.      Findings: No rash.   Neurological:      Mental Status: He is alert and oriented to person, place, and time.   Psychiatric:         Mood and Affect: Mood normal.         Behavior: Behavior normal.            No visits with results within 1 Day(s) from this visit.   Latest known visit with results is:   Orders Only on 07/02/2024   Component Date Value Ref Range Status    Glucose 07/02/2024 115 (H)  70 - 99 mg/dL Final    BUN 07/02/2024 20  8 - 27 mg/dL Final    Creatinine 07/02/2024 0.94  0.76 - 1.27 mg/dL Final    EGFR Result 07/02/2024 86  >59 mL/min/1.73 Final    BUN/Creatinine Ratio 07/02/2024 21  10 - 24 Final    Sodium 07/02/2024 140  134 - 144 mmol/L Final    Potassium 07/02/2024 5.2  3.5 - 5.2 mmol/L Final    Chloride 07/02/2024 103  96 - 106 mmol/L Final    Total CO2 07/02/2024 24  20 - 29 mmol/L Final    Calcium 07/02/2024 9.4  8.6 - 10.2 mg/dL Final    Total Protein 07/02/2024 6.2  6.0 - 8.5 g/dL Final    Albumin 07/02/2024 4.3  3.8 - 4.8 g/dL Final    Globulin 07/02/2024 1.9  1.5 - 4.5 g/dL Final    Total Bilirubin 07/02/2024 0.4  0.0 - 1.2 mg/dL Final    Alkaline Phosphatase 07/02/2024 59  44 - 121 IU/L Final    AST (SGOT) 07/02/2024 23  0 - 40 IU/L Final     ALT (SGPT) 07/02/2024 28  0 - 44 IU/L Final    Hemoglobin A1C 07/02/2024 7.6 (H)  4.8 - 5.6 % Final    Comment:          Prediabetes: 5.7 - 6.4           Diabetes: >6.4           Glycemic control for adults with diabetes: <7.0      Total Cholesterol 07/02/2024 125  100 - 199 mg/dL Final    Triglycerides 07/02/2024 136  0 - 149 mg/dL Final    HDL Cholesterol 07/02/2024 46  >39 mg/dL Final    VLDL Cholesterol Brad 07/02/2024 24  5 - 40 mg/dL Final    LDL Chol Calc (NIH) 07/02/2024 55  0 - 99 mg/dL Final    Chol/HDL Ratio 07/02/2024 2.7  0.0 - 5.0 ratio Final    Comment:                                   T. Chol/HDL Ratio                                              Men  Women                                1/2 Avg.Risk  3.4    3.3                                    Avg.Risk  5.0    4.4                                 2X Avg.Risk  9.6    7.1                                 3X Avg.Risk 23.4   11.0       A1C Last 3 Results          8/15/2023    09:30 11/21/2023    09:14 7/2/2024    08:33   HGBA1C Last 3 Results   Hemoglobin A1C 7.1  8.0  7.6      Lab Results   Component Value Date    CHOL 115 10/30/2019    CHLPL 125 07/02/2024    TRIG 136 07/02/2024    HDL 46 07/02/2024    LDL 55 07/02/2024     Lab Results   Component Value Date    TSH 0.681 08/15/2023     Lab Results   Component Value Date    GLUCOSE 115 (H) 07/02/2024    BUN 20 07/02/2024    CREATININE 0.94 07/02/2024    EGFRIFNONA 100 12/01/2021    EGFRIFAFRI 100 11/05/2021    BCR 21 07/02/2024    K 5.2 07/02/2024    CO2 24 07/02/2024    CALCIUM 9.4 07/02/2024    PROTENTOTREF 6.2 07/02/2024    ALBUMIN 4.3 07/02/2024    LABIL2 2.4 (H) 08/15/2023    AST 23 07/02/2024    ALT 28 07/02/2024     Lab Results   Component Value Date    WBC 5.3 08/15/2023    HGB 14.0 08/15/2023    HCT 42.9 08/15/2023    MCV 91 08/15/2023     08/15/2023                      Assessment and Plan    Diagnoses and all orders for this visit:    1. Type 2 diabetes mellitus with hyperglycemia,  without long-term current use of insulin (Primary)    2. Benign essential HTN    3. Mixed hyperlipidemia    4. Body mass index (BMI) of 37.0 to 37.9 in adult    5. Former smoker    6. Medial epicondylitis of elbow, right      Diabetes, A1c currently 7.6, down from 8.0 seven months ago.  He is currently taking glimepiride 4 mg twice daily, metformin 1000 mg twice daily and Jardiance 25 mg daily.  Patient reports that only in the past 3 weeks he has been able to increase his physical activity following his right knee reconstruction surgery.  He has been able to get out and rebuilding a deck and anticipates continuing to be more physically active.  We will continue current medications and encouraged to work with increased physical activity and low concentrated sweets diet.  Hypertension at goal continue lisinopril 40 mg daily  Hyperlipidemia at goal continue Crestor 10 mg daily.      There are no discontinued medications.      Follow Up     Return in about 6 weeks (around 8/20/2024) for Medicare Wellness.    Patient was given instructions and counseling regarding his condition or for health maintenance advice. Please see specific information pulled into the AVS if appropriate.

## 2024-07-09 ENCOUNTER — OFFICE VISIT (OUTPATIENT)
Dept: FAMILY MEDICINE CLINIC | Facility: CLINIC | Age: 74
End: 2024-07-09
Payer: MEDICARE

## 2024-07-09 VITALS
SYSTOLIC BLOOD PRESSURE: 132 MMHG | DIASTOLIC BLOOD PRESSURE: 60 MMHG | TEMPERATURE: 97.3 F | HEART RATE: 61 BPM | BODY MASS INDEX: 37.77 KG/M2 | HEIGHT: 69 IN | RESPIRATION RATE: 18 BRPM | WEIGHT: 255 LBS | OXYGEN SATURATION: 97 %

## 2024-07-09 DIAGNOSIS — E78.2 MIXED HYPERLIPIDEMIA: ICD-10-CM

## 2024-07-09 DIAGNOSIS — Z87.891 FORMER SMOKER: ICD-10-CM

## 2024-07-09 DIAGNOSIS — M77.01 MEDIAL EPICONDYLITIS OF ELBOW, RIGHT: ICD-10-CM

## 2024-07-09 DIAGNOSIS — I10 BENIGN ESSENTIAL HTN: ICD-10-CM

## 2024-07-09 DIAGNOSIS — E11.65 TYPE 2 DIABETES MELLITUS WITH HYPERGLYCEMIA, WITHOUT LONG-TERM CURRENT USE OF INSULIN: Primary | ICD-10-CM

## 2024-07-09 PROCEDURE — 3051F HG A1C>EQUAL 7.0%<8.0%: CPT | Performed by: FAMILY MEDICINE

## 2024-07-09 PROCEDURE — 1159F MED LIST DOCD IN RCRD: CPT | Performed by: FAMILY MEDICINE

## 2024-07-09 PROCEDURE — 3078F DIAST BP <80 MM HG: CPT | Performed by: FAMILY MEDICINE

## 2024-07-09 PROCEDURE — 1160F RVW MEDS BY RX/DR IN RCRD: CPT | Performed by: FAMILY MEDICINE

## 2024-07-09 PROCEDURE — G2211 COMPLEX E/M VISIT ADD ON: HCPCS | Performed by: FAMILY MEDICINE

## 2024-07-09 PROCEDURE — 1126F AMNT PAIN NOTED NONE PRSNT: CPT | Performed by: FAMILY MEDICINE

## 2024-07-09 PROCEDURE — 3075F SYST BP GE 130 - 139MM HG: CPT | Performed by: FAMILY MEDICINE

## 2024-07-09 PROCEDURE — 99214 OFFICE O/P EST MOD 30 MIN: CPT | Performed by: FAMILY MEDICINE

## 2024-07-09 RX ORDER — DIPHENHYDRAMINE HCL 25 MG
25 CAPSULE ORAL EVERY 6 HOURS PRN
COMMUNITY

## 2024-07-12 DIAGNOSIS — I10 ESSENTIAL HYPERTENSION: ICD-10-CM

## 2024-07-12 RX ORDER — LISINOPRIL 40 MG/1
40 TABLET ORAL DAILY
Qty: 90 TABLET | Refills: 3 | Status: SHIPPED | OUTPATIENT
Start: 2024-07-12

## 2024-07-29 NOTE — PATIENT PROFILE ADULT - NSPROGENBLOODRESTRICT_GEN_A_NUR
----- Message from Ana Stark sent at 7/22/2024  5:13 PM CDT -----  Notify of results.  A1c is 6.6%.  goal to keep <7%.  Lipids are elevated.  Statin therapy advised.  Lipitor 40 mg script sent.  Other labs normal.  Follow up 3 months advised.   none

## 2024-08-04 NOTE — PROGRESS NOTE BEHAVIORAL HEALTH - NSBHCHARTREVIEWVS_PSY_A_CORE FT
Vital Signs Last 24 Hrs  T(C): 36.5 (24 Apr 2019 05:47), Max: 37.1 (23 Apr 2019 21:38)  T(F): 97.7 (24 Apr 2019 05:47), Max: 98.8 (23 Apr 2019 21:38)  HR: 80 (24 Apr 2019 05:47) (70 - 80)  BP: 157/106 (24 Apr 2019 05:47) (123/78 - 170/85)  BP(mean): --  RR: 19 (24 Apr 2019 05:47) (18 - 19)  SpO2: 98% (24 Apr 2019 05:47) (97% - 99%)
Vital Signs Last 24 Hrs  T(C): 36.9 (25 Apr 2019 05:44), Max: 37.1 (24 Apr 2019 21:50)  T(F): 98.5 (25 Apr 2019 05:44), Max: 98.7 (24 Apr 2019 21:50)  HR: 70 (25 Apr 2019 05:44) (70 - 79)  BP: 146/91 (25 Apr 2019 05:44) (120/81 - 173/101)  BP(mean): --  RR: 18 (25 Apr 2019 05:44) (18 - 18)  SpO2: 100% (25 Apr 2019 05:44) (97% - 100%)
Vital Signs Last 24 Hrs  T(C): 36.6 (26 Apr 2019 06:57), Max: 37.2 (25 Apr 2019 15:38)  T(F): 97.8 (26 Apr 2019 06:57), Max: 99 (25 Apr 2019 15:38)  HR: 79 (26 Apr 2019 06:57) (79 - 82)  BP: 160/90 (26 Apr 2019 06:57) (137/86 - 160/90)  BP(mean): --  RR: 18 (26 Apr 2019 06:57) (18 - 18)  SpO2: 98% (26 Apr 2019 06:57) (96% - 98%)
No

## 2024-08-21 NOTE — PROGRESS NOTES
Subjective   The ABCs of the Annual Wellness Visit  Medicare Wellness Visit      Orion Harvey is a 74 y.o. patient who presents for a Medicare Wellness Visit.    The following portions of the patient's history were reviewed and   updated as appropriate: allergies, current medications, past family history, past medical history, past social history, past surgical history, and problem list.    Compared to one year ago, the patient's physical   health is better.  Compared to one year ago, the patient's mental   health is better.    Recent Hospitalizations:  He was admitted within the past 365 days at [facility].     Current Medical Providers:  Patient Care Team:  Nazanin Garcia MD as PCP - General (Family Medicine)  Rosmery Manuel as Technologist    Outpatient Medications Prior to Visit   Medication Sig Dispense Refill    acetaminophen (TYLENOL) 650 MG 8 hr tablet Take 1 tablet by mouth Every 8 (Eight) Hours As Needed for Mild Pain.      ascorbic acid (VITAMIN C) 1000 MG tablet Take 1 tablet by mouth Daily. LAST DOSE 11/23      aspirin 325 MG tablet Take 1 tablet by mouth Daily.      diphenhydrAMINE (BENADRYL) 25 mg capsule Take 1 capsule by mouth Every 6 (Six) Hours As Needed for Itching.      empagliflozin (Jardiance) 25 MG tablet tablet Take 1 tablet by mouth Daily. 90 tablet 3    fluticasone (FLONASE) 50 MCG/ACT nasal spray 2 sprays into the nostril(s) as directed by provider Daily As Needed for Rhinitis.      glimepiride (AMARYL) 4 MG tablet TAKE 1 TABLET BY MOUTH TWICE DAILY 180 tablet 3    glucose blood (OneTouch Verio) test strip 1 each by Other route 2 (Two) Times a Day. Once daily  DX E11.65 200 each 1    lisinopril (PRINIVIL,ZESTRIL) 40 MG tablet TAKE 1 TABLET BY MOUTH DAILY 90 tablet 3    metFORMIN (GLUCOPHAGE) 1000 MG tablet Take 1 tablet by mouth 2 (Two) Times a Day With Meals. 180 tablet 3    Multiple Vitamins-Minerals (CENTRUM SILVER) tablet Take 1 tablet by mouth Every Evening.  LAST DOSE 11/22      OneTouch Delica Lancets 33G misc 1 strip by Other route Every 12 (Twelve) Hours. 100 each 3    rosuvastatin (CRESTOR) 10 MG tablet TAKE 1 TABLET BY MOUTH DAILY 90 tablet 3     No facility-administered medications prior to visit.     No opioid medication identified on active medication list. I have reviewed chart for other potential  high risk medication/s and harmful drug interactions in the elderly.      Aspirin is on active medication list. Aspirin use is indicated based on review of current medical condition/s. Pros and cons of this therapy have been discussed today. Benefits of this medication outweigh potential harm.  Patient has been encouraged to continue taking this medication.  .      Patient Active Problem List   Diagnosis    Cough due to ACE inhibitor    Diabetes mellitus, type 2    Gout    Mixed hyperlipidemia    Kidney stones    Obstructive sleep apnea syndrome    BPH (benign prostatic hyperplasia)    Colon polyps    Class 3 severe obesity due to excess calories with serious comorbidity and body mass index (BMI) of 40.0 to 44.9 in adult    Plantar fasciitis    LLOYD on CPAP    Nuclear sclerosis    Presbyopia    Osteoarthritis of left shoulder    Osteoarthritis of right glenohumeral joint    Status post replacement of right shoulder joint    Right wrist pain    Tear of right scapholunate ligament    Chondromalacia of knee    Chronic pain of right knee    Osteoarthritis of both knees    Easy bruising    Pain in both knees    Primary osteoarthritis involving multiple joints    Chronic pain syndrome    Hx of total knee replacement, left    Primary osteoarthritis of right knee    Former smoker    DJD of left shoulder    Benign essential HTN    Chest pain    Medicare annual wellness visit, subsequent    Vitamin D deficiency     Advance Care Planning Advance Directive is on file.  ACP discussion was held with the patient during this visit. Patient has an advance directive in EMR which is  "still valid.             Objective   Vitals:    24 1009   BP: 112/62   BP Location: Right arm   Patient Position: Sitting   Cuff Size: Large Adult   Pulse: 66   Resp: 18   Temp: 98 °F (36.7 °C)   TempSrc: Temporal   SpO2: 95%   Weight: 117 kg (257 lb 12.8 oz)   Height: 172.7 cm (68\")   PainSc: 0-No pain       Estimated body mass index is 39.2 kg/m² as calculated from the following:    Height as of this encounter: 172.7 cm (68\").    Weight as of this encounter: 117 kg (257 lb 12.8 oz).    Class 2 Severe Obesity (BMI >=35 and <=39.9). Obesity-related health conditions include the following: hypertension, diabetes mellitus, dyslipidemias, and osteoarthritis. Obesity is unchanged. BMI is is above average; BMI management plan is completed. We discussed low calorie, low carb based diet program, portion control, and increasing exercise.     ATTENTION  What is the year: correct  What is the month of the year: correct  What is the day of the week?: correct  What is the date?: correct  MEMORY  Repeat address three times, only score third attempt: [address] [place], [place]: 7  HOW MANY ANIMALS DID THE PATIENT NAME  Verbal Fluency -- Animal Names (0-25): 22+  CLOCK DRAWING  Clock Drawing: All Correct  MEMORY RECALL  Tell me what you remember about that name and address we were repeating at the beginnin  ACE TOTAL SCORE  Total ACE Score - <25/30 strongly suggests cognitive impairment; <21/30 almost certainly shows dementia: 29    Does the patient have evidence of cognitive impairment? No  Lab Results   Component Value Date    CHLPL 125 2024    TRIG 136 2024    HDL 46 2024    LDL 55 2024    VLDL 24 2024    HGBA1C 7.6 (H) 2024                                                                                                Health  Risk Assessment    Smoking Status:  Social History     Tobacco Use   Smoking Status Former    Current packs/day: 0.00    Average " packs/day: 1 pack/day for 24.0 years (24.0 ttl pk-yrs)    Types: Cigarettes    Start date: 1976    Quit date: 1999    Years since quittin.6   Smokeless Tobacco Never     Alcohol Consumption:  Social History     Substance and Sexual Activity   Alcohol Use No       Fall Risk Screen  STEADI Fall Risk Assessment was completed, and patient is at HIGH risk for falls. Assessment completed on:2024    Depression Screenin/23/2024    10:11 AM   PHQ-2/PHQ-9 Depression Screening   Little Interest or Pleasure in Doing Things 0-->not at all   Feeling Down, Depressed or Hopeless 0-->not at all   PHQ-9: Brief Depression Severity Measure Score 0     Health Habits and Functional and Cognitive Screenin/17/2024     7:06 AM   Functional & Cognitive Status   Do you have difficulty preparing food and eating? No   Do you have difficulty bathing yourself, getting dressed or grooming yourself? No   Do you have difficulty using the toilet? No   Do you have difficulty moving around from place to place? No   Do you have trouble with steps or getting out of a bed or a chair? No   Current Diet Limited Junk Food   Dental Exam Up to date   Eye Exam Up to date   Exercise (times per week) 3 times per week   Current Exercises Include Walking;Yard Work   Do you need help using the phone?  No   Are you deaf or do you have serious difficulty hearing?  No   Do you need help to go to places out of walking distance? No   Do you need help shopping? No   Do you need help preparing meals?  No   Do you need help with housework?  No   Do you need help with laundry? No   Do you need help taking your medications? No   Do you need help managing money? No   Do you ever drive or ride in a car without wearing a seat belt? No   Have you felt unusual stress, anger or loneliness in the last month? No   Who do you live with? Spouse   If you need help, do you have trouble finding someone available to you? No   Have you been bothered in  the last four weeks by sexual problems? No   Do you have difficulty concentrating, remembering or making decisions? No           Age-appropriate Screening Schedule:  Refer to the list below for future screening recommendations based on patient's age, sex and/or medical conditions. Orders for these recommended tests are listed in the plan section. The patient has been provided with a written plan.    Health Maintenance List  Health Maintenance   Topic Date Due    ZOSTER VACCINE (2 of 3) 10/27/2012    COVID-19 Vaccine (5 - 2023-24 season) 09/01/2023    URINE MICROALBUMIN  08/15/2024    INFLUENZA VACCINE  08/01/2024    DIABETIC FOOT EXAM  11/21/2024    HEMOGLOBIN A1C  01/02/2025    DIABETIC EYE EXAM  06/17/2025    LIPID PANEL  07/02/2025    ANNUAL WELLNESS VISIT  08/23/2025    BMI FOLLOWUP  08/23/2025    TDAP/TD VACCINES (2 - Td or Tdap) 04/16/2028    COLORECTAL CANCER SCREENING  02/03/2031    HEPATITIS C SCREENING  Completed    Pneumococcal Vaccine 65+  Completed    AAA SCREEN (ONE-TIME)  Completed                                                                                                                                                CMS Preventative Services Quick Reference  Risk Factors Identified During Encounter  Immunizations Discussed/Encouraged: Influenza, Shingrix, COVID19, and RSV (Respiratory Syncytial Virus)    The above risks/problems have been discussed with the patient.  Pertinent information has been shared with the patient in the After Visit Summary.  An After Visit Summary and PPPS were made available to the patient.    Follow Up:   Next Medicare Wellness visit to be scheduled in 1 year.         Additional E&M Note during same encounter follows:  Patient has additional, significant, and separately identifiable condition(s)/problem(s) that require work above and beyond the Medicare Wellness Visit     Chief Complaint  Medicare Wellness-subsequent    Subjective   HPI  Orion is also being seen  "today for additional medical problem/s.    Class 2 Severe Obesity (BMI >=35 and <=39.9). Obesity-related health conditions include the following: obstructive sleep apnea, hypertension, diabetes mellitus, dyslipidemias, and osteoarthritis. Obesity is unchanged. BMI is is above average; BMI management plan is completed. We discussed portion control and increasing exercise.                 Objective   Vital Signs:  /62 (BP Location: Right arm, Patient Position: Sitting, Cuff Size: Large Adult)   Pulse 66   Temp 98 °F (36.7 °C) (Temporal)   Resp 18   Ht 172.7 cm (68\")   Wt 117 kg (257 lb 12.8 oz)   SpO2 95%   BMI 39.20 kg/m²   Physical Exam  Vitals and nursing note reviewed.   Constitutional:       General: He is not in acute distress.     Appearance: Normal appearance. He is well-developed. He is obese.   HENT:      Head: Normocephalic and atraumatic.   Eyes:      Conjunctiva/sclera: Conjunctivae normal.      Pupils: Pupils are equal, round, and reactive to light.   Neck:      Thyroid: No thyromegaly.      Vascular: No JVD.      Comments: No thyromegaly or palpable thyroid masses  Cardiovascular:      Rate and Rhythm: Normal rate and regular rhythm.      Heart sounds: Normal heart sounds. No murmur heard.  Pulmonary:      Breath sounds: Normal breath sounds. No wheezing or rales.   Musculoskeletal:         General: Normal range of motion.      Cervical back: Normal range of motion and neck supple.      Comments: Postsurgical changes of right knee and right wrist.  And range of motion,  Gait is steady   Skin:     General: Skin is warm and dry.      Findings: No rash.   Neurological:      Mental Status: He is alert and oriented to person, place, and time.   Psychiatric:         Mood and Affect: Mood normal.         Behavior: Behavior normal.         The following data was reviewed by: Nazanin Garcia MD on 08/23/2024:  Orders Only on 07/02/2024   Component Date Value Ref Range Status    Glucose " 07/02/2024 115 (H)  70 - 99 mg/dL Final    BUN 07/02/2024 20  8 - 27 mg/dL Final    Creatinine 07/02/2024 0.94  0.76 - 1.27 mg/dL Final    EGFR Result 07/02/2024 86  >59 mL/min/1.73 Final    BUN/Creatinine Ratio 07/02/2024 21  10 - 24 Final    Sodium 07/02/2024 140  134 - 144 mmol/L Final    Potassium 07/02/2024 5.2  3.5 - 5.2 mmol/L Final    Chloride 07/02/2024 103  96 - 106 mmol/L Final    Total CO2 07/02/2024 24  20 - 29 mmol/L Final    Calcium 07/02/2024 9.4  8.6 - 10.2 mg/dL Final    Total Protein 07/02/2024 6.2  6.0 - 8.5 g/dL Final    Albumin 07/02/2024 4.3  3.8 - 4.8 g/dL Final    Globulin 07/02/2024 1.9  1.5 - 4.5 g/dL Final    Total Bilirubin 07/02/2024 0.4  0.0 - 1.2 mg/dL Final    Alkaline Phosphatase 07/02/2024 59  44 - 121 IU/L Final    AST (SGOT) 07/02/2024 23  0 - 40 IU/L Final    ALT (SGPT) 07/02/2024 28  0 - 44 IU/L Final    Hemoglobin A1C 07/02/2024 7.6 (H)  4.8 - 5.6 % Final    Comment:          Prediabetes: 5.7 - 6.4           Diabetes: >6.4           Glycemic control for adults with diabetes: <7.0      Total Cholesterol 07/02/2024 125  100 - 199 mg/dL Final    Triglycerides 07/02/2024 136  0 - 149 mg/dL Final    HDL Cholesterol 07/02/2024 46  >39 mg/dL Final    VLDL Cholesterol Brad 07/02/2024 24  5 - 40 mg/dL Final    LDL Chol Calc (NIH) 07/02/2024 55  0 - 99 mg/dL Final    Chol/HDL Ratio 07/02/2024 2.7  0.0 - 5.0 ratio Final    Comment:                                   T. Chol/HDL Ratio                                              Men  Women                                1/2 Avg.Risk  3.4    3.3                                    Avg.Risk  5.0    4.4                                 2X Avg.Risk  9.6    7.1                                 3X Avg.Risk 23.4   11.0               Assessment and Plan Additional age appropriate preventative wellness advice topics were discussed during today's preventative wellness exam(some topics already addressed during AWV portion of the note above):    Physical  Activity: Advised cardiovascular activity 150 minutes per week as tolerated. (example brisk walk for 30 minutes, 5 days a week).             Medicare annual wellness visit, subsequent    Body mass index (BMI) of 39.0 to 39.9 in adult    Former smoker    Type 2 diabetes mellitus with hyperglycemia, without long-term current use of insulin  Diabetes is stable.   Continue current treatment regimen.  Diabetes will be reassessed in 3 months  Benign essential HTN  Hypertension is stable and controlled  Continue current treatment regimen.  Blood pressure will be reassessed in 3 months.    The patient was counseled regarding nutrition, physical activity, healthy weight, injury prevention, immunizations and preventative health screenings.    Discussion with Patient and spouse regarding advanced directives and end of life care was voluntarily entered by patient.  Patient has not had significant change in their medical condition in the past year.     Living Will, POST, and Advanced Care Planning form discussed at length and reviewed with patient.     I spent 16 minutes  with patient reviewing information and documenting  in the chart.      Patient states he does want CPR. Reviewed medical interventions with patient and the differences between each: Comfort, Limited and Full. Patient opted for Full. Discussed the use of antibiotics at the end of life. He chose to use antibiotics consistent with treatment goals. Discussed artificially administered nutrition, patient is aware that if he is alert and oriented they can change their mind at any time. However, they have elected to have a trial of artificial nutrition by tube for undetermined length of time for a goal of good quality of life..   Additionally, he would want withdrawal of advanced care interventions including mechanical ventilation and artificial nutrition if there is little or no chance of recovery to good quality of life.    Patient has identified his spouse Amy as  his healthcare representative. Advised to discuss with healthcare representative if they can comply with wishes. Patient encouraged to have a meeting to discuss his decision regarding advanced care directives and goals of care with extended family and significant  friends  In regard to the POST form:The patient opted to complete POST while in the office and copy scanned into the chart. and advised to give to family members, place in an easily accessible place and take with them if going to the hospital or Emergency room.     Orders Placed This Encounter   Procedures    Hemoglobin A1c     Standing Status:   Future     Standing Expiration Date:   8/23/2025     Order Specific Question:   Release to patient     Answer:   Routine Release [[id number]]    MicroAlbumin, Urine, Random - Urine, Clean Catch     Standing Status:   Future     Standing Expiration Date:   8/23/2025     Order Specific Question:   Release to patient     Answer:   Routine Release [[id number]]    CBC & Differential     Standing Status:   Future     Standing Expiration Date:   8/23/2025     Order Specific Question:   Manual Differential     Answer:   No     Order Specific Question:   Release to patient     Answer:   Routine Release [[id number]]             Follow Up   Return in 7 weeks (on 10/11/2024) for as previously scheduled, diabetes, with Labs.  Patient was given instructions and counseling regarding his condition or for health maintenance advice. Please see specific information pulled into the AVS if appropriate.

## 2024-08-23 ENCOUNTER — OFFICE VISIT (OUTPATIENT)
Dept: FAMILY MEDICINE CLINIC | Facility: CLINIC | Age: 74
End: 2024-08-23
Payer: MEDICARE

## 2024-08-23 VITALS
HEART RATE: 66 BPM | WEIGHT: 257.8 LBS | TEMPERATURE: 98 F | OXYGEN SATURATION: 95 % | BODY MASS INDEX: 39.07 KG/M2 | RESPIRATION RATE: 18 BRPM | HEIGHT: 68 IN | DIASTOLIC BLOOD PRESSURE: 62 MMHG | SYSTOLIC BLOOD PRESSURE: 112 MMHG

## 2024-08-23 DIAGNOSIS — E11.65 TYPE 2 DIABETES MELLITUS WITH HYPERGLYCEMIA, WITHOUT LONG-TERM CURRENT USE OF INSULIN: ICD-10-CM

## 2024-08-23 DIAGNOSIS — I10 BENIGN ESSENTIAL HTN: ICD-10-CM

## 2024-08-23 DIAGNOSIS — Z87.891 FORMER SMOKER: ICD-10-CM

## 2024-08-23 DIAGNOSIS — Z00.00 MEDICARE ANNUAL WELLNESS VISIT, SUBSEQUENT: Primary | ICD-10-CM

## 2024-09-04 NOTE — PROVIDER CONTACT NOTE (OTHER) - RECOMMENDATIONS
one time order medication for anxiety and NP to assess lump at the bedside [Joint Pain] : joint pain [Negative] : Heme/Lymph

## 2024-10-10 NOTE — PROGRESS NOTES
Answers submitted by the patient for this visit:  Primary Reason for Visit (Submitted on 10/4/2024)  What is the primary reason for your visit?: Diabetes  Diabetes Questionnaire (Submitted on 10/4/2024)  Chief Complaint: Diabetes problem  Diabetes type: type 2  MedicAlert ID: No  Disease duration: 10 Years  Treatment compliance: most of the time  Symptom course: improving  Home blood tests: 1-2 x per day  High score: 180-200  Below 70: never  blurred vision: No  foot paresthesias: No  foot ulcerations: No  polydipsia: No  polyuria: No  weight loss: No  tremors: Yes  Current diet: generally healthy  Meal planning: none  Exercise: intermittently  Eye exam current: Yes  Sees podiatrist: No  Chief Complaint  Diabetes, Hypertension, and Hyperlipidemia    History of Present Illness  Orion Harvey presents today for follow up on diabetes, hypertension, and hyperlipidemia    Diabetes  Patient does check blood sugar at home 1 times daily.  Per patient fasting blood sugars range between 97 to 170.  Associated symptoms include Hunger  Per patient current diet is Well Balanced and Variety of foods.  Patient does avoid concentrated sweets.  Patient does not see podiatry.  Patient see's [facility] for diabetic eye exam and last eye exam was 05/2024.  Patient reports they are taking medications as prescribed and they are not having side effects.    Hypertension  Patient does check blood pressure at home.   Home readings range from  106/62 to 145/63 per patient/patient submitted blood pressure diary.  Patient denies  blurred vision, chest pain, dyspnea, headache, neck aches, orthopnea, palpitations, paroxysmal nocturnal dyspnea, peripheral edema, pulsating in the ears, and tiredness/fatigue   Patient reports they are taking medications as prescribed and they are not having side effects.    Hyperlipidemia  Patient is following a low cholesterol diet.   Currently is on statin therapy.  Patient reports is  "exercising.  Patient reports they are taking medications as prescribed and they are not having side effects.    Patient Care Team:  Nazanin Garcia MD as PCP - General (Family Medicine)  Rosmery Manuel as Technologist   Current Outpatient Medications on File Prior to Visit   Medication Sig    acetaminophen (TYLENOL) 650 MG 8 hr tablet Take 1 tablet by mouth Every 8 (Eight) Hours As Needed for Mild Pain.    ascorbic acid (VITAMIN C) 1000 MG tablet Take 1 tablet by mouth Daily. LAST DOSE 11/23    aspirin 325 MG tablet Take 1 tablet by mouth Daily.    diphenhydrAMINE (BENADRYL) 25 mg capsule Take 1 capsule by mouth Every 6 (Six) Hours As Needed for Itching.    fluticasone (FLONASE) 50 MCG/ACT nasal spray Administer 2 sprays into the nostril(s) as directed by provider Daily As Needed for Rhinitis.    glimepiride (AMARYL) 4 MG tablet TAKE 1 TABLET BY MOUTH TWICE DAILY    glucose blood (OneTouch Verio) test strip 1 each by Other route 2 (Two) Times a Day. Once daily  DX E11.65    lisinopril (PRINIVIL,ZESTRIL) 40 MG tablet TAKE 1 TABLET BY MOUTH DAILY    metFORMIN (GLUCOPHAGE) 1000 MG tablet Take 1 tablet by mouth 2 (Two) Times a Day With Meals.    Multiple Vitamins-Minerals (CENTRUM SILVER) tablet Take 1 tablet by mouth Every Evening. LAST DOSE 11/22    OneTouch Delica Lancets 33G misc 1 strip by Other route Every 12 (Twelve) Hours.    rosuvastatin (CRESTOR) 10 MG tablet TAKE 1 TABLET BY MOUTH DAILY    [DISCONTINUED] empagliflozin (Jardiance) 25 MG tablet tablet Take 1 tablet by mouth Daily.     No current facility-administered medications on file prior to visit.       Objective   Vital Signs:   /72 (BP Location: Right arm, Patient Position: Sitting, Cuff Size: Large Adult)   Pulse 68   Temp 96.9 °F (36.1 °C) (Temporal)   Resp 18   Ht 172.7 cm (68\")   Wt 117 kg (259 lb)   SpO2 95%   BMI 39.38 kg/m²    BP Readings from Last 3 Encounters:   10/11/24 112/72   08/23/24 112/62   07/09/24 132/60     Wt " Readings from Last 3 Encounters:   10/11/24 117 kg (259 lb)   08/23/24 117 kg (257 lb 12.8 oz)   07/09/24 116 kg (255 lb)         Physical Exam  Vitals and nursing note reviewed.   Constitutional:       General: He is not in acute distress.     Appearance: He is well-developed. He is obese. He is not ill-appearing.   HENT:      Head: Normocephalic and atraumatic.   Eyes:      Pupils: Pupils are equal, round, and reactive to light.   Cardiovascular:      Rate and Rhythm: Regular rhythm.      Heart sounds: No murmur heard.  Pulmonary:      Breath sounds: Normal breath sounds. No wheezing or rales.   Skin:     General: Skin is warm and dry.      Findings: No rash.   Neurological:      Mental Status: He is alert and oriented to person, place, and time.            No visits with results within 1 Day(s) from this visit.   Latest known visit with results is:   Results Encounter on 10/01/2024   Component Date Value Ref Range Status    Hemoglobin A1C 10/03/2024 7.5 (H)  4.8 - 5.6 % Final    Comment:          Prediabetes: 5.7 - 6.4           Diabetes: >6.4           Glycemic control for adults with diabetes: <7.0      WBC 10/03/2024 5.4  3.4 - 10.8 x10E3/uL Final    RBC 10/03/2024 4.72  4.14 - 5.80 x10E6/uL Final    Hemoglobin 10/03/2024 14.0  13.0 - 17.7 g/dL Final    Hematocrit 10/03/2024 43.0  37.5 - 51.0 % Final    MCV 10/03/2024 91  79 - 97 fL Final    MCH 10/03/2024 29.7  26.6 - 33.0 pg Final    MCHC 10/03/2024 32.6  31.5 - 35.7 g/dL Final    RDW 10/03/2024 13.9  11.6 - 15.4 % Final    Platelets 10/03/2024 174  150 - 450 x10E3/uL Final    Neutrophil Rel % 10/03/2024 60  Not Estab. % Final    Lymphocyte Rel % 10/03/2024 27  Not Estab. % Final    Monocyte Rel % 10/03/2024 9  Not Estab. % Final    Eosinophil Rel % 10/03/2024 3  Not Estab. % Final    Basophil Rel % 10/03/2024 1  Not Estab. % Final    Neutrophils Absolute 10/03/2024 3.2  1.4 - 7.0 x10E3/uL Final    Lymphocytes Absolute 10/03/2024 1.5  0.7 - 3.1 x10E3/uL  Final    Monocytes Absolute 10/03/2024 0.5  0.1 - 0.9 x10E3/uL Final    Eosinophils Absolute 10/03/2024 0.2  0.0 - 0.4 x10E3/uL Final    Basophils Absolute 10/03/2024 0.0  0.0 - 0.2 x10E3/uL Final    Immature Granulocyte Rel % 10/03/2024 0  Not Estab. % Final    Immature Grans Absolute 10/03/2024 0.0  0.0 - 0.1 x10E3/uL Final    Microalbumin, Urine 10/03/2024 <3.0  Not Estab. ug/mL Final     A1C Last 3 Results          11/21/2023    09:14 7/2/2024    08:33 10/3/2024    09:28   HGBA1C Last 3 Results   Hemoglobin A1C 8.0  7.6  7.5      Lab Results   Component Value Date    CHOL 115 10/30/2019    CHLPL 125 07/02/2024    TRIG 136 07/02/2024    HDL 46 07/02/2024    LDL 55 07/02/2024     Lab Results   Component Value Date    TSH 0.681 08/15/2023     Lab Results   Component Value Date    GLUCOSE 115 (H) 07/02/2024    BUN 20 07/02/2024    CREATININE 0.94 07/02/2024    EGFRIFNONA 100 12/01/2021    EGFRIFAFRI 100 11/05/2021    BCR 21 07/02/2024    K 5.2 07/02/2024    CO2 24 07/02/2024    CALCIUM 9.4 07/02/2024    PROTENTOTREF 6.2 07/02/2024    ALBUMIN 4.3 07/02/2024    LABIL2 2.4 (H) 08/15/2023    AST 23 07/02/2024    ALT 28 07/02/2024     Lab Results   Component Value Date    WBC 5.4 10/03/2024    HGB 14.0 10/03/2024    HCT 43.0 10/03/2024    MCV 91 10/03/2024     10/03/2024                      Assessment and Plan    Diagnoses and all orders for this visit:    1. Type 2 diabetes mellitus with hyperglycemia, without long-term current use of insulin (Primary)  -     empagliflozin (Jardiance) 25 MG tablet tablet; Take 1 tablet by mouth Daily.  Dispense: 90 tablet; Refill: 3  -     Hemoglobin A1c; Future  -     Comprehensive Metabolic Panel; Future    2. Benign essential HTN    3. Mixed hyperlipidemia    4. Body mass index (BMI) of 39.0 to 39.9 in adult    5. Former smoker    6. Need for COVID-19 vaccine  -     COVID-19 (Pfizer) 12yrs+ (COMIRNATY)    7. Need for influenza vaccination  -     Fluzone High-Dose 65+yrs    8.  Obesity, Class II, BMI 35-39.9  Assessment & Plan:  Patient's (Body mass index is 39.38 kg/m².) indicates that they are morbidly/severely obese (BMI > 40 or > 35 with obesity - related health condition) with health conditions that include diabetes, hypertension, hyperlipidemia, osteoarthritis, obstructive sleep apnea:. Weight is worsening. BMI  is above average; BMI management plan is completed. We discussed low calorie, low carb based diet program, portion control, and increasing exercise.         Diabetes, A1c is stable at 7.5, discussed medications, he is currently taking Jardiance 25 mg daily, metformin 1000 mg twice daily along with glimepiride 4 mg twice daily.  Discussed diet and recommendation to reduce sugary foods and calories in general    Hypertension at goal continue lisinopril 40 mg daily.    Influenza and COVID vaccinations today.  Reminded he is due for second Shingrix vaccination will get at pharmacy in the future.    Medications Discontinued During This Encounter   Medication Reason    empagliflozin (Jardiance) 25 MG tablet tablet Reorder         Follow Up     Return in about 3 months (around 1/11/2025) for Recheck, diabetes.    Patient was given instructions and counseling regarding his condition or for health maintenance advice. Please see specific information pulled into the AVS if appropriate.

## 2024-10-11 ENCOUNTER — OFFICE VISIT (OUTPATIENT)
Dept: FAMILY MEDICINE CLINIC | Facility: CLINIC | Age: 74
End: 2024-10-11
Payer: MEDICARE

## 2024-10-11 VITALS
DIASTOLIC BLOOD PRESSURE: 72 MMHG | HEIGHT: 68 IN | WEIGHT: 259 LBS | SYSTOLIC BLOOD PRESSURE: 112 MMHG | BODY MASS INDEX: 39.25 KG/M2 | OXYGEN SATURATION: 95 % | RESPIRATION RATE: 18 BRPM | TEMPERATURE: 96.9 F | HEART RATE: 68 BPM

## 2024-10-11 DIAGNOSIS — I10 BENIGN ESSENTIAL HTN: ICD-10-CM

## 2024-10-11 DIAGNOSIS — Z23 NEED FOR INFLUENZA VACCINATION: ICD-10-CM

## 2024-10-11 DIAGNOSIS — Z87.891 FORMER SMOKER: ICD-10-CM

## 2024-10-11 DIAGNOSIS — E11.65 TYPE 2 DIABETES MELLITUS WITH HYPERGLYCEMIA, WITHOUT LONG-TERM CURRENT USE OF INSULIN: Primary | ICD-10-CM

## 2024-10-11 DIAGNOSIS — E66.812 OBESITY, CLASS II, BMI 35-39.9: ICD-10-CM

## 2024-10-11 DIAGNOSIS — Z23 NEED FOR COVID-19 VACCINE: ICD-10-CM

## 2024-10-11 DIAGNOSIS — E78.2 MIXED HYPERLIPIDEMIA: ICD-10-CM

## 2024-10-11 PROBLEM — E66.01 CLASS 3 SEVERE OBESITY DUE TO EXCESS CALORIES WITH SERIOUS COMORBIDITY AND BODY MASS INDEX (BMI) OF 40.0 TO 44.9 IN ADULT: Status: RESOLVED | Noted: 2019-07-29 | Resolved: 2024-10-11

## 2024-10-11 PROBLEM — E66.813 CLASS 3 SEVERE OBESITY DUE TO EXCESS CALORIES WITH SERIOUS COMORBIDITY AND BODY MASS INDEX (BMI) OF 40.0 TO 44.9 IN ADULT: Status: RESOLVED | Noted: 2019-07-29 | Resolved: 2024-10-11

## 2024-10-11 PROCEDURE — 3051F HG A1C>EQUAL 7.0%<8.0%: CPT | Performed by: FAMILY MEDICINE

## 2024-10-11 PROCEDURE — 99214 OFFICE O/P EST MOD 30 MIN: CPT | Performed by: FAMILY MEDICINE

## 2024-10-11 PROCEDURE — 1126F AMNT PAIN NOTED NONE PRSNT: CPT | Performed by: FAMILY MEDICINE

## 2024-10-11 PROCEDURE — 90480 ADMN SARSCOV2 VAC 1/ONLY CMP: CPT | Performed by: FAMILY MEDICINE

## 2024-10-11 PROCEDURE — 3078F DIAST BP <80 MM HG: CPT | Performed by: FAMILY MEDICINE

## 2024-10-11 PROCEDURE — 3074F SYST BP LT 130 MM HG: CPT | Performed by: FAMILY MEDICINE

## 2024-10-11 PROCEDURE — G0008 ADMIN INFLUENZA VIRUS VAC: HCPCS | Performed by: FAMILY MEDICINE

## 2024-10-11 PROCEDURE — 90662 IIV NO PRSV INCREASED AG IM: CPT | Performed by: FAMILY MEDICINE

## 2024-10-11 PROCEDURE — 91320 SARSCV2 VAC 30MCG TRS-SUC IM: CPT | Performed by: FAMILY MEDICINE

## 2024-10-11 NOTE — ASSESSMENT & PLAN NOTE
Patient's (Body mass index is 39.38 kg/m².) indicates that they are morbidly/severely obese (BMI > 40 or > 35 with obesity - related health condition) with health conditions that include diabetes, hypertension, hyperlipidemia, osteoarthritis, obstructive sleep apnea:. Weight is worsening. BMI  is above average; BMI management plan is completed. We discussed low calorie, low carb based diet program, portion control, and increasing exercise.

## 2024-10-25 DIAGNOSIS — E11.9 TYPE 2 DIABETES MELLITUS WITHOUT COMPLICATION, WITHOUT LONG-TERM CURRENT USE OF INSULIN: ICD-10-CM

## 2024-10-25 DIAGNOSIS — E78.2 MIXED HYPERLIPIDEMIA: ICD-10-CM

## 2024-10-25 RX ORDER — ROSUVASTATIN CALCIUM 10 MG/1
TABLET, COATED ORAL
Qty: 90 TABLET | Refills: 3 | Status: SHIPPED | OUTPATIENT
Start: 2024-10-25

## 2024-11-06 DIAGNOSIS — Z96.652 HX OF TOTAL KNEE REPLACEMENT, LEFT: ICD-10-CM

## 2024-11-06 RX ORDER — AMOXICILLIN 500 MG/1
CAPSULE ORAL
Qty: 12 CAPSULE | Refills: 1 | Status: SHIPPED | OUTPATIENT
Start: 2024-11-06

## 2024-11-15 RX ORDER — GLIMEPIRIDE 4 MG/1
4 TABLET ORAL 2 TIMES DAILY
Qty: 180 TABLET | Refills: 3 | Status: SHIPPED | OUTPATIENT
Start: 2024-11-15

## 2024-12-21 DIAGNOSIS — E11.9 TYPE 2 DIABETES MELLITUS WITHOUT COMPLICATION, WITHOUT LONG-TERM CURRENT USE OF INSULIN: ICD-10-CM

## 2024-12-23 RX ORDER — BLOOD SUGAR DIAGNOSTIC
1 STRIP MISCELLANEOUS 2 TIMES DAILY
Qty: 200 EACH | Refills: 1 | Status: SHIPPED | OUTPATIENT
Start: 2024-12-23

## 2025-01-07 NOTE — ED ADULT NURSE NOTE - NEURO WDL
Alert and oriented to person, place and time, memory intact, behavior appropriate to situation, PERRL.
.
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
## 2025-01-15 ENCOUNTER — OFFICE VISIT (OUTPATIENT)
Dept: FAMILY MEDICINE CLINIC | Facility: CLINIC | Age: 75
End: 2025-01-15
Payer: MEDICARE

## 2025-01-15 VITALS
SYSTOLIC BLOOD PRESSURE: 124 MMHG | OXYGEN SATURATION: 94 % | HEIGHT: 68 IN | BODY MASS INDEX: 38.83 KG/M2 | RESPIRATION RATE: 18 BRPM | TEMPERATURE: 96.6 F | WEIGHT: 256.2 LBS | HEART RATE: 56 BPM | DIASTOLIC BLOOD PRESSURE: 82 MMHG

## 2025-01-15 DIAGNOSIS — R09.81 NASAL SINUS CONGESTION: ICD-10-CM

## 2025-01-15 DIAGNOSIS — E66.812 OBESITY, CLASS II, BMI 35-39.9: Primary | ICD-10-CM

## 2025-01-15 DIAGNOSIS — E11.9 TYPE 2 DIABETES MELLITUS WITHOUT COMPLICATION, WITHOUT LONG-TERM CURRENT USE OF INSULIN: ICD-10-CM

## 2025-01-15 DIAGNOSIS — J31.0 CHRONIC RHINITIS: ICD-10-CM

## 2025-01-15 DIAGNOSIS — J30.1 NON-SEASONAL ALLERGIC RHINITIS DUE TO POLLEN: ICD-10-CM

## 2025-01-15 DIAGNOSIS — E78.2 MIXED HYPERLIPIDEMIA: ICD-10-CM

## 2025-01-15 PROCEDURE — 3051F HG A1C>EQUAL 7.0%<8.0%: CPT | Performed by: FAMILY MEDICINE

## 2025-01-15 PROCEDURE — 1160F RVW MEDS BY RX/DR IN RCRD: CPT | Performed by: FAMILY MEDICINE

## 2025-01-15 PROCEDURE — 3079F DIAST BP 80-89 MM HG: CPT | Performed by: FAMILY MEDICINE

## 2025-01-15 PROCEDURE — G2211 COMPLEX E/M VISIT ADD ON: HCPCS | Performed by: FAMILY MEDICINE

## 2025-01-15 PROCEDURE — 99214 OFFICE O/P EST MOD 30 MIN: CPT | Performed by: FAMILY MEDICINE

## 2025-01-15 PROCEDURE — 1126F AMNT PAIN NOTED NONE PRSNT: CPT | Performed by: FAMILY MEDICINE

## 2025-01-15 PROCEDURE — 3074F SYST BP LT 130 MM HG: CPT | Performed by: FAMILY MEDICINE

## 2025-01-15 PROCEDURE — 1159F MED LIST DOCD IN RCRD: CPT | Performed by: FAMILY MEDICINE

## 2025-01-15 RX ORDER — BLOOD SUGAR DIAGNOSTIC
1 STRIP MISCELLANEOUS 2 TIMES DAILY
Qty: 200 EACH | Refills: 1 | Status: SHIPPED | OUTPATIENT
Start: 2025-01-15

## 2025-01-15 RX ORDER — AZELASTINE 1 MG/ML
2 SPRAY, METERED NASAL 2 TIMES DAILY
Qty: 30 ML | Refills: 12 | Status: SHIPPED | OUTPATIENT
Start: 2025-01-15

## 2025-02-24 DIAGNOSIS — Z96.652 HX OF TOTAL KNEE REPLACEMENT, LEFT: ICD-10-CM

## 2025-02-24 RX ORDER — AMOXICILLIN 500 MG/1
CAPSULE ORAL
Qty: 12 CAPSULE | Refills: 1 | Status: SHIPPED | OUTPATIENT
Start: 2025-02-24

## 2025-02-24 RX ORDER — GLIMEPIRIDE 4 MG/1
4 TABLET ORAL 2 TIMES DAILY
Qty: 180 TABLET | Refills: 3 | Status: SHIPPED | OUTPATIENT
Start: 2025-02-24

## 2025-02-24 NOTE — TELEPHONE ENCOUNTER
Patient stopped by and said he needs his Glimepiride 4mg tablets and Amoxicillin 500mg capsules sent to the [facility] here in [place]. He has refills left at [facility] on both of them but they won't transfer the scripts. So he is needing new scripts for Glimepiride and Amoxicillin sent to [facility].

## 2025-02-24 NOTE — PHYSICAL THERAPY INITIAL EVALUATION ADULT - DISCHARGE DISPOSITION, PT EVAL
Post-Discharge Transitional Care  Follow Up      Frederick Hough   YOB: 1970    Date of Office Visit:  2/24/2025  Date of Hospital Admission: 2/13/25  Date of Hospital Discharge: 2/18/25  Risk of hospital readmission (high >=14%. Medium >=10%) :Readmission Risk Score: 13.8      Care management risk score Rising risk (score 2-5) and Complex Care (Scores >=6): No Risk Score On File     Non face to face  following discharge, date last encounter closed (first attempt may have been earlier): 02/17/2025    Call initiated 2 business days of discharge: Yes    ASSESSMENT/PLAN:   Pneumonia of both lower lobes due to infectious organism  -     XR CHEST STANDARD (2 VW); Future  Hospital discharge follow-up  -     [place] DISCHARGE MEDS RECONCILED W/ CURRENT OUTPATIENT MED LIST  Primary hypertension  -     lisinopril (PRINIVIL;ZESTRIL) 20 MG tablet; Take 1 tablet by mouth daily, Disp-30 tablet, R-2Normal  -     Hepatic Function Panel; Future  Dizziness  -     EKG 12 Lead - Clinic Performed; Future    After further evaluation pt was standing and had an episode where he looked like he was going to pass out and his head started nodding back and forth. He was sat down and EKG and BP were performed. BP was low and EKG shows AF with uncontrolled rate. Pt on cardizem and xarelto which he had been taken but given symptoms of dizziness/orthostatic hypotension EMT was called and pt was taken to ER.  Paroxysmal atrial fibrillation (HCC)  Hypotension, unspecified hypotension type      Medical Decision Making: High Complexity  Return for as scheduled.           Subjective:   HPI:  Follow up of Hospital problems/diagnosis(es):   History of Present Illness  The patient presents for evaluation of lightheadedness, sinus pressure, and abdominal pain.    He was recently hospitalized due to a respiratory infection, which has since resolved. However, he has been experiencing episodes of lightheadedness, particularly when ambulating. These 
home w/ home PT
home w/ outpatient services/Home with out patient PT services for pain control, improve strength and optimize functional mobility .

## 2025-04-04 NOTE — PATIENT PROFILE ADULT - NSPROMEDSBROUGHTTOHOSP_GEN_A_NUR
[facility] EMERGENCY DEPT  EMERGENCY DEPARTMENT HISTORY AND PHYSICAL EXAM      Date of evaluation: 4/4/2025  Patient Name: Shaheed Hari  Birthdate 1953  MRN: [id number]  ED Provider: CHANDANA Aguilera NP   Note Started: 2:14 PM EDT 4/4/25    HISTORY OF PRESENT ILLNESS     Chief Complaint   Patient presents with    Leg Pain       History Provided By: Patient, {Historian:[id number]}     HPI: Shaheed Hair is a 71 y.o. male ***    PAST MEDICAL HISTORY   Past Medical History:  Past Medical History:   Diagnosis Date    Arthritis     Hyperlipidemia     Hypertension        Past Surgical History:  Past Surgical History:   Procedure Laterality Date    COLONOSCOPY N/A 8/18/2023    COLONOSCOPY performed by Carole Sherwood MD at [facility] ENDOSCOPY    COLONOSCOPY N/A 8/18/2023    COLONOSCOPY WITH BIOPSY performed by Carole Sherwood MD at [facility] ENDOSCOPY       Family History:  No family history on file.    Social History:  Social History     Tobacco Use    Smoking status: Never    Smokeless tobacco: Never   Vaping Use    Vaping status: Never Used   Substance Use Topics    Alcohol use: Not Currently    Drug use: Yes     Types: Marijuana (Weed)     Comment: to sleep at night       Allergies:  No Known Allergies    PCP: Eloisa Feldman MD    Current Meds:   No current facility-administered medications for this encounter.     Current Outpatient Medications   Medication Sig Dispense Refill    apixaban (ELIQUIS) 5 MG TABS tablet Take 1 tablet by mouth 2 times daily 60 tablet 3    acetaminophen (TYLENOL 8 HOUR) 650 MG extended release tablet Take 1 tablet by mouth every 8 hours as needed for Pain 12 tablet 0    Multiple Vitamins-Minerals (MENS 50+ MULTI VITAMIN/MIN) TABS Take 1 tablet by mouth Daily      lisinopril (PRINIVIL;ZESTRIL) 5 MG tablet Take 1 tablet by mouth daily      rosuvastatin (CRESTOR) 20 MG tablet Take 1 tablet by mouth Daily (Patient not taking: Reported on 1/23/2025)         Social Determinants of Health:   Social Drivers of Health 
no

## 2025-04-15 NOTE — PROGRESS NOTES
Answers submitted by the patient for this visit:  Diabetes Questionnaire (Submitted on 4/9/2025)  Chief Complaint: Diabetes problem  Diabetes type: type 2  MedicAlert ID: No  Disease duration: 10 Years  Treatment compliance: most of the time  Symptom course: stable  Home blood tests: 1-2 x per day  High score: 130-140  Below 70: never  blurred vision: No  foot paresthesias: No  foot ulcerations: No  polydipsia: No  polyuria: No  weight loss: No  tremors: Yes  Current diet: generally healthy  Meal planning: none  Exercise: weekly  Eye exam current: Yes  Sees podiatrist: No  Chief Complaint  Diabetes, Hyperlipidemia, and Allergic Rhinitis    History of Present Illness  Orion Harvey presents today for follow up on diabetes, hyperlipidemia, hypertension, and allergies    Diabetes  Patient does check blood sugar at home 1 times daily.  Per patient fasting blood sugars range between 117 to 183.  Associated symptoms include Hunger.  Per patient current diet is Well Balanced.  Patient does not avoid concentrated sweets.  Patient does not see podiatry.  Patient see's [facility] for diabetic eye exam and last eye exam was 06/2024.  Patient reports they are taking medications as prescribed and they are not having side effects.  Last A1c was 7.8 on 4/10/25.     Hyperlipidemia  Patient is following a low cholesterol diet.   Currently is on statin therapy.  Patient reports is exercising.  Patient reports they are taking medications as prescribed and they are not having side effects.     Hypertension  Patient does check blood pressure at home.   Home readings range from 116/64 to 132/71 per patient/patient submitted blood pressure diary.  Patient denies  blurred vision, chest pain, dyspnea, headache, neck aches, orthopnea, palpitations, paroxysmal nocturnal dyspnea, peripheral edema, pulsating in the ears, and tiredness/fatigue   Patient reports they are taking medications as prescribed and they are not having  side effects.    Allergic Rhinitis: Orion Harvey is here for evaluation of possible allergic rhinitis. Patient's symptoms include cough, itchy eyes, and postnasal drip. These symptoms are seasonal. Current triggers include exposure to weather changes. The patient has tried over the counter medications and prescription nasal sprays with good relief of symptoms. Immunotherapy has never been tried. The patient has never had nasal polyps. The patient has no history of asthma. The patient does not suffer from frequent sinopulmonary infections. The patient has not had sinus surgery in the past. The patient has no history of eczema.    History of Present Illness  The patient is a 74-year-old male here to follow up on chronic medical conditions including diabetes, hyperlipidemia, hypertension, allergies, and obesity. He is accompanied by his wife. Labs conducted last week were reviewed today, showing a weight increase from 256 to 261 pounds over the past 3 months.    Recent activities include joining the [facility] with the goal of reducing A1c levels and losing weight. The exercise regimen consists of using an elliptical machine for 15 minutes, followed by leg exercises, and walking approximately a mile. Significant improvement in knee function has been observed since starting this routine. Weight training, specifically targeting the legs, has also been incorporated, resulting in increased knee flexibility. Physical therapy sessions have concluded, and continuation of exercises independently has been advised. Dietary modifications include reducing sweets and incorporating more vegetables and whole grains into meals. Current medications for diabetes management include Jardiance, metformin, and glimepiride.    Blood pressure is reported to be stable, with today's reading slightly lower than usual at 110/52. No refills are needed for antihypertensive medications, and lisinopril is currently being taken for blood pressure  management.    A consultation with a cardiologist in 06/2024 due to chest pain attributed to anxiety revealed no significant concerns. No anxiety attacks have been experienced since then.    Persistent swelling in one leg is reported, attributed to previous knee damage and lymph node removal.    Cholesterol management includes Crestor 10 mg daily.    PAST SURGICAL HISTORY:  Colonoscopy on 02/03/2021 revealed a 3 mm polyp in the descending colon. Further follow-up is required to determine the pathology report and appropriate timeline for repeat colonoscopy.      Patient Care Team:  Nazanin Garcia MD as PCP - General (Family Medicine)  Rosmery Manuel as Technologist   Current Outpatient Medications on File Prior to Visit   Medication Sig    acetaminophen (TYLENOL) 650 MG 8 hr tablet Take 1 tablet by mouth Every 8 (Eight) Hours As Needed for Mild Pain.    amoxicillin (AMOXIL) 500 MG capsule TAKE 4 CAPSULES BY MOUTH 1 HOUR PRIOR AND 2 CAPSULES 6 HOURS AFTER DENTAL CLEANING OR PROCEDURE    ascorbic acid (VITAMIN C) 1000 MG tablet Take 1 tablet by mouth Daily. LAST DOSE 11/23    aspirin 325 MG tablet Take 1 tablet by mouth Daily.    azelastine (ASTELIN) 0.1 % nasal spray Administer 2 sprays into the nostril(s) as directed by provider 2 (Two) Times a Day. Use in each nostril as directed    diphenhydrAMINE (BENADRYL) 25 mg capsule Take 1 capsule by mouth Every 6 (Six) Hours As Needed for Itching.    empagliflozin (Jardiance) 25 MG tablet tablet Take 1 tablet by mouth Daily.    fluticasone (FLONASE) 50 MCG/ACT nasal spray Administer 2 sprays into the nostril(s) as directed by provider Daily As Needed for Rhinitis.    glimepiride (AMARYL) 4 MG tablet Take 1 tablet by mouth 2 (Two) Times a Day.    glucose blood (OneTouch Verio) test strip 1 each by Other route 2 (Two) Times a Day. Once daily  DX E11.65    lisinopril (PRINIVIL,ZESTRIL) 40 MG tablet TAKE 1 TABLET BY MOUTH DAILY    metFORMIN (GLUCOPHAGE) 1000 MG  "tablet TAKE 1 TABLET BY MOUTH TWICE DAILY WITH MEALS    Multiple Vitamins-Minerals (CENTRUM SILVER) tablet Take 1 tablet by mouth Every Evening. LAST DOSE 11/22    OneTouch Delica Lancets 33G misc 1 strip by Other route Every 12 (Twelve) Hours.    rosuvastatin (CRESTOR) 10 MG tablet TAKE 1 TABLET BY MOUTH DAILY     No current facility-administered medications on file prior to visit.       Objective   Vital Signs:   /52 (BP Location: Right arm, Patient Position: Sitting, Cuff Size: Large Adult)   Pulse 68   Temp 96.8 °F (36 °C) (Temporal)   Resp 18   Ht 172.7 cm (68\")   Wt 119 kg (261 lb 9.6 oz)   SpO2 97%   BMI 39.78 kg/m²    BP Readings from Last 3 Encounters:   04/16/25 110/52   01/15/25 124/82   10/11/24 112/72     Wt Readings from Last 3 Encounters:   04/16/25 119 kg (261 lb 9.6 oz)   01/15/25 116 kg (256 lb 3.2 oz)   10/11/24 117 kg (259 lb)         Physical Exam  Vitals and nursing note reviewed.   Constitutional:       General: He is not in acute distress.     Appearance: He is well-developed. He is obese. He is not ill-appearing.   HENT:      Head: Normocephalic and atraumatic.   Eyes:      Pupils: Pupils are equal, round, and reactive to light.   Cardiovascular:      Rate and Rhythm: Regular rhythm.      Heart sounds: No murmur heard.  Pulmonary:      Breath sounds: Normal breath sounds. No wheezing or rales.   Musculoskeletal:      Right lower leg: Edema present.      Left lower leg: Edema present.      Comments: Trace to 1+ pitting edema lower extremities bilaterally  Patient does have improved flexion of right knee minimum 100 degrees   Skin:     General: Skin is warm and dry.      Findings: No rash.   Neurological:      Mental Status: He is alert and oriented to person, place, and time.          Physical Exam        No visits with results within 1 Day(s) from this visit.   Latest known visit with results is:   Results Encounter on 04/07/2025   Component Date Value Ref Range Status    " Hemoglobin A1C 04/10/2025 7.8 (H)  4.8 - 5.6 % Final    Comment:          Prediabetes: 5.7 - 6.4           Diabetes: >6.4           Glycemic control for adults with diabetes: <7.0      Glucose 04/10/2025 144 (H)  70 - 99 mg/dL Final    BUN 04/10/2025 17  8 - 27 mg/dL Final    Creatinine 04/10/2025 0.86  0.76 - 1.27 mg/dL Final    EGFR Result 04/10/2025 91  >59 mL/min/1.73 Final    BUN/Creatinine Ratio 04/10/2025 20  10 - 24 Final    Sodium 04/10/2025 140  134 - 144 mmol/L Final    Potassium 04/10/2025 4.8  3.5 - 5.2 mmol/L Final    Chloride 04/10/2025 103  96 - 106 mmol/L Final    Total CO2 04/10/2025 23  20 - 29 mmol/L Final    Calcium 04/10/2025 9.4  8.6 - 10.2 mg/dL Final    Total Protein 04/10/2025 6.2  6.0 - 8.5 g/dL Final    Albumin 04/10/2025 4.3  3.8 - 4.8 g/dL Final    Globulin 04/10/2025 1.9  1.5 - 4.5 g/dL Final    Total Bilirubin 04/10/2025 0.5  0.0 - 1.2 mg/dL Final    Alkaline Phosphatase 04/10/2025 59  44 - 121 IU/L Final    AST (SGOT) 04/10/2025 31  0 - 40 IU/L Final    ALT (SGPT) 04/10/2025 38  0 - 44 IU/L Final    Total Cholesterol 04/10/2025 132  100 - 199 mg/dL Final    Triglycerides 04/10/2025 270 (H)  0 - 149 mg/dL Final    HDL Cholesterol 04/10/2025 37 (L)  >39 mg/dL Final    VLDL Cholesterol Brad 04/10/2025 42 (H)  5 - 40 mg/dL Final    LDL Chol Calc (NIH) 04/10/2025 53  0 - 99 mg/dL Final     A1C Last 3 Results          10/3/2024    09:28 1/9/2025    09:32 4/10/2025    10:15   HGBA1C Last 3 Results   Hemoglobin A1C 7.5  7.8  7.8      Lab Results   Component Value Date    CHOL 115 10/30/2019    CHLPL 132 04/10/2025    TRIG 270 (H) 04/10/2025    HDL 37 (L) 04/10/2025    LDL 53 04/10/2025     Lab Results   Component Value Date    TSH 0.681 08/15/2023     Lab Results   Component Value Date    GLUCOSE 144 (H) 04/10/2025    BUN 17 04/10/2025    CREATININE 0.86 04/10/2025    EGFRIFNONA 100 12/01/2021    EGFRIFAFRI 100 11/05/2021    BCR 20 04/10/2025    K 4.8 04/10/2025    CO2 23 04/10/2025     CALCIUM 9.4 04/10/2025    ALBUMIN 4.3 04/10/2025    AST 31 04/10/2025    ALT 38 04/10/2025     Lab Results   Component Value Date    WBC 5.4 10/03/2024    HGB 14.0 10/03/2024    HCT 43.0 10/03/2024    MCV 91 10/03/2024     10/03/2024             Results  Labs   - A1c: 7.8   - Total cholesterol: 132   - Triglycerides: 270   - HDL: 37   - LDL: 53             Assessment and Plan    Diagnoses and all orders for this visit:    1. Type 2 diabetes mellitus without complication, without long-term current use of insulin (Primary)  -     Microalbumin / Creatinine Urine Ratio - Urine, Random Void  -     Hemoglobin A1c; Future  -     Comprehensive Metabolic Panel; Future    2. Mixed hyperlipidemia  -     Comprehensive Metabolic Panel; Future  -     Lipid Panel; Future    3. Benign essential HTN    4. Non-seasonal allergic rhinitis due to pollen    5. Obesity, Class II, BMI 35-39.9    6. Former smoker    7. Bilateral leg edema        Assessment & Plan  1. Diabetes Mellitus.  - A1c remains stable at 7.8.  - Current medication regimen includes Jardiance, metformin, and glimepiride.  - Encouraged to maintain exercise routine and dietary modifications to help lower A1c levels.  - A1c test and comprehensive metabolic panel (CMP) will be conducted in 3 months.    2. Hyperlipidemia.  - Total cholesterol is 132, triglycerides are elevated at 270, HDL is low at 37, and LDL is well-controlled at 53.  - Continue taking rosuvastatin 10 mg daily.  - Increasing physical activity and maintaining a healthy diet are recommended to improve lipid profile.  - Fasting lipid panel will be conducted in 3 months.    3. Hypertension.  - Blood pressure readings are within the normal range today at 110/52, with occasional home readings reaching the 140s.  - Continue taking lisinopril as prescribed.    4. Pitting Edema.  - Mild pitting edema noted on both sides.  - Advised to elevate legs for 10 to 15 minutes, 3 to 4 times daily, preferably at heart  level.  - Using a pillow under legs while in a recliner can help achieve necessary elevation.    5. Health Maintenance.  - Received both shingles shots in October 2024 and December 2024.  - Last colonoscopy on 02/03/2021 revealed a small 3 mm polyp in the descending colon.  - Pathology report will be obtained to determine appropriate interval for next colonoscopy, with a minimum of 7 years being noted if the polyp was adenomatous but 10 years if it was hyperplastic.    Follow-up  - Follow up in 3 months.      There are no discontinued medications.      Follow Up     Return in about 3 months (around 7/16/2025) for diabetes, htn, with Labs.    Patient was given instructions and counseling regarding his condition or for health maintenance advice. Please see specific information pulled into the AVS if appropriate.     Patient or patient representative verbalized consent for the use of Ambient Listening during the visit with  Nazanin Garcia MD for chart documentation. 4/16/2025  09:16 EDT

## 2025-04-16 ENCOUNTER — OFFICE VISIT (OUTPATIENT)
Dept: FAMILY MEDICINE CLINIC | Facility: CLINIC | Age: 75
End: 2025-04-16
Payer: MEDICARE

## 2025-04-16 ENCOUNTER — TELEPHONE (OUTPATIENT)
Dept: FAMILY MEDICINE CLINIC | Facility: CLINIC | Age: 75
End: 2025-04-16

## 2025-04-16 VITALS
TEMPERATURE: 96.8 F | RESPIRATION RATE: 18 BRPM | SYSTOLIC BLOOD PRESSURE: 110 MMHG | OXYGEN SATURATION: 97 % | BODY MASS INDEX: 39.65 KG/M2 | WEIGHT: 261.6 LBS | HEIGHT: 68 IN | HEART RATE: 68 BPM | DIASTOLIC BLOOD PRESSURE: 52 MMHG

## 2025-04-16 DIAGNOSIS — R60.0 BILATERAL LEG EDEMA: ICD-10-CM

## 2025-04-16 DIAGNOSIS — Z87.891 FORMER SMOKER: ICD-10-CM

## 2025-04-16 DIAGNOSIS — E78.2 MIXED HYPERLIPIDEMIA: ICD-10-CM

## 2025-04-16 DIAGNOSIS — J30.1 NON-SEASONAL ALLERGIC RHINITIS DUE TO POLLEN: ICD-10-CM

## 2025-04-16 DIAGNOSIS — E66.812 OBESITY, CLASS II, BMI 35-39.9: ICD-10-CM

## 2025-04-16 DIAGNOSIS — E11.9 TYPE 2 DIABETES MELLITUS WITHOUT COMPLICATION, WITHOUT LONG-TERM CURRENT USE OF INSULIN: Primary | ICD-10-CM

## 2025-04-16 DIAGNOSIS — I10 BENIGN ESSENTIAL HTN: ICD-10-CM

## 2025-04-16 NOTE — TELEPHONE ENCOUNTER
Spoke with Vangie at [facility] to obtain pathology report from colonoscopy on 02/03/2021, she is faxing over now.

## 2025-04-16 NOTE — ED ADULT NURSE NOTE - NSFALLRSKINDICATORS_ED_ALL_ED
Endocrine Follow up     PCP: Shelly Mckeon MD    Chief Complaint   Patient presents with    Thyroid Problem     HPI:  Katie Mederos is a 43 y.o. female with  has a past medical history of Hyperthyroidism and Hypothyroidism. Here for follow up of thyroid disease.     Dx in  w/hyperthyroidism  Underwent RAIRx  in [facility]   Since then on thyroid replacement medication  No hx of thyroid surgery     Labs 2024   Tsh 33.8  Ft4 0.76 - L  FT3  1.8 - L    24   TSH 23.50, FT4 0.9     CTA chest 24: THYROID: No nodule.     Feeling well today     BRIEF ROS   Gen: no fevers/chills  Resp: no shortness of breath, cough   GI: no nausea, emesis, abdominal pain  Neuro: no confusion     LABS/STUDIES:   No results found for: \"HJD6SNSB\"  Lab Results   Component Value Date/Time    CREATININE 0.68 2024 10:19 AM    LABGLOM >90 2024 10:19 AM     No results found for: \"CHOL\", \"TRIG\", \"HDL\"  Lab Results   Component Value Date/Time    TSH 0.72 2025 08:56 AM     No results found for: \"CPEPLT\", \"CPEPTIDE\"  Current Outpatient Medications   Medication Sig Dispense Refill    levothyroxine (SYNTHROID) 175 MCG tablet Take 1 tablet by mouth daily 30 tablet 5     No current facility-administered medications for this visit.      Past Medical History:   Diagnosis Date    Hyperthyroidism     Hypothyroidism       Past Surgical History:   Procedure Laterality Date     SECTION      OTHER SURGICAL HISTORY      ENGLISH therapy - thyroid        Social History     Tobacco Use    Smoking status: Never    Smokeless tobacco: Never   Substance Use Topics    Alcohol use: Not Currently     Comment: social      Employer:  [employer]  1     Family History   Problem Relation Age of Onset    Hypertension Mother     Elevated Lipids Mother     Heart Disease Mother     High Blood Pressure Mother     Obesity Mother     No Known Problems Father     Diabetes Maternal Grandmother     Diabetes Maternal Grandfather     Hypothyroidism Maternal 
Katie Mederos is a 43 y.o. female here for   Chief Complaint   Patient presents with    Thyroid Problem       1. Have you been to the ER, urgent care clinic since your last visit?  Hospitalized since your last visit? -No    2. Have you seen or consulted any other health care providers outside of the [facility] since your last visit?  Include any pap smears or colon screening.-No      
no

## 2025-04-17 LAB
ALBUMIN/CREAT UR: <9 MG/G CREAT (ref 0–29)
CREAT UR-MCNC: 31.9 MG/DL
MICROALBUMIN UR-MCNC: <3 UG/ML

## 2025-04-23 NOTE — ED ADULT TRIAGE NOTE - PRO INTERPRETER NEED 2
Under care of endocrinology  Reviewed medications and adjustments noted  Discussed low-fat diabetic diet  Follow-up with labs in 3 months  Gabapentin for neuropathy secondary to diabetes and recent history of stroke  Lab Results   Component Value Date    HGBA1C 7.8 (A) 01/21/2025       Orders:    gabapentin (NEURONTIN) 100 mg capsule; Take 1 capsule (100 mg total) by mouth daily with breakfast    Comprehensive metabolic panel    CBC and differential    Lipid panel    Vitamin D 25 hydroxy    Hemoglobin A1C    UA (URINE) with reflex to Scope    Cytology, urine    Empagliflozin-metFORMIN HCl (Synjardy) 12.5-1000 MG TABS; Take 1 tablet by mouth 2 (two) times a day     English

## 2025-07-04 DIAGNOSIS — I10 ESSENTIAL HYPERTENSION: ICD-10-CM

## 2025-07-07 RX ORDER — LISINOPRIL 40 MG/1
40 TABLET ORAL DAILY
Qty: 90 TABLET | Refills: 0 | Status: SHIPPED | OUTPATIENT
Start: 2025-07-07

## 2025-07-15 NOTE — PROGRESS NOTES
Answers submitted by the patient for this visit:  Diabetes Questionnaire (Submitted on 7/9/2025)  Chief Complaint: Diabetes problem  Diabetes type: type 2  MedicAlert ID: No  Disease duration: 15 Years  Treatment compliance: most of the time  Symptom course: stable  Home blood tests: 1-2 x per day  High score: 140-180  Below 70: never  blurred vision: No  foot paresthesias: No  foot ulcerations: No  polydipsia: No  polyuria: No  weight loss: No  tremors: Yes  Current diet: diabetic, generally healthy  Meal planning: avoidance of concentrated sweets  Exercise: some days  Eye exam current: Yes  Sees podiatrist: No  Chief Complaint  Diabetes, Hypertension, and Hyperlipidemia    History of Present Illness  Orion Harvey presents today for follow up on diabetes, hypertension, and hyperlipidemia    Diabetes  Patient does not check blood sugar at home.  Associated symptoms include None.  Per patient current diet is Variety of foods.  Patient does not avoid concentrated sweets.  Patient does not see podiatry.  Patient see's [facility] for diabetic eye exam and last eye exam was 06/2025.  Patient reports they are taking medications as prescribed and they are not having side effects.  Last A1c was 8.0 on 7/10/25.     Hypertension  Patient does check blood pressure at home.   Home readings range from 120's/70-80's per patient.  Patient denies  blurred vision, chest pain, dyspnea, headache, neck aches, orthopnea, palpitations, paroxysmal nocturnal dyspnea, peripheral edema, pulsating in the ears, and tiredness/fatigue   Patient reports they are taking medications as prescribed and they are not having side effects.    Hyperlipidemia  Patient is not following a low cholesterol diet.   Currently is on statin therapy.  Patient reports is exercising.  Patient reports they are taking medications as prescribed and they are not having side effects.     History of Present Illness  The patient is a 74-year-old male  here for a follow-up on diabetes, hypertension, and recent lab work. He is accompanied by his wife.    He has been maintaining a regular exercise routine but has not observed any significant weight loss. His diet includes a variety of foods, with a conscious effort to limit his intake of sweets. Exercise increases his appetite, which may contribute to his weight maintenance. He does not experience frequent hypoglycemic episodes and rarely feels the need to eat due to low blood sugar levels.     His current medication regimen includes glimepiride twice daily, metformin 1000 mg twice daily, and Jardiance 25 mg. He is not currently on insulin therapy. He has not previously discussed the use of GLP-1 drugs with his healthcare provider but is considering it for potential weight loss and diabetes management.    He is currently using a CPAP machine for sleep apnea management. He reports no new cardiac or pulmonary symptoms or concerns. He is also taking rosuvastatin 10 mg for cholesterol management and lisinopril for blood pressure management.    Marital Status:   Diet: Includes a variety of foods, limits intake of sweets  Sleep: Uses CPAP machine for sleep apnea management    PAST SURGICAL HISTORY:  - Knee surgery  - Shoulder surgery  - Wrist surgery    FAMILY HISTORY  He does not have a family history of multiple endocrine neoplasia.      Patient Care Team:  Nazanin Garcia MD as PCP - General (Family Medicine)  Rosmery Manuel as Technologist   Current Outpatient Medications on File Prior to Visit   Medication Sig    acetaminophen (TYLENOL) 650 MG 8 hr tablet Take 1 tablet by mouth Every 8 (Eight) Hours As Needed for Mild Pain.    amoxicillin (AMOXIL) 500 MG capsule TAKE 4 CAPSULES BY MOUTH 1 HOUR PRIOR AND 2 CAPSULES 6 HOURS AFTER DENTAL CLEANING OR PROCEDURE    ascorbic acid (VITAMIN C) 1000 MG tablet Take 1 tablet by mouth Daily. LAST DOSE 11/23    aspirin 325 MG tablet Take 1 tablet by mouth  "Daily.    azelastine (ASTELIN) 0.1 % nasal spray Administer 2 sprays into the nostril(s) as directed by provider 2 (Two) Times a Day. Use in each nostril as directed    diphenhydrAMINE (BENADRYL) 25 mg capsule Take 1 capsule by mouth Every 6 (Six) Hours As Needed for Itching.    empagliflozin (Jardiance) 25 MG tablet tablet Take 1 tablet by mouth Daily.    glimepiride (AMARYL) 4 MG tablet Take 1 tablet by mouth 2 (Two) Times a Day.    lisinopril (PRINIVIL,ZESTRIL) 40 MG tablet Take 1 tablet by mouth once daily    metFORMIN (GLUCOPHAGE) 1000 MG tablet TAKE 1 TABLET BY MOUTH TWICE DAILY WITH MEALS    Multiple Vitamins-Minerals (CENTRUM SILVER) tablet Take 1 tablet by mouth Every Evening. LAST DOSE 11/22    OneTouch Delica Lancets 33G misc 1 strip by Other route Every 12 (Twelve) Hours.    rosuvastatin (CRESTOR) 10 MG tablet TAKE 1 TABLET BY MOUTH DAILY    fluticasone (FLONASE) 50 MCG/ACT nasal spray Administer 2 sprays into the nostril(s) as directed by provider Daily As Needed for Rhinitis.     No current facility-administered medications on file prior to visit.       Objective   Vital Signs:   /72 (BP Location: Right arm, Patient Position: Sitting, Cuff Size: Large Adult)   Pulse 59   Temp 97.8 °F (36.6 °C) (Temporal)   Resp 18   Ht 175.3 cm (69\")   Wt 119 kg (261 lb 9.6 oz)   SpO2 96%   BMI 38.63 kg/m²    BP Readings from Last 3 Encounters:   07/16/25 124/72   04/16/25 110/52   01/15/25 124/82     Wt Readings from Last 3 Encounters:   07/16/25 119 kg (261 lb 9.6 oz)   04/16/25 119 kg (261 lb 9.6 oz)   01/15/25 116 kg (256 lb 3.2 oz)         Physical Exam  Vitals and nursing note reviewed.   Constitutional:       General: He is not in acute distress.     Appearance: He is well-developed. He is obese. He is not ill-appearing.   HENT:      Head: Normocephalic and atraumatic.   Eyes:      Pupils: Pupils are equal, round, and reactive to light.   Cardiovascular:      Rate and Rhythm: Regular rhythm.      " Heart sounds: No murmur heard.  Pulmonary:      Breath sounds: Normal breath sounds. No wheezing or rales.   Musculoskeletal:      Right lower leg: Edema present.      Left lower leg: Edema present.      Comments: Trace to 1+ pitting edema lower extremities bilaterally  Extensive postoperative changes of right knee with range of motion   Skin:     General: Skin is warm and dry.      Findings: No rash.   Neurological:      Mental Status: He is alert and oriented to person, place, and time.   Psychiatric:         Mood and Affect: Mood normal.         Behavior: Behavior normal.         Thought Content: Thought content normal.         Judgment: Judgment normal.          Physical Exam        No visits with results within 1 Day(s) from this visit.   Latest known visit with results is:   Results Encounter on 07/07/2025   Component Date Value Ref Range Status    Hemoglobin A1C 07/10/2025 8.0 (H)  4.8 - 5.6 % Final    Comment:          Prediabetes: 5.7 - 6.4           Diabetes: >6.4           Glycemic control for adults with diabetes: <7.0      Glucose 07/10/2025 122 (H)  70 - 99 mg/dL Final    BUN 07/10/2025 26  8 - 27 mg/dL Final    Creatinine 07/10/2025 0.95  0.76 - 1.27 mg/dL Final    EGFR Result 07/10/2025 85  >59 mL/min/1.73 Final    BUN/Creatinine Ratio 07/10/2025 27 (H)  10 - 24 Final    Sodium 07/10/2025 140  134 - 144 mmol/L Final    Potassium 07/10/2025 5.3 (H)  3.5 - 5.2 mmol/L Final    Chloride 07/10/2025 102  96 - 106 mmol/L Final    Total CO2 07/10/2025 22  20 - 29 mmol/L Final    Calcium 07/10/2025 9.8  8.6 - 10.2 mg/dL Final    Total Protein 07/10/2025 6.2  6.0 - 8.5 g/dL Final    Albumin 07/10/2025 4.3  3.8 - 4.8 g/dL Final    Globulin 07/10/2025 1.9  1.5 - 4.5 g/dL Final    Total Bilirubin 07/10/2025 0.4  0.0 - 1.2 mg/dL Final    Alkaline Phosphatase 07/10/2025 49  44 - 121 IU/L Final    AST (SGOT) 07/10/2025 24  0 - 40 IU/L Final    ALT (SGPT) 07/10/2025 27  0 - 44 IU/L Final    Total Cholesterol  07/10/2025 139  100 - 199 mg/dL Final    Triglycerides 07/10/2025 269 (H)  0 - 149 mg/dL Final    HDL Cholesterol 07/10/2025 40  >39 mg/dL Final    VLDL Cholesterol Brad 07/10/2025 42 (H)  5 - 40 mg/dL Final    LDL Chol Calc (NIH) 07/10/2025 57  0 - 99 mg/dL Final     A1C Last 3 Results          1/9/2025    09:32 4/10/2025    10:15 7/10/2025    07:51   HGBA1C Last 3 Results   Hemoglobin A1C 7.8  7.8  8.0      Lab Results   Component Value Date    CHOL 115 10/30/2019    CHLPL 139 07/10/2025    TRIG 269 (H) 07/10/2025    HDL 40 07/10/2025    LDL 57 07/10/2025     Lab Results   Component Value Date    TSH 0.681 08/15/2023     Lab Results   Component Value Date    GLUCOSE 122 (H) 07/10/2025    BUN 26 07/10/2025    CREATININE 0.95 07/10/2025    EGFRIFNONA 100 12/01/2021    EGFRIFAFRI 100 11/05/2021    BCR 27 (H) 07/10/2025    K 5.3 (H) 07/10/2025    CO2 22 07/10/2025    CALCIUM 9.8 07/10/2025    ALBUMIN 4.3 07/10/2025    AST 24 07/10/2025    ALT 27 07/10/2025     Lab Results   Component Value Date    WBC 5.4 10/03/2024    HGB 14.0 10/03/2024    HCT 43.0 10/03/2024    MCV 91 10/03/2024     10/03/2024             Results  Labs   - A1c: 8.0   - Total Cholesterol: Normal   - LDL Cholesterol: Normal   - Triglycerides: High   - Potassium: 5.3             Assessment and Plan    Diagnoses and all orders for this visit:    1. Type 2 diabetes mellitus without complication, without long-term current use of insulin (Primary)  -     glucose blood (OneTouch Verio) test strip; 1 each by Other route 2 (Two) Times a Day. Once daily  DX E11.65  Dispense: 200 each; Refill: 1  -     Tirzepatide 2.5 MG/0.5ML solution auto-injector; Inject 2.5 mg under the skin into the appropriate area as directed 1 (One) Time Per Week.  Dispense: 2 mL; Refill: 0    2. Essential hypertension    3. Mixed hyperlipidemia    4. Body mass index (BMI) of 38.0 to 38.9 in adult    5. Former smoker    6. Hyperkalemia        Assessment & Plan  1. Diabetes  [place].  - His A1c level has shown a slight increase to 8.0.  - Currently on glimepiride twice a day, metformin 1000 mg twice a day, and Jardiance 25 mg daily.  - Discussed potential benefits and side effects of GLP-1 drugs, including their ability to better control blood sugars, reduce appetite, aid in weight loss, and provide cardioprotective benefits and potentially help sleep apnea as well. He expressed interest in trying Mounjaro.  - Prescription for Mounjaro provided, starting with a dose of 2.5 mg for the first month, followed by an increase to 5 mg for the second month, 7.5 mg for the third month, and finally reaching 10 mg. Advised to monitor blood sugar levels closely and reduce glimepiride dosage if hypoglycemia occurs. Refill for One Touch Verio glucose test strips provided.    2. Hypertension.  - Blood pressure is well-controlled on his current regimen.  - Advised to continue taking lisinopril as it helps protect the kidneys and control blood pressure.    3. Hyperlipidemia.  - Total cholesterol and LDL levels are within the normal range, but triglycerides remain elevated.  - Advised to continue taking rosuvastatin 10 mg daily.    4. Elevated Potassium.  - Potassium level is slightly elevated at 5.3.  - Provided with a handout on high potassium foods and advised to reduce their intake, should not to eliminate them entirely. Advised to limit consumption of bananas, orange juice, and cantaloupe and other high potassium foods.    5. Sleep Apnea.  - Currently using a CPAP machine.  - Discussed potential benefits of Mounjaro for weight loss and its FDA indication for obstructive sleep apnea.    Follow-up  A follow-up visit is scheduled in 1 month for his Medicare wellness check and follow-up after starting Mounjuro and in 3 months for his diabetes.      Medications Discontinued During This Encounter   Medication Reason    glucose blood (OneTouch Verio) test strip Reorder         Follow Up     Return in  about 1 month (around 8/16/2025) for Medicare Wellness and 3 month for diabetes.    Patient was given instructions and counseling regarding his condition or for health maintenance advice. Please see specific information pulled into the AVS if appropriate.     Patient or patient representative verbalized consent for the use of Ambient Listening during the visit with  Nazanin Garcia MD for chart documentation. 7/17/2025  18:22 EDT

## 2025-07-16 ENCOUNTER — OFFICE VISIT (OUTPATIENT)
Dept: FAMILY MEDICINE CLINIC | Facility: CLINIC | Age: 75
End: 2025-07-16
Payer: MEDICARE

## 2025-07-16 VITALS
OXYGEN SATURATION: 96 % | HEIGHT: 69 IN | BODY MASS INDEX: 38.75 KG/M2 | TEMPERATURE: 97.8 F | HEART RATE: 59 BPM | WEIGHT: 261.6 LBS | DIASTOLIC BLOOD PRESSURE: 72 MMHG | SYSTOLIC BLOOD PRESSURE: 124 MMHG | RESPIRATION RATE: 18 BRPM

## 2025-07-16 DIAGNOSIS — E78.2 MIXED HYPERLIPIDEMIA: ICD-10-CM

## 2025-07-16 DIAGNOSIS — I10 ESSENTIAL HYPERTENSION: ICD-10-CM

## 2025-07-16 DIAGNOSIS — E87.5 HYPERKALEMIA: ICD-10-CM

## 2025-07-16 DIAGNOSIS — E11.9 TYPE 2 DIABETES MELLITUS WITHOUT COMPLICATION, WITHOUT LONG-TERM CURRENT USE OF INSULIN: Primary | Chronic | ICD-10-CM

## 2025-07-16 DIAGNOSIS — Z87.891 FORMER SMOKER: ICD-10-CM

## 2025-07-16 RX ORDER — BLOOD SUGAR DIAGNOSTIC
1 STRIP MISCELLANEOUS 2 TIMES DAILY
Qty: 200 EACH | Refills: 1 | Status: SHIPPED | OUTPATIENT
Start: 2025-07-16

## 2025-08-08 DIAGNOSIS — E11.9 TYPE 2 DIABETES MELLITUS WITHOUT COMPLICATION, WITHOUT LONG-TERM CURRENT USE OF INSULIN: Chronic | ICD-10-CM

## 2025-08-19 ENCOUNTER — OFFICE VISIT (OUTPATIENT)
Dept: FAMILY MEDICINE CLINIC | Facility: CLINIC | Age: 75
End: 2025-08-19
Payer: MEDICARE

## 2025-08-19 VITALS
TEMPERATURE: 98.2 F | OXYGEN SATURATION: 92 % | HEART RATE: 86 BPM | RESPIRATION RATE: 18 BRPM | WEIGHT: 252.6 LBS | DIASTOLIC BLOOD PRESSURE: 72 MMHG | SYSTOLIC BLOOD PRESSURE: 112 MMHG | BODY MASS INDEX: 38.28 KG/M2 | HEIGHT: 68 IN

## 2025-08-19 DIAGNOSIS — E11.9 TYPE 2 DIABETES MELLITUS WITHOUT COMPLICATION, WITHOUT LONG-TERM CURRENT USE OF INSULIN: ICD-10-CM

## 2025-08-19 DIAGNOSIS — Z87.891 FORMER SMOKER: ICD-10-CM

## 2025-08-19 DIAGNOSIS — Z00.00 MEDICARE ANNUAL WELLNESS VISIT, SUBSEQUENT: Primary | ICD-10-CM

## (undated) DEVICE — DRSNG SURESITE WNDW 4X4.5

## (undated) DEVICE — ANTIBACTERIAL UNDYED BRAIDED (POLYGLACTIN 910), SYNTHETIC ABSORBABLE SUTURE: Brand: COATED VICRYL

## (undated) DEVICE — SUT MONOCRYL PLS ANTIB UND 3/0  PS1 27IN

## (undated) DEVICE — DRSNG TELFA PAD NONADH STR 1S 3X8IN

## (undated) DEVICE — DRSNG BARR INSUL SHLDR POLAR/CARE S/ADHS XL

## (undated) DEVICE — DUAL CUT SAGITTAL BLADE

## (undated) DEVICE — TOWEL,OR,DSP,ST,WHITE,DLX,4/PK,20PK/CS: Brand: MEDLINE

## (undated) DEVICE — CUFF TOURNI 1BLADDER 1PRT 30IN STRL

## (undated) DEVICE — PAD UNDERCAST WYTEX 4IN 4YD LF STRL

## (undated) DEVICE — BNDG ELAS ELITE V/CLOSE 6IN 5YD LF STRL

## (undated) DEVICE — ZIPPERED TOGA, 2X LARGE: Brand: FLYTE

## (undated) DEVICE — ADHS SKIN PREMIERPRO EXOFIN TOPICAL HI/VISC .5ML

## (undated) DEVICE — SOL IRRIG NACL 9PCT 1000ML BTL

## (undated) DEVICE — UNDYED BRAIDED (POLYGLACTIN 910), SYNTHETIC ABSORBABLE SUTURE: Brand: COATED VICRYL

## (undated) DEVICE — T-MAX DISPOSABLE FACE MASK 8 PER BOX

## (undated) DEVICE — SOL IRRIG SOD CHL 0.9PCT 3000ML

## (undated) DEVICE — DECANTER: Brand: UNBRANDED

## (undated) DEVICE — INTEGUSEAL MICROBIAL SEALANT: Brand: AVANOS

## (undated) DEVICE — SOLUTION,WATER,IRRIGATION,1000ML,STERILE: Brand: MEDLINE

## (undated) DEVICE — GLV SURG SENSICARE PI MIC PF SZ7.5 LF STRL

## (undated) DEVICE — SOL IRRIG NACL 1000ML

## (undated) DEVICE — GLV SURG SENSICARE PI ORTHO SZ8 LF STRL

## (undated) DEVICE — PK TOTL KN 50

## (undated) DEVICE — UNDERGLV SURG BIOGEL INDICAT PI SZ8 BLU

## (undated) DEVICE — SPNG CVR STR 2S 4X4

## (undated) DEVICE — SUT VIC 2/0 CT2 27IN J269H

## (undated) DEVICE — CVR HNDL LT SURG ACCSSRY BLU STRL

## (undated) DEVICE — KT SURG TURNOVER 050

## (undated) DEVICE — GLV SURG DERMASSURE GRN LF PF 8.5

## (undated) DEVICE — SYS COLD/THRP POLAR/CARE/CUBE

## (undated) DEVICE — PAD COLD/THRP SHLDR POLAR/CARE WRAP/ON UNIV XL

## (undated) DEVICE — ADHS LIQ MASTISOL 2/3ML

## (undated) DEVICE — GLV SURG SENSICARE PI ORTHO SZ8.5 LF STRL

## (undated) DEVICE — CUFF SCD HEMOFORCE SEQ CALF STD MD

## (undated) DEVICE — CEMENT MIXING SYSTEM WITH FEMORAL BREAKWAY NOZZLE: Brand: REVOLUTION

## (undated) DEVICE — SUT VIC 0 CT1 CR8 18IN J840D

## (undated) DEVICE — GLV SURG SENSICARE PI LF PF 8 GRN STRL

## (undated) DEVICE — STERILE PATIENT PROTECTIVE PAD FOR IMP® KNEE POSITIONERS & COHESIVE WRAP (10 / CASE): Brand: DE MAYO KNEE POSITIONER®

## (undated) DEVICE — WRAP SHOULDER COLD THERAPY

## (undated) DEVICE — SKIN AFFIX SURG ADHESIVE 72/CS 0.55ML: Brand: MEDLINE

## (undated) DEVICE — GLV SURG SIGNATURE ESSENTIAL PF LTX SZ8.5

## (undated) DEVICE — NEEDLE, QUINCKE, 18GX3.5": Brand: MEDLINE

## (undated) DEVICE — PENCL EVAC ULTRAVAC SMOKE W/BLD

## (undated) DEVICE — SOL IRR NACL 0.9PCT ARTHROMATIC 3000ML

## (undated) DEVICE — SOL IRRIG H2O 1000ML STRL

## (undated) DEVICE — DRSNG SLVR/ANTIBAC PRIMASEAL POST/OP ADHS 3.5X12IN

## (undated) DEVICE — PK TOTL SHLDR 50

## (undated) DEVICE — DRAPE SHEET ULTRAGARD: Brand: MEDLINE

## (undated) DEVICE — SLV SCD CALF HEMOFORCE DVT THERP REPROC MD

## (undated) DEVICE — IRRIGATOR BULB ASEPTO 60CC STRL

## (undated) DEVICE — MARKR SKIN W/RULR

## (undated) DEVICE — UNDERGLV SURG BIOGEL/PI PF SYNTH SURG SZ8.5 BLU 50/BX

## (undated) DEVICE — SCRW HEX PERSONA 3.5X3.2X33MM
Type: IMPLANTABLE DEVICE | Site: KNEE | Status: NON-FUNCTIONAL
Removed: 2021-11-30

## (undated) DEVICE — PAD UNDERCAST WYTEX 6IN 4YD LF STRL

## (undated) DEVICE — WRAP KNEE COLD THERAPY